# Patient Record
Sex: FEMALE | Race: WHITE | NOT HISPANIC OR LATINO | Employment: OTHER | URBAN - METROPOLITAN AREA
[De-identification: names, ages, dates, MRNs, and addresses within clinical notes are randomized per-mention and may not be internally consistent; named-entity substitution may affect disease eponyms.]

---

## 2017-10-19 ENCOUNTER — ALLSCRIPTS OFFICE VISIT (OUTPATIENT)
Dept: OTHER | Facility: OTHER | Age: 52
End: 2017-10-19

## 2017-10-19 DIAGNOSIS — M81.0 AGE-RELATED OSTEOPOROSIS WITHOUT CURRENT PATHOLOGICAL FRACTURE: ICD-10-CM

## 2017-10-19 DIAGNOSIS — R00.0 TACHYCARDIA: ICD-10-CM

## 2017-10-19 DIAGNOSIS — E53.8 DEFICIENCY OF OTHER SPECIFIED B GROUP VITAMINS (CODE): ICD-10-CM

## 2017-10-19 DIAGNOSIS — R06.02 SHORTNESS OF BREATH: ICD-10-CM

## 2017-10-20 NOTE — CONSULTS
Assessment  1  Positive HELENE (antinuclear antibody) (795 79) (R76 8)   2  Lupus anticoagulant positive (795 79) (R76 0)   3  B12 deficiency (266 2) (E53 8)   4  Osteoporosis (733 00) (M81 0)   5  Family history of cerebrovascular accident (CVA) (V17 1) (Z82 3) : Father   6  Family history of hypertension (V17 49) (Z82 49) : Father   7  Family history of diabetes mellitus (V18 0) (Z83 3) : Mother, Uncle   8  SOB (shortness of breath) (786 05) (R06 02)    Plan   B12 deficiency    · (1) CBC/PLT/DIFF; Status:Active; Requested BGQ:[id number];    Perform:[facility]; EOF:[id number]; Ordered; For:B12 deficiency; Ordered By:Trinidad March;   · (1) COMPREHENSIVE METABOLIC PANEL; Status:Active; Requested PGU:[id number];    Perform:[facility]; [id number]; Ordered; For:B12 deficiency; Ordered By:Trinidad March;   · (1) GASTRIN; Status:Active; Requested [id number];    Perform:[facility]; YDD:[id number]; Ordered; For:B12 deficiency; Ordered By:Trinidad March;   · (1) HOMOCYSTEINE; Status:Active; Requested MPB:[id number];    Perform:[facility]; ACV:[id number]; Ordered; For:B12 deficiency; Ordered By:Trinidad March;   · (1) INTRINSIC FACTOR BLOCKING ANTIBODY; Status:Active; Requested [id number]:[id number];    Perform:[facility]; KQF:[id number]; Ordered; For:B12 deficiency; Ordered By:Trinidad March;   · (1) METHYLMALONIC ACID,BLOOD; Status:Active; Requested XOF:[id number];    Perform:[facility]; JLY:[id number]; Ordered; For:B12 deficiency; Ordered By:Trinidad March;   · (1) VITAMIN B12; Status:Active; Requested QPE:[id number];    Perform:[facility]; KWE:[id number]; Ordered; For:B12 deficiency; Ordered By:Trinidad March;   · (Q) GASTRIC PARIETAL CELL ANTIBODY, GAMA; Status:Active; Requested  YMI:[id number];    Perform:[facility]; Due:2018; Ordered; For:B12 deficiency;  Ordered By:Trinidad March;   · Follow-up visit in 6 weeks Evaluation and Treatment Follow-up  Status: Complete  Done:  25ALL9733 09:15AM   Ordered; For: B12 deficiency; Ordered By: Burt Kennedy Performed:  Due: 29RGX0258; Last Updated By: Reyes Heir; 10/19/2017 10:59:06 AM  Lupus anticoagulant positive    · 3 - [facility]  (Rheumatology) Co-Management  *CHRONICALLY + HELENE, + LUPUS  ANTICOAGULANT; DX WITH LUPUS-LIKE DISEASE  NEVER TREATED  RECORDS  FROM CURRENT RHEUMATOLOGIST SCANNED IN [facility]  Status: Hold For -  Scheduling  Requested for: [id number]   Ordered; For: Lupus anticoagulant positive; Ordered By: Burt Kennedy Performed:  Due: 88IJQ2334  are Referring to a non- Preferred Provider : Established Patient  Care Summary provided  : Yes   · Rheumatology Referral Other Co-Management  *  CHRONICALLY + HELENE, + LUPUS  ANTICOAGULANT; DX WITH LUPUS-LIKE DISEASE  NEVER TREATED  RECORDS  FROM CURRENT RHEUMATOLOGIST SCANNED IN [facility]  Status: Active   Requested for: 20VYY7729   Ordered; For: Lupus anticoagulant positive; Ordered By: Burt Kennedy Performed:  Due: 72PVN9258  are Referring to a non- Preferred Provider : Services not provided in network  Care Summary provided  : Yes  SOB (shortness of breath)    · [facility] - [facility] (Cardiology) Co-Management  *Question of [address]  Chronic SOB  Fatigue  Estimated PA systolic pressure on stress echo at [facility] 8/13/2012 increased to  5mm mercury 'suggestive of significant exercise induced pulmonary hypertension  Status: Active  Requested for: [id number]   Ordered; For: SOB (shortness of breath); Ordered By: Burt Kennedy Performed:  Due: 16PWF1906  Care Summary provided  : Yes    * DXA BONE DENSITY SPINE HIP AND PELVIS; Status:Need Information - Financial Authorization; Requested UJK:[id number] 05:00PM;   Perform:[facility]; DHB:[id number]; Last Updated By:Bettie Power; 10/19/2017 2:27:27 PM;Ordered;    For:Osteoporosis; Ordered By:Jake March; Discussion/Summary    1   B12 deficiency without evidence of anemia  B12 184( 211-946) 10/6/2017  History of Nissen fundoplication 7120 for severe GERD  Lupus anticoagulant + on at least 3 separate occasions: (prior to 3/13/2015) 7/11/2016 and 10/6/2017  IgM anticardiolipin antibody: 18 10/6/2017Persistently positive HELENE  Lupus-like disease / lupus - has not been treated  Wants to see new rheumatologist  Referral made  Osteoporosis L-spine diagnosed 28 y/o  Dexa many years ago  Ordered  S/P hysterectomy without oophorectomy 39 y/o  Persistent SOB, chronic cough  Stress echo at [facility] suggestive of PAH  Saw [address] specialist who did not agree  Wishes to have another opinion  Referred  has B12 1000mcg/ML vials for single use from Dr Zenon Villagomez but did not receive needles/ syringes  She is comfortable with self IM administration and needles/ syringes rx written  It would be a hardship for patient financially and timewise to travel to Dr Seema Roper office for administration  1000mcg 2/week x 4-6 weeks recommended  Will evaluate for potential causes of B12 deficiency serologically  With history of Nissen fundoplication she may have poor absorption, with + autoimmune tests, I question if she also has autoantibodies to intrinsic factor or gastric parietal cells (pernicious anemia)  Repeat EGD to be considered in the future  is asked to f/u in 4-6 weeks with lab work  made for rheumatologic opinion regarding +/- Lupus, cardiology opinion for chronic SOB, +/- PAH  Continue ASA 81 mg po  History of Present Illness  Sophie Zaidi is a 45year old female seen 10/18/2017 for initial evaluation , self referred, secondary to low Vitamin B12 level  labs at [facility]: hemoglobin 13 2, mcv 90, RDW 14 3, WBC 6 2, 68% neutrophils, 21% lymphs, 8% monocytes 2% eosinophils, platelet count 021,432  Iron saturation 15%, ferritin 52 Vitamin B12 184( 211-946) Tsh 1 98, free T4 1 18, negative lyme antibody  normal, Anti-scleroderma antibodies normal, anti-DS DNA normal, Styles AB normal, RNP antibodies normal  Anti-centromere AB normal  Normal sed rate  Dense fine speckled pattern is noted which suggest presence of  antibody which has a low prevalence in systemic autoimmune rheumatic diseases  Normal immunofixation on SPEP  Normal serum immunoglobulins  CCP antibodies IgG and IgAidentified hyaline casts  panel was normal  CMP normalANA  POSITIVE Lupus anticoagulant  IgM anticardiolipin antibody 18 (Indeterminate)  Normal IgG anticardiolipin antibody , normal IgA anticardiolipin antibody  normal anticardiolipin antibodies  Lyme negative  ANCA profile, normal C3 and C4 complement, normal CRP  Normal thyroglobulin profile  Normal ESR  + LUPUS ANTICOAGULANT  positive  [facility] intake 3/13/2015 she noted that she had a history of + lupus anticoagulant  Raymundo Chatman states she 1st had HELENE evaluated and was positive in 2012  is extremely frustrated  She states she has been having symptoms of SOB, fatigue and has been diagnosed with a lupus-like disease but has not received any disease directed therapy  She took prednisone years ago without benefit and significant weight gain  Was previously seen at [address] [place] [address] by Yady Templeton in 2012 regarding chronic cough, SOB without history of asthma, allergies  Quit smoking at 34years old after 15-20 pack years  PND treated and cough improved but ANSARI persisted  Bronchoscopy was normal  PFTs normal  Evaluation unrevealing to pulmonary source for her dyspnea  No benefit with Advair or high dose steroids  Falls asleep easily  Normal echo  Noted to have edema and sleep apnea suspected  She was advised to increase Lasix and have sleep study  Gina, a cardiologist at the [facility] had her undergo echocardiogram, however, poor images were obtained  She been had a stress echocardiogram and a question of pulmonary artery hypertension and was raised   stress echocardiogram report from [address]: estimated P  a systolic pressure increased to 56 mm mercury suggestive of significant exercise induced pulmonary hypertensionstates she saw Dr Darnell Smalls, a specialist at [address] [place] [address] who didn't examine me and told me I look too good to have pulmonary artery hypertension  He did not perform additional testing but then referred her to Dr Katherine Birmingham, a cardiac electrophysiologist at [facility] due to resting heart rate 114-120s who informed her she had a + HELENE  saw Dr Kelvin Chaudhary a few times but felt pressured by him as he wanted to perform a lip biopsy to evaluate for Sjogren's syndrome  She has been seeing TERI Cutler  at the rheumatology center of [facility] but has not seeing him in approximately a year and a half as she states he was requesting repeated blood work that was not moving forward towards treating any symptoms she was having and feels that he can be adverserial    intolerant  Osteoporosis diagnosed at 26 y/o in Lumbar spine  Partial Hysterectomy 1996 for endometrial hyperplasia  Ovaries spared  at 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 arranged for her to see Jah Fisher MD in [place], [place] who ordered the above labs  Ginny Painter states that this was the first time her B12 level had been checked  She was prescribed B12 by Dr Otto Shields for 1000mcg IM twice weekly administration but does not syringes or needles  Ginny Painter is very comfortable with injecting herself IM  s/sx pancreatic insufficiency  She does not take PPI or H2- blocker  She does not drink alcohol regularly  has never had a blood clot  He has 2 grown children ages 28 and 29  Did have a first trimester miscarriage around the 10th week in between the birth of her 2 children  She takes Aspirin  last EGD was 3-4 years ago performed by Dr Rip Harris at [facility]  She is s/p Nissen Fundoplication for severe GERD   She had a colonoscopy previously  saw Dr Sade Edouard [facility]13/2015 regarding persistently + HELENE and IGM anticardiolipin antibody  There was also question whether or not she had cotton wool spots secondary to embolic phenomenon  Patient states ultimately this was ruled out  Aspirin was hematologic recommendation at that time  Review of Systems    Constitutional: feeling poorly,-- recent weight gain-- and-- feeling tired, but-- no fever,-- no chills-- and-- no recent weight loss  Eyes: No complaints of eye pain, no red eyes, no eyesight problems, no discharge, no dry eyes, no itching of eyes  ENT: nosebleeds, but-- no earache,-- no sore throat,-- no hearing loss,-- no nasal discharge-- and-- no hoarseness  Cardiovascular: fast heart rate  Respiratory: shortness of breath-- and-- shortness of breath during exertion  Gastrointestinal: No complaints of abdominal pain, no constipation, no nausea or vomiting, no diarrhea, no bloody stools  Genitourinary: s/p hysterectomy 26 y/o, but-- No complaints of dysuria, no incontinence, no pelvic pain, no dysmenorrhea, no vaginal discharge or bleeding  Musculoskeletal: joint stiffness  Integumentary: No complaints of skin rash or lesions, no itching, no skin wounds, no breast pain or lump  Neurological: numbness,-- tingling-- and-- Patient reports EMG BLE inconclusive  Psychiatric: Not suicidal, no sleep disturbance, no anxiety or depression, no change in personality, no emotional problems  Endocrine: No complaints of proptosis, no hot flashes, no muscle weakness, no deepening of the voice, no feelings of weakness  Active Problems  1  Breast lump (611 72) (N63 0)   2  Encounter for routine gynecological examination (V72 31) (Z01 419)   3  Encounter for screening mammogram for malignant neoplasm of breast (V76 12)   (Z12 31)   4  Hot flashes (627 2) (N95 1)   5  Lupus anticoagulant positive (795 79) (R76 0)   6   Night sweats (780  8) (R61)   7  Positive HELENE (antinuclear antibody) (795 79) (R76 8)    Past Medical History  1  History of Endometrial hyperplasia (621 30) (N85 00)   2  History of encephalitis (V12 42) (Z86 61)   3  History of malignant neoplasm of cervix uteri (V10 41) (Z85 41)   4  History of osteopenia (V13 59) (Z87 39)   5  History of osteoporosis (V13 59) (Z87 39)   6  History of pneumonia (V12 61) (Z87 01)   7  History of urinary incontinence (V13 09) (Z87 898)   8  History of Maxillary sinusitis (473 0) (J32 0)    The active problems and past medical history were reviewed and updated today  Surgical History  1  History of Ankle Surgery   2  History of Breast Surgery Reduction Procedure   3  History of Cholecystectomy   4  History of Esophagogastric Fundoplasty Laparoscopic   5  History of Hand Arthrodesis IP Joint   6  History of Tonsillectomy   7  History of Vaginal Hysterectomy    The surgical history was reviewed and updated today  Family History  Mother    1  Family history of diabetes mellitus (V18 0) (Z83 3)   2  Family history of malignant neoplasm of uterus (V16 49) (Z80 49)   3  Family history of pancreatic cancer (V16 0) (Z80 0)  Father    4  Family history of cerebrovascular accident (CVA) (V17 1) (Z82 3)   5  Family history of hypertension (V17 49) (Z82 49)  Uncle    6  Family history of diabetes mellitus (V18 0) (Z83 3)    The family history was reviewed and updated today  Social History   · Daily caffeinated coffee consumption   · Exercise habits   · Former smoker (V15 82) (Y01 210)   ·    · Occasional alcohol use   · Sexually active   · Two children   · Denied: History of Uses drugs daily  The social history was reviewed and updated today  The social history was reviewed and is unchanged  Current Meds   1  Aspirin 81 MG TABS; Therapy: (Recorded:13Mar2015) to Recorded    Allergies  1  Augmentin SUSR   2   Dilantin CAPS    Vitals  Vital Signs    Recorded: 69RIU0340 09:37AM Temperature 98 F   Heart Rate 103   Respiration 16   Systolic 697   Diastolic 90   Height 5 ft 2 5 in   Weight 225 lb    BMI Calculated 40 5   BSA Calculated 2 02   O2 Saturation 95   Pain Scale 0     Physical Exam    Constitutional   General appearance: No acute distress, well appearing and well nourished  Eyes   Conjunctiva and lids: No swelling, erythema or discharge  Pupils and irises: Equal, round and reactive to light  Ears, Nose, Mouth, and Throat   External inspection of ears and nose: Normal     Oropharynx: Normal with no erythema, edema, exudate or lesions  Pulmonary   Respiratory effort: No increased work of breathing or signs of respiratory distress  Auscultation of lungs: Clear to auscultation  Cardiovascular   Palpation of heart: Normal PMI, no thrills  Auscultation of heart: Normal rate and rhythm, normal S1 and S2, without murmurs  Examination of extremities for edema and/or varicosities: Normal     Abdomen   Abdomen: Non-tender, no masses  Liver and spleen: No hepatomegaly or splenomegaly  Lymphatic   Palpation of lymph nodes in neck: No lymphadenopathy  Musculoskeletal   Gait and station: Normal     Digits and nails: Normal without clubbing or cyanosis  Inspection/palpation of joints, bones, and muscles: Normal     Skin   Skin and subcutaneous tissue: Normal without rashes or lesions  Psychiatric   Orientation to person, place, and time: Normal     Mood and affect: Normal          Future Appointments    Date/Time Provider Specialty Site   11/27/2017 09:15 AM DANUTA Vides  [facility]     Signatures   Electronically signed by : Kim Lion [facility]; Oct 19 2017  3:47PM EST                       (Author)    Electronically signed by :  DANUTA Jackson ; Oct 19 2017  5:46PM EST                       (Author)

## 2017-10-31 ENCOUNTER — HOSPITAL ENCOUNTER (OUTPATIENT)
Dept: RADIOLOGY | Age: 52
Discharge: HOME | End: 2017-10-31
Payer: COMMERCIAL

## 2017-10-31 DIAGNOSIS — M81.0 AGE-RELATED OSTEOPOROSIS WITHOUT CURRENT PATHOLOGICAL FRACTURE: ICD-10-CM

## 2017-10-31 PROCEDURE — 77080 DXA BONE DENSITY AXIAL: CPT

## 2017-11-08 ENCOUNTER — ALLSCRIPTS OFFICE VISIT (OUTPATIENT)
Dept: OTHER | Facility: OTHER | Age: 52
End: 2017-11-08

## 2017-11-08 ENCOUNTER — TRANSCRIBE ORDERS (OUTPATIENT)
Dept: ADMINISTRATIVE | Facility: HOSPITAL | Age: 52
End: 2017-11-08

## 2017-11-08 ENCOUNTER — GENERIC CONVERSION - ENCOUNTER (OUTPATIENT)
Dept: OTHER | Facility: OTHER | Age: 52
End: 2017-11-08

## 2017-11-08 DIAGNOSIS — R06.2 WHEEZING: Primary | ICD-10-CM

## 2017-11-11 ENCOUNTER — HOSPITAL ENCOUNTER (OUTPATIENT)
Dept: RADIOLOGY | Facility: HOSPITAL | Age: 52
Discharge: HOME | End: 2017-11-11
Attending: INTERNAL MEDICINE
Payer: COMMERCIAL

## 2017-11-11 ENCOUNTER — TRANSCRIBE ORDERS (OUTPATIENT)
Dept: RADIOLOGY | Facility: HOSPITAL | Age: 52
End: 2017-11-11

## 2017-11-11 DIAGNOSIS — R06.02 SHORTNESS OF BREATH: ICD-10-CM

## 2017-11-11 DIAGNOSIS — R00.0 TACHYCARDIA: ICD-10-CM

## 2017-11-11 PROCEDURE — 78582 LUNG VENTILAT&PERFUS IMAGING: CPT

## 2017-11-11 PROCEDURE — A9540 TC99M MAA: HCPCS

## 2017-11-11 PROCEDURE — 71020 HB CHEST X-RAY 2VW FRONTAL&LATL: CPT

## 2017-11-11 PROCEDURE — A9558 XE133 XENON 10MCI: HCPCS

## 2017-11-18 ENCOUNTER — APPOINTMENT (OUTPATIENT)
Dept: LAB | Facility: CLINIC | Age: 52
End: 2017-11-18
Payer: COMMERCIAL

## 2017-11-18 ENCOUNTER — TRANSCRIBE ORDERS (OUTPATIENT)
Dept: LAB | Facility: CLINIC | Age: 52
End: 2017-11-18

## 2017-11-18 DIAGNOSIS — E53.8 DEFICIENCY OF OTHER SPECIFIED B GROUP VITAMINS (CODE): ICD-10-CM

## 2017-11-18 DIAGNOSIS — E53.8 ADENOSYLCOBALAMIN SYNTHESIS DEFECT: ICD-10-CM

## 2017-11-18 DIAGNOSIS — E53.8 ADENOSYLCOBALAMIN SYNTHESIS DEFECT: Primary | ICD-10-CM

## 2017-11-18 LAB
ALBUMIN SERPL BCP-MCNC: 3.4 G/DL (ref 3.5–5)
ALP SERPL-CCNC: 108 U/L (ref 46–116)
ALT SERPL W P-5'-P-CCNC: 23 U/L (ref 12–78)
ANION GAP SERPL CALCULATED.3IONS-SCNC: 7 MMOL/L (ref 4–13)
AST SERPL W P-5'-P-CCNC: 11 U/L (ref 5–45)
BASOPHILS # BLD AUTO: 0.03 THOUSANDS/ΜL (ref 0–0.1)
BASOPHILS NFR BLD AUTO: 1 % (ref 0–1)
BILIRUB SERPL-MCNC: 0.4 MG/DL (ref 0.2–1)
BUN SERPL-MCNC: 15 MG/DL (ref 5–25)
CALCIUM SERPL-MCNC: 9.1 MG/DL (ref 8.3–10.1)
CHLORIDE SERPL-SCNC: 103 MMOL/L (ref 100–108)
CO2 SERPL-SCNC: 29 MMOL/L (ref 21–32)
CREAT SERPL-MCNC: 0.91 MG/DL (ref 0.6–1.3)
EOSINOPHIL # BLD AUTO: 0.26 THOUSAND/ΜL (ref 0–0.61)
EOSINOPHIL NFR BLD AUTO: 6 % (ref 0–6)
ERYTHROCYTE [DISTWIDTH] IN BLOOD BY AUTOMATED COUNT: 12.8 % (ref 11.6–15.1)
GFR SERPL CREATININE-BSD FRML MDRD: 72 ML/MIN/1.73SQ M
GLUCOSE SERPL-MCNC: 97 MG/DL (ref 65–140)
HCT VFR BLD AUTO: 40.2 % (ref 34.8–46.1)
HCYS SERPL-SCNC: 9.7 UMOL/L (ref 3.7–11.2)
HGB BLD-MCNC: 13.1 G/DL (ref 11.5–15.4)
LYMPHOCYTES # BLD AUTO: 1.46 THOUSANDS/ΜL (ref 0.6–4.47)
LYMPHOCYTES NFR BLD AUTO: 31 % (ref 14–44)
MCH RBC QN AUTO: 29.6 PG (ref 26.8–34.3)
MCHC RBC AUTO-ENTMCNC: 32.6 G/DL (ref 31.4–37.4)
MCV RBC AUTO: 91 FL (ref 82–98)
MONOCYTES # BLD AUTO: 0.31 THOUSAND/ΜL (ref 0.17–1.22)
MONOCYTES NFR BLD AUTO: 7 % (ref 4–12)
NEUTROPHILS # BLD AUTO: 2.68 THOUSANDS/ΜL (ref 1.85–7.62)
NEUTS SEG NFR BLD AUTO: 55 % (ref 43–75)
PLATELET # BLD AUTO: 312 THOUSANDS/UL (ref 149–390)
PMV BLD AUTO: 10.5 FL (ref 8.9–12.7)
POTASSIUM SERPL-SCNC: 3.9 MMOL/L (ref 3.5–5.3)
PROT SERPL-MCNC: 6.8 G/DL (ref 6.4–8.2)
RBC # BLD AUTO: 4.43 MILLION/UL (ref 3.81–5.12)
SODIUM SERPL-SCNC: 139 MMOL/L (ref 136–145)
VIT B12 SERPL-MCNC: 760 PG/ML (ref 100–900)
WBC # BLD AUTO: 4.74 THOUSAND/UL (ref 4.31–10.16)

## 2017-11-18 PROCEDURE — 86340 INTRINSIC FACTOR ANTIBODY: CPT

## 2017-11-18 PROCEDURE — 82941 ASSAY OF GASTRIN: CPT

## 2017-11-18 PROCEDURE — 83516 IMMUNOASSAY NONANTIBODY: CPT

## 2017-11-18 PROCEDURE — 82607 VITAMIN B-12: CPT

## 2017-11-18 PROCEDURE — 85025 COMPLETE CBC W/AUTO DIFF WBC: CPT

## 2017-11-18 PROCEDURE — 80053 COMPREHEN METABOLIC PANEL: CPT

## 2017-11-18 PROCEDURE — 83090 ASSAY OF HOMOCYSTEINE: CPT

## 2017-11-18 PROCEDURE — 83918 ORGANIC ACIDS TOTAL QUANT: CPT

## 2017-11-18 PROCEDURE — 36415 COLL VENOUS BLD VENIPUNCTURE: CPT

## 2017-11-20 LAB — IF BLOCK AB SER QL RIA: 1 AU/ML (ref 0–1.1)

## 2017-11-21 LAB
GASTRIN SERPL-MCNC: 20 PG/ML (ref 0–115)
PCA AB SER-ACNC: 1.1 UNITS (ref 0–20)

## 2017-11-22 LAB — METHYLMALONATE SERPL-SCNC: 121 NMOL/L (ref 0–378)

## 2017-11-27 ENCOUNTER — HOSPITAL ENCOUNTER (OUTPATIENT)
Dept: NON INVASIVE DIAGNOSTICS | Facility: CLINIC | Age: 52
Discharge: HOME | End: 2017-11-27
Payer: COMMERCIAL

## 2017-11-27 ENCOUNTER — ALLSCRIPTS OFFICE VISIT (OUTPATIENT)
Dept: OTHER | Facility: OTHER | Age: 52
End: 2017-11-27

## 2017-11-27 DIAGNOSIS — E53.8 DEFICIENCY OF OTHER SPECIFIED B GROUP VITAMINS (CODE): ICD-10-CM

## 2017-11-27 DIAGNOSIS — R06.02 SHORTNESS OF BREATH: ICD-10-CM

## 2017-11-27 DIAGNOSIS — R00.0 TACHYCARDIA: ICD-10-CM

## 2017-11-27 PROCEDURE — 93306 TTE W/DOPPLER COMPLETE: CPT

## 2017-11-28 NOTE — PROGRESS NOTES
Assessment    1  B12 deficiency (266 2) (E53 8)   2  Positive HELENE (antinuclear antibody) (795 79) (R76 8)    Plan  B12 deficiency    · Cyanocobalamin 1000 MCG/ML Injection Solution; 1000 mcg IM 2x a month--3month supply   Rx By: Elizabeth Mack); Dispense: 0 Days ; #:6 ML; Refill: 5;For: B12 deficiency; VINCENT = N; Verified Transmission to [address] [place] [address] [address]; Last Updated By: System, [employer]; 11/27/2017 9:50:25 AM   · (1) CBC/PLT/DIFF; Status:Active; Requested for:27Nov2017;    Perform:[facility]; CSC:[id number]; Ordered; For:B12 deficiency; Ordered By:Margarette Brewster);   · (1) VITAMIN B12; Status:Active; Requested for:27Nov2017;    Perform:[facility]; EOD:[id number]; Ordered;deficiency; Ordered By:Margarette Brewster); · Follow-up visit in 1 year Evaluation and Treatment  Follow-up  Status: Hold For -Scheduling  Requested for: [id number]   Ordered;B12 deficiency; Ordered By: Elizabeth Mack) Performed:  Due: [id number]    Discussion/Summary  Discussion Summary:   1  vitamin B12 deficiency in a patient was diagnosed with autoimmune connective tissue disease most likely secondary to malabsorption as well because of history of gastric fundoplication secondary to GERDVitamin B12 1000 mcg every other week IM shotAspirin 81 mg p o  b i d  with a history of lupus anticoagulant antibodies she never had any history of thrombosis, DVT, PEFollow-up in 1 year with CBC, vitamin B12 level  History of Present Illness  HPI: Jayda Mauricio is a 45year old female seen 10/18/2017 for initial evaluation , self referred, secondary to low Vitamin B12 level  [facility]: hemoglobin 13 2, mcv 90, RDW 14 3, WBC 6 2, 68% neutrophils, 21% lymphs, 8% monocytes 2% eosinophils, platelet count 675,096  Iron saturation 15%, ferritin 52 Vitamin B12 184( 211-946) Tsh 1 98, free T4 1 18, negative lyme antibody  normal, Anti-scleroderma antibodies normal, anti-DS DNA normal, Styles AB normal, RNP antibodies normal  Anti-centromere AB normal  Normal sed rate  Dense fine speckled pattern is noted which suggest presence of  antibody which has a low prevalence in systemic autoimmune rheumatic diseases  Normal immunofixation on SPEP  Normal serum immunoglobulins  CCP antibodies IgG and IgAidentified hyaline casts  panel was normal  CMP normalANA  POSITIVE Lupus anticoagulant  IgM anticardiolipin antibody 18 (Indeterminate)  Normal IgG anticardiolipin antibody , normal IgA anticardiolipin antibody  normal anticardiolipin antibodies  Lyme negative  ANCA profile, normal C3 and C4 complement, normal CRP  Normal thyroglobulin profile  Normal ESR  + LUPUS ANTICOAGULANT  positive  [facility] she noted that she had a history of + lupus anticoagulant  Vera Gonzalezmiles states she 1st had HELENE evaluated and was positive in 2012  is extremely frustrated  She states she has been having symptoms of SOB, fatigue and has been diagnosed with a lupus-like disease but has not received any disease directed therapy  She took prednisone years ago without benefit and significant weight gain  Was previously seen at [facility] [address] [place] [address] by Cecilia Dumas in 2012 regarding chronic cough, SOB without history of asthma, allergies  Quit smoking at 34years old after 15-20 pack years  PND treated and cough improved but ANSARI persisted  Bronchoscopy was normal  PFTs normal  Evaluation unrevealing to pulmonary source for her dyspnea  No benefit with Advair or high dose steroids  Falls asleep easily  Normal echo  Noted to have edema and sleep apnea suspected  She was advised to increase Lasix and have sleep study  Gina, a cardiologist at the [facility] had her undergo echocardiogram, however, poor images were obtained  She been had a stress echocardiogram and a question of pulmonary artery hypertension and was raised  stress echocardiogram report from [address]: estimated P  a systolic pressure increased to 56 mm mercury suggestive of significant exercise induced pulmonary hypertensionstates she saw Dr Jesus Caldwell, a specialist at [address] [place] [address] who didn't examine me and told me I look too good to have pulmonary artery hypertension  He did not perform additional testing but then referred her to Dr Caroline Castellanos, a cardiac electrophysiologist at [facility] due to resting heart rate 114-120s who informed her she had a + HELENE  saw Dr Saumya Iniguez a few times but felt pressured by him as he wanted to perform a lip biopsy to evaluate for Sjogren's syndrome  She has been seeing TERI Ovalle  at the rheumatology center of [facility] but has not seeing him in approximately a year and a half as she states he was requesting repeated blood work that was not moving forward towards treating any symptoms she was having and feels that he can be adverserial   intolerant  Osteoporosis diagnosed at 26 y/o in Lumbar spine  Partial Hysterectomy 1996 for endometrial hyperplasia  Ovaries spared  at [facility] [place]  she returned from a vacation and had 3+ pitting edema  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, 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 arranged for her to see John Carrillo MD in [place], [place] who ordered the above labs  Madelaine Simmonds states that this was the first time her B12 level had been checked  She was prescribed B12 by Dr Starr Castillo for 1000mcg IM twice weekly administration but does not syringes or needles  Madelaine Simmonds is very comfortable with injecting herself IM s/sx pancreatic insufficiency  She does not take PPI or H2- blocker  She does not drink alcohol regularly  has never had a blood clot  He has 2 grown children ages 28 and 29  Did have a first trimester miscarriage around the 10th week in between the birth of her 2 children  She takes Aspirin  last EGD was 3-4 years ago performed by Dr Joseluis Slater at [facility]  She is s/p Nissen Fundoplication for severe GERD  She had a colonoscopy previously    saw Dr Donna Jean with [facility] 2/13/2015 regarding persistently + HELENE and IGM anticardiolipin antibody  There was also question whether or not she had cotton wool spots secondary to embolic phenomenon  Patient states ultimately this was ruled out  Aspirin was hematologic recommendation at that time  Interval History: Evaluated at [facility] and was diagnosed with auto connective tissue disease currently on Plaquenil 200 mg p  o  b i d  since November 2017B12 1000 mcg IM twice weekly for the past month because of fundoplication secondary to GERD      Review of Systems  Complete-Female:  Constitutional: feeling poorly,-- recent --Ulb weight gain-- and-- feeling tired, but-- no fever,-- no chills-- and-- no recent weight loss  Eyes: No complaints of eye pain, no red eyes, no eyesight problems, no discharge, no dry eyes, no itching of eyes  ENT: nosebleeds, but-- no earache,-- no sore throat,-- no hearing loss,-- no nasal discharge-- and-- no hoarseness  Cardiovascular: fast heart rate  Respiratory: shortness of breath-- and-- shortness of breath during exertion  Gastrointestinal: No complaints of abdominal pain, no constipation, no nausea or vomiting, no diarrhea, no bloody stools  Genitourinary: s/p hysterectomy 28 y/o, but-- No complaints of dysuria, no incontinence, no pelvic pain, no dysmenorrhea, no vaginal discharge or bleeding  Musculoskeletal: joint stiffness  Integumentary: No complaints of skin rash or lesions, no itching, no skin wounds, no breast pain or lump  Neurological: numbness,-- tingling-- and-- Patient reports EMG BLE inconclusive  Psychiatric: Not suicidal, no sleep disturbance, no anxiety or depression, no change in personality, no emotional problems  Endocrine: No complaints of proptosis, no hot flashes, no muscle weakness, no deepening of the voice, no feelings of weakness  Active Problems    1  B12 deficiency (266 2) (E50 4)   2   Breast lump (611 72) (N63 0)   3  Encounter for routine gynecological examination (V72 31) (Z01 419)   4  Encounter for screening mammogram for malignant neoplasm of breast (V76 12) (Z12 31)   5  Hot flashes (627 2) (N95 1)   6  Lupus anticoagulant positive (795 79) (R76 0)   7  Night sweats (780 8) (R61)   8  Osteoporosis (733 00) (M81 0)   9  Positive HELENE (antinuclear antibody) (795 79) (R76 8)   10  Sinus tachycardia (427 89) (R00 0)   11  SOB (shortness of breath) (786 05) (R06 02)    Past Medical History  1  History of Endometrial hyperplasia (621 30) (N85 00)   2  History of encephalitis (V12 42) (Z86 61)   3  History of malignant neoplasm of cervix uteri (V10 41) (Z85 41)   4  History of osteopenia (V13 59) (Z87 39)   5  History of osteoporosis (V13 59) (Z87 39)   6  History of pneumonia (V12 61) (Z87 01)   7  History of urinary incontinence (V13 09) (Z87 898)   8  History of Maxillary sinusitis (473 0) (J32 0)    Surgical History    1  History of Ankle Surgery   2  History of Breast Surgery Reduction Procedure   3  History of Cholecystectomy   4  History of Esophagogastric Fundoplasty Laparoscopic   5  History of Hand Arthrodesis IP Joint   6  History of Tonsillectomy   7  History of Vaginal Hysterectomy    Family History  Mother    1  Family history of diabetes mellitus (V18 0) (Z83 3)   2  Family history of malignant neoplasm of uterus (V16 49) (Z80 49)   3  Family history of pancreatic cancer (V16 0) (Z80 0)  Father    4  Family history of cerebrovascular accident (CVA) (V17 1) (Z82 3)   5  Family history of hypertension (V17 49) (Z82 49)  Uncle    6  Family history of diabetes mellitus (V18 0) (Z83 3)    Social History     · Daily caffeinated coffee consumption   · Exercise habits   · Former smoker (H54 30) (I18 567)   ·    · Occasional alcohol use   · Sexually active   · Two children   · Denied: History of Uses drugs daily    Current Meds   1  Aspirin 81 MG TABS;  Therapy: (Recorded:13Mar2015) to Recorded   2  B-12 Compliance Injection 1000 MCG/ML Injection Kit; 1000 mcg 2 times per week; Therapy: 84GEW1130 to (Last Rx:13Nov2017)  Requested for: 49LYV4307 Ordered   3  Furosemide 20 MG Oral Tablet; one po qd; Therapy: 72LLR2463 to Recorded   4  Plaquenil TABS; Therapy: (Recorded:27Nov2017) to Recorded    Allergies  1  Augmentin SUSR   2  Dilantin CAPS    Vitals  Vital Signs    Recorded: 90YED3361 09:20AM   Temperature 98 7 F   Heart Rate 102   Respiration 16   Systolic 169   Diastolic 80   Height 5 ft 2 5 in   Weight 227 lb    BMI Calculated 40 86   BSA Calculated 2 03   O2 Saturation 91       Physical Exam   Constitutional  General appearance: No acute distress, well appearing and well nourished  Eyes  Conjunctiva and lids: No swelling, erythema or discharge  Pupils and irises: Equal, round and reactive to light  Ears, Nose, Mouth, and Throat  External inspection of ears and nose: Normal    Oropharynx: Normal with no erythema, edema, exudate or lesions  Pulmonary  Respiratory effort: No increased work of breathing or signs of respiratory distress  Auscultation of lungs: Clear to auscultation  Cardiovascular  Palpation of heart: Normal PMI, no thrills  Auscultation of heart: Normal rate and rhythm, normal S1 and S2, without murmurs  Examination of extremities for edema and/or varicosities: Normal    Abdomen  Abdomen: Non-tender, no masses  Liver and spleen: No hepatomegaly or splenomegaly  Lymphatic  Palpation of lymph nodes in neck: No lymphadenopathy  Musculoskeletal  Gait and station: Normal    Digits and nails: Normal without clubbing or cyanosis  Inspection/palpation of joints, bones, and muscles: Normal    Skin  Skin and subcutaneous tissue: Normal without rashes or lesions     Psychiatric  Orientation to person, place, and time: Normal    Mood and affect: Normal          Results/Data  (1) CBC/PLT/DIFF 76SSH7559 08:44AM Aye Little Order Number: [id number] Test Name Result Flag Reference   WBC COUNT 4 74 Thousand/uL  4 31-10 16   RBC COUNT 4 43 Million/uL  3 81-5 12   HEMOGLOBIN 13 1 g/dL  11 5-15 4   HEMATOCRIT 40 2 %  34 8-46  1   MCV 91 fL  82-98   MCH 29 6 pg  26 8-34 3   MCHC 32 6 g/dL  31 4-37 4   RDW 12 8 %  11 6-15 1   MPV 10 5 fL  8 9-12 7   PLATELET COUNT 778 Thousands/uL  149-390   NEUTROPHILS RELATIVE PERCENT 55 %  43-75   LYMPHOCYTES RELATIVE PERCENT 31 %  14-44   MONOCYTES RELATIVE PERCENT 7 %  4-12   EOSINOPHILS RELATIVE PERCENT 6 %  0-6   BASOPHILS RELATIVE PERCENT 1 %  0-1   NEUTROPHILS ABSOLUTE COUNT 2 68 Thousands/? ??L  1 85-7 62   LYMPHOCYTES ABSOLUTE COUNT 1 46 Thousands/? ??L  0 60-4 47   MONOCYTES ABSOLUTE COUNT 0 31 Thousand/? ??L  0 17-1 22   EOSINOPHILS ABSOLUTE COUNT 0 26 Thousand/? ??L  0 00-0 61   BASOPHILS ABSOLUTE COUNT 0 03 Thousands/? ??L  0 00-0 10     (1) COMPREHENSIVE METABOLIC PANEL [id number] 54:31UK Rubén Be Order Number: [id number]     Test Name Result Flag Reference   GLUCOSE,RANDM 97 mg/dL       If the patient is fasting, the ADA then defines impaired fasting glucose as > 100 mg/dL and diabetes as > or equal to 123 mg/dL  Specimen collection should occur prior to Sulfasalazine administration due to the potential for falsely depressed results  Specimen collection should occur prior to Sulfapyridine administration due to the potential for falsely elevated results  SODIUM 139 mmol/L  136-145   POTASSIUM 3 9 mmol/L  3 5-5 3   CHLORIDE 103 mmol/L  100-108   CARBON DIOXIDE 29 mmol/L  21-32   ANION GAP (CALC) 7 mmol/L  4-13   BLOOD UREA NITROGEN 15 mg/dL  5-25   CREATININE 0 91 mg/dL  0 60-1 30   Standardized to IDMS reference method   CALCIUM 9 1 mg/dL  8 3-10 1   BILI, TOTAL 0 40 mg/dL  0 20-1 00   ALK PHOSPHATAS 108 U/L     ALT (SGPT) 23 U/L  12-78   Specimen collection should occur prior to Sulfasalazine administration due to the potential for falsely depressed results     AST(SGOT) 11 U/L 5-45   Specimen collection should occur prior to Sulfasalazine administration due to the potential for falsely depressed results  ALBUMIN 3 4 g/dL L 3 5-5 0   TOTAL PROTEIN 6 8 g/dL  6 4-8 2   eGFR 72 ml/min/1 73sq m       [facility] Disease Education Program recommendations are as follows: GFR calculation is accurate only with a steady state creatinine Chronic Kidney disease less than 60 ml/min/1 73 sq  meters Kidney failure less than 15 ml/min/1 73 sq  meters       (1) VITAMIN B12 [id number] 08:44AM Ashley Better Order Number: [id number]     Test Name Result Flag Reference   VITAMIN B12 760 pg/mL  100-900     (1) METHYLMALONIC ACID,BLOOD [id number] 08:44AM Ashley Better Order Number: [id number]     Test Name Result Flag Reference   METHYLMALONIC ACID 121 nmol/L  0 - 378     Performed at:  [address]  [id number] : Adriano Gonzalez MD, Phone:  [phone]     (1) HOMOCYSTEINE [id number] 08:44AM Ashley Better Order Number: [id number]     Test Name Result Flag Reference   HOMOCYSTEINE 9 7 umol/L  3 7-11 2     (1) INTRINSIC FACTOR BLOCKING ANTIBODY 90LTV2987 08:44AM Ashley Better Order Number: [id number]     Test Name Result Flag Reference   INTRINSIC FACTOR 1 0 AU/mL  0 0 - 1 1     Performed at:  [address]  [id number] : Adriano Gonzalez MD, Phone:  [phone]     (1) GASTRIN [id number] 08:44AM Ashley Better Order Number: [id number]     Test Name Result Flag Reference   GASTRIN 20 pg/mL  0 - 115     [employer] Immulite 2000 Immunochemiluminometric assay [facility])  Performed at:  [address]  [id number] : Adriano Gonzalez MD, Phone:  [phone]     (1) ANTI-PARIETAL ANTIBODY RUSSEL HAS GASTRIC PARIETAL CELL ANTIBODY [id number] 08:44AM [facility] Test Name Result Flag Reference   PARIETAL CELL ANTIBODY 1 1 Units  0 0 - 20 0     Negative    0 0 - 20 0                                 Equivocal  20 1 - 24 9                                 Positive         >24 9 Parietal Cell Antibodies are found in 90% of patients with pernicious anemia and 30% of first degree relatives with pernicious anemia  Performed at:  [address] [place] [address]  [id number] : Amilcar Cabrera MD, Phone:  [phone]     Future Appointments    Date/Time Provider Specialty Site   12/01/2017 04:00 PM DANUTA Klein , PhD Cardiology [facility]   Electronically signed by :  DANUTA Alberto ; Nov 27 2017  9:54AM EST                       (Author)

## 2017-12-01 ENCOUNTER — TRANSCRIBE ORDERS (OUTPATIENT)
Dept: ADMINISTRATIVE | Facility: HOSPITAL | Age: 52
End: 2017-12-01

## 2017-12-01 ENCOUNTER — ALLSCRIPTS OFFICE VISIT (OUTPATIENT)
Dept: OTHER | Facility: OTHER | Age: 52
End: 2017-12-01

## 2017-12-01 DIAGNOSIS — R06.02 SHORTNESS OF BREATH: Primary | ICD-10-CM

## 2017-12-01 DIAGNOSIS — R06.02 SHORTNESS OF BREATH: ICD-10-CM

## 2017-12-01 DIAGNOSIS — R00.0 SINUS TACHYCARDIA: Primary | ICD-10-CM

## 2017-12-04 ENCOUNTER — HOSPITAL ENCOUNTER (OUTPATIENT)
Dept: PULMONOLOGY | Facility: HOSPITAL | Age: 52
Discharge: HOME | End: 2017-12-04
Attending: INTERNAL MEDICINE
Payer: COMMERCIAL

## 2017-12-04 DIAGNOSIS — R06.02 SHORTNESS OF BREATH: ICD-10-CM

## 2017-12-04 PROCEDURE — 94726 PLETHYSMOGRAPHY LUNG VOLUMES: CPT

## 2017-12-04 PROCEDURE — 94729 DIFFUSING CAPACITY: CPT

## 2017-12-04 PROCEDURE — 94760 N-INVAS EAR/PLS OXIMETRY 1: CPT

## 2017-12-04 PROCEDURE — 94060 EVALUATION OF WHEEZING: CPT

## 2017-12-04 RX ORDER — ALBUTEROL SULFATE 2.5 MG/3ML
2.5 SOLUTION RESPIRATORY (INHALATION) ONCE
Status: COMPLETED | OUTPATIENT
Start: 2017-12-04 | End: 2017-12-04

## 2017-12-04 RX ADMIN — ALBUTEROL SULFATE 2.5 MG: 2.5 SOLUTION RESPIRATORY (INHALATION) at 17:45

## 2017-12-05 ENCOUNTER — GENERIC CONVERSION - ENCOUNTER (OUTPATIENT)
Dept: CARDIOLOGY CLINIC | Facility: CLINIC | Age: 52
End: 2017-12-05

## 2017-12-05 NOTE — PROGRESS NOTES
Plan  Sinus tachycardia, SOB (shortness of breath)    · ECHO STRESS TEST W CONTRAST IF INDICATED; Status:Need Information - FinancialAuthorization; Requested for:[id number];    Perform:[facility]; Order Comments:Please evaluate PA pressures and RV function  Please assess for RVOT signal  Assessing for exercise induced pulmonary HTN ; Due:[id number]; Last Updated By:[facility]; 12/1/2017 5:17:27 PM;Ordered; ASAP;For:Sinus tachycardia, SOB (shortness of breath); Ordered By:Joseluis Baird;  SOB (shortness of breath)    · Furosemide 20 MG Oral Tablet; one po qd   Rx By: Carine Daley; Dispense: 30 Days ; #:30 Tablet; Refill: 3;For: SOB (shortness of breath); VINCENT = N; Verified Transmission to [facility]; Last Updated By: System, [employer]; 12/1/2017 4:36:44 PM   · Complete PFT; Status:Active; Requested for:[id number];    Perform:[facility]; SHA:[id number]; Last Updated By:[facility]; 12/1/2017 5:17:27 PM;Ordered;(shortness of breath); Ordered By:Joseluis Baird;    Discussion/Summary  Cardiology Discussion Summary Free Text Note Form [place]:   44 y/o woman w/  ANSARI: Unclear etiology  Although there is likely a component of obesity/deconditioning +/- diastolic dysfunction (obesity, female, currently on diuretics w/ hx of volume overload)  This does not fully explain her degree of dysfunction  Recent diagnosis of MCTD/SLE w/ + lupus anticoagulant  Remote smoker  She is at risk to develop pulmonary vascular disease leading to RV dysfunction  Currently RV appears normal on TTE with RV-PA coupling  There is no e/o of RVOT notching suggestive of normal PVR at rest  However, she did have a stress echo in 2012 that was suggestive of exercise induced pulmonary HTN  At the time she did not have e/o of elevated PVR  Her bubble was negative which makes an intracardiac shunt less likely  However, her symptoms have progressed since that time   In addition to TTE findings, currently, her V/Q scan is negative making PE/CTEPH less likely  As her symptoms have progressed and with ambulation she shows evidence of desaturation that may be due to abnormal air exchange we will proceed with the following:with possible lung imaging based on resultsstress echo to evaluate pulmonary pressures, RV, and RVOT signal w/ exercisethreshold for RHC +/- exercise in this situationworkup/tx based on above  ST: V/Q negative  May be 2/2 to deconditioning +/- inappropriate ST +/- diastolic dysfunction/HF (euvolemic on exam)  No e/o infection  to monitor, can consider coby agents depending on workup  in 1 month  Chief Complaint  Chief Complaint Free Text Note Form: F/U      History of Present Illness  Cardiology HPI Free Text Note Form  Michaela Rod: Very pleasant 44 y/o woman w/ PMHx of newly diagnosed MCTD/SLE, +lupus anti-coagulant, B12 deficiency, and obesity referred for evaluation of ANSARI  states that she first started getting SOB -- 10 years ago  She has had several episodes of bacterial PNA and chronic cough (a few times/year)  She was referred to [facility] in 2012 for workup for PH  She had a a stress echo 8/2012 that showed that her PA pressures more than doubled from --24 mmHg to > 50 mmHg  Currently, she states she can walk up a couple flights of stairs but does get SOB  She also gets SOB with walking up inclines  She gets occasional LH  She has been found to be B12 deficient, but that is improving  seeing a Rheumatologist in [place] but recently saw a specialist at [facility], Dr Uma Rodriges (Rheumatologist - 11/16)  She was diagnosed with MCTD and SLE  HELENE + 1:320 w/ speckled pattern  She has been on Plaquenil x 2 weeks now  She is on ASA for + lupus anticoagulant and anticardiolipin Abs  She has joint pains and a subtle malar rash  She states so far there has been no change with plaquenil   She has f/u with him in 2/2018   her visit with Dr Joel Beal, she has had TTE which shows LVEF --65%, normal RV size and function without RVOT notching  Normal atria  CXR clear  She also has had a negative V/Q scan   walked the patient in the hallway and had her go up and down stairs  Her resting HR went from --100 bpm to 110s with Pulse Ox starting from 98% @ rest to --90% with exercise  Pulse Ox showed a good waveform throughout  denies CP, palpitations, presyncope, or syncope  She notes that she went on a two week cruise and afterwards developed LE edema in the past and has developed LE edema  Review of Systems  Cardiology Female ROS:    Cardiac: as noted in HPI  Skin: No complaints of nonhealing sores or skin rash  Genitourinary: No complaints of recurrent urinary tract infections, frequent urination at night, difficult urination, blood in urine, kidney stones, loss of bladder control, kidney problems, denies any birth control or hormone replacement, is not post menopausal, not currently pregnant  Psychological: No complaints of feeling depressed, anxiety, panic attacks, or difficulty concentrating  General: No complaints of trouble sleeping, lack of energy, fatigue, appetite changes, weight changes, fever, frequent infections, or night sweats  Respiratory: shortness of breath-- and-- cough/sputum, but-- as noted in HPI  HEENT: No complaints of serious problems, hearing problems, nose problems, throat problems, or snoring  Gastrointestinal: No complaints of liver problems, nausea, vomiting, heartburn, constipation, bloody stools, diarrhea, problems swallowing, adbominal pain, or rectal bleeding  Hematologic: No complaints of bleeding disorders, anemia, blood clots, or excessive brusing  Neurological: No complaints of numbness, tingling, dizziness, weakness, seizures, headaches, syncope or fainting, AM fatigue, daytime sleepiness, no witnessed apnea episodes  Musculoskeletal: as noted in HPI   ROS Reviewed:   ROS reviewed  Active Problems  Problems    1  B12 deficiency (266 2) (E53 8)   2   Breast lump (461 27) (N63 0)   3  Encounter for routine gynecological examination (V72 31) (Z01 419)   4  Encounter for screening mammogram for malignant neoplasm of breast (V76 12) (Z12 31)   5  Hot flashes (627 2) (N95 1)   6  Lupus anticoagulant positive (795 79) (R76 0)   7  Night sweats (780 8) (R61)   8  Osteoporosis (733 00) (M81 0)   9  Positive HELENE (antinuclear antibody) (795 79) (R76 8)   10  Sinus tachycardia (427 89) (R00 0)   11  SOB (shortness of breath) (786 05) (R06 02)    Past Medical History  Problems    1  History of Endometrial hyperplasia (621 30) (N85 00)   2  History of encephalitis (V12 42) (Z86 61)   3  History of malignant neoplasm of cervix uteri (V10 41) (Z85 41)   4  History of osteopenia (V13 59) (Z87 39)   5  History of osteoporosis (V13 59) (Z87 39)   6  History of pneumonia (V12 61) (Z87 01)   7  History of urinary incontinence (V13 09) (Z87 898)   8  History of Maxillary sinusitis (473 0) (J32 0)  Active Problems And Past Medical History Reviewed: The active problems and past medical history were reviewed and updated today  Surgical History  Problems    1  History of Ankle Surgery   2  History of Breast Surgery Reduction Procedure   3  History of Cholecystectomy   4  History of Esophagogastric Fundoplasty Laparoscopic   5  History of Hand Arthrodesis IP Joint   6  History of Tonsillectomy   7  History of Vaginal Hysterectomy  Surgical History Reviewed: The surgical history was reviewed and updated today  Family History  Mother    1  Family history of diabetes mellitus (V18 0) (Z83 3)   2  Family history of malignant neoplasm of uterus (V16 49) (Z80 49)   3  Family history of pancreatic cancer (V16 0) (Z80 0)  Father    4  Family history of cerebrovascular accident (CVA) (V17 1) (Z82 3)   5  Family history of hypertension (V17 49) (Z82 49)  Uncle    6  Family history of diabetes mellitus (V18 0) (Z83 3)  Family History Reviewed: The family history was reviewed and updated today  Social History  Problems    · Daily caffeinated coffee consumption   · Exercise habits   · Former smoker (N79 53) (Q86 819)   ·    · Occasional alcohol use   · Sexually active   · Two children   · Denied: History of Uses drugs daily  Social History Reviewed: The social history was reviewed and updated today  The social history was reviewed and is unchanged  Current Meds   1  Aspirin 81 MG TABS; take 2 tablet daily; Therapy: (Recorded:31Dhz5564) to Recorded   2  Furosemide 20 MG Oral Tablet; one po qd; Therapy: [id number] to Recorded   3  Plaquenil 200 MG Oral Tablet; take 2 tablet daily; Therapy: (Recorded:09Fcj7944) to Recorded  Medication List Reviewed: The medication list was reviewed and updated today  Allergies  Medication    1  Augmentin SUSR   2  Dilantin CAPS    Vitals  Vital Signs    Recorded: 25TGL1513 03:57PM   Heart Rate 220   Systolic 485, RUE, Sitting   Diastolic 72, RUE, Sitting   BP CUFF SIZE Large   Height 5 ft 2 5 in   Weight 224 lb 6 oz   BMI Calculated 40 38   BSA Calculated 2 02   O2 Saturation 98       Physical Exam   Constitutional  General appearance: No acute distress, well appearing and well nourished  Eyes  Conjunctiva and Sclera examination: Conjunctiva pink, sclera anicteric  Ears, Nose, Mouth, and Throat - Oropharynx: Clear, nares are clear, mucous membranes are moist   Neck  Neck and thyroid: Normal, supple, trachea midline, no thyromegaly  -- No HJR  Pulmonary  Respiratory effort: No increased work of breathing or signs of respiratory distress  Auscultation of lungs: Clear to auscultation, no rales, no rhonchi, no wheezing, good air movement  Cardiovascular  Auscultation of heart: Normal rate and rhythm, normal S1 and S2, no murmurs  -- No RV heave or PA tap  Carotid pulses: Normal, 2+ bilaterally  Peripheral vascular exam: Normal pulses throughout, no tenderness, erythema or swelling  Pedal pulses: Normal, 2+ bilaterally     Examination of extremities for edema and/or varicosities: Normal    Abdomen  Abdomen: Non-tender and no distention  Liver and spleen: No hepatomegaly or splenomegaly  Musculoskeletal Gait and station: Normal gait  -- Digits and nails: Normal without clubbing or cyanosis  -- Inspection/palpation of joints, bones, and muscles: Normal, ROM normal    Skin - Skin and subcutaneous tissue: Normal without rashes or lesions  Skin is warm and well perfused, normal turgor  Neurologic - Cranial nerves: II - XII intact  -- Speech: Normal    Psychiatric - Orientation to person, place, and time: Normal -- Mood and affect: Normal       Results/Data  Diagnostic Studies Reviewed Cardio: I personally reviewed the recording/images in the office today  My interpretation follows  Future Appointments    Date/Time Provider Specialty Site   11/30/2018 08:00 AM Cayla Weber [facility] Hematology Oncology [facility] [place]   01/09/2018 04:40 PM DANUTA Aranda , PhD [facility]   Electronically signed by : DANUTA Mina  Dec  1 2017  5:43PM EST                       (Author)

## 2017-12-14 ENCOUNTER — HOSPITAL ENCOUNTER (OUTPATIENT)
Dept: NON INVASIVE DIAGNOSTICS | Facility: HOSPITAL | Age: 52
Discharge: HOME | End: 2017-12-14
Attending: INTERNAL MEDICINE
Payer: COMMERCIAL

## 2017-12-14 ENCOUNTER — GENERIC CONVERSION - ENCOUNTER (OUTPATIENT)
Dept: CARDIOLOGY CLINIC | Facility: CLINIC | Age: 52
End: 2017-12-14

## 2017-12-14 DIAGNOSIS — R00.0 TACHYCARDIA: ICD-10-CM

## 2017-12-14 DIAGNOSIS — R06.02 SHORTNESS OF BREATH: ICD-10-CM

## 2017-12-14 PROCEDURE — 93350 STRESS TTE ONLY: CPT

## 2017-12-15 LAB
CHEST PAIN STATEMENT: NORMAL
MAX DIASTOLIC BP: 88 MMHG
MAX HEART RATE: 184 BPM
MAX PREDICTED HEART RATE: 166 BPM
MAX. SYSTOLIC BP: 178 MMHG
PROTOCOL NAME: NORMAL
REASON FOR TERMINATION: NORMAL
TARGET HR FORMULA: NORMAL
TEST INDICATION: NORMAL
TIME IN EXERCISE PHASE: NORMAL

## 2017-12-18 ENCOUNTER — TELEPHONE (OUTPATIENT)
Dept: INPATIENT UNIT | Facility: HOSPITAL | Age: 52
End: 2017-12-18

## 2017-12-19 ENCOUNTER — GENERIC CONVERSION - ENCOUNTER (OUTPATIENT)
Dept: OTHER | Facility: OTHER | Age: 52
End: 2017-12-19

## 2017-12-19 ENCOUNTER — HOSPITAL ENCOUNTER (OUTPATIENT)
Dept: NON INVASIVE DIAGNOSTICS | Facility: HOSPITAL | Age: 52
Discharge: HOME | End: 2017-12-19
Attending: INTERNAL MEDICINE | Admitting: INTERNAL MEDICINE
Payer: COMMERCIAL

## 2017-12-19 VITALS
RESPIRATION RATE: 18 BRPM | HEART RATE: 93 BPM | SYSTOLIC BLOOD PRESSURE: 129 MMHG | BODY MASS INDEX: 41.22 KG/M2 | DIASTOLIC BLOOD PRESSURE: 71 MMHG | OXYGEN SATURATION: 96 % | HEIGHT: 62 IN | TEMPERATURE: 98.6 F | WEIGHT: 224 LBS

## 2017-12-19 LAB
ANION GAP SERPL CALCULATED.3IONS-SCNC: 6 MMOL/L (ref 4–13)
ATRIAL RATE: 99 BPM
BUN SERPL-MCNC: 13 MG/DL (ref 5–25)
CALCIUM SERPL-MCNC: 9.4 MG/DL (ref 8.3–10.1)
CHLORIDE SERPL-SCNC: 107 MMOL/L (ref 100–108)
CO2 SERPL-SCNC: 27 MMOL/L (ref 21–32)
CREAT SERPL-MCNC: 0.9 MG/DL (ref 0.6–1.3)
ERYTHROCYTE [DISTWIDTH] IN BLOOD BY AUTOMATED COUNT: 12.7 % (ref 11.6–15.1)
GFR SERPL CREATININE-BSD FRML MDRD: 73 ML/MIN/1.73SQ M
GLUCOSE P FAST SERPL-MCNC: 93 MG/DL (ref 65–99)
GLUCOSE SERPL-MCNC: 93 MG/DL (ref 65–140)
HCT VFR BLD AUTO: 38.8 % (ref 34.8–46.1)
HGB BLD-MCNC: 13.1 G/DL (ref 11.5–15.4)
INR PPP: 0.99 (ref 0.86–1.16)
MCH RBC QN AUTO: 30.3 PG (ref 26.8–34.3)
MCHC RBC AUTO-ENTMCNC: 33.8 G/DL (ref 31.4–37.4)
MCV RBC AUTO: 90 FL (ref 82–98)
P AXIS: 52 DEGREES
PLATELET # BLD AUTO: 288 THOUSANDS/UL (ref 149–390)
PMV BLD AUTO: 11.1 FL (ref 8.9–12.7)
POTASSIUM SERPL-SCNC: 4.3 MMOL/L (ref 3.5–5.3)
PR INTERVAL: 130 MS
PROTHROMBIN TIME: 13.1 SECONDS (ref 12.1–14.4)
QRS AXIS: 73 DEGREES
QRSD INTERVAL: 82 MS
QT INTERVAL: 360 MS
QTC INTERVAL: 462 MS
RBC # BLD AUTO: 4.32 MILLION/UL (ref 3.81–5.12)
SODIUM SERPL-SCNC: 140 MMOL/L (ref 136–145)
T WAVE AXIS: 48 DEGREES
VENTRICULAR RATE: 99 BPM
WBC # BLD AUTO: 4.41 THOUSAND/UL (ref 4.31–10.16)

## 2017-12-19 PROCEDURE — 85027 COMPLETE CBC AUTOMATED: CPT | Performed by: NURSE PRACTITIONER

## 2017-12-19 PROCEDURE — 82810 BLOOD GASES O2 SAT ONLY: CPT | Performed by: INTERNAL MEDICINE

## 2017-12-19 PROCEDURE — 93451 RIGHT HEART CATH: CPT | Performed by: INTERNAL MEDICINE

## 2017-12-19 PROCEDURE — 85610 PROTHROMBIN TIME: CPT | Performed by: NURSE PRACTITIONER

## 2017-12-19 PROCEDURE — 80048 BASIC METABOLIC PNL TOTAL CA: CPT | Performed by: NURSE PRACTITIONER

## 2017-12-19 PROCEDURE — C1769 GUIDE WIRE: HCPCS | Performed by: INTERNAL MEDICINE

## 2017-12-19 PROCEDURE — 93464 EXERCISE W/HEMODYNAMIC MEAS: CPT | Performed by: INTERNAL MEDICINE

## 2017-12-19 PROCEDURE — 93005 ELECTROCARDIOGRAM TRACING: CPT

## 2017-12-19 PROCEDURE — C1894 INTRO/SHEATH, NON-LASER: HCPCS | Performed by: INTERNAL MEDICINE

## 2017-12-19 RX ORDER — HYDROXYCHLOROQUINE SULFATE 200 MG/1
400 TABLET, FILM COATED ORAL
COMMUNITY

## 2017-12-19 RX ORDER — ASPIRIN 81 MG/1
162 TABLET, CHEWABLE ORAL DAILY
COMMUNITY
End: 2018-07-12 | Stop reason: CLARIF

## 2017-12-19 RX ORDER — FUROSEMIDE 20 MG/1
20 TABLET ORAL DAILY
COMMUNITY
End: 2018-02-13 | Stop reason: ALTCHOICE

## 2017-12-19 RX ORDER — LIDOCAINE HYDROCHLORIDE 10 MG/ML
INJECTION, SOLUTION INFILTRATION; PERINEURAL CODE/TRAUMA/SEDATION MEDICATION
Status: COMPLETED | OUTPATIENT
Start: 2017-12-19 | End: 2017-12-19

## 2017-12-19 RX ORDER — MELATONIN
2000
COMMUNITY

## 2017-12-19 RX ADMIN — LIDOCAINE HYDROCHLORIDE 4 ML: 10 INJECTION, SOLUTION INFILTRATION; PERINEURAL at 09:06

## 2017-12-19 NOTE — PROCEDURES
Patient was prepped in sterile fashion  His right IJ area was draped  Right IJ access was obtained via seldinger technique using a micropuncture as an additional step for safety  An 8 French sheath was placed  The PA catheter was advanced via pressure wave form monitoring and as needed fluoroscopy to obtain RA, RV, PA and wedge pressures  No complications during the procedure  PA saturation was obtained for [facility] Cardiac Output calculation  Exercise RHC was done and PA pressures and simultaneous CVP tracings were done  Please see the full report for the results

## 2017-12-19 NOTE — DISCHARGE INSTRUCTIONS
We will start patient on Tadalafil on discharge  Right Heart Catheterization   WHAT YOU NEED TO KNOW:   Right heart catheterization is a procedure to check the pressure in your heart and lungs  It is also called a [place]-Jaime or pulmonary artery catheterization  You may need this procedure if you have chest pain, shortness of breath, or decreased oxygen in your body  You may also need this procedure if you need heart surgery or have a heart condition  DISCHARGE INSTRUCTIONS:   Follow up with your healthcare provider as directed:  Write down your questions so you remember to ask them during your visits  Limit activity as directed:   · Avoid unnecessary stair climbing for 48 hours, if a catheter was put in your groin  · Do not place pressure on your arm, hand, or wrist, if the catheter was placed in your wrist  Avoid pushing, pulling, or heavy lifting with that arm  · If you need to cough, support the area where the catheter was inserted with your hand  · Ask your healthcare provider how long you need to limit movement and avoid certain activities  · You may feel like resting more after your procedure  Slowly start to do more each day  Rest when you feel it is needed  Drink liquids as directed:  Liquids help flush the dye used for your procedure out of your body  Ask your healthcare provider how much liquid to drink each day, and which liquids to drink  Some foods, such as soup and fruit, also provide liquid  Wound care:  Ask your healthcare provider about how to care for your incision wound  Ask when you can get into a tub, shower, or pool  Contact your healthcare provider if:   · You have a fever  · The skin around your wound is red, swollen, or has pus coming from it  · You have trouble breathing, or your skin is itchy, swollen, or has a rash  · You have questions or concerns about your condition or care    Seek care immediately or call 911 if:   · The area where the catheter was placed is swollen and filled with blood or is bleeding  · The leg or arm used for the procedure becomes numb or turns white or blue  · You feel lightheaded, short of breath, and have chest pain  · You cough up blood  · You have weakness or numbness in your arm, leg, or face  · You are confused and have problems speaking or understanding speech  · You have a severe headache or feel dizzy  · You have vision changes or loss of vision  · Your arm or leg feels warm, tender, and painful  It may look swollen and red  · You have any of the following signs of a heart attack:      ¨ Squeezing, pressure, or pain in your chest that lasts longer than 5 minutes or returns    ¨ Discomfort or pain in your back, neck, jaw, stomach, or arm     ¨ Trouble breathing    ¨ Nausea or vomiting    ¨ Lightheadedness or a sudden cold sweat, especially with chest pain or trouble breathing  © 2017 [address] is for End User's use only and may not be sold, redistributed or otherwise used for commercial purposes  All illustrations and images included in CareNotes® are the copyrighted property of [employer]  or Howard Berrios  The above information is an  only  It is not intended as medical advice for individual conditions or treatments  Talk to your doctor, nurse or pharmacist before following any medical regimen to see if it is safe and effective for you

## 2017-12-27 ENCOUNTER — ALLSCRIPTS OFFICE VISIT (OUTPATIENT)
Dept: OTHER | Facility: OTHER | Age: 52
End: 2017-12-27

## 2017-12-30 NOTE — PROGRESS NOTES
Assessment   1  Positive HELENE (antinuclear antibody) (795 79) (R76 8)   2  B12 deficiency (266 2) (E53 8)   3  Lupus anticoagulant positive (795 79) (R76 0)    Plan   Lupus anticoagulant positive    · *VB - PHQ-9 Tool; Status:Complete;   Done: [id number] 04:00PM    Discussion/Summary   Discussion Summary:    Past and present medical history reviewed with patient during exam  Patient appears to be stable and well controlled at this time --continue working with Cardiology for newly diagnosed pulmonary hypertension  Patient to have records forwarded from her rheumatologists with [facility] for our review  RTO 5-6 months for complete physical exam  Patient welcome to return sooner for any questions or concerns  Counseling Documentation With Imm: The patient was counseled regarding diagnostic results,-- instructions for management,-- risk factor reductions,-- prognosis,-- patient and family education,-- impressions,-- risks and benefits of treatment options,-- importance of compliance with treatment  Medication SE Review and Pt Understands Tx: Possible side effects of new medications were reviewed with the patient/guardian today  The treatment plan was reviewed with the patient/guardian  The patient/guardian understands and agrees with the treatment plan      Chief Complaint   Chief Complaint Free Text Note Form: PT here multi issue lupus, vit B12        History of Present Illness   HPI: Patient presents to establish care complicated medical history including positive HELENE, positive lupus anticoagulant, mixed connective tissue disorder, and lupus recently, she was diagnosed with pulmonary hypertension follows face fully with [facility] cardiologists and rheumatologists with [address] also follows regularly with [facility] Hematology, specifically Dr Geena Jordan states that despite her medical history, she believes her overall health is good to fair no medication problems with furosemide, Plaquenil, vitamin-D, aspirin, and vitamin B12 injections currently lives at home with her , daughter, and grandson (who only today received his 's permit and is excited to learn how to drive) history includes cancer in her mother --endometrial, pancreatic (status post 12 years Whipple), heart disease in dad and brother, and type 2 diabetes with mom has no complaints today      Review of Systems   Complete-Female:      Constitutional: No fever, no chills, feels well, no tiredness, no recent weight gain or weight loss  Eyes: No complaints of eye pain, no red eyes, no eyesight problems, no discharge, no dry eyes, no itching of eyes  ENT: no complaints of earache, no loss of hearing, no nose bleeds, no nasal discharge, no sore throat, no hoarseness  Cardiovascular: No complaints of slow heart rate, no fast heart rate, no chest pain, no palpitations, no leg claudication, no lower extremity edema  Respiratory: No complaints of shortness of breath, no wheezing, no cough, no SOB on exertion, no orthopnea, no PND  Gastrointestinal: No complaints of abdominal pain, no constipation, no nausea or vomiting, no diarrhea, no bloody stools  Genitourinary: No complaints of dysuria, no incontinence, no pelvic pain, no dysmenorrhea, no vaginal discharge or bleeding  Musculoskeletal: No complaints of arthralgias, no myalgias, no joint swelling or stiffness, no limb pain or swelling  Integumentary: No complaints of skin rash or lesions, no itching, no skin wounds, no breast pain or lump  Neurological: No complaints of headache, no confusion, no convulsions, no numbness, no dizziness or fainting, no tingling, no limb weakness, no difficulty walking  Psychiatric: Not suicidal, no sleep disturbance, no anxiety or depression, no change in personality, no emotional problems        Endocrine: No complaints of proptosis, no hot flashes, no muscle weakness, no deepening of the voice, no feelings of weakness  Hematologic/Lymphatic: No complaints of swollen glands, no swollen glands in the neck, does not bleed easily, does not bruise easily  Active Problems   1  B12 deficiency (266 2) (E53 8)   2  Breast lump (611 72) (N63 0)   3  Encounter for routine gynecological examination (V72 31) (Z01 419)   4  Encounter for screening mammogram for malignant neoplasm of breast (V76 12)     (Z12 31)   5  Hot flashes (627 2) (N95 1)   6  Lupus anticoagulant positive (795 79) (R76 0)   7  Night sweats (780 8) (R61)   8  Osteoporosis (733 00) (M81 0)   9  Positive HELEEN (antinuclear antibody) (795 79) (R76 8)   10  Secondary pulmonary hypertension (416 8)   11  Sinus tachycardia (427 89) (R00 0)   12  SOB (shortness of breath) (786 05) (R06 02)    Past Medical History   1  History of Endometrial hyperplasia (621 30) (N85 00)   2  History of encephalitis (V12 42) (Z86 61)   3  History of malignant neoplasm of cervix uteri (V10 41) (Z85 41)   4  History of osteopenia (V13 59) (Z87 39)   5  History of osteoporosis (V13 59) (Z87 39)   6  History of pneumonia (V12 61) (Z87 01)   7  History of urinary incontinence (V13 09) (Z87 898)   8  History of Maxillary sinusitis (473 0) (J32 0)  Active Problems And Past Medical History Reviewed: The active problems and past medical history were reviewed and updated today  Surgical History   1  History of Ankle Surgery   2  History of Breast Surgery Reduction Procedure   3  History of Cholecystectomy   4  History of Esophagogastric Fundoplasty Laparoscopic   5  History of Hand Arthrodesis IP Joint   6  History of Tonsillectomy   7  History of Vaginal Hysterectomy  Surgical History Reviewed: The surgical history was reviewed and updated today  Family History   Mother    1  Family history of diabetes mellitus (V18 0) (Z83 3)   2  Family history of malignant neoplasm of uterus (V16 49) (Z80 49)   3   Family history of pancreatic cancer (V16 0) (Z80 0)  Father 4  Family history of cerebrovascular accident (CVA) (V17 1) (Z82 3)   5  Family history of hypertension (V17 49) (Z82 49)  Brother    10  Family history of hemochromatosis (V18 19) (Z83 49)  Uncle    7  Family history of diabetes mellitus (V18 0) (Z83 3)  Family History Reviewed: The family history was reviewed and updated today  Social History    · Daily caffeinated coffee consumption   · Exercise habits   · Former smoker (A71 06) (A84 827)   ·    · Occasional alcohol use   · Sexually active   · Social drinker (V49 89) (Z78 9)   · Two children   · Denied: History of Uses drugs daily  Social History Reviewed: The social history was reviewed and updated today  The social history was reviewed and is unchanged  Current Meds    1  Aspirin 81 MG TABS; take 2 tablet daily; Therapy: (Recorded:23Kkg9443) to Recorded   2  Cyanocobalamin 1000 MCG/ML Injection Solution; Inject every other week; Therapy: (Recorded:40Ijp8189) to Recorded   3  Furosemide 20 MG Oral Tablet; one po qd; Therapy: 14WIL9398 to (Evaluate:63Obt4311)  Requested for: 27VHW6095; Last     Rx:33Uei5584 Ordered   4  Plaquenil 200 MG Oral Tablet; Take 2 tablets QD; Therapy: (Recorded:22Tem6642) to Recorded   5  Vitamin D 2000 UNIT Oral Tablet; TAKE QD;     Therapy: (Recorded:94Irk5414) to Recorded    Allergies   1  Augmentin SUSR   2  Dilantin CAPS    Vitals   Vital Signs    Recorded: 83ZXJ7164 03:52PM   Temperature 98 4 F, Tympanic   Heart Rate 104   Respiration 16   Systolic 878, LUE, Sitting   Diastolic 90, LUE, Sitting   Height 5 ft 3 in   Weight 223 lb    BMI Calculated 39 5   BSA Calculated 2 03   O2 Saturation 97     Physical Exam        Constitutional      General appearance: No acute distress, well appearing and well nourished  obese  Eyes      Conjunctiva and lids: No swelling, erythema or discharge         Ears, Nose, Mouth, and Throat      External inspection of ears and nose: Normal        Oropharynx: Normal with no erythema, edema, exudate or lesions  Pulmonary      Respiratory effort: No increased work of breathing or signs of respiratory distress  Auscultation of lungs: Clear to auscultation  Cardiovascular      Auscultation of heart: Abnormal   The heart rate was tachycardic  The rhythm was regular  no murmurs were heard  Examination of extremities for edema and/or varicosities: Abnormal   bilateral ankle 1-2+ pitting edema  Abdomen      Abdomen: Non-tender, no masses  Lymphatic      Palpation of lymph nodes in neck: No lymphadenopathy  Musculoskeletal      Gait and station: Normal        Skin      Skin and subcutaneous tissue: Normal without rashes or lesions  Neurologic      Cranial nerves: Cranial nerves 2-12 intact         Psychiatric      Orientation to person, place, and time: Normal        Mood and affect: Normal           Results/Data   *VB - PHQ-9 Tool 41Rvw8405 04:00PM Traci Bazan      Test Name Result Flag Reference   PHQ-9 Adult Depression Score 0     PHQ-9 Adult Depression Screening Negative          Future Appointments      Date/Time Provider Specialty Site   11[facility]30/2018 08:00 AM [facility] [place]   01/09/2018 04:40 PM DANUTA Viramontes , PhD Cardiology [facility]   05/22/2018 06:45 PM Traci Bazan DO Family Medicine FAMILY PRACTICE [facility]     Signatures    Electronically signed by : Gisella Braxton DO; Dec 29 2017  1:55PM EST                       (Author)

## 2018-01-08 ENCOUNTER — TRANSCRIBE ORDERS (OUTPATIENT)
Dept: ADMINISTRATIVE | Facility: HOSPITAL | Age: 53
End: 2018-01-08

## 2018-01-08 DIAGNOSIS — Z12.31 ENCOUNTER FOR SCREENING MAMMOGRAM FOR MALIGNANT NEOPLASM OF BREAST: Primary | ICD-10-CM

## 2018-01-09 ENCOUNTER — ALLSCRIPTS OFFICE VISIT (OUTPATIENT)
Dept: OTHER | Facility: OTHER | Age: 53
End: 2018-01-09

## 2018-01-10 NOTE — PROGRESS NOTES
Discussion/Summary   Cardiology Discussion Summary Free Text Note Form [place]:    44 y/o woman w/   ANSARI: Unclear etiology  Although there is likely a component of obesity/deconditioning +/- diastolic dysfunction (obesity, female, currently on diuretics w/ hx of volume overload)  This does not fully explain her degree of dysfunction  Recent diagnosis of MCTD/SLE w/ + lupus anticoagulant  Remote smoker  She is at risk to develop pulmonary vascular disease leading to RV dysfunction  Currently RV appears normal on TTE with RV-PA coupling  There is no e/o of RVOT notching suggestive of normal PVR at rest  However, she did have a stress echo in 2012 that was suggestive of exercise induced pulmonary HTN  At the time she did not have e/o of elevated PVR  Her bubble was negative which makes an intracardiac shunt less likely  However, her symptoms have progressed since that time  In addition to TTE findings, currently, her V/Q scan is negative making PE/CTEPH less likely  As her symptoms have progressed and with ambulation she shows evidence of desaturation that may be due to abnormal air exchange we did the following: were normal stress echo to evaluate pulmonary pressures, RV, and RVOT signal w/ exercise showed exaggerated HR response, HTNsive response, decrease in O2 saturation from 99% to 91% and increase in PA pressures from 45 mmHg to 70 mmHg with exercise  Exercised for 5 min and 20 sec  on above did RHC w/ exercise: This was an exercise RHC with simultaneous monitoring of the distal and proximal SG catheter ports  She had an appropriate HR response from 100 to 130 bpm with MAP throughout the study staying at 112 mmHg  Hgb 13 1  The findings were consistent with exercise induced Pulmonary Arterial Hypertension (WHO Group 1, NYHA II)  Her TTE is not consistent with significant left sided heart disease (Group II)   Her PFTs did not show any significant lung disease (Group III), V/Q scan is not consistent with CTEPH (Group IV PAH) and stress echo showed elevated of PA pressures with exercise which is consistent with above findings  lasix to 20 mg PO BID or 40 mg PO daily until weight down --5 lbs; then reassess ANSARI at home; patient will call with symptom update next week weights qAM sildenafil 20 mg PO q8hrs for now; will d/w insurance company re: tadalafil   ST: V/Q negative  May be 2/2 to deconditioning +/- inappropriate ST +/- diastolic dysfunction/HF (euvolemic on exam)  No e/o infection  to monitor, can consider coby agents in the future   in 1 month  Chief Complaint   Chief Complaint Free Text Note Form: F/U      History of Present Illness   Cardiology HPI Free Text Note Form  Yesi Rodriguez: Very pleasant 46 y/o woman w/ PMHx of newly diagnosed MCTD/SLE, +lupus anti-coagulant, B12 deficiency, and obesity referred for evaluation of ANSARI  states that she first started getting SOB -- 10 years ago  She has had several episodes of bacterial PNA and chronic cough (a few times/year)  She was referred to [place] [address] in 2012 for workup for PH  She had a a stress echo 8/2012 that showed that her PA pressures more than doubled from --24 mmHg to > 50 mmHg  Currently, she states she can walk up a couple flights of stairs but does get SOB  She also gets SOB with walking up inclines  She gets occasional LH  She has been found to be B12 deficient, but that is improving  seeing a Rheumatologist in [place] but recently saw a specialist at [facility], Dr Suma Lira (Rheumatologist - 11/16)  She was diagnosed with MCTD and SLE  HELENE + 1:320 w/ speckled pattern  She has been on Plaquenil x 2 weeks now  She is on ASA for + lupus anticoagulant and anticardiolipin Abs  She has joint pains and a subtle malar rash  She states so far there has been no change with plaquenil  She has f/u with him in 2/2018    her visit with Dr Ashanti Cortes, she has had TTE which shows LVEF --65%, normal RV size and function without RVOT notching  Normal atria   CXR clear  She also has had a negative V/Q scan     walked the patient in the hallway and had her go up and down stairs  Her resting HR went from --100 bpm to 110s with Pulse Ox starting from 98% @ rest to --90% with exercise  Pulse Ox showed a good waveform throughout  denies CP, palpitations, presyncope, or syncope  She notes that she went on a two week cruise and afterwards developed LE edema in the past and has developed LE edema  1/9/2018, returns for f/u  Had echo stress test w/ poor exercise tolerance and decrease in O2 saturation w/ increase in PA pressures from 45 to 70 mmHg w/ exercise  Exercise RHC showed exercise induced PH w/ increase in PVR from <3 to --4 5 MOLINA  PCWP increased from 12 to 18 mmHg  CVP increased from 7 to 10 mmHg  She was denied for tadalafil, but approved for sildenafil  She started sildenafil on 1/5/18 and states she does not feel any different and continues to have trouble with stairs  In addition, today in clinic, she continues to be tachycardic  Has gained 4 lbs since her last visit  Review of Systems   Cardiology Female ROS:         Cardiac: as noted in HPI  Skin: No complaints of nonhealing sores or skin rash  Genitourinary: No complaints of recurrent urinary tract infections, frequent urination at night, difficult urination, blood in urine, kidney stones, loss of bladder control, kidney problems, denies any birth control or hormone replacement, is not post menopausal, not currently pregnant  Psychological: No complaints of feeling depressed, anxiety, panic attacks, or difficulty concentrating  General: No complaints of trouble sleeping, lack of energy, fatigue, appetite changes, weight changes, fever, frequent infections, or night sweats  Respiratory: shortness of breath-- and-- cough/sputum, but-- as noted in HPI  HEENT: No complaints of serious problems, hearing problems, nose problems, throat problems, or snoring        Gastrointestinal: No complaints of liver problems, nausea, vomiting, heartburn, constipation, bloody stools, diarrhea, problems swallowing, adbominal pain, or rectal bleeding  Hematologic: No complaints of bleeding disorders, anemia, blood clots, or excessive brusing  Neurological: No complaints of numbness, tingling, dizziness, weakness, seizures, headaches, syncope or fainting, AM fatigue, daytime sleepiness, no witnessed apnea episodes  Musculoskeletal: as noted in HPI             ROS Reviewed:    ROS reviewed  Active Problems   Problems    1  B12 deficiency (266 2) (E53 8)   2  Breast lump (611 72) (N63 0)   3  Encounter for routine gynecological examination (V72 31) (Z01 419)   4  Encounter for screening mammogram for malignant neoplasm of breast (V76 12)     (Z12 31)   5  Hot flashes (627 2) (N95 1)   6  Lupus anticoagulant positive (795 79) (R76 0)   7  Night sweats (780 8) (R61)   8  Osteoporosis (733 00) (M81 0)   9  Positive HELENE (antinuclear antibody) (795 79) (R76 8)   10  Secondary pulmonary hypertension (416 8)   11  Sinus tachycardia (427 89) (R00 0)   12  SOB (shortness of breath) (786 05) (R06 02)    Past Medical History   Problems    1  History of Endometrial hyperplasia (621 30) (N85 00)   2  History of encephalitis (V12 42) (Z86 61)   3  History of malignant neoplasm of cervix uteri (V10 41) (Z85 41)   4  History of osteopenia (V13 59) (Z87 39)   5  History of osteoporosis (V13 59) (Z87 39)   6  History of pneumonia (V12 61) (Z87 01)   7  History of urinary incontinence (V13 09) (Z87 898)   8  History of Maxillary sinusitis (473 0) (J32 0)  Active Problems And Past Medical History Reviewed: The active problems and past medical history were reviewed and updated today  Surgical History   Problems    1  History of Ankle Surgery   2  History of Breast Surgery Reduction Procedure   3  History of Cholecystectomy   4  History of Esophagogastric Fundoplasty Laparoscopic   5   History of Hand Arthrodesis IP Joint   6  History of Tonsillectomy   7  History of Vaginal Hysterectomy  Surgical History Reviewed: The surgical history was reviewed and updated today  Family History   Mother    1  Family history of diabetes mellitus (V18 0) (Z83 3)   2  Family history of malignant neoplasm of uterus (V16 49) (Z80 49)   3  Family history of pancreatic cancer (V16 0) (Z80 0)  Father    4  Family history of cerebrovascular accident (CVA) (V17 1) (Z82 3)   5  Family history of hypertension (V17 49) (Z82 49)  Brother    10  Family history of hemochromatosis (V18 19) (Z83 49)  Uncle    7  Family history of diabetes mellitus (V18 0) (Z83 3)  Family History    8  Family history of Heart problem  Family History Reviewed: The family history was reviewed and updated today  Social History   Problems    · Daily caffeinated coffee consumption   · Exercise habits   · Former smoker (V15 82) (D08 269)   ·    · No illicit drug use   · Occasional alcohol use   · Sexually active   · Social drinker (V49 89) (Z78 9)   · Two children   · Denied: History of Uses drugs daily  Social History Reviewed: The social history was reviewed and updated today  The social history was reviewed and is unchanged  Current Meds    1  Aspirin 81 MG TABS; take 2 tablet daily; Therapy: (Recorded:23Sdk5648) to Recorded   2  Cyanocobalamin 1000 MCG/ML Injection Solution; Inject every other week; Therapy: (Recorded:38Mji3118) to Recorded   3  Furosemide 20 MG Oral Tablet; one po qd; Therapy: 45NPH6095 to (Evaluate:31Mar2018)  Requested for: 89Apo5971; Last     Rx:38Iiz6177 Ordered   4  Plaquenil 200 MG Oral Tablet; Take 2 tablets QD; Therapy: (Recorded:44Oqz6623) to Recorded   5  Sildenafil Citrate 20 MG Oral Tablet; TAKE 1 TABLET 3 TIMES DAILY  Requested for:     08CVF3506; Last HC:65ZIQ0928 Ordered   6   Vitamin D 2000 UNIT Oral Tablet; TAKE QD;     Therapy: (Recorded:79Lnl3411) to Recorded  Medication List Reviewed: The medication list was reviewed and updated today  Allergies   Medication    1  Augmentin SUSR   2  Dilantin CAPS    Vitals   Vital Signs    Recorded: 02EIT1523 03:38PM   Heart Rate 905   Systolic 025, RUE, Sitting   Diastolic 50, RUE, Sitting   Height 5 ft 3 in   Weight 228 lb    BMI Calculated 40 39   BSA Calculated 2 04   O2 Saturation 98     Physical Exam        Constitutional      General appearance: No acute distress, well appearing and well nourished  Eyes      Conjunctiva and Sclera examination: Conjunctiva pink, sclera anicteric  Ears, Nose, Mouth, and Throat - Oropharynx: Clear, nares are clear, mucous membranes are moist       Neck      Neck and thyroid: Normal, supple, trachea midline, no thyromegaly  -- No HJR  Pulmonary      Respiratory effort: No increased work of breathing or signs of respiratory distress  Auscultation of lungs: Clear to auscultation, no rales, no rhonchi, no wheezing, good air movement  Cardiovascular      Auscultation of heart: Normal rate and rhythm, normal S1 and S2, no murmurs  -- No RV heave or PA tap  Carotid pulses: Normal, 2+ bilaterally  Peripheral vascular exam: Normal pulses throughout, no tenderness, erythema or swelling  Pedal pulses: Normal, 2+ bilaterally  Examination of extremities for edema and/or varicosities: Normal        Abdomen      Abdomen: Non-tender and no distention  Liver and spleen: No hepatomegaly or splenomegaly  Musculoskeletal Gait and station: Normal gait  -- Digits and nails: Normal without clubbing or cyanosis  -- Inspection/palpation of joints, bones, and muscles: Normal, ROM normal        Skin - Skin and subcutaneous tissue: Normal without rashes or lesions  Skin is warm and well perfused, normal turgor  Neurologic - Cranial nerves: II - XII intact  -- Speech: Normal        Psychiatric - Orientation to person, place, and time: Normal -- Mood and affect: Normal  Results/Data   Diagnostic Studies Reviewed Cardio: I personally reviewed the recording/images in the office today  My interpretation follows  Other: 12/19/17 RHC: This was an exercise RHC with simultaneous monitoring of the distal and proximal SG catheter ports  had an appropriate HR response from 100 to 130 bpm with MAP throughout the study staying at 112 mmHg  13 1 7 37/4/13 33/16/25 12 100 99% 72 3% 4 8/2 3 13 4 2 7 10 54/29/37 18 117 100% 66% 4/1 9 19 11 4 8 0 55 the end of the study the hemodynamics returned to baseline  above hemodynamic response is consistent with exercise induced Pulmonary Arterial Hypertension (WHO Group 1, NYHA II)  Future Appointments      Date/Time Provider Specialty Site   01/24/2018 08:40 AM Willem Velez, [address] Obstetrics/Gynecology [facility] OB   11/30/2018 08:00 AM Aicha Mendiola [address] Hematology Oncology CANCER [facility] ONCOLOGY [place]   02/13/2018 04:40 PM DANUTA Oneal , PhD Cardiology [facility]   05/22/2018 06:45 PM Isac Srivastava, [address]     Signatures    Electronically signed by : DANUTA Clements  Jan 9 2018  5:06PM EST                       (Author)

## 2018-01-13 VITALS
SYSTOLIC BLOOD PRESSURE: 120 MMHG | HEART RATE: 102 BPM | BODY MASS INDEX: 40.22 KG/M2 | HEIGHT: 63 IN | DIASTOLIC BLOOD PRESSURE: 80 MMHG | OXYGEN SATURATION: 91 % | TEMPERATURE: 98.7 F | WEIGHT: 227 LBS | RESPIRATION RATE: 16 BRPM

## 2018-01-14 VITALS
DIASTOLIC BLOOD PRESSURE: 90 MMHG | WEIGHT: 225 LBS | RESPIRATION RATE: 16 BRPM | TEMPERATURE: 98 F | HEART RATE: 103 BPM | OXYGEN SATURATION: 95 % | SYSTOLIC BLOOD PRESSURE: 136 MMHG | HEIGHT: 63 IN | BODY MASS INDEX: 39.87 KG/M2

## 2018-01-16 NOTE — CONSULTS
History of Present Illness  Rosemary Gonzalez is a 45year old female seen 10/18/2017 for initial evaluation , self referred, secondary to low Vitamin B12 level  10/6/2017 labs at [facility]: hemoglobin 13 2, mcv 90, RDW 14 3, WBC 6 2, 68% neutrophils, 21% lymphs, 8% monocytes 2% eosinophils, platelet count 415,301  Iron saturation 15%, ferritin 52 Vitamin B12 184( 211-946) Tsh 1 98, free T4 1 18, negative lyme antibody  RF normal, Anti-scleroderma antibodies normal, anti-DS DNA normal, Styles AB normal, RNP antibodies normal  Anti-centromere AB normal  Normal sed rate  HELENE: Dense fine speckled pattern is noted which suggest presence of  antibody which has a low prevalence in systemic autoimmune rheumatic diseases  Normal immunofixation on SPEP  Normal serum immunoglobulins  Normal CCP antibodies IgG and IgA  U/A identified hyaline casts  Celiac panel was normal  CMP normal  POSITIVE HELENE  POSITIVE Lupus anticoagulant  IgM anticardiolipin antibody 18 (Indeterminate)  Normal IgG anticardiolipin antibody , normal IgA anticardiolipin antibody  7/11/2016: normal anticardiolipin antibodies  Lyme negative  Normal ANCA profile, normal C3 and C4 complement, normal CRP  Normal thyroglobulin profile  Normal ESR  + LUPUS ANTICOAGULANT  HELENE positive  On [facility] intake 3/13/2015 she noted that she had a history of + lupus anticoagulant  Nova Bang states she 1st had HELENE evaluated and was positive in 2012  Patient is extremely frustrated  She states she has been having symptoms of SOB, fatigue and has been diagnosed with a lupus-like disease but has not received any disease directed therapy  She took prednisone years ago without benefit and significant weight gain  Was previously seen at [address] [place] [address] by Sergio Guerra in 2012 regarding chronic cough, SOB without history of asthma, allergies  Quit smoking at 34years old after 15-20 pack years   PND treated and cough improved but ANSARI persisted  Bronchoscopy was normal  PFTs normal  Evaluation unrevealing to pulmonary source for her dyspnea  No benefit with Advair or high dose steroids  Falls asleep easily  Normal echo  Noted to have edema and sleep apnea suspected  She was advised to increase Lasix and have sleep study  Bailey Griffin, a cardiologist at the [facility] had her undergo echocardiogram, however, poor images were obtained  She been had a stress echocardiogram and a question of pulmonary artery hypertension and was raised  8/13/2012: stress echocardiogram report from [address] [place] [address]: estimated P  a systolic pressure increased to 56 mm mercury suggestive of significant exercise induced pulmonary hypertension  She states she saw Dr Debbie Zhong, a specialist at [address] [place] [address] who didn't examine me and told me I look too good to have pulmonary artery hypertension " He did not perform additional testing but then referred her to Dr Brenden Schwarz, a cardiac electrophysiologist at [facility] due to resting heart rate 114-120s who informed her she had a + HELENE  She saw Dr Helen Castro a few times but felt pressured by him as he wanted to perform a lip biopsy to evaluate for Sjogren's syndrome  She has been seeing TERI Lees  at the rheumatology center of [facility] but has not seeing him in approximately a year and a half as she states he was requesting repeated blood work that was not moving forward towards treating any symptoms she was having and feels that he can be adverserial      Lactose intolerant  Osteoporosis diagnosed at 26 y/o in Lumbar spine  Partial Hysterectomy 1996 for endometrial hyperplasia  Ovaries spared  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 [place]  she returned from a vacation and had 3+ pitting edema  Dr Peyton Baird, 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 arranged for her to see Kate Mac MD in [facility], [address] [place] [address] who ordered the above labs   STAFFANSTORP states that this was the first time her B12 level had been checked  She was prescribed B12 by Dr Briana Obrien for 1000mcg IM twice weekly administration but does not syringes or needles  Alexander Gonzalez is very comfortable with injecting herself IM  No s/sx pancreatic insufficiency  She does not take PPI or H2- blocker  She does not drink alcohol regularly  Bundy has never had a blood clot  He has 2 grown children ages 28 and 29  Did have a first trimester miscarriage around the 10th week in between the birth of her 2 children  She takes Aspirin  Reports last EGD was 3-4 years ago performed by Dr Violeta Parker at [facility]  She is s/p Nissen Fundoplication for severe GERD  She had a colonoscopy previously  She saw Dr Jad Ha with [facility] 2/13/2015 regarding persistently + HELENE and IGM anticardiolipin antibody  There was also question whether or not she had cotton wool spots secondary to embolic phenomenon  Patient states ultimately this was ruled out  Aspirin was hematologic recommendation at that time  Review of Systems    Constitutional: feeling poorly, recent weight gain and feeling tired, but no fever, no chills and no recent weight loss  Eyes: No complaints of eye pain, no red eyes, no eyesight problems, no discharge, no dry eyes, no itching of eyes  ENT: nosebleeds, but no earache, no sore throat, no hearing loss, no nasal discharge and no hoarseness  Cardiovascular: fast heart rate  Respiratory: shortness of breath and shortness of breath during exertion  Gastrointestinal: No complaints of abdominal pain, no constipation, no nausea or vomiting, no diarrhea, no bloody stools  Genitourinary: s/p hysterectomy 26 y/o, but No complaints of dysuria, no incontinence, no pelvic pain, no dysmenorrhea, no vaginal discharge or bleeding  Musculoskeletal: joint stiffness  Integumentary: No complaints of skin rash or lesions, no itching, no skin wounds, no breast pain or lump     Neurological: numbness, tingling and Patient reports EMG BLE inconclusive  Psychiatric: Not suicidal, no sleep disturbance, no anxiety or depression, no change in personality, no emotional problems  Endocrine: No complaints of proptosis, no hot flashes, no muscle weakness, no deepening of the voice, no feelings of weakness  Active Problems    1  Breast lump (611 72) (N63 0)   2  Encounter for routine gynecological examination (V72 31) (Z01 419)   3  Encounter for screening mammogram for malignant neoplasm of breast (V76 12)   (Z12 31)   4  Hot flashes (627 2) (N95 1)   5  Lupus anticoagulant positive (795 79) (R76 0)   6  Night sweats (780 8) (R61)   7  Positive HELENE (antinuclear antibody) (795 79) (R76 8)    Past Medical History    · History of Endometrial hyperplasia (621 30) (N85 00)   · History of encephalitis (V12 42) (Z86 61)   · History of malignant neoplasm of cervix uteri (V10 41) (Z85 41)   · History of osteopenia (V13 59) (Z87 39)   · History of osteoporosis (V13 59) (Z87 39)   · History of pneumonia (V12 61) (Z87 01)   · History of urinary incontinence (V13 09) (A41 638)   · History of Maxillary sinusitis (473 0) (J32 0)    The active problems and past medical history were reviewed and updated today  Surgical History    · History of Ankle Surgery   · History of Breast Surgery Reduction Procedure   · History of Cholecystectomy   · History of Esophagogastric Fundoplasty Laparoscopic   · History of Hand Arthrodesis IP Joint   · History of Tonsillectomy   · History of Vaginal Hysterectomy    The surgical history was reviewed and updated today         Family History    · Family history of diabetes mellitus (V18 0) (Z83 3)   · Family history of malignant neoplasm of uterus (V16 49) (Z80 49)   · Family history of pancreatic cancer (V16 0) (Z80 0)    · Family history of cerebrovascular accident (CVA) (V17 1) (Z82 3)   · Family history of hypertension (V17 49) (Z82 49)    · Family history of diabetes mellitus (V18 0) (Z83 3)    The family history was reviewed and updated today  Social History    · Daily caffeinated coffee consumption   · Exercise habits   · Former smoker (V15 82) (B28 274)   ·    · Occasional alcohol use   · Sexually active   · Two children   · Denied: History of Uses drugs daily  The social history was reviewed and updated today  The social history was reviewed and is unchanged  Current Meds   1  Aspirin 81 MG TABS; Therapy: (Recorded:13Mar2015) to Recorded    Allergies    1  Augmentin SUSR   2  Dilantin CAPS    Vitals   Recorded: 03QBR3967 09:37AM   Temperature 98 F   Heart Rate 103   Respiration 16   Systolic 192   Diastolic 90   Height 5 ft 2 5 in   Weight 225 lb    BMI Calculated 40 5   BSA Calculated 2 02   O2 Saturation 95   Pain Scale 0     Physical Exam    Constitutional   General appearance: No acute distress, well appearing and well nourished  Eyes   Conjunctiva and lids: No swelling, erythema or discharge  Pupils and irises: Equal, round and reactive to light  Ears, Nose, Mouth, and Throat   External inspection of ears and nose: Normal     Oropharynx: Normal with no erythema, edema, exudate or lesions  Pulmonary   Respiratory effort: No increased work of breathing or signs of respiratory distress  Auscultation of lungs: Clear to auscultation  Cardiovascular   Palpation of heart: Normal PMI, no thrills  Auscultation of heart: Normal rate and rhythm, normal S1 and S2, without murmurs  Examination of extremities for edema and/or varicosities: Normal     Abdomen   Abdomen: Non-tender, no masses  Liver and spleen: No hepatomegaly or splenomegaly  Lymphatic   Palpation of lymph nodes in neck: No lymphadenopathy  Musculoskeletal   Gait and station: Normal     Digits and nails: Normal without clubbing or cyanosis  Inspection/palpation of joints, bones, and muscles: Normal     Skin   Skin and subcutaneous tissue: Normal without rashes or lesions  Psychiatric   Orientation to person, place, and time: Normal     Mood and affect: Normal          Assessment    1  Positive HELENE (antinuclear antibody) (795 79) (R76 8)   2  Lupus anticoagulant positive (795 79) (R76 0)   3  B12 deficiency (266 2) (E53 8)   4  Osteoporosis (733 00) (M81 0)   · premenopausal mid 30s diagnosed   5  Family history of cerebrovascular accident (CVA) (V17 1) (Z82 3) : Father   6  Family history of hypertension (V17 49) (Z82 49) : Father   7  Family history of diabetes mellitus (V18 0) (Z83 3) : Mother, Uncle   8  SOB (shortness of breath) (786 05) (R06 02)    Plan   B12 deficiency    · (1) CBC/PLT/DIFF; Status:Active; Requested JTX:[id number];    Perform:[facility]; RYF:[id number]; Ordered; For:B12 deficiency; Ordered By:Sandra March;   · (1) COMPREHENSIVE METABOLIC PANEL; Status:Active; Requested FTA:[id number];    Perform:[facility]; NUN:[id number]; Ordered; For:B12 deficiency; Ordered By:Sandra March;   · (1) GASTRIN; Status:Active; Requested KGK:[id number];    Perform:[facility]; OCX:[id number]; Ordered; For:B12 deficiency; Ordered By:Sandra March;   · (1) HOMOCYSTEINE; Status:Active; Requested PSR:[id number];    Perform:[facility]; KRC:[id number]; Ordered; For:B12 deficiency; Ordered By:Sandra March;   · (1) INTRINSIC FACTOR BLOCKING ANTIBODY; Status:Active; Requested HDA:[id number];    Perform:[facility]; ETT:[id number]; Ordered; For:B12 deficiency; Ordered By:Sandra March;   · (1) METHYLMALONIC ACID,BLOOD; Status:Active; Requested DQM:[id number];    Perform:[facility]; IVR:[id number]; Ordered; For:B12 deficiency; Ordered By:Sandra Mrach;   · (1) VITAMIN B12; Status:Active; Requested APY:[id number];    Perform:[facility]; GHW:[id number]; Ordered; For:B12 deficiency;  Ordered By:Sandra March;   · (Q) GASTRIC PARIETAL CELL ANTIBODY, GAMA; Status:Active; Requested  XPP:[id number];    Perform:[facility]; Due:47Mcp8139; Ordered; For:B12 deficiency; Ordered By:Yoseph March;   · Follow-up visit in 6 weeks Evaluation and Treatment  Follow-up  Status: Complete  Done:  03TBS8965 09:15AM   Ordered; For: B12 deficiency; Ordered By: Vanlue Model Performed:  Due: 88IZQ1580; Last Updated By: Butch Lewis; 10/19/2017 10:59:06 AM  Lupus anticoagulant positive    · 3 - Field Jeanne KIDD  (Rheumatology) Co-Management  *CHRONICALLY + HELENE, + LUPUS  ANTICOAGULANT; DX WITH "LUPUS-LIKE" DISEASE  NEVER TREATED  [facility]  [facility]  Status: Hold For -  Scheduling  Requested for: 06RQG4766   Ordered; For: Lupus anticoagulant positive; Ordered By: Vanlue Model Performed:  Due: 03BOL0952  are Referring to a non- Preferred Provider : Established Patient  Care Summary provided  : Yes   · Rheumatology Referral Other Co-Management  *  CHRONICALLY + HELENE, + LUPUS  ANTICOAGULANT; DX WITH "LUPUS-LIKE" DISEASE  NEVER TREATED  RECORDS  FROM CURRENT RHEUMATOLOGIST SCANNED IN [facility]  Status: Active   Requested for: 64IGB5081   Ordered; For: Lupus anticoagulant positive; Ordered By: Vanlue Model Performed:  Due: 86RVR8422  are Referring to a non- Preferred Provider : Services not provided in network  Care Summary provided  : Yes  SOB (shortness of breath)    · 1 - Mickie Parsons (Cardiology) Co-Management  *Question of [address]  Chronic SOB  Fatigue  Estimated PA systolic pressure on stress echo at [facility] 8/13/2012 increased to  5mm mercury 'suggestive of significant exercise induced pulmonary hypertension "   Status: Active  Requested for: 88MSS6302   Ordered; For: SOB (shortness of breath); Ordered By: Vanlue Model Performed:  Due: 80XTM0092  Care Summary provided  : Yes    * DXA BONE DENSITY SPINE HIP AND PELVIS; Status:Need Information - Financial Authorization;  Requested [facility]:47NAH0946 05:00PM;   Perform:[facility]; Due:86Tyh0852; Last Updated By:Bettie Power; 10/19/2017 2:27:27 PM;Ordered;    For:Osteoporosis; Ordered By:Valdo March; Discussion/Summary    1  B12 deficiency without evidence of anemia  B12 184( 691-274) 10/6/2017  History of Nissen fundoplication 8039 for severe GERD  2  Lupus anticoagulant + on at least 3 separate occasions: (prior to 3/13/2015) 7/11/2016 and 10/6/2017  3 Indeterminate IgM anticardiolipin antibody: 18 10/6/2017  4  Persistently positive HELENE  5  Lupus-like disease / lupus - has not been treated  Wants to see new rheumatologist  Referral made  6  Osteoporosis L-spine diagnosed 28 y/o  Dexa many years ago  Ordered  7  S/P hysterectomy without oophorectomy 39 y/o   8  Persistent SOB, chronic cough  Stress echo at [facility] suggestive of PAH  Saw [address] specialist who did not agree  Wishes to have another opinion  Referred  She has B12 1000mcg/ML vials for single use from Dr Kevan Bryson but did not receive needles/ syringes  She is comfortable with self IM administration and needles/ syringes rx written  It would be a hardship for patient financially and timewise to travel to Dr Morales Choe office for administration  1000mcg 2/week x 4-6 weeks recommended  Will evaluate for potential causes of B12 deficiency serologically  With history of Nissen fundoplication she may have poor absorption, with + autoimmune tests, I question if she also has autoantibodies to intrinsic factor or gastric parietal cells (pernicious anemia)  Repeat EGD to be considered in the future  She is asked to f/u in 4-6 weeks with lab work  Referral made for rheumatologic opinion regarding +/- Lupus, cardiology opinion for chronic SOB, +/- PAH  Continue ASA 81 mg po            Signatures   Electronically signed by : Sadny Dsouza, [facility]; Oct 19 2017  3:47PM EST                       (Author)

## 2018-01-22 VITALS
BODY MASS INDEX: 39.51 KG/M2 | HEART RATE: 98 BPM | HEIGHT: 63 IN | DIASTOLIC BLOOD PRESSURE: 80 MMHG | SYSTOLIC BLOOD PRESSURE: 140 MMHG | WEIGHT: 223 LBS

## 2018-01-23 VITALS
HEIGHT: 63 IN | OXYGEN SATURATION: 97 % | BODY MASS INDEX: 39.51 KG/M2 | WEIGHT: 223 LBS | HEART RATE: 104 BPM | DIASTOLIC BLOOD PRESSURE: 90 MMHG | RESPIRATION RATE: 16 BRPM | TEMPERATURE: 98.4 F | SYSTOLIC BLOOD PRESSURE: 138 MMHG

## 2018-01-23 VITALS
WEIGHT: 228 LBS | OXYGEN SATURATION: 98 % | DIASTOLIC BLOOD PRESSURE: 50 MMHG | HEART RATE: 116 BPM | SYSTOLIC BLOOD PRESSURE: 120 MMHG | HEIGHT: 63 IN | BODY MASS INDEX: 40.4 KG/M2

## 2018-01-23 VITALS
WEIGHT: 224.38 LBS | SYSTOLIC BLOOD PRESSURE: 140 MMHG | OXYGEN SATURATION: 98 % | BODY MASS INDEX: 39.76 KG/M2 | DIASTOLIC BLOOD PRESSURE: 72 MMHG | HEIGHT: 63 IN | HEART RATE: 100 BPM

## 2018-01-23 NOTE — PROCEDURES
Procedures by Francy Coffey MD at  12/19/2017 10:17 AM      Author:  Francy Coffey MD Service:  Cardiology Author Type:  Fellow    Filed:  12/19/2017 10:22 AM Date of Service:  12/19/2017 10:17 AM Status:  Attested    :  Francy Coffey MD (Fellow)  Cosigner:  Licha Manriquez MD at 12/19/2017  1:35 PM      Pre-procedure Diagnoses:       1  Pulmonary hypertension [I27 20]                Post-procedure Diagnoses:       1  Pulmonary hypertension [I27 20]                Procedures:       1  VT RIGHT HEART CATHETERIZATION [06331 (CPT®)]              Attestation signed by Licha Manriquez MD at 12/19/2017  1:35 PM      Teaching Physician Statement  I performed history and exam of patient  I discussed with the Cardiology Fellow  I agree with the [address] [place] [address] documented findings and plan of care  I personally interviewed and examined  patient, reviewed labs, telemetry, vitals, and relevant studies  Please see RHC report  Patient was prepped in sterile fashion  His right IJ area was draped  Right IJ access was obtained via seldinger technique using a micropuncture as an additional step for safety  An 8 French sheath was placed  The PA catheter was advanced via pressure wave form monitoring and as needed fluoroscopy to obtain RA, RV, PA and wedge pressures  No complications during the procedure  PA saturation was obtained for [facility] Cardiac Output calculation  Exercise RHC was done and PA pressures and simultaneous CVP tracings were done  Please see the full report for the results             Received Sukhdev Hou MD  Dec 19 2017  1:35PM [facility] Standard Time

## 2018-01-24 ENCOUNTER — OFFICE VISIT (OUTPATIENT)
Dept: OBGYN CLINIC | Facility: CLINIC | Age: 53
End: 2018-01-24
Payer: COMMERCIAL

## 2018-01-24 VITALS
SYSTOLIC BLOOD PRESSURE: 128 MMHG | BODY MASS INDEX: 40.75 KG/M2 | HEIGHT: 63 IN | DIASTOLIC BLOOD PRESSURE: 82 MMHG | WEIGHT: 230 LBS

## 2018-01-24 DIAGNOSIS — Z12.31 SCREENING MAMMOGRAM, ENCOUNTER FOR: Primary | ICD-10-CM

## 2018-01-24 PROBLEM — Z01.419 ENCOUNTER FOR GYNECOLOGICAL EXAMINATION (GENERAL) (ROUTINE) WITHOUT ABNORMAL FINDINGS: Status: ACTIVE | Noted: 2018-01-24

## 2018-01-24 PROCEDURE — S0612 ANNUAL GYNECOLOGICAL EXAMINA: HCPCS | Performed by: PHYSICIAN ASSISTANT

## 2018-01-24 RX ORDER — SILDENAFIL CITRATE 20 MG/1
20 TABLET ORAL 3 TIMES DAILY
COMMUNITY
End: 2018-12-06 | Stop reason: SDUPTHER

## 2018-01-24 NOTE — PROGRESS NOTES
CC:  Yearly exam    S:  52 y o  female here for yearly exam  She is postmenopausal and has had no vaginal bleeding  She is s/p vaginal hyst for bleeding and hyperplasia on biopsy; her specimen showed no signs of hyperplasia or carcinoma  She denies vaginal discharge, itching, odor or dryness  She is sexually active without pain, bleeding or dryness  We did discuss coconut oil as a good lubricant if needed  She has had a rough year with a new dx of systemic lupus and connective tissue disease and pulmonary hypertension  She works for an eye doctor  She does have some hot flashes that are bothersome to her  We discussed avoiding estrogen and soy products with her lupus anticoagulant  She will check with her rheumatologist about the advisability of trying black cohosh  Last Pap Hyst  Last HPV hyst  Last Mammo 3/20/2015, WNL  Last DEXA 10/31/2017, WNL  Last Colonoscopy years ago        O:  Patient appears well and is not in distress  Neck is supple without masses  Breasts are symmetrical without mass, tenderness, nipple discharge, skin changes or adenopathy  Abdomen is soft and nontender without masses  External genitals are normal without lesions or rashes  Vagina is normal without discharge or bleeding  Cervix and uterus are surgically absent  Adnexa are normal, nontender, without palpable mass  A:  Yearly exam      P:  She is scheduled for her mammogram on Monday  She refuses further colonoscopy due to severe pain with her last one  She will RTO one year for yearly exam or sooner as needed

## 2018-01-29 ENCOUNTER — HOSPITAL ENCOUNTER (OUTPATIENT)
Dept: RADIOLOGY | Age: 53
Discharge: HOME | End: 2018-01-29
Payer: COMMERCIAL

## 2018-01-29 DIAGNOSIS — Z12.31 SCREENING MAMMOGRAM, ENCOUNTER FOR: ICD-10-CM

## 2018-01-29 PROCEDURE — 77067 SCR MAMMO BI INCL CAD: CPT

## 2018-02-13 ENCOUNTER — OFFICE VISIT (OUTPATIENT)
Dept: CARDIOLOGY CLINIC | Facility: CLINIC | Age: 53
End: 2018-02-13
Payer: COMMERCIAL

## 2018-02-13 VITALS
OXYGEN SATURATION: 98 % | WEIGHT: 230.7 LBS | HEIGHT: 63 IN | DIASTOLIC BLOOD PRESSURE: 66 MMHG | SYSTOLIC BLOOD PRESSURE: 124 MMHG | BODY MASS INDEX: 40.88 KG/M2 | HEART RATE: 105 BPM

## 2018-02-13 DIAGNOSIS — I27.20 PULMONARY HYPERTENSION (HCC): Primary | ICD-10-CM

## 2018-02-13 PROCEDURE — 99214 OFFICE O/P EST MOD 30 MIN: CPT | Performed by: INTERNAL MEDICINE

## 2018-02-13 RX ORDER — TORSEMIDE 20 MG/1
20 TABLET ORAL DAILY
Qty: 145 TABLET | Refills: 3 | Status: SHIPPED | OUTPATIENT
Start: 2018-02-13 | End: 2018-04-30 | Stop reason: SDUPTHER

## 2018-02-13 NOTE — PATIENT INSTRUCTIONS
1) Stop furosemide  2) Start torsemide 40 mg daily (this is twice as strong as your furosemide)   Once weight has decreased ~9-10 lbs, go down to torsemide 20 mg daily  3) Check bloodwork in 1 week

## 2018-02-13 NOTE — PROGRESS NOTES
Heart Failure Outpatient Progress Note - Talon Asp 52 y o  female MRN: [id number]    @ Encounter: [id number]      Assessment/Plan:    # ANSARI: Likely combination of obesity/deconditioning, diastolic dysfunction (obesity, female, currently on diuretics w/ hx of volume overload), and exercise induced PAH  --Continue diet and exercise for weight loss    # Exercised induced PAH: Obesity/deconditioning and diastolic dysfunction (PCWP 12 mmHg) do not fully explain her degree of dysfunction  Recent diagnosis of MCTD/SLE w/ + lupus anticoagulant  Remote smoker  She is at risk to develop pulmonary vascular disease leading to RV dysfunction  At rest, RV appears normal on TTE with coupled RV-PA w/o e/o of RVOT notching suggestive of normal PVR at rest  However, she did have a stress echo in 2012 that was suggestive of exercise induced pulmonary HTN  At the time she did not have e/o of elevated PVR  Her bubble was negative which makes an intracardiac shunt less likely  Her symptoms have progressed and with ambulation she shows evidence of desaturation that may be due to abnormal air exchange we did the following:   Diag:  --PFTs were normal   --Repeat stress echo to evaluate pulmonary pressures, RV, and RVOT signal w/ exercise showed exaggerated HR response, HTNsive response, decrease in O2 saturation from 99% to 91% and increase in PA pressures from 45 mmHg to 70 mmHg with exercise  Exercised for 5 min and 20 sec  --RHC w/ exercise: This was an exercise RHC with simultaneous monitoring of the distal and proximal SG catheter ports  She had an appropriate HR response from 100 to 130 bpm with MAP throughout the study staying at 112 mmHg  Hgb 13 1  With exercise, there was an increase in PVR from <3 to ~4 5 MOLINA  PCWP increased from 12 to 18 mmHg  CVP increased from 7 to 10 mmHg  The findings were consistent with exercise induced Pulmonary Arterial Hypertension (WHO Group 1, NYHA II)   Her TTE and PCWP are not consistent with significant left sided heart disease (Group II)  Her PFTs did not show any significant lung disease (Group III), V/Q scan is not consistent with CTEPH (Group IV PAH) and stress echo showed elevation of PA pressures with exercise which is consistent with above findings  --Has not had significant response to sildenafil at this point  --Given above, will trial addition of ERA    Therapeutic:  --Stop lasix; start torsemide 20 mg PO daily  Increase to 40 mg PO daily until weight decrease @ least 9-10 lbs  Then back down to 20 mg PO daily  --Daily weights qAM  --Continue sildenafil 20 mg PO q8hrs for now  --Will obtain approval for macitentan 10 mg PO daily    # Undifferentiated CTD w/ + lupus anticoagulant (unclear if SLE is involved)  --Per outpatient Rheumatologist  # ST: V/Q negative  May be 2/2 to deconditioning +/- inappropriate ST +/- diastolic dysfunction/HF (euvolemic on exam)  No e/o infection  --Continue to monitor, can consider coby agents in the future    HPI: Very pleasant 44 y/o woman w/ PMHx of newly diagnosed MCTD/SLE, +lupus anti-coagulant, B12 deficiency, and obesity referred for evaluation of ANSARI  She states that she first started getting SOB -- 10 years ago  She has had several episodes of bacterial PNA and chronic cough (a few times/year)  She was referred to [facility] in 2012 for workup for PH  She had a a stress echo 8/2012 that showed that her PA pressures more than doubled from --24 mmHg to > 50 mmHg  Currently, she states she can walk up a couple flights of stairs but does get SOB  She also gets SOB with walking up inclines  She gets occasional LH  She has been found to be B12 deficient, but that is improving  She was seeing a Rheumatologist in [place], [address] [place] [address] but recently saw a specialist at [facility], Dr Waleska Sheth (Rheumatologist - 11/16)  She was diagnosed with MCTD and SLE  HELENE + 1:320 w/ speckled pattern  She has been on Plaquenil x 2 weeks now   She is on ASA for + lupus anticoagulant and anticardiolipin Abs  She has joint pains and a subtle malar rash  She states so far there has been no change with plaquenil  She has f/u with him in 2/2018  After her visit with Dr Jeana Duvall, she has had TTE which shows LVEF --65%, normal RV size and function without RVOT notching  Normal atria  CXR clear  She also has had a negative V/Q scan  She walked the patient in the hallway and had her go up and down stairs  Her resting HR went from --100 bpm to 110s with Pulse Ox starting from 98% @ rest to --90% with exercise  Pulse Ox showed a good waveform throughout  She denies CP, palpitations, presyncope, or syncope  She notes that she went on a two week cruise and afterwards developed LE edema in the past and has developed LE edema  Today, 1/9/2018, returns for f/u  Had echo stress test w/ poor exercise tolerance and decrease in O2 saturation w/ increase in PA pressures from 45 to 70 mmHg w/ exercise  Exercise RHC showed exercise induced PH w/ increase in PVR from <3 to ~4 5 MOLINA  PCWP increased from 12 to 18 mmHg  CVP increased from 7 to 10 mmHg  She was denied for tadalafil, but approved for sildenafil  She started sildenafil on 1/5/18 and states she does not feel any different and continues to have trouble with stairs  In addition, today in clinic, she continues to be tachycardic  Has gained 4 lbs since her last visit  Today, 2/13/2018, returns for f/u  She has gained ~ 4 lbs since her last visit, for a total of 9 lbs since attempted diuresis  She started sildenafil on 1/5/18 and states she feels worse going up stairs than previously  In addition, today in clinic, she continues to be tachycardic      Past Medical History:   Diagnosis Date    HELENE positive     Hypertension     Lupus anticoagulant positive     Night sweat     Osteoporosis     Secondary pulmonary arterial hypertension     Shortness of breath     Sinus tachycardia        Review of Systems - 12 point ROS was done and is negative, except as noted above  Allergies   Allergen Reactions    Dilantin [Phenytoin] Anaphylaxis    Augmentin [Amoxicillin-Pot Clavulanate] Rash       Current Outpatient Prescriptions:     aspirin 81 mg chewable tablet, Chew 162 mg daily, Disp: , Rfl:     cholecalciferol (VITAMIN D3) 1,000 units tablet, Take 2,000 Units by mouth daily, Disp: , Rfl:     Cyanocobalamin (VITAMIN B-12 IJ), Inject 1,000 mcg/mL as directed every 14 (fourteen) days, Disp: , Rfl:     furosemide (LASIX) 20 mg tablet, Take 20 mg by mouth daily, Disp: , Rfl:     hydroxychloroquine (PLAQUENIL) 200 mg tablet, Take 400 mg by mouth daily, Disp: , Rfl:     sildenafil (REVATIO) 20 mg tablet, Take 20 mg by mouth 3 (three) times a day, Disp: , Rfl:     Social History     Social History    Marital status: /Civil Union     Spouse name: N/A    Number of children: N/A    Years of education: N/A     Occupational History    Not on file       Social History Main Topics    Smoking status: Former Smoker     Quit date: 1/24/2006    Smokeless tobacco: Never Used    Alcohol use Yes      Comment: social    Drug use: No    Sexual activity: Yes     Other Topics Concern    Not on file     Social History Narrative    No narrative on file       Family History   Problem Relation Age of Onset    Uterine cancer Mother     Pancreatic cancer Mother     Heart disease Father     Thyroid disease Sister     Thyroid disease Maternal Grandmother        Physical Exam:    Vitals: Blood pressure 124/66, pulse 105, height 5' 3" (1 6 m), weight 105 kg (230 lb 11 2 oz), SpO2 98 %, not currently breastfeeding , Body mass index is 40 87 kg/m² ,   Wt Readings from Last 3 Encounters:   02/13/18 105 kg (230 lb 11 2 oz)   01/24/18 104 kg (230 lb)   01/09/18 103 kg (228 lb)     Physical Exam:  Vitals:    02/13/18 1647   BP: 124/66   BP Location: Left arm   Patient Position: Sitting   Cuff Size: Adult   Pulse: 105   SpO2: 98%   Weight: 105 kg (230 lb 11 2 oz)   Height: 5' 3" (1 6 m)       GEN: Mandy Gosselin appears well, alert and oriented x 3, pleasant and cooperative   HEENT: pupils equal, round, and reactive to light; extraocular muscles intact  NECK: supple, no carotid bruits   HEART: regular rhythm, normal S1 and S2, no murmurs, clicks, gallops or rubs, JVD is elevated  LUNGS: clear to auscultation bilaterally; no wheezes, rales, or rhonchi   ABDOMEN: normal bowel sounds, soft, no tenderness, no distention  EXTREMITIES: peripheral pulses normal; no clubbing, cyanosis; + 1+ B/L tense LE edema  NEURO: no focal findings   SKIN: normal without suspicious lesions on exposed skin    Labs & Results:  Lab Results   Component Value Date    WBC 4 41 12/19/2017    HGB 13 1 12/19/2017    HCT 38 8 12/19/2017    MCV 90 12/19/2017     12/19/2017       Chemistry        Component Value Date/Time     12/19/2017 0758    K 4 3 12/19/2017 0758     12/19/2017 0758    CO2 27 12/19/2017 0758    BUN 13 12/19/2017 0758    CREATININE 0 90 12/19/2017 0758        Component Value Date/Time    CALCIUM 9 4 12/19/2017 0758    ALKPHOS 108 11/18/2017 0844    AST 11 11/18/2017 0844    ALT 23 11/18/2017 0844    BILITOT 0 40 11/18/2017 0844        EKG personally reviewed by Rakesh Rose MD      Counseling / Coordination of Care  Total floor / unit time spent today 40 minutes  Greater than 50% of total time was spent with the patient and / or family counseling and / or coordination of care  A description of the counseling / coordination of care: 25 min  Thank you for the opportunity to participate in the care of this patient      Rakesh Rose MD, PhD   Jeny Hutson

## 2018-02-16 DIAGNOSIS — I27.21 PAH (PULMONARY ARTERIAL HYPERTENSION) WITH PORTAL HYPERTENSION (HCC): Primary | ICD-10-CM

## 2018-02-16 DIAGNOSIS — I27.20 PULMONARY HYPERTENSION (HCC): Primary | ICD-10-CM

## 2018-02-16 DIAGNOSIS — K76.6 PAH (PULMONARY ARTERIAL HYPERTENSION) WITH PORTAL HYPERTENSION (HCC): Primary | ICD-10-CM

## 2018-02-19 ENCOUNTER — TELEPHONE (OUTPATIENT)
Dept: CARDIOLOGY CLINIC | Facility: CLINIC | Age: 53
End: 2018-02-19

## 2018-02-19 ENCOUNTER — OFFICE VISIT (OUTPATIENT)
Dept: FAMILY MEDICINE CLINIC | Facility: OTHER | Age: 53
End: 2018-02-19
Payer: COMMERCIAL

## 2018-02-19 VITALS
HEART RATE: 100 BPM | DIASTOLIC BLOOD PRESSURE: 90 MMHG | HEIGHT: 63 IN | BODY MASS INDEX: 39.17 KG/M2 | SYSTOLIC BLOOD PRESSURE: 138 MMHG | WEIGHT: 221.06 LBS | TEMPERATURE: 101.8 F

## 2018-02-19 DIAGNOSIS — J06.9 VIRAL URI WITH COUGH: Primary | ICD-10-CM

## 2018-02-19 PROBLEM — R00.0 SINUS TACHYCARDIA: Status: ACTIVE | Noted: 2017-11-09

## 2018-02-19 PROBLEM — M70.50 PES ANSERINE BURSITIS: Status: ACTIVE | Noted: 2017-11-21

## 2018-02-19 PROBLEM — I27.20 PULMONARY HYPERTENSION (HCC): Status: ACTIVE | Noted: 2018-02-16

## 2018-02-19 PROBLEM — M35.9 DIFFUSE CONNECTIVE TISSUE DISEASE (HCC): Status: ACTIVE | Noted: 2017-11-21

## 2018-02-19 PROBLEM — M81.0 OSTEOPOROSIS: Status: ACTIVE | Noted: 2017-10-19

## 2018-02-19 PROBLEM — R06.02 SOB (SHORTNESS OF BREATH): Status: ACTIVE | Noted: 2017-10-19

## 2018-02-19 PROBLEM — IMO0002 LUPUS: Status: ACTIVE | Noted: 2017-11-17

## 2018-02-19 PROBLEM — M32.9 LUPUS (HCC): Status: ACTIVE | Noted: 2017-11-17

## 2018-02-19 PROBLEM — R76.0 LUPUS ANTICOAGULANT POSITIVE: Status: ACTIVE | Noted: 2017-10-18

## 2018-02-19 PROBLEM — IMO0002 SECONDARY PULMONARY HYPERTENSION: Status: ACTIVE | Noted: 2017-12-15

## 2018-02-19 PROBLEM — Z79.899 LONG-TERM USE OF HYDROXYCHLOROQUINE: Status: ACTIVE | Noted: 2018-02-16

## 2018-02-19 PROBLEM — R76.8 POSITIVE ANA (ANTINUCLEAR ANTIBODY): Status: ACTIVE | Noted: 2017-10-18

## 2018-02-19 PROBLEM — E53.8 B12 DEFICIENCY: Status: ACTIVE | Noted: 2017-10-19

## 2018-02-19 PROCEDURE — 99214 OFFICE O/P EST MOD 30 MIN: CPT | Performed by: FAMILY MEDICINE

## 2018-02-19 RX ORDER — OSELTAMIVIR PHOSPHATE 75 MG/1
75 CAPSULE ORAL 2 TIMES DAILY
Qty: 10 CAPSULE | Refills: 0 | Status: SHIPPED | OUTPATIENT
Start: 2018-02-19 | End: 2018-02-24

## 2018-02-19 NOTE — TELEPHONE ENCOUNTER
PCP states she has the flu  Gave script for Tamiflu  Fady Alexander would like to know if she can have more than 64 oz of fluids daily?

## 2018-02-19 NOTE — TELEPHONE ENCOUNTER
Continue current BID dosing of torsemide until weight <220 lbs  Then maintain at ~219 lbs with daily dosing of torsemide   Mgmt of F/cough/body aches per PMD

## 2018-02-19 NOTE — TELEPHONE ENCOUNTER
Delma Bowen called w/ her weight for today, 221 8 lbs  C/o fever, cough, body aches which she is seeing her PCP for today  Flint her SOB was better but today it is worsened  Currently taking Torsemide 20mg BID  Please advise     C/b # [phone]

## 2018-02-19 NOTE — PROGRESS NOTES
Assessment/Plan:    Stay hydrated and ensure resting  Take OTC fever reducing medication with precautions  Take the medication as directed for the full duration  Contact and droplet precautions advised  Work excuse provided today  FU prn  Problem List Items Addressed This Visit     None      Visit Diagnoses     Viral URI with cough    -  Primary    Relevant Medications    oseltamivir (TAMIFLU) 75 mg capsule            Subjective:      Patient ID: Prasanth Ralph is a 52 y o  female  Patient is here today for acute visit due to cough and URI and fever and body aches  She was around her coworker who was coughing last week  She missed work today and would like to have a work excuse as well  Did take some tylenol but still feels feverish  Has been tolerating diet ok but appetite is down and she feels terrible and run down  URI    This is a new problem  The current episode started yesterday  The problem has been gradually worsening  The maximum temperature recorded prior to her arrival was 102 - 102 9 F  The fever has been present for 1 to 2 days  Associated symptoms include congestion, coughing, headaches, neck pain, rhinorrhea and a sore throat  Pertinent negatives include no abdominal pain, chest pain, diarrhea, dysuria, joint swelling, nausea, rash, sinus pain, swollen glands, vomiting or wheezing  She has tried antihistamine, NSAIDs and sleep for the symptoms  The treatment provided no relief  The following portions of the patient's history were reviewed and updated as appropriate: allergies, current medications, past family history, past medical history, past social history, past surgical history and problem list     Review of Systems   HENT: Positive for congestion, rhinorrhea and sore throat  Negative for sinus pain  Respiratory: Positive for cough  Negative for wheezing  Cardiovascular: Negative for chest pain     Gastrointestinal: Negative for abdominal pain, diarrhea, nausea and vomiting  Genitourinary: Negative for dysuria  Musculoskeletal: Positive for arthralgias and neck pain  Skin: Negative for rash  Neurological: Positive for headaches  Objective:      /90 (BP Location: Right arm, Patient Position: Sitting, Cuff Size: Adult)   Pulse 100   Temp (!) 101 8 °F (38 8 °C) (Tympanic)   Ht 5' 3" (1 6 m)   Wt 100 kg (221 lb 1 oz)   BMI 39 16 kg/m²          Physical Exam   Constitutional: She is oriented to person, place, and time  She appears well-developed and well-nourished  No distress  HENT:   Head: Normocephalic and atraumatic  Right Ear: External ear normal    Left Ear: External ear normal    Mouth/Throat: Oropharynx is clear and moist  No oropharyngeal exudate  Eyes: Right eye exhibits no discharge  Left eye exhibits no discharge  No scleral icterus  Neck: Normal range of motion  Neck supple  Cardiovascular: Normal rate, regular rhythm and normal heart sounds  No murmur heard  Pulmonary/Chest: Effort normal and breath sounds normal  No respiratory distress  She has no wheezes  Abdominal: Soft  Bowel sounds are normal  She exhibits no distension  There is no tenderness  Lymphadenopathy:     She has no cervical adenopathy  Neurological: She is alert and oriented to person, place, and time  No cranial nerve deficit  Skin: Skin is warm and dry  No rash noted  She is not diaphoretic  Psychiatric: She has a normal mood and affect  Her behavior is normal    Nursing note and vitals reviewed

## 2018-02-19 NOTE — PATIENT INSTRUCTIONS
Viral Syndrome   AMBULATORY CARE:   Viral syndrome  is a term used for general symptoms of a viral infection that has no clear cause  Viruses are spread easily from person to person through the air and on shared items  Common symptoms include the following:   · Fever and chills    · A runny or stuffy nose     · Cough, sore throat, or hoarseness     · Headache, or pain and pressure around your eyes     · Muscle aches and joint pain     · Shortness of breath or wheezing     · Abdominal pain, cramps, and diarrhea     · Nausea, vomiting, or loss of appetite  Call 911 for the following:   · You have a seizure  · You cannot be woken  · You have chest pain or trouble breathing  Seek care immediately if:   · You have a stiff neck, a bad headache, and sensitivity to light  · You feel weak, dizzy, or confused  · You stop urinating or urinate a lot less than normal      · You cough up blood or thick, yellow or green, mucus  · You have severe abdominal pain or your abdomen is larger than usual   Contact your healthcare provider if:   · Your symptoms do not get better with treatment, or get worse, after 3 days  · You have a rash or ear pain  · You have burning when you urinate  · You have questions or concerns about your condition or care  Treatment for viral syndrome  may include medicines to manage your symptoms  You may  need any of the following:  · Acetaminophen  decreases pain and fever  It is available without a doctor's order  Ask how much medicine to take and how often to take it  Follow directions  Acetaminophen can cause liver damage if not taken correctly  · NSAIDs , such as ibuprofen, help decrease swelling, pain, and fever  NSAIDs can cause stomach bleeding or kidney problems in certain people  If you take blood thinner medicine, always ask your healthcare provider if NSAIDs are safe for you  Always read the medicine label and follow directions      · Cold medicine  helps decrease swelling, control a cough, and relieve chest or nasal congestion  · Saline nasal spray  helps decrease nasal congestion  · Take your medicine as directed  Contact your healthcare provider if you think your medicine is not helping or if you have side effects  Tell him of her if you are allergic to any medicine  Keep a list of the medicines, vitamins, and herbs you take  Include the amounts, and when and why you take them  Bring the list or the pill bottles to follow-up visits  Carry your medicine list with you in case of an emergency  Manage your symptoms:   · Drink liquids as directed  to prevent dehydration  Ask how much liquid to drink each day and which liquids are best for you  Ask if you should drink an oral rehydration solution (ORS)  An ORS has the right amounts of water, salts, and sugar you need to replace body fluids  This may help prevent dehydration caused by vomiting or diarrhea  Do not drink liquids with caffeine  Drinks with caffeine can make dehydration worse  · Get plenty of rest  to help your body heal  Take naps throughout the day  Ask your healthcare provider when you can return to work and your normal activities  · Use a cool mist humidifier  to help you breathe easier if you have nasal or chest congestion  Ask your healthcare provider how to use a cool mist humidifier  · Eat honey or use cough drops  to help decrease throat discomfort  Ask your healthcare provider how much honey you should eat each day  Cough drops are available without a doctor's order  Follow directions for taking cough drops  · Do not smoke and stay away from others who smoke  Nicotine and other chemicals in cigarettes and cigars can cause lung damage  Smoking can also delay healing  Ask your healthcare provider for information if you currently smoke and need help to quit  E-cigarettes or smokeless tobacco still contain nicotine   Talk to your healthcare provider before you use these products  · Wash your hands frequently  to prevent the spread of germs to others  Use soap and water  Use gel hand  when soap and water are not available  Wash your hands after you use the bathroom, cough, or sneeze  Wash your hands before you prepare or eat food  Follow up with your healthcare provider as directed:  Write down your questions so you remember to ask them during your visits  © 2017 [facility] Information is for End User's use only and may not be sold, redistributed or otherwise used for commercial purposes  All illustrations and images included in CareNotes® are the copyrighted property of A D A M [employer] Inc  or [employer] Ber[employer]  The above information is an  only  It is not intended as medical advice for individual conditions or treatments  Talk to your doctor, nurse or pharmacist before following any medical regimen to see if it is safe and effective for you

## 2018-02-19 NOTE — LETTER
February 19, 2018     Patient: Al Jay   YOB: 1965   Date of Visit: 2/19/2018       To Whom it May Concern:    Cristine Ferguson is under my professional care  She was seen in my office on 2/19/2018  Please excuse her from work today and 2/20/18  She may return to work on 2/21/18       If you have any questions or concerns, please don't hesitate to call           Sincerely,          Katarzyna Wolf MD        CC: No Recipients

## 2018-02-19 NOTE — TELEPHONE ENCOUNTER
Called patient and instructed her to continue fluid restriction but decrease torsemide to 20 mg PO daily during her acute phase of illness  She is to resume torsemide 20 mg PO BID if her weight starts to rise during her illness  Once her illness has resolved, she is to resume torsemide 20 mg PO BID to continue diuresis (likely another 2-3 lbs, still has LE edema per our discussion)  Also gave instructions to seek medical attention if her SOB worsens despite stable weights as this could signal progression of her viral illness

## 2018-02-21 ENCOUNTER — TELEPHONE (OUTPATIENT)
Dept: CARDIOLOGY CLINIC | Facility: CLINIC | Age: 53
End: 2018-02-21

## 2018-02-21 NOTE — TELEPHONE ENCOUNTER
Just a FYI : they have tried reaching the pt 3x  Opsumit- called the rx line today & confirm patients phone number-

## 2018-02-23 ENCOUNTER — TELEPHONE (OUTPATIENT)
Dept: CARDIOLOGY CLINIC | Facility: CLINIC | Age: 53
End: 2018-02-23

## 2018-02-23 ENCOUNTER — TELEPHONE (OUTPATIENT)
Dept: FAMILY MEDICINE CLINIC | Facility: OTHER | Age: 53
End: 2018-02-23

## 2018-02-23 NOTE — TELEPHONE ENCOUNTER
Called patient  Her edema as resolved  She still has URI symptoms  Asked her to decrease torsemide to QOD to attempt to maintain current weight  Lab work as planned

## 2018-02-24 ENCOUNTER — APPOINTMENT (OUTPATIENT)
Dept: LAB | Facility: CLINIC | Age: 53
End: 2018-02-24
Payer: COMMERCIAL

## 2018-02-24 LAB
ANION GAP SERPL CALCULATED.3IONS-SCNC: 7 MMOL/L (ref 4–13)
BUN SERPL-MCNC: 14 MG/DL (ref 5–25)
CALCIUM SERPL-MCNC: 9.6 MG/DL (ref 8.3–10.1)
CHLORIDE SERPL-SCNC: 96 MMOL/L (ref 100–108)
CO2 SERPL-SCNC: 32 MMOL/L (ref 21–32)
CREAT SERPL-MCNC: 1.47 MG/DL (ref 0.6–1.3)
GFR SERPL CREATININE-BSD FRML MDRD: 40 ML/MIN/1.73SQ M
GLUCOSE P FAST SERPL-MCNC: 115 MG/DL (ref 65–99)
POTASSIUM SERPL-SCNC: 2.8 MMOL/L (ref 3.5–5.3)
SODIUM SERPL-SCNC: 135 MMOL/L (ref 136–145)

## 2018-02-24 PROCEDURE — 36415 COLL VENOUS BLD VENIPUNCTURE: CPT | Performed by: INTERNAL MEDICINE

## 2018-02-24 PROCEDURE — 80048 BASIC METABOLIC PNL TOTAL CA: CPT | Performed by: INTERNAL MEDICINE

## 2018-03-12 ENCOUNTER — TELEPHONE (OUTPATIENT)
Dept: CARDIOLOGY CLINIC | Facility: CLINIC | Age: 53
End: 2018-03-12

## 2018-03-12 NOTE — TELEPHONE ENCOUNTER
Called the patient  Swelling has improved  Weight currently ~219 lbs per our discussion  SOB/ANSARI improved  Instructed her to continue sildenafil and start macitentan  Thanks

## 2018-03-12 NOTE — TELEPHONE ENCOUNTER
Pt asking to s/w you, stating she was advised to call with instruction prior to starting Opsumit regarding titration of the sildenafil

## 2018-03-19 ENCOUNTER — OFFICE VISIT (OUTPATIENT)
Dept: CARDIOLOGY CLINIC | Facility: CLINIC | Age: 53
End: 2018-03-19
Payer: COMMERCIAL

## 2018-03-19 VITALS
HEART RATE: 99 BPM | OXYGEN SATURATION: 98 % | SYSTOLIC BLOOD PRESSURE: 126 MMHG | WEIGHT: 222 LBS | DIASTOLIC BLOOD PRESSURE: 68 MMHG | BODY MASS INDEX: 39.33 KG/M2

## 2018-03-19 DIAGNOSIS — I27.20 PULMONARY HYPERTENSION (HCC): Primary | ICD-10-CM

## 2018-03-19 PROCEDURE — 99213 OFFICE O/P EST LOW 20 MIN: CPT | Performed by: INTERNAL MEDICINE

## 2018-03-19 RX ORDER — POTASSIUM CHLORIDE 20 MEQ/1
20 TABLET, EXTENDED RELEASE ORAL DAILY
Qty: 90 TABLET | Refills: 3 | Status: SHIPPED | OUTPATIENT
Start: 2018-03-19 | End: 2018-04-30 | Stop reason: SDUPTHER

## 2018-03-19 NOTE — PROGRESS NOTES
Heart Failure Outpatient Progress Note - Rob Meter 52 y o  female MRN: [id number]    @ Encounter: [id number]      Assessment/Plan:    # ANSARI: Likely combination of obesity/deconditioning, diastolic dysfunction (obesity, female, currently on diuretics w/ hx of volume overload), and exercise induced PAH  --Continue diet and exercise for weight loss    # Exercised induced PAH: Obesity/deconditioning and diastolic dysfunction (PCWP 12 mmHg) do not fully explain her degree of dysfunction  Recent diagnosis of MCTD/SLE w/ + lupus anticoagulant  Remote smoker  She is at risk to develop pulmonary vascular disease leading to RV dysfunction  At rest, RV appears normal on TTE with coupled RV-PA w/o e/o of RVOT notching suggestive of normal PVR at rest  However, she did have a stress echo in 2012 that was suggestive of exercise induced pulmonary HTN  At the time she did not have e/o of elevated PVR  Her bubble was negative which makes an intracardiac shunt less likely  Her symptoms have progressed and with ambulation she shows evidence of desaturation that may be due to abnormal air exchange we did the following:   Diag:  --PFTs were normal   --Repeat stress echo to evaluate pulmonary pressures, RV, and RVOT signal w/ exercise showed exaggerated HR response, HTNsive response, decrease in O2 saturation from 99% to 91% and increase in PA pressures from 45 mmHg to 70 mmHg with exercise  Exercised for 5 min and 20 sec  --RHC w/ exercise: This was an exercise RHC with simultaneous monitoring of the distal and proximal SG catheter ports  She had an appropriate HR response from 100 to 130 bpm with MAP throughout the study staying at 112 mmHg  Hgb 13 1  With exercise, there was an increase in PVR from <3 to ~4 5 MOLINA  PCWP increased from 12 to 18 mmHg  CVP increased from 7 to 10 mmHg  The findings were consistent with exercise induced Pulmonary Arterial Hypertension (WHO Group 1, NYHA II)   Her TTE and PCWP are not consistent with significant left sided heart disease (Group II)  Her PFTs did not show any significant lung disease (Group III), V/Q scan is not consistent with CTEPH (Group IV PAH) and stress echo showed elevation of PA pressures with exercise which is consistent with above findings  --As she did not have significant response to sildenafil, started macitentan  --Volume overloaded today    Therapeutic:  --Increase torsemide to 40 mg QOD and torsemide 20 mg PO daily  Start Kdur 20 mEq daily  --Check labs in 1 week  --Daily weights qAM  --Continue sildenafil 20 mg PO q8hrs for now  --Continue macitentan 10 mg PO daily    # Undifferentiated CTD w/ + lupus anticoagulant (unclear if SLE is involved)  --Per outpatient Rheumatologist  # ST: V/Q negative  May be 2/2 to deconditioning +/- inappropriate ST +/- diastolic dysfunction/HF (euvolemic on exam)  No e/o infection  --Continue to monitor, can consider coby agents in the future  # Morbid obesity: Continue weight loss    RTC in 6 weeks    HPI: Very pleasant 53 y/o woman w/ PMHx of newly diagnosed MCTD/SLE, +lupus anti-coagulant, B12 deficiency, and obesity referred for evaluation of ANSARI  She states that she first started getting SOB -- 10 years ago  She has had several episodes of bacterial PNA and chronic cough (a few times/year)  She was referred to [facility] in 2012 for workup for PH  She had a a stress echo 8/2012 that showed that her PA pressures more than doubled from --24 mmHg to > 50 mmHg  Currently, she states she can walk up a couple flights of stairs but does get SOB  She also gets SOB with walking up inclines  She gets occasional LH  She has been found to be B12 deficient, but that is improving  She was seeing a Rheumatologist in [place], [place] but recently saw a specialist at [facility], Dr Gary Hammer (Rheumatologist - 11/16)  She was diagnosed with MCTD and SLE  HELENE + 1:320 w/ speckled pattern  She has been on Plaquenil x 2 weeks now   She is on ASA for + lupus anticoagulant and anticardiolipin Abs  She has joint pains and a subtle malar rash  She states so far there has been no change with plaquenil  She has f/u with him in 2/2018  After her visit with Dr Ashanti Cortes, she has had TTE which shows LVEF --65%, normal RV size and function without RVOT notching  Normal atria  CXR clear  She also has had a negative V/Q scan  She walked the patient in the hallway and had her go up and down stairs  Her resting HR went from --100 bpm to 110s with Pulse Ox starting from 98% @ rest to --90% with exercise  Pulse Ox showed a good waveform throughout  She denies CP, palpitations, presyncope, or syncope  She notes that she went on a two week cruise and afterwards developed LE edema in the past and has developed LE edema  Today, 1/9/2018, returns for f/u  Had echo stress test w/ poor exercise tolerance and decrease in O2 saturation w/ increase in PA pressures from 45 to 70 mmHg w/ exercise  Exercise RHC showed exercise induced PH w/ increase in PVR from <3 to ~4 5 MOLINA  PCWP increased from 12 to 18 mmHg  CVP increased from 7 to 10 mmHg  She was denied for tadalafil, but approved for sildenafil  She started sildenafil on 1/5/18 and states she does not feel any different and continues to have trouble with stairs  In addition, today in clinic, she continues to be tachycardic  Has gained 4 lbs since her last visit  Today, 2/13/2018, returns for f/u  She has gained ~ 4 lbs since her last visit, for a total of 9 lbs since attempted diuresis  She started sildenafil on 1/5/18 and states she feels worse going up stairs than previously  In addition, today in clinic, she continues to be tachycardic  Today, 3/19/2018, she returns for f/u  She states going up stairs there is improvement now  She has been on macitentan for 1 week now  Weight has steadily been decreasing       Past Medical History:   Diagnosis Date    HELENE positive     Encephalitis     Lasted Assessed 10/18/2017    Endometrial hyperplasia     Last assessed 10/18/2017    Hypertension     Lupus anticoagulant positive     Malignant neoplasm of cervix uteri ([facility] [address] )     Maxillary sinusitis     Lasted Assessed 10/18/2017    Night sweat     Osteopenia     Hip    Osteoporosis     Spine    Pneumonia     Last Assessed 10/18/2017    Secondary pulmonary arterial hypertension     Shortness of breath     Sinus tachycardia     Urinary incontinence        Review of Systems - 12 point ROS was done and is negative, except as noted above  Allergies   Allergen Reactions    Dilantin [Phenytoin] Anaphylaxis    Augmentin [Amoxicillin-Pot Clavulanate] Rash       Current Outpatient Prescriptions:     aspirin 81 mg chewable tablet, Chew 162 mg daily, Disp: , Rfl:     cholecalciferol (VITAMIN D3) 1,000 units tablet, Take 2,000 Units by mouth daily, Disp: , Rfl:     Cyanocobalamin (VITAMIN B-12 IJ), Inject 1,000 mcg/mL as directed every 14 (fourteen) days, Disp: , Rfl:     hydroxychloroquine (PLAQUENIL) 200 mg tablet, Take 400 mg by mouth daily, Disp: , Rfl:     Macitentan (OPSUMIT) 10 MG tablet, Take 1 tablet (10 mg total) by mouth daily, Disp: 30 tablet, Rfl: 10    Macitentan (OPSUMIT) 10 MG tablet, Take 1 tablet (10 mg total) by mouth daily, Disp: 90 tablet, Rfl: 0    sildenafil (REVATIO) 20 mg tablet, Take 20 mg by mouth 3 (three) times a day, Disp: , Rfl:     torsemide (DEMADEX) 20 mg tablet, Take 1 tablet (20 mg total) by mouth daily (Patient taking differently: Take 40 mg by mouth daily  ), Disp: 145 tablet, Rfl: 3    Social History     Social History    Marital status: /Civil Union     Spouse name: N/A    Number of children: 2    Years of education: N/A     Occupational History    Not on file       Social History Main Topics    Smoking status: Former Smoker     Quit date: 1/24/2006    Smokeless tobacco: Never Used    Alcohol use Yes      Comment: social    Drug use: No    Sexual activity: Yes     Other Topics Concern    Not on file     Social History Narrative    Daily caffeinated coffee consumption    Exercise habits           Family History   Problem Relation Age of Onset    Uterine cancer Mother     Pancreatic cancer Mother     Diabetes Mother     Heart disease Father     Stroke Father      CVA    Hypertension Father     Thyroid disease Sister     Hemochromatosis Brother     Thyroid disease Maternal Grandmother     Diabetes Other     Other Family      Heart Problem       Physical Exam:    Vitals: not currently breastfeeding  , There is no height or weight on file to calculate BMI ,   Wt Readings from Last 3 Encounters:   02/19/18 100 kg (221 lb 1 oz)   02/13/18 105 kg (230 lb 11 2 oz)   01/24/18 104 kg (230 lb)     Physical Exam:  Vitals:    03/19/18 1633   BP: 126/68   BP Location: Right arm   Patient Position: Sitting   Cuff Size: Standard   Pulse: 99   SpO2: 98%   Weight: 101 kg (222 lb)       GEN: Charmayne Borders appears well, alert and oriented x 3, pleasant and cooperative   HEENT: pupils equal, round, and reactive to light; extraocular muscles intact  NECK: supple, no carotid bruits   HEART: regular rhythm, normal S1 and S2, no murmurs, clicks, gallops or rubs, JVD is elevated, +HJR  LUNGS: clear to auscultation bilaterally; no wheezes, rales, or rhonchi   ABDOMEN: normal bowel sounds, soft, no tenderness, no distention  EXTREMITIES: peripheral pulses normal; no clubbing, cyanosis; 1+ B/L LE edema  NEURO: no focal findings   SKIN: normal without suspicious lesions on exposed skin    Labs & Results:  Lab Results   Component Value Date    WBC 4 41 12/19/2017    HGB 13 1 12/19/2017    HCT 38 8 12/19/2017    MCV 90 12/19/2017     12/19/2017       Chemistry        Component Value Date/Time     (L) 02/24/2018 0744    K 2 8 (L) 02/24/2018 0744    CL 96 (L) 02/24/2018 0744    CO2 32 02/24/2018 0744    BUN 14 02/24/2018 0744    CREATININE 1 47 (H) 02/24/2018 0744        Component Value Date/Time    CALCIUM 9 6 02/24/2018 0744    ALKPHOS 108 11/18/2017 0844    AST 11 11/18/2017 0844    ALT 23 11/18/2017 0844    BILITOT 0 40 11/18/2017 0844        EKG personally reviewed by Yen Beck MD      Counseling / Coordination of Care  Total floor / unit time spent today 40 minutes  Greater than 50% of total time was spent with the patient and / or family counseling and / or coordination of care  A description of the counseling / coordination of care: 25 min  Thank you for the opportunity to participate in the care of this patient      Yen Beck MD, PhD   Yvrose Hernandez

## 2018-03-31 ENCOUNTER — APPOINTMENT (OUTPATIENT)
Dept: LAB | Facility: CLINIC | Age: 53
End: 2018-03-31
Payer: COMMERCIAL

## 2018-03-31 LAB
ANION GAP SERPL CALCULATED.3IONS-SCNC: 7 MMOL/L (ref 4–13)
BUN SERPL-MCNC: 18 MG/DL (ref 5–25)
CALCIUM SERPL-MCNC: 9.7 MG/DL (ref 8.3–10.1)
CHLORIDE SERPL-SCNC: 105 MMOL/L (ref 100–108)
CO2 SERPL-SCNC: 30 MMOL/L (ref 21–32)
CREAT SERPL-MCNC: 1.26 MG/DL (ref 0.6–1.3)
GFR SERPL CREATININE-BSD FRML MDRD: 48 ML/MIN/1.73SQ M
GLUCOSE P FAST SERPL-MCNC: 96 MG/DL (ref 65–99)
POTASSIUM SERPL-SCNC: 3.9 MMOL/L (ref 3.5–5.3)
SODIUM SERPL-SCNC: 142 MMOL/L (ref 136–145)

## 2018-03-31 PROCEDURE — 36415 COLL VENOUS BLD VENIPUNCTURE: CPT | Performed by: INTERNAL MEDICINE

## 2018-03-31 PROCEDURE — 80048 BASIC METABOLIC PNL TOTAL CA: CPT | Performed by: INTERNAL MEDICINE

## 2018-04-23 DIAGNOSIS — I27.20 PULMONARY HYPERTENSION (HCC): ICD-10-CM

## 2018-04-23 RX ORDER — TORSEMIDE 20 MG/1
TABLET ORAL
Qty: 140 TABLET | Refills: 3 | Status: CANCELLED | OUTPATIENT
Start: 2018-04-23

## 2018-04-30 ENCOUNTER — OFFICE VISIT (OUTPATIENT)
Dept: CARDIOLOGY CLINIC | Facility: CLINIC | Age: 53
End: 2018-04-30
Payer: COMMERCIAL

## 2018-04-30 VITALS
WEIGHT: 219 LBS | OXYGEN SATURATION: 99 % | HEART RATE: 94 BPM | BODY MASS INDEX: 38.79 KG/M2 | DIASTOLIC BLOOD PRESSURE: 70 MMHG | SYSTOLIC BLOOD PRESSURE: 112 MMHG

## 2018-04-30 DIAGNOSIS — I27.20 PULMONARY HYPERTENSION (HCC): ICD-10-CM

## 2018-04-30 PROCEDURE — 99243 OFF/OP CNSLTJ NEW/EST LOW 30: CPT | Performed by: INTERNAL MEDICINE

## 2018-04-30 RX ORDER — TORSEMIDE 20 MG/1
40 TABLET ORAL DAILY
Qty: 240 TABLET | Refills: 3 | Status: SHIPPED | OUTPATIENT
Start: 2018-04-30 | End: 2019-08-05 | Stop reason: SDUPTHER

## 2018-04-30 RX ORDER — POTASSIUM CHLORIDE 20 MEQ/1
40 TABLET, EXTENDED RELEASE ORAL DAILY
Qty: 240 TABLET | Refills: 3 | Status: SHIPPED | OUTPATIENT
Start: 2018-04-30 | End: 2019-01-28 | Stop reason: SDUPTHER

## 2018-04-30 NOTE — LETTER
April 30, 2018     Mahesh Larson [id number]    Patient: Aaron Robles   YOB: 1965   Date of Visit: 4/30/2018       Dear Dr Gama Spencer:    Thank you for referring Ubaldo Kang to me for evaluation  Below are my notes for this consultation  If you have questions, please do not hesitate to call me  I look forward to following your patient along with you  Sincerely,        Vincenzo Zarate MD        CC: No Recipients  Vincenzo Zarate MD  4/30/2018 10:28 AM  Sign at close encounter    Heart Failure Outpatient Progress Note - Aaron Robles 52 y o  female MRN: [id number]    @ Encounter: [id number]      Assessment/Plan:    # ANSARI: Likely combination of obesity/deconditioning, diastolic dysfunction (obesity, female, currently on diuretics w/ hx of volume overload), and exercise induced PAH  --Continue diet and exercise for weight loss    # Exercised induced PAH: Obesity/deconditioning and diastolic dysfunction (PCWP 12 mmHg) do not fully explain her degree of dysfunction  Recent diagnosis of MCTD/SLE w/ + lupus anticoagulant  Remote smoker  She is at risk to develop pulmonary vascular disease leading to RV dysfunction  At rest, RV appears normal on TTE with coupled RV-PA w/o e/o of RVOT notching suggestive of normal PVR at rest  However, she did have a stress echo in 2012 that was suggestive of exercise induced pulmonary HTN  At the time she did not have e/o of elevated PVR  Her bubble was negative which makes an intracardiac shunt less likely  Her symptoms have progressed and with ambulation she shows evidence of desaturation that may be due to abnormal air exchange we did the following:   Diag:  --PFTs were normal   --Repeat stress echo to evaluate pulmonary pressures, RV, and RVOT signal w/ exercise showed exaggerated HR response, HTNsive response, decrease in O2 saturation from 99% to 91% and increase in PA pressures from 45 mmHg to 70 mmHg with exercise   Exercised for 5 min and 20 sec  --RHC w/ exercise: This was an exercise RHC with simultaneous monitoring of the distal and proximal SG catheter ports  She had an appropriate HR response from 100 to 130 bpm with MAP throughout the study staying at 112 mmHg  Hgb 13 1  With exercise, there was an increase in PVR from <3 to ~4 5 MOLINA  PCWP increased from 12 to 18 mmHg  CVP increased from 7 to 10 mmHg  The findings were consistent with exercise induced Pulmonary Arterial Hypertension (WHO Group 1, NYHA II)  Her TTE and PCWP are not consistent with significant left sided heart disease (Group II)  Her PFTs did not show any significant lung disease (Group III), V/Q scan is not consistent with CTEPH (Group IV PAH) and stress echo showed elevation of PA pressures with exercise which is consistent with above findings  --As she did not have significant response to sildenafil, started macitentan  --Volume overloaded today    Therapeutic:  --Increase torsemide to 40 mg QOD and torsemide 20 mg PO daily  Start Kdur 20 mEq daily  --Check labs in 1 week  --Daily weights qAM  --Continue sildenafil 20 mg PO q8hrs for now  --Continue macitentan 10 mg PO daily    # Undifferentiated CTD w/ + lupus anticoagulant (unclear if SLE is involved)  --Per outpatient Rheumatologist  # ST: V/Q negative  May be 2/2 to deconditioning +/- inappropriate ST +/- diastolic dysfunction/HF (euvolemic on exam)  No e/o infection  --Continue to monitor, can consider coby agents in the future  # Morbid obesity: Continue weight loss    RTC in 6 weeks    HPI: Very pleasant 53 y/o woman w/ PMHx of newly diagnosed MCTD/SLE, +lupus anti-coagulant, B12 deficiency, and obesity referred for evaluation of ANSARI  She states that she first started getting SOB -- 10 years ago  She has had several episodes of bacterial PNA and chronic cough (a few times/year)  She was referred to [facility] in 2012 for workup for PH   She had a a stress echo 8/2012 that showed that her PA pressures more than doubled from --24 mmHg to > 50 mmHg  Currently, she states she can walk up a couple flights of stairs but does get SOB  She also gets SOB with walking up inclines  She gets occasional LH  She has been found to be B12 deficient, but that is improving  She was seeing a Rheumatologist in [place], [place] but recently saw a specialist at [facility], Dr Seble Nance (Rheumatologist - 11/16)  She was diagnosed with MCTD and SLE  HELENE + 1:320 w/ speckled pattern  She has been on Plaquenil x 2 weeks now  She is on ASA for + lupus anticoagulant and anticardiolipin Abs  She has joint pains and a subtle malar rash  She states so far there has been no change with plaquenil  She has f/u with him in 2/2018  After her visit with Dr Susie Robert, she has had TTE which shows LVEF --65%, normal RV size and function without RVOT notching  Normal atria  CXR clear  She also has had a negative V/Q scan  She walked the patient in the hallway and had her go up and down stairs  Her resting HR went from --100 bpm to 110s with Pulse Ox starting from 98% @ rest to --90% with exercise  Pulse Ox showed a good waveform throughout  She denies CP, palpitations, presyncope, or syncope  She notes that she went on a two week cruise and afterwards developed LE edema in the past and has developed LE edema  Today, 1/9/2018, returns for f/u  Had echo stress test w/ poor exercise tolerance and decrease in O2 saturation w/ increase in PA pressures from 45 to 70 mmHg w/ exercise  Exercise RHC showed exercise induced PH w/ increase in PVR from <3 to ~4 5 MOLINA  PCWP increased from 12 to 18 mmHg  CVP increased from 7 to 10 mmHg  She was denied for tadalafil, but approved for sildenafil  She started sildenafil on 1/5/18 and states she does not feel any different and continues to have trouble with stairs  In addition, today in clinic, she continues to be tachycardic  Has gained 4 lbs since her last visit  Today, 2/13/2018, returns for f/u   She has gained ~ 4 lbs since her last visit, for a total of 9 lbs since attempted diuresis  She started sildenafil on 1/5/18 and states she feels worse going up stairs than previously  In addition, today in clinic, she continues to be tachycardic  Today, 3/19/2018, she returns for f/u  She states going up stairs there is improvement now  She has been on macitentan for 1 week now  Weight has steadily been decreasing  Past Medical History:   Diagnosis Date    HELENE positive     Encephalitis     Lasted Assessed 10/18/2017    Endometrial hyperplasia     Last assessed 10/18/2017    Hypertension     Lupus anticoagulant positive     Malignant neoplasm of cervix uteri ([facility] [address]     Maxillary sinusitis     Lasted Assessed 10/18/2017    Night sweat     Osteopenia     Hip    Osteoporosis     Spine    Pneumonia     Last Assessed 10/18/2017    Secondary pulmonary arterial hypertension (HCC)     Shortness of breath     Sinus tachycardia     Urinary incontinence        Review of Systems - 12 point ROS was done and is negative, except as noted above  Allergies   Allergen Reactions    Dilantin [Phenytoin] Anaphylaxis    Augmentin [Amoxicillin-Pot Clavulanate] Rash           Current Outpatient Prescriptions:     aspirin 81 mg chewable tablet, Chew 162 mg daily, Disp: , Rfl:     cholecalciferol (VITAMIN D3) 1,000 units tablet, Take 2,000 Units by mouth daily, Disp: , Rfl:     Cyanocobalamin (VITAMIN B-12 IJ), Inject 1,000 mcg/mL as directed every 14 (fourteen) days, Disp: , Rfl:     hydroxychloroquine (PLAQUENIL) 200 mg tablet, Take 400 mg by mouth daily, Disp: , Rfl:     Macitentan (OPSUMIT) 10 MG tablet, Take 1 tablet (10 mg total) by mouth daily, Disp: 30 tablet, Rfl: 10    Macitentan (OPSUMIT) 10 MG tablet, Take 1 tablet (10 mg total) by mouth daily, Disp: 90 tablet, Rfl: 0    potassium chloride (K-DUR,KLOR-CON) 20 mEq tablet, Take 1 tablet (20 mEq total) by mouth daily, Disp: 90 tablet, Rfl: 3    sildenafil (REVATIO) 20 mg tablet, Take 20 mg by mouth 3 (three) times a day, Disp: , Rfl:     torsemide (DEMADEX) 20 mg tablet, Take 1 tablet (20 mg total) by mouth daily (Patient taking differently: Take 40 mg by mouth daily  ), Disp: 145 tablet, Rfl: 3    Social History     Social History    Marital status: /Civil Union     Spouse name: N/A    Number of children: 2    Years of education: N/A     Occupational History    Not on file  Social History Main Topics    Smoking status: Former Smoker     Quit date: 1/24/2006    Smokeless tobacco: Never Used    Alcohol use Yes      Comment: social    Drug use: No    Sexual activity: Yes     Other Topics Concern    Not on file     Social History Narrative    Daily caffeinated coffee consumption    Exercise habits           Family History   Problem Relation Age of Onset    Uterine cancer Mother     Pancreatic cancer Mother     Diabetes Mother     Heart disease Father     Stroke Father      CVA    Hypertension Father     Thyroid disease Sister     Hemochromatosis Brother     Thyroid disease Maternal Grandmother     Diabetes Other     Other Family      Heart Problem       Physical Exam:    Vitals: not currently breastfeeding  , There is no height or weight on file to calculate BMI ,   Wt Readings from Last 3 Encounters:   03/19/18 101 kg (222 lb)   02/19/18 100 kg (221 lb 1 oz)   02/13/18 105 kg (230 lb 11 2 oz)     There were no vitals filed for this visit      GEN: Walter Henry appears well, alert and oriented x 3, pleasant and cooperative   HEENT: pupils equal, round, and reactive to light; extraocular muscles intact  NECK: supple, no carotid bruits   HEART: regular rhythm, normal S1 and S2, no murmurs, clicks, gallops or rubs, JVP is    LUNGS: clear to auscultation bilaterally; no wheezes, rales, or rhonchi   ABDOMEN: normal bowel sounds, soft, no tenderness, no distention  EXTREMITIES: peripheral pulses normal; no clubbing, cyanosis, or edema  NEURO: no focal findings   SKIN: normal without suspicious lesions on exposed skin    Labs & Results:  Lab Results   Component Value Date    WBC 4 41 12/19/2017    HGB 13 1 12/19/2017    HCT 38 8 12/19/2017    MCV 90 12/19/2017     12/19/2017       Chemistry        Component Value Date/Time     03/31/2018 0748    K 3 9 03/31/2018 0748     03/31/2018 0748    CO2 30 03/31/2018 0748    BUN 18 03/31/2018 0748    CREATININE 1 26 03/31/2018 0748        Component Value Date/Time    CALCIUM 9 7 03/31/2018 0748    ALKPHOS 108 11/18/2017 0844    AST 11 11/18/2017 0844    ALT 23 11/18/2017 0844    BILITOT 0 40 11/18/2017 0844        EKG personally reviewed by rEyn Ny MD      Counseling / Coordination of Care  Total floor / unit time spent today 40 minutes  Greater than 50% of total time was spent with the patient and / or family counseling and / or coordination of care  A description of the counseling / coordination of care: 25 min  Thank you for the opportunity to participate in the care of this patient      Eryn Ny MD, PhD   Genny Bridges

## 2018-04-30 NOTE — PROGRESS NOTES
Heart Failure Outpatient Progress Note - Bib Estrella 52 y o  female MRN: [id number]    @ Encounter: [id number]  Assessment/Plan:    # ANSARI: Likely combination of obesity/deconditioning, diastolic dysfunction (obesity, female, currently on diuretics w/ hx of volume overload), and exercise induced PAH  --Continue diet and exercise for weight loss    # Exercised induced PAH: Obesity/deconditioning and diastolic dysfunction (PCWP 12 mmHg) do not fully explain her degree of dysfunction  Recent diagnosis of MCTD/SLE w/ + lupus anticoagulant  Remote smoker  She is at risk to develop pulmonary vascular disease leading to RV dysfunction  At rest, RV appears normal on TTE with coupled RV-PA w/o e/o of RVOT notching suggestive of normal PVR at rest  However, she did have a stress echo in 2012 that was suggestive of exercise induced pulmonary HTN  At the time she did not have e/o of elevated PVR  Her bubble was negative which makes an intracardiac shunt less likely  Her symptoms have progressed and with ambulation she shows evidence of desaturation that may be due to abnormal air exchange we did the following:   Diag:  --PFTs were normal   --Repeat stress echo to evaluate pulmonary pressures, RV, and RVOT signal w/ exercise showed exaggerated HR response, HTNsive response, decrease in O2 saturation from 99% to 91% and increase in PA pressures from 45 mmHg to 70 mmHg with exercise  Exercised for 5 min and 20 sec  --RHC w/ exercise: This was an exercise RHC with simultaneous monitoring of the distal and proximal SG catheter ports  She had an appropriate HR response from 100 to 130 bpm with MAP throughout the study staying at 112 mmHg  Hgb 13 1  With exercise, there was an increase in PVR from <3 to ~4 5 MOLINA  PCWP increased from 12 to 18 mmHg  CVP increased from 7 to 10 mmHg  The findings were consistent with exercise induced Pulmonary Arterial Hypertension (WHO Group 1, NYHA II)   Her TTE and PCWP are not consistent with significant left sided heart disease (Group II)  Her PFTs did not show any significant lung disease (Group III), V/Q scan is not consistent with CTEPH (Group IV PAH) and stress echo showed elevation of PA pressures with exercise which is consistent with above findings  --As she did not have significant response to sildenafil, started macitentan  --Volume overloaded today    Therapeutic:  --Increase torsemide to 40 mg daily  Increase Kdur to 40 mEq daily  --Check labs in 1 week  --Daily weights qAM  --Continue sildenafil 20 mg PO q8hrs for now  --Continue macitentan 10 mg PO daily    # Undifferentiated CTD w/ + lupus anticoagulant (unclear if SLE is involved)  --Per outpatient Rheumatologist  # ST: V/Q negative  May be 2/2 to deconditioning +/- inappropriate ST +/- diastolic dysfunction/HF (euvolemic on exam)  No e/o infection  --Continue to monitor, can consider coby agents in the future  # Morbid obesity: Continue weight loss    RTC in 6 weeks    HPI: Very pleasant 51 y/o woman w/ PMHx of newly diagnosed MCTD/SLE, +lupus anti-coagulant, B12 deficiency, and obesity referred for evaluation of ANSARI  She states that she first started getting SOB -- 10 years ago  She has had several episodes of bacterial PNA and chronic cough (a few times/year)  She was referred to [facility] in 2012 for workup for PH  She had a a stress echo 8/2012 that showed that her PA pressures more than doubled from --24 mmHg to > 50 mmHg  Currently, she states she can walk up a couple flights of stairs but does get SOB  She also gets SOB with walking up inclines  She gets occasional LH  She has been found to be B12 deficient, but that is improving  She was seeing a Rheumatologist in [place], [place] but recently saw a specialist at [facility], Dr Lorene Smith (Rheumatologist - 11/16)  She was diagnosed with MCTD and SLE  HELENE + 1:320 w/ speckled pattern  She has been on Plaquenil x 2 weeks now   She is on ASA for + lupus anticoagulant and anticardiolipin Abs  She has joint pains and a subtle malar rash  She states so far there has been no change with plaquenil  She has f/u with him in 2/2018  After her visit with Dr Jaden Anders, she has had TTE which shows LVEF --65%, normal RV size and function without RVOT notching  Normal atria  CXR clear  She also has had a negative V/Q scan  She walked the patient in the hallway and had her go up and down stairs  Her resting HR went from --100 bpm to 110s with Pulse Ox starting from 98% @ rest to --90% with exercise  Pulse Ox showed a good waveform throughout  She denies CP, palpitations, presyncope, or syncope  She notes that she went on a two week cruise and afterwards developed LE edema in the past and has developed LE edema  Today, 1/9/2018, returns for f/u  Had echo stress test w/ poor exercise tolerance and decrease in O2 saturation w/ increase in PA pressures from 45 to 70 mmHg w/ exercise  Exercise RHC showed exercise induced PH w/ increase in PVR from <3 to ~4 5 MOLINA  PCWP increased from 12 to 18 mmHg  CVP increased from 7 to 10 mmHg  She was denied for tadalafil, but approved for sildenafil  She started sildenafil on 1/5/18 and states she does not feel any different and continues to have trouble with stairs  In addition, today in clinic, she continues to be tachycardic  Has gained 4 lbs since her last visit  Today, 2/13/2018, returns for f/u  She has gained ~ 4 lbs since her last visit, for a total of 9 lbs since attempted diuresis  She started sildenafil on 1/5/18 and states she feels worse going up stairs than previously  In addition, today in clinic, she continues to be tachycardic  Today, 3/19/2018, she returns for f/u  She states going up stairs there is improvement now  She has been on macitentan for 1 week now  Weight has steadily been decreasing  Today, 4/30/2018, she returns for f/u  She states she feels about the same as her last visit  Weight has steadily been decreasing  Past Medical History:   Diagnosis Date    HELENE positive     Encephalitis     Lasted Assessed 10/18/2017    Endometrial hyperplasia     Last assessed 10/18/2017    Hypertension     Lupus anticoagulant positive     Malignant neoplasm of cervix uteri ([address] )     Maxillary sinusitis     Lasted Assessed 10/18/2017    Night sweat     Osteopenia     Hip    Osteoporosis     Spine    Pneumonia     Last Assessed 10/18/2017    Secondary pulmonary arterial hypertension (HCC)     Shortness of breath     Sinus tachycardia     Urinary incontinence        Review of Systems - 12 point ROS was done and is negative, except as noted above  Allergies   Allergen Reactions    Dilantin [Phenytoin] Anaphylaxis and Rash    Augmentin [Amoxicillin-Pot Clavulanate] Rash       Current Outpatient Prescriptions:     aspirin 81 mg chewable tablet, Chew 162 mg daily, Disp: , Rfl:     cholecalciferol (VITAMIN D3) 1,000 units tablet, Take 2,000 Units by mouth daily, Disp: , Rfl:     Cyanocobalamin (VITAMIN B-12 IJ), Inject 1,000 mcg/mL as directed every 14 (fourteen) days, Disp: , Rfl:     hydroxychloroquine (PLAQUENIL) 200 mg tablet, Take 400 mg by mouth daily, Disp: , Rfl:     Macitentan (OPSUMIT) 10 MG tablet, Take 1 tablet (10 mg total) by mouth daily, Disp: 30 tablet, Rfl: 10    potassium chloride (K-DUR,KLOR-CON) 20 mEq tablet, Take 2 tablets (40 mEq total) by mouth daily, Disp: 240 tablet, Rfl: 3    sildenafil (REVATIO) 20 mg tablet, Take 20 mg by mouth 3 (three) times a day, Disp: , Rfl:     torsemide (DEMADEX) 20 mg tablet, Take 2 tablets (40 mg total) by mouth daily, Disp: 240 tablet, Rfl: 3    Social History     Social History    Marital status: /Civil Union     Spouse name: N/A    Number of children: 2    Years of education: N/A     Occupational History    Not on file       Social History Main Topics    Smoking status: Former Smoker     Quit date: 1/24/2006    Smokeless tobacco: Never Used   Dipti Soto Alcohol use Yes      Comment: social    Drug use: No    Sexual activity: Yes     Other Topics Concern    Not on file     Social History Narrative    Daily caffeinated coffee consumption    Exercise habits           Family History   Problem Relation Age of Onset    Uterine cancer Mother     Pancreatic cancer Mother     Diabetes Mother     Heart disease Father     Stroke Father      CVA    Hypertension Father     Thyroid disease Sister     Hemochromatosis Brother     Thyroid disease Maternal Grandmother     Diabetes Other     Other Family      Heart Problem       Physical Exam:    Vitals: Blood pressure 112/70, pulse 94, weight 99 3 kg (219 lb), SpO2 99 %, not currently breastfeeding , Body mass index is 38 79 kg/m² ,   Wt Readings from Last 3 Encounters:   04/30/18 99 3 kg (219 lb)   03/19/18 101 kg (222 lb)   02/19/18 100 kg (221 lb 1 oz)     Vitals:    04/30/18 1639   BP: 112/70   BP Location: Right arm   Patient Position: Sitting   Cuff Size: Standard   Pulse: 94   SpO2: 99%   Weight: 99 3 kg (219 lb)       GEN: Johnnie Goodness appears well, alert and oriented x 3, pleasant and cooperative   HEENT: pupils equal, round, and reactive to light; extraocular muscles intact  NECK: supple, no carotid bruits   HEART: regular rhythm, normal S1 and S2, no murmurs, clicks, gallops or rubs, JVD is elevated  LUNGS: clear to auscultation bilaterally; no wheezes, rales, or rhonchi   ABDOMEN: normal bowel sounds, soft, no tenderness, no distention  EXTREMITIES: peripheral pulses normal; no clubbing, cyanosis; +B/L LE edema  NEURO: no focal findings   SKIN: normal without suspicious lesions on exposed skin    Labs & Results:  Lab Results   Component Value Date    WBC 4 41 12/19/2017    HGB 13 1 12/19/2017    HCT 38 8 12/19/2017    MCV 90 12/19/2017     12/19/2017       Chemistry        Component Value Date/Time     03/31/2018 0748    K 3 9 03/31/2018 0748     03/31/2018 0748    CO2 30 03/31/2018 0748 BUN 18 03/31/2018 0748    CREATININE 1 26 03/31/2018 0748        Component Value Date/Time    CALCIUM 9 7 03/31/2018 0748    ALKPHOS 108 11/18/2017 0844    AST 11 11/18/2017 0844    ALT 23 11/18/2017 0844    BILITOT 0 40 11/18/2017 0844        EKG personally reviewed by Skylar Dickens MD      Counseling / Coordination of Care  Total floor / unit time spent today 40 minutes  Greater than 50% of total time was spent with the patient and / or family counseling and / or coordination of care  A description of the counseling / coordination of care: 25 min  Thank you for the opportunity to participate in the care of this patient      Skylar Dickens MD, PhD   Lisa Collazo

## 2018-05-07 ENCOUNTER — TELEPHONE (OUTPATIENT)
Dept: CARDIOLOGY CLINIC | Facility: CLINIC | Age: 53
End: 2018-05-07

## 2018-05-07 NOTE — TELEPHONE ENCOUNTER
Enma Blevins called to let you kow she has not really lost any weight since increasing torsemide dose on 4/30  Currently taking 20 mg bid  Also on KDur 40 meq daily  Her wt has been steady at 214 6 to 214 8 for the last 3 days  On the 30th at home, she weighted 217, although on office scale was 219  Please advise    Pt cb [phone]

## 2018-05-07 NOTE — TELEPHONE ENCOUNTER
Please increase to torsemide 40 mg PO BID and Kdur to 40 mEq BID  Check chem 7 on Thursday  Also should continue to maintain 2L fluid restriction  Thanks

## 2018-05-09 NOTE — TELEPHONE ENCOUNTER
It is ok to draw nonfasting BMP  Ok to do Friday  Continue the higher dose of torsemide and Kdur as we have been doing

## 2018-05-09 NOTE — TELEPHONE ENCOUNTER
Chinedu Price called to let you know wt is down 2 lbs since Monday  Also having bmp drawn on Friday  She said she has to have it drawn after work that day,and she is unable to fast for 8 hours prior  Chinedu Price asking how important is fasting prior to bmp, and is it ok if she has it drawn without fasting?

## 2018-05-11 ENCOUNTER — LAB (OUTPATIENT)
Dept: LAB | Facility: HOSPITAL | Age: 53
End: 2018-05-11
Attending: INTERNAL MEDICINE
Payer: COMMERCIAL

## 2018-05-11 LAB
ANION GAP SERPL CALCULATED.3IONS-SCNC: 6 MMOL/L (ref 4–13)
BUN SERPL-MCNC: 18 MG/DL (ref 5–25)
CALCIUM SERPL-MCNC: 9.6 MG/DL (ref 8.3–10.1)
CHLORIDE SERPL-SCNC: 100 MMOL/L (ref 100–108)
CO2 SERPL-SCNC: 29 MMOL/L (ref 21–32)
CREAT SERPL-MCNC: 1.62 MG/DL (ref 0.6–1.3)
GFR SERPL CREATININE-BSD FRML MDRD: 35 ML/MIN/1.73SQ M
GLUCOSE P FAST SERPL-MCNC: 90 MG/DL (ref 65–99)
POTASSIUM SERPL-SCNC: 3.8 MMOL/L (ref 3.5–5.3)
SODIUM SERPL-SCNC: 135 MMOL/L (ref 136–145)

## 2018-05-11 PROCEDURE — 36415 COLL VENOUS BLD VENIPUNCTURE: CPT | Performed by: INTERNAL MEDICINE

## 2018-05-11 PROCEDURE — 80048 BASIC METABOLIC PNL TOTAL CA: CPT | Performed by: INTERNAL MEDICINE

## 2018-05-12 ENCOUNTER — HOSPITAL ENCOUNTER (EMERGENCY)
Facility: HOSPITAL | Age: 53
Discharge: HOME | End: 2018-05-12
Attending: EMERGENCY MEDICINE | Admitting: EMERGENCY MEDICINE
Payer: COMMERCIAL

## 2018-05-12 VITALS
DIASTOLIC BLOOD PRESSURE: 56 MMHG | RESPIRATION RATE: 18 BRPM | SYSTOLIC BLOOD PRESSURE: 108 MMHG | TEMPERATURE: 99.5 F | HEART RATE: 85 BPM | OXYGEN SATURATION: 92 %

## 2018-05-12 DIAGNOSIS — M25.561 RIGHT KNEE PAIN: ICD-10-CM

## 2018-05-12 DIAGNOSIS — R50.9 FEVER: Primary | ICD-10-CM

## 2018-05-12 LAB
AMORPH URATE CRY URNS QL MICRO: ABNORMAL /HPF
ANION GAP SERPL CALCULATED.3IONS-SCNC: 12 MMOL/L (ref 4–13)
BACTERIA UR QL AUTO: ABNORMAL /HPF
BASOPHILS # BLD AUTO: 0.01 THOUSANDS/ΜL (ref 0–0.1)
BASOPHILS NFR BLD AUTO: 0 % (ref 0–1)
BILIRUB UR QL STRIP: ABNORMAL
BUN SERPL-MCNC: 17 MG/DL (ref 5–25)
CALCIUM SERPL-MCNC: 9.1 MG/DL (ref 8.3–10.1)
CHLORIDE SERPL-SCNC: 101 MMOL/L (ref 100–108)
CLARITY UR: CLEAR
CO2 SERPL-SCNC: 22 MMOL/L (ref 21–32)
COLOR UR: YELLOW
COLOR, POC: YELLOW
CREAT SERPL-MCNC: 1.25 MG/DL (ref 0.6–1.3)
CRP SERPL QL: 68.3 MG/L
EOSINOPHIL # BLD AUTO: 0 THOUSAND/ΜL (ref 0–0.61)
EOSINOPHIL NFR BLD AUTO: 0 % (ref 0–6)
ERYTHROCYTE [DISTWIDTH] IN BLOOD BY AUTOMATED COUNT: 13.3 % (ref 11.6–15.1)
GFR SERPL CREATININE-BSD FRML MDRD: 49 ML/MIN/1.73SQ M
GLUCOSE SERPL-MCNC: 81 MG/DL (ref 65–140)
GLUCOSE UR STRIP-MCNC: NEGATIVE MG/DL
HCT VFR BLD AUTO: 37.7 % (ref 34.8–46.1)
HGB BLD-MCNC: 12.7 G/DL (ref 11.5–15.4)
HGB UR QL STRIP.AUTO: NEGATIVE
KETONES UR STRIP-MCNC: ABNORMAL MG/DL
LEUKOCYTE ESTERASE UR QL STRIP: ABNORMAL
LYMPHOCYTES # BLD AUTO: 0.51 THOUSANDS/ΜL (ref 0.6–4.47)
LYMPHOCYTES NFR BLD AUTO: 9 % (ref 14–44)
MCH RBC QN AUTO: 30.2 PG (ref 26.8–34.3)
MCHC RBC AUTO-ENTMCNC: 33.7 G/DL (ref 31.4–37.4)
MCV RBC AUTO: 90 FL (ref 82–98)
MONOCYTES # BLD AUTO: 0.32 THOUSAND/ΜL (ref 0.17–1.22)
MONOCYTES NFR BLD AUTO: 6 % (ref 4–12)
NEUTROPHILS # BLD AUTO: 4.56 THOUSANDS/ΜL (ref 1.85–7.62)
NEUTS SEG NFR BLD AUTO: 85 % (ref 43–75)
NITRITE UR QL STRIP: NEGATIVE
NON-SQ EPI CELLS URNS QL MICRO: ABNORMAL /HPF
NRBC BLD AUTO-RTO: 0 /100 WBCS
PH UR STRIP.AUTO: 5.5 [PH] (ref 4.5–8)
PLATELET # BLD AUTO: 215 THOUSANDS/UL (ref 149–390)
PMV BLD AUTO: 10.7 FL (ref 8.9–12.7)
POTASSIUM SERPL-SCNC: 3.2 MMOL/L (ref 3.5–5.3)
PROT UR STRIP-MCNC: ABNORMAL MG/DL
RBC # BLD AUTO: 4.2 MILLION/UL (ref 3.81–5.12)
RBC #/AREA URNS AUTO: ABNORMAL /HPF
SODIUM SERPL-SCNC: 135 MMOL/L (ref 136–145)
SP GR UR STRIP.AUTO: >=1.03 (ref 1–1.03)
UROBILINOGEN UR QL STRIP.AUTO: 0.2 E.U./DL
WBC # BLD AUTO: 5.4 THOUSAND/UL (ref 4.31–10.16)
WBC #/AREA URNS AUTO: ABNORMAL /HPF

## 2018-05-12 PROCEDURE — 93005 ELECTROCARDIOGRAM TRACING: CPT

## 2018-05-12 PROCEDURE — 85025 COMPLETE CBC W/AUTO DIFF WBC: CPT | Performed by: EMERGENCY MEDICINE

## 2018-05-12 PROCEDURE — 86140 C-REACTIVE PROTEIN: CPT | Performed by: EMERGENCY MEDICINE

## 2018-05-12 PROCEDURE — 80048 BASIC METABOLIC PNL TOTAL CA: CPT | Performed by: EMERGENCY MEDICINE

## 2018-05-12 PROCEDURE — 36415 COLL VENOUS BLD VENIPUNCTURE: CPT | Performed by: EMERGENCY MEDICINE

## 2018-05-12 PROCEDURE — 99284 EMERGENCY DEPT VISIT MOD MDM: CPT

## 2018-05-12 PROCEDURE — 81002 URINALYSIS NONAUTO W/O SCOPE: CPT | Performed by: EMERGENCY MEDICINE

## 2018-05-12 PROCEDURE — 87086 URINE CULTURE/COLONY COUNT: CPT

## 2018-05-12 PROCEDURE — 81001 URINALYSIS AUTO W/SCOPE: CPT

## 2018-05-12 RX ORDER — ACETAMINOPHEN 325 MG/1
650 TABLET ORAL ONCE
Status: COMPLETED | OUTPATIENT
Start: 2018-05-12 | End: 2018-05-12

## 2018-05-12 RX ADMIN — ACETAMINOPHEN 650 MG: 325 TABLET, FILM COATED ORAL at 13:10

## 2018-05-12 NOTE — ED PROVIDER NOTES
History  Chief Complaint   Patient presents with    Fever - 9 weeks to 74 years     Pt c/o fever since yesterday and right knee pain/swelling       Patient is a 57-year-old woman with a past medical history of undifferentiated mixed connective tissue disorder, lupus anticoagulant positive, exertional pulmonary hypertension who presents for evaluation of fever, right knee pain  Patient states that she noticed a fever yesterday  Her T-max was roughly 101  She states that this morning  She noticed her right knee her this morning  She has pain when bearing weight on the right knee  She is able to ambulate  She states that the pain is simply worse when she bears weight  She denies trauma to the knee  She denies any swelling or redness that she has seen  She denies prior injury to that knee  She does have a diagnosis 7 M differentiated connective tissue disorder  Walks she has multiple lupus criteria she has not been formally diagnosed with lupus  She has had extensive diagnosis by rheumatologists at [facility]  She currently takes Plaquenil for this  She denies recent steroids  She has had fevers or having flares in the past   She denies any sick contacts, cough, nausea, vomiting, diarrhea, abdominal pain, shortness of breath, chest pain  History provided by:  Patient      Prior to Admission Medications   Prescriptions Last Dose Informant Patient Reported? Taking?    Cyanocobalamin (VITAMIN B-12 IJ)  Self Yes No   Sig: Inject 1,000 mcg/mL as directed every 14 (fourteen) days   Macitentan (OPSUMIT) 10 MG tablet   No No   Sig: Take 1 tablet (10 mg total) by mouth daily   aspirin 81 mg chewable tablet  Self Yes No   Sig: Chew 162 mg daily   cholecalciferol (VITAMIN D3) 1,000 units tablet  Self Yes No   Sig: Take 2,000 Units by mouth daily   hydroxychloroquine (PLAQUENIL) 200 mg tablet  Self Yes No   Sig: Take 400 mg by mouth daily   potassium chloride (K-DUR,KLOR-CON) 20 mEq tablet   No No Sig: Take 2 tablets (40 mEq total) by mouth daily   sildenafil (REVATIO) 20 mg tablet  Self Yes No   Sig: Take 20 mg by mouth 3 (three) times a day   torsemide (DEMADEX) 20 mg tablet   No No   Sig: Take 2 tablets (40 mg total) by mouth daily      Facility-Administered Medications: None       Past Medical History:   Diagnosis Date    HELENE positive     Encephalitis     Lasted Assessed 10/18/2017    Endometrial hyperplasia     Last assessed 10/18/2017    Hypertension     Lupus anticoagulant disorder ([facility]     Lupus anticoagulant positive     Malignant neoplasm of cervix uteri ([facility] [address] [facility]     Maxillary sinusitis     Lasted Assessed 10/18/2017    Night sweat     Osteopenia     Hip    Osteoporosis     Spine    Pneumonia     Last Assessed 10/18/2017    Secondary pulmonary arterial hypertension (HCC)     Shortness of breath     Sinus tachycardia     Urinary incontinence        Past Surgical History:   Procedure Laterality Date    ANKLE FRACTURE SURGERY      ARTHRODESIS      Hand: IP Joint    COLPOSCOPY      GALLBLADDER SURGERY      HYSTERECTOMY      Vaginal    LAPAROSCOPIC ESOPHAGOGASTRIC FUNDOPLASTY  2008    REDUCTION MAMMAPLASTY      TONSILLECTOMY         Family History   Problem Relation Age of Onset    Uterine cancer Mother     Pancreatic cancer Mother     Diabetes Mother     Heart disease Father     Stroke Father      CVA    Hypertension Father     Thyroid disease Sister     Hemochromatosis Brother     Thyroid disease Maternal Grandmother     Diabetes Other     Other Family      Heart Problem     I have reviewed and agree with the history as documented  Social History   Substance Use Topics    Smoking status: Former Smoker     Quit date: 1/24/2006    Smokeless tobacco: Former User    Alcohol use Yes      Comment: social        Review of Systems   Constitutional: Positive for fatigue and fever  Negative for appetite change, chills and diaphoresis     HENT: Negative for congestion, rhinorrhea and sore throat  Respiratory: Negative for cough, shortness of breath, wheezing and stridor  Cardiovascular: Negative for chest pain, palpitations and leg swelling  Gastrointestinal: Negative for abdominal distention, abdominal pain, constipation, diarrhea, nausea and vomiting  Endocrine: Negative for polydipsia and polyuria  Genitourinary: Negative for dysuria and hematuria  Musculoskeletal: Positive for arthralgias  Negative for back pain, neck pain and neck stiffness  Skin: Negative for pallor, rash and wound  Neurological: Negative for dizziness, light-headedness and headaches  Psychiatric/Behavioral: Negative for behavioral problems and confusion  All other systems reviewed and are negative  Physical Exam  ED Triage Vitals [05/12/18 1028]   Temperature Pulse Respirations Blood Pressure SpO2   (!) 100 6 °F (38 1 °C) 100 16 126/75 93 %      Temp Source Heart Rate Source Patient Position - Orthostatic VS BP Location FiO2 (%)   Oral Monitor Sitting Right arm --      Pain Score       7           Orthostatic Vital Signs  Vitals:    05/12/18 1300 05/12/18 1315 05/12/18 1416 05/12/18 1545   BP: 117/88 117/65 110/62 108/56   Pulse: 98 94 97 85   Patient Position - Orthostatic VS: Sitting  Lying Lying       Physical Exam   Constitutional: She is oriented to person, place, and time  She appears well-developed and well-nourished  No distress  HENT:   Head: Normocephalic and atraumatic  Eyes: Conjunctivae are normal  No scleral icterus  Neck: Normal range of motion  Neck supple  Cardiovascular: Normal rate, regular rhythm, normal heart sounds and intact distal pulses  Exam reveals no gallop and no friction rub  No murmur heard  Pulmonary/Chest: Effort normal and breath sounds normal  No respiratory distress  She has no wheezes  She has no rales  She exhibits no tenderness  Abdominal: Soft  Bowel sounds are normal  She exhibits no distension  There is no tenderness  Musculoskeletal: Normal range of motion  She exhibits no edema, tenderness or deformity  Tenderness over inferior 0 medial aspect of right knee  No obvious effusion, erythema, edema, skin breakdown appreciated  There is no edema distal to the area of tenderness  There is no popliteal tenderness, edema  Full ROM of bilateral lower extremities to passive and active flexion & extension  Neurological: She is alert and oriented to person, place, and time  Skin: Skin is warm and dry  No rash noted  She is not diaphoretic  No erythema  No pallor  Psychiatric: She has a normal mood and affect  Her behavior is normal    Nursing note and vitals reviewed        ED Medications  Medications   acetaminophen (TYLENOL) tablet 650 mg (650 mg Oral Given 5/12/18 1310)       Diagnostic Studies  Results Reviewed     Procedure Component Value Units Date/Time    Urine culture [[id number]] Collected:  05/12/18 1520    Lab Status:  Final result Specimen:  Urine from Urine, Clean Catch Updated:  05/13/18 1856     Urine Culture 40,000-49,000 cfu/ml     Urine Microscopic [[id number]]  (Abnormal) Collected:  05/12/18 1520    Lab Status:  Final result Specimen:  Urine from Urine, Clean Catch Updated:  05/12/18 1632     RBC, UA 2-4 (A) /hpf      WBC, UA 30-50 (A) /hpf      Epithelial Cells Moderate (A) /hpf      Bacteria, UA Moderate (A) /hpf      AMORPH URATES Moderate /hpf     POCT urinalysis dipstick [[id number]]  (Normal) Resulted:  05/12/18 1516    Lab Status:  Final result Specimen:  Urine Updated:  05/12/18 1518     Color, UA Yellow    ED Urine Macroscopic [[id number]]  (Abnormal) Collected:  05/12/18 1520    Lab Status:  Final result Specimen:  Urine Updated:  05/12/18 1516     Color, UA Yellow     Clarity, UA Clear     pH, UA 5 5     Leukocytes, UA Trace (A)     Nitrite, UA Negative     Protein,  (2+) (A) mg/dl      Glucose, UA Negative mg/dl      Ketones, UA 15 (1+) (A) mg/dl      Urobilinogen, UA 0 2 E U /dl      Bilirubin, UA Interference- unable to analyze (A)     Blood, UA Negative     Specific Gravity, UA >=1 030    Narrative:       CLINITEK RESULT    Basic metabolic panel [[id number]]  (Abnormal) Collected:  05/12/18 1042    Lab Status:  Final result Specimen:  Blood from Hand, Left Updated:  05/12/18 1128     Sodium 135 (L) mmol/L      Potassium 3 2 (L) mmol/L      Chloride 101 mmol/L      CO2 22 mmol/L      Anion Gap 12 mmol/L      BUN 17 mg/dL      Creatinine 1 25 mg/dL      Glucose 81 mg/dL      Calcium 9 1 mg/dL      eGFR 49 ml/min/1 73sq m     Narrative:         National Kidney Disease Education Program recommendations are as follows:  GFR calculation is accurate only with a steady state creatinine  Chronic Kidney disease less than 60 ml/min/1 73 sq  meters  Kidney failure less than 15 ml/min/1 73 sq  meters      C-reactive protein [[id number]]  (Abnormal) Collected:  05/12/18 1042    Lab Status:  Final result Specimen:  Blood from Hand, Left Updated:  05/12/18 1128     CRP 68 3 (H) mg/L     CBC and differential [[id number]]  (Abnormal) Collected:  05/12/18 1042    Lab Status:  Final result Specimen:  Blood from Hand, Left Updated:  05/12/18 1104     WBC 5 40 Thousand/uL      RBC 4 20 Million/uL      Hemoglobin 12 7 g/dL      Hematocrit 37 7 %      MCV 90 fL      MCH 30 2 pg      MCHC 33 7 g/dL      RDW 13 3 %      MPV 10 7 fL      Platelets 964 Thousands/uL      nRBC 0 /100 WBCs      Neutrophils Relative 85 (H) %      Lymphocytes Relative 9 (L) %      Monocytes Relative 6 %      Eosinophils Relative 0 %      Basophils Relative 0 %      Neutrophils Absolute 4 56 Thousands/µL      Lymphocytes Absolute 0 51 (L) Thousands/µL      Monocytes Absolute 0 32 Thousand/µL      Eosinophils Absolute 0 00 Thousand/µL      Basophils Absolute 0 01 Thousands/µL                  No orders to display         Procedures  Procedures      Phone Consults  ED Phone Contact    ED Course                               MDM  Number of Diagnoses or Management Options  Fever: new and requires workup  Right knee pain: new and requires workup  Diagnosis management comments: [de-identified] broke woman with history of undifferentiated connective tissue disorder presents with fever and right knee pain for approximately 1 day  Patient denies any other systemic symptoms at this time  She denies trauma to the right knee  Etiology includes infectious process, inflammatory/lupus flare as well as inflammatory versus tendinitis in the right knee and surrounding musculature  Will obtain CBC, CMP, CRP to assess for active inflammatory response  Will contact [facility] for recommendations  Contacted Dr Masood Sebastian at [facility]  She states that symptoms likely caused by autoimmune disease flare  She recommends UA to evaluate for hematuria/proteinuria as these can be precursor to systemic flare  She does not recommend admission if urinalysis is normal     UA wnl  Discharge patient home with instructions to follow up with Rheumatology on Monday  Return precautions given including persistent fever, dyspnea, worsening right knee pain, right knee swelling/redness         Amount and/or Complexity of Data Reviewed  Clinical lab tests: ordered and reviewed  Tests in the medicine section of CPT®: ordered and reviewed  Decide to obtain previous medical records or to obtain history from someone other than the patient: yes  Obtain history from someone other than the patient: yes  Review and summarize past medical records: yes  Discuss the patient with other providers: yes  Independent visualization of images, tracings, or specimens: yes    Risk of Complications, Morbidity, and/or Mortality  Presenting problems: minimal  Diagnostic procedures: minimal  Management options: minimal    Patient Progress  Patient progress: stable    CritCare Time    Disposition  Final diagnoses:   Fever   Right knee pain     Time reflects when diagnosis was documented in both MDM as applicable and the Disposition within this note     Time User Action Codes Description Comment    5/12/2018  4:34 PM Cheryn Render Add [R50 9] Fever     5/12/2018  4:34 PM Cheryn Render Add [M25 561] Right knee pain       ED Disposition     ED Disposition Condition Comment    Discharge  Giles Schaumann discharge to home/self care  Condition at discharge: Stable        Follow-up Information     Follow up With Specialties Details Why Contact Info Additional [address],  Family Medicine  Follow-Up [address] [address]  [phone]       [address] [place] [address] Emergency Department Emergency Medicine Go to As needed, If symptoms worsen [address]  [phone]  ED, [address] [place] [address], [phone]    Rheumotology  Call in 2 days Follow-Up          Discharge Medication List as of 5/12/2018  4:36 PM      CONTINUE these medications which have NOT CHANGED    Details   aspirin 81 mg chewable tablet Chew 162 mg daily, Historical Med      cholecalciferol (VITAMIN D3) 1,000 units tablet Take 2,000 Units by mouth daily, Historical Med      Cyanocobalamin (VITAMIN B-12 IJ) Inject 1,000 mcg/mL as directed every 14 (fourteen) days, Historical Med      hydroxychloroquine (PLAQUENIL) 200 mg tablet Take 400 mg by mouth daily, Historical Med      Macitentan (OPSUMIT) 10 MG tablet Take 1 tablet (10 mg total) by mouth daily, Starting Tue 2/20/2018, Until Wed 2/20/2019, Phone In      potassium chloride (K-DUR,KLOR-CON) 20 mEq tablet Take 2 tablets (40 mEq total) by mouth daily, Starting Mon 4/30/2018, Normal      sildenafil (REVATIO) 20 mg tablet Take 20 mg by mouth 3 (three) times a day, Historical Med      torsemide (DEMADEX) 20 mg tablet Take 2 tablets (40 mg total) by mouth daily, Starting Mon 4/30/2018, Normal           No discharge procedures on file  ED Provider  Attending physically available and evaluated Giles Schaumann I managed the patient along with the ED Attending      Electronically Signed by         Saadia Ro MD  05/18/18 9402

## 2018-05-12 NOTE — ED ATTENDING ATTESTATION
Kenyatta Hodge MD, saw and evaluated the patient  I have discussed the patient with the resident/non-physician practitioner and agree with the resident's/non-physician practitioner's findings, Plan of Care, and MDM as documented in the resident's/non-physician practitioner's note, except where noted  All available labs and Radiology studies were reviewed  At this point I agree with the current assessment done in the Emergency Department  I have conducted an independent evaluation of this patient a history and physical is as follows:Has undifferentiated mixed connective tissue disease  Presents with fever and right knee pain  Also has pulmonary HTN    Pain medial proximal tibia  No swelling or redness   Lungs clear, cor RR    Critical Care Time  CritCare Time    Procedures

## 2018-05-12 NOTE — DISCHARGE INSTRUCTIONS
Arthralgia   WHAT YOU NEED TO KNOW:   Arthralgia is pain in one or more joints, with no inflammation  It may be short-term and get better within 6 to 8 weeks  Arthralgia can be an early sign of arthritis  Arthralgia may be caused by a medical condition, such as a hormone disorder or a tumor  It may also be caused by an infection or injury  DISCHARGE INSTRUCTIONS:   Medicines: The following medicines may  be ordered for you:  · Acetaminophen  decreases pain  Ask how much to take and how often to take it  Follow directions  Acetaminophen can cause liver damage if not taken correctly  · NSAIDs  Do not take Motrin  · Pain relief cream  decreases pain  Use this cream as directed  · Take your medicine as directed  Contact your healthcare provider if you think your medicine is not helping or if you have side effects  Tell him of her if you are allergic to any medicine  Keep a list of the medicines, vitamins, and herbs you take  Include the amounts, and when and why you take them  Bring the list or the pill bottles to follow-up visits  Carry your medicine list with you in case of an emergency  Follow up with your healthcare provider or specialist as directed:  Write down your questions so you remember to ask them during your visits  Self-care:   · Apply heat  to help decrease pain  Use a heating pad or heat wrap  Apply heat for 20 to 30 minutes every 2 hours for as many days as directed  · Rest  as much as possible  Avoid activities that cause joint pain  · Apply ice  to help decrease swelling and pain  Ice may also help prevent tissue damage  Use an ice pack, or put crushed ice in a plastic bag  Cover it with a towel and place it on your painful joint for 15 to 20 minutes every hour or as directed  · Support  the joint with a brace or elastic wrap as directed  · Elevate  your joint above the level of your heart as often as you can to help decrease swelling and pain   Prop your painful joint on pillows or blankets to keep it elevated comfortably  · Lose weight  if you are overweight  Extra weight can put pressure on your joints and cause more pain  Ask your healthcare provider how much you should weigh  Ask him to help you create a weight loss plan  · Exercise  regularly to help improve joint movement and to decrease pain  Ask about the best exercise plan for you  Low-impact exercises can help take the pressure off your joints  Examples are walking, swimming, and water aerobics  Physical therapy:  A physical therapist teaches you exercises to help improve movement and strength, and to decrease pain  Ask your healthcare provider if physical therapy is right for you  Contact your healthcare provider or specialist if:   · You have a fever  · You continue to have joint pain that cannot be relieved with heat, ice, or medicine  · You have pain and inflammation around your joint  · You have questions or concerns about your condition or care  Return to the emergency department if:   · You have sudden, severe pain when you move your joint  · You have a fever and shaking chills  · You cannot move your joint  · You lose feeling on the side of your body where you have the painful joint  © 2017 [facility] Information is for End User's use only and may not be sold, redistributed or otherwise used for commercial purposes  All illustrations and images included in CareNotes® are the copyrighted property of [employer]  or [employer] Ber[employer]  The above information is an  only  It is not intended as medical advice for individual conditions or treatments  Talk to your doctor, nurse or pharmacist before following any medical regimen to see if it is safe and effective for you

## 2018-05-13 LAB — BACTERIA UR CULT: NORMAL

## 2018-05-14 ENCOUNTER — TELEPHONE (OUTPATIENT)
Dept: FAMILY MEDICINE CLINIC | Facility: OTHER | Age: 53
End: 2018-05-14

## 2018-05-14 DIAGNOSIS — I27.20 PULMONARY HYPERTENSION (HCC): Primary | ICD-10-CM

## 2018-05-14 LAB
ATRIAL RATE: 105 BPM
P AXIS: 43 DEGREES
PR INTERVAL: 126 MS
QRS AXIS: 59 DEGREES
QRSD INTERVAL: 82 MS
QT INTERVAL: 306 MS
QTC INTERVAL: 404 MS
T WAVE AXIS: 19 DEGREES
VENTRICULAR RATE: 105 BPM

## 2018-05-14 PROCEDURE — 93010 ELECTROCARDIOGRAM REPORT: CPT | Performed by: INTERNAL MEDICINE

## 2018-05-14 NOTE — TELEPHONE ENCOUNTER
Brent Salmeron called today to report wt down to 204#  Last week was 212  She was in ER over weekend with a fever, thought to be possible lupus flare per pt, and has been advised to contact her rheumatologist at [facility]  She states she "feels awful", and hasn't taken any of her meds over the weekend  BMP on 5/12 with potassium of 3 2 FYI  Again, has not taken potassium or torsemide for a few days  She is asking what to do with meds  Please advise    Pt cb [phone]

## 2018-05-14 NOTE — TELEPHONE ENCOUNTER
She should resume her sildenafil (revatio), macitentan (opsumit), torsemide, and potassium (she should double this given her low K)  I will defer to her rheumatologist for treatment of possible lupus flare

## 2018-05-14 NOTE — TELEPHONE ENCOUNTER
I gave pt instructions from Dr Silviano Simpson  Will double kcl dose until bmp drawn  States she cannot have drawn until Thursday  Verbalized understanding

## 2018-05-16 ENCOUNTER — OFFICE VISIT (OUTPATIENT)
Dept: FAMILY MEDICINE CLINIC | Facility: OTHER | Age: 53
End: 2018-05-16
Payer: COMMERCIAL

## 2018-05-16 VITALS
TEMPERATURE: 98.9 F | HEART RATE: 98 BPM | HEIGHT: 62 IN | BODY MASS INDEX: 38.04 KG/M2 | SYSTOLIC BLOOD PRESSURE: 134 MMHG | WEIGHT: 206.7 LBS | DIASTOLIC BLOOD PRESSURE: 86 MMHG | OXYGEN SATURATION: 98 %

## 2018-05-16 DIAGNOSIS — Z00.00 WELL ADULT EXAM: Primary | ICD-10-CM

## 2018-05-16 DIAGNOSIS — Z13.220 SCREENING FOR LIPID DISORDERS: ICD-10-CM

## 2018-05-16 DIAGNOSIS — Z13.1 SCREENING FOR DIABETES MELLITUS: ICD-10-CM

## 2018-05-16 PROCEDURE — 99396 PREV VISIT EST AGE 40-64: CPT | Performed by: FAMILY MEDICINE

## 2018-05-16 NOTE — PROGRESS NOTES
Subjective:      Patient ID: Lawson Franklin is a 52 y o  female  HPI   Pt presents for annual physical/wellness exam  Overall health fair (current ?lupus flare)  Regular eye/dental exams  Diet: healthy, diverse -- protein from lean meat/fish   Exercise: none (low O2 with activity)  Colonoscopy: 4-5 yrs ago, GI in [place]  Last mammo/pap:  mammo done 1/2018; No pap s/p hyster  DXA: 1/2018  GYN: Fadia Calamity  Vaccines: Tdap 2016,  Prevnar-13/Pneumovax: n/a         The following portions of the patient's history were reviewed and updated as appropriate: allergies, current medications, past family history, past medical history, past social history, past surgical history and problem list       Current Outpatient Prescriptions:     aspirin 81 mg chewable tablet, Chew 162 mg daily, Disp: , Rfl:     cholecalciferol (VITAMIN D3) 1,000 units tablet, Take 2,000 Units by mouth daily, Disp: , Rfl:     Cyanocobalamin (VITAMIN B-12 IJ), Inject 1,000 mcg/mL as directed every 14 (fourteen) days, Disp: , Rfl:     hydroxychloroquine (PLAQUENIL) 200 mg tablet, Take 400 mg by mouth daily, Disp: , Rfl:     Macitentan (OPSUMIT) 10 MG tablet, Take 1 tablet (10 mg total) by mouth daily, Disp: 30 tablet, Rfl: 10    potassium chloride (K-DUR,KLOR-CON) 20 mEq tablet, Take 2 tablets (40 mEq total) by mouth daily, Disp: 240 tablet, Rfl: 3    sildenafil (REVATIO) 20 mg tablet, Take 20 mg by mouth 3 (three) times a day, Disp: , Rfl:     torsemide (DEMADEX) 20 mg tablet, Take 2 tablets (40 mg total) by mouth daily, Disp: 240 tablet, Rfl: 3      Review of Systems   Constitutional: Positive for fatigue  Negative for appetite change, fever and unexpected weight change  HENT: Negative for congestion, dental problem, ear pain, postnasal drip, sore throat and tinnitus  Eyes: Negative for pain, discharge and visual disturbance  Respiratory: Negative for cough, shortness of breath and wheezing      Cardiovascular: Negative for chest pain, palpitations and leg swelling  Gastrointestinal: Negative for abdominal pain, constipation, diarrhea, nausea and vomiting  Endocrine: Negative for cold intolerance and heat intolerance  Genitourinary: Negative for difficulty urinating, dysuria, flank pain and urgency  Musculoskeletal: Positive for arthralgias (right knee)  Negative for back pain, joint swelling and myalgias  Skin: Negative for rash and wound  Allergic/Immunologic: Negative for immunocompromised state  Neurological: Negative for dizziness, syncope, speech difficulty, weakness and numbness  Hematological: Negative for adenopathy  Does not bruise/bleed easily  Psychiatric/Behavioral: Negative for confusion, dysphoric mood and sleep disturbance  The patient is not nervous/anxious  Objective:      /86 (BP Location: Left arm, Patient Position: Sitting, Cuff Size: Adult)   Pulse 98   Temp 98 9 °F (37 2 °C) (Tympanic)   Ht 5' 2" (1 575 m)   Wt 93 8 kg (206 lb 11 2 oz)   SpO2 98%   BMI 37 81 kg/m²          Physical Exam   Constitutional: She is oriented to person, place, and time  She appears well-developed and well-nourished  No distress  HENT:   Head: Normocephalic and atraumatic  Right Ear: External ear normal    Left Ear: External ear normal    Nose: Nose normal    Mouth/Throat: Oropharynx is clear and moist  No oropharyngeal exudate  Eyes: Conjunctivae and EOM are normal  Pupils are equal, round, and reactive to light  No scleral icterus  Neck: Normal range of motion  Neck supple  No thyromegaly present  Cardiovascular: Normal rate, regular rhythm and normal heart sounds  No murmur heard  Pulmonary/Chest: Effort normal and breath sounds normal  No respiratory distress  She has no wheezes  She has no rales  Abdominal: Soft  Bowel sounds are normal  She exhibits no mass  There is no tenderness  Musculoskeletal: Normal range of motion  She exhibits no edema or tenderness     Lymphadenopathy:     She has no cervical adenopathy  Neurological: She is alert and oriented to person, place, and time  She has normal reflexes  No cranial nerve deficit  Skin: Skin is warm and dry  No rash noted  No erythema  Psychiatric: She has a normal mood and affect  Her behavior is normal          Assessment/Plan:     Diagnoses and all orders for this visit:    Well adult exam    Screening for lipid disorders  -     Lipid panel; Future    Screening for diabetes mellitus          Healthy appearing overweight female with Body mass index is 37 81 kg/m²  Reviewed the importance of healthy diet and regular exercise -- advised regular visits with eye doctor and dentist   Health maintenance items up-to-date with the exception of colonoscopy  Patient declines colonoscopy citing traumatic experience with last scope approximately 4-5 years ago  Routine blood work ordered to assess lipid status  Patient already has order for BMP tomorrow (will observe fasting glucose on this study)  States she has appointment to see specialist at [facility] tomorrow regarding possible lupus diagnosis  The patient indicates understanding of these issues and agrees with the plan        Sherley Cui DO

## 2018-05-17 ENCOUNTER — APPOINTMENT (OUTPATIENT)
Dept: LAB | Facility: CLINIC | Age: 53
End: 2018-05-17
Payer: COMMERCIAL

## 2018-05-17 ENCOUNTER — TRANSCRIBE ORDERS (OUTPATIENT)
Dept: LAB | Facility: CLINIC | Age: 53
End: 2018-05-17

## 2018-05-17 DIAGNOSIS — I27.20 PULMONARY HYPERTENSION (HCC): ICD-10-CM

## 2018-05-17 DIAGNOSIS — Z13.220 SCREENING FOR LIPID DISORDERS: ICD-10-CM

## 2018-05-17 LAB
ANION GAP SERPL CALCULATED.3IONS-SCNC: 9 MMOL/L (ref 4–13)
BUN SERPL-MCNC: 14 MG/DL (ref 5–25)
CALCIUM SERPL-MCNC: 9.3 MG/DL (ref 8.3–10.1)
CHLORIDE SERPL-SCNC: 101 MMOL/L (ref 100–108)
CHOLEST SERPL-MCNC: 157 MG/DL (ref 50–200)
CO2 SERPL-SCNC: 32 MMOL/L (ref 21–32)
CREAT SERPL-MCNC: 1.38 MG/DL (ref 0.6–1.3)
GFR SERPL CREATININE-BSD FRML MDRD: 43 ML/MIN/1.73SQ M
GLUCOSE P FAST SERPL-MCNC: 100 MG/DL (ref 65–99)
HDLC SERPL-MCNC: 45 MG/DL (ref 40–60)
LDLC SERPL CALC-MCNC: 86 MG/DL (ref 0–100)
NONHDLC SERPL-MCNC: 112 MG/DL
POTASSIUM SERPL-SCNC: 3.1 MMOL/L (ref 3.5–5.3)
SODIUM SERPL-SCNC: 142 MMOL/L (ref 136–145)
TRIGL SERPL-MCNC: 131 MG/DL

## 2018-05-17 PROCEDURE — 36415 COLL VENOUS BLD VENIPUNCTURE: CPT

## 2018-05-17 PROCEDURE — 80048 BASIC METABOLIC PNL TOTAL CA: CPT

## 2018-05-17 PROCEDURE — 80061 LIPID PANEL: CPT

## 2018-05-17 NOTE — TELEPHONE ENCOUNTER
JAIME   checked BMP that Lorri Mendes had drawn today, after she was instructed to increase kcl dose  It's actually lower at 3 1 today  On 5/12 it was 3 2

## 2018-05-18 DIAGNOSIS — I27.21 PULMONARY ARTERY HYPERTENSION (HCC): Primary | ICD-10-CM

## 2018-05-18 DIAGNOSIS — M10.9 GOUT, UNSPECIFIED CAUSE, UNSPECIFIED CHRONICITY, UNSPECIFIED SITE: Primary | ICD-10-CM

## 2018-05-18 RX ORDER — COLCHICINE 0.6 MG/1
0.6 TABLET ORAL 2 TIMES DAILY
Qty: 30 TABLET | Refills: 11 | Status: SHIPPED | OUTPATIENT
Start: 2018-05-18 | End: 2018-07-12 | Stop reason: ALTCHOICE

## 2018-05-18 NOTE — TELEPHONE ENCOUNTER
Spoke with Diaz Jackson, she will increase potassium as instructed  Order in for BMP next week  She had appt with her rheumatologist yesterday, who advised her that her uric acid level is extremely high, >10, which he believes may be from her torsemide  He thinks she had a gout flare in response to uric acid    Rheumatologist told her to discuss with you,  holding torsemide for a while to allow numbers to come back to normal   FYI

## 2018-05-18 NOTE — TELEPHONE ENCOUNTER
If we hold her torsemide, she will become overloaded again  Best option would be to start her on colchicine 0 6 mg PO BID for maintenance

## 2018-05-19 ENCOUNTER — TRANSCRIBE ORDERS (OUTPATIENT)
Dept: LAB | Facility: CLINIC | Age: 53
End: 2018-05-19

## 2018-05-19 ENCOUNTER — APPOINTMENT (OUTPATIENT)
Dept: LAB | Facility: CLINIC | Age: 53
End: 2018-05-19
Payer: COMMERCIAL

## 2018-05-19 DIAGNOSIS — M35.9 DIFFUSE DISEASE OF CONNECTIVE TISSUE (HCC): ICD-10-CM

## 2018-05-19 DIAGNOSIS — M35.1 MIXED CONNECTIVE TISSUE DISEASE (HCC): Primary | ICD-10-CM

## 2018-05-19 LAB
BACTERIA UR QL AUTO: ABNORMAL /HPF
BILIRUB UR QL STRIP: NEGATIVE
CLARITY UR: CLEAR
COLOR UR: YELLOW
CREAT UR-MCNC: 124 MG/DL
GLUCOSE UR STRIP-MCNC: NEGATIVE MG/DL
HGB UR QL STRIP.AUTO: NEGATIVE
KETONES UR STRIP-MCNC: NEGATIVE MG/DL
LEUKOCYTE ESTERASE UR QL STRIP: ABNORMAL
MICROALBUMIN UR-MCNC: 12.6 MG/L (ref 0–20)
MICROALBUMIN/CREAT 24H UR: 10 MG/G CREATININE (ref 0–30)
MUCOUS THREADS UR QL AUTO: ABNORMAL
NITRITE UR QL STRIP: NEGATIVE
NON-SQ EPI CELLS URNS QL MICRO: ABNORMAL /HPF
PH UR STRIP.AUTO: 5.5 [PH] (ref 4.5–8)
PROT UR STRIP-MCNC: NEGATIVE MG/DL
RBC #/AREA URNS AUTO: ABNORMAL /HPF
SP GR UR STRIP.AUTO: 1.02 (ref 1–1.03)
UROBILINOGEN UR QL STRIP.AUTO: 0.2 E.U./DL
WBC #/AREA URNS AUTO: ABNORMAL /HPF

## 2018-05-19 PROCEDURE — 82570 ASSAY OF URINE CREATININE: CPT | Performed by: DENTIST

## 2018-05-19 PROCEDURE — 81001 URINALYSIS AUTO W/SCOPE: CPT | Performed by: DENTIST

## 2018-05-19 PROCEDURE — 82043 UR ALBUMIN QUANTITATIVE: CPT | Performed by: DENTIST

## 2018-05-22 ENCOUNTER — APPOINTMENT (OUTPATIENT)
Dept: LAB | Facility: CLINIC | Age: 53
End: 2018-05-22
Payer: COMMERCIAL

## 2018-05-22 DIAGNOSIS — I27.21 PULMONARY ARTERY HYPERTENSION (HCC): ICD-10-CM

## 2018-05-22 LAB
ANION GAP SERPL CALCULATED.3IONS-SCNC: 10 MMOL/L (ref 4–13)
BUN SERPL-MCNC: 17 MG/DL (ref 5–25)
CALCIUM SERPL-MCNC: 9.8 MG/DL (ref 8.3–10.1)
CHLORIDE SERPL-SCNC: 103 MMOL/L (ref 100–108)
CO2 SERPL-SCNC: 28 MMOL/L (ref 21–32)
CREAT SERPL-MCNC: 1.47 MG/DL (ref 0.6–1.3)
GFR SERPL CREATININE-BSD FRML MDRD: 40 ML/MIN/1.73SQ M
GLUCOSE SERPL-MCNC: 93 MG/DL (ref 65–140)
POTASSIUM SERPL-SCNC: 4 MMOL/L (ref 3.5–5.3)
SODIUM SERPL-SCNC: 141 MMOL/L (ref 136–145)

## 2018-05-22 PROCEDURE — 36415 COLL VENOUS BLD VENIPUNCTURE: CPT

## 2018-05-22 PROCEDURE — 80048 BASIC METABOLIC PNL TOTAL CA: CPT

## 2018-05-23 ENCOUNTER — TELEPHONE (OUTPATIENT)
Dept: FAMILY MEDICINE CLINIC | Facility: OTHER | Age: 53
End: 2018-05-23

## 2018-05-23 NOTE — TELEPHONE ENCOUNTER
Please advise patient that she does not need to test her blood sugars at home  Elevated blood sugar reading noted last week was 100 (normal is 70-99)  Since that blood work, it appears the patient has had additional labs drawn--blood sugar was normal on those readings  Will continue to monitor at this time  Thanks!   Walter Zhou, DO

## 2018-05-23 NOTE — TELEPHONE ENCOUNTER
----- Message from Yanique Joseph sent at 5/23/2018  8:51 AM EDT -----  Regarding: Test Results Question  Contact: [phone]  DR Arnold,  You had mentioned that my blood sugar was high, do you think it would be reasonable for me to monitor my blood sugar daily and if so would you be willing to prescribe me a glucose meter or recommend one  to me

## 2018-05-23 NOTE — TELEPHONE ENCOUNTER
As stated in the previous message, I do not feel it is appropriate for patient to do home glucose monitoring at this time  She had only 1 elevated fasting glucose reading--her value was 100, normal was 70-99  Since that lab draw 1 week ago, patient has had normal glucose levels on subsequent studies  Thanks!   Dmitriy Thomas, DO

## 2018-05-23 NOTE — TELEPHONE ENCOUNTER
Lemuel Joseph called, to let you know she had repeat bmp drawn yesterday, after increasing Kcl to 40 meq tid  Please review and advise of any new orders

## 2018-06-04 ENCOUNTER — OFFICE VISIT (OUTPATIENT)
Dept: OBGYN CLINIC | Facility: CLINIC | Age: 53
End: 2018-06-04
Payer: COMMERCIAL

## 2018-06-04 ENCOUNTER — APPOINTMENT (OUTPATIENT)
Dept: RADIOLOGY | Facility: CLINIC | Age: 53
End: 2018-06-04
Payer: COMMERCIAL

## 2018-06-04 VITALS
DIASTOLIC BLOOD PRESSURE: 75 MMHG | WEIGHT: 206.79 LBS | SYSTOLIC BLOOD PRESSURE: 114 MMHG | HEART RATE: 105 BPM | BODY MASS INDEX: 38.05 KG/M2 | HEIGHT: 62 IN

## 2018-06-04 DIAGNOSIS — M70.51 PES ANSERINUS BURSITIS OF RIGHT KNEE: ICD-10-CM

## 2018-06-04 DIAGNOSIS — M17.11 ARTHRITIS OF RIGHT KNEE: ICD-10-CM

## 2018-06-04 DIAGNOSIS — M25.561 ACUTE PAIN OF RIGHT KNEE: Primary | ICD-10-CM

## 2018-06-04 DIAGNOSIS — M22.2X1 PATELLOFEMORAL DISORDER OF RIGHT KNEE: ICD-10-CM

## 2018-06-04 DIAGNOSIS — M25.561 ACUTE PAIN OF RIGHT KNEE: ICD-10-CM

## 2018-06-04 PROCEDURE — 99204 OFFICE O/P NEW MOD 45 MIN: CPT | Performed by: ORTHOPAEDIC SURGERY

## 2018-06-04 PROCEDURE — 73562 X-RAY EXAM OF KNEE 3: CPT

## 2018-06-04 PROCEDURE — 20610 DRAIN/INJ JOINT/BURSA W/O US: CPT | Performed by: ORTHOPAEDIC SURGERY

## 2018-06-04 RX ADMIN — BUPIVACAINE HYDROCHLORIDE 2 ML: 2.5 INJECTION, SOLUTION INFILTRATION; PERINEURAL at 16:59

## 2018-06-04 RX ADMIN — LIDOCAINE HYDROCHLORIDE 1 ML: 10 INJECTION, SOLUTION INFILTRATION; PERINEURAL at 16:59

## 2018-06-04 RX ADMIN — BETAMETHASONE SODIUM PHOSPHATE AND BETAMETHASONE ACETATE 6 MG: 3; 3 INJECTION, SUSPENSION INTRA-ARTICULAR; INTRALESIONAL; INTRAMUSCULAR; SOFT TISSUE at 16:59

## 2018-06-04 NOTE — PROGRESS NOTES
Assessment:  1  Acute pain of right knee  XR knee 3 vw right non injury   2  Patellofemoral disorder of right knee  Ambulatory referral to Physical Therapy   3  Pes anserinus bursitis of right knee  Ambulatory referral to Physical Therapy   4  Arthritis of right knee  Ambulatory referral to Physical Therapy     Patient Active Problem List   Diagnosis    Encounter for gynecological examination (general) (routine) without abnormal findings    B12 deficiency    Chronic cough    Chronic rhinitis    Diffuse connective tissue disease ([address] )    Dyspnea    Edema    Essential hypertension    Hot flashes due to menopause    Long-term use of hydroxychloroquine    Lupus    Lupus anticoagulant positive    Tachycardia    SOB (shortness of breath) on exertion    SOB (shortness of breath)    Sinus tachycardia    Secondary pulmonary hypertension    Pulmonary hypertension (HCC)    Post-nasal drip    Pes anserine bursitis    Positive HELENE (antinuclear antibody)    Other chronic pulmonary heart diseases    Osteoporosis    Night sweats    Patellofemoral disorder of right knee    Pes anserinus bursitis of right knee    Arthritis of right knee           Plan       cortisone injection given  Burns taping    brace   PT for home exercise program  Follow-up in 6 weeks see how she is doing            Subjective:     Patient ID:    Chief Complaint:Na Joseph 52 y o  female      HPI    Patient comes in today with regards to Right knee  The patient reports that the pain is in the  Superior to the patella and anterior inferior and medially along the knee joint and has been going on for  At least 1 month  The pain is rated at4 at its best and7 at its worst   The pain is described as  Aching and throbbing  It is worsened with  Walking especially with up and down steps, and is made better with  uncertain  The patient has taken  Aleve for treatment  But is unable to take this due to kidney function issues  Aleve helps somewhat but not completely alleviated  The following portions of the patient's history were reviewed and updated as appropriate: allergies, current medications, past family history, past social history, past surgical history and problem list         Social History     Social History    Marital status: /Civil Union     Spouse name: N/A    Number of children: 2    Years of education: N/A     Occupational History    Not on file       Social History Main Topics    Smoking status: Former Smoker     Quit date: 1/24/2006    Smokeless tobacco: Former User    Alcohol use Yes      Comment: social    Drug use: No    Sexual activity: Not on file     Other Topics Concern    Not on file     Social History Narrative    Daily caffeinated coffee consumption    Exercise habits         Past Medical History:   Diagnosis Date    HELENE positive     Encephalitis     Lasted Assessed 10/18/2017    Endometrial hyperplasia     Last assessed 10/18/2017    Hypertension     Lupus anticoagulant disorder ([address] )     Lupus anticoagulant positive     Malignant neoplasm of cervix uteri ([address]     Maxillary sinusitis     Lasted Assessed 10/18/2017    Night sweat     Osteopenia     Hip    Osteoporosis     Spine    Pneumonia     Last Assessed 10/18/2017    Secondary pulmonary arterial hypertension (HCC)     Shortness of breath     Sinus tachycardia     Urinary incontinence      Past Surgical History:   Procedure Laterality Date    ANKLE FRACTURE SURGERY      ARTHRODESIS      Hand: IP Joint    COLPOSCOPY      GALLBLADDER SURGERY      HYSTERECTOMY      Vaginal    LAPAROSCOPIC ESOPHAGOGASTRIC FUNDOPLASTY  2008    REDUCTION MAMMAPLASTY      TONSILLECTOMY       Allergies   Allergen Reactions    Dilantin [Phenytoin] Anaphylaxis and Rash    Augmentin [Amoxicillin-Pot Clavulanate] Rash     Current Outpatient Prescriptions on File Prior to Visit   Medication Sig Dispense Refill    aspirin 81 mg chewable tablet Chew 162 mg daily      cholecalciferol (VITAMIN D3) 1,000 units tablet Take 2,000 Units by mouth daily      colchicine (COLCRYS) 0 6 mg tablet Take 1 tablet (0 6 mg total) by mouth 2 (two) times a day 30 tablet 11    Cyanocobalamin (VITAMIN B-12 IJ) Inject 1,000 mcg/mL as directed every 14 (fourteen) days      hydroxychloroquine (PLAQUENIL) 200 mg tablet Take 400 mg by mouth daily      Macitentan (OPSUMIT) 10 MG tablet Take 1 tablet (10 mg total) by mouth daily 30 tablet 10    potassium chloride (K-DUR,KLOR-CON) 20 mEq tablet Take 2 tablets (40 mEq total) by mouth daily 240 tablet 3    sildenafil (REVATIO) 20 mg tablet Take 20 mg by mouth 3 (three) times a day      torsemide (DEMADEX) 20 mg tablet Take 2 tablets (40 mg total) by mouth daily 240 tablet 3     No current facility-administered medications on file prior to visit  Objective:    Review of Systems   Constitutional: Negative  HENT: Negative  Eyes: Negative  Respiratory: Positive for shortness of breath  Cardiovascular: Positive for leg swelling  Gastrointestinal: Negative  Endocrine: Positive for polyuria  Genitourinary: Negative  Musculoskeletal:        See HPI   Skin: Negative  Allergic/Immunologic: Negative  Neurological: Negative  Hematological: Negative  Psychiatric/Behavioral: Negative  Right Knee Exam     Tenderness   The patient is experiencing tenderness in the pes anserinus and patella  Range of Motion   The patient has normal right knee ROM  Extension: normal   Flexion: normal     Muscle Strength     The patient has normal right knee strength      Tests   Hussain:  Medial - negative Lateral - negative  Lachman:  Anterior - negative      Drawer:       Anterior - negative      Varus: negative  Valgus: positive    Other   Erythema: present  Swelling: mild  Other tests: no effusion present    Comments:   Pinpoint tenderness over the right pes bursa and medial patellofemoral ligament there is past pain on the lateral patellar facet and patellar grind test is positive  Left Knee Exam   Left knee exam is normal     Range of Motion   The patient has normal left knee ROM  Tests   Drawer:       Anterior - negative         Other   Erythema: present  Sensation: normal  Pulse: present  Swelling: none            Physical Exam   Constitutional: She is oriented to person, place, and time  She appears well-developed  HENT:   Head: Normocephalic  Eyes: Pupils are equal, round, and reactive to light  Neck: Normal range of motion  Cardiovascular: Normal rate  Pulmonary/Chest: Effort normal    Abdominal: She exhibits no distension  Musculoskeletal:        Right knee: She exhibits no effusion  Neurological: She is alert and oriented to person, place, and time  Skin: Skin is warm  Psychiatric: She has a normal mood and affect  Nursing note and vitals reviewed  Pes bursa injection  Date/Time: 6/4/2018 4:59 PM  Consent given by: patient  Site marked: site marked  Supporting Documentation  Indications: pain   Procedure Details  Location: knee - R knee (Pes bursa)  Needle size: 25 G  Approach: anteromedial  Medications administered: 2 mL bupivacaine 0 25 %; 1 mL lidocaine 1 %; 6 mg betamethasone acetate-betamethasone sodium phosphate 6 (3-3) mg/mL    Patient tolerance: patient tolerated the procedure well with no immediate complications               I have personally reviewed pertinent films in PACS and my interpretation is Mild arthritis medial joint line mild arthritis patellofemoral joint line  Portions of the record may have been created with voice recognition software   Occasional wrong word or "sound a like" substitutions may have occurred due to the inherent limitations of voice recognition software   Read the chart carefully and recognize, using context, where substitutions have occurred

## 2018-06-06 ENCOUNTER — TELEPHONE (OUTPATIENT)
Dept: OBGYN CLINIC | Facility: HOSPITAL | Age: 53
End: 2018-06-06

## 2018-06-06 RX ORDER — LIDOCAINE HYDROCHLORIDE 10 MG/ML
1 INJECTION, SOLUTION INFILTRATION; PERINEURAL
Status: DISCONTINUED | OUTPATIENT
Start: 2018-06-04 | End: 2018-12-05

## 2018-06-06 RX ORDER — BETAMETHASONE SODIUM PHOSPHATE AND BETAMETHASONE ACETATE 3; 3 MG/ML; MG/ML
6 INJECTION, SUSPENSION INTRA-ARTICULAR; INTRALESIONAL; INTRAMUSCULAR; SOFT TISSUE
Status: DISCONTINUED | OUTPATIENT
Start: 2018-06-04 | End: 2018-12-05

## 2018-06-06 RX ORDER — BUPIVACAINE HYDROCHLORIDE 2.5 MG/ML
2 INJECTION, SOLUTION INFILTRATION; PERINEURAL
Status: DISCONTINUED | OUTPATIENT
Start: 2018-06-04 | End: 2018-12-05

## 2018-06-06 NOTE — TELEPHONE ENCOUNTER
Dr Silverio Schultz patient calling  Ph- [phone]  Patient reports her rheumatology doctor will not change her gout medication unless there is a confirmed diagnosis by aspiration  She wants to know if you want to aspirate her knee given this information? Also, she wants to know the name and type of knee brace you recommended so she can see if she can get it cheaper online

## 2018-06-06 NOTE — TELEPHONE ENCOUNTER
isabelle olvera is in the note and I just gave her a cortisone injection so it is too soon to aspirate  Have her come in at her scheduled visit in six weeks and we can possibluy aspirate  BTW rheumatologist also give injections and aspirate knees

## 2018-06-11 ENCOUNTER — OFFICE VISIT (OUTPATIENT)
Dept: CARDIOLOGY CLINIC | Facility: CLINIC | Age: 53
End: 2018-06-11
Payer: COMMERCIAL

## 2018-06-11 VITALS
WEIGHT: 208 LBS | BODY MASS INDEX: 38.03 KG/M2 | DIASTOLIC BLOOD PRESSURE: 70 MMHG | OXYGEN SATURATION: 99 % | SYSTOLIC BLOOD PRESSURE: 114 MMHG | HEART RATE: 96 BPM

## 2018-06-11 DIAGNOSIS — I27.20 PULMONARY HYPERTENSION (HCC): Primary | ICD-10-CM

## 2018-06-11 PROCEDURE — 99205 OFFICE O/P NEW HI 60 MIN: CPT | Performed by: INTERNAL MEDICINE

## 2018-06-11 NOTE — LETTER
June 11, 2018     [address]    Patient: Talat Coe   YOB: 1965   Date of Visit: 6/11/2018       Dear Dr Amadou Hopper:    Thank you for referring Uvaldo Rachael to me for evaluation  Below are my notes for this consultation  If you have questions, please do not hesitate to call me  I look forward to following your patient along with you  Sincerely,        Eryn Ny MD        CC: No Recipients  Eryn Ny MD  6/11/2018  5:29 PM  Sign at close encounter  Heart Failure Outpatient Progress Note - Talat Coe 52 y o  female MRN: [id number]    @ Encounter: [id number]  Assessment/Plan:    # ANSARI: Likely combination of obesity/deconditioning, diastolic dysfunction (obesity, female, currently on diuretics w/ hx of volume overload), and exercise induced PAH  --Continue diet and exercise for weight loss    # Exercised induced PAH: Obesity/deconditioning and diastolic dysfunction (PCWP 12 mmHg) do not fully explain her degree of dysfunction  Recent diagnosis of MCTD/SLE w/ + lupus anticoagulant  Remote smoker  She is at risk to develop pulmonary vascular disease leading to RV dysfunction  At rest, RV appears normal on TTE with coupled RV-PA w/o e/o of RVOT notching suggestive of normal PVR at rest  However, she did have a stress echo in 2012 that was suggestive of exercise induced pulmonary HTN  At the time she did not have e/o of elevated PVR  Her bubble was negative which makes an intracardiac shunt less likely  Her symptoms have progressed and with ambulation she shows evidence of desaturation that may be due to abnormal air exchange we did the following:   Diag:  --PFTs were normal   --Repeat stress echo to evaluate pulmonary pressures, RV, and RVOT signal w/ exercise showed exaggerated HR response, HTNsive response, decrease in O2 saturation from 99% to 91% and increase in PA pressures from 45 mmHg to 70 mmHg with exercise   Exercised for 5 min and 20 sec  --RHC w/ exercise: This was an exercise RHC with simultaneous monitoring of the distal and proximal SG catheter ports  She had an appropriate HR response from 100 to 130 bpm with MAP throughout the study staying at 112 mmHg  Hgb 13 1  With exercise, there was an increase in PVR from <3 to ~4 5 MOLINA  PCWP increased from 12 to 18 mmHg  CVP increased from 7 to 10 mmHg  The findings were consistent with exercise induced Pulmonary Arterial Hypertension (WHO Group 1, NYHA II)  Her TTE and PCWP are not consistent with significant left sided heart disease (Group II)  Her PFTs did not show any significant lung disease (Group III), V/Q scan is not consistent with CTEPH (Group IV PAH) and stress echo showed elevation of PA pressures with exercise which is consistent with above findings  --Slow improvement on sildenafil and macitentan  --Volume overloaded today, but improving    Therapeutic:  --Continue torsemide 40 mg PO BID  Nausea w/ Kdur  Trial 20 mEq PO BID  Consider start omeprazole  --Check labs one weight ~ 200 lbs on home scale; CMP and Mag  --Daily weights qAM  --Continue sildenafil 20 mg PO q8hrs for now  --Continue macitentan 10 mg PO daily    # Undifferentiated CTD w/ + lupus anticoagulant (unclear if SLE is involved)  --Per outpatient Rheumatologist  # ST: V/Q negative  May be 2/2 to deconditioning +/- inappropriate ST +/- diastolic dysfunction/HF (euvolemic on exam)  No e/o infection  --Continue to monitor, can consider coby agents in the future  # Morbid obesity: Continue weight loss    RTC in 6 weeks    HPI: Very pleasant 53 y/o woman w/ PMHx of newly diagnosed MCTD/SLE, +lupus anti-coagulant, B12 deficiency, and obesity referred for evaluation of ANSARI  She states that she first started getting SOB -- 10 years ago  She has had several episodes of bacterial PNA and chronic cough (a few times/year)  She was referred to [facility] in 2012 for workup for PH   She had a a stress echo 8/2012 that showed that her PA pressures more than doubled from --24 mmHg to > 50 mmHg  Currently, she states she can walk up a couple flights of stairs but does get SOB  She also gets SOB with walking up inclines  She gets occasional LH  She has been found to be B12 deficient, but that is improving  She was seeing a Rheumatologist in [address] but recently saw a specialist at [facility], Dr Alyson Leonardo (Rheumatologist - 11/16)  She was diagnosed with MCTD and SLE  HELENE + 1:320 w/ speckled pattern  She has been on Plaquenil x 2 weeks now  She is on ASA for + lupus anticoagulant and anticardiolipin Abs  She has joint pains and a subtle malar rash  She states so far there has been no change with plaquenil  She has f/u with him in 2/2018  After her visit with Dr My Haynes, she has had TTE which shows LVEF --65%, normal RV size and function without RVOT notching  Normal atria  CXR clear  She also has had a negative V/Q scan  She walked the patient in the hallway and had her go up and down stairs  Her resting HR went from --100 bpm to 110s with Pulse Ox starting from 98% @ rest to --90% with exercise  Pulse Ox showed a good waveform throughout  She denies CP, palpitations, presyncope, or syncope  She notes that she went on a two week cruise and afterwards developed LE edema in the past and has developed LE edema  Today, 1/9/2018, returns for f/u  Had echo stress test w/ poor exercise tolerance and decrease in O2 saturation w/ increase in PA pressures from 45 to 70 mmHg w/ exercise  Exercise RHC showed exercise induced PH w/ increase in PVR from <3 to ~4 5 MOLINA  PCWP increased from 12 to 18 mmHg  CVP increased from 7 to 10 mmHg  She was denied for tadalafil, but approved for sildenafil  She started sildenafil on 1/5/18 and states she does not feel any different and continues to have trouble with stairs  In addition, today in clinic, she continues to be tachycardic  Has gained 4 lbs since her last visit      Today, 2/13/2018, returns for f/u  She has gained ~ 4 lbs since her last visit, for a total of 9 lbs since attempted diuresis  She started sildenafil on 1/5/18 and states she feels worse going up stairs than previously  In addition, today in clinic, she continues to be tachycardic  Today, 3/19/2018, she returns for f/u  She states going up stairs there is improvement now  She has been on macitentan for 1 week now  Weight has steadily been decreasing  Today, 4/30/2018, she returns for f/u  She states she feels about the same as her last visit  Weight has steadily been decreasing  Today, 6/11/2018, she returns for f/u  She feels slightly better than her last visit  Weight continues to decrease  Walking up one flight is not a problem, two flights feels SOB  Past Medical History:   Diagnosis Date    HELENE positive     Encephalitis     Lasted Assessed 10/18/2017    Endometrial hyperplasia     Last assessed 10/18/2017    Hypertension     [facility]upus anticoagulant disorder [facility] [address] [facility]     Lupus anticoagulant positive     [facility]     Maxillary sinusitis     Lasted Assessed 10/18/2017    Night sweat     Osteopenia     Hip    Osteoporosis     Spine    Pneumonia     Last Assessed 10/18/2017    Secondary pulmonary arterial hypertension (HCC)     Shortness of breath     Sinus tachycardia     Urinary incontinence        Review of Systems - 12 point ROS was done and is negative, except as noted above       Allergies   Allergen Reactions    Dilantin [Phenytoin] Anaphylaxis and Rash    Augmentin [Amoxicillin-Pot Clavulanate] Rash       Current Outpatient Prescriptions:     aspirin 81 mg chewable tablet, Chew 162 mg daily, Disp: , Rfl:     cholecalciferol (VITAMIN D3) 1,000 units tablet, Take 2,000 Units by mouth daily, Disp: , Rfl:     Cyanocobalamin (VITAMIN B-12 IJ), Inject 1,000 mcg/mL as directed every 14 (fourteen) days, Disp: , Rfl:     hydroxychloroquine (PLAQUENIL) 200 mg tablet, Take 400 mg by mouth daily, Disp: , Rfl:     Macitentan (OPSUMIT) 10 MG tablet, Take 1 tablet (10 mg total) by mouth daily, Disp: 30 tablet, Rfl: 10    potassium chloride (K-DUR,KLOR-CON) 20 mEq tablet, Take 2 tablets (40 mEq total) by mouth daily, Disp: 240 tablet, Rfl: 3    sildenafil (REVATIO) 20 mg tablet, Take 20 mg by mouth 3 (three) times a day, Disp: , Rfl:     torsemide (DEMADEX) 20 mg tablet, Take 2 tablets (40 mg total) by mouth daily (Patient taking differently: Take 80 mg by mouth daily  ), Disp: 240 tablet, Rfl: 3    colchicine (COLCRYS) 0 6 mg tablet, Take 1 tablet (0 6 mg total) by mouth 2 (two) times a day, Disp: 30 tablet, Rfl: 11    Current Facility-Administered Medications:     betamethasone acetate-betamethasone sodium phosphate (CELESTONE) injection 6 mg, 6 mg, Intra-articular, , Hannah Roots, DO, 6 mg at 06/04/18 1659    bupivacaine (MARCAINE) 0 25 % injection 2 mL, 2 mL, Injection, , Hannah Roots, DO, 2 mL at 06/04/18 1659    lidocaine (XYLOCAINE) 1 % injection 1 mL, 1 mL, Injection, , Hannah Roots, DO, 1 mL at 06/04/18 1659    Social History     Social History    Marital status: /Civil Union     Spouse name: N/A    Number of children: 2    Years of education: N/A     Occupational History    Not on file       Social History Main Topics    Smoking status: Former Smoker     Quit date: 1/24/2006    Smokeless tobacco: Former User    Alcohol use Yes      Comment: social    Drug use: No    Sexual activity: Not on file     Other Topics Concern    Not on file     Social History Narrative    Daily caffeinated coffee consumption    Exercise habits           Family History   Problem Relation Age of Onset    Uterine cancer Mother     Pancreatic cancer Mother     Diabetes Mother     Heart disease Father     Stroke Father      CVA    Hypertension Father     Thyroid disease Sister     Hemochromatosis Brother     Thyroid disease Maternal Grandmother     Diabetes Other  Other Family      Heart Problem       Physical Exam:    Vitals: Blood pressure 114/70, pulse 96, weight 94 3 kg (208 lb), SpO2 99 %, not currently breastfeeding , Body mass index is 38 03 kg/m² ,   Wt Readings from Last 3 Encounters:   06/11/18 94 3 kg (208 lb)   06/04/18 93 8 kg (206 lb 12 7 oz)   05/16/18 93 8 kg (206 lb 11 2 oz)     Vitals:    06/11/18 1633   BP: 114/70   BP Location: Right arm   Patient Position: Sitting   Cuff Size: Standard   Pulse: 96   SpO2: 99%   Weight: 94 3 kg (208 lb)       GEN: Sophie Ribera appears well, alert and oriented x 3, pleasant and cooperative   HEENT: pupils equal, round, and reactive to light; extraocular muscles intact  NECK: supple, no carotid bruits   HEART: regular rhythm, normal S1 and S2, no murmurs, clicks, gallops or rubs, JVD is flat; +HJR    LUNGS: clear to auscultation bilaterally; no wheezes, rales, or rhonchi   ABDOMEN: normal bowel sounds, soft, no tenderness, no distention  EXTREMITIES: peripheral pulses normal; no clubbing, cyanosis; 1+ B/L LE edema  NEURO: no focal findings   SKIN: normal without suspicious lesions on exposed skin    Labs & Results:  Lab Results   Component Value Date    WBC 5 40 05/12/2018    HGB 12 7 05/12/2018    HCT 37 7 05/12/2018    MCV 90 05/12/2018     05/12/2018       Chemistry        Component Value Date/Time     05/22/2018 1657    K 4 0 05/22/2018 1657     05/22/2018 1657    CO2 28 05/22/2018 1657    BUN 17 05/22/2018 1657    CREATININE 1 47 (H) 05/22/2018 1657        Component Value Date/Time    CALCIUM 9 8 05/22/2018 1657    ALKPHOS 108 11/18/2017 0844    AST 11 11/18/2017 0844    ALT 23 11/18/2017 0844    BILITOT 0 40 11/18/2017 0844        EKG personally reviewed by Piedad Gannon MD      Counseling / Coordination of Care  Total floor / unit time spent today 40 minutes  Greater than 50% of total time was spent with the patient and / or family counseling and / or coordination of care    A description of the counseling / coordination of care: 25 min  Thank you for the opportunity to participate in the care of this patient      Gill Hazel MD, PhD   [facility]

## 2018-06-11 NOTE — PROGRESS NOTES
Heart Failure Outpatient Progress Note - Corby Cho 52 y o  female MRN: [id number]    @ Encounter: [id number]  Assessment/Plan:    # ANSARI: Likely combination of obesity/deconditioning, diastolic dysfunction (obesity, female, currently on diuretics w/ hx of volume overload), and exercise induced PAH  --Continue diet and exercise for weight loss    # Exercised induced PAH: Obesity/deconditioning and diastolic dysfunction (PCWP 12 mmHg) do not fully explain her degree of dysfunction  Recent diagnosis of MCTD/SLE w/ + lupus anticoagulant  Remote smoker  She is at risk to develop pulmonary vascular disease leading to RV dysfunction  At rest, RV appears normal on TTE with coupled RV-PA w/o e/o of RVOT notching suggestive of normal PVR at rest  However, she did have a stress echo in 2012 that was suggestive of exercise induced pulmonary HTN  At the time she did not have e/o of elevated PVR  Her bubble was negative which makes an intracardiac shunt less likely  Her symptoms have progressed and with ambulation she shows evidence of desaturation that may be due to abnormal air exchange we did the following:   Diag:  --PFTs were normal   --Repeat stress echo to evaluate pulmonary pressures, RV, and RVOT signal w/ exercise showed exaggerated HR response, HTNsive response, decrease in O2 saturation from 99% to 91% and increase in PA pressures from 45 mmHg to 70 mmHg with exercise  Exercised for 5 min and 20 sec  --RHC w/ exercise: This was an exercise RHC with simultaneous monitoring of the distal and proximal SG catheter ports  She had an appropriate HR response from 100 to 130 bpm with MAP throughout the study staying at 112 mmHg  Hgb 13 1  With exercise, there was an increase in PVR from <3 to ~4 5 MOLINA  PCWP increased from 12 to 18 mmHg  CVP increased from 7 to 10 mmHg  The findings were consistent with exercise induced Pulmonary Arterial Hypertension (WHO Group 1, NYHA II)   Her TTE and PCWP are not consistent with significant left sided heart disease (Group II)  Her PFTs did not show any significant lung disease (Group III), V/Q scan is not consistent with CTEPH (Group IV PAH) and stress echo showed elevation of PA pressures with exercise which is consistent with above findings  --Slow improvement on sildenafil and macitentan  --Volume overloaded today, but improving    Therapeutic:  --Continue torsemide 40 mg PO BID  Nausea w/ Kdur  Trial 20 mEq PO BID  Consider start omeprazole  --Check labs one weight ~ 200 lbs on home scale; CMP and Mag  --Daily weights qAM  --Continue sildenafil 20 mg PO q8hrs for now  --Continue macitentan 10 mg PO daily    # Undifferentiated CTD w/ + lupus anticoagulant (unclear if SLE is involved)  --Per outpatient Rheumatologist  # ST: V/Q negative  May be 2/2 to deconditioning +/- inappropriate ST +/- diastolic dysfunction/HF (euvolemic on exam)  No e/o infection  --Continue to monitor, can consider coby agents in the future  # Morbid obesity: Continue weight loss    RTC in 6 weeks    HPI: Very pleasant 51 y/o woman w/ PMHx of newly diagnosed MCTD/SLE, +lupus anti-coagulant, B12 deficiency, and obesity referred for evaluation of ANSARI  She states that she first started getting SOB -- 10 years ago  She has had several episodes of bacterial PNA and chronic cough (a few times/year)  She was referred to [facility] in 2012 for workup for PH  She had a a stress echo 8/2012 that showed that her PA pressures more than doubled from --24 mmHg to > 50 mmHg  Currently, she states she can walk up a couple flights of stairs but does get SOB  She also gets SOB with walking up inclines  She gets occasional LH  She has been found to be B12 deficient, but that is improving  She was seeing a Rheumatologist [place] but recently saw a specialist at [facility], Dr Uma Rodriges (Rheumatologist - 11/16)  She was diagnosed with MCTD and SLE  HELENE + 1:320 w/ speckled pattern   She has been on Plaquenil x 2 weeks now  She is on ASA for + lupus anticoagulant and anticardiolipin Abs  She has joint pains and a subtle malar rash  She states so far there has been no change with plaquenil  She has f/u with him in 2/2018  After her visit with Dr Trini Jauregui, she has had TTE which shows LVEF --65%, normal RV size and function without RVOT notching  Normal atria  CXR clear  She also has had a negative V/Q scan  She walked the patient in the hallway and had her go up and down stairs  Her resting HR went from --100 bpm to 110s with Pulse Ox starting from 98% @ rest to --90% with exercise  Pulse Ox showed a good waveform throughout  She denies CP, palpitations, presyncope, or syncope  She notes that she went on a two week cruise and afterwards developed LE edema in the past and has developed LE edema  Today, 1/9/2018, returns for f/u  Had echo stress test w/ poor exercise tolerance and decrease in O2 saturation w/ increase in PA pressures from 45 to 70 mmHg w/ exercise  Exercise RHC showed exercise induced PH w/ increase in PVR from <3 to ~4 5 MOLINA  PCWP increased from 12 to 18 mmHg  CVP increased from 7 to 10 mmHg  She was denied for tadalafil, but approved for sildenafil  She started sildenafil on 1/5/18 and states she does not feel any different and continues to have trouble with stairs  In addition, today in clinic, she continues to be tachycardic  Has gained 4 lbs since her last visit  Today, 2/13/2018, returns for f/u  She has gained ~ 4 lbs since her last visit, for a total of 9 lbs since attempted diuresis  She started sildenafil on 1/5/18 and states she feels worse going up stairs than previously  In addition, today in clinic, she continues to be tachycardic  Today, 3/19/2018, she returns for f/u  She states going up stairs there is improvement now  She has been on macitentan for 1 week now  Weight has steadily been decreasing  Today, 4/30/2018, she returns for f/u   She states she feels about the same as her last visit  Weight has steadily been decreasing  Today, 6/11/2018, she returns for f/u  She feels slightly better than her last visit  Weight continues to decrease  Walking up one flight is not a problem, two flights feels SOB  Past Medical History:   Diagnosis Date    HELENE positive     Encephalitis     Lasted Assessed 10/18/2017    Endometrial hyperplasia     Last assessed 10/18/2017    Hypertension     Lupus [facility] [address] [facility]     Lupus anticoagulant positive     Malignant neoplasm of cervix uteri ([facility] [address] [facility]     Maxillary sinusitis     Lasted Assessed 10/18/2017    Night sweat     Osteopenia     Hip    Osteoporosis     Spine    Pneumonia     Last Assessed 10/18/2017    Secondary pulmonary arterial hypertension (HCC)     Shortness of breath     Sinus tachycardia     Urinary incontinence        Review of Systems - 12 point ROS was done and is negative, except as noted above       Allergies   Allergen Reactions    Dilantin [Phenytoin] Anaphylaxis and Rash    Augmentin [Amoxicillin-Pot Clavulanate] Rash       Current Outpatient Prescriptions:     aspirin 81 mg chewable tablet, Chew 162 mg daily, Disp: , Rfl:     cholecalciferol (VITAMIN D3) 1,000 units tablet, Take 2,000 Units by mouth daily, Disp: , Rfl:     Cyanocobalamin (VITAMIN B-12 IJ), Inject 1,000 mcg/mL as directed every 14 (fourteen) days, Disp: , Rfl:     hydroxychloroquine (PLAQUENIL) 200 mg tablet, Take 400 mg by mouth daily, Disp: , Rfl:     Macitentan (OPSUMIT) 10 MG tablet, Take 1 tablet (10 mg total) by mouth daily, Disp: 30 tablet, Rfl: 10    potassium chloride (K-DUR,KLOR-CON) 20 mEq tablet, Take 2 tablets (40 mEq total) by mouth daily, Disp: 240 tablet, Rfl: 3    sildenafil (REVATIO) 20 mg tablet, Take 20 mg by mouth 3 (three) times a day, Disp: , Rfl:     torsemide (DEMADEX) 20 mg tablet, Take 2 tablets (40 mg total) by mouth daily (Patient taking differently: Take 80 mg by mouth daily  ), Disp: 240 tablet, Rfl: 3    colchicine (COLCRYS) 0 6 mg tablet, Take 1 tablet (0 6 mg total) by mouth 2 (two) times a day, Disp: 30 tablet, Rfl: 11    Current Facility-Administered Medications:     betamethasone acetate-betamethasone sodium phosphate (CELESTONE) injection 6 mg, 6 mg, Intra-articular, , [place] Nyhan, DO, 6 mg at 06/04/18 1659    bupivacaine (MARCAINE) 0 25 % injection 2 mL, 2 mL, Injection, , Lazara Nyhan, DO, 2 mL at 06/04/18 1659    lidocaine (XYLOCAINE) 1 % injection 1 mL, 1 mL, Injection, , [place] Nyhan, DO, 1 mL at 06/04/18 1659    Social History     Social History    Marital status: /Civil Union     Spouse name: N/A    Number of children: 2    Years of education: N/A     Occupational History    Not on file       Social History Main Topics    Smoking status: Former Smoker     Quit date: 1/24/2006    Smokeless tobacco: Former User    Alcohol use Yes      Comment: social    Drug use: No    Sexual activity: Not on file     Other Topics Concern    Not on file     Social History Narrative    Daily caffeinated coffee consumption    Exercise habits           Family History   Problem Relation Age of Onset    Uterine cancer Mother     Pancreatic cancer Mother     Diabetes Mother     Heart disease Father     Stroke Father      CVA    Hypertension Father     Thyroid disease Sister     Hemochromatosis Brother     Thyroid disease Maternal Grandmother     Diabetes Other     Other Family      Heart Problem       Physical Exam:    Vitals: Blood pressure 114/70, pulse 96, weight 94 3 kg (208 lb), SpO2 99 %, not currently breastfeeding , Body mass index is 38 03 kg/m² ,   Wt Readings from Last 3 Encounters:   06/11/18 94 3 kg (208 lb)   06/04/18 93 8 kg (206 lb 12 7 oz)   05/16/18 93 8 kg (206 lb 11 2 oz)     Vitals:    06/11/18 1633   BP: 114/70   BP Location: Right arm   Patient Position: Sitting   Cuff Size: Standard   Pulse: 96   SpO2: 99%   Weight: 94 3 kg (208 lb)       GEN: Reggie Gowers appears well, alert and oriented x 3, pleasant and cooperative   HEENT: pupils equal, round, and reactive to light; extraocular muscles intact  NECK: supple, no carotid bruits   HEART: regular rhythm, normal S1 and S2, no murmurs, clicks, gallops or rubs, JVD is flat; +HJR    LUNGS: clear to auscultation bilaterally; no wheezes, rales, or rhonchi   ABDOMEN: normal bowel sounds, soft, no tenderness, no distention  EXTREMITIES: peripheral pulses normal; no clubbing, cyanosis; 1+ B/L LE edema  NEURO: no focal findings   SKIN: normal without suspicious lesions on exposed skin    Labs & Results:  Lab Results   Component Value Date    WBC 5 40 05/12/2018    HGB 12 7 05/12/2018    HCT 37 7 05/12/2018    MCV 90 05/12/2018     05/12/2018       Chemistry        Component Value Date/Time     05/22/2018 1657    K 4 0 05/22/2018 1657     05/22/2018 1657    CO2 28 05/22/2018 1657    BUN 17 05/22/2018 1657    CREATININE 1 47 (H) 05/22/2018 1657        Component Value Date/Time    CALCIUM 9 8 05/22/2018 1657    ALKPHOS 108 11/18/2017 0844    AST 11 11/18/2017 0844    ALT 23 11/18/2017 0844    BILITOT 0 40 11/18/2017 0844        EKG personally reviewed by Pratibha Rios MD      Counseling / Coordination of Care  Total floor / unit time spent today 40 minutes  Greater than 50% of total time was spent with the patient and / or family counseling and / or coordination of care  A description of the counseling / coordination of care: 25 min  Thank you for the opportunity to participate in the care of this patient      Pratibha Rios MD, PhD   [facility]

## 2018-07-05 ENCOUNTER — TRANSCRIBE ORDERS (OUTPATIENT)
Dept: ADMINISTRATIVE | Facility: HOSPITAL | Age: 53
End: 2018-07-05

## 2018-07-05 DIAGNOSIS — K62.89 CHRONIC IDIOPATHIC ANAL PAIN: ICD-10-CM

## 2018-07-05 DIAGNOSIS — M25.561 ACUTE PAIN OF RIGHT KNEE: Primary | ICD-10-CM

## 2018-07-05 DIAGNOSIS — G89.29 CHRONIC IDIOPATHIC ANAL PAIN: ICD-10-CM

## 2018-07-10 ENCOUNTER — HOSPITAL ENCOUNTER (OUTPATIENT)
Dept: RADIOLOGY | Age: 53
Discharge: HOME | End: 2018-07-10
Payer: COMMERCIAL

## 2018-07-10 DIAGNOSIS — M25.561 ACUTE PAIN OF RIGHT KNEE: ICD-10-CM

## 2018-07-10 DIAGNOSIS — G89.29 CHRONIC IDIOPATHIC ANAL PAIN: ICD-10-CM

## 2018-07-10 DIAGNOSIS — K62.89 CHRONIC IDIOPATHIC ANAL PAIN: ICD-10-CM

## 2018-07-10 PROCEDURE — 73721 MRI JNT OF LWR EXTRE W/O DYE: CPT

## 2018-07-12 ENCOUNTER — OFFICE VISIT (OUTPATIENT)
Dept: CARDIOLOGY CLINIC | Facility: CLINIC | Age: 53
End: 2018-07-12
Payer: COMMERCIAL

## 2018-07-12 VITALS
DIASTOLIC BLOOD PRESSURE: 68 MMHG | SYSTOLIC BLOOD PRESSURE: 122 MMHG | OXYGEN SATURATION: 94 % | HEART RATE: 100 BPM | HEIGHT: 62 IN | WEIGHT: 207.6 LBS | BODY MASS INDEX: 38.2 KG/M2

## 2018-07-12 DIAGNOSIS — I27.20 PULMONARY HYPERTENSION (HCC): Primary | ICD-10-CM

## 2018-07-12 PROCEDURE — 99215 OFFICE O/P EST HI 40 MIN: CPT | Performed by: INTERNAL MEDICINE

## 2018-07-12 NOTE — LETTER
July 12, 2018     Mahesh Larson [address]    Patient: Hugo Hurtado   YOB: 1965   Date of Visit: 7/12/2018       Dear Dr Alfred Rucker:    Thank you for referring Oli Ask to me for evaluation  Below are my notes for this consultation  If you have questions, please do not hesitate to call me  I look forward to following your patient along with you  Sincerely,        Piedad Rosa MD        CC: MD Piedad Gutierrez MD  7/12/2018  4:52 PM  Sign at close encounter  Heart Failure Outpatient Progress Note - Hugo Hurtado 52 y o  female MRN: [id number]    @ Encounter: [id number]  Assessment/Plan:    # ANSARI: Likely combination of obesity/deconditioning, diastolic dysfunction (obesity, female, currently on diuretics w/ hx of volume overload), and exercise induced PAH  --Continue diet and exercise for weight loss    # Exercised induced PAH: Obesity/deconditioning and diastolic dysfunction (PCWP 12 mmHg) do not fully explain her degree of dysfunction  Recent diagnosis of MCTD/SLE w/ + lupus anticoagulant  Remote smoker  She is at risk to develop pulmonary vascular disease leading to RV dysfunction  At rest, RV appears normal on TTE with coupled RV-PA w/o e/o of RVOT notching suggestive of normal PVR at rest  However, she did have a stress echo in 2012 that was suggestive of exercise induced pulmonary HTN  At the time she did not have e/o of elevated PVR  Her bubble was negative which makes an intracardiac shunt less likely  Her symptoms have progressed and with ambulation she shows evidence of desaturation that may be due to abnormal air exchange we did the following:   Diag:  --PFTs were normal   --Repeat stress echo to evaluate pulmonary pressures, RV, and RVOT signal w/ exercise showed exaggerated HR response, HTNsive response, decrease in O2 saturation from 99% to 91% and increase in PA pressures from 45 mmHg to 70 mmHg with exercise   Exercised for 5 min and 20 sec  --RHC w/ exercise: This was an exercise RHC with simultaneous monitoring of the distal and proximal SG catheter ports  She had an appropriate HR response from 100 to 130 bpm with MAP throughout the study staying at 112 mmHg  Hgb 13 1  With exercise, there was an increase in PVR from <3 to ~4 5 MOLINA  PCWP increased from 12 to 18 mmHg  CVP increased from 7 to 10 mmHg  The findings were consistent with exercise induced Pulmonary Arterial Hypertension (WHO Group 1, NYHA II)  Her TTE and PCWP are not consistent with significant left sided heart disease (Group II)  Her PFTs did not show any significant lung disease (Group III), V/Q scan is not consistent with CTEPH (Group IV PAH) and stress echo showed elevation of PA pressures with exercise which is consistent with above findings  --Slow improvement on sildenafil and macitentan  --Mild overload to euvolemic; improving     Therapeutic:  --Continue torsemide 40 mg PO BID  Continue Kdur 20 mEq PO BID    --Daily weights qAM  --Continue sildenafil 20 mg PO q8hrs for now  --Continue macitentan 10 mg PO daily    # Undifferentiated CTD w/ + lupus anticoagulant (unclear if SLE is involved)  --Per outpatient Rheumatologist  # ST: V/Q negative  May be 2/2 to deconditioning +/- inappropriate ST +/- diastolic dysfunction/HF (euvolemic on exam)  No e/o infection  --Continue to monitor, can consider coby agents in the future  # Morbid obesity: Continue weight loss    RTC in 6 weeks    HPI: Very pleasant 51 y/o woman w/ PMHx of newly diagnosed MCTD/SLE, +lupus anti-coagulant, B12 deficiency, and obesity referred for evaluation of ANSARI  She states that she first started getting SOB -- 10 years ago  She has had several episodes of bacterial PNA and chronic cough (a few times/year)  She was referred to [facility] in 2012 for workup for PH  She had a a stress echo 8/2012 that showed that her PA pressures more than doubled from --24 mmHg to > 50 mmHg   Currently, she states she can walk up a couple flights of stairs but does get SOB  She also gets SOB with walking up inclines  She gets occasional LH  She has been found to be B12 deficient, but that is improving  She was seeing a Rheumatologist in [place] but recently saw a specialist at [facility], Dr Alyson Leonardo (Rheumatologist - 11/16)  She was diagnosed with MCTD and SLE  HELENE + 1:320 w/ speckled pattern  She has been on Plaquenil x 2 weeks now  She is on ASA for + lupus anticoagulant and anticardiolipin Abs  She has joint pains and a subtle malar rash  She states so far there has been no change with plaquenil  She has f/u with him in 2/2018  After her visit with Dr My Haynes, she has had TTE which shows LVEF --65%, normal RV size and function without RVOT notching  Normal atria  CXR clear  She also has had a negative V/Q scan  She walked the patient in the hallway and had her go up and down stairs  Her resting HR went from --100 bpm to 110s with Pulse Ox starting from 98% @ rest to --90% with exercise  Pulse Ox showed a good waveform throughout  She denies CP, palpitations, presyncope, or syncope  She notes that she went on a two week cruise and afterwards developed LE edema in the past and has developed LE edema  Today, 1/9/2018, returns for f/u  Had echo stress test w/ poor exercise tolerance and decrease in O2 saturation w/ increase in PA pressures from 45 to 70 mmHg w/ exercise  Exercise RHC showed exercise induced PH w/ increase in PVR from <3 to ~4 5 MOLINA  PCWP increased from 12 to 18 mmHg  CVP increased from 7 to 10 mmHg  She was denied for tadalafil, but approved for sildenafil  She started sildenafil on 1/5/18 and states she does not feel any different and continues to have trouble with stairs  In addition, today in clinic, she continues to be tachycardic  Has gained 4 lbs since her last visit  Today, 2/13/2018, returns for f/u   She has gained ~ 4 lbs since her last visit, for a total of 9 lbs since attempted diuresis  She started sildenafil on 1/5/18 and states she feels worse going up stairs than previously  In addition, today in clinic, she continues to be tachycardic  Today, 3/19/2018, she returns for f/u  She states going up stairs there is improvement now  She has been on macitentan for 1 week now  Weight has steadily been decreasing  Today, 4/30/2018, she returns for f/u  She states she feels about the same as her last visit  Weight has steadily been decreasing  Today, 6/11/2018, she returns for f/u  She feels slightly better than her last visit  Weight continues to decrease  Walking up one flight is not a problem, two flights feels SOB  Today, 7/12/2018, she returns for f/u  Her weight is ~206 lbs on home scale  She has been out and walking around with her puppy about 15-20 minutes every night  She feels a little winded on the top of the hills, but not having to stop  Past Medical History:   Diagnosis Date    HELENE positive     Encephalitis     Lasted Assessed 10/18/2017    Endometrial hyperplasia     Last assessed 10/18/2017    Hypertension     [facility]upus [facility] disorder [facility] [address]     Lupus anticoagulant positive     Malignant neoplasm of cervix uteri ([facility] [address]     Maxillary sinusitis     Lasted Assessed 10/18/2017    Night sweat     Osteopenia     Hip    Osteoporosis     Spine    Pneumonia     Last Assessed 10/18/2017    Secondary pulmonary arterial hypertension (HCC)     Shortness of breath     Sinus tachycardia     Urinary incontinence        Review of Systems - 12 point ROS was done and is negative, except as noted above       Allergies   Allergen Reactions    Dilantin [Phenytoin] Anaphylaxis and Rash    Augmentin [Amoxicillin-Pot Clavulanate] Rash    Penicillins Rash       Current Outpatient Prescriptions:     aspirin (ECOTRIN LOW STRENGTH) 81 mg EC tablet, Take 162 mg by mouth daily, Disp: , Rfl:     cholecalciferol (VITAMIN D3) 1,000 units tablet, Take 2,000 Units by mouth daily, Disp: , Rfl:     Cyanocobalamin (VITAMIN B-12 IJ), Inject 1,000 mcg/mL as directed every 14 (fourteen) days, Disp: , Rfl:     hydroxychloroquine (PLAQUENIL) 200 mg tablet, Take 400 mg by mouth daily, Disp: , Rfl:     Macitentan (OPSUMIT) 10 MG tablet, Take 1 tablet (10 mg total) by mouth daily, Disp: 30 tablet, Rfl: 10    potassium chloride (K-DUR,KLOR-CON) 20 mEq tablet, Take 2 tablets (40 mEq total) by mouth daily, Disp: 240 tablet, Rfl: 3    sildenafil (REVATIO) 20 mg tablet, Take 20 mg by mouth 3 (three) times a day, Disp: , Rfl:     torsemide (DEMADEX) 20 mg tablet, Take 2 tablets (40 mg total) by mouth daily (Patient taking differently: Take 80 mg by mouth daily  ), Disp: 240 tablet, Rfl: 3    Current Facility-Administered Medications:     betamethasone acetate-betamethasone sodium phosphate (CELESTONE) injection 6 mg, 6 mg, Intra-articular, , Ray Pump, DO, 6 mg at 06/04/18 1659    bupivacaine (MARCAINE) 0 25 % injection 2 mL, 2 mL, Injection, , Ray Pump, DO, 2 mL at 06/04/18 1659    lidocaine (XYLOCAINE) 1 % injection 1 mL, 1 mL, Injection, , Ray Pump, DO, 1 mL at 06/04/18 1659    Social History     Social History    Marital status: /Civil Union     Spouse name: N/A    Number of children: 2    Years of education: N/A     Occupational History    Not on file       Social History Main Topics    Smoking status: Former Smoker     Quit date: 1/24/2006    Smokeless tobacco: Never Used    Alcohol use Yes      Comment: social    Drug use: No    Sexual activity: Not on file     Other Topics Concern    Not on file     Social History Narrative    Daily caffeinated coffee consumption    Exercise habits           Family History   Problem Relation Age of Onset    Uterine cancer Mother     Pancreatic cancer Mother     Diabetes Mother     Heart disease Father         open heart surgery at age 48     Stroke Father         CVA    Hypertension Father     Atrial fibrillation Father     Hyperlipidemia Father     Thyroid disease Sister     Hemochromatosis Brother     Thyroid disease Maternal Grandmother     Diabetes Other     Other Family         Heart Problem    Anuerysm Neg Hx     Clotting disorder Neg Hx     Heart failure Neg Hx        Physical Exam:    Vitals: Blood pressure 122/68, pulse 100, height 5' 2" (1 575 m), weight 94 2 kg (207 lb 9 6 oz), SpO2 94 %, not currently breastfeeding , Body mass index is 37 97 kg/m² ,   Wt Readings from Last 3 Encounters:   07/12/18 94 2 kg (207 lb 9 6 oz)   07/10/18 93 9 kg (207 lb)   06/11/18 94 3 kg (208 lb)     Vitals:    07/12/18 1634   BP: 122/68   BP Location: Left arm   Patient Position: Sitting   Cuff Size: Large   Pulse: 100   SpO2: 94%   Weight: 94 2 kg (207 lb 9 6 oz)   Height: 5' 2" (1 575 m)       GEN: Luis Angel Miller appears well, alert and oriented x 3, pleasant and cooperative   HEENT: pupils equal, round, and reactive to light; extraocular muscles intact  NECK: supple, no carotid bruits   HEART: regular rhythm, normal S1 and S2, no murmurs, clicks, gallops or rubs, JVD is flat    LUNGS: clear to auscultation bilaterally; no wheezes, rales, or rhonchi   ABDOMEN: normal bowel sounds, soft, no tenderness, no distention  EXTREMITIES: peripheral pulses normal; no clubbing, cyanosis, or edema  NEURO: no focal findings   SKIN: normal without suspicious lesions on exposed skin    Labs & Results:  Lab Results   Component Value Date    WBC 5 40 05/12/2018    HGB 12 7 05/12/2018    HCT 37 7 05/12/2018    MCV 90 05/12/2018     05/12/2018       Chemistry        Component Value Date/Time     05/22/2018 1657    K 4 0 05/22/2018 1657     05/22/2018 1657    CO2 28 05/22/2018 1657    BUN 17 05/22/2018 1657    CREATININE 1 47 (H) 05/22/2018 1657        Component Value Date/Time    CALCIUM 9 8 05/22/2018 1657    ALKPHOS 108 11/18/2017 0844    AST 11 11/18/2017 0844    ALT 23 11/18/2017 0844    BILITOT 0 40 11/18/2017 0844        EKG personally reviewed by John Sigala MD      Counseling / Coordination of Care  Total floor / unit time spent today 40 minutes  Greater than 50% of total time was spent with the patient and / or family counseling and / or coordination of care  A description of the counseling / coordination of care: 25 min  Thank you for the opportunity to participate in the care of this patient      John Sigala MD, PhD   [place]

## 2018-07-12 NOTE — PROGRESS NOTES
Heart Failure Outpatient Progress Note - Reggie Gowers 52 y o  female MRN: [id number]    @ Encounter: [id number]  Assessment/Plan:    # ANSARI: Likely combination of obesity/deconditioning, diastolic dysfunction (obesity, female, currently on diuretics w/ hx of volume overload), and exercise induced PAH  --Continue diet and exercise for weight loss    # Exercised induced PAH: Obesity/deconditioning and diastolic dysfunction (PCWP 12 mmHg) do not fully explain her degree of dysfunction  Recent diagnosis of MCTD/SLE w/ + lupus anticoagulant  Remote smoker  She is at risk to develop pulmonary vascular disease leading to RV dysfunction  At rest, RV appears normal on TTE with coupled RV-PA w/o e/o of RVOT notching suggestive of normal PVR at rest  However, she did have a stress echo in 2012 that was suggestive of exercise induced pulmonary HTN  At the time she did not have e/o of elevated PVR  Her bubble was negative which makes an intracardiac shunt less likely  Her symptoms have progressed and with ambulation she shows evidence of desaturation that may be due to abnormal air exchange we did the following:   Diag:  --PFTs were normal   --Repeat stress echo to evaluate pulmonary pressures, RV, and RVOT signal w/ exercise showed exaggerated HR response, HTNsive response, decrease in O2 saturation from 99% to 91% and increase in PA pressures from 45 mmHg to 70 mmHg with exercise  Exercised for 5 min and 20 sec  --RHC w/ exercise: This was an exercise RHC with simultaneous monitoring of the distal and proximal SG catheter ports  She had an appropriate HR response from 100 to 130 bpm with MAP throughout the study staying at 112 mmHg  Hgb 13 1  With exercise, there was an increase in PVR from <3 to ~4 5 MOLINA  PCWP increased from 12 to 18 mmHg  CVP increased from 7 to 10 mmHg  The findings were consistent with exercise induced Pulmonary Arterial Hypertension (WHO Group 1, NYHA II)   Her TTE and PCWP are not consistent with significant left sided heart disease (Group II)  Her PFTs did not show any significant lung disease (Group III), V/Q scan is not consistent with CTEPH (Group IV PAH) and stress echo showed elevation of PA pressures with exercise which is consistent with above findings  --Slow improvement on sildenafil and macitentan  --Mild overload to euvolemic; improving     Therapeutic:  --Continue torsemide 40 mg PO BID  Continue Kdur 20 mEq PO BID    --Daily weights qAM  --Continue sildenafil 20 mg PO q8hrs for now  --Continue macitentan 10 mg PO daily    # Undifferentiated CTD w/ + lupus anticoagulant (unclear if SLE is involved)  --Per outpatient Rheumatologist  # ST: V/Q negative  May be 2/2 to deconditioning +/- inappropriate ST +/- diastolic dysfunction/HF (euvolemic on exam)  No e/o infection  --Continue to monitor, can consider coby agents in the future  # Morbid obesity: Continue weight loss    RTC in 6 weeks    HPI: Very pleasant 53 y/o woman w/ PMHx of newly diagnosed MCTD/SLE, +lupus anti-coagulant, B12 deficiency, and obesity referred for evaluation of ANSARI  She states that she first started getting SOB -- 10 years ago  She has had several episodes of bacterial PNA and chronic cough (a few times/year)  She was referred to [facility] in 2012 for workup for PH  She had a a stress echo 8/2012 that showed that her PA pressures more than doubled from --24 mmHg to > 50 mmHg  Currently, she states she can walk up a couple flights of stairs but does get SOB  She also gets SOB with walking up inclines  She gets occasional LH  She has been found to be B12 deficient, but that is improving  She was seeing a Rheumatologist in [place] but recently saw a specialist at [facility], Dr Catracho Paez (Rheumatologist - 11/16)  She was diagnosed with MCTD and SLE  HELENE + 1:320 w/ speckled pattern  She has been on Plaquenil x 2 weeks now  She is on ASA for + lupus anticoagulant and anticardiolipin Abs   She has joint pains and a subtle malar rash  She states so far there has been no change with plaquenil  She has f/u with him in 2/2018  After her visit with Dr Gael Holter, she has had TTE which shows LVEF --65%, normal RV size and function without RVOT notching  Normal atria  CXR clear  She also has had a negative V/Q scan  She walked the patient in the hallway and had her go up and down stairs  Her resting HR went from --100 bpm to 110s with Pulse Ox starting from 98% @ rest to --90% with exercise  Pulse Ox showed a good waveform throughout  She denies CP, palpitations, presyncope, or syncope  She notes that she went on a two week cruise and afterwards developed LE edema in the past and has developed LE edema  Today, 1/9/2018, returns for f/u  Had echo stress test w/ poor exercise tolerance and decrease in O2 saturation w/ increase in PA pressures from 45 to 70 mmHg w/ exercise  Exercise RHC showed exercise induced PH w/ increase in PVR from <3 to ~4 5 MOLINA  PCWP increased from 12 to 18 mmHg  CVP increased from 7 to 10 mmHg  She was denied for tadalafil, but approved for sildenafil  She started sildenafil on 1/5/18 and states she does not feel any different and continues to have trouble with stairs  In addition, today in clinic, she continues to be tachycardic  Has gained 4 lbs since her last visit  Today, 2/13/2018, returns for f/u  She has gained ~ 4 lbs since her last visit, for a total of 9 lbs since attempted diuresis  She started sildenafil on 1/5/18 and states she feels worse going up stairs than previously  In addition, today in clinic, she continues to be tachycardic  Today, 3/19/2018, she returns for f/u  She states going up stairs there is improvement now  She has been on macitentan for 1 week now  Weight has steadily been decreasing  Today, 4/30/2018, she returns for f/u  She states she feels about the same as her last visit  Weight has steadily been decreasing  Today, 6/11/2018, she returns for f/u   She feels slightly better than her last visit  Weight continues to decrease  Walking up one flight is not a problem, two flights feels SOB  Today, 7/12/2018, she returns for f/u  Her weight is ~206 lbs on home scale  She has been out and walking around with her puppy about 15-20 minutes every night  She feels a little winded [place], but not having to stop  Past Medical History:   Diagnosis Date    HELENE positive     Encephalitis     Lasted Assessed 10/18/2017    Endometrial hyperplasia     Last assessed 10/18/2017    Hypertension     [facility]upus [facility] disorder [facility] [address]     Lupus anticoagulant positive     Malignant neoplasm of cervix uteri ([facility] [address]     Maxillary sinusitis     Lasted Assessed 10/18/2017    Night sweat     Osteopenia     Hip    Osteoporosis     Spine    Pneumonia     Last Assessed 10/18/2017    Secondary pulmonary arterial hypertension (HCC)     Shortness of breath     Sinus tachycardia     Urinary incontinence        Review of Systems - 12 point ROS was done and is negative, except as noted above       Allergies   Allergen Reactions    Dilantin [Phenytoin] Anaphylaxis and Rash    Augmentin [Amoxicillin-Pot Clavulanate] Rash    Penicillins Rash       Current Outpatient Prescriptions:     aspirin (ECOTRIN LOW STRENGTH) 81 mg EC tablet, Take 162 mg by mouth daily, Disp: , Rfl:     cholecalciferol (VITAMIN D3) 1,000 units tablet, Take 2,000 Units by mouth daily, Disp: , Rfl:     Cyanocobalamin (VITAMIN B-12 IJ), Inject 1,000 mcg/mL as directed every 14 (fourteen) days, Disp: , Rfl:     hydroxychloroquine (PLAQUENIL) 200 mg tablet, Take 400 mg by mouth daily, Disp: , Rfl:     Macitentan (OPSUMIT) 10 MG tablet, Take 1 tablet (10 mg total) by mouth daily, Disp: 30 tablet, Rfl: 10    potassium chloride (K-DUR,KLOR-CON) 20 mEq tablet, Take 2 tablets (40 mEq total) by mouth daily, Disp: 240 tablet, Rfl: 3    sildenafil (REVATIO) 20 mg tablet, Take 20 mg by mouth 3 (three) times a day, Disp: , Rfl:     torsemide (DEMADEX) 20 mg tablet, Take 2 tablets (40 mg total) by mouth daily (Patient taking differently: Take 80 mg by mouth daily  ), Disp: 240 tablet, Rfl: 3    Current Facility-Administered Medications:     betamethasone acetate-betamethasone sodium phosphate (CELESTONE) injection 6 mg, 6 mg, Intra-articular, , Shellia Fritter, DO, 6 mg at 06/04/18 1659    bupivacaine (MARCAINE) 0 25 % injection 2 mL, 2 mL, Injection, , Shellia Fritter, DO, 2 mL at 06/04/18 1659    lidocaine (XYLOCAINE) 1 % injection 1 mL, 1 mL, Injection, , Shellia Fritter, DO, 1 mL at 06/04/18 1659    Social History     Social History    Marital status: /Civil Union     Spouse name: N/A    Number of children: 2    Years of education: N/A     Occupational History    Not on file       Social History Main Topics    Smoking status: Former Smoker     Quit date: 1/24/2006    Smokeless tobacco: Never Used    Alcohol use Yes      Comment: social    Drug use: No    Sexual activity: Not on file     Other Topics Concern    Not on file     Social History Narrative    Daily caffeinated coffee consumption    Exercise habits           Family History   Problem Relation Age of Onset    Uterine cancer Mother     Pancreatic cancer Mother     Diabetes Mother     Heart disease Father         open heart surgery at age 48     Stroke Father         CVA    Hypertension Father     Atrial fibrillation Father     Hyperlipidemia Father     Thyroid disease Sister     Hemochromatosis Brother     Thyroid disease Maternal Grandmother     Diabetes Other     Other Family         Heart Problem    Anuerysm Neg Hx     Clotting disorder Neg Hx     Heart failure Neg Hx        Physical Exam:    Vitals: Blood pressure 122/68, pulse 100, height 5' 2" (1 575 m), weight 94 2 kg (207 lb 9 6 oz), SpO2 94 %, not currently breastfeeding , Body mass index is 37 97 kg/m² ,   Wt Readings from Last 3 Encounters:   07/12/18 94 2 kg (207 lb 9 6 oz)   07/10/18 93 9 kg (207 lb)   06/11/18 94 3 kg (208 lb)     Vitals:    07/12/18 1634   BP: 122/68   BP Location: Left arm   Patient Position: Sitting   Cuff Size: Large   Pulse: 100   SpO2: 94%   Weight: 94 2 kg (207 lb 9 6 oz)   Height: 5' 2" (1 575 m)       GEN: Lawson Franklin appears well, alert and oriented x 3, pleasant and cooperative   HEENT: pupils equal, round, and reactive to light; extraocular muscles intact  NECK: supple, no carotid bruits   HEART: regular rhythm, normal S1 and S2, no murmurs, clicks, gallops or rubs, JVD is flat    LUNGS: clear to auscultation bilaterally; no wheezes, rales, or rhonchi   ABDOMEN: normal bowel sounds, soft, no tenderness, no distention  EXTREMITIES: peripheral pulses normal; no clubbing, cyanosis, or edema  NEURO: no focal findings   SKIN: normal without suspicious lesions on exposed skin    Labs & Results:  Lab Results   Component Value Date    WBC 5 40 05/12/2018    HGB 12 7 05/12/2018    HCT 37 7 05/12/2018    MCV 90 05/12/2018     05/12/2018       Chemistry        Component Value Date/Time     05/22/2018 1657    K 4 0 05/22/2018 1657     05/22/2018 1657    CO2 28 05/22/2018 1657    BUN 17 05/22/2018 1657    CREATININE 1 47 (H) 05/22/2018 1657        Component Value Date/Time    CALCIUM 9 8 05/22/2018 1657    ALKPHOS 108 11/18/2017 0844    AST 11 11/18/2017 0844    ALT 23 11/18/2017 0844    BILITOT 0 40 11/18/2017 0844        EKG personally reviewed by Rustam May MD      Counseling / Coordination of Care  Total floor / unit time spent today 40 minutes  Greater than 50% of total time was spent with the patient and / or family counseling and / or coordination of care  A description of the counseling / coordination of care: 25 min  Thank you for the opportunity to participate in the care of this patient      Rustam May MD, PhD   Roxie Styles

## 2018-07-16 ENCOUNTER — OFFICE VISIT (OUTPATIENT)
Dept: OBGYN CLINIC | Facility: CLINIC | Age: 53
End: 2018-07-16
Payer: COMMERCIAL

## 2018-07-16 VITALS
DIASTOLIC BLOOD PRESSURE: 80 MMHG | HEIGHT: 62 IN | BODY MASS INDEX: 37.91 KG/M2 | HEART RATE: 97 BPM | WEIGHT: 206 LBS | SYSTOLIC BLOOD PRESSURE: 117 MMHG

## 2018-07-16 DIAGNOSIS — M17.11 ARTHRITIS OF RIGHT KNEE: ICD-10-CM

## 2018-07-16 DIAGNOSIS — S83.241A OTHER TEAR OF MEDIAL MENISCUS, CURRENT INJURY, RIGHT KNEE, INITIAL ENCOUNTER: ICD-10-CM

## 2018-07-16 DIAGNOSIS — M70.50 PES ANSERINE BURSITIS: Primary | ICD-10-CM

## 2018-07-16 PROCEDURE — 99213 OFFICE O/P EST LOW 20 MIN: CPT | Performed by: ORTHOPAEDIC SURGERY

## 2018-07-16 NOTE — PROGRESS NOTES
Assessment:  1  Pes anserine bursitis     2  Arthritis of right knee     3  Other tear of medial meniscus, current injury, right knee, initial encounter       Patient Active Problem List   Diagnosis    Encounter for gynecological examination (general) (routine) without abnormal findings    B12 deficiency    Chronic cough    Chronic rhinitis    Diffuse connective tissue disease ([address] )    Dyspnea    Edema    Essential hypertension    Hot flashes due to menopause    Long-term use of hydroxychloroquine    Lupus    Lupus anticoagulant positive    Tachycardia    SOB (shortness of breath) on exertion    SOB (shortness of breath)    Sinus tachycardia    Secondary pulmonary hypertension    Pulmonary hypertension (HCC)    Post-nasal drip    Pes anserine bursitis    Positive HELENE (antinuclear antibody)    Other chronic pulmonary heart diseases    Osteoporosis    Night sweats    Patellofemoral disorder of right knee    Pes anserinus bursitis of right knee    Arthritis of right knee           Plan      Injection given into the right knee  Attempted aspiration no fluid was able to be collected  From the suprapatellar pouch approach  She will follow back up with us in 2 weeks to see how the injection worked if it worked and the pain came back we can always do an arthroscopic partial medial meniscectomy  If she still has relief when she comes back I would hold off on arthroscopy  If she comes back in had no relief whatsoever I would hold off on meniscal surgery and try to treat the pes bursa with may be repeat injection or topical anti-inflammatories or even steroids  Subjective:     Patient ID:    Chief Complaint:Na Joseph 52 y o  female      HPI    Patient comes in today with regards to her right knee  She had been given injection of the pes bursa had a couple days of relief and then the pain came back    She saw her rheumatologist or who ordered an MRI back today to see how her response was for the injection but also to review the MRI with us  The following portions of the patient's history were reviewed and updated as appropriate: allergies, current medications, past family history, past social history, past surgical history and problem list     All organ systems normal    Social History     Social History    Marital status: /Civil Union     Spouse name: N/A    Number of children: 2    Years of education: N/A     Occupational History    Not on file       Social History Main Topics    Smoking status: Former Smoker     Quit date: 1/24/2006    Smokeless tobacco: Never Used    Alcohol use Yes      Comment: social    Drug use: No    Sexual activity: Not on file     Other Topics Concern    Not on file     Social History Narrative    Daily caffeinated coffee consumption    Exercise habits         Past Medical History:   Diagnosis Date    HELENE positive     Encephalitis     Lasted Assessed 10/18/2017    Endometrial hyperplasia     Last assessed 10/18/2017    Hypertension     Lupus anticoagulant disorder (HCC)     Lupus anticoagulant positive     Malignant neoplasm of cervix uteri ([address] )     Maxillary sinusitis     Lasted Assessed 10/18/2017    Night sweat     Osteopenia     Hip    Osteoporosis     Spine    Pneumonia     Last Assessed 10/18/2017    Secondary pulmonary arterial hypertension (HCC)     Shortness of breath     Sinus tachycardia     Urinary incontinence      Past Surgical History:   Procedure Laterality Date    ANKLE FRACTURE SURGERY      ARTHRODESIS      Hand: IP Joint    COLPOSCOPY      GALLBLADDER SURGERY      HYSTERECTOMY      Vaginal    LAPAROSCOPIC ESOPHAGOGASTRIC FUNDOPLASTY  2008    REDUCTION MAMMAPLASTY      TONSILLECTOMY       Allergies   Allergen Reactions    Dilantin [Phenytoin] Anaphylaxis and Rash    Augmentin [Amoxicillin-Pot Clavulanate] Rash    Penicillins Rash     Current Outpatient Prescriptions on File Prior to Visit Medication Sig Dispense Refill    aspirin (ECOTRIN LOW STRENGTH) 81 mg EC tablet Take 162 mg by mouth daily      cholecalciferol (VITAMIN D3) 1,000 units tablet Take 2,000 Units by mouth daily      Cyanocobalamin (VITAMIN B-12 IJ) Inject 1,000 mcg/mL as directed every 14 (fourteen) days      hydroxychloroquine (PLAQUENIL) 200 mg tablet Take 400 mg by mouth daily      Macitentan (OPSUMIT) 10 MG tablet Take 1 tablet (10 mg total) by mouth daily 30 tablet 10    potassium chloride (K-DUR,KLOR-CON) 20 mEq tablet Take 2 tablets (40 mEq total) by mouth daily 240 tablet 3    sildenafil (REVATIO) 20 mg tablet Take 20 mg by mouth 3 (three) times a day      torsemide (DEMADEX) 20 mg tablet Take 2 tablets (40 mg total) by mouth daily (Patient taking differently: Take 80 mg by mouth daily  ) 240 tablet 3     Current Facility-Administered Medications on File Prior to Visit   Medication Dose Route Frequency Provider Last Rate Last Dose    betamethasone acetate-betamethasone sodium phosphate (CELESTONE) injection 6 mg  6 mg Intra-articular  Shellia Fritter, DO   6 mg at 06/04/18 1659    bupivacaine (MARCAINE) 0 25 % injection 2 mL  2 mL Injection  Shellia Fritter, DO   2 mL at 06/04/18 1659    lidocaine (XYLOCAINE) 1 % injection 1 mL  1 mL Injection  Shellia Fritter, DO   1 mL at 06/04/18 1659              Objective:        Ortho Exam  Patient is still pinpoint tender over the pes bursa  She does have some tenderness along the joint line as well  Attempted aspiration of what I thought was effusion but unable to aspirate any fluid from the knee joint  No erythema minimal warmth with palpation  I have personally reviewed pertinent films in PACS and my interpretation is MRI shows arthritis medial femoral condyle with some bone edema as well posterior aspect of medial femoral condyle  There is also arthritis medial patella facet  There is a small meniscus tear as well       Portions of the record may have been created with voice recognition software   Occasional wrong word or "sound a like" substitutions may have occurred due to the inherent limitations of voice recognition software   Read the chart carefully and recognize, using context, where substitutions have occurred

## 2018-07-30 ENCOUNTER — OFFICE VISIT (OUTPATIENT)
Dept: OBGYN CLINIC | Facility: CLINIC | Age: 53
End: 2018-07-30
Payer: COMMERCIAL

## 2018-07-30 VITALS
HEIGHT: 62 IN | WEIGHT: 206 LBS | BODY MASS INDEX: 37.91 KG/M2 | SYSTOLIC BLOOD PRESSURE: 138 MMHG | DIASTOLIC BLOOD PRESSURE: 84 MMHG | HEART RATE: 102 BPM

## 2018-07-30 DIAGNOSIS — S83.241D OTHER TEAR OF MEDIAL MENISCUS, CURRENT INJURY, RIGHT KNEE, SUBSEQUENT ENCOUNTER: Primary | ICD-10-CM

## 2018-07-30 DIAGNOSIS — M70.51 PES ANSERINUS BURSITIS OF RIGHT KNEE: ICD-10-CM

## 2018-07-30 DIAGNOSIS — M22.2X1 PATELLOFEMORAL DISORDER OF RIGHT KNEE: ICD-10-CM

## 2018-07-30 DIAGNOSIS — M17.11 ARTHRITIS OF RIGHT KNEE: ICD-10-CM

## 2018-07-30 PROCEDURE — 99214 OFFICE O/P EST MOD 30 MIN: CPT | Performed by: ORTHOPAEDIC SURGERY

## 2018-07-30 NOTE — PROGRESS NOTES
Assessment/Plan:  1  Other tear of medial meniscus, current injury, right knee, subsequent encounter     2  Pes anserinus bursitis of right knee     3  Arthritis of right knee     4  Patellofemoral disorder of right knee       Patient Active Problem List   Diagnosis    Encounter for gynecological examination (general) (routine) without abnormal findings    B12 deficiency    Chronic cough    Chronic rhinitis    Diffuse connective tissue disease ([address] )    Dyspnea    Edema    Essential hypertension    Hot flashes due to menopause    Long-term use of hydroxychloroquine    Lupus    Lupus anticoagulant positive    Tachycardia    SOB (shortness of breath) on exertion    SOB (shortness of breath)    Sinus tachycardia    Secondary pulmonary hypertension    Pulmonary hypertension (HCC)    Post-nasal drip    Pes anserine bursitis    Positive HELENE (antinuclear antibody)    Other chronic pulmonary heart diseases    Osteoporosis    Night sweats    Patellofemoral disorder of right knee    Pes anserinus bursitis of right knee    Arthritis of right knee    Other tear of medial meniscus, current injury, right knee, initial encounter       Pt does have a medial meniscus tear,   And since she did receive few days of relief from the intra-articular injection of the right knee with corticosteroid, this indicates that the primary source of her pain is the medial meniscus  The patient was offered but declined a right pes anserine corticosteroid injection today  Discussed benefits and risks of  Knee arthroscopy, including risks and benefits of general versus local anesthesia  At this time, patient does have significant pain however would like to think about surgery and will contact the office for follow-up if she is interested in scheduling in consenting for right knee arthroscopy  The assessment and plan were formulated by Dr Akash Lowery and I assisted in carrying it out      Subjective:   Patient ID: Srini Norman Flora Redd is a 52 y o  female   HPI    52year old female presents for follow up of right medial meniscus tear, right knee OA,and right pes anserine bursitis  Pt did receive a right knee CSI at her last visit, she reports only 2-3 day relief of her knee pain  She reports the pain now is same as before, worse medial aspect of the knee near the medial joint line as well as over pes anserine bursa  She reports pain or pes anserine was not real change by the intra-articular knee injection  Her pain is still worse with weight-bearing activity, better with rest   She reports she cannot trial NSAIDs due to renal issues related to diuretics  She denies any re-injury of the right knee  The following portions of the patient's history were reviewed and updated as appropriate: allergies, current medications, past family history, past social history, past surgical history and problem list     Social History     Social History    Marital status: /Civil Union     Spouse name: N/A    Number of children: 2    Years of education: N/A     Occupational History    Not on file       Social History Main Topics    Smoking status: Former Smoker     Quit date: 1/24/2006    Smokeless tobacco: Never Used    Alcohol use Yes      Comment: social    Drug use: No    Sexual activity: Not on file     Other Topics Concern    Not on file     Social History Narrative    Daily caffeinated coffee consumption    Exercise habits         Past Medical History:   Diagnosis Date    HELENE positive     Encephalitis     Lasted Assessed 10/18/2017    Endometrial hyperplasia     Last assessed 10/18/2017    Hypertension     [facility]upus anticoagulant disorder [facility] [address]     Lupus anticoagulant positive     Malignant neoplasm of cervix uteri ([facility] [facility] )     Maxillary sinusitis     Lasted Assessed 10/18/2017    Night sweat     Osteopenia     Hip    Osteoporosis     Spine    Pneumonia     Last Assessed 10/18/2017    Secondary pulmonary arterial hypertension (HCC)     Shortness of breath     Sinus tachycardia     Urinary incontinence      Past Surgical History:   Procedure Laterality Date    ANKLE FRACTURE SURGERY      ARTHRODESIS      Hand: IP Joint    COLPOSCOPY      GALLBLADDER SURGERY      HYSTERECTOMY      Vaginal    LAPAROSCOPIC ESOPHAGOGASTRIC FUNDOPLASTY  2008    REDUCTION MAMMAPLASTY      TONSILLECTOMY       Allergies   Allergen Reactions    Dilantin [Phenytoin] Anaphylaxis and Rash    Augmentin [Amoxicillin-Pot Clavulanate] Rash    Penicillins Rash     Current Outpatient Prescriptions on File Prior to Visit   Medication Sig Dispense Refill    aspirin (ECOTRIN LOW STRENGTH) 81 mg EC tablet Take 162 mg by mouth daily      cholecalciferol (VITAMIN D3) 1,000 units tablet Take 2,000 Units by mouth daily      Cyanocobalamin (VITAMIN B-12 IJ) Inject 1,000 mcg/mL as directed every 14 (fourteen) days      hydroxychloroquine (PLAQUENIL) 200 mg tablet Take 400 mg by mouth daily      Macitentan (OPSUMIT) 10 MG tablet Take 1 tablet (10 mg total) by mouth daily 30 tablet 10    potassium chloride (K-DUR,KLOR-CON) 20 mEq tablet Take 2 tablets (40 mEq total) by mouth daily 240 tablet 3    sildenafil (REVATIO) 20 mg tablet Take 20 mg by mouth 3 (three) times a day      torsemide (DEMADEX) 20 mg tablet Take 2 tablets (40 mg total) by mouth daily (Patient taking differently: Take 80 mg by mouth daily  ) 240 tablet 3     Current Facility-Administered Medications on File Prior to Visit   Medication Dose Route Frequency Provider Last Rate Last Dose    betamethasone acetate-betamethasone sodium phosphate (CELESTONE) injection 6 mg  6 mg Intra-articular  Patterson Dikes, DO   6 mg at 06/04/18 1659    bupivacaine (MARCAINE) 0 25 % injection 2 mL  2 mL Injection  Diony Dikes, DO   2 mL at 06/04/18 1659    lidocaine (XYLOCAINE) 1 % injection 1 mL  1 mL Injection  Diony Dikes, DO   1 mL at 06/04/18 1659       Review of Systems - Negative except as noted in HPI      Objective:    Vitals:    07/30/18 1718   BP: 138/84   Pulse: 102       Physical Exam   Constitutional: She is oriented to person, place, and time  She appears well-developed and well-nourished  HENT:   Head: Normocephalic and atraumatic  Eyes: Conjunctivae are normal  No scleral icterus  Neck: Normal range of motion  Neck supple  Pulmonary/Chest: Effort normal  No stridor  No respiratory distress  Musculoskeletal:        Right knee: She exhibits no effusion  Neurological: She is alert and oriented to person, place, and time  No sensory deficit  Skin: Skin is warm and dry  No erythema  Psychiatric: She has a normal mood and affect  Her behavior is normal        Right Knee Exam     Tenderness   The patient is experiencing tenderness in the pes anserinus, medial joint line and medial retinaculum (No point tenderness over bone or soft tissues)  Range of Motion   The patient has normal right knee ROM  Extension: normal   Flexion: normal     Muscle Strength     The patient has normal right knee strength  Tests   Hussain:  Medial - positive Lateral - negative  Lachman:  Anterior - negative    Posterior - negative  Drawer:       Anterior - negative    Posterior - negative    Other   Erythema: absent  Scars: absent  Sensation: normal  Pulse: present  Swelling: none  Other tests: no effusion present            I have personally reviewed pertinent films in PACS and my interpretation is small partial undersurface tear of posterior horn of right medial meniscus  Procedures  No Procedures performed today    Portions of the record may have been created with voice recognition software   Occasional wrong word or "sound a like" substitutions may have occurred due to the inherent limitations of voice recognition software   Read the chart carefully and recognize, using context, where substitutions have occurred

## 2018-08-07 ENCOUNTER — TELEPHONE (OUTPATIENT)
Dept: OBGYN CLINIC | Facility: HOSPITAL | Age: 53
End: 2018-08-07

## 2018-08-07 NOTE — TELEPHONE ENCOUNTER
Dr Valerie Rodríguez    Patient called stating that she has an appointment with her cardiologist on [phone]  She is stating that she needs paperwork for that appointment  Patient is asking for a call to discuss this

## 2018-08-13 ENCOUNTER — TELEPHONE (OUTPATIENT)
Dept: CARDIOLOGY CLINIC | Facility: CLINIC | Age: 53
End: 2018-08-13

## 2018-08-15 ENCOUNTER — TELEPHONE (OUTPATIENT)
Dept: CARDIOLOGY CLINIC | Facility: CLINIC | Age: 53
End: 2018-08-15

## 2018-08-16 DIAGNOSIS — K76.6 PAH (PULMONARY ARTERIAL HYPERTENSION) WITH PORTAL HYPERTENSION (HCC): ICD-10-CM

## 2018-08-16 DIAGNOSIS — I27.21 PAH (PULMONARY ARTERIAL HYPERTENSION) WITH PORTAL HYPERTENSION (HCC): ICD-10-CM

## 2018-08-30 ENCOUNTER — OFFICE VISIT (OUTPATIENT)
Dept: CARDIOLOGY CLINIC | Facility: CLINIC | Age: 53
End: 2018-08-30
Payer: COMMERCIAL

## 2018-08-30 VITALS
SYSTOLIC BLOOD PRESSURE: 104 MMHG | OXYGEN SATURATION: 98 % | BODY MASS INDEX: 35.22 KG/M2 | DIASTOLIC BLOOD PRESSURE: 66 MMHG | WEIGHT: 206.3 LBS | HEART RATE: 94 BPM | HEIGHT: 64 IN

## 2018-08-30 DIAGNOSIS — Z01.818 PRE-OP EXAM: Primary | ICD-10-CM

## 2018-08-30 PROCEDURE — 93000 ELECTROCARDIOGRAM COMPLETE: CPT | Performed by: INTERNAL MEDICINE

## 2018-08-30 PROCEDURE — 99214 OFFICE O/P EST MOD 30 MIN: CPT | Performed by: INTERNAL MEDICINE

## 2018-08-30 NOTE — LETTER
August 30, 2018     Mahesh Larson [id number]    Patient: Ingrid Rincon   YOB: 1965   Date of Visit: 8/30/2018       Dear Dr Mattie Vazquez:    Thank you for referring Dali Quigley to me for evaluation  Below are my notes for this consultation  If you have questions, please do not hesitate to call me  I look forward to following your patient along with you  Sincerely,        José Luis Méndez MD        CC: DO José Luis Mar MD  8/30/2018  4:00 PM  Sign at close encounter  Heart Failure Outpatient Progress Note - Ingrid Rincon 52 y o  female MRN: [id number]    @ Encounter: [id number]  Assessment/Plan:    # Preop for arthoscopic knee surgery: Low to moderate risk for surgery  Currently optimized from a CV perspective  RV normal at rest and now on therapy with minimal symptoms  No further testing is indicated  Continue sildenafil and macitentan in the perioperative setting  Maintain euvolemia, HR, and BP in perioperative setting  # ANSARI: Likely combination of obesity/deconditioning, diastolic dysfunction (obesity, female, currently on diuretics w/ hx of volume overload), and exercise induced PAH  --Continue diet and exercise for weight loss    # Exercised induced PAH: Obesity/deconditioning and diastolic dysfunction (PCWP 12 mmHg) do not fully explain her degree of dysfunction  Recent diagnosis of MCTD/SLE w/ + lupus anticoagulant  Remote smoker  She is at risk to develop pulmonary vascular disease leading to RV dysfunction  At rest, RV appears normal on TTE with coupled RV-PA w/o e/o of RVOT notching suggestive of normal PVR at rest  However, she did have a stress echo in 2012 that was suggestive of exercise induced pulmonary HTN  At the time she did not have e/o of elevated PVR  Her bubble was negative which makes an intracardiac shunt less likely   Her symptoms have progressed and with ambulation she shows evidence of desaturation that may be due to abnormal air exchange we did the following:   Diag:  --PFTs were normal   --Repeat stress echo to evaluate pulmonary pressures, RV, and RVOT signal w/ exercise showed exaggerated HR response, HTNsive response, decrease in O2 saturation from 99% to 91% and increase in PA pressures from 45 mmHg to 70 mmHg with exercise  Exercised for 5 min and 20 sec  --RHC w/ exercise: This was an exercise RHC with simultaneous monitoring of the distal and proximal SG catheter ports  She had an appropriate HR response from 100 to 130 bpm with MAP throughout the study staying at 112 mmHg  Hgb 13 1  With exercise, there was an increase in PVR from <3 to ~4 5 MOLINA  PCWP increased from 12 to 18 mmHg  CVP increased from 7 to 10 mmHg  The findings were consistent with exercise induced Pulmonary Arterial Hypertension (WHO Group 1, NYHA II)  Her TTE and PCWP are not consistent with significant left sided heart disease (Group II)  Her PFTs did not show any significant lung disease (Group III), V/Q scan is not consistent with CTEPH (Group IV PAH) and stress echo showed elevation of PA pressures with exercise which is consistent with above findings  --Slow improvement on sildenafil and macitentan  --Mild overload to euvolemic; improving     Therapeutic:  --Continue torsemide 40 mg PO BID  Continue Kdur 20 mEq PO BID    --Daily weights qAM  --Continue sildenafil 20 mg PO q8hrs for now  --Continue macitentan 10 mg PO daily    # Undifferentiated CTD w/ + lupus anticoagulant (unclear if SLE is involved)  --Per outpatient Rheumatologist  # ST: V/Q negative  May be 2/2 to deconditioning +/- inappropriate ST +/- diastolic dysfunction/HF (euvolemic on exam)  No e/o infection      --Continue to monitor, can consider coby agents in the future  # Morbid obesity: Continue weight loss    RTC in 3 months    HPI: Very pleasant 53 y/o woman w/ PMHx of newly diagnosed MCTD/SLE, +lupus anti-coagulant, B12 deficiency, and obesity referred for evaluation of ANSARI  She states that she first started getting SOB -- 10 years ago  She has had several episodes of bacterial PNA and chronic cough (a few times/year)  She was referred to [facility] in 2012 for workup for PH  She had a a stress echo 8/2012 that showed that her PA pressures more than doubled from --24 mmHg to > 50 mmHg  Currently, she states she can walk up a couple flights of stairs but does get SOB  She also gets SOB with walking up inclines  She gets occasional LH  She has been found to be B12 deficient, but that is improving  She was seeing a Rheumatologist in [place], [place] but recently saw a specialist at [facility], Dr Alma Davis (Rheumatologist - 11/16)  She was diagnosed with MCTD and SLE  HELENE + 1:320 w/ speckled pattern  She has been on Plaquenil x 2 weeks now  She is on ASA for + lupus anticoagulant and anticardiolipin Abs  She has joint pains and a subtle malar rash  She states so far there has been no change with plaquenil  She has f/u with him in 2/2018  After her visit with Dr Tim Harp, she has had TTE which shows LVEF --65%, normal RV size and function without RVOT notching  Normal atria  CXR clear  She also has had a negative V/Q scan  She walked the patient in the hallway and had her go up and down stairs  Her resting HR went from --100 bpm to 110s with Pulse Ox starting from 98% @ rest to --90% with exercise  Pulse Ox showed a good waveform throughout  She denies CP, palpitations, presyncope, or syncope  She notes that she went on a two week cruise and afterwards developed LE edema in the past and has developed LE edema  Today, 1/9/2018, returns for f/u  Had echo stress test w/ poor exercise tolerance and decrease in O2 saturation w/ increase in PA pressures from 45 to 70 mmHg w/ exercise  Exercise RHC showed exercise induced PH w/ increase in PVR from <3 to ~4 5 MOLINA  PCWP increased from 12 to 18 mmHg  CVP increased from 7 to 10 mmHg   She was denied for tadalafil, but approved for sildenafil  She started sildenafil on 1/5/18 and states she does not feel any different and continues to have trouble with stairs  In addition, today in clinic, she continues to be tachycardic  Has gained 4 lbs since her last visit  Today, 2/13/2018, returns for f/u  She has gained ~ 4 lbs since her last visit, for a total of 9 lbs since attempted diuresis  She started sildenafil on 1/5/18 and states she feels worse going up stairs than previously  In addition, today in clinic, she continues to be tachycardic  Today, 3/19/2018, she returns for f/u  She states going up stairs there is improvement now  She has been on macitentan for 1 week now  Weight has steadily been decreasing  Today, 4/30/2018, she returns for f/u  She states she feels about the same as her last visit  Weight has steadily been decreasing  Today, 6/11/2018, she returns for f/u  She feels slightly better than her last visit  Weight continues to decrease  Walking up one flight is not a problem, two flights feels SOB  Today, 7/12/2018, she returns for f/u  Her weight is ~206 lbs on home scale  She has been out and walking around with her puppy about 15-20 minutes every night  She feels a little winded [place], but not having to stop  Today, 8/30/18, she returns for f/u  She feels the same as last visit, however, she is having knee pains  She has a meniscus tear in the R knee         Past Medical History:   Diagnosis Date    HELENE positive     Encephalitis     Lasted Assessed 10/18/2017    Endometrial hyperplasia     Last assessed 10/18/2017    Hypertension     Lupus anticoagulant disorder ([facility] [address] )     Lupus anticoagulant positive     Malignant neoplasm of cervix uteri ([facility] [address] [facility]     Maxillary sinusitis     Lasted Assessed 10/18/2017    Night sweat     Osteopenia     Hip    Osteoporosis     Spine    Pneumonia     Last Assessed 10/18/2017    Secondary pulmonary arterial hypertension (HCC)     Shortness of breath  Sinus tachycardia     Urinary incontinence      Review of Systems - 12 point ROS was done and is negative, except as noted above  Allergies   Allergen Reactions    Dilantin [Phenytoin] Anaphylaxis and Rash    Augmentin [Amoxicillin-Pot Clavulanate] Rash    Penicillins Rash       Current Outpatient Prescriptions:     aspirin (ECOTRIN LOW STRENGTH) 81 mg EC tablet, Take 162 mg by mouth daily, Disp: , Rfl:     cholecalciferol (VITAMIN D3) 1,000 units tablet, Take 2,000 Units by mouth daily, Disp: , Rfl:     Cyanocobalamin (VITAMIN B-12 IJ), Inject 1,000 mcg/mL as directed every 14 (fourteen) days, Disp: , Rfl:     hydroxychloroquine (PLAQUENIL) 200 mg tablet, Take 400 mg by mouth daily, Disp: , Rfl:     Macitentan (OPSUMIT) 10 MG tablet, Take 1 tablet (10 mg total) by mouth daily, Disp: 30 tablet, Rfl: 10    potassium chloride (K-DUR,KLOR-CON) 20 mEq tablet, Take 2 tablets (40 mEq total) by mouth daily, Disp: 240 tablet, Rfl: 3    sildenafil (REVATIO) 20 mg tablet, Take 20 mg by mouth 3 (three) times a day, Disp: , Rfl:     torsemide (DEMADEX) 20 mg tablet, Take 2 tablets (40 mg total) by mouth daily (Patient taking differently: Take 40 mg by mouth 2 (two) times a day  ), Disp: 240 tablet, Rfl: 3    Current Facility-Administered Medications:     betamethasone acetate-betamethasone sodium phosphate (CELESTONE) injection 6 mg, 6 mg, Intra-articular, , Katha Liner, DO, 6 mg at 06/04/18 1659    bupivacaine (MARCAINE) 0 25 % injection 2 mL, 2 mL, Injection, , Katha Liner, DO, 2 mL at 06/04/18 1659    lidocaine (XYLOCAINE) 1 % injection 1 mL, 1 mL, Injection, , Katha Liner, DO, 1 mL at 06/04/18 1659    Social History     Social History    Marital status: /Civil Union     Spouse name: N/A    Number of children: 2    Years of education: N/A     Occupational History    Not on file       Social History Main Topics    Smoking status: Former Smoker     Quit date: 1/24/2006    Smokeless tobacco: Never Used    Alcohol use Yes      Comment: social    Drug use: No    Sexual activity: Not on file     Other Topics Concern    Not on file     Social History Narrative    Daily caffeinated coffee consumption    Exercise habits           Family History   Problem Relation Age of Onset    Uterine cancer Mother     Pancreatic cancer Mother     Diabetes Mother     Heart disease Father         open heart surgery at age 48     Stroke Father         CVA    Hypertension Father     Atrial fibrillation Father     Hyperlipidemia Father     Thyroid disease Sister     Hemochromatosis Brother     Thyroid disease Maternal Grandmother     Diabetes Other     Other Family         Heart Problem    Anuerysm Neg Hx     Clotting disorder Neg Hx     Heart failure Neg Hx      Physical Exam:  Vitals:    08/30/18 1541   BP: 104/66   BP Location: Right arm   Patient Position: Sitting   Cuff Size: Large   Pulse: 94   SpO2: 98%   Weight: 93 6 kg (206 lb 4 8 oz)   Height: 5' 4" (1 626 m)       GEN: Kenji Swenson appears well, alert and oriented x 3, pleasant and cooperative   HEENT: pupils equal, round, and reactive to light; extraocular muscles intact  NECK: supple, no carotid bruits   HEART: regular rhythm, normal S1 and S2, no murmurs, clicks, gallops or rubs, JVD is flat    LUNGS: clear to auscultation bilaterally; no wheezes, rales, or rhonchi   ABDOMEN: normal bowel sounds, soft, no tenderness, no distention  EXTREMITIES: peripheral pulses normal; no clubbing, cyanosis, or edema  NEURO: no focal findings   SKIN: normal without suspicious lesions on exposed skin    Labs & Results:  Lab Results   Component Value Date    WBC 5 40 05/12/2018    HGB 12 7 05/12/2018    HCT 37 7 05/12/2018    MCV 90 05/12/2018     05/12/2018       Chemistry        Component Value Date/Time     05/22/2018 1657    K 4 0 05/22/2018 1657     05/22/2018 1657    CO2 28 05/22/2018 1657    BUN 17 05/22/2018 1657    CREATININE 1 47 (H) 05/22/2018 1657        Component Value Date/Time    CALCIUM 9 8 05/22/2018 1657    ALKPHOS 108 11/18/2017 0844    AST 11 11/18/2017 0844    ALT 23 11/18/2017 0844        EKG personally reviewed by Refugio Perez MD      Counseling / Coordination of Care  Total floor / unit time spent today 40 minutes  Greater than 50% of total time was spent with the patient and / or family counseling and / or coordination of care  A description of the counseling / coordination of care: 25 min  Thank you for the opportunity to participate in the care of this patient      Refugio Perez MD, PhD   Katharina Peacock

## 2018-08-30 NOTE — PROGRESS NOTES
Heart Failure Outpatient Progress Note - Kenji Swenson 52 y o  female MRN: [id number]    @ Encounter: [id number]  Assessment/Plan:    # Preop for arthoscopic knee surgery: Low to moderate risk for surgery  Currently optimized from a CV perspective  RV normal at rest and now on therapy with minimal symptoms  No further testing is indicated  Continue sildenafil and macitentan in the perioperative setting  Maintain euvolemia, HR, and BP in perioperative setting  # ANSARI: Likely combination of obesity/deconditioning, diastolic dysfunction (obesity, female, currently on diuretics w/ hx of volume overload), and exercise induced PAH  --Continue diet and exercise for weight loss    # Exercised induced PAH: Obesity/deconditioning and diastolic dysfunction (PCWP 12 mmHg) do not fully explain her degree of dysfunction  Recent diagnosis of MCTD/SLE w/ + lupus anticoagulant  Remote smoker  She is at risk to develop pulmonary vascular disease leading to RV dysfunction  At rest, RV appears normal on TTE with coupled RV-PA w/o e/o of RVOT notching suggestive of normal PVR at rest  However, she did have a stress echo in 2012 that was suggestive of exercise induced pulmonary HTN  At the time she did not have e/o of elevated PVR  Her bubble was negative which makes an intracardiac shunt less likely  Her symptoms have progressed and with ambulation she shows evidence of desaturation that may be due to abnormal air exchange we did the following:   Diag:  --PFTs were normal   --Repeat stress echo to evaluate pulmonary pressures, RV, and RVOT signal w/ exercise showed exaggerated HR response, HTNsive response, decrease in O2 saturation from 99% to 91% and increase in PA pressures from 45 mmHg to 70 mmHg with exercise  Exercised for 5 min and 20 sec  --RHC w/ exercise: This was an exercise RHC with simultaneous monitoring of the distal and proximal SG catheter ports   She had an appropriate HR response from 100 to 130 bpm with MAP throughout the study staying at 112 mmHg  Hgb 13 1  With exercise, there was an increase in PVR from <3 to ~4 5 MOLINA  PCWP increased from 12 to 18 mmHg  CVP increased from 7 to 10 mmHg  The findings were consistent with exercise induced Pulmonary Arterial Hypertension (WHO Group 1, NYHA II)  Her TTE and PCWP are not consistent with significant left sided heart disease (Group II)  Her PFTs did not show any significant lung disease (Group III), V/Q scan is not consistent with CTEPH (Group IV PAH) and stress echo showed elevation of PA pressures with exercise which is consistent with above findings  --Slow improvement on sildenafil and macitentan  --Mild overload to euvolemic; improving     Therapeutic:  --Continue torsemide 40 mg PO BID  Continue Kdur 20 mEq PO BID    --Daily weights qAM  --Continue sildenafil 20 mg PO q8hrs for now  --Continue macitentan 10 mg PO daily    # Undifferentiated CTD w/ + lupus anticoagulant (unclear if SLE is involved)  --Per outpatient Rheumatologist  # ST: V/Q negative  May be 2/2 to deconditioning +/- inappropriate ST +/- diastolic dysfunction/HF (euvolemic on exam)  No e/o infection  --Continue to monitor, can consider coby agents in the future  # Morbid obesity: Continue weight loss    RTC in 3 months    HPI: Very pleasant 53 y/o woman w/ PMHx of newly diagnosed MCTD/SLE, +lupus anti-coagulant, B12 deficiency, and obesity referred for evaluation of ANSARI  She states that she first started getting SOB -- 10 years ago  She has had several episodes of bacterial PNA and chronic cough (a few times/year)  She was referred to [facility] in 2012 for workup for PH  She had a a stress echo 8/2012 that showed that her PA pressures more than doubled from --24 mmHg to > 50 mmHg  Currently, she states she can walk up a couple flights of stairs but does get SOB  She also gets SOB with walking up inclines  She gets occasional LH  She has been found to be B12 deficient, but that is improving       She was seeing a Rheumatologist in [place] but recently saw a specialist at [facility], Dr Ellen Du (Rheumatologist - 11/16)  She was diagnosed with MCTD and SLE  HELENE + 1:320 w/ speckled pattern  She has been on Plaquenil x 2 weeks now  She is on ASA for + lupus anticoagulant and anticardiolipin Abs  She has joint pains and a subtle malar rash  She states so far there has been no change with plaquenil  She has f/u with him in 2/2018  After her visit with Dr Tanmay Turner, she has had TTE which shows LVEF --65%, normal RV size and function without RVOT notching  Normal atria  CXR clear  She also has had a negative V/Q scan  She walked the patient in the hallway and had her go up and down stairs  Her resting HR went from --100 bpm to 110s with Pulse Ox starting from 98% @ rest to --90% with exercise  Pulse Ox showed a good waveform throughout  She denies CP, palpitations, presyncope, or syncope  She notes that she went on a two week cruise and afterwards developed LE edema in the past and has developed LE edema  Today, 1/9/2018, returns for f/u  Had echo stress test w/ poor exercise tolerance and decrease in O2 saturation w/ increase in PA pressures from 45 to 70 mmHg w/ exercise  Exercise RHC showed exercise induced PH w/ increase in PVR from <3 to ~4 5 MOLINA  PCWP increased from 12 to 18 mmHg  CVP increased from 7 to 10 mmHg  She was denied for tadalafil, but approved for sildenafil  She started sildenafil on 1/5/18 and states she does not feel any different and continues to have trouble with stairs  In addition, today in clinic, she continues to be tachycardic  Has gained 4 lbs since her last visit  Today, 2/13/2018, returns for f/u  She has gained ~ 4 lbs since her last visit, for a total of 9 lbs since attempted diuresis  She started sildenafil on 1/5/18 and states she feels worse going up stairs than previously  In addition, today in clinic, she continues to be tachycardic      Today, 3/19/2018, she returns for f/u  She states going up stairs there is improvement now  She has been on macitentan for 1 week now  Weight has steadily been decreasing  Today, 4/30/2018, she returns for f/u  She states she feels about the same as her last visit  Weight has steadily been decreasing  Today, 6/11/2018, she returns for f/u  She feels slightly better than her last visit  Weight continues to decrease  Walking up one flight is not a problem, two flights feels SOB  Today, 7/12/2018, she returns for f/u  Her weight is ~206 lbs on home scale  She has been out and walking around with her puppy about 15-20 minutes every night  She feels a little winded on the top of the hills, but not having to stop  Today, 8/30/18, she returns for f/u  She feels the same as last visit, however, she is having knee pains  She has a meniscus tear in the R knee  Past Medical History:   Diagnosis Date    HELENE positive     Encephalitis     Lasted Assessed 10/18/2017    Endometrial hyperplasia     Last assessed 10/18/2017    Hypertension     [facility]     Lupus anticoagulant positive     Malignant neoplasm of cervix uteri ([facility]     Maxillary sinusitis     Lasted Assessed 10/18/2017    Night sweat     Osteopenia     Hip    Osteoporosis     Spine    Pneumonia     Last Assessed 10/18/2017    Secondary pulmonary arterial hypertension (HCC)     Shortness of breath     Sinus tachycardia     Urinary incontinence      Review of Systems - 12 point ROS was done and is negative, except as noted above       Allergies   Allergen Reactions    Dilantin [Phenytoin] Anaphylaxis and Rash    Augmentin [Amoxicillin-Pot Clavulanate] Rash    Penicillins Rash       Current Outpatient Prescriptions:     aspirin (ECOTRIN LOW STRENGTH) 81 mg EC tablet, Take 162 mg by mouth daily, Disp: , Rfl:     cholecalciferol (VITAMIN D3) 1,000 units tablet, Take 2,000 Units by mouth daily, Disp: , Rfl:     Cyanocobalamin (VITAMIN B-12 IJ), Inject 1,000 mcg/mL as directed every 14 (fourteen) days, Disp: , Rfl:     hydroxychloroquine (PLAQUENIL) 200 mg tablet, Take 400 mg by mouth daily, Disp: , Rfl:     Macitentan (OPSUMIT) 10 MG tablet, Take 1 tablet (10 mg total) by mouth daily, Disp: 30 tablet, Rfl: 10    potassium chloride (K-DUR,KLOR-CON) 20 mEq tablet, Take 2 tablets (40 mEq total) by mouth daily, Disp: 240 tablet, Rfl: 3    sildenafil (REVATIO) 20 mg tablet, Take 20 mg by mouth 3 (three) times a day, Disp: , Rfl:     torsemide (DEMADEX) 20 mg tablet, Take 2 tablets (40 mg total) by mouth daily (Patient taking differently: Take 40 mg by mouth 2 (two) times a day  ), Disp: 240 tablet, Rfl: 3    Current Facility-Administered Medications:     betamethasone acetate-betamethasone sodium phosphate (CELESTONE) injection 6 mg, 6 mg, Intra-articular, , Philemon Bump, DO, 6 mg at 06/04/18 1659    bupivacaine (MARCAINE) 0 25 % injection 2 mL, 2 mL, Injection, , Philemon Bump, DO, 2 mL at 06/04/18 1659    lidocaine (XYLOCAINE) 1 % injection 1 mL, 1 mL, Injection, , Philemon Bump, DO, 1 mL at 06/04/18 1659    Social History     Social History    Marital status: /Civil Union     Spouse name: N/A    Number of children: 2    Years of education: N/A     Occupational History    Not on file       Social History Main Topics    Smoking status: Former Smoker     Quit date: 1/24/2006    Smokeless tobacco: Never Used    Alcohol use Yes      Comment: social    Drug use: No    Sexual activity: Not on file     Other Topics Concern    Not on file     Social History Narrative    Daily caffeinated coffee consumption    Exercise habits           Family History   Problem Relation Age of Onset    Uterine cancer Mother     Pancreatic cancer Mother     Diabetes Mother     Heart disease Father         open heart surgery at age 48     Stroke Father         CVA    Hypertension Father     Atrial fibrillation Father     Hyperlipidemia Father  Thyroid disease Sister     Hemochromatosis Brother     Thyroid disease Maternal Grandmother     Diabetes Other     Other Family         Heart Problem    Anuerysm Neg Hx     Clotting disorder Neg Hx     Heart failure Neg Hx      Physical Exam:  Vitals:    08/30/18 1541   BP: 104/66   BP Location: Right arm   Patient Position: Sitting   Cuff Size: Large   Pulse: 94   SpO2: 98%   Weight: 93 6 kg (206 lb 4 8 oz)   Height: 5' 4" (1 626 m)       GEN: Olman Birch appears well, alert and oriented x 3, pleasant and cooperative   HEENT: pupils equal, round, and reactive to light; extraocular muscles intact  NECK: supple, no carotid bruits   HEART: regular rhythm, normal S1 and S2, no murmurs, clicks, gallops or rubs, JVD is flat    LUNGS: clear to auscultation bilaterally; no wheezes, rales, or rhonchi   ABDOMEN: normal bowel sounds, soft, no tenderness, no distention  EXTREMITIES: peripheral pulses normal; no clubbing, cyanosis, or edema  NEURO: no focal findings   SKIN: normal without suspicious lesions on exposed skin    Labs & Results:  Lab Results   Component Value Date    WBC 5 40 05/12/2018    HGB 12 7 05/12/2018    HCT 37 7 05/12/2018    MCV 90 05/12/2018     05/12/2018       Chemistry        Component Value Date/Time     05/22/2018 1657    K 4 0 05/22/2018 1657     05/22/2018 1657    CO2 28 05/22/2018 1657    BUN 17 05/22/2018 1657    CREATININE 1 47 (H) 05/22/2018 1657        Component Value Date/Time    CALCIUM 9 8 05/22/2018 1657    ALKPHOS 108 11/18/2017 0844    AST 11 11/18/2017 0844    ALT 23 11/18/2017 0844        EKG personally reviewed by Zonia Claire MD      Counseling / Coordination of Care  Total floor / unit time spent today 40 minutes  Greater than 50% of total time was spent with the patient and / or family counseling and / or coordination of care  A description of the counseling / coordination of care: 25 min        Thank you for the opportunity to participate in the care of this patient      Refugio Perez MD, PhD   Katharina Peacock

## 2018-09-10 ENCOUNTER — APPOINTMENT (OUTPATIENT)
Dept: LAB | Facility: CLINIC | Age: 53
End: 2018-09-10
Payer: COMMERCIAL

## 2018-09-10 ENCOUNTER — OFFICE VISIT (OUTPATIENT)
Dept: OBGYN CLINIC | Facility: CLINIC | Age: 53
End: 2018-09-10
Payer: COMMERCIAL

## 2018-09-10 ENCOUNTER — APPOINTMENT (OUTPATIENT)
Dept: RADIOLOGY | Facility: CLINIC | Age: 53
End: 2018-09-10
Payer: COMMERCIAL

## 2018-09-10 VITALS
SYSTOLIC BLOOD PRESSURE: 108 MMHG | WEIGHT: 204 LBS | DIASTOLIC BLOOD PRESSURE: 74 MMHG | BODY MASS INDEX: 34.83 KG/M2 | HEIGHT: 64 IN | HEART RATE: 98 BPM

## 2018-09-10 DIAGNOSIS — M17.11 ARTHRITIS OF RIGHT KNEE: ICD-10-CM

## 2018-09-10 DIAGNOSIS — S83.241D TEAR OF MEDIAL MENISCUS OF RIGHT KNEE, CURRENT, UNSPECIFIED TEAR TYPE, SUBSEQUENT ENCOUNTER: Primary | ICD-10-CM

## 2018-09-10 DIAGNOSIS — M70.51 PES ANSERINUS BURSITIS OF RIGHT KNEE: ICD-10-CM

## 2018-09-10 DIAGNOSIS — S83.241D TEAR OF MEDIAL MENISCUS OF RIGHT KNEE, CURRENT, UNSPECIFIED TEAR TYPE, SUBSEQUENT ENCOUNTER: ICD-10-CM

## 2018-09-10 PROBLEM — S83.241A TEAR OF MEDIAL MENISCUS OF RIGHT KNEE, CURRENT: Status: ACTIVE | Noted: 2018-09-10

## 2018-09-10 LAB
ANION GAP SERPL CALCULATED.3IONS-SCNC: 7 MMOL/L (ref 4–13)
BASOPHILS # BLD AUTO: 0.03 THOUSANDS/ΜL (ref 0–0.1)
BASOPHILS NFR BLD AUTO: 1 % (ref 0–1)
BUN SERPL-MCNC: 6 MG/DL (ref 5–25)
CALCIUM SERPL-MCNC: 9.2 MG/DL (ref 8.3–10.1)
CHLORIDE SERPL-SCNC: 106 MMOL/L (ref 100–108)
CO2 SERPL-SCNC: 30 MMOL/L (ref 21–32)
CREAT SERPL-MCNC: 1.05 MG/DL (ref 0.6–1.3)
EOSINOPHIL # BLD AUTO: 0.17 THOUSAND/ΜL (ref 0–0.61)
EOSINOPHIL NFR BLD AUTO: 4 % (ref 0–6)
ERYTHROCYTE [DISTWIDTH] IN BLOOD BY AUTOMATED COUNT: 12.8 % (ref 11.6–15.1)
GFR SERPL CREATININE-BSD FRML MDRD: 60 ML/MIN/1.73SQ M
GLUCOSE P FAST SERPL-MCNC: 91 MG/DL (ref 65–99)
HCT VFR BLD AUTO: 36.1 % (ref 34.8–46.1)
HGB BLD-MCNC: 11.7 G/DL (ref 11.5–15.4)
IMM GRANULOCYTES # BLD AUTO: 0.01 THOUSAND/UL (ref 0–0.2)
IMM GRANULOCYTES NFR BLD AUTO: 0 % (ref 0–2)
LYMPHOCYTES # BLD AUTO: 0.68 THOUSANDS/ΜL (ref 0.6–4.47)
LYMPHOCYTES NFR BLD AUTO: 18 % (ref 14–44)
MCH RBC QN AUTO: 30.2 PG (ref 26.8–34.3)
MCHC RBC AUTO-ENTMCNC: 32.4 G/DL (ref 31.4–37.4)
MCV RBC AUTO: 93 FL (ref 82–98)
MONOCYTES # BLD AUTO: 0.4 THOUSAND/ΜL (ref 0.17–1.22)
MONOCYTES NFR BLD AUTO: 10 % (ref 4–12)
NEUTROPHILS # BLD AUTO: 2.58 THOUSANDS/ΜL (ref 1.85–7.62)
NEUTS SEG NFR BLD AUTO: 67 % (ref 43–75)
NRBC BLD AUTO-RTO: 0 /100 WBCS
PLATELET # BLD AUTO: 261 THOUSANDS/UL (ref 149–390)
PMV BLD AUTO: 10.2 FL (ref 8.9–12.7)
POTASSIUM SERPL-SCNC: 3.8 MMOL/L (ref 3.5–5.3)
RBC # BLD AUTO: 3.87 MILLION/UL (ref 3.81–5.12)
SODIUM SERPL-SCNC: 143 MMOL/L (ref 136–145)
WBC # BLD AUTO: 3.87 THOUSAND/UL (ref 4.31–10.16)

## 2018-09-10 PROCEDURE — 36415 COLL VENOUS BLD VENIPUNCTURE: CPT

## 2018-09-10 PROCEDURE — 99214 OFFICE O/P EST MOD 30 MIN: CPT | Performed by: ORTHOPAEDIC SURGERY

## 2018-09-10 PROCEDURE — 80048 BASIC METABOLIC PNL TOTAL CA: CPT

## 2018-09-10 PROCEDURE — 85025 COMPLETE CBC W/AUTO DIFF WBC: CPT

## 2018-09-10 PROCEDURE — 71046 X-RAY EXAM CHEST 2 VIEWS: CPT

## 2018-09-10 NOTE — PROGRESS NOTES
Assessment/Plan:  1  Tear of medial meniscus of right knee, current, unspecified tear type, subsequent encounter  Case request operating room: ARTHROSCOPY RIGHT KNEE WITH PARTIAL MEDIAL MENISCECTOMY    Basic metabolic panel    CBC and differential    XR chest pa & lateral    Case request operating room: ARTHROSCOPY RIGHT KNEE WITH PARTIAL MEDIAL MENISCECTOMY   2  Pes anserinus bursitis of right knee     3  Arthritis of right knee       Patient Active Problem List   Diagnosis    Encounter for gynecological examination (general) (routine) without abnormal findings    B12 deficiency    Chronic cough    Chronic rhinitis    Diffuse connective tissue disease ([address] )    Dyspnea    Edema    Essential hypertension    Hot flashes due to menopause    Long-term use of hydroxychloroquine    Lupus    Lupus anticoagulant positive    Tachycardia    SOB (shortness of breath) on exertion    SOB (shortness of breath)    Sinus tachycardia    Secondary pulmonary hypertension    Pulmonary hypertension (HCC)    Post-nasal drip    Pes anserine bursitis    Positive HELENE (antinuclear antibody)    Other chronic pulmonary heart diseases    Osteoporosis    Night sweats    Patellofemoral disorder of right knee    Pes anserinus bursitis of right knee    Arthritis of right knee    Other tear of medial meniscus, current injury, right knee, initial encounter    Tear of medial meniscus of right knee, current       52year old female with right knee medial meniscus tear As well as pes anserine bursitis of the right knee  With increasing pain in the right knee, as well as having failed conservative treatment measures for medial meniscus tear of the right knee, she wishes to proceed with right knee arthroscopy  Doctor Wandy Potts explained the benefits and risks this procedure in detail with the patient, as well as possible need for additional procedures intraoperatively    Patient understands and consented to this procedure pending standard preop testing   Including chest x-ray for her pulmonary hypertension  She reports her rheumatologist wants her on injectable anticoagulant perioperatively as she is lupus anticoagulant positive, if insurance doesn't cover injectable form she may benefit form ASA 325mg BID  She reports her rheumatologist will be ordering and managing these anticoagulants  She will be contacted prior to surgery for management of her other medications by nursing, can do sips with medication  She was advised to hold plaquenil day of surgery  She reports she has tolerated cephalosporins in the past and as such will use ancef day of surgery  She declined any treatment for right pes anserine bursitis today, declined corticosteroid injection   In this area  The assessment and plan were formulated by Dr Ricki Puga and I assisted in carrying it out  Subjective:   Patient ID: Walter Henry is a 52 y o  female   HPI    Pt with history of right medial meniscus tear and right pes anserine bursitis presents to the office with increasing right knee pain  At her last visit she had already tried conservative treatments for the right medial meniscus including therapy and corticosteroid injections, was having significant pain at that time but uncertain of whether she wanted to proceed with arthroscopic surgery  Today she reports a fall on the knee last week, was not seen for this and has had increased pain but not instability since that fall  She reports the pain still as moderate, over the medial and anteromedial knee primarily  She denies any numbness tingling fevers or chills  Was offered a pes anserine bursa CSI last visit but declined      The following portions of the patient's history were reviewed and updated as appropriate: allergies, current medications, past family history, past social history, past surgical history and problem list     Social History     Social History    Marital status: /Civil Union Spouse name: N/A    Number of children: 2    Years of education: N/A     Occupational History    Not on file       Social History Main Topics    Smoking status: Former Smoker     Quit date: 1/24/2006    Smokeless tobacco: Never Used    Alcohol use Yes      Comment: social    Drug use: No    Sexual activity: Not on file     Other Topics Concern    Not on file     Social History Narrative    Daily caffeinated coffee consumption    Exercise habits         Past Medical History:   Diagnosis Date    HELENE positive     Encephalitis     Lasted Assessed 10/18/2017    Endometrial hyperplasia     Last assessed 10/18/2017    Hypertension     Lupus anticoagulant disorder (HCC)     Lupus anticoagulant positive     Malignant neoplasm of cervix uteri ([address]     Maxillary sinusitis     Lasted Assessed 10/18/2017    Night sweat     Osteopenia     Hip    Osteoporosis     Spine    Pneumonia     Last Assessed 10/18/2017    Secondary pulmonary arterial hypertension (HCC)     Shortness of breath     Sinus tachycardia     Urinary incontinence      Past Surgical History:   Procedure Laterality Date    ANKLE FRACTURE SURGERY      ARTHRODESIS      Hand: IP Joint    COLPOSCOPY      GALLBLADDER SURGERY      HYSTERECTOMY      Vaginal    LAPAROSCOPIC ESOPHAGOGASTRIC FUNDOPLASTY  2008    REDUCTION MAMMAPLASTY      TONSILLECTOMY       Allergies   Allergen Reactions    Dilantin [Phenytoin] Anaphylaxis and Rash    Augmentin [Amoxicillin-Pot Clavulanate] Rash    Penicillins Rash     Current Outpatient Prescriptions on File Prior to Visit   Medication Sig Dispense Refill    aspirin (ECOTRIN LOW STRENGTH) 81 mg EC tablet Take 162 mg by mouth daily      cholecalciferol (VITAMIN D3) 1,000 units tablet Take 2,000 Units by mouth daily      Cyanocobalamin (VITAMIN B-12 IJ) Inject 1,000 mcg/mL as directed every 14 (fourteen) days      hydroxychloroquine (PLAQUENIL) 200 mg tablet Take 400 mg by mouth daily      Macitentan (OPSUMIT) 10 MG tablet Take 1 tablet (10 mg total) by mouth daily 30 tablet 10    potassium chloride (K-DUR,KLOR-CON) 20 mEq tablet Take 2 tablets (40 mEq total) by mouth daily 240 tablet 3    sildenafil (REVATIO) 20 mg tablet Take 20 mg by mouth 3 (three) times a day      torsemide (DEMADEX) 20 mg tablet Take 2 tablets (40 mg total) by mouth daily (Patient taking differently: Take 40 mg by mouth 2 (two) times a day  ) 240 tablet 3     Current Facility-Administered Medications on File Prior to Visit   Medication Dose Route Frequency Provider Last Rate Last Dose    betamethasone acetate-betamethasone sodium phosphate (CELESTONE) injection 6 mg  6 mg Intra-articular  Philemon Bump, DO   6 mg at 06/04/18 1659    bupivacaine (MARCAINE) 0 25 % injection 2 mL  2 mL Injection  Philemon Bump, DO   2 mL at 06/04/18 1659    lidocaine (XYLOCAINE) 1 % injection 1 mL  1 mL Injection  Philemon Bump, DO   1 mL at 06/04/18 1659       Review of Systems - Negative except as noted in HPI      Objective:    Vitals:    09/10/18 1037   BP: 108/74   Pulse: 98       Physical Exam   Constitutional: She is oriented to person, place, and time  She appears well-developed and well-nourished  HENT:   Head: Normocephalic and atraumatic  Eyes: Conjunctivae are normal  No scleral icterus  Neck: Neck supple  Pulmonary/Chest: Effort normal  No stridor  No respiratory distress  Abdominal: She exhibits no distension  Musculoskeletal:        Right knee: She exhibits effusion (mild effusion)  Neurological: She is alert and oriented to person, place, and time  Skin: Skin is warm and dry  No erythema  Psychiatric: She has a normal mood and affect  Her behavior is normal        Right Knee Exam     Tenderness   The patient is experiencing tenderness in the medial joint line and pes anserinus  Range of Motion   Extension: normal   Flexion: abnormal Right knee flexion: slightly limited by pain       Tests   Hussain:  Medial - positive Lateral - negative  Lachman:  Anterior - negative      Drawer:       Anterior - negative    Posterior - negative  Varus: negative  Valgus: positive (pain no laxity)    Other   Erythema: absent  Scars: absent  Sensation: normal  Pulse: present  Swelling: mild  Other tests: effusion (mild effusion) present    Comments:  No ecchymosis or noticeable deformity present            I have personally reviewed pertinent films in PACS  Procedures  No Procedures performed today    Portions of the record may have been created with voice recognition software   Occasional wrong word or "sound a like" substitutions may have occurred due to the inherent limitations of voice recognition software   Read the chart carefully and recognize, using context, where substitutions have occurred

## 2018-09-11 NOTE — PRE-PROCEDURE INSTRUCTIONS
Pre-Surgery Instructions:   Medication Instructions    ASPIRIN PO Patient was instructed to contact Physician for medication instruction   cholecalciferol (VITAMIN D3) 1,000 units tablet Instructed patient per Anesthesia Guidelines   Cyanocobalamin (VITAMIN B-12 IJ) Instructed patient per Anesthesia Guidelines   hydroxychloroquine (PLAQUENIL) 200 mg tablet Instructed patient per Anesthesia Guidelines   Macitentan (OPSUMIT) 10 MG tablet Patient was instructed by Physician and understands   potassium chloride (K-DUR,KLOR-CON) 20 mEq tablet Instructed patient per Anesthesia Guidelines   sildenafil (REVATIO) 20 mg tablet Patient was instructed by Physician and understands   torsemide (DEMADEX) 20 mg tablet Patient was instructed by Physician and understands      Pre op and showering instructions reviewed-Patient getting hibiclens-Instructed to bring crutches DOS

## 2018-10-01 ENCOUNTER — ANESTHESIA EVENT (OUTPATIENT)
Dept: PERIOP | Facility: HOSPITAL | Age: 53
End: 2018-10-01
Payer: COMMERCIAL

## 2018-10-02 ENCOUNTER — ANESTHESIA (OUTPATIENT)
Dept: PERIOP | Facility: HOSPITAL | Age: 53
End: 2018-10-02
Payer: COMMERCIAL

## 2018-10-02 ENCOUNTER — HOSPITAL ENCOUNTER (OUTPATIENT)
Facility: HOSPITAL | Age: 53
Setting detail: OUTPATIENT SURGERY
Discharge: HOME | End: 2018-10-02
Attending: ORTHOPAEDIC SURGERY | Admitting: ORTHOPAEDIC SURGERY
Payer: COMMERCIAL

## 2018-10-02 VITALS
HEIGHT: 64 IN | OXYGEN SATURATION: 98 % | TEMPERATURE: 99.1 F | HEART RATE: 78 BPM | WEIGHT: 204 LBS | DIASTOLIC BLOOD PRESSURE: 64 MMHG | SYSTOLIC BLOOD PRESSURE: 107 MMHG | RESPIRATION RATE: 18 BRPM | BODY MASS INDEX: 34.83 KG/M2

## 2018-10-02 DIAGNOSIS — S83.241D TEAR OF MEDIAL MENISCUS OF RIGHT KNEE, CURRENT, UNSPECIFIED TEAR TYPE, SUBSEQUENT ENCOUNTER: Primary | ICD-10-CM

## 2018-10-02 PROCEDURE — 29881 ARTHRS KNE SRG MNISECTMY M/L: CPT | Performed by: ORTHOPAEDIC SURGERY

## 2018-10-02 RX ORDER — HYDROMORPHONE HCL/PF 1 MG/ML
0.5 SYRINGE (ML) INJECTION
Status: DISCONTINUED | OUTPATIENT
Start: 2018-10-02 | End: 2018-10-02 | Stop reason: HOSPADM

## 2018-10-02 RX ORDER — HYDROMORPHONE HCL/PF 1 MG/ML
0.2 SYRINGE (ML) INJECTION
Status: DISCONTINUED | OUTPATIENT
Start: 2018-10-02 | End: 2018-10-02 | Stop reason: HOSPADM

## 2018-10-02 RX ORDER — SODIUM CHLORIDE 9 MG/ML
INJECTION, SOLUTION INTRAVENOUS CONTINUOUS PRN
Status: DISCONTINUED | OUTPATIENT
Start: 2018-10-02 | End: 2018-10-02 | Stop reason: SURG

## 2018-10-02 RX ORDER — ONDANSETRON 2 MG/ML
4 INJECTION INTRAMUSCULAR; INTRAVENOUS ONCE AS NEEDED
Status: DISCONTINUED | OUTPATIENT
Start: 2018-10-02 | End: 2018-10-02 | Stop reason: HOSPADM

## 2018-10-02 RX ORDER — METOCLOPRAMIDE HYDROCHLORIDE 5 MG/ML
10 INJECTION INTRAMUSCULAR; INTRAVENOUS ONCE AS NEEDED
Status: DISCONTINUED | OUTPATIENT
Start: 2018-10-02 | End: 2018-10-02 | Stop reason: HOSPADM

## 2018-10-02 RX ORDER — FENTANYL CITRATE 50 UG/ML
INJECTION, SOLUTION INTRAMUSCULAR; INTRAVENOUS AS NEEDED
Status: DISCONTINUED | OUTPATIENT
Start: 2018-10-02 | End: 2018-10-02 | Stop reason: SURG

## 2018-10-02 RX ORDER — TRAMADOL HYDROCHLORIDE 50 MG/1
50 TABLET ORAL EVERY 6 HOURS PRN
Qty: 15 TABLET | Refills: 0 | Status: SHIPPED | OUTPATIENT
Start: 2018-10-02 | End: 2018-11-15 | Stop reason: SDUPTHER

## 2018-10-02 RX ORDER — PROPOFOL 10 MG/ML
INJECTION, EMULSION INTRAVENOUS AS NEEDED
Status: DISCONTINUED | OUTPATIENT
Start: 2018-10-02 | End: 2018-10-02 | Stop reason: SURG

## 2018-10-02 RX ORDER — ONDANSETRON 2 MG/ML
4 INJECTION INTRAMUSCULAR; INTRAVENOUS EVERY 6 HOURS PRN
Status: DISCONTINUED | OUTPATIENT
Start: 2018-10-02 | End: 2018-10-02 | Stop reason: HOSPADM

## 2018-10-02 RX ORDER — OXYCODONE HYDROCHLORIDE 5 MG/1
10 TABLET ORAL EVERY 4 HOURS PRN
Status: DISCONTINUED | OUTPATIENT
Start: 2018-10-02 | End: 2018-10-02 | Stop reason: HOSPADM

## 2018-10-02 RX ORDER — SODIUM CHLORIDE, SODIUM LACTATE, POTASSIUM CHLORIDE, CALCIUM CHLORIDE 600; 310; 30; 20 MG/100ML; MG/100ML; MG/100ML; MG/100ML
75 INJECTION, SOLUTION INTRAVENOUS CONTINUOUS
Status: CANCELLED | OUTPATIENT
Start: 2018-10-02

## 2018-10-02 RX ORDER — FENTANYL CITRATE/PF 50 MCG/ML
50 SYRINGE (ML) INJECTION
Status: DISCONTINUED | OUTPATIENT
Start: 2018-10-02 | End: 2018-10-02 | Stop reason: HOSPADM

## 2018-10-02 RX ORDER — DIPHENHYDRAMINE HYDROCHLORIDE 50 MG/ML
12.5 INJECTION INTRAMUSCULAR; INTRAVENOUS ONCE AS NEEDED
Status: COMPLETED | OUTPATIENT
Start: 2018-10-02 | End: 2018-10-02

## 2018-10-02 RX ORDER — TRAMADOL HYDROCHLORIDE 50 MG/1
50 TABLET ORAL EVERY 6 HOURS PRN
Qty: 15 TABLET | Refills: 0 | Status: SHIPPED | OUTPATIENT
Start: 2018-10-02 | End: 2018-10-12

## 2018-10-02 RX ORDER — METOCLOPRAMIDE HYDROCHLORIDE 5 MG/ML
INJECTION INTRAMUSCULAR; INTRAVENOUS AS NEEDED
Status: DISCONTINUED | OUTPATIENT
Start: 2018-10-02 | End: 2018-10-02 | Stop reason: SURG

## 2018-10-02 RX ORDER — SODIUM CHLORIDE, SODIUM LACTATE, POTASSIUM CHLORIDE, CALCIUM CHLORIDE 600; 310; 30; 20 MG/100ML; MG/100ML; MG/100ML; MG/100ML
125 INJECTION, SOLUTION INTRAVENOUS CONTINUOUS
Status: DISCONTINUED | OUTPATIENT
Start: 2018-10-02 | End: 2018-10-02 | Stop reason: HOSPADM

## 2018-10-02 RX ORDER — MIDAZOLAM HYDROCHLORIDE 1 MG/ML
INJECTION INTRAMUSCULAR; INTRAVENOUS AS NEEDED
Status: DISCONTINUED | OUTPATIENT
Start: 2018-10-02 | End: 2018-10-02 | Stop reason: SURG

## 2018-10-02 RX ORDER — ONDANSETRON 2 MG/ML
INJECTION INTRAMUSCULAR; INTRAVENOUS AS NEEDED
Status: DISCONTINUED | OUTPATIENT
Start: 2018-10-02 | End: 2018-10-02 | Stop reason: SURG

## 2018-10-02 RX ORDER — MORPHINE SULFATE 4 MG/ML
2 INJECTION, SOLUTION INTRAMUSCULAR; INTRAVENOUS EVERY 4 HOURS PRN
Status: DISCONTINUED | OUTPATIENT
Start: 2018-10-02 | End: 2018-10-02 | Stop reason: HOSPADM

## 2018-10-02 RX ORDER — OXYCODONE HYDROCHLORIDE 5 MG/1
5 TABLET ORAL EVERY 4 HOURS PRN
Status: DISCONTINUED | OUTPATIENT
Start: 2018-10-02 | End: 2018-10-02 | Stop reason: HOSPADM

## 2018-10-02 RX ORDER — SCOLOPAMINE TRANSDERMAL SYSTEM 1 MG/1
1 PATCH, EXTENDED RELEASE TRANSDERMAL
Status: DISCONTINUED | OUTPATIENT
Start: 2018-10-02 | End: 2018-10-02 | Stop reason: HOSPADM

## 2018-10-02 RX ORDER — BUPIVACAINE HYDROCHLORIDE AND EPINEPHRINE 5; 5 MG/ML; UG/ML
INJECTION, SOLUTION EPIDURAL; INTRACAUDAL; PERINEURAL AS NEEDED
Status: DISCONTINUED | OUTPATIENT
Start: 2018-10-02 | End: 2018-10-02 | Stop reason: HOSPADM

## 2018-10-02 RX ORDER — TRAMADOL HYDROCHLORIDE 50 MG/1
50 TABLET ORAL EVERY 6 HOURS PRN
Status: DISCONTINUED | OUTPATIENT
Start: 2018-10-02 | End: 2018-10-02 | Stop reason: HOSPADM

## 2018-10-02 RX ADMIN — METOCLOPRAMIDE HYDROCHLORIDE 10 MG: 5 INJECTION INTRAMUSCULAR; INTRAVENOUS at 13:20

## 2018-10-02 RX ADMIN — SODIUM CHLORIDE: 0.9 INJECTION, SOLUTION INTRAVENOUS at 11:40

## 2018-10-02 RX ADMIN — DIPHENHYDRAMINE HYDROCHLORIDE 12.5 MG: 50 INJECTION, SOLUTION INTRAMUSCULAR; INTRAVENOUS at 14:09

## 2018-10-02 RX ADMIN — PROPOFOL 50 MG: 10 INJECTION, EMULSION INTRAVENOUS at 13:16

## 2018-10-02 RX ADMIN — DEXAMETHASONE SODIUM PHOSPHATE 10 MG: 10 INJECTION INTRAMUSCULAR; INTRAVENOUS at 13:20

## 2018-10-02 RX ADMIN — PROPOFOL 30 MG: 10 INJECTION, EMULSION INTRAVENOUS at 13:17

## 2018-10-02 RX ADMIN — SODIUM CHLORIDE: 0.9 INJECTION, SOLUTION INTRAVENOUS at 13:43

## 2018-10-02 RX ADMIN — SCOPALAMINE 1 PATCH: 1 PATCH, EXTENDED RELEASE TRANSDERMAL at 13:03

## 2018-10-02 RX ADMIN — ONDANSETRON 4 MG: 2 INJECTION INTRAMUSCULAR; INTRAVENOUS at 13:46

## 2018-10-02 RX ADMIN — FENTANYL CITRATE 50 MCG: 50 INJECTION INTRAMUSCULAR; INTRAVENOUS at 14:27

## 2018-10-02 RX ADMIN — FENTANYL CITRATE 50 MCG: 50 INJECTION INTRAMUSCULAR; INTRAVENOUS at 14:04

## 2018-10-02 RX ADMIN — MIDAZOLAM HYDROCHLORIDE 2 MG: 1 INJECTION, SOLUTION INTRAMUSCULAR; INTRAVENOUS at 13:09

## 2018-10-02 RX ADMIN — CEFAZOLIN SODIUM 500 MG: 2 SOLUTION INTRAVENOUS at 13:11

## 2018-10-02 RX ADMIN — TRAMADOL HYDROCHLORIDE 50 MG: 50 TABLET, FILM COATED ORAL at 15:39

## 2018-10-02 RX ADMIN — CEFAZOLIN SODIUM 1500 MG: 2 SOLUTION INTRAVENOUS at 13:19

## 2018-10-02 RX ADMIN — FENTANYL CITRATE 25 MCG: 50 INJECTION INTRAMUSCULAR; INTRAVENOUS at 13:19

## 2018-10-02 RX ADMIN — PROPOFOL 200 MG: 10 INJECTION, EMULSION INTRAVENOUS at 13:15

## 2018-10-02 RX ADMIN — FENTANYL CITRATE 50 MCG: 50 INJECTION INTRAMUSCULAR; INTRAVENOUS at 13:15

## 2018-10-02 RX ADMIN — FENTANYL CITRATE 25 MCG: 50 INJECTION INTRAMUSCULAR; INTRAVENOUS at 13:53

## 2018-10-02 NOTE — DISCHARGE INSTRUCTIONS
Keep dressings clean and dry for 2- 3 days  May remove dressings in 2 days  May shower if incisions dry  Please clean incisions with alcohol after showers  Weight bearing as tolerated  Apply band-aide if needed  Utilize naproxen or Aleve as her primary source of pain relief use the pain medicines prescribed if needed and sparingly

## 2018-10-02 NOTE — H&P (VIEW-ONLY)
Assessment/Plan:  1  Tear of medial meniscus of right knee, current, unspecified tear type, subsequent encounter  Case request operating room: ARTHROSCOPY RIGHT KNEE WITH PARTIAL MEDIAL MENISCECTOMY    Basic metabolic panel    CBC and differential    XR chest pa & lateral    Case request operating room: ARTHROSCOPY RIGHT KNEE WITH PARTIAL MEDIAL MENISCECTOMY   2  Pes anserinus bursitis of right knee     3  Arthritis of right knee       Patient Active Problem List   Diagnosis    Encounter for gynecological examination (general) (routine) without abnormal findings    B12 deficiency    Chronic cough    Chronic rhinitis    Diffuse connective tissue disease ([address] )    Dyspnea    Edema    Essential hypertension    Hot flashes due to menopause    Long-term use of hydroxychloroquine    Lupus    Lupus anticoagulant positive    Tachycardia    SOB (shortness of breath) on exertion    SOB (shortness of breath)    Sinus tachycardia    Secondary pulmonary hypertension    Pulmonary hypertension (HCC)    Post-nasal drip    Pes anserine bursitis    Positive HELENE (antinuclear antibody)    Other chronic pulmonary heart diseases    Osteoporosis    Night sweats    Patellofemoral disorder of right knee    Pes anserinus bursitis of right knee    Arthritis of right knee    Other tear of medial meniscus, current injury, right knee, initial encounter    Tear of medial meniscus of right knee, current       52year old female with right knee medial meniscus tear As well as pes anserine bursitis of the right knee  With increasing pain in the right knee, as well as having failed conservative treatment measures for medial meniscus tear of the right knee, she wishes to proceed with right knee arthroscopy  Doctor Dora Weinstein explained the benefits and risks this procedure in detail with the patient, as well as possible need for additional procedures intraoperatively    Patient understands and consented to this procedure pending standard preop testing   Including chest x-ray for her pulmonary hypertension  She reports her rheumatologist wants her on injectable anticoagulant perioperatively as she is lupus anticoagulant positive, if insurance doesn't cover injectable form she may benefit form ASA 325mg BID  She reports her rheumatologist will be ordering and managing these anticoagulants  She will be contacted prior to surgery for management of her other medications by nursing, can do sips with medication  She was advised to hold plaquenil day of surgery  She reports she has tolerated cephalosporins in the past and as such will use ancef day of surgery  She declined any treatment for right pes anserine bursitis today, declined corticosteroid injection   In this area  The assessment and plan were formulated by Dr Mary Rm and I assisted in carrying it out  Subjective:   Patient ID: Chidi Chamorro is a 52 y o  female   HPI    Pt with history of right medial meniscus tear and right pes anserine bursitis presents to the office with increasing right knee pain  At her last visit she had already tried conservative treatments for the right medial meniscus including therapy and corticosteroid injections, was having significant pain at that time but uncertain of whether she wanted to proceed with arthroscopic surgery  Today she reports a fall on the knee last week, was not seen for this and has had increased pain but not instability since that fall  She reports the pain still as moderate, over the medial and anteromedial knee primarily  She denies any numbness tingling fevers or chills  Was offered a pes anserine bursa CSI last visit but declined      The following portions of the patient's history were reviewed and updated as appropriate: allergies, current medications, past family history, past social history, past surgical history and problem list     Social History     Social History    Marital status: /Civil Union Spouse name: N/A    Number of children: 2    Years of education: N/A     Occupational History    Not on file       Social History Main Topics    Smoking status: Former Smoker     Quit date: 1/24/2006    Smokeless tobacco: Never Used    Alcohol use Yes      Comment: social    Drug use: No    Sexual activity: Not on file     Other Topics Concern    Not on file     Social History Narrative    Daily caffeinated coffee consumption    Exercise habits         Past Medical History:   Diagnosis Date    HELENE positive     Encephalitis     Lasted Assessed 10/18/2017    Endometrial hyperplasia     Last assessed 10/18/2017    Hypertension     Lupus anticoagulant disorder (HCC)     Lupus anticoagulant positive     Malignant neoplasm of cervix uteri ([address] )     Maxillary sinusitis     Lasted Assessed 10/18/2017    Night sweat     Osteopenia     Hip    Osteoporosis     Spine    Pneumonia     Last Assessed 10/18/2017    Secondary pulmonary arterial hypertension (HCC)     Shortness of breath     Sinus tachycardia     Urinary incontinence      Past Surgical History:   Procedure Laterality Date    ANKLE FRACTURE SURGERY      ARTHRODESIS      Hand: IP Joint    COLPOSCOPY      GALLBLADDER SURGERY      HYSTERECTOMY      Vaginal    LAPAROSCOPIC ESOPHAGOGASTRIC FUNDOPLASTY  2008    REDUCTION MAMMAPLASTY      TONSILLECTOMY       Allergies   Allergen Reactions    Dilantin [Phenytoin] Anaphylaxis and Rash    Augmentin [Amoxicillin-Pot Clavulanate] Rash    Penicillins Rash     Current Outpatient Prescriptions on File Prior to Visit   Medication Sig Dispense Refill    aspirin (ECOTRIN LOW STRENGTH) 81 mg EC tablet Take 162 mg by mouth daily      cholecalciferol (VITAMIN D3) 1,000 units tablet Take 2,000 Units by mouth daily      Cyanocobalamin (VITAMIN B-12 IJ) Inject 1,000 mcg/mL as directed every 14 (fourteen) days      hydroxychloroquine (PLAQUENIL) 200 mg tablet Take 400 mg by mouth daily      Macitentan (OPSUMIT) 10 MG tablet Take 1 tablet (10 mg total) by mouth daily 30 tablet 10    potassium chloride (K-DUR,KLOR-CON) 20 mEq tablet Take 2 tablets (40 mEq total) by mouth daily 240 tablet 3    sildenafil (REVATIO) 20 mg tablet Take 20 mg by mouth 3 (three) times a day      torsemide (DEMADEX) 20 mg tablet Take 2 tablets (40 mg total) by mouth daily (Patient taking differently: Take 40 mg by mouth 2 (two) times a day  ) 240 tablet 3     Current Facility-Administered Medications on File Prior to Visit   Medication Dose Route Frequency Provider Last Rate Last Dose    betamethasone acetate-betamethasone sodium phosphate (CELESTONE) injection 6 mg  6 mg Intra-articular  Mihai Hammers, DO   6 mg at 06/04/18 1659    bupivacaine (MARCAINE) 0 25 % injection 2 mL  2 mL Injection  Mihai Hammers, DO   2 mL at 06/04/18 1659    lidocaine (XYLOCAINE) 1 % injection 1 mL  1 mL Injection  Mihai Hammers, DO   1 mL at 06/04/18 1659       Review of Systems - Negative except as noted in HPI      Objective:    Vitals:    09/10/18 1037   BP: 108/74   Pulse: 98       Physical Exam   Constitutional: She is oriented to person, place, and time  She appears well-developed and well-nourished  HENT:   Head: Normocephalic and atraumatic  Eyes: Conjunctivae are normal  No scleral icterus  Neck: Neck supple  Pulmonary/Chest: Effort normal  No stridor  No respiratory distress  Abdominal: She exhibits no distension  Musculoskeletal:        Right knee: She exhibits effusion (mild effusion)  Neurological: She is alert and oriented to person, place, and time  Skin: Skin is warm and dry  No erythema  Psychiatric: She has a normal mood and affect  Her behavior is normal        Right Knee Exam     Tenderness   The patient is experiencing tenderness in the medial joint line and pes anserinus  Range of Motion   Extension: normal   Flexion: abnormal Right knee flexion: slightly limited by pain       Tests   Hussain:  Medial - positive Lateral - negative  Lachman:  Anterior - negative      Drawer:       Anterior - negative    Posterior - negative  Varus: negative  Valgus: positive (pain no laxity)    Other   Erythema: absent  Scars: absent  Sensation: normal  Pulse: present  Swelling: mild  Other tests: effusion (mild effusion) present    Comments:  No ecchymosis or noticeable deformity present            I have personally reviewed pertinent films in PACS  Procedures  No Procedures performed today    Portions of the record may have been created with voice recognition software   Occasional wrong word or "sound a like" substitutions may have occurred due to the inherent limitations of voice recognition software   Read the chart carefully and recognize, using context, where substitutions have occurred

## 2018-10-02 NOTE — ANESTHESIA POSTPROCEDURE EVALUATION
Post-Op Assessment Note      CV Status:  Stable    Mental Status:  Alert and awake    Hydration Status:  Euvolemic    PONV Controlled:  Controlled    Airway Patency:  Patent    Post Op Vitals Reviewed: Yes          Staff: CRNA           BP   117/58   Temp   98 5   Pulse  97   Resp   14   SpO2   97

## 2018-10-02 NOTE — ANESTHESIA PREPROCEDURE EVALUATION
Review of Systems/Medical History  Patient summary reviewed  Chart reviewed  History of anesthetic complications PONV    Cardiovascular  Hypertension controlled,   Pulmonary hypertension Pulmonary  Negative pulmonary ROS        GI/Hepatic  Negative GI/hepatic ROS          Negative  ROS        Endo/Other    Comment: Lupus    GYN  Negative gynecology ROS          Hematology  Anemia ,  Hypercoagulable state,   Comment: Lupus anticoagulant, takes two 81mg ASA daily Musculoskeletal    Comment: Right knee meniscus tear Arthritis     Neurology  Negative neurology ROS      Psychology   Negative psychology ROS              Physical Exam    Airway    Mallampati score: II  TM Distance: >3 FB  Neck ROM: full     Dental   No notable dental hx     Cardiovascular  Rhythm: regular, Rate: normal, Cardiovascular exam normal    Pulmonary  Pulmonary exam normal Breath sounds clear to auscultation,     Other Findings        Anesthesia Plan  ASA Score- 3     Anesthesia Type- general with ASA Monitors  Additional Monitors:   Airway Plan: LMA  Plan Factors-    Induction- intravenous  Postoperative Plan- Plan for postoperative opioid use  Informed Consent- Anesthetic plan and risks discussed with patient  I personally reviewed this patient with the CRNA  Discussed and agreed on the Anesthesia Plan with the CRNA  Brooke Farrar Recent labs personally reviewed:  Lab Results   Component Value Date    WBC 3 87 (L) 09/10/2018    HGB 11 7 09/10/2018     09/10/2018     Lab Results   Component Value Date     09/10/2018    K 3 8 09/10/2018    BUN 6 09/10/2018    CREATININE 1 05 09/10/2018     No results found for: PTT   Lab Results   Component Value Date    INR 0 99 12/19/2017     I, Arlin Tomas MD, have personally seen and evaluated the patient prior to anesthetic care  I have reviewed the pre-anesthetic record, and other medical records if appropriate to the anesthetic care    If a CRNA is involved in the case, I have reviewed the CRNA assessment, if present, and agree  Risks/benefits and alternatives discussed with patient including possible PONV, sore throat, and possibility of rare anesthetic and surgical emergencies

## 2018-10-02 NOTE — OP NOTE
OPERATIVE REPORT  PATIENT NAME: Donzella Cogan    :  1965  MRN: [id number]  Pt Location: [facility]    SURGERY DATE: 10/2/2018    Surgeon(s) and Role:     DO Marc Orourke Primary    Preop Diagnosis:  Tear of medial meniscus of right knee, current, unspecified tear type, subsequent encounter [S35 241D]    Post-Op Diagnosis Codes:     * Tear of medial meniscus of right knee, current, unspecified tear type, subsequent encounter [I90 979D]    Procedure(s) (LRB):  KNEE ARTHROSCOPY WITH PARTIAL MEDIAL MENISCECTOMY (Right)    Specimen(s):  * No specimens in log *    Estimated Blood Loss:   Minimal    Drains:       Anesthesia Type:   General    Operative Indications:  Tear of medial meniscus of right knee, current, unspecified tear type, subsequent encounter [Y08 622D]      Operative Findings:   Complex medial Meniscus tear 1 changes arthritis grade 2 changes patellofemoral joint    Complications:   None    Procedure and Technique:  Patient was seen and examined in the preoperative area  The right lower extremity was marked, the consent an H&P had been reviewed  The patient was brought back to the operative suite  The patient was intubated sedated  The right lower extremity was prepped and draped in a sterile fashion  After proper timeout commenced and identified the right lower extremity as the operative site  Injection of quarter percent Marcaine with epinephrine was injected in both medial lateral portal site  Incision over the lateral portal was made with eleven blade  We performed a diagnostic arthroscopy  We used the arthroscope and spinal needle to located our medial portal, and made incision with 11 blade and dilated with hemostat  We completed our diagnostic arthroscopy  We found grade 1 changes medial joint open grade 2 changes patellofemoral joint with complex medial meniscus tear  We debrided our meniscus back to a stable by using electro cautery device, shaver, and biters    We copiously irrigated the wound  We closed the incision with 3-0 nylon  Xeroform was applied to the incisions  4 x 4's ABDs , web roll and an Ace wrap were applied to right lower extremity  The patient was taken back to PACU after anesthesia was reversed        I was present for the entire procedure and A qualified resident physician was not available    Patient Disposition:  PACU     SIGNATURE: Gadiel Wade DO  DATE: October 2, 2018  TIME: 1:03 PM

## 2018-10-17 ENCOUNTER — OFFICE VISIT (OUTPATIENT)
Dept: OBGYN CLINIC | Facility: CLINIC | Age: 53
End: 2018-10-17

## 2018-10-17 VITALS
HEART RATE: 109 BPM | BODY MASS INDEX: 34.83 KG/M2 | WEIGHT: 204 LBS | HEIGHT: 64 IN | DIASTOLIC BLOOD PRESSURE: 67 MMHG | SYSTOLIC BLOOD PRESSURE: 109 MMHG

## 2018-10-17 DIAGNOSIS — Z98.890 S/P ARTHROSCOPIC PARTIAL MEDIAL MENISCECTOMY: Primary | ICD-10-CM

## 2018-10-17 PROCEDURE — 99024 POSTOP FOLLOW-UP VISIT: CPT | Performed by: ORTHOPAEDIC SURGERY

## 2018-10-17 NOTE — PROGRESS NOTES
Assessment:  1  S/P arthroscopic partial medial meniscectomy       Patient Active Problem List   Diagnosis    Encounter for gynecological examination (general) (routine) without abnormal findings    B12 deficiency    Chronic cough    Chronic rhinitis    Diffuse connective tissue disease ([address] )    Dyspnea    Edema    Essential hypertension    Hot flashes due to menopause    Long-term use of hydroxychloroquine    Lupus    Lupus anticoagulant positive    Tachycardia    SOB (shortness of breath) on exertion    SOB (shortness of breath)    Sinus tachycardia    Secondary pulmonary hypertension    Pulmonary hypertension (HCC)    Post-nasal drip    Pes anserine bursitis    Positive HELENE (antinuclear antibody)    Other chronic pulmonary heart diseases    Osteoporosis    Night sweats    Patellofemoral disorder of right knee    Pes anserinus bursitis of right knee    Arthritis of right knee    Other tear of medial meniscus, current injury, right knee, initial encounter    Tear of medial meniscus of right knee, current           Plan      Discussed post operative symptoms with patient  She will do HEP instead of formal physical therapy  She is to do activities to tolerance  She is to remain out of work until next visit  I will see patient in the office x 2 weeks for post op check of right knee   Subjective:     Patient ID:    Chief Complaint:Na ORTIZ Jake 52 y o  female in office she is s/p Arthroscopy partial medial meniscectomy of the right knee  10/2/18  She states her pain level is a 4/10  She is very active and does over do it at times  She states knee does feel much improved after surgery than before surgery  She states she has pain when walking up and down steps                  The following portions of the patient's history were reviewed and updated as appropriate: allergies, current medications, past family history, past social history, past surgical history and problem list     All organ systems normal    Social History     Social History    Marital status: /Civil Union     Spouse name: N/A    Number of children: 2    Years of education: N/A     Occupational History    Not on file       Social History Main Topics    Smoking status: Former Smoker     Quit date: 1/24/2006    Smokeless tobacco: Never Used    Alcohol use Yes      Comment: socially    Drug use: No    Sexual activity: Not on file     Other Topics Concern    Not on file     Social History Narrative    Daily caffeinated coffee consumption    Exercise habits         Past Medical History:   Diagnosis Date    HELENE positive     Anemia     Sildenafil causes decreased hematocrit    Encephalitis     Lasted Assessed 10/18/2017    Lupus anticoagulant disorder [address] [address]     Maxillary sinusitis     Lasted Assessed 10/18/2017    Night sweat     Osteopenia     Hip    Osteoporosis     Spine    Pneumonia     Last Assessed 10/18/2017    PONV (postoperative nausea and vomiting)     Pulmonary hypertension (HCC)     Seizures (HCC)     Only during Encephalitis    Shortness of breath     Sinus tachycardia     Urinary incontinence      Past Surgical History:   Procedure Laterality Date    ARTHRODESIS      Hand: IP Joint    CHOLECYSTECTOMY      COLONOSCOPY      COLPOSCOPY      HYSTERECTOMY      Vaginal    LAPAROSCOPIC ESOPHAGOGASTRIC FUNDOPLASTY  2008    CA KNEE SCOPE,SINGLE MENISECTOMY Right 10/2/2018    Procedure: KNEE ARTHROSCOPY WITH PARTIAL MEDIAL MENISCECTOMY;  Surgeon: Janice Roman DO;  [facility];  Service: Orthopedics    REDUCTION MAMMAPLASTY      REPAIR ANKLE LIGAMENT Right     TONSILLECTOMY       Allergies   Allergen Reactions    Dilantin [Phenytoin] Anaphylaxis and Rash    Augmentin [Amoxicillin-Pot Clavulanate] Rash    Penicillins Rash     Current Outpatient Prescriptions on File Prior to Visit   Medication Sig Dispense Refill    ASPIRIN PO Take 162 mg by mouth daily in the early morning        cholecalciferol (VITAMIN D3) 1,000 units tablet Take 2,000 Units by mouth daily in the early morning        Cyanocobalamin (VITAMIN B-12 IJ) Inject 1,000 mcg/mL as directed every 14 (fourteen) days      hydroxychloroquine (PLAQUENIL) 200 mg tablet Take 400 mg by mouth daily with breakfast        Macitentan (OPSUMIT) 10 MG tablet Take 1 tablet (10 mg total) by mouth daily (Patient taking differently: Take 10 mg by mouth daily in the early morning  ) 30 tablet 10    potassium chloride (K-DUR,KLOR-CON) 20 mEq tablet Take 2 tablets (40 mEq total) by mouth daily (Patient taking differently: Take 20 mEq by mouth 2 (two) times a day  ) 240 tablet 3    sildenafil (REVATIO) 20 mg tablet Take 20 mg by mouth 3 (three) times a day      torsemide (DEMADEX) 20 mg tablet Take 2 tablets (40 mg total) by mouth daily (Patient taking differently: Take 20 mg by mouth daily  ) 240 tablet 3    traMADol (ULTRAM) 50 mg tablet Take 1 tablet (50 mg total) by mouth every 6 (six) hours as needed for moderate pain or severe pain 15 tablet 0     Current Facility-Administered Medications on File Prior to Visit   Medication Dose Route Frequency Provider Last Rate Last Dose    betamethasone acetate-betamethasone sodium phosphate (CELESTONE) injection 6 mg  6 mg Intra-articular  Shaila Valerie, DO   6 mg at 06/04/18 1659    bupivacaine (MARCAINE) 0 25 % injection 2 mL  2 mL Injection  Shaila Valerie, DO   2 mL at 06/04/18 1659    lidocaine (XYLOCAINE) 1 % injection 1 mL  1 mL Injection  Shaila Valerie, DO   1 mL at 06/04/18 1659              Objective:        Right Knee Exam     Tenderness   The patient is experiencing tenderness in the medial joint line      Range of Motion   Extension: 0   Flexion: normal     Tests   Hussain:  Medial - negative Lateral - negative    Other   Erythema: absent  Scars: present  Sensation: normal  Pulse: present  Swelling: none  Other tests: no effusion present    Comments:  Incision sites CDI , no erythema , no edema, no sign of infection  Scribe Attestation    I,:   Ela Augustine am acting as a scribe while in the presence of the attending physician :        I,:   Soha Morrissey, DO personally performed the services described in this documentation    as scribed in my presence :          Portions of the record may have been created with voice recognition software   Occasional wrong word or "sound a like" substitutions may have occurred due to the inherent limitations of voice recognition software   Read the chart carefully and recognize, using context, where substitutions have occurred

## 2018-10-17 NOTE — LETTER
October 17, 2018     Patient: Jessica Thomson   YOB: 1965   Date of Visit: 10/17/2018       To Whom it May Concern:    Molly Dominguez is under my professional care  She was seen in my office on 10/17/2018  She is to remain out of work until next post op visit x 2 weeks  If you have any questions or concerns, please don't hesitate to call           Sincerely,          Ashley Gtz DO        CC: Jessica Thomson

## 2018-10-24 RX ORDER — AZELASTINE 1 MG/ML
1 SPRAY, METERED NASAL
COMMUNITY
End: 2018-11-13

## 2018-10-24 RX ORDER — OMEPRAZOLE 20 MG/1
20 CAPSULE, DELAYED RELEASE ORAL
COMMUNITY
End: 2018-11-21

## 2018-10-24 RX ORDER — FUROSEMIDE 20 MG/1
20 TABLET ORAL
COMMUNITY
End: 2018-11-13

## 2018-10-31 ENCOUNTER — OFFICE VISIT (OUTPATIENT)
Dept: OBGYN CLINIC | Facility: CLINIC | Age: 53
End: 2018-10-31

## 2018-10-31 VITALS
WEIGHT: 204 LBS | HEIGHT: 64 IN | HEART RATE: 92 BPM | DIASTOLIC BLOOD PRESSURE: 83 MMHG | BODY MASS INDEX: 34.83 KG/M2 | SYSTOLIC BLOOD PRESSURE: 123 MMHG

## 2018-10-31 DIAGNOSIS — M17.11 ARTHRITIS OF RIGHT KNEE: ICD-10-CM

## 2018-10-31 DIAGNOSIS — S83.241D TEAR OF MEDIAL MENISCUS OF RIGHT KNEE, CURRENT, UNSPECIFIED TEAR TYPE, SUBSEQUENT ENCOUNTER: Primary | ICD-10-CM

## 2018-10-31 PROCEDURE — 99024 POSTOP FOLLOW-UP VISIT: CPT | Performed by: ORTHOPAEDIC SURGERY

## 2018-10-31 NOTE — LETTER
October 31, 2018     Patient: Christina Moreno   YOB: 1965   Date of Visit: 10/31/2018       To Whom it May Concern:    Penelope Medina is under my professional care  She was seen in my office on 10/31/2018  She may return to work on 11/12/2018 full duty no restrictions  If you have any questions or concerns, please don't hesitate to call           Sincerely,          Beryl Galindo,         CC: No Recipients

## 2018-10-31 NOTE — PROGRESS NOTES
Assessment:  1  Tear of medial meniscus of right knee, current, unspecified tear type, subsequent encounter  Injection procedure prior authorization   2  Arthritis of right knee  Injection procedure prior authorization     Patient Active Problem List   Diagnosis    Encounter for gynecological examination (general) (routine) without abnormal findings    B12 deficiency    Chronic cough    Chronic rhinitis    Diffuse connective tissue disease ([address] )    Dyspnea    Edema    Essential hypertension    Hot flashes due to menopause    Long-term use of hydroxychloroquine    Lupus    Lupus anticoagulant positive    Tachycardia    SOB (shortness of breath) on exertion    SOB (shortness of breath)    Sinus tachycardia    Secondary pulmonary hypertension    Pulmonary hypertension (HCC)    Post-nasal drip    Pes anserine bursitis    Positive HELENE (antinuclear antibody)    Other chronic pulmonary heart diseases    Osteoporosis    Night sweats    Patellofemoral disorder of right knee    Pes anserinus bursitis of right knee    Arthritis of right knee    Other tear of medial meniscus, current injury, right knee, initial encounter    Tear of medial meniscus of right knee, current           Plan       follow up with us once we have the lubricant injections approved  Patient has also been instructed on eccentric strengthening            Subjective:     Patient ID:    Chief Complaint:Na Joseph 52 y o  female      HPI     patient comes in today review guarding her right knee she had an arthroscopic surgery done over 4 weeks ago  She is healing nicely she has had improvements but today feels worse than it had been it feels about the same as before the surgery    Pain is on the inside of the knee on the medial joint line        Social History     Social History    Marital status: /Civil Union     Spouse name: N/A    Number of children: 2    Years of education: N/A     Occupational History    Not on file      Social History Main Topics    Smoking status: Former Smoker     Quit date: 1/24/2006    Smokeless tobacco: Never Used    Alcohol use Yes      Comment: socially    Drug use: No    Sexual activity: Not on file     Other Topics Concern    Not on file     Social History Narrative    Daily caffeinated coffee consumption    Exercise habits         Past Medical History:   Diagnosis Date    EHLENE positive     Anemia     Sildenafil causes decreased hematocrit    Encephalitis     Lasted Assessed 10/18/2017    Lupus anticoagulant disorder ([address] )     Maxillary sinusitis     Lasted Assessed 10/18/2017    Night sweat     Osteopenia     Hip    Osteoporosis     Spine    Pneumonia     Last Assessed 10/18/2017    PONV (postoperative nausea and vomiting)     Pulmonary hypertension (HCC)     Seizures (HCC)     Only during Encephalitis    Shortness of breath     Sinus tachycardia     Urinary incontinence      Past Surgical History:   Procedure Laterality Date    ARTHRODESIS      Hand: IP Joint    CHOLECYSTECTOMY      COLONOSCOPY      COLPOSCOPY      HYSTERECTOMY      Vaginal    LAPAROSCOPIC ESOPHAGOGASTRIC FUNDOPLASTY  2008    RI KNEE SCOPE,SINGLE MENISECTOMY Right 10/2/2018    Procedure: KNEE ARTHROSCOPY WITH PARTIAL MEDIAL MENISCECTOMY;  Surgeon: Melissa Bunn DO;  [facility];  Service: Orthopedics    REDUCTION MAMMAPLASTY      REPAIR ANKLE LIGAMENT Right     TONSILLECTOMY       Allergies   Allergen Reactions    Dilantin [Phenytoin] Anaphylaxis and Rash    Augmentin [Amoxicillin-Pot Clavulanate] Rash    Penicillins Rash     Current Outpatient Prescriptions on File Prior to Visit   Medication Sig Dispense Refill    ASPIRIN PO Take 162 mg by mouth daily in the early morning        azelastine (ASTELIN) 0 1 % nasal spray 1 spray      cholecalciferol (VITAMIN D3) 1,000 units tablet Take 2,000 Units by mouth daily in the early morning        Cyanocobalamin (VITAMIN B-12 IJ) Inject 1,000 mcg/mL as directed every 14 (fourteen) days      enoxaparin (LOVENOX) 40 mg/0 4 mL Inject 40 mg under the skin daily      Fluticasone Propionate, Inhal, 100 MCG/BLIST AEPB 2 puffs into each nostril      fluticasone-salmeterol (ADVAIR) 500-50 mcg/dose inhaler 1 puff      furosemide (LASIX) 20 mg tablet Take 20 mg by mouth      hydroxychloroquine (PLAQUENIL) 200 mg tablet Take 400 mg by mouth daily with breakfast        Macitentan (OPSUMIT) 10 MG tablet Take 1 tablet (10 mg total) by mouth daily (Patient taking differently: Take 10 mg by mouth daily in the early morning  ) 30 tablet 10    omeprazole (PriLOSEC) 20 mg delayed release capsule Take 20 mg by mouth      potassium chloride (K-DUR,KLOR-CON) 20 mEq tablet Take 2 tablets (40 mEq total) by mouth daily (Patient taking differently: Take 20 mEq by mouth 2 (two) times a day  ) 240 tablet 3    sildenafil (REVATIO) 20 mg tablet Take 20 mg by mouth 3 (three) times a day      tiotropium (SPIRIVA) 18 mcg inhalation capsule 18 mcg      torsemide (DEMADEX) 20 mg tablet Take 2 tablets (40 mg total) by mouth daily (Patient taking differently: Take 20 mg by mouth daily  ) 240 tablet 3    traMADol (ULTRAM) 50 mg tablet Take 1 tablet (50 mg total) by mouth every 6 (six) hours as needed for moderate pain or severe pain 15 tablet 0     Current Facility-Administered Medications on File Prior to Visit   Medication Dose Route Frequency Provider Last Rate Last Dose    betamethasone acetate-betamethasone sodium phosphate (CELESTONE) injection 6 mg  6 mg Intra-articular  Goddard Kramer, DO   6 mg at 06/04/18 1659    bupivacaine (MARCAINE) 0 25 % injection 2 mL  2 mL Injection  Goddard Kramer, DO   2 mL at 06/04/18 1659    lidocaine (XYLOCAINE) 1 % injection 1 mL  1 mL Injection  Goddard Kramer, DO   1 mL at 06/04/18 1659              Objective:    Review of Systems    Ortho Exam   no effusion or ecchymosis the incisions are healed very nicely she still has some scar tissue there  Her range of motion is within normal limits  She is painful on the medial joint line  Seems to be arthritic in nature    Physical Exam

## 2018-11-13 ENCOUNTER — TELEPHONE (OUTPATIENT)
Dept: OBGYN CLINIC | Facility: HOSPITAL | Age: 53
End: 2018-11-13

## 2018-11-13 ENCOUNTER — OFFICE VISIT (OUTPATIENT)
Dept: FAMILY MEDICINE CLINIC | Facility: OTHER | Age: 53
End: 2018-11-13
Payer: COMMERCIAL

## 2018-11-13 VITALS
HEIGHT: 63 IN | OXYGEN SATURATION: 98 % | HEART RATE: 106 BPM | WEIGHT: 202.56 LBS | TEMPERATURE: 99.8 F | BODY MASS INDEX: 35.89 KG/M2 | SYSTOLIC BLOOD PRESSURE: 120 MMHG | DIASTOLIC BLOOD PRESSURE: 70 MMHG

## 2018-11-13 DIAGNOSIS — M35.9 DIFFUSE CONNECTIVE TISSUE DISEASE (HCC): ICD-10-CM

## 2018-11-13 DIAGNOSIS — I10 ESSENTIAL HYPERTENSION: Primary | ICD-10-CM

## 2018-11-13 DIAGNOSIS — Z12.39 BREAST CANCER SCREENING: ICD-10-CM

## 2018-11-13 DIAGNOSIS — I27.20 PULMONARY HYPERTENSION (HCC): ICD-10-CM

## 2018-11-13 PROBLEM — E79.0 HYPERURICEMIA: Status: ACTIVE | Noted: 2018-05-22

## 2018-11-13 PROBLEM — G89.29 CHRONIC PAIN OF RIGHT KNEE: Status: ACTIVE | Noted: 2018-05-22

## 2018-11-13 PROBLEM — R79.89 ELEVATED SERUM CREATININE: Status: ACTIVE | Noted: 2018-05-22

## 2018-11-13 PROBLEM — M70.62 TROCHANTERIC BURSITIS OF BOTH HIPS: Status: ACTIVE | Noted: 2017-11-21

## 2018-11-13 PROBLEM — E55.9 VITAMIN D DEFICIENCY: Status: ACTIVE | Noted: 2017-11-21

## 2018-11-13 PROBLEM — M10.9 GOUT: Status: ACTIVE | Noted: 2018-06-26

## 2018-11-13 PROBLEM — M25.561 CHRONIC PAIN OF RIGHT KNEE: Status: ACTIVE | Noted: 2018-05-22

## 2018-11-13 PROBLEM — M70.61 TROCHANTERIC BURSITIS OF BOTH HIPS: Status: ACTIVE | Noted: 2017-11-21

## 2018-11-13 PROCEDURE — 3078F DIAST BP <80 MM HG: CPT | Performed by: FAMILY MEDICINE

## 2018-11-13 PROCEDURE — 1036F TOBACCO NON-USER: CPT | Performed by: FAMILY MEDICINE

## 2018-11-13 PROCEDURE — 3074F SYST BP LT 130 MM HG: CPT | Performed by: FAMILY MEDICINE

## 2018-11-13 PROCEDURE — 99213 OFFICE O/P EST LOW 20 MIN: CPT | Performed by: FAMILY MEDICINE

## 2018-11-13 NOTE — TELEPHONE ENCOUNTER
Patient sees Dr López Verdugo  She is calling because she was given an order for gel injections but they have not been approved yet  She is asking what she can do for the pain until she gets the injections? She is stating that she is not sleeping because of the pain as well

## 2018-11-13 NOTE — PROGRESS NOTES
Subjective:      Patient ID: Christina Moreno is a 52 y o  female  Hypertension   This is a chronic problem  The current episode started more than 1 year ago  The problem has been rapidly improving since onset  The problem is controlled  Associated symptoms include malaise/fatigue, peripheral edema (slight) and shortness of breath (occasional)  Pertinent negatives include no anxiety, blurred vision, chest pain, headaches, neck pain, orthopnea, palpitations, PND or sweats  There are no associated agents to hypertension  Risk factors for coronary artery disease include family history, obesity, sedentary lifestyle, stress and post-menopausal state  Past treatments include diuretics  The current treatment provides moderate improvement  There are no compliance problems  There is no history of kidney disease, CAD/MI, CVA, heart failure or retinopathy  There is no history of chronic renal disease, a hypertension causing med or a thyroid problem  Diffuse connective tissue disease  This is a chronic problem  Patient is currently under the care of [facility], Dr Chelly Barrett  As of 8/31/2018 office visit, UCTD is under control and there was no evidence of progression to SLE  She was advised to continue her current medication regimen of Plaqueni and Opsumit  Pulmonary hypertension  This is a chronic problem  Patient follows regularly with Cardiology, Dr Gonsales Offer  She is on aspirin for positive lupus anticoagulant and anticardiolipin antibodies  RHC showed exercise-induced pulmonary hypertension which is stable at this time  Patient has a history of intermittent dyspnea particularly with exertion, this is stable at the present time  Patient is maintained on sildenafil and torsemide          The following portions of the patient's history were reviewed and updated as appropriate: allergies, current medications, past family history, past medical history, past social history, past surgical history and problem list       Current Outpatient Prescriptions:     ASPIRIN PO, Take 162 mg by mouth daily in the early morning  , Disp: , Rfl:     cholecalciferol (VITAMIN D3) 1,000 units tablet, Take 2,000 Units by mouth daily in the early morning  , Disp: , Rfl:     Cyanocobalamin (VITAMIN B-12 IJ), Inject 1,000 mcg/mL as directed every 14 (fourteen) days, Disp: , Rfl:     hydroxychloroquine (PLAQUENIL) 200 mg tablet, Take 400 mg by mouth daily with breakfast  , Disp: , Rfl:     Macitentan (OPSUMIT) 10 MG tablet, Take 1 tablet (10 mg total) by mouth daily (Patient taking differently: Take 10 mg by mouth daily in the early morning  ), Disp: 30 tablet, Rfl: 10    omeprazole (PriLOSEC) 20 mg delayed release capsule, Take 20 mg by mouth, Disp: , Rfl:     potassium chloride (K-DUR,KLOR-CON) 20 mEq tablet, Take 2 tablets (40 mEq total) by mouth daily (Patient taking differently: Take 20 mEq by mouth 2 (two) times a day  ), Disp: 240 tablet, Rfl: 3    sildenafil (REVATIO) 20 mg tablet, Take 20 mg by mouth 3 (three) times a day, Disp: , Rfl:     torsemide (DEMADEX) 20 mg tablet, Take 2 tablets (40 mg total) by mouth daily (Patient taking differently: Take 20 mg by mouth daily  ), Disp: 240 tablet, Rfl: 3    Methylprednisolone 4 MG TBPK, Use as directed on package, Disp: 21 tablet, Rfl: 0    traMADol (ULTRAM) 50 mg tablet, Take 1 tablet (50 mg total) by mouth daily at bedtime as needed for moderate pain or severe pain, Disp: 30 tablet, Rfl: 0    Current Facility-Administered Medications:     betamethasone acetate-betamethasone sodium phosphate (CELESTONE) injection 6 mg, 6 mg, Intra-articular, , Padmini Citron, DO, 6 mg at 06/04/18 1659    bupivacaine (MARCAINE) 0 25 % injection 2 mL, 2 mL, Injection, , Padmini Citron, DO, 2 mL at 06/04/18 1659    lidocaine (XYLOCAINE) 1 % injection 1 mL, 1 mL, Injection, , Padmini Citron, DO, 1 mL at 06/04/18 1659      Review of Systems   Constitutional: Positive for malaise/fatigue  Negative for activity change, fatigue and fever  HENT: Negative for congestion, ear pain, sinus pain and sore throat  Eyes: Negative for blurred vision, pain and itching  Respiratory: Positive for shortness of breath (occasional)  Negative for cough  Cardiovascular: Negative for chest pain, palpitations, orthopnea and PND  Gastrointestinal: Negative for abdominal pain, constipation, diarrhea, nausea and vomiting  Endocrine: Negative for cold intolerance and heat intolerance  Genitourinary: Negative for dysuria  Musculoskeletal: Positive for arthralgias (Chronic knee pain, currently following with Ortho)  Negative for myalgias and neck pain  Skin: Negative for color change and rash  Neurological: Negative for dizziness, syncope and headaches  Hematological: Negative for adenopathy  Psychiatric/Behavioral: Negative for behavioral problems, dysphoric mood and sleep disturbance  The patient is not nervous/anxious  Objective:      /70 (BP Location: Right arm, Patient Position: Sitting, Cuff Size: Large)   Pulse (!) 106   Temp 99 8 °F (37 7 °C) (Tympanic)   Ht 5' 2 5" (1 588 m)   Wt 91 9 kg (202 lb 9 oz)   SpO2 98%   BMI 36 46 kg/m²          Physical Exam   Constitutional: She is oriented to person, place, and time  She appears well-developed and well-nourished  No distress  Obese, Body mass index is 36 46 kg/m²  HENT:   Head: Normocephalic and atraumatic  Right Ear: External ear normal    Left Ear: External ear normal    Nose: Nose normal    Mouth/Throat: Oropharynx is clear and moist    Eyes: Pupils are equal, round, and reactive to light  Conjunctivae and EOM are normal  No scleral icterus  Neck: Normal range of motion  Neck supple  No thyromegaly present  Cardiovascular: Normal rate, regular rhythm and normal heart sounds  No murmur heard  Pulmonary/Chest: Effort normal and breath sounds normal  No respiratory distress  She has no wheezes  Abdominal: Soft  Bowel sounds are normal  She exhibits no distension  There is no tenderness  Musculoskeletal: She exhibits edema (trace b/l LE edema)  She exhibits no tenderness  Lymphadenopathy:     She has no cervical adenopathy  Neurological: She is alert and oriented to person, place, and time  No cranial nerve deficit  Coordination normal    Skin: Skin is warm and dry  No rash noted  She is not diaphoretic  No erythema  Psychiatric: She has a normal mood and affect  Her behavior is normal            Assessment/Plan:  Diagnoses and all orders for this visit:    Essential hypertension    Diffuse connective tissue disease ([facility] [address] )    Pulmonary hypertension ([facility] [address] )    Breast cancer screening  -     Mammo screening bilateral w 3d & cad; Future            A 61-year-old female presents for routine follow-up of hypertension  Blood pressure remains stable at this time, no medication changes  She also has a longstanding history of mixed connective tissue disorder/SLE and pulmonary hypertension--both of these conditions are stable  Specialist notes reviewed with patient during visit  Health maintenance up-to-date with the exception of mammogram, order placed  Return in about 6 months (around 5/13/2019) for Annual physical        The patient indicates understanding of these issues and agrees with the plan          Bj Garcia DO

## 2018-11-13 NOTE — TELEPHONE ENCOUNTER
Patient does not have a follow up yet as she was waiting for confirmation on injection coverage  Patient will use an Ace wrap for support in the meantime while she waits for gel injections to be approved

## 2018-11-13 NOTE — TELEPHONE ENCOUNTER
Patient is calling to check on the status of her injections  Dr Chico Ventura ordered them on 10/31  She called [facility] and they stated that they never received an order

## 2018-11-13 NOTE — TELEPHONE ENCOUNTER
Thank you for your help  We will discuss on her upcoming visit  No idea about injections   Braces also have to get fitted

## 2018-11-13 NOTE — TELEPHONE ENCOUNTER
Dr Mary Rm patient - R kneeMedial Meniscus tear    I spoke with patient this morning  I advised ice 20 min on 20 min off, elevation, okay for ace wrap for support, okay for aspercreme with lidocaine gel topically  Patient states she is unable to use NSAIDS due to autoimmune disorder, patient states she is okay for tylenol 1000mg TID (no liver disorders in her hx)  She is wondering if a brace would help  She is coming in to see you with her grandson tomorrow  She can  brace then if you okay this

## 2018-11-15 DIAGNOSIS — S83.241D TEAR OF MEDIAL MENISCUS OF RIGHT KNEE, CURRENT, UNSPECIFIED TEAR TYPE, SUBSEQUENT ENCOUNTER: ICD-10-CM

## 2018-11-15 DIAGNOSIS — M25.561 RIGHT KNEE PAIN, UNSPECIFIED CHRONICITY: Primary | ICD-10-CM

## 2018-11-15 RX ORDER — METHYLPREDNISOLONE 4 MG/1
TABLET ORAL
Qty: 21 TABLET | Refills: 0 | Status: SHIPPED | OUTPATIENT
Start: 2018-11-15 | End: 2018-12-21 | Stop reason: ALTCHOICE

## 2018-11-15 RX ORDER — TRAMADOL HYDROCHLORIDE 50 MG/1
50 TABLET ORAL
Qty: 30 TABLET | Refills: 0 | Status: SHIPPED | OUTPATIENT
Start: 2018-11-15 | End: 2019-09-25 | Stop reason: CLARIF

## 2018-11-15 NOTE — TELEPHONE ENCOUNTER
Called and spoke with the patient  Reached out to staff responsible for [address] [place] [address] prior authorization regarding her Euflexxa injections  They will let us know and we will contact her as soon as they can when she can come in for a flexor for the right knee  Explained to her that we need to try conservative treatments  Before proceeding with total knee arthroplasty  Provided her prescription for tramadol for use at bedtime as needed as well as a Medrol Dosepak for acute pain

## 2018-11-15 NOTE — TELEPHONE ENCOUNTER
Patient called regarding the injection  She wants to know which shot it is that Dr Job Pinto wants to give her, all she knows is it's a "chicken shot"  She wants to know which medication it is  Please call to discuss

## 2018-11-15 NOTE — TELEPHONE ENCOUNTER
Patient called back to tell Dr Ivis Drake that she found out from her insurance company that if she does outpatient total knee it does not need precertification  She would like to do this before end of year if you have an opening  She realizes that the gel injections will be put on hold if total knee is in the near future

## 2018-11-17 ENCOUNTER — TELEPHONE (OUTPATIENT)
Dept: OBGYN CLINIC | Facility: HOSPITAL | Age: 53
End: 2018-11-17

## 2018-11-17 NOTE — TELEPHONE ENCOUNTER
Caller: Judi from [facility]  Patient: Artelia Samples  C/B #: [phone]  Dr Eric Lange called to advise that pre-authorization code  does not require a predetermination

## 2018-11-19 ENCOUNTER — OFFICE VISIT (OUTPATIENT)
Dept: CARDIOLOGY CLINIC | Facility: CLINIC | Age: 53
End: 2018-11-19
Payer: COMMERCIAL

## 2018-11-19 VITALS
DIASTOLIC BLOOD PRESSURE: 62 MMHG | OXYGEN SATURATION: 98 % | HEART RATE: 97 BPM | WEIGHT: 205 LBS | BODY MASS INDEX: 36.9 KG/M2 | SYSTOLIC BLOOD PRESSURE: 124 MMHG

## 2018-11-19 DIAGNOSIS — I27.20 PULMONARY HYPERTENSION (HCC): Primary | ICD-10-CM

## 2018-11-19 PROCEDURE — 99215 OFFICE O/P EST HI 40 MIN: CPT | Performed by: INTERNAL MEDICINE

## 2018-11-19 NOTE — PROGRESS NOTES
Heart Failure Outpatient Progress Note - Al Jay 52 y o  female MRN: [id number]    @ Encounter: [id number]  Assessment/Plan:    # ANSARI: Likely combination of obesity/deconditioning, diastolic dysfunction (obesity, female, currently on diuretics w/ hx of volume overload), and exercise induced PAH  --Continue diet and exercise for weight loss    # Exercised induced PAH: Obesity/deconditioning and diastolic dysfunction (PCWP 12 mmHg) do not fully explain her degree of dysfunction  Recent diagnosis of MCTD/SLE w/ + lupus anticoagulant  Remote smoker  She is at risk to develop pulmonary vascular disease leading to RV dysfunction  At rest, RV appears normal on TTE with coupled RV-PA w/o e/o of RVOT notching suggestive of normal PVR at rest  However, she did have a stress echo in 2012 that was suggestive of exercise induced pulmonary HTN  At the time she did not have e/o of elevated PVR  Her bubble was negative which makes an intracardiac shunt less likely  As her symptoms had progressed, and with ambulation she showed evidence of desaturation (possible abnormal air exchange) we did the following:   Diag:  --PFTs were normal   --Repeat stress echo to evaluate pulmonary pressures, RV, and RVOT signal w/ exercise showed exaggerated HR response, HTNsive response, decrease in O2 saturation from 99% to 91% and increase in PA pressures from 45 mmHg to 70 mmHg with exercise  Exercised for 5 min and 20 sec  --RHC w/ exercise: This was an exercise RHC with simultaneous monitoring of the distal and proximal SG catheter ports  She had an appropriate HR response from 100 to 130 bpm with MAP throughout the study staying at 112 mmHg  Hgb 13 1  With exercise, there was an increase in PVR from <3 to ~4 5 MOLINA  PCWP increased from 12 to 18 mmHg  CVP increased from 7 to 10 mmHg  The findings were consistent with exercise induced Pulmonary Arterial Hypertension (WHO Group 1, NYHA II)   Her TTE and PCWP are not consistent with significant left sided heart disease (Group II)  Her PFTs did not show any significant lung disease (Group III), V/Q scan is not consistent with CTEPH (Group IV PAH) and stress echo showed elevation of PA pressures with exercise which is consistent with above findings  --Slow improvement on sildenafil and macitentan  --Mild volume overload, slowly improving  --Extent of ANSARI symptoms unclear as likely masked currently by knee pain symptoms preceding ANSARI  Therapeutic:  --Continue torsemide 40 mg PO daily (patient dose reduced from BID)  Continue Kdur 20 mEq PO daily (patient dose reduced from BID)  Goal weight about 200 lbs for now  Increase back to BID   --Daily weights qAM  --Continue sildenafil 20 mg PO q8hrs  --Continue macitentan 10 mg PO daily    # Undifferentiated CTD w/ + lupus anticoagulant (unclear if SLE is involved)  --Per outpatient Rheumatologist  # ST: V/Q negative  May be 2/2 to deconditioning +/- inappropriate ST +/- diastolic dysfunction/HF (euvolemic on exam)  No e/o infection  --Continue to monitor, can consider coby agents in the future  # Morbid obesity: Continue weight loss    RTC in 3 months    HPI: Very pleasant 51 y/o woman w/ PMHx of newly diagnosed MCTD/SLE, +lupus anti-coagulant, B12 deficiency, and obesity referred for evaluation of ANSARI  She states that she first started getting SOB -- 10 years ago  She has had several episodes of bacterial PNA and chronic cough (a few times/year)  She was referred to [facility] in 2012 for workup for PH  She had a a stress echo 8/2012 that showed that her PA pressures more than doubled from --24 mmHg to > 50 mmHg  Currently, she states she can walk up a couple flights of stairs but does get SOB  She also gets SOB with walking up inclines  She gets occasional LH  She has been found to be B12 deficient, but that is improving       She was seeing a Rheumatologist in [place] but recently saw a specialist at [facility], Dr Triston Cross (Rheumatologist - 11/16)  She was diagnosed with MCTD and SLE  HELENE + 1:320 w/ speckled pattern  She has been on Plaquenil x 2 weeks now  She is on ASA for + lupus anticoagulant and anticardiolipin Abs  She has joint pains and a subtle malar rash  She states so far there has been no change with plaquenil  She has f/u with him in 2/2018  After her visit with Dr Lani Beltran, she has had TTE which shows LVEF --65%, normal RV size and function without RVOT notching  Normal atria  CXR clear  She also has had a negative V/Q scan  She walked the patient in the hallway and had her go up and down stairs  Her resting HR went from --100 bpm to 110s with Pulse Ox starting from 98% @ rest to --90% with exercise  Pulse Ox showed a good waveform throughout  She denies CP, palpitations, presyncope, or syncope  She notes that she went on a two week cruise and afterwards developed LE edema in the past and has developed LE edema  Today, 1/9/2018, returns for f/u  Had echo stress test w/ poor exercise tolerance and decrease in O2 saturation w/ increase in PA pressures from 45 to 70 mmHg w/ exercise  Exercise RHC showed exercise induced PH w/ increase in PVR from <3 to ~4 5 MOLINA  PCWP increased from 12 to 18 mmHg  CVP increased from 7 to 10 mmHg  She was denied for tadalafil, but approved for sildenafil  She started sildenafil on 1/5/18 and states she does not feel any different and continues to have trouble with stairs  In addition, today in clinic, she continues to be tachycardic  Has gained 4 lbs since her last visit  Today, 2/13/2018, returns for f/u  She has gained ~ 4 lbs since her last visit, for a total of 9 lbs since attempted diuresis  She started sildenafil on 1/5/18 and states she feels worse going up stairs than previously  In addition, today in clinic, she continues to be tachycardic  Today, 3/19/2018, she returns for f/u  She states going up stairs there is improvement now   She has been on macitentan for 1 week now  Weight has steadily been decreasing  Today, 4/30/2018, she returns for f/u  She states she feels about the same as her last visit  Weight has steadily been decreasing  Today, 6/11/2018, she returns for f/u  She feels slightly better than her last visit  Weight continues to decrease  Walking up one flight is not a problem, two flights feels SOB  Today, 7/12/2018, she returns for f/u  Her weight is ~206 lbs on home scale  She has been out and walking around with her puppy about 15-20 minutes every night  She feels a little winded on the top of the hills, but not having to stop  Today, 8/30/18, she returns for f/u  She feels the same as last visit, however, she is having knee pains  She has a meniscus tear in the R knee  Today, 11/19/18, she returns for f/u  Exertional symptoms are stable  Her knee pains are persistent despite meniscus surgery  She is considering further surgery or knee injections  Past Medical History:   Diagnosis Date    HELENE positive     Anemia     Sildenafil causes decreased hematocrit    Encephalitis     Lasted Assessed 10/18/2017    Lupus anticoagulant disorder (HCC)     Maxillary sinusitis     Lasted Assessed 10/18/2017    Night sweat     Osteopenia     Hip    Osteoporosis     Spine    Pneumonia     Last Assessed 10/18/2017    PONV (postoperative nausea and vomiting)     Pulmonary hypertension (HCC)     Seizures (HCC)     Only during Encephalitis    Shortness of breath     Sinus tachycardia     Urinary incontinence      Review of Systems - 12 point ROS was done and is negative, except as noted above       Allergies   Allergen Reactions    Dilantin [Phenytoin] Anaphylaxis and Rash    Augmentin [Amoxicillin-Pot Clavulanate] Rash and Dermatitis    Penicillins Rash       Current Outpatient Prescriptions:     ASPIRIN PO, Take 162 mg by mouth daily in the early morning  , Disp: , Rfl:     cholecalciferol (VITAMIN D3) 1,000 units tablet, Take 2,000 Units by mouth daily in the early morning  , Disp: , Rfl:     Cyanocobalamin (VITAMIN B-12 IJ), Inject 1,000 mcg/mL as directed every 14 (fourteen) days, Disp: , Rfl:     hydroxychloroquine (PLAQUENIL) 200 mg tablet, Take 400 mg by mouth daily with breakfast  , Disp: , Rfl:     Macitentan (OPSUMIT) 10 MG tablet, Take 1 tablet (10 mg total) by mouth daily (Patient taking differently: Take 10 mg by mouth daily in the early morning  ), Disp: 30 tablet, Rfl: 10    Methylprednisolone 4 MG TBPK, Use as directed on package, Disp: 21 tablet, Rfl: 0    omeprazole (PriLOSEC) 20 mg delayed release capsule, Take 20 mg by mouth, Disp: , Rfl:     potassium chloride (K-DUR,KLOR-CON) 20 mEq tablet, Take 2 tablets (40 mEq total) by mouth daily (Patient taking differently: Take 20 mEq by mouth 2 (two) times a day  ), Disp: 240 tablet, Rfl: 3    sildenafil (REVATIO) 20 mg tablet, Take 20 mg by mouth 3 (three) times a day, Disp: , Rfl:     torsemide (DEMADEX) 20 mg tablet, Take 2 tablets (40 mg total) by mouth daily (Patient taking differently: Take 20 mg by mouth daily  ), Disp: 240 tablet, Rfl: 3    traMADol (ULTRAM) 50 mg tablet, Take 1 tablet (50 mg total) by mouth daily at bedtime as needed for moderate pain or severe pain, Disp: 30 tablet, Rfl: 0    Current Facility-Administered Medications:     betamethasone acetate-betamethasone sodium phosphate (CELESTONE) injection 6 mg, 6 mg, Intra-articular, , Rhodia Adjutant, DO, 6 mg at 06/04/18 1659    bupivacaine (MARCAINE) 0 25 % injection 2 mL, 2 mL, Injection, , Rhodia Adjutant, DO, 2 mL at 06/04/18 1659    lidocaine (XYLOCAINE) 1 % injection 1 mL, 1 mL, Injection, , Rhodia Adjutant, DO, 1 mL at 06/04/18 1659    Social History     Social History    Marital status: /Civil Union     Spouse name: N/A    Number of children: 2    Years of education: N/A     Occupational History    Not on file       Social History Main Topics    Smoking status: Former Smoker     Quit date: 1/24/2006  Smokeless tobacco: Never Used    Alcohol use Yes      Comment: socially    Drug use: No    Sexual activity: Not on file     Other Topics Concern    Not on file     Social History Narrative    Daily caffeinated coffee consumption    Exercise habits           Family History   Problem Relation Age of Onset    Uterine cancer Mother     Pancreatic cancer Mother     Diabetes Mother     Heart disease Father         open heart surgery at age 48     Stroke Father         CVA    Hypertension Father     Atrial fibrillation Father     Hyperlipidemia Father     Thyroid disease Sister     Hemochromatosis Brother     Thyroid disease Maternal Grandmother     Diabetes Other     Other Family         Heart Problem    Anuerysm Neg Hx     Clotting disorder Neg Hx     Heart failure Neg Hx      There were no vitals filed for this visit      GEN: Glo Valles appears well, alert and oriented x 3, pleasant and cooperative   HEENT: pupils equal, round, and reactive to light; extraocular muscles intact  NECK: supple, no carotid bruits   HEART: regular rhythm, normal S1 and S2, no murmurs, clicks, gallops or rubs, JVP is    LUNGS: clear to auscultation bilaterally; no wheezes, rales, or rhonchi   ABDOMEN: normal bowel sounds, soft, no tenderness, no distention  EXTREMITIES: peripheral pulses normal; no clubbing, cyanosis, or edema  NEURO: no focal findings   SKIN: normal without suspicious lesions on exposed skin    Labs & Results:  Lab Results   Component Value Date    WBC 3 87 (L) 09/10/2018    HGB 11 7 09/10/2018    HCT 36 1 09/10/2018    MCV 93 09/10/2018     09/10/2018       Chemistry        Component Value Date/Time    K 3 8 09/10/2018 1128     09/10/2018 1128    CO2 30 09/10/2018 1128    BUN 6 09/10/2018 1128    CREATININE 1 05 09/10/2018 1128        Component Value Date/Time    CALCIUM 9 2 09/10/2018 1128    ALKPHOS 108 11/18/2017 0844    AST 11 11/18/2017 0844    ALT 23 11/18/2017 0844        EKG personally reviewed by Vonnie Rivera MD      Counseling / Coordination of Care  Total floor / unit time spent today 40 minutes  Greater than 50% of total time was spent with the patient and / or family counseling and / or coordination of care  A description of the counseling / coordination of care: 25 min  Thank you for the opportunity to participate in the care of this patient      Vonnie Rivera MD, PhD   Hassell Gilford

## 2018-11-20 ENCOUNTER — APPOINTMENT (OUTPATIENT)
Dept: LAB | Facility: CLINIC | Age: 53
End: 2018-11-20
Payer: COMMERCIAL

## 2018-11-20 ENCOUNTER — TRANSCRIBE ORDERS (OUTPATIENT)
Dept: LAB | Facility: CLINIC | Age: 53
End: 2018-11-20

## 2018-11-20 DIAGNOSIS — E53.8 DEFICIENCY OF OTHER SPECIFIED B GROUP VITAMINS (CODE): ICD-10-CM

## 2018-11-20 LAB
BASOPHILS # BLD AUTO: 0.02 THOUSANDS/ΜL (ref 0–0.1)
BASOPHILS NFR BLD AUTO: 0 % (ref 0–1)
EOSINOPHIL # BLD AUTO: 0.01 THOUSAND/ΜL (ref 0–0.61)
EOSINOPHIL NFR BLD AUTO: 0 % (ref 0–6)
ERYTHROCYTE [DISTWIDTH] IN BLOOD BY AUTOMATED COUNT: 12.5 % (ref 11.6–15.1)
HCT VFR BLD AUTO: 37 % (ref 34.8–46.1)
HGB BLD-MCNC: 12.3 G/DL (ref 11.5–15.4)
IMM GRANULOCYTES # BLD AUTO: 0.02 THOUSAND/UL (ref 0–0.2)
IMM GRANULOCYTES NFR BLD AUTO: 0 % (ref 0–2)
LYMPHOCYTES # BLD AUTO: 1.14 THOUSANDS/ΜL (ref 0.6–4.47)
LYMPHOCYTES NFR BLD AUTO: 15 % (ref 14–44)
MCH RBC QN AUTO: 30.2 PG (ref 26.8–34.3)
MCHC RBC AUTO-ENTMCNC: 33.2 G/DL (ref 31.4–37.4)
MCV RBC AUTO: 91 FL (ref 82–98)
MONOCYTES # BLD AUTO: 0.39 THOUSAND/ΜL (ref 0.17–1.22)
MONOCYTES NFR BLD AUTO: 5 % (ref 4–12)
NEUTROPHILS # BLD AUTO: 6.14 THOUSANDS/ΜL (ref 1.85–7.62)
NEUTS SEG NFR BLD AUTO: 80 % (ref 43–75)
NRBC BLD AUTO-RTO: 0 /100 WBCS
PLATELET # BLD AUTO: 308 THOUSANDS/UL (ref 149–390)
PMV BLD AUTO: 11.1 FL (ref 8.9–12.7)
RBC # BLD AUTO: 4.07 MILLION/UL (ref 3.81–5.12)
WBC # BLD AUTO: 7.72 THOUSAND/UL (ref 4.31–10.16)

## 2018-11-20 PROCEDURE — 85025 COMPLETE CBC W/AUTO DIFF WBC: CPT

## 2018-11-20 PROCEDURE — 36415 COLL VENOUS BLD VENIPUNCTURE: CPT

## 2018-11-20 PROCEDURE — 82607 VITAMIN B-12: CPT

## 2018-11-20 NOTE — TELEPHONE ENCOUNTER
Please see message  Not sure if you need to know this or not  This is the Dr Vicki Machado patient who wants to have sx before the of the year

## 2018-11-21 ENCOUNTER — TELEPHONE (OUTPATIENT)
Dept: HEMATOLOGY ONCOLOGY | Facility: CLINIC | Age: 53
End: 2018-11-21

## 2018-11-21 ENCOUNTER — OFFICE VISIT (OUTPATIENT)
Dept: HEMATOLOGY ONCOLOGY | Facility: CLINIC | Age: 53
End: 2018-11-21
Payer: COMMERCIAL

## 2018-11-21 VITALS
RESPIRATION RATE: 16 BRPM | WEIGHT: 205 LBS | HEIGHT: 63 IN | OXYGEN SATURATION: 97 % | HEART RATE: 87 BPM | DIASTOLIC BLOOD PRESSURE: 80 MMHG | BODY MASS INDEX: 36.32 KG/M2 | TEMPERATURE: 97.4 F | SYSTOLIC BLOOD PRESSURE: 130 MMHG

## 2018-11-21 DIAGNOSIS — E53.8 B12 DEFICIENCY: ICD-10-CM

## 2018-11-21 DIAGNOSIS — R76.0 LUPUS ANTICOAGULANT POSITIVE: Primary | ICD-10-CM

## 2018-11-21 LAB — VIT B12 SERPL-MCNC: 432 PG/ML (ref 100–900)

## 2018-11-21 PROCEDURE — 99214 OFFICE O/P EST MOD 30 MIN: CPT | Performed by: PHYSICIAN ASSISTANT

## 2018-11-21 RX ORDER — CYANOCOBALAMIN 1000 UG/ML
INJECTION INTRAMUSCULAR; SUBCUTANEOUS
Refills: 5 | COMMUNITY
Start: 2018-11-19 | End: 2018-11-21 | Stop reason: SDUPTHER

## 2018-11-21 RX ORDER — CYANOCOBALAMIN 1000 UG/ML
INJECTION INTRAMUSCULAR; SUBCUTANEOUS
Qty: 10 ML | Refills: 2 | Status: SHIPPED | OUTPATIENT
Start: 2018-11-21 | End: 2019-07-11 | Stop reason: SDUPTHER

## 2018-11-21 NOTE — PROGRESS NOTES
Hematology Outpatient Follow - Up Note  Lili Martinez 52 y o  female MRN: @ Encounter: [id number]        Date:  11/21/2018    Assessment/ Plan:    Vitamin B12 deficiency in a patient was diagnosed with autoimmune connective tissue disease most likely secondary to malabsorption as well because of history of gastric fundoplication secondary to GERD  Vitamin B12 1000 mcg every other week IM shot self administered at home  Vitamin B12 level 432 11/20/2018  Will increase to weekly for 2 months and then continue with every other week  She would like to f/u at 6 month interval to reassess  2   Aspirin 81 mg p o  b i d  with a history of lupus anticoagulant antibodies she never had any history of thrombosis, DVT, PE    3  Exercise induced PAH and follows with Dr Rodrigo Obando  4   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, HELENE, anticardiolipin AB  Met with Dr Rubio Lakhani at [facility]  F/U early December 2018  5   Right medial meniscal tear  Following with Dr Vicki Machado     HPI:  Naun Webbre is a 45year old female seen 10/18/2017 for initial evaluation , self referred, secondary to low Vitamin B12 level  10/6/2017 labs at [facility]: hemoglobin 13 2, mcv 90, RDW 14 3, WBC 6 2, 68% neutrophils, 21% lymphs, 8% monocytes 2% eosinophils, platelet count 360,934  Iron saturation 15%, ferritin 52 Vitamin B12 184( 211-946) Tsh 1 98, free T4 1 18, negative lyme antibody  RF normal, Anti-scleroderma antibodies normal, anti-DS DNA normal, Styles AB normal, RNP antibodies normal  Anti-centromere AB normal  Normal sed rate  HELNEE: Dense fine speckled pattern is noted which suggest presence of  antibody which has a low prevalence in systemic autoimmune rheumatic diseases  Normal immunofixation on SPEP  Normal serum immunoglobulins  Normal CCP antibodies IgG and IgA  U/A identified hyaline casts  Celiac panel was normal  CMP normal     POSITIVE HELENE  POSITIVE Lupus anticoagulant   IgM anticardiolipin antibody 18 (Indeterminate)  Normal IgG anticardiolipin antibody , normal IgA anticardiolipin antibody  7/11/2016: normal anticardiolipin antibodies  Lyme negative  Normal ANCA profile, normal C3 and C4 complement, normal CRP  Normal thyroglobulin profile  Normal ESR  + LUPUS ANTICOAGULANT  HELENE positive  On [facility] intake 3/13/2015 she noted that she had a history of + lupus anticoagulant  Katlin Duron states she 1st had HELENE evaluated and was positive in 2012  Patient is extremely frustrated  She states she has been having symptoms of SOB, fatigue and has been diagnosed with a lupus-like disease but has not received any disease directed therapy  She took prednisone years ago without benefit and significant weight gain  Was previously seen at [address] [place] [address] by Kennedi Tello in 2012 regarding chronic cough, SOB without history of asthma, allergies  Quit smoking at 34years old after 15-20 pack years  PND treated and cough improved but ANSARI persisted  Bronchoscopy was normal  PFTs normal  Evaluation unrevealing to pulmonary source for her dyspnea  No benefit with Advair or high dose steroids  Falls asleep easily  Normal echo  Noted to have edema and sleep apnea suspected  She was advised to increase Lasix and have sleep study  Tiffanie Luther, a cardiologist at the [facility] had her undergo echocardiogram, however, poor images were obtained  She been had a stress echocardiogram and a question of pulmonary artery hypertension and was raised     8/13/2012: stress echocardiogram report from [address] [place] [address]: estimated P  a systolic pressure increased to 56 mm mercury suggestive of significant exercise induced pulmonary hypertension  She states she saw Dr Tre Barros, a specialist at [address] [place] [address] who didn't examine me and told me I look too good to have pulmonary artery hypertension " He did not perform additional testing but then referred her to Dr Abdoul Mata, a cardiac electrophysiologist at [facility] due to resting heart rate 114-120s who informed her she had a + HELENE  She saw Dr Marta Ndiaye a few times but felt pressured by him as he wanted to perform a lip biopsy to evaluate for Sjogren's syndrome  She has been seeing TERI Love  at the rheumatology center of [facility] but has not seeing him in approximately a year and a half as she states he was requesting repeated blood work that was not moving forward towards treating any symptoms she was having and feels that he can be adverserial    Lactose intolerant  Osteoporosis diagnosed at 26 y/o in Lumbar spine  Partial Hysterectomy 1996 for endometrial hyperplasia  Ovaries spared  She works at [employer] [place]  She returned from a vacation and had 3+ pitting edema  Dr Tsering Carr, 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 arranged for her to see Serg Bailey MD in [place], [place] who ordered the above labs  Nelson Vides states that this was the first time her B12 level had been checked  She was prescribed B12 by Dr Rosita Wade for 1000mcg IM twice weekly administration but does not syringes or needles  Nelson Vides is very comfortable with injecting herself IM  No s/sx pancreatic insufficiency  She does not take PPI or H2- blocker  She does not drink alcohol regularly  Nelson Vides has never had a blood clot  He has 2 grown children ages 28 and 29  Did have a first trimester miscarriage around the 10th week in between the birth of her 2 children  She takes Aspirin  Reports last EGD was 3-4 years ago performed by Dr Isauro Russell at [facility]  She is s/p Nissen Fundoplication for severe GERD  She had a colonoscopy previously  She saw Dr Eric Pérez with [facility] 2/13/2015 regarding persistently + HELENE and IGM anticardiolipin antibody  There was also question whether or not she had cotton wool spots secondary to embolic phenomenon   Patient states ultimately this was ruled out  Aspirin was hematologic recommendation at that time  Interval History:        Previous Treatment:        Test Results:      Labs:   Lab Results   Component Value Date    HGB 12 3 11/20/2018    HCT 37 0 11/20/2018    MCV 91 11/20/2018     11/20/2018    WBC 7 72 11/20/2018    NRBC 0 11/20/2018     Lab Results   Component Value Date    K 3 8 09/10/2018     09/10/2018    CO2 30 09/10/2018    BUN 6 09/10/2018    CREATININE 1 05 09/10/2018    GLUF 91 09/10/2018    CALCIUM 9 2 09/10/2018    AST 11 11/18/2017    ALT 23 11/18/2017    ALKPHOS 108 11/18/2017    EGFR 60 09/10/2018         Lab Results   Component Value Date    [id number] 432 11/20/2018         Imaging: No results found  ROS:   As mentioned in HPI & Interval History otherwise 14 point ROS negative  Allergies: Allergies   Allergen Reactions    Dilantin [Phenytoin] Anaphylaxis and Rash    Augmentin [Amoxicillin-Pot Clavulanate] Rash and Dermatitis    Penicillins Rash     Current Medications: Reviewed  PMH/FH/SH:  Reviewed      Physical Exam:    There is no height or weight on file to calculate BSA  Ht Readings from Last 3 Encounters:   11/13/18 5' 2 5" (1 588 m)   10/31/18 5' 4" (1 626 m)   10/17/18 5' 4" (1 626 m)       Wt Readings from Last 3 Encounters:   11/19/18 93 kg (205 lb)   11/13/18 91 9 kg (202 lb 9 oz)   10/31/18 92 5 kg (204 lb)        Temp Readings from Last 3 Encounters:   11/13/18 99 8 °F (37 7 °C) (Tympanic)   10/02/18 99 1 °F (37 3 °C)   05/16/18 98 9 °F (37 2 °C) (Tympanic)        BP Readings from Last 3 Encounters:   11/19/18 124/62   11/13/18 120/70   10/31/18 123/83           Physical Exam   Constitutional: She is oriented to person, place, and time  She appears well-developed and well-nourished  No distress  HENT:   Head: Normocephalic and atraumatic  Nose: Nose normal    Mouth/Throat: Oropharynx is clear and moist    Eyes: Pupils are equal, round, and reactive to light   Conjunctivae and EOM are normal  No scleral icterus  Neck: Normal range of motion  Neck supple  No tracheal deviation present  No thyromegaly present  Cardiovascular: Normal rate, regular rhythm, normal heart sounds and intact distal pulses  Exam reveals no gallop and no friction rub  No murmur heard  Pulmonary/Chest: Effort normal and breath sounds normal  No stridor  No respiratory distress  She has no wheezes  She has no rales  She exhibits no tenderness  Abdominal: Soft  Bowel sounds are normal  She exhibits no distension and no mass  There is no tenderness  There is no rebound and no guarding  Musculoskeletal: She exhibits no edema, tenderness or deformity  Lymphadenopathy:     She has no cervical adenopathy  Neurological: She is alert and oriented to person, place, and time  No cranial nerve deficit  Coordination normal    Skin: Skin is warm and dry  No rash noted  She is not diaphoretic  No erythema  No pallor  Psychiatric: She has a normal mood and affect  Her behavior is normal  Judgment and thought content normal          Goals and Barriers:  Current Goal:  Remain free from falls  Barriers: None  Patient's Capacity to Self Care:  Patient is able to self care

## 2018-11-21 NOTE — TELEPHONE ENCOUNTER
Pt was called and given appropriate provider and scheduled with ARIANNA Sanchez on 11/23/18 @ 8 am  At pt's request I called her home # [phone] and left a vm with our address [address], [place], [address]

## 2018-11-21 NOTE — TELEPHONE ENCOUNTER
Please advise pt is scheduled to see Caroline KELLER on Friday 11/23 but she is a Dr Sachin Garcia pt  Please schedule pt with Anna Cabrera or Dr Sachin Garcia

## 2018-12-03 ENCOUNTER — OFFICE VISIT (OUTPATIENT)
Dept: OBGYN CLINIC | Facility: CLINIC | Age: 53
End: 2018-12-03
Payer: COMMERCIAL

## 2018-12-03 DIAGNOSIS — M17.11 ARTHRITIS OF RIGHT KNEE: Primary | ICD-10-CM

## 2018-12-03 PROCEDURE — 20610 DRAIN/INJ JOINT/BURSA W/O US: CPT | Performed by: ORTHOPAEDIC SURGERY

## 2018-12-03 RX ORDER — HYDROCODONE BITARTRATE AND ACETAMINOPHEN 5; 325 MG/1; MG/1
1 TABLET ORAL EVERY 6 HOURS PRN
Qty: 30 TABLET | Refills: 0 | Status: SHIPPED | OUTPATIENT
Start: 2018-12-03 | End: 2019-02-14 | Stop reason: HOSPADM

## 2018-12-03 RX ORDER — ONDANSETRON 4 MG/1
4 TABLET, ORALLY DISINTEGRATING ORAL EVERY 8 HOURS SCHEDULED
Status: DISCONTINUED | OUTPATIENT
Start: 2018-12-03 | End: 2018-12-05

## 2018-12-03 RX ADMIN — Medication 20 MG: at 17:47

## 2018-12-03 NOTE — PROGRESS NOTES
Assessment:  1  Arthritis of right knee  HYDROcodone-acetaminophen (NORCO) 5-325 mg per tablet    ondansetron (ZOFRAN-ODT) dispersible tablet 4 mg    Large joint arthrocentesis     Patient Active Problem List   Diagnosis    Encounter for gynecological examination (general) (routine) without abnormal findings    B12 deficiency    Chronic cough    Chronic rhinitis    Diffuse connective tissue disease ([address] )    Dyspnea    Edema    Essential hypertension    Hot flashes due to menopause    Long-term use of hydroxychloroquine    Systemic lupus erythematosus (HCC)    Lupus anticoagulant positive    Tachycardia    SOB (shortness of breath) on exertion    SOB (shortness of breath)    Sinus tachycardia    Secondary pulmonary hypertension    Pulmonary hypertension (HCC)    Post-nasal drip    Pes anserine bursitis    Positive HELENE (antinuclear antibody)    Other chronic pulmonary heart diseases    Osteoporosis    Night sweats    Patellofemoral disorder of right knee    Pes anserinus bursitis of right knee    Arthritis of right knee    Other tear of medial meniscus, current injury, right knee, initial encounter    Tear of medial meniscus of right knee, current    Chronic pain of right knee    Elevated serum creatinine    Gout    Hyperuricemia    Trochanteric bursitis of both hips    Undifferentiated connective tissue disease ([address] )    Vitamin D deficiency           Plan      Vicodin   Zofran to help with preventing any nausea  Follow up next week for 2nd injection of Euflexxa            Subjective:     Patient ID:    Chief Complaint:Napop Joseph 52 y o  female      HPI     patient has a history of medial meniscus tear pes bursitis as well as right knee arthritis  Here for her 1st injection of Euflexxa  Patient is in significant pain she is having difficulty sleeping she would like something to help her pain but she does get nauseous from narcotics    She cannot take anti-inflammatories because of her kidney issues as well  The following portions of the patient's history were reviewed and updated as appropriate: allergies, current medications, past family history, past social history, past surgical history and problem list     All organ systems normal    Social History     Social History    Marital status: /Civil Union     Spouse name: N/A    Number of children: 2    Years of education: N/A     Occupational History    Not on file       Social History Main Topics    Smoking status: Former Smoker     Quit date: 1/24/2006    Smokeless tobacco: Never Used    Alcohol use Yes      Comment: socially    Drug use: No    Sexual activity: Not on file     Other Topics Concern    Not on file     Social History Narrative    Daily caffeinated coffee consumption    Exercise habits         Past Medical History:   Diagnosis Date    HELENE positive     Anemia     Sildenafil causes decreased hematocrit    Encephalitis     Lasted Assessed 10/18/2017    Lupus anticoagulant disorder ([address] )     Maxillary sinusitis     Lasted Assessed 10/18/2017    Night sweat     Osteopenia     Hip    Osteoporosis     Spine    Pneumonia     Last Assessed 10/18/2017    PONV (postoperative nausea and vomiting)     Pulmonary hypertension (HCC)     Seizures (HCC)     Only during Encephalitis    Shortness of breath     Sinus tachycardia     Urinary incontinence      Past Surgical History:   Procedure Laterality Date    ARTHRODESIS      Hand: IP Joint    CHOLECYSTECTOMY      COLONOSCOPY      COLPOSCOPY      HYSTERECTOMY      Vaginal    LAPAROSCOPIC ESOPHAGOGASTRIC FUNDOPLASTY  2008    DC KNEE SCOPE,SINGLE MENISECTOMY Right 10/2/2018    Procedure: KNEE ARTHROSCOPY WITH PARTIAL MEDIAL MENISCECTOMY;  Surgeon: Daniel Hurd DO;  [facility];  Service: Orthopedics    REDUCTION MAMMAPLASTY      REPAIR ANKLE LIGAMENT Right     TONSILLECTOMY       Allergies   Allergen Reactions    Dilantin [Phenytoin] Anaphylaxis and Rash    Augmentin [Amoxicillin-Pot Clavulanate] Rash and Dermatitis    Penicillins Rash     Current Outpatient Prescriptions on File Prior to Visit   Medication Sig Dispense Refill    ASPIRIN PO Take 162 mg by mouth daily in the early morning        cholecalciferol (VITAMIN D3) 1,000 units tablet Take 2,000 Units by mouth daily in the early morning        cyanocobalamin 1,000 mcg/mL Inject 1mL IM weekly 10 mL 2    hydroxychloroquine (PLAQUENIL) 200 mg tablet Take 400 mg by mouth daily with breakfast        Macitentan (OPSUMIT) 10 MG tablet Take 1 tablet (10 mg total) by mouth daily (Patient taking differently: Take 10 mg by mouth daily in the early morning  ) 30 tablet 10    Methylprednisolone 4 MG TBPK Use as directed on package 21 tablet 0    potassium chloride (K-DUR,KLOR-CON) 20 mEq tablet Take 2 tablets (40 mEq total) by mouth daily (Patient taking differently: Take 20 mEq by mouth 2 (two) times a day  ) 240 tablet 3    sildenafil (REVATIO) 20 mg tablet Take 20 mg by mouth 3 (three) times a day      torsemide (DEMADEX) 20 mg tablet Take 2 tablets (40 mg total) by mouth daily (Patient taking differently: Take 20 mg by mouth daily  ) 240 tablet 3    traMADol (ULTRAM) 50 mg tablet Take 1 tablet (50 mg total) by mouth daily at bedtime as needed for moderate pain or severe pain 30 tablet 0     Current Facility-Administered Medications on File Prior to Visit   Medication Dose Route Frequency Provider Last Rate Last Dose    betamethasone acetate-betamethasone sodium phosphate (CELESTONE) injection 6 mg  6 mg Intra-articular  Archie Gaudy, DO   6 mg at 06/04/18 1659    bupivacaine (MARCAINE) 0 25 % injection 2 mL  2 mL Injection  Archie Gaudy, DO   2 mL at 06/04/18 1659    lidocaine (XYLOCAINE) 1 % injection 1 mL  1 mL Injection  Archie Gaudy, DO   1 mL at 06/04/18 1659              Objective:  Large joint arthrocentesis  Date/Time: 12/3/2018 5:47 PM  Consent given by: patient  Supporting Documentation  Indications: pain   Procedure Details  Location: knee - R knee  Needle size: 22 G  Ultrasound guidance: no  Approach: anterolateral  Medications administered: 20 mg Sodium Hyaluronate 20 MG/2ML    Patient tolerance: patient tolerated the procedure well with no immediate complications              Ortho Exam     no effusion no ecchymosis no erythema        Portions of the record may have been created with voice recognition software   Occasional wrong word or "sound a like" substitutions may have occurred due to the inherent limitations of voice recognition software   Read the chart carefully and recognize, using context, where substitutions have occurred

## 2018-12-04 ENCOUNTER — TELEPHONE (OUTPATIENT)
Dept: OBGYN CLINIC | Facility: HOSPITAL | Age: 53
End: 2018-12-04

## 2018-12-04 NOTE — TELEPHONE ENCOUNTER
Patient calling that her zofran did not make it to the pharmacy electronically  Zofran 4mg RX called to [facility]  Patient aware

## 2018-12-05 RX ORDER — ONDANSETRON 4 MG/1
4 TABLET, FILM COATED ORAL EVERY 8 HOURS PRN
Qty: 20 TABLET | Refills: 0 | Status: SHIPPED | OUTPATIENT
Start: 2018-12-05 | End: 2019-04-04 | Stop reason: ALTCHOICE

## 2018-12-05 RX ORDER — HYALURONATE SODIUM 10 MG/ML
20 SYRINGE (ML) INTRAARTICULAR
Status: COMPLETED | OUTPATIENT
Start: 2018-12-03 | End: 2018-12-03

## 2018-12-06 DIAGNOSIS — I27.20 PULMONARY HYPERTENSION (HCC): Primary | ICD-10-CM

## 2018-12-06 DIAGNOSIS — K76.6 PAH (PULMONARY ARTERIAL HYPERTENSION) WITH PORTAL HYPERTENSION (HCC): ICD-10-CM

## 2018-12-06 DIAGNOSIS — I27.21 PAH (PULMONARY ARTERIAL HYPERTENSION) WITH PORTAL HYPERTENSION (HCC): ICD-10-CM

## 2018-12-06 RX ORDER — SILDENAFIL CITRATE 20 MG/1
20 TABLET ORAL 3 TIMES DAILY
Qty: 90 TABLET | Refills: 11 | OUTPATIENT
Start: 2018-12-06 | End: 2019-11-21 | Stop reason: SDUPTHER

## 2018-12-08 ENCOUNTER — TRANSCRIBE ORDERS (OUTPATIENT)
Dept: LAB | Facility: CLINIC | Age: 53
End: 2018-12-08

## 2018-12-08 ENCOUNTER — APPOINTMENT (OUTPATIENT)
Dept: LAB | Facility: CLINIC | Age: 53
End: 2018-12-08
Payer: COMMERCIAL

## 2018-12-08 DIAGNOSIS — M35.9 DIFFUSE DISEASE OF CONNECTIVE TISSUE (HCC): Primary | ICD-10-CM

## 2018-12-08 LAB
BACTERIA UR QL AUTO: ABNORMAL /HPF
BILIRUB UR QL STRIP: NEGATIVE
CLARITY UR: CLEAR
COLOR UR: ABNORMAL
CREAT UR-MCNC: 33.8 MG/DL
GLUCOSE UR STRIP-MCNC: NEGATIVE MG/DL
HGB UR QL STRIP.AUTO: NEGATIVE
KETONES UR STRIP-MCNC: NEGATIVE MG/DL
LEUKOCYTE ESTERASE UR QL STRIP: ABNORMAL
MUCOUS THREADS UR QL AUTO: ABNORMAL
NITRITE UR QL STRIP: NEGATIVE
NON-SQ EPI CELLS URNS QL MICRO: ABNORMAL /HPF
PH UR STRIP.AUTO: 6 [PH] (ref 4.5–8)
PROT UR STRIP-MCNC: NEGATIVE MG/DL
PROT UR-MCNC: <6 MG/DL
PROT/CREAT UR: <0.18 MG/G{CREAT} (ref 0–0.1)
RBC #/AREA URNS AUTO: ABNORMAL /HPF
SP GR UR STRIP.AUTO: 1.02 (ref 1–1.03)
UROBILINOGEN UR QL STRIP.AUTO: 0.2 E.U./DL
WBC #/AREA URNS AUTO: ABNORMAL /HPF

## 2018-12-08 PROCEDURE — 82570 ASSAY OF URINE CREATININE: CPT | Performed by: INTERNAL MEDICINE

## 2018-12-08 PROCEDURE — 81001 URINALYSIS AUTO W/SCOPE: CPT | Performed by: INTERNAL MEDICINE

## 2018-12-08 PROCEDURE — 84156 ASSAY OF PROTEIN URINE: CPT | Performed by: INTERNAL MEDICINE

## 2018-12-10 ENCOUNTER — OFFICE VISIT (OUTPATIENT)
Dept: OBGYN CLINIC | Facility: CLINIC | Age: 53
End: 2018-12-10
Payer: COMMERCIAL

## 2018-12-10 DIAGNOSIS — M17.11 ARTHRITIS OF RIGHT KNEE: Primary | ICD-10-CM

## 2018-12-10 PROCEDURE — 20610 DRAIN/INJ JOINT/BURSA W/O US: CPT | Performed by: ORTHOPAEDIC SURGERY

## 2018-12-10 RX ORDER — HYALURONATE SODIUM 10 MG/ML
20 SYRINGE (ML) INTRAARTICULAR
Status: COMPLETED | OUTPATIENT
Start: 2018-12-10 | End: 2018-12-10

## 2018-12-10 RX ADMIN — Medication 20 MG: at 17:27

## 2018-12-10 NOTE — PROGRESS NOTES
1  Arthritis of right knee       Patient is here for her 2nd injection of Euflexxa into the right knee  Patient reports no improvement  All organ systems normal  Physical exam of the knee shows no effusion no ecchymosis  Large joint arthrocentesis  Date/Time: 12/10/2018 5:27 PM  Consent given by: patient  Supporting Documentation  Indications: pain   Procedure Details  Location: knee - R knee  Needle size: 22 G  Ultrasound guidance: no  Approach: anterolateral  Medications administered: 20 mg Sodium Hyaluronate 20 MG/2ML            Patient tolerated procedure follow up 1 week  We did have a lengthy discussion about her knee her arthritis and possibility of a knee replacement in the future  I did tell her that it may take 4 weeks to see pain reduction  I also told her that we need all of her medical doctors on the same page and with recommendations as far as postoperative care if we have her go as far as doing a total knee replacement  She has a history of connective tissue disorder as well as pulmonary hypertension and kidney issues we need recommendations from her pulmonologist her nephrologist her medical doctor as far as postoperative management to prevent organ damage    We also need recommendations for pain management due to the fact that she has many issues with other pain medications she seems to tolerate Vicodin and Dilaudid however

## 2018-12-17 ENCOUNTER — OFFICE VISIT (OUTPATIENT)
Dept: OBGYN CLINIC | Facility: CLINIC | Age: 53
End: 2018-12-17
Payer: COMMERCIAL

## 2018-12-17 DIAGNOSIS — M22.2X1 PATELLOFEMORAL DISORDER OF RIGHT KNEE: Primary | ICD-10-CM

## 2018-12-17 PROCEDURE — 20610 DRAIN/INJ JOINT/BURSA W/O US: CPT | Performed by: ORTHOPAEDIC SURGERY

## 2018-12-17 RX ORDER — HYALURONATE SODIUM 10 MG/ML
20 SYRINGE (ML) INTRAARTICULAR
Status: COMPLETED | OUTPATIENT
Start: 2018-12-17 | End: 2018-12-17

## 2018-12-17 RX ADMIN — Medication 20 MG: at 17:01

## 2018-12-17 NOTE — PROGRESS NOTES
1  Patellofemoral disorder of right knee       Patient is here for her 3rd injection of Euflexxa into the right knee  Patient reports no improvements  All organ systems normal  Physical exam of the knee shows no effusion no ecchymosis  Large joint arthrocentesis  Date/Time: 12/17/2018 5:01 PM  Consent given by: patient  Supporting Documentation  Indications: pain   Procedure Details  Location: knee - R knee  Needle size: 22 G  Ultrasound guidance: no  Approach: anterolateral  Medications administered: 20 mg Sodium Hyaluronate 20 MG/2ML            Patient tolerated procedure follow up p r n

## 2018-12-19 ENCOUNTER — CLINICAL SUPPORT (OUTPATIENT)
Dept: FAMILY MEDICINE CLINIC | Facility: OTHER | Age: 53
End: 2018-12-19
Payer: COMMERCIAL

## 2018-12-19 DIAGNOSIS — Z23 ENCOUNTER FOR IMMUNIZATION: ICD-10-CM

## 2018-12-19 DIAGNOSIS — Z23 ENCOUNTER FOR VACCINATION: Primary | ICD-10-CM

## 2018-12-19 PROCEDURE — 90472 IMMUNIZATION ADMIN EACH ADD: CPT

## 2018-12-19 PROCEDURE — 90732 PPSV23 VACC 2 YRS+ SUBQ/IM: CPT

## 2018-12-19 PROCEDURE — 90686 IIV4 VACC NO PRSV 0.5 ML IM: CPT

## 2018-12-19 PROCEDURE — 90471 IMMUNIZATION ADMIN: CPT

## 2018-12-21 ENCOUNTER — OFFICE VISIT (OUTPATIENT)
Dept: OBGYN CLINIC | Facility: CLINIC | Age: 53
End: 2018-12-21
Payer: COMMERCIAL

## 2018-12-21 VITALS
DIASTOLIC BLOOD PRESSURE: 72 MMHG | HEIGHT: 63 IN | BODY MASS INDEX: 37.14 KG/M2 | SYSTOLIC BLOOD PRESSURE: 116 MMHG | WEIGHT: 209.6 LBS

## 2018-12-21 DIAGNOSIS — B96.89 BV (BACTERIAL VAGINOSIS): Primary | ICD-10-CM

## 2018-12-21 DIAGNOSIS — N76.0 BV (BACTERIAL VAGINOSIS): Primary | ICD-10-CM

## 2018-12-21 PROCEDURE — 87660 TRICHOMONAS VAGIN DIR PROBE: CPT | Performed by: NURSE PRACTITIONER

## 2018-12-21 PROCEDURE — 87510 GARDNER VAG DNA DIR PROBE: CPT | Performed by: NURSE PRACTITIONER

## 2018-12-21 PROCEDURE — 99213 OFFICE O/P EST LOW 20 MIN: CPT | Performed by: NURSE PRACTITIONER

## 2018-12-21 PROCEDURE — 87480 CANDIDA DNA DIR PROBE: CPT | Performed by: NURSE PRACTITIONER

## 2018-12-21 NOTE — ASSESSMENT & PLAN NOTE
Exam c/w same  Affirm sent to r/o coinfection given plans for TKR  Recommended Clinda suppositories and conservative vulvar hygiene  F/u PRN if sx not improved

## 2018-12-21 NOTE — PROGRESS NOTES
Assessment/Plan:    BV (bacterial vaginosis)  Exam c/w same  Affirm sent to r/o coinfection given plans for TKR  Recommended Clinda suppositories and conservative vulvar hygiene  F/u PRN if sx not improved  Diagnoses and all orders for this visit:    BV (bacterial vaginosis)  -     clindamycin (CLEOCIN) 100 MG vaginal suppository; Insert 1 suppository (100 mg total) into the vagina daily at bedtime  -     VAGINOSIS DNA PROBE (AFFIRM)          Subjective:      Patient ID: Stephan Hdez is a 52 y o  female  This patient presents with c/o malodorous discharge and itching for about one month  She denies pelvic pain,  bleeding (s/p hyst), STI concerns   Scheduled for TKR in 7 weeks and must be infection free to proceed  Extensive PMHx reviewed  The following portions of the patient's history were reviewed and updated as appropriate: allergies, current medications, past family history, past medical history, past social history, past surgical history and problem list     Review of Systems   Constitutional: Negative  HENT: Negative  Eyes: Negative  Respiratory: Negative  Cardiovascular: Negative  Gastrointestinal: Negative  Endocrine: Negative  Genitourinary: Positive for vaginal discharge  Negative for dysuria and pelvic pain  Musculoskeletal: Negative  Skin: Negative  Allergic/Immunologic: Negative  Neurological: Negative  Hematological: Negative  Psychiatric/Behavioral: Negative  Objective:      /72 (BP Location: Left arm, Cuff Size: Large)   Ht 5' 3" (1 6 m)   Wt 95 1 kg (209 lb 9 6 oz)   BMI 37 13 kg/m²          Physical Exam   Constitutional: She is oriented to person, place, and time  She appears well-developed and well-nourished  HENT:   Head: Normocephalic and atraumatic  Eyes: Pupils are equal, round, and reactive to light  Neck: Normal range of motion     Pulmonary/Chest: Effort normal    Abdominal: Hernia confirmed negative in the right inguinal area and confirmed negative in the left inguinal area  Genitourinary: Rectum normal        There is no rash, tenderness, lesion or injury on the right labia  There is no rash, tenderness, lesion or injury on the left labia  No erythema, tenderness or bleeding in the vagina  Vaginal discharge found  Musculoskeletal: Normal range of motion  Lymphadenopathy:        Right: No inguinal adenopathy present  Left: No inguinal adenopathy present  Neurological: She is alert and oriented to person, place, and time  Skin: Skin is warm and dry  Psychiatric: She has a normal mood and affect   Her behavior is normal  Judgment and thought content normal

## 2018-12-23 LAB
CANDIDA RRNA VAG QL PROBE: NEGATIVE
G VAGINALIS RRNA GENITAL QL PROBE: POSITIVE
T VAGINALIS RRNA GENITAL QL PROBE: NEGATIVE

## 2018-12-26 ENCOUNTER — TELEPHONE (OUTPATIENT)
Dept: CARDIOLOGY CLINIC | Facility: CLINIC | Age: 53
End: 2018-12-26

## 2018-12-27 ENCOUNTER — TELEPHONE (OUTPATIENT)
Dept: OBGYN CLINIC | Facility: HOSPITAL | Age: 53
End: 2018-12-27

## 2018-12-27 NOTE — TELEPHONE ENCOUNTER
Patient is calling to check the status on her next plan of care from [facility]  She states that her Rheumatologist Dr Judge sent an email to [facility] and she wants to know if he received and discussed the email yet   Please advise, thx    Call back# [phone]    Dr Zendejas's patient

## 2018-12-28 NOTE — TELEPHONE ENCOUNTER
Spoke with patient we still need cardiac recommendations because patient is on fluid restrictions  Also we to figure out antibiotic preop and postop  Patient will stop aspirin 7 days before surgery will auto donate blood and we will automatically transfused that blood perioperatively  We will not stop Plaquenil as recommended by her rheumatologist but also supported by consortium of rheumatologist recommendations  She has also been counseled on increased risk of clot and infection due to lupus antibody as well as continuing Plaquenil preoperative  We will also place her on Lovenox perioperatively will try to reach out to Dr   to see if he recommends continuing the Lovenox any longer than 30 days usually recommended

## 2019-01-07 ENCOUNTER — TELEPHONE (OUTPATIENT)
Dept: CARDIOLOGY CLINIC | Facility: CLINIC | Age: 54
End: 2019-01-07

## 2019-01-21 ENCOUNTER — OFFICE VISIT (OUTPATIENT)
Dept: OBGYN CLINIC | Facility: CLINIC | Age: 54
End: 2019-01-21
Payer: COMMERCIAL

## 2019-01-21 VITALS — SYSTOLIC BLOOD PRESSURE: 127 MMHG | HEART RATE: 92 BPM | DIASTOLIC BLOOD PRESSURE: 82 MMHG

## 2019-01-21 DIAGNOSIS — M17.11 ARTHRITIS OF RIGHT KNEE: Primary | ICD-10-CM

## 2019-01-21 PROCEDURE — 99213 OFFICE O/P EST LOW 20 MIN: CPT | Performed by: ORTHOPAEDIC SURGERY

## 2019-01-21 RX ORDER — CHLORHEXIDINE GLUCONATE 0.12 MG/ML
15 RINSE ORAL ONCE
Status: CANCELLED | OUTPATIENT
Start: 2019-01-21 | End: 2019-01-21

## 2019-01-21 RX ORDER — FERROUS SULFATE TAB EC 324 MG (65 MG FE EQUIVALENT) 324 (65 FE) MG
324 TABLET DELAYED RESPONSE ORAL
Qty: 30 TABLET | Refills: 0 | Status: ON HOLD | OUTPATIENT
Start: 2019-01-21 | End: 2019-02-12

## 2019-01-21 RX ORDER — CHLORHEXIDINE GLUCONATE 4 G/100ML
SOLUTION TOPICAL DAILY PRN
Status: CANCELLED | OUTPATIENT
Start: 2019-01-21

## 2019-01-21 RX ORDER — CLINDAMYCIN PHOSPHATE 900 MG/50ML
900 INJECTION INTRAVENOUS ONCE
Status: CANCELLED | OUTPATIENT
Start: 2019-01-21 | End: 2019-01-21

## 2019-01-21 RX ORDER — LANOLIN ALCOHOL/MO/W.PET/CERES
400 CREAM (GRAM) TOPICAL DAILY
Qty: 30 TABLET | Refills: 0 | Status: SHIPPED | OUTPATIENT
Start: 2019-01-21 | End: 2019-04-04 | Stop reason: ALTCHOICE

## 2019-01-21 RX ORDER — ASCORBIC ACID 500 MG
500 TABLET ORAL DAILY
Qty: 30 TABLET | Refills: 0 | Status: SHIPPED | OUTPATIENT
Start: 2019-01-21 | End: 2019-04-04 | Stop reason: ALTCHOICE

## 2019-01-21 NOTE — PROGRESS NOTES
Assessment/Plan:  1  Arthritis of right knee  Case request operating room: ARTHROPLASTY KNEE TOTAL AND ASSOCIATED PROCEDURES    Ambulatory referral to Family Practice    Ambulatory referral to Cardiology    Comprehensive metabolic panel    Iron Panel    CBC and differential    C-reactive protein    UA w Reflex to Microscopic w Reflex to Culture    Case request operating room: ARTHROPLASTY KNEE TOTAL AND ASSOCIATED PROCEDURES    ascorbic acid (VITAMIN C) 500 mg tablet    ferrous sulfate 324 (65 Fe) mg    folic acid (FOLVITE) 362 mcg tablet    Walker    Crutches    Misc   Devices (COMMODE BEDSIDE) [address] [facility]    Shower chair     Patient Active Problem List   Diagnosis    Encounter for gynecological examination (general) (routine) without abnormal findings    B12 deficiency    Chronic cough    Chronic rhinitis    Diffuse connective tissue disease ([address]    Dyspnea    Edema    Essential hypertension    Hot flashes due to menopause    Long-term use of hydroxychloroquine    Systemic lupus erythematosus ([address]    Lupus anticoagulant positive    Tachycardia    SOB (shortness of breath) on exertion    SOB (shortness of breath)    Sinus tachycardia    Secondary pulmonary hypertension    Pulmonary hypertension (HCC)    Post-nasal drip    Pes anserine bursitis    Positive HELENE (antinuclear antibody)    Other chronic pulmonary heart diseases    Osteoporosis    Night sweats    Patellofemoral disorder of right knee    Pes anserinus bursitis of right knee    Arthritis of right knee    Other tear of medial meniscus, current injury, right knee, initial encounter    Tear of medial meniscus of right knee, current    Chronic pain of right knee    Elevated serum creatinine    Gout    Hyperuricemia    Trochanteric bursitis of both hips    Undifferentiated connective tissue disease ([address]    Vitamin D deficiency    BV (bacterial vaginosis)       Discussion/Summary:    62 y o  female  With right knee pain secondary to osteoarthritis  The patient has tried and failed multiple conservative treatments including cortisone injections, viscosupplementation,  bracing, and has also had arthroscopic meniscal surgery without relief of her symptoms  Discussion with the patient today including the increased risks of infection after surgery given that she has had Euflexxa injections 1 month ago and would be proceeding with total knee arthroplasty around February 12 as well as due to her continuing taking antirheumatic drugs there is the radical increased risk of infection from these 2 things she understands these and would still wished to proceed as scheduled with her knee replacement  Also discussed other risks including the risk that this surgery may not give her adequate relief or full relief of her symptoms as desired   And she still wishes to proceed  She has appointments with her cardiologist as well as family Medicine for clearance  We will reach out to her rheumatologist potentially about switching from aspirin to Lovenox preoperatively  Will consider if she can have test dose of Ancef given the fact that she has had a rash from penicillin in the past or if we will use alternative antibiotic such as clindamycin  Pending clearances and labs, she will undergo total knee replacement  Under general anesthesia with spinal   Prescriptions given for vitamin C folic acid and iron  Patient has crutches, walker, bedside commode, and shower chair    The assessment and plan were formulated by Dr Maris Wan and I assisted in carrying it out  Follow Up: After Surgery    To Do Next Visit:  X-rays of the  right  knee    Subjective:   Patient ID: Jessica Thomson is a 62 y o  female   HPI    Patient presents to the office for follow up of  Right knee pain and osteoarthritis  Since the last visit, the patient reports  No changes in her symptoms  Still has severe right anterior medial knee pain as well as pain behind the kneecap  This pain is causing significant difficulty with the quality of life  She denies any erythema warmth or swelling of the knee  Patient  denies any new injuries to the  Right knee since the last visit  The following portions of the patient's history were reviewed and updated as appropriate: allergies, current medications, past family history, past social history, past surgical history and problem list     Social History     Social History    Marital status: /Civil Union     Spouse name: N/A    Number of children: 2    Years of education: N/A     Occupational History    Not on file       Social History Main Topics    Smoking status: Former Smoker     Quit date: 1/24/2006    Smokeless tobacco: Never Used    Alcohol use Yes      Comment: socially    Drug use: No    Sexual activity: Yes     Partners: Male     Other Topics Concern    Not on file     Social History Narrative    Daily caffeinated coffee consumption    Exercise habits         Past Medical History:   Diagnosis Date    HELENE positive     Anemia     Sildenafil causes decreased hematocrit    Encephalitis     Lasted Assessed 10/18/2017    Lupus anticoagulant disorder [address] [address]     Maxillary sinusitis     Lasted Assessed 10/18/2017    Night sweat     Osteopenia     Hip    Osteoporosis     Spine    Pneumonia     Last Assessed 10/18/2017    PONV (postoperative nausea and vomiting)     Pulmonary hypertension (HCC)     Seizures (HCC)     Only during Encephalitis    Shortness of breath     Sinus tachycardia     Urinary incontinence      Past Surgical History:   Procedure Laterality Date    ARTHRODESIS      Hand: IP Joint    CHOLECYSTECTOMY      COLONOSCOPY      COLPOSCOPY      HYSTERECTOMY      Vaginal    LAPAROSCOPIC ESOPHAGOGASTRIC FUNDOPLASTY  2008    NV KNEE SCOPE,SINGLE MENISECTOMY Right 10/2/2018    Procedure: KNEE ARTHROSCOPY WITH PARTIAL MEDIAL MENISCECTOMY;  Surgeon: Lizzie Kohler DO;  [facility];  Service: Orthopedics    REDUCTION MAMMAPLASTY      REPAIR ANKLE LIGAMENT Right     TONSILLECTOMY       Allergies   Allergen Reactions    Dilantin [Phenytoin] Anaphylaxis and Rash    Augmentin [Amoxicillin-Pot Clavulanate] Rash and Dermatitis    Penicillins Rash     Current Outpatient Prescriptions on File Prior to Visit   Medication Sig Dispense Refill    ASPIRIN PO Take 162 mg by mouth daily in the early morning        cholecalciferol (VITAMIN D3) 1,000 units tablet Take 2,000 Units by mouth daily in the early morning        clindamycin (CLEOCIN) 100 MG vaginal suppository Insert 1 suppository (100 mg total) into the vagina daily at bedtime 3 suppository 0    cyanocobalamin 1,000 mcg/mL Inject 1mL IM weekly 10 mL 2    HYDROcodone-acetaminophen (NORCO) 5-325 mg per tablet Take 1 tablet by mouth every 6 (six) hours as needed for pain Max Daily Amount: 4 tablets 30 tablet 0    hydroxychloroquine (PLAQUENIL) 200 mg tablet Take 400 mg by mouth daily with breakfast        Macitentan (OPSUMIT) 10 MG tablet Take 1 tablet (10 mg total) by mouth daily 30 tablet 11    ondansetron (ZOFRAN) 4 mg tablet Take 1 tablet (4 mg total) by mouth every 8 (eight) hours as needed for nausea or vomiting 20 tablet 0    potassium chloride (K-DUR,KLOR-CON) 20 mEq tablet Take 2 tablets (40 mEq total) by mouth daily (Patient taking differently: Take 20 mEq by mouth 2 (two) times a day  ) 240 tablet 3    sildenafil (REVATIO) 20 mg tablet Take 1 tablet (20 mg total) by mouth 3 (three) times a day 90 tablet 11    torsemide (DEMADEX) 20 mg tablet Take 2 tablets (40 mg total) by mouth daily (Patient taking differently: Take 20 mg by mouth daily  ) 240 tablet 3    traMADol (ULTRAM) 50 mg tablet Take 1 tablet (50 mg total) by mouth daily at bedtime as needed for moderate pain or severe pain 30 tablet 0     No current facility-administered medications on file prior to visit          Review of Systems - Negative except as noted in HPI Objective:    Vitals:    01/21/19 1705   BP: 127/82   Pulse: 92       Physical Exam   Constitutional: She is oriented to person, place, and time  She appears well-developed and well-nourished  HENT:   Head: Normocephalic and atraumatic  Eyes: Conjunctivae are normal  No scleral icterus  Neck: Neck supple  Pulmonary/Chest: Effort normal  No stridor  No respiratory distress  Abdominal: Soft  She exhibits no distension  Musculoskeletal:        Right knee: She exhibits no effusion  Neurological: She is alert and oriented to person, place, and time  Skin: Skin is warm and dry  No erythema  Psychiatric: She has a normal mood and affect  Her behavior is normal        Right Knee Exam     Tenderness   The patient is experiencing tenderness in the medial joint line and medial retinaculum  Range of Motion   The patient has normal right knee ROM  Extension: normal   Flexion: normal     Muscle Strength     The patient has normal right knee strength  Tests   Varus: negative  Valgus: negative    Other   Erythema: absent  Scars: present ( anterolateral anteromedial arthroscopic portal scars present)  Sensation: normal  Pulse: present  Swelling: none  Other tests: no effusion present    Comments:  No ecchymosis or deformity              I have personally reviewed pertinent films in PACS  Procedures  No Procedures performed today    Portions of the record may have been created with voice recognition software   Occasional wrong word or "sound a like" substitutions may have occurred due to the inherent limitations of voice recognition software   Read the chart carefully and recognize, using context, where substitutions have occurred

## 2019-01-21 NOTE — H&P (VIEW-ONLY)
Assessment/Plan:  1  Arthritis of right knee  Case request operating room: ARTHROPLASTY KNEE TOTAL AND ASSOCIATED PROCEDURES    Ambulatory referral to Family Practice    Ambulatory referral to Cardiology    Comprehensive metabolic panel    Iron Panel    CBC and differential    C-reactive protein    UA w Reflex to Microscopic w Reflex to Culture    Case request operating room: ARTHROPLASTY KNEE TOTAL AND ASSOCIATED PROCEDURES    ascorbic acid (VITAMIN C) 500 mg tablet    ferrous sulfate 324 (65 Fe) mg    folic acid (FOLVITE) 632 mcg tablet    Walker    Crutches    Misc   Devices (COMMODE BEDSIDE) [facility]    Shower chair     Patient Active Problem List   Diagnosis    Encounter for gynecological examination (general) (routine) without abnormal findings    B12 deficiency    Chronic cough    Chronic rhinitis    Diffuse connective tissue disease ([address] )    Dyspnea    Edema    Essential hypertension    Hot flashes due to menopause    Long-term use of hydroxychloroquine    Systemic lupus erythematosus ([address] )    Lupus anticoagulant positive    Tachycardia    SOB (shortness of breath) on exertion    SOB (shortness of breath)    Sinus tachycardia    Secondary pulmonary hypertension    Pulmonary hypertension (HCC)    Post-nasal drip    Pes anserine bursitis    Positive HELENE (antinuclear antibody)    Other chronic pulmonary heart diseases    Osteoporosis    Night sweats    Patellofemoral disorder of right knee    Pes anserinus bursitis of right knee    Arthritis of right knee    Other tear of medial meniscus, current injury, right knee, initial encounter    Tear of medial meniscus of right knee, current    Chronic pain of right knee    Elevated serum creatinine    Gout    Hyperuricemia    Trochanteric bursitis of both hips    Undifferentiated connective tissue disease ([address] )    Vitamin D deficiency    BV (bacterial vaginosis)       Discussion/Summary:    62 y o  female  With right knee pain secondary to osteoarthritis  The patient has tried and failed multiple conservative treatments including cortisone injections, viscosupplementation,  bracing, and has also had arthroscopic meniscal surgery without relief of her symptoms  Discussion with the patient today including the increased risks of infection after surgery given that she has had Euflexxa injections 1 month ago and would be proceeding with total knee arthroplasty around February 12 as well as due to her continuing taking antirheumatic drugs there is the radical increased risk of infection from these 2 things she understands these and would still wished to proceed as scheduled with her knee replacement  Also discussed other risks including the risk that this surgery may not give her adequate relief or full relief of her symptoms as desired   And she still wishes to proceed  She has appointments with her cardiologist as well as family Medicine for clearance  We will reach out to her rheumatologist potentially about switching from aspirin to Lovenox preoperatively  Will consider if she can have test dose of Ancef given the fact that she has had a rash from penicillin in the past or if we will use alternative antibiotic such as clindamycin  Pending clearances and labs, she will undergo total knee replacement  Under general anesthesia with spinal   Prescriptions given for vitamin C folic acid and iron  Patient has crutches, walker, bedside commode, and shower chair    The assessment and plan were formulated by Dr Ivone Franco and I assisted in carrying it out  Follow Up: After Surgery    To Do Next Visit:  X-rays of the  right  knee    Subjective:   Patient ID: Carlitos Sims is a 62 y o  female   HPI    Patient presents to the office for follow up of  Right knee pain and osteoarthritis  Since the last visit, the patient reports  No changes in her symptoms  Still has severe right anterior medial knee pain as well as pain behind the kneecap  This pain is causing significant difficulty with the quality of life  She denies any erythema warmth or swelling of the knee  Patient  denies any new injuries to the  Right knee since the last visit  The following portions of the patient's history were reviewed and updated as appropriate: allergies, current medications, past family history, past social history, past surgical history and problem list     Social History     Social History    Marital status: /Civil Union     Spouse name: N/A    Number of children: 2    Years of education: N/A     Occupational History    Not on file       Social History Main Topics    Smoking status: Former Smoker     Quit date: 1/24/2006    Smokeless tobacco: Never Used    Alcohol use Yes      Comment: socially    Drug use: No    Sexual activity: Yes     Partners: Male     Other Topics Concern    Not on file     Social History Narrative    Daily caffeinated coffee consumption    Exercise habits         Past Medical History:   Diagnosis Date    HELENE positive     Anemia     Sildenafil causes decreased hematocrit    Encephalitis     Lasted Assessed 10/18/2017    Lupus anticoagulant disorder [address] [address]     Maxillary sinusitis     Lasted Assessed 10/18/2017    Night sweat     Osteopenia     Hip    Osteoporosis     Spine    Pneumonia     Last Assessed 10/18/2017    PONV (postoperative nausea and vomiting)     Pulmonary hypertension (HCC)     Seizures (HCC)     Only during Encephalitis    Shortness of breath     Sinus tachycardia     Urinary incontinence      Past Surgical History:   Procedure Laterality Date    ARTHRODESIS      Hand: IP Joint    CHOLECYSTECTOMY      COLONOSCOPY      COLPOSCOPY      HYSTERECTOMY      Vaginal    LAPAROSCOPIC ESOPHAGOGASTRIC FUNDOPLASTY  2008    SC KNEE SCOPE,SINGLE MENISECTOMY Right 10/2/2018    Procedure: KNEE ARTHROSCOPY WITH PARTIAL MEDIAL MENISCECTOMY;  Surgeon: Belkys De Leon DO;  Location: [facility];  Service: Orthopedics    REDUCTION MAMMAPLASTY      REPAIR ANKLE LIGAMENT Right     TONSILLECTOMY       Allergies   Allergen Reactions    Dilantin [Phenytoin] Anaphylaxis and Rash    Augmentin [Amoxicillin-Pot Clavulanate] Rash and Dermatitis    Penicillins Rash     Current Outpatient Prescriptions on File Prior to Visit   Medication Sig Dispense Refill    ASPIRIN PO Take 162 mg by mouth daily in the early morning        cholecalciferol (VITAMIN D3) 1,000 units tablet Take 2,000 Units by mouth daily in the early morning        clindamycin (CLEOCIN) 100 MG vaginal suppository Insert 1 suppository (100 mg total) into the vagina daily at bedtime 3 suppository 0    cyanocobalamin 1,000 mcg/mL Inject 1mL IM weekly 10 mL 2    HYDROcodone-acetaminophen (NORCO) 5-325 mg per tablet Take 1 tablet by mouth every 6 (six) hours as needed for pain Max Daily Amount: 4 tablets 30 tablet 0    hydroxychloroquine (PLAQUENIL) 200 mg tablet Take 400 mg by mouth daily with breakfast        Macitentan (OPSUMIT) 10 MG tablet Take 1 tablet (10 mg total) by mouth daily 30 tablet 11    ondansetron (ZOFRAN) 4 mg tablet Take 1 tablet (4 mg total) by mouth every 8 (eight) hours as needed for nausea or vomiting 20 tablet 0    potassium chloride (K-DUR,KLOR-CON) 20 mEq tablet Take 2 tablets (40 mEq total) by mouth daily (Patient taking differently: Take 20 mEq by mouth 2 (two) times a day  ) 240 tablet 3    sildenafil (REVATIO) 20 mg tablet Take 1 tablet (20 mg total) by mouth 3 (three) times a day 90 tablet 11    torsemide (DEMADEX) 20 mg tablet Take 2 tablets (40 mg total) by mouth daily (Patient taking differently: Take 20 mg by mouth daily  ) 240 tablet 3    traMADol (ULTRAM) 50 mg tablet Take 1 tablet (50 mg total) by mouth daily at bedtime as needed for moderate pain or severe pain 30 tablet 0     No current facility-administered medications on file prior to visit          Review of Systems - Negative except as noted in HPI Objective:    Vitals:    01/21/19 1705   BP: 127/82   Pulse: 92       Physical Exam   Constitutional: She is oriented to person, place, and time  She appears well-developed and well-nourished  HENT:   Head: Normocephalic and atraumatic  Eyes: Conjunctivae are normal  No scleral icterus  Neck: Neck supple  Pulmonary/Chest: Effort normal  No stridor  No respiratory distress  Abdominal: Soft  She exhibits no distension  Musculoskeletal:        Right knee: She exhibits no effusion  Neurological: She is alert and oriented to person, place, and time  Skin: Skin is warm and dry  No erythema  Psychiatric: She has a normal mood and affect  Her behavior is normal        Right Knee Exam     Tenderness   The patient is experiencing tenderness in the medial joint line and medial retinaculum  Range of Motion   The patient has normal right knee ROM  Extension: normal   Flexion: normal     Muscle Strength     The patient has normal right knee strength  Tests   Varus: negative  Valgus: negative    Other   Erythema: absent  Scars: present ( anterolateral anteromedial arthroscopic portal scars present)  Sensation: normal  Pulse: present  Swelling: none  Other tests: no effusion present    Comments:  No ecchymosis or deformity              I have personally reviewed pertinent films in PACS  Procedures  No Procedures performed today    Portions of the record may have been created with voice recognition software   Occasional wrong word or "sound a like" substitutions may have occurred due to the inherent limitations of voice recognition software   Read the chart carefully and recognize, using context, where substitutions have occurred

## 2019-01-22 DIAGNOSIS — Z01.818 PRE-OP TESTING: Primary | ICD-10-CM

## 2019-01-22 NOTE — PRE-PROCEDURE INSTRUCTIONS
Pre-Surgery Instructions:   Medication Instructions    ascorbic acid (VITAMIN C) 500 mg tablet Patient was instructed by Physician and understands   ASPIRIN PO Patient was instructed by Physician and understands   cholecalciferol (VITAMIN D3) 1,000 units tablet Patient was instructed by Physician and understands   cyanocobalamin 1,000 mcg/mL Patient was instructed by Physician and understands   ferrous sulfate 324 (65 Fe) mg Patient was instructed by Physician and understands   folic acid (FOLVITE) 093 mcg tablet Patient was instructed by Physician and understands   HYDROcodone-acetaminophen (NORCO) 5-325 mg per tablet Instructed patient per Anesthesia Guidelines   hydroxychloroquine (PLAQUENIL) 200 mg tablet Instructed patient per Anesthesia Guidelines   Macitentan (OPSUMIT) 10 MG tablet Instructed patient per Anesthesia Guidelines   ondansetron (ZOFRAN) 4 mg tablet Instructed patient per Anesthesia Guidelines   potassium chloride (K-DUR,KLOR-CON) 20 mEq tablet Instructed patient per Anesthesia Guidelines   sildenafil (REVATIO) 20 mg tablet Instructed patient per Anesthesia Guidelines   torsemide (DEMADEX) 20 mg tablet Instructed patient per Anesthesia Guidelines   traMADol (ULTRAM) 50 mg tablet Instructed patient per Anesthesia Guidelines  Pre op and bathing instructions reviewed   Pt has hibiclens

## 2019-01-24 DIAGNOSIS — R76.8 POSITIVE ANA (ANTINUCLEAR ANTIBODY): ICD-10-CM

## 2019-01-24 DIAGNOSIS — M32.9 SYSTEMIC LUPUS ERYTHEMATOSUS, UNSPECIFIED SLE TYPE, UNSPECIFIED ORGAN INVOLVEMENT STATUS (HCC): Primary | ICD-10-CM

## 2019-01-24 RX ORDER — HEPARIN SODIUM 5000 [USP'U]/ML
5000 INJECTION, SOLUTION INTRAVENOUS; SUBCUTANEOUS EVERY 12 HOURS SCHEDULED
Qty: 14 ML | Refills: 0 | Status: SHIPPED | OUTPATIENT
Start: 2019-02-04 | End: 2019-02-14 | Stop reason: HOSPADM

## 2019-01-24 NOTE — TELEPHONE ENCOUNTER
Called and spoke with pt, explained that we need her to start on 2/4 Heparin 5000 U BID and stop on Sunday night 2/10 as well as stopping aspirin on 2/4 so that she may be 24 hours anticoagulant free before surgery on 2/12   She understands and we sent this to her pharmacy we will pick it up

## 2019-01-25 ENCOUNTER — TRANSCRIBE ORDERS (OUTPATIENT)
Dept: LAB | Facility: CLINIC | Age: 54
End: 2019-01-25

## 2019-01-25 ENCOUNTER — APPOINTMENT (OUTPATIENT)
Dept: LAB | Facility: CLINIC | Age: 54
End: 2019-01-25
Payer: COMMERCIAL

## 2019-01-25 ENCOUNTER — APPOINTMENT (OUTPATIENT)
Dept: PREADMISSION TESTING | Facility: HOSPITAL | Age: 54
End: 2019-01-25
Payer: COMMERCIAL

## 2019-01-25 DIAGNOSIS — Z01.818 PRE-OP TESTING: ICD-10-CM

## 2019-01-25 DIAGNOSIS — M17.11 ARTHRITIS OF RIGHT KNEE: ICD-10-CM

## 2019-01-25 LAB
ABO GROUP BLD: NORMAL
ALBUMIN SERPL BCP-MCNC: 4.2 G/DL (ref 3.5–5)
ALP SERPL-CCNC: 105 U/L (ref 46–116)
ALT SERPL W P-5'-P-CCNC: 23 U/L (ref 12–78)
ANION GAP SERPL CALCULATED.3IONS-SCNC: 9 MMOL/L (ref 4–13)
AST SERPL W P-5'-P-CCNC: 10 U/L (ref 5–45)
BASOPHILS # BLD AUTO: 0.03 THOUSANDS/ΜL (ref 0–0.1)
BASOPHILS NFR BLD AUTO: 1 % (ref 0–1)
BILIRUB SERPL-MCNC: 0.5 MG/DL (ref 0.2–1)
BILIRUB UR QL STRIP: NEGATIVE
BLD GP AB SCN SERPL QL: NEGATIVE
BUN SERPL-MCNC: 15 MG/DL (ref 5–25)
CALCIUM SERPL-MCNC: 9.4 MG/DL (ref 8.3–10.1)
CHLORIDE SERPL-SCNC: 101 MMOL/L (ref 100–108)
CLARITY UR: CLEAR
CO2 SERPL-SCNC: 32 MMOL/L (ref 21–32)
COLOR UR: YELLOW
CREAT SERPL-MCNC: 1.18 MG/DL (ref 0.6–1.3)
CRP SERPL QL: <3 MG/L
EOSINOPHIL # BLD AUTO: 0.17 THOUSAND/ΜL (ref 0–0.61)
EOSINOPHIL NFR BLD AUTO: 4 % (ref 0–6)
ERYTHROCYTE [DISTWIDTH] IN BLOOD BY AUTOMATED COUNT: 12.5 % (ref 11.6–15.1)
EST. AVERAGE GLUCOSE BLD GHB EST-MCNC: 103 MG/DL
FERRITIN SERPL-MCNC: 41 NG/ML (ref 8–388)
GFR SERPL CREATININE-BSD FRML MDRD: 52 ML/MIN/1.73SQ M
GLUCOSE SERPL-MCNC: 97 MG/DL (ref 65–140)
GLUCOSE UR STRIP-MCNC: NEGATIVE MG/DL
HBA1C MFR BLD: 5.2 % (ref 4.2–6.3)
HCT VFR BLD AUTO: 38.6 % (ref 34.8–46.1)
HGB BLD-MCNC: 12.8 G/DL (ref 11.5–15.4)
HGB UR QL STRIP.AUTO: NEGATIVE
IMM GRANULOCYTES # BLD AUTO: 0.01 THOUSAND/UL (ref 0–0.2)
IMM GRANULOCYTES NFR BLD AUTO: 0 % (ref 0–2)
INR PPP: 1.02 (ref 0.86–1.17)
IRON SATN MFR SERPL: 21 %
IRON SERPL-MCNC: 78 UG/DL (ref 50–170)
KETONES UR STRIP-MCNC: NEGATIVE MG/DL
LEUKOCYTE ESTERASE UR QL STRIP: NEGATIVE
LYMPHOCYTES # BLD AUTO: 1.29 THOUSANDS/ΜL (ref 0.6–4.47)
LYMPHOCYTES NFR BLD AUTO: 29 % (ref 14–44)
MCH RBC QN AUTO: 29.9 PG (ref 26.8–34.3)
MCHC RBC AUTO-ENTMCNC: 33.2 G/DL (ref 31.4–37.4)
MCV RBC AUTO: 90 FL (ref 82–98)
MONOCYTES # BLD AUTO: 0.35 THOUSAND/ΜL (ref 0.17–1.22)
MONOCYTES NFR BLD AUTO: 8 % (ref 4–12)
NEUTROPHILS # BLD AUTO: 2.59 THOUSANDS/ΜL (ref 1.85–7.62)
NEUTS SEG NFR BLD AUTO: 58 % (ref 43–75)
NITRITE UR QL STRIP: NEGATIVE
NRBC BLD AUTO-RTO: 0 /100 WBCS
PH UR STRIP.AUTO: 6 [PH] (ref 4.5–8)
PLATELET # BLD AUTO: 305 THOUSANDS/UL (ref 149–390)
PMV BLD AUTO: 10.7 FL (ref 8.9–12.7)
POTASSIUM SERPL-SCNC: 3.2 MMOL/L (ref 3.5–5.3)
PROT SERPL-MCNC: 7.4 G/DL (ref 6.4–8.2)
PROT UR STRIP-MCNC: NEGATIVE MG/DL
PROTHROMBIN TIME: 13.1 SECONDS (ref 11.8–14.2)
RBC # BLD AUTO: 4.28 MILLION/UL (ref 3.81–5.12)
RH BLD: POSITIVE
SODIUM SERPL-SCNC: 142 MMOL/L (ref 136–145)
SP GR UR STRIP.AUTO: 1.02 (ref 1–1.03)
SPECIMEN EXPIRATION DATE: NORMAL
TIBC SERPL-MCNC: 371 UG/DL (ref 250–450)
UROBILINOGEN UR QL STRIP.AUTO: 0.2 E.U./DL
WBC # BLD AUTO: 4.44 THOUSAND/UL (ref 4.31–10.16)

## 2019-01-25 PROCEDURE — 86901 BLOOD TYPING SEROLOGIC RH(D): CPT

## 2019-01-25 PROCEDURE — 81003 URINALYSIS AUTO W/O SCOPE: CPT | Performed by: ORTHOPAEDIC SURGERY

## 2019-01-25 PROCEDURE — 82728 ASSAY OF FERRITIN: CPT

## 2019-01-25 PROCEDURE — 83540 ASSAY OF IRON: CPT

## 2019-01-25 PROCEDURE — 83036 HEMOGLOBIN GLYCOSYLATED A1C: CPT

## 2019-01-25 PROCEDURE — 80053 COMPREHEN METABOLIC PANEL: CPT

## 2019-01-25 PROCEDURE — 85610 PROTHROMBIN TIME: CPT

## 2019-01-25 PROCEDURE — 86850 RBC ANTIBODY SCREEN: CPT

## 2019-01-25 PROCEDURE — 86900 BLOOD TYPING SEROLOGIC ABO: CPT

## 2019-01-25 PROCEDURE — 85025 COMPLETE CBC W/AUTO DIFF WBC: CPT

## 2019-01-25 PROCEDURE — 86140 C-REACTIVE PROTEIN: CPT

## 2019-01-25 PROCEDURE — 83550 IRON BINDING TEST: CPT

## 2019-01-25 PROCEDURE — 36415 COLL VENOUS BLD VENIPUNCTURE: CPT

## 2019-01-28 ENCOUNTER — TELEPHONE (OUTPATIENT)
Dept: OBGYN CLINIC | Facility: HOSPITAL | Age: 54
End: 2019-01-28

## 2019-01-28 ENCOUNTER — OFFICE VISIT (OUTPATIENT)
Dept: CARDIOLOGY CLINIC | Facility: CLINIC | Age: 54
End: 2019-01-28
Payer: COMMERCIAL

## 2019-01-28 VITALS
OXYGEN SATURATION: 98 % | HEIGHT: 64 IN | HEART RATE: 97 BPM | WEIGHT: 202.6 LBS | SYSTOLIC BLOOD PRESSURE: 106 MMHG | DIASTOLIC BLOOD PRESSURE: 72 MMHG | BODY MASS INDEX: 34.59 KG/M2

## 2019-01-28 DIAGNOSIS — Z01.818 PRE-OP EXAM: Primary | ICD-10-CM

## 2019-01-28 DIAGNOSIS — I27.20 PULMONARY HYPERTENSION (HCC): ICD-10-CM

## 2019-01-28 PROCEDURE — 99215 OFFICE O/P EST HI 40 MIN: CPT | Performed by: INTERNAL MEDICINE

## 2019-01-28 RX ORDER — POTASSIUM CHLORIDE 20 MEQ/1
40 TABLET, EXTENDED RELEASE ORAL 2 TIMES DAILY
Qty: 960 TABLET | Refills: 3 | Status: SHIPPED | OUTPATIENT
Start: 2019-01-28 | End: 2020-09-21 | Stop reason: SDUPTHER

## 2019-01-28 NOTE — PROGRESS NOTES
Heart Failure Outpatient Progress Note - Alejandra Florence 62 y o  female MRN: [id number]    @ Encounter: [id number]  Assessment/Plan:    # Preoperative evaluation: Low to moderate risk for R total knee arthoplasty  She is currently optimized for surgery  TTE and RHC assessment shows normal RV size and function at rest    --Please maintain euvolemia, HR and BP in there perioperative setting   --Continue the sildenafil (revatio) and macitentan (opsumit) in the perioperative setting (please DO not hold it the AM of surgery)  --The patient's dry weight is about 203 lbs  Please diurese to this weight postop  If needed, please consult cardiology for assistance  # ANSARI: Likely combination of obesity/deconditioning, diastolic dysfunction (obesity, female, currently on diuretics w/ hx of volume overload), and exercise induced PAH  --Continue diet and exercise for weight loss    # Exercised induced PAH: Obesity/deconditioning and diastolic dysfunction (PCWP 12 mmHg) do not fully explain her degree of dysfunction  Recent diagnosis of MCTD/SLE w/ + lupus anticoagulant  Remote smoker  She is at risk to develop pulmonary vascular disease leading to RV dysfunction  At rest, RV appears normal on TTE with coupled RV-PA w/o e/o of RVOT notching suggestive of normal PVR at rest  However, she did have a stress echo in 2012 that was suggestive of exercise induced pulmonary HTN  At the time she did not have e/o of elevated PVR  Her bubble was negative which makes an intracardiac shunt less likely  As her symptoms had progressed, and with ambulation she showed evidence of desaturation (possible abnormal air exchange) we did the following  Dry weight 202-203 lbs:   Diag:  --PFTs were normal   --Repeat stress echo to evaluate pulmonary pressures, RV, and RVOT signal w/ exercise showed exaggerated HR response, HTNsive response, decrease in O2 saturation from 99% to 91% and increase in PA pressures from 45 mmHg to 70 mmHg with exercise  Exercised for 5 min and 20 sec  --RHC w/ exercise: This was an exercise RHC with simultaneous monitoring of the distal and proximal SG catheter ports  She had an appropriate HR response from 100 to 130 bpm with MAP throughout the study staying at 112 mmHg  Hgb 13 1  With exercise, there was an increase in PVR from <3 to ~4 5 MOLINA  PCWP increased from 12 to 18 mmHg  CVP increased from 7 to 10 mmHg  The findings were consistent with exercise induced Pulmonary Arterial Hypertension (WHO Group 1, NYHA II)  Her TTE and PCWP are not consistent with significant left sided heart disease (Group II)  Her PFTs did not show any significant lung disease (Group III), V/Q scan is not consistent with CTEPH (Group IV PAH) and stress echo showed elevation of PA pressures with exercise which is consistent with above findings  --Slow improvement on sildenafil and macitentan  --Mild volume overload, slowly improving  --Extent of ANSARI symptoms unclear as likely masked currently by knee pain symptoms preceding ANSARI  Therapeutic:  --Continue torsemide 40 mg PO BID (patient dose reduced from BID)  Continue Kdur 40 mEq PO qAM and 20 in the afternoon  Goal weight about 202-203 lbs for now  --Daily weights qAM  --Continue sildenafil 20 mg PO q8hrs  --Continue macitentan 10 mg PO daily    # Undifferentiated CTD w/ + lupus anticoagulant (unclear if SLE is involved)  --Per outpatient Rheumatologist  # ST: V/Q negative  May be 2/2 to deconditioning +/- inappropriate ST +/- diastolic dysfunction/HF (euvolemic on exam)  No e/o infection  --Continue to monitor, can consider coby agents in the future  # Morbid obesity: Continue weight loss    RTC in 3 months    HPI: Very pleasant 61 y/o woman w/ PMHx of newly diagnosed MCTD/SLE, +lupus anti-coagulant, B12 deficiency, and obesity referred for evaluation of ANSARI  She states that she first started getting SOB -- 10 years ago   She has had several episodes of bacterial PNA and chronic cough (a few times/year)  She was referred to [facility] in 2012 for workup for PH  She had a a stress echo 8/2012 that showed that her PA pressures more than doubled from --24 mmHg to > 50 mmHg  Currently, she states she can walk up a couple flights of stairs but does get SOB  She also gets SOB with walking up inclines  She gets occasional LH  She has been found to be B12 deficient, but that is improving  She was seeing a Rheumatologist in [place] but recently saw a specialist at [facility], Dr Yvette Gold (Rheumatologist - 11/16)  She was diagnosed with MCTD and SLE  HELENE + 1:320 w/ speckled pattern  She has been on Plaquenil x 2 weeks now  She is on ASA for + lupus anticoagulant and anticardiolipin Abs  She has joint pains and a subtle malar rash  She states so far there has been no change with plaquenil  She has f/u with him in 2/2018  After her visit with Dr Erickson Be, she has had TTE which shows LVEF --65%, normal RV size and function without RVOT notching  Normal atria  CXR clear  She also has had a negative V/Q scan  She walked the patient in the hallway and had her go up and down stairs  Her resting HR went from --100 bpm to 110s with Pulse Ox starting from 98% @ rest to --90% with exercise  Pulse Ox showed a good waveform throughout  She denies CP, palpitations, presyncope, or syncope  She notes that she went on a two week cruise and afterwards developed LE edema in the past and has developed LE edema  Today, 1/9/2018, returns for f/u  Had echo stress test w/ poor exercise tolerance and decrease in O2 saturation w/ increase in PA pressures from 45 to 70 mmHg w/ exercise  Exercise RHC showed exercise induced PH w/ increase in PVR from <3 to ~4 5 MOLINA  PCWP increased from 12 to 18 mmHg  CVP increased from 7 to 10 mmHg  She was denied for tadalafil, but approved for sildenafil  She started sildenafil on 1/5/18 and states she does not feel any different and continues to have trouble with stairs   In addition, today in clinic, she continues to be tachycardic  Has gained 4 lbs since her last visit  Today, 2/13/2018, returns for f/u  She has gained ~ 4 lbs since her last visit, for a total of 9 lbs since attempted diuresis  She started sildenafil on 1/5/18 and states she feels worse going up stairs than previously  In addition, today in clinic, she continues to be tachycardic  Today, 3/19/2018, she returns for f/u  She states going up stairs there is improvement now  She has been on macitentan for 1 week now  Weight has steadily been decreasing  Today, 4/30/2018, she returns for f/u  She states she feels about the same as her last visit  Weight has steadily been decreasing  Today, 6/11/2018, she returns for f/u  She feels slightly better than her last visit  Weight continues to decrease  Walking up one flight is not a problem, two flights feels SOB  Today, 7/12/2018, she returns for f/u  Her weight is ~206 lbs on home scale  She has been out and walking around with her puppy about 15-20 minutes every night  She feels a little winded on the top of the [place], but not having to stop  Today, 8/30/18, she returns for f/u  She feels the same as last visit, however, she is having knee pains  She has a meniscus tear in the R knee  Today, 11/19/18, she returns for f/u  Exertional symptoms are stable  Her knee pains are persistent despite meniscus surgery  She is considering further surgery or knee injections  Today, 1/28/19, she returns for f/u  Preop for knee surgery         Past Medical History:   Diagnosis Date    HELENE positive     Anemia     Sildenafil causes decreased hematocrit    Chronic kidney disease     borderline lab values    Encephalitis     Lasted Assessed 10/18/2017    Lupus anticoagulant disorder (HCC)     Maxillary sinusitis     Lasted Assessed 10/18/2017    Night sweat     Osteopenia     Hip    Osteoporosis     Spine    Pneumonia     Last Assessed 10/18/2017    PONV (postoperative nausea and vomiting)     Pulmonary hypertension (HCC)     Seizures (HCC)     Only during Encephalitis    Shortness of breath     with exertion    Sinus tachycardia     Urinary incontinence      Review of Systems - 12 point ROS was done and is negative, except as noted above       Allergies   Allergen Reactions    Dilantin [Phenytoin] Anaphylaxis and Rash    Augmentin [Amoxicillin-Pot Clavulanate] Rash and Dermatitis    Penicillins Rash       Current Outpatient Prescriptions:     ascorbic acid (VITAMIN C) 500 mg tablet, Take 1 tablet (500 mg total) by mouth daily, Disp: 30 tablet, Rfl: 0    ASPIRIN PO, Take 162 mg by mouth daily in the early morning  , Disp: , Rfl:     cholecalciferol (VITAMIN D3) 1,000 units tablet, Take 2,000 Units by mouth daily in the early morning  , Disp: , Rfl:     cyanocobalamin 1,000 mcg/mL, Inject 1mL IM weekly (Patient taking differently: Inject 1,000 mcg into a muscle Inject 1mL IM weekly ), Disp: 10 mL, Rfl: 2    ferrous sulfate 324 (65 Fe) mg, Take 1 tablet (324 mg total) by mouth 2 (two) times a day before meals, Disp: 30 tablet, Rfl: 0    folic acid (FOLVITE) 128 mcg tablet, Take 1 tablet (400 mcg total) by mouth daily, Disp: 30 tablet, Rfl: 0    HYDROcodone-acetaminophen (NORCO) 5-325 mg per tablet, Take 1 tablet by mouth every 6 (six) hours as needed for pain Max Daily Amount: 4 tablets, Disp: 30 tablet, Rfl: 0    hydroxychloroquine (PLAQUENIL) 200 mg tablet, Take 400 mg by mouth daily with breakfast  , Disp: , Rfl:     Macitentan (OPSUMIT) 10 MG tablet, Take 1 tablet (10 mg total) by mouth daily, Disp: 30 tablet, Rfl: 11    ondansetron (ZOFRAN) 4 mg tablet, Take 1 tablet (4 mg total) by mouth every 8 (eight) hours as needed for nausea or vomiting, Disp: 20 tablet, Rfl: 0    potassium chloride (K-DUR,KLOR-CON) 20 mEq tablet, Take 2 tablets (40 mEq total) by mouth daily (Patient taking differently: Take 20 mEq by mouth 2 (two) times a day  ), Disp: 240 tablet, Rfl: 3    sildenafil (REVATIO) 20 mg tablet, Take 1 tablet (20 mg total) by mouth 3 (three) times a day, Disp: 90 tablet, Rfl: 11    torsemide (DEMADEX) 20 mg tablet, Take 2 tablets (40 mg total) by mouth daily (Patient taking differently: Take 40 mg by mouth 2 (two) times a day  ), Disp: 240 tablet, Rfl: 3    traMADol (ULTRAM) 50 mg tablet, Take 1 tablet (50 mg total) by mouth daily at bedtime as needed for moderate pain or severe pain, Disp: 30 tablet, Rfl: 0    [START ON 2/4/2019] Heparin Sodium, Porcine, (HEPARIN, PORCINE,) 5,000 units/mL, Inject 1 mL (5,000 Units total) under the skin every 12 (twelve) hours for 7 days Last dose on Faustino night Feb 10th (Patient not taking: Reported on 1/28/2019 ), Disp: 14 mL, Rfl: 0    Misc  Devices (COMMODE BEDSIDE) MISC, by Does not apply route as needed (Bathroom), Disp: 1 each, Rfl: 0    Social History     Social History    Marital status: /Civil Union     Spouse name: N/A    Number of children: 2    Years of education: N/A     Occupational History    Not on file       Social History Main Topics    Smoking status: Former Smoker     Quit date: 1/24/2006    Smokeless tobacco: Never Used    Alcohol use Yes      Comment: socially    Drug use: No    Sexual activity: Yes     Partners: Male     Other Topics Concern    Not on file     Social History Narrative    Daily caffeinated coffee consumption    Exercise habits           Family History   Problem Relation Age of Onset    Uterine cancer Mother     Pancreatic cancer Mother     Diabetes Mother     Heart disease Father         open heart surgery at age 48     Stroke Father         CVA    Hypertension Father     Atrial fibrillation Father     Hyperlipidemia Father     No Known Problems Sister     No Known Problems Brother     Thyroid disease Maternal Grandmother     Asthma Daughter     Heart attack Maternal Grandfather     Heart attack Paternal Grandmother     Skin cancer Paternal Grandfather     No Known Problems Sister     Thyroid disease Sister     Depression Sister     Osteoarthritis Sister     Hemochromatosis Brother    Jessenia Almanza Migraines Daughter     Asthma Daughter     Anuerysm Neg Hx     Clotting disorder Neg Hx     Heart failure Neg Hx      Physical Exam:  Vitals:    01/28/19 1608   BP: 106/72   BP Location: Right arm   Patient Position: Sitting   Cuff Size: Large   Pulse: 97   SpO2: 98%   Weight: 91 9 kg (202 lb 9 6 oz)   Height: 5' 4" (1 626 m)       GEN: Chidi Chamorro appears well, alert and oriented x 3, pleasant and cooperative   HEENT: pupils equal, round, and reactive to light; extraocular muscles intact  NECK: supple, no carotid bruits   HEART: regular rhythm, normal S1 and S2, no murmurs, clicks, gallops or rubs, JVP is    LUNGS: clear to auscultation bilaterally; no wheezes, rales, or rhonchi   ABDOMEN: normal bowel sounds, soft, no tenderness, no distention  EXTREMITIES: peripheral pulses normal; no clubbing, cyanosis, or edema  NEURO: no focal findings   SKIN: normal without suspicious lesions on exposed skin    Labs & Results:  Lab Results   Component Value Date    WBC 4 44 01/25/2019    HGB 12 8 01/25/2019    HCT 38 6 01/25/2019    MCV 90 01/25/2019     01/25/2019       Chemistry        Component Value Date/Time    K 3 2 (L) 01/25/2019 1300     01/25/2019 1300    CO2 32 01/25/2019 1300    BUN 15 01/25/2019 1300    CREATININE 1 18 01/25/2019 1300        Component Value Date/Time    CALCIUM 9 4 01/25/2019 1300    ALKPHOS 105 01/25/2019 1300    AST 10 01/25/2019 1300    ALT 23 01/25/2019 1300        EKG personally reviewed by Pradip Schmitt MD      Counseling / Coordination of Care  Total floor / unit time spent today 40 minutes  Greater than 50% of total time was spent with the patient and / or family counseling and / or coordination of care  A description of the counseling / coordination of care: 25 min        Thank you for the opportunity to participate in the care of this patient      Rodrigo Obando MD, PhD   [facility]ae Mcdonald

## 2019-01-28 NOTE — LETTER
January 28, 2019     Nam Armijo DO  [id number]    Patient: Christina Moreno   YOB: 1965   Date of Visit: 1/28/2019       Dear Dr Elaina Holguin:    Thank you for referring Penelope Medina to me for evaluation  Below are my notes for this consultation  If you have questions, please do not hesitate to call me  I look forward to following your patient along with you  Sincerely,        Ilda Daniels MD        CC: PELON Ma DO Trudie Offer, MD  1/28/2019  4:31 PM  Sign at close encounter  Heart Failure Outpatient Progress Note - Christina Moreno 62 y o  female MRN: [id number]    @ Encounter: [id number]  Assessment/Plan:    # Preoperative evaluation: Low to moderate risk for R total knee arthoplasty  She is currently optimized for surgery  TTE and RHC assessment shows normal RV size and function at rest    --Please maintain euvolemia, HR and BP in there perioperative setting   --Continue the sildenafil (revatio) and macitentan (opsumit) in the perioperative setting (please DO not hold it the AM of surgery)  --The patient's dry weight is about 203 lbs  Please diurese to this weight postop  If needed, please consult cardiology for assistance  # ANSARI: Likely combination of obesity/deconditioning, diastolic dysfunction (obesity, female, currently on diuretics w/ hx of volume overload), and exercise induced PAH  --Continue diet and exercise for weight loss    # Exercised induced PAH: Obesity/deconditioning and diastolic dysfunction (PCWP 12 mmHg) do not fully explain her degree of dysfunction  Recent diagnosis of MCTD/SLE w/ + lupus anticoagulant  Remote smoker  She is at risk to develop pulmonary vascular disease leading to RV dysfunction  At rest, RV appears normal on TTE with coupled RV-PA w/o e/o of RVOT notching suggestive of normal PVR at rest  However, she did have a stress echo in 2012 that was suggestive of exercise induced pulmonary HTN   At the time she did not have e/o of elevated PVR  Her bubble was negative which makes an intracardiac shunt less likely  As her symptoms had progressed, and with ambulation she showed evidence of desaturation (possible abnormal air exchange) we did the following  Dry weight 202-203 lbs:   Diag:  --PFTs were normal   --Repeat stress echo to evaluate pulmonary pressures, RV, and RVOT signal w/ exercise showed exaggerated HR response, HTNsive response, decrease in O2 saturation from 99% to 91% and increase in PA pressures from 45 mmHg to 70 mmHg with exercise  Exercised for 5 min and 20 sec  --RHC w/ exercise: This was an exercise RHC with simultaneous monitoring of the distal and proximal SG catheter ports  She had an appropriate HR response from 100 to 130 bpm with MAP throughout the study staying at 112 mmHg  Hgb 13 1  With exercise, there was an increase in PVR from <3 to ~4 5 MOLINA  PCWP increased from 12 to 18 mmHg  CVP increased from 7 to 10 mmHg  The findings were consistent with exercise induced Pulmonary Arterial Hypertension (WHO Group 1, NYHA II)  Her TTE and PCWP are not consistent with significant left sided heart disease (Group II)  Her PFTs did not show any significant lung disease (Group III), V/Q scan is not consistent with CTEPH (Group IV PAH) and stress echo showed elevation of PA pressures with exercise which is consistent with above findings  --Slow improvement on sildenafil and macitentan  --Mild volume overload, slowly improving  --Extent of ANSARI symptoms unclear as likely masked currently by knee pain symptoms preceding ANSARI  Therapeutic:  --Continue torsemide 40 mg PO BID (patient dose reduced from BID)  Continue Kdur 40 mEq PO qAM and 20 in the afternoon  Goal weight about 202-203 lbs for now    --Daily weights qAM  --Continue sildenafil 20 mg PO q8hrs  --Continue macitentan 10 mg PO daily    # Undifferentiated CTD w/ + lupus anticoagulant (unclear if SLE is involved)  --Per outpatient Rheumatologist  # ST: V/Q negative  May be 2/2 to deconditioning +/- inappropriate ST +/- diastolic dysfunction/HF (euvolemic on exam)  No e/o infection  --Continue to monitor, can consider coby agents in the future  # Morbid obesity: Continue weight loss    RTC in 3 months    HPI: Very pleasant 63 y/o woman w/ PMHx of newly diagnosed MCTD/SLE, +lupus anti-coagulant, B12 deficiency, and obesity referred for evaluation of ANSARI  She states that she first started getting SOB -- 10 years ago  She has had several episodes of bacterial PNA and chronic cough (a few times/year)  She was referred to [facility] in 2012 for workup for PH  She had a a stress echo 8/2012 that showed that her PA pressures more than doubled from --24 mmHg to > 50 mmHg  Currently, she states she can walk up a couple flights of stairs but does get SOB  She also gets SOB with walking up inclines  She gets occasional LH  She has been found to be B12 deficient, but that is improving  She was seeing a Rheumatologist in [place], [place] but recently saw a specialist at [facility], Dr Yvette Gold (Rheumatologist - 11/16)  She was diagnosed with MCTD and SLE  HELENE + 1:320 w/ speckled pattern  She has been on Plaquenil x 2 weeks now  She is on ASA for + lupus anticoagulant and anticardiolipin Abs  She has joint pains and a subtle malar rash  She states so far there has been no change with plaquenil  She has f/u with him in 2/2018  After her visit with Dr Erickson Be, she has had TTE which shows LVEF --65%, normal RV size and function without RVOT notching  Normal atria  CXR clear  She also has had a negative V/Q scan  She walked the patient in the hallway and had her go up and down stairs  Her resting HR went from --100 bpm to 110s with Pulse Ox starting from 98% @ rest to --90% with exercise  Pulse Ox showed a good waveform throughout  She denies CP, palpitations, presyncope, or syncope   She notes that she went on a two week cruise and afterwards developed LE edema in the past and has developed LE edema  Today, 1/9/2018, returns for f/u  Had echo stress test w/ poor exercise tolerance and decrease in O2 saturation w/ increase in PA pressures from 45 to 70 mmHg w/ exercise  Exercise RHC showed exercise induced PH w/ increase in PVR from <3 to ~4 5 MOLINA  PCWP increased from 12 to 18 mmHg  CVP increased from 7 to 10 mmHg  She was denied for tadalafil, but approved for sildenafil  She started sildenafil on 1/5/18 and states she does not feel any different and continues to have trouble with stairs  In addition, today in clinic, she continues to be tachycardic  Has gained 4 lbs since her last visit  Today, 2/13/2018, returns for f/u  She has gained ~ 4 lbs since her last visit, for a total of 9 lbs since attempted diuresis  She started sildenafil on 1/5/18 and states she feels worse going up stairs than previously  In addition, today in clinic, she continues to be tachycardic  Today, 3/19/2018, she returns for f/u  She states going up stairs there is improvement now  She has been on macitentan for 1 week now  Weight has steadily been decreasing  Today, 4/30/2018, she returns for f/u  She states she feels about the same as her last visit  Weight has steadily been decreasing  Today, 6/11/2018, she returns for f/u  She feels slightly better than her last visit  Weight continues to decrease  Walking up one flight is not a problem, two flights feels SOB  Today, 7/12/2018, she returns for f/u  Her weight is ~206 lbs on home scale  She has been out and walking around with her puppy about 15-20 minutes every night  She feels a little winded on the top of the [place], but not having to stop  Today, 8/30/18, she returns for f/u  She feels the same as last visit, however, she is having knee pains  She has a meniscus tear in the R knee  Today, 11/19/18, she returns for f/u  Exertional symptoms are stable   Her knee pains are persistent despite meniscus surgery  She is considering further surgery or knee injections  Today, 1/28/19, she returns for f/u  Preop for knee surgery  Past Medical History:   Diagnosis Date    HELENE positive     Anemia     Sildenafil causes decreased hematocrit    Chronic kidney disease     borderline lab values    Encephalitis     Lasted Assessed 10/18/2017    Lupus anticoagulant disorder (HCC)     Maxillary sinusitis     Lasted Assessed 10/18/2017    Night sweat     Osteopenia     Hip    Osteoporosis     Spine    Pneumonia     Last Assessed 10/18/2017    PONV (postoperative nausea and vomiting)     Pulmonary hypertension (HCC)     Seizures (HCC)     Only during Encephalitis    Shortness of breath     with exertion    Sinus tachycardia     Urinary incontinence      Review of Systems - 12 point ROS was done and is negative, except as noted above       Allergies   Allergen Reactions    Dilantin [Phenytoin] Anaphylaxis and Rash    Augmentin [Amoxicillin-Pot Clavulanate] Rash and Dermatitis    Penicillins Rash       Current Outpatient Prescriptions:     ascorbic acid (VITAMIN C) 500 mg tablet, Take 1 tablet (500 mg total) by mouth daily, Disp: 30 tablet, Rfl: 0    ASPIRIN PO, Take 162 mg by mouth daily in the early morning  , Disp: , Rfl:     cholecalciferol (VITAMIN D3) 1,000 units tablet, Take 2,000 Units by mouth daily in the early morning  , Disp: , Rfl:     cyanocobalamin 1,000 mcg/mL, Inject 1mL IM weekly (Patient taking differently: Inject 1,000 mcg into a muscle Inject 1mL IM weekly ), Disp: 10 mL, Rfl: 2    ferrous sulfate 324 (65 Fe) mg, Take 1 tablet (324 mg total) by mouth 2 (two) times a day before meals, Disp: 30 tablet, Rfl: 0    folic acid (FOLVITE) 003 mcg tablet, Take 1 tablet (400 mcg total) by mouth daily, Disp: 30 tablet, Rfl: 0    HYDROcodone-acetaminophen (NORCO) 5-325 mg per tablet, Take 1 tablet by mouth every 6 (six) hours as needed for pain Max Daily Amount: 4 tablets, Disp: 30 tablet, Rfl: 0    hydroxychloroquine (PLAQUENIL) 200 mg tablet, Take 400 mg by mouth daily with breakfast  , Disp: , Rfl:     Macitentan (OPSUMIT) 10 MG tablet, Take 1 tablet (10 mg total) by mouth daily, Disp: 30 tablet, Rfl: 11    ondansetron (ZOFRAN) 4 mg tablet, Take 1 tablet (4 mg total) by mouth every 8 (eight) hours as needed for nausea or vomiting, Disp: 20 tablet, Rfl: 0    potassium chloride (K-DUR,KLOR-CON) 20 mEq tablet, Take 2 tablets (40 mEq total) by mouth daily (Patient taking differently: Take 20 mEq by mouth 2 (two) times a day  ), Disp: 240 tablet, Rfl: 3    sildenafil (REVATIO) 20 mg tablet, Take 1 tablet (20 mg total) by mouth 3 (three) times a day, Disp: 90 tablet, Rfl: 11    torsemide (DEMADEX) 20 mg tablet, Take 2 tablets (40 mg total) by mouth daily (Patient taking differently: Take 40 mg by mouth 2 (two) times a day  ), Disp: 240 tablet, Rfl: 3    traMADol (ULTRAM) 50 mg tablet, Take 1 tablet (50 mg total) by mouth daily at bedtime as needed for moderate pain or severe pain, Disp: 30 tablet, Rfl: 0    [START ON 2/4/2019] Heparin Sodium, Porcine, (HEPARIN, PORCINE,) 5,000 units/mL, Inject 1 mL (5,000 Units total) under the skin every 12 (twelve) hours for 7 days Last dose on Faustino night Feb 10th (Patient not taking: Reported on 1/28/2019 ), Disp: 14 mL, Rfl: 0    Misc  Devices (COMMODE BEDSIDE) MISC, by Does not apply route as needed (Bathroom), Disp: 1 each, Rfl: 0    Social History     Social History    Marital status: /Civil Union     Spouse name: N/A    Number of children: 2    Years of education: N/A     Occupational History    Not on file       Social History Main Topics    Smoking status: Former Smoker     Quit date: 1/24/2006    Smokeless tobacco: Never Used    Alcohol use Yes      Comment: socially    Drug use: No    Sexual activity: Yes     Partners: Male     Other Topics Concern    Not on file     Social History Narrative    Daily caffeinated coffee consumption Exercise habits           Family History   Problem Relation Age of Onset    Uterine cancer Mother     Pancreatic cancer Mother     Diabetes Mother     Heart disease Father         open heart surgery at age 48     Stroke Father         CVA    Hypertension Father     Atrial fibrillation Father     Hyperlipidemia Father     No Known Problems Sister     No Known Problems Brother     Thyroid disease Maternal Grandmother     Asthma Daughter     Heart attack Maternal Grandfather     Heart attack Paternal Grandmother     Skin cancer Paternal Grandfather     No Known Problems Sister     Thyroid disease Sister     Depression Sister     Osteoarthritis Sister     Hemochromatosis Brother     Migraines Daughter     Asthma Daughter     Anuerysm Neg Hx     Clotting disorder Neg Hx     Heart failure Neg Hx      Physical Exam:  Vitals:    01/28/19 1608   BP: 106/72   BP Location: Right arm   Patient Position: Sitting   Cuff Size: Large   Pulse: 97   SpO2: 98%   Weight: 91 9 kg (202 lb 9 6 oz)   Height: 5' 4" (1 626 m)       GEN: Bg Stern appears well, alert and oriented x 3, pleasant and cooperative   HEENT: pupils equal, round, and reactive to light; extraocular muscles intact  NECK: supple, no carotid bruits   HEART: regular rhythm, normal S1 and S2, no murmurs, clicks, gallops or rubs, JVP is    LUNGS: clear to auscultation bilaterally; no wheezes, rales, or rhonchi   ABDOMEN: normal bowel sounds, soft, no tenderness, no distention  EXTREMITIES: peripheral pulses normal; no clubbing, cyanosis, or edema  NEURO: no focal findings   SKIN: normal without suspicious lesions on exposed skin    Labs & Results:  Lab Results   Component Value Date    WBC 4 44 01/25/2019    HGB 12 8 01/25/2019    HCT 38 6 01/25/2019    MCV 90 01/25/2019     01/25/2019       Chemistry        Component Value Date/Time    K 3 2 (L) 01/25/2019 1300     01/25/2019 1300    CO2 32 01/25/2019 1300    BUN 15 01/25/2019 1300    CREATININE 1 18 01/25/2019 1300        Component Value Date/Time    CALCIUM 9 4 01/25/2019 1300    ALKPHOS 105 01/25/2019 1300    AST 10 01/25/2019 1300    ALT 23 01/25/2019 1300        EKG personally reviewed by Rakesh Rose MD      Counseling / Coordination of Care  Total floor / unit time spent today 40 minutes  Greater than 50% of total time was spent with the patient and / or family counseling and / or coordination of care  A description of the counseling / coordination of care: 25 min  Thank you for the opportunity to participate in the care of this patient      Rakesh Rose MD, PhD   Jeny Hutson

## 2019-01-28 NOTE — TELEPHONE ENCOUNTER
Preoperative Elective Admission Assessment       Living Situation: Pt reports living at home with her , Gloria Nava, grown daughter and grandson  Home Layout: Pt reports twin home with 2 floors, step in tub with shower seat, and pt ordered grab bar                      Steps: 4 to enter the home, 14 to the 2nd floor  First Floor Setup: N/A     Post-op Caregiver: Primarily  Gloria Nava, however, pt reports grandson and daughter will both be around to assist      Post-op Transport:Primarily  Gloria Nava, however, pt reports grandson and daughter will both be around to assist      Outpatient Physical Therapy Site: [facility], sent to PT  to get pt scheduled    DME: Pt reports having crutches, RW and BSC, needs a cane    Patient's Current Level of Function: Pt reports independent with ADLS and Ambulation    Medication Management: Pt reports self managing medications using a pillbox                     Preferred Pharmacy:[facility]                    Blood Management Vitamins: Pt reports taking folic acid, vitamin C and Iron                      Post-op anticoagulant: Pt reports bridging with heparin pre op, pt has lupus anticoag disorder, will go to [facility] post op per pt, she discussed with surgeon    DC Plan: Home with outpatient PT                    Barriers to DC identified preoperatively:   *concerns for anesthesia    BMI: 37 13 at GYN on 12/21    [employer]: enrolled on 1/28                    RAPT: 11                    ACE/ARB Form: N/A                    HOOS/KOOS: 54 84 on 1/28    Patient Education:   Pt educated on post op pain, early mobilization (POD0), indication/use of incentive spirometer (10x/hr while awake), and indication for/use of foot/leg pumps  pt encouraged to call me with questions, concerns or issues

## 2019-01-31 ENCOUNTER — ANESTHESIA EVENT (OUTPATIENT)
Dept: PERIOP | Facility: HOSPITAL | Age: 54
DRG: 470 | End: 2019-01-31
Payer: COMMERCIAL

## 2019-02-04 ENCOUNTER — OFFICE VISIT (OUTPATIENT)
Dept: FAMILY MEDICINE CLINIC | Facility: OTHER | Age: 54
End: 2019-02-04
Payer: COMMERCIAL

## 2019-02-04 ENCOUNTER — TELEPHONE (OUTPATIENT)
Dept: OBGYN CLINIC | Facility: HOSPITAL | Age: 54
End: 2019-02-04

## 2019-02-04 VITALS
SYSTOLIC BLOOD PRESSURE: 122 MMHG | OXYGEN SATURATION: 97 % | DIASTOLIC BLOOD PRESSURE: 78 MMHG | TEMPERATURE: 99.6 F | HEART RATE: 105 BPM | HEIGHT: 64 IN | WEIGHT: 206.56 LBS | BODY MASS INDEX: 35.26 KG/M2

## 2019-02-04 DIAGNOSIS — I27.20 PULMONARY HYPERTENSION (HCC): ICD-10-CM

## 2019-02-04 DIAGNOSIS — Z01.818 PREOP GENERAL PHYSICAL EXAM: Primary | ICD-10-CM

## 2019-02-04 DIAGNOSIS — I10 ESSENTIAL HYPERTENSION: ICD-10-CM

## 2019-02-04 DIAGNOSIS — M35.9 DIFFUSE CONNECTIVE TISSUE DISEASE (HCC): ICD-10-CM

## 2019-02-04 DIAGNOSIS — M17.11 ARTHRITIS OF RIGHT KNEE: ICD-10-CM

## 2019-02-04 DIAGNOSIS — R76.0 LUPUS ANTICOAGULANT POSITIVE: ICD-10-CM

## 2019-02-04 PROCEDURE — 99244 OFF/OP CNSLTJ NEW/EST MOD 40: CPT | Performed by: FAMILY MEDICINE

## 2019-02-04 NOTE — TELEPHONE ENCOUNTER
Pt contacted me this morning requesting a call  Pt asking about anesthesia type for day of surgery  I contacted surgeons office, was told that ultimate decision will be made the day of but that pt's request of being "concious" would not occur  I called pt and relayed that spinal with a femoral block will not be completed  I did offer pt to delay surgery to do research regarding anesthesia and surgeons that would consider her request, pt denied wanting to do so and would like to keep surgery date for 2 12  Pt reports she is just "concerned with getting sick and general " I advised pt to speak with anesthesia the day of surgery and that they can medicate pt appropriately for her concerns of nausea  pt encouraged to call me with questions, concerns or issues

## 2019-02-04 NOTE — PROGRESS NOTES
Subjective:     Genesis Batista is a 62 y o  female who presents to the office today for a preoperative consultation at the request of surgeon Dr Radha De La Cruz who plans on performing RIGHT TKR on February 12  This consultation is requested for the specific conditions prompting preoperative evaluation (i e  because of potential affect on operative risk): pulmonary HTN, essential HTN, diffuse connective tissue disease  Planned anesthesia: spinal and general  The patient has the following known anesthesia issues: NONE  Patients bleeding risk: no recent abnormal bleeding, no remote history of abnormal bleeding and no use of Ca-channel blockers  Patient does not have objections to receiving blood products if needed      The following portions of the patient's history were reviewed and updated as appropriate: allergies, current medications, past family history, past medical history, past social history, past surgical history and problem list       Current Outpatient Prescriptions:     ascorbic acid (VITAMIN C) 500 mg tablet, Take 1 tablet (500 mg total) by mouth daily, Disp: 30 tablet, Rfl: 0    cholecalciferol (VITAMIN D3) 1,000 units tablet, Take 2,000 Units by mouth daily in the early morning  , Disp: , Rfl:     cyanocobalamin 1,000 mcg/mL, Inject 1mL IM weekly (Patient taking differently: Inject 1,000 mcg into a muscle Inject 1mL IM weekly ), Disp: 10 mL, Rfl: 2    ferrous sulfate 324 (65 Fe) mg, Take 1 tablet (324 mg total) by mouth 2 (two) times a day before meals, Disp: 30 tablet, Rfl: 0    folic acid (FOLVITE) 011 mcg tablet, Take 1 tablet (400 mcg total) by mouth daily, Disp: 30 tablet, Rfl: 0    Heparin Sodium, Porcine, (HEPARIN, PORCINE,) 5,000 units/mL, Inject 1 mL (5,000 Units total) under the skin every 12 (twelve) hours for 7 days Last dose on Sunday night Feb 10th, Disp: 14 mL, Rfl: 0    HYDROcodone-acetaminophen (NORCO) 5-325 mg per tablet, Take 1 tablet by mouth every 6 (six) hours as needed for pain Max Daily Amount: 4 tablets, Disp: 30 tablet, Rfl: 0    hydroxychloroquine (PLAQUENIL) 200 mg tablet, Take 400 mg by mouth daily with breakfast  , Disp: , Rfl:     Macitentan (OPSUMIT) 10 MG tablet, Take 1 tablet (10 mg total) by mouth daily, Disp: 30 tablet, Rfl: 11    Misc  Devices (COMMODE BEDSIDE) MISC, by Does not apply route as needed (Bathroom), Disp: 1 each, Rfl: 0    ondansetron (ZOFRAN) 4 mg tablet, Take 1 tablet (4 mg total) by mouth every 8 (eight) hours as needed for nausea or vomiting, Disp: 20 tablet, Rfl: 0    potassium chloride (K-DUR,KLOR-CON) 20 mEq tablet, Take 2 tablets (40 mEq total) by mouth 2 (two) times a day, Disp: 960 tablet, Rfl: 3    sildenafil (REVATIO) 20 mg tablet, Take 1 tablet (20 mg total) by mouth 3 (three) times a day, Disp: 90 tablet, Rfl: 11    torsemide (DEMADEX) 20 mg tablet, Take 2 tablets (40 mg total) by mouth daily (Patient taking differently: Take 40 mg by mouth 2 (two) times a day  ), Disp: 240 tablet, Rfl: 3    traMADol (ULTRAM) 50 mg tablet, Take 1 tablet (50 mg total) by mouth daily at bedtime as needed for moderate pain or severe pain, Disp: 30 tablet, Rfl: 0        Review of Systems   Constitutional: Negative for appetite change, fatigue, fever and unexpected weight change  HENT: Negative for congestion, dental problem, ear pain, postnasal drip, sore throat and tinnitus  Eyes: Negative for pain, discharge and visual disturbance  Respiratory: Negative for cough, shortness of breath and wheezing  Cardiovascular: Negative for chest pain, palpitations and leg swelling  Gastrointestinal: Negative for abdominal pain, constipation, diarrhea, nausea and vomiting  Endocrine: Negative for cold intolerance and heat intolerance  Genitourinary: Negative for difficulty urinating, dysuria, flank pain and urgency  Musculoskeletal: Negative for arthralgias, back pain, joint swelling and myalgias  Skin: Negative for rash and wound     Allergic/Immunologic: Negative for immunocompromised state  Neurological: Negative for dizziness, syncope, speech difficulty, weakness and numbness  Hematological: Negative for adenopathy  Does not bruise/bleed easily  Psychiatric/Behavioral: Negative for confusion, dysphoric mood and sleep disturbance  The patient is not nervous/anxious  Objective:  /78 (BP Location: Left arm, Patient Position: Sitting, Cuff Size: Large)   Pulse 105   Temp 99 6 °F (37 6 °C) (Tympanic)   Ht 5' 4" (1 626 m)   Wt 93 7 kg (206 lb 9 oz)   SpO2 97%   BMI 35 46 kg/m²     Physical Exam   Constitutional: She is oriented to person, place, and time  She appears well-developed and well-nourished  No distress  HENT:   Head: Normocephalic and atraumatic  Right Ear: Hearing, tympanic membrane, external ear and ear canal normal    Left Ear: Hearing, tympanic membrane, external ear and ear canal normal    Nose: Nose normal    Mouth/Throat: Uvula is midline, oropharynx is clear and moist and mucous membranes are normal    Eyes: Pupils are equal, round, and reactive to light  Conjunctivae and EOM are normal  No scleral icterus  Neck: Normal range of motion  Neck supple  No thyromegaly present  Cardiovascular: Normal rate, regular rhythm, normal heart sounds and intact distal pulses  No murmur heard  Pulmonary/Chest: Effort normal and breath sounds normal  No respiratory distress  She has no wheezes  Abdominal: Soft  Bowel sounds are normal  She exhibits no distension  There is no tenderness  Musculoskeletal: She exhibits no edema or tenderness  Lymphadenopathy:     She has no cervical adenopathy  Neurological: She is alert and oriented to person, place, and time  No cranial nerve deficit  Coordination normal    Skin: Skin is warm and dry  No rash noted  No erythema  No pallor  Psychiatric: She has a normal mood and affect  Her behavior is normal    Vitals reviewed        Cardiographics  ECG: normal sinus rhythm, no blocks or conduction defects, no ischemic changes  Echocardiogram: not done    Imaging  Chest x-ray: not done     Lab Review   Appointment on 01/25/2019   Component Date Value    Sodium 01/25/2019 142     Potassium 01/25/2019 3 2*    Chloride 01/25/2019 101     CO2 01/25/2019 32     ANION GAP 01/25/2019 9     BUN 01/25/2019 15     Creatinine 01/25/2019 1 18     Glucose 01/25/2019 97     Calcium 01/25/2019 9 4     AST 01/25/2019 10     ALT 01/25/2019 23     Alkaline Phosphatase 01/25/2019 105     Total Protein 01/25/2019 7 4     Albumin 01/25/2019 4 2     Total Bilirubin 01/25/2019 0 50     eGFR 01/25/2019 52     WBC 01/25/2019 4 44     RBC 01/25/2019 4 28     Hemoglobin 01/25/2019 12 8     Hematocrit 01/25/2019 38 6     MCV 01/25/2019 90     MCH 01/25/2019 29 9     MCHC 01/25/2019 33 2     RDW 01/25/2019 12 5     MPV 01/25/2019 10 7     Platelets 54/76/5891 305     nRBC 01/25/2019 0     Neutrophils Relative 01/25/2019 58     Immat GRANS % 01/25/2019 0     Lymphocytes Relative 01/25/2019 29     Monocytes Relative 01/25/2019 8     Eosinophils Relative 01/25/2019 4     Basophils Relative 01/25/2019 1     Neutrophils Absolute 01/25/2019 2 59     Immature Grans Absolute 01/25/2019 0 01     Lymphocytes Absolute 01/25/2019 1 29     Monocytes Absolute 01/25/2019 0 35     Eosinophils Absolute 01/25/2019 0 17     Basophils Absolute 01/25/2019 0 03     CRP 01/25/2019 <3 0     ABO Grouping 01/25/2019 A     Rh Factor 01/25/2019 Positive     Antibody Screen 01/25/2019 Negative     Specimen Expiration Date 01/25/2019 [id number]     Hemoglobin A1C 01/25/2019 5 2     EAG 01/25/2019 103     Protime 01/25/2019 13 1     INR 01/25/2019 1 02     Iron Saturation 01/25/2019 21     TIBC 01/25/2019 371     Iron 01/25/2019 78     Ferritin 01/25/2019 41    Office Visit on 01/21/2019   Component Date Value    Color, UA 01/25/2019 Yellow     Clarity, UA 01/25/2019 Clear     Specific Lutz, UA 01/25/2019 1 020     pH, UA 01/25/2019 6 0     Leukocytes, UA 01/25/2019 Negative     Nitrite, UA 01/25/2019 Negative     Protein, UA 01/25/2019 Negative     Glucose, UA 01/25/2019 Negative     Ketones, UA 01/25/2019 Negative     Urobilinogen, UA 01/25/2019 0 2     Bilirubin, UA 01/25/2019 Negative     Blood, UA 01/25/2019 Negative    Office Visit on 12/21/2018   Component Date Value    Candida Species 12/21/2018 Negative     Gardnerella vaginalis 12/21/2018 Positive*    Trichomonas vaginalis 12/21/2018 Negative    Transcribe Orders on 12/08/2018   Component Date Value    Color, UA 12/08/2018 Light Yellow     Clarity, UA 12/08/2018 Clear     Specific Gravity, UA 12/08/2018 1 020     pH, UA 12/08/2018 6 0     Leukocytes, UA 12/08/2018 Trace*    Nitrite, UA 12/08/2018 Negative     Protein, UA 12/08/2018 Negative     Glucose, UA 12/08/2018 Negative     Ketones, UA 12/08/2018 Negative     Urobilinogen, UA 12/08/2018 0 2     Bilirubin, UA 12/08/2018 Negative     Blood, UA 12/08/2018 Negative     Creatinine, Ur 12/08/2018 33 8     Protein Urine Random 12/08/2018 <6     Prot/Creat Ratio, Ur 12/08/2018 <0 18*    RBC, UA 12/08/2018 None Seen     WBC, UA 12/08/2018 0-1*    Epithelial Cells 12/08/2018 Occasional     Bacteria, UA 12/08/2018 None Seen     MUCUS THREADS 12/08/2018 Occasional*          Assessment:     62 y o  female with planned surgery as above  Known risk factors for perioperative complications: pulmonary hypertension    Difficulty with intubation is not anticipated  Cardiac Risk Estimation: MODERATE cardiopulmonary risk;  medically maximized at this time      Plan:     1  Preoperative workup as follows ECG, hemoglobin, hematocrit, electrolytes, creatinine, glucose, coagulation studies  2  Change in medication regimen before surgery: see Cardiology clearance note for details  Return if symptoms worsen or fail to improve      The patient indicates understanding of these issues and agrees with the plan            Brenda Gaines, DO

## 2019-02-06 ENCOUNTER — EVALUATION (OUTPATIENT)
Dept: PHYSICAL THERAPY | Facility: CLINIC | Age: 54
End: 2019-02-06
Payer: COMMERCIAL

## 2019-02-06 DIAGNOSIS — M25.561 RECURRENT PAIN OF RIGHT KNEE: Primary | ICD-10-CM

## 2019-02-06 PROCEDURE — 97161 PT EVAL LOW COMPLEX 20 MIN: CPT | Performed by: PHYSICAL THERAPIST

## 2019-02-06 NOTE — PROGRESS NOTES
PT Evaluation     Today's date: 2019  Patient name: Carlitos Sims  : 1965  MRN: [id number]  Referring provider: Chucho Sousa DO  Dx:   Encounter Diagnosis     ICD-10-CM    1  Recurrent pain of right knee M25 [id number]                   Assessment  Assessment details: Carlitos Sims is a pleasant 62 y o  female who presents pre-operatively for R TKR  The patient's greatest concerns are the fear of not knowing what will happen and this increases her anxiety  No further referral appears necessary at this time based upon examination results  Primary movement impairment diagnosis of knee extension hypomobility resulting in pathoanatomical symptoms of knee osteoarthritis  She was given education on what to expect postoperatively and patient showed good understanding of plan  Impairments include:  1) Knee extension hypomobility  2)knee flexion hypomobility  3) Hip ext strength deficit    Etiologic factors include none recalled by the patient  Impairments: abnormal coordination, abnormal muscle firing, abnormal muscle tone, abnormal or restricted ROM, abnormal movement, activity intolerance, difficulty understanding, impaired physical strength, lacks appropriate home exercise program, pain with function, poor posture  and poor body mechanics    Symptom irritability: moderateUnderstanding of Dx/Px/POC: good   Prognosis: good  Prognosis details: Positive prognostic indicators include positive attitude toward recovery  Negative prognostic indicators include predisposition for blood clots, Osteopenia, Pulmonary HTN, connective tissue disorder,     Goals  STGs  1  Decrease pain by 20% in 2-4 weeks  2  Improve knee ROM by 10 degrees in 2-4 weeks  3  Improve hip strength by 1/3 grade in 2-4 weeks  LTG  1  Decrease pain by 80% in 10 weeks  2  Perform stairs s pain and drive independently 10 weeks  3  Walk well enough to vacation to [facility] in     4  SLB >20 sec in 3 weeks    5  Walk grocery store distance in 2-3 weeks  Plan  Plan details: Prognosis above is given PT services 2x/week tapering to 1x/week over the next 2 months and home program adherence  Patient would benefit from: skilled physical therapy  Planned modality interventions: thermotherapy: hydrocollator packs  Planned therapy interventions: activity modification, joint mobilization, manual therapy, motor coordination training, neuromuscular re-education, patient education, self care, therapeutic activities, therapeutic exercise, graded activity, home exercise program and behavior modification  Frequency: 2x week  Duration in weeks: 8  Treatment plan discussed with: patient        Subjective Evaluation    History of Present Illness  Mechanism of injury: Patient presents with R knee pain from insidious onset last 2018  She will be having R TKR on 19  She does get occasional LBP due to gait deviations       Neuro signs: Her knee gives out few times/ week  Red flags: none  Occupation: Medical billing- sitting mostly  Past Medical History: Menisectomy in 10/2018  Pain  At best pain ratin  At worst pain ratin  Location: medial knee and under kneecap  Quality: dull ache and throbbing  Aggravating factors: stair climbing    Social Support  Steps to enter house: yes (single rail)  5  Stairs in house: yes (single rail)   18  Lives with: spouse and adult children (5 cats and 1 dog)      Diagnostic Tests  MRI studies: abnormal  Patient Goals  Patient goals for therapy: decreased pain, increased motion and increased strength          Objective     Active Range of Motion   Left Knee   Flexion: 130 degrees   Extension: -4 degrees     Right Knee   Flexion: 118 degrees   Extension: 7 degrees     Additional Active Range of Motion Details    Observation: BLEs swelling  130/82 mm Hg  TUG: 10 11 sec  Balance: R SLB  5 sec mod sway and painful  Gait: Decr WB on RLE  Functional squat: 95 deg painful  - SLR: Delma Olvieros: - Faddir: -  Stairs: decreased force in push off, pain coming down c LLE   Hip abd: R 4/5 L 4+5   Hip ext: 4-/5 B    Passive Range of Motion   Left Knee   Flexion: 140 degrees   Extension: -7 degrees     Right Knee   Flexion: 130 degrees   Extension: 2 degrees                        Subjective: See IE      Objective: See treatment diary below      Assessment: Tolerated session well and demonstrated good understanding of POC        Plan: Patient's main goal is to walk normally    Precautions: Pulmonary HTN, connective tissue disorder, SOB   Dx: R TKR 2/12/19    Daily Treatment Diary     Manuals 2/6         knee PROM                                                    Exercise Diary          bike          LAQ          SLR          TKE          Total gym          SLS          3-way          Steps          Minisquats                                                                                                                                  Modalities

## 2019-02-11 NOTE — ANESTHESIA PREPROCEDURE EVALUATION
Review of Systems/Medical History      History of anesthetic complications PONV    Cardiovascular  Hypertension ,   Comment: Nl  RV and RHCath, Pulmonary hypertension (Exercise Induced) Pulmonary  Smoker ex-smoker  ,   Comment: Chronic Cough     GI/Hepatic            Endo/Other     GYN       Hematology  Anemia ,    Comment: SLE Musculoskeletal    Arthritis     Neurology  Seizures (Only during encephalitis) ,     Psychology           Physical Exam    Airway    Mallampati score: I  TM Distance: >3 FB  Neck ROM: full     Dental   No notable dental hx     Cardiovascular      Pulmonary      Other Findings        Anesthesia Plan  ASA Score- 3     Anesthesia Type- regional and general with ASA Monitors  Additional Monitors:   Airway Plan: ETT  Comment: AFter discussion with pt  And Dr Bishop Starks, will proceed with GA   Plan Factors-Patient not instructed to abstain from smoking on day of procedure  Patient did not smoke on day of surgery  Induction-     Postoperative Plan-     Informed Consent- Anesthetic plan and risks discussed with patient  I personally reviewed this patient with the CRNA  Discussed and agreed on the Anesthesia Plan with the CRNA           Lab Results   Component Value Date    GLUC 97 01/25/2019    GLUF 91 09/10/2018    ALT 23 01/25/2019    AST 10 01/25/2019    BUN 15 01/25/2019    CALCIUM 9 4 01/25/2019     01/25/2019    CO2 32 01/25/2019    CREATININE 1 18 01/25/2019    HDL 45 05/17/2018    INR 1 02 01/25/2019    HCT 38 6 01/25/2019    HGB 12 8 01/25/2019    HGBA1C 5 2 01/25/2019     01/25/2019    K 3 2 (L) 01/25/2019    TRIG 131 05/17/2018    WBC 4 44 01/25/2019

## 2019-02-12 ENCOUNTER — ANESTHESIA (OUTPATIENT)
Dept: PERIOP | Facility: HOSPITAL | Age: 54
DRG: 470 | End: 2019-02-12
Payer: COMMERCIAL

## 2019-02-12 ENCOUNTER — HOSPITAL ENCOUNTER (INPATIENT)
Facility: HOSPITAL | Age: 54
LOS: 2 days | Discharge: HOME | DRG: 470 | End: 2019-02-14
Attending: ORTHOPAEDIC SURGERY | Admitting: ORTHOPAEDIC SURGERY
Payer: COMMERCIAL

## 2019-02-12 DIAGNOSIS — R00.0 TACHYCARDIA: ICD-10-CM

## 2019-02-12 DIAGNOSIS — R00.0 SINUS TACHYCARDIA: ICD-10-CM

## 2019-02-12 DIAGNOSIS — M35.9 DIFFUSE CONNECTIVE TISSUE DISEASE (HCC): ICD-10-CM

## 2019-02-12 DIAGNOSIS — E79.0 HYPERURICEMIA: ICD-10-CM

## 2019-02-12 DIAGNOSIS — M17.11 ARTHRITIS OF RIGHT KNEE: Primary | ICD-10-CM

## 2019-02-12 DIAGNOSIS — N18.30 CKD (CHRONIC KIDNEY DISEASE) STAGE 3, GFR 30-59 ML/MIN (HCC): ICD-10-CM

## 2019-02-12 DIAGNOSIS — M10.9 GOUT, UNSPECIFIED CAUSE, UNSPECIFIED CHRONICITY, UNSPECIFIED SITE: ICD-10-CM

## 2019-02-12 DIAGNOSIS — R76.0 LUPUS ANTICOAGULANT POSITIVE: ICD-10-CM

## 2019-02-12 DIAGNOSIS — L93.0 LUPUS ERYTHEMATOSUS, UNSPECIFIED FORM: ICD-10-CM

## 2019-02-12 DIAGNOSIS — I10 ESSENTIAL HYPERTENSION: ICD-10-CM

## 2019-02-12 DIAGNOSIS — I27.20 PULMONARY HYPERTENSION (HCC): ICD-10-CM

## 2019-02-12 DIAGNOSIS — M35.9 UNDIFFERENTIATED CONNECTIVE TISSUE DISEASE (HCC): ICD-10-CM

## 2019-02-12 DIAGNOSIS — R06.02 SOB (SHORTNESS OF BREATH) ON EXERTION: ICD-10-CM

## 2019-02-12 DIAGNOSIS — R76.8 POSITIVE ANA (ANTINUCLEAR ANTIBODY): ICD-10-CM

## 2019-02-12 DIAGNOSIS — M32.9 SYSTEMIC LUPUS ERYTHEMATOSUS, UNSPECIFIED SLE TYPE, UNSPECIFIED ORGAN INVOLVEMENT STATUS (HCC): ICD-10-CM

## 2019-02-12 DIAGNOSIS — Z79.899 LONG-TERM USE OF HYDROXYCHLOROQUINE: ICD-10-CM

## 2019-02-12 LAB
ABO GROUP BLD: NORMAL
ALBUMIN SERPL BCP-MCNC: 3.5 G/DL (ref 3.5–5)
ALP SERPL-CCNC: 95 U/L (ref 46–116)
ALT SERPL W P-5'-P-CCNC: 27 U/L (ref 12–78)
ANION GAP SERPL CALCULATED.3IONS-SCNC: 11 MMOL/L (ref 4–13)
AST SERPL W P-5'-P-CCNC: 18 U/L (ref 5–45)
BILIRUB SERPL-MCNC: 0.3 MG/DL (ref 0.2–1)
BLD GP AB SCN SERPL QL: NEGATIVE
BUN SERPL-MCNC: 17 MG/DL (ref 5–25)
CALCIUM SERPL-MCNC: 8.9 MG/DL (ref 8.3–10.1)
CHLORIDE SERPL-SCNC: 104 MMOL/L (ref 100–108)
CO2 SERPL-SCNC: 26 MMOL/L (ref 21–32)
CREAT SERPL-MCNC: 1.26 MG/DL (ref 0.6–1.3)
GFR SERPL CREATININE-BSD FRML MDRD: 48 ML/MIN/1.73SQ M
GLUCOSE SERPL-MCNC: 103 MG/DL (ref 65–140)
GLUCOSE SERPL-MCNC: 158 MG/DL (ref 65–140)
PLATELET # BLD AUTO: 288 THOUSANDS/UL (ref 149–390)
PMV BLD AUTO: 9.8 FL (ref 8.9–12.7)
POTASSIUM SERPL-SCNC: 3.8 MMOL/L (ref 3.5–5.3)
PROT SERPL-MCNC: 6.7 G/DL (ref 6.4–8.2)
RH BLD: POSITIVE
SODIUM SERPL-SCNC: 141 MMOL/L (ref 136–145)
SPECIMEN EXPIRATION DATE: NORMAL

## 2019-02-12 PROCEDURE — 97530 THERAPEUTIC ACTIVITIES: CPT

## 2019-02-12 PROCEDURE — 86901 BLOOD TYPING SEROLOGIC RH(D): CPT | Performed by: ORTHOPAEDIC SURGERY

## 2019-02-12 PROCEDURE — 86923 COMPATIBILITY TEST ELECTRIC: CPT

## 2019-02-12 PROCEDURE — 86900 BLOOD TYPING SEROLOGIC ABO: CPT | Performed by: ORTHOPAEDIC SURGERY

## 2019-02-12 PROCEDURE — G8979 MOBILITY GOAL STATUS: HCPCS

## 2019-02-12 PROCEDURE — 86850 RBC ANTIBODY SCREEN: CPT | Performed by: ORTHOPAEDIC SURGERY

## 2019-02-12 PROCEDURE — C1713 ANCHOR/SCREW BN/BN,TIS/BN: HCPCS | Performed by: ORTHOPAEDIC SURGERY

## 2019-02-12 PROCEDURE — 3E0T3BZ INTRODUCTION OF ANESTHETIC AGENT INTO PERIPHERAL NERVES AND PLEXI, PERCUTANEOUS APPROACH: ICD-10-PCS | Performed by: ANESTHESIOLOGY

## 2019-02-12 PROCEDURE — 27447 TOTAL KNEE ARTHROPLASTY: CPT | Performed by: ORTHOPAEDIC SURGERY

## 2019-02-12 PROCEDURE — 82948 REAGENT STRIP/BLOOD GLUCOSE: CPT

## 2019-02-12 PROCEDURE — 99254 IP/OBS CNSLTJ NEW/EST MOD 60: CPT | Performed by: PHYSICIAN ASSISTANT

## 2019-02-12 PROCEDURE — C9290 INJ, BUPIVACAINE LIPOSOME: HCPCS | Performed by: ORTHOPAEDIC SURGERY

## 2019-02-12 PROCEDURE — 99254 IP/OBS CNSLTJ NEW/EST MOD 60: CPT | Performed by: INTERNAL MEDICINE

## 2019-02-12 PROCEDURE — 0SRC0J9 REPLACEMENT OF RIGHT KNEE JOINT WITH SYNTHETIC SUBSTITUTE, CEMENTED, OPEN APPROACH: ICD-10-PCS | Performed by: ORTHOPAEDIC SURGERY

## 2019-02-12 PROCEDURE — C1776 JOINT DEVICE (IMPLANTABLE): HCPCS | Performed by: ORTHOPAEDIC SURGERY

## 2019-02-12 PROCEDURE — 85049 AUTOMATED PLATELET COUNT: CPT | Performed by: ORTHOPAEDIC SURGERY

## 2019-02-12 PROCEDURE — 97163 PT EVAL HIGH COMPLEX 45 MIN: CPT

## 2019-02-12 PROCEDURE — 97110 THERAPEUTIC EXERCISES: CPT

## 2019-02-12 PROCEDURE — 80053 COMPREHEN METABOLIC PANEL: CPT | Performed by: NURSE PRACTITIONER

## 2019-02-12 PROCEDURE — G8978 MOBILITY CURRENT STATUS: HCPCS

## 2019-02-12 DEVICE — ATTUNE KNEE SYSTEM TIBIAL BASE ROTATING PLATFORM SIZE 4 CEMENTED
Type: IMPLANTABLE DEVICE | Site: KNEE | Status: FUNCTIONAL
Brand: ATTUNE

## 2019-02-12 DEVICE — ATTUNE PATELLA MEDIALIZED DOME 35MM CEMENTED AOX
Type: IMPLANTABLE DEVICE | Site: KNEE | Status: FUNCTIONAL
Brand: ATTUNE

## 2019-02-12 DEVICE — ATTUNE KNEE SYSTEM FEMORAL POSTERIOR STABILIZED NARROW SIZE 5N RIGHT CEMENTED
Type: IMPLANTABLE DEVICE | Site: KNEE | Status: FUNCTIONAL
Brand: ATTUNE

## 2019-02-12 DEVICE — SMARTSET HIGH PERFORMANCE MV MEDIUM VISCOSITY BONE CEMENT 40G
Type: IMPLANTABLE DEVICE | Site: KNEE | Status: FUNCTIONAL
Brand: SMARTSET

## 2019-02-12 DEVICE — ATTUNE KNEE SYSTEM TIBIAL INSERT ROTATING PLATFORM POSTERIOR STABILIZED 5 15MM AOX
Type: IMPLANTABLE DEVICE | Site: KNEE | Status: FUNCTIONAL
Brand: ATTUNE

## 2019-02-12 RX ORDER — ROPIVACAINE HYDROCHLORIDE 5 MG/ML
INJECTION, SOLUTION EPIDURAL; INFILTRATION; PERINEURAL
Status: COMPLETED | OUTPATIENT
Start: 2019-02-12 | End: 2019-02-12

## 2019-02-12 RX ORDER — ONDANSETRON 2 MG/ML
INJECTION INTRAMUSCULAR; INTRAVENOUS AS NEEDED
Status: DISCONTINUED | OUTPATIENT
Start: 2019-02-12 | End: 2019-02-12 | Stop reason: SURG

## 2019-02-12 RX ORDER — HEPARIN SODIUM 5000 [USP'U]/ML
5000 INJECTION, SOLUTION INTRAVENOUS; SUBCUTANEOUS EVERY 12 HOURS SCHEDULED
Status: DISCONTINUED | OUTPATIENT
Start: 2019-02-12 | End: 2019-02-14 | Stop reason: HOSPADM

## 2019-02-12 RX ORDER — HYDROMORPHONE HCL/PF 1 MG/ML
0.5 SYRINGE (ML) INJECTION
Status: DISCONTINUED | OUTPATIENT
Start: 2019-02-12 | End: 2019-02-14 | Stop reason: HOSPADM

## 2019-02-12 RX ORDER — CHLORHEXIDINE GLUCONATE 4 G/100ML
SOLUTION TOPICAL DAILY PRN
Status: DISCONTINUED | OUTPATIENT
Start: 2019-02-12 | End: 2019-02-12 | Stop reason: HOSPADM

## 2019-02-12 RX ORDER — SODIUM CHLORIDE 9 MG/ML
125 INJECTION, SOLUTION INTRAVENOUS CONTINUOUS
Status: DISCONTINUED | OUTPATIENT
Start: 2019-02-12 | End: 2019-02-12

## 2019-02-12 RX ORDER — FENTANYL CITRATE/PF 50 MCG/ML
25 SYRINGE (ML) INJECTION
Status: COMPLETED | OUTPATIENT
Start: 2019-02-12 | End: 2019-02-12

## 2019-02-12 RX ORDER — MELATONIN
2000
Status: DISCONTINUED | OUTPATIENT
Start: 2019-02-12 | End: 2019-02-14 | Stop reason: HOSPADM

## 2019-02-12 RX ORDER — SODIUM CHLORIDE 9 MG/ML
40 INJECTION, SOLUTION INTRAVENOUS CONTINUOUS
Status: DISCONTINUED | OUTPATIENT
Start: 2019-02-12 | End: 2019-02-14

## 2019-02-12 RX ORDER — SODIUM CHLORIDE, SODIUM LACTATE, POTASSIUM CHLORIDE, CALCIUM CHLORIDE 600; 310; 30; 20 MG/100ML; MG/100ML; MG/100ML; MG/100ML
125 INJECTION, SOLUTION INTRAVENOUS CONTINUOUS
Status: DISCONTINUED | OUTPATIENT
Start: 2019-02-12 | End: 2019-02-12

## 2019-02-12 RX ORDER — CHLORHEXIDINE GLUCONATE 0.12 MG/ML
15 RINSE ORAL ONCE
Status: COMPLETED | OUTPATIENT
Start: 2019-02-12 | End: 2019-02-12

## 2019-02-12 RX ORDER — SCOLOPAMINE TRANSDERMAL SYSTEM 1 MG/1
1 PATCH, EXTENDED RELEASE TRANSDERMAL ONCE
Status: DISCONTINUED | OUTPATIENT
Start: 2019-02-12 | End: 2019-02-12

## 2019-02-12 RX ORDER — MIDAZOLAM HYDROCHLORIDE 1 MG/ML
INJECTION INTRAMUSCULAR; INTRAVENOUS AS NEEDED
Status: DISCONTINUED | OUTPATIENT
Start: 2019-02-12 | End: 2019-02-12

## 2019-02-12 RX ORDER — MAGNESIUM HYDROXIDE 1200 MG/15ML
LIQUID ORAL AS NEEDED
Status: DISCONTINUED | OUTPATIENT
Start: 2019-02-12 | End: 2019-02-12 | Stop reason: HOSPADM

## 2019-02-12 RX ORDER — ROCURONIUM BROMIDE 10 MG/ML
INJECTION, SOLUTION INTRAVENOUS AS NEEDED
Status: DISCONTINUED | OUTPATIENT
Start: 2019-02-12 | End: 2019-02-12 | Stop reason: SURG

## 2019-02-12 RX ORDER — HYDROMORPHONE HCL/PF 1 MG/ML
SYRINGE (ML) INJECTION AS NEEDED
Status: DISCONTINUED | OUTPATIENT
Start: 2019-02-12 | End: 2019-02-12 | Stop reason: SURG

## 2019-02-12 RX ORDER — KETAMINE HCL IN NACL, ISO-OSM 100MG/10ML
SYRINGE (ML) INJECTION AS NEEDED
Status: DISCONTINUED | OUTPATIENT
Start: 2019-02-12 | End: 2019-02-12 | Stop reason: SURG

## 2019-02-12 RX ORDER — POTASSIUM CHLORIDE 20 MEQ/1
40 TABLET, EXTENDED RELEASE ORAL 2 TIMES DAILY
Status: DISCONTINUED | OUTPATIENT
Start: 2019-02-12 | End: 2019-02-14 | Stop reason: HOSPADM

## 2019-02-12 RX ORDER — LANOLIN ALCOHOL/MO/W.PET/CERES
400 CREAM (GRAM) TOPICAL DAILY
Status: DISCONTINUED | OUTPATIENT
Start: 2019-02-12 | End: 2019-02-14 | Stop reason: HOSPADM

## 2019-02-12 RX ORDER — PROPOFOL 10 MG/ML
INJECTION, EMULSION INTRAVENOUS AS NEEDED
Status: DISCONTINUED | OUTPATIENT
Start: 2019-02-12 | End: 2019-02-12 | Stop reason: SURG

## 2019-02-12 RX ORDER — ONDANSETRON 2 MG/ML
4 INJECTION INTRAMUSCULAR; INTRAVENOUS EVERY 6 HOURS PRN
Status: DISCONTINUED | OUTPATIENT
Start: 2019-02-12 | End: 2019-02-14 | Stop reason: HOSPADM

## 2019-02-12 RX ORDER — FENTANYL CITRATE 50 UG/ML
INJECTION, SOLUTION INTRAMUSCULAR; INTRAVENOUS
Status: COMPLETED | OUTPATIENT
Start: 2019-02-12 | End: 2019-02-12

## 2019-02-12 RX ORDER — ONDANSETRON 2 MG/ML
4 INJECTION INTRAMUSCULAR; INTRAVENOUS ONCE AS NEEDED
Status: DISCONTINUED | OUTPATIENT
Start: 2019-02-12 | End: 2019-02-12 | Stop reason: HOSPADM

## 2019-02-12 RX ORDER — ACETAMINOPHEN 325 MG/1
650 TABLET ORAL EVERY 4 HOURS PRN
Status: DISCONTINUED | OUTPATIENT
Start: 2019-02-12 | End: 2019-02-14 | Stop reason: HOSPADM

## 2019-02-12 RX ORDER — PROPOFOL 10 MG/ML
INJECTION, EMULSION INTRAVENOUS CONTINUOUS PRN
Status: DISCONTINUED | OUTPATIENT
Start: 2019-02-12 | End: 2019-02-12 | Stop reason: SURG

## 2019-02-12 RX ORDER — FENTANYL CITRATE 50 UG/ML
INJECTION, SOLUTION INTRAMUSCULAR; INTRAVENOUS AS NEEDED
Status: DISCONTINUED | OUTPATIENT
Start: 2019-02-12 | End: 2019-02-12

## 2019-02-12 RX ORDER — DOCUSATE SODIUM 100 MG/1
100 CAPSULE, LIQUID FILLED ORAL 2 TIMES DAILY
Status: DISCONTINUED | OUTPATIENT
Start: 2019-02-12 | End: 2019-02-14 | Stop reason: HOSPADM

## 2019-02-12 RX ORDER — DEXAMETHASONE SODIUM PHOSPHATE 4 MG/ML
INJECTION, SOLUTION INTRA-ARTICULAR; INTRALESIONAL; INTRAMUSCULAR; INTRAVENOUS; SOFT TISSUE AS NEEDED
Status: DISCONTINUED | OUTPATIENT
Start: 2019-02-12 | End: 2019-02-12 | Stop reason: SURG

## 2019-02-12 RX ORDER — TORSEMIDE 20 MG/1
40 TABLET ORAL
Status: DISCONTINUED | OUTPATIENT
Start: 2019-02-13 | End: 2019-02-14 | Stop reason: HOSPADM

## 2019-02-12 RX ORDER — OXYCODONE HYDROCHLORIDE 10 MG/1
10 TABLET ORAL EVERY 4 HOURS PRN
Status: DISCONTINUED | OUTPATIENT
Start: 2019-02-12 | End: 2019-02-14 | Stop reason: HOSPADM

## 2019-02-12 RX ORDER — GLYCOPYRROLATE 0.2 MG/ML
INJECTION INTRAMUSCULAR; INTRAVENOUS AS NEEDED
Status: DISCONTINUED | OUTPATIENT
Start: 2019-02-12 | End: 2019-02-12 | Stop reason: SURG

## 2019-02-12 RX ORDER — MULTIVIT WITH MINERALS/LUTEIN
500 TABLET ORAL DAILY
Status: DISCONTINUED | OUTPATIENT
Start: 2019-02-12 | End: 2019-02-14 | Stop reason: HOSPADM

## 2019-02-12 RX ORDER — SCOLOPAMINE TRANSDERMAL SYSTEM 1 MG/1
PATCH, EXTENDED RELEASE TRANSDERMAL
Status: DISPENSED
Start: 2019-02-12 | End: 2019-02-12

## 2019-02-12 RX ORDER — ALBUTEROL SULFATE 2.5 MG/3ML
2.5 SOLUTION RESPIRATORY (INHALATION) ONCE AS NEEDED
Status: DISCONTINUED | OUTPATIENT
Start: 2019-02-12 | End: 2019-02-12 | Stop reason: HOSPADM

## 2019-02-12 RX ORDER — OXYCODONE HYDROCHLORIDE 5 MG/1
5 TABLET ORAL EVERY 4 HOURS PRN
Status: DISCONTINUED | OUTPATIENT
Start: 2019-02-12 | End: 2019-02-14 | Stop reason: HOSPADM

## 2019-02-12 RX ORDER — CLINDAMYCIN PHOSPHATE 900 MG/50ML
INJECTION INTRAVENOUS AS NEEDED
Status: DISCONTINUED | OUTPATIENT
Start: 2019-02-12 | End: 2019-02-12 | Stop reason: SURG

## 2019-02-12 RX ORDER — TORSEMIDE 20 MG/1
40 TABLET ORAL DAILY
Status: DISCONTINUED | OUTPATIENT
Start: 2019-02-12 | End: 2019-02-12

## 2019-02-12 RX ORDER — HYDROXYCHLOROQUINE SULFATE 200 MG/1
400 TABLET, FILM COATED ORAL
Status: DISCONTINUED | OUTPATIENT
Start: 2019-02-12 | End: 2019-02-14 | Stop reason: HOSPADM

## 2019-02-12 RX ORDER — TRAMADOL HYDROCHLORIDE 50 MG/1
50 TABLET ORAL
Status: DISCONTINUED | OUTPATIENT
Start: 2019-02-12 | End: 2019-02-14 | Stop reason: HOSPADM

## 2019-02-12 RX ORDER — METOCLOPRAMIDE HYDROCHLORIDE 5 MG/ML
INJECTION INTRAMUSCULAR; INTRAVENOUS AS NEEDED
Status: DISCONTINUED | OUTPATIENT
Start: 2019-02-12 | End: 2019-02-12 | Stop reason: SURG

## 2019-02-12 RX ORDER — CLINDAMYCIN PHOSPHATE 900 MG/50ML
900 INJECTION INTRAVENOUS ONCE
Status: COMPLETED | OUTPATIENT
Start: 2019-02-12 | End: 2019-02-12

## 2019-02-12 RX ORDER — CLINDAMYCIN PHOSPHATE 600 MG/50ML
600 INJECTION INTRAVENOUS EVERY 8 HOURS
Status: COMPLETED | OUTPATIENT
Start: 2019-02-12 | End: 2019-02-12

## 2019-02-12 RX ORDER — SILDENAFIL CITRATE 20 MG/1
20 TABLET ORAL 3 TIMES DAILY
Status: DISCONTINUED | OUTPATIENT
Start: 2019-02-12 | End: 2019-02-14 | Stop reason: HOSPADM

## 2019-02-12 RX ORDER — HYDROMORPHONE HCL/PF 1 MG/ML
0.5 SYRINGE (ML) INJECTION
Status: DISCONTINUED | OUTPATIENT
Start: 2019-02-12 | End: 2019-02-12 | Stop reason: HOSPADM

## 2019-02-12 RX ORDER — MIDAZOLAM HYDROCHLORIDE 1 MG/ML
INJECTION INTRAMUSCULAR; INTRAVENOUS
Status: COMPLETED | OUTPATIENT
Start: 2019-02-12 | End: 2019-02-12

## 2019-02-12 RX ORDER — MAGNESIUM HYDROXIDE/ALUMINUM HYDROXICE/SIMETHICONE 120; 1200; 1200 MG/30ML; MG/30ML; MG/30ML
30 SUSPENSION ORAL EVERY 6 HOURS PRN
Status: DISCONTINUED | OUTPATIENT
Start: 2019-02-12 | End: 2019-02-14 | Stop reason: HOSPADM

## 2019-02-12 RX ORDER — LIDOCAINE HYDROCHLORIDE 10 MG/ML
INJECTION, SOLUTION INFILTRATION; PERINEURAL AS NEEDED
Status: DISCONTINUED | OUTPATIENT
Start: 2019-02-12 | End: 2019-02-12 | Stop reason: SURG

## 2019-02-12 RX ADMIN — SILDENAFIL 20 MG: 20 TABLET ORAL at 12:57

## 2019-02-12 RX ADMIN — CLINDAMYCIN PHOSPHATE 900 MG: 900 INJECTION, SOLUTION INTRAVENOUS at 07:13

## 2019-02-12 RX ADMIN — FENTANYL CITRATE 50 MCG: 50 INJECTION INTRAMUSCULAR; INTRAVENOUS at 07:51

## 2019-02-12 RX ADMIN — ONDANSETRON 4 MG: 2 INJECTION INTRAMUSCULAR; INTRAVENOUS at 14:06

## 2019-02-12 RX ADMIN — CLINDAMYCIN PHOSPHATE 600 MG: 12 INJECTION, SOLUTION INTRAMUSCULAR; INTRAVENOUS at 14:14

## 2019-02-12 RX ADMIN — POTASSIUM CHLORIDE 40 MEQ: 1500 TABLET, EXTENDED RELEASE ORAL at 18:26

## 2019-02-12 RX ADMIN — SILDENAFIL 20 MG: 20 TABLET ORAL at 18:26

## 2019-02-12 RX ADMIN — ROPIVACAINE HYDROCHLORIDE 20 ML: 5 INJECTION, SOLUTION EPIDURAL; INFILTRATION; PERINEURAL at 07:21

## 2019-02-12 RX ADMIN — OXYCODONE HYDROCHLORIDE 5 MG: 5 TABLET ORAL at 19:30

## 2019-02-12 RX ADMIN — SODIUM CHLORIDE: 0.9 INJECTION, SOLUTION INTRAVENOUS at 09:45

## 2019-02-12 RX ADMIN — ASCORBIC ACID TAB 250 MG 500 MG: 250 TAB at 12:57

## 2019-02-12 RX ADMIN — FENTANYL CITRATE 50 MCG: 50 INJECTION INTRAMUSCULAR; INTRAVENOUS at 07:21

## 2019-02-12 RX ADMIN — LIDOCAINE HYDROCHLORIDE ANHYDROUS 25 MG: 10 INJECTION, SOLUTION INFILTRATION at 07:51

## 2019-02-12 RX ADMIN — PROPOFOL 120 MCG/KG/MIN: 10 INJECTION, EMULSION INTRAVENOUS at 07:53

## 2019-02-12 RX ADMIN — MIDAZOLAM HYDROCHLORIDE 2 MG: 1 INJECTION, SOLUTION INTRAMUSCULAR; INTRAVENOUS at 07:21

## 2019-02-12 RX ADMIN — SCOPALAMINE 1 PATCH: 1 PATCH, EXTENDED RELEASE TRANSDERMAL at 07:15

## 2019-02-12 RX ADMIN — GLYCOPYRROLATE 0.4 MG: 0.2 INJECTION, SOLUTION INTRAMUSCULAR; INTRAVENOUS at 10:11

## 2019-02-12 RX ADMIN — HYDROMORPHONE HYDROCHLORIDE 0.5 MG: 1 INJECTION, SOLUTION INTRAMUSCULAR; INTRAVENOUS; SUBCUTANEOUS at 09:56

## 2019-02-12 RX ADMIN — CHLORHEXIDINE GLUCONATE 0.12% ORAL RINSE 15 ML: 1.2 LIQUID ORAL at 07:15

## 2019-02-12 RX ADMIN — CLINDAMYCIN PHOSPHATE 900 MG: 18 INJECTION, SOLUTION INTRAMUSCULAR; INTRAVENOUS at 07:45

## 2019-02-12 RX ADMIN — FENTANYL CITRATE 25 MCG: 50 INJECTION, SOLUTION INTRAMUSCULAR; INTRAVENOUS at 10:55

## 2019-02-12 RX ADMIN — DOCUSATE SODIUM 100 MG: 100 CAPSULE, LIQUID FILLED ORAL at 12:56

## 2019-02-12 RX ADMIN — HYDROMORPHONE HYDROCHLORIDE 0.5 MG: 1 INJECTION, SOLUTION INTRAMUSCULAR; INTRAVENOUS; SUBCUTANEOUS at 08:31

## 2019-02-12 RX ADMIN — HEPARIN SODIUM 5000 UNITS: 5000 INJECTION, SOLUTION INTRAVENOUS; SUBCUTANEOUS at 21:47

## 2019-02-12 RX ADMIN — OXYCODONE HYDROCHLORIDE 5 MG: 5 TABLET ORAL at 14:20

## 2019-02-12 RX ADMIN — Medication 40 MG: at 08:14

## 2019-02-12 RX ADMIN — Medication 10 MG: at 09:02

## 2019-02-12 RX ADMIN — NEOSTIGMINE METHYLSULFATE 3 MG: 1 INJECTION INTRAMUSCULAR; INTRAVENOUS; SUBCUTANEOUS at 10:11

## 2019-02-12 RX ADMIN — OXYCODONE HYDROCHLORIDE 5 MG: 5 TABLET ORAL at 23:59

## 2019-02-12 RX ADMIN — DEXAMETHASONE SODIUM PHOSPHATE 8 MG: 4 INJECTION, SOLUTION INTRAMUSCULAR; INTRAVENOUS at 08:04

## 2019-02-12 RX ADMIN — SODIUM CHLORIDE: 0.9 INJECTION, SOLUTION INTRAVENOUS at 07:00

## 2019-02-12 RX ADMIN — FOLIC ACID TAB 400 MCG 400 MCG: 400 TAB at 12:57

## 2019-02-12 RX ADMIN — PROPOFOL 200 MG: 10 INJECTION, EMULSION INTRAVENOUS at 07:51

## 2019-02-12 RX ADMIN — DOCUSATE SODIUM 100 MG: 100 CAPSULE, LIQUID FILLED ORAL at 18:26

## 2019-02-12 RX ADMIN — ONDANSETRON 4 MG: 2 INJECTION INTRAMUSCULAR; INTRAVENOUS at 09:12

## 2019-02-12 RX ADMIN — FENTANYL CITRATE 25 MCG: 50 INJECTION, SOLUTION INTRAMUSCULAR; INTRAVENOUS at 10:40

## 2019-02-12 RX ADMIN — HYDROMORPHONE HYDROCHLORIDE 0.5 MG: 1 INJECTION, SOLUTION INTRAMUSCULAR; INTRAVENOUS; SUBCUTANEOUS at 11:15

## 2019-02-12 RX ADMIN — METOCLOPRAMIDE HYDROCHLORIDE 10 MG: 5 INJECTION INTRAMUSCULAR; INTRAVENOUS at 08:09

## 2019-02-12 RX ADMIN — FENTANYL CITRATE 25 MCG: 50 INJECTION, SOLUTION INTRAMUSCULAR; INTRAVENOUS at 10:50

## 2019-02-12 RX ADMIN — FENTANYL CITRATE 25 MCG: 50 INJECTION, SOLUTION INTRAMUSCULAR; INTRAVENOUS at 10:45

## 2019-02-12 RX ADMIN — CLINDAMYCIN PHOSPHATE 600 MG: 12 INJECTION, SOLUTION INTRAMUSCULAR; INTRAVENOUS at 21:47

## 2019-02-12 RX ADMIN — HYDROMORPHONE HYDROCHLORIDE 0.5 MG: 1 INJECTION, SOLUTION INTRAMUSCULAR; INTRAVENOUS; SUBCUTANEOUS at 10:59

## 2019-02-12 RX ADMIN — ROCURONIUM BROMIDE 50 MG: 10 INJECTION INTRAVENOUS at 07:51

## 2019-02-12 NOTE — PHYSICAL THERAPY NOTE
PHYSICAL THERAPY Evaluation NOTE    Patient Name: Carlitos Sims  OKZKW'K Date: 2/12/2019     AGE:   62 y o  Mrn:   [id number]  ADMIT DX:  Arthritis of right knee [M17 11]    Past Medical History:   Diagnosis Date    HELENE positive     Anemia     Sildenafil causes decreased hematocrit    Chronic kidney disease     borderline lab values    Encephalitis     Lasted Assessed 10/18/2017    Lupus anticoagulant disorder (HCC)     Maxillary sinusitis     Lasted Assessed 10/18/2017    Night sweat     Osteopenia     Hip    Osteoporosis     Spine    Pneumonia     Last Assessed 10/18/2017    PONV (postoperative nausea and vomiting)     Pulmonary hypertension (HCC)     Seizures (HCC)     Only during Encephalitis    Shortness of breath     with exertion    Sinus tachycardia     Urinary incontinence      Length Of Stay: 0  PHYSICAL THERAPY EVALUATION :    02/12/19 1553   Note Type   Note type Eval/Treat   Pain Assessment   Pain Assessment 0-10   Pain Score 3   Pain Type Surgical pain   Pain Location Knee   Pain Orientation Right   Home Living   Type of [address] [place] [address] Two level;Stairs to enter with rails;Bed/bath upstairs; Work area in [address]   [address] transfer bench;[employer]   [address] [facility]   Additional Comments Pt  lives with spouse, and family in a [facility] with 4 JINA and full flight to main bedroom/bathroom  Pt reports having DME from previous needs, however does not currently use any of the items listed above   Prior Function   Level of [place] Independent with ADLs and functional mobility   Lives With Spouse; Family   ADL Assistance Independent   IADLs Independent   Falls in the last 6 months 0   Vocational Full time employment   Comments PTA, Pt  reports INDEP with ADLs, IADLs, and functional mobility without an AD, + , and works full time    Restrictions/Precautions   Weight Bearing Precautions Per Order Yes   RLE Weight Bearing Per Order WBAT  (confirmed by Dr Daisy Rodrigues verbally)   Braces or Orthoses   (Water circulation system)   Other Precautions WBS; Multiple lines;Telemetry;O2;Fall Risk;Pain   General   Additional Pertinent History Pt  is a 61 yo F who presents with R knee pain 2/2 to OA  Pt  has trialed and failed multiple conservative measures w/o relief  Pt  is s/p R TKA POD #0  Per Ortho WBS is WBAT  Comorbidties include htn, systemic lupus, pulmonary htn, CKD 3, Tachycardia, SOB, Osteoporosis, edema, Vit  B 12 Defficiency, Diffuse connective tissue disease   Family/Caregiver Present Yes   Cognition   Overall Cognitive Status WFL   Arousal/Participation Cooperative   Orientation Level Oriented X4   Memory Within functional limits   Following Commands Follows all commands and directions without difficulty   Comments Pt  was identified with full name and birthdate   RUE Assessment   RUE Assessment WFL   LUE Assessment   LUE Assessment WFL   RLE Assessment   RLE Assessment   (limited MMT and ROM due to POD 0)   LLE Assessment   LLE Assessment WFL   Coordination   Movements are Fluid and Coordinated 0   Coordination and Movement Description bradykinetic with all functional mobility 2/2 to pain   Sensation   (reports TTP over R lateral quadriceps musculature)   Light Touch   RLE Light Touch Grossly intact   LLE Light Touch Grossly intact   Bed Mobility   Supine to Sit 4  Minimal assistance   Additional items Assist x 1; Increased time required;HOB elevated; Bedrails;Verbal cues;LE management   Sit to Supine 4  Minimal assistance   Additional items Assist x 1; Increased time required;Verbal cues;LE management;HOB elevated; Bedrails   Transfers   Sit to Stand 5  Supervision   Additional items Assist x 1; Increased time required;Verbal cues  (for hand placement and sequencing)   Stand to Sit 5  Supervision Additional items Assist x 1; Impulsive;Verbal cues  (to reach back for controlled descent)   Ambulation/Elevation   Gait pattern Improper Weight shift; Forward Flexion;Decreased foot clearance; Antalgic; Inconsistent carlos; Foward flexed; Short stride; Step to;Excessively slow   Gait Assistance 4  Minimal assist  (CGA, variable to supervision)   Additional items Assist x 1;Verbal cues  (for gait mechanics and AD management)   Assistive Device Rolling walker   Distance 200'x1   Balance   Static Sitting Fair   Dynamic Sitting Fair   Static Standing Fair -   Dynamic Standing Poor +   Ambulatory Poor +   Endurance Deficit   Endurance Deficit Yes   Endurance Deficit Description postural and gait degradation with fatigue   Activity Tolerance   Activity Tolerance Patient tolerated treatment well   Medical Staff Made Aware Spoke to ARIANNA Wesley, Dr Nito Lindsay, and ARIANNA Whitaker   [facility]urse [facility]   Assessment   Prognosis Excellent   Problem List Decreased strength;Decreased range of motion;Decreased endurance; Impaired balance;Decreased mobility; Decreased coordination;Decreased safety awareness;Pain;Orthopedic restrictions;Decreased skin integrity   Assessment Pt  is a 61 yo F who presents with R knee pain 2/2 to OA  Pt  has trialed and failed multiple conservative measures w/o relief  Pt  is s/p R TKA POD #0  Per Ortho WBS is WBAT  order placed for PT eval and tx, w/ activity order of WBAT R LE  pt presents w/ comorbidities of  htn, systemic lupus, pulmonary htn, CKD 3, Tachycardia, SOB, Osteoporosis, edema, Vit   B 12 Defficiency, Diffuse connective tissue disease and personal factors of living in 2 story house, mobilizing w/ assistive device, stair(s) to enter home, inability to navigate level surfaces w/o external assistance, unable to perform dynamic tasks in community, inability to perform current job functions, unable to perform caregiver support/tasks, inability to perform IADLs, inability to perform ADLs and inability to live alone  pt presents w/ pain, weakness, decreased ROM, decreased endurance, impaired cognition, impaired balance, gait deviations, impaired coordination, decreased safety awareness, orthopedic restrictions, fall risk, LE edema and impaired skin integrity  these impairments are evident in findings from physical examination (weakness, decreased ROM, impaired coordination, impaired skin integrity and edema of extremities), mobility assessment (need for Min assist w/ all phases of mobility when usually mobilizing independently, tolerance to only 200 feet of ambulation and need for cueing for mobility technique), and Barthel Index: 55/100  pt needed input for task focus and mobility technique  pt is at risk for falls due to physical and safety awareness deficits  pt's clinical presentation is unstable/unpredictable (evident in need for assist w/ all phases of mobility when usually mobilizing independently, tolerance to only 200 feet of ambulation, pain impacting overall mobility status, need for supplemental oxygen in order to maintain oxygen saturation, need for input for mobility technique, need for input for task focus and mobility technique and need for input for mobility technique/safety)  pt needs inpatient PT tx to improve mobility deficits  discharge recommendation is for outpatient PT to reduce fall risk and maximize level of functional independence  Barriers to Discharge Inaccessible home environment  (Steps to Enter)   Goals   Patient Goals to be able to go on my cruise next year pain free   STG Expiration Date 02/22/19   Short Term Goal #1 pt will:  Increase bilateral LE strength 1/2 grade to facilitate independent mobility, Perform all bed mobility tasks modified independent to decrease fall risk factors, Perform all transfers modified independent to improve independence, Ambulate 350 ft  with least restrictive assistive device w/ supervision w/o LOB, Increase all balance 1/2 grade to decrease risk for falls and Improve Barthel Index score to 80 or greater to facilitate independence PT to see for stair training as appropriate  Plan   Treatment/Interventions Functional transfer training;LE strengthening/ROM; Therapeutic exercise; Endurance training;Cognitive reorientation;Patient/family training;Equipment eval/education; Bed mobility;Gait training;Spoke to nursing;Spoke to MD;Spoke to advanced practitioner;Family  (PT to see for Stair training as appropriate)   PT Frequency Twice a day   Recommendation   Recommendation Outpatient PT   Equipment Recommended Walker   PT - OK to Discharge Yes   Additional Comments when medically appropriate   Barthel Index   Feeding 10   Bathing 0   Grooming Score 0   Dressing Score 5   Bladder Score 0   Bowels Score 10   Toilet Use Score 5   Transfers (Bed/Chair) Score 10   Mobility (Level Surface) Score 10   Stairs Score 5   Barthel Index Score 55     Skilled PT recommended while in hospital and upon DC to progress pt toward treatment goals  [id number] Treatment session    S: Pt  Berenice Chiang to continued PT session for development of LE TherEx program and time was provided for all questions from Pt  And spouse to be answered  O: Pt  Performed quad sets and hamstring sets x10 RLE, glute sets bilateral x30 and Ankle pumps BLE x30 Pt  Was instructed to perform these exercises 2 x more this evening for a total of 3x per day  Pt  And family were educated in the importance of these exercises for increased ROM, Stability, and reducing risk of VTE due to decreased mobility s/p surgery  Time was provided for Pt  And family to ask all questions needed and answers were provided within the scope of PT practice  A: Pt  Continues to demonstrate activity limitations as outlined above and will benefit from continued skilled therapy to address deficits in functional mobility, aid patient in return of PLOF, and reduce risk for falls      P: Continue with PT POC as outlined above  Trial for stair negotiation required prior to return home, recommend trial and education over next 1-2 sessions  From a nursing standpoint, Ax1 for short distance ambulation from bed to chair or bed to bathroom  I do not recommend further ambulation at this time        Savannah Lopes, PT 2/12/2019

## 2019-02-12 NOTE — PLAN OF CARE
Problem: PAIN - ADULT  Goal: Verbalizes/displays adequate comfort level or baseline comfort level  Description  Interventions:  - Encourage patient to monitor pain and request assistance  - Assess pain using appropriate pain scale  - Administer analgesics based on type and severity of pain and evaluate response  - Implement non-pharmacological measures as appropriate and evaluate response  - Consider cultural and social influences on pain and pain management  - Notify physician/advanced practitioner if interventions unsuccessful or patient reports new pain  Outcome: Progressing     Problem: SKIN/TISSUE INTEGRITY - ADULT  Goal: Incision(s), wounds(s) or drain site(s) healing without S/S of infection  Description  INTERVENTIONS  - Assess and document risk factors for skin impairment   - Assess and document dressing, incision, wound bed, drain sites and surrounding tissue  - Initiate Nutrition services consult and/or wound management as needed  Outcome: Progressing     Problem: MUSCULOSKELETAL - ADULT  Goal: Maintain or return mobility to safest level of function  Description  INTERVENTIONS:  - Assess patient's ability to carry out ADLs; assess patient's baseline for ADL function and identify physical deficits which impact ability to perform ADLs (bathing, care of mouth/teeth, toileting, grooming, dressing, etc )  - Assess/evaluate cause of self-care deficits   - Assess range of motion  - Assess patient's mobility; develop plan if impaired  - Assess patient's need for assistive devices and provide as appropriate  - Encourage maximum independence but intervene and supervise when necessary  - Involve family in performance of ADLs  - Assess for home care needs following discharge   - Request OT consult to assist with ADL evaluation and planning for discharge  - Provide patient education as appropriate  Outcome: Progressing

## 2019-02-12 NOTE — PLAN OF CARE
Problem: PHYSICAL THERAPY ADULT  Goal: Performs mobility at highest level of function for planned discharge setting  See evaluation for individualized goals  Description  Treatment/Interventions: Functional transfer training, LE strengthening/ROM, Therapeutic exercise, Endurance training, Cognitive reorientation, Patient/family training, Equipment eval/education, Bed mobility, Gait training, Spoke to nursing, Spoke to MD, Spoke to advanced practitioner, Family(PT to see for Stair training as appropriate)  Equipment Recommended: Diane Kill       See flowsheet documentation for full assessment, interventions and recommendations  Note:   Prognosis: Excellent  Problem List: Decreased strength, Decreased range of motion, Decreased endurance, Impaired balance, Decreased mobility, Decreased coordination, Decreased safety awareness, Pain, Orthopedic restrictions, Decreased skin integrity  Assessment: Pt  is a 63 yo F who presents with R knee pain 2/2 to OA  Pt  has trialed and failed multiple conservative measures w/o relief  Pt  is s/p R TKA POD #0  Per Ortho WBS is WBAT  order placed for PT eval and tx, w/ activity order of WBAT R LE  pt presents w/ comorbidities of  htn, systemic lupus, pulmonary htn, CKD 3, Tachycardia, SOB, Osteoporosis, edema, Vit  B 12 Defficiency, Diffuse connective tissue disease and personal factors of living in 2 story house, mobilizing w/ assistive device, stair(s) to enter home, inability to navigate level surfaces w/o external assistance, unable to perform dynamic tasks in community, inability to perform current job functions, unable to perform caregiver support/tasks, inability to perform IADLs, inability to perform ADLs and inability to live alone   pt presents w/ pain, weakness, decreased ROM, decreased endurance, impaired cognition, impaired balance, gait deviations, impaired coordination, decreased safety awareness, orthopedic restrictions, fall risk, LE edema and impaired skin integrity  these impairments are evident in findings from physical examination (weakness, decreased ROM, impaired coordination, impaired skin integrity and edema of extremities), mobility assessment (need for Min assist w/ all phases of mobility when usually mobilizing independently, tolerance to only 200 feet of ambulation and need for cueing for mobility technique), and Barthel Index: 55/100  pt needed input for task focus and mobility technique  pt is at risk for falls due to physical and safety awareness deficits  pt's clinical presentation is unstable/unpredictable (evident in need for assist w/ all phases of mobility when usually mobilizing independently, tolerance to only 200 feet of ambulation, pain impacting overall mobility status, need for supplemental oxygen in order to maintain oxygen saturation, need for input for mobility technique, need for input for task focus and mobility technique and need for input for mobility technique/safety)  pt needs inpatient PT tx to improve mobility deficits  discharge recommendation is for outpatient PT to reduce fall risk and maximize level of functional independence  Barriers to Discharge: Inaccessible home environment(Steps to Enter)     Recommendation: Outpatient PT     PT - OK to Discharge: Yes    See flowsheet documentation for full assessment

## 2019-02-12 NOTE — OP NOTE
OPERATIVE REPORT  PATIENT NAME: Rajinder Treviño    :  1965  MRN: [id number]  Pt Location: AN OR ROOM 01    SURGERY DATE: 2019    Surgeon(s) and Role:     * Melissa Bunn DO - Primary  [facility] utilized during the case for assistance with positioning draping retraction closure and dressing application  He retracted the soft tissue as I did closure we also retracted the soft tissue as I made my bone cut and protected the soft tissue as I made my bone cuts  Preop Diagnosis:  Arthritis of right knee [M17 11]    Post-Op Diagnosis Codes:     * Arthritis of right knee [M17 11]    Procedure(s) (LRB):  KNEE TOTAL ARTHROPLASTY AND ASSOCIATED PROCEDURES (Right)    Specimen(s):  * No specimens in log *    Estimated Blood Loss:   Minimal    Drains:  * No LDAs found *    Anesthesia Type:   Spinal    Operative Indications:  Arthritis of right knee [M17 11]      Operative Findings:  Severe osteoarthritis involving the medial femoral condyle patellofemoral joint and medial tibial plateau    Complications:   None    Procedure and Technique:  Patient was seen and examined in the preoperative area  The right lower extremity was marked, the consent an H&P had been reviewed  The patient was brought back to the operative suite  The patient was intubated sedated  Tourniquet was applied to right thigh  The right lower extremity was prepped and draped in a sterile fashion  After proper timeout commenced and identified the right lower extremity as the operative site  Esmarch was utilized to exsanguinate the extremity, the tourniquet was turned up to 325 mm of mercury for 90 minutes  We started with injection of a mixture into the subdermal tissue  An incision was made midline over the patella  We dissected down to the fascia  We identified the patella made a parapatellar approach using 10 blade  We made sure to have 5 mm of remnant tissue on the patella   We then moved on the prepatellar fat pad resected most of the prepatellar fat pad  We moved medially freeing the capsule from the proximal tibia making sure not to extend distally more than 9 mm  We then moved onto meniscus and ACL PCL resected as much as possible  We then hyperflexed the knee and used our opening canal Reamer 1 cm anterior to the PCL insertion opened the canal   Using our interna medullary jig we placed our distal femoral cutting jig  We performed our distal femoral cut  We then moved onto our tibia using our external jig we excised 2 mm off the deficient deficient side or 8-10 off the good side or less deficient side  We then used the extension block found good balance in medial out stress  We moved back to the femur performed our anterior posterior and chamfer cuts after sizing the femur  We found the size to be 5  We then moved back to the tibia  We finished the tibia and sized this to be 5 we used the Sizer guide and placed 2 screws to hold in position then placed the tower using the opening Reamer and then the broach  We then moved to our patella we excised 8 mm of bone  We sized the patella be 21 mm thick before excising  We then used our lollipop sizing guide and measured the patella to be a size 35 we then used the lollipop guide to drill 3 holes into the patella  Implanted our size 35 patella trial   Implanted our size 5 femur trial and our size 5 tibia trial   We took the knee through range of motion found good balance in flexion and extension  We explanted all implants irrigate the wound copiously injected our mixture in the posterior capsule  We mixed the cement we placed a piece of bone into the femoral canal to block excessive bleeding  We placed cement into the tibia impacted our tibial tray excised any excess cement  Placed cement on the posterior flange of the implant of the femur  We placed cement on the femoral condyles implanted the femoral implant  We impacted an removed excess cement   We then placed cement on the patella and the back of the patellar implant  Placed the implant on the patella held that in place with clamp removed excess cement  We waited for the cement to dry  Once the cement had hardened we then irrigated the wound again ran the knee through range of motion and found good patellar tracking  We closed the fascia with 0 Vicryl as well as Jam's fascia  We then closed the subcutaneous fascia with 2 O Vicryl staples on skin  Acticoat was placed on the skin 4x4s ABDs Webril Hydrocool and Ace wrap were applied to the operative leg  A stocking net Jovanny stocking was applied to the right lower extremity  Patient left the OR with the gomez intact  Anesthesia was reversed and patient was taken back to PACU in stable condition  Patient will require more than 2 midnight stays due to the fact that she has multiple cor morbidities has a history of chronic kidney disease CHF lupus anticoagulant and lupus    Internal Medicine will also be consult   I was present for the entire procedure and A qualified resident physician was not available    Patient Disposition:  PACU     SIGNATURE: Diane Whitney,   DATE: February 12, 2019  TIME: 7:30 AM

## 2019-02-12 NOTE — ASSESSMENT & PLAN NOTE
· Baseline creatinine noted to be 1 2-1 5  Suspect due to NSAID nephropathy vs hypertensive nephrosclerosis   Nephrology following  · Continue home medications   · Monitor creatinine   · Decrease fluids to 75 cc/hr as per their note's recommendations

## 2019-02-12 NOTE — ANESTHESIA POSTPROCEDURE EVALUATION
Post-Op Assessment Note    CV Status:  Stable    Pain management: adequate  Multimodal analgesia used between 6 hours prior to anesthesia start to PACU discharge    Mental Status:  Alert   PONV Controlled:  None   Airway Patency:  Patent   Post Op Vitals Reviewed: Yes      Staff: CRNA           BP      Temp      Pulse     Resp      SpO2

## 2019-02-12 NOTE — ANESTHESIA PROCEDURE NOTES
Peripheral Block    Patient location during procedure: holding area  Start time: 2/12/2019 7:31 AM  Reason for block: at surgeon's request and post-op pain management  Staffing  Anesthesiologist: Esdras Sarah MD  Performed: anesthesiologist   Preanesthetic Checklist  Completed: patient identified, site marked, surgical consent, pre-op evaluation, timeout performed, IV checked, risks and benefits discussed and monitors and equipment checked  Peripheral Block  Patient position: supine  Prep: ChloraPrep  Patient monitoring: heart rate, continuous pulse ox and frequent blood pressure checks  Block type: adductor canal block  Laterality: right  Injection technique: single-shot  Procedures: ultrasound guided  Ultrasound permanent image savedropivacaine (NAROPIN) 0 5 % perineural infiltration, 20 mL  midazolam (VERSED) 2 mg/2 mL IV, 2 mg  fentaNYL 50 mcg/mL IV, 50 mcg  Needle  Needle type: Stimuplex   Needle gauge: 20G    Needle length: 10 cm  Needle localization: ultrasound guidance  Needle insertion depth: 4 cm  Assessment  Injection assessment: incremental injection, local visualized surrounding nerve on ultrasound, negative aspiration for heme and no paresthesia on injection  Heart rate change: no  Slow fractionated injection: yes  Post-procedure:  site cleaned  patient tolerated the procedure well with no immediate complications

## 2019-02-12 NOTE — CONSULTS
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  Hypertensive nephrosclerosis in the differential   · Plan:    · Check labs now and in the [facility] · Continue IV fluids, reduced dose as described below  · Avoid hypotension, maintain systolic blood pressure  Permissive elevation of blood pressure recommended for renal perfusion  · Avoid nephrotoxic agents  · No NSAID  · Monitor I&O  2  Right knee osteoarthritis:    · Status post right knee arthroplasty on 02/12/2019  · Management per orthopedic surgeons  3  Hypertension, volume status:    · Blood pressure low  · Try to maintain normal or slightly elevated pressures for renal perfusion  · Patient was below her dry weight of 203 lb preoperatively  This morning she weighed 198 lb  · She took her dose of torsemide this morning preoperatively    4  Mixed connective tissue disorder:  Follows with Rheumatology  5  Exercise induced pulmonary hypertension:  On sildenafil and Macitentan  · Recommend tapering IV fluids to 75 mL an hour      HISTORY OF PRESENT ILLNESS:  Requesting Physician: Kirstie Cesar DO  Reason for Consult:  Chronic kidney disease    Chidi Chamorro is a 62 y o  female the history of CKD, hypertension, exercise induced PAH, positive lupus anticoagulant and cardiolipin antibodies/mixed connective tissue disorder, who was admitted to [facility] after presenting for elective knee arthroplasty  A renal consultation is requested today for assistance in the management of CKD  According to review of medical record it appears that creatinine has been elevated for approximately 1 year    Baseline creatinine appears to be between 1  05-1 5 although she did have 1 reading as high as 1 62 last May  Prior to 2018 creatinine 0 9  Patient was chronically using NSAIDs for pain relief  She stopped using Aleve less than the year ago  She takes torsemide daily for control of lower extremity edema  She has not on home oxygen  She was seen postoperatively  Adequate pain control although she is having intermittent nausea  She is on nasal cannula oxygen  Breathing is comfortable at this time  She has a Sauceda catheter in place draining clear yellow urine      PAST MEDICAL HISTORY:  Past Medical History:   Diagnosis Date    HELENE positive     Anemia     Sildenafil causes decreased hematocrit    Chronic kidney disease     borderline lab values    Encephalitis     Lasted Assessed 10/18/2017    Lupus anticoagulant disorder ([facility] [address] )     Maxillary sinusitis     Lasted Assessed 10/18/2017    Night sweat     Osteopenia     Hip    Osteoporosis     Spine    Pneumonia     Last Assessed 10/18/2017    PONV (postoperative nausea and vomiting)     Pulmonary hypertension (HCC)     Seizures (HCC)     Only during Encephalitis    Shortness of breath     with exertion    Sinus tachycardia     Urinary incontinence        PAST SURGICAL HISTORY:  Past Surgical History:   Procedure Laterality Date    ARTHRODESIS      Hand: IP Joint    CHOLECYSTECTOMY      COLONOSCOPY      COLPOSCOPY      HYSTERECTOMY      Vaginal    LAPAROSCOPIC ESOPHAGOGASTRIC FUNDOPLASTY  2008    KS KNEE SCOPE,SINGLE MENISECTOMY Right 10/2/2018    Procedure: KNEE ARTHROSCOPY WITH PARTIAL MEDIAL MENISCECTOMY;  Surgeon: Beryl Galindo DO;  Location: [facility];  Service: Orthopedics    REDUCTION MAMMAPLASTY      REPAIR ANKLE LIGAMENT Right     TONSILLECTOMY         ALLERGIES:  Allergies   Allergen Reactions    Dilantin [Phenytoin] Anaphylaxis and Rash    Augmentin [Amoxicillin-Pot Clavulanate] Rash and Dermatitis    Penicillins Rash       SOCIAL HISTORY:  Social History Substance and Sexual Activity   Alcohol Use Yes    Comment: socially     Social History     Substance and Sexual Activity   Drug Use No     Social History     Tobacco Use   Smoking Status Former Smoker    Last attempt to quit: 2006    Years since quittin 0   Smokeless Tobacco Never Used       FAMILY HISTORY:  Family History   Problem Relation Age of Onset    Uterine cancer Mother     Pancreatic cancer Mother     Diabetes Mother     Heart disease Father         open heart surgery at age 48     Stroke Father         CVA    Hypertension Father     Atrial fibrillation Father     Hyperlipidemia Father     No Known Problems Sister     No Known Problems Brother     Thyroid disease Maternal Grandmother     Asthma Daughter     Heart attack Maternal Grandfather     Heart attack Paternal Grandmother     Skin cancer Paternal Grandfather     No Known Problems Sister     Thyroid disease Sister     Depression Sister     Osteoarthritis Sister     Hemochromatosis Brother     Migraines Daughter     Asthma Daughter     Anuerysm Neg Hx     Clotting disorder Neg Hx     Heart failure Neg Hx        MEDICATIONS:    Current Facility-Administered Medications:     ascorbic acid (VITAMIN C) tablet 500 mg, 500 mg, Oral, Daily, Sandi Greenfield, DO    bupivacaine liposomal (EXPAREL) 1 3 % 20 mL, bupivacaine (MARCAINE) 50 mL in sodium chloride 0 9 % 50 mL OR irrigation, , Irrigation, Once, Sandi Greenfield DO    cholecalciferol (VITAMIN D3) tablet 2,000 Units, 2,000 Units, Oral, Early Morning, Sandi Greenfield, DO    folic acid (FOLVITE) tablet 400 mcg, 400 mcg, Oral, Daily, Sandi Greenfield, DO    heparin (porcine) subcutaneous injection 5,000 Units, 5,000 Units, Subcutaneous, Q12H [address], Sandi Greenfield, DO    hydroxychloroquine (PLAQUENIL) tablet 400 mg, 400 mg, Oral, Daily With Breakfast, Sandi Greenfield, DO    Macitentan (OPSUMIT) tablet 10 mg, 10 mg, Oral, Daily, Sandi Greenfield, DO    potassium chloride (K-DUR,KLOR-CON) CR tablet 40 mEq, 40 mEq, Oral, BID, Gadiel Wade DO    scopolamine (TRANSDERM-SCOP) 1 5 mg/3 days TD 72 hr patch **ADS Override Pull**, , , ,     scopolamine (TRANSDERM-SCOP) 1 5 mg/3 days TD 72 hr patch 1 patch, 1 patch, Transdermal, Once, Keshia Quintanilla MD, 1 patch at 02/12/19 0715    sildenafil (REVATIO) tablet 20 mg, 20 mg, Oral, TID, Gadiel Wade DO    sodium chloride 0 9 % infusion, 125 mL/hr, Intravenous, Continuous, Keshia Quintanilla MD    torsemide [facility]) tablet 40 mg, 40 mg, Oral, Daily, Gadiel Wade DO    traMADol Lady Karl) tablet 50 mg, 50 mg, Oral, HS PRN, Gadiel Wade DO    REVIEW OF SYSTEMS:  Please see HPI for pertinent positives and negatives   All the systems were reviewed and were negative except as documented on the HPI  PHYSICAL EXAM:  Current Weight: Weight - Scale: 89 8 kg (198 lb)  First Weight: Weight - Scale: 92 5 kg (204 lb)  Vitals:    02/12/19 1045 02/12/19 1100 02/12/19 1115 02/12/19 1155   BP: 125/58 114/64 112/63 98/64   Pulse: 88 76 76 75   Resp: 20 18 15 16   Temp:   (!) 44 1 °F (6 7 °C) (!) 97 4 °F (36 3 °C)   TempSrc:    Oral   SpO2: 98% 98% 100% (!) 85%   Weight:       Height:           Intake/Output Summary (Last 24 hours) at 2/12/2019 1209  Last data filed at 2/12/2019 1120  Gross per 24 hour   Intake 1600 ml   Output 800 ml   Net 800 ml     Physical Exam   Constitutional: She is oriented to person, place, and time  She appears well-developed and well-nourished  No distress  HENT:   Head: Normocephalic and atraumatic  Mouth/Throat: Oropharynx is clear and moist    Eyes: Conjunctivae are normal  Right eye exhibits no discharge  Left eye exhibits no discharge  No scleral icterus  Neck: Neck supple  No JVD present  Cardiovascular: Normal rate and regular rhythm  Exam reveals no gallop and no friction rub  No murmur heard  Pulmonary/Chest: Effort normal  No stridor  No respiratory distress  She has no wheezes  She has no rales     Coarse breath sounds right greater than left   Abdominal: Soft  Bowel sounds are normal  She exhibits no distension and no mass  There is no tenderness  There is no guarding  Musculoskeletal: She exhibits no edema ( no significant edema noted at this time)  Neurological: She is alert and oriented to person, place, and time  Skin: Skin is warm and dry  No rash noted  She is not diaphoretic  No erythema  No pallor  Psychiatric: She has a normal mood and affect  Her behavior is normal  Judgment and thought content normal        Invasive Devices:   Urethral Catheter Latex 16 Fr   (Active)   Site Assessment Clean;Skin intact 2/12/2019 10:34 AM   Collection Container Standard drainage bag 2/12/2019 10:34 AM     Lab Results:         Invalid input(s): ALBUMIN  Other Studies:

## 2019-02-12 NOTE — ASSESSMENT & PLAN NOTE
· Patient follows Dr Green Blizzard for routine follow up and monitoring   As per his notes she had been stable/improving on current therapy   · Continue Revatio, Opsumit, and Demedex at home doses  · Continue O2 for supportive care for now  · Wean O2 as tolerated   · Encourage Incentive Spirometry

## 2019-02-13 LAB
ANION GAP SERPL CALCULATED.3IONS-SCNC: 9 MMOL/L (ref 4–13)
BASOPHILS # BLD AUTO: 0.01 THOUSANDS/ΜL (ref 0–0.1)
BASOPHILS NFR BLD AUTO: 0 % (ref 0–1)
BUN SERPL-MCNC: 13 MG/DL (ref 5–25)
CALCIUM SERPL-MCNC: 9 MG/DL (ref 8.3–10.1)
CHLORIDE SERPL-SCNC: 103 MMOL/L (ref 100–108)
CO2 SERPL-SCNC: 25 MMOL/L (ref 21–32)
CREAT SERPL-MCNC: 1.09 MG/DL (ref 0.6–1.3)
EOSINOPHIL # BLD AUTO: 0 THOUSAND/ΜL (ref 0–0.61)
EOSINOPHIL NFR BLD AUTO: 0 % (ref 0–6)
ERYTHROCYTE [DISTWIDTH] IN BLOOD BY AUTOMATED COUNT: 13 % (ref 11.6–15.1)
GFR SERPL CREATININE-BSD FRML MDRD: 57 ML/MIN/1.73SQ M
GLUCOSE SERPL-MCNC: 128 MG/DL (ref 65–140)
HCT VFR BLD AUTO: 30.4 % (ref 34.8–46.1)
HGB BLD-MCNC: 9.9 G/DL (ref 11.5–15.4)
IMM GRANULOCYTES # BLD AUTO: 0.02 THOUSAND/UL (ref 0–0.2)
IMM GRANULOCYTES NFR BLD AUTO: 0 % (ref 0–2)
LYMPHOCYTES # BLD AUTO: 0.71 THOUSANDS/ΜL (ref 0.6–4.47)
LYMPHOCYTES NFR BLD AUTO: 11 % (ref 14–44)
MCH RBC QN AUTO: 30.3 PG (ref 26.8–34.3)
MCHC RBC AUTO-ENTMCNC: 32.6 G/DL (ref 31.4–37.4)
MCV RBC AUTO: 93 FL (ref 82–98)
MONOCYTES # BLD AUTO: 0.75 THOUSAND/ΜL (ref 0.17–1.22)
MONOCYTES NFR BLD AUTO: 12 % (ref 4–12)
NEUTROPHILS # BLD AUTO: 5.01 THOUSANDS/ΜL (ref 1.85–7.62)
NEUTS SEG NFR BLD AUTO: 77 % (ref 43–75)
NRBC BLD AUTO-RTO: 0 /100 WBCS
PLATELET # BLD AUTO: 274 THOUSANDS/UL (ref 149–390)
PMV BLD AUTO: 9.8 FL (ref 8.9–12.7)
POTASSIUM SERPL-SCNC: 4.1 MMOL/L (ref 3.5–5.3)
RBC # BLD AUTO: 3.27 MILLION/UL (ref 3.81–5.12)
SODIUM SERPL-SCNC: 137 MMOL/L (ref 136–145)
WBC # BLD AUTO: 6.5 THOUSAND/UL (ref 4.31–10.16)

## 2019-02-13 PROCEDURE — 99232 SBSQ HOSP IP/OBS MODERATE 35: CPT | Performed by: PHYSICIAN ASSISTANT

## 2019-02-13 PROCEDURE — 85025 COMPLETE CBC W/AUTO DIFF WBC: CPT | Performed by: NURSE PRACTITIONER

## 2019-02-13 PROCEDURE — 97110 THERAPEUTIC EXERCISES: CPT

## 2019-02-13 PROCEDURE — 99024 POSTOP FOLLOW-UP VISIT: CPT | Performed by: ORTHOPAEDIC SURGERY

## 2019-02-13 PROCEDURE — G8987 SELF CARE CURRENT STATUS: HCPCS

## 2019-02-13 PROCEDURE — 97164 PT RE-EVAL EST PLAN CARE: CPT

## 2019-02-13 PROCEDURE — 30233N1 TRANSFUSION OF NONAUTOLOGOUS RED BLOOD CELLS INTO PERIPHERAL VEIN, PERCUTANEOUS APPROACH: ICD-10-PCS | Performed by: ORTHOPAEDIC SURGERY

## 2019-02-13 PROCEDURE — 97116 GAIT TRAINING THERAPY: CPT

## 2019-02-13 PROCEDURE — G8988 SELF CARE GOAL STATUS: HCPCS

## 2019-02-13 PROCEDURE — G8978 MOBILITY CURRENT STATUS: HCPCS

## 2019-02-13 PROCEDURE — P9016 RBC LEUKOCYTES REDUCED: HCPCS

## 2019-02-13 PROCEDURE — 97530 THERAPEUTIC ACTIVITIES: CPT

## 2019-02-13 PROCEDURE — 80048 BASIC METABOLIC PNL TOTAL CA: CPT | Performed by: ORTHOPAEDIC SURGERY

## 2019-02-13 PROCEDURE — 99232 SBSQ HOSP IP/OBS MODERATE 35: CPT | Performed by: INTERNAL MEDICINE

## 2019-02-13 PROCEDURE — G8979 MOBILITY GOAL STATUS: HCPCS

## 2019-02-13 PROCEDURE — 97166 OT EVAL MOD COMPLEX 45 MIN: CPT

## 2019-02-13 RX ORDER — KETOROLAC TROMETHAMINE 30 MG/ML
15 INJECTION, SOLUTION INTRAMUSCULAR; INTRAVENOUS ONCE
Status: COMPLETED | OUTPATIENT
Start: 2019-02-13 | End: 2019-02-13

## 2019-02-13 RX ORDER — KETOROLAC TROMETHAMINE 30 MG/ML
30 INJECTION, SOLUTION INTRAMUSCULAR; INTRAVENOUS ONCE
Status: DISCONTINUED | OUTPATIENT
Start: 2019-02-13 | End: 2019-02-13

## 2019-02-13 RX ORDER — OXYCODONE HYDROCHLORIDE 5 MG/1
5 TABLET ORAL EVERY 4 HOURS PRN
Qty: 20 TABLET | Refills: 0 | Status: SHIPPED | OUTPATIENT
Start: 2019-02-13 | End: 2019-02-22 | Stop reason: SDUPTHER

## 2019-02-13 RX ADMIN — HYDROMORPHONE HYDROCHLORIDE 0.5 MG: 1 INJECTION, SOLUTION INTRAMUSCULAR; INTRAVENOUS; SUBCUTANEOUS at 09:11

## 2019-02-13 RX ADMIN — VITAMIN D, TAB 1000IU (100/BT) 2000 UNITS: 25 TAB at 05:53

## 2019-02-13 RX ADMIN — SILDENAFIL 20 MG: 20 TABLET ORAL at 05:53

## 2019-02-13 RX ADMIN — HEPARIN SODIUM 5000 UNITS: 5000 INJECTION, SOLUTION INTRAVENOUS; SUBCUTANEOUS at 07:56

## 2019-02-13 RX ADMIN — TORSEMIDE 40 MG: 20 TABLET ORAL at 15:11

## 2019-02-13 RX ADMIN — OXYCODONE HYDROCHLORIDE 10 MG: 10 TABLET ORAL at 03:18

## 2019-02-13 RX ADMIN — DOCUSATE SODIUM 100 MG: 100 CAPSULE, LIQUID FILLED ORAL at 07:56

## 2019-02-13 RX ADMIN — DOCUSATE SODIUM 100 MG: 100 CAPSULE, LIQUID FILLED ORAL at 18:13

## 2019-02-13 RX ADMIN — ACETAMINOPHEN 650 MG: 325 TABLET, FILM COATED ORAL at 23:07

## 2019-02-13 RX ADMIN — OXYCODONE HYDROCHLORIDE 10 MG: 10 TABLET ORAL at 12:07

## 2019-02-13 RX ADMIN — KETOROLAC TROMETHAMINE 15 MG: 30 INJECTION, SOLUTION INTRAMUSCULAR at 14:18

## 2019-02-13 RX ADMIN — HYDROXYCHLOROQUINE SULFATE 400 MG: 200 TABLET, FILM COATED ORAL at 07:55

## 2019-02-13 RX ADMIN — POTASSIUM CHLORIDE 40 MEQ: 1500 TABLET, EXTENDED RELEASE ORAL at 18:13

## 2019-02-13 RX ADMIN — SILDENAFIL 20 MG: 20 TABLET ORAL at 18:13

## 2019-02-13 RX ADMIN — ASCORBIC ACID TAB 250 MG 500 MG: 250 TAB at 07:59

## 2019-02-13 RX ADMIN — OXYCODONE HYDROCHLORIDE 10 MG: 10 TABLET ORAL at 07:55

## 2019-02-13 RX ADMIN — HEPARIN SODIUM 5000 UNITS: 5000 INJECTION, SOLUTION INTRAVENOUS; SUBCUTANEOUS at 21:38

## 2019-02-13 RX ADMIN — SILDENAFIL 20 MG: 20 TABLET ORAL at 12:07

## 2019-02-13 RX ADMIN — OXYCODONE HYDROCHLORIDE 10 MG: 10 TABLET ORAL at 21:49

## 2019-02-13 RX ADMIN — HYDROMORPHONE HYDROCHLORIDE 0.5 MG: 1 INJECTION, SOLUTION INTRAMUSCULAR; INTRAVENOUS; SUBCUTANEOUS at 04:37

## 2019-02-13 RX ADMIN — ONDANSETRON 4 MG: 2 INJECTION INTRAMUSCULAR; INTRAVENOUS at 09:11

## 2019-02-13 RX ADMIN — FOLIC ACID TAB 400 MCG 400 MCG: 400 TAB at 07:58

## 2019-02-13 RX ADMIN — SODIUM CHLORIDE 40 ML/HR: 0.9 INJECTION, SOLUTION INTRAVENOUS at 21:38

## 2019-02-13 RX ADMIN — POTASSIUM CHLORIDE 40 MEQ: 1500 TABLET, EXTENDED RELEASE ORAL at 07:57

## 2019-02-13 RX ADMIN — HYDROMORPHONE HYDROCHLORIDE 0.5 MG: 1 INJECTION, SOLUTION INTRAMUSCULAR; INTRAVENOUS; SUBCUTANEOUS at 15:14

## 2019-02-13 NOTE — PLAN OF CARE
Problem: OCCUPATIONAL THERAPY ADULT  Goal: Performs self-care activities at highest level of function for planned discharge setting  See evaluation for individualized goals  Description  Treatment Interventions: ADL retraining, Functional transfer training, Endurance training, Patient/family training, Equipment evaluation/education, Activityengagement, Compensatory technique education  Equipment Recommended: Other (comment)(pt reports having DME and AE from her )       See flowsheet documentation for full assessment, interventions and recommendations  Note:   Limitation: Decreased ADL status, Decreased endurance, Decreased self-care trans, Decreased high-level ADLs     Assessment: Pt is a 62yo female admitted to [address] [place] [address] on 2/12/2019  Pt presents w/ arthritis of right knee and significant PMH impacting her occupational performance including HTN, systemic lupus  Pt presents s/p R TKA 2/12/2019, and is WBAT R LE  Pt reports living w/  and family in [facility] w/ JINA  Pt reports I w/ ADL / IADL PTA including driving and working full time  Upon eval, pt able to participate in conversation appropriate and provide social history  Pt required mod A to complete LBD to thread R LE and don sock / shoe  Pt completed grooming w/ S standing at sink after set- up w/ increased time using RW  Pt engaged in functional mobility and sit to stand w/ min A and increased time  Pt presents w/ increased pain, decreased R LE ROM / strength, decreased standing tolerance, decreased standing balance impacting her I w/ dressing, bathing, functional mobility, functional transfers, community mobility, food prep / clean up, and clothing mgmt  Pt would benefit from OT while in acute care to address deficits  From an OT perspective, pt can return home to [facility] w/ family support and PT services  Will continue to follow pt while in acute care       OT Discharge Recommendation: Home with family support  OT - OK to Discharge: (when medically stable)

## 2019-02-13 NOTE — DISCHARGE INSTRUCTIONS
Discharge Instructions:    Weight Bearing Status:                                           Weight bearing as tolerated right lower extremity    DVT prophylaxis:  Complete course of Lovenox as directed  One injection into abdomen daily for 30 days post-operatively  Wear thigh high compression ALAP stockings on both legs for 6 months  Start one tablet of 325mg of Aspirin twice daily up to 6 months postoperatively  Start the Aspirin after you have finished the Lovenox  Pain:  You have been prescribed a narcotic  Take this sparingly and only as needed for pain  May take one tab every 4-6hrs as needed for pain  Dressing Instructions:   Do not remove dressing overlying the incision  May shower 5 days after surgery, but please clean around the dressing afterwards  NO SOAKING the incision  PT/OT:  Continue PT/OT on outpatient basis as directed    Appt Instructions: If you do not have your appointment, please call the clinic at [phone] to follow up with Dr Sheeba Ram in 2 weeks from surgery date  If you develop significant pain in your calf, calf swelling/discoloration, these may be signs of a blood clot  Call the office or go to the emergency department

## 2019-02-13 NOTE — UTILIZATION REVIEW
Please use the attached Clinical for auth #[id number]  Send all determinations to our UR Dept Fax of [phone] or my phone of [phone] please

## 2019-02-13 NOTE — PLAN OF CARE
Problem: PHYSICAL THERAPY ADULT  Goal: Performs mobility at highest level of function for planned discharge setting  See evaluation for individualized goals  Description  Treatment/Interventions: Functional transfer training, LE strengthening/ROM, Therapeutic exercise, Endurance training, Cognitive reorientation, Patient/family training, Equipment eval/education, Bed mobility, Gait training, Spoke to nursing, Spoke to MD, Spoke to advanced practitioner, Family(PT to see for Stair training as appropriate)  Equipment Recommended: Kimberly Gomez       See flowsheet documentation for full assessment, interventions and recommendations     Outcome: Progressing

## 2019-02-13 NOTE — TELEPHONE ENCOUNTER
Called and spoke with Dr Genet Stone  He recommended that we do Lovenox for postoperative  Just 30 days  He said it was okay if we use aspirin afterwards    He suggested use of 81 mg of aspirin along with Lovenox but left this up to our discretion,   As long as she is on at least 81 mg of aspirin daily after Lovenox is completed he feels that will be sufficient for treatment of her lupus anticoagulant

## 2019-02-13 NOTE — ASSESSMENT & PLAN NOTE
· Baseline creatinine noted to be 1 2-1 5  Suspect due to NSAID nephropathy vs hypertensive nephrosclerosis   Nephrology following  · Continue home medications   · Monitor creatinine   · Decrease fluids to 40  cc/hr as per their note's recommendations

## 2019-02-13 NOTE — PROGRESS NOTES
Progress Note - Orthopedics   Genesis Batista 62 y o  female MRN: [id number]  Unit/Bed#: -01 Encounter: [id number]    Assessment:  Right total knee arthroplasty  Pain was 7/10 today she did well before 11 o'clock last night but pain increased  Has had only 1 dose of Dilaudid  Would like to give her 1 dose of anti-inflammatories Toradol IM  We will reach out to nephrology her kidneys look like they are functioning normally right now hopefully we can give her a dose of Toradol this will decrease her dependence on narcotic usage  Much appreciated input by Nephrology an internal medicine  Patient is noted to have acute blood loss anemia has auto donated her blood will transfuse at 1 unit    Plan: We will reach out to nephrology for possible 1 time IM dose of Toradol    Weight bearing:  Weight bearing as tolerated    VTE Pharmacologic Prophylaxis: Heparin  VTE Mechanical Prophylaxis: sequential compression device    Subjective:  Pain 7 out 10 was doing well up until 11 o'clock last night and pain started  Patient had oxycodone 1 hour ago    Vitals: Blood pressure 106/55, pulse 78, temperature 98 7 °F (37 1 °C), temperature source Oral, resp  rate 18, height 5' 4" (1 626 m), weight 89 8 kg (198 lb), SpO2 99 %, not currently breastfeeding  ,Body mass index is 33 99 kg/m²  Intake/Output Summary (Last 24 hours) at 2/13/2019 0912  Last data filed at 2/13/2019 0601  Gross per 24 hour   Intake 1600 ml   Output 1450 ml   Net 150 ml       Invasive Devices     Peripheral Intravenous Line            Peripheral IV 02/12/19 Left Arm 1 day                Physical Exam:  Dressings with scant or minimal blood otherwise dry removed Ace wrap today  Neurovascularly intact      Lab, Imaging and other studies:   I have personally reviewed pertinent lab results    CBC:   Lab Results   Component Value Date    WBC 6 50 02/13/2019    HGB 9 9 (L) 02/13/2019    HCT 30 4 (L) 02/13/2019    MCV 93 02/13/2019     02/13/2019    MCH 30 3 02/13/2019    MCHC 32 6 02/13/2019    RDW 13 0 02/13/2019    MPV 9 8 02/13/2019    NRBC 0 02/13/2019     CMP:   Lab Results   Component Value Date    SODIUM 137 02/13/2019     02/13/2019    CO2 25 02/13/2019    BUN 13 02/13/2019    CREATININE 1 09 02/13/2019    CALCIUM 9 0 02/13/2019    AST 18 02/12/2019    ALT 27 02/12/2019    ALKPHOS 95 02/12/2019    EGFR 57 02/13/2019

## 2019-02-13 NOTE — PHYSICAL THERAPY NOTE
PHYSICAL THERAPY NOTE    Patient Name: Glo Valles  QJVBB'E Date: 19 0825   Pain Assessment   Pain Assessment 0-10   Pain Score 8   Pain Type Surgical pain   Pain Location Knee   Pain Orientation Right   Restrictions/Precautions   Weight Bearing Precautions Per Order Yes   RLE Weight Bearing Per Order WBAT   Other Precautions WBS; Multiple lines;Telemetry; Fall Risk;Pain   General   Family/Caregiver Present Yes   Subjective   Subjective Patient supine in bed and is agreeable to therapy session  Patient identifers obtained from name &   Bed Mobility   Supine to Sit 4  Minimal assistance   Additional items Assist x 1;HOB elevated; Bedrails; Increased time required;Verbal cues;LE management   Sit to Supine Unable to assess   Additional Comments Patient seated OON in recliner post session with call bell and belongings in reach  Transfers   Sit to Stand 5  Supervision   Additional items Assist x 1; Increased time required;Verbal cues   Stand to Sit 5  Supervision   Additional items Assist x 1; Armrests; Increased time required;Verbal cues   Ambulation/Elevation   Gait pattern Improper Weight shift; Forward Flexion;Decreased foot clearance; Antalgic; Inconsistent carlos; Foward flexed; Short stride; Step to;Excessively slow   Gait Assistance 4  Minimal assist  (contact guard)   Additional items Assist x 1;Verbal cues   Assistive Device Rolling walker   Distance 150' x1   Balance   Static Sitting Fair   Dynamic Sitting Fair   Static Standing Fair -   Dynamic Standing Poor +   Ambulatory Poor +   Endurance Deficit   Endurance Deficit Yes   Endurance Deficit Description limited ambulation distance   Activity Tolerance   Activity Tolerance Patient tolerated treatment well;Patient limited by pain   Nurse Made Aware Spoke to Tin Martinez RN    Exercises   [employer] Supine;10 reps;AROM; Right   Heelslides Supine;10 reps;AAROM; Right Ankle Pumps Supine;15 reps;AROM; Bilateral   Assessment   Prognosis Excellent   Problem List Decreased strength;Decreased range of motion;Decreased endurance; Impaired balance;Decreased mobility; Decreased coordination;Decreased safety awareness;Pain;Orthopedic restrictions;Decreased skin integrity   Assessment Patient with complaints of increased R knee pain however is agreeable and motivated to participate  Patient able to transfer supine to sit with min ax1, R LE management and increased time  Patient remains consistent with supervision for sit<>stand transfers with verbal instruction for body positioning and hand placement for controlled descend  Patient able to ambulate 150' x1 with roller walker and contact guard assist  Provided verbal instructionfor walker advancement and stepping sequence with good follow through and understanding  Patient participated in supine LE ROM and strengthening program with AROM/AAROM  Continue to focus on OOB mobility with progression of ambulation and PT to see for stair training as appropriate  Barriers to Discharge Inaccessible home environment  (steps to enter)   Goals   Patient Goals none stated   STG Expiration Date 02/22/19   Treatment Day 2   Plan   Treatment/Interventions Functional transfer training;LE strengthening/ROM; Therapeutic exercise; Endurance training;Cognitive reorientation;Patient/family training;Equipment eval/education; Bed mobility;Gait training;Spoke to nursing  (PT to see for Stair training as appropriate)   PT Frequency Twice a day   Recommendation   Recommendation Outpatient PT   Equipment Recommended Roseann Peñaloza PTA

## 2019-02-13 NOTE — PROGRESS NOTES
[address] [facility] NOTE   Karan Ramachandran 62 y o  female MRN: [id number]  Unit/Bed#: -01 Encounter: [id number]  Reason for Consult: CKD    ASSESSMENT and PLAN:  1  CKD III:  · Baseline serum creatinine appears to be around 1 2-1 5  · Renal function is currently stable perioperatively  · Urinalysis in January 2019 was noted to be bland  Suspected etiology is long-term NSAID use versus hypertensive nephrosclerosis  · Will decrease IVF to 40 cc/hour and may stop once oral intake has improved  2  Hypotension: Resolved  Decrease IVF  3  R knee osteoarthritis s/p arthroplasty 2/12/19  4  MCTD:  Follows with Rheumatology at [facility]  5  Pulm HTN    DISPOSITION:  · Decrease IVF to 40 cc/hour  · Stop IVF later today or tomorrow once oral intake has normalized  · Continue to hold diuretics and likely restart as an outpatient  SUBJECTIVE / INTERVAL HISTORY:  No new acute events  Complaining of pain from postoperative site  Appetite is still on the low side  OBJECTIVE:  Current Weight: Weight - Scale: 89 8 kg (198 lb)  Vitals:    02/12/19 2027 02/12/19 2251 02/13/19 0256 02/13/19 0700   BP: 119/59 100/53 103/55 106/55   BP Location: Left arm Right arm Left arm Right arm   Pulse: 80 83 87 78   Resp:  18 18 18   Temp:  98 8 °F (37 1 °C) 99 4 °F (37 4 °C) 98 7 °F (37 1 °C)   TempSrc:  Oral Oral Oral   SpO2:  98% 100% 99%   Weight:       Height:           Intake/Output Summary (Last 24 hours) at 2/13/2019 1003  Last data filed at 2/13/2019 0601  Gross per 24 hour   Intake 600 ml   Output 750 ml   Net -150 ml     General: conscious, coherent, cooperative, no distress  Chest/Lungs: clear breath sounds  CVS: distinct heart sounds, normal rate  Abdomen: soft  Extremities: (+) R leg edema       Medications:    Current Facility-Administered Medications:     acetaminophen (TYLENOL) tablet 650 mg, 650 mg, Oral, Q4H PRN, Rachie Gateresa, DO    aluminum-magnesium hydroxide-simethicone (MYLANTA) 200-200-20 mg/5 mL oral suspension 30 mL, 30 mL, Oral, Q6H PRN, Lisa Polite, DO    ascorbic acid (VITAMIN C) tablet 500 mg, 500 mg, Oral, Daily, Lisa Polite, DO, 500 mg at 02/13/19 0759    bupivacaine liposomal (EXPAREL) 1 3 % 20 mL, bupivacaine (MARCAINE) 50 mL in sodium chloride 0 9 % 50 mL OR irrigation, , Irrigation, Once, Lisa Polite, DO    cholecalciferol (VITAMIN D3) tablet 2,000 Units, 2,000 Units, Oral, Early Morning, Lisa Kellyte, DO, 2,000 Units at 02/13/19 0553    docusate sodium (COLACE) capsule 100 mg, 100 mg, Oral, BID, Lisa Kellyte, DO, 100 mg at [birth year] 1986    folic acid (FOLVITE) tablet 400 mcg, 400 mcg, Oral, Daily, Lisa Polite, DO, 400 mcg at 02/13/19 0758    heparin (porcine) subcutaneous injection 5,000 Units, 5,000 Units, Subcutaneous, Q12H [facility], Lisa Kellyte, DO, 5,000 Units at 02/13/19 0756    HYDROmorphone (DILAUDID) injection 0 5 mg, 0 5 mg, Intravenous, Q3H PRN, Lisa Kellyte, DO, 0 5 mg at 02/13/19 0911    hydroxychloroquine (PLAQUENIL) tablet 400 mg, 400 mg, Oral, Daily With Breakfast, Lisa Kellyte, DO, 400 mg at 02/13/19 0755    lactated ringers bolus 1,000 mL, 1,000 mL, Intravenous, Once, Lisa Kellyte DO, Stopped at 02/12/19 1903    Macitentan (OPSUMIT) tablet 10 mg, 10 mg, Oral, Daily, Lisa Kellyte DO, 10 mg at 02/13/19 0758    ondansetron (ZOFRAN) injection 4 mg, 4 mg, Intravenous, Q6H PRN, Colleen Wade MD, 4 mg at 02/13/19 0911    oxyCODONE (ROXICODONE) immediate release tablet 10 mg, 10 mg, Oral, Q4H PRN, Lisa Kellyte, DO, 10 mg at 02/13/19 0755    oxyCODONE (ROXICODONE) IR tablet 5 mg, 5 mg, Oral, Q4H PRN, Lisa Polite, DO, 5 mg at 02/12/19 6943    potassium chloride (K-DUR,KLOR-CON) CR tablet 40 mEq, 40 mEq, Oral, BID, Lisa Polite, DO, 40 mEq at 02/13/19 0757    sildenafil (REVATIO) tablet 20 mg, 20 mg, Oral, TID, Lisa Polite, DO, 20 mg at 02/13/19 0553    sodium chloride 0 9 % infusion, 75 mL/hr, Intravenous, Continuous, Jerome Torrez PA-C, Last Rate: 75 mL/hr at 02/12/19 1410, 75 mL/hr at 02/12/19 1410    torsemide (DEMADEX) tablet 40 mg, 40 mg, Oral, BID (diuretic), Jerome Vieira PA-C    traMADol (ULTRAM) tablet 50 mg, 50 mg, Oral, HS PRN, Fabiana Victoria,     Laboratory Results:  Results from last 7 days   Lab Units 02/13/19  0543 02/12/19  1428   WBC Thousand/uL 6 50  --    HEMOGLOBIN g/dL 9 9*  --    HEMATOCRIT % 30 4*  --    PLATELETS Thousands/uL 274 288   POTASSIUM mmol/L 4 1 3 8   CHLORIDE mmol/L 103 104   CO2 mmol/L 25 26   BUN mg/dL 13 17   CREATININE mg/dL 1 09 1 26   CALCIUM mg/dL 9 0 8 9     Work up:

## 2019-02-13 NOTE — OCCUPATIONAL THERAPY NOTE
[address]  Evaluation     Patient Name: Donzella Cogan  [id number] Date: 2/13/2019  Problem List  Patient Active Problem List   Diagnosis    Encounter for gynecological examination (general) (routine) without abnormal findings    B12 deficiency    Chronic cough    Chronic rhinitis    Diffuse connective tissue disease ([facility] [address] [facility]    Dyspnea    Edema    Essential hypertension    Hot flashes due to menopause    Long-term use of hydroxychloroquine    Systemic lupus erythematosus ([facility] [address] [facility]    Lupus anticoagulant positive    Tachycardia    SOB (shortness of breath) on exertion    SOB (shortness of breath)    Sinus tachycardia    Secondary pulmonary hypertension    Pulmonary hypertension (HCC)    Post-nasal drip    Pes anserine bursitis    Positive HELENE (antinuclear antibody)    Other chronic pulmonary heart diseases    Osteoporosis    Night sweats    Patellofemoral disorder of right knee    Pes anserinus bursitis of right knee    Arthritis of right knee    Other tear of medial meniscus, current injury, right knee, initial encounter    Tear of medial meniscus of right knee, current    Chronic pain of right knee    Elevated serum creatinine    Gout    Hyperuricemia    Trochanteric bursitis of both hips    Undifferentiated connective tissue disease ([facility] [facility]    Vitamin D deficiency    BV (bacterial vaginosis)    Chronic kidney disease, stage 3 ([facility] [address] )     Past Medical History  Past Medical History:   Diagnosis Date    HELENE positive     Anemia     Sildenafil causes decreased hematocrit    Chronic kidney disease     borderline lab values    Encephalitis     Lasted Assessed 10/18/2017    Lupus anticoagulant disorder ([facility] [address] )     Maxillary sinusitis     Lasted Assessed 10/18/2017    Night sweat     Osteopenia     Hip    Osteoporosis     Spine    Pneumonia     Last Assessed 10/18/2017    PONV (postoperative nausea and vomiting)     Pulmonary hypertension (HCC)     Seizures ([facility] [address] )     Only during Encephalitis    Shortness of breath     with exertion    Sinus tachycardia     Urinary incontinence      Past Surgical History  Past Surgical History:   Procedure Laterality Date    ARTHRODESIS      Hand: IP Joint    CHOLECYSTECTOMY      COLONOSCOPY      COLPOSCOPY      HYSTERECTOMY      Vaginal    LAPAROSCOPIC ESOPHAGOGASTRIC FUNDOPLASTY  2008    SD KNEE SCOPE,SINGLE MENISECTOMY Right 10/2/2018    Procedure: KNEE ARTHROSCOPY WITH PARTIAL MEDIAL MENISCECTOMY;  Surgeon: Padmini Morrissey DO;  [facility];  Service: Orthopedics    SD TOTAL KNEE ARTHROPLASTY Right 2/12/2019    Procedure: KNEE TOTAL ARTHROPLASTY AND ASSOCIATED PROCEDURES;  Surgeon: Padmini Morrissey DO;  [facility];  Service: Orthopedics    REDUCTION MAMMAPLASTY      REPAIR ANKLE LIGAMENT Right     TONSILLECTOMY           02/13/19 0946   Note Type   Note type Eval/Treat   Restrictions/Precautions   Weight Bearing Precautions Per Order Yes   RLE Weight Bearing Per Order WBAT   Other Precautions WBS;Pain; Fall Risk;Multiple lines   Pain Assessment   Pain Assessment 0-10   Pain Score 6   Pain Type Acute pain;Surgical pain   Pain Location Knee   Pain Orientation Right   Effect of Pain on Daily Activities limits standing tolerance and I w/ ADL performance   Patient's Stated Pain Goal No pain   Home Living   Type of [facility] Two level;Bed/bath upstairs;Stairs to enter with rails   Bathroom Shower/Tub Tub/shower unit   Bathroom Toilet Standard   Bathroom Equipment Tub transfer bench;Commode   Bathroom Accessibility Other (Comment)  (up full flight of stairs)   Home Equipment Walker   Additional Comments Pt reports living w/ , daughter, and grandson in [facility] w/ + JINA  Pt reports bed / bath up full flight of stairs on 2nd floor   Pt added plans to keep commode on first floor in small dressing tent for privacy as no bathroom on first floor   Prior Function   Level of [place] Independent with ADLs and functional mobility   Lives With Spouse;Daughter; Other (Comment)  (grandson)   ADL Assistance Independent   IADLs Independent  (+ drive, works full time)   Falls in the last 6 months 0   Vocational Full time employment   Comments Pt reports I w/ ADL / IADL PTA including driving and working full time   Lifestyle   Autonomy Pt reports I w/ ADL/ IADL PTA including working full time and driving  Pt reports having AE and DME for her  who had his hip replaced, but not using it PTA   Reciprocal Relationships Pt reports supportive  and family, and added that her  took off work next week  Pt's daughter present during eval   Service to Others 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 [address] reports enjoying watching her grandson play football  Pt added that she is looking for viera to big vacation (transatlantic cruise) later this year w/ her    ADL   Eating Assistance 6  Modified independent  (decreased appetite; did not eat much breakfast)   Eating Deficit Setup   Grooming Assistance 5  Supervision/Setup  (to complete oral hygiene standing at sink)   Grooming Deficit Setup; Increased time to complete;Standing with assistive device   UB Bathing Assistance Unable to assess   LB Bathing Assistance 2  Maximal Assistance   UB Dressing Assistance 4  Minimal Assistance   UB Dressing Deficit Setup;Pull around back; Increased time to complete   LB Dressing Assistance 3  Moderate Assistance   LB Dressing Deficit Don/doff R sock; Setup; Increased time to complete   Bed Mobility   Supine to Sit Unable to assess   Sit to Supine Unable to assess   Additional Comments Pt seated OOB in chair upon arrival and post eval w/ call bell in reach on cushion  Needs met  Provided pt w/ today's newspaper   Transfers   Sit to Stand 4  Minimal assistance   Additional items Assist x 1; Increased time required;Armrests; Verbal cues   Stand to Sit 5  Supervision Additional items Assist x 1; Armrests; Increased time required;Verbal cues  (+ time, cues for hand placement)   Functional Mobility   Functional Mobility 4  Minimal assistance   Additional Comments min A w/ increased time using RW   Additional items Rolling walker   Balance   Static Sitting Fair   Dynamic Sitting Poor +   Static Standing Fair -   Ambulatory Poor +   Activity Tolerance   Activity Tolerance Patient limited by pain; Patient tolerated treatment well   Nurse Made Aware spoke to [address] Assessment   RUE Assessment [facility]   RU Strength   RUE Overall Strength Within Functional Limits - able to perform ADL tasks with strength   LUE Assessment   LUE Assessment WFL   LUE Strength   LUE Overall Strength Within Functional Limits - able to perform ADL tasks with strength   Hand Function   Gross Motor Coordination Functional   Fine Motor Coordination Functional   Sensation   Light Touch No apparent deficits   Sharp/Dull Not tested   Vision-Basic Assessment   Current Vision Wears glasses all the time   Cognition   Overall Cognitive Status [facility]   Arousal/Participation Alert; Cooperative   Attention Within functional limits   Orientation Level Oriented X4   Memory Within functional limits   Following Commands Follows all commands and directions without difficulty   Comments Identified pt by full name and birthdate  Pt able to participate in covnersation appropriate this AM   Assessment   Limitation Decreased ADL status; Decreased endurance;Decreased self-care trans;Decreased high-level ADLs   Assessment Pt is a 60yo female [facility] on 2/12/2019  Pt presents w/ arthritis of right knee and significant PMH impacting her occupational performance including HTN, systemic lupus  Pt presents s/p R TKA 2/12/2019, and is WBAT R LE  Pt reports living w/  and family in [facility] w/ J[place]  Pt reports I w/ ADL / IADL PTA including driving and working full time   Upon eval, pt able to participate in conversation appropriate and provide social history  Pt required mod A to complete LBD to thread R LE and don sock / shoe  Pt completed grooming w/ S standing at sink after set- up w/ increased time using RW  Pt engaged in functional mobility and sit to stand w/ min A and increased time  Pt presents w/ increased pain, decreased R LE ROM / strength, decreased standing tolerance, decreased standing balance impacting her I w/ dressing, bathing, functional mobility, functional transfers, community mobility, food prep / clean up, and clothing mgmt  Pt would benefit from OT while in acute care to address deficits  From an OT perspective, pt can return home to [facility] w/ family support and PT services  Will continue to follow pt while in acute care  Goals   Patient Goals Pt stated that she would like to go home and be able to take care of herself   Plan   Treatment Interventions ADL retraining;Functional transfer training; Endurance training;Patient/family training;Equipment evaluation/education; Activityengagement; Compensatory technique education   Goal Expiration Date 02/20/19   OT Frequency 3-5x/wk   Recommendation   OT Discharge Recommendation Home with family support   Equipment Recommended Other (comment)  (pt reports having DME and AE from her )   OT - OK to Discharge   (when medically stable)   Barthel Index   Feeding 10   Bathing 0   Grooming Score 5   Dressing Score 5   Bladder Score 10   Bowels Score 10   Toilet Use Score 5   Transfers (Bed/Chair) Score 10   Mobility (Level Surface) Score 0   Stairs Score 0   Barthel Index Score 55   Modified [place] Scale   Modified [place] Scale 4   Pt goals to be met by 2/20/2019:  1  Pt will complete functional transfers to bed, chair, and toilet vs commode using AD w/ mod I to max I w/ ADL performance  2  Pt will complete UBD w/ mod I after set- up while seated unsupported at EOB w/ good balance  3   Pt will complete LBD w/ min A x1 after set- up thread R LE, don sock / shoe using AE as needed to max I w/ ADL performance  4  Pt will demonstrate increased functional standing tolerance for at least 15 minutes using AD to max I functional mobility and improve activity engagement to return to PLOF, and be able to care for herself  5   Pt will complete functional tub / shower transfers using DME w/ S and AD to max I and safety w/ bathing    Judie Martinez, OTR/L

## 2019-02-13 NOTE — UTILIZATION REVIEW
Initial Clinical Review    Age/Sex: 62 y o  female     Surgery Date: 2/12/2019     Procedure: KNEE TOTAL ARTHROPLASTY AND ASSOCIATED PROCEDURES (Right)    Anesthesia: spinal     Admission Orders: Date/Time/Statement: 2/12/19 @    Orders Placed This Encounter   Procedures    Inpatient Admission     Standing Status:   Standing     Number of Occurrences:   1     Order Specific Question:   Admitting Physician     Answer:   Roland Cedeño     Order Specific Question:   Level of Care     Answer:   Med Surg [16]     Order Specific Question:   Estimated length of stay     Answer:   More than 2 Midnights     Order Specific Question:   Certification     Answer:   I certify that inpatient services are medically necessary for this patient for a duration of greater than two midnights  See H&P and MD Progress Notes for additional information about the patient's course of treatment  Comments:   Patient has multiple medical issues including hypercoagulable state lupus CHF     Vital Signs: /55 (BP Location: Right arm)   Pulse 78   Temp 98 7 °F (37 1 °C) (Oral)   Resp 18   Ht 5' 4" (1 626 m)   Wt 89 8 kg (198 lb)   SpO2 99%   BMI 33 99 kg/m²   Diet:        Diet Orders   (From admission, onward)            Start     Ordered    02/12/19 1637  Room Service  Once     Question:  Type of Service  Answer:  Room Service-Appropriate    02/12/19 1636    02/12/19 1223  Diet Regular; Regular House  Diet effective now     Question Answer Comment   Diet Type Regular    Regular Regular House    RD to adjust diet per protocol? Yes        02/12/19 1222        Mobility: PT/OT    DVT Prophylaxis:   Scds  Heparin 5000 sq q 12h  Pain Control: Dilaudid 0 5 IV - used x 2  Oxycodone 5 mg - used x 3     Pain Medications             HYDROcodone-acetaminophen (NORCO) 5-325 mg per tablet Take 1 tablet by mouth every 6 (six) hours as needed for pain Max Daily Amount: 4 tablets    traMADol (ULTRAM) 50 mg tablet Take 1 tablet (50 mg total) by mouth daily at bedtime as needed for moderate pain or severe pain    oxyCODONE (ROXICODONE) 5 mg immediate release tablet Take 1 tablet (5 mg total) by mouth every 4 (four) hours as needed for moderate pain for up to 10 daysMax Daily Amount: 30 mg          OTHER ORDERS:  Po medications:  Macitentan  Torsemide  Hydroxychloroquine  Colace  Sildenafil  Ascorbic acid  Folic acid  KCL  Cholecalciferol  IVF @ 40 cc/h  Urinary catheter - dc post day one  2/13 transfuse PRBC  Consult medicine, nephrology  zofran 4 IV - used x 2  Labs 2/13-  hgb 9 9, hct 30 4       Network Utilization Review Department  Phone: [phone]; Fax [phone]  [email or web address]  ATTENTION: Please call with any questions or concerns to [phone]  and carefully listen to the prompts so that you are directed to the right person  Send all requests for admission clinical reviews, approved or denied determinations and any other requests to fax [phone]   All voicemails are confidential

## 2019-02-13 NOTE — PHYSICAL THERAPY NOTE
PHYSICAL THERAPY REEVAL NOTE    Patient Name: Saba Gutierrez  GVOCF'B Date: 2/13/2019 02/13/19 1534   Pain Assessment   Pain Assessment 0-10   Pain Score 7   Pain Location Knee  (thigh)   Pain Orientation Right   Restrictions/Precautions   RLE Weight Bearing Per Order WBAT   Other Precautions Pain; Fall Risk;WBS;Multiple lines   General   Chart Reviewed Yes   Additional Pertinent History Reeval completed due to pt exceeding previously established goals and need for stair goal  Dx: right knee arthritis s/p TKA, pulmonary HTN, SLE, and HTN  Comorbidities: HTN, SLE, pulmonary HTN, CKD, tachycardia,osteoporosis, edema, Vit  B12 defficiency, and diffuse connective tissue disease  Personal factors: living in 2 story house, mobilizing w/ assistive device, stair(s) to enter home, inability to navigate level surfaces w/o external assistance, unable to perform dynamic tasks in community, inability to perform current job functions, unable to perform caregiver support/tasks, inability to perform IADLs, inability to perform ADLs and inability to live alone  Right knee supine active ROM -10* to 46*  Clinical presentation: unstable/unpredictable (evident in need for for standby to min assist needed to maintain mobilty safety, need for input for mobility technique, and pain affecting mobility status)  Family/Caregiver Present No   Cognition   Overall Cognitive Status WFL   Arousal/Participation Alert; Cooperative   Attention Within functional limits   Orientation Level Oriented X4;Other (Comment)  (pt was identified w/ full name, birth date)   Following Commands Follows one step commands without difficulty   Subjective   Subjective pt seen supine in bed  agreed to PT session  c/o right knee and thigh pain  pt wants to go home and to go on a cruise next year     Bed Mobility   Supine to Sit 4  Minimal assistance   Additional items Assist x 1;Increased time required;LE management   Sit to Supine 4  Minimal assistance   Additional items Assist x 1; Increased time required;LE management   Transfers   Sit to Stand 5  Supervision   Additional items Increased time required   Stand to Sit 5  Supervision   Additional items Increased time required   Ambulation/Elevation   Gait pattern Decreased R stance; Excessively slow; Short stride   Gait Assistance 5  Supervision   Additional items Verbal cues  (for walker placement)   Assistive Device Rolling walker   Distance 100 feet x2  (additional not possible due to pain and fatigue)   Stair Management Assistance 5  Supervision   Additional items Verbal cues; Tactile cues; Increased time required  (for cane placement, sequencing, foot clearance)   Stair Management Technique One rail R;With cane; Step to pattern   Number of Stairs 5  (additional not possible due to pain and fatigue)   Balance   Static Sitting Good   Dynamic Sitting Fair +   Static Standing Fair   Dynamic Standing Fair -   Ambulatory Fair -  (w/ roller walker)   Activity Tolerance   Activity Tolerance Patient limited by fatigue;Patient limited by pain   Nurse Made Aware spoke to [address]   Equipment Use   Comments ankle pumps 30  quad sets 20  heel slides 10  exercise handout was provided  Assessment   Prognosis Excellent   Problem List Decreased strength;Decreased range of motion;Decreased endurance; Impaired balance;Decreased mobility; Decreased coordination;Decreased safety awareness;Pain;Orthopedic restrictions;Decreased skin integrity   Assessment Pt shows improvement in mobility status since initial eval w/ increased ambulation tolerance, decreased level of assist to safely mobilize, and introduction of stair use  Pt continues to need standby to min assist to maintain mobility safety  Mobility impairments are related to pain, weakness, impaired balance, decreased endurance, limited ROM, and gait deviations   Mobility impairment is evident in Barthel Index score of 75/100  Pt continues to be at risk for falling due to physical and safety awareness impairments  Pt needs continued inpatient PT to reduce fall risk factors and increase level of function  Discharge recommendation is for outpatient PT to facilitate return home and ability to participate in leisure activities  Goals   Patient Goals go home, go on a cruise next year   STG Expiration Date 02/23/19   Short Term Goal #1 pt will: Increase right knee active ROM to 0*-80* to improve level of function, Increase right knee strength to 3/5 or greater to facilitate independent mobility, Perform all bed mobility tasks independently to decrease fall risk factors, Perform all transfers independently to improve independence, Ambulate 300 ft  with roller walker independently w/o LOB to facilitate return to leisure activities like cruising, Navigate 15 stairs independently with unilateral handrail to facilitate return to previous living environment, Increase ambulatory balance 1 grade to decrease risk for falls, Complete exercise program independently to improve activity tolerance, Tolerate 3 hr OOB to faciliate upright tolerance and Improve Barthel Index score to 95 or greater to facilitate independence   Treatment Day 1   Plan   Treatment/Interventions Functional transfer training;LE strengthening/ROM; Elevations; Therapeutic exercise; Endurance training;Patient/family training;Equipment eval/education; Bed mobility;Gait training   Progress Progressing toward goals   PT Frequency Twice a day   Recommendation   Recommendation Outpatient PT   Equipment Recommended Walker     Skilled inpatient PT recommended while in hospital to progress pt toward treatment goals      Violetta Anderson, PT

## 2019-02-13 NOTE — ASSESSMENT & PLAN NOTE
· Patient follows Dr Behzad Alexandre for routine follow up and monitoring   As per his notes she had been stable/improving on current therapy   · Continue Revatio, Opsumit, and Demedex at home doses  · Patient is off O2 at this time  · Continue incentive spirometry

## 2019-02-14 VITALS
TEMPERATURE: 98.7 F | HEIGHT: 64 IN | WEIGHT: 198 LBS | RESPIRATION RATE: 16 BRPM | BODY MASS INDEX: 33.8 KG/M2 | DIASTOLIC BLOOD PRESSURE: 60 MMHG | HEART RATE: 104 BPM | SYSTOLIC BLOOD PRESSURE: 119 MMHG | OXYGEN SATURATION: 100 %

## 2019-02-14 LAB
ABO GROUP BLD BPU: NORMAL
ANION GAP SERPL CALCULATED.3IONS-SCNC: 9 MMOL/L (ref 4–13)
BPU ID: NORMAL
BUN SERPL-MCNC: 12 MG/DL (ref 5–25)
CALCIUM SERPL-MCNC: 8.4 MG/DL (ref 8.3–10.1)
CHLORIDE SERPL-SCNC: 103 MMOL/L (ref 100–108)
CO2 SERPL-SCNC: 24 MMOL/L (ref 21–32)
CREAT SERPL-MCNC: 1.04 MG/DL (ref 0.6–1.3)
GFR SERPL CREATININE-BSD FRML MDRD: 60 ML/MIN/1.73SQ M
GLUCOSE SERPL-MCNC: 93 MG/DL (ref 65–140)
POTASSIUM SERPL-SCNC: 4 MMOL/L (ref 3.5–5.3)
SODIUM SERPL-SCNC: 136 MMOL/L (ref 136–145)
UNIT DISPENSE STATUS: NORMAL
UNIT PRODUCT CODE: NORMAL
UNIT RH: NORMAL

## 2019-02-14 PROCEDURE — 80048 BASIC METABOLIC PNL TOTAL CA: CPT | Performed by: ORTHOPAEDIC SURGERY

## 2019-02-14 PROCEDURE — 97535 SELF CARE MNGMENT TRAINING: CPT

## 2019-02-14 PROCEDURE — 99024 POSTOP FOLLOW-UP VISIT: CPT | Performed by: PHYSICIAN ASSISTANT

## 2019-02-14 PROCEDURE — 97116 GAIT TRAINING THERAPY: CPT

## 2019-02-14 RX ADMIN — OXYCODONE HYDROCHLORIDE 10 MG: 10 TABLET ORAL at 12:04

## 2019-02-14 RX ADMIN — DOCUSATE SODIUM 100 MG: 100 CAPSULE, LIQUID FILLED ORAL at 17:29

## 2019-02-14 RX ADMIN — HYDROMORPHONE HYDROCHLORIDE 0.5 MG: 1 INJECTION, SOLUTION INTRAMUSCULAR; INTRAVENOUS; SUBCUTANEOUS at 02:17

## 2019-02-14 RX ADMIN — VITAMIN D, TAB 1000IU (100/BT) 2000 UNITS: 25 TAB at 05:45

## 2019-02-14 RX ADMIN — OXYCODONE HYDROCHLORIDE 10 MG: 10 TABLET ORAL at 08:24

## 2019-02-14 RX ADMIN — DOCUSATE SODIUM 100 MG: 100 CAPSULE, LIQUID FILLED ORAL at 08:25

## 2019-02-14 RX ADMIN — OXYCODONE HYDROCHLORIDE 10 MG: 10 TABLET ORAL at 17:30

## 2019-02-14 RX ADMIN — OXYCODONE HYDROCHLORIDE 5 MG: 5 TABLET ORAL at 00:32

## 2019-02-14 RX ADMIN — POTASSIUM CHLORIDE 40 MEQ: 1500 TABLET, EXTENDED RELEASE ORAL at 08:25

## 2019-02-14 RX ADMIN — FOLIC ACID TAB 400 MCG 400 MCG: 400 TAB at 08:26

## 2019-02-14 RX ADMIN — HEPARIN SODIUM 5000 UNITS: 5000 INJECTION, SOLUTION INTRAVENOUS; SUBCUTANEOUS at 08:31

## 2019-02-14 RX ADMIN — SILDENAFIL 20 MG: 20 TABLET ORAL at 12:12

## 2019-02-14 RX ADMIN — ASCORBIC ACID TAB 250 MG 500 MG: 250 TAB at 08:26

## 2019-02-14 RX ADMIN — TORSEMIDE 40 MG: 20 TABLET ORAL at 08:24

## 2019-02-14 RX ADMIN — SILDENAFIL 20 MG: 20 TABLET ORAL at 05:45

## 2019-02-14 RX ADMIN — HYDROXYCHLOROQUINE SULFATE 400 MG: 200 TABLET, FILM COATED ORAL at 08:25

## 2019-02-14 RX ADMIN — TORSEMIDE 40 MG: 20 TABLET ORAL at 17:29

## 2019-02-14 RX ADMIN — POTASSIUM CHLORIDE 40 MEQ: 1500 TABLET, EXTENDED RELEASE ORAL at 17:29

## 2019-02-14 NOTE — PROGRESS NOTES
Orthopedics   [facility] 62 y o  female MRN: [id number]  Unit/Bed#: -01      Subjective:  Patient seen and evaluated  Notes overall improvement with regards to her right knee pain  States she slept very well last night  No numbness or tingling    No fevers or chills    Labs:  0   Lab Value Date/Time    HCT 30 4 (L) 02/13/2019 0543    HCT 38 6 01/25/2019 1300    HCT 37 0 11/20/2018 1706    HGB 9 9 (L) 02/13/2019 0543    HGB 12 8 01/25/2019 1300    HGB 12 3 11/20/2018 1706    INR 1 02 01/25/2019 1300    WBC 6 50 02/13/2019 0543    WBC 4 44 01/25/2019 1300    WBC 7 72 11/20/2018 1706    CRP <3 0 01/25/2019 1300       Meds:    Current Facility-Administered Medications:     acetaminophen (TYLENOL) tablet 650 mg, 650 mg, Oral, Q4H PRN, Elidia Myers DO, 650 mg at 02/13/19 2307    aluminum-magnesium hydroxide-simethicone (MYLANTA) 200-200-20 mg/5 mL oral suspension 30 mL, 30 mL, Oral, Q6H PRN, Elidia Myers DO    ascorbic acid (VITAMIN C) tablet 500 mg, 500 mg, Oral, Daily, Elidia Myers DO, 500 mg at 02/13/19 0759    bupivacaine liposomal (EXPAREL) 1 3 % 20 mL, bupivacaine (MARCAINE) 50 mL in sodium chloride 0 9 % 50 mL OR irrigation, , Irrigation, Once, Elidia Myers DO    cholecalciferol (VITAMIN D3) tablet 2,000 Units, 2,000 Units, Oral, Early Morning, Elidia Myers DO, 2,000 Units at 02/14/19 0545    docusate sodium (COLACE) capsule 100 mg, 100 mg, Oral, BID, Elidia Myers DO, 100 mg at 90/75/73 5867    folic acid (FOLVITE) tablet 400 mcg, 400 mcg, Oral, Daily, Elidia Myers DO, 400 mcg at 02/13/19 0758    heparin (porcine) subcutaneous injection 5,000 Units, 5,000 Units, Subcutaneous, Q12H [address], Elidia Myers DO, 5,000 Units at 02/13/19 2138    HYDROmorphone (DILAUDID) injection 0 5 mg, 0 5 mg, Intravenous, Q3H PRN, Elidia Myers DO, 0 5 mg at 02/14/19 0217    hydroxychloroquine (PLAQUENIL) tablet 400 mg, 400 mg, Oral, Daily With Breakfast, Elidia Myers DO, 400 mg at 02/13/19 3424    lactated ringers bolus 1,000 mL, 1,000 mL, Intravenous, Once, Ashley Ulisses, DO, Stopped at 02/12/19 1903    Macitentan (OPSUMIT) tablet 10 mg, 10 mg, Oral, Daily, Ashley Ulisses, DO, 10 mg at 02/13/19 0758    ondansetron (ZOFRAN) injection 4 mg, 4 mg, Intravenous, Q6H PRN, Marin Sharp MD, 4 mg at 02/13/19 0911    oxyCODONE (ROXICODONE) immediate release tablet 10 mg, 10 mg, Oral, Q4H PRN, Ashley Ulisses, DO, 10 mg at 02/13/19 2149    oxyCODONE (ROXICODONE) IR tablet 5 mg, 5 mg, Oral, Q4H PRN, Ashley Ulisses, DO, 5 mg at 02/14/19 0032    potassium chloride (K-DUR,KLOR-CON) CR tablet 40 mEq, 40 mEq, Oral, BID, Ashley Ulisses, DO, 40 mEq at 02/13/19 1813    sildenafil (REVATIO) tablet 20 mg, 20 mg, Oral, TID, Ashley Ulisses, DO, 20 mg at 02/14/19 0545    sodium chloride 0 9 % infusion, 40 mL/hr, Intravenous, Continuous, Mikey Conklin MD, Last Rate: 40 mL/hr at 02/13/19 2138, 40 mL/hr at 02/13/19 2138    torsemide (DEMADEX) tablet 40 mg, 40 mg, Oral, BID (diuretic), Jerome Torrez PA-C, 40 mg at 02/13/19 1511    traMADol (ULTRAM) tablet 50 mg, 50 mg, Oral, HS PRN, Ashley Ulisses, DO      Physical exam:  Vitals:    02/14/19 0104   BP:    Pulse:    Resp:    Temp: 99 7 °F (37 6 °C)   SpO2:       Right knee:  · Incision C/D/I  No erythema or drainage  Dressing changed  · Motor and sensation intact right lower extremity  · Palpable DP pulse     _*_*_*_*_*_*_*_*_*_*_*_*_*_*_*_*_*_*_*_*_*_*_*_*_*_*_*_*_*_*_*_*_*_*_*_*_*_*_*_*_*    Assessment: 62 y  o female POD 2 s/p R TKA    Plan:  · Pain Control  · WBAT RLE  · PT/OT  · DVT prophylaxis  · Slight increase in temperature likely secondary to atelectasis  Encourage IS use  · Continue to monitor clinically for signs of ABLA  · D/C planning   Potential D/C home today if cleared by PT    Neftali Calderón PA-C

## 2019-02-14 NOTE — SOCIAL WORK
CM spoke with [address] Gl[address] Paula  Patient copay cost is $15 00 and $1 50  [facility] will contact patient and deliver to the bedside if patient is agreeable  Nursing updated with plan of care  Patient's  will transport at time of discharge  Patient's  will be here around 1600 due to work schedule

## 2019-02-14 NOTE — PROGRESS NOTES
Progress Note - Nephrology   Lamine Arana 62 y o  female MRN: [id number]  Unit/Bed#: -01 Encounter: [id number]    Assessment:  1  Stage 3 chronic kidney disease:  Baseline creatinine had been 1 2-1 5, her creatinine is 1 04 better than her prior baseline  In 2017 is notice her creatinine is 0 9  Urinalysis in January 2019 was noted to be bland  Diagnostic considerations for chronic kidney disease include long-term NSAID use, hypertensive nephrosclerosis or hemodynamics given pulmonary hypertension with history of mixed connective tissue disease  Patient was given 1 dose of diuretic last night discontinue IV fluids    2  Pulmonary hypertension with history of mixed connective tissue disease:  Patient follows with Rheumatology terms Prasad  Continue with torsemide at home dose follow hemodynamics and blood pressure  3  Hypotension:  Resolved  Plan:  Continue with oral diuretics and  discontinue IV fluids      Subjective:   Patient seen in follow-up for acute renal failure on chronic kidney disease  She feels okay mild soreness of her knee  She states that she has for the torsemide to be given last night to prevent fluid buildup given her history of hypertension  Her blood pressure over the past 24 hours has been anywhere from 960-939 systolic  Objective:     Vitals: Blood pressure 109/50, pulse (!) 115, temperature 99 6 °F (37 6 °C), temperature source Oral, resp  rate 18, height 5' 4" (1 626 m), weight 89 8 kg (198 lb), SpO2 94 %, not currently breastfeeding  ,Body mass index is 33 99 kg/m²      Weight (last 2 days)     Date/Time   Weight    02/12/19 0654   89 8 (198)                Intake/Output Summary (Last 24 hours) at 2/14/2019 0821  Last data filed at 2/14/2019 0044  Gross per 24 hour   Intake 3478 33 ml   Output 1150 ml   Net 2328 33 ml            Physical Exam: General: patient is in NAD  Skin:  No new rash  Eyes:  No scleral icterus  Neck:  Supple no adenopathy  Chest:  Coarse breath sounds  CVS:  S1-S2  Abdomen:  Positive bowel sounds  Extremities: There is leg edema right greater than left  Neuro:  Nonfocal  Psych:  Patient answers questions appropriate;y              Medications Prior to Admission   Medication    ascorbic acid (VITAMIN C) 500 mg tablet    cholecalciferol (VITAMIN D3) 1,000 units tablet    cyanocobalamin 8,093 mcg/mL    folic acid (FOLVITE) 407 mcg tablet    HYDROcodone-acetaminophen (NORCO) 5-325 mg per tablet    hydroxychloroquine (PLAQUENIL) 200 mg tablet    Macitentan (OPSUMIT) 10 MG tablet    potassium chloride (K-DUR,KLOR-CON) 20 mEq tablet    sildenafil (REVATIO) 20 mg tablet    torsemide (DEMADEX) 20 mg tablet    traMADol (ULTRAM) 50 mg tablet    Heparin Sodium, Porcine, (HEPARIN, PORCINE,) 5,000 units/mL    Misc   Devices (COMMODE BEDSIDE) MISC    ondansetron (ZOFRAN) 4 mg tablet       Current Facility-Administered Medications   Medication Dose Route Frequency    acetaminophen (TYLENOL) tablet 650 mg  650 mg Oral Q4H PRN    aluminum-magnesium hydroxide-simethicone (MYLANTA) 200-200-20 mg/5 mL oral suspension 30 mL  30 mL Oral Q6H PRN    ascorbic acid (VITAMIN C) tablet 500 mg  500 mg Oral Daily    bupivacaine liposomal (EXPAREL) 1 3 % 20 mL, bupivacaine (MARCAINE) 50 mL in sodium chloride 0 9 % 50 mL OR irrigation   Irrigation Once    cholecalciferol (VITAMIN D3) tablet 2,000 Units  2,000 Units Oral Early Morning    docusate sodium (COLACE) capsule 100 mg  100 mg Oral BID    folic acid (FOLVITE) tablet 400 mcg  400 mcg Oral Daily    heparin (porcine) subcutaneous injection 5,000 Units  5,000 Units Subcutaneous Q12H [address]    HYDROmorphone (DILAUDID) injection 0 5 mg  0 5 mg Intravenous Q3H PRN    hydroxychloroquine (PLAQUENIL) tablet 400 mg  400 mg Oral Daily With Breakfast    lactated ringers bolus 1,000 mL  1,000 mL Intravenous Once    Macitentan (OPSUMIT) tablet 10 mg  10 mg Oral Daily    ondansetron (ZOFRAN) injection 4 mg  4 mg Intravenous Q6H PRN    oxyCODONE (ROXICODONE) immediate release tablet 10 mg  10 mg Oral Q4H PRN    oxyCODONE (ROXICODONE) IR tablet 5 mg  5 mg Oral Q4H PRN    potassium chloride (K-DUR,KLOR-CON) CR tablet 40 mEq  40 mEq Oral BID    sildenafil (REVATIO) tablet 20 mg  20 mg Oral TID    sodium chloride 0 9 % infusion  40 mL/hr Intravenous Continuous    torsemide (DEMADEX) tablet 40 mg  40 mg Oral BID (diuretic)    traMADol (ULTRAM) tablet 50 mg  50 mg Oral HS PRN        Lab, Imaging and other studies: I have personally reviewed pertinent labs    CBC:   Lab Results   Component Value Date    WBC 6 50 02/13/2019    RBC 3 27 (L) 02/13/2019     CMP:   Lab Results   Component Value Date     02/14/2019    CO2 24 02/14/2019    BUN 12 02/14/2019    CREATININE 1 04 02/14/2019    CALCIUM 8 4 02/14/2019    AST 18 02/12/2019    ALT 27 02/12/2019    ALKPHOS 95 02/12/2019    EGFR 60 02/14/2019     Phosphorus: No results found for: PHOS  Magnesium: No results found for: MG  Urinalysis:   Lab Results   Component Value Date    COLORU Yellow 01/25/2019    CLARITYU Clear 01/25/2019    SPECGRAV 1 020 01/25/2019    PHUR 6 0 01/25/2019    LEUKOCYTESUR Negative 01/25/2019    NITRITE Negative 01/25/2019    GLUCOSEU Negative 01/25/2019    KETONESU Negative 01/25/2019    BILIRUBINUR Negative 01/25/2019    BLOODU Negative 01/25/2019     BMP:   Lab Results   Component Value Date    SODIUM 136 02/14/2019    CO2 24 02/14/2019    BUN 12 02/14/2019    CREATININE 1 04 02/14/2019    CALCIUM 8 4 02/14/2019

## 2019-02-14 NOTE — PLAN OF CARE
Problem: PAIN - ADULT  Goal: Verbalizes/displays adequate comfort level or baseline comfort level  Description  Interventions:  - Encourage patient to monitor pain and request assistance  - Assess pain using appropriate pain scale  - Administer analgesics based on type and severity of pain and evaluate response  - Implement non-pharmacological measures as appropriate and evaluate response  - Consider cultural and social influences on pain and pain management  - Notify physician/advanced practitioner if interventions unsuccessful or patient reports new pain  Outcome: Progressing     Problem: SKIN/TISSUE INTEGRITY - ADULT  Goal: Incision(s), wounds(s) or drain site(s) healing without S/S of infection  Description  INTERVENTIONS  - Assess and document risk factors for skin impairment   - Assess and document dressing, incision, wound bed, drain sites and surrounding tissue  - Initiate Nutrition services consult and/or wound management as needed  Outcome: Progressing     Problem: MUSCULOSKELETAL - ADULT  Goal: Maintain or return mobility to safest level of function  Description  INTERVENTIONS:  - Assess patient's ability to carry out ADLs; assess patient's baseline for ADL function and identify physical deficits which impact ability to perform ADLs (bathing, care of mouth/teeth, toileting, grooming, dressing, etc )  - Assess/evaluate cause of self-care deficits   - Assess range of motion  - Assess patient's mobility; develop plan if impaired  - Assess patient's need for assistive devices and provide as appropriate  - Encourage maximum independence but intervene and supervise when necessary  - Involve family in performance of ADLs  - Assess for home care needs following discharge   - Request OT consult to assist with ADL evaluation and planning for discharge  - Provide patient education as appropriate  Outcome: Progressing     Problem: Potential for Falls  Goal: Patient will remain free of falls  Description  INTERVENTIONS:  - Assess patient frequently for physical needs  -  Identify cognitive and physical deficits and behaviors that affect risk of falls    -  [place] fall precautions as indicated by assessment   - Educate patient/family on patient safety including physical limitations  - Instruct patient to call for assistance with activity based on assessment  - Modify environment to reduce risk of injury  - Consider OT/PT consult to assist with strengthening/mobility  Outcome: Progressing

## 2019-02-14 NOTE — OCCUPATIONAL THERAPY NOTE
OccupationalTherapy Progress Note     Pt seen for split session this AM [id number]; [id number]  Patient Name: Robert Degroot Date: 2/14/2019  Problem List  Patient Active Problem List   Diagnosis    Encounter for gynecological examination (general) (routine) without abnormal findings    B12 deficiency    Chronic cough    Chronic rhinitis    Diffuse connective tissue disease ([place] [address] )    Dyspnea    Edema    Essential hypertension    Hot flashes due to menopause    Long-term use of hydroxychloroquine    Systemic lupus erythematosus ([facility] )    Lupus anticoagulant positive    Tachycardia    SOB (shortness of breath) on exertion    SOB (shortness of breath)    Sinus tachycardia    Secondary pulmonary hypertension    Pulmonary hypertension (HCC)    Post-nasal drip    Pes anserine bursitis    Positive HELENE (antinuclear antibody)    Other chronic pulmonary heart diseases    Osteoporosis    Night sweats    Patellofemoral disorder of right knee    Pes anserinus bursitis of right knee    Arthritis of right knee    Other tear of medial meniscus, current injury, right knee, initial encounter    Tear of medial meniscus of right knee, current    Chronic pain of right knee    Elevated serum creatinine    Gout    Hyperuricemia    Trochanteric bursitis of both hips    Undifferentiated connective tissue disease ([place] [address] )    Vitamin D deficiency    BV (bacterial vaginosis)    Chronic kidney disease, stage 3 ([place] [address] )         02/14/19 0950   Restrictions/Precautions   Weight Bearing Precautions Per Order Yes   RLE Weight Bearing Per Order WBAT   Other Precautions Fall Risk;Pain;WBS   General   Response to Previous Treatment Patient with no complaints from previous session   Pain Assessment   Pain Assessment 0-10   Pain Score 6   Pain Type Acute pain;Surgical pain   Pain Location Knee;Leg   Pain Orientation Right   Effect of Pain on Daily Activities limits standing tolerance and I w/ ADL performance Patient's Stated Pain Goal No pain   Hospital Pain Intervention(s) Cold applied;Repositioned; Ambulation/increased activity; Emotional support   Response to Interventions tolerated, resting comforably in bed   ADL   Where Assessed Standing at sink  (vs sitting at sink)   Grooming Assistance 6  Modified Independent   Grooming Deficit Setup; Increased time to complete;Standing with assistive device   Grooming Comments standing at sink to complete oral hygiene after set- up   [address]; Increased time to complete   UB Bathing Comments seated in arm chair to complete bathing; assist to wash upper / mid back   LB Bathing Assistance 5  Supervision/Setup   LB Bathing Deficit Setup; Increased time to complete   LB Bathing Comments sitting at sink w/ mod I after set- up to wash thighs, L knee  S standing w/ RW to wash buttocks   UB Dressing Assistance 6  Modified independent   UB Dressing Deficit Setup   UB Dressing Comments to don bra, tank top, and sweatshirt   LB Dressing Assistance 4  Minimal Assistance   LB Dressing Deficit Thread RLE into pants; Thread RLE into underwear;Pull up over hips   Toileting Assistance  5  Supervision/Setup   Toileting Deficit Setup; Increased time to complete   Bed Mobility   Supine to Sit Unable to assess   Sit to Supine 4  Minimal assistance   Additional items LE management;Assist x 1   Additional Comments Pt returned to bed at end of session w/ needs met, call bell in reach   Transfers   Sit to Stand 5  Supervision   Additional items Increased time required;Armrests   Stand to Sit 5  Supervision   Additional items Armrests; Increased time required   Toilet transfer 5  Supervision   Additional items Assist x 1;Standard toilet; Increased time required; Other  (R grab bars)   Additional items Assist x 1;Verbal cues  (tub transfer)   Mechanical lift 4  Minimal assistance  (min A to manage R LE to tub bench)   Functional Mobility   Functional Mobility 5  Supervision   Additional Comments household distances   Additional items Rolling walker   Toilet Transfers   Toilet Transfer From Rolling walker   Toilet Transfer Type To and from   Toilet Transfer to [employer] Transfers Comments increased time   Tub Transfers   Tub Transfer From Rolling walker   Tub Transfer Type To and From   Tub Transfer to Transfer tub bench   Tub Transfer Technique Ambulating   Tub Transfers Minimal assistance;Verbal cues   Tub Transfers Comments educated pt on tub transfer to tub bench   Cognition   Overall Cognitive Status [facility]   Arousal/Participation Alert; Cooperative   Attention Within functional limits   Orientation Level Oriented X4   Memory Within functional limits   Following Commands Follows multistep commands without difficulty   Comments Identified pt by full name and wristband  Pt able to participate in conversation appropriately   Activity Tolerance   Activity Tolerance Patient tolerated treatment well;Patient limited by pain   Medical Staff Made Aware spoke to Shine GARCIA and Pranav TOMPKINS   Assessment   Assessment Pt seen for OT tx session day 1 this AM focusing on challenging activity tolerance and pt education ot tub transfer to tub bench  Portion of session co - tx, dovetail w/ PTA Stepmayriosuze due to care coordination and pt education  Pt demonstrated increased activity tolerance this AM and required less physical assistance  Pt engaged in bathing, dressing, toileting  Educated pt on functional tub transfer to tub bench w/ min A to manage R LE  Continue to recommend that pt can return home to [facility] w/ OPPT when medically stable for discharge from acute care  Will continue to follow   Plan   Treatment Interventions ADL retraining;Functional transfer training;Patient/family training;Equipment evaluation/education; Activityengagement   Goal Expiration Date 02/20/19   Treatment Day 1   OT Frequency 3-5x/wk   Recommendation   OT Discharge Recommendation Home with family support   Equipment Recommended Other (comment)  (tub bench, commode, AE)   OT - OK to Discharge   (when medically stable)   Barthel Index   Feeding 10   Bathing 0   Grooming Score 5   Dressing Score 5   Bladder Score 10   Bowels Score 10   Toilet Use Score 10   Transfers (Bed/Chair) Score 10   Mobility (Level Surface) Score 10   Stairs Score 5   Barthel Index Score 75   Modified [place] Scale   Modified [place] Scale 3   Judie Martinez, OTR/L

## 2019-02-14 NOTE — PLAN OF CARE
Problem: PAIN - ADULT  Goal: Verbalizes/displays adequate comfort level or baseline comfort level  Description  Interventions:  - Encourage patient to monitor pain and request assistance  - Assess pain using appropriate pain scale  - Administer analgesics based on type and severity of pain and evaluate response  - Implement non-pharmacological measures as appropriate and evaluate response  - Consider cultural and social influences on pain and pain management  - Notify physician/advanced practitioner if interventions unsuccessful or patient reports new pain  Outcome: Progressing     Problem: SKIN/TISSUE INTEGRITY - ADULT  Goal: Incision(s), wounds(s) or drain site(s) healing without S/S of infection  Description  INTERVENTIONS  - Assess and document risk factors for skin impairment   - Assess and document dressing, incision, wound bed, drain sites and surrounding tissue  - Initiate Nutrition services consult and/or wound management as needed  Outcome: Progressing     Problem: MUSCULOSKELETAL - ADULT  Goal: Maintain or return mobility to safest level of function  Description  INTERVENTIONS:  - Assess patient's ability to carry out ADLs; assess patient's baseline for ADL function and identify physical deficits which impact ability to perform ADLs (bathing, care of mouth/teeth, toileting, grooming, dressing, etc )  - Assess/evaluate cause of self-care deficits   - Assess range of motion  - Assess patient's mobility; develop plan if impaired  - Assess patient's need for assistive devices and provide as appropriate  - Encourage maximum independence but intervene and supervise when necessary  - Involve family in performance of ADLs  - Assess for home care needs following discharge   - Request OT consult to assist with ADL evaluation and planning for discharge  - Provide patient education as appropriate  Outcome: Progressing     Problem: Potential for Falls  Goal: Patient will remain free of falls  Description  INTERVENTIONS:  - Assess patient frequently for physical needs  -  Identify cognitive and physical deficits and behaviors that affect risk of falls    -  [place] fall precautions as indicated by assessment   - Educate patient/family on patient safety including physical limitations  - Instruct patient to call for assistance with activity based on assessment  - Modify environment to reduce risk of injury  - Consider OT/PT consult to assist with strengthening/mobility  Outcome: Progressing

## 2019-02-14 NOTE — PHYSICAL THERAPY NOTE
PHYSICAL THERAPY NOTE    Patient Name: Pushpa Stone  ZVREI'G Date: 19 0320   Pain Assessment   Pain Assessment 0-10   Pain Score 6   Pain Type Acute pain   Pain Location Knee   Pain Orientation Right   Restrictions/Precautions   Weight Bearing Precautions Per Order Yes   RLE Weight Bearing Per Order WBAT   Other Precautions Pain; Fall Risk;WBS   General   Family/Caregiver Present No   Subjective   Subjective Patient seated in bathroom with LOUANN Dimas present and is agreeable to therapy session  Patient identifers obtained from name &   Bed Mobility   Supine to Sit Unable to assess   Sit to Supine Unable to assess   Transfers   Sit to Stand 5  Supervision   Additional items Increased time required   Stand to Sit 5  Supervision   Additional items Increased time required;Verbal cues   Ambulation/Elevation   Gait pattern Decreased R stance; Excessively slow; Short stride   Gait Assistance 5  Supervision   Additional items Verbal cues  (for walker advancement)   Assistive Device Rolling walker   Distance 230' x1   Stair Management Assistance 5  Supervision   Additional items Assist x 1;Verbal cues; Increased time required   Stair Management Technique One rail R;Step to pattern; Foreward; With cane   Number of Stairs 5   Balance   Static Sitting Good   Dynamic Sitting Fair +   Static Standing Fair   Dynamic Standing Alexis Boggs 3554 -  (roller walker)   Activity Tolerance   Activity Tolerance Patient limited by fatigue;Patient limited by pain   Medical Staff Made Aware Spoke to Gerda Falk OT   Nurse Made Aware Spoke to Ozzy Yanez RN    Assessment   Prognosis Excellent   Problem List Decreased strength;Decreased range of motion;Decreased endurance; Impaired balance;Decreased mobility; Decreased coordination;Decreased safety awareness;Pain;Orthopedic restrictions;Decreased skin integrity   Assessment Patient seen follwing Santiago Red for care corrdination  Patient remains consistent with supervision for sit<>stand transfers with increased time and good technique  Patient demonstrated ability to ambulate increased gait distance with roller walker and supervision  Patient requires verbal instruction to increase walker advancement with fair carry over  Patient ascend and descend 5 steps with unilateral handrail and SPC  Good recall of technique and cane use, additional stair training not approriate secondary to pain and fatigue  Goals   Patient Goals to go home   STG Expiration Date 02/23/19   Treatment Day 2   Plan   Treatment/Interventions Functional transfer training;LE strengthening/ROM; Elevations; Therapeutic exercise; Endurance training;Patient/family training;Equipment eval/education; Bed mobility;Gait training   Progress Progressing toward goals   PT Frequency Twice a day   Recommendation   Recommendation Outpatient PT   Equipment Recommended Sheela Jackson, PTA

## 2019-02-14 NOTE — PLAN OF CARE
Problem: OCCUPATIONAL THERAPY ADULT  Goal: Performs self-care activities at highest level of function for planned discharge setting  See evaluation for individualized goals  Description  Treatment Interventions: ADL retraining, Functional transfer training, Endurance training, Patient/family training, Equipment evaluation/education, Activityengagement, Compensatory technique education  Equipment Recommended: Other (comment)(pt reports having DME and AE from her )       See flowsheet documentation for full assessment, interventions and recommendations  Outcome: Progressing  Note:   Limitation: Decreased ADL status, Decreased endurance, Decreased self-care trans, Decreased high-level ADLs     Assessment: Pt seen for OT tx session day 1 this AM focusing on challenging activity tolerance and pt education ot tub transfer to tub bench  Portion of session co - tx, dovetail w/ PTA, Stephanioe due to care coordination and pt education  Pt demonstrated increased activity tolerance this AM and required less physical assistance  Pt engaged in bathing, dressing, toileting  Educated pt on functional tub transfer to tub bench w/ min A to manage R LE  Continue to recommend that pt can return home to [facility] w/ OPPT when medically stable for discharge from acute care   Will continue to follow     OT Discharge Recommendation: Home with family support  OT - OK to Discharge: (when medically stable)

## 2019-02-14 NOTE — SOCIAL WORK
CM spoke with patient at the bedside  Patient has a RW and BSC at home  Patient is agreeable to CM sending prescriptions to [address] [place], [place] [address]josy Doshi Paula to check copay costs  Patient's  will transport at time of discharge  CM provided a copy of the outpatient PT list to patient at the bedside  Patient stated she has an outpatient appointment scheduled for PT  All questions answered at the bedside  CM will f/u with patient regarding copay costs of Lovenox and pain script  Nursing updated with plan of care

## 2019-02-14 NOTE — PLAN OF CARE
Problem: DISCHARGE PLANNING - CARE MANAGEMENT  Goal: Discharge to post-acute care or home with appropriate resources  Description  INTERVENTIONS:  - Conduct assessment to determine patient/family and health care team treatment goals, and need for post-acute services based on payer coverage, community resources, and patient preferences, and barriers to discharge  - Address psychosocial, clinical, and financial barriers to discharge as identified in assessment in conjunction with the patient/family and health care team  - Arrange appropriate level of post-acute services according to patient?s   needs and preference and payer coverage in collaboration with the physician and health care team  - Communicate with and update the patient/family, physician, and health care team regarding progress on the discharge plan  - Arrange appropriate transportation to post-acute venues  Outcome: Progressing     CM spoke with patient at the bedside  Patient has a RW and BSC at home  Patient is agreeable to CM sending prescriptions to [address] [place] [address] to check copay costs  Patient's  will transport at time of discharge  CM provided a copy of the outpatient PT list to patient at the bedside  Patient stated she has an outpatient appointment scheduled for PT  All questions answered at the bedside  CM will f/u with patient regarding copay costs of Lovenox and pain script  Nursing updated with plan of care

## 2019-02-15 ENCOUNTER — TRANSITIONAL CARE MANAGEMENT (OUTPATIENT)
Dept: FAMILY MEDICINE CLINIC | Facility: OTHER | Age: 54
End: 2019-02-15

## 2019-02-15 ENCOUNTER — TELEPHONE (OUTPATIENT)
Dept: OBGYN CLINIC | Facility: HOSPITAL | Age: 54
End: 2019-02-15

## 2019-02-15 ENCOUNTER — OFFICE VISIT (OUTPATIENT)
Dept: PHYSICAL THERAPY | Facility: CLINIC | Age: 54
End: 2019-02-15
Payer: COMMERCIAL

## 2019-02-15 DIAGNOSIS — D64.9 ANEMIA, UNSPECIFIED TYPE: Primary | ICD-10-CM

## 2019-02-15 DIAGNOSIS — M25.561 RECURRENT PAIN OF RIGHT KNEE: Primary | ICD-10-CM

## 2019-02-15 PROCEDURE — 97164 PT RE-EVAL EST PLAN CARE: CPT | Performed by: PHYSICAL THERAPIST

## 2019-02-15 PROCEDURE — 97110 THERAPEUTIC EXERCISES: CPT | Performed by: PHYSICAL THERAPIST

## 2019-02-15 RX ORDER — FERROUS SULFATE TAB EC 324 MG (65 MG FE EQUIVALENT) 324 (65 FE) MG
324 TABLET DELAYED RESPONSE ORAL
Qty: 30 TABLET | Refills: 0 | Status: SHIPPED | OUTPATIENT
Start: 2019-02-15 | End: 2019-04-04 | Stop reason: ALTCHOICE

## 2019-02-15 NOTE — DISCHARGE SUMMARY
ORTHOPEDICS DISCHARGE SUMMARY  Jessy Nunn 62 y o  female MRN: [id number]  Unit/Bed#:     Attending Physician: Dr Nash Brock    Admitting diagnosis: Arthritis of right knee [M17 11]    Discharge diagnosis: Arthritis of right knee [M17 11]    Date of admission: 2/12/2019    Date of discharge: 2/14/2019    Procedure:  Right total knee arthroplasty    Hospital Course:  42-year-old female who tried and failed all conservative measures with regards to her right knee DJD  She elected to proceed with total knee arthroplasty  She underwent a right total knee arthroplasty by Dr Nash Brock on 2/12/19  After the procedure was completed she was admitted to the orthopedic service  She was transferred to the floor in stable condition  On the floor she received adequate pain control, PT/OT, and DVT prophylaxis  The remainder of her hospital course was unremarkable  She remained hemodynamically stable  She was deemed fit for discharge by physical therapy and discharged to home in stable condition on 2/14/19  Discharge Instructions: The patient was discharged weight bearing as tolerated to the right lower extremity  Lovenox will be continued for 28 days  Continue PT/OT  Take pain medications as instructed  Discharge Medications: For the complete list of discharge medications, please refer to the patient's medication reconciliation

## 2019-02-15 NOTE — TELEPHONE ENCOUNTER
Pt contacted today to complete postoperative follow up call assessment  Pt reports doing "okay" at this time reports being tired, but denies weakness  Pt  reports 5 out of 10 pain scale at this time, taking oxycodone for pain, encouraged rotation of tylenol into pain regimen  Pt educated on max dose of tylenol is 3000mg a day  Pt verbalized understanding  Pt reprots extremity is "swollen" reports elevating, and was encouraged to ice  Pt reports dressing is dry, denies drainage  Pt reports taking daily lovenox injections, denies any bleeding  Pt denies any "issues or concerns" with the lovenox injections  Pt reports no bowel movement yet, reports passing gas at this time, pt was recommended to start her colace today as prescribed 2 times daily  Pt denies nausea, vomiting, or abdominal pain at this time  Pt denies Chest pain, dizziness, fever, and/or calf pain  Pt reports "sob on exhertion" when getting up to ambulate that started yesterday  Upon further assessment pt reports she is "pale" but then states "I am always pale "  Pt denies any addtl symptoms, again denied chest pain, calf pain or palpitations  Pt reports concern over her Mychart reports "low hemoglobin" result before DC  Surgeon notified about the pt's concerns and symptoms, and Mychart concerns,at this time, Dr  encouraged pt to increase fluids, continue iron as prescribed and refill called into pharmacy, and pt has f u apt on Monday with surgeon  Pt made aware of these recommendations from the surgeon, and aware to contact myself or the call center with any changes  pt encouraged to call me with questions, concerns or issues

## 2019-02-15 NOTE — PROGRESS NOTES
PT Evaluation     Today's date: 2/15/2019  Patient name: Charmayne Borders  : 1965  MRN: [id number]  Referring provider: Jeannette Ortiz DO  Dx:   Encounter Diagnosis     ICD-10-CM    1  Recurrent pain of right knee M25 561                   Assessment  Assessment details: Charmayne Borders is a pleasant 62 y o  female who presents post-operatively for R TKR  She has no signs of infection nor signs of DVT  No further referral appears necessary at this time based upon examination results  Primary movement impairment diagnosis of knee flexion hypomobility  She would benefit from use of NMES unit and potentially home unit to improve return of quadriceps contraction Positive prognostic indicators include positive attitude toward recovery  Negative prognostic indicators include: poor/ trace quad contraction,  predisposition for blood clots, Osteopenia, Pulmonary HTN, connective tissue disorder,     Impairments include:  1) Poor quad set  2)knee flexion hypomobility  3) Hip ext strength deficit  4) Decreased endurance  5) Decreased WB tolerance    Etiologic factors include none recalled by the patient  Impairments: abnormal coordination, abnormal muscle firing, abnormal muscle tone, abnormal or restricted ROM, abnormal movement, activity intolerance, difficulty understanding, impaired physical strength, lacks appropriate home exercise program, pain with function, poor posture  and poor body mechanics    Symptom irritability: moderateUnderstanding of Dx/Px/POC: good   Prognosis: good    Goals  STGs  1  Decrease pain by 20% in 2-4 weeks  2  Improve knee ROM by 10 degrees in 2-4 weeks  3  Improve hip strength by 1/3 grade in 2-4 weeks  4  Getting in and out of tub independently in 3 weeks  5  Getting in and out of car in 2-3 weeks  LTG  1  Decrease pain by 80% in 10 weeks  2  Perform stairs s pain and drive independently 10 weeks  3  Walk well enough to vacation to [facility] in     4   SLB >20 sec in 3 weeks   5  Walk grocery store distance in 2-3 weeks  Plan  Plan details: Prognosis above is given PT services 2x/week tapering to 1x/week over the next 2 months and home program adherence  Patient would benefit from: skilled physical therapy  Planned modality interventions: thermotherapy: hydrocollator packs  Planned therapy interventions: activity modification, joint mobilization, manual therapy, motor coordination training, neuromuscular re-education, patient education, self care, therapeutic activities, therapeutic exercise, graded activity, home exercise program and behavior modification  Frequency: 2x week  Duration in weeks: 8  Treatment plan discussed with: patient        Subjective Evaluation    History of Present Illness  Date of surgery: 2019  Mechanism of injury: Patient presents with R knee pain from insidious onset last 2018  She had R TKR on 19  She was d/c from the hospital and has been doing quite well  She sees MD on Monday  She has not been sleeping too well  She states she was walking up stairs with some supervision  She feels like she has gotten very good care while here      Neuro signs: Her knee gives out few times/ week- has not felt this  Red flags: none  Occupation: Medical billing- sitting mostly  Past Medical History: Menisectomy in 10/2018  Pain  Current pain ratin  At best pain ratin  At worst pain ratin  Location: medial knee and under kneecap  Quality: dull ache and throbbing  Aggravating factors: stair climbing    Social Support  Steps to enter house: yes (single rail)  5  Stairs in house: yes (single rail)   18  Lives with: spouse and adult children (5 cats and 1 dog)      Diagnostic Tests  MRI studies: abnormal  Patient Goals  Patient goals for therapy: decreased pain, increased motion and increased strength          Objective     Active Range of Motion   Left Knee   Flexion: 130 degrees   Extension: -4 degrees     Right Knee   Flexion: 60 degrees Extension: 12 degrees     Additional Active Range of Motion Details    Observation: BLEs swelling  Homans: -  Integumentary: no weeping or drainage, no odor, wound edges closed  Quad set: poor and trace contraction  TUG: RW 30 36 sec  Balance: R SLB unable to do  Gait: Decr WB on RLE knee flexion and trunk flexion in gait  Functional squat: 95 deg painful (NT)  - SLR: Nikhil Johann: - Faddir: -  Stairs: Step to pattern, 6inch step using rail and cane     (NT)  Hip abd: R 4/5 L 4+5   Hip ext: 4-/5 B    Passive Range of Motion   Left Knee   Flexion: 140 degrees   Extension: -7 degrees     Right Knee   Flexion: 73 degrees   Extension: 12 degrees                        Subjective: See IE      Objective: See treatment diary below      Assessment: Tolerated session with fatigue and poor quad contraction  Plan: Patient's main goal is to walk normally   STG to get into car s pain and in and out of bathtub    Precautions: Pulmonary HTN, connective tissue disorder, SOB   Dx: R TKR 2/12/19    Daily Treatment Diary     Manuals 2/6 2/15        knee PROM   3'                                                 Exercise Diary          bike          LAQ          SLR c strap          TKE GTB          LP #75          NBOS          3-way          Steps          Minisquats          Quad set  20x        Heel slides  3'        S/l hip abd          SAQ  1x10        Gastroc ST strap  5x :20                                                                              Modalities          NMES to quad and CP  nv

## 2019-02-18 ENCOUNTER — APPOINTMENT (OUTPATIENT)
Dept: RADIOLOGY | Facility: CLINIC | Age: 54
End: 2019-02-18
Payer: COMMERCIAL

## 2019-02-18 ENCOUNTER — OFFICE VISIT (OUTPATIENT)
Dept: OBGYN CLINIC | Facility: CLINIC | Age: 54
End: 2019-02-18

## 2019-02-18 DIAGNOSIS — M25.561 RIGHT KNEE PAIN, UNSPECIFIED CHRONICITY: Primary | ICD-10-CM

## 2019-02-18 DIAGNOSIS — Z96.651 STATUS POST TOTAL RIGHT KNEE REPLACEMENT: ICD-10-CM

## 2019-02-18 DIAGNOSIS — M25.561 RIGHT KNEE PAIN, UNSPECIFIED CHRONICITY: ICD-10-CM

## 2019-02-18 PROCEDURE — 99024 POSTOP FOLLOW-UP VISIT: CPT | Performed by: ORTHOPAEDIC SURGERY

## 2019-02-18 PROCEDURE — 73562 X-RAY EXAM OF KNEE 3: CPT

## 2019-02-18 NOTE — PROGRESS NOTES
Chart reviewed (labs and d/c summary) -- no elevated creatinine level noted  Without more information, I'm unable to make a recommendation at this time  Keep follow up appts with Ortho as surgical course appears to have been rather unremarkable  Thanks!   Clarissa Never, DO

## 2019-02-18 NOTE — PROGRESS NOTES
Assessment:   S/p right total knee arthroplasty on 2/12/19    Plan:   WBAT RLE  PT/OT  Pain control as needed  Next week will likely plan to start peeling off some of the Prineo dressings    Follow Up:  1  week(s)    To Do Next Visit:  No XR needed next week      CHIEF COMPLAINT:  Chief Complaint   Patient presents with    Right Knee - Post-op         SUBJECTIVE:  Rob Palmer is a 62y o  year old female who presents for follow up after right TKA  Today patient has improved pain as compared to first few post op days  She reports that she is getting around ok with use of walker and is doing well with PT  She reports having warmth of the knee and low grade fever Saturday night but called and spoke With Dr Mumtaz Enriquez who was not concerned for infection  She has noted no fevers or chills         PHYSICAL EXAMINATION:  General: well developed and well nourished, alert, oriented times 3 and appears comfortable  Psychiatric: Normal    MUSCULOSKELETAL EXAMINATION:  Incision: Clean dry intact, prineo dressing intact, mild rubor and warmth anterolateral knee c/w likely hematoma, there is swelling as expected as well  Range of Motion: Actively from 0-90 degrees  Neurovascular status: Neuro intact, good cap refill  Activity Restrictions: No restrictions         STUDIES REVIEWED:  Images were reviewd in PACS: XR right knee shows intact stable implants s/p TKA, implants are in good alignment no evidence of faiulre      PROCEDURES PERFORMED:  Procedures  No Procedures performed today

## 2019-02-19 ENCOUNTER — OFFICE VISIT (OUTPATIENT)
Dept: PHYSICAL THERAPY | Facility: CLINIC | Age: 54
End: 2019-02-19
Payer: COMMERCIAL

## 2019-02-19 DIAGNOSIS — M25.561 RECURRENT PAIN OF RIGHT KNEE: Primary | ICD-10-CM

## 2019-02-19 PROCEDURE — 97110 THERAPEUTIC EXERCISES: CPT | Performed by: PHYSICAL THERAPIST

## 2019-02-19 PROCEDURE — 97112 NEUROMUSCULAR REEDUCATION: CPT | Performed by: PHYSICAL THERAPIST

## 2019-02-19 PROCEDURE — 97140 MANUAL THERAPY 1/> REGIONS: CPT | Performed by: PHYSICAL THERAPIST

## 2019-02-19 NOTE — UTILIZATION REVIEW
Please use the attached Clinical for auth #[id number]  Send all determinations to our UR Dept Fax of [phone] or my phone of [phone] please  Notification of Inpatient Admission/Inpatient Authorization Request  This is a Notification of Inpatient Admission/Request for Inpatient Authorization for our facility [address] [facility] [address]  Be advised that this patient was admitted to our facility under Inpatient Status  Please contact the Utilization Review Department where the patient is receiving care services for additional admission information  Place of Service Code: 24   Place of Service Name: Inpatient Hospital  Presentation Date & Time: 2/12/2019  6:18 AM  Inpatient Admission Date & Time: 2/12/19 0749  Discharge Date & Time: 2/14/2019  6:00 PM   Discharge Disposition (if discharged): Home/Self Care  Attending Physician: Mercedes Rucker [[id number]]  Admission Orders (From admission, onward)    Ordered        02/12/19 0749  Inpatient Admission  Once     Order ID Start Status   [id number] 02/12/19 0744 Completed                Facility: [facility] [address] Utilization Review Department  Phone: [phone]; Fax [phone]  [email or web address]  ATTENTION: Please call with any questions or concerns to [phone]  and carefully listen to the prompts so that you are directed to the right person  Send all requests for admission clinical reviews, approved or denied determinations and any other requests to fax [phone]   All voicemails are confidential     Initial Clinical Review    Age/Sex: 62 y o  female     Surgery Date: 2/12/2019     Procedure: KNEE TOTAL ARTHROPLASTY AND ASSOCIATED PROCEDURES (Right)    Anesthesia: spinal     Admission Orders: Date/Time/Statement: 2/12/19 @ 4732   Orders Placed This Encounter   Procedures    Inpatient Admission     Standing Status:   Standing     Number of Occurrences:   1     Order Specific Question: Admitting Physician     Answer:   Bobo Franco     Order Specific Question:   Level of Care     Answer:   Med Surg [16]     Order Specific Question:   Estimated length of stay     Answer:   More than 2 Midnights     Order Specific Question:   Certification     Answer:   I certify that inpatient services are medically necessary for this patient for a duration of greater than two midnights  See H&P and MD Progress Notes for additional information about the patient's course of treatment  Comments:   Patient has multiple medical issues including hypercoagulable state lupus CHF     Vital Signs: /60 (BP Location: Right arm)   Pulse 104   Temp 98 7 °F (37 1 °C)   Resp 16   Ht 5' 4" (1 626 m)   Wt 89 8 kg (198 lb)   SpO2 100%   BMI 33 99 kg/m²   Diet:     Mobility: PT/OT    DVT Prophylaxis:   Scds  Heparin 5000 sq q 12h  Pain Control: Dilaudid 0 5 IV - used x 2  Oxycodone 5 mg - used x 3  Pain Medications             traMADol (ULTRAM) 50 mg tablet Take 1 tablet (50 mg total) by mouth daily at bedtime as needed for moderate pain or severe pain    oxyCODONE (ROXICODONE) 5 mg immediate release tablet Take 1 tablet (5 mg total) by mouth every 4 (four) hours as needed for moderate pain for up to 10 daysMax Daily Amount: 30 mg          OTHER ORDERS:  Po medications:  Macitentan  Torsemide  Hydroxychloroquine  Colace  Sildenafil  Ascorbic acid  Folic acid  KCL  Cholecalciferol  IVF @ 40 cc/h  Urinary catheter - dc post day one  2/13 transfuse PRBC  Consult medicine, nephrology  zofran 4 IV - used x 2  Labs 2/13-  hgb 9 9, hct 30 4       Network Utilization Review Department  Phone: [phone]; Fax [phone]  [email or web address]  ATTENTION: Please call with any questions or concerns to [phone]  and carefully listen to the prompts so that you are directed to the right person     Send all requests for admission clinical reviews, approved or denied determinations and any other requests to fax [phone]  All voicemails are confidential     Notification of Discharge  This is a Notification of Discharge from our facility [address]  Please be advised that this patient has been discharge from our facility  Below you will find the admission and discharge date and time including the patients disposition  PRESENTATION DATE: 2/12/2019  6:18 AM  IP ADMISSION DATE: 2/12/19 0749  DISCHARGE DATE: 2/14/2019  6:00 PM  DISPOSITION: [facility] Utilization Review Department  Phone: [phone]; Fax [phone]  [email or web address]  ATTENTION: Please call with any questions or concerns to [phone]  and carefully listen to the prompts so that you are directed to the right person  Send all requests for admission clinical reviews, approved or denied determinations and any other requests to fax [phone]   All voicemails are confidential

## 2019-02-19 NOTE — PROGRESS NOTES
Daily Note     Today's date: 2019  Patient name: Glo Valles  : 1965  MRN: [id number]  Referring provider: Benito Rhodes DO  Dx: No diagnosis found  Subjective: She states she saw MD and he was pleased c progress  She states she had about 90 deg knee flexion  Objective: See treatment diary below      Assessment: Tolerated session with fatigue and poor quad contraction  Tolerated exercises well and had 1/2 full ROM for SAQ  Showed good motivation today  Plan: Patient's main goal is to walk normally  STG to get into car s pain and in and out of bathtub  Steps to get in and then she has steps to bedroom      Precautions: Pulmonary HTN, connective tissue disorder, SOB   Dx: R TKR 19    Daily Treatment Diary     Manuals 2/6 2/15 2/19       knee PROM   3' 10'                                                Exercise Diary          bike   6'       LAQ          SLR c strap   10x       TKE GTB          LP #75   2x10       NBOS          3-way          Steps   1 flight       Minisquats   2x8       Quad set  20x 20x       Heel slides  3' 5'       S/l hip abd          SAQ  1x10 2x10       Gastroc ST strap  5x :20                                                                              Modalities          NMES to quad and CP  nv nv

## 2019-02-20 ENCOUNTER — TELEPHONE (OUTPATIENT)
Dept: FAMILY MEDICINE CLINIC | Facility: OTHER | Age: 54
End: 2019-02-20

## 2019-02-20 NOTE — TELEPHONE ENCOUNTER
Patient was informed of the previous message but she would like a call back regarding some questions she has regarding the creatine she is confused on what everyone is saying because she is getting different information from different physicians     I offered her to come in but due to her surgery its very hard for her to get out   Her number is [phone]  Thank you

## 2019-02-22 ENCOUNTER — OFFICE VISIT (OUTPATIENT)
Dept: PHYSICAL THERAPY | Facility: CLINIC | Age: 54
End: 2019-02-22
Payer: COMMERCIAL

## 2019-02-22 DIAGNOSIS — M17.11 ARTHRITIS OF RIGHT KNEE: ICD-10-CM

## 2019-02-22 DIAGNOSIS — M25.561 RECURRENT PAIN OF RIGHT KNEE: Primary | ICD-10-CM

## 2019-02-22 PROCEDURE — 97112 NEUROMUSCULAR REEDUCATION: CPT | Performed by: PHYSICAL THERAPIST

## 2019-02-22 PROCEDURE — 97110 THERAPEUTIC EXERCISES: CPT | Performed by: PHYSICAL THERAPIST

## 2019-02-22 PROCEDURE — 97140 MANUAL THERAPY 1/> REGIONS: CPT | Performed by: PHYSICAL THERAPIST

## 2019-02-22 RX ORDER — OXYCODONE HYDROCHLORIDE 5 MG/1
5 TABLET ORAL EVERY 4 HOURS PRN
Qty: 20 TABLET | Refills: 0 | Status: SHIPPED | OUTPATIENT
Start: 2019-02-22 | End: 2019-03-04

## 2019-02-22 NOTE — PROGRESS NOTES
Daily Note     Today's date: 2019  Patient name: Chad Hernandez  : 1965  MRN: [id number]  Referring provider: Char Paige DO  Dx:   Encounter Diagnosis     ICD-10-CM    1  Recurrent pain of right knee M25 561        Start Time: 1045  Stop Time: 1135  Total time in clinic (min): 50 minutes    Subjective: Patient notes she is stiff and sore overall today  Objective: See treatment diary below      Assessment: Tolerated treatment well  Patient exhibited good technique with therapeutic exercises and would benefit from continued PT  Plan: Continue per plan of care       Precautions: Pulmonary HTN, connective tissue disorder, SOB   Dx: R TKR 19     Daily Treatment Diary      Manuals 2/6 2/15 2/19  2/22         knee PROM    3' 10'  10'                                                                                 Exercise Diary                 bike     6'  6'         LAQ                 SLR c strap     10x  10x         TKE GTB                 LP #75     2x10 3x10         NBOS                 3-way                 Steps     1 flight  np         Minisquats     2x8  2x10         Quad set   20x 20x  20x         Heel slides   3' 5'  5'         S/l hip abd                 SAQ   1x10 2x10  2x10         Gastroc ST strap   5x :20              gastroc stretch on wedge        20" x5                                                                                                                     Modalities                 NMES to quad and CP   nv nv

## 2019-02-25 ENCOUNTER — OFFICE VISIT (OUTPATIENT)
Dept: OBGYN CLINIC | Facility: CLINIC | Age: 54
End: 2019-02-25

## 2019-02-25 DIAGNOSIS — M17.11 ARTHRITIS OF RIGHT KNEE: Primary | ICD-10-CM

## 2019-02-25 PROCEDURE — 99024 POSTOP FOLLOW-UP VISIT: CPT | Performed by: ORTHOPAEDIC SURGERY

## 2019-02-25 NOTE — PROGRESS NOTES
Assessment:  1  Arthritis of right knee       Patient Active Problem List   Diagnosis    Encounter for gynecological examination (general) (routine) without abnormal findings    B12 deficiency    Chronic cough    Chronic rhinitis    Diffuse connective tissue disease [facility]    Dyspnea    Edema    Essential hypertension    Hot flashes due to menopause    Long-term use of hydroxychloroquine    Systemic lupus erythematosus (HCC)    Lupus anticoagulant positive    Tachycardia    SOB (shortness of breath) on exertion    SOB (shortness of breath)    Sinus tachycardia    Secondary pulmonary hypertension    Pulmonary hypertension (HCC)    Post-nasal drip    Pes anserine bursitis    Positive HELENE (antinuclear antibody)    Other chronic pulmonary heart diseases    Osteoporosis    Night sweats    Patellofemoral disorder of right knee    Pes anserinus bursitis of right knee    Arthritis of right knee    Other tear of medial meniscus, current injury, right knee, initial encounter    Tear of medial meniscus of right knee, current    Chronic pain of right knee    Elevated serum creatinine    Gout    Hyperuricemia    Trochanteric bursitis of both hips    Undifferentiated connective tissue disease ([facility] [address] [facility]    Vitamin D deficiency    BV (bacterial vaginosis)    [facility] disease[facility]    Right knee pain    Status post total right knee replacement           Plan      Follow-up in 2 weeks repeat exam            Subjective:     Patient ID:    Chief Complaint:Na Joseph 62 y o  female      HPI    Patient comes in today now 2 weeks out after having total knee arthroplasty doing rather well  Had some physical therapy on Friday that aggravated her pain but is doing better today          Social History     Socioeconomic History    Marital status: /Civil Union     Spouse name: Not on file    Number of children: 2    Years of education: Not on file    Highest education level: Not on file   Occupational History    Not on file   Social Needs    Financial resource strain: Not on file    Food insecurity:     Worry: Not on file     Inability: Not on file    Transportation needs:     Medical: Not on file     Non-medical: Not on file   Tobacco Use    Smoking status: Former Smoker     Last attempt to quit: 2006     Years since quittin 0    Smokeless tobacco: Never Used   Substance and Sexual Activity    Alcohol use: Yes     Comment: socially    Drug use: No    Sexual activity: Yes     Partners: Male   Lifestyle    Physical activity:     Days per week: Not on file     Minutes per session: Not on file    Stress: Not on file   Relationships    Social connections:     Talks on phone: Not on file     Gets together: Not on file     Attends Orthodoxy service: Not on file     Active member of club or organization: Not on file     Attends meetings of clubs or organizations: Not on file     Relationship status: Not on file    Intimate partner violence:     Fear of current or ex partner: Not on file     Emotionally abused: Not on file     Physically abused: Not on file     Forced sexual activity: Not on file   Other Topics Concern    Not on file   Social History Narrative    Daily caffeinated coffee consumption    Exercise habits     Past Medical History:   Diagnosis Date    HELENE positive     Anemia     Sildenafil causes decreased hematocrit    Chronic kidney disease     borderline lab values    Encephalitis     Lasted Assessed 10/18/2017    Lupus anticoagulant disorder ([facility] [facility]     Maxillary sinusitis     Lasted Assessed 10/18/2017    Night sweat     Osteopenia     Hip    Osteoporosis     Spine    Pneumonia     Last Assessed 10/18/2017    PONV (postoperative nausea and vomiting)     Pulmonary hypertension ([facility]     Seizures ([facility] [facility]     Only during Encephalitis    Shortness of breath     with exertion    Sinus tachycardia     Urinary incontinence      Past Surgical History: Procedure Laterality Date    ARTHRODESIS      Hand: IP Joint    CHOLECYSTECTOMY      COLONOSCOPY      COLPOSCOPY      HYSTERECTOMY      Vaginal    LAPAROSCOPIC ESOPHAGOGASTRIC FUNDOPLASTY  2008    ME KNEE SCOPE,SINGLE MENISECTOMY Right 10/2/2018    Procedure: KNEE ARTHROSCOPY WITH PARTIAL MEDIAL MENISCECTOMY;  Surgeon: Dana Gupta DO;  [facility];  Service: Orthopedics    ME TOTAL KNEE ARTHROPLASTY Right 2/12/2019    Procedure: KNEE TOTAL ARTHROPLASTY AND ASSOCIATED PROCEDURES;  Surgeon: Dana Gupta DO;  [facility];  Service: Orthopedics    REDUCTION MAMMAPLASTY      REPAIR ANKLE LIGAMENT Right     TONSILLECTOMY       Allergies   Allergen Reactions    Dilantin [Phenytoin] Anaphylaxis and Rash    Augmentin [Amoxicillin-Pot Clavulanate] Rash and Dermatitis    Penicillins Rash     Current Outpatient Medications on File Prior to Visit   Medication Sig Dispense Refill    ascorbic acid (VITAMIN C) 500 mg tablet Take 1 tablet (500 mg total) by mouth daily 30 tablet 0    cholecalciferol (VITAMIN D3) 1,000 units tablet Take 2,000 Units by mouth daily in the early morning        cyanocobalamin 1,000 mcg/mL Inject 1mL IM weekly (Patient taking differently: Inject 1,000 mcg into a muscle Inject 1mL IM weekly ) 10 mL 2    enoxaparin (LOVENOX) 40 mg/0 4 mL Inject 0 4 mL (40 mg total) under the skin daily for 30 days 30 Syringe 0    ferrous sulfate 324 (65 Fe) mg Take 1 tablet (324 mg total) by mouth daily before breakfast 30 tablet 0    folic acid (FOLVITE) 916 mcg tablet Take 1 tablet (400 mcg total) by mouth daily 30 tablet 0    hydroxychloroquine (PLAQUENIL) 200 mg tablet Take 400 mg by mouth daily with breakfast        Macitentan (OPSUMIT) 10 MG tablet Take 1 tablet (10 mg total) by mouth daily 30 tablet 11    Misc   Devices (COMMODE BEDSIDE) MISC by Does not apply route as needed (Bathroom) 1 each 0    ondansetron (ZOFRAN) 4 mg tablet Take 1 tablet (4 mg total) by mouth every 8 (eight) hours as needed for nausea or vomiting 20 tablet 0    oxyCODONE (ROXICODONE) 5 mg immediate release tablet Take 1 tablet (5 mg total) by mouth every 4 (four) hours as needed for moderate pain for up to 10 daysMax Daily Amount: 30 mg 20 tablet 0    potassium chloride (K-DUR,KLOR-CON) 20 mEq tablet Take 2 tablets (40 mEq total) by mouth 2 (two) times a day 960 tablet 3    sildenafil (REVATIO) 20 mg tablet Take 1 tablet (20 mg total) by mouth 3 (three) times a day 90 tablet 11    torsemide (DEMADEX) 20 mg tablet Take 2 tablets (40 mg total) by mouth daily (Patient taking differently: Take 40 mg by mouth 2 (two) times a day  ) 240 tablet 3    traMADol (ULTRAM) 50 mg tablet Take 1 tablet (50 mg total) by mouth daily at bedtime as needed for moderate pain or severe pain 30 tablet 0     No current facility-administered medications on file prior to visit  Objective:    Review of Systems    Ortho Exam    Physical Exam    Range of motion almost 0-95 degrees incision healing nicely removed the proneo    No effusion ecchymosis resolving swelling also improving

## 2019-02-26 ENCOUNTER — OFFICE VISIT (OUTPATIENT)
Dept: PHYSICAL THERAPY | Facility: CLINIC | Age: 54
End: 2019-02-26
Payer: COMMERCIAL

## 2019-02-26 DIAGNOSIS — M25.561 RECURRENT PAIN OF RIGHT KNEE: Primary | ICD-10-CM

## 2019-02-26 PROCEDURE — 97140 MANUAL THERAPY 1/> REGIONS: CPT

## 2019-02-26 PROCEDURE — 97530 THERAPEUTIC ACTIVITIES: CPT

## 2019-02-26 PROCEDURE — 97110 THERAPEUTIC EXERCISES: CPT

## 2019-02-26 PROCEDURE — 97112 NEUROMUSCULAR REEDUCATION: CPT

## 2019-02-26 PROCEDURE — 97116 GAIT TRAINING THERAPY: CPT

## 2019-02-26 NOTE — PROGRESS NOTES
Daily Note     Today's date: 2019  Patient name: Angel Hood  : 1965  MRN: [id number]  Referring provider: Jenny Lewis DO  Dx:   Encounter Diagnosis     ICD-10-CM    1  Recurrent pain of right knee M25 561      1:1 with PTA LK 5:00-5:30  1:1 with PTA CR 5:30-6:05  NMES/CP 6:05-6:15  Subjective: Follow up with MD went well stating that he would like her to begin gait training with SPC  Objective: See treatment diary below      Assessment: Tolerated treatment well  Patient exhibited good technique with therapeutic exercises and would benefit from continued PT  Unable to make full revolutions on the bike  Educated patient in proper use of SPC as she was using it on the wrong side  Corrected squatting technique  Introduced NMES for improved quad contraction  PROM: -6- 102*      Plan: Continue per plan of care       Precautions: Pulmonary HTN, connective tissue disorder, SOB   Dx: R TKR 19     Daily Treatment Diary      Manuals 2/6 2/15 2/19  2/22  2/26       knee PROM    3' 10'  10'  10 mins                                           Exercise Diary                 bike     6'  6'  6 mins       LAQ                 SLR c strap     10x  10x  10       TKE GTB                 LP #75     2x10 3x10  3x10       NBOS                 3-way                 Steps     1 flight  np         Minisquats     2x8  2x10  2x10       Quad set   20x 20x  20x  NMES  10 mins       Heel slides   3' 5'  5'  5 mins       S/l hip abd                 SAQ   1x10 2x10  2x10  2x10       Gastroc ST strap   5x :20              gastroc stretch on wedge        20" x5  20"x5                                            gait training          SPC  3 laps                                                             Modalities                 NMES to quad and CP   nv nv    10 mins

## 2019-02-28 ENCOUNTER — OFFICE VISIT (OUTPATIENT)
Dept: PHYSICAL THERAPY | Facility: CLINIC | Age: 54
End: 2019-02-28
Payer: COMMERCIAL

## 2019-02-28 DIAGNOSIS — M25.561 RECURRENT PAIN OF RIGHT KNEE: Primary | ICD-10-CM

## 2019-02-28 PROCEDURE — 97140 MANUAL THERAPY 1/> REGIONS: CPT

## 2019-02-28 PROCEDURE — 97110 THERAPEUTIC EXERCISES: CPT

## 2019-02-28 PROCEDURE — 97116 GAIT TRAINING THERAPY: CPT

## 2019-02-28 PROCEDURE — 97112 NEUROMUSCULAR REEDUCATION: CPT

## 2019-02-28 NOTE — PROGRESS NOTES
Daily Note     Today's date: 2019  Patient name: Edison Carey  : 1965  MRN: [id number]  Referring provider: Irving Wright DO  Dx:   Encounter Diagnosis     ICD-10-CM    1  Recurrent pain of right knee M25 561                   Subjective: R knee feeling sore but no more than usual  Using SPC only at home, feels like she is getting around okay with it  Still using RW outside of home  Objective: See treatment diary below    Precautions: Pulmonary HTN, connective tissue disorder, SOB   Dx: R TKR 19     Daily Treatment Diary      Manuals 2/6 2/15 2/19  2/22  2/26  2/28     knee PROM    3' 10'  10'  10 mins  10 mins                                         Exercise Diary               bike     6'  6'  6 mins  6 mins     LAQ                 SLR c strap     10x  10x  10  15 no strap     TKE GTB                 LP #75     2x10 3x10  3x10  3x10     NBOS                 3-way                 Steps     1 flight  np         Minisquats     2x8  2x10  2x10  2x10     Quad set   20x 20x  20x  NMES  10 mins  see below     Heel slides   3' 5'  5'  5 mins  5 mins     S/l hip abd                 SAQ   1x10 2x10  2x10  2x10  2x10     Gastroc ST strap   5x :20              gastroc stretch on wedge        20" x5  20"x5  20''x5                                          gait training          SPC  3 laps  SPC 4 laps                                                           Modalities               NMES to quad and CP   nv nv    10 mins  10 mins                                                       Assessment: Tolerated treatment well  Patient would benefit from continued PT For improved strength, flexibility and overall function  ncision appears to be healing well  Improved soft tissue mobility of R knee following manual therapy; R knee AROM extension 6 degrees after manual therapy  I      Plan: Continue per plan of care

## 2019-03-04 ENCOUNTER — OFFICE VISIT (OUTPATIENT)
Dept: PHYSICAL THERAPY | Facility: CLINIC | Age: 54
End: 2019-03-04
Payer: COMMERCIAL

## 2019-03-04 DIAGNOSIS — M25.561 RECURRENT PAIN OF RIGHT KNEE: Primary | ICD-10-CM

## 2019-03-04 PROCEDURE — 97140 MANUAL THERAPY 1/> REGIONS: CPT | Performed by: PHYSICAL THERAPIST

## 2019-03-04 PROCEDURE — 97112 NEUROMUSCULAR REEDUCATION: CPT | Performed by: PHYSICAL THERAPIST

## 2019-03-04 PROCEDURE — 97110 THERAPEUTIC EXERCISES: CPT | Performed by: PHYSICAL THERAPIST

## 2019-03-04 PROCEDURE — G0283 ELEC STIM OTHER THAN WOUND: HCPCS | Performed by: PHYSICAL THERAPIST

## 2019-03-04 PROCEDURE — 97014 ELECTRIC STIMULATION THERAPY: CPT | Performed by: PHYSICAL THERAPIST

## 2019-03-04 NOTE — PROGRESS NOTES
Daily Note     Today's date: 3/4/2019  Patient name: Jessica Thomson  : 1965  MRN: [id number]  Referring provider: Saurav Ojeda DO  Dx:   Encounter Diagnosis     ICD-10-CM    1  Recurrent pain of right knee M25 561                   Subjective: She states she is continuing to feel better and has been using cane more often with the walker on the sidewalks  Objective: See treatment diary below    Assessment: She had pain with SLR in R glut at rep 18  Instructed to perform 3x7 reps at home  Improved to performing LAQ today as well  Quad set is still quite poor  Plan: Continue with POC   Add in TKE nv     Precautions: Pulmonary HTN, connective tissue disorder, SOB   Dx: R TKR 19     Daily Treatment Diary      Manuals 2/6 2/15 2/19  2/22  2/26  2/28  3/4   knee PROM    3' 10'  10'  10 mins  10 mins  10'                                       Exercise Diary               bike     6'  6'  6 mins  6 mins  6'   LAQ              2x10   SLR      10x  10x  10  15 no strap  2x10   TKE GTB             nv   LP #95     2x10 3x10  3x10  3x10  3x10                                     Steps     1 flight  np         Minisquats     2x8  2x10  2x10  2x10  2x10   Quad set   20x 20x  20x  NMES  10 mins  see below     Heel slides   3' 5'  5'  5 mins  5 mins  30x   S/l hip abd              2x10   SAQ   1x10 2x10  2x10  2x10  2x10                gastroc stretch on wedge        20" x5  20"x5  20''x5  :20x5                                        gait training          SPC  3 laps  SPC 4 laps                                                           Modalities               NMES to quad and CP   nv nv    10 mins  10 mins  10'

## 2019-03-06 ENCOUNTER — OFFICE VISIT (OUTPATIENT)
Dept: PHYSICAL THERAPY | Facility: CLINIC | Age: 54
End: 2019-03-06
Payer: COMMERCIAL

## 2019-03-06 DIAGNOSIS — M25.561 RECURRENT PAIN OF RIGHT KNEE: Primary | ICD-10-CM

## 2019-03-06 PROCEDURE — 97140 MANUAL THERAPY 1/> REGIONS: CPT | Performed by: PHYSICAL THERAPIST

## 2019-03-06 PROCEDURE — 97014 ELECTRIC STIMULATION THERAPY: CPT | Performed by: PHYSICAL THERAPIST

## 2019-03-06 PROCEDURE — 97110 THERAPEUTIC EXERCISES: CPT | Performed by: PHYSICAL THERAPIST

## 2019-03-06 PROCEDURE — 97112 NEUROMUSCULAR REEDUCATION: CPT | Performed by: PHYSICAL THERAPIST

## 2019-03-06 PROCEDURE — G0283 ELEC STIM OTHER THAN WOUND: HCPCS | Performed by: PHYSICAL THERAPIST

## 2019-03-06 NOTE — PROGRESS NOTES
Daily Note     Today's date: 3/6/2019  Patient name: Genesis Batista  : 1965  MRN: [id number]  Referring provider: Gentry Courtney DO  Dx:   Encounter Diagnosis     ICD-10-CM    1  Recurrent pain of right knee M25 561                 Subjective: She states she is continuing to feel better and has been using cane more often with the walker on the sidewalks  Objective: See treatment diary below    Assessment: She had pain with SLR in R glut at rep 18  Instructed to perform 3x7 reps at home  Improved to performing LAQ today as well  Quad set is still quite poor  Plan: Continue with POC       Precautions: Pulmonary HTN, connective tissue disorder, SOB   Dx: R TKR 19      Daily Treatment Diary      Manuals 3/6 2/15 2/19  2/22  2/26  2/28  3/4   knee PROM   10' 3' 10'  10'  10 mins  10 mins  10'                                       Exercise Diary               bike  6'   6'  6'  6 mins  6 mins  6'   LAQ  2x10            2x10   SLR   3x7   10x  10x  10  15 no strap  2x10   TKE GTB  2x10              LP #95  3x10   2x10 3x10  3x10  3x10  3x10                                     Steps  2x10   1 flight  np      h   Sit to stand  2x10   2x8  2x10  2x10  2x10  2x10   Quad set   20x 20x  20x  NMES  10 mins  see below     Heel slides  30x 3' 5'  5'  5 mins  5 mins  30x   S/l hip abd  2x10            2x10   SAQ   1x10 2x10  2x10  2x10  2x10                gastroc stretch on wedge  :20 x5      20" x5  20"x5  20''x5  :20x5                                        gait training          SPC  3 laps  SPC 4 laps                                                           Modalities               NMES to quad and CP  10' nv nv    10 mins  10 mins  10'

## 2019-03-11 ENCOUNTER — OFFICE VISIT (OUTPATIENT)
Dept: PHYSICAL THERAPY | Facility: CLINIC | Age: 54
End: 2019-03-11
Payer: COMMERCIAL

## 2019-03-11 DIAGNOSIS — M25.561 RECURRENT PAIN OF RIGHT KNEE: Primary | ICD-10-CM

## 2019-03-11 PROCEDURE — 97140 MANUAL THERAPY 1/> REGIONS: CPT | Performed by: PHYSICAL THERAPIST

## 2019-03-11 PROCEDURE — 97116 GAIT TRAINING THERAPY: CPT | Performed by: PHYSICAL THERAPIST

## 2019-03-11 PROCEDURE — 97112 NEUROMUSCULAR REEDUCATION: CPT | Performed by: PHYSICAL THERAPIST

## 2019-03-11 PROCEDURE — G0283 ELEC STIM OTHER THAN WOUND: HCPCS | Performed by: PHYSICAL THERAPIST

## 2019-03-11 PROCEDURE — 97014 ELECTRIC STIMULATION THERAPY: CPT | Performed by: PHYSICAL THERAPIST

## 2019-03-11 NOTE — PROGRESS NOTES
Daily Note     Today's date: 3/11/2019  Patient name: Prudencio Rivers  : 1965  MRN: [id number]  Referring provider: Rhea Guallpa DO  Dx:   Encounter Diagnosis     ICD-10-CM    1  Recurrent pain of right knee M25 561                   Subjective: Pt was quite sore after LV with increased pain/soreness lasting through the weekend      Objective: See treatment diary below  Decreased intensity of TE program today given response to last tx  AROM [id number]      Assessment: Tolerated treatment well  Patient would benefit from continued PT  Difficulty with stair ambulation- improved ascending after practice   Plan: Continue per plan of care    Consider step ups/downs at 4/6 in height    Precautions: Pulmonary HTN, connective tissue disorder, SOB   Dx: R TKR 19      Daily Treatment Diary      Manuals 3/6 3/11 2/19  2/22  2/26  2/28  3   knee PROM   10' 10' 10'  10'  10 mins  10 mins  10'                                       Exercise Diary               bike  6'  6' 6'  6'  6 mins  6 mins  6'   LAQ  2x10  20x          2x10   SLR   3x7  10x 10x  10x  10  15 no strap  2x10   TKE GTB  2x10              LP #95  3x10  20x 2x10 3x10  3x10  3x10  3x10                                     Steps  2x10   1 flight  np      h   Sit to stand  2x10   2x8  2x10  2x10  2x10  2x10   Quad set   20x 20x  20x  NMES  10 mins  see below     Heel slides  30x 20 5'  5'  5 mins  5 mins  30x   S/l hip abd  2x10  20          2x10   SAQ    2x10  2x10  2x10  2x10                gastroc stretch on wedge  :20 x5  :20/5    20" x5  20"x5  20''x5  :20x5                                        gait training          SPC  3 laps  SPC 4 laps                                                           Modalities               NMES to quad and CP  10' 10 nv    10 mins  10 mins  10'                        Direct Supervision 545-end of tx

## 2019-03-13 ENCOUNTER — OFFICE VISIT (OUTPATIENT)
Dept: OBGYN CLINIC | Facility: CLINIC | Age: 54
End: 2019-03-13

## 2019-03-13 ENCOUNTER — EVALUATION (OUTPATIENT)
Dept: PHYSICAL THERAPY | Facility: CLINIC | Age: 54
End: 2019-03-13
Payer: COMMERCIAL

## 2019-03-13 DIAGNOSIS — Z96.651 STATUS POST TOTAL RIGHT KNEE REPLACEMENT: Primary | ICD-10-CM

## 2019-03-13 DIAGNOSIS — M25.561 RECURRENT PAIN OF RIGHT KNEE: Primary | ICD-10-CM

## 2019-03-13 PROCEDURE — 97112 NEUROMUSCULAR REEDUCATION: CPT | Performed by: PHYSICAL THERAPIST

## 2019-03-13 PROCEDURE — 97110 THERAPEUTIC EXERCISES: CPT | Performed by: PHYSICAL THERAPIST

## 2019-03-13 PROCEDURE — 99024 POSTOP FOLLOW-UP VISIT: CPT | Performed by: ORTHOPAEDIC SURGERY

## 2019-03-13 PROCEDURE — 97140 MANUAL THERAPY 1/> REGIONS: CPT | Performed by: PHYSICAL THERAPIST

## 2019-03-13 RX ORDER — CLINDAMYCIN HYDROCHLORIDE 300 MG/1
CAPSULE ORAL
Qty: 2 CAPSULE | Refills: 3 | Status: SHIPPED | OUTPATIENT
Start: 2019-03-13 | End: 2019-04-04 | Stop reason: ALTCHOICE

## 2019-03-13 NOTE — PROGRESS NOTES
PT Re-Evaluation     Today's date: 3/13/2019  Patient name: Mandy Gosselin  : 1965  MRN: [id number]  Referring provider: John Hardy DO  Dx:   Encounter Diagnosis     ICD-10-CM    1  Recurrent pain of right knee M25 561                   Assessment  Assessment details: Mandy Gosselin has demonstrated overall improvement great in ROM, strength, function, and a reduction in pain  Currently, she has made steady progress towards her goals, but continues to remain limited with knee extension mobility  At this time, skilled physical therapy is warranted to address the remaining impairments and aide in return to walking s AD and performing home activities s pain  It is recommended that they continue to be seen for skilled physical therapy for an additional 6 weeks  Thank you for this pleasant referral!       Impairments: abnormal coordination, abnormal muscle firing, abnormal muscle tone, abnormal or restricted ROM, abnormal movement, activity intolerance, difficulty understanding, impaired physical strength, lacks appropriate home exercise program, pain with function, poor posture  and poor body mechanics    Symptom irritability: moderateUnderstanding of Dx/Px/POC: good   Prognosis: good    Goals  STGs  1  Decrease pain by 20% in 2-4 weeks  - not met  2  Improve knee ROM by 10 degrees in 2-4 weeks  - met   3  Improve hip strength by 1/3 grade in 2-4 weeks  -met  4  Getting in and out of tub independently in 3 weeks  - met  5  Getting in and out of car in 2-3 weeks  met  LTG  1  Decrease pain by 80% in 10 weeks  2  Perform stairs s pain and drive independently 10 weeks  - not met  3  Walk well enough to vacation to [facility] in     4  SLB >20 sec in 8 weeks  - not met  5  Walk grocery store distance in 6 weeks  - not met     Plan  Patient would benefit from: skilled physical therapy  Planned modality interventions: thermotherapy: hydrocollator packs  Planned therapy interventions: activity modification, joint mobilization, manual therapy, motor coordination training, neuromuscular re-education, patient education, self care, therapeutic activities, therapeutic exercise, graded activity, home exercise program and behavior modification  Frequency: 2x week  Duration in weeks: 6  Treatment plan discussed with: patient        Subjective Evaluation    History of Present Illness  Date of surgery: 2019  Mechanism of injury: She states she is not sleeping well still due to being busy during the day  She has no trouble with stairs at this time but is doing step to pattern     Neuro signs: Her knee gives out few times/ week- has not felt this  Red flags: none  Occupation: Medical billing- sitting mostly  Past Medical History: Menisectomy in 10/2018  Pain  At best pain ratin  At worst pain ratin  Location: medial knee and under kneecap  Quality: dull ache and throbbing  Aggravating factors: stair climbing    Social Support  Steps to enter house: yes (single rail)  5  Stairs in house: yes (single rail)   18  Lives with: spouse and adult children (5 cats and 1 dog)      Diagnostic Tests  MRI studies: abnormal  Patient Goals  Patient goals for therapy: decreased pain, increased motion and increased strength          Objective     Active Range of Motion   Left Knee   Flexion: 130 degrees   Extension: -4 degrees     Right Knee   Flexion: 101 degrees   Extension: 11 degrees     Additional Active Range of Motion Details    Observation: BLEs swelling    Integumentary: WNL incision edges doing well   TUG: cane 11 18 sec  Balance: R SLB  10 sec and inbalance  Gait: Decr WB on RLE knee flexion and trunk flexion in gait  Functional squat: 95 deg   - SLR: Katie Antonio: - Faddir: -  Stairs: Step to pattern, 6inch step using rail and cane   SLR lag: 15 deg  Hip abd: R 4/5 L 4+5   Hip ext: 4/5 B    Passive Range of Motion   Left Knee   Flexion: 140 degrees   Extension: -7 degrees     Right Knee   Flexion: 111 degrees   Extension: 8 degrees Subjective: See RE      Objective: See treatment diary below  AROM:       Assessment: Tolerated treatment well  Patient would benefit from continued PT  She demonstrated improved control with stair negotiation  Plan: Continue per plan of care        Precautions: Pulmonary HTN, connective tissue disorder, SOB   Dx: R TKR 2/12/19      Daily Treatment Diary      Manuals 3/6 3/11 3/13  2/22  2/26  2/28  3/4   knee PROM   10' 10' 10'  10'  10 mins  10 mins  10'                                       Exercise Diary          2/26 2/28     bike  6'  6' 6'  6'  6 mins  6 mins  6'   LAQ  2x10  20x  np        2x10   SLR   3x7  10x 2x10  10x  10  15 no strap  2x10   TKE GTB  2x10              LP #95  3x10  20x 3x10 3x10  3x10  3x10  3x10                                     Steps  2x10   1 flight  np      h   Sit to stand  2x10   2x10  2x10  2x10  2x10  2x10   Quad set   20x np  20x  NMES  10 mins  see below     Heel slides  30x 20 HEP  5'  5 mins  5 mins  30x   S/l hip abd  2x10  20  20x        2x10   SAQ      2x10  2x10  2x10                gastroc stretch on wedge  :20 x5  :20/5  :20 x5  20" x5  20"x5  20''x5  :20x5                                        gait training          SPC  3 laps  SPC 4 laps                                                           Modalities          2/26 2/28     NMES to quad and CP  10' 10 declined    10 mins  10 mins  10'

## 2019-03-13 NOTE — PROGRESS NOTES
Assessment:    status post right total knee arthroplasty on 02/12/2019    Plan:    weightbearing as tolerated right lower extremity  Patient will be taking aspirin for 5 months from today 81 mg b i d    use of bilateral thigh-high Jovanny stockings for another 5 months for total 6 months postoperatively   advised the patient in sleep hygiene, including use of chamomile Tea, melatonin  Rather than a medication specifically for sleep  Pt will be given temporary handicap placard  Continue with PT, advised that ROM and stretching will help with soreness around incision as well    Prescription for dental prophylactic antibiotics will be sent    Follow Up:  8 week(s)    To Do Next Visit:  X-rays of the  right  knee      CHIEF COMPLAINT:  Chief Complaint   Patient presents with    Right Knee - Post-op         SUBJECTIVE:  Chad Hernandez is a 62y o  year old female who presents for follow up after  Left total knee arthroplasty  Today patient has  Continued improvement in her pain,  Says she has occasional increase in swelling after physical therapy of the right knee, still notes warmth that is slightly improving no fevers or chills  She denies any numbness or tingling  Does note stiffness when attempting full extension  George Orozco        PHYSICAL EXAMINATION:  General: well developed and well nourished, alert, oriented times 3 and appears comfortable  Psychiatric: Normal    MUSCULOSKELETAL EXAMINATION:  Incision: Incision healing nicely, there is mild swelling erythema over anterior knee as expected some mild warmth as expected as well  Range of Motion: knee extension: PROM and AROM to about 5-10 degrees, AROM with flexion to near 120  Neurovascular status: Neuro intact, good cap refill  Activity Restrictions: No restrictions        STUDIES REVIEWED:  No Studies to review      PROCEDURES PERFORMED:  Procedures  No Procedures performed today

## 2019-03-18 ENCOUNTER — OFFICE VISIT (OUTPATIENT)
Dept: PHYSICAL THERAPY | Facility: CLINIC | Age: 54
End: 2019-03-18
Payer: COMMERCIAL

## 2019-03-18 DIAGNOSIS — M25.561 RECURRENT PAIN OF RIGHT KNEE: Primary | ICD-10-CM

## 2019-03-18 PROCEDURE — 97140 MANUAL THERAPY 1/> REGIONS: CPT

## 2019-03-18 PROCEDURE — 97112 NEUROMUSCULAR REEDUCATION: CPT

## 2019-03-18 PROCEDURE — 97110 THERAPEUTIC EXERCISES: CPT

## 2019-03-18 NOTE — PROGRESS NOTES
Daily Note     Today's date: 3/18/2019  Patient name: Rosy Seymour  : 1965  MRN: [id number]  Referring provider: Timothy Page DO  Dx:   Encounter Diagnosis     ICD-10-CM    1  Recurrent pain of right knee M25 561                   Subjective: Pt reports feeling pretty good prior to start of session  Notes she still has been getting better at ascending stairs with reciprocal gait, but is still nervous about descending stairs  Objective: See treatment diary below    Precautions: Pulmonary HTN, connective tissue disorder, SOB   Dx: R TKR 19      Daily Treatment Diary      Manuals 3/6 3/11 3/13 3/18  2/26  2/28  3/4   knee PROM   10' 10' 10' 10'  10 mins  10 mins  10'                                       Exercise Diary               bike  6'  6' 6'  6'  6 mins  6 mins  6'   LAQ  2x10  20x  np 3x10      2x10   SLR   3x7  10x 2x10 2x10  10  15 no strap  2x10   TKE GTB  2x10     NV        LP #95  3x10  20x 3x10 100#  2x10  3x10  3x10  3x10                                   Steps  2x10   1 flight np      h   Sit to stand  2x10   2x10 np  2x10  2x10  2x10   Quad set   20x np np  NMES  10 mins  see below     Heel slides  30x 20 HEP 3x10  5 mins  5 mins  30x   S/l hip abd  2x10  20  20x 2x10      2x10   SAQ       2x10  2x10                gastroc stretch on wedge  :20 x5  :20/5  :20 x5 :20x5  20"x5  20''x5  :20x5    Forward Step Down        4"  2x10                            gait training          SPC  3 laps  SPC 4 laps                                                           Modalities        3/18  2/26  2/28     NMES to quad and CP  10' 10 declined  10 min  10 mins  10 mins  10'                     1:1 with PTA CR [id number]    Assessment: Tolerated treatment well  Was able to perform all exercise with no c/o pain  Exhibits continued limitations in knee ext  May benefit from addition of IASTM to hamstring tendon next session to aide improved ROM  Consider adding TKE with TB next session  Patient demonstrated fatigue post treatment and would benefit from continued PT  Plan: Continue per plan of care  Progress treatment as tolerated

## 2019-03-18 NOTE — PROGRESS NOTES
Daily Note     Today's date: 3/18/2019  Patient name: Brigid Iglesias  : 1965  MRN: [id number]  Referring provider: Apoorva Barnett DO  Dx:   Encounter Diagnosis     ICD-10-CM    1  Recurrent pain of right knee M25 561                   Subjective: ***      Objective: See treatment diary below      Assessment: Tolerated treatment {Tolerated treatment :6468304126}   Patient {assessment:2897099675}      Plan: {PLAN:8498227260}    Precautions: Pulmonary HTN, connective tissue disorder, SOB   Dx: R TKR 19      Daily Treatment Diary      Manuals 3/6 3/11 3/13  3/18  2/26  2/28  3/4   knee PROM   10' 10' 10'  10'  10 mins  10 mins  10'                                       Exercise Diary               bike  6'  6' 6'  L1  7 mins  6 mins  6 mins  6'   LAQ  2x10  20x  np  3x10      2x10   SLR   3x7  10x 2x10  2x10  10  15 no strap  2x10   TKE GTB  2x10     NV        LP #95  3x10  20x 3x10 100#  2x10  3x10  3x10  3x10                                     Steps  2x10   1 flight  np      h   Sit to stand  2x10   2x10 NP  2x10  2x10  2x10   Quad set   20x np NP  NMES  10 mins  see below     Heel slides  30x 20 HEP    5 mins  5 mins  30x   S/l hip abd  2x10  20  20x  2x10      2x10              gastroc stretch on wedge  :20 x5  :20/  :20 x5  20" x5  20"x5  20''x5  :20x5    forward step down        4"x20                            gait training          SPC  3 laps  SPC 4 laps                                                          Modalities               NMES to quad and CP  10' 10 declined    10 mins  10 mins  10'

## 2019-03-20 ENCOUNTER — OFFICE VISIT (OUTPATIENT)
Dept: PHYSICAL THERAPY | Facility: CLINIC | Age: 54
End: 2019-03-20
Payer: COMMERCIAL

## 2019-03-20 DIAGNOSIS — M25.561 RECURRENT PAIN OF RIGHT KNEE: Primary | ICD-10-CM

## 2019-03-20 PROCEDURE — 97110 THERAPEUTIC EXERCISES: CPT

## 2019-03-20 PROCEDURE — 97140 MANUAL THERAPY 1/> REGIONS: CPT

## 2019-03-20 PROCEDURE — 97112 NEUROMUSCULAR REEDUCATION: CPT

## 2019-03-20 NOTE — PROGRESS NOTES
Daily Note     Today's date: 3/20/2019  Patient name: Genesis Batista  : 1965  MRN: [id number]  Referring provider: Gentry Courtney DO  Dx:   Encounter Diagnosis     ICD-10-CM    1  Recurrent pain of right knee M25 561        Start Time: 1800  Stop Time: 1858  Total time in clinic (min): 58 minutes    Subjective: Pt reports not having much pain today prior to start of session  Primary complaint is stiffness and continued limited ROM in R knee  Objective: See treatment diary below  AROM in ext of R knee 10*  PROM in ext of R knee 3*  Precautions: Pulmonary HTN, connective tissue disorder, SOB   Dx: R TKR 19      Daily Treatment Diary      Manuals 3/6 3/11 3/13 3/18  3/20  2/28  3/4   knee PROM   10' 10' 10' 10'  10 mins  10 mins  10'    IASTM   Patella/hamstring tendon           8'                         Exercise Diary                bike  6'  6' 6'  6' 6'  6 mins  6'   LAQ  2x10  20x  np 3x10 np    2x10   SLR   3x7  10x 2x10 2x10 3x10  15 no strap  2x10   TKE GTB  2x10     NV GTB  5"x20      LP #95  3x10  20x 3x10 100#  2x10 100#  2x10  3x10  3x10                                 Steps  2x10   1 flight np     h   Sit to stand  2x10   2x10 np   2x10  2x10   Quad set   20x np np   see below     Heel slides  30x 20 HEP 3x10 3x10  5 mins  30x   S/l hip abd  2x10  20  20x 2x10 3x10    2x10   SAQ        2x10                gastroc stretch on wedge  :20 x5  :20/5  :20 x5 :20x5 20"x5  20''x5  :20x5    Forward Step Down        4"  2x10  4"  2x10                         gait training           SPC 4 laps                                                        Modalities        3/18 3/20  2/28     NMES to quad and CP  10' 10 declined  10 min Declined  10 mins  10'                         Assessment: Tolerated treatment well  Was able to tolerate all exercise with no significant increase of pain  Increased tone in R hamstring noted during IASTM  Continues to show limitations in available ROM in knee ext  Patient would benefit from continued PT  Plan: Continue per plan of care

## 2019-03-25 ENCOUNTER — OFFICE VISIT (OUTPATIENT)
Dept: PHYSICAL THERAPY | Facility: CLINIC | Age: 54
End: 2019-03-25
Payer: COMMERCIAL

## 2019-03-25 DIAGNOSIS — M25.561 RECURRENT PAIN OF RIGHT KNEE: Primary | ICD-10-CM

## 2019-03-25 PROCEDURE — 97140 MANUAL THERAPY 1/> REGIONS: CPT | Performed by: PHYSICAL THERAPIST

## 2019-03-25 PROCEDURE — 97112 NEUROMUSCULAR REEDUCATION: CPT | Performed by: PHYSICAL THERAPIST

## 2019-03-25 PROCEDURE — 97110 THERAPEUTIC EXERCISES: CPT | Performed by: PHYSICAL THERAPIST

## 2019-03-25 NOTE — PROGRESS NOTES
Daily Note     Today's date: 3/25/2019  Patient name: Alejandra Florence  : 1965  MRN: [id number]  Referring provider: Savilla Schaumann, DO  Dx:   Encounter Diagnosis     ICD-10-CM    1  Recurrent pain of right knee M25 561                   Subjective: Pt reports she feels she turned the corner with her ROM  Objective: See treatment diary below  AROM in ext of R knee 10* (NT)  PROM in ext of R knee 6  Precautions: Pulmonary HTN, connective tissue disorder, SOB   Dx: R TKR 19      Daily Treatment Diary      Manuals 3/6 3/11 3/13 3/18  3/20  3/25  3/4   knee PROM   10' 10' 10' 10'  10 mins  10 mins  10'    IASTM   Patella/hamstring tendon           8'  np                       Exercise Diary                bike  6'  6' 6'  6' 6'  6 mins  6'   LAQ  2x10  20x  np 3x10 np    2x10   SLR   3x7  10x 2x10 2x10 3x10  15 no strap  2x10   TKE GTB  2x10     NV GTB  5"x20  GTB 20x    LP #95  3x10  20x 3x10 100#  2x10 100#  2x10 #60 x20  3x10                                 Steps  2x10   1 flight np     h   Sit to stand  2x10   2x10 np   2x10  2x10   Quad set   20x np np   see below     Heel slides  30x 20 HEP 3x10 3x10  5 mins  30x   S/l hip abd  2x10  20  20x 2x10 3x10  2x10  2x10   SAQ        2x10                gastroc stretch on wedge  :20 x5  :20/5  :20 x5 :20x5 20"x5  20''x5  :20x5    Forward Step Down        4"  2x10  4"  2x10  6" 2x10                                                                                    Modalities        3/18 3/20      NMES to quad and CP  10' 10 declined  10 min Declined declined  10'                         Assessment: Tolerated treatment well  She was able to tolerate all TE well today  Will continue to work on steps progression  She had some difficulty with stepping up onto 8 inch step  Plan: Continue per plan of care  Will have her trial parking lot driving [place] if pain and strength is doing well

## 2019-03-27 ENCOUNTER — OFFICE VISIT (OUTPATIENT)
Dept: PHYSICAL THERAPY | Facility: CLINIC | Age: 54
End: 2019-03-27
Payer: COMMERCIAL

## 2019-03-27 DIAGNOSIS — M25.561 RECURRENT PAIN OF RIGHT KNEE: Primary | ICD-10-CM

## 2019-03-27 PROCEDURE — 97110 THERAPEUTIC EXERCISES: CPT

## 2019-03-27 PROCEDURE — 97140 MANUAL THERAPY 1/> REGIONS: CPT

## 2019-03-27 PROCEDURE — 97112 NEUROMUSCULAR REEDUCATION: CPT

## 2019-03-27 NOTE — PROGRESS NOTES
Daily Note     Today's date: 3/27/2019  Patient name: Rajinder Treviño  : 1965  MRN: [id number]  Referring provider: Octavia Barnes DO  Dx:   Encounter Diagnosis     ICD-10-CM    1  Recurrent pain of right knee M25 561                   Subjective: Pt presents to session with no AD, notes that descending stairs is difficult  Objective: See treatment diary below  AROM knee flexion 120 degrees  Precautions: Pulmonary HTN, connective tissue disorder, SOB   Dx: R TKR 19      Daily Treatment Diary      Manuals 3/6 3/11 3/13 3/18  3/20  3/25 3/27   knee PROM   10' 10' 10' 10'  10 mins  10 mins 10 mins    IASTM   Patella/hamstring tendon           8'  np                     Exercise Diary               bike  6'  6' 6'  6' 6'  6 mins 6 min   LAQ  2x10  20x  np 3x10 np      SLR   3x7  10x 2x10 2x10 3x10  15 no strap 2x10   TKE GTB  2x10     NV GTB  5"x20  GTB 20x GTB  20x 5"   LP #95  3x10  20x 3x10 100#  2x10 100#  2x10 #60 x20 60#  20x SL                                Steps  2x10   1 flight np       Sit to stand  2x10   2x10 np   2x10 np   Quad set   20x np np   see below np   Heel slides  30x 20 HEP 3x10 3x10  5 mins np    S/l hip abd  2x10  20  20x 2x10 3x10  2x10    SAQ        2x10 2x10               gastroc stretch on wedge  :20 x5  :20/5  :20 x5 :20x5 20"x5  20''x5     Forward Step Down        4"  2x10  4"  2x10  6" 2x10 8 5"  2x10     mini squats             biodex   20x    LSD       4"  2x10     TG lvl 19             20x                                    Modalities        3/18 3/20     NMES to quad and CP  10' 10 declined  10 min Declined declined 10'                         Assessment: Tolerated treatment well  Some difficulty with steps but able to perform with 8 5" this session  Progressed with knee ext b exercises  Improved AROM knee ext post manual stretching  Plan: Continue per plan of care  Will have her trial parking lot driving [place] if pain and strength is doing well

## 2019-04-02 ENCOUNTER — OFFICE VISIT (OUTPATIENT)
Dept: PHYSICAL THERAPY | Facility: CLINIC | Age: 54
End: 2019-04-02
Payer: COMMERCIAL

## 2019-04-02 DIAGNOSIS — M25.561 RECURRENT PAIN OF RIGHT KNEE: Primary | ICD-10-CM

## 2019-04-02 PROCEDURE — 97140 MANUAL THERAPY 1/> REGIONS: CPT

## 2019-04-02 PROCEDURE — 97110 THERAPEUTIC EXERCISES: CPT

## 2019-04-02 PROCEDURE — 97112 NEUROMUSCULAR REEDUCATION: CPT

## 2019-04-03 NOTE — TELEPHONE ENCOUNTER
patient not due for visco injections until 6/17/19  Tried calling patient how ever received a busy dial tone

## 2019-04-04 ENCOUNTER — ANNUAL EXAM (OUTPATIENT)
Dept: OBGYN CLINIC | Facility: CLINIC | Age: 54
End: 2019-04-04
Payer: COMMERCIAL

## 2019-04-04 VITALS
DIASTOLIC BLOOD PRESSURE: 80 MMHG | WEIGHT: 202 LBS | SYSTOLIC BLOOD PRESSURE: 138 MMHG | HEIGHT: 63 IN | BODY MASS INDEX: 35.79 KG/M2

## 2019-04-04 DIAGNOSIS — Z12.31 ENCOUNTER FOR SCREENING MAMMOGRAM FOR MALIGNANT NEOPLASM OF BREAST: ICD-10-CM

## 2019-04-04 DIAGNOSIS — Z01.419 ENCNTR FOR GYN EXAM (GENERAL) (ROUTINE) W/O ABN FINDINGS: ICD-10-CM

## 2019-04-04 PROCEDURE — S0612 ANNUAL GYNECOLOGICAL EXAMINA: HCPCS | Performed by: PHYSICIAN ASSISTANT

## 2019-04-04 RX ORDER — TRIAMCINOLONE ACETONIDE 0.1 %
PASTE (GRAM) DENTAL
COMMUNITY
Start: 2017-11-17

## 2019-04-04 RX ORDER — ASPIRIN 81 MG/1
162 TABLET ORAL DAILY
COMMUNITY

## 2019-04-05 ENCOUNTER — OFFICE VISIT (OUTPATIENT)
Dept: PHYSICAL THERAPY | Facility: CLINIC | Age: 54
End: 2019-04-05
Payer: COMMERCIAL

## 2019-04-05 DIAGNOSIS — M25.561 RECURRENT PAIN OF RIGHT KNEE: Primary | ICD-10-CM

## 2019-04-05 PROCEDURE — 97140 MANUAL THERAPY 1/> REGIONS: CPT | Performed by: PHYSICAL THERAPIST

## 2019-04-05 PROCEDURE — 97110 THERAPEUTIC EXERCISES: CPT | Performed by: PHYSICAL THERAPIST

## 2019-04-05 PROCEDURE — 97112 NEUROMUSCULAR REEDUCATION: CPT | Performed by: PHYSICAL THERAPIST

## 2019-04-08 ENCOUNTER — OFFICE VISIT (OUTPATIENT)
Dept: PHYSICAL THERAPY | Facility: CLINIC | Age: 54
End: 2019-04-08
Payer: COMMERCIAL

## 2019-04-08 DIAGNOSIS — M25.561 RECURRENT PAIN OF RIGHT KNEE: Primary | ICD-10-CM

## 2019-04-08 PROCEDURE — 97140 MANUAL THERAPY 1/> REGIONS: CPT | Performed by: PHYSICAL THERAPIST

## 2019-04-08 PROCEDURE — 97112 NEUROMUSCULAR REEDUCATION: CPT | Performed by: PHYSICAL THERAPIST

## 2019-04-08 PROCEDURE — 97110 THERAPEUTIC EXERCISES: CPT | Performed by: PHYSICAL THERAPIST

## 2019-04-10 ENCOUNTER — OFFICE VISIT (OUTPATIENT)
Dept: PHYSICAL THERAPY | Facility: CLINIC | Age: 54
End: 2019-04-10
Payer: COMMERCIAL

## 2019-04-10 DIAGNOSIS — M25.561 RECURRENT PAIN OF RIGHT KNEE: Primary | ICD-10-CM

## 2019-04-10 PROCEDURE — 97110 THERAPEUTIC EXERCISES: CPT

## 2019-04-10 PROCEDURE — 97112 NEUROMUSCULAR REEDUCATION: CPT

## 2019-04-10 PROCEDURE — 97140 MANUAL THERAPY 1/> REGIONS: CPT

## 2019-04-15 ENCOUNTER — OFFICE VISIT (OUTPATIENT)
Dept: CARDIOLOGY CLINIC | Facility: CLINIC | Age: 54
End: 2019-04-15
Payer: COMMERCIAL

## 2019-04-15 VITALS
BODY MASS INDEX: 35.97 KG/M2 | SYSTOLIC BLOOD PRESSURE: 116 MMHG | OXYGEN SATURATION: 97 % | DIASTOLIC BLOOD PRESSURE: 80 MMHG | HEIGHT: 63 IN | HEART RATE: 89 BPM | WEIGHT: 203 LBS

## 2019-04-15 DIAGNOSIS — I27.20 PULMONARY HYPERTENSION (HCC): Primary | ICD-10-CM

## 2019-04-15 PROCEDURE — 99214 OFFICE O/P EST MOD 30 MIN: CPT | Performed by: INTERNAL MEDICINE

## 2019-04-16 ENCOUNTER — OFFICE VISIT (OUTPATIENT)
Dept: PHYSICAL THERAPY | Facility: CLINIC | Age: 54
End: 2019-04-16
Payer: COMMERCIAL

## 2019-04-16 DIAGNOSIS — M25.561 RECURRENT PAIN OF RIGHT KNEE: Primary | ICD-10-CM

## 2019-04-16 PROCEDURE — 97140 MANUAL THERAPY 1/> REGIONS: CPT

## 2019-04-16 PROCEDURE — 97112 NEUROMUSCULAR REEDUCATION: CPT

## 2019-04-16 PROCEDURE — 97110 THERAPEUTIC EXERCISES: CPT

## 2019-04-17 ENCOUNTER — APPOINTMENT (OUTPATIENT)
Dept: RADIOLOGY | Facility: CLINIC | Age: 54
End: 2019-04-17
Payer: COMMERCIAL

## 2019-04-17 ENCOUNTER — OFFICE VISIT (OUTPATIENT)
Dept: OBGYN CLINIC | Facility: CLINIC | Age: 54
End: 2019-04-17

## 2019-04-17 DIAGNOSIS — Z96.651 STATUS POST TOTAL RIGHT KNEE REPLACEMENT: ICD-10-CM

## 2019-04-17 DIAGNOSIS — Z96.651 STATUS POST TOTAL RIGHT KNEE REPLACEMENT: Primary | ICD-10-CM

## 2019-04-17 PROCEDURE — 99024 POSTOP FOLLOW-UP VISIT: CPT | Performed by: ORTHOPAEDIC SURGERY

## 2019-04-17 PROCEDURE — 73562 X-RAY EXAM OF KNEE 3: CPT

## 2019-04-18 ENCOUNTER — OFFICE VISIT (OUTPATIENT)
Dept: PHYSICAL THERAPY | Facility: CLINIC | Age: 54
End: 2019-04-18
Payer: COMMERCIAL

## 2019-04-18 DIAGNOSIS — M25.561 RECURRENT PAIN OF RIGHT KNEE: Primary | ICD-10-CM

## 2019-04-18 PROCEDURE — 97110 THERAPEUTIC EXERCISES: CPT

## 2019-04-18 PROCEDURE — 97140 MANUAL THERAPY 1/> REGIONS: CPT

## 2019-04-22 ENCOUNTER — EVALUATION (OUTPATIENT)
Dept: PHYSICAL THERAPY | Facility: CLINIC | Age: 54
End: 2019-04-22
Payer: COMMERCIAL

## 2019-04-22 DIAGNOSIS — M25.561 RECURRENT PAIN OF RIGHT KNEE: Primary | ICD-10-CM

## 2019-04-22 PROCEDURE — 97112 NEUROMUSCULAR REEDUCATION: CPT | Performed by: PHYSICAL THERAPIST

## 2019-04-22 PROCEDURE — 97110 THERAPEUTIC EXERCISES: CPT | Performed by: PHYSICAL THERAPIST

## 2019-04-22 PROCEDURE — 97140 MANUAL THERAPY 1/> REGIONS: CPT | Performed by: PHYSICAL THERAPIST

## 2019-04-23 ENCOUNTER — HOSPITAL ENCOUNTER (OUTPATIENT)
Dept: NON INVASIVE DIAGNOSTICS | Facility: HOSPITAL | Age: 54
Discharge: HOME | End: 2019-04-23
Payer: COMMERCIAL

## 2019-04-23 DIAGNOSIS — I27.20 PULMONARY HYPERTENSION (HCC): ICD-10-CM

## 2019-04-23 PROCEDURE — 93306 TTE W/DOPPLER COMPLETE: CPT

## 2019-04-24 ENCOUNTER — OFFICE VISIT (OUTPATIENT)
Dept: PHYSICAL THERAPY | Facility: CLINIC | Age: 54
End: 2019-04-24
Payer: COMMERCIAL

## 2019-04-24 DIAGNOSIS — M25.561 RECURRENT PAIN OF RIGHT KNEE: Primary | ICD-10-CM

## 2019-04-24 PROCEDURE — 97110 THERAPEUTIC EXERCISES: CPT

## 2019-04-24 PROCEDURE — 97112 NEUROMUSCULAR REEDUCATION: CPT

## 2019-04-24 PROCEDURE — 97140 MANUAL THERAPY 1/> REGIONS: CPT

## 2019-04-24 PROCEDURE — 93306 TTE W/DOPPLER COMPLETE: CPT | Performed by: INTERNAL MEDICINE

## 2019-04-29 ENCOUNTER — APPOINTMENT (OUTPATIENT)
Dept: PHYSICAL THERAPY | Facility: CLINIC | Age: 54
End: 2019-04-29
Payer: COMMERCIAL

## 2019-04-29 ENCOUNTER — TELEPHONE (OUTPATIENT)
Dept: CARDIOLOGY CLINIC | Facility: CLINIC | Age: 54
End: 2019-04-29

## 2019-04-30 ENCOUNTER — OFFICE VISIT (OUTPATIENT)
Dept: PHYSICAL THERAPY | Facility: CLINIC | Age: 54
End: 2019-04-30
Payer: COMMERCIAL

## 2019-04-30 DIAGNOSIS — M25.561 RECURRENT PAIN OF RIGHT KNEE: Primary | ICD-10-CM

## 2019-04-30 PROCEDURE — 97110 THERAPEUTIC EXERCISES: CPT

## 2019-04-30 PROCEDURE — 97140 MANUAL THERAPY 1/> REGIONS: CPT

## 2019-04-30 PROCEDURE — 97112 NEUROMUSCULAR REEDUCATION: CPT

## 2019-05-01 ENCOUNTER — APPOINTMENT (OUTPATIENT)
Dept: PHYSICAL THERAPY | Facility: CLINIC | Age: 54
End: 2019-05-01
Payer: COMMERCIAL

## 2019-05-02 ENCOUNTER — OFFICE VISIT (OUTPATIENT)
Dept: PHYSICAL THERAPY | Facility: CLINIC | Age: 54
End: 2019-05-02
Payer: COMMERCIAL

## 2019-05-02 DIAGNOSIS — M25.561 RECURRENT PAIN OF RIGHT KNEE: Primary | ICD-10-CM

## 2019-05-02 PROCEDURE — 97140 MANUAL THERAPY 1/> REGIONS: CPT

## 2019-05-02 PROCEDURE — 97110 THERAPEUTIC EXERCISES: CPT

## 2019-05-06 ENCOUNTER — OFFICE VISIT (OUTPATIENT)
Dept: PHYSICAL THERAPY | Facility: CLINIC | Age: 54
End: 2019-05-06
Payer: COMMERCIAL

## 2019-05-06 DIAGNOSIS — M25.561 RECURRENT PAIN OF RIGHT KNEE: Primary | ICD-10-CM

## 2019-05-06 PROCEDURE — 97110 THERAPEUTIC EXERCISES: CPT | Performed by: PHYSICAL THERAPIST

## 2019-05-06 PROCEDURE — 97140 MANUAL THERAPY 1/> REGIONS: CPT | Performed by: PHYSICAL THERAPIST

## 2019-05-06 PROCEDURE — 97112 NEUROMUSCULAR REEDUCATION: CPT | Performed by: PHYSICAL THERAPIST

## 2019-05-08 ENCOUNTER — OFFICE VISIT (OUTPATIENT)
Dept: PHYSICAL THERAPY | Facility: CLINIC | Age: 54
End: 2019-05-08
Payer: COMMERCIAL

## 2019-05-08 DIAGNOSIS — M25.561 RECURRENT PAIN OF RIGHT KNEE: Primary | ICD-10-CM

## 2019-05-08 PROCEDURE — 97110 THERAPEUTIC EXERCISES: CPT

## 2019-05-08 PROCEDURE — 97112 NEUROMUSCULAR REEDUCATION: CPT

## 2019-05-08 PROCEDURE — 97140 MANUAL THERAPY 1/> REGIONS: CPT

## 2019-05-16 ENCOUNTER — APPOINTMENT (OUTPATIENT)
Dept: LAB | Facility: CLINIC | Age: 54
End: 2019-05-16
Payer: COMMERCIAL

## 2019-05-16 DIAGNOSIS — E53.8 B12 DEFICIENCY: ICD-10-CM

## 2019-05-16 LAB
FOLATE SERPL-MCNC: 9 NG/ML (ref 3.1–17.5)
VIT B12 SERPL-MCNC: 897 PG/ML (ref 100–900)

## 2019-05-16 PROCEDURE — 83918 ORGANIC ACIDS TOTAL QUANT: CPT

## 2019-05-16 PROCEDURE — 36415 COLL VENOUS BLD VENIPUNCTURE: CPT

## 2019-05-16 PROCEDURE — 82607 VITAMIN B-12: CPT

## 2019-05-16 PROCEDURE — 82746 ASSAY OF FOLIC ACID SERUM: CPT

## 2019-05-17 RX ORDER — OMEPRAZOLE 20 MG/1
20 CAPSULE, DELAYED RELEASE ORAL
COMMUNITY
End: 2019-06-25

## 2019-05-17 RX ORDER — AZELASTINE 1 MG/ML
1 SPRAY, METERED NASAL
COMMUNITY
End: 2019-06-25

## 2019-05-17 RX ORDER — TIOTROPIUM BROMIDE 18 UG/1
18 CAPSULE ORAL; RESPIRATORY (INHALATION)
COMMUNITY
End: 2019-06-25

## 2019-05-17 RX ORDER — FUROSEMIDE 20 MG/1
TABLET ORAL
COMMUNITY
End: 2019-06-25

## 2019-05-20 ENCOUNTER — OFFICE VISIT (OUTPATIENT)
Dept: OBGYN CLINIC | Facility: MEDICAL CENTER | Age: 54
End: 2019-05-20
Payer: COMMERCIAL

## 2019-05-20 ENCOUNTER — APPOINTMENT (OUTPATIENT)
Dept: RADIOLOGY | Facility: MEDICAL CENTER | Age: 54
End: 2019-05-20
Payer: COMMERCIAL

## 2019-05-20 ENCOUNTER — OFFICE VISIT (OUTPATIENT)
Dept: HEMATOLOGY ONCOLOGY | Facility: CLINIC | Age: 54
End: 2019-05-20
Payer: COMMERCIAL

## 2019-05-20 VITALS
TEMPERATURE: 97.7 F | HEIGHT: 63 IN | WEIGHT: 203 LBS | RESPIRATION RATE: 16 BRPM | SYSTOLIC BLOOD PRESSURE: 140 MMHG | DIASTOLIC BLOOD PRESSURE: 70 MMHG | BODY MASS INDEX: 35.97 KG/M2 | OXYGEN SATURATION: 98 % | HEART RATE: 94 BPM

## 2019-05-20 VITALS
DIASTOLIC BLOOD PRESSURE: 82 MMHG | HEIGHT: 63 IN | HEART RATE: 97 BPM | BODY MASS INDEX: 36.57 KG/M2 | SYSTOLIC BLOOD PRESSURE: 119 MMHG | WEIGHT: 206.4 LBS

## 2019-05-20 DIAGNOSIS — Z96.651 STATUS POST TOTAL RIGHT KNEE REPLACEMENT: Primary | ICD-10-CM

## 2019-05-20 DIAGNOSIS — Z96.651 STATUS POST TOTAL RIGHT KNEE REPLACEMENT: ICD-10-CM

## 2019-05-20 DIAGNOSIS — E53.8 B12 DEFICIENCY: Primary | ICD-10-CM

## 2019-05-20 PROCEDURE — 99213 OFFICE O/P EST LOW 20 MIN: CPT | Performed by: ORTHOPAEDIC SURGERY

## 2019-05-20 PROCEDURE — 73562 X-RAY EXAM OF KNEE 3: CPT

## 2019-05-20 PROCEDURE — 99213 OFFICE O/P EST LOW 20 MIN: CPT | Performed by: PHYSICIAN ASSISTANT

## 2019-05-21 LAB
METHYLMALONATE SERPL-SCNC: 118 NMOL/L (ref 0–378)
SL AMB DISCLAIMER: NORMAL

## 2019-06-25 ENCOUNTER — OFFICE VISIT (OUTPATIENT)
Dept: FAMILY MEDICINE CLINIC | Facility: OTHER | Age: 54
End: 2019-06-25
Payer: COMMERCIAL

## 2019-06-25 VITALS
WEIGHT: 207 LBS | TEMPERATURE: 98.5 F | HEART RATE: 101 BPM | OXYGEN SATURATION: 99 % | DIASTOLIC BLOOD PRESSURE: 76 MMHG | SYSTOLIC BLOOD PRESSURE: 110 MMHG | BODY MASS INDEX: 36.67 KG/M2

## 2019-06-25 DIAGNOSIS — Z11.59 NEED FOR HEPATITIS C SCREENING TEST: ICD-10-CM

## 2019-06-25 DIAGNOSIS — R79.89 ELEVATED SERUM CREATININE: Primary | ICD-10-CM

## 2019-06-25 DIAGNOSIS — Z12.11 COLON CANCER SCREENING: ICD-10-CM

## 2019-06-25 PROBLEM — M10.9 GOUT: Status: RESOLVED | Noted: 2018-06-26 | Resolved: 2019-06-25

## 2019-06-25 PROBLEM — R06.02 SOB (SHORTNESS OF BREATH): Status: RESOLVED | Noted: 2017-10-19 | Resolved: 2019-06-25

## 2019-06-25 PROCEDURE — 99213 OFFICE O/P EST LOW 20 MIN: CPT | Performed by: FAMILY MEDICINE

## 2019-06-25 PROCEDURE — 1036F TOBACCO NON-USER: CPT | Performed by: FAMILY MEDICINE

## 2019-07-10 ENCOUNTER — OFFICE VISIT (OUTPATIENT)
Dept: CARDIOLOGY CLINIC | Facility: CLINIC | Age: 54
End: 2019-07-10
Payer: COMMERCIAL

## 2019-07-10 VITALS
BODY MASS INDEX: 36 KG/M2 | OXYGEN SATURATION: 97 % | SYSTOLIC BLOOD PRESSURE: 132 MMHG | HEART RATE: 115 BPM | DIASTOLIC BLOOD PRESSURE: 78 MMHG | HEIGHT: 64 IN | WEIGHT: 210.9 LBS

## 2019-07-10 DIAGNOSIS — I50.32 CHRONIC HEART FAILURE WITH PRESERVED EJECTION FRACTION (HCC): Primary | ICD-10-CM

## 2019-07-10 PROCEDURE — 99214 OFFICE O/P EST MOD 30 MIN: CPT | Performed by: INTERNAL MEDICINE

## 2019-07-10 RX ORDER — SPIRONOLACTONE 25 MG/1
25 TABLET ORAL DAILY
Qty: 90 TABLET | Refills: 3 | Status: SHIPPED | OUTPATIENT
Start: 2019-07-10 | End: 2020-09-21 | Stop reason: SDUPTHER

## 2019-07-10 NOTE — PROGRESS NOTES
Advanced Heart Failure/Pulmonary Hypertension Outpatient Progress Note - Reggie Gowers 62 y o  female MRN: [id number]    @ Encounter: [id number]  Assessment/Plan:    # ANSARI: Likely combination of obesity/deconditioning, diastolic dysfunction (obesity, female, currently on diuretics w/ hx of volume overload), and exercise induced PAH  --Continue diet and exercise for weight loss    # Exercised induced PAH: Obesity/deconditioning and diastolic dysfunction (PCWP 12 mmHg) do not fully explain her degree of dysfunction  Recent diagnosis of MCTD/SLE w/ + lupus anticoagulant  Remote smoker  She is at risk to develop pulmonary vascular disease leading to RV dysfunction  At rest, RV appears normal on TTE with coupled RV-PA w/o e/o of RVOT notching suggestive of normal PVR at rest  However, she did have a stress echo in 2012 that was suggestive of exercise induced pulmonary HTN  At the time she did not have e/o of elevated PVR  Her bubble was negative which makes an intracardiac shunt less likely  As her symptoms had progressed, and with ambulation she showed evidence of desaturation (possible abnormal air exchange) we did the following  Dry weight 202-203 lbs:   Diag:  --PFTs normal   --Repeat stress echo to evaluate pulmonary pressures, RV, and RVOT signal w/ exercise showed exaggerated HR response, HTNsive response, decrease in O2 saturation from 99% to 91% and increase in PA pressures from 45 mmHg to 70 mmHg with exercise  Exercised for 5 min and 20 sec  --RHC w/ exercise: This was an exercise RHC with simultaneous monitoring of the distal and proximal SG catheter ports  She had an appropriate HR response from 100 to 130 bpm with MAP throughout the study staying at 112 mmHg  Hgb 13 1  With exercise, there was an increase in PVR from <3 to ~4 5 MOLINA  PCWP increased from 12 to 18 mmHg  CVP increased from 7 to 10 mmHg   The findings were consistent with exercise induced pulmonary hypertension with an abnormal pulmonary vascular response  Her TTE and PCWP suggest a component of L heart disease  Her PFTs did not show any significant lung disease (Group III), V/Q scan is not consistent with CTEPH (Group IV PAH) and stress echo showed elevation of PA pressures with exercise which is consistent with above findings  --TTE 4/23/19 shows normal RV size and function  --Slow improvement on sildenafil and macitentan  --Mild volume overload currently  --Will consider RHC w/ exercise if symptoms do not return to baseline after weight back to ~202 lbs     Therapeutic:  --Increase to torsemide 60 mg PO BID (patient dose reduced from BID)  --Start spironolactone 25 mg PO daily; decrease Kdur to 20 mEq PO qAM  --Goal weight about 202-203 lbs for now  Currently 210 lbs  --Daily weights qAM  --Continue sildenafil 20 mg PO q8hrs  --Continue macitentan 10 mg PO daily    # SLE: Per outpatient Rheumatologist  Continue plaquenil  # ST: V/Q negative  May be 2/2 to deconditioning +/- inappropriate ST +/- diastolic dysfunction/HF  No e/o infection  --Continue to monitor, can consider coby agents in the future  # Morbid obesity: Continue weight loss    RTC in 3 months    HPI: Very pleasant 62 y o  woman w/ PMHx of newly diagnosed MCTD/SLE, +lupus anti-coagulant, B12 deficiency, and obesity referred for evaluation of ANSARI  She states that she first started getting SOB -- 10 years ago  She has had several episodes of bacterial PNA and chronic cough (a few times/year)  She was referred to [facility] in 2012 for workup for PH  She had a a stress echo 8/2012 that showed that her PA pressures more than doubled from --24 mmHg to > 50 mmHg  Currently, she states she can walk up a couple flights of stairs but does get SOB  She also gets SOB with walking up inclines  She gets occasional LH  She has been found to be B12 deficient, but that is improving       She was seeing a Rheumatologist in [place], [place] but recently saw a specialist at [facility], Dr Janice Nance (Rheumatologist - 11/16)  She was diagnosed with MCTD and SLE  HELEEN + 1:320 w/ speckled pattern  She has been on Plaquenil x 2 weeks now  She is on ASA for + lupus anticoagulant and anticardiolipin Abs  She has joint pains and a subtle malar rash  She states so far there has been no change with plaquenil  She has f/u with him in 2/2018  After her visit with Dr Silviano Roque, she has had TTE which shows LVEF --65%, normal RV size and function without RVOT notching  Normal atria  CXR clear  She also has had a negative V/Q scan  She walked the patient in the hallway and had her go up and down stairs  Her resting HR went from --100 bpm to 110s with Pulse Ox starting from 98% @ rest to --90% with exercise  Pulse Ox showed a good waveform throughout  She denies CP, palpitations, presyncope, or syncope  She notes that she went on a two week cruise and afterwards developed LE edema in the past and has developed LE edema  Today, 1/9/2018, returns for f/u  Had echo stress test w/ poor exercise tolerance and decrease in O2 saturation w/ increase in PA pressures from 45 to 70 mmHg w/ exercise  Exercise RHC showed exercise induced PH w/ increase in PVR from <3 to ~4 5 MOLINA  PCWP increased from 12 to 18 mmHg  CVP increased from 7 to 10 mmHg  She was denied for tadalafil, but approved for sildenafil  She started sildenafil on 1/5/18 and states she does not feel any different and continues to have trouble with stairs  In addition, today in clinic, she continues to be tachycardic  Has gained 4 lbs since her last visit  Today, 2/13/2018, returns for f/u  She has gained ~ 4 lbs since her last visit, for a total of 9 lbs since attempted diuresis  She started sildenafil on 1/5/18 and states she feels worse going up stairs than previously  In addition, today in clinic, she continues to be tachycardic  Today, 3/19/2018, she returns for f/u  She states going up stairs there is improvement now   She has been on macitentan for 1 week now  Weight has steadily been decreasing  Today, 4/30/2018, she returns for f/u  She states she feels about the same as her last visit  Weight has steadily been decreasing  Today, 6/11/2018, she returns for f/u  She feels slightly better than her last visit  Weight continues to decrease  Walking up one flight is not a problem, two flights feels SOB  Today, 7/12/2018, she returns for f/u  Her weight is ~206 lbs on home scale  She has been out and walking around with her puppy about 15-20 minutes every night  She feels a little winded on the top of the hills, but not having to stop  Today, 8/30/18, she returns for f/u  She feels the same as last visit, however, she is having knee pains  She has a meniscus tear in the R knee  Today, 11/19/18, she returns for f/u  Exertional symptoms are stable  Her knee pains are persistent despite meniscus surgery  She is considering further surgery or knee injections  Today, 1/28/19, she returns for f/u  Preop for knee surgery  Today, 4/15/19, she returns for f/u  Continues to recover from surgery  Today, 7/10/19, she returns for f/u  She is walking around again without difficulty  Having trouble going up hills  Stairs are a little more challenging as well  Weight is up about 8-10 lbs       Past Medical History:   Diagnosis Date    HELENE positive     Anemia     Sildenafil causes decreased hematocrit    Chronic kidney disease     borderline lab values    Chronic rhinitis 6/4/2012    Encephalitis     Lasted Assessed 10/18/2017    Gout 6/26/2018    Lupus anticoagulant disorder (HCC)     Maxillary sinusitis     Lasted Assessed 10/18/2017    Night sweat     Osteopenia     Hip    Osteoporosis     Spine    Pneumonia     Last Assessed 10/18/2017    PONV (postoperative nausea and vomiting)     Pulmonary hypertension (HCC)     Seizures (HCC)     Only during Encephalitis    Shortness of breath     with exertion    Sinus tachycardia     Urinary incontinence      Review of Systems - 12 point ROS was done and is negative, except as noted above       Allergies   Allergen Reactions    Dilantin [Phenytoin] Anaphylaxis and Rash    Other     Augmentin [Amoxicillin-Pot Clavulanate] Rash and Dermatitis    Penicillins Rash       Current Outpatient Medications:     aspirin (ECOTRIN LOW STRENGTH) 81 mg EC tablet, Take 162 mg by mouth daily, Disp: , Rfl:     cholecalciferol (VITAMIN D3) 1,000 units tablet, Take 2,000 Units by mouth daily in the early morning  , Disp: , Rfl:     cyanocobalamin 1,000 mcg/mL, Inject 1mL IM weekly (Patient taking differently: Inject 1,000 mcg into a muscle Inject 1mL IM weekly ), Disp: 10 mL, Rfl: 2    hydroxychloroquine (PLAQUENIL) 200 mg tablet, Take 400 mg by mouth daily with breakfast  , Disp: , Rfl:     Macitentan (OPSUMIT) 10 MG tablet, Take 1 tablet (10 mg total) by mouth daily, Disp: 30 tablet, Rfl: 11    potassium chloride (K-DUR,KLOR-CON) 20 mEq tablet, Take 2 tablets (40 mEq total) by mouth 2 (two) times a day, Disp: 960 tablet, Rfl: 3    sildenafil (REVATIO) 20 mg tablet, Take 1 tablet (20 mg total) by mouth 3 (three) times a day, Disp: 90 tablet, Rfl: 11    torsemide (DEMADEX) 20 mg tablet, Take 2 tablets (40 mg total) by mouth daily (Patient taking differently: Take 40 mg by mouth 2 (two) times a day  ), Disp: 240 tablet, Rfl: 3    traMADol (ULTRAM) 50 mg tablet, Take 1 tablet (50 mg total) by mouth daily at bedtime as needed for moderate pain or severe pain, Disp: 30 tablet, Rfl: 0    triamcinolone (KENALOG) 0 1 % oral topical paste, prn, Disp: , Rfl:     Social History     Socioeconomic History    Marital status: /Civil Union     Spouse name: Not on file    Number of children: 2    Years of education: Not on file    Highest education level: Not on file   Occupational History    Not on file   Social Needs    Financial resource strain: Not on file    Food insecurity:     Worry: Not on file Inability: Not on file    Transportation needs:     Medical: Not on file     Non-medical: Not on file   Tobacco Use    Smoking status: Former Smoker     Last attempt to quit: 2006     Years since quittin 4    Smokeless tobacco: Never Used   Substance and Sexual Activity    Alcohol use: Yes     Comment: socially    Drug use: No    Sexual activity: Yes     Partners: Male   Lifestyle    Physical activity:     Days per week: Not on file     Minutes per session: Not on file    Stress: Not on file   Relationships    Social connections:     Talks on phone: Not on file     Gets together: Not on file     Attends [employer] service: Not on file     Active member of club or organization: Not on file     Attends meetings of clubs or organizations: Not on file     Relationship status: Not on file    Intimate partner violence:     Fear of current or ex partner: Not on file     Emotionally abused: Not on file     Physically abused: Not on file     Forced sexual activity: Not on file   Other Topics Concern    Not on file   Social History Narrative    Daily caffeinated coffee consumption    Exercise habits       Family History   Problem Relation Age of Onset    Uterine cancer Mother     Pancreatic cancer Mother     Diabetes Mother     Heart disease Father         open heart surgery at age 48     Stroke Father         CVA    Hypertension Father     Atrial fibrillation Father     Hyperlipidemia Father     No Known Problems Sister     No Known Problems Brother     Thyroid disease Maternal Grandmother     Asthma Daughter     Heart attack Maternal Grandfather     Heart attack Paternal Grandmother     Skin cancer Paternal Grandfather     No Known Problems Sister     Thyroid disease Sister     Depression Sister     Osteoarthritis Sister     Hemochromatosis Brother    Vena Kinza Migraines Daughter     Asthma Daughter     Anuerysm Neg Hx     Clotting disorder Neg Hx     Heart failure Neg Hx      Physical Exam:  Vitals:    07/10/19 1634   BP: 132/78   BP Location: Right arm   Patient Position: Sitting   Cuff Size: Large   Pulse: (!) 115   SpO2: 97%   Weight: 95 7 kg (210 lb 14 4 oz)   Height: 5' 4" (1 626 m)     GEN: Ingrid Rincon appears well, alert and oriented x 3, pleasant and cooperative   HEENT: pupils equal, round, and reactive to light; extraocular muscles intact  NECK: supple, no carotid bruits   HEART: regular rhythm, normal S1 and S2, no murmurs, clicks, gallops or rubs, JVP is    LUNGS: clear to auscultation bilaterally; no wheezes, rales, or rhonchi   ABDOMEN: normal bowel sounds, soft, no tenderness, no distention  EXTREMITIES: peripheral pulses normal; no clubbing, cyanosis, or edema  NEURO: no focal findings   SKIN: normal without suspicious lesions on exposed skin    Labs & Results:  Lab Results   Component Value Date    WBC 6 50 02/13/2019    HGB 9 9 (L) 02/13/2019    HCT 30 4 (L) 02/13/2019    MCV 93 02/13/2019     02/13/2019       Chemistry        Component Value Date/Time    K 4 0 02/14/2019 0535     02/14/2019 0535    CO2 24 02/14/2019 0535    BUN 12 02/14/2019 0535    CREATININE 1 04 02/14/2019 0535        Component Value Date/Time    CALCIUM 8 4 02/14/2019 0535    ALKPHOS 95 02/12/2019 1428    AST 18 02/12/2019 1428    ALT 27 02/12/2019 1428        EKG personally reviewed by José Luis Méndez MD      Counseling / Coordination of Care  Total floor / unit time spent today 40 minutes  Greater than 50% of total time was spent with the patient and / or family counseling and / or coordination of care  A description of the counseling / coordination of care: 25 min  Thank you for the opportunity to participate in the care of this patient      José Luis Méndez MD, PhD   [facility]

## 2019-07-11 DIAGNOSIS — E53.8 B12 DEFICIENCY: ICD-10-CM

## 2019-07-11 RX ORDER — CYANOCOBALAMIN 1000 UG/ML
INJECTION INTRAMUSCULAR; SUBCUTANEOUS
Qty: 10 ML | Refills: 2 | Status: SHIPPED | OUTPATIENT
Start: 2019-07-11 | End: 2019-12-02 | Stop reason: SDUPTHER

## 2019-07-11 NOTE — TELEPHONE ENCOUNTER
Call transferred from [facility] [address], [facility]  Patient called requesting refill of Cyancobalamin injection  States will need refill for next weeks dose through [facility]   Patient phone number: [phone]

## 2019-07-18 ENCOUNTER — APPOINTMENT (OUTPATIENT)
Dept: LAB | Facility: CLINIC | Age: 54
End: 2019-07-18
Payer: COMMERCIAL

## 2019-07-18 ENCOUNTER — TRANSCRIBE ORDERS (OUTPATIENT)
Dept: LAB | Facility: CLINIC | Age: 54
End: 2019-07-18

## 2019-07-18 DIAGNOSIS — Z11.59 NEED FOR HEPATITIS C SCREENING TEST: ICD-10-CM

## 2019-07-18 LAB
ANION GAP SERPL CALCULATED.3IONS-SCNC: 8 MMOL/L (ref 4–13)
BUN SERPL-MCNC: 25 MG/DL (ref 5–25)
CALCIUM SERPL-MCNC: 9.5 MG/DL (ref 8.3–10.1)
CHLORIDE SERPL-SCNC: 100 MMOL/L (ref 100–108)
CO2 SERPL-SCNC: 31 MMOL/L (ref 21–32)
CREAT SERPL-MCNC: 1.19 MG/DL (ref 0.6–1.3)
GFR SERPL CREATININE-BSD FRML MDRD: 51 ML/MIN/1.73SQ M
GLUCOSE SERPL-MCNC: 82 MG/DL (ref 65–140)
POTASSIUM SERPL-SCNC: 3.9 MMOL/L (ref 3.5–5.3)
SODIUM SERPL-SCNC: 139 MMOL/L (ref 136–145)

## 2019-07-18 PROCEDURE — 86803 HEPATITIS C AB TEST: CPT

## 2019-07-18 PROCEDURE — 80048 BASIC METABOLIC PNL TOTAL CA: CPT | Performed by: INTERNAL MEDICINE

## 2019-07-18 PROCEDURE — 36415 COLL VENOUS BLD VENIPUNCTURE: CPT | Performed by: INTERNAL MEDICINE

## 2019-07-19 LAB — HCV AB SER QL: ABNORMAL

## 2019-07-22 ENCOUNTER — TELEPHONE (OUTPATIENT)
Dept: FAMILY MEDICINE CLINIC | Facility: OTHER | Age: 54
End: 2019-07-22

## 2019-07-22 ENCOUNTER — TELEPHONE (OUTPATIENT)
Dept: CARDIOLOGY CLINIC | Facility: CLINIC | Age: 54
End: 2019-07-22

## 2019-07-22 DIAGNOSIS — Z11.59 NEED FOR HEPATITIS C SCREENING TEST: Primary | ICD-10-CM

## 2019-07-22 NOTE — TELEPHONE ENCOUNTER
Patient informed       ----- Message from Cahy Escamilla DO sent at 7/22/2019 10:32 AM EDT -----  Please inform pt that Hep C test came back "low reactive" -- we are ordering a secondary, confirmatory test to further evaluate this result  Thanks!   Chay Escamilla DO

## 2019-07-22 NOTE — TELEPHONE ENCOUNTER
Spoke with Agustín Abreu her that her labs were normal  She said that's great news  No further action was taken     ===View-only below this line===    ----- Message -----  From: Bety Andrews MD  Sent: 7/19/2019   5:03 PM EDT  To: Ana Lilia Cheney MA    Results are normal  Please notify the patient  Thank you!

## 2019-07-23 ENCOUNTER — APPOINTMENT (OUTPATIENT)
Dept: LAB | Facility: CLINIC | Age: 54
End: 2019-07-23
Payer: COMMERCIAL

## 2019-07-23 DIAGNOSIS — Z11.59 NEED FOR HEPATITIS C SCREENING TEST: ICD-10-CM

## 2019-07-23 PROCEDURE — 36415 COLL VENOUS BLD VENIPUNCTURE: CPT

## 2019-07-23 PROCEDURE — 87522 HEPATITIS C REVRS TRNSCRPJ: CPT

## 2019-07-25 LAB
HCV RNA SERPL NAA+PROBE-ACNC: NORMAL IU/ML
TEST INFORMATION: NORMAL

## 2019-08-05 ENCOUNTER — TELEPHONE (OUTPATIENT)
Dept: OBGYN CLINIC | Facility: HOSPITAL | Age: 54
End: 2019-08-05

## 2019-08-05 ENCOUNTER — TELEPHONE (OUTPATIENT)
Dept: CARDIOLOGY CLINIC | Facility: CLINIC | Age: 54
End: 2019-08-05

## 2019-08-05 DIAGNOSIS — I27.20 PULMONARY HYPERTENSION (HCC): ICD-10-CM

## 2019-08-05 RX ORDER — TORSEMIDE 20 MG/1
40 TABLET ORAL 2 TIMES DAILY
Qty: 360 TABLET | Refills: 3 | Status: SHIPPED | OUTPATIENT
Start: 2019-08-05 | End: 2020-09-21 | Stop reason: SDUPTHER

## 2019-08-05 NOTE — TELEPHONE ENCOUNTER
Lorri Mendes was seen on 7/10  She called to give you an update  She said you wanted to know if her breathing improves   She said it has not improved and may even be slightly worse  She is taking all meds as prescribed  Her weight is hovering around 207#  In the office, it was 210  She said her edema has slightly improved  Please advise

## 2019-08-05 NOTE — TELEPHONE ENCOUNTER
Dr Yoav Esquivel    Patient had a R total knee arthroplasty on 2/12/2019  She broke a tooth and has a dental appt Tuesday morning 8/6/19 @ 10 am  Her Dental office is asking since it has been less then 6 months if it will be ok to pull the tooth if needed   Patient stated she already has the antibiotic    Please contact patient @ [phone]

## 2019-08-05 NOTE — TELEPHONE ENCOUNTER
Keep going with the higher dose of torsemide  Once weight back down to ~202 lbs, call with symptom update  If no improvement, will consider repeat of RHC with exercise  Thanks!

## 2019-08-06 NOTE — TELEPHONE ENCOUNTER
Called spoke with the patient  She is 1 week shy of 6 months out from surgery    At this point as long as she takes her dental prophylactic antibiotics as instructed is okay for her to proceed with her dental procedure

## 2019-08-26 ENCOUNTER — OFFICE VISIT (OUTPATIENT)
Dept: OBGYN CLINIC | Facility: CLINIC | Age: 54
End: 2019-08-26
Payer: COMMERCIAL

## 2019-08-26 ENCOUNTER — APPOINTMENT (OUTPATIENT)
Dept: RADIOLOGY | Facility: AMBULARY SURGERY CENTER | Age: 54
End: 2019-08-26
Payer: COMMERCIAL

## 2019-08-26 VITALS
HEIGHT: 64 IN | WEIGHT: 210 LBS | HEART RATE: 84 BPM | DIASTOLIC BLOOD PRESSURE: 81 MMHG | SYSTOLIC BLOOD PRESSURE: 124 MMHG | BODY MASS INDEX: 35.85 KG/M2

## 2019-08-26 DIAGNOSIS — Z96.651 STATUS POST TOTAL RIGHT KNEE REPLACEMENT: Primary | ICD-10-CM

## 2019-08-26 DIAGNOSIS — Z96.651 STATUS POST TOTAL RIGHT KNEE REPLACEMENT: ICD-10-CM

## 2019-08-26 PROCEDURE — 99213 OFFICE O/P EST LOW 20 MIN: CPT | Performed by: ORTHOPAEDIC SURGERY

## 2019-08-26 PROCEDURE — 73562 X-RAY EXAM OF KNEE 3: CPT

## 2019-08-26 NOTE — PROGRESS NOTES
Assessment:    6 months status post right total knee arthroplasty doing well    Plan:    weightbearing as tolerated right lower extremity  Activities as tolerated  No more Jovanny stockings  Patient does have prescriptions are ready for dental prophylactic antibiotics  Continue working  Without restrictions    Follow Up:  6  month(s)    To Do Next Visit:  X-rays of the  right  knee      CHIEF COMPLAINT:  Chief Complaint   Patient presents with    Right Knee - Post-op         SUBJECTIVE:  Gavin Parra is a 62y o  year old female who presents for follow up after  Right total knee arthroplasty 6 months ago  Today patient has  No complaints  She no longer has any pain of the right knee  No falls or injuries since her last visit  To the office  No fevers or chills  She has return of sensation over the anterior knee near the incision line          PHYSICAL EXAMINATION:  General: well developed and well nourished, alert, oriented times 3 and appears comfortable  Psychiatric: Normal    MUSCULOSKELETAL EXAMINATION:  Incision:  incision is well healed, there is no effusion no erythema no ecchymosis  Range of Motion:  full extension, active passive flexion to at least 120°  Neurovascular status: Neuro intact, good cap refill  Activity Restrictions: No restrictions        STUDIES REVIEWED:  Images were reviewd in PACS:   x-ray of right knee today demonstrates stable implants and hardware status post total knee arthroplasty, possible stress shielding along the femoral implant on AP view no clear evidence of lucency or loosening      PROCEDURES PERFORMED:  Procedures  No Procedures performed today

## 2019-08-30 ENCOUNTER — TELEPHONE (OUTPATIENT)
Dept: CARDIOLOGY CLINIC | Facility: CLINIC | Age: 54
End: 2019-08-30

## 2019-08-30 NOTE — TELEPHONE ENCOUNTER
P/C crying  She told me she got fired from her job because she uses the bathroom to much  She would like for you to call her   # [id number]  Her  said for her to go out on disability, and she wants to know if you would support her on that? Pt aware you are out of the office until Wednesday        Please advise

## 2019-09-04 ENCOUNTER — TELEPHONE (OUTPATIENT)
Dept: CARDIOLOGY CLINIC | Facility: CLINIC | Age: 54
End: 2019-09-04

## 2019-09-04 NOTE — TELEPHONE ENCOUNTER
opsumit 10 mg daily prior auth submitted to [employer] [place] through cover my meds

## 2019-09-10 ENCOUNTER — DOCUMENTATION (OUTPATIENT)
Dept: CARDIOLOGY CLINIC | Facility: CLINIC | Age: 54
End: 2019-09-10

## 2019-09-10 NOTE — TELEPHONE ENCOUNTER
Opsumit 10 mg daily   Approved through [employer] and [place], [place] therapeutic    9/6/19 -9/6/2020  Case [id number]

## 2019-09-10 NOTE — PROGRESS NOTES
Received and completed a short term disability request for Damaris Weber  It was submitted through the [employer]  Dr Radha Munguia approved a short term disability leave until 11/28/2019

## 2019-09-25 ENCOUNTER — OFFICE VISIT (OUTPATIENT)
Dept: CARDIOLOGY CLINIC | Facility: CLINIC | Age: 54
End: 2019-09-25
Payer: COMMERCIAL

## 2019-09-25 VITALS
BODY MASS INDEX: 35.41 KG/M2 | SYSTOLIC BLOOD PRESSURE: 106 MMHG | HEIGHT: 64 IN | OXYGEN SATURATION: 97 % | WEIGHT: 207.4 LBS | HEART RATE: 108 BPM | DIASTOLIC BLOOD PRESSURE: 70 MMHG

## 2019-09-25 DIAGNOSIS — I27.20 PULMONARY HYPERTENSION (HCC): Primary | ICD-10-CM

## 2019-09-25 PROCEDURE — 99214 OFFICE O/P EST MOD 30 MIN: CPT | Performed by: INTERNAL MEDICINE

## 2019-09-25 RX ORDER — CLINDAMYCIN HYDROCHLORIDE 300 MG/1
CAPSULE ORAL
Refills: 3 | COMMUNITY
Start: 2019-08-26 | End: 2019-10-01 | Stop reason: SDUPTHER

## 2019-09-25 NOTE — PROGRESS NOTES
Advanced Heart Failure/Pulmonary Hypertension Outpatient Progress Note - Reggie Gowers 62 y o  female MRN: [id number]    @ Encounter: [id number]    Plan:  --Daily weights qAM  --Trial stop macitentan 10 mg PO daily as may be causing fluid retention and maybe able to dose reduce diuretic  --Decrease torsemide to 40 mg PO BID  --Continue spironolactone 25 mg PO daily; decrease Kdur to 20 mEq PO qAM  --Continue sildenafil 20 mg PO q8hrs    Assessment:  # ANSARI: Likely combination of obesity/deconditioning, diastolic dysfunction (obesity, female, currently on diuretics w/ hx of volume overload), and exercise induced PH  --Continue diet and exercise for weight loss    # Exercised induced PAH: Obesity/deconditioning and diastolic dysfunction (PCWP 12 mmHg) do not fully explain her degree of dysfunction  Recent diagnosis of MCTD/SLE w/ + lupus anticoagulant  Remote smoker  She is at risk to develop pulmonary vascular disease leading to RV dysfunction  At rest, RV appears normal on TTE with coupled RV-PA w/o e/o of RVOT notching suggestive of normal PVR at rest  However, she did have a stress echo in 2012 that was suggestive of exercise induced pulmonary HTN  At the time she did not have e/o of elevated PVR  Her bubble was negative which makes an intracardiac shunt less likely  As her symptoms had progressed, and with ambulation she showed evidence of desaturation (possible abnormal air exchange) we did the following  Dry weight 202-203 lbs:   Diag:  --PFTs normal   --Repeat stress echo to evaluate pulmonary pressures, RV, and RVOT signal w/ exercise showed exaggerated HR response, HTNsive response, decrease in O2 saturation from 99% to 91% and increase in PA pressures from 45 mmHg to 70 mmHg with exercise  Exercised for 5 min and 20 sec  --RHC w/ exercise: This was an exercise RHC with simultaneous monitoring of the distal and proximal SG catheter ports   She had an appropriate HR response from 100 to 130 bpm with MAP throughout the study staying at 112 mmHg  Hgb 13 1  With exercise, there was an increase in PVR from <3 to ~4 5 MOLINA  PCWP increased from 12 to 18 mmHg  CVP increased from 7 to 10 mmHg  The findings were consistent with exercise induced pulmonary hypertension with an abnormal pulmonary vascular response  Her TTE and PCWP suggest a component of L heart disease  Her PFTs did not show any significant lung disease (Group III), V/Q scan is not consistent with CTEPH (Group IV PAH) and stress echo showed elevation of PA pressures with exercise which is consistent with above findings  --TTE 4/23/19 shows normal RV size and function  --Slow improvement on sildenafil and macitentan  --Mild volume overload currently  --Will consider RHC w/ exercise if symptoms do not return to baseline after weight back to ~202 lbs     Therapeutic:  --Decrease torsemide to 40 mg PO BID  --Continue spironolactone 25 mg PO daily; decrease Kdur to 20 mEq PO qAM  --Goal weight about 202-203 lbs for now  Currently 210 lbs  --Daily weights qAM  --Continue sildenafil 20 mg PO q8hrs  --Trial stop macitentan 10 mg PO daily    # SLE: Per outpatient Rheumatologist  Continue plaquenil  # ST: V/Q negative  May be 2/2 to deconditioning +/- inappropriate ST +/- diastolic dysfunction/HF  No e/o infection  --Continue to monitor, can consider coby agents in the future  # Morbid obesity: Continue weight loss    RTC in 3 months    HPI: Very pleasant 62 y o  woman w/ PMHx of newly diagnosed MCTD/SLE, +lupus anti-coagulant, B12 deficiency, and obesity referred for evaluation of ANSARI  She states that she first started getting SOB -- 10 years ago  She has had several episodes of bacterial PNA and chronic cough (a few times/year)  She was referred to [facility] in 2012 for workup for PH  She had a a stress echo 8/2012 that showed that her PA pressures more than doubled from --24 mmHg to > 50 mmHg   Currently, she states she can walk up a couple flights of stairs but does get SOB  She also gets SOB with walking up inclines  She gets occasional LH  She has been found to be B12 deficient, but that is improving  She was seeing a Rheumatologist in [place], [place] but recently saw a specialist at [facility], Dr Seble Nance (Rheumatologist - 11/16)  She was diagnosed with MCTD and SLE  HELENE + 1:320 w/ speckled pattern  She has been on Plaquenil x 2 weeks now  She is on ASA for + lupus anticoagulant and anticardiolipin Abs  She has joint pains and a subtle malar rash  She states so far there has been no change with plaquenil  She has f/u with him in 2/2018  After her visit with Dr Susie Robert, she has had TTE which shows LVEF --65%, normal RV size and function without RVOT notching  Normal atria  CXR clear  She also has had a negative V/Q scan  She walked the patient in the hallway and had her go up and down stairs  Her resting HR went from --100 bpm to 110s with Pulse Ox starting from 98% @ rest to --90% with exercise  Pulse Ox showed a good waveform throughout  She denies CP, palpitations, presyncope, or syncope  She notes that she went on a two week cruise and afterwards developed LE edema in the past and has developed LE edema  Today, 1/9/2018, returns for f/u  Had echo stress test w/ poor exercise tolerance and decrease in O2 saturation w/ increase in PA pressures from 45 to 70 mmHg w/ exercise  Exercise RHC showed exercise induced PH w/ increase in PVR from <3 to ~4 5 MOLINA  PCWP increased from 12 to 18 mmHg  CVP increased from 7 to 10 mmHg  She was denied for tadalafil, but approved for sildenafil  She started sildenafil on 1/5/18 and states she does not feel any different and continues to have trouble with stairs  In addition, today in clinic, she continues to be tachycardic  Has gained 4 lbs since her last visit  Today, 2/13/2018, returns for f/u  She has gained ~ 4 lbs since her last visit, for a total of 9 lbs since attempted diuresis   She started sildenafil on 1/5/18 and states she feels worse going up stairs than previously  In addition, today in clinic, she continues to be tachycardic  Today, 3/19/2018, she returns for f/u  She states going up stairs there is improvement now  She has been on macitentan for 1 week now  Weight has steadily been decreasing  Today, 4/30/2018, she returns for f/u  She states she feels about the same as her last visit  Weight has steadily been decreasing  Today, 6/11/2018, she returns for f/u  She feels slightly better than her last visit  Weight continues to decrease  Walking up one flight is not a problem, two flights feels SOB  Today, 7/12/2018, she returns for f/u  Her weight is ~206 lbs on home scale  She has been out and walking around with her puppy about 15-20 minutes every night  She feels a little winded on the top of the hills, but not having to stop  Today, 8/30/18, she returns for f/u  She feels the same as last visit, however, she is having knee pains  She has a meniscus tear in the R knee  Today, 11/19/18, she returns for f/u  Exertional symptoms are stable  Her knee pains are persistent despite meniscus surgery  She is considering further surgery or knee injections  Today, 1/28/19, she returns for f/u  Preop for knee surgery  Today, 4/15/19, she returns for f/u  Continues to recover from surgery  Today, 7/10/19, she returns for f/u  She is walking around again without difficulty  Having trouble going up hills  Stairs are a little more challenging as well  Weight is up about 8-10 lbs  Today, 9/25/19, she returns for f/u  She was released from her job due to "spending too much time in the bathroom " She states this is likely due to her diuretic  Continues to have trouble with hills, stairs continues to be SOB  Stable symptoms from last visit       Past Medical History:   Diagnosis Date    HELENE positive     Anemia     Sildenafil causes decreased hematocrit    Chronic kidney disease borderline lab values    Chronic rhinitis 6/4/2012    Encephalitis     Lasted Assessed 10/18/2017    Gout 6/26/2018    Lupus anticoagulant disorder (HCC)     Maxillary sinusitis     Lasted Assessed 10/18/2017    Night sweat     Osteopenia     Hip    Osteoporosis     Spine    Pneumonia     Last Assessed 10/18/2017    PONV (postoperative nausea and vomiting)     Pulmonary hypertension (HCC)     Seizures (HCC)     Only during Encephalitis    Shortness of breath     with exertion    Sinus tachycardia     Urinary incontinence      Review of Systems - 12 point ROS was done and is negative, except as noted above       Allergies   Allergen Reactions    Dilantin [Phenytoin] Anaphylaxis and Rash    Other     Augmentin [Amoxicillin-Pot Clavulanate] Rash and Dermatitis    Penicillins Rash       Current Outpatient Medications:     aspirin (ECOTRIN LOW STRENGTH) 81 mg EC tablet, Take 162 mg by mouth daily, Disp: , Rfl:     cholecalciferol (VITAMIN D3) 1,000 units tablet, Take 2,000 Units by mouth daily in the early morning  , Disp: , Rfl:     cyanocobalamin 1,000 mcg/mL, Inject 1mL IM weekly, Disp: 10 mL, Rfl: 2    hydroxychloroquine (PLAQUENIL) 200 mg tablet, Take 400 mg by mouth daily with breakfast  , Disp: , Rfl:     Macitentan (OPSUMIT) 10 MG tablet, Take 1 tablet (10 mg total) by mouth daily, Disp: 30 tablet, Rfl: 11    potassium chloride (K-DUR,KLOR-CON) 20 mEq tablet, Take 2 tablets (40 mEq total) by mouth 2 (two) times a day (Patient taking differently: Take 20 mEq by mouth daily ), Disp: 960 tablet, Rfl: 3    sildenafil (REVATIO) 20 mg tablet, Take 1 tablet (20 mg total) by mouth 3 (three) times a day, Disp: 90 tablet, Rfl: 11    spironolactone (ALDACTONE) 25 mg tablet, Take 1 tablet (25 mg total) by mouth daily, Disp: 90 tablet, Rfl: 3    torsemide (DEMADEX) 20 mg tablet, Take 2 tablets (40 mg total) by mouth 2 (two) times a day (Patient taking differently: Take 40 mg by mouth 2 (two) times a day ), Disp: 360 tablet, Rfl: 3    triamcinolone (KENALOG) 0 1 % oral topical paste, prn, Disp: , Rfl:     clindamycin (CLEOCIN) 300 MG capsule, TK 2 CS PO 1 HOUR BEFORE DENTAL PROCEDURE, Disp: , Rfl: 3    Social History     Socioeconomic History    Marital status: /Civil Union     Spouse name: Not on file    Number of children: 2    Years of education: Not on file    Highest education level: Not on file   Occupational History    Not on file   Social Needs    Financial resource strain: Not on file    Food insecurity:     Worry: Not on file     Inability: Not on file    Transportation needs:     Medical: Not on file     Non-medical: Not on file   Tobacco Use    Smoking status: Former Smoker     Last attempt to quit: 2006     Years since quittin 6    Smokeless tobacco: Never Used   Substance and Sexual Activity    Alcohol use: Yes     Comment: socially    Drug use: No    Sexual activity: Yes     Partners: Male   Lifestyle    Physical activity:     Days per week: Not on file     Minutes per session: Not on file    Stress: Not on file   Relationships    Social connections:     Talks on phone: Not on file     Gets together: Not on file     Attends Sabianist service: Not on file     Active member of club or organization: Not on file     Attends meetings of clubs or organizations: Not on file     Relationship status: Not on file    Intimate partner violence:     Fear of current or ex partner: Not on file     Emotionally abused: Not on file     Physically abused: Not on file     Forced sexual activity: Not on file   Other Topics Concern    Not on file   Social History Narrative    Daily caffeinated coffee consumption    Exercise habits       Family History   Problem Relation Age of Onset    Uterine cancer Mother     Pancreatic cancer Mother     Diabetes Mother     Heart disease Father         open heart surgery at age 48     Stroke Father         CVA    Hypertension Father    [employer] Atrial fibrillation Father     Hyperlipidemia Father     No Known Problems Sister     No Known Problems Brother     Thyroid disease Maternal Grandmother     Asthma Daughter     Heart attack Maternal Grandfather     Heart attack Paternal Grandmother     Skin cancer Paternal Grandfather     No Known Problems Sister     Thyroid disease Sister     Depression Sister     Osteoarthritis Sister     Hemochromatosis Brother    Harden Migraines Daughter     Asthma Daughter     Anuerysm Neg Hx     Clotting disorder Neg Hx     Heart failure Neg Hx      Vitals:    09/25/19 1713   BP: 106/70   BP Location: Right arm   Patient Position: Sitting   Cuff Size: Large   Pulse: (!) 108   SpO2: 97%   Weight: 94 1 kg (207 lb 6 4 oz)   Height: 5' 4" (1 626 m)       GEN: Ingrid Rincon appears well, alert and oriented x 3, pleasant and cooperative   HEENT: pupils equal, round, and reactive to light; extraocular muscles intact  NECK: supple, no carotid bruits   HEART: regular rhythm, normal S1 and S2, no murmurs, clicks, gallops or rubs, JVP is    LUNGS: clear to auscultation bilaterally; no wheezes, rales, or rhonchi   ABDOMEN: normal bowel sounds, soft, no tenderness, no distention  EXTREMITIES: peripheral pulses normal; no clubbing, cyanosis, or edema  NEURO: no focal findings   SKIN: normal without suspicious lesions on exposed skin    Labs & Results:  Lab Results   Component Value Date    WBC 6 50 02/13/2019    HGB 9 9 (L) 02/13/2019    HCT 30 4 (L) 02/13/2019    MCV 93 02/13/2019     02/13/2019       Chemistry        Component Value Date/Time    K 3 9 07/18/2019 1638     07/18/2019 1638    CO2 31 07/18/2019 1638    BUN 25 07/18/2019 1638    CREATININE 1 19 07/18/2019 1638        Component Value Date/Time    CALCIUM 9 5 07/18/2019 1638    ALKPHOS 95 02/12/2019 1428    AST 18 02/12/2019 1428    ALT 27 02/12/2019 1428        EKG personally reviewed by José Luis Méndez MD      Counseling / Coordination of Care  Total floor / unit time spent today 40 minutes  Greater than 50% of total time was spent with the patient and / or family counseling and / or coordination of care  A description of the counseling / coordination of care: 25 min  Thank you for the opportunity to participate in the care of this patient      Delos Buerger MD, PhD   Cherylene Hakim

## 2019-09-25 NOTE — PATIENT INSTRUCTIONS
Stop opsumit for now as it may be contributing to your fluid retention  Hopefully will be able to reduce your diuretic over time    Please call in 2 weeks with weight and symptom update

## 2019-10-01 ENCOUNTER — TELEPHONE (OUTPATIENT)
Dept: OBGYN CLINIC | Facility: CLINIC | Age: 54
End: 2019-10-01

## 2019-10-01 DIAGNOSIS — Z96.651 STATUS POST TOTAL RIGHT KNEE REPLACEMENT: Primary | ICD-10-CM

## 2019-10-01 RX ORDER — CLINDAMYCIN HYDROCHLORIDE 300 MG/1
CAPSULE ORAL
Qty: 2 CAPSULE | Refills: 3 | Status: SHIPPED | OUTPATIENT
Start: 2019-10-01 | End: 2019-10-02

## 2019-10-01 NOTE — TELEPHONE ENCOUNTER
Dr Louise Theodore has a dental appt and would like a prescription sent into [facility]    Thank you

## 2019-10-14 ENCOUNTER — CLINICAL SUPPORT (OUTPATIENT)
Dept: FAMILY MEDICINE CLINIC | Facility: OTHER | Age: 54
End: 2019-10-14
Payer: COMMERCIAL

## 2019-10-14 DIAGNOSIS — Z23 ENCOUNTER FOR IMMUNIZATION: ICD-10-CM

## 2019-10-14 PROCEDURE — 90682 RIV4 VACC RECOMBINANT DNA IM: CPT

## 2019-10-14 PROCEDURE — 90471 IMMUNIZATION ADMIN: CPT

## 2019-10-21 ENCOUNTER — TELEPHONE (OUTPATIENT)
Dept: HEMATOLOGY ONCOLOGY | Facility: CLINIC | Age: 54
End: 2019-10-21

## 2019-10-21 NOTE — TELEPHONE ENCOUNTER
Claudine Robert from the [facility] [address] called to follow up on the request for medical records mailed to the Identified 9/16  She stated that she will to the office today at fax number [phone]

## 2019-10-30 DIAGNOSIS — N76.0 BACTERIAL VAGINOSIS: Primary | ICD-10-CM

## 2019-10-30 DIAGNOSIS — B96.89 BACTERIAL VAGINOSIS: Primary | ICD-10-CM

## 2019-11-01 ENCOUNTER — HOSPITAL ENCOUNTER (OUTPATIENT)
Dept: MAMMOGRAPHY | Facility: HOSPITAL | Age: 54
Discharge: HOME | End: 2019-11-01
Payer: COMMERCIAL

## 2019-11-01 VITALS — HEIGHT: 64 IN | BODY MASS INDEX: 35.34 KG/M2 | WEIGHT: 207 LBS

## 2019-11-01 DIAGNOSIS — Z12.31 ENCOUNTER FOR SCREENING MAMMOGRAM FOR MALIGNANT NEOPLASM OF BREAST: ICD-10-CM

## 2019-11-01 PROCEDURE — 77067 SCR MAMMO BI INCL CAD: CPT

## 2019-11-11 ENCOUNTER — OFFICE VISIT (OUTPATIENT)
Dept: OBGYN CLINIC | Facility: CLINIC | Age: 54
End: 2019-11-11
Payer: COMMERCIAL

## 2019-11-11 ENCOUNTER — APPOINTMENT (OUTPATIENT)
Dept: RADIOLOGY | Facility: AMBULARY SURGERY CENTER | Age: 54
End: 2019-11-11
Payer: COMMERCIAL

## 2019-11-11 DIAGNOSIS — S83.242A TEAR OF MEDIAL MENISCUS OF LEFT KNEE, CURRENT, UNSPECIFIED TEAR TYPE, INITIAL ENCOUNTER: ICD-10-CM

## 2019-11-11 DIAGNOSIS — M25.562 LEFT KNEE PAIN, UNSPECIFIED CHRONICITY: ICD-10-CM

## 2019-11-11 DIAGNOSIS — M25.562 LEFT KNEE PAIN, UNSPECIFIED CHRONICITY: Primary | ICD-10-CM

## 2019-11-11 PROCEDURE — 20610 DRAIN/INJ JOINT/BURSA W/O US: CPT | Performed by: ORTHOPAEDIC SURGERY

## 2019-11-11 PROCEDURE — 99213 OFFICE O/P EST LOW 20 MIN: CPT | Performed by: ORTHOPAEDIC SURGERY

## 2019-11-11 PROCEDURE — 73562 X-RAY EXAM OF KNEE 3: CPT

## 2019-11-11 RX ORDER — LIDOCAINE HYDROCHLORIDE 10 MG/ML
1 INJECTION, SOLUTION INFILTRATION; PERINEURAL
Status: COMPLETED | OUTPATIENT
Start: 2019-11-11 | End: 2019-11-11

## 2019-11-11 RX ORDER — BETAMETHASONE SODIUM PHOSPHATE AND BETAMETHASONE ACETATE 3; 3 MG/ML; MG/ML
6 INJECTION, SUSPENSION INTRA-ARTICULAR; INTRALESIONAL; INTRAMUSCULAR; SOFT TISSUE
Status: COMPLETED | OUTPATIENT
Start: 2019-11-11 | End: 2019-11-11

## 2019-11-11 RX ADMIN — BETAMETHASONE SODIUM PHOSPHATE AND BETAMETHASONE ACETATE 6 MG: 3; 3 INJECTION, SUSPENSION INTRA-ARTICULAR; INTRALESIONAL; INTRAMUSCULAR; SOFT TISSUE at 12:25

## 2019-11-11 RX ADMIN — LIDOCAINE HYDROCHLORIDE 1 ML: 10 INJECTION, SOLUTION INFILTRATION; PERINEURAL at 12:25

## 2019-11-11 NOTE — PROGRESS NOTES
Assessment:  1  Left knee pain, unspecified chronicity  XR knee 3 vw left non injury    Large joint arthrocentesis: L knee   2  Tear of medial meniscus of left knee, current, unspecified tear type, initial encounter  Large joint arthrocentesis: L knee     Patient Active Problem List   Diagnosis    Encounter for gynecological examination (general) (routine) without abnormal findings    B12 deficiency    Chronic cough    Diffuse connective tissue disease ([facility]    Dyspnea    Edema    Essential hypertension    Hot flashes due to menopause    Long-term use of hydroxychloroquine    Systemic lupus erythematosus ([facility]    Lupus anticoagulant positive    Tachycardia    SOB (shortness of breath) on exertion    Sinus tachycardia    Secondary pulmonary hypertension    Pulmonary hypertension (HCC)    Post-nasal drip    Pes anserine bursitis    Positive HELENE (antinuclear antibody)    Other chronic pulmonary heart diseases    Osteoporosis    Night sweats    Patellofemoral disorder of right knee    Pes anserinus bursitis of right knee    Arthritis of right knee    Other tear of medial meniscus, current injury, right knee, initial encounter    Tear of medial meniscus of right knee, current    Chronic pain of right knee    Elevated serum creatinine    Hyperuricemia    Trochanteric bursitis of both hips    Undifferentiated connective tissue disease ([facility] [address] [facility]    Vitamin D deficiency    BV (bacterial vaginosis)    Chronic kidney disease, stage 3 ([facility] [address] [facility]    Right knee pain    Status post total right knee replacement    Left knee pain    Tear of medial meniscus of left knee, current           Plan       patient has pain medially and 3/4 test are positive for meniscus tear  We are giving her the cortisone injection today will in order the min MRI  Most likely will show positive meniscus tear  She will come back as soon as we have the MRI to discuss what the other options are    We have briefly discussed and she has a history of having meniscal surgeries on the opposite knee            Subjective:     Patient ID:    Chief Irma Le 62 y o  female      HPI    Patient comes in today with regards to Knee pain  The patient reports that the pain is in the  Anterior medial aspect of left knee and has been going on for 2 weeks   [place] no specific injury or fall  The pain is rated at3 at its best and8 at its worst   The pain is described as   Cruz Xavier  It is worsened with  Standing and walking, and is made better with  Rest and Tylenol partially makes it better  The patient has taken  Tylenol for treatment patient is unable to take anti-inflammatories due to medical reasons  Open occasionally patient feels unstable        The following portions of the patient's history were reviewed and updated as appropriate: allergies, current medications, past family history, past social history, past surgical history and problem list         Social History     Socioeconomic History    Marital status: /Civil Union     Spouse name: Not on file    Number of children: 2    Years of education: Not on file    Highest education level: Not on file   Occupational History    Not on file   Social Needs    Financial resource strain: Not on file    Food insecurity:     Worry: Not on file     Inability: Not on file    Transportation needs:     Medical: Not on file     Non-medical: Not on file   Tobacco Use    Smoking status: Former Smoker     Last attempt to quit: 2006     Years since quittin 8    Smokeless tobacco: Never Used   Substance and Sexual Activity    Alcohol use: Yes     Comment: socially    Drug use: No    Sexual activity: Yes     Partners: Male   Lifestyle    Physical activity:     Days per week: Not on file     Minutes per session: Not on file    Stress: Not on file   Relationships    Social connections:     Talks on phone: Not on file     Gets together: Not on file     Attends Confucianism service: Not on file     Active member of club or organization: Not on file     Attends meetings of clubs or organizations: Not on file     Relationship status: Not on file    Intimate partner violence:     Fear of current or ex partner: Not on file     Emotionally abused: Not on file     Physically abused: Not on file     Forced sexual activity: Not on file   Other Topics Concern    Not on file   Social History Narrative    Daily caffeinated coffee consumption    Exercise habits     Past Medical History:   Diagnosis Date    HELENE positive     Anemia     Sildenafil causes decreased hematocrit    Chronic kidney disease     borderline lab values    Chronic rhinitis 6/4/2012    Encephalitis     Lasted Assessed 10/18/2017    Gout 6/26/2018    Lupus anticoagulant disorder ([address] )     Maxillary sinusitis     Lasted Assessed 10/18/2017    Night sweat     Osteopenia     Hip    Osteoporosis     Spine    Pneumonia     Last Assessed 10/18/2017    PONV (postoperative nausea and vomiting)     Pulmonary hypertension ([address]     Seizures ([address] )     Only during Encephalitis    Shortness of breath     with exertion    Sinus tachycardia     Urinary incontinence      Past Surgical History:   Procedure Laterality Date    ARTHRODESIS      Hand: IP Joint    CHOLECYSTECTOMY      COLONOSCOPY      COLPOSCOPY      HYSTERECTOMY      Vaginal    LAPAROSCOPIC ESOPHAGOGASTRIC FUNDOPLASTY  2008    MD KNEE SCOPE,SINGLE MENISECTOMY Right 10/2/2018    Procedure: KNEE ARTHROSCOPY WITH PARTIAL MEDIAL MENISCECTOMY;  Surgeon: John Jacobo DO;  [facility];  Service: Orthopedics    MD TOTAL KNEE ARTHROPLASTY Right 2/12/2019    Procedure: KNEE TOTAL ARTHROPLASTY AND ASSOCIATED PROCEDURES;  Surgeon: John Jacobo DO;  [facility];  Service: Orthopedics    REDUCTION MAMMAPLASTY      REPAIR ANKLE LIGAMENT Right     TONSILLECTOMY       Allergies   Allergen Reactions    Dilantin [Phenytoin] Anaphylaxis and Rash    Other     Augmentin [Amoxicillin-Pot Clavulanate] Rash and Dermatitis    Penicillins Rash     Current Outpatient Medications on File Prior to Visit   Medication Sig Dispense Refill    aspirin (ECOTRIN LOW STRENGTH) 81 mg EC tablet Take 162 mg by mouth daily      cholecalciferol (VITAMIN D3) 1,000 units tablet Take 2,000 Units by mouth daily in the early morning        clindamycin (CLEOCIN) 100 MG vaginal suppository Insert 1 suppository (100 mg total) into the vagina daily at bedtime 3 suppository 0    cyanocobalamin 1,000 mcg/mL Inject 1mL IM weekly 10 mL 2    hydroxychloroquine (PLAQUENIL) 200 mg tablet Take 400 mg by mouth daily with breakfast        Macitentan (OPSUMIT) 10 MG tablet Take 1 tablet (10 mg total) by mouth daily 30 tablet 11    potassium chloride (K-DUR,KLOR-CON) 20 mEq tablet Take 2 tablets (40 mEq total) by mouth 2 (two) times a day (Patient taking differently: Take 20 mEq by mouth daily ) 960 tablet 3    sildenafil (REVATIO) 20 mg tablet Take 1 tablet (20 mg total) by mouth 3 (three) times a day 90 tablet 11    spironolactone (ALDACTONE) 25 mg tablet Take 1 tablet (25 mg total) by mouth daily 90 tablet 3    torsemide (DEMADEX) 20 mg tablet Take 2 tablets (40 mg total) by mouth 2 (two) times a day (Patient taking differently: Take 40 mg by mouth 2 (two) times a day ) 360 tablet 3    triamcinolone (KENALOG) 0 1 % oral topical paste prn       No current facility-administered medications on file prior to visit  Objective:    Review of Systems   Constitutional: Negative  HENT: Negative  Eyes: Negative  Respiratory: Positive for shortness of breath  Cardiovascular: Positive for leg swelling  Gastrointestinal: Negative  Endocrine: Negative  Genitourinary: Negative  Musculoskeletal:        See HPI   Skin: Negative  Allergic/Immunologic: Negative  Neurological: Negative  Hematological: Negative  Psychiatric/Behavioral: Negative          Right Knee Exam     Range of Motion   The patient has normal right knee ROM  Left Knee Exam     Tenderness   The patient is experiencing tenderness in the medial joint line  Range of Motion   The patient has normal left knee ROM  Tests   Hussain:  Medial - positive   Varus: negative Valgus: positive    Other   Erythema: absent  Sensation: normal  Pulse: present  Swelling: mild  Effusion: no effusion present    Comments:   Pinpoint tenderness over the posterior medial joint line Hussain's test is exquisitely positive as is the sleeves test bounce test was negative            Physical Exam   Constitutional: She is oriented to person, place, and time  She appears well-developed  HENT:   Head: Normocephalic  Eyes: Pupils are equal, round, and reactive to light  Neck: Normal range of motion  Cardiovascular: Normal rate  Pulmonary/Chest: Effort normal    Abdominal: She exhibits no distension  Musculoskeletal:        Left knee: She exhibits no effusion  Neurological: She is alert and oriented to person, place, and time  Skin: Skin is warm  Psychiatric: She has a normal mood and affect  Nursing note and vitals reviewed  Large joint arthrocentesis: L knee  Date/Time: 11/11/2019 12:25 PM  Consent given by: patient  Timeout: Immediately prior to procedure a time out was called to verify the correct patient, procedure, equipment, support staff and site/side marked as required   Supporting Documentation  Indications: pain   Procedure Details  Location: knee - L knee  Needle size: 22 G  Ultrasound guidance: no  Approach: anterolateral  Medications administered: 1 mL lidocaine 1 %; 6 mg betamethasone acetate-betamethasone sodium phosphate 6 (3-3) mg/mL               I have personally reviewed pertinent films in PACS and my interpretation is Mild arthritis medial joint space and patellofemoral joint space        Portions of the record may have been created with voice recognition software   Occasional wrong word or "sound a like" substitutions may have occurred due to the inherent limitations of voice recognition software   Read the chart carefully and recognize, using context, where substitutions have occurred

## 2019-11-12 ENCOUNTER — HOSPITAL ENCOUNTER (OUTPATIENT)
Dept: MRI IMAGING | Facility: HOSPITAL | Age: 54
Discharge: HOME | End: 2019-11-12
Attending: ORTHOPAEDIC SURGERY
Payer: COMMERCIAL

## 2019-11-12 ENCOUNTER — TELEPHONE (OUTPATIENT)
Dept: OBGYN CLINIC | Facility: HOSPITAL | Age: 54
End: 2019-11-12

## 2019-11-12 DIAGNOSIS — M25.562 LEFT KNEE PAIN, UNSPECIFIED CHRONICITY: ICD-10-CM

## 2019-11-12 DIAGNOSIS — M25.562 LEFT KNEE PAIN, UNSPECIFIED CHRONICITY: Primary | ICD-10-CM

## 2019-11-12 DIAGNOSIS — S83.242A TEAR OF MEDIAL MENISCUS OF LEFT KNEE, CURRENT, UNSPECIFIED TEAR TYPE, INITIAL ENCOUNTER: ICD-10-CM

## 2019-11-12 PROCEDURE — 73721 MRI JNT OF LWR EXTRE W/O DYE: CPT

## 2019-11-12 RX ORDER — METHYLPREDNISOLONE 4 MG/1
TABLET ORAL
Qty: 1 EACH | Refills: 0 | Status: SHIPPED | OUTPATIENT
Start: 2019-11-12 | End: 2019-12-02

## 2019-11-12 NOTE — TELEPHONE ENCOUNTER
Methylprednisolone was sent to her pharmacy on file  Thank you for providing education on post-injection protocol, agree with everything you told her  If she has increasing pain redness warmth fevers chills etc she should call the office back as soon as possible, would expect this post injection pain to get better over the next few days gradually

## 2019-11-12 NOTE — TELEPHONE ENCOUNTER
Spoke to patient  She stated she is in severe pain after the injection yesterday  Stated she is taking tylenol at this time with no improvement  She stated she is icing hourly with no help to pain and has been elevating  Advised that these injections can cause increased pain for the first few days following the injection and she should take it easy for a few days and ice and rest as much as she can  Patient stated she cannot take NSAIDs due to Lupus  Requesting Prednisone  Please advise

## 2019-11-12 NOTE — TELEPHONE ENCOUNTER
She had an injection yesterday by Dr Janina Siddiqi on the left knee, and she is in so much pain  Should she get a prescription for pain?       [phone]

## 2019-11-15 ENCOUNTER — OFFICE VISIT (OUTPATIENT)
Dept: OBGYN CLINIC | Facility: CLINIC | Age: 54
End: 2019-11-15
Payer: COMMERCIAL

## 2019-11-15 VITALS
DIASTOLIC BLOOD PRESSURE: 84 MMHG | HEIGHT: 64 IN | HEART RATE: 101 BPM | WEIGHT: 207 LBS | BODY MASS INDEX: 35.34 KG/M2 | SYSTOLIC BLOOD PRESSURE: 160 MMHG

## 2019-11-15 DIAGNOSIS — S83.242A TEAR OF MEDIAL MENISCUS OF LEFT KNEE, CURRENT, UNSPECIFIED TEAR TYPE, INITIAL ENCOUNTER: ICD-10-CM

## 2019-11-15 DIAGNOSIS — M25.562 LEFT KNEE PAIN, UNSPECIFIED CHRONICITY: Primary | ICD-10-CM

## 2019-11-15 PROCEDURE — 99212 OFFICE O/P EST SF 10 MIN: CPT | Performed by: ORTHOPAEDIC SURGERY

## 2019-11-21 ENCOUNTER — APPOINTMENT (OUTPATIENT)
Dept: LAB | Facility: CLINIC | Age: 54
End: 2019-11-21
Payer: COMMERCIAL

## 2019-11-21 DIAGNOSIS — I27.20 PULMONARY HYPERTENSION (HCC): ICD-10-CM

## 2019-11-21 DIAGNOSIS — M25.562 LEFT KNEE PAIN, UNSPECIFIED CHRONICITY: ICD-10-CM

## 2019-11-21 DIAGNOSIS — E53.8 B12 DEFICIENCY: ICD-10-CM

## 2019-11-21 DIAGNOSIS — S83.242A TEAR OF MEDIAL MENISCUS OF LEFT KNEE, CURRENT, UNSPECIFIED TEAR TYPE, INITIAL ENCOUNTER: ICD-10-CM

## 2019-11-21 LAB
ANION GAP SERPL CALCULATED.3IONS-SCNC: 7 MMOL/L (ref 4–13)
BASOPHILS # BLD AUTO: 0.03 THOUSANDS/ΜL (ref 0–0.1)
BASOPHILS NFR BLD AUTO: 1 % (ref 0–1)
BUN SERPL-MCNC: 17 MG/DL (ref 5–25)
CALCIUM SERPL-MCNC: 9.3 MG/DL (ref 8.3–10.1)
CHLORIDE SERPL-SCNC: 105 MMOL/L (ref 100–108)
CO2 SERPL-SCNC: 29 MMOL/L (ref 21–32)
CREAT SERPL-MCNC: 0.82 MG/DL (ref 0.6–1.3)
EOSINOPHIL # BLD AUTO: 0.23 THOUSAND/ΜL (ref 0–0.61)
EOSINOPHIL NFR BLD AUTO: 4 % (ref 0–6)
ERYTHROCYTE [DISTWIDTH] IN BLOOD BY AUTOMATED COUNT: 12.5 % (ref 11.6–15.1)
GFR SERPL CREATININE-BSD FRML MDRD: 80 ML/MIN/1.73SQ M
GLUCOSE SERPL-MCNC: 86 MG/DL (ref 65–140)
HCT VFR BLD AUTO: 42.2 % (ref 34.8–46.1)
HGB BLD-MCNC: 13.9 G/DL (ref 11.5–15.4)
IMM GRANULOCYTES # BLD AUTO: 0.02 THOUSAND/UL (ref 0–0.2)
IMM GRANULOCYTES NFR BLD AUTO: 0 % (ref 0–2)
LYMPHOCYTES # BLD AUTO: 1.63 THOUSANDS/ΜL (ref 0.6–4.47)
LYMPHOCYTES NFR BLD AUTO: 31 % (ref 14–44)
MCH RBC QN AUTO: 30.4 PG (ref 26.8–34.3)
MCHC RBC AUTO-ENTMCNC: 32.9 G/DL (ref 31.4–37.4)
MCV RBC AUTO: 92 FL (ref 82–98)
MONOCYTES # BLD AUTO: 0.48 THOUSAND/ΜL (ref 0.17–1.22)
MONOCYTES NFR BLD AUTO: 9 % (ref 4–12)
NEUTROPHILS # BLD AUTO: 2.8 THOUSANDS/ΜL (ref 1.85–7.62)
NEUTS SEG NFR BLD AUTO: 55 % (ref 43–75)
NRBC BLD AUTO-RTO: 0 /100 WBCS
PLATELET # BLD AUTO: 315 THOUSANDS/UL (ref 149–390)
PMV BLD AUTO: 9.9 FL (ref 8.9–12.7)
POTASSIUM SERPL-SCNC: 4.6 MMOL/L (ref 3.5–5.3)
RBC # BLD AUTO: 4.57 MILLION/UL (ref 3.81–5.12)
SODIUM SERPL-SCNC: 141 MMOL/L (ref 136–145)
VIT B12 SERPL-MCNC: 1881 PG/ML (ref 100–900)
WBC # BLD AUTO: 5.19 THOUSAND/UL (ref 4.31–10.16)

## 2019-11-21 PROCEDURE — 80048 BASIC METABOLIC PNL TOTAL CA: CPT

## 2019-11-21 PROCEDURE — 82607 VITAMIN B-12: CPT

## 2019-11-21 PROCEDURE — 36415 COLL VENOUS BLD VENIPUNCTURE: CPT

## 2019-11-21 PROCEDURE — 85025 COMPLETE CBC W/AUTO DIFF WBC: CPT

## 2019-11-21 RX ORDER — SILDENAFIL CITRATE 20 MG/1
TABLET ORAL
Qty: 90 TABLET | Refills: 0 | Status: SHIPPED | OUTPATIENT
Start: 2019-11-21 | End: 2019-11-28 | Stop reason: SDUPTHER

## 2019-11-28 DIAGNOSIS — I27.20 PULMONARY HYPERTENSION (HCC): ICD-10-CM

## 2019-11-28 RX ORDER — SILDENAFIL CITRATE 20 MG/1
TABLET ORAL
Qty: 90 TABLET | Refills: 0 | Status: SHIPPED | OUTPATIENT
Start: 2019-11-28 | End: 2020-01-16

## 2019-12-02 ENCOUNTER — OFFICE VISIT (OUTPATIENT)
Dept: HEMATOLOGY ONCOLOGY | Facility: CLINIC | Age: 54
End: 2019-12-02
Payer: COMMERCIAL

## 2019-12-02 ENCOUNTER — TELEPHONE (OUTPATIENT)
Dept: HEMATOLOGY ONCOLOGY | Facility: CLINIC | Age: 54
End: 2019-12-02

## 2019-12-02 VITALS
DIASTOLIC BLOOD PRESSURE: 74 MMHG | OXYGEN SATURATION: 97 % | HEART RATE: 92 BPM | TEMPERATURE: 97.7 F | RESPIRATION RATE: 16 BRPM | SYSTOLIC BLOOD PRESSURE: 120 MMHG

## 2019-12-02 DIAGNOSIS — E53.8 B12 DEFICIENCY: Primary | ICD-10-CM

## 2019-12-02 PROCEDURE — 99213 OFFICE O/P EST LOW 20 MIN: CPT | Performed by: PHYSICIAN ASSISTANT

## 2019-12-02 RX ORDER — CYANOCOBALAMIN 1000 UG/ML
INJECTION INTRAMUSCULAR; SUBCUTANEOUS
Qty: 10 ML | Refills: 6 | Status: SHIPPED | OUTPATIENT
Start: 2019-12-02 | End: 2021-11-22

## 2019-12-02 RX ORDER — CYANOCOBALAMIN 1000 UG/ML
INJECTION INTRAMUSCULAR; SUBCUTANEOUS
Qty: 10 ML | Refills: 6 | Status: SHIPPED | OUTPATIENT
Start: 2019-12-02 | End: 2019-12-02 | Stop reason: SDUPTHER

## 2019-12-02 NOTE — TELEPHONE ENCOUNTER
Call transferred from [place], [facility] from patient stating she was just seen by Andrew Corcoran, [facility]  and script for syringes was not at [facility] for the Vitamin B 12 injection  Tuyet Massey [place] for [facility] to advise of same, a separate order will be put in for syringes for this patient  Patient made aware of same

## 2019-12-02 NOTE — TELEPHONE ENCOUNTER
Andrade Thomas from [place] called stating he would like to verify a few things with the Vitamin B12 injection he is requesting a call back at [phone]

## 2019-12-02 NOTE — PROGRESS NOTES
Hematology/Oncology Outpatient Follow- up Note  Fay Boggs 62 y o  female MRN: @ Encounter: [id number]        Date:  12/2/2019      Assessment / Plan:    1  Vitamin B12 deficiency in a patient was diagnosed with autoimmune connective tissue disease most likely secondary to malabsorption as well because of history of gastric fundoplication secondary to GERD   Vitamin B12 1000 mcg every other week IM shot self administered at home   Vitamin B12 level 432 11/20/2018   She has been self administering weekly since 11/2018  Vitamin B12 level increased to 897 5/16/2019; 1881 11/21/2019 (This was drawn hours after her B12 injection)    She would like to f/u at 6 month interval to reassess       2   Aspirin 81 mg p o  b i d  with a history of lupus anticoagulant antibodies she never had any history of thrombosis, DVT, PE     3   Exercise induced PAH and follows with Dr Mónica Bal      4   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, HELENE, anticardiolipin AB   Met with Dr Omar Evans at [facility]   Dx Lupus  On plaquenil        5   Right medial meniscal tear, OA  S/P RTKR 2/2109 per Dr Yashira Toro is a 45year old female seen 10/18/2017 for initial evaluation , self referred, secondary to low Vitamin B12 level    10/6/2017 labs at [facility]: hemoglobin 13 2, mcv 90, RDW 14 3, WBC 6 2, 68% neutrophils, 21% lymphs, 8% monocytes 2% eosinophils, platelet count 921,022  Iron saturation 15%, ferritin 52 Vitamin B12 184( 211-946) Tsh 1 98, free T4 1 18, negative lyme antibody  RF normal, Anti-scleroderma antibodies normal, anti-DS DNA normal, Styles AB normal, RNP antibodies normal  Anti-centromere AB normal  Normal sed rate   HELENE: Dense fine speckled pattern is noted which suggest presence of  antibody which has a low prevalence in systemic autoimmune rheumatic diseases  Normal immunofixation on SPEP  Normal serum immunoglobulins     Normal CCP antibodies IgG and IgA  U/A identified hyaline casts  Celiac panel was normal  CMP normal     POSITIVE HELENE  POSITIVE Lupus anticoagulant  IgM anticardiolipin antibody 18 (Indeterminate)  Normal IgG anticardiolipin antibody , normal IgA anticardiolipin antibody      7/11/2016: normal anticardiolipin antibodies  Lyme negative  Normal ANCA profile, normal C3 and C4 complement, normal CRP  Normal thyroglobulin profile  Normal ESR  + LUPUS ANTICOAGULANT  HELENE positive  On [facility] intake 3/13/2015 she noted that she had a history of + lupus anticoagulant  Nisa Sandifer states she 1st had HELENE evaluated and was positive in 2012      Patient is extremely frustrated  She states she has been having symptoms of SOB, fatigue and has been diagnosed with a lupus-like disease but has not received any disease directed therapy  She took prednisone years ago without benefit and significant weight gain  Was previously seen at [address] [place] [address] by Lowell Uribe in 2012 regarding chronic cough, SOB without history of asthma, allergies  Quit smoking at 34years old after 15-20 pack years  PND treated and cough improved but ANSARI persisted  Bronchoscopy was normal  PFTs normal  Evaluation unrevealing to pulmonary source for her dyspnea  No benefit with Advair or high dose steroids  Falls asleep easily  Normal echo  Noted to have edema and sleep apnea suspected  She was advised to increase Lasix and have sleep study  Stephenie Sheridan, a cardiologist at the [facility] had her undergo echocardiogram, however, poor images were obtained  She been had a stress echocardiogram and a question of pulmonary artery hypertension and was raised     8/13/2012: stress echocardiogram report from [address] [place] [address]: estimated P  a systolic pressure increased to 56 mm mercury suggestive of significant exercise induced pulmonary hypertension  She states she saw Dr Dale Rodriguez, a specialist at [address] [place] [address] who didn't examine me and told me I look too good to have pulmonary artery hypertension " He did not perform additional testing but then referred her to Dr Linda Pugh, a cardiac electrophysiologist at [facility] due to resting heart rate 114-120s who informed her she had a + HELENE      She saw Dr Julio C Nj a few times but felt pressured by him as he wanted to perform a lip biopsy to evaluate for Sjogren's syndrome  She has been seeing TERI Victor  at the rheumatology center of [facility] but has not seeing him in approximately a year and a half as she states he was requesting repeated blood work that was not moving forward towards treating any symptoms she was having and feels that he can be adverserial    Lactose intolerant  Osteoporosis diagnosed at 28 y/o in Lumbar spine  Partial Hysterectomy 1996 for endometrial hyperplasia  Ovaries spared       She works at [employer] [place]  She returned from a vacation and had 3+ pitting edema  Dr Melody Saleh, 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 arranged for her to see Rigo Ocampo MD in [place], [place] who ordered the above labs  Denae Bhat is very comfortable with injecting herself IM  No s/sx pancreatic insufficiency  She does not take PPI or H2- blocker  She does not drink alcohol regularly       Lili has never had a blood clot  He has 2 grown children ages 28 and 29  Did have a first trimester miscarriage around the 10th week in between the birth of her 2 children  She takes Aspirin       Reports last EGD was 3-4 years ago performed by Dr Tanya Bedoya at [facility]  She is s/p Nissen Fundoplication for severe GERD  She had a colonoscopy previously      She saw Dr Jony Mckeon with [facility] 2/13/2015 regarding persistently + HELENE and IGM anticardiolipin antibody  There was also question whether or not she had cotton wool spots secondary to embolic phenomenon  Patient states ultimately this was ruled out   Aspirin was hematologic recommendation at that time           Interval History:    Left knee medial meniscus tear  Left knee arthroscopy with partial medial lateral meniscectomies planned for 12/17/2019      10/2019:  Normal cardiolipin AB, normal C3, C4, dsDNA ab not present,     Test Results:        Labs:   Lab Results   Component Value Date    HGB 13 9 11/21/2019    HCT 42 2 11/21/2019    MCV 92 11/21/2019     11/21/2019    WBC 5 19 11/21/2019    NRBC 0 11/21/2019     Lab Results   Component Value Date    K 4 6 11/21/2019     11/21/2019    CO2 29 11/21/2019    BUN 17 11/21/2019    CREATININE 0 82 11/21/2019    GLUF 91 09/10/2018    CALCIUM 9 3 11/21/2019    AST 18 02/12/2019    ALT 27 02/12/2019    ALKPHOS 95 02/12/2019    EGFR 80 11/21/2019       Imaging: Xr Knee 3 Vw Left Non Injury    Result Date: 11/13/2019  Narrative: LEFT KNEE INDICATION:   M25 562: Pain in left knee  COMPARISON:  None VIEWS:  XR KNEE 3 VW LEFT NON INJURY Images: 3 FINDINGS: There is no acute fracture or dislocation  Mild suprapatellar fullness suggesting small joint effusion  No significant degenerative changes  No lytic or blastic lesions are seen  Soft tissues are unremarkable  Impression: No acute osseous abnormality  Workstation performed: [id number]     Mri Knee Left  Wo Contrast    Result Date: 11/13/2019  Narrative: MRI LEFT KNEE INDICATION:   M25 562: Pain in left knee S83 242A: Other tear of medial meniscus, current injury, left knee, initial encounter  COMPARISON: Knee x-ray from prior day TECHNIQUE:    MR sequences were obtained of the left knee including:  Localizer, axial T2 fat sat, coronal T1/T2 fat sat, sagittal PD/T2 fat sat  Imaging performed on 1 5T MRI Gadolinium was not used  FINDINGS: SUBCUTANEOUS TISSUES: Normal JOINT EFFUSION: There is a small joint effusion  BAKER'S CYST: None  MENISCI: Radial tear noted in the medial meniscus body seen best on series 5/19, also appreciated on series 3/20 CRUCIATE LIGAMENTS: Intact   EXTENSOR APPARATUS: Intact  COLLATERAL LIGAMENTS: Intact  ARTICULAR SURFACES: Patellofemoral: Full-thickness loss of cartilage along the medial facet with subchondral edema  Focal subchondral edema noted along the medial trochlea Medial compartment: Broad area of partial thickness loss of cartilage throughout the weightbearing portion of the femoral condyle Lateral compartment: Intact BONES: Normal  MUSCULATURE:  Intact  Impression: 1  Radial tear at the medial meniscus body 2  Severe patellofemoral and moderate medial compartment chondrosis Workstation performed: [id number]           ROS:  As mentioned in HPI & Interval History otherwise 14 point ROS negative  Allergies: Allergies   Allergen Reactions    Dilantin [Phenytoin] Anaphylaxis and Rash    Other     Augmentin [Amoxicillin-Pot Clavulanate] Rash and Dermatitis    Penicillins Rash     Current Medications: Reviewed  PMH/FH/SH:  Reviewed      Physical Exam:    There is no height or weight on file to calculate BSA  Ht Readings from Last 3 Encounters:   11/15/19 5' 4" (1 626 m)   11/01/19 5' 4" (1 626 m)   09/25/19 5' 4" (1 626 m)        Wt Readings from Last 3 Encounters:   11/15/19 93 9 kg (207 lb)   11/01/19 93 9 kg (207 lb)   09/25/19 94 1 kg (207 lb 6 4 oz)        Temp Readings from Last 3 Encounters:   12/02/19 97 7 °F (36 5 °C) (Oral)   06/25/19 98 5 °F (36 9 °C) (Tympanic)   05/20/19 97 7 °F (36 5 °C)        BP Readings from Last 3 Encounters:   12/02/19 120/74   11/15/19 160/84   09/25/19 106/70           Physical Exam   Constitutional: She is oriented to person, place, and time  She appears well-developed and well-nourished  No distress  HENT:   Head: Normocephalic and atraumatic  Eyes: Conjunctivae are normal    Neck: Normal range of motion  Neck supple  No tracheal deviation present  Cardiovascular: Normal rate and regular rhythm  Exam reveals no gallop and no friction rub  No murmur heard    Pulmonary/Chest: Effort normal and breath sounds normal  No respiratory distress  She has no wheezes  She has no rales  She exhibits no tenderness  Abdominal: Soft  She exhibits no distension  There is no tenderness  Lymphadenopathy:     She has no cervical adenopathy  Neurological: She is alert and oriented to person, place, and time  Skin: Skin is warm and dry  She is not diaphoretic  No erythema  No pallor  Psychiatric: She has a normal mood and affect  Her behavior is normal  Judgment and thought content normal    Vitals reviewed            Emergency Contacts:    Khalida Ortega [phone],

## 2019-12-02 NOTE — TELEPHONE ENCOUNTER
Patient called in for an earlier appointment I found a availability for 11am today to see Anca KELLER

## 2019-12-04 NOTE — PRE-PROCEDURE INSTRUCTIONS
Pre-Surgery Instructions:   Medication Instructions    aspirin (ECOTRIN LOW STRENGTH) 81 mg EC tablet Patient was instructed by Physician and understands   cholecalciferol (VITAMIN D3) 1,000 units tablet Instructed patient per Anesthesia Guidelines   cyanocobalamin 1,000 mcg/mL Instructed patient per Anesthesia Guidelines   hydroxychloroquine (PLAQUENIL) 200 mg tablet Patient was instructed by Physician and understands   NEEDLE, DISP, 24 G 24G X 1" MISC Instructed patient per Anesthesia Guidelines   potassium chloride (K-DUR,KLOR-CON) 20 mEq tablet Instructed patient per Anesthesia Guidelines   sildenafil (REVATIO) 20 mg tablet Patient was instructed by Physician and understands   spironolactone (ALDACTONE) 25 mg tablet Instructed patient per Anesthesia Guidelines   Syringe 1 ML MISC Instructed patient per Anesthesia Guidelines   torsemide (DEMADEX) 20 mg tablet Instructed patient per Anesthesia Guidelines   triamcinolone (KENALOG) 0 1 % oral topical paste Instructed patient per Anesthesia Guidelines      Pre op,medications and showering instructions reviewed-Patient has hibiclens

## 2019-12-05 RX ORDER — NEEDLES, FILTER 19GX1 1/2"
NEEDLE, DISPOSABLE MISCELLANEOUS
Refills: 2 | COMMUNITY
Start: 2019-12-02 | End: 2021-11-22

## 2019-12-09 ENCOUNTER — OFFICE VISIT (OUTPATIENT)
Dept: FAMILY MEDICINE CLINIC | Facility: OTHER | Age: 54
End: 2019-12-09
Payer: COMMERCIAL

## 2019-12-09 ENCOUNTER — APPOINTMENT (OUTPATIENT)
Dept: LAB | Facility: CLINIC | Age: 54
End: 2019-12-09
Payer: COMMERCIAL

## 2019-12-09 ENCOUNTER — TRANSCRIBE ORDERS (OUTPATIENT)
Dept: LAB | Facility: CLINIC | Age: 54
End: 2019-12-09

## 2019-12-09 VITALS
DIASTOLIC BLOOD PRESSURE: 90 MMHG | BODY MASS INDEX: 38.16 KG/M2 | TEMPERATURE: 98.9 F | HEART RATE: 117 BPM | SYSTOLIC BLOOD PRESSURE: 146 MMHG | OXYGEN SATURATION: 98 % | WEIGHT: 223.5 LBS | HEIGHT: 64 IN

## 2019-12-09 DIAGNOSIS — Z01.818 PREOP EXAMINATION: ICD-10-CM

## 2019-12-09 DIAGNOSIS — S83.242A TEAR OF MEDIAL MENISCUS OF LEFT KNEE, CURRENT, UNSPECIFIED TEAR TYPE, INITIAL ENCOUNTER: ICD-10-CM

## 2019-12-09 DIAGNOSIS — Z01.818 PREOP EXAMINATION: Primary | ICD-10-CM

## 2019-12-09 DIAGNOSIS — M25.562 LEFT KNEE PAIN, UNSPECIFIED CHRONICITY: ICD-10-CM

## 2019-12-09 LAB
BACTERIA UR QL AUTO: ABNORMAL /HPF
BILIRUB UR QL STRIP: NEGATIVE
CLARITY UR: CLEAR
COLOR UR: YELLOW
GLUCOSE UR STRIP-MCNC: NEGATIVE MG/DL
HGB UR QL STRIP.AUTO: NEGATIVE
INR PPP: 1.03 (ref 0.84–1.19)
KETONES UR STRIP-MCNC: NEGATIVE MG/DL
LEUKOCYTE ESTERASE UR QL STRIP: ABNORMAL
MUCOUS THREADS UR QL AUTO: ABNORMAL
NITRITE UR QL STRIP: NEGATIVE
NON-SQ EPI CELLS URNS QL MICRO: ABNORMAL /HPF
PH UR STRIP.AUTO: 6 [PH]
PROT UR STRIP-MCNC: NEGATIVE MG/DL
PROTHROMBIN TIME: 12.9 SECONDS (ref 11.6–14.5)
RBC #/AREA URNS AUTO: ABNORMAL /HPF
SP GR UR STRIP.AUTO: 1.01 (ref 1–1.03)
UROBILINOGEN UR QL STRIP.AUTO: 0.2 E.U./DL
WBC #/AREA URNS AUTO: ABNORMAL /HPF

## 2019-12-09 PROCEDURE — 81001 URINALYSIS AUTO W/SCOPE: CPT | Performed by: FAMILY MEDICINE

## 2019-12-09 PROCEDURE — 85610 PROTHROMBIN TIME: CPT

## 2019-12-09 PROCEDURE — 93000 ELECTROCARDIOGRAM COMPLETE: CPT | Performed by: FAMILY MEDICINE

## 2019-12-09 PROCEDURE — 36415 COLL VENOUS BLD VENIPUNCTURE: CPT

## 2019-12-09 PROCEDURE — 87086 URINE CULTURE/COLONY COUNT: CPT | Performed by: FAMILY MEDICINE

## 2019-12-09 PROCEDURE — 99244 OFF/OP CNSLTJ NEW/EST MOD 40: CPT | Performed by: FAMILY MEDICINE

## 2019-12-09 NOTE — PROGRESS NOTES
Subjective:      Fay Boggs is a 62 y o  female who presents to the office today for a preoperative consultation at the request of surgeon DR HAYNES [facility] who plans on performing LEFT KNEE ARTHROSCOPIC MENISCECTOMY LATERAL/MEDIAL on December 17  This consultation is requested for the specific conditions prompting preoperative evaluation (i e  because of potential affect on operative risk): HTN, PULMONARY HTN, LUPUS, CKD STABE 3, AND H/O SINUS TACHYCARDIA  Planned anesthesia is general  The patient has the following known anesthesia issues: NONE  Patient has a bleeding risk of: no recent abnormal bleeding and no remote history of abnormal bleeding  Patient does not have objections to receiving blood products if needed  The following portions of the patient's history were reviewed and updated as appropriate: allergies, current medications, past family history, past medical history, past social history, past surgical history and problem list       Current Outpatient Medications:     aspirin (ECOTRIN LOW STRENGTH) 81 mg EC tablet, Take 162 mg by mouth daily, Disp: , Rfl:     BD INTEGRA SYRINGE 25G X 1" 3 ML MISC, FOR WEEKLY ADMINISTRATION OF B-12, Disp: , Rfl: 2    cholecalciferol (VITAMIN D3) 1,000 units tablet, Take 2,000 Units by mouth daily in the early morning  , Disp: , Rfl:     cyanocobalamin 1,000 mcg/mL, Inject 1mL IM weekly   Dispense with needles and syringes, Disp: 10 mL, Rfl: 6    hydroxychloroquine (PLAQUENIL) 200 mg tablet, Take 400 mg by mouth daily with breakfast  , Disp: , Rfl:     NEEDLE, DISP, 24 G 24G X 1" MISC, For weekly administration of B12, Disp: 20 each, Rfl: 2    potassium chloride (K-DUR,KLOR-CON) 20 mEq tablet, Take 2 tablets (40 mEq total) by mouth 2 (two) times a day (Patient taking differently: Take 20 mEq by mouth daily ), Disp: 960 tablet, Rfl: 3    sildenafil (REVATIO) 20 mg tablet, TAKE ONE TABLET BY MOUTH THREE TIMES DAILY (Patient taking differently: Take 20 mg by mouth ), Disp: 90 tablet, Rfl: 0    spironolactone (ALDACTONE) 25 mg tablet, Take 1 tablet (25 mg total) by mouth daily, Disp: 90 tablet, Rfl: 3    Syringe 1 ML MISC, For weekly administration of B12, Disp: 20 each, Rfl: 2    torsemide (DEMADEX) 20 mg tablet, Take 2 tablets (40 mg total) by mouth 2 (two) times a day (Patient taking differently: Take 40 mg by mouth 2 (two) times a day ), Disp: 360 tablet, Rfl: 3    triamcinolone (KENALOG) 0 1 % oral topical paste, prn, Disp: , Rfl:       Review of Systems   Constitutional: Negative for appetite change, fatigue, fever and unexpected weight change  HENT: Negative for congestion, dental problem, ear pain, postnasal drip, sore throat and tinnitus  Eyes: Negative for pain, discharge and visual disturbance  Respiratory: Negative for cough, shortness of breath and wheezing  Cardiovascular: Negative for chest pain, palpitations and leg swelling  Gastrointestinal: Negative for abdominal pain, constipation, diarrhea, nausea and vomiting  Endocrine: Negative for cold intolerance and heat intolerance  Genitourinary: Negative for difficulty urinating, dysuria, flank pain and urgency  Musculoskeletal: Positive for arthralgias (CHRONIC LEFT KNEE PAIN)  Negative for back pain, joint swelling and myalgias  Skin: Negative for rash and wound  Allergic/Immunologic: Negative for immunocompromised state  Neurological: Negative for dizziness, syncope, speech difficulty, weakness and numbness  Hematological: Negative for adenopathy  Does not bruise/bleed easily  Psychiatric/Behavioral: Negative for confusion, dysphoric mood and sleep disturbance  The patient is not nervous/anxious              Objective:     /90 (BP Location: Left arm, Patient Position: Sitting, Cuff Size: Large)   Pulse (!) 117   Temp 98 9 °F (37 2 °C) (Tympanic)   Ht 5' 4" (1 626 m)   Wt 101 kg (223 lb 8 oz)   SpO2 98%   BMI 38 36 kg/m²     Physical Exam   Constitutional: She is oriented to person, place, and time  She appears well-developed and well-nourished  No distress  Body mass index is 38 36 kg/m²  HENT:   Head: Normocephalic and atraumatic  Right Ear: Hearing, tympanic membrane, external ear and ear canal normal    Left Ear: Hearing, tympanic membrane, external ear and ear canal normal    Nose: Nose normal    Mouth/Throat: Uvula is midline, oropharynx is clear and moist and mucous membranes are normal    Eyes: Pupils are equal, round, and reactive to light  Conjunctivae and EOM are normal  No scleral icterus  Neck: Normal range of motion  Neck supple  No thyromegaly present  Cardiovascular: Normal rate, regular rhythm, normal heart sounds and intact distal pulses  No murmur heard  Pulmonary/Chest: Effort normal and breath sounds normal  No respiratory distress  She has no wheezes  Abdominal: Soft  Bowel sounds are normal  She exhibits no distension  There is no tenderness  Musculoskeletal: She exhibits no edema or tenderness  Lymphadenopathy:     She has no cervical adenopathy  Neurological: She is alert and oriented to person, place, and time  No cranial nerve deficit  Coordination normal    Skin: Skin is warm and dry  No rash noted  No erythema  No pallor  Psychiatric: She has a normal mood and affect  Her behavior is normal    Nursing note and vitals reviewed          Cardiographics  ECG: normal sinus rhythm, no blocks or conduction defects, no ischemic changes  Echocardiogram: not done    Imaging  Chest x-ray: NOT REQUESTED BY SURGICAL TEAM     Lab Review   Appointment on 11/21/2019   Component Date Value    WBC 11/21/2019 5 19     RBC 11/21/2019 4 57     Hemoglobin 11/21/2019 13 9     Hematocrit 11/21/2019 42 2     MCV 11/21/2019 92     [address] 11/21/2019 30 4     MCHC 11/21/2019 32 9     RDW 11/21/2019 12 5     MPV 11/21/2019 9 9     Platelets 07/97/3308 315     nRBC 11/21/2019 0     Neutrophils Relative 11/21/2019 55     Immat GRANS % 11/21/2019 0     Lymphocytes Relative 11/21/2019 31     Monocytes Relative 11/21/2019 9     Eosinophils Relative 11/21/2019 4     Basophils Relative 11/21/2019 1     Neutrophils Absolute 11/21/2019 2 80     Immature Grans Absolute 11/21/2019 0 02     Lymphocytes Absolute 11/21/2019 1 63     Monocytes Absolute 11/21/2019 0 48     Eosinophils Absolute 11/21/2019 0 23     Basophils Absolute 11/21/2019 0 03     Vitamin B-12 11/21/2019 1,881*    Sodium 11/21/2019 141     Potassium 11/21/2019 4 6     Chloride 11/21/2019 105     CO2 11/21/2019 29     ANION GAP 11/21/2019 7     BUN 11/21/2019 17     Creatinine 11/21/2019 0 82     Glucose 11/21/2019 86     Calcium 11/21/2019 9 3     eGFR 11/21/2019 80       Lab Results   Component Value Date    INR 1 03 12/09/2019    INR 1 02 01/25/2019    INR 0 99 12/19/2017    PROTIME 12 9 12/09/2019    PROTIME 13 1 01/25/2019    PROTIME 13 1 12/19/2017           Assessment:      62 y o  female with planned surgery as above  Known risk factors for perioperative complications: None    Difficulty with intubation is not anticipated  Cardiac Risk Estimation: per the Revised Cardiac Risk Index (Circ  [id number], 1999), the patient's risk factors for cardiac complications include Pulmonary Hypertension, putting her in: RCI RISK CLASS I (0 risk factors, risk of major cardiac compl  appr  3 9%)       Plan:     1  Preoperative workup as follows ECG, hemoglobin, hematocrit, electrolytes, creatinine, glucose, coagulation studies, urinalysis (urinary tract instrumentation planned)  2  Change in medication regimen before surgery: Defer to Cardiology for advice regarding Pulmonary HTN medications  4  Deep vein thrombosis prophylaxis postoperatively:regimen to be chosen by surgical team  5  Urinalysis likely contaminated specimen -- if culture comes back positive will empirically tx with 3 day course of oral cipro (no need to delay procedure)          Return in about 4 weeks (around 1/6/2020) for Annual physical      The patient indicates understanding of these issues and agrees with the plan          Ute Hurd, DO

## 2019-12-10 LAB — BACTERIA UR CULT: NORMAL

## 2019-12-11 ENCOUNTER — TELEPHONE (OUTPATIENT)
Dept: OBGYN CLINIC | Facility: CLINIC | Age: 54
End: 2019-12-11

## 2019-12-12 DIAGNOSIS — Z91.89 RISK FACTORS FOR OBSTRUCTIVE SLEEP APNEA: Primary | ICD-10-CM

## 2019-12-16 ENCOUNTER — ANESTHESIA EVENT (OUTPATIENT)
Dept: PERIOP | Facility: HOSPITAL | Age: 54
End: 2019-12-16
Payer: COMMERCIAL

## 2019-12-17 ENCOUNTER — ANESTHESIA (OUTPATIENT)
Dept: PERIOP | Facility: HOSPITAL | Age: 54
End: 2019-12-17
Payer: COMMERCIAL

## 2019-12-17 ENCOUNTER — HOSPITAL ENCOUNTER (OUTPATIENT)
Facility: HOSPITAL | Age: 54
Setting detail: OUTPATIENT SURGERY
Discharge: HOME | End: 2019-12-17
Attending: ORTHOPAEDIC SURGERY | Admitting: ORTHOPAEDIC SURGERY
Payer: COMMERCIAL

## 2019-12-17 VITALS
SYSTOLIC BLOOD PRESSURE: 137 MMHG | DIASTOLIC BLOOD PRESSURE: 73 MMHG | WEIGHT: 221 LBS | HEIGHT: 64 IN | TEMPERATURE: 97.6 F | OXYGEN SATURATION: 95 % | HEART RATE: 89 BPM | RESPIRATION RATE: 16 BRPM | BODY MASS INDEX: 37.73 KG/M2

## 2019-12-17 DIAGNOSIS — R11.0 NAUSEA AFTER ANESTHESIA, INITIAL ENCOUNTER: Primary | ICD-10-CM

## 2019-12-17 DIAGNOSIS — S83.242D TEAR OF MEDIAL MENISCUS OF LEFT KNEE, CURRENT, UNSPECIFIED TEAR TYPE, SUBSEQUENT ENCOUNTER: Primary | ICD-10-CM

## 2019-12-17 DIAGNOSIS — T88.59XA NAUSEA AFTER ANESTHESIA, INITIAL ENCOUNTER: Primary | ICD-10-CM

## 2019-12-17 PROCEDURE — 29880 ARTHRS KNE SRG MNISECTMY M&L: CPT | Performed by: ORTHOPAEDIC SURGERY

## 2019-12-17 PROCEDURE — 99024 POSTOP FOLLOW-UP VISIT: CPT | Performed by: PHYSICIAN ASSISTANT

## 2019-12-17 PROCEDURE — 29880 ARTHRS KNE SRG MNISECTMY M&L: CPT | Performed by: PHYSICIAN ASSISTANT

## 2019-12-17 RX ORDER — ONDANSETRON 2 MG/ML
INJECTION INTRAMUSCULAR; INTRAVENOUS AS NEEDED
Status: DISCONTINUED | OUTPATIENT
Start: 2019-12-17 | End: 2019-12-17 | Stop reason: SURG

## 2019-12-17 RX ORDER — LIDOCAINE HYDROCHLORIDE AND EPINEPHRINE 10; 10 MG/ML; UG/ML
INJECTION, SOLUTION INFILTRATION; PERINEURAL AS NEEDED
Status: DISCONTINUED | OUTPATIENT
Start: 2019-12-17 | End: 2019-12-17 | Stop reason: HOSPADM

## 2019-12-17 RX ORDER — OXYCODONE HYDROCHLORIDE AND ACETAMINOPHEN 5; 325 MG/1; MG/1
1 TABLET ORAL EVERY 6 HOURS PRN
Qty: 7 TABLET | Refills: 0 | Status: SHIPPED | OUTPATIENT
Start: 2019-12-17 | End: 2019-12-24

## 2019-12-17 RX ORDER — FENTANYL CITRATE/PF 50 MCG/ML
50 SYRINGE (ML) INJECTION
Status: DISCONTINUED | OUTPATIENT
Start: 2019-12-17 | End: 2019-12-17 | Stop reason: HOSPADM

## 2019-12-17 RX ORDER — PROPOFOL 10 MG/ML
INJECTION, EMULSION INTRAVENOUS AS NEEDED
Status: DISCONTINUED | OUTPATIENT
Start: 2019-12-17 | End: 2019-12-17 | Stop reason: SURG

## 2019-12-17 RX ORDER — LIDOCAINE HYDROCHLORIDE 10 MG/ML
INJECTION, SOLUTION EPIDURAL; INFILTRATION; INTRACAUDAL; PERINEURAL AS NEEDED
Status: DISCONTINUED | OUTPATIENT
Start: 2019-12-17 | End: 2019-12-17 | Stop reason: SURG

## 2019-12-17 RX ORDER — SODIUM CHLORIDE, SODIUM LACTATE, POTASSIUM CHLORIDE, CALCIUM CHLORIDE 600; 310; 30; 20 MG/100ML; MG/100ML; MG/100ML; MG/100ML
INJECTION, SOLUTION INTRAVENOUS CONTINUOUS PRN
Status: DISCONTINUED | OUTPATIENT
Start: 2019-12-17 | End: 2019-12-17 | Stop reason: SURG

## 2019-12-17 RX ORDER — MIDAZOLAM HYDROCHLORIDE 2 MG/2ML
INJECTION, SOLUTION INTRAMUSCULAR; INTRAVENOUS AS NEEDED
Status: DISCONTINUED | OUTPATIENT
Start: 2019-12-17 | End: 2019-12-17 | Stop reason: SURG

## 2019-12-17 RX ORDER — HYDROMORPHONE HCL/PF 1 MG/ML
0.5 SYRINGE (ML) INJECTION
Status: DISCONTINUED | OUTPATIENT
Start: 2019-12-17 | End: 2019-12-17 | Stop reason: HOSPADM

## 2019-12-17 RX ORDER — HYDROMORPHONE HCL/PF 1 MG/ML
0.2 SYRINGE (ML) INJECTION
Status: DISCONTINUED | OUTPATIENT
Start: 2019-12-17 | End: 2019-12-17 | Stop reason: HOSPADM

## 2019-12-17 RX ORDER — MORPHINE SULFATE 10 MG/ML
2 INJECTION, SOLUTION INTRAMUSCULAR; INTRAVENOUS EVERY 2 HOUR PRN
Status: DISCONTINUED | OUTPATIENT
Start: 2019-12-17 | End: 2019-12-17 | Stop reason: HOSPADM

## 2019-12-17 RX ORDER — SODIUM CHLORIDE, SODIUM LACTATE, POTASSIUM CHLORIDE, CALCIUM CHLORIDE 600; 310; 30; 20 MG/100ML; MG/100ML; MG/100ML; MG/100ML
125 INJECTION, SOLUTION INTRAVENOUS CONTINUOUS
Status: DISCONTINUED | OUTPATIENT
Start: 2019-12-17 | End: 2019-12-17 | Stop reason: HOSPADM

## 2019-12-17 RX ORDER — ONDANSETRON 2 MG/ML
4 INJECTION INTRAMUSCULAR; INTRAVENOUS ONCE AS NEEDED
Status: DISCONTINUED | OUTPATIENT
Start: 2019-12-17 | End: 2019-12-17 | Stop reason: HOSPADM

## 2019-12-17 RX ORDER — SCOLOPAMINE TRANSDERMAL SYSTEM 1 MG/1
1 PATCH, EXTENDED RELEASE TRANSDERMAL
Status: DISCONTINUED | OUTPATIENT
Start: 2019-12-17 | End: 2019-12-17 | Stop reason: HOSPADM

## 2019-12-17 RX ORDER — ONDANSETRON 2 MG/ML
4 INJECTION INTRAMUSCULAR; INTRAVENOUS EVERY 6 HOURS PRN
Status: DISCONTINUED | OUTPATIENT
Start: 2019-12-17 | End: 2019-12-17 | Stop reason: HOSPADM

## 2019-12-17 RX ORDER — PROPOFOL 10 MG/ML
INJECTION, EMULSION INTRAVENOUS CONTINUOUS PRN
Status: DISCONTINUED | OUTPATIENT
Start: 2019-12-17 | End: 2019-12-17 | Stop reason: SURG

## 2019-12-17 RX ORDER — METOCLOPRAMIDE HYDROCHLORIDE 5 MG/ML
10 INJECTION INTRAMUSCULAR; INTRAVENOUS ONCE AS NEEDED
Status: DISCONTINUED | OUTPATIENT
Start: 2019-12-17 | End: 2019-12-17 | Stop reason: HOSPADM

## 2019-12-17 RX ORDER — CLINDAMYCIN PHOSPHATE 900 MG/50ML
900 INJECTION INTRAVENOUS ONCE
Status: COMPLETED | OUTPATIENT
Start: 2019-12-17 | End: 2019-12-17

## 2019-12-17 RX ORDER — DIPHENHYDRAMINE HYDROCHLORIDE 50 MG/ML
12.5 INJECTION INTRAMUSCULAR; INTRAVENOUS ONCE AS NEEDED
Status: DISCONTINUED | OUTPATIENT
Start: 2019-12-17 | End: 2019-12-17 | Stop reason: HOSPADM

## 2019-12-17 RX ORDER — FENTANYL CITRATE 50 UG/ML
INJECTION, SOLUTION INTRAMUSCULAR; INTRAVENOUS AS NEEDED
Status: DISCONTINUED | OUTPATIENT
Start: 2019-12-17 | End: 2019-12-17 | Stop reason: SURG

## 2019-12-17 RX ORDER — ONDANSETRON 4 MG/1
4 TABLET, FILM COATED ORAL EVERY 8 HOURS PRN
Qty: 10 TABLET | Refills: 0 | Status: CANCELLED | OUTPATIENT
Start: 2019-12-17

## 2019-12-17 RX ORDER — DEXAMETHASONE SODIUM PHOSPHATE 10 MG/ML
INJECTION, SOLUTION INTRAMUSCULAR; INTRAVENOUS AS NEEDED
Status: DISCONTINUED | OUTPATIENT
Start: 2019-12-17 | End: 2019-12-17 | Stop reason: SURG

## 2019-12-17 RX ORDER — LIDOCAINE HYDROCHLORIDE 10 MG/ML
0.5 INJECTION, SOLUTION EPIDURAL; INFILTRATION; INTRACAUDAL; PERINEURAL ONCE AS NEEDED
Status: DISCONTINUED | OUTPATIENT
Start: 2019-12-17 | End: 2019-12-17 | Stop reason: HOSPADM

## 2019-12-17 RX ORDER — OXYCODONE HYDROCHLORIDE 5 MG/1
5 TABLET ORAL EVERY 4 HOURS PRN
Status: DISCONTINUED | OUTPATIENT
Start: 2019-12-17 | End: 2019-12-17 | Stop reason: HOSPADM

## 2019-12-17 RX ORDER — METOCLOPRAMIDE HYDROCHLORIDE 5 MG/ML
INJECTION INTRAMUSCULAR; INTRAVENOUS AS NEEDED
Status: DISCONTINUED | OUTPATIENT
Start: 2019-12-17 | End: 2019-12-17 | Stop reason: SURG

## 2019-12-17 RX ORDER — OXYCODONE HYDROCHLORIDE 5 MG/1
10 TABLET ORAL EVERY 4 HOURS PRN
Status: DISCONTINUED | OUTPATIENT
Start: 2019-12-17 | End: 2019-12-17 | Stop reason: HOSPADM

## 2019-12-17 RX ORDER — ONDANSETRON 4 MG/1
4 TABLET, FILM COATED ORAL EVERY 8 HOURS PRN
Qty: 20 TABLET | Refills: 0 | Status: SHIPPED | OUTPATIENT
Start: 2019-12-17 | End: 2021-11-22

## 2019-12-17 RX ADMIN — FENTANYL CITRATE 50 MCG: 50 INJECTION, SOLUTION INTRAMUSCULAR; INTRAVENOUS at 15:07

## 2019-12-17 RX ADMIN — DEXAMETHASONE SODIUM PHOSPHATE 8 MG: 10 INJECTION, SOLUTION INTRAMUSCULAR; INTRAVENOUS at 14:20

## 2019-12-17 RX ADMIN — LIDOCAINE HYDROCHLORIDE 50 MG: 10 INJECTION, SOLUTION EPIDURAL; INFILTRATION; INTRACAUDAL; PERINEURAL at 14:16

## 2019-12-17 RX ADMIN — SODIUM CHLORIDE, SODIUM LACTATE, POTASSIUM CHLORIDE, AND CALCIUM CHLORIDE: .6; .31; .03; .02 INJECTION, SOLUTION INTRAVENOUS at 14:05

## 2019-12-17 RX ADMIN — FENTANYL CITRATE 50 MCG: 50 INJECTION INTRAMUSCULAR; INTRAVENOUS at 14:16

## 2019-12-17 RX ADMIN — PROPOFOL 200 MCG/KG/MIN: 10 INJECTION, EMULSION INTRAVENOUS at 14:18

## 2019-12-17 RX ADMIN — PROPOFOL 200 MG: 10 INJECTION, EMULSION INTRAVENOUS at 14:16

## 2019-12-17 RX ADMIN — METOCLOPRAMIDE HYDROCHLORIDE 10 MG: 5 INJECTION INTRAMUSCULAR; INTRAVENOUS at 14:24

## 2019-12-17 RX ADMIN — MIDAZOLAM HYDROCHLORIDE 2 MG: 1 INJECTION, SOLUTION INTRAMUSCULAR; INTRAVENOUS at 14:10

## 2019-12-17 RX ADMIN — SCOPOLAMINE 1 PATCH: 1 PATCH TRANSDERMAL at 13:59

## 2019-12-17 RX ADMIN — ONDANSETRON 4 MG: 2 INJECTION INTRAMUSCULAR; INTRAVENOUS at 14:39

## 2019-12-17 RX ADMIN — CLINDAMYCIN PHOSPHATE 900 MG: 18 INJECTION, SOLUTION INTRAMUSCULAR; INTRAVENOUS at 14:05

## 2019-12-17 RX ADMIN — FENTANYL CITRATE 50 MCG: 50 INJECTION, SOLUTION INTRAMUSCULAR; INTRAVENOUS at 15:23

## 2019-12-17 NOTE — ANESTHESIA POSTPROCEDURE EVALUATION
Post-Op Assessment Note    CV Status:  Stable    Pain management: adequate     Mental Status:  Alert and awake   Hydration Status:  Euvolemic   PONV Controlled:  Controlled   Airway Patency:  Patent   Post Op Vitals Reviewed: Yes      Staff: CRNA           BP   141/72   Temp   98   Pulse 101   Resp 18   SpO2   97

## 2019-12-17 NOTE — H&P
H&P Exam - Orthopedics   Josiah Lewis 62 y o  female MRN: [id number]  Unit/Bed#: APU 6    Assessment/Plan   Assessment:  Left knee medial meniscus tear   Plan: To OR today for left knee arthroscopy partial medial meniscectomy vs possible but much less likely meniscal repair    History of Present Illness   HPI:  Josiah Lewis is a 62 y o  female who presents with left knee pain  Was seen in the office and received intra-articular cortisone injection  This failed to give her lasting relief  MRIs obtained  This identified a medial meniscus tear  After all conservative measures were tried and failed, she wished to proceed with surgical intervention  Left knee arthroscopic partial medial meniscectomy was recommended  Patient was interested in meniscal repair was explained to her that due to location of the tear this is unlikely but would be evaluated intraoperatively if tear was repairable and repair than would be performed  Since being last seen in the office she has no change in his symptoms  Still has medial-sided knee pain worse with activity and bending the knee  Moderate to severe and intermittent      Historical Information  Review Of Systems:   · Skin: Normal  · Neuro: See HPI  · Musculoskeletal: See HPI  · 14 point review of systems negative except as stated above     Past Medical History:   Past Medical History:   Diagnosis Date    HELENE positive     Anemia     Sildenafil causes decreased hematocrit    Chronic kidney disease     borderline lab values    Chronic rhinitis 6/4/2012    Encephalitis     Lasted Assessed 10/18/2017    Gout 6/26/2018    Lupus anticoagulant disorder ([facility] [address] )     Maxillary sinusitis     Lasted Assessed 10/18/2017    Night sweat     Osteopenia     Hip    Osteoporosis     Spine    Pneumonia     Last Assessed 10/18/2017    PONV (postoperative nausea and vomiting)     Pulmonary hypertension (HCC)     Seizures (HCC)     Only during Encephalitis    Shortness of breath with exertion    Sinus tachycardia     Urinary incontinence        Past Surgical History:   Past Surgical History:   Procedure Laterality Date    ARTHRODESIS      Hand: IP Joint    CHOLECYSTECTOMY      COLONOSCOPY      COLPOSCOPY      HYSTERECTOMY      Vaginal    JOINT REPLACEMENT Right     Knee replacement    LAPAROSCOPIC ESOPHAGOGASTRIC FUNDOPLASTY  2008    OH KNEE SCOPE,SINGLE MENISECTOMY Right 10/2/2018    Procedure: KNEE ARTHROSCOPY WITH PARTIAL MEDIAL MENISCECTOMY;  Surgeon: Harper Ingram DO;  [facility];  Service: Orthopedics    OH TOTAL KNEE ARTHROPLASTY Right 2/12/2019    Procedure: KNEE TOTAL ARTHROPLASTY AND ASSOCIATED PROCEDURES;  Surgeon: Harper Ingram DO;  [facility];  Service: Orthopedics    REDUCTION MAMMAPLASTY      REPAIR ANKLE LIGAMENT Right     TONSILLECTOMY         Family History:  Family history reviewed and non-contributory  Family History   Problem Relation Age of Onset    Uterine cancer Mother     Pancreatic cancer Mother 79    Diabetes Mother     Endometrial cancer Mother 77    Heart disease Father         open heart surgery at age 48     Stroke Father         CVA    Hypertension Father     Atrial fibrillation Father     Hyperlipidemia Father     No Known Problems Sister     No Known Problems Brother     Thyroid disease Maternal Grandmother     Asthma Daughter     Heart attack Maternal Grandfather     Heart attack Paternal Grandmother     Skin cancer Paternal Grandfather     No Known Problems Sister     Thyroid disease Sister     Depression Sister     Osteoarthritis Sister     Hemochromatosis Brother     Migraines Daughter     Asthma Daughter     Anuerysm Neg Hx     Clotting disorder Neg Hx     Heart failure Neg Hx        Social History:  Social History     Socioeconomic History    Marital status: /Civil Union     Spouse name: None    Number of children: 2    Years of education: None    Highest education level: None Occupational History    None   Social Needs    Financial resource strain: None    Food insecurity:     Worry: None     Inability: None    Transportation needs:     Medical: None     Non-medical: None   Tobacco Use    Smoking status: Former Smoker     Last attempt to quit: 2006     Years since quittin 9    Smokeless tobacco: Never Used   Substance and Sexual Activity    Alcohol use: Yes     Frequency: Monthly or less     Drinks per session: 1 or 2     Binge frequency: Never     Comment: socially    Drug use: No    Sexual activity: Yes     Partners: Male   Lifestyle    Physical activity:     Days per week: None     Minutes per session: None    Stress: None   Relationships    Social connections:     Talks on phone: None     Gets together: None     Attends Mu-ism service: None     Active member of club or organization: None     Attends meetings of clubs or organizations: None     Relationship status: None    Intimate partner violence:     Fear of current or ex partner: None     Emotionally abused: None     Physically abused: None     Forced sexual activity: None   Other Topics Concern    None   Social History Narrative    Daily caffeinated coffee consumption    Exercise habits       Allergies:    Allergies   Allergen Reactions    Dilantin [Phenytoin] Anaphylaxis and Rash    Augmentin [Amoxicillin-Pot Clavulanate] Rash and Dermatitis    Penicillins Rash           Labs:  0   Lab Value Date/Time    HCT 42 2 2019 0748    HCT 30 4 (L) 2019 0543    HCT 38 6 2019 1300    HGB 13 9 2019 0748    HGB 9 9 (L) 2019 0543    HGB 12 8 2019 1300    INR 1 03 2019 1437    WBC 5 19 2019 0748    WBC 6 50 2019 0543    WBC 4 44 2019 1300    CRP <3 0 2019 1300       Meds:    Current Facility-Administered Medications:     clindamycin (CLEOCIN) IVPB (premix) 900 mg, 900 mg, Intravenous, Once, Alpha Pain, DO    lidocaine (PF) (XYLOCAINE-MPF) 1 % injection 0 5 mL, 0 5 mL, Infiltration, Once PRN, Miller Smallwood MD    morphine (PF) 10 mg/mL injection 2 mg, 2 mg, Intravenous, Q2H PRN, Myranda Flanagan PA-C    ondansetron [facility]) injection 4 mg, 4 mg, Intravenous, Q6H PRN, Myranda Flanagan PA-C    oxyCODONE (ROXICODONE) IR tablet 10 mg, 10 mg, Oral, Q4H PRN, ARIANNA Bella-ANGEL    oxyCODONE (ROXICODONE) IR tablet 5 mg, 5 mg, Oral, Q4H PRN, Myranda Flanagan PA-C    scopolamine (TRANSDERM-SCOP) 1 5 mg/3 days TD 72 hr patch 1 patch, 1 patch, Transdermal, Q72H, Miller Smallwood MD, 1 patch at 12/17/19 1359    Blood Culture:   No results found for: BLOODCX    Wound Culture:   No results found for: WOUNDCULT    Ins and Outs:  No intake/output data recorded  Physical Exam  /73   Pulse (!) 109   Temp 97 6 °F (36 4 °C) (Temporal)   Resp 20   Ht 5' 4" (1 626 m)   Wt 100 kg (221 lb)   SpO2 97%   BMI 37 93 kg/m²   /73   Pulse (!) 109   Temp 97 6 °F (36 4 °C) (Temporal)   Resp 20   Ht 5' 4" (1 626 m)   Wt 100 kg (221 lb)   SpO2 97%   BMI 37 93 kg/m²   Gen: Alert and oriented to person, place, time  HEENT: EOMI, eyes clear, moist mucus membranes, hearing intact  Respiratory: Bilateral chest rise   No audible wheezing found  Cardiovascular: Regular Rate and Rhythm  Abdomen: soft nontender/nondistended  Ortho Exam:  Tenderness to palpation medial joint line posteriorly, no erythema ecchymosis or effusion, range of motion within normal limits  Neuro Exam:  Sensation intact light touch L2 through S1 left lower extremity, motor intact knee flexion extension, EHL/FHL, DP pulse intact  Lab Results: Reviewed  Imaging: Reviewed

## 2019-12-17 NOTE — DISCHARGE INSTRUCTIONS
Discharge Instructions    Weight Bearing Status:                                           Weight bearing as tolerated    Pain:  You have been prescribed a narcotic  Take this sparingly and only as needed for pain  May take one tab every 4-6hrs AS NEEDED for pain  Dressing Instructions:   Keep dressing clean, dry and intact for three days  After three days, you may remove dressings and shower  Please clean incisions with rubbing alcohol after showering until postoperative appointment  May apply band-aids as needed  Appt Instructions: If you do not have your appointment, please call the clinic at [phone] to f/u with Dr Radha Kenney in 2 weeks from surgery    Miscellaneous:  Please perform exercises Dr Radha Kenney instructed postoperatively  If you develop significant calf pain, calf swelling, calf discoloration - these may be signs of a blood clot  Call the office or go the emergency department

## 2019-12-17 NOTE — OP NOTE
OPERATIVE REPORT  PATIENT NAME: Syd Kay    :  1965  MRN: [id number]  Pt Location: [facility] OR ROOM 01    SURGERY DATE: 2019    Surgeon(s) and Role:     * Virginia Gonzalez DO - Primary  [facility] utilized during the case for positioning prepping draping wound closure and dressing application assistance  Preop Diagnosis:  Tear of medial meniscus of left knee, current, unspecified tear type, initial encounter [S83 242A]  Left knee pain, unspecified chronicity [M25 562]    Post-Op Diagnosis Codes:     * Tear of medial meniscus of left knee, current, unspecified tear type, initial encounter [S83 242A]     * Left knee pain, unspecified chronicity [M25 562]  Left knee arthritis    Procedure(s) (LRB):  KNEE ARTHROSCOPIC MENISCECTOMY LATERAL /MEDIAL (Left)    Specimen(s):  * No specimens in log *    Estimated Blood Loss:   Minimal    Drains:  * No LDAs found *    Anesthesia Type:   General    Operative Indications:  Tear of medial meniscus of left knee, current, unspecified tear type, initial encounter [S83 242A]  Left knee pain, unspecified chronicity [M25 562]      Operative Findings:  Transverse tear through the body of the meniscus with complex degeneration as well in this area as well as the posterior aspect of meniscus  There is also grade 2 and grade 3 changes medial femoral condyle  Grade 3 changes patellofemoral joint grade 1 changes lateral tibial femoral condyle and mild fraying of the meniscus    Complications:   None    Procedure and Technique:  Patient was seen and examined in the preoperative area  The left lower extremity was marked, the consent an H&P had been reviewed  The patient was brought back to the operative suite  The patient was intubated sedated  The left lower extremity was prepped and draped in a sterile fashion  After proper timeout commenced and identified the left lower extremity as the operative site     Injection of quarter percent Marcaine with epinephrine was injected in both medial lateral portal site  Incision over the lateral portal was made with eleven blade  We performed a diagnostic arthroscopy  We used the arthroscope and spinal needle to located our medial portal, and made incision with 11 blade and dilated with hemostat  We completed our diagnostic arthroscopy  We found Transverse tear through the body of the meniscus with complex degeneration as well in this area as well as the posterior aspect of meniscus  There is also grade 2 and grade 3 changes medial femoral condyle  Grade 3 changes patellofemoral joint grade 1 changes lateral tibial femoral condyle and mild fraying of the meniscus  We debrided our meniscus back to a stable by using electro cautery device, shaver, and biters  We also performed a chondroplasty on the patella and medial femoral condyle we debrided the lateral meniscus but it was just mild fraying  We copiously irrigated the wound  We closed the incision with 3-0 nylon  Xeroform was applied to the incisions  4 x 4's ABDs , web roll and an Ace wrap were applied to left lower extremity  The patient was taken back to PACU after anesthesia was reversed        I was present for the entire procedure and A qualified resident physician was not available    Patient Disposition:  PACU     SIGNATURE: Obinna Deal DO  DATE: December 17, 2019  TIME: 2:13 PM

## 2019-12-17 NOTE — ANESTHESIA PREPROCEDURE EVALUATION
Review of Systems/Medical History  Patient summary reviewed  Chart reviewed  History of anesthetic complications PONV    Cardiovascular  Hypertension ,   Pulmonary hypertension Pulmonary  Smoker ex-smoker  ,        GI/Hepatic  Negative GI/hepatic ROS          Kidney disease CKD, Chronic kidney disease stage 3,        Endo/Other    Comment: Lupus Obesity    GYN  Negative gynecology ROS          Hematology  Negative hematology ROS      Musculoskeletal  Gout,   Comment: Left knee meniscus tear Arthritis     Neurology  Seizures ,     Psychology   Negative psychology ROS              Physical Exam    Airway    Mallampati score: II  TM Distance: >3 FB  Neck ROM: full     Dental   No notable dental hx     Cardiovascular  Rhythm: regular, Rate: normal, Cardiovascular exam normal    Pulmonary  Pulmonary exam normal Breath sounds clear to auscultation,     Other Findings        Anesthesia Plan  ASA Score- 3     Anesthesia Type- general with ASA Monitors  Additional Monitors:   Airway Plan: LMA  Plan Factors-    Induction- intravenous  Postoperative Plan- Plan for postoperative opioid use  Informed Consent- Anesthetic plan and risks discussed with patient  I personally reviewed this patient with the CRNA  Discussed and agreed on the Anesthesia Plan with the CRNA  Carlitos Burgess Recent labs personally reviewed:  Lab Results   Component Value Date    WBC 5 19 11/21/2019    HGB 13 9 11/21/2019     11/21/2019     Lab Results   Component Value Date    K 4 6 11/21/2019    BUN 17 11/21/2019    CREATININE 0 82 11/21/2019     No results found for: PTT   Lab Results   Component Value Date    INR 1 03 12/09/2019       Blood type A    Lab Results   Component Value Date    HGBA1C 5 2 01/25/2019       I, Verner Leech, MD, have personally seen and evaluated the patient prior to anesthetic care  I have reviewed the pre-anesthetic record, and other medical records if appropriate to the anesthetic care    If a CRNA is involved in the case, I have reviewed the CRNA assessment, if present, and agree  Risks/benefits and alternatives discussed with patient including possible PONV, sore throat, and possibility of rare anesthetic and surgical emergencies

## 2019-12-19 ENCOUNTER — OFFICE VISIT (OUTPATIENT)
Dept: CARDIOLOGY CLINIC | Facility: CLINIC | Age: 54
End: 2019-12-19
Payer: COMMERCIAL

## 2019-12-19 VITALS
HEIGHT: 64 IN | OXYGEN SATURATION: 97 % | SYSTOLIC BLOOD PRESSURE: 112 MMHG | WEIGHT: 229 LBS | BODY MASS INDEX: 39.09 KG/M2 | HEART RATE: 86 BPM | DIASTOLIC BLOOD PRESSURE: 68 MMHG

## 2019-12-19 DIAGNOSIS — I27.20 PULMONARY HYPERTENSION (HCC): Primary | ICD-10-CM

## 2019-12-19 PROCEDURE — 99214 OFFICE O/P EST MOD 30 MIN: CPT | Performed by: INTERNAL MEDICINE

## 2019-12-19 NOTE — PROGRESS NOTES
Advanced Heart Failure/Pulmonary Hypertension Outpatient Progress Note - Richard Thomason 62 y o  female MRN: [id number]    @ Encounter: [id number]    Plan:  --Resume torsemide 40 mg PO qAM and 20 mg qPM  --Continue spironolactone 25 mg PO daily; decrease Kdur to 20 mEq PO qAM  --Goal weight about 202-203 lbs for now  Currently 229, but only partially volume  --Daily weights qAM  --Continue sildenafil 20 mg PO q8hrs  --Stop macitentan 10 mg PO daily    Assessment:  # ANSARI: Likely combination of obesity/deconditioning, diastolic dysfunction (obesity, female, currently on diuretics w/ hx of volume overload), and exercise induced PH  --Continue diet and exercise for weight loss    # Exercised induced PAH: Obesity/deconditioning and diastolic dysfunction (PCWP 12 mmHg) do not fully explain her degree of dysfunction  Recent diagnosis of MCTD/SLE w/ + lupus anticoagulant  Remote smoker  She is at risk to develop pulmonary vascular disease leading to RV dysfunction  At rest, RV appears normal on TTE with coupled RV-PA w/o e/o of RVOT notching suggestive of normal PVR at rest  However, she did have a stress echo in 2012 that was suggestive of exercise induced pulmonary HTN  At the time she did not have e/o of elevated PVR  Her bubble was negative which makes an intracardiac shunt less likely  As her symptoms had progressed, and with ambulation she showed evidence of desaturation (possible abnormal air exchange) we did the following  Volume overloaded currently  Dry weight 202-203 lbs:   Diag:  --PFTs normal   --Repeat stress echo to evaluate pulmonary pressures, RV, and RVOT signal w/ exercise showed exaggerated HR response, HTNsive response, decrease in O2 saturation from 99% to 91% and increase in PA pressures from 45 mmHg to 70 mmHg with exercise  Exercised for 5 min and 20 sec  --RHC w/ exercise: This was an exercise RHC with simultaneous monitoring of the distal and proximal SG catheter ports   She had an appropriate HR response from 100 to 130 bpm with MAP throughout the study staying at 112 mmHg  Hgb 13 1  With exercise, there was an increase in PVR from <3 to ~4 5 MOLINA  PCWP increased from 12 to 18 mmHg  CVP increased from 7 to 10 mmHg  The findings were consistent with exercise induced pulmonary hypertension with an abnormal pulmonary vascular response  Her TTE and PCWP suggest a component of L heart disease  Her PFTs did not show any significant lung disease (Group III), V/Q scan is not consistent with CTEPH (Group IV PAH) and stress echo showed elevation of PA pressures with exercise which is consistent with above findings  --TTE 4/23/19 shows normal RV size and function  --Slow improvement on sildenafil and macitentan  --Mild volume overload currently  --Will consider RHC w/ exercise if symptoms do not return to baseline after weight back to ~202 lbs     Therapeutic:  --Resume torsemide 40 mg PO qAM and 20 mg qPM  --Continue spironolactone 25 mg PO daily; decrease Kdur to 20 mEq PO qAM  --Goal weight about 202-203 lbs for now  Currently 229, but only partially volume  --Daily weights qAM  --Continue sildenafil 20 mg PO q8hrs  --Stop macitentan 10 mg PO daily    # SLE: Per outpatient Rheumatologist  Continue plaquenil  # ST: V/Q negative  May be 2/2 to deconditioning +/- inappropriate ST +/- diastolic dysfunction/HF  No e/o infection  --Continue to monitor, can consider coby agents in the future  # Morbid obesity: Continue weight loss    RTC in 3 months    HPI: Very pleasant 62 y o  woman w/ PMHx of newly diagnosed MCTD/SLE, +lupus anti-coagulant, B12 deficiency, and obesity referred for evaluation of ANSARI  She states that she first started getting SOB -- 10 years ago  She has had several episodes of bacterial PNA and chronic cough (a few times/year)  She was referred to [facility] in 2012 for workup for PH  She had a a stress echo 8/2012 that showed that her PA pressures more than doubled from --24 mmHg to > 50 mmHg  Currently, she states she can walk up a couple flights of stairs but does get SOB  She also gets SOB with walking up inclines  She gets occasional LH  She has been found to be B12 deficient, but that is improving  She was seeing a Rheumatologist in [place], [place] but recently saw a specialist at [facility], Dr Ronaldo Tena (Rheumatologist - 11/16)  She was diagnosed with MCTD and SLE  HELENE + 1:320 w/ speckled pattern  She has been on Plaquenil x 2 weeks now  She is on ASA for + lupus anticoagulant and anticardiolipin Abs  She has joint pains and a subtle malar rash  She states so far there has been no change with plaquenil  She has f/u with him in 2/2018  After her visit with Dr Katie Manley, she has had TTE which shows LVEF --65%, normal RV size and function without RVOT notching  Normal atria  CXR clear  She also has had a negative V/Q scan  She walked the patient in the hallway and had her go up and down stairs  Her resting HR went from --100 bpm to 110s with Pulse Ox starting from 98% @ rest to --90% with exercise  Pulse Ox showed a good waveform throughout  She denies CP, palpitations, presyncope, or syncope  She notes that she went on a two week cruise and afterwards developed LE edema in the past and has developed LE edema  Today, 1/9/2018, returns for f/u  Had echo stress test w/ poor exercise tolerance and decrease in O2 saturation w/ increase in PA pressures from 45 to 70 mmHg w/ exercise  Exercise RHC showed exercise induced PH w/ increase in PVR from <3 to ~4 5 MOLINA  PCWP increased from 12 to 18 mmHg  CVP increased from 7 to 10 mmHg  She was denied for tadalafil, but approved for sildenafil  She started sildenafil on 1/5/18 and states she does not feel any different and continues to have trouble with stairs  In addition, today in clinic, she continues to be tachycardic  Has gained 4 lbs since her last visit  Today, 2/13/2018, returns for f/u   She has gained ~ 4 lbs since her last visit, for a total of 9 lbs since attempted diuresis  She started sildenafil on 1/5/18 and states she feels worse going up stairs than previously  In addition, today in clinic, she continues to be tachycardic  Today, 3/19/2018, she returns for f/u  She states going up stairs there is improvement now  She has been on macitentan for 1 week now  Weight has steadily been decreasing  Today, 4/30/2018, she returns for f/u  She states she feels about the same as her last visit  Weight has steadily been decreasing  Today, 6/11/2018, she returns for f/u  She feels slightly better than her last visit  Weight continues to decrease  Walking up one flight is not a problem, two flights feels SOB  Today, 7/12/2018, she returns for f/u  Her weight is ~206 lbs on home scale  She has been out and walking around with her puppy about 15-20 minutes every night  She feels a little winded on the top of the hills, but not having to stop  Today, 8/30/18, she returns for f/u  She feels the same as last visit, however, she is having knee pains  She has a meniscus tear in the R knee  Today, 11/19/18, she returns for f/u  Exertional symptoms are stable  Her knee pains are persistent despite meniscus surgery  She is considering further surgery or knee injections  Today, 1/28/19, she returns for f/u  Preop for knee surgery  Today, 4/15/19, she returns for f/u  Continues to recover from surgery  Today, 7/10/19, she returns for f/u  She is walking around again without difficulty  Having trouble going up hills  Stairs are a little more challenging as well  Weight is up about 8-10 lbs  Today, 9/25/19, she returns for f/u  She was released from her job due to "spending too much time in the bathroom " She states this is likely due to her diuretic  Continues to have trouble with hills, stairs continues to be SOB  Stable symptoms from last visit  12/19/19: Tore meniscus in L knee s/p repair   Has been off macitentan for about 3 months and feels same  SOB is stable  Weight is     Past Medical History:   Diagnosis Date    HELENE positive     Anemia     Sildenafil causes decreased hematocrit    Chronic kidney disease     borderline lab values    Chronic rhinitis 6/4/2012    Encephalitis     Lasted Assessed 10/18/2017    Gout 6/26/2018    Lupus anticoagulant disorder [facility]     Maxillary sinusitis     Lasted Assessed 10/18/2017    Night sweat     Osteopenia     Hip    Osteoporosis     Spine    Pneumonia     Last Assessed 10/18/2017    PONV (postoperative nausea and vomiting)     Pulmonary hypertension (HCC)     Seizures (HCC)     Only during Encephalitis    Shortness of breath     with exertion    Sinus tachycardia     Urinary incontinence      Review of Systems - 12 point ROS was done and is negative, except as noted above  Allergies   Allergen Reactions    Dilantin [Phenytoin] Anaphylaxis and Rash    Augmentin [Amoxicillin-Pot Clavulanate] Rash and Dermatitis    Penicillins Rash       Current Outpatient Medications:     aspirin (ECOTRIN LOW STRENGTH) 81 mg EC tablet, Take 162 mg by mouth daily, Disp: , Rfl:     BD INTEGRA SYRINGE 25G X 1" 3 ML MISC, FOR WEEKLY ADMINISTRATION OF B-12, Disp: , Rfl: 2    cholecalciferol (VITAMIN D3) 1,000 units tablet, Take 2,000 Units by mouth daily in the early morning  , Disp: , Rfl:     cyanocobalamin 1,000 mcg/mL, Inject 1mL IM weekly   Dispense with needles and syringes, Disp: 10 mL, Rfl: 6    hydroxychloroquine (PLAQUENIL) 200 mg tablet, Take 400 mg by mouth daily with breakfast  , Disp: , Rfl:     NEEDLE, DISP, 24 G 24G X 1" MISC, For weekly administration of B12, Disp: 20 each, Rfl: 2    ondansetron (ZOFRAN) 4 mg tablet, Take 1 tablet (4 mg total) by mouth every 8 (eight) hours as needed for nausea or vomiting, Disp: 20 tablet, Rfl: 0    oxyCODONE-acetaminophen (PERCOCET) 5-325 mg per tablet, Take 1 tablet by mouth every 6 (six) hours as needed for moderate pain or severe pain for up to 7 daysMax Daily Amount: 4 tablets, Disp: 7 tablet, Rfl: 0    potassium chloride (K-DUR,KLOR-CON) 20 mEq tablet, Take 2 tablets (40 mEq total) by mouth 2 (two) times a day (Patient taking differently: Take 20 mEq by mouth 2 (two) times a day ), Disp: 960 tablet, Rfl: 3    sildenafil (REVATIO) 20 mg tablet, TAKE ONE TABLET BY MOUTH THREE TIMES DAILY (Patient taking differently: Take 20 mg by mouth ), Disp: 90 tablet, Rfl: 0    spironolactone (ALDACTONE) 25 mg tablet, Take 1 tablet (25 mg total) by mouth daily, Disp: 90 tablet, Rfl: 3    Syringe 1 ML MISC, For weekly administration of B12, Disp: 20 each, Rfl: 2    torsemide (DEMADEX) 20 mg tablet, Take 2 tablets (40 mg total) by mouth 2 (two) times a day (Patient taking differently: Take 40 mg by mouth 2 (two) times a day ), Disp: 360 tablet, Rfl: 3    triamcinolone (KENALOG) 0 1 % oral topical paste, prn, Disp: , Rfl:     Social History     Socioeconomic History    Marital status: /Civil Union     Spouse name: Not on file    Number of children: 2    Years of education: Not on file    Highest education level: Not on file   Occupational History    Not on file   Social Needs    Financial resource strain: Not on file    Food insecurity:     Worry: Not on file     Inability: Not on file    Transportation needs:     Medical: Not on file     Non-medical: Not on file   Tobacco Use    Smoking status: Former Smoker     Last attempt to quit: 2006     Years since quittin 9    Smokeless tobacco: Never Used   Substance and Sexual Activity    Alcohol use: Yes     Frequency: Monthly or less     Drinks per session: 1 or 2     Binge frequency: Never     Comment: socially    Drug use: No    Sexual activity: Yes     Partners: Male   Lifestyle    Physical activity:     Days per week: Not on file     Minutes per session: Not on file    Stress: Not on file   Relationships    Social connections:     Talks on phone: Not on file     Gets together: Not on file     Attends Rastafarian service: Not on file     Active member of club or organization: Not on file     Attends meetings of clubs or organizations: Not on file     Relationship status: Not on file    Intimate partner violence:     Fear of current or ex partner: Not on file     Emotionally abused: Not on file     Physically abused: Not on file     Forced sexual activity: Not on file   Other Topics Concern    Not on file   Social History Narrative    Daily caffeinated coffee consumption    Exercise habits       Family History   Problem Relation Age of Onset    Uterine cancer Mother     Pancreatic cancer Mother 79    Diabetes Mother     Endometrial cancer Mother 77    Heart disease Father         open heart surgery at age 48     Stroke Father         CVA    Hypertension Father     Atrial fibrillation Father     Hyperlipidemia Father     No Known Problems Sister     No Known Problems Brother     Thyroid disease Maternal Grandmother     Asthma Daughter     Heart attack Maternal Grandfather     Heart attack Paternal Grandmother     Skin cancer Paternal Grandfather     No Known Problems Sister     Thyroid disease Sister     Depression Sister     Osteoarthritis Sister     Hemochromatosis Brother    Aniceto Etna Migraines Daughter     Asthma Daughter     Anuerysm Neg Hx     Clotting disorder Neg Hx     Heart failure Neg Hx      Physical Exam:  Vitals:    12/19/19 1555   BP: 112/68   BP Location: Right arm   Patient Position: Sitting   Cuff Size: Large   Pulse: 86   SpO2: 97%   Weight: 104 kg (229 lb)   Height: 5' 4" (1 626 m)       GEN: Saadia Devine appears well, alert and oriented x 3, pleasant and cooperative   HEENT: pupils equal, round, and reactive to light; extraocular muscles intact  NECK: supple, no carotid bruits   HEART: regular rhythm, normal S1 and S2, no murmurs, clicks, gallops or rubs, JVP is    LUNGS: clear to auscultation bilaterally; no wheezes, rales, or rhonchi ABDOMEN: normal bowel sounds, soft, no tenderness, no distention  EXTREMITIES: peripheral pulses normal; no clubbing, cyanosis, or edema  NEURO: no focal findings   SKIN: normal without suspicious lesions on exposed skin    Labs & Results:  Lab Results   Component Value Date    WBC 5 19 11/21/2019    HGB 13 9 11/21/2019    HCT 42 2 11/21/2019    MCV 92 11/21/2019     11/21/2019       Chemistry        Component Value Date/Time    K 4 6 11/21/2019 0748     11/21/2019 0748    CO2 29 11/21/2019 0748    BUN 17 11/21/2019 0748    CREATININE 0 82 11/21/2019 0748        Component Value Date/Time    CALCIUM 9 3 11/21/2019 0748    ALKPHOS 95 02/12/2019 1428    AST 18 02/12/2019 1428    ALT 27 02/12/2019 1428        EKG personally reviewed by Tianna Higuera MD      Counseling / Coordination of Care  Total floor / unit time spent today 40 minutes  Greater than 50% of total time was spent with the patient and / or family counseling and / or coordination of care  A description of the counseling / coordination of care: 25 min  Thank you for the opportunity to participate in the care of this patient      Tianna Higuera MD, PhD   Vishal Mccallum

## 2019-12-30 ENCOUNTER — OFFICE VISIT (OUTPATIENT)
Dept: OBGYN CLINIC | Facility: CLINIC | Age: 54
End: 2019-12-30

## 2019-12-30 DIAGNOSIS — S83.241A OTHER TEAR OF MEDIAL MENISCUS, CURRENT INJURY, RIGHT KNEE, INITIAL ENCOUNTER: Primary | ICD-10-CM

## 2019-12-30 PROCEDURE — 99024 POSTOP FOLLOW-UP VISIT: CPT | Performed by: ORTHOPAEDIC SURGERY

## 2019-12-30 NOTE — PROGRESS NOTES
Assessment:  1  Other tear of medial meniscus, current injury, right knee, initial encounter       Patient Active Problem List   Diagnosis    Encounter for gynecological examination (general) (routine) without abnormal findings    B12 deficiency    Chronic cough    [facility]    Dyspnea    Edema    Essential hypertension    Hot flashes due to menopause    Long-term use of hydroxychloroquine    Systemic lupus erythematosus [facility]    Lupus anticoagulant positive    Tachycardia    SOB (shortness of breath) on exertion    Sinus tachycardia    Secondary pulmonary hypertension    Pulmonary hypertension (HCC)    Post-nasal drip    Pes anserine bursitis    Positive HELENE (antinuclear antibody)    Other chronic pulmonary heart diseases    Osteoporosis    Night sweats    Patellofemoral disorder of right knee    Pes anserinus bursitis of right knee    Arthritis of right knee    Other tear of medial meniscus, current injury, right knee, initial encounter    Tear of medial meniscus of right knee, current    Chronic pain of right knee    Elevated serum creatinine    Hyperuricemia    Trochanteric bursitis of both hips    Undifferentiated connective tissue disease ([facility] )    Vitamin D deficiency    BV (bacterial vaginosis)    Chronic kidney disease, stage 3 ([facility] )    Right knee pain    Status post total right knee replacement    Left knee pain    Tear of medial meniscus of left knee, current           Plan      Sutures were removed at today's visit, steri strips applied  She is to perform HEP for quad strengthening exercises and straight leg raises demonstrated to her in the office  Scar massage advised  She will be seen back in the office x 2 weeks for post op visit, left knee medial and lateral meniscectomy  Subjective:     Patient ID:    Chief Complaint:Na Joseph 62 y o  female      HPI    Leobardo Eth presents in the office for post op visit    She is s/p Left knee arthroscopy medial / lateral meniscectomy 19  She states she had burning of the left knee lateral probe incision suture  She states mild discomfort with walking up and down stairs and discomfort when on her feet for long periods of time          Social History     Socioeconomic History    Marital status: /Civil Union     Spouse name: Not on file    Number of children: 2    Years of education: Not on file    Highest education level: Not on file   Occupational History    Not on file   Social Needs    Financial resource strain: Not on file    Food insecurity:     Worry: Not on file     Inability: Not on file    Transportation needs:     Medical: Not on file     Non-medical: Not on file   Tobacco Use    Smoking status: Former Smoker     Last attempt to quit: 2006     Years since quittin 9    Smokeless tobacco: Never Used   Substance and Sexual Activity    Alcohol use: Yes     Frequency: Monthly or less     Drinks per session: 1 or 2     Binge frequency: Never     Comment: socially    Drug use: No    Sexual activity: Yes     Partners: Male   Lifestyle    Physical activity:     Days per week: Not on file     Minutes per session: Not on file    Stress: Not on file   Relationships    Social connections:     Talks on phone: Not on file     Gets together: Not on file     Attends Mor[employer] service: Not on file     Active member of club or organization: Not on file     Attends meetings of clubs or organizations: Not on file     Relationship status: Not on file    Intimate partner violence:     Fear of current or ex partner: Not on file     Emotionally abused: Not on file     Physically abused: Not on file     Forced sexual activity: Not on file   Other Topics Concern    Not on file   Social History Narrative    Daily caffeinated coffee consumption    Exercise habits     Past Medical History:   Diagnosis Date    HELENE positive     Anemia     Sildenafil causes decreased hematocrit    Chronic kidney disease     borderline lab values    Chronic rhinitis 6/4/2012    Encephalitis     Lasted Assessed 10/18/2017    Gout 6/26/2018    Lupus anticoagulant disorder (HCC)     Maxillary sinusitis     Lasted Assessed 10/18/2017    Night sweat     Osteopenia     Hip    Osteoporosis     Spine    Pneumonia     Last Assessed 10/18/2017    PONV (postoperative nausea and vomiting)     Pulmonary hypertension (HCC)     Seizures (HCC)     Only during Encephalitis    Shortness of breath     with exertion    Sinus tachycardia     Urinary incontinence      Past Surgical History:   Procedure Laterality Date    ARTHRODESIS      Hand: IP Joint    CHOLECYSTECTOMY      COLONOSCOPY      COLPOSCOPY      HYSTERECTOMY      Vaginal    JOINT REPLACEMENT Right     Knee replacement    LAPAROSCOPIC ESOPHAGOGASTRIC FUNDOPLASTY  2008    OK KNEE SCOPE,SINGLE MENISECTOMY Right 10/2/2018    Procedure: KNEE ARTHROSCOPY WITH PARTIAL MEDIAL MENISCECTOMY;  Surgeon: Macho Booker DO;  [facility];  Service: Orthopedics    OK KNEE SCOPE,SINGLE MENISECTOMY Left 12/17/2019    Procedure: KNEE ARTHROSCOPIC MENISCECTOMY /MEDIAL;  Chondyl medial and posterior;  Surgeon: Macho Booker DO;  [facility];  Service: Orthopedics    OK TOTAL KNEE ARTHROPLASTY Right 2/12/2019    Procedure: KNEE TOTAL ARTHROPLASTY AND ASSOCIATED PROCEDURES;  Surgeon: Macho Booker DO;  [facility];  Service: Orthopedics    REDUCTION MAMMAPLASTY      REPAIR ANKLE LIGAMENT Right     TONSILLECTOMY       Allergies   Allergen Reactions    Dilantin [Phenytoin] Anaphylaxis and Rash    Augmentin [Amoxicillin-Pot Clavulanate] Rash and Dermatitis    Penicillins Rash     Current Outpatient Medications on File Prior to Visit   Medication Sig Dispense Refill    aspirin (ECOTRIN LOW STRENGTH) 81 mg EC tablet Take 162 mg by mouth daily      BD INTEGRA SYRINGE 25G X 1" 3 ML MISC FOR WEEKLY ADMINISTRATION OF B-12  2    cholecalciferol (VITAMIN D3) 1,000 units tablet Take 2,000 Units by mouth daily in the early morning        cyanocobalamin 1,000 mcg/mL Inject 1mL IM weekly  Dispense with needles and syringes 10 mL 6    hydroxychloroquine (PLAQUENIL) 200 mg tablet Take 400 mg by mouth daily with breakfast        NEEDLE, DISP, 24 G 24G X 1" MISC For weekly administration of B12 20 each 2    ondansetron (ZOFRAN) 4 mg tablet Take 1 tablet (4 mg total) by mouth every 8 (eight) hours as needed for nausea or vomiting 20 tablet 0    potassium chloride (K-DUR,KLOR-CON) 20 mEq tablet Take 2 tablets (40 mEq total) by mouth 2 (two) times a day (Patient taking differently: Take 20 mEq by mouth 2 (two) times a day ) 960 tablet 3    sildenafil (REVATIO) 20 mg tablet TAKE ONE TABLET BY MOUTH THREE TIMES DAILY (Patient taking differently: Take 20 mg by mouth ) 90 tablet 0    spironolactone (ALDACTONE) 25 mg tablet Take 1 tablet (25 mg total) by mouth daily 90 tablet 3    Syringe 1 ML MISC For weekly administration of B12 20 each 2    torsemide (DEMADEX) 20 mg tablet Take 2 tablets (40 mg total) by mouth 2 (two) times a day (Patient taking differently: Take 40 mg by mouth 2 (two) times a day ) 360 tablet 3    triamcinolone (KENALOG) 0 1 % oral topical paste prn       No current facility-administered medications on file prior to visit  Objective:    Review of Systems    Left Knee Exam     Muscle Strength   The patient has normal left knee strength  Tenderness   The patient is experiencing no tenderness  Range of Motion   Extension: normal   Flexion: normal     Other   Erythema: absent  Scars: present  Sensation: normal  Pulse: present  Swelling: none  Effusion: no effusion present    Comments:  Probe sites present:  CDI sutures remain intact  No drainage, no erythema, no ecchymosis, no sign of infection  Physical Exam   Musculoskeletal:        Left knee: She exhibits no effusion         no new imaging perfomed

## 2020-01-13 ENCOUNTER — OFFICE VISIT (OUTPATIENT)
Dept: OBGYN CLINIC | Facility: CLINIC | Age: 55
End: 2020-01-13

## 2020-01-13 DIAGNOSIS — S83.242D TEAR OF MEDIAL MENISCUS OF LEFT KNEE, CURRENT, UNSPECIFIED TEAR TYPE, SUBSEQUENT ENCOUNTER: Primary | ICD-10-CM

## 2020-01-13 PROCEDURE — 99024 POSTOP FOLLOW-UP VISIT: CPT | Performed by: ORTHOPAEDIC SURGERY

## 2020-01-13 NOTE — PROGRESS NOTES
Assessment:   {Common Diagnosis:15722}    Plan:   {kmtreatments:30886}    Follow Up:  {follow up time choices:73558}    To Do Next Visit:  {To do next visit:93029::" "}      CHIEF COMPLAINT:  Chief Complaint   Patient presents with    Left Knee - Post-op         SUBJECTIVE:  Aubree Hall is a 62y o  year old female who presents for follow up after {Common Surgeries:25266}  Today patient has {Complaint:24145}         PHYSICAL EXAMINATION:  General: {1234:05314::"well developed and well nourished","alert","oriented times 3","appears comfortable"}  Psychiatric: {Psych:12580::"Normal"}    MUSCULOSKELETAL EXAMINATION:  Incision: {post op incision:21938}  Range of Motion: {post op rom:27778::"As expected"}  Neurovascular status: {post op neuro MHCU:92592::"ELYHZ intact, good cap refill"}  Activity Restrictions: {post op activity:14329::"No restrictions"}  {anything done today?:82505}      STUDIES REVIEWED:  {kmimagin::"No Studies to review"}      PROCEDURES PERFORMED:  Procedures  {Was Procdoc done:85105::"No Procedures performed today"}

## 2020-01-13 NOTE — PROGRESS NOTES
Assessment:  1  Tear of medial meniscus of left knee, current, unspecified tear type, subsequent encounter       Patient Active Problem List   Diagnosis    Encounter for gynecological examination (general) (routine) without abnormal findings    B12 deficiency    Chronic cough    [facility]    Dyspnea    Edema    Essential hypertension    Hot flashes due to menopause    Long-term use of hydroxychloroquine    Systemic lupus erythematosus ([facility]    Lupus anticoagulant positive    Tachycardia    SOB (shortness of breath) on exertion    Sinus tachycardia    Secondary pulmonary hypertension    Pulmonary hypertension (HCC)    Post-nasal drip    Pes anserine bursitis    Positive HELENE (antinuclear antibody)    Other chronic pulmonary heart diseases    Osteoporosis    Night sweats    Patellofemoral disorder of right knee    Pes anserinus bursitis of right knee    Arthritis of right knee    Other tear of medial meniscus, current injury, right knee, initial encounter    Tear of medial meniscus of right knee, current    Chronic pain of right knee    Elevated serum creatinine    Hyperuricemia    Trochanteric bursitis of both hips    Undifferentiated connective tissue disease ([facility]    Vitamin D deficiency    BV (bacterial vaginosis)    Chronic kidney disease, stage 3 ([facility]    Right knee pain    Status post total right knee replacement    Left knee pain    Tear of medial meniscus of left knee, current           Plan      Activity as tolerated follow up on as-needed basis  We did talk about giving her cortisone injection before she goes on her cruise in May  She may also want us to reorder lubricant injections as well  Subjective:     Patient ID:    Chief Complaint:Na Joseph 62 y o  female      HPI    Patient comes in today with regards to her left knee  Patient had meniscal surgery 4 weeks ago  She is doing better    She still has some pain especially when she wears heels  I explained to her that this is normal   She is only 4 weeks out from her surgery        The following portions of the patient's history were reviewed and updated as appropriate: allergies, current medications, past family history, past social history, past surgical history and problem list     All organ systems normal    Social History     Socioeconomic History    Marital status: /Civil Union     Spouse name: Not on file    Number of children: 2    Years of education: Not on file    Highest education level: Not on file   Occupational History    Not on file   Social Needs    Financial resource strain: Not on file    Food insecurity:     Worry: Not on file     Inability: Not on file    Transportation needs:     Medical: Not on file     Non-medical: Not on file   Tobacco Use    Smoking status: Former Smoker     Last attempt to quit: 2006     Years since quittin 9    Smokeless tobacco: Never Used   Substance and Sexual Activity    Alcohol use: Yes     Frequency: Monthly or less     Drinks per session: 1 or 2     Binge frequency: Never     Comment: socially    Drug use: No    Sexual activity: Yes     Partners: Male   Lifestyle    Physical activity:     Days per week: Not on file     Minutes per session: Not on file    Stress: Not on file   Relationships    Social connections:     Talks on phone: Not on file     Gets together: Not on file     Attends Gnosticist service: Not on file     Active member of club or organization: Not on file     Attends meetings of clubs or organizations: Not on file     Relationship status: Not on file    Intimate partner violence:     Fear of current or ex partner: Not on file     Emotionally abused: Not on file     Physically abused: Not on file     Forced sexual activity: Not on file   Other Topics Concern    Not on file   Social History Narrative    Daily caffeinated coffee consumption    Exercise habits     Past Medical History:   Diagnosis Date    HELENE positive     Anemia     Sildenafil causes decreased hematocrit    Chronic kidney disease     borderline lab values    Chronic rhinitis 6/4/2012    Encephalitis     Lasted Assessed 10/18/2017    Gout 6/26/2018    Lupus anticoagulant disorder ([facility]     Maxillary sinusitis     Lasted Assessed 10/18/2017    Night sweat     Osteopenia     Hip    Osteoporosis     Spine    Pneumonia     Last Assessed 10/18/2017    PONV (postoperative nausea and vomiting)     Pulmonary hypertension (HCC)     Seizures (HCC)     Only during Encephalitis    Shortness of breath     with exertion    Sinus tachycardia     Urinary incontinence      Past Surgical History:   Procedure Laterality Date    ARTHRODESIS      Hand: IP Joint    CHOLECYSTECTOMY      COLONOSCOPY      COLPOSCOPY      HYSTERECTOMY      Vaginal    JOINT REPLACEMENT Right     Knee replacement    LAPAROSCOPIC ESOPHAGOGASTRIC FUNDOPLASTY  2008    AR KNEE SCOPE,SINGLE MENISECTOMY Right 10/2/2018    Procedure: KNEE ARTHROSCOPY WITH PARTIAL MEDIAL MENISCECTOMY;  Surgeon: Maryam Guerrero DO;  [facility];  Service: Orthopedics    AR KNEE SCOPE,SINGLE MENISECTOMY Left 12/17/2019    Procedure: KNEE ARTHROSCOPIC MENISCECTOMY /MEDIAL;  Chondyl medial and posterior;  Surgeon: Maryam Guerrero DO;  [facility];  Service: Orthopedics    AR TOTAL KNEE ARTHROPLASTY Right 2/12/2019    Procedure: KNEE TOTAL ARTHROPLASTY AND ASSOCIATED PROCEDURES;  Surgeon: Maryam Guerrero DO;  [facility];  Service: Orthopedics    REDUCTION MAMMAPLASTY      REPAIR ANKLE LIGAMENT Right     TONSILLECTOMY       Allergies   Allergen Reactions    Dilantin [Phenytoin] Anaphylaxis and Rash    Augmentin [Amoxicillin-Pot Clavulanate] Rash and Dermatitis    Penicillins Rash     Current Outpatient Medications on File Prior to Visit   Medication Sig Dispense Refill    aspirin (ECOTRIN LOW STRENGTH) 81 mg EC tablet Take 162 mg by mouth daily  BD INTEGRA SYRINGE 25G X 1" 3 ML MISC FOR WEEKLY ADMINISTRATION OF B-12  2    cholecalciferol (VITAMIN D3) 1,000 units tablet Take 2,000 Units by mouth daily in the early morning        cyanocobalamin 1,000 mcg/mL Inject 1mL IM weekly  Dispense with needles and syringes 10 mL 6    hydroxychloroquine (PLAQUENIL) 200 mg tablet Take 400 mg by mouth daily with breakfast        NEEDLE, DISP, 24 G 24G X 1" MISC For weekly administration of B12 20 each 2    ondansetron (ZOFRAN) 4 mg tablet Take 1 tablet (4 mg total) by mouth every 8 (eight) hours as needed for nausea or vomiting 20 tablet 0    potassium chloride (K-DUR,KLOR-CON) 20 mEq tablet Take 2 tablets (40 mEq total) by mouth 2 (two) times a day (Patient taking differently: Take 20 mEq by mouth 2 (two) times a day ) 960 tablet 3    sildenafil (REVATIO) 20 mg tablet TAKE ONE TABLET BY MOUTH THREE TIMES DAILY (Patient taking differently: Take 20 mg by mouth ) 90 tablet 0    spironolactone (ALDACTONE) 25 mg tablet Take 1 tablet (25 mg total) by mouth daily 90 tablet 3    Syringe 1 ML MISC For weekly administration of B12 20 each 2    torsemide (DEMADEX) 20 mg tablet Take 2 tablets (40 mg total) by mouth 2 (two) times a day (Patient taking differently: Take 40 mg by mouth 2 (two) times a day ) 360 tablet 3    triamcinolone (KENALOG) 0 1 % oral topical paste prn       No current facility-administered medications on file prior to visit  Objective:        Ortho Exam    Mild tenderness to palpation on the medial joint line over the medial portal scar otherwise range of motion normal   No effusion or ecchymosis  Mild portal scar swelling        Portions of the record may have been created with voice recognition software   Occasional wrong word or "sound a like" substitutions may have occurred due to the inherent limitations of voice recognition software   Read the chart carefully and recognize, using context, where substitutions have occurred

## 2020-01-16 DIAGNOSIS — I27.20 PULMONARY HYPERTENSION (HCC): ICD-10-CM

## 2020-01-16 RX ORDER — SILDENAFIL CITRATE 20 MG/1
TABLET ORAL
Qty: 90 TABLET | Refills: 1 | Status: SHIPPED | OUTPATIENT
Start: 2020-01-16 | End: 2020-01-22

## 2020-01-22 DIAGNOSIS — I27.20 PULMONARY HYPERTENSION (HCC): ICD-10-CM

## 2020-01-22 RX ORDER — SILDENAFIL CITRATE 20 MG/1
20 TABLET ORAL 3 TIMES DAILY
Qty: 90 TABLET | Refills: 1 | Status: SHIPPED | OUTPATIENT
Start: 2020-01-22 | End: 2020-02-28 | Stop reason: SDUPTHER

## 2020-02-03 ENCOUNTER — TELEPHONE (OUTPATIENT)
Dept: CARDIOLOGY CLINIC | Facility: CLINIC | Age: 55
End: 2020-02-03

## 2020-02-05 NOTE — TELEPHONE ENCOUNTER
Sildenafil 20 mg TID approved through [facility] [employer]   2/3/20-2/3/2021   Case ID [id number]

## 2020-02-12 ENCOUNTER — APPOINTMENT (OUTPATIENT)
Dept: RADIOLOGY | Facility: AMBULARY SURGERY CENTER | Age: 55
End: 2020-02-12
Payer: COMMERCIAL

## 2020-02-12 ENCOUNTER — OFFICE VISIT (OUTPATIENT)
Dept: OBGYN CLINIC | Facility: CLINIC | Age: 55
End: 2020-02-12
Payer: COMMERCIAL

## 2020-02-12 VITALS — DIASTOLIC BLOOD PRESSURE: 77 MMHG | HEART RATE: 108 BPM | SYSTOLIC BLOOD PRESSURE: 138 MMHG

## 2020-02-12 DIAGNOSIS — M17.12 ARTHRITIS OF LEFT KNEE: ICD-10-CM

## 2020-02-12 DIAGNOSIS — Z96.651 STATUS POST TOTAL RIGHT KNEE REPLACEMENT: Primary | ICD-10-CM

## 2020-02-12 DIAGNOSIS — Z96.651 STATUS POST TOTAL RIGHT KNEE REPLACEMENT: ICD-10-CM

## 2020-02-12 PROCEDURE — 99024 POSTOP FOLLOW-UP VISIT: CPT | Performed by: ORTHOPAEDIC SURGERY

## 2020-02-12 PROCEDURE — 1036F TOBACCO NON-USER: CPT | Performed by: ORTHOPAEDIC SURGERY

## 2020-02-12 PROCEDURE — 99213 OFFICE O/P EST LOW 20 MIN: CPT | Performed by: ORTHOPAEDIC SURGERY

## 2020-02-12 PROCEDURE — 3075F SYST BP GE 130 - 139MM HG: CPT | Performed by: ORTHOPAEDIC SURGERY

## 2020-02-12 PROCEDURE — 3078F DIAST BP <80 MM HG: CPT | Performed by: ORTHOPAEDIC SURGERY

## 2020-02-12 PROCEDURE — 73562 X-RAY EXAM OF KNEE 3: CPT

## 2020-02-12 PROCEDURE — 20610 DRAIN/INJ JOINT/BURSA W/O US: CPT | Performed by: ORTHOPAEDIC SURGERY

## 2020-02-12 RX ORDER — LIDOCAINE HYDROCHLORIDE 10 MG/ML
1 INJECTION, SOLUTION INFILTRATION; PERINEURAL
Status: COMPLETED | OUTPATIENT
Start: 2020-02-12 | End: 2020-02-12

## 2020-02-12 RX ORDER — BETAMETHASONE SODIUM PHOSPHATE AND BETAMETHASONE ACETATE 3; 3 MG/ML; MG/ML
6 INJECTION, SUSPENSION INTRA-ARTICULAR; INTRALESIONAL; INTRAMUSCULAR; SOFT TISSUE
Status: COMPLETED | OUTPATIENT
Start: 2020-02-12 | End: 2020-02-12

## 2020-02-12 RX ADMIN — LIDOCAINE HYDROCHLORIDE 1 ML: 10 INJECTION, SOLUTION INFILTRATION; PERINEURAL at 09:40

## 2020-02-12 RX ADMIN — BETAMETHASONE SODIUM PHOSPHATE AND BETAMETHASONE ACETATE 6 MG: 3; 3 INJECTION, SUSPENSION INTRA-ARTICULAR; INTRALESIONAL; INTRAMUSCULAR; SOFT TISSUE at 09:40

## 2020-02-12 NOTE — PROGRESS NOTES
Assessment:  1  Status post total right knee replacement  XR knee 3 vw right non injury     Patient Active Problem List   Diagnosis    Encounter for gynecological examination (general) (routine) without abnormal findings    B12 deficiency    Chronic cough    Diffuse connective tissue disease ([facility]    Dyspnea    Edema    Essential hypertension    Hot flashes due to menopause    Long-term use of hydroxychloroquine    Systemic lupus erythematosus ([facility]    Lupus anticoagulant positive    Tachycardia    SOB (shortness of breath) on exertion    Sinus tachycardia    Secondary pulmonary hypertension    Pulmonary hypertension (HCC)    Post-nasal drip    Pes anserine bursitis    Positive HELENE (antinuclear antibody)    Other chronic pulmonary heart diseases    Osteoporosis    Night sweats    Patellofemoral disorder of right knee    Pes anserinus bursitis of right knee    Arthritis of right knee    Other tear of medial meniscus, current injury, right knee, initial encounter    Tear of medial meniscus of right knee, current    Chronic pain of right knee    Elevated serum creatinine    Hyperuricemia    Trochanteric bursitis of both hips    Undifferentiated connective tissue disease ([facility]    Vitamin D deficiency    BV (bacterial vaginosis)    [facility] disease, stage 3 [facility]    Right knee pain    Status post total right knee replacement    Left knee pain    Tear of medial meniscus of left knee, current           Plan      Patient is doing very well with her right knee she has excellent range of motion she has done excellent with her progress  She does have some little tightness of her lateral gastroc region and her patellar tendon  Will center with their physical therapy to work on Tara on that  As far as her left knee is concerned she had a significant complex meniscus tear on the medial meniscus in the body of the meniscus and that is where her pain is currently    I think she has some inflammation in that area as well as the arthritis that we saw on the MRI and some of the arthritis that was on the arthroscopy is contributing to her pain we are going to give her cortisone injection to calm down the inflammation today we will order lubricant and we will utilize that as soon as she needs it done  We must get the approval from the insurance 1st             Subjective:     Patient ID:    Chief Meagan Petty 60 y o  female      HPI      Patient comes in today with regards to her right knee and is 1 year out from her right knee but also comes in to talk about her left knee who had a arthroscopy little over 6-8 weeks ago  I am the left knee is bothering her on the medial side where the meniscectomy was the right knee is doing otherwise well        The following portions of the patient's history were reviewed and updated as appropriate: allergies, current medications, past family history, past social history, past surgical history and problem list     All organ systems normal    Social History     Socioeconomic History    Marital status: /Civil Union     Spouse name: Not on file    Number of children: 2    Years of education: Not on file    Highest education level: Not on file   Occupational History    Not on file   Social Needs    Financial resource strain: Not on file    Food insecurity:     Worry: Not on file     Inability: Not on file    Transportation needs:     Medical: Not on file     Non-medical: Not on file   Tobacco Use    Smoking status: Former Smoker     Last attempt to quit: 2006     Years since quittin 0    Smokeless tobacco: Never Used   Substance and Sexual Activity    Alcohol use: Yes     Frequency: Monthly or less     Drinks per session: 1 or 2     Binge frequency: Never     Comment: socially    Drug use: No    Sexual activity: Yes     Partners: Male   Lifestyle    Physical activity:     Days per week: Not on file     Minutes per session: Not on file    Stress: Not on file   Relationships    Social connections:     Talks on phone: Not on file     Gets together: Not on file     Attends Jewish service: Not on file     Active member of club or organization: Not on file     Attends meetings of clubs or organizations: Not on file     Relationship status: Not on file    Intimate partner violence:     Fear of current or ex partner: Not on file     Emotionally abused: Not on file     Physically abused: Not on file     Forced sexual activity: Not on file   Other Topics Concern    Not on file   Social History Narrative    Daily caffeinated coffee consumption    Exercise habits     Past Medical History:   Diagnosis Date    HELENE positive     Anemia     Sildenafil causes decreased hematocrit    Chronic kidney disease     borderline lab values    Chronic rhinitis 6/4/2012    Encephalitis     Lasted Assessed 10/18/2017    Gout 6/26/2018    Lupus anticoagulant disorder ([address]     Maxillary sinusitis     Lasted Assessed 10/18/2017    Night sweat     Osteopenia     Hip    Osteoporosis     Spine    Pneumonia     Last Assessed 10/18/2017    PONV (postoperative nausea and vomiting)     Pulmonary hypertension ([address]     Seizures ([address]     Only during Encephalitis    Shortness of breath     with exertion    Sinus tachycardia     Urinary incontinence      Past Surgical History:   Procedure Laterality Date    ARTHRODESIS      Hand: IP Joint    CHOLECYSTECTOMY      COLONOSCOPY      COLPOSCOPY      HYSTERECTOMY      Vaginal    JOINT REPLACEMENT Right     Knee replacement    LAPAROSCOPIC ESOPHAGOGASTRIC FUNDOPLASTY  2008    DE KNEE SCOPE,SINGLE MENISECTOMY Right 10/2/2018    Procedure: KNEE ARTHROSCOPY WITH PARTIAL MEDIAL MENISCECTOMY;  Surgeon: Real Barakat DO;  [facility];  Service: Orthopedics    DE KNEE SCOPE,SINGLE MENISECTOMY Left 12/17/2019    Procedure: KNEE ARTHROSCOPIC MENISCECTOMY /MEDIAL;  Chondyl medial and posterior; Surgeon: José Cabrales DO;  [facility];  Service: Orthopedics    WA TOTAL KNEE ARTHROPLASTY Right 2/12/2019    Procedure: KNEE TOTAL ARTHROPLASTY AND ASSOCIATED PROCEDURES;  Surgeon: José Cabrales DO;  [facility];  Service: Orthopedics    REDUCTION MAMMAPLASTY      REPAIR ANKLE LIGAMENT Right     TONSILLECTOMY       Allergies   Allergen Reactions    Dilantin [Phenytoin] Anaphylaxis and Rash    Augmentin [Amoxicillin-Pot Clavulanate] Rash and Dermatitis    Penicillins Rash     Current Outpatient Medications on File Prior to Visit   Medication Sig Dispense Refill    aspirin (ECOTRIN LOW STRENGTH) 81 mg EC tablet Take 162 mg by mouth daily      BD INTEGRA SYRINGE 25G X 1" 3 ML MISC FOR WEEKLY ADMINISTRATION OF B-12  2    cholecalciferol (VITAMIN D3) 1,000 units tablet Take 2,000 Units by mouth daily in the early morning        cyanocobalamin 1,000 mcg/mL Inject 1mL IM weekly   Dispense with needles and syringes 10 mL 6    hydroxychloroquine (PLAQUENIL) 200 mg tablet Take 400 mg by mouth daily with breakfast        NEEDLE, DISP, 24 G 24G X 1" MISC For weekly administration of B12 20 each 2    ondansetron (ZOFRAN) 4 mg tablet Take 1 tablet (4 mg total) by mouth every 8 (eight) hours as needed for nausea or vomiting 20 tablet 0    potassium chloride (K-DUR,KLOR-CON) 20 mEq tablet Take 2 tablets (40 mEq total) by mouth 2 (two) times a day (Patient taking differently: Take 20 mEq by mouth 2 (two) times a day ) 960 tablet 3    sildenafil (REVATIO) 20 mg tablet Take 1 tablet (20 mg total) by mouth 3 (three) times a day 90 tablet 1    spironolactone (ALDACTONE) 25 mg tablet Take 1 tablet (25 mg total) by mouth daily 90 tablet 3    Syringe 1 ML MISC For weekly administration of B12 20 each 2    torsemide (DEMADEX) 20 mg tablet Take 2 tablets (40 mg total) by mouth 2 (two) times a day (Patient taking differently: Take 40 mg by mouth 2 (two) times a day ) 360 tablet 3    triamcinolone (KENALOG) 0 1 % oral topical paste prn       No current facility-administered medications on file prior to visit  Objective:  Large joint arthrocentesis: L knee  Date/Time: 2/12/2020 9:40 AM  Consent given by: patient  Timeout: Immediately prior to procedure a time out was called to verify the correct patient, procedure, equipment, support staff and site/side marked as required   Supporting Documentation  Indications: pain   Procedure Details  Location: knee - L knee  Needle size: 22 G  Ultrasound guidance: no  Approach: anterolateral  Medications administered: 1 mL lidocaine 1 %; 6 mg betamethasone acetate-betamethasone sodium phosphate 6 (3-3) mg/mL                Ortho Exam    Examination pinpoint tenderness on the medial joint of the left knee  There is great stability of the right knee and full extension and greater than 120° of flexion  I have personally reviewed pertinent films in PACS  Portions of the record may have been created with voice recognition software   Occasional wrong word or "sound a like" substitutions may have occurred due to the inherent limitations of voice recognition software   Read the chart carefully and recognize, using context, where substitutions have occurred

## 2020-02-28 DIAGNOSIS — I27.20 PULMONARY HYPERTENSION (HCC): ICD-10-CM

## 2020-02-28 RX ORDER — SILDENAFIL CITRATE 20 MG/1
20 TABLET ORAL 3 TIMES DAILY
Qty: 270 TABLET | Refills: 3 | Status: SHIPPED | OUTPATIENT
Start: 2020-02-28 | End: 2021-02-11 | Stop reason: SDUPTHER

## 2020-03-06 ENCOUNTER — TELEPHONE (OUTPATIENT)
Dept: OBGYN CLINIC | Facility: HOSPITAL | Age: 55
End: 2020-03-06

## 2020-03-06 NOTE — TELEPHONE ENCOUNTER
Caller: [facility]  Provider: Dr Lucia Jennings  Call back: [phone]  Reason for call: Johana Santiago is calling requesting to speak with Benigno Christiansen

## 2020-03-11 NOTE — TELEPHONE ENCOUNTER
Patient called to check and see if there was any financial assistance for visco injections, I informed her that she would have to request that with [facility] - she will contact them to see what she can with help regarding cost of injections  If they need anything from us they will reach out to us

## 2020-03-13 NOTE — PROGRESS NOTES
Assessment:  1  Left knee pain, unspecified chronicity     2  Tear of medial meniscus of left knee, current, unspecified tear type, initial encounter       Patient Active Problem List   Diagnosis    Encounter for gynecological examination (general) (routine) without abnormal findings    B12 deficiency    Chronic cough    [facility] disease [facility]    Dyspnea    Edema    Essential hypertension    Hot flashes due to menopause    Long-term use of hydroxychloroquine    Systemic lupus erythematosus ([facility]    Lupus anticoagulant positive    Tachycardia    SOB (shortness of breath) on exertion    Sinus tachycardia    Secondary pulmonary hypertension    Pulmonary hypertension (HCC)    Post-nasal drip    Pes anserine bursitis    Positive HELENE (antinuclear antibody)    Other chronic pulmonary heart diseases    Osteoporosis    Night sweats    Patellofemoral disorder of right knee    Pes anserinus bursitis of right knee    Arthritis of right knee    Other tear of medial meniscus, current injury, right knee, initial encounter    Tear of medial meniscus of right knee, current    Chronic pain of right knee    Elevated serum creatinine    Hyperuricemia    Trochanteric bursitis of both hips    Undifferentiated connective tissue disease ([facility]    Vitamin D deficiency    BV (bacterial vaginosis)    Chronic kidney disease, stage 3 ([facility] )    Right knee pain    Status post total right knee replacement    Left knee pain    Tear of medial meniscus of left knee, current           Plan      · Left medial meniscus tear   · Left knee MRI reviewed  · In the presence of lack of relief with non operative modalities and in conjunction with her MRI findings and symptoms, quality of life decision to pursue a left knee arthroscopy with partial medial lateral meniscectomies    Patient wishes to pursue surgical intervention as she finds that her symptoms are impacting her daily activities as well as impeding on her quality of life  Risks and benefits of a knee arthroscopy were discussed  Consents obtained  Reasonable expectations of surgical outcomes advised given the amount of arthritis the patient does have              Subjective:     Patient ID:    Chief Complaint:Na Joseph 62 y o  female      HPI    Patient presents for follow up of left knee  She is s/p left knee steroid injection 19 with benefit  Today she complains of anteromedial left knee pain  The pain can be sharp  Standing aggravates  She can occasionally feel unstable   Rest and tylenol lessen pain  She does use tylenol for pain  She avoids NSAIDs due to medical reasons  She does use Aspirin 81mg BID            The following portions of the patient's history were reviewed and updated as appropriate: allergies, current medications, past family history, past social history, past surgical history and problem list         Social History     Socioeconomic History    Marital status: /Civil Union     Spouse name: Not on file    Number of children: 2    Years of education: Not on file    Highest education level: Not on file   Occupational History    Not on file   Social Needs    Financial resource strain: Not on file    Food insecurity:     Worry: Not on file     Inability: Not on file    Transportation needs:     Medical: Not on file     Non-medical: Not on file   Tobacco Use    Smoking status: Former Smoker     Last attempt to quit: 2006     Years since quittin 8    Smokeless tobacco: Never Used   Substance and Sexual Activity    Alcohol use: Yes     Comment: socially    Drug use: No    Sexual activity: Yes     Partners: Male   Lifestyle    Physical activity:     Days per week: Not on file     Minutes per session: Not on file    Stress: Not on file   Relationships    Social connections:     Talks on phone: Not on file     Gets together: Not on file     Attends Shinto service: Not on file Active member of club or organization: Not on file     Attends meetings of clubs or organizations: Not on file     Relationship status: Not on file    Intimate partner violence:     Fear of current or ex partner: Not on file     Emotionally abused: Not on file     Physically abused: Not on file     Forced sexual activity: Not on file   Other Topics Concern    Not on file   Social History Narrative    Daily caffeinated coffee consumption    Exercise habits     Past Medical History:   Diagnosis Date    HELENE positive     Anemia     Sildenafil causes decreased hematocrit    Chronic kidney disease     borderline lab values    Chronic rhinitis 6/4/2012    Encephalitis     Lasted Assessed 10/18/2017    Gout 6/26/2018    Lupus anticoagulant disorder ([address] )     Maxillary sinusitis     Lasted Assessed 10/18/2017    Night sweat     Osteopenia     Hip    Osteoporosis     Spine    Pneumonia     Last Assessed 10/18/2017    PONV (postoperative nausea and vomiting)     Pulmonary hypertension ([address]     Seizures ([address] )     Only during Encephalitis    Shortness of breath     with exertion    Sinus tachycardia     Urinary incontinence      Past Surgical History:   Procedure Laterality Date    ARTHRODESIS      Hand: IP Joint    CHOLECYSTECTOMY      COLONOSCOPY      COLPOSCOPY      HYSTERECTOMY      Vaginal    LAPAROSCOPIC ESOPHAGOGASTRIC FUNDOPLASTY  2008    MO KNEE SCOPE,SINGLE MENISECTOMY Right 10/2/2018    Procedure: KNEE ARTHROSCOPY WITH PARTIAL MEDIAL MENISCECTOMY;  Surgeon: Gaby Valente DO;  [facility];  Service: Orthopedics    MO TOTAL KNEE ARTHROPLASTY Right 2/12/2019    Procedure: KNEE TOTAL ARTHROPLASTY AND ASSOCIATED PROCEDURES;  Surgeon: Gaby Valente DO;  [facility];  Service: Orthopedics    REDUCTION MAMMAPLASTY      REPAIR ANKLE LIGAMENT Right     TONSILLECTOMY       Allergies   Allergen Reactions    Dilantin [Phenytoin] Anaphylaxis and Rash    Other     Augmentin [Amoxicillin-Pot Clavulanate] Rash and Dermatitis    Penicillins Rash     Current Outpatient Medications on File Prior to Visit   Medication Sig Dispense Refill    aspirin (ECOTRIN LOW STRENGTH) 81 mg EC tablet Take 162 mg by mouth daily      cholecalciferol (VITAMIN D3) 1,000 units tablet Take 2,000 Units by mouth daily in the early morning        clindamycin (CLEOCIN) 100 MG vaginal suppository Insert 1 suppository (100 mg total) into the vagina daily at bedtime 3 suppository 0    cyanocobalamin 1,000 mcg/mL Inject 1mL IM weekly 10 mL 2    hydroxychloroquine (PLAQUENIL) 200 mg tablet Take 400 mg by mouth daily with breakfast        Macitentan (OPSUMIT) 10 MG tablet Take 1 tablet (10 mg total) by mouth daily 30 tablet 11    methylPREDNISolone 4 MG tablet therapy pack Use as directed on package 1 each 0    potassium chloride (K-DUR,KLOR-CON) 20 mEq tablet Take 2 tablets (40 mEq total) by mouth 2 (two) times a day (Patient taking differently: Take 20 mEq by mouth daily ) 960 tablet 3    sildenafil (REVATIO) 20 mg tablet Take 1 tablet (20 mg total) by mouth 3 (three) times a day 90 tablet 11    spironolactone (ALDACTONE) 25 mg tablet Take 1 tablet (25 mg total) by mouth daily 90 tablet 3    torsemide (DEMADEX) 20 mg tablet Take 2 tablets (40 mg total) by mouth 2 (two) times a day (Patient taking differently: Take 40 mg by mouth 2 (two) times a day ) 360 tablet 3    triamcinolone (KENALOG) 0 1 % oral topical paste prn       No current facility-administered medications on file prior to visit  Objective:    Review of Systems    Right Knee Exam     Comments:  See previous exam for further details but exam is unchanged from previous exam            Physical Exam   Constitutional: She is oriented to person, place, and time  She appears well-developed and well-nourished  HENT:   Head: Normocephalic  Eyes: Conjunctivae are normal    Neck: Normal range of motion  Cardiovascular: Normal rate  Pulmonary/Chest: Effort normal    Neurological: She is alert and oriented to person, place, and time  Skin: Skin is warm and dry  Procedures    None performed today    I have personally reviewed pertinent films in PACS  Left knee MRI 11/12/19:  Mild/moderate arthritis  Medial meniscus tear  Scribe Attestation    I,:   Larissa George am acting as a scribe while in the presence of the attending physician :        I,:   Casey Almeida, DO personally performed the services described in this documentation    as scribed in my presence :              Portions of the record may have been created with voice recognition software   Occasional wrong word or "sound a like" substitutions may have occurred due to the inherent limitations of voice recognition software   Read the chart carefully and recognize, using context, where substitutions have occurred  no

## 2020-06-25 ENCOUNTER — TELEPHONE (OUTPATIENT)
Dept: HEMATOLOGY ONCOLOGY | Facility: CLINIC | Age: 55
End: 2020-06-25

## 2020-07-13 ENCOUNTER — TELEPHONE (OUTPATIENT)
Dept: CARDIOLOGY CLINIC | Facility: CLINIC | Age: 55
End: 2020-07-13

## 2020-07-13 NOTE — TELEPHONE ENCOUNTER
I did offer virtual she refused and [address] [place] [address] offered her to see  Dr Venessa Henson

## 2020-07-13 NOTE — TELEPHONE ENCOUNTER
Contacted patient she stated she is concerned with coming into the office due to Covid she will call in a couple weeks to schedule I did let her know he is leaving the practice she said she is not comfortable       Thanks

## 2020-09-17 ENCOUNTER — OFFICE VISIT (OUTPATIENT)
Dept: OBGYN CLINIC | Facility: CLINIC | Age: 55
End: 2020-09-17
Payer: COMMERCIAL

## 2020-09-17 DIAGNOSIS — M17.12 ARTHRITIS OF LEFT KNEE: Primary | ICD-10-CM

## 2020-09-17 PROCEDURE — 20610 DRAIN/INJ JOINT/BURSA W/O US: CPT | Performed by: ORTHOPAEDIC SURGERY

## 2020-09-17 PROCEDURE — 99213 OFFICE O/P EST LOW 20 MIN: CPT | Performed by: ORTHOPAEDIC SURGERY

## 2020-09-17 RX ORDER — BUPIVACAINE HYDROCHLORIDE 5 MG/ML
2 INJECTION, SOLUTION EPIDURAL; INTRACAUDAL
Status: COMPLETED | OUTPATIENT
Start: 2020-09-17 | End: 2020-09-17

## 2020-09-17 RX ORDER — LIDOCAINE HYDROCHLORIDE 10 MG/ML
1 INJECTION, SOLUTION INFILTRATION; PERINEURAL
Status: COMPLETED | OUTPATIENT
Start: 2020-09-17 | End: 2020-09-17

## 2020-09-17 RX ORDER — BETAMETHASONE SODIUM PHOSPHATE AND BETAMETHASONE ACETATE 3; 3 MG/ML; MG/ML
12 INJECTION, SUSPENSION INTRA-ARTICULAR; INTRALESIONAL; INTRAMUSCULAR; SOFT TISSUE
Status: COMPLETED | OUTPATIENT
Start: 2020-09-17 | End: 2020-09-17

## 2020-09-17 RX ADMIN — LIDOCAINE HYDROCHLORIDE 1 ML: 10 INJECTION, SOLUTION INFILTRATION; PERINEURAL at 08:04

## 2020-09-17 RX ADMIN — BUPIVACAINE HYDROCHLORIDE 2 ML: 5 INJECTION, SOLUTION EPIDURAL; INTRACAUDAL at 08:04

## 2020-09-17 RX ADMIN — BETAMETHASONE SODIUM PHOSPHATE AND BETAMETHASONE ACETATE 12 MG: 3; 3 INJECTION, SUSPENSION INTRA-ARTICULAR; INTRALESIONAL; INTRAMUSCULAR; SOFT TISSUE at 08:04

## 2020-09-17 NOTE — PROGRESS NOTES
Assessment/Plan:  1  Arthritis of left knee  Large joint arthrocentesis     Patient Active Problem List   Diagnosis    Encounter for gynecological examination (general) (routine) without abnormal findings    B12 deficiency    Chronic cough    Diffuse connective [facility] disease ([facility]    Dyspnea    Edema    Essential hypertension    Hot flashes due to menopause    Long-term use of hydroxychloroquine    Systemic lupus [facility]matosus ([email or web address]    Lupus anticoagulant positive    Tachycardia    SOB (shortness of breath) on exertion    Sinus tachycardia    Secondary pulmonary hypertension    Pulmonary hypertension (HCC)    Post-nasal drip    Pes anserine bursitis    Positive HELENE (antinuclear antibody)    Other chronic pulmonary heart diseases    Osteoporosis    Night sweats    Patellofemoral disorder of right knee    Pes anserinus bursitis of right knee    Arthritis of right knee    Other tear of medial meniscus, current injury, right knee, initial encounter    Tear of medial meniscus of right knee, current    Chronic pain of right knee    Elevated serum creatinine    Hyperuricemia    Trochanteric bursitis of both hips    Undifferentiated connective tissue disease ([email or web address]    Vitamin D deficiency    BV (bacterial vaginosis)    Chronic kidney disease, stage 3 ([email or web address]    Right knee pain    Status post total right knee replacement    Left knee pain    Tear of medial meniscus of left knee, current    Arthritis of left knee       Discussion/Summary:    60 y o  female  Left knee pain secondary to osteoarthritis  Cortisone injection given into the left knee today  She tolerated this well  She will call and let us know if she would like to try an oral steroid pack  At this time she does not wish to undergo total knee arthroplasty due to concerns   Related to COVID  Can also consider Visco injections for her       The assessment and plan were formulated by Dr Lenora Houston and I assisted in carrying it out     Subjective:   Patient ID: Carlitos Sims is a 60 y o  female   HPI    Patient presents to the office for follow up of   left knee pain  Since the last visit, the patient reports  Increased pain left knee  He has anterior medial joint line  The pain is mild-to-moderate  It is waking her up at night sometimes  Sometimes sharp sometimes stiff knee aching  The pain does not radiate  It is intermittent  It is relieved sometimes at rest   She had 10 days relief from prior cortisone injection  She is unable to take NSAIDs    Patient   Denies any new injuries to the  Left knee since the last visit    Denies any fevers or chills    The following portions of the patient's history were reviewed and updated as appropriate: allergies, current medications, past family history, past social history, past surgical history and problem list     Social History     Socioeconomic History    Marital status: /Civil Union     Spouse name: Not on file    Number of children: 2    Years of education: Not on file    Highest education level: Not on file   Occupational History    Not on file   Social Needs    Financial resource strain: Not on file    Food insecurity     Worry: Not on file     Inability: Not on file   [employer] needs     Medical: Not on file     Non-medical: Not on file   Tobacco Use    Smoking status: Former Smoker     Last attempt to quit: 2006     Years since quittin 6    Smokeless tobacco: Never Used   Substance and Sexual Activity    Alcohol use: Yes     Frequency: Monthly or less     Drinks per session: 1 or 2     Binge frequency: Never     Comment: socially    Drug use: No    Sexual activity: Yes     Partners: Male   Lifestyle    Physical activity     Days per week: Not on file     Minutes per session: Not on file    Stress: Not on file   Relationships    Social connections     Talks on phone: Not on file     Gets together: Not on file     Attends [employer] service: Not on file     Active member of club or organization: Not on file     Attends meetings of clubs or organizations: Not on file     Relationship status: Not on file    Intimate partner violence     Fear of current or ex partner: Not on file     Emotionally abused: Not on file     Physically abused: Not on file     Forced sexual activity: Not on file   Other Topics Concern    Not on file   Social History Narrative    Daily caffeinated coffee consumption    Exercise habits     Past Medical History:   Diagnosis Date    HELENE positive     Anemia     Sildenafil causes decreased hematocrit    Chronic kidney disease     borderline lab values    Chronic rhinitis 6/4/2012    Encephalitis     Lasted Assessed 10/18/2017    Gout 6/26/2018    Lupus anticoagulant disorder ([address]     Maxillary sinusitis     Lasted Assessed 10/18/2017    Night sweat     Osteopenia     Hip    Osteoporosis     Spine    Pneumonia     Last Assessed 10/18/2017    PONV (postoperative nausea and vomiting)     Pulmonary hypertension ([address]     Seizures ([address] )     Only during Encephalitis    Shortness of breath     with exertion    Sinus tachycardia     Urinary incontinence      Past Surgical History:   Procedure Laterality Date    ARTHRODESIS      Hand: IP Joint    CHOLECYSTECTOMY      COLONOSCOPY      COLPOSCOPY      HYSTERECTOMY      Vaginal    JOINT REPLACEMENT Right     Knee replacement    LAPAROSCOPIC ESOPHAGOGASTRIC FUNDOPLASTY  2008    IN KNEE SCOPE,SINGLE MENISECTOMY Right 10/2/2018    Procedure: KNEE ARTHROSCOPY WITH PARTIAL MEDIAL MENISCECTOMY;  Surgeon: Sb Zavala DO;  [facility];  Service: Orthopedics    IN KNEE SCOPE,SINGLE MENISECTOMY Left 12/17/2019    Procedure: KNEE ARTHROSCOPIC MENISCECTOMY /MEDIAL;  Chondyl medial and posterior;  Surgeon: Sb Zavala DO;  [facility];  Service: Orthopedics    IN TOTAL KNEE ARTHROPLASTY Right 2/12/2019    Procedure: KNEE TOTAL ARTHROPLASTY AND ASSOCIATED PROCEDURES;  Surgeon: Mihai Vale DO;  [facility];  Service: Orthopedics    REDUCTION MAMMAPLASTY      REPAIR ANKLE LIGAMENT Right     TONSILLECTOMY       Allergies   Allergen Reactions    Dilantin [Phenytoin] Anaphylaxis and Rash    Augmentin [Amoxicillin-Pot Clavulanate] Rash and Dermatitis    Penicillins Rash     Current Outpatient Medications on File Prior to Visit   Medication Sig Dispense Refill    aspirin (ECOTRIN LOW STRENGTH) 81 mg EC tablet Take 162 mg by mouth daily      BD INTEGRA SYRINGE 25G X 1" 3 ML MISC FOR WEEKLY ADMINISTRATION OF B-12  2    cholecalciferol (VITAMIN D3) 1,000 units tablet Take 2,000 Units by mouth daily in the early morning        cyanocobalamin 1,000 mcg/mL Inject 1mL IM weekly  Dispense with needles and syringes 10 mL 6    hydroxychloroquine (PLAQUENIL) 200 mg tablet Take 400 mg by mouth daily with breakfast        NEEDLE, DISP, 24 G 24G X 1" MISC For weekly administration of B12 20 each 2    ondansetron (ZOFRAN) 4 mg tablet Take 1 tablet (4 mg total) by mouth every 8 (eight) hours as needed for nausea or vomiting 20 tablet 0    potassium chloride (K-DUR,KLOR-CON) 20 mEq tablet Take 2 tablets (40 mEq total) by mouth 2 (two) times a day (Patient taking differently: Take 20 mEq by mouth 2 (two) times a day ) 960 tablet 3    sildenafil (REVATIO) 20 mg tablet Take 1 tablet (20 mg total) by mouth 3 (three) times a day 270 tablet 3    spironolactone (ALDACTONE) 25 mg tablet Take 1 tablet (25 mg total) by mouth daily 90 tablet 3    Syringe 1 ML MISC For weekly administration of B12 20 each 2    torsemide (DEMADEX) 20 mg tablet Take 2 tablets (40 mg total) by mouth 2 (two) times a day (Patient taking differently: Take 40 mg by mouth 2 (two) times a day ) 360 tablet 3    triamcinolone (KENALOG) 0 1 % oral topical paste prn       No current facility-administered medications on file prior to visit  Review of Systems      Objective:     There were no vitals filed for this visit  Physical Exam  Constitutional:       Appearance: She is well-developed  HENT:      Head: Normocephalic and atraumatic  Eyes:      General: No scleral icterus  Conjunctiva/sclera: Conjunctivae normal    Neck:      Musculoskeletal: Neck supple  Cardiovascular:      Comments: No discernible arrhthymias  Pulmonary:      Effort: Pulmonary effort is normal  No respiratory distress  Breath sounds: No stridor  Abdominal:      General: There is no distension  Palpations: Abdomen is soft  Musculoskeletal:      Left knee: She exhibits no effusion  Skin:     General: Skin is warm and dry  Findings: No erythema  Neurological:      Mental Status: She is alert and oriented to person, place, and time  Psychiatric:         Behavior: Behavior normal          Left Knee Exam     Muscle Strength   The patient has normal left knee strength  Tenderness   The patient is experiencing tenderness in the medial joint line  Range of Motion   Flexion: abnormal     Tests   Varus: negative Valgus: negative  Patellar apprehension: negative    Other   Erythema: absent  Scars: absent  Sensation: normal  Left knee pulse absent: skin warm and well perfused  Swelling: none  Effusion: no effusion present    Comments:  No ecchymosis or deformity  Negative patellar grind test    Crepitus in patellofemoral joint with range of motion            I have personally reviewed pertinent films in PACS      Large joint arthrocentesis: L knee  Date/Time: 9/17/2020 8:04 AM  Consent given by: patient  Site marked: site marked  Timeout: Immediately prior to procedure a time out was called to verify the correct patient, procedure, equipment, support staff and site/side marked as required   Supporting Documentation  Indications: pain   Procedure Details  Location: knee - L knee  Preparation: Patient was prepped and draped in the usual sterile fashion  Needle size: 22 G  Approach:  superior lateral   Medications administered: 1 mL lidocaine 1 %; 12 mg betamethasone acetate-betamethasone sodium phosphate 6 (3-3) mg/mL; 2 mL bupivacaine (PF) 0 5 %    Patient tolerance: patient tolerated the procedure well with no immediate complications  Dressing:  Sterile dressing applied            Portions of the record may have been created with voice recognition software  Occasional wrong word or "sound a like" substitutions may have occurred due to the inherent limitations of voice recognition software  Read the chart carefully and recognize, using context, where substitutions have occurred

## 2020-09-20 NOTE — PROGRESS NOTES
Cardiology Outpatient Progress Note - Pushpa Stone 60 y o  female MRN: [id number]    @ Encounter: [id number]      Patient Active Problem List    Diagnosis Date Noted    Arthritis of left knee 02/12/2020    Left knee pain 11/11/2019    Tear of medial meniscus of left knee, current 11/11/2019    Right knee pain 02/18/2019    Status post total right knee replacement 02/18/2019    Chronic kidney disease, stage 3 ([facility] [address] ) 02/12/2019    BV (bacterial vaginosis) 12/21/2018    Tear of medial meniscus of right knee, current 09/10/2018    Other tear of medial meniscus, current injury, right knee, initial encounter 07/16/2018    Patellofemoral disorder of right knee 06/04/2018    Pes anserinus bursitis of right knee 06/04/2018    Arthritis of right knee 06/04/2018    Chronic pain of right knee 05/22/2018    Elevated serum creatinine 05/22/2018    Hyperuricemia 05/22/2018    Long-term use of hydroxychloroquine 02/16/2018    Pulmonary hypertension ([facility] ) 02/16/2018    Undifferentiated connective tissue disease ([facility] ) 02/16/2018    Encounter for gynecological examination (general) (routine) without abnormal findings 01/24/2018    Secondary pulmonary hypertension 12/15/2017    Diffuse connective tissue disease ([facility] ) 11/21/2017    Pes anserine bursitis 11/21/2017    Trochanteric bursitis of both hips 11/21/2017    Vitamin D deficiency 11/21/2017    [facility] lupus [facility]matosus [facility] ) 11/17/2017    Sinus tachycardia 11/09/2017    B12 deficiency 10/19/2017    Osteoporosis 10/19/2017    Lupus anticoagulant positive 10/18/2017    Positive HELENE (antinuclear antibody) 10/18/2017    Hot flashes due to menopause 09/01/2015    Night sweats 09/01/2015    Tachycardia 11/15/2012    SOB (shortness of breath) on exertion 11/12/2012    Other chronic pulmonary heart diseases 11/05/2012    Essential hypertension 06/13/2012    Edema 06/04/2012    Post-nasal drip 06/04/2012    Chronic cough 02/09/2012    Dyspnea 02/09/2012       Assessment:  # Exercise induced PAH; HFpEF with PH  Out of proportion to obesity/ deconditioning/ diastolic dysfunction (PCWP 12 mmHg)  Pt with MCTD/ SLE w/ lupus anticoagulant  Remote smoker  At rest, RV appears normal on TTE with coupled RV-PA w/o e/o of RVOT notching suggestive of normal PVR at rest  However, she did have a stress echo in 2012 that was suggestive of exercise induced pulmonary HTN  At the time she did not have e/o of elevated PVR  Her bubble was negative which makes an intracardiac shunt less likely  PAH Rx: sildanefil 20 mg Q8 (previously on macitentan)  Diuretic: torsemide 40  Mg AM, 20 mg PM, spironolactone 25 mg daily  NT proBNP:   Weight: 236  Lbs up from 202 lbs remotely and 220 lbs last time    Echo 4/23/19:  Normal RV size and function    RHC with stress 12/19/17:  She had an appropriate HR response from 100 to 130 bpm with MAP throughout the study staying at 112 mmHg  Hgb 13 1     Rest:  RA 7  RV 37/4/13  PA 33/16/25  PCWP 12    SaO2 99%  MvO2 72 3%  CO/CI 4 8/2  3  TPG 13  DPG 4  PVR 2 7     Exercise:  CVP 10  PA 54/29/37  PCWP 18    SaO2 100%  MvO2 66%  CO/CI 4/1 9  TPG 19  DPG 11  PVR 4 8  CVP:PCWP 0 55    Stress echo 12/14/17:to evaluate pulmonary pressures, RV, and RVOT signal w/ exercise  5 min, 20 sec  HTNsive response, decrease in O2 saturation from 99% to 91% and increase in PA pressures from 45 mmHg to 70 mmHg with exercise  PFTs 12/1/17: normal, DLCO 84%    # SLE: Per outpatient Rheumatologist  Continue plaquenil  # ST: V/Q negative  May be 2/2 to deconditioning +/- inappropriate ST +/- diastolic dysfunction/HF  No e/o infection     --Continue to monitor, can consider coby agents in the future  # Morbid obesity: Continue weight loss    TODAY'S PLAN:  Stress echo to assess RV, pa pressures    HPI:          61 yo followed for out of proportion PAH, exercise induced PAH, with MCTD/ SLE, + lupus anticoagulant, obesity  She has seen Dr Dmitri Sahni  first started getting SOB -- 10 years ago  She has had several episodes of bacterial PNA and chronic cough (a few times/year)  She was referred to [facility] in 2012 for workup for PH  She had a a stress echo 8/2012 that showed that her PA pressures more than doubled from --24 mmHg to > 50 mmHg  Currently, she states she can walk up a couple flights of stairs but does get SOB  She also gets SOB with walking up inclines  She gets occasional LH  She was seeing a Rheumatologist in [place] but recently saw a specialist at [facility], Dr Linda Samano (Rheumatologist - 11/16)  She was diagnosed with MCTD and SLE  HELENE + 1:320 w/ speckled pattern  She has been on Plaquenil x 2 weeks now  She is on ASA for + lupus anticoagulant and anticardiolipin Abs  She has joint pains and a subtle malar rash  She states so far there has been no change with plaquenil  After her visit with Dr Andrade Katz, she has had TTE which shows LVEF --65%, normal RV size and function without RVOT notching  Normal atria  CXR clear  She also has had a negative V/Q scan  She walked the patient in the hallway and had her go up and down stairs  Her resting HR went from --100 bpm to 110s with Pulse Ox starting from 98% @ rest to --90% with exercise  Stress Echo in 2017 showed hypertensive response and PA pressures to 70 mmHg w/ drop in pulse ox to 91%  Exercise RHC showed increase in PVR to 4 5 MOLINA from < 3 MOLINA  She was denied Tadalafil but approved for sildanefil  She did not feel any different and continued trouble with stairs       Interval History:   Has not been on diuretics for a couple of months  Was anxious to leave house with COVID    Past Medical History:   Diagnosis Date    HELENE positive     Anemia     Sildenafil causes decreased hematocrit    Chronic kidney disease     borderline lab values    Chronic rhinitis 6/4/2012    Encephalitis     Lasted Assessed 10/18/2017    Gout 6/26/2018    Lupus anticoagulant disorder ([facility] )     Maxillary sinusitis     Lasted Assessed 10/18/2017    Night sweat     Osteopenia     Hip    Osteoporosis     Spine    Pneumonia     Last Assessed 10/18/2017    PONV (postoperative nausea and vomiting)     Pulmonary hypertension (HCC)     Seizures (HCC)     Only during Encephalitis    Shortness of breath     with exertion    Sinus tachycardia     Urinary incontinence        Review of Systems    Allergies   Allergen Reactions    Dilantin [Phenytoin] Anaphylaxis and Rash    Augmentin [Amoxicillin-Pot Clavulanate] Rash and Dermatitis    Penicillins Rash       Current Outpatient Medications:     aspirin (ECOTRIN LOW STRENGTH) 81 mg EC tablet, Take 162 mg by mouth daily, Disp: , Rfl:     BD INTEGRA SYRINGE 25G X 1" 3 ML MISC, FOR WEEKLY ADMINISTRATION OF B-12, Disp: , Rfl: 2    cholecalciferol (VITAMIN D3) 1,000 units tablet, Take 2,000 Units by mouth daily in the early morning  , Disp: , Rfl:     cyanocobalamin 1,000 mcg/mL, Inject 1mL IM weekly   Dispense with needles and syringes, Disp: 10 mL, Rfl: 6    hydroxychloroquine (PLAQUENIL) 200 mg tablet, Take 400 mg by mouth daily with breakfast  , Disp: , Rfl:     NEEDLE, DISP, 24 G 24G X 1" MISC, For weekly administration of B12, Disp: 20 each, Rfl: 2    ondansetron (ZOFRAN) 4 mg tablet, Take 1 tablet (4 mg total) by mouth every 8 (eight) hours as needed for nausea or vomiting, Disp: 20 tablet, Rfl: 0    potassium chloride (K-DUR,KLOR-CON) 20 mEq tablet, Take 2 tablets (40 mEq total) by mouth 2 (two) times a day (Patient taking differently: Take 20 mEq by mouth 2 (two) times a day ), Disp: 960 tablet, Rfl: 3    sildenafil (REVATIO) 20 mg tablet, Take 1 tablet (20 mg total) by mouth 3 (three) times a day, Disp: 270 tablet, Rfl: 3    spironolactone (ALDACTONE) 25 mg tablet, Take 1 tablet (25 mg total) by mouth daily, Disp: 90 tablet, Rfl: 3    Syringe 1 ML MISC, For weekly administration of B12, Disp: 20 each, Rfl: 2    torsemide (DEMADEX) 20 mg tablet, Take 2 tablets (40 mg total) by mouth 2 (two) times a day (Patient taking differently: Take 40 mg by mouth 2 (two) times a day ), Disp: 360 tablet, Rfl: 3    triamcinolone (KENALOG) 0 1 % oral topical paste, prn, Disp: , Rfl:     Social History     Socioeconomic History    Marital status: /Civil Union     Spouse name: Not on file    Number of children: 2    Years of education: Not on file    Highest education level: Not on file   Occupational History    Not on file   Social Needs    Financial resource strain: Not on file    Food insecurity     Worry: Not on file     Inability: Not on file   [employer] needs     Medical: Not on file     Non-medical: Not on file   Tobacco Use    Smoking status: Former Smoker     Last attempt to quit: 2006     Years since quittin 6    Smokeless tobacco: Never Used   Substance and Sexual Activity    Alcohol use: Yes     Frequency: Monthly or less     Drinks per session: 1 or 2     Binge frequency: Never     Comment: socially    Drug use: No    Sexual activity: Yes     Partners: Male   Lifestyle    Physical activity     Days per week: Not on file     Minutes per session: Not on file    Stress: Not on file   Relationships    Social connections     Talks on phone: Not on file     Gets together: Not on file     Attends [employer] service: Not on file     Active member of club or organization: Not on file     Attends meetings of clubs or organizations: Not on file     Relationship status: Not on file    Intimate partner violence     Fear of current or ex partner: Not on file     Emotionally abused: Not on file     Physically abused: Not on file     Forced sexual activity: Not on file   Other Topics Concern    Not on file   Social History Narrative    Daily caffeinated coffee consumption    Exercise habits       Family History   Problem Relation Age of Onset    Uterine cancer Mother     Pancreatic cancer Mother 79    Diabetes Mother     Endometrial cancer Mother 77    Heart disease Father         open heart surgery at age 48     Stroke Father         CVA    Hypertension Father     Atrial fibrillation Father     Hyperlipidemia Father     No Known Problems Sister     No Known Problems Brother     Thyroid disease Maternal Grandmother     Asthma Daughter     Heart attack Maternal Grandfather     Heart attack Paternal Grandmother     Skin cancer Paternal Grandfather     No Known Problems Sister     Thyroid disease Sister     Depression Sister     Osteoarthritis Sister     Hemochromatosis Brother     Migraines Daughter     Asthma Daughter     Anuerysm Neg Hx     Clotting disorder Neg Hx     Heart failure Neg Hx        Physical Exam:    Vitals: not currently breastfeeding  , There is no height or weight on file to calculate BMI ,   Wt Readings from Last 3 Encounters:   12/19/19 104 kg (229 lb)   12/17/19 100 kg (221 lb)   12/09/19 101 kg (223 lb 8 oz)         Physical Exam:    Physical Exam    Labs & Results:    Lab Results   Component Value Date    SODIUM 141 11/21/2019    K 4 6 11/21/2019     11/21/2019    CO2 29 11/21/2019    BUN 17 11/21/2019    CREATININE 0 82 11/21/2019    GLUC 86 11/21/2019    CALCIUM 9 3 11/21/2019     Lab Results   Component Value Date    WBC 5 19 11/21/2019    HGB 13 9 11/21/2019    HCT 42 2 11/21/2019    MCV 92 11/21/2019     11/21/2019     No results found for: BNP   Lab Results   Component Value Date    CHOLESTEROL 157 05/17/2018     Lab Results   Component Value Date    HDL 45 05/17/2018     Lab Results   Component Value Date    TRIG 131 05/17/2018     Lab Results   Component Value Date    [place] 112 05/17/2018         EKG personally reviewed by Marcia Koch DO  Counseling / Coordination of Care  Total floor / unit time spent today 25 minutes  Greater than 50% of total time was spent with the patient and / or family counseling and / or coordination of care    A description of the counseling / coordination of care: 15  Thank you for the opportunity to participate in the care of this patient    YUMI ZAVALETA [address] Richard

## 2020-09-21 ENCOUNTER — OFFICE VISIT (OUTPATIENT)
Dept: CARDIOLOGY CLINIC | Facility: CLINIC | Age: 55
End: 2020-09-21
Payer: COMMERCIAL

## 2020-09-21 ENCOUNTER — TELEPHONE (OUTPATIENT)
Dept: OBGYN CLINIC | Facility: HOSPITAL | Age: 55
End: 2020-09-21

## 2020-09-21 VITALS
DIASTOLIC BLOOD PRESSURE: 74 MMHG | HEIGHT: 64 IN | TEMPERATURE: 97.5 F | WEIGHT: 236.3 LBS | BODY MASS INDEX: 40.34 KG/M2 | OXYGEN SATURATION: 99 % | SYSTOLIC BLOOD PRESSURE: 122 MMHG | HEART RATE: 96 BPM

## 2020-09-21 DIAGNOSIS — I27.21 PAH (PULMONARY ARTERY HYPERTENSION) (HCC): Primary | ICD-10-CM

## 2020-09-21 DIAGNOSIS — I10 HTN (HYPERTENSION), BENIGN: ICD-10-CM

## 2020-09-21 DIAGNOSIS — I27.20 PULMONARY HYPERTENSION (HCC): ICD-10-CM

## 2020-09-21 DIAGNOSIS — I50.32 CHRONIC HEART FAILURE WITH PRESERVED EJECTION FRACTION (HCC): ICD-10-CM

## 2020-09-21 DIAGNOSIS — I50.32 CHRONIC DIASTOLIC CHF (CONGESTIVE HEART FAILURE), NYHA CLASS 2 (HCC): ICD-10-CM

## 2020-09-21 PROCEDURE — 99214 OFFICE O/P EST MOD 30 MIN: CPT | Performed by: INTERNAL MEDICINE

## 2020-09-21 RX ORDER — SPIRONOLACTONE 25 MG/1
25 TABLET ORAL DAILY
Qty: 90 TABLET | Refills: 3 | Status: SHIPPED | OUTPATIENT
Start: 2020-09-21 | End: 2021-11-10 | Stop reason: SDUPTHER

## 2020-09-21 RX ORDER — POTASSIUM CHLORIDE 20 MEQ/1
40 TABLET, EXTENDED RELEASE ORAL 2 TIMES DAILY
Qty: 960 TABLET | Refills: 3 | Status: SHIPPED | OUTPATIENT
Start: 2020-09-21 | End: 2021-11-10 | Stop reason: ALTCHOICE

## 2020-09-21 RX ORDER — TORSEMIDE 20 MG/1
40 TABLET ORAL DAILY
Qty: 180 TABLET | Refills: 3 | Status: SHIPPED | OUTPATIENT
Start: 2020-09-21 | End: 2021-11-10 | Stop reason: SDUPTHER

## 2020-09-21 NOTE — TELEPHONE ENCOUNTER
Dr Canelo Menezes    Patient was told by Angelia Lema that the office has a free VISCO sample since her insurance won't cover it  Can this be scheduled? What are the instructions?       # [phone]

## 2020-09-24 ENCOUNTER — OFFICE VISIT (OUTPATIENT)
Dept: FAMILY MEDICINE CLINIC | Facility: OTHER | Age: 55
End: 2020-09-24
Payer: COMMERCIAL

## 2020-09-24 VITALS
TEMPERATURE: 98 F | WEIGHT: 234 LBS | HEART RATE: 106 BPM | BODY MASS INDEX: 39.95 KG/M2 | HEIGHT: 64 IN | OXYGEN SATURATION: 97 % | DIASTOLIC BLOOD PRESSURE: 80 MMHG | SYSTOLIC BLOOD PRESSURE: 122 MMHG

## 2020-09-24 DIAGNOSIS — Z23 ENCOUNTER FOR IMMUNIZATION: ICD-10-CM

## 2020-09-24 DIAGNOSIS — I10 ESSENTIAL HYPERTENSION: ICD-10-CM

## 2020-09-24 DIAGNOSIS — Z00.00 ANNUAL PHYSICAL EXAM: ICD-10-CM

## 2020-09-24 DIAGNOSIS — Z12.39 BREAST CANCER SCREENING: Primary | ICD-10-CM

## 2020-09-24 DIAGNOSIS — F41.9 ANXIETY: ICD-10-CM

## 2020-09-24 DIAGNOSIS — R63.1 POLYDIPSIA: ICD-10-CM

## 2020-09-24 DIAGNOSIS — R35.89 POLYURIA: ICD-10-CM

## 2020-09-24 PROCEDURE — 90471 IMMUNIZATION ADMIN: CPT

## 2020-09-24 PROCEDURE — 99396 PREV VISIT EST AGE 40-64: CPT | Performed by: FAMILY MEDICINE

## 2020-09-24 PROCEDURE — 3725F SCREEN DEPRESSION PERFORMED: CPT | Performed by: FAMILY MEDICINE

## 2020-09-24 PROCEDURE — 90682 RIV4 VACC RECOMBINANT DNA IM: CPT

## 2020-09-24 RX ORDER — LORAZEPAM 0.5 MG/1
0.5 TABLET ORAL 2 TIMES DAILY PRN
Qty: 30 TABLET | Refills: 0 | Status: SHIPPED | OUTPATIENT
Start: 2020-09-24 | End: 2021-11-22

## 2020-09-24 NOTE — PROGRESS NOTES
ADULT ANNUAL [place] [address] [facility]    NAME: Jessy Nunn  AGE: 60 y o  SEX: female  : 1965     DATE: 2020     Assessment and Plan:     Problem List Items Addressed This Visit        Cardiovascular and Mediastinum    Essential hypertension    Relevant Orders    TSH, 3rd generation with Free T4 reflex    Lipid panel      Other Visit Diagnoses     Breast cancer screening    -  Primary    Relevant Orders    Mammo screening bilateral w 3d & cad    Encounter for immunization        Relevant Orders    influenza vaccine, quadrivalent, recombinant, PF, 0 5 mL, for patients 18 yr+ (FLUBLOK) (Completed)    Polyuria        Relevant Orders    HEMOGLOBIN A1C W/ EAG ESTIMATION    Polydipsia        Relevant Orders    HEMOGLOBIN A1C W/ EAG ESTIMATION    Anxiety        Relevant Medications    LORazepam (ATIVAN) 0 5 mg tablet    Other Relevant Orders    TSH, 3rd generation with Free T4 reflex    HEMOGLOBIN A1C W/ EAG ESTIMATION    Annual physical exam            PDMP consulted and appropriate -- no red flags  Immunizations and preventive care screenings were discussed with patient today  Appropriate education was printed on patient's after visit summary  Counseling:  Alcohol/drug use: discussed moderation in alcohol intake, the recommendations for healthy alcohol use, and avoidance of illicit drug use  Dental Health: discussed importance of regular tooth brushing, flossing, and dental visits  Injury prevention: discussed safety/seat belts, safety helmets, smoke detectors, carbon dioxide detectors, and smoking near bedding or upholstery  Sexual health: discussed sexually transmitted diseases, partner selection, use of condoms, avoidance of unintended pregnancy, and contraceptive alternatives  · Exercise: the importance of regular exercise/physical activity was discussed  Recommend exercise 3-5 times per week for at least 30 minutes       BMI Counseling: Body mass index is 40 17 kg/m²  The BMI is above normal  Nutrition recommendations include decreasing portion sizes, encouraging healthy choices of fruits and vegetables, decreasing fast food intake, consuming healthier snacks, limiting drinks that contain sugar, moderation in carbohydrate intake, increasing intake of lean protein, reducing intake of saturated and trans fat and reducing intake of cholesterol  Exercise recommendations include exercising 3-5 times per week and strength training exercises  Return in about 2 weeks (around 10/8/2020) for Recheck (virtual) review of labs/anxiety  Chief Complaint:     Chief Complaint   Patient presents with    Annual Exam      History of Present Illness:     Adult Annual Physical   Patient here for a comprehensive physical exam  The patient reports no problems  Diet and Physical Activity  · Diet/Nutrition: well balanced diet, limited junk food and consuming 3-5 servings of fruits/vegetables daily  · Exercise: no formal exercise  Depression Screening  PHQ-9 Depression Screening    PHQ-9:    Frequency of the following problems over the past two weeks:       Little interest or pleasure in doing things:  0 - not at all  Feeling down, depressed, or hopeless:  0 - not at all  PHQ-2 Score:  0       General Health  · Sleep: sleeps poorly and gets 4-6 hours of sleep on average  · Hearing: normal - bilateral   · Vision: goes for regular eye exams  · Dental: regular dental visits  /GYN Health  · Patient is: postmenopausal  · Last menstrual period: n/a  · Contraceptive method: n/a  Review of Systems:     Review of Systems   Constitutional: Negative for appetite change, fatigue, fever and unexpected weight change  HENT: Negative for congestion, dental problem, ear pain, postnasal drip, sore throat and tinnitus  Eyes: Negative for pain, discharge and visual disturbance     Respiratory: Negative for cough, shortness of breath and wheezing  Cardiovascular: Positive for palpitations  Negative for chest pain and leg swelling  Gastrointestinal: Negative for abdominal pain, constipation, diarrhea, nausea and vomiting  Endocrine: Positive for polydipsia and polyuria  Negative for cold intolerance and heat intolerance  Genitourinary: Negative for difficulty urinating, dysuria, flank pain and urgency  Musculoskeletal: Negative for arthralgias, back pain, joint swelling and myalgias  Skin: Negative for rash and wound  Allergic/Immunologic: Negative for immunocompromised state  Neurological: Negative for dizziness, syncope, speech difficulty, weakness and numbness  Hematological: Negative for adenopathy  Does not bruise/bleed easily  Psychiatric/Behavioral: Negative for confusion, dysphoric mood and sleep disturbance  The patient is nervous/anxious         Past Medical History:     Past Medical History:   Diagnosis Date    HELENE positive     Anemia     Sildenafil causes decreased hematocrit    Chronic kidney disease     borderline lab values    Chronic rhinitis 6/4/2012    Encephalitis     Lasted Assessed 10/18/2017    Gout 6/26/2018    Lupus anticoagulant disorder (HCC)     Maxillary sinusitis     Lasted Assessed 10/18/2017    Night sweat     Osteopenia     Hip    Osteoporosis     Spine    Pneumonia     Last Assessed 10/18/2017    PONV (postoperative nausea and vomiting)     Pulmonary hypertension (HCC)     Seizures (HCC)     Only during Encephalitis    Shortness of breath     with exertion    Sinus tachycardia     Urinary incontinence       Past Surgical History:     Past Surgical History:   Procedure Laterality Date    ARTHRODESIS      Hand: IP Joint    CHOLECYSTECTOMY      COLONOSCOPY      COLPOSCOPY      HYSTERECTOMY      Vaginal    JOINT REPLACEMENT Right     Knee replacement    LAPAROSCOPIC ESOPHAGOGASTRIC FUNDOPLASTY  2008    GA KNEE SCOPE,SINGLE MENISECTOMY Right 10/2/2018    Procedure: KNEE ARTHROSCOPY WITH PARTIAL MEDIAL MENISCECTOMY;  Surgeon: Jazz Olmos DO;  [facility];  Service: Orthopedics    TN KNEE SCOPE,SINGLE MENISECTOMY Left 2019    Procedure: KNEE ARTHROSCOPIC MENISCECTOMY /MEDIAL;  Chondyl medial and posterior;  Surgeon: Jazz Olmos DO;  [facility];  Service: Orthopedics    TN TOTAL KNEE ARTHROPLASTY Right 2019    Procedure: KNEE TOTAL ARTHROPLASTY AND ASSOCIATED PROCEDURES;  Surgeon: Jazz Olmos DO;  [facility];  Service: Orthopedics    REDUCTION MAMMAPLASTY      REPAIR ANKLE LIGAMENT Right     TONSILLECTOMY        Social History:        Social History     Socioeconomic History    Marital status: /Civil Union     Spouse name: None    Number of children: 2    Years of education: None    Highest education level: None   Occupational History    None   Social Needs    Financial resource strain: None    Food insecurity     Worry: None     Inability: None    Transportation needs     Medical: None     Non-medical: None   Tobacco Use    Smoking status: Former Smoker     Last attempt to quit: 2006     Years since quittin 6    Smokeless tobacco: Never Used   Substance and Sexual Activity    Alcohol use: Yes     Frequency: Monthly or less     Drinks per session: 1 or 2     Binge frequency: Never     Comment: socially    Drug use: No    Sexual activity: Yes     Partners: Male   Lifestyle    Physical activity     Days per week: None     Minutes per session: None    Stress: None   Relationships    Social connections     Talks on phone: None     Gets together: None     Attends [employer] service: None     Active member of club or organization: None     Attends meetings of clubs or organizations: None     Relationship status: None    Intimate partner violence     Fear of current or ex partner: None     Emotionally abused: None     Physically abused: None     Forced sexual activity: None   Other Topics Concern    None Social History Narrative    Daily caffeinated coffee consumption    Exercise habits      Family History:     Family History   Problem Relation Age of Onset    Uterine cancer Mother     Pancreatic cancer Mother 79    Diabetes Mother     Endometrial cancer Mother 77    Heart disease Father         open heart surgery at age 48     Stroke Father         CVA    Hypertension Father     Atrial fibrillation Father     Hyperlipidemia Father     No Known Problems Sister     No Known Problems Brother     Thyroid disease Maternal Grandmother     Asthma Daughter     Heart attack Maternal Grandfather     Heart attack Paternal Grandmother     Skin cancer Paternal Grandfather     No Known Problems Sister     Thyroid disease Sister     Depression Sister     Osteoarthritis Sister     Hemochromatosis Brother     Migraines Daughter     Asthma Daughter     Anuerysm Neg Hx     Clotting disorder Neg Hx     Heart failure Neg Hx       Current Medications:     Current Outpatient Medications   Medication Sig Dispense Refill    aspirin (ECOTRIN LOW STRENGTH) 81 mg EC tablet Take 162 mg by mouth daily      BD INTEGRA SYRINGE 25G X 1" 3 ML MISC FOR WEEKLY ADMINISTRATION OF B-12  2    cholecalciferol (VITAMIN D3) 1,000 units tablet Take 2,000 Units by mouth daily in the early morning        cyanocobalamin 1,000 mcg/mL Inject 1mL IM weekly   Dispense with needles and syringes 10 mL 6    hydroxychloroquine (PLAQUENIL) 200 mg tablet Take 400 mg by mouth daily with breakfast        NEEDLE, DISP, 24 G 24G X 1" MISC For weekly administration of B12 20 each 2    ondansetron (ZOFRAN) 4 mg tablet Take 1 tablet (4 mg total) by mouth every 8 (eight) hours as needed for nausea or vomiting 20 tablet 0    potassium chloride (K-DUR,KLOR-CON) 20 mEq tablet Take 2 tablets (40 mEq total) by mouth 2 (two) times a day 960 tablet 3    sildenafil (REVATIO) 20 mg tablet Take 1 tablet (20 mg total) by mouth 3 (three) times a day 270 tablet 3    spironolactone (ALDACTONE) 25 mg tablet Take 1 tablet (25 mg total) by mouth daily 90 tablet 3    Syringe 1 ML MISC For weekly administration of B12 20 each 2    torsemide (DEMADEX) 20 mg tablet Take 2 tablets (40 mg total) by mouth daily 180 tablet 3    triamcinolone (KENALOG) 0 1 % oral topical paste prn      LORazepam (ATIVAN) 0 5 mg tablet Take 1 tablet (0 5 mg total) by mouth 2 (two) times a day as needed for anxiety 30 tablet 0     No current facility-administered medications for this visit  Allergies: Allergies   Allergen Reactions    Dilantin [Phenytoin] Anaphylaxis and Rash    Augmentin [Amoxicillin-Pot Clavulanate] Rash and Dermatitis    Penicillins Rash      Physical Exam:     /80 (BP Location: Right arm, Patient Position: Sitting, Cuff Size: Large)   Pulse (!) 106   Temp 98 °F (36 7 °C) (Temporal)   Ht 5' 4" (1 626 m)   Wt 106 kg (234 lb)   SpO2 97%   BMI 40 17 kg/m²     Physical Exam  Vitals signs and nursing note reviewed  Constitutional:       General: She is not in acute distress  Appearance: She is well-developed  She is obese  She is not ill-appearing  HENT:      Head: Normocephalic and atraumatic  Right Ear: Hearing, tympanic membrane, ear canal and external ear normal       Left Ear: Hearing, tympanic membrane, ear canal and external ear normal       Nose: Nose normal       Mouth/Throat:      Pharynx: Uvula midline  Eyes:      General: No scleral icterus  Conjunctiva/sclera: Conjunctivae normal       Pupils: Pupils are equal, round, and reactive to light  Neck:      Musculoskeletal: Normal range of motion and neck supple  Thyroid: No thyromegaly  Cardiovascular:      Rate and Rhythm: Normal rate and regular rhythm  Heart sounds: Normal heart sounds  No murmur  Pulmonary:      Effort: Pulmonary effort is normal  No respiratory distress  Breath sounds: Normal breath sounds  No wheezing     Abdominal:      General: Bowel sounds are normal  There is no distension  Palpations: Abdomen is soft  Tenderness: There is no abdominal tenderness  Musculoskeletal: Normal range of motion  General: No tenderness  Right lower leg: No edema  Left lower leg: No edema  Lymphadenopathy:      Cervical: No cervical adenopathy  Skin:     General: Skin is warm and dry  Coloration: Skin is not jaundiced  Findings: No erythema or rash  Neurological:      General: No focal deficit present  Mental Status: She is alert and oriented to person, place, and time  Cranial Nerves: No cranial nerve deficit  Gait: Gait normal    Psychiatric:         Mood and Affect: Mood is anxious  Speech: Speech normal          Behavior: Behavior normal          Thought Content: Thought content does not include homicidal or suicidal ideation            Sultana Coffey, DO  [facility] [address] [address],[address]

## 2020-09-24 NOTE — PATIENT INSTRUCTIONS

## 2020-09-28 ENCOUNTER — OFFICE VISIT (OUTPATIENT)
Dept: OBGYN CLINIC | Facility: CLINIC | Age: 55
End: 2020-09-28
Payer: COMMERCIAL

## 2020-09-28 DIAGNOSIS — M17.12 ARTHRITIS OF LEFT KNEE: Primary | ICD-10-CM

## 2020-09-28 PROCEDURE — 20610 DRAIN/INJ JOINT/BURSA W/O US: CPT | Performed by: ORTHOPAEDIC SURGERY

## 2020-09-28 RX ORDER — HYALURONATE SODIUM 10 MG/ML
20 SYRINGE (ML) INTRAARTICULAR
Status: COMPLETED | OUTPATIENT
Start: 2020-09-28 | End: 2020-09-28

## 2020-09-28 RX ADMIN — Medication 20 MG: at 15:29

## 2020-09-28 NOTE — PROGRESS NOTES
1  Arthritis of left knee       Patient is here for her 1st injection of Euflexxa into the left knee  Patient reports pain medially  Physical exam of the knee shows no effusion no ecchymosis    All other organ systems normal    Large joint arthrocentesis: L knee  Date/Time: 9/28/2020 3:29 PM  Consent given by: patient  Timeout: Immediately prior to procedure a time out was called to verify the correct patient, procedure, equipment, support staff and site/side marked as required   Supporting Documentation  Indications: pain   Procedure Details  Location: knee - L knee  Needle size: 22 G  Ultrasound guidance: no  Approach: anterolateral  Medications administered: 20 mg Sodium Hyaluronate 20 MG/2ML    Patient tolerance: patient tolerated the procedure well with no immediate complications              Patient tolerated procedure follow up   One week

## 2020-10-05 ENCOUNTER — OFFICE VISIT (OUTPATIENT)
Dept: OBGYN CLINIC | Facility: CLINIC | Age: 55
End: 2020-10-05
Payer: COMMERCIAL

## 2020-10-05 DIAGNOSIS — M17.12 ARTHRITIS OF LEFT KNEE: Primary | ICD-10-CM

## 2020-10-05 PROCEDURE — 20610 DRAIN/INJ JOINT/BURSA W/O US: CPT | Performed by: ORTHOPAEDIC SURGERY

## 2020-10-05 RX ORDER — HYALURONATE SODIUM 10 MG/ML
20 SYRINGE (ML) INTRAARTICULAR
Status: COMPLETED | OUTPATIENT
Start: 2020-10-05 | End: 2020-10-05

## 2020-10-05 RX ADMIN — Medication 20 MG: at 15:33

## 2020-10-08 PROBLEM — M32.19 OTHER ORGAN OR SYSTEM INVOLVEMENT IN SYSTEMIC LUPUS ERYTHEMATOSUS (HCC): Status: ACTIVE | Noted: 2017-11-17

## 2020-10-08 PROBLEM — D68.62 LUPUS ANTICOAGULANT DISORDER (HCC): Status: ACTIVE | Noted: 2017-10-18

## 2020-10-08 PROBLEM — E66.01 MORBID OBESITY (HCC): Status: ACTIVE | Noted: 2020-10-08

## 2020-10-09 ENCOUNTER — LAB (OUTPATIENT)
Dept: LAB | Facility: CLINIC | Age: 55
End: 2020-10-09
Payer: COMMERCIAL

## 2020-10-09 DIAGNOSIS — F41.9 ANXIETY: ICD-10-CM

## 2020-10-09 DIAGNOSIS — R35.89 POLYURIA: ICD-10-CM

## 2020-10-09 DIAGNOSIS — R63.1 POLYDIPSIA: ICD-10-CM

## 2020-10-09 DIAGNOSIS — I10 ESSENTIAL HYPERTENSION: ICD-10-CM

## 2020-10-09 LAB
CHOLEST SERPL-MCNC: 176 MG/DL (ref 50–200)
EST. AVERAGE GLUCOSE BLD GHB EST-MCNC: 103 MG/DL
HBA1C MFR BLD: 5.2 %
HDLC SERPL-MCNC: 60 MG/DL
LDLC SERPL CALC-MCNC: 95 MG/DL (ref 0–100)
NONHDLC SERPL-MCNC: 116 MG/DL
TRIGL SERPL-MCNC: 107 MG/DL
TSH SERPL DL<=0.05 MIU/L-ACNC: 2.16 UIU/ML (ref 0.36–3.74)

## 2020-10-09 PROCEDURE — 36415 COLL VENOUS BLD VENIPUNCTURE: CPT

## 2020-10-09 PROCEDURE — 84443 ASSAY THYROID STIM HORMONE: CPT

## 2020-10-09 PROCEDURE — 80061 LIPID PANEL: CPT

## 2020-10-09 PROCEDURE — 83036 HEMOGLOBIN GLYCOSYLATED A1C: CPT

## 2020-10-12 ENCOUNTER — OFFICE VISIT (OUTPATIENT)
Dept: FAMILY MEDICINE CLINIC | Facility: OTHER | Age: 55
End: 2020-10-12
Payer: COMMERCIAL

## 2020-10-12 ENCOUNTER — OFFICE VISIT (OUTPATIENT)
Dept: OBGYN CLINIC | Facility: CLINIC | Age: 55
End: 2020-10-12
Payer: COMMERCIAL

## 2020-10-12 VITALS
BODY MASS INDEX: 40.26 KG/M2 | TEMPERATURE: 98.2 F | OXYGEN SATURATION: 97 % | WEIGHT: 235.8 LBS | HEIGHT: 64 IN | DIASTOLIC BLOOD PRESSURE: 78 MMHG | SYSTOLIC BLOOD PRESSURE: 118 MMHG | HEART RATE: 94 BPM

## 2020-10-12 DIAGNOSIS — R21 RASH AND NONSPECIFIC SKIN ERUPTION: Primary | ICD-10-CM

## 2020-10-12 DIAGNOSIS — R63.1 POLYDIPSIA: ICD-10-CM

## 2020-10-12 DIAGNOSIS — M17.12 ARTHRITIS OF LEFT KNEE: Primary | ICD-10-CM

## 2020-10-12 DIAGNOSIS — R35.89 POLYURIA: ICD-10-CM

## 2020-10-12 PROCEDURE — 1036F TOBACCO NON-USER: CPT | Performed by: FAMILY MEDICINE

## 2020-10-12 PROCEDURE — 99213 OFFICE O/P EST LOW 20 MIN: CPT | Performed by: FAMILY MEDICINE

## 2020-10-12 PROCEDURE — 3078F DIAST BP <80 MM HG: CPT | Performed by: FAMILY MEDICINE

## 2020-10-12 PROCEDURE — 20610 DRAIN/INJ JOINT/BURSA W/O US: CPT | Performed by: ORTHOPAEDIC SURGERY

## 2020-10-12 RX ORDER — HYALURONATE SODIUM 10 MG/ML
20 SYRINGE (ML) INTRAARTICULAR
Status: COMPLETED | OUTPATIENT
Start: 2020-10-12 | End: 2020-10-12

## 2020-10-12 RX ORDER — NYSTATIN 100000 U/G
CREAM TOPICAL 2 TIMES DAILY
Qty: 30 G | Refills: 0 | Status: SHIPPED | OUTPATIENT
Start: 2020-10-12

## 2020-10-12 RX ADMIN — Medication 20 MG: at 15:16

## 2020-10-21 ENCOUNTER — DOCUMENTATION (OUTPATIENT)
Dept: CARDIOLOGY CLINIC | Facility: CLINIC | Age: 55
End: 2020-10-21

## 2020-10-29 ENCOUNTER — OFFICE VISIT (OUTPATIENT)
Dept: OBGYN CLINIC | Facility: CLINIC | Age: 55
End: 2020-10-29
Payer: COMMERCIAL

## 2020-10-29 ENCOUNTER — TELEPHONE (OUTPATIENT)
Dept: FAMILY MEDICINE CLINIC | Facility: OTHER | Age: 55
End: 2020-10-29

## 2020-10-29 VITALS — BODY MASS INDEX: 40.12 KG/M2 | WEIGHT: 235 LBS | HEIGHT: 64 IN

## 2020-10-29 DIAGNOSIS — I10 ESSENTIAL HYPERTENSION: Primary | ICD-10-CM

## 2020-10-29 DIAGNOSIS — M35.9 UNDIFFERENTIATED CONNECTIVE TISSUE DISEASE (HCC): ICD-10-CM

## 2020-10-29 DIAGNOSIS — M17.12 ARTHRITIS OF LEFT KNEE: Primary | ICD-10-CM

## 2020-10-29 DIAGNOSIS — M70.62 TROCHANTERIC BURSITIS OF BOTH HIPS: ICD-10-CM

## 2020-10-29 DIAGNOSIS — M70.50 PES ANSERINE BURSITIS: ICD-10-CM

## 2020-10-29 DIAGNOSIS — R06.02 SOB (SHORTNESS OF BREATH) ON EXERTION: ICD-10-CM

## 2020-10-29 DIAGNOSIS — E66.01 MORBID OBESITY (HCC): ICD-10-CM

## 2020-10-29 DIAGNOSIS — M70.61 TROCHANTERIC BURSITIS OF BOTH HIPS: ICD-10-CM

## 2020-10-29 DIAGNOSIS — D68.62 LUPUS ANTICOAGULANT DISORDER (HCC): ICD-10-CM

## 2020-10-29 DIAGNOSIS — M35.9 DIFFUSE CONNECTIVE TISSUE DISEASE (HCC): ICD-10-CM

## 2020-10-29 DIAGNOSIS — M22.2X1 PATELLOFEMORAL DISORDER OF RIGHT KNEE: ICD-10-CM

## 2020-10-29 DIAGNOSIS — I27.20 PULMONARY HYPERTENSION (HCC): ICD-10-CM

## 2020-10-29 DIAGNOSIS — Z96.651 STATUS POST TOTAL RIGHT KNEE REPLACEMENT: ICD-10-CM

## 2020-10-29 DIAGNOSIS — M81.0 OSTEOPOROSIS WITHOUT CURRENT PATHOLOGICAL FRACTURE, UNSPECIFIED OSTEOPOROSIS TYPE: ICD-10-CM

## 2020-10-29 DIAGNOSIS — M70.51 PES ANSERINUS BURSITIS OF RIGHT KNEE: ICD-10-CM

## 2020-10-29 PROCEDURE — 20610 DRAIN/INJ JOINT/BURSA W/O US: CPT | Performed by: ORTHOPAEDIC SURGERY

## 2020-10-29 PROCEDURE — 3008F BODY MASS INDEX DOCD: CPT | Performed by: ORTHOPAEDIC SURGERY

## 2020-10-29 PROCEDURE — 99213 OFFICE O/P EST LOW 20 MIN: CPT | Performed by: ORTHOPAEDIC SURGERY

## 2020-10-29 RX ORDER — BETAMETHASONE SODIUM PHOSPHATE AND BETAMETHASONE ACETATE 3; 3 MG/ML; MG/ML
12 INJECTION, SUSPENSION INTRA-ARTICULAR; INTRALESIONAL; INTRAMUSCULAR; SOFT TISSUE
Status: COMPLETED | OUTPATIENT
Start: 2020-10-29 | End: 2020-10-29

## 2020-10-29 RX ORDER — BUPIVACAINE HYDROCHLORIDE 2.5 MG/ML
1 INJECTION, SOLUTION INFILTRATION; PERINEURAL
Status: COMPLETED | OUTPATIENT
Start: 2020-10-29 | End: 2020-10-29

## 2020-10-29 RX ORDER — LIDOCAINE HYDROCHLORIDE 10 MG/ML
1 INJECTION, SOLUTION INFILTRATION; PERINEURAL
Status: COMPLETED | OUTPATIENT
Start: 2020-10-29 | End: 2020-10-29

## 2020-10-29 RX ADMIN — BUPIVACAINE HYDROCHLORIDE 1 ML: 2.5 INJECTION, SOLUTION INFILTRATION; PERINEURAL at 10:19

## 2020-10-29 RX ADMIN — BETAMETHASONE SODIUM PHOSPHATE AND BETAMETHASONE ACETATE 12 MG: 3; 3 INJECTION, SUSPENSION INTRA-ARTICULAR; INTRALESIONAL; INTRAMUSCULAR; SOFT TISSUE at 10:19

## 2020-10-29 RX ADMIN — LIDOCAINE HYDROCHLORIDE 1 ML: 10 INJECTION, SOLUTION INFILTRATION; PERINEURAL at 10:19

## 2020-10-30 ENCOUNTER — TELEPHONE (OUTPATIENT)
Dept: FAMILY MEDICINE CLINIC | Facility: OTHER | Age: 55
End: 2020-10-30

## 2020-10-30 ENCOUNTER — DOCUMENTATION (OUTPATIENT)
Dept: FAMILY MEDICINE CLINIC | Facility: OTHER | Age: 55
End: 2020-10-30

## 2020-10-30 DIAGNOSIS — G47.00 INSOMNIA, UNSPECIFIED TYPE: Primary | ICD-10-CM

## 2020-10-30 RX ORDER — TRAZODONE HYDROCHLORIDE 50 MG/1
50 TABLET ORAL
Qty: 30 TABLET | Refills: 1 | Status: SHIPPED | OUTPATIENT
Start: 2020-10-30 | End: 2021-01-05 | Stop reason: SDUPTHER

## 2020-11-04 ENCOUNTER — HOSPITAL ENCOUNTER (OUTPATIENT)
Dept: NON INVASIVE DIAGNOSTICS | Facility: CLINIC | Age: 55
Discharge: HOME | End: 2020-11-04
Payer: COMMERCIAL

## 2020-11-04 DIAGNOSIS — I50.32 CHRONIC HEART FAILURE WITH PRESERVED EJECTION FRACTION (HCC): ICD-10-CM

## 2020-11-04 DIAGNOSIS — I27.21 PAH (PULMONARY ARTERY HYPERTENSION) (HCC): ICD-10-CM

## 2020-11-04 PROCEDURE — 93350 STRESS TTE ONLY: CPT

## 2020-11-04 PROCEDURE — 93306 TTE W/DOPPLER COMPLETE: CPT

## 2020-11-04 PROCEDURE — 93306 TTE W/DOPPLER COMPLETE: CPT | Performed by: INTERNAL MEDICINE

## 2020-11-04 PROCEDURE — 93351 STRESS TTE COMPLETE: CPT | Performed by: INTERNAL MEDICINE

## 2020-11-05 LAB
CHEST PAIN STATEMENT: NORMAL
MAX DIASTOLIC BP: 90 MMHG
MAX HEART RATE: 155 BPM
MAX PREDICTED HEART RATE: 163 BPM
MAX. SYSTOLIC BP: 160 MMHG
PROTOCOL NAME: NORMAL
REASON FOR TERMINATION: NORMAL
TARGET HR FORMULA: NORMAL
TEST INDICATION: NORMAL
TIME IN EXERCISE PHASE: NORMAL

## 2020-11-11 ENCOUNTER — TELEPHONE (OUTPATIENT)
Dept: CARDIOLOGY CLINIC | Facility: CLINIC | Age: 55
End: 2020-11-11

## 2020-11-20 ENCOUNTER — TELEPHONE (OUTPATIENT)
Dept: OBGYN CLINIC | Facility: OTHER | Age: 55
End: 2020-11-20

## 2020-12-01 ENCOUNTER — TRANSCRIBE ORDERS (OUTPATIENT)
Dept: ADMINISTRATIVE | Facility: HOSPITAL | Age: 55
End: 2020-12-01

## 2020-12-01 DIAGNOSIS — I49.3 PVC (PREMATURE VENTRICULAR CONTRACTION): Primary | ICD-10-CM

## 2020-12-04 ENCOUNTER — HOSPITAL ENCOUNTER (OUTPATIENT)
Dept: NON INVASIVE DIAGNOSTICS | Facility: CLINIC | Age: 55
Discharge: HOME | End: 2020-12-04
Payer: COMMERCIAL

## 2020-12-04 DIAGNOSIS — I49.3 PVC (PREMATURE VENTRICULAR CONTRACTION): ICD-10-CM

## 2020-12-04 PROCEDURE — 93226 XTRNL ECG REC<48 HR SCAN A/R: CPT

## 2020-12-04 PROCEDURE — 93225 XTRNL ECG REC<48 HRS REC: CPT

## 2020-12-08 PROCEDURE — 93227 XTRNL ECG REC<48 HR R&I: CPT | Performed by: INTERNAL MEDICINE

## 2021-01-05 DIAGNOSIS — G47.00 INSOMNIA, UNSPECIFIED TYPE: ICD-10-CM

## 2021-01-05 RX ORDER — TRAZODONE HYDROCHLORIDE 50 MG/1
50 TABLET ORAL
Qty: 30 TABLET | Refills: 1 | Status: SHIPPED | OUTPATIENT
Start: 2021-01-05 | End: 2021-11-22

## 2021-02-11 DIAGNOSIS — I27.20 PULMONARY HYPERTENSION (HCC): ICD-10-CM

## 2021-02-11 RX ORDER — SILDENAFIL CITRATE 20 MG/1
20 TABLET ORAL 3 TIMES DAILY
Qty: 270 TABLET | Refills: 3 | Status: SHIPPED | OUTPATIENT
Start: 2021-02-11 | End: 2022-02-07

## 2021-02-17 NOTE — TELEPHONE ENCOUNTER
Surgery      Subjective: Minimal complaints of discomfort; hungry      Objective: _    Vitals:   Temp    98.3  (MAR 19 19:15) 98.3  (MAR 19 19:15) 98.3  (MAR 19 19:15)  Heart Rate   100  (MAR 19 19:15) 93  (MAR 19 07:57) 100  (MAR 19 19:15)  Resp Rate       16  (MAR 19 19:15) 16  (MAR 19 19:15) 18  (MAR 19 07:57)  SBP    93  (MAR 19 19:15) 93  (MAR 19 19:15) 97  (MAR 19 07:57)  DBP    61  (MAR 19 19:15) 61  (MAR 19 07:57) 61  (MAR 19 07:57)    GI: Abdomen is soft and non-tender      Labs:   WBC                  7.2 (MAR 19) 6.2 (MAR 18) 5.0 (MAR 17) 4.2 (MAR 16)   Hgb                  L 8.9 (MAR 19) L 8.7 (MAR 18) L 8.1 (MAR 17) L 8.7 (MAR 16)   Hct                  L 28 (MAR 19) L 28 (MAR 18) L 25 (MAR 17) L 27 (MAR 16)   Plt                  224 (MAR 19) 179 (MAR 18) 159 (MAR 17) 150 (MAR 16)   Na                   L 134 (MAR 19) L 133 (MAR 18) L 132 (MAR 17) 136 (MAR 16)   K                    4.3 (MAR 19) 4.3 (MAR 18) 4.2 (MAR 17) 4.2 (MAR 16)   CO2                  25 (MAR 19) 26 (MAR 18) 27 (MAR 17) 27 (MAR 16)   Cl                   103 (MAR 19) 102 (MAR 18) 102 (MAR 17) 100 (MAR 16)   Cr                   0.75 (MAR 19) 0.72 (MAR 18) 0.67 (MAR 17) 0.71 (MAR 16)   BUN                  H 26 (MAR 19) H 25 (MAR 18) H 26 (MAR 17) H 29 (MAR 16)   Glucose              98 (MAR 19) 86 (MAR 18) H 101 (MAR 17) H 106 (MAR 16)   Mg                   1.9 (MAR 19) 1.8 (MAR 18) 1.9 (MAR 17) 1.9 (MAR 16)   Phos                 4.4 (MAR 19) 4.2 (MAR 18) 4.1 (MAR 17) 4.0 (MAR 16)   Ca                   L 8.3 (MAR 19) L 8.1 (MAR 18) L 7.6 (MAR 17) L 7.7 (MAR 16)   PT                   H 12.6 (MAR 10)   INR                  H 1.3 (MAR 10)   PTT                  H 62 (MAR 19) H 64 (MAR 18) H 59 (MAR 17) H 46 (MAR 16)     Assessment: Excellent improvement    Plan: Hydrate tonight and CT A/P with IV contrast to assess progress; likely will start clear liquids tomorrow pending CT results         I spoke with Gold Pinon and advised  She still wants to think about the colchicine but asked that it be called in to pharmacy

## 2021-02-24 ENCOUNTER — DOCUMENTATION (OUTPATIENT)
Dept: CARDIOLOGY CLINIC | Facility: CLINIC | Age: 56
End: 2021-02-24

## 2021-03-10 DIAGNOSIS — Z23 ENCOUNTER FOR IMMUNIZATION: ICD-10-CM

## 2021-03-13 DIAGNOSIS — G47.00 INSOMNIA, UNSPECIFIED TYPE: ICD-10-CM

## 2021-03-15 RX ORDER — TRAZODONE HYDROCHLORIDE 50 MG/1
TABLET ORAL
Qty: 30 TABLET | Refills: 1 | OUTPATIENT
Start: 2021-03-15

## 2021-03-17 DIAGNOSIS — G47.00 INSOMNIA, UNSPECIFIED TYPE: ICD-10-CM

## 2021-03-17 RX ORDER — TRAZODONE HYDROCHLORIDE 50 MG/1
TABLET ORAL
Qty: 30 TABLET | Refills: 1 | OUTPATIENT
Start: 2021-03-17

## 2021-07-15 DIAGNOSIS — Z12.11 COLON CANCER SCREENING: Primary | ICD-10-CM

## 2021-07-22 ENCOUNTER — VBI (OUTPATIENT)
Dept: ADMINISTRATIVE | Facility: OTHER | Age: 56
End: 2021-07-22

## 2021-10-19 ENCOUNTER — HOSPITAL ENCOUNTER (OUTPATIENT)
Dept: RADIOLOGY | Facility: HOSPITAL | Age: 56
Discharge: HOME | End: 2021-10-19
Payer: COMMERCIAL

## 2021-10-19 DIAGNOSIS — R76.0 RAISED ANTIBODY TITER: ICD-10-CM

## 2021-10-19 DIAGNOSIS — Z79.899 OTHER LONG TERM (CURRENT) DRUG THERAPY: ICD-10-CM

## 2021-10-19 DIAGNOSIS — M32.19 OTHER ORGAN OR SYSTEM INVOLVEMENT IN SYSTEMIC LUPUS ERYTHEMATOSUS (HCC): ICD-10-CM

## 2021-10-19 DIAGNOSIS — I27.20 PULMONARY HYPERTENSION, UNSPECIFIED (HCC): ICD-10-CM

## 2021-10-19 PROCEDURE — 77080 DXA BONE DENSITY AXIAL: CPT

## 2021-10-28 ENCOUNTER — VBI (OUTPATIENT)
Dept: ADMINISTRATIVE | Facility: OTHER | Age: 56
End: 2021-10-28

## 2021-11-09 ENCOUNTER — TELEPHONE (OUTPATIENT)
Dept: HEMATOLOGY ONCOLOGY | Facility: CLINIC | Age: 56
End: 2021-11-09

## 2021-11-10 ENCOUNTER — OFFICE VISIT (OUTPATIENT)
Dept: CARDIOLOGY CLINIC | Facility: CLINIC | Age: 56
End: 2021-11-10
Payer: COMMERCIAL

## 2021-11-10 VITALS
BODY MASS INDEX: 40.72 KG/M2 | DIASTOLIC BLOOD PRESSURE: 84 MMHG | OXYGEN SATURATION: 97 % | SYSTOLIC BLOOD PRESSURE: 132 MMHG | HEART RATE: 97 BPM | WEIGHT: 237.2 LBS

## 2021-11-10 DIAGNOSIS — I50.32 CHRONIC HEART FAILURE WITH PRESERVED EJECTION FRACTION (HCC): ICD-10-CM

## 2021-11-10 DIAGNOSIS — I27.20 PULMONARY HYPERTENSION (HCC): ICD-10-CM

## 2021-11-10 PROCEDURE — 99214 OFFICE O/P EST MOD 30 MIN: CPT | Performed by: INTERNAL MEDICINE

## 2021-11-10 RX ORDER — SPIRONOLACTONE 25 MG/1
25 TABLET ORAL DAILY
Qty: 90 TABLET | Refills: 3 | Status: SHIPPED | OUTPATIENT
Start: 2021-11-10 | End: 2021-11-22

## 2021-11-10 RX ORDER — TORSEMIDE 20 MG/1
20 TABLET ORAL DAILY
Qty: 90 TABLET | Refills: 3 | Status: SHIPPED | OUTPATIENT
Start: 2021-11-10 | End: 2022-06-17 | Stop reason: SDUPTHER

## 2021-11-22 ENCOUNTER — OFFICE VISIT (OUTPATIENT)
Dept: HEMATOLOGY ONCOLOGY | Facility: CLINIC | Age: 56
End: 2021-11-22
Payer: COMMERCIAL

## 2021-11-22 ENCOUNTER — APPOINTMENT (OUTPATIENT)
Dept: LAB | Facility: CLINIC | Age: 56
End: 2021-11-22
Payer: COMMERCIAL

## 2021-11-22 ENCOUNTER — TELEPHONE (OUTPATIENT)
Dept: HEMATOLOGY ONCOLOGY | Facility: CLINIC | Age: 56
End: 2021-11-22

## 2021-11-22 VITALS
BODY MASS INDEX: 40.12 KG/M2 | HEIGHT: 64 IN | WEIGHT: 235 LBS | TEMPERATURE: 97.8 F | HEART RATE: 124 BPM | DIASTOLIC BLOOD PRESSURE: 90 MMHG | OXYGEN SATURATION: 98 % | SYSTOLIC BLOOD PRESSURE: 158 MMHG | RESPIRATION RATE: 20 BRPM

## 2021-11-22 DIAGNOSIS — D70.9 NEUTROPENIA, UNSPECIFIED TYPE (HCC): ICD-10-CM

## 2021-11-22 DIAGNOSIS — D50.9 IRON DEFICIENCY ANEMIA, UNSPECIFIED IRON DEFICIENCY ANEMIA TYPE: ICD-10-CM

## 2021-11-22 DIAGNOSIS — E53.8 B12 DEFICIENCY: ICD-10-CM

## 2021-11-22 DIAGNOSIS — I50.32 CHRONIC HEART FAILURE WITH PRESERVED EJECTION FRACTION (HCC): ICD-10-CM

## 2021-11-22 DIAGNOSIS — D68.62 LUPUS ANTICOAGULANT DISORDER (HCC): ICD-10-CM

## 2021-11-22 DIAGNOSIS — R76.8 POSITIVE ANA (ANTINUCLEAR ANTIBODY): Primary | ICD-10-CM

## 2021-11-22 PROBLEM — D72.819 LEUKOPENIA: Status: ACTIVE | Noted: 2021-11-22

## 2021-11-22 LAB
FERRITIN SERPL-MCNC: 92 NG/ML (ref 8–388)
IRON SATN MFR SERPL: 14 % (ref 15–50)
IRON SERPL-MCNC: 46 UG/DL (ref 50–170)
TIBC SERPL-MCNC: 336 UG/DL (ref 250–450)
VIT B12 SERPL-MCNC: 227 PG/ML (ref 100–900)

## 2021-11-22 PROCEDURE — 36415 COLL VENOUS BLD VENIPUNCTURE: CPT

## 2021-11-22 PROCEDURE — 82607 VITAMIN B-12: CPT

## 2021-11-22 PROCEDURE — 99214 OFFICE O/P EST MOD 30 MIN: CPT | Performed by: PHYSICIAN ASSISTANT

## 2021-11-22 PROCEDURE — 1036F TOBACCO NON-USER: CPT | Performed by: PHYSICIAN ASSISTANT

## 2021-11-22 PROCEDURE — 3008F BODY MASS INDEX DOCD: CPT | Performed by: PHYSICIAN ASSISTANT

## 2021-11-22 PROCEDURE — 83550 IRON BINDING TEST: CPT

## 2021-11-22 PROCEDURE — 83540 ASSAY OF IRON: CPT

## 2021-11-22 PROCEDURE — 82728 ASSAY OF FERRITIN: CPT

## 2021-11-22 RX ORDER — METHOTREXATE 2.5 MG/1
TABLET ORAL
COMMUNITY
End: 2021-11-22

## 2021-11-22 RX ORDER — SPIRONOLACTONE 25 MG/1
25 TABLET ORAL DAILY
Qty: 90 TABLET | Refills: 3
Start: 2021-11-22 | End: 2022-06-17 | Stop reason: SDUPTHER

## 2021-11-22 RX ORDER — FOLIC ACID 1 MG/1
TABLET ORAL DAILY
COMMUNITY

## 2021-11-22 RX ORDER — CYANOCOBALAMIN 1000 UG/ML
1000 INJECTION INTRAMUSCULAR; SUBCUTANEOUS
Qty: 1 ML | Refills: 11 | Status: SHIPPED | OUTPATIENT
Start: 2021-11-22

## 2021-11-22 RX ORDER — FERROUS SULFATE 325(65) MG
325 TABLET ORAL 2 TIMES DAILY WITH MEALS
Qty: 30 TABLET | Refills: 3 | Status: SHIPPED | OUTPATIENT
Start: 2021-11-22

## 2022-02-07 DIAGNOSIS — I27.20 PULMONARY HYPERTENSION (HCC): ICD-10-CM

## 2022-02-07 RX ORDER — SILDENAFIL CITRATE 20 MG/1
20 TABLET ORAL 3 TIMES DAILY
Qty: 270 TABLET | Refills: 3 | Status: SHIPPED | OUTPATIENT
Start: 2022-02-07 | End: 2022-03-10 | Stop reason: SDUPTHER

## 2022-02-21 ENCOUNTER — HOSPITAL ENCOUNTER (OUTPATIENT)
Dept: MAMMOGRAPHY | Facility: HOSPITAL | Age: 57
Discharge: HOME | End: 2022-02-21
Attending: FAMILY MEDICINE
Payer: MEDICARE

## 2022-02-21 VITALS — HEIGHT: 64 IN | BODY MASS INDEX: 40.27 KG/M2 | WEIGHT: 235.89 LBS

## 2022-02-21 DIAGNOSIS — Z12.39 BREAST CANCER SCREENING: ICD-10-CM

## 2022-02-21 PROCEDURE — 77063 BREAST TOMOSYNTHESIS BI: CPT

## 2022-02-21 PROCEDURE — 77067 SCR MAMMO BI INCL CAD: CPT

## 2022-03-04 ENCOUNTER — OFFICE VISIT (OUTPATIENT)
Dept: OBGYN CLINIC | Facility: CLINIC | Age: 57
End: 2022-03-04
Payer: MEDICARE

## 2022-03-04 VITALS
DIASTOLIC BLOOD PRESSURE: 82 MMHG | SYSTOLIC BLOOD PRESSURE: 136 MMHG | BODY MASS INDEX: 44.05 KG/M2 | WEIGHT: 239.4 LBS | HEIGHT: 62 IN

## 2022-03-04 DIAGNOSIS — Z01.419 ENCNTR FOR GYN EXAM (GENERAL) (ROUTINE) W/O ABN FINDINGS: Primary | ICD-10-CM

## 2022-03-04 DIAGNOSIS — E66.01 MORBID OBESITY (HCC): ICD-10-CM

## 2022-03-04 DIAGNOSIS — Z12.31 ENCOUNTER FOR SCREENING MAMMOGRAM FOR MALIGNANT NEOPLASM OF BREAST: ICD-10-CM

## 2022-03-04 DIAGNOSIS — Z12.11 COLON CANCER SCREENING: ICD-10-CM

## 2022-03-04 DIAGNOSIS — F32.9 REACTIVE DEPRESSION: ICD-10-CM

## 2022-03-04 PROBLEM — B96.89 BV (BACTERIAL VAGINOSIS): Status: RESOLVED | Noted: 2018-12-21 | Resolved: 2022-03-04

## 2022-03-04 PROBLEM — N76.0 BV (BACTERIAL VAGINOSIS): Status: RESOLVED | Noted: 2018-12-21 | Resolved: 2022-03-04

## 2022-03-04 PROCEDURE — G0101 CA SCREEN;PELVIC/BREAST EXAM: HCPCS | Performed by: PHYSICIAN ASSISTANT

## 2022-03-04 NOTE — LETTER
March 4, 2022     Castro Stephenson  [address] [facility] [address]    Patient: Genesis Batista   YOB: 1965   Date of Visit: 3/4/2022       Dear Dr Nhung Aguilera:    I saw Fariha Redman for her annual visit today  We discussed her osteoporosis  She is uninterested in oral bisphosphonate therapy  I recommended Prolia as a reasonable option, however, we do not administer this in our office due to insurance issues  We typically refer to rheumatology for Prolia  I asked her to reach out to your office to see if this can be set up through you, or if she needs to see an endocrinologist for this  Thanks so much!       Sincerely,        Dawna Bansal PA-C
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
## 2022-03-07 ENCOUNTER — TELEPHONE (OUTPATIENT)
Dept: OBGYN CLINIC | Facility: HOSPITAL | Age: 57
End: 2022-03-07

## 2022-03-07 NOTE — TELEPHONE ENCOUNTER
Spoke to patient and advised of above  Phone number for Vikram Mcintosh provided as well        ----- Message from Nilda Padilla PA-C sent at 3/4/2022  2:57 PM EST -----  Regarding: FW: Abnormal Bone density  Can you please contact the patient and let her know we recommend seeing one of our endocrinologists for her osteoporosis  ----- Message -----  From: Pancho Cid MA  Sent: 3/4/2022   2:18 PM EST  To: Nilda Padilla PA-C  Subject: FW: Abnormal Bone density                          ----- Message -----  From: Kenneth Hernandez  Sent: 3/4/2022   2:09 PM EST  To: [facility]  Subject: Abnormal Bone density                            Dr Nuha Abbasi,  I have an abnormal Bone density with osteoporosis, My Rheumatologist in [place] wants this addressed  I spoke to my GYN today and her recommendation is to start injections of Prolia  She can not administer them and would refer me back to Rheumatology, but my Rheumatologist in [place] does not treat osteoporosis  Do you or anyone in your office treat osteoporosis? or does [facility] have a osteoporosis clinic?

## 2022-03-10 DIAGNOSIS — I27.20 PULMONARY HYPERTENSION (HCC): ICD-10-CM

## 2022-03-10 RX ORDER — SILDENAFIL CITRATE 20 MG/1
20 TABLET ORAL 3 TIMES DAILY
Qty: 270 TABLET | Refills: 3 | Status: SHIPPED | OUTPATIENT
Start: 2022-03-10

## 2022-03-22 ENCOUNTER — TELEPHONE (OUTPATIENT)
Dept: PSYCHIATRY | Facility: CLINIC | Age: 57
End: 2022-03-22

## 2022-04-13 ENCOUNTER — VBI (OUTPATIENT)
Dept: ADMINISTRATIVE | Facility: OTHER | Age: 57
End: 2022-04-13

## 2022-04-22 ENCOUNTER — OFFICE VISIT (OUTPATIENT)
Dept: FAMILY MEDICINE CLINIC | Facility: OTHER | Age: 57
End: 2022-04-22
Payer: MEDICARE

## 2022-04-22 VITALS
DIASTOLIC BLOOD PRESSURE: 80 MMHG | TEMPERATURE: 98 F | OXYGEN SATURATION: 98 % | WEIGHT: 234.4 LBS | HEIGHT: 62 IN | SYSTOLIC BLOOD PRESSURE: 108 MMHG | HEART RATE: 103 BPM | RESPIRATION RATE: 18 BRPM | BODY MASS INDEX: 43.13 KG/M2

## 2022-04-22 DIAGNOSIS — R05.8 DRY COUGH: ICD-10-CM

## 2022-04-22 DIAGNOSIS — R09.81 NASAL CONGESTION: Primary | ICD-10-CM

## 2022-04-22 PROCEDURE — 99213 OFFICE O/P EST LOW 20 MIN: CPT | Performed by: FAMILY MEDICINE

## 2022-04-22 RX ORDER — TRIAMCINOLONE ACETONIDE 55 UG/1
2 SPRAY, METERED NASAL DAILY
Qty: 16.9 ML | Refills: 0 | Status: SHIPPED | OUTPATIENT
Start: 2022-04-22

## 2022-04-22 RX ORDER — LORATADINE 10 MG/1
10 TABLET ORAL DAILY
Qty: 30 TABLET | Refills: 0 | Status: SHIPPED | OUTPATIENT
Start: 2022-04-22 | End: 2022-05-22

## 2022-04-22 NOTE — PROGRESS NOTES
Family Medicine Office Visit  Corina Pedro 59 y o  female   MRN: [id number] : 1965  ENCOUNTER: 2022 1:25 PM    Assessment & Plan     This is a 55-year-old female with scratchy throat x4d, nasal congestion/postnasal drip, nonproductive cough, and wheezing (Last night only)  Afebrile, lungs clear  home called rapid antigen test negative    Symptoms likely secondary to seasonal allergies  1  Nasal congestion  Triamcinolone Acetonide (Nasacort Allergy 24HR) 55 MCG/ACT nasal spray    loratadine (CLARITIN) 10 mg tablet   2  Dry cough  Triamcinolone Acetonide (Nasacort Allergy 24HR) 55 MCG/ACT nasal spray    loratadine (CLARITIN) 10 mg tablet   ·   ·  Patient states she has tried Claritin in the past and has not had allergy with use  Trial of p o  loratadine 10 mg p r n  Hayley Patel · Discontinue use of Afrin as it can cause rebound nasal congestion  discontinue use of Flonase as has not improved symptoms much  Trial of Nasacort  · Call office if symptoms worsen/failed to improve  Call office if sxs worsen/fail to improve  ED precautions emphasized  Patient verbalizes understanding and agrees with the plan  Return if symptoms worsen or fail to improve, for Next scheduled follow up  Subjective   CC: Cough (1x day) and Wheezing      HPI  Corina Pedro is a 59y o -year-old female who  has a past medical history of HELENE positive, Anemia, Anxiety (3/2020), Chronic kidney disease, Chronic rhinitis (2012), Clotting disorder ([address] ) (), Coronary artery disease (), Encephalitis, Gout (2018), Hypertension, Lupus anticoagulant disorder ([address] ), Maxillary sinusitis, Night sweat, Osteopenia, Osteoporosis, Pneumonia, PONV (postoperative nausea and vomiting), Pulmonary hypertension ([address] ), Seizures ([address] ), Shortness of breath, Sinus tachycardia, and Urinary incontinence        Today she presents for Cough (1x day) and Wheezing x1 day   Itchy/scratchy throat started Tuesday    Then she noted nasal congestion and postnasal drip    Denies having any fevers at any point without the use of any fever reducing medications  Has been using Afrin and Flonase x2 days   Dry cough   Wheezing only last night when lying down but has not noticed it sent getting out of bed  This does not bother her and she denies chest tightness/chest congestion   Right ear clogged since yesterday   Has similar symptoms about 2 times per year and she feels that she may have seasonal allergies  Review of Systems   Constitutional: Negative for chills, diaphoresis, fatigue and fever  HENT: Positive for congestion and postnasal drip  Negative for rhinorrhea, sinus pressure, sinus pain, sneezing, sore throat and trouble swallowing  Respiratory: Positive for cough (Dry)  Negative for chest tightness, shortness of breath, wheezing and stridor  Cardiovascular: Negative for chest pain  Skin: Negative for color change (no blue discoloration of lips/tonuge/fingers/toes)  Allergies   Allergen Reactions    Dilantin [Phenytoin] Anaphylaxis and Rash    Augmentin [Amoxicillin-Pot Clavulanate] Rash and Dermatitis    Penicillins Rash       Past Medical History, Past Surgical History, Family History, and Social History were reviewed and updated today as appropriate  Objective   /80   Pulse 103   Temp 98 °F (36 7 °C)   Resp 18   Ht 5' 2 21" (1 58 m)   Wt 106 kg (234 lb 6 4 oz)   SpO2 98%   BMI 42 58 kg/m²     Physical Exam  Constitutional:       General: She is not in acute distress  Appearance: She is not diaphoretic  HENT:      Right Ear: Tympanic membrane, ear canal and external ear normal  No tenderness  No middle ear effusion  No mastoid tenderness  Tympanic membrane is not erythematous, retracted or bulging  Left Ear: Tympanic membrane, ear canal and external ear normal  No tenderness  No middle ear effusion  No mastoid tenderness  Tympanic membrane is not erythematous, retracted or bulging  Nose: Congestion present  Comments: Normal transillumination test of maxillary sinuses     Mouth/Throat:      Mouth: Mucous membranes are moist       Pharynx: Uvula midline  No pharyngeal swelling, oropharyngeal exudate or uvula swelling  Tonsils: No tonsillar exudate  Comments: Mild pharyngeal erythema without exudates or palatal petechiae  Eyes:      Extraocular Movements: Extraocular movements intact  Pupils: Pupils are equal, round, and reactive to light  Cardiovascular:      Rate and Rhythm: Regular rhythm  Tachycardia present  Pulses: Normal pulses  Heart sounds: Normal heart sounds  Comments: Patient states has tachycardia at baseline due to pulmonary hypertension    Denies palpitations, lightheadedness  Pulmonary:      Effort: Pulmonary effort is normal       Breath sounds: Normal breath sounds  No stridor  No wheezing, rhonchi or rales  Musculoskeletal:      Cervical back: Neck supple  Lymphadenopathy:      Cervical: No cervical adenopathy  Skin:     General: Skin is warm and dry  Neurological:      Mental Status: She is alert  Labs: I have reviewed all lab results           Fausto Alcazar MD   [address]    Parts of this note were dictated using M*Modal dictation software

## 2022-05-06 ENCOUNTER — HOSPITAL ENCOUNTER (OUTPATIENT)
Dept: RADIOLOGY | Facility: HOSPITAL | Age: 57
Discharge: HOME | End: 2022-05-06
Payer: MEDICARE

## 2022-05-06 ENCOUNTER — HOSPITAL ENCOUNTER (OUTPATIENT)
Dept: NON INVASIVE DIAGNOSTICS | Facility: HOSPITAL | Age: 57
Discharge: HOME | End: 2022-05-06
Payer: MEDICARE

## 2022-05-06 ENCOUNTER — OFFICE VISIT (OUTPATIENT)
Dept: OBGYN CLINIC | Facility: HOSPITAL | Age: 57
End: 2022-05-06
Payer: MEDICARE

## 2022-05-06 DIAGNOSIS — M79.604 PAIN IN RIGHT LEG: ICD-10-CM

## 2022-05-06 DIAGNOSIS — M25.561 ACUTE PAIN OF RIGHT KNEE: ICD-10-CM

## 2022-05-06 DIAGNOSIS — M25.561 ACUTE PAIN OF RIGHT KNEE: Primary | ICD-10-CM

## 2022-05-06 PROCEDURE — 20610 DRAIN/INJ JOINT/BURSA W/O US: CPT | Performed by: PHYSICIAN ASSISTANT

## 2022-05-06 PROCEDURE — 99204 OFFICE O/P NEW MOD 45 MIN: CPT | Performed by: PHYSICIAN ASSISTANT

## 2022-05-06 PROCEDURE — 93971 EXTREMITY STUDY: CPT

## 2022-05-06 PROCEDURE — 73564 X-RAY EXAM KNEE 4 OR MORE: CPT

## 2022-05-06 NOTE — PROGRESS NOTES
Assessment:    Right knee pain, erythema and swelling concerning for prosthetic joint infection  Right knee joint aspirated over 10 cc of yellowish cloudy fluid  History of right total knee arthroplasty done in 2019 by Dr Ary Fothergill:    Fluid sent for [facility] analysis with crystals  Check CBC, CMP, ESR, CRP  If she develops fever or chills it is recommended she present to an ER  She will follow-up in the office on Monday at [facility] for further evaluation  This plan was formulated by Dr Edwina Garrido     In addition, will send the patient for a stat venous duplex of the right lower extremity given her additional pitting edema and warmth of the lower leg with recent plane ride and history of lupus  I will call the patient with the results of the study and further treatment plan at that time  Problem List Items Addressed This Visit        Other    Right knee pain - Primary    Relevant Orders    XR knee 4+ vw right injury    VAS lower limb venous duplex study, unilateral/limited    CBC and differential    Sedimentation rate, automated    Comprehensive metabolic panel    C-reactive protein      Other Visit Diagnoses     Pain in right leg         Relevant Orders    VAS lower limb venous duplex study, unilateral/limited                   Subjective:     Patient ID:  Roxanna Purcell is a 59 y o  female    HPI    29-year-old female presenting for evaluation of her right knee  She states last week she was on a cruise and got lots of sun however this past Saturday coming home from the trip, she noticed right lower extremity swelling that started in her right lower leg and work her way up to the knee  This was associated with right knee pain  She states she thinks there is fluid in her knee  She is concerned because she has a history of right total knee replacement done by Dr Edwina Garrido in 2019, and is worried about infection  Over the last few days, she thinks the swelling has increased    It is in the foot and shin, and localized to the knee joint as well  Additionally she notes the distal aspect of her incision did open up and was bleeding to the point where she had a clean it several times  She did not notice any pus drainage  She has not noticed any fever chills and has not been sick over the course of the last week  She does have a history of lupus  The following portions of the patient's history were reviewed and updated as appropriate: allergies, current medications, past family history, past medical history, past social history, past surgical history and problem list     Review of Systems     Objective:    Imaging:  Right knee x-rays demonstrate no obvious loosening of the prosthetic joint   No acute findings  Physical Exam     Orthopedic Examination:  Patient ambulates without assistance  She is well-appearing  She does not appear acutely ill  Right knee    Inspection:  + erythema and effusion  The distal most aspect of her previous incision has some dried blood that is scabbing  There is no evidence of moshe wound dehiscence or active pus drainage  Palpation:  Warm to palpation  Range-of-motion:  0 to 100, there is pain with knee flexion past 90 degrees    Strength:  5/5 hip flexion knee extension ankle plantar dorsiflexion    Sensation:  Intact    Special Tests: There is +2-3 pitting edema of the lower leg in addition to her knee, with warmth  There is no calf tenderness currently  Large joint arthrocentesis: R knee  Universal Protocol:  Consent: Verbal consent obtained    Risks and benefits: risks, benefits and alternatives were discussed  Consent given by: patient  Patient identity confirmed: verbally with patient    Supporting Documentation  Indications: joint swelling   Procedure Details  Location: knee - R knee  Preparation: Patient was prepped and draped in the usual sterile fashion  Needle size: 18 G  Approach: superior    Aspirate amount: 12 mL  Aspirate: cloudy and yellow  Analysis: fluid sample sent for laboratory analysis    Patient tolerance: patient tolerated the procedure well with no immediate complications  Dressing:  Sterile dressing applied

## 2022-05-07 ENCOUNTER — APPOINTMENT (OUTPATIENT)
Dept: LAB | Facility: CLINIC | Age: 57
End: 2022-05-07
Payer: MEDICARE

## 2022-05-07 DIAGNOSIS — M25.561 ACUTE PAIN OF RIGHT KNEE: ICD-10-CM

## 2022-05-07 LAB
ALBUMIN SERPL BCP-MCNC: 3.6 G/DL (ref 3.5–5)
ALP SERPL-CCNC: 68 U/L (ref 34–104)
ALT SERPL W P-5'-P-CCNC: 13 U/L (ref 7–52)
ANION GAP SERPL CALCULATED.3IONS-SCNC: 6 MMOL/L (ref 4–13)
AST SERPL W P-5'-P-CCNC: 11 U/L (ref 13–39)
BASOPHILS # BLD AUTO: 0.02 THOUSANDS/ΜL (ref 0–0.1)
BASOPHILS NFR BLD AUTO: 0 % (ref 0–1)
BILIRUB SERPL-MCNC: 0.47 MG/DL (ref 0.2–1)
BUN SERPL-MCNC: 12 MG/DL (ref 5–25)
CALCIUM SERPL-MCNC: 9 MG/DL (ref 8.4–10.2)
CHLORIDE SERPL-SCNC: 107 MMOL/L (ref 96–108)
CO2 SERPL-SCNC: 27 MMOL/L (ref 21–32)
CREAT SERPL-MCNC: 0.7 MG/DL (ref 0.6–1.3)
CRP SERPL QL: <1 MG/L
EOSINOPHIL # BLD AUTO: 0.14 THOUSAND/ΜL (ref 0–0.61)
EOSINOPHIL NFR BLD AUTO: 3 % (ref 0–6)
ERYTHROCYTE [DISTWIDTH] IN BLOOD BY AUTOMATED COUNT: 13.2 % (ref 11.6–15.1)
ERYTHROCYTE [SEDIMENTATION RATE] IN BLOOD: 14 MM/HOUR (ref 0–29)
GFR SERPL CREATININE-BSD FRML MDRD: 95 ML/MIN/1.73SQ M
GLUCOSE SERPL-MCNC: 89 MG/DL (ref 65–140)
HCT VFR BLD AUTO: 39.7 % (ref 34.8–46.1)
HGB BLD-MCNC: 12.9 G/DL (ref 11.5–15.4)
IMM GRANULOCYTES # BLD AUTO: 0.01 THOUSAND/UL (ref 0–0.2)
IMM GRANULOCYTES NFR BLD AUTO: 0 % (ref 0–2)
LYMPHOCYTES # BLD AUTO: 1.27 THOUSANDS/ΜL (ref 0.6–4.47)
LYMPHOCYTES NFR BLD AUTO: 28 % (ref 14–44)
MCH RBC QN AUTO: 32.8 PG (ref 26.8–34.3)
MCHC RBC AUTO-ENTMCNC: 32.5 G/DL (ref 31.4–37.4)
MCV RBC AUTO: 101 FL (ref 82–98)
MONOCYTES # BLD AUTO: 0.38 THOUSAND/ΜL (ref 0.17–1.22)
MONOCYTES NFR BLD AUTO: 8 % (ref 4–12)
NEUTROPHILS # BLD AUTO: 2.71 THOUSANDS/ΜL (ref 1.85–7.62)
NEUTS SEG NFR BLD AUTO: 61 % (ref 43–75)
NRBC BLD AUTO-RTO: 0 /100 WBCS
PLATELET # BLD AUTO: 307 THOUSANDS/UL (ref 149–390)
PMV BLD AUTO: 11.5 FL (ref 8.9–12.7)
POTASSIUM SERPL-SCNC: 4.2 MMOL/L (ref 3.5–5.3)
PROT SERPL-MCNC: 6 G/DL (ref 6.4–8.4)
RBC # BLD AUTO: 3.93 MILLION/UL (ref 3.81–5.12)
SODIUM SERPL-SCNC: 140 MMOL/L (ref 135–147)
WBC # BLD AUTO: 4.53 THOUSAND/UL (ref 4.31–10.16)

## 2022-05-07 PROCEDURE — 93971 EXTREMITY STUDY: CPT | Performed by: SURGERY

## 2022-05-07 PROCEDURE — 85025 COMPLETE CBC W/AUTO DIFF WBC: CPT

## 2022-05-07 PROCEDURE — 85652 RBC SED RATE AUTOMATED: CPT

## 2022-05-07 PROCEDURE — 80053 COMPREHEN METABOLIC PANEL: CPT

## 2022-05-07 PROCEDURE — 36415 COLL VENOUS BLD VENIPUNCTURE: CPT

## 2022-05-07 PROCEDURE — 86140 C-REACTIVE PROTEIN: CPT

## 2022-05-09 ENCOUNTER — TELEPHONE (OUTPATIENT)
Dept: OBGYN CLINIC | Facility: OTHER | Age: 57
End: 2022-05-09

## 2022-05-09 ENCOUNTER — TELEPHONE (OUTPATIENT)
Dept: OBGYN CLINIC | Facility: CLINIC | Age: 57
End: 2022-05-09

## 2022-05-09 ENCOUNTER — TRANSCRIBE ORDERS (OUTPATIENT)
Dept: OBGYN CLINIC | Facility: CLINIC | Age: 57
End: 2022-05-09

## 2022-05-09 ENCOUNTER — OFFICE VISIT (OUTPATIENT)
Dept: OBGYN CLINIC | Facility: CLINIC | Age: 57
End: 2022-05-09
Payer: MEDICARE

## 2022-05-09 VITALS — WEIGHT: 234 LBS | BODY MASS INDEX: 43.06 KG/M2 | HEIGHT: 62 IN

## 2022-05-09 DIAGNOSIS — I87.8 VENOUS STASIS OF LOWER EXTREMITY: Primary | ICD-10-CM

## 2022-05-09 DIAGNOSIS — M25.561 ACUTE PAIN OF RIGHT KNEE: ICD-10-CM

## 2022-05-09 DIAGNOSIS — Z96.651 STATUS POST TOTAL RIGHT KNEE REPLACEMENT: ICD-10-CM

## 2022-05-09 DIAGNOSIS — M32.19 OTHER SYSTEMIC LUPUS ERYTHEMATOSUS WITH OTHER ORGAN INVOLVEMENT (HCC): ICD-10-CM

## 2022-05-09 DIAGNOSIS — E66.01 MORBID OBESITY (HCC): ICD-10-CM

## 2022-05-09 PROCEDURE — 99214 OFFICE O/P EST MOD 30 MIN: CPT | Performed by: ORTHOPAEDIC SURGERY

## 2022-05-09 RX ORDER — METHYLPREDNISOLONE 4 MG/1
TABLET ORAL
COMMUNITY
Start: 2022-04-19

## 2022-05-09 NOTE — TELEPHONE ENCOUNTER
I called and spoke to her rheumatologist Dr Alejandre Danger  He is also concern for vascular etiology of her symptoms  He does not feel that anything about her reported symptoms are consistent with autoimmune or inflammatory cause  Her lupus anticoagulant does make her high risk for DVT  He did suggest we order ultrasound to evaluate for more proximal DVT in the iliac veins  We will order vas ivc/iliac veins duplex STAT  Will call the patient as well to inform her and help get it scheduled

## 2022-05-09 NOTE — PROGRESS NOTES
Assessment:  1  Venous stasis of lower extremity  MRI tibia fibula right wo contrast    Ambulatory Referral to Vascular Surgery   2  Status post total right knee replacement  MRI tibia fibula right wo contrast    Ambulatory Referral to Vascular Surgery   3  Acute pain of right knee     4  Other systemic lupus erythematosus with other organ involvement ([facility] )     5   Morbid obesity ([facility] )       Patient Active Problem List   Diagnosis    B12 deficiency    Chronic cough    Diffuse connective tissue disease ([facility] )    Dyspnea    Edema    Essential hypertension    Hot flashes due to menopause    Long-term use of hydroxychloroquine    Other organ or system involvement in systemic lupus erythematosus [facility] )    Lupus anticoagulant disorder (HCC)    Tachycardia    SOB (shortness of breath) on exertion    Sinus tachycardia    Secondary pulmonary hypertension    Pulmonary hypertension (HCC)    Post-nasal drip    Pes anserine bursitis    Positive HELENE (antinuclear antibody)    Other chronic pulmonary heart diseases    Osteoporosis    Night sweats    Patellofemoral disorder of right knee    Pes anserinus bursitis of right knee    Arthritis of right knee    Other tear of medial meniscus, current injury, right knee, initial encounter    Tear of medial meniscus of right knee, current    Chronic pain of right knee    Elevated serum creatinine    Hyperuricemia    Trochanteric bursitis of both hips    Undifferentiated connective tissue disease ([facility] )    Vitamin D deficiency    Chronic kidney disease, stage 3 ([facility] )    Right knee pain    Status post total right knee replacement    Left knee pain    Tear of medial meniscus of left knee, current    Arthritis of left knee    Morbid obesity ([facility] )    Leukopenia    Venous stasis of lower extremity    Acute pain of right knee       Plan:    59 y o  female with acute right knee pain and right lower leg pain, swelling, discoloration secondary to suspected venous congestion     Will obtain stat MRI of the right tib-fib to further evaluate the musculature and compartments, her compartments are soft compressible on exam however specifically the anterior lateral compartments appear to be swollen on exam as well as appearance of vascular congestion possibly due to more superficial rather than deep vein  Recent venous duplex was negative for thrombosis from the common femoral vein distally  She does have risk factors for thrombus formation including history of lupus anticoagulant , recent prolonged travel   Will reach out to her rheumatologist for further recommendations and coordination of care; patient might require a period of anticoagulation   Will also referred the patient to vascular surgery for evaluation and reach out to them for any possible recommendations prior to formal consultation  She will see vascular after having this MRI performed   We will continue to wait results of the Synovasure testing from 05/06/2022 when she was seen and aspirated by Fallon Hidalgo At this time have a lower suspicion that she does have a prosthetic joint infection given her physical examination and also the history she presents with  Also her systemic inflammatory markers are normal with no peripheral leukocytosis   Follow-up will be based upon results synovial fluid testing and MRI   Will consider referral for lymphedema massage and therapy in the future depending on results of workup  Patient may consider use of Jovanny stockings as well to help with swelling in the meantime    The patient was seen and examined by Dr Monroe Candelario and myself  The assessment and plan were formulated by Dr Monroe Candelario and I assisted in carrying it out  Subjective:   Patient ID: Siena Espinoza is a 59 y o  female   HPI    Patient comes in today with regards to right knee pain  Patient is referred to us by Keshawn Fletcher DO for further evaluation   The patient reports that the pain is in the anteromedial and anterolateral and has been going on for approximately 10 days  She began noticing at the inferior aspect of her incision some superficial area of bleeding  She also noticed some discoloration of her leg  She denies any fall or injury to the knee our incision prior to the onset of the symptoms  She was on vacation in the [place] area  She denies any bug bites or cuts to the skin around the knee   The pain does radiate the leg  The pain is rated at mild-to-moderate  The pain is described as swelling  It is worsened with pressure on the area, and is made better with nothing  Denies any fevers chills night sweats recent weight loss  The patient has taken nothing for treatment  Prior TKA on 19      The following portions of the patient's history were reviewed and updated as appropriate: allergies, current medications, past family history, past social history, past surgical history and problem list     Social History     Socioeconomic History    Marital status: /Civil Union     Spouse name: Not on file    Number of children: 2    Years of education: Not on file    Highest education level: Not on file   Occupational History    Not on file   Tobacco Use    Smoking status: Former Smoker     Packs/day: 0 00     Years: 0 00     Pack years: 0 00     Quit date: 2006     Years since quittin 2    Smokeless tobacco: Never Used   Vaping Use    Vaping Use: Never used   Substance and Sexual Activity    Alcohol use:  Yes     Alcohol/week: 0 0 standard drinks     Comment: socially    Drug use: No    Sexual activity: Yes     Partners: Male   Other Topics Concern    Not on file   Social History Narrative    Daily caffeinated coffee consumption    Exercise habits     Social Determinants of Health     Financial Resource Strain: Not on file   Food Insecurity: Not on file   Transportation Needs: Not on file   Physical Activity: Not on file   Stress: Not on file   Social Connections: Not on file   Intimate Partner Violence: Not on file   Housing Stability: Not on file     Past Medical History:   Diagnosis Date    HELENE positive     Anemia     Sildenafil causes decreased hematocrit    Anxiety 3/2020    Chronic kidney disease     borderline lab values    Chronic rhinitis 6/4/2012    Clotting disorder ([facility] ) 2012    Coronary artery disease 2018    Encephalitis     Lasted Assessed 10/18/2017    Gout 6/26/2018    Hypertension     Lupus anticoagulant disorder ([facility] )     Maxillary sinusitis     Lasted Assessed 10/18/2017    Night sweat     Osteopenia     Hip    Osteoporosis     Spine    Pneumonia     Last Assessed 10/18/2017    PONV (postoperative nausea and vomiting)     Pulmonary hypertension (HCC)     Seizures (HCC)     Only during Encephalitis    Shortness of breath     with exertion    Sinus tachycardia     Urinary incontinence      Past Surgical History:   Procedure Laterality Date    ARTHRODESIS      Hand: IP Joint    CHOLECYSTECTOMY      COLONOSCOPY      COLPOSCOPY      HYSTERECTOMY      Vaginal    JOINT REPLACEMENT Right     Knee replacement    KNEE SURGERY  2/2019    LAPAROSCOPIC ESOPHAGOGASTRIC FUNDOPLASTY  2008    MO KNEE SCOPE,SINGLE MENISECTOMY Right 10/2/2018    Procedure: KNEE ARTHROSCOPY WITH PARTIAL MEDIAL MENISCECTOMY;  Surgeon: Umair Jose DO;  [facility];  Service: Orthopedics    MO KNEE SCOPE,SINGLE MENISECTOMY Left 12/17/2019    Procedure: KNEE ARTHROSCOPIC MENISCECTOMY /MEDIAL;  Chondyl medial and posterior;  Surgeon: Umair Jose DO;  [facility];  Service: Orthopedics    MO TOTAL KNEE ARTHROPLASTY Right 2/12/2019    Procedure: KNEE TOTAL ARTHROPLASTY AND ASSOCIATED PROCEDURES;  Surgeon: Umair Jose DO;  [facility];  Service: Orthopedics    REDUCTION MAMMAPLASTY      REPAIR ANKLE LIGAMENT Right     TONSILLECTOMY       Allergies   Allergen Reactions    Dilantin [Phenytoin] Anaphylaxis and Rash    Augmentin [Amoxicillin-Pot Clavulanate] Rash and Dermatitis    Penicillins Rash     Current Outpatient Medications on File Prior to Visit   Medication Sig Dispense Refill    aspirin (ECOTRIN LOW STRENGTH) 81 mg EC tablet Take 162 mg by mouth daily      cholecalciferol (VITAMIN D3) 1,000 units tablet Take 2,000 Units by mouth daily in the early morning        cyanocobalamin 1,000 mcg/mL Inject 1 mL (1,000 mcg total) into a muscle every 30 (thirty) days 1 mL 11    ferrous sulfate 325 (65 Fe) mg tablet Take 1 tablet (325 mg total) by mouth 2 (two) times a day with meals 30 tablet 3    folic acid (FOLVITE) 1 mg tablet Take by mouth daily      hydroxychloroquine (PLAQUENIL) 200 mg tablet Take 400 mg by mouth daily with breakfast        loratadine (CLARITIN) 10 mg tablet Take 1 tablet (10 mg total) by mouth daily 30 tablet 0    methylPREDNISolone 4 MG tablet therapy pack Follow package directions      nystatin (MYCOSTATIN) cream Apply topically 2 (two) times a day 30 g 0    sildenafil (REVATIO) 20 mg tablet Take 1 tablet (20 mg total) by mouth 3 (three) times a day 270 tablet 3    spironolactone (ALDACTONE) 25 mg tablet Take 1 tablet (25 mg total) by mouth daily 90 tablet 3    Syringe/Needle, Disp, (SecureSafe Syringe/Needle) 25G X 1-1/2" 1 ML MISC Use 1 Syringe every 30 (thirty) days 12 each 1    torsemide (DEMADEX) 20 mg tablet Take 1 tablet (20 mg total) by mouth daily 90 tablet 3    triamcinolone (KENALOG) 0 1 % oral topical paste prn      Triamcinolone Acetonide (Nasacort Allergy 24HR) 55 MCG/ACT nasal spray 2 sprays by Each Nare route daily 16 9 mL 0    methotrexate 2 5 mg tablet Take 15 mg by mouth once a week   (Patient not taking: Reported on 5/9/2022 )       No current facility-administered medications on file prior to visit  Review of Systems   Constitutional: Negative for chills, fever and unexpected weight change  HENT: Negative for hearing loss, nosebleeds and sore throat      Eyes: Negative for pain, redness and visual disturbance  Respiratory: Negative for cough, shortness of breath and wheezing  Cardiovascular: Positive for leg swelling  Negative for chest pain and palpitations  Gastrointestinal: Negative for abdominal pain, nausea and vomiting  Endocrine: Negative for polydipsia and polyuria  Genitourinary: Negative for dysuria and hematuria  Musculoskeletal:         As noted in HPI   Skin: Negative for rash and wound  Neurological: Negative for dizziness, numbness and headaches  Psychiatric/Behavioral: Negative for decreased concentration, dysphoric mood and suicidal ideas  The patient is not nervous/anxious  Objective: There were no vitals filed for this visit  Physical Exam  Constitutional:       Appearance: She is well-developed  HENT:      Head: Normocephalic and atraumatic  Eyes:      General: No scleral icterus  Conjunctiva/sclera: Conjunctivae normal    Cardiovascular:      Comments: No discernible arrhthymias  Pulmonary:      Effort: Pulmonary effort is normal  No respiratory distress  Breath sounds: No stridor  Abdominal:      General: There is no distension  Palpations: Abdomen is soft  Musculoskeletal:      Cervical back: Neck supple  Right knee: Effusion (small) present  Skin:     General: Skin is warm and dry  Findings: No erythema  Neurological:      Mental Status: She is alert and oriented to person, place, and time  Psychiatric:         Behavior: Behavior normal          Right Knee Exam     Tenderness   The patient is experiencing tenderness in the medial joint line and lateral joint line      Range of Motion   Extension: 0   Flexion: 120     Tests   Varus: negative Valgus: negative  Drawer:  Anterior - negative    Posterior - negative    Other   Erythema: present  Pulse: present (DP pulse intact)  Effusion: effusion (small) present    Comments:  Anterior and lateral lower lerg are swollen with prominent superficial vascular marking and violaceous discoloration of leg more in anterior lateral than posterior and medial, shininess to the skin as well  This discoloration is also present around the entire foot    Superficial crusting/abrasion with dry blood over the inferior apex of the incision, rest of incision is intact at midpoint of incision there is some hyperemia                I have personally reviewed pertinent films in PACS and my interpretation is XR R knee from 5/6/22 shows no acute fracture, intact prostheses s/p TKA  Procedures  No Procedures performed today    Scribe Attestation    I,:  Ben Saunders PA-C am acting as a scribe while in the presence of the attending physician :       I,:  Sandhya Petty, DO personally performed the services described in this documentation    as scribed in my presence :             Portions of the record may have been created with voice recognition software  Occasional wrong word or "sound a like" substitutions may have occurred due to the inherent limitations of voice recognition software  Read the chart carefully and recognize, using context, where substitutions have occurred

## 2022-05-10 ENCOUNTER — HOSPITAL ENCOUNTER (OUTPATIENT)
Dept: VASCULAR ULTRASOUND | Facility: HOSPITAL | Age: 57
Discharge: HOME | End: 2022-05-10
Payer: MEDICARE

## 2022-05-10 DIAGNOSIS — I87.8 VENOUS STASIS OF LOWER EXTREMITY: ICD-10-CM

## 2022-05-10 PROCEDURE — 93978 VASCULAR STUDY: CPT | Performed by: SURGERY

## 2022-05-10 PROCEDURE — 93978 VASCULAR STUDY: CPT

## 2022-05-12 ENCOUNTER — TELEPHONE (OUTPATIENT)
Dept: CARDIOLOGY CLINIC | Facility: CLINIC | Age: 57
End: 2022-05-12

## 2022-05-12 ENCOUNTER — HOSPITAL ENCOUNTER (OUTPATIENT)
Dept: RADIOLOGY | Facility: HOSPITAL | Age: 57
Discharge: HOME | End: 2022-05-12
Payer: MEDICARE

## 2022-05-12 ENCOUNTER — TELEPHONE (OUTPATIENT)
Dept: OBGYN CLINIC | Facility: CLINIC | Age: 57
End: 2022-05-12

## 2022-05-12 DIAGNOSIS — I87.8 VENOUS STASIS OF LOWER EXTREMITY: ICD-10-CM

## 2022-05-12 DIAGNOSIS — Z96.651 STATUS POST TOTAL RIGHT KNEE REPLACEMENT: ICD-10-CM

## 2022-05-12 PROCEDURE — 73718 MRI LOWER EXTREMITY W/O DYE: CPT

## 2022-05-12 PROCEDURE — G1004 CDSM NDSC: HCPCS

## 2022-05-12 NOTE — TELEPHONE ENCOUNTER
Heather Cardona is due for a 6 m follow up this month  She is not scheduled  Heather Cardona asked for your thoughts regarding swelling in her right leg  She returned from a cruise a week ago and developed swelling in her right leg only  She does not weight herself, no abdominal bloating  Said she is always sob due to her pulmonary htn, but no changes  She has had vascular studies and no DVT, had fluid removed from her right knee by Dr Eulogio Johnson, and today had an MRI of her right leg  Results pending  Heather Cardona asked if you have any recommendations since so far, no reason for the swelling, pain in right leg  She has been referred for a vascular consult, but Heather Cardona thinks something else is going on with her leg  She asked to come in today, but no openings with you or Meredith Albion  Please advise

## 2022-05-12 NOTE — TELEPHONE ENCOUNTER
I spoke with Ghislaine Cherry and advised her of Dr Wilberto Henson response  She verbalized understanding

## 2022-05-12 NOTE — TELEPHONE ENCOUNTER
I called spoke to the patient regarding results of the MRI showing mild subcutaneous edema  Discuss that this time we do not have an explanation for discoloration swelling of her right lower leg  All of our synovial fluid testing has been negative thus far  We will contact her if the final results are positive  Encouraged her to keep following up with her rheumatologist as he did prophylactically place her on Lovenox recommended she contact him and make him aware that the venous duplex of the IVC and iliac veins were negative for any DVT

## 2022-05-24 ENCOUNTER — VBI (OUTPATIENT)
Dept: ADMINISTRATIVE | Facility: OTHER | Age: 57
End: 2022-05-24

## 2022-06-15 NOTE — PROGRESS NOTES
Cardiology Outpatient Progress Note - Saadia Devine 59 y o  female MRN: [id number]    @ Encounter: [id number]      Patient Active Problem List    Diagnosis Date Noted    Venous stasis of lower extremity 05/09/2022    Acute pain of right knee 05/09/2022    Leukopenia 11/22/2021    Morbid obesity ([facility] ) 10/08/2020    Arthritis of left knee 02/12/2020    Left knee pain 11/11/2019    Tear of medial meniscus of left knee, current 11/11/2019    Right knee pain 02/18/2019    Status post total right knee replacement 02/18/2019    Chronic kidney disease, stage 3 ([facility] ) 02/12/2019    Tear of medial meniscus of right knee, current 09/10/2018    Other tear of medial meniscus, current injury, right knee, initial encounter 07/16/2018    Patellofemoral disorder of right knee 06/04/2018    Pes anserinus bursitis of right knee 06/04/2018    Arthritis of right knee 06/04/2018    Chronic pain of right knee 05/22/2018    Elevated serum creatinine 05/22/2018    Hyperuricemia 05/22/2018    Long-term use of hydroxychloroquine 02/16/2018    Pulmonary hypertension ([facility] ) 02/16/2018    Undifferentiated connective tissue disease ([facility] ) 02/16/2018    Secondary pulmonary hypertension 12/15/2017    Diffuse connective tissue disease ([facility] ) 11/21/2017    Pes anserine bursitis 11/21/2017    Trochanteric bursitis of both hips 11/21/2017    Vitamin D deficiency 11/21/2017    Other organ or system involvement in systemic lupus erythematosus ([facility] ) 11/17/2017    Sinus tachycardia 11/09/2017    B12 deficiency 10/19/2017    Osteoporosis 10/19/2017    Lupus anticoagulant disorder ([facility] ) 10/18/2017    Positive HELENE (antinuclear antibody) 10/18/2017    Hot flashes due to menopause 09/01/2015    Night sweats 09/01/2015    Tachycardia 11/15/2012    SOB (shortness of breath) on exertion 11/12/2012    Other chronic pulmonary heart diseases 11/05/2012    Essential hypertension 06/13/2012    Edema 06/04/2012    Post-nasal drip 06/04/2012    Chronic cough 02/09/2012    Dyspnea 02/09/2012       Assessment:  # Exercise induced PAH; HFpEF with PH  Out of proportion to obesity/ deconditioning/ diastolic dysfunction (PCWP 12 mmHg)  Pt with MCTD/ SLE w/ lupus anticoagulant  Remote smoker  At rest, RV appears normal on TTE with coupled RV-PA w/o e/o of RVOT notching suggestive of normal PVR at rest  However, she did have a stress echo in 2012 that was suggestive of exercise induced pulmonary HTN  At the time she did not have e/o of elevated PVR  Her bubble was negative which makes an intracardiac shunt less likely  PAH Rx: sildanefil 20 mg Q8 (previously on macitentan)  Diuretic: torsemide 20 mg daily, spironolactone 25 mg daily  NT proBNP:   Weight: 237 lbs    Studies- personally reviewed by me  Stress Echo 11/4/20: 3 min, 4 6 METs, max heart rate 155 bpm, resting /96    Echo 4/23/19:  Normal RV size and function    RHC with stress 12/19/17:  She had an appropriate HR response from 100 to 130 bpm with MAP throughout the study staying at 112 mmHg  Hgb 13 1     Rest:  RA 7  RV 37/4/13  PA 33/16/25  PCWP 12    SaO2 99%  MvO2 72 3%  CO/CI 4 8/2  3  TPG 13  DPG 4  PVR 2 7     Exercise:  CVP 10  PA 54/29/37  PCWP 18    SaO2 100%  MvO2 66%  CO/CI 4/1 9  TPG 19  DPG 11  PVR 4 8  CVP:PCWP 0 55    Stress echo 12/14/17:to evaluate pulmonary pressures, RV, and RVOT signal w/ exercise  5 min, 20 sec  HTNsive response, decrease in O2 saturation from 99% to 91% and increase in PA pressures from 45 mmHg to 70 mmHg with exercise  PFTs 12/1/17: normal, DLCO 84%    # SLE: Per outpatient Rheumatologist  Continue plaquenil  # ST: V/Q negative  May be 2/2 to deconditioning +/- inappropriate ST +/- diastolic dysfunction/HF   No e/o infection     Holter 12/4/20: , 87 bpm  # Morbid obesity: Continue weight loss    TODAY'S PLAN:  Continue sildanefil  Torsemide 20 mg and spironolactone 25 mg daily  Possible CT venogram  Will refer to vascular   Likely has venous insufficiency  Echo to follow on RV, pa pressures    HPI:          61 yo followed for out of proportion PAH, exercise induced PAH, with MCTD/ SLE, + lupus anticoagulant, obesity  She has seen Dr Ellen Garcia  first started getting SOB -- 10 years ago  She has had several episodes of bacterial PNA and chronic cough (a few times/year)  She was referred to [facility] in 2012 for workup for PH  She had a a stress echo 8/2012 that showed that her PA pressures more than doubled from --24 mmHg to > 50 mmHg  Currently, she states she can walk up a couple flights of stairs but does get SOB  She also gets SOB with walking up inclines  She gets occasional LH  She was seeing a Rheumatologist in [place] but recently saw a specialist at [facility], Dr Mignon Boyd (Rheumatologist - 11/16)  She was diagnosed with MCTD and SLE  HELENE + 1:320 w/ speckled pattern  She has been on Plaquenil x 2 weeks now  She is on ASA for + lupus anticoagulant and anticardiolipin Abs  She has joint pains and a subtle malar rash  She states so far there has been no change with plaquenil  After her visit with Dr Molinda Saint, she has had TTE which shows LVEF --65%, normal RV size and function without RVOT notching  Normal atria  CXR clear  She also has had a negative V/Q scan  She walked the patient in the hallway and had her go up and down stairs  Her resting HR went from --100 bpm to 110s with Pulse Ox starting from 98% @ rest to --90% with exercise  Stress Echo in 2017 showed hypertensive response and PA pressures to 70 mmHg w/ drop in pulse ox to 91%  Exercise RHC showed increase in PVR to 4 5 MOLINA from < 3 MOLINA  She was denied Tadalafil but approved for sildanefil  She did not feel any different and continued trouble with stairs  Interval History:   Called 5/12 that returned from cruise a week ago and developed swelling in right leg  Ultrasound negative for DVT   Had fluid removed from right knee by Dr Kelly Galeas  CMR- mild edema throughout right and left leg  I saw pictures and she had horrible edema on the right only  [place] rheumatologist wants her to see a cardiologist at [facility]    Past Medical History:   Diagnosis Date    HELENE positive     Anemia     Sildenafil causes decreased hematocrit    Anxiety 3/2020    Chronic kidney disease     borderline lab values    Chronic rhinitis 6/4/2012    Clot[facility] disorder ([facility] ) 2012    Coronary artery disease 2018    Encephalitis     Lasted Assessed 10/18/2017    Gout 6/26/2018    Hypertension     Lupus anticoagulant disorder ([facility] )     Maxillary sinusitis     Lasted Assessed 10/18/2017    Night sweat     Osteopenia     Hip    Osteoporosis     Spine    Pneumonia     Last Assessed 10/18/2017    PONV (postoperative nausea and vomiting)     Pulmonary hypertension (HCC)     Seizures (HCC)     Only during Encephalitis    Shortness of breath     with exertion    Sinus tachycardia     Urinary incontinence        Review of Systems   Constitutional: Negative for activity change, appetite change, fatigue and unexpected weight change  HENT: Negative for congestion and nosebleeds  Eyes: Negative  Respiratory: Negative for cough, chest tightness and shortness of breath  Cardiovascular: Negative for chest pain, palpitations and leg swelling  Gastrointestinal: Negative for abdominal distention  Endocrine: Negative  Genitourinary: Negative  Musculoskeletal: Negative  Skin: Negative  Neurological: Negative for dizziness, syncope and weakness  Hematological: Negative  Psychiatric/Behavioral: Negative  Allergies   Allergen Reactions    Dilantin [Phenytoin] Anaphylaxis and Rash    Augmentin [Amoxicillin-Pot Clavulanate] Rash and Dermatitis    Penicillins Rash           Current Outpatient Medications:     aspirin (ECOTRIN LOW STRENGTH) 81 mg EC tablet, Take 162 mg by mouth daily, Disp: , Rfl:     cholecalciferol (VITAMIN D3) 1,000 units tablet, Take 2,000 Units by mouth daily in the early morning  , Disp: , Rfl:     cyanocobalamin 1,000 mcg/mL, Inject 1 mL (1,000 mcg total) into a muscle every 30 (thirty) days, Disp: 1 mL, Rfl: 11    ferrous sulfate 325 (65 Fe) mg tablet, Take 1 tablet (325 mg total) by mouth 2 (two) times a day with meals, Disp: 30 tablet, Rfl: 3    folic acid (FOLVITE) 1 mg tablet, Take by mouth daily, Disp: , Rfl:     hydroxychloroquine (PLAQUENIL) 200 mg tablet, Take 400 mg by mouth daily with breakfast  , Disp: , Rfl:     loratadine (CLARITIN) 10 mg tablet, Take 1 tablet (10 mg total) by mouth daily, Disp: 30 tablet, Rfl: 0    methotrexate 2 5 mg tablet, Take 15 mg by mouth once a week   (Patient not taking: Reported on 5/9/2022 ), Disp: , Rfl:     methylPREDNISolone 4 MG tablet therapy pack, Follow package directions, Disp: , Rfl:     nystatin (MYCOSTATIN) cream, Apply topically 2 (two) times a day, Disp: 30 g, Rfl: 0    sildenafil (REVATIO) 20 mg tablet, Take 1 tablet (20 mg total) by mouth 3 (three) times a day, Disp: 270 tablet, Rfl: 3    spironolactone (ALDACTONE) 25 mg tablet, Take 1 tablet (25 mg total) by mouth daily, Disp: 90 tablet, Rfl: 3    Syringe/Needle, Disp, (SecureSafe Syringe/Needle) 25G X 1-1/2" 1 ML MISC, Use 1 Syringe every 30 (thirty) days, Disp: 12 each, Rfl: 1    torsemide (DEMADEX) 20 mg tablet, Take 1 tablet (20 mg total) by mouth daily, Disp: 90 tablet, Rfl: 3    triamcinolone (KENALOG) 0 1 % oral topical paste, prn, Disp: , Rfl:     Triamcinolone Acetonide (Nasacort Allergy 24HR) 55 MCG/ACT nasal spray, 2 sprays by Each Nare route daily, Disp: 16 9 mL, Rfl: 0    Social History     Socioeconomic History    Marital status: /Civil Union     Spouse name: Not on file    Number of children: 2    Years of education: Not on file    Highest education level: Not on file   Occupational History    Not on file   Tobacco Use    Smoking status: Former Smoker     Packs/day: 0 00     Years: 0 00 Pack years: 0 00     Quit date: 2006     Years since quittin 4    Smokeless tobacco: Never Used   Vaping Use    Vaping Use: Never used   Substance and Sexual Activity    Alcohol use: Yes     Alcohol/week: 0 0 standard drinks     Comment: socially    Drug use: No    Sexual activity: Yes     Partners: Male   Other Topics Concern    Not on file   Social History Narrative    Daily caffeinated coffee consumption    Exercise habits     Social Determinants of Health     Financial Resource Strain: Not on file   Food Insecurity: Not on file   Transportation Needs: Not on file   Physical Activity: Not on file   Stress: Not on file   Social Connections: Not on file   Intimate Partner Violence: Not on file   Housing Stability: Not on file       Family History   Problem Relation Age of Onset    Uterine cancer Mother     Pancreatic cancer Mother 79    Diabetes Mother     Endometrial cancer Mother 77    Arthritis Mother     Cancer Mother         Pancreas, Uterine    Vision loss Mother     Heart disease Father         open heart surgery at age 48     Stroke Father         CVA    Hypertension Father     Atrial fibrillation Father     Hyperlipidemia Father     Arthritis Father     Rheum arthritis Father     No Known Problems Sister     No Known Problems Brother     Thyroid disease Maternal Grandmother     Asthma Daughter     Heart attack Maternal Grandfather     Heart attack Paternal Grandmother     Skin cancer Paternal Grandfather     No Known Problems Sister     Thyroid disease Sister     Depression Sister     Osteoarthritis Sister     Hemochromatosis Brother    [facility] Migraines Daughter     Asthma Daughter     No Known Problems Maternal Aunt     Anuerysm Neg Hx     Clotting disorder Neg Hx     Heart failure Neg Hx        Physical Exam:    Vitals: not currently breastfeeding  , There is no height or weight on file to calculate BMI ,   Wt Readings from Last 3 Encounters:   22 106 kg (234 lb)   04/22/22 106 kg (234 lb 6 4 oz)   03/04/22 109 kg (239 lb 6 4 oz)         Physical Exam:    Physical Exam    Labs & Results:    Lab Results   Component Value Date    SODIUM 140 05/07/2022    K 4 2 05/07/2022     05/07/2022    CO2 27 05/07/2022    BUN 12 05/07/2022    CREATININE 0 70 05/07/2022    GLUC 89 05/07/2022    CALCIUM 9 0 05/07/2022     Lab Results   Component Value Date    WBC 4 53 05/07/2022    HGB 12 9 05/07/2022    HCT 39 7 05/07/2022     (H) 05/07/2022     05/07/2022     No results found for: BNP   Lab Results   Component Value Date    CHOLESTEROL 176 10/09/2020    CHOLESTEROL 157 05/17/2018     Lab Results   Component Value Date    HDL 60 10/09/2020    HDL 45 05/17/2018     Lab Results   Component Value Date    TRIG 107 10/09/2020    TRIG 131 05/17/2018     Lab Results   Component Value Date    NONHDLC 116 10/09/2020    [place] 112 05/17/2018         EKG personally reviewed by Kenny Mason DO  Counseling / Coordination of Care  Time spent today 25 minutes  Greater than 50% of total time was spent with the patient and / or family counseling and / or coordination of care  We discussed diagnoses, most recent studies, tests and any changes in treatment plan    Thank you for the opportunity to participate in the care of this patient      [facility] PULMONARY HYPERTENSION  MEDICAL DIRECTOR OF [place]

## 2022-06-17 ENCOUNTER — OFFICE VISIT (OUTPATIENT)
Dept: CARDIOLOGY CLINIC | Facility: CLINIC | Age: 57
End: 2022-06-17
Payer: MEDICARE

## 2022-06-17 VITALS
HEIGHT: 62 IN | HEART RATE: 93 BPM | OXYGEN SATURATION: 98 % | WEIGHT: 235 LBS | DIASTOLIC BLOOD PRESSURE: 74 MMHG | SYSTOLIC BLOOD PRESSURE: 112 MMHG | BODY MASS INDEX: 43.24 KG/M2

## 2022-06-17 DIAGNOSIS — I27.20 PULMONARY HYPERTENSION (HCC): Primary | ICD-10-CM

## 2022-06-17 DIAGNOSIS — R60.0 EDEMA OF RIGHT LOWER EXTREMITY: ICD-10-CM

## 2022-06-17 DIAGNOSIS — I50.32 CHRONIC HEART FAILURE WITH PRESERVED EJECTION FRACTION (HCC): ICD-10-CM

## 2022-06-17 DIAGNOSIS — E66.01 MORBID OBESITY (HCC): ICD-10-CM

## 2022-06-17 DIAGNOSIS — I10 ESSENTIAL HYPERTENSION: ICD-10-CM

## 2022-06-17 PROCEDURE — 99214 OFFICE O/P EST MOD 30 MIN: CPT | Performed by: INTERNAL MEDICINE

## 2022-06-17 RX ORDER — SPIRONOLACTONE 25 MG/1
25 TABLET ORAL DAILY
Qty: 90 TABLET | Refills: 3
Start: 2022-06-17 | End: 2022-07-14 | Stop reason: SDUPTHER

## 2022-06-17 RX ORDER — TORSEMIDE 20 MG/1
20 TABLET ORAL DAILY
Qty: 90 TABLET | Refills: 3 | Status: SHIPPED | OUTPATIENT
Start: 2022-06-17

## 2022-06-27 ENCOUNTER — OFFICE VISIT (OUTPATIENT)
Dept: URGENT CARE | Facility: CLINIC | Age: 57
End: 2022-06-27
Payer: MEDICARE

## 2022-06-27 VITALS
HEIGHT: 62 IN | BODY MASS INDEX: 43.24 KG/M2 | TEMPERATURE: 97.4 F | SYSTOLIC BLOOD PRESSURE: 174 MMHG | HEART RATE: 97 BPM | DIASTOLIC BLOOD PRESSURE: 77 MMHG | WEIGHT: 235 LBS | OXYGEN SATURATION: 97 % | RESPIRATION RATE: 16 BRPM

## 2022-06-27 DIAGNOSIS — R07.81 RIB PAIN ON LEFT SIDE: Primary | ICD-10-CM

## 2022-06-27 PROCEDURE — 99213 OFFICE O/P EST LOW 20 MIN: CPT | Performed by: NURSE PRACTITIONER

## 2022-06-27 PROCEDURE — G0463 HOSPITAL OUTPT CLINIC VISIT: HCPCS | Performed by: NURSE PRACTITIONER

## 2022-06-27 RX ORDER — BELIMUMAB 200 MG/ML
SOLUTION SUBCUTANEOUS
COMMUNITY
Start: 2022-06-20

## 2022-06-27 NOTE — PROGRESS NOTES
[facility]        NAME: Robbie Smith is a 59 y o  female  : 1965    MRN: [id number]  DATE: 2022  TIME: 12:04 PM    Assessment and Plan   Rib pain on left side [R07 81]  1  Rib pain on left side  XR ribs 2 vw left         Patient Instructions     --Initial read of x-ray negative for fracture  Will call with final results when obtained (anticipate 1-12 hours)  Suspected cartilaginous injury  --Ice/heat   --Tylenol as needed for pain  Alternative is OTC Voltaren gel or OTC lidoderm patch  --Avoid potentially exacerbating and injurious activities  --Seek re-evaluation (PCP or ortho) if no improvement over the next 1-2 weeks and/or ongoing symptoms over the next 3-4 weeks  Chief Complaint     Chief Complaint   Patient presents with    Rib Injury     Pt was bending over Friday night to grab something when she felt a pop in her left side         History of Present Illness       Here with complaints of left lower anterior rib pain s/p injury 3 days ago  Was bending to the left to grab vacuum  when she heard "snap" and felt abrupt sharp pain in her left lower anterior ribs  Pain has continued since then, worse if anything  Radiates partially around side to back and also to upper abdomen  Increased with most trunk movements, lying down, and with deep breathing  Rates 4/10 at present  No relief from Tylenol  No ice or heat  No dyspnea noted  No bruising, swelling  PMH notable for osteoporosis, lupus-related connective tissue disorder  She denies past rib injuries or pathologic fractures, however  Review of Systems   Review of Systems   Constitutional: Negative for fever  Respiratory: Negative for shortness of breath      Musculoskeletal:        Per HPI         Current Medications       Current Outpatient Medications:     aspirin (ECOTRIN LOW STRENGTH) 81 mg EC tablet, Take 162 mg by mouth daily, Disp: , Rfl:     Benlysta 200 MG/ML SOAJ, , Disp: , Rfl:   cholecalciferol (VITAMIN D3) 1,000 units tablet, Take 2,000 Units by mouth daily in the early morning  , Disp: , Rfl:     cyanocobalamin 1,000 mcg/mL, Inject 1 mL (1,000 mcg total) into a muscle every 30 (thirty) days, Disp: 1 mL, Rfl: 11    ferrous sulfate 325 (65 Fe) mg tablet, Take 1 tablet (325 mg total) by mouth 2 (two) times a day with meals, Disp: 30 tablet, Rfl: 3    folic acid (FOLVITE) 1 mg tablet, Take by mouth daily, Disp: , Rfl:     hydroxychloroquine (PLAQUENIL) 200 mg tablet, Take 400 mg by mouth daily with breakfast  , Disp: , Rfl:     methotrexate 2 5 mg tablet, Take 15 mg by mouth once a week, Disp: , Rfl:     methylPREDNISolone 4 MG tablet therapy pack, Follow package directions, Disp: , Rfl:     nystatin (MYCOSTATIN) cream, Apply topically 2 (two) times a day, Disp: 30 g, Rfl: 0    sildenafil (REVATIO) 20 mg tablet, Take 1 tablet (20 mg total) by mouth 3 (three) times a day, Disp: 270 tablet, Rfl: 3    spironolactone (ALDACTONE) 25 mg tablet, Take 1 tablet (25 mg total) by mouth daily, Disp: 90 tablet, Rfl: 3    Syringe/Needle, Disp, (SecureSafe Syringe/Needle) 25G X 1-1/2" 1 ML MISC, Use 1 Syringe every 30 (thirty) days, Disp: 12 each, Rfl: 1    torsemide (DEMADEX) 20 mg tablet, Take 1 tablet (20 mg total) by mouth daily, Disp: 90 tablet, Rfl: 3    triamcinolone (KENALOG) 0 1 % oral topical paste, prn, Disp: , Rfl:     loratadine (CLARITIN) 10 mg tablet, Take 1 tablet (10 mg total) by mouth daily, Disp: 30 tablet, Rfl: 0    Triamcinolone Acetonide (Nasacort Allergy 24HR) 55 MCG/ACT nasal spray, 2 sprays by Each Nare route daily, Disp: 16 9 mL, Rfl: 0    Current Allergies     Allergies as of 06/27/2022 - Reviewed 06/27/2022   Allergen Reaction Noted    Dilantin [phenytoin] Anaphylaxis and Rash 03/10/2012    Penicillins Rash, Anaphylaxis, Dermatitis, and Hives 02/09/2012    Augmentin [amoxicillin-pot clavulanate] Rash and Dermatitis 03/10/2012            The following portions of the patient's history were reviewed and updated as appropriate: allergies, current medications, past family history, past medical history, past social history, past surgical history and problem list      Past Medical History:   Diagnosis Date    HELENE positive     Anemia     Sildenafil causes decreased hematocrit    Anxiety 3/2020    Chronic kidney disease     borderline lab values    Chronic rhinitis 6/4/2012    [facility] ) 2012    Coronary artery disease 2018    Encephalitis     Lasted Assessed 10/18/2017    Gout 6/26/2018    Hypertension     [facility]     Maxillary sinusitis     Lasted Assessed 10/18/2017    Night sweat     Osteopenia     Hip    Osteoporosis     Spine    Pneumonia     Last Assessed 10/18/2017    PONV (postoperative nausea and vomiting)     Pulmonary hypertension (HCC)     Seizures (HCC)     Only during Encephalitis    Shortness of breath     with exertion    Sinus tachycardia     Urinary incontinence        Past Surgical History:   Procedure Laterality Date    ARTHRODESIS      Hand: IP Joint    CHOLECYSTECTOMY      COLONOSCOPY      COLPOSCOPY      HYSTERECTOMY      Vaginal    JOINT REPLACEMENT Right     Knee replacement    KNEE SURGERY  2/2019    LAPAROSCOPIC ESOPHAGOGASTRIC FUNDOPLASTY  2008    CA KNEE SCOPE,SINGLE MENISECTOMY Right 10/2/2018    Procedure: KNEE ARTHROSCOPY WITH PARTIAL MEDIAL MENISCECTOMY;  Surgeon: Nicky Monroe DO;  [facility];  Service: Orthopedics    CA KNEE SCOPE,SINGLE MENISECTOMY Left 12/17/2019    Procedure: KNEE ARTHROSCOPIC MENISCECTOMY /MEDIAL;  Chondyl medial and posterior;  Surgeon: Nicky Monroe DO;  [facility];  Service: Orthopedics    CA TOTAL KNEE ARTHROPLASTY Right 2/12/2019    Procedure: KNEE TOTAL ARTHROPLASTY AND ASSOCIATED PROCEDURES;  Surgeon: Nicky Monroe DO;  [facility];  Service: Orthopedics    [address] [place] [address] Right     TONSILLECTOMY         Family History   Problem Relation Age of Onset    Uterine cancer Mother     Pancreatic cancer Mother 79    Diabetes Mother     Endometrial cancer Mother 77    Arthritis Mother    Becca Garcia Cancer Mother         Pancreas, Uterine    Vision loss Mother     Heart disease Father         open heart surgery at age 48     Stroke Father         CVA    Hypertension Father     Atrial fibrillation Father     Hyperlipidemia Father     Arthritis Father     Rheum arthritis Father     No Known Problems Sister     No Known Problems Brother     Thyroid disease Maternal Grandmother     Asthma Daughter     Heart attack Maternal Grandfather     Heart attack Paternal Grandmother     Skin cancer Paternal Grandfather     No Known Problems Sister     Thyroid disease Sister     Depression Sister     Osteoarthritis Sister     Hemochromatosis Brother    Becca Garcia Migraines Daughter     Asthma Daughter     No Known Problems Maternal Aunt     Anuerysm Neg Hx     Clotting disorder Neg Hx     Heart failure Neg Hx          Medications have been verified  Objective   BP (!) 174/77   Pulse 97   Temp (!) 97 4 °F (36 3 °C)   Resp 16   Ht 5' 2" (1 575 m)   Wt 107 kg (235 lb)   SpO2 97%   BMI 42 98 kg/m²   No LMP recorded  Patient is postmenopausal        Physical Exam     Physical Exam  Pulmonary:      Effort: Pulmonary effort is normal  No respiratory distress  Breath sounds: No stridor  No wheezing, rhonchi or rales  Chest:      Chest wall: Tenderness present  Abdominal:      Palpations: Abdomen is soft  Comments: Mild left upper quadrant abdominal tenderness  Musculoskeletal:         General: Tenderness present  No swelling or deformity  Comments: Area of primary symptomatology, mid lower margin of left anterior ribs, with 2-3 inch area of localized tenderness  No swelling, bruising, or other visualized abnormality     No deformity, crepitus, or other palpable abnormality  Remainder of chest well nontender with normal appearance  Spine AROM decreased 25-50% in rotation with pain  Skin:     Findings: No bruising or erythema  Neurological:      Mental Status: She is alert

## 2022-06-27 NOTE — PATIENT INSTRUCTIONS
--Initial read of x-ray negative for fracture  Will call with final results when obtained (anticipate 1-12 hours)  Suspected cartilaginous injury  --Ice/heat   --Motrin/Aleve as needed for pain  Alternative is OTC Voltaren gel or OTC lidoderm patch  --Avoid potentially exacerbating and injurious activities  --Seek re-evaluation (PCP or ortho) if no improvement over the next 1-2 weeks and/or ongoing symptoms over the next 3-4 weeks

## 2022-07-07 ENCOUNTER — OFFICE VISIT (OUTPATIENT)
Dept: OBGYN CLINIC | Facility: CLINIC | Age: 57
End: 2022-07-07
Payer: MEDICARE

## 2022-07-07 ENCOUNTER — APPOINTMENT (OUTPATIENT)
Dept: RADIOLOGY | Facility: AMBULARY SURGERY CENTER | Age: 57
End: 2022-07-07
Attending: ORTHOPAEDIC SURGERY
Payer: MEDICARE

## 2022-07-07 VITALS
HEART RATE: 108 BPM | HEIGHT: 64 IN | DIASTOLIC BLOOD PRESSURE: 85 MMHG | WEIGHT: 235 LBS | BODY MASS INDEX: 40.12 KG/M2 | SYSTOLIC BLOOD PRESSURE: 129 MMHG

## 2022-07-07 DIAGNOSIS — M25.562 PAIN OF LEFT KNEE AND LOWER LEG: ICD-10-CM

## 2022-07-07 DIAGNOSIS — M17.12 ARTHRITIS OF LEFT KNEE: Primary | ICD-10-CM

## 2022-07-07 DIAGNOSIS — M17.12 ARTHRITIS OF LEFT KNEE: ICD-10-CM

## 2022-07-07 DIAGNOSIS — M25.562 PAIN AND SWELLING OF KNEE, LEFT: ICD-10-CM

## 2022-07-07 DIAGNOSIS — M79.662 PAIN OF LEFT KNEE AND LOWER LEG: ICD-10-CM

## 2022-07-07 DIAGNOSIS — M25.462 PAIN AND SWELLING OF KNEE, LEFT: ICD-10-CM

## 2022-07-07 PROCEDURE — 73562 X-RAY EXAM OF KNEE 3: CPT

## 2022-07-07 PROCEDURE — 99214 OFFICE O/P EST MOD 30 MIN: CPT | Performed by: ORTHOPAEDIC SURGERY

## 2022-07-07 RX ORDER — MULTIVIT-MIN/IRON FUM/FOLIC AC 7.5 MG-4
1 TABLET ORAL DAILY
Qty: 30 TABLET | Refills: 1 | Status: SHIPPED | OUTPATIENT
Start: 2022-07-07

## 2022-07-07 RX ORDER — FERROUS SULFATE TAB EC 324 MG (65 MG FE EQUIVALENT) 324 (65 FE) MG
324 TABLET DELAYED RESPONSE ORAL
Qty: 60 TABLET | Refills: 1 | Status: SHIPPED | OUTPATIENT
Start: 2022-07-07

## 2022-07-07 RX ORDER — CHLORHEXIDINE GLUCONATE 0.12 MG/ML
15 RINSE ORAL ONCE
Status: CANCELLED | OUTPATIENT
Start: 2022-07-07 | End: 2022-07-07

## 2022-07-07 RX ORDER — ASCORBIC ACID 500 MG
500 TABLET ORAL 2 TIMES DAILY
Qty: 60 TABLET | Refills: 1 | Status: SHIPPED | OUTPATIENT
Start: 2022-07-07

## 2022-07-07 RX ORDER — FOLIC ACID 1 MG/1
1 TABLET ORAL DAILY
Qty: 30 TABLET | Refills: 1 | Status: SHIPPED | OUTPATIENT
Start: 2022-07-07 | End: 2022-09-27

## 2022-07-07 RX ORDER — CHLORHEXIDINE GLUCONATE 4 G/100ML
SOLUTION TOPICAL DAILY PRN
Status: CANCELLED | OUTPATIENT
Start: 2022-07-07

## 2022-07-07 RX ORDER — ENOXAPARIN SODIUM 100 MG/ML
40 INJECTION SUBCUTANEOUS DAILY
Qty: 11.2 ML | Refills: 0 | Status: SHIPPED | OUTPATIENT
Start: 2022-07-07 | End: 2022-10-13

## 2022-07-07 NOTE — PROGRESS NOTES
Assessment:  1  Arthritis of left knee  XR knee 3 vw left non injury    Case request operating room: ARTHROPLASTY KNEE TOTAL    Comprehensive metabolic panel    Hemoglobin A1C W/EAG Estimation    CBC and differential    C-reactive protein    Anemia Panel w/Reflex    Protime-INR    APTT    Type and screen    Ambulatory referral to Family Practice    Ambulatory referral to Physical Therapy    EKG 12 lead    ascorbic acid (VITAMIN C) 500 MG tablet    ferrous sulfate 324 (65 Fe) mg    folic acid (FOLVITE) 1 mg tablet    Multiple Vitamins-Minerals (multivitamin with minerals) tablet    Case request operating room: ARTHROPLASTY KNEE TOTAL    enoxaparin (LOVENOX) 40 mg/0 4 mL   2  Pain and swelling of knee, left   CBC and differential    Protime-INR    APTT   3   Pain of left knee and lower leg   Protime-INR    APTT     Patient Active Problem List   Diagnosis    B12 deficiency    Chronic cough    Diffuse connective tissue disease ([facility] [address] )    Dyspnea    Edema    Essential hypertension    Hot flashes due to menopause    Long-term use of hydroxychloroquine    Other organ or system involvement in systemic lupus erythematosus ([address]    Lupus anticoagulant disorder (HCC)    Tachycardia    SOB (shortness of breath) on exertion    Sinus tachycardia    Secondary pulmonary hypertension    Pulmonary hypertension (HCC)    Post-nasal drip    Pes anserine bursitis    Positive HELENE (antinuclear antibody)    Other chronic pulmonary heart diseases    Osteoporosis    Night sweats    Patellofemoral disorder of right knee    Pes anserinus bursitis of right knee    Arthritis of right knee    Other tear of medial meniscus, current injury, right knee, initial encounter    Tear of medial meniscus of right knee, current    Chronic pain of right knee    Elevated serum creatinine    Hyperuricemia    Trochanteric bursitis of both hips    Undifferentiated connective tissue disease ([facility] [address] )    Vitamin D deficiency    Chronic kidney disease, stage 3 (HCC)    Right knee pain    Status post total right knee replacement    Left knee pain    Tear of medial meniscus of left knee, current    Arthritis of left knee    Morbid obesity (HCC)    Leukopenia    Venous stasis of lower extremity    Acute pain of right knee           Plan      59 y o  female with known osteoarthritis of the left knee  She is here today to discuss total knee replacement  A quality life decision has been made, she has tried multiple other treatments in the past including but not limited to injections, therapy and over the counter NSAID's  Subjective:     Patient ID:    Chief Complaint:Na Joseph 59 y o  female      HPI      Patient is here today for evaluation of left knee  Patient states the pain is in the medial joint space  It wakes her up at night  It is painful to walk and go up and down stairs  She also notes a general stiffness in the morning or after sitting for a while  She rates her pain 4-10 at its best 710 at its worst   She describes as an aching and throbbing  She has tried Tylenol and injections which she states did not help  The following portions of the patient's history were reviewed and updated as appropriate: allergies, current medications, past family history, past social history, past surgical history and problem list         Social History     Socioeconomic History    Marital status: /Civil Union     Spouse name: Not on file    Number of children: 2    Years of education: Not on file    Highest education level: Not on file   Occupational History    Not on file   Tobacco Use    Smoking status: Former Smoker     Packs/day: 0 00     Years: 0 00     Pack years: 0 00     Quit date: 2006     Years since quittin 4    Smokeless tobacco: Never Used   Vaping Use    Vaping Use: Never used   Substance and Sexual Activity    Alcohol use:  Yes     Alcohol/week: 0 0 standard drinks     Comment: socially  Drug use: No    Sexual activity: Yes     Partners: Male   Other Topics Concern    Not on file   Social History Narrative    Daily caffeinated coffee consumption    Exercise habits     Social Determinants of Health     Financial Resource Strain: Not on file   Food Insecurity: Not on file   Transportation Needs: Not on file   Physical Activity: Not on file   Stress: Not on file   Social Connections: Not on file   Intimate Partner Violence: Not on file   Housing Stability: Not on file     Past Medical History:   Diagnosis Date    HELENE positive     Anemia     Sildenafil causes decreased hematocrit    Anxiety 3/2020    Chronic kidney disease     borderline lab values    Chronic rhinitis 6/4/2012    Clotting disorder ([facility] [address] ) 2012    Coronary artery disease 2018    Encephalitis     Lasted Assessed 10/18/2017    Gout 6/26/2018    Hypertension     Lupus anticoagulant disorder ([facility] [address] )     Maxillary sinusitis     Lasted Assessed 10/18/2017    Night sweat     Osteopenia     Hip    Osteoporosis     Spine    Pneumonia     Last Assessed 10/18/2017    PONV (postoperative nausea and vomiting)     Pulmonary hypertension (HCC)     Seizures (HCC)     Only during Encephalitis    Shortness of breath     with exertion    Sinus tachycardia     Urinary incontinence      Past Surgical History:   Procedure Laterality Date    ARTHRODESIS      Hand: IP Joint    CHOLECYSTECTOMY      COLONOSCOPY      COLPOSCOPY      HYSTERECTOMY      Vaginal    JOINT REPLACEMENT Right     Knee replacement    KNEE SURGERY  2/2019    LAPAROSCOPIC ESOPHAGOGASTRIC FUNDOPLASTY  2008    FL KNEE SCOPE,SINGLE MENISECTOMY Right 10/2/2018    Procedure: KNEE ARTHROSCOPY WITH PARTIAL MEDIAL MENISCECTOMY;  Surgeon: Dana Gupta DO;  [facility];  Service: Orthopedics    FL KNEE SCOPE,SINGLE MENISECTOMY Left 12/17/2019    Procedure: KNEE ARTHROSCOPIC MENISCECTOMY /MEDIAL;  Chondyl medial and posterior;  Surgeon: Dana Gupta DO; [facility];  Service: Orthopedics    VT TOTAL KNEE ARTHROPLASTY Right 2/12/2019    Procedure: KNEE TOTAL ARTHROPLASTY AND ASSOCIATED PROCEDURES;  Surgeon: Chelsea Mcintosh DO;  [facility];  Service: Orthopedics    REDUCTION MAMMAPLASTY      REPAIR ANKLE LIGAMENT Right     TONSILLECTOMY       Allergies   Allergen Reactions    Dilantin [Phenytoin] Anaphylaxis and Rash    Penicillins Rash, Anaphylaxis, Dermatitis and Hives    Augmentin [Amoxicillin-Pot Clavulanate] Rash and Dermatitis     Current Outpatient Medications on File Prior to Visit   Medication Sig Dispense Refill    aspirin (ECOTRIN LOW STRENGTH) 81 mg EC tablet Take 162 mg by mouth daily      Benlysta 200 MG/ML SOAJ       cholecalciferol (VITAMIN D3) 1,000 units tablet Take 2,000 Units by mouth daily in the early morning        cyanocobalamin 1,000 mcg/mL Inject 1 mL (1,000 mcg total) into a muscle every 30 (thirty) days 1 mL 11    hydroxychloroquine (PLAQUENIL) 200 mg tablet Take 400 mg by mouth daily with breakfast        sildenafil (REVATIO) 20 mg tablet Take 1 tablet (20 mg total) by mouth 3 (three) times a day 270 tablet 3    spironolactone (ALDACTONE) 25 mg tablet Take 1 tablet (25 mg total) by mouth daily 90 tablet 3    Syringe/Needle, Disp, (SecureSafe Syringe/Needle) 25G X 1-1/2" 1 ML MISC Use 1 Syringe every 30 (thirty) days 12 each 1    torsemide (DEMADEX) 20 mg tablet Take 1 tablet (20 mg total) by mouth daily 90 tablet 3    triamcinolone (KENALOG) 0 1 % oral topical paste prn      ferrous sulfate 325 (65 Fe) mg tablet Take 1 tablet (325 mg total) by mouth 2 (two) times a day with meals 30 tablet 3    folic acid (FOLVITE) 1 mg tablet Take by mouth daily      loratadine (CLARITIN) 10 mg tablet Take 1 tablet (10 mg total) by mouth daily 30 tablet 0    methotrexate 2 5 mg tablet Take 15 mg by mouth once a week      methylPREDNISolone 4 MG tablet therapy pack Follow package directions      nystatin (MYCOSTATIN) cream Apply topically 2 (two) times a day 30 g 0    Triamcinolone Acetonide (Nasacort Allergy 24HR) 55 MCG/ACT nasal spray 2 sprays by Each Nare route daily 16 9 mL 0     No current facility-administered medications on file prior to visit  Objective:    Review of Systems   Constitutional: Negative for chills and fever  HENT: Negative for ear pain and sore throat  Eyes: Negative for pain and visual disturbance  Respiratory: Negative for cough and shortness of breath  Cardiovascular: Negative for chest pain and palpitations  Gastrointestinal: Negative for abdominal pain and vomiting  Genitourinary: Negative for dysuria and hematuria  Musculoskeletal: Negative for arthralgias and back pain  Skin: Negative for color change and rash  Neurological: Negative for seizures and syncope  All other systems reviewed and are negative  Ortho Exam    Physical Exam  HENT:      Head: Normocephalic  Eyes:      Pupils: Pupils are equal, round, and reactive to light  Pulmonary:      Effort: Pulmonary effort is normal    Musculoskeletal:         General: Normal range of motion  Comments: See HEP   Skin:     General: Skin is warm and dry  Neurological:      General: No focal deficit present  Mental Status: She is alert  Psychiatric:         Behavior: Behavior normal          Procedures  No Procedures performed today    I have personally reviewed pertinent films in PACS  Degenerative changes with medial joint space narrowing       Scribe Attestation    I,:  Genyn Aleman am acting as a scribe while in the presence of the attending physician :       I,:  Malathi Bernabe, DO personally performed the services described in this documentation    as scribed in my presence :               Portions of the record may have been created with voice recognition software   Occasional wrong word or "sound a like" substitutions may have occurred due to the inherent limitations of voice recognition software   Read the chart carefully and recognize, using context, where substitutions have occurred

## 2022-07-08 ENCOUNTER — TELEPHONE (OUTPATIENT)
Dept: NEPHROLOGY | Facility: CLINIC | Age: 57
End: 2022-07-08

## 2022-07-08 PROBLEM — N18.30 CHRONIC KIDNEY DISEASE, STAGE 3 (HCC): Chronic | Status: RESOLVED | Noted: 2019-02-12 | Resolved: 2022-07-08

## 2022-07-08 NOTE — TELEPHONE ENCOUNTER
I reviewed her chart and I agree that her renal function now is better compared to 2018 and 2019 readings  I removed the CKD diagnosis from her chart

## 2022-07-08 NOTE — TELEPHONE ENCOUNTER
Patient called explaining that Dr Yimi Wan had seen her during 2019 (2/13/19) and diagnosed her with stage 3 CKD  She explains that Dr Canelo Menezes believes this diagnosis to be wrong  Patient states that she needs that diagnosis removed in order for her to go under general anesthesia for her knee replacement currently scheduled for 11/29  Patient mentioned that her kidney function has been stable for the past two years  Please advise  Patient requests a call back at [phone] when diagnosis is removed

## 2022-07-14 ENCOUNTER — HOSPITAL ENCOUNTER (OUTPATIENT)
Dept: NON INVASIVE DIAGNOSTICS | Facility: CLINIC | Age: 57
Discharge: HOME | End: 2022-07-14
Payer: MEDICARE

## 2022-07-14 VITALS
SYSTOLIC BLOOD PRESSURE: 129 MMHG | DIASTOLIC BLOOD PRESSURE: 85 MMHG | WEIGHT: 235 LBS | HEART RATE: 90 BPM | BODY MASS INDEX: 40.12 KG/M2 | HEIGHT: 64 IN

## 2022-07-14 DIAGNOSIS — I27.20 PULMONARY HYPERTENSION (HCC): ICD-10-CM

## 2022-07-14 DIAGNOSIS — I50.32 CHRONIC HEART FAILURE WITH PRESERVED EJECTION FRACTION (HCC): ICD-10-CM

## 2022-07-14 LAB
AORTIC ROOT: 3.4 CM
APICAL FOUR CHAMBER EJECTION FRACTION: 58 %
ASCENDING AORTA: 2.9 CM
E WAVE DECELERATION TIME: 187 MS
FRACTIONAL SHORTENING: 44 % (ref 28–44)
INTERVENTRICULAR SEPTUM IN DIASTOLE (PARASTERNAL SHORT AXIS VIEW): 1.2 CM
INTERVENTRICULAR SEPTUM: 1.2 CM (ref 0.6–1.1)
LAAS-AP4: 11.8 CM2
LEFT ATRIUM SIZE: 4.1 CM
LEFT INTERNAL DIMENSION IN SYSTOLE: 2.2 CM (ref 2.1–4)
LEFT VENTRICULAR INTERNAL DIMENSION IN DIASTOLE: 3.9 CM (ref 3.5–6)
LEFT VENTRICULAR POSTERIOR WALL IN END DIASTOLE: 1.1 CM
LEFT VENTRICULAR STROKE VOLUME: 50 ML
LVSV (TEICH): 50 ML
MV E'TISSUE VEL-SEP: 12 CM/S
MV PEAK A VEL: 0.85 M/S
MV PEAK E VEL: 82 CM/S
MV STENOSIS PRESSURE HALF TIME: 54 MS
MV VALVE AREA P 1/2 METHOD: 4.07 CM2
RA PRESSURE ESTIMATED: 3 MMHG
RIGHT ATRIUM AREA SYSTOLE A4C: 9 CM2
RIGHT VENTRICLE ID DIMENSION: 3 CM
RV PSP: 29 MMHG
SL CV LV EF: 70
SL CV PED ECHO LEFT VENTRICLE DIASTOLIC VOLUME (MOD BIPLANE) 2D: 67 ML
SL CV PED ECHO LEFT VENTRICLE SYSTOLIC VOLUME (MOD BIPLANE) 2D: 17 ML
TR MAX PG: 26 MMHG
TR PEAK VELOCITY: 2.6 M/S
TRICUSPID VALVE PEAK REGURGITATION VELOCITY: 2.57 M/S

## 2022-07-14 PROCEDURE — 93306 TTE W/DOPPLER COMPLETE: CPT

## 2022-07-14 PROCEDURE — 93306 TTE W/DOPPLER COMPLETE: CPT | Performed by: INTERNAL MEDICINE

## 2022-07-14 RX ORDER — SPIRONOLACTONE 25 MG/1
25 TABLET ORAL DAILY
Qty: 90 TABLET | Refills: 3 | Status: SHIPPED | OUTPATIENT
Start: 2022-07-14

## 2022-08-01 ENCOUNTER — TELEPHONE (OUTPATIENT)
Dept: OBGYN CLINIC | Facility: CLINIC | Age: 57
End: 2022-08-01

## 2022-08-01 NOTE — TELEPHONE ENCOUNTER
Pt is calling for her daughter she is asking if you can recommend a surgeon for her daughter she has an ovarian cyst

## 2022-08-02 NOTE — TELEPHONE ENCOUNTER
Do I know her daughter? It's hard to make a recommendation when I have no information on her daughter - age, type of cyst, etc    She should make an appt for a consult with any of our physicians to determine next best step - she may not even need surgery, so we need to learn a lot more about this

## 2022-08-17 ENCOUNTER — TELEPHONE (OUTPATIENT)
Dept: PSYCHIATRY | Facility: CLINIC | Age: 57
End: 2022-08-17

## 2022-08-17 NOTE — TELEPHONE ENCOUNTER
contacted pt off med mgmt waitlist to verify needs of services in attempts to update waitlist  unable to lvm for patient to contact intake dept " call cannot be completed at this time"

## 2022-09-09 ENCOUNTER — VBI (OUTPATIENT)
Dept: ADMINISTRATIVE | Facility: OTHER | Age: 57
End: 2022-09-09

## 2022-09-09 NOTE — PRE-PROCEDURE INSTRUCTIONS
Pre-Surgery Instructions:   Medication Instructions    ascorbic acid (VITAMIN C) 500 MG tablet Hold day of surgery   aspirin (ECOTRIN LOW STRENGTH) 81 mg EC tablet Pt instructed not stop per Rheumatologist, per pt surgeon office aware    Benlysta 200 MG/ML SOAJ Pt to check at rheumatologist appt 9/19    cholecalciferol (VITAMIN D3) 1,000 units tablet Stop taking 7 days prior to surgery   cyanocobalamin 1,000 mcg/mL Monthly    enoxaparin (LOVENOX) 40 mg/0 4 mL For use after surgery    ferrous sulfate 324 (65 Fe) mg Hold day of surgery   folic acid (FOLVITE) 1 mg tablet Hold day of surgery   hydroxychloroquine (PLAQUENIL) 200 mg tablet Take day of surgery   Multiple Vitamins-Minerals (multivitamin with minerals) tablet Hold day of surgery   sildenafil (REVATIO) 20 mg tablet Take day of surgery   spironolactone (ALDACTONE) 25 mg tablet Take night before surgery    torsemide (DEMADEX) 20 mg tablet Hold day of surgery   triamcinolone (KENALOG) 0 1 % oral topical paste Uses PRN- OK to take day of surgery     Spoke with pt via phone  Ortho class completed, Incentive spirometer reviewed  Instructed to complete 10 breaths 4x a day  Advised hospital location will call with arrival time night before surgery  NPO after midnight the night before surgery, including chewing gum and hard candy  A small sip of water is allowed to take approved medications the morning of surgery  Non-vaccinated patients and visitors need to remain masked at all times while in the hospital     Instructed to leave contacts/jewelery/valuables at home  Ok to wear dentures, glasses and hearing aides into the hospital - they will be removed for surgery  No smoking or alcohol consumption 24 hours prior to surgery  Avoid all Aspirin products and OTC Vit/Supp 1 week prior to surgery and avoid NSAIDs 3 days prior to surgery per anesthesia instructions  Tylenol ok to take prn      Showering instructions reviewed for night before and morning of surgery using surgical soap or antibacterial soap  Patient verbalized understanding of all of the above, including medication instructions

## 2022-09-10 DIAGNOSIS — E53.8 B12 DEFICIENCY: ICD-10-CM

## 2022-09-12 ENCOUNTER — EVALUATION (OUTPATIENT)
Dept: PHYSICAL THERAPY | Facility: CLINIC | Age: 57
End: 2022-09-12
Payer: MEDICARE

## 2022-09-12 ENCOUNTER — TELEPHONE (OUTPATIENT)
Dept: OBGYN CLINIC | Facility: HOSPITAL | Age: 57
End: 2022-09-12

## 2022-09-12 DIAGNOSIS — M25.562 CHRONIC PAIN OF LEFT KNEE: ICD-10-CM

## 2022-09-12 DIAGNOSIS — G89.29 CHRONIC PAIN OF LEFT KNEE: ICD-10-CM

## 2022-09-12 DIAGNOSIS — Z01.818 PRE-OP EXAM: Primary | ICD-10-CM

## 2022-09-12 PROCEDURE — 97161 PT EVAL LOW COMPLEX 20 MIN: CPT | Performed by: PHYSICAL THERAPIST

## 2022-09-12 RX ORDER — CYANOCOBALAMIN 1000 UG/ML
INJECTION, SOLUTION INTRAMUSCULAR; SUBCUTANEOUS
Qty: 3 ML | Refills: 2 | Status: SHIPPED | OUTPATIENT
Start: 2022-09-12 | End: 2022-09-12 | Stop reason: SDUPTHER

## 2022-09-12 NOTE — TELEPHONE ENCOUNTER
Preoperative Elective Admission Assessment- spoke to patient    [facility]     Living Situation:    Who does pt live with: spouse  And adult grand son  What kind of home: multi-level  How do they enter the home: front  How many levels in home:2   # of steps to enter home: 4 to enter with bilateral railing  # of steps to second floor: 14 to 2nd floor   Are there handrails: Yes  Are there landings: Yes  Sleeping arrangement: first/entry floor  Where is Bathroom: 2nd floor--- walk in shower, with grab bars and shower seats  First Floor Setup:   Is there a bathroom: NO- has BSC   Where would pt sleep: recliner     DME: frame walker, cane and 3-in-1 bedside commode     Patient's Current Level of Function: Ambulates: Independently and ADLs: Independent    Post-op Caregiver: spouse  Caregiver Name and phone number for Inpatient discharge needs: Corinne Dawson, spouse--- [phone], daughter also available Radha Evans)   Currently receive any HHC/aides/community supports: No     Post-op Transport: spouse  To/from hospital: spouse  To/from PT 2-3x/week: spouse   Uses community transport now: No     Outpatient Physical Therapy Site:  Site: [facility]   pre and post-op appts scheduled? Yes     Medication Management: self and out of bottle  Preferred Pharmacy for Post-op Medications: [facility]   Blood Management Vitamin Regimen: Pt confirms taking as prescribed  Post-op anticoagulant: prescribed preoperatively - patient has at home ready for after surgery use only     DC Plan: Pt plans to be discharged home  Pt educated that our goal, if at all possible, is to appropriately discharge patient based off their post-op function while striving to maintain maximal independence  If possible, the goal is to discharge patient to home and for them to attend outpatient physical therapy  Barriers to DC identified preoperatively: none identified    BMI: 40 34 -- discussed BMI of 40 and losing 2 lbs   Weight check at Dr Phillip Pitmtan on 9/26     Caresense: Enrolled in surveys     Patient Education:  Pt educated on post-op pain, early mobilization (POD0), Average inpt LOS, OP PT goal   Patient educated that our goal is to appropriately discharge patient based off their post-op function while striving to maintain maximal independence  The goal is to discharge patient to home and for them to attend outpatient physical therapy

## 2022-09-12 NOTE — PROGRESS NOTES
PT Evaluation    Today's date: 2022  Patient name: Dora Rodriguez  : 1965  MRN: [id number]  Referring provider: Sherry Quan DO  Dx: No diagnosis found  Subjective Evaluation     History of Present Illness    Patient presents pre-operatively for a L TKR on 10/6/22  She has had L knee pain from 2-3 years due to meniscus tear and repair  She had a menisectomy on 2019  This was after getting her R knee replaced  Her knee does buckle  Neuro signs: none  Red flags: none  Occupation: not working- house tasks      Pain  At best pain ratin  At worst pain rating: 10  Location: anterior medial knee  Quality: achy, sharp,   Relieving factors: rest, elevation,   Aggravating factors: at night, stairs- up worse, prolonged walking    Social Support  Lives at home c  and grandson  There are 5 JINA B railing,       Patient Goals  Patient goals for therapy: To get up and walking same day, improve ROM    STGs  1  Decrease pain by 20% in 2-4 weeks  2  Improve knee ROM from 0-90 degrees in 2-4 weeks  3  Improve knee strength by 1/3 grade in 2-4 weeks to perform sit to stand independently s UE  4  Perform stairs in step to pattern c rail in 4 weeks  LTGs  1  Decrease pain by 60% in 6-8 weeks  2  Improve walking tolerance to >30 minutes in 6-8 weeks to perform community ambulation  3  Perform job/dressing/showering activities without pain in 6-8 weeks      4  Return to driving activities    Objective Measurements:  Observation:L genu valgum    TUG: NT by mistake  Gait: pain in WB LLE and L lateral lean  Functional squat: sit to stand less WB LLE  Slump:-  SLR: Gilma Riis:  [place]:   Stairs: up:step through c pain going up c use of rail   Down: pain coming down and decreased control on LLE B rail use       Knee A/PROM L R Strength L R   Flex  128 / 124 / Flex 5 5   Ext  4/-4 5 / Ext 4 5           Hip A/PROM WFL       Flex  /  / Flex 5 5   ER at 90   ER     IR at 90   IR     Abd   Abd 4+    Ext Ext 4+            Lumbar AROM   Ankle     Flex   DF     Ext   PF           Assessment:    Angel Hood is a pleasant 59 y o  female who presents for pre-op L TKR limiting her ability to sleep s pain and walk for prolonged distances  The patient's greatest concern is improving pain  She was given education to trial elevating leg to help her pain at night  No further referral appears necessary at this time based upon examination results  Etiologic factors include pain after menisectomy  Negative prognostic indicators:none  Positive prognostic indicators: good ROM  Please contact me if you have any further questions or recommendations  Thank you very much for the kind referral         Plan  Patient would benefit from:Skilled physical therapy  Planned therapy interventions: manual therapy, neuromuscular re-education, stretching, strengthening, therapeutic activities, therapeutic exercise, patient education, home exercise program, and activity modification      Frequency: 2x week  Duration in weeks: 8  Treatment plan discussed with: patient         Goal: Patient's goal is to sleep s pain and improve her ROM    Precautions: R tkR, Pulmonary HTN, connective tissue disorder, SOB   Dx: L pre-op TKR     Daily Treatment Diary  Manuals 9/11/2022        Knee PROM                                     Ther Ex Hep discussed        Bike         Gastroc slant bd         TKE         Heel slides         bridges                                             Neuro Re-ed         Quad set 10x        saq         laq 10x                          Ther Activity         stairs         minisquats                  Gait Training         Treadmill WBAT                  Modalities         CP in elevation

## 2022-09-15 ENCOUNTER — APPOINTMENT (OUTPATIENT)
Dept: LAB | Facility: CLINIC | Age: 57
End: 2022-09-15
Payer: MEDICARE

## 2022-09-15 ENCOUNTER — OFFICE VISIT (OUTPATIENT)
Dept: LAB | Facility: CLINIC | Age: 57
End: 2022-09-15
Payer: MEDICARE

## 2022-09-15 DIAGNOSIS — I50.32 CHRONIC HEART FAILURE WITH PRESERVED EJECTION FRACTION (HCC): ICD-10-CM

## 2022-09-15 DIAGNOSIS — M17.12 ARTHRITIS OF LEFT KNEE: ICD-10-CM

## 2022-09-15 DIAGNOSIS — M25.462 PAIN AND SWELLING OF KNEE, LEFT: ICD-10-CM

## 2022-09-15 DIAGNOSIS — I27.20 PULMONARY HYPERTENSION (HCC): ICD-10-CM

## 2022-09-15 DIAGNOSIS — M79.662 PAIN OF LEFT KNEE AND LOWER LEG: ICD-10-CM

## 2022-09-15 DIAGNOSIS — M25.562 PAIN AND SWELLING OF KNEE, LEFT: ICD-10-CM

## 2022-09-15 DIAGNOSIS — M25.562 PAIN OF LEFT KNEE AND LOWER LEG: ICD-10-CM

## 2022-09-15 LAB
ABO GROUP BLD: NORMAL
ALBUMIN SERPL BCP-MCNC: 4.5 G/DL (ref 3.5–5)
ALP SERPL-CCNC: 97 U/L (ref 34–104)
ALT SERPL W P-5'-P-CCNC: 14 U/L (ref 7–52)
ANION GAP SERPL CALCULATED.3IONS-SCNC: 7 MMOL/L (ref 4–13)
APTT PPP: 30 SECONDS (ref 23–37)
AST SERPL W P-5'-P-CCNC: 13 U/L (ref 13–39)
BASOPHILS # BLD AUTO: 0.04 THOUSANDS/ΜL (ref 0–0.1)
BASOPHILS NFR BLD AUTO: 1 % (ref 0–1)
BILIRUB SERPL-MCNC: 0.62 MG/DL (ref 0.2–1)
BLD GP AB SCN SERPL QL: NEGATIVE
BUN SERPL-MCNC: 27 MG/DL (ref 5–25)
CALCIUM SERPL-MCNC: 9.9 MG/DL (ref 8.4–10.2)
CHLORIDE SERPL-SCNC: 102 MMOL/L (ref 96–108)
CO2 SERPL-SCNC: 30 MMOL/L (ref 21–32)
CREAT SERPL-MCNC: 1.1 MG/DL (ref 0.6–1.3)
CRP SERPL QL: 2.2 MG/L
EOSINOPHIL # BLD AUTO: 0.24 THOUSAND/ΜL (ref 0–0.61)
EOSINOPHIL NFR BLD AUTO: 5 % (ref 0–6)
ERYTHROCYTE [DISTWIDTH] IN BLOOD BY AUTOMATED COUNT: 12.1 % (ref 11.6–15.1)
EST. AVERAGE GLUCOSE BLD GHB EST-MCNC: 103 MG/DL
GFR SERPL CREATININE-BSD FRML MDRD: 55 ML/MIN/1.73SQ M
GLUCOSE P FAST SERPL-MCNC: 107 MG/DL (ref 65–99)
HBA1C MFR BLD: 5.2 %
HCT VFR BLD AUTO: 45.5 % (ref 34.8–46.1)
HGB BLD-MCNC: 15.1 G/DL (ref 11.5–15.4)
IMM GRANULOCYTES # BLD AUTO: 0 THOUSAND/UL (ref 0–0.2)
IMM GRANULOCYTES NFR BLD AUTO: 0 % (ref 0–2)
INR PPP: 1.03 (ref 0.84–1.19)
LYMPHOCYTES # BLD AUTO: 1.5 THOUSANDS/ΜL (ref 0.6–4.47)
LYMPHOCYTES NFR BLD AUTO: 33 % (ref 14–44)
MCH RBC QN AUTO: 29.8 PG (ref 26.8–34.3)
MCHC RBC AUTO-ENTMCNC: 33.2 G/DL (ref 31.4–37.4)
MCV RBC AUTO: 90 FL (ref 82–98)
MONOCYTES # BLD AUTO: 0.57 THOUSAND/ΜL (ref 0.17–1.22)
MONOCYTES NFR BLD AUTO: 13 % (ref 4–12)
NEUTROPHILS # BLD AUTO: 2.2 THOUSANDS/ΜL (ref 1.85–7.62)
NEUTS SEG NFR BLD AUTO: 48 % (ref 43–75)
NRBC BLD AUTO-RTO: 0 /100 WBCS
NT-PROBNP SERPL-MCNC: 29 PG/ML
PLATELET # BLD AUTO: 315 THOUSANDS/UL (ref 149–390)
PMV BLD AUTO: 10.6 FL (ref 8.9–12.7)
POTASSIUM SERPL-SCNC: 3.7 MMOL/L (ref 3.5–5.3)
PROT SERPL-MCNC: 7.3 G/DL (ref 6.4–8.4)
PROTHROMBIN TIME: 13.8 SECONDS (ref 11.6–14.5)
RBC # BLD AUTO: 5.07 MILLION/UL (ref 3.81–5.12)
RH BLD: POSITIVE
SODIUM SERPL-SCNC: 139 MMOL/L (ref 135–147)
SPECIMEN EXPIRATION DATE: NORMAL
WBC # BLD AUTO: 4.55 THOUSAND/UL (ref 4.31–10.16)

## 2022-09-15 PROCEDURE — 83036 HEMOGLOBIN GLYCOSYLATED A1C: CPT

## 2022-09-15 PROCEDURE — 93005 ELECTROCARDIOGRAM TRACING: CPT

## 2022-09-15 PROCEDURE — 85025 COMPLETE CBC W/AUTO DIFF WBC: CPT

## 2022-09-15 PROCEDURE — 86900 BLOOD TYPING SEROLOGIC ABO: CPT

## 2022-09-15 PROCEDURE — 85610 PROTHROMBIN TIME: CPT

## 2022-09-15 PROCEDURE — 83880 ASSAY OF NATRIURETIC PEPTIDE: CPT

## 2022-09-15 PROCEDURE — 36415 COLL VENOUS BLD VENIPUNCTURE: CPT

## 2022-09-15 PROCEDURE — 86140 C-REACTIVE PROTEIN: CPT

## 2022-09-15 PROCEDURE — 86901 BLOOD TYPING SEROLOGIC RH(D): CPT

## 2022-09-15 PROCEDURE — 86850 RBC ANTIBODY SCREEN: CPT

## 2022-09-15 PROCEDURE — 85730 THROMBOPLASTIN TIME PARTIAL: CPT

## 2022-09-15 PROCEDURE — 80053 COMPREHEN METABOLIC PANEL: CPT

## 2022-09-16 PROBLEM — I27.20 PULMONARY HYPERTENSION (HCC): Status: RESOLVED | Noted: 2018-02-16 | Resolved: 2022-09-16

## 2022-09-16 LAB
ATRIAL RATE: 97 BPM
P AXIS: 43 DEGREES
PR INTERVAL: 128 MS
QRS AXIS: 65 DEGREES
QRSD INTERVAL: 84 MS
QT INTERVAL: 358 MS
QTC INTERVAL: 454 MS
T WAVE AXIS: 48 DEGREES
VENTRICULAR RATE: 97 BPM

## 2022-09-16 PROCEDURE — 93010 ELECTROCARDIOGRAM REPORT: CPT | Performed by: INTERNAL MEDICINE

## 2022-09-16 NOTE — PROGRESS NOTES
Internal Medicine Pre-Operative Evaluation:     Reason for Visit: Pre-operative Evaluation for Risk Stratification and Optimization    Patient ID: Karan Ramachandran is a 59 y o  female  Surgery: Arthroplasty of left knee  Referring Provider: Dr Izzy Snow      Primary osteoarthritis left knee   Failed conservative measures   Electing to undergo total joint arthroplasty    Pre-operative Optimization for planned surgery   Patient is surgically optimized for planned procedure   Patient meets preoperative quality goals as noted below   Recommendations as listed in PLAN section below  Anabel Garcia 3090 surgical nurse  navigator with any questions regarding preoperative plan or schedule   Follow up visit with [facility] medical team 1 week after discharge from surgery as scheduled    CKD   Level 3   Baseline 0 9-1 1   Avoid nephrotoxic medications   Avoid hypotension in the post operative setting   Previous gfr was 95 now down to 54   Had episode previously of this as well   Recommend repeat bmp well hydrated    Pulmonary HTN  · F/b Cardiology  · Cont sildenafil daily  · Hold torsemide and aldactone the day prior to surgery and the morning of surgery    Lupus  · Takes plaquenil  · F/b physician at [facility]  · They want her to stay on both her plaquenil and her ASA through surgery w/o interruption    · Surgery aware of ASA plan and ok with same    Obesity  · Recommend continued attempts at weight loss        Patient Active Problem List   Diagnosis    B12 deficiency    Chronic cough    Diffuse connective tissue disease ([facility]    Dyspnea    Edema    Essential hypertension    Hot flashes due to menopause    Long-term use of hydroxychloroquine    Other organ or system involvement in systemic lupus erythematosus (HCC)    Lupus anticoagulant disorder (HCC)    SOB (shortness of breath) on exertion    Sinus tachycardia    Secondary pulmonary hypertension    Post-nasal drip    Pes anserine bursitis    Positive HELENE (antinuclear antibody)    Other chronic pulmonary heart diseases    Osteoporosis    Night sweats    Patellofemoral disorder of right knee    Pes anserinus bursitis of right knee    Arthritis of right knee    Other tear of medial meniscus, current injury, right knee, initial encounter    Tear of medial meniscus of right knee, current    Chronic pain of right knee    Elevated serum creatinine    Hyperuricemia    Trochanteric bursitis of both hips    Undifferentiated connective tissue disease ([facility] )    Vitamin D deficiency    Right knee pain    Status post total right knee replacement    Left knee pain    Tear of medial meniscus of left knee, current    Primary osteoarthritis of left knee    Morbid obesity ([facility]    Leukopenia    Venous stasis of lower extremity    Acute pain of right knee        Plan:     1  Further preoperative workup as follows:   - none no further testing required may proceed with surgery    2  Medication Management/Recommendations:   - continue all current medicines through morning of surgery with sip of water except the following:  - hold diuretic / water pill  24 hours prior to surgery  - hold aspirin 7 days prior to surgery  - avoid use of NSAID such as motrin, advil, aleve for 7 days prior to surgery  - hold rheumatologic medicines as instructed by your rheumatologist  - hold all OTC herbal or nutritional supplements 7 days before surgery    3  Patient requires further consultation with:   No Consults Required    4  Discharge Planning / Barriers to Discharge  none identified - patients has post discharge therapy plan in place, transportation arranged for discharge day, adequate family support at home to assist with discharge to home      Recommendations to Proceed withSurgery    No contraindications to planned surgery      Subjective:           History of Present Illness:     Donzella Cogan is a 59 y o  female who presents to the office today for a preoperative consultation at the request of surgeon  The patient understands this is an elective procedure and not emergent  They are electing to undergo planned procedure with an understanding that all surgery has inherent risk  They have worked with their surgeon and failed conservative treatment measures  Today they present for preoperative risk assessment and recommendations for optimization in preparation for surgery  Pt seen in surgical optimization center for upcoming proposed surgery  They have failed previous conservative measures and have elected surgical intervention  Pt meets presurgical lab and BMI optimization goals, except Current GFR is <60  Her previous gfr was 95, I would recommend repeat bmp well hydrated prior to surgery  She has had an episode like this in the past and was seen by nephrology however it had improved  We will hold all nephrotoxic medications and monitor bmp closely in the post operative setting avoiding hypotension if at all possible  Upon interview questioning patient is able to perform greater than 4 METs workload in daily life without any reported cardio-pulmonary symptoms  Pt has lupus followed by physician group at [facility]  They have outlined the perioperative medication plan as it relates to her lupus meds        ROS: No TIA's or unusual headaches, no dysphagia  No prolonged cough  No dyspnea or chest pain on exertion  No abdominal pain, change in bowel habits, black or bloody stools  No urinary tract or BPH symptoms  Positive reported pain in arthritic joint  Positive difficulty with gait  No skin rashes or issues              Objective:      /80   Pulse (!) 111   Ht 5' 4 5" (1 638 m)   Wt 105 kg (231 lb 7 7 oz)   SpO2 98%   BMI 39 12 kg/m²       General Appearance: no distress, conversive  HEENT: PERRLA, conjuctiva normal; oropharynx clear; mucous membranes moist;   Neck:  Supple, no lymphadenopathy or thyromegaly  Lungs: CTA, normal respiratory effort, no retractions, expiratory effort normal  CV: regular rate and rhythm , PMI normal   ABD: soft non tender, no masses , no hepatic or splenomegaly  EXT: DP pulses intact, no lymphadenopathy, no edema  Skin: normal turgor, normal texture, no rash  Psych: affect normal, mood normal  Neuro: AAOx3        The following portions of the patient's history were reviewed and updated as appropriate: allergies, current medications, past family history, past medical history, past social history, past surgical history and problem list      Past History:       Past Medical History:   Diagnosis Date    HELENE positive     Anemia     Sildenafil causes decreased hematocrit    Anxiety 03/2020    CHF (congestive heart failure) [facility]     right heart failure related to pulm HTN lupus    Chronic kidney disease     borderline lab values    Chronic rhinitis 06/04/2012    [facility] 2012    Coronary artery disease 2018    Encephalitis     Lasted Assessed 10/18/2017    Gout 06/26/2018    Hypertension     Lupus anticoagulant disorder [facility]     Maxillary sinusitis     Lasted Assessed 10/18/2017    Night sweat     Osteopenia     Hip    Osteoporosis     Spine    Pneumonia     Last Assessed 10/18/2017    PONV (postoperative nausea and vomiting)     Pulmonary hypertension (HCC)     Seizures (HCC)     Only during Encephalitis    Shortness of breath     with exertion    Sinus tachycardia     Urinary incontinence     Past Surgical History:   Procedure Laterality Date    ARTHRODESIS      Hand: IP Joint    CHOLECYSTECTOMY      COLONOSCOPY      COLPOSCOPY      HYSTERECTOMY      Vaginal    JOINT REPLACEMENT Right     Knee replacement    KNEE SURGERY  2/2019    LAPAROSCOPIC ESOPHAGOGASTRIC FUNDOPLASTY  2008    [place] KNEE SCOPE,SINGLE MENISECTOMY Right 10/2/2018    Procedure: KNEE ARTHROSCOPY WITH PARTIAL MEDIAL MENISCECTOMY;  Surgeon: Kirstie Cesar DO;  [facility];  Service: Orthopedics    [facility] [address] MENISECTOMY Left 2019    Procedure: KNEE ARTHROSCOPIC MENISCECTOMY /MEDIAL; Chondyl medial and posterior;  Surgeon: Gadiel Wade DO;  [facility];  Service: Orthopedics    RI TOTAL KNEE ARTHROPLASTY Right 2019    Procedure: KNEE TOTAL ARTHROPLASTY AND ASSOCIATED PROCEDURES;  Surgeon: Gadiel Wade DO;  [facility];  Service: Orthopedics    REDUCTION MAMMAPLASTY      REPAIR ANKLE LIGAMENT Right     TONSILLECTOMY            Social History     Tobacco Use    Smoking status: Former Smoker     Packs/day: 0 00     Years: 0 00     Pack years: 0 00     Quit date: 2006     Years since quittin 6    Smokeless tobacco: Never Used   Vaping Use    Vaping Use: Never used   Substance Use Topics    Alcohol use: Yes     Alcohol/week: 0 0 standard drinks     Comment: socially    Drug use: No     Family History   Problem Relation Age of Onset    Uterine cancer Mother     Pancreatic cancer Mother 79    Diabetes Mother     Endometrial cancer Mother 77    Arthritis Mother    [employer] Cancer Mother         Pancreas, Uterine    Vision loss Mother     Heart disease Father         open heart surgery at age 48     Stroke Father         CVA    Hypertension Father     Atrial fibrillation Father     Hyperlipidemia Father     Arthritis Father     Rheum arthritis Father     No Known Problems Sister     No Known Problems Brother     Thyroid disease Maternal Grandmother     Asthma Daughter     Heart attack Maternal Grandfather     Heart attack Paternal [de-identified]     Skin cancer Paternal Grandfather     No Known Problems Sister     Thyroid disease Sister     Depression Sister     Osteoarthritis Sister     Hemochromatosis Brother    [employer] Migraines Daughter     Asthma Daughter     No Known Problems Maternal Aunt     Anuerysm Neg Hx     Clotting disorder Neg Hx     Heart failure Neg Hx           Allergies:      Allergies   Allergen Reactions    Dilantin [Phenytoin] Anaphylaxis and Rash  Penicillins Rash, Anaphylaxis, Dermatitis and Hives    Augmentin [Amoxicillin-Pot Clavulanate] Rash and Dermatitis        Current Medications:     Current Outpatient Medications   Medication Instructions    ascorbic acid (VITAMIN C) 500 mg, Oral, 2 times daily    aspirin (ECOTRIN LOW STRENGTH) 162 mg, Oral, Daily    Benlysta 200 MG/ML SOAJ Weekly on Fridays    cholecalciferol (VITAMIN D3) 2,000 Units, Oral, Daily (early morning)    cyanocobalamin 1,000 mcg, Intramuscular, Every 30 days    enoxaparin (LOVENOX) 40 mg, Subcutaneous, Daily, Starting after left total knee surgery    ferrous sulfate 324 mg, Oral, 2 times daily before meals    folic acid (FOLVITE) 1 mg, Oral, Daily    hydroxychloroquine (PLAQUENIL) 400 mg, Oral, Daily with breakfast, Dosage adjustments based on testing results  Takes 200 mg on odd days and takes 400 mg on even days    Multiple Vitamins-Minerals (multivitamin with minerals) tablet 1 tablet, Oral, Daily    sildenafil (REVATIO) 20 mg, Oral, 3 times daily    spironolactone (ALDACTONE) 25 mg, Oral, Daily    Syringe/Needle, Disp, (SecureSafe Syringe/Needle) 25G X 1-1/2" 1 ML MISC 1 Syringe, Does not apply, Every 30 days    torsemide (DEMADEX) 20 mg, Oral, Daily    triamcinolone (KENALOG) 0 1 % oral topical paste prn             PRE-OP WORKSHEET DATA    Assessment of Pre-Operative Risks     MLJ Quality Hard Stops:  BMI (<40) : Estimated body mass index is 39 12 kg/m² as calculated from the following:    Height as of this encounter: 5' 4 5" (1 638 m)  Weight as of this encounter: 105 kg (231 lb 7 7 oz)  Hgb ( >11): Lab Results   Component Value Date    HGB 15 1 09/15/2022     HbA1c (<7 0) :   Lab Results   Component Value Date    HGBA1C 5 2 09/15/2022     GFR (>60) (Less then 45 = Nephrology consult):  CrCl cannot be calculated (Patient's most recent lab result is older than the maximum 7 days allowed  )        Active Decompensated Chronic Conditions which would delay surgery  No acutely decompensated medical issues such as recent CVA, MI, new onset arrhythmia, severe aortic stenosis, CHF, uncontrolled COPD       Exercise Capacity: (if less the 4 mets consider functional assessment via cardiac stress testing or consultation)    · Able to walk 2 blocks without symptoms?: Yes  · Able to walk 1 flights without symptoms?: Yes    Assessment of intra and post operative respiratory, hemodynamic and thrombotic risks     Prior Anesthesia Reactions: No     Personal history of venous thromboembolic disease? No    History of steroid use > 5 mg for >2 weeks within last year? No    Cardiac Risk Estimation: per the Revised Cardiac Risk Index (Circ  100:1043, 1999),     The patient's risk factors for cardiac complications include :  none    Charmayne Borders has a in hospital cardiac risk of RCI RISK CLASS I (0 risk factors, risk of major cardiac compl  appr  0 5%) based on RCRI calculator          Pre-Op Data Reviewed:       Laboratory Results: I have personally reviewed the pertinent laboratory results/reports     EKG:I have personally reviewed pertinent reports    I personally reviewed and interpreted available tracings in the electronic medical record    OLD RECORDS: reviewed old records in the chart review section if EHR on day of visit      Previous cardiopulmonary studies within the past year:  · Echocardiogram: yes, ef 70%  · Cardiac Catheterization: no  · Stress Test: no      Time of visit including pre-visit chart review, visit and post-visit coordination of plan and care , review of pre-surgical lab work, preparation and time spent documenting note in electronic medical record, time spent face-to-face in physical examination answering patient questions: 60 minutes             [address] [place] [address]

## 2022-09-16 NOTE — PATIENT INSTRUCTIONS
Contact surgical nurse  navigator with any questions regarding preoperative plan or schedule  Follow up visit with [facility] medical team 1 week after discharge from surgery    Stop all over the counter supplements, herbal, naturopathic  medications for 1 week prior to surgery UNLESS prescribed by your surgeon  Hold NSAIDS (i e  advil, alleve, motrin, ibuprofen, celebrex) minimum 7 days prior to surgery  Recommend using Tylenol ( acetaminophen ) 500mg every eight hours during the first week post discharge in conjunction with any additional pain medicine prescribed by your surgeon  Use bowel medicines prescribed by your surgeon ( colace) daily post op during the first 1-2 weeks to avoid post operative constipation issues  Call [phone] with any post discharge concerns or medical issues  Hold torsemide/aldactone the day prior to surgery and the morning of surgery  Take only sildenafil ( Revatio) morning of surgery

## 2022-09-16 NOTE — H&P (VIEW-ONLY)
Internal Medicine Pre-Operative Evaluation:     Reason for Visit: Pre-operative Evaluation for Risk Stratification and Optimization    Patient ID: Radha Lancaster is a 59 y o  female  Surgery: Arthroplasty of left knee  Referring Provider: Dr Al Johansen      Primary osteoarthritis left knee   Failed conservative measures   Electing to undergo total joint arthroplasty    Pre-operative Optimization for planned surgery   Patient is surgically optimized for planned procedure   Patient meets preoperative quality goals as noted below   Recommendations as listed in PLAN section below  Anabel Garcia 3201 surgical nurse  navigator with any questions regarding preoperative plan or schedule   Follow up visit with [facility] medical team 1 week after discharge from surgery as scheduled    CKD   Level 3   Baseline 0 9-1 1   Avoid nephrotoxic medications   Avoid hypotension in the post operative setting   Previous gfr was 95 now down to 54   Had episode previously of this as well   Recommend repeat bmp well hydrated    Pulmonary HTN  · F/b Cardiology  · Cont sildenafil daily  · Hold torsemide and aldactone the day prior to surgery and the morning of surgery    Lupus  · Takes plaquenil  · F/b physician at [facility]  · They want her to stay on both her plaquenil and her ASA through surgery w/o interruption    · Surgery aware of ASA plan and ok with same    Obesity  · Recommend continued attempts at weight loss        Patient Active Problem List   Diagnosis    B12 deficiency    Chronic cough    Diffuse connective tissue disease ([facility] [address]    Dyspnea    Edema    Essential hypertension    Hot flashes due to menopause    Long-term use of hydroxychloroquine    Other organ or system involvement in systemic lupus erythematosus (HCC)    Lupus anticoagulant disorder (HCC)    SOB (shortness of breath) on exertion    Sinus tachycardia    Secondary pulmonary hypertension    Post-nasal drip    Pes anserine bursitis    Positive HELENE (antinuclear antibody)    Other chronic pulmonary heart diseases    Osteoporosis    Night sweats    Patellofemoral disorder of right knee    Pes anserinus bursitis of right knee    Arthritis of right knee    Other tear of medial meniscus, current injury, right knee, initial encounter    Tear of medial meniscus of right knee, current    Chronic pain of right knee    Elevated serum creatinine    Hyperuricemia    Trochanteric bursitis of both hips    [facility] connective tissue disease [facility]    Vitamin D deficiency    Right knee pain    Status post total right knee replacement    Left knee pain    Tear of medial meniscus of left knee, current    Primary osteoarthritis of left knee    Morbid obesity ([facility] [address] )    Leukopenia    Venous stasis of lower extremity    Acute pain of right knee        Plan:     1  Further preoperative workup as follows:   - none no further testing required may proceed with surgery    2  Medication Management/Recommendations:   - continue all current medicines through morning of surgery with sip of water except the following:  - hold diuretic / water pill  24 hours prior to surgery  - hold aspirin 7 days prior to surgery  - avoid use of NSAID such as motrin, advil, aleve for 7 days prior to surgery  - hold rheumatologic medicines as instructed by your rheumatologist  - hold all OTC herbal or nutritional supplements 7 days before surgery    3  Patient requires further consultation with:   No Consults Required    4  Discharge Planning / Barriers to Discharge  none identified - patients has post discharge therapy plan in place, transportation arranged for discharge day, adequate family support at home to assist with discharge to home      Recommendations to Proceed withSurgery    No contraindications to planned surgery      Subjective:           History of Present Illness:     Prasanth Ralph is a 59 y o  female who presents to the office today for a preoperative consultation at the request of surgeon  The patient understands this is an elective procedure and not emergent  They are electing to undergo planned procedure with an understanding that all surgery has inherent risk  They have worked with their surgeon and failed conservative treatment measures  Today they present for preoperative risk assessment and recommendations for optimization in preparation for surgery  Pt seen in surgical optimization center for upcoming proposed surgery  They have failed previous conservative measures and have elected surgical intervention  Pt meets presurgical lab and BMI optimization goals, except Current GFR is <60  Her previous gfr was 95, I would recommend repeat bmp well hydrated prior to surgery  She has had an episode like this in the past and was seen by nephrology however it had improved  We will hold all nephrotoxic medications and monitor bmp closely in the post operative setting avoiding hypotension if at all possible  Upon interview questioning patient is able to perform greater than 4 METs workload in daily life without any reported cardio-pulmonary symptoms  Pt has lupus followed by physician group at [facility]  They have outlined the perioperative medication plan as it relates to her lupus meds        ROS: No TIA's or unusual headaches, no dysphagia  No prolonged cough  No dyspnea or chest pain on exertion  No abdominal pain, change in bowel habits, black or bloody stools  No urinary tract or BPH symptoms  Positive reported pain in arthritic joint  Positive difficulty with gait  No skin rashes or issues              Objective:      /80   Pulse (!) 111   Ht 5' 4 5" (1 638 m)   Wt 105 kg (231 lb 7 7 oz)   SpO2 98%   BMI 39 12 kg/m²       General Appearance: no distress, conversive  HEENT: PERRLA, conjuctiva normal; oropharynx clear; mucous membranes moist;   Neck:  Supple, no lymphadenopathy or thyromegaly  Lungs: CTA, normal respiratory effort, no retractions, expiratory effort normal  CV: regular rate and rhythm , PMI normal   ABD: soft non tender, no masses , no hepatic or splenomegaly  EXT: DP pulses intact, no lymphadenopathy, no edema  Skin: normal turgor, normal texture, no rash  Psych: affect normal, mood normal  Neuro: AAOx3        The following portions of the patient's history were reviewed and updated as appropriate: allergies, current medications, past family history, past medical history, past social history, past surgical history and problem list      Past History:       Past Medical History:   Diagnosis Date    HELENE positive     Anemia     Sildenafil causes decreased hematocrit    Anxiety 03/2020    CHF (congestive heart failure) ([facility] [address] )     right heart failure related to pulm HTN lupus    Chronic kidney disease     borderline lab values    Chronic rhinitis 06/04/2012    [facility]    Coronary artery disease 2018    Encephalitis     Lasted Assessed 10/18/2017    Gout 06/26/2018    Hypertension     [facility]     Maxillary sinusitis     Lasted Assessed 10/18/2017    Night sweat     Osteopenia     Hip    Osteoporosis     Spine    Pneumonia     Last Assessed 10/18/2017    PONV (postoperative nausea and vomiting)     Pulmonary hypertension (HCC)     Seizures (HCC)     Only during Encephalitis    Shortness of breath     with exertion    Sinus tachycardia     Urinary incontinence     Past Surgical History:   Procedure Laterality Date    ARTHRODESIS      Hand: IP Joint    CHOLECYSTECTOMY      COLONOSCOPY      COLPOSCOPY      HYSTERECTOMY      Vaginal    JOINT REPLACEMENT Right     Knee replacement    KNEE SURGERY  2/2019    LAPAROSCOPIC ESOPHAGOGASTRIC FUNDOPLASTY  2008    [place] KNEE SCOPE,SINGLE MENISECTOMY Right 10/2/2018    Procedure: KNEE ARTHROSCOPY WITH PARTIAL MEDIAL MENISCECTOMY;  Surgeon: Belkys De Leon DO;  [facility];  Service: Orthopedics    [facility] [address] MENISECTOMY Left 2019    Procedure: KNEE ARTHROSCOPIC MENISCECTOMY /MEDIAL; Chondyl medial and posterior;  Surgeon: Padmini Morrissey DO;  [facility];  Service: Orthopedics    TX TOTAL KNEE ARTHROPLASTY Right 2019    Procedure: KNEE TOTAL ARTHROPLASTY AND ASSOCIATED PROCEDURES;  Surgeon: Padmini Morrissey DO;  [facility];  Service: Orthopedics    REDUCTION MAMMAPLASTY      REPAIR ANKLE LIGAMENT Right     TONSILLECTOMY            Social History     Tobacco Use    Smoking status: Former Smoker     Packs/day: 0 00     Years: 0 00     Pack years: 0 00     Quit date: 2006     Years since quittin 6    Smokeless tobacco: Never Used   Vaping Use    Vaping Use: Never used   Substance Use Topics    Alcohol use: Yes     Alcohol/week: 0 0 standard drinks     Comment: socially    Drug use: No     Family History   Problem Relation Age of Onset    Uterine cancer Mother     Pancreatic cancer Mother 79    Diabetes Mother     Endometrial cancer Mother 77    Arthritis Mother    Tomotilia Coop Cancer Mother         Pancreas, Uterine    Vision loss Mother     Heart disease Father         open heart surgery at age 48     Stroke Father         CVA    Hypertension Father     Atrial fibrillation Father     Hyperlipidemia Father     Arthritis Father     Rheum arthritis Father     No Known Problems Sister     No Known Problems Brother     Thyroid disease Maternal Grandmother     Asthma Daughter     Heart attack Maternal Grandfather     Heart attack Paternal [de-identified]     Skin cancer Paternal Grandfather     No Known Problems Sister     Thyroid disease Sister     Depression Sister     Osteoarthritis Sister     Hemochromatosis Brother    Tomotilia Coop Migraines Daughter     Asthma Daughter     No Known Problems Maternal Aunt     Anuerysm Neg Hx     Clotting disorder Neg Hx     Heart failure Neg Hx           Allergies:      Allergies   Allergen Reactions    Dilantin [Phenytoin] Anaphylaxis and Rash  Penicillins Rash, Anaphylaxis, Dermatitis and Hives    Augmentin [Amoxicillin-Pot Clavulanate] Rash and Dermatitis        Current Medications:     Current Outpatient Medications   Medication Instructions    ascorbic acid (VITAMIN C) 500 mg, Oral, 2 times daily    aspirin (ECOTRIN LOW STRENGTH) 162 mg, Oral, Daily    Benlysta 200 MG/ML SOAJ Weekly on Fridays    cholecalciferol (VITAMIN D3) 2,000 Units, Oral, Daily (early morning)    cyanocobalamin 1,000 mcg, Intramuscular, Every 30 days    enoxaparin (LOVENOX) 40 mg, Subcutaneous, Daily, Starting after left total knee surgery    ferrous sulfate 324 mg, Oral, 2 times daily before meals    folic acid (FOLVITE) 1 mg, Oral, Daily    hydroxychloroquine (PLAQUENIL) 400 mg, Oral, Daily with breakfast, Dosage adjustments based on testing results  Takes 200 mg on odd days and takes 400 mg on even days    Multiple Vitamins-Minerals (multivitamin with minerals) tablet 1 tablet, Oral, Daily    sildenafil (REVATIO) 20 mg, Oral, 3 times daily    spironolactone (ALDACTONE) 25 mg, Oral, Daily    Syringe/Needle, Disp, (SecureSafe Syringe/Needle) 25G X 1-1/2" 1 ML MISC 1 Syringe, Does not apply, Every 30 days    torsemide (DEMADEX) 20 mg, Oral, Daily    triamcinolone (KENALOG) 0 1 % oral topical paste prn             PRE-OP WORKSHEET DATA    Assessment of Pre-Operative Risks     MLJ Quality Hard Stops:  BMI (<40) : Estimated body mass index is 39 12 kg/m² as calculated from the following:    Height as of this encounter: 5' 4 5" (1 638 m)  Weight as of this encounter: 105 kg (231 lb 7 7 oz)  Hgb ( >11): Lab Results   Component Value Date    HGB 15 1 09/15/2022     HbA1c (<7 0) :   Lab Results   Component Value Date    HGBA1C 5 2 09/15/2022     GFR (>60) (Less then 45 = Nephrology consult):  CrCl cannot be calculated (Patient's most recent lab result is older than the maximum 7 days allowed  )        Active Decompensated Chronic Conditions which would delay surgery  No acutely decompensated medical issues such as recent CVA, MI, new onset arrhythmia, severe aortic stenosis, CHF, uncontrolled COPD       Exercise Capacity: (if less the 4 mets consider functional assessment via cardiac stress testing or consultation)    · Able to walk 2 blocks without symptoms?: Yes  · Able to walk 1 flights without symptoms?: Yes    Assessment of intra and post operative respiratory, hemodynamic and thrombotic risks     Prior Anesthesia Reactions: No     Personal history of venous thromboembolic disease? No    History of steroid use > 5 mg for >2 weeks within last year? No    Cardiac Risk Estimation: per the Revised Cardiac Risk Index (Circ  [id number], 1999),     The patient's risk factors for cardiac complications include :  none    Al Jay has a in hospital cardiac risk of RCI RISK CLASS I (0 risk factors, risk of major cardiac compl  appr  0 5%) based on RCRI calculator          Pre-Op Data Reviewed:       Laboratory Results: I have personally reviewed the pertinent laboratory results/reports     EKG:I have personally reviewed pertinent reports    I personally reviewed and interpreted available tracings in the electronic medical record    OLD RECORDS: reviewed old records in the chart review section if EHR on day of visit      Previous cardiopulmonary studies within the past year:  · Echocardiogram: yes, ef 70%  · Cardiac Catheterization: no  · Stress Test: no      Time of visit including pre-visit chart review, visit and post-visit coordination of plan and care , review of pre-surgical lab work, preparation and time spent documenting note in electronic medical record, time spent face-to-face in physical examination answering patient questions: 60 minutes             [address] [place] [address]

## 2022-09-26 ENCOUNTER — TELEPHONE (OUTPATIENT)
Dept: CARDIOLOGY CLINIC | Facility: CLINIC | Age: 57
End: 2022-09-26

## 2022-09-26 ENCOUNTER — APPOINTMENT (OUTPATIENT)
Dept: LAB | Facility: CLINIC | Age: 57
End: 2022-09-26
Payer: COMMERCIAL

## 2022-09-26 ENCOUNTER — OFFICE VISIT (OUTPATIENT)
Dept: INTERNAL MEDICINE CLINIC | Facility: CLINIC | Age: 57
End: 2022-09-26
Payer: MEDICARE

## 2022-09-26 VITALS
HEIGHT: 65 IN | OXYGEN SATURATION: 98 % | BODY MASS INDEX: 38.57 KG/M2 | DIASTOLIC BLOOD PRESSURE: 80 MMHG | SYSTOLIC BLOOD PRESSURE: 121 MMHG | WEIGHT: 231.48 LBS | HEART RATE: 111 BPM

## 2022-09-26 DIAGNOSIS — E55.9 VITAMIN D INSUFFICIENCY: ICD-10-CM

## 2022-09-26 DIAGNOSIS — Z23 NEED FOR IMMUNIZATION AGAINST INFLUENZA: ICD-10-CM

## 2022-09-26 DIAGNOSIS — E66.01 MORBID OBESITY (HCC): ICD-10-CM

## 2022-09-26 DIAGNOSIS — Z01.818 PRE-OPERATIVE CLEARANCE: ICD-10-CM

## 2022-09-26 DIAGNOSIS — Z51.81 THERAPEUTIC DRUG MONITORING: ICD-10-CM

## 2022-09-26 DIAGNOSIS — I27.20 PULMONARY HYPERTENSION (HCC): ICD-10-CM

## 2022-09-26 DIAGNOSIS — M17.12 PRIMARY OSTEOARTHRITIS OF LEFT KNEE: ICD-10-CM

## 2022-09-26 DIAGNOSIS — Z79.899 LONG-TERM USE OF HYDROXYCHLOROQUINE: ICD-10-CM

## 2022-09-26 DIAGNOSIS — I10 ESSENTIAL HYPERTENSION: ICD-10-CM

## 2022-09-26 DIAGNOSIS — R05.3 CHRONIC COUGH: Primary | ICD-10-CM

## 2022-09-26 DIAGNOSIS — R76.0 ANTIPHOSPHOLIPID ANTIBODY POSITIVE: ICD-10-CM

## 2022-09-26 DIAGNOSIS — M79.7 FIBROMYALGIA: ICD-10-CM

## 2022-09-26 DIAGNOSIS — Z91.89 AT RISK FOR CARDIOVASCULAR EVENT: ICD-10-CM

## 2022-09-26 DIAGNOSIS — M32.19 OTHER ORGAN OR SYSTEM INVOLVEMENT IN SYSTEMIC LUPUS ERYTHEMATOSUS (HCC): ICD-10-CM

## 2022-09-26 DIAGNOSIS — M79.89 LEG SWELLING: ICD-10-CM

## 2022-09-26 DIAGNOSIS — R60.9 EDEMA, UNSPECIFIED TYPE: ICD-10-CM

## 2022-09-26 DIAGNOSIS — D84.9 IMMUNOSUPPRESSION (HCC): ICD-10-CM

## 2022-09-26 DIAGNOSIS — D84.9 IMMUNOCOMPROMISED (HCC): ICD-10-CM

## 2022-09-26 DIAGNOSIS — M17.12 ARTHRITIS OF LEFT KNEE: ICD-10-CM

## 2022-09-26 DIAGNOSIS — M32.19 OTHER SYSTEMIC LUPUS ERYTHEMATOSUS WITH OTHER ORGAN INVOLVEMENT (HCC): ICD-10-CM

## 2022-09-26 LAB
25(OH)D3 SERPL-MCNC: 51.3 NG/ML (ref 30–100)
ALBUMIN SERPL BCP-MCNC: 3.6 G/DL (ref 3.5–5)
ALP SERPL-CCNC: 108 U/L (ref 46–116)
ALT SERPL W P-5'-P-CCNC: 25 U/L (ref 12–78)
ANION GAP SERPL CALCULATED.3IONS-SCNC: 4 MMOL/L (ref 4–13)
AST SERPL W P-5'-P-CCNC: 17 U/L (ref 5–45)
BILIRUB SERPL-MCNC: 0.66 MG/DL (ref 0.2–1)
BUN SERPL-MCNC: 22 MG/DL (ref 5–25)
C3 SERPL-MCNC: 142 MG/DL (ref 90–180)
C4 SERPL-MCNC: 30 MG/DL (ref 10–40)
CALCIUM SERPL-MCNC: 9.2 MG/DL (ref 8.3–10.1)
CHLORIDE SERPL-SCNC: 106 MMOL/L (ref 96–108)
CO2 SERPL-SCNC: 29 MMOL/L (ref 21–32)
CREAT SERPL-MCNC: 1.06 MG/DL (ref 0.6–1.3)
ERYTHROCYTE [SEDIMENTATION RATE] IN BLOOD: 17 MM/HOUR (ref 0–29)
GFR SERPL CREATININE-BSD FRML MDRD: 57 ML/MIN/1.73SQ M
GLUCOSE SERPL-MCNC: 111 MG/DL (ref 65–140)
POTASSIUM SERPL-SCNC: 3.3 MMOL/L (ref 3.5–5.3)
PROT SERPL-MCNC: 7.2 G/DL (ref 6.4–8.4)
SODIUM SERPL-SCNC: 139 MMOL/L (ref 135–147)

## 2022-09-26 PROCEDURE — 80220 DRUG ASY HYDROXYCHLOROQUINE: CPT

## 2022-09-26 PROCEDURE — 82306 VITAMIN D 25 HYDROXY: CPT

## 2022-09-26 PROCEDURE — 85613 RUSSELL VIPER VENOM DILUTED: CPT

## 2022-09-26 PROCEDURE — 86225 DNA ANTIBODY NATIVE: CPT

## 2022-09-26 PROCEDURE — 85652 RBC SED RATE AUTOMATED: CPT

## 2022-09-26 PROCEDURE — 80053 COMPREHEN METABOLIC PANEL: CPT

## 2022-09-26 PROCEDURE — 99205 OFFICE O/P NEW HI 60 MIN: CPT | Performed by: INTERNAL MEDICINE

## 2022-09-26 PROCEDURE — 36415 COLL VENOUS BLD VENIPUNCTURE: CPT

## 2022-09-26 PROCEDURE — 86160 COMPLEMENT ANTIGEN: CPT

## 2022-09-26 PROCEDURE — 86147 CARDIOLIPIN ANTIBODY EA IG: CPT

## 2022-09-26 NOTE — TELEPHONE ENCOUNTER
BW had BW done today for another practice and stated the potassium is 3 3  She is currently taking spironolactone 25 mg and torsemide 20 mg daily  She does not typically include potassium into her diet, but will start eating bananas and stated she started protein drinks with additional potassium  Pt stated she choked on potassium tablets before and does not wish to start taking them again

## 2022-09-27 DIAGNOSIS — M17.12 ARTHRITIS OF LEFT KNEE: ICD-10-CM

## 2022-09-27 LAB
CARDIOLIPIN IGA SER IA-ACNC: 1.8
CARDIOLIPIN IGG SER IA-ACNC: 1.3
CARDIOLIPIN IGM SER IA-ACNC: 1.8
DSDNA AB SER-ACNC: 1 IU/ML (ref 0–9)

## 2022-09-27 RX ORDER — FOLIC ACID 1 MG/1
TABLET ORAL
Qty: 90 TABLET | Refills: 0 | Status: SHIPPED | OUTPATIENT
Start: 2022-09-27

## 2022-09-28 LAB
DRVVT IMM 1:2 NP PPP: 47.3 SEC (ref 0–40.4)
DRVVT SCREEN TO CONFIRM RATIO: 1.4 RATIO (ref 0.8–1.2)
LA PPP-IMP: ABNORMAL
SCREEN APTT: 46.6 SEC (ref 0–51.9)
SCREEN DRVVT: 52.8 SEC (ref 0–47)

## 2022-09-30 ENCOUNTER — VBI (OUTPATIENT)
Dept: ADMINISTRATIVE | Facility: OTHER | Age: 57
End: 2022-09-30

## 2022-09-30 PROCEDURE — 99447 NTRPROF PH1/NTRNET/EHR 11-20: CPT | Performed by: ANESTHESIOLOGY

## 2022-10-01 NOTE — TELEMEDICINE
Contacted Blanca Baez to discuss Perioperative Autologous Blood options for upcoming TKA  I had previously discussed PAD (preoperative autologous donation) and JUSTIN (acute normovolemic hemodilution) with her and reviewed data with her surgeon Dr Selvin Lang   We unanimously agree with the below recommendation and logic  We reviewed her last experience in which she has preoperative autologous blood donation in 2019  Baseline Hgb 12 8, ori Hgb 9 9 for which she received her PAD unit  This likely under represents her degree of anemia given donation occurred after CBC and the transfusion occurred above typical threshold most likely to prevent unit waste  For this event, she has a baseline Hgb 15 1 and ori Hgb is typically -3 0 g/dL based on unpublished internal data  As such, I do not think for a procedure with a <1% transfusion rate PAD or JUSTIN is indicated even if she will be bridging with enoxaparin and continuing aspirin  I think the risk of JUSTIN blood clotting or becoming unable to be returned exceeds any benefit particularly in that achieving appropriate therapeutic dose of JUSTIN will be challenging in light of her degree of Pulmonary Hypertension and HFpEF  She is aware to discuss mode of anesthesia with her anesthesiologist on day-of-surgery (Spinal compared to [place] is associated with lower EBL and risk of transfusion), antifibrinolytic use is planned, and if a transfusion is indicated, she will received "community" blood  Time spent discussing this with Ms Joseph 15 minutes      Junior Zarco Problem: Pain:  Goal: Pain level will decrease  Description  Pain level will decrease  4/17/2019 1005 by Arturo Dorsey RN  Outcome: Ongoing  4/17/2019 0256 by Brennen Chambers RN  Outcome: Ongoing  Goal: Control of acute pain  Description  Control of acute pain  Outcome: Ongoing  Goal: Control of chronic pain  Description  Control of chronic pain  Outcome: Ongoing  Note:   Pt has chronic back pain in addition to new abdominal pain from hernia repair two days ago. Continue IV morphine as needed. Problem: Risk for Impaired Skin Integrity  Goal: Tissue integrity - skin and mucous membranes  Description  Structural intactness and normal physiological function of skin and  mucous membranes. Outcome: Ongoing  Note:   Lap incisions dry and intact. Problem: Nausea/Vomiting:  Goal: Absence of nausea/vomiting  Description  Absence of nausea/vomiting  4/17/2019 1005 by Arturo Dorsey RN  Outcome: Ongoing  Note:   Continue IV Phenergan and Zofran as needed.   4/16/2019 2007 by Brennen Chambers RN  Outcome: Ongoing     Problem: Cardiovascular  Goal: Hemodynamic stability  4/17/2019 1005 by Arturo Dorsey RN  Outcome: Ongoing  Note:   VSS.  4/17/2019 0506 by Brennen Chambers RN  Outcome: Ongoing  4/16/2019 2007 by Brennen Chambers RN  Outcome: Ongoing

## 2022-10-05 ENCOUNTER — ANESTHESIA EVENT (OUTPATIENT)
Dept: PERIOP | Facility: HOSPITAL | Age: 57
End: 2022-10-05
Payer: MEDICARE

## 2022-10-06 ENCOUNTER — HOSPITAL ENCOUNTER (OUTPATIENT)
Facility: HOSPITAL | Age: 57
Setting detail: OUTPATIENT SURGERY
Discharge: HOME | End: 2022-10-07
Attending: ORTHOPAEDIC SURGERY | Admitting: ORTHOPAEDIC SURGERY
Payer: MEDICARE

## 2022-10-06 ENCOUNTER — ANESTHESIA (OUTPATIENT)
Dept: PERIOP | Facility: HOSPITAL | Age: 57
End: 2022-10-06
Payer: MEDICARE

## 2022-10-06 DIAGNOSIS — Z96.652 STATUS POST TOTAL KNEE REPLACEMENT USING CEMENT, LEFT: Primary | ICD-10-CM

## 2022-10-06 DIAGNOSIS — I27.20 PULMONARY HYPERTENSION (HCC): ICD-10-CM

## 2022-10-06 DIAGNOSIS — M35.9 DIFFUSE CONNECTIVE TISSUE DISEASE (HCC): ICD-10-CM

## 2022-10-06 DIAGNOSIS — R06.02 SOB (SHORTNESS OF BREATH) ON EXERTION: ICD-10-CM

## 2022-10-06 DIAGNOSIS — Z98.890 PONV (POSTOPERATIVE NAUSEA AND VOMITING): ICD-10-CM

## 2022-10-06 DIAGNOSIS — M17.12 PRIMARY OSTEOARTHRITIS OF LEFT KNEE: ICD-10-CM

## 2022-10-06 DIAGNOSIS — R11.2 PONV (POSTOPERATIVE NAUSEA AND VOMITING): ICD-10-CM

## 2022-10-06 DIAGNOSIS — N18.31 STAGE 3A CHRONIC KIDNEY DISEASE (HCC): ICD-10-CM

## 2022-10-06 PROBLEM — E66.9 OBESITY: Status: ACTIVE | Noted: 2020-10-08

## 2022-10-06 PROCEDURE — C1713 ANCHOR/SCREW BN/BN,TIS/BN: HCPCS | Performed by: ORTHOPAEDIC SURGERY

## 2022-10-06 PROCEDURE — 27447 TOTAL KNEE ARTHROPLASTY: CPT | Performed by: PHYSICIAN ASSISTANT

## 2022-10-06 PROCEDURE — 99218 PR INITIAL OBSERVATION CARE/DAY 30 MINUTES: CPT | Performed by: INTERNAL MEDICINE

## 2022-10-06 PROCEDURE — C1776 JOINT DEVICE (IMPLANTABLE): HCPCS | Performed by: ORTHOPAEDIC SURGERY

## 2022-10-06 PROCEDURE — 27447 TOTAL KNEE ARTHROPLASTY: CPT | Performed by: ORTHOPAEDIC SURGERY

## 2022-10-06 PROCEDURE — 97163 PT EVAL HIGH COMPLEX 45 MIN: CPT

## 2022-10-06 PROCEDURE — 97535 SELF CARE MNGMENT TRAINING: CPT

## 2022-10-06 PROCEDURE — C9290 INJ, BUPIVACAINE LIPOSOME: HCPCS | Performed by: STUDENT IN AN ORGANIZED HEALTH CARE EDUCATION/TRAINING PROGRAM

## 2022-10-06 DEVICE — SMARTSET HIGH PERFORMANCE MV MEDIUM VISCOSITY BONE CEMENT 40G
Type: IMPLANTABLE DEVICE | Site: KNEE | Status: FUNCTIONAL
Brand: SMARTSET

## 2022-10-06 DEVICE — ATTUNE KNEE SYSTEM FEMORAL POSTERIOR STABILIZED NARROW SIZE 5N LEFT CEMENTED
Type: IMPLANTABLE DEVICE | Site: KNEE | Status: FUNCTIONAL
Brand: ATTUNE

## 2022-10-06 DEVICE — ATTUNE KNEE SYSTEM TIBIAL INSERT ROTATING PLATFORM POSTERIOR STABILIZED 5 7MM AOX
Type: IMPLANTABLE DEVICE | Site: KNEE | Status: FUNCTIONAL
Brand: ATTUNE

## 2022-10-06 DEVICE — ATTUNE PATELLA MEDIALIZED DOME 35MM CEMENTED AOX
Type: IMPLANTABLE DEVICE | Site: KNEE | Status: FUNCTIONAL
Brand: ATTUNE

## 2022-10-06 DEVICE — ATTUNE KNEE SYSTEM TIBIAL BASE ROTATING PLATFORM SIZE 4 CEMENTED
Type: IMPLANTABLE DEVICE | Site: KNEE | Status: FUNCTIONAL
Brand: ATTUNE

## 2022-10-06 RX ORDER — SPIRONOLACTONE 25 MG/1
25 TABLET ORAL
Status: DISCONTINUED | OUTPATIENT
Start: 2022-10-07 | End: 2022-10-07 | Stop reason: HOSPADM

## 2022-10-06 RX ORDER — ENOXAPARIN SODIUM 100 MG/ML
40 INJECTION SUBCUTANEOUS ONCE
Status: COMPLETED | OUTPATIENT
Start: 2022-10-06 | End: 2022-10-06

## 2022-10-06 RX ORDER — ONDANSETRON 2 MG/ML
4 INJECTION INTRAMUSCULAR; INTRAVENOUS ONCE AS NEEDED
Status: DISCONTINUED | OUTPATIENT
Start: 2022-10-06 | End: 2022-10-06 | Stop reason: HOSPADM

## 2022-10-06 RX ORDER — PROPOFOL 10 MG/ML
INJECTION, EMULSION INTRAVENOUS CONTINUOUS PRN
Status: DISCONTINUED | OUTPATIENT
Start: 2022-10-06 | End: 2022-10-06

## 2022-10-06 RX ORDER — SODIUM CHLORIDE, SODIUM LACTATE, POTASSIUM CHLORIDE, CALCIUM CHLORIDE 600; 310; 30; 20 MG/100ML; MG/100ML; MG/100ML; MG/100ML
1.5 INJECTION, SOLUTION INTRAVENOUS CONTINUOUS
Status: DISCONTINUED | OUTPATIENT
Start: 2022-10-06 | End: 2022-10-07 | Stop reason: HOSPADM

## 2022-10-06 RX ORDER — PROPOFOL 10 MG/ML
INJECTION, EMULSION INTRAVENOUS AS NEEDED
Status: DISCONTINUED | OUTPATIENT
Start: 2022-10-06 | End: 2022-10-06

## 2022-10-06 RX ORDER — GLYCOPYRROLATE 0.2 MG/ML
INJECTION INTRAMUSCULAR; INTRAVENOUS AS NEEDED
Status: DISCONTINUED | OUTPATIENT
Start: 2022-10-06 | End: 2022-10-06

## 2022-10-06 RX ORDER — ASCORBIC ACID 500 MG
500 TABLET ORAL 2 TIMES DAILY
Status: DISCONTINUED | OUTPATIENT
Start: 2022-10-06 | End: 2022-10-07 | Stop reason: HOSPADM

## 2022-10-06 RX ORDER — ONDANSETRON 2 MG/ML
4 INJECTION INTRAMUSCULAR; INTRAVENOUS ONCE
Status: COMPLETED | OUTPATIENT
Start: 2022-10-06 | End: 2022-10-06

## 2022-10-06 RX ORDER — CYANOCOBALAMIN 1000 UG/ML
1000 INJECTION, SOLUTION INTRAMUSCULAR; SUBCUTANEOUS
Status: DISCONTINUED | OUTPATIENT
Start: 2022-10-12 | End: 2022-10-07 | Stop reason: HOSPADM

## 2022-10-06 RX ORDER — DEXAMETHASONE SODIUM PHOSPHATE 10 MG/ML
INJECTION, SOLUTION INTRAMUSCULAR; INTRAVENOUS AS NEEDED
Status: DISCONTINUED | OUTPATIENT
Start: 2022-10-06 | End: 2022-10-06

## 2022-10-06 RX ORDER — MAGNESIUM HYDROXIDE 1200 MG/15ML
LIQUID ORAL AS NEEDED
Status: DISCONTINUED | OUTPATIENT
Start: 2022-10-06 | End: 2022-10-06 | Stop reason: HOSPADM

## 2022-10-06 RX ORDER — CHLORHEXIDINE GLUCONATE 4 G/100ML
SOLUTION TOPICAL DAILY PRN
Status: DISCONTINUED | OUTPATIENT
Start: 2022-10-06 | End: 2022-10-06

## 2022-10-06 RX ORDER — PROMETHAZINE HYDROCHLORIDE 25 MG/ML
12.5 INJECTION, SOLUTION INTRAMUSCULAR; INTRAVENOUS
Status: DISCONTINUED | OUTPATIENT
Start: 2022-10-06 | End: 2022-10-06 | Stop reason: HOSPADM

## 2022-10-06 RX ORDER — ONDANSETRON 2 MG/ML
INJECTION INTRAMUSCULAR; INTRAVENOUS AS NEEDED
Status: DISCONTINUED | OUTPATIENT
Start: 2022-10-06 | End: 2022-10-06

## 2022-10-06 RX ORDER — FERROUS SULFATE 325(65) MG
325 TABLET ORAL 2 TIMES DAILY WITH MEALS
Refills: 1 | Status: DISCONTINUED | OUTPATIENT
Start: 2022-10-06 | End: 2022-10-07 | Stop reason: HOSPADM

## 2022-10-06 RX ORDER — SENNOSIDES 8.6 MG
1 TABLET ORAL DAILY
Status: DISCONTINUED | OUTPATIENT
Start: 2022-10-06 | End: 2022-10-07 | Stop reason: HOSPADM

## 2022-10-06 RX ORDER — LANOLIN ALCOHOL/MO/W.PET/CERES
800 CREAM (GRAM) TOPICAL DAILY
Status: DISCONTINUED | OUTPATIENT
Start: 2022-10-06 | End: 2022-10-07 | Stop reason: HOSPADM

## 2022-10-06 RX ORDER — HYDROXYCHLOROQUINE SULFATE 200 MG/1
400 TABLET, FILM COATED ORAL
Status: DISCONTINUED | OUTPATIENT
Start: 2022-10-07 | End: 2022-10-07 | Stop reason: HOSPADM

## 2022-10-06 RX ORDER — HYDROMORPHONE HCL/PF 1 MG/ML
0.5 SYRINGE (ML) INJECTION
Status: DISCONTINUED | OUTPATIENT
Start: 2022-10-06 | End: 2022-10-06 | Stop reason: HOSPADM

## 2022-10-06 RX ORDER — SILDENAFIL CITRATE 20 MG/1
20 TABLET ORAL 3 TIMES DAILY
Status: DISCONTINUED | OUTPATIENT
Start: 2022-10-06 | End: 2022-10-07 | Stop reason: HOSPADM

## 2022-10-06 RX ORDER — MELATONIN
2000
Status: DISCONTINUED | OUTPATIENT
Start: 2022-10-07 | End: 2022-10-07 | Stop reason: HOSPADM

## 2022-10-06 RX ORDER — ENOXAPARIN SODIUM 100 MG/ML
40 INJECTION SUBCUTANEOUS DAILY
Status: DISCONTINUED | OUTPATIENT
Start: 2022-10-07 | End: 2022-10-07 | Stop reason: HOSPADM

## 2022-10-06 RX ORDER — CLINDAMYCIN PHOSPHATE 600 MG/50ML
600 INJECTION INTRAVENOUS EVERY 8 HOURS
Status: COMPLETED | OUTPATIENT
Start: 2022-10-06 | End: 2022-10-07

## 2022-10-06 RX ORDER — LIDOCAINE HYDROCHLORIDE 10 MG/ML
INJECTION, SOLUTION EPIDURAL; INFILTRATION; INTRACAUDAL; PERINEURAL AS NEEDED
Status: DISCONTINUED | OUTPATIENT
Start: 2022-10-06 | End: 2022-10-06

## 2022-10-06 RX ORDER — SODIUM CHLORIDE, SODIUM LACTATE, POTASSIUM CHLORIDE, CALCIUM CHLORIDE 600; 310; 30; 20 MG/100ML; MG/100ML; MG/100ML; MG/100ML
INJECTION, SOLUTION INTRAVENOUS CONTINUOUS PRN
Status: DISCONTINUED | OUTPATIENT
Start: 2022-10-06 | End: 2022-10-06

## 2022-10-06 RX ORDER — MIDAZOLAM HYDROCHLORIDE 2 MG/2ML
INJECTION, SOLUTION INTRAMUSCULAR; INTRAVENOUS AS NEEDED
Status: DISCONTINUED | OUTPATIENT
Start: 2022-10-06 | End: 2022-10-06

## 2022-10-06 RX ORDER — METHOCARBAMOL 750 MG/1
750 TABLET, FILM COATED ORAL EVERY 6 HOURS SCHEDULED
Status: DISCONTINUED | OUTPATIENT
Start: 2022-10-06 | End: 2022-10-07 | Stop reason: HOSPADM

## 2022-10-06 RX ORDER — ROCURONIUM BROMIDE 10 MG/ML
INJECTION, SOLUTION INTRAVENOUS AS NEEDED
Status: DISCONTINUED | OUTPATIENT
Start: 2022-10-06 | End: 2022-10-06

## 2022-10-06 RX ORDER — METHOCARBAMOL 750 MG/1
750 TABLET, FILM COATED ORAL EVERY 6 HOURS PRN
Qty: 60 TABLET | Refills: 0 | Status: SHIPPED | OUTPATIENT
Start: 2022-10-06 | End: 2022-10-07

## 2022-10-06 RX ORDER — CALCIUM CARBONATE 200(500)MG
1000 TABLET,CHEWABLE ORAL DAILY PRN
Status: DISCONTINUED | OUTPATIENT
Start: 2022-10-06 | End: 2022-10-07 | Stop reason: HOSPADM

## 2022-10-06 RX ORDER — ACETAMINOPHEN 325 MG/1
975 TABLET ORAL EVERY 8 HOURS
Status: DISCONTINUED | OUTPATIENT
Start: 2022-10-06 | End: 2022-10-07 | Stop reason: HOSPADM

## 2022-10-06 RX ORDER — FENTANYL CITRATE 50 UG/ML
INJECTION, SOLUTION INTRAMUSCULAR; INTRAVENOUS AS NEEDED
Status: DISCONTINUED | OUTPATIENT
Start: 2022-10-06 | End: 2022-10-06

## 2022-10-06 RX ORDER — TORSEMIDE 20 MG/1
20 TABLET ORAL DAILY
Status: DISCONTINUED | OUTPATIENT
Start: 2022-10-07 | End: 2022-10-07 | Stop reason: HOSPADM

## 2022-10-06 RX ORDER — OXYCODONE HYDROCHLORIDE 5 MG/1
5 TABLET ORAL
Status: DISCONTINUED | OUTPATIENT
Start: 2022-10-06 | End: 2022-10-07 | Stop reason: HOSPADM

## 2022-10-06 RX ORDER — CHLORHEXIDINE GLUCONATE 0.12 MG/ML
15 RINSE ORAL ONCE
Status: COMPLETED | OUTPATIENT
Start: 2022-10-06 | End: 2022-10-06

## 2022-10-06 RX ORDER — OXYCODONE HYDROCHLORIDE 5 MG/1
5 TABLET ORAL EVERY 4 HOURS PRN
Qty: 20 TABLET | Refills: 0 | Status: SHIPPED | OUTPATIENT
Start: 2022-10-06 | End: 2022-10-07

## 2022-10-06 RX ORDER — BUPIVACAINE HYDROCHLORIDE 2.5 MG/ML
INJECTION, SOLUTION EPIDURAL; INFILTRATION; INTRACAUDAL AS NEEDED
Status: DISCONTINUED | OUTPATIENT
Start: 2022-10-06 | End: 2022-10-06

## 2022-10-06 RX ORDER — HYDROMORPHONE HCL/PF 1 MG/ML
SYRINGE (ML) INJECTION AS NEEDED
Status: DISCONTINUED | OUTPATIENT
Start: 2022-10-06 | End: 2022-10-06

## 2022-10-06 RX ORDER — OXYCODONE HYDROCHLORIDE 10 MG/1
10 TABLET ORAL EVERY 4 HOURS PRN
Status: DISCONTINUED | OUTPATIENT
Start: 2022-10-06 | End: 2022-10-07 | Stop reason: HOSPADM

## 2022-10-06 RX ORDER — ONDANSETRON 2 MG/ML
4 INJECTION INTRAMUSCULAR; INTRAVENOUS EVERY 6 HOURS PRN
Status: DISCONTINUED | OUTPATIENT
Start: 2022-10-06 | End: 2022-10-07 | Stop reason: HOSPADM

## 2022-10-06 RX ORDER — ASPIRIN 81 MG/1
162 TABLET ORAL DAILY
Status: DISCONTINUED | OUTPATIENT
Start: 2022-10-07 | End: 2022-10-07 | Stop reason: HOSPADM

## 2022-10-06 RX ORDER — ENOXAPARIN SODIUM 100 MG/ML
40 INJECTION SUBCUTANEOUS DAILY
COMMUNITY
Start: 2022-09-29 | End: 2022-10-07

## 2022-10-06 RX ORDER — NEOSTIGMINE METHYLSULFATE 1 MG/ML
INJECTION INTRAVENOUS AS NEEDED
Status: DISCONTINUED | OUTPATIENT
Start: 2022-10-06 | End: 2022-10-06

## 2022-10-06 RX ORDER — SCOLOPAMINE TRANSDERMAL SYSTEM 1 MG/1
1 PATCH, EXTENDED RELEASE TRANSDERMAL
Status: DISCONTINUED | OUTPATIENT
Start: 2022-10-06 | End: 2022-10-07 | Stop reason: HOSPADM

## 2022-10-06 RX ORDER — FENTANYL CITRATE/PF 50 MCG/ML
50 SYRINGE (ML) INJECTION
Status: DISCONTINUED | OUTPATIENT
Start: 2022-10-06 | End: 2022-10-06 | Stop reason: HOSPADM

## 2022-10-06 RX ORDER — CLINDAMYCIN PHOSPHATE 900 MG/50ML
900 INJECTION INTRAVENOUS ONCE
Status: COMPLETED | OUTPATIENT
Start: 2022-10-06 | End: 2022-10-06

## 2022-10-06 RX ORDER — DOCUSATE SODIUM 100 MG/1
100 CAPSULE, LIQUID FILLED ORAL 2 TIMES DAILY
Status: DISCONTINUED | OUTPATIENT
Start: 2022-10-06 | End: 2022-10-07 | Stop reason: HOSPADM

## 2022-10-06 RX ADMIN — ONDANSETRON 4 MG: 2 INJECTION INTRAMUSCULAR; INTRAVENOUS at 13:06

## 2022-10-06 RX ADMIN — SENNOSIDES 8.6 MG: 8.6 TABLET, FILM COATED ORAL at 18:14

## 2022-10-06 RX ADMIN — BUPIVACAINE HYDROCHLORIDE 5 ML: 2.5 INJECTION, SOLUTION EPIDURAL; INFILTRATION; INTRACAUDAL at 10:15

## 2022-10-06 RX ADMIN — FENTANYL CITRATE 100 MCG: 50 INJECTION, SOLUTION INTRAMUSCULAR; INTRAVENOUS at 11:10

## 2022-10-06 RX ADMIN — ROCURONIUM BROMIDE 50 MG: 10 SOLUTION INTRAVENOUS at 11:11

## 2022-10-06 RX ADMIN — METHOCARBAMOL 750 MG: 750 TABLET ORAL at 18:13

## 2022-10-06 RX ADMIN — SILDENAFIL CITRATE 20 MG: 20 TABLET ORAL at 22:34

## 2022-10-06 RX ADMIN — FENTANYL CITRATE 50 MCG: 50 INJECTION INTRAMUSCULAR; INTRAVENOUS at 13:55

## 2022-10-06 RX ADMIN — CHLORHEXIDINE GLUCONATE 15 ML: 1.2 RINSE ORAL at 10:24

## 2022-10-06 RX ADMIN — GLYCOPYRROLATE 0.4 MG: 0.2 INJECTION, SOLUTION INTRAMUSCULAR; INTRAVENOUS at 13:08

## 2022-10-06 RX ADMIN — ACETAMINOPHEN 975 MG: 325 TABLET ORAL at 15:24

## 2022-10-06 RX ADMIN — LIDOCAINE HYDROCHLORIDE 20 MG: 10 INJECTION, SOLUTION EPIDURAL; INFILTRATION; INTRACAUDAL; PERINEURAL at 11:10

## 2022-10-06 RX ADMIN — NEOSTIGMINE METHYLSULFATE 3 MG: 1 INJECTION INTRAVENOUS at 13:08

## 2022-10-06 RX ADMIN — DOCUSATE SODIUM 100 MG: 100 CAPSULE, LIQUID FILLED ORAL at 18:13

## 2022-10-06 RX ADMIN — MIDAZOLAM HYDROCHLORIDE 2 MG: 1 INJECTION, SOLUTION INTRAMUSCULAR; INTRAVENOUS at 10:10

## 2022-10-06 RX ADMIN — ENOXAPARIN SODIUM 40 MG: 40 INJECTION SUBCUTANEOUS at 22:35

## 2022-10-06 RX ADMIN — CLINDAMYCIN PHOSPHATE 600 MG: 600 INJECTION, SOLUTION INTRAVENOUS at 19:18

## 2022-10-06 RX ADMIN — MULTIPLE VITAMINS W/ MINERALS TAB 1 TABLET: TAB ORAL at 18:13

## 2022-10-06 RX ADMIN — FERROUS SULFATE TAB 325 MG (65 MG ELEMENTAL FE) 325 MG: 325 (65 FE) TAB at 18:13

## 2022-10-06 RX ADMIN — BUPIVACAINE 10 ML: 13.3 INJECTION, SUSPENSION, LIPOSOMAL INFILTRATION at 10:20

## 2022-10-06 RX ADMIN — SODIUM CHLORIDE, SODIUM LACTATE, POTASSIUM CHLORIDE, AND CALCIUM CHLORIDE: .6; .31; .03; .02 INJECTION, SOLUTION INTRAVENOUS at 11:03

## 2022-10-06 RX ADMIN — ACETAMINOPHEN 975 MG: 325 TABLET ORAL at 22:35

## 2022-10-06 RX ADMIN — HYDROMORPHONE HYDROCHLORIDE 0.5 MG: 1 INJECTION, SOLUTION INTRAMUSCULAR; INTRAVENOUS; SUBCUTANEOUS at 12:10

## 2022-10-06 RX ADMIN — SCOPALAMINE 1 PATCH: 1 PATCH, EXTENDED RELEASE TRANSDERMAL at 11:00

## 2022-10-06 RX ADMIN — OXYCODONE HYDROCHLORIDE AND ACETAMINOPHEN 500 MG: 500 TABLET ORAL at 18:13

## 2022-10-06 RX ADMIN — BUPIVACAINE HYDROCHLORIDE 5 ML: 2.5 INJECTION, SOLUTION EPIDURAL; INFILTRATION; INTRACAUDAL at 10:20

## 2022-10-06 RX ADMIN — PROPOFOL 100 MCG/KG/MIN: 10 INJECTION, EMULSION INTRAVENOUS at 11:18

## 2022-10-06 RX ADMIN — TRANEXAMIC ACID 1000 MG: 1 INJECTION, SOLUTION INTRAVENOUS at 11:30

## 2022-10-06 RX ADMIN — ONDANSETRON 4 MG: 2 INJECTION INTRAMUSCULAR; INTRAVENOUS at 16:38

## 2022-10-06 RX ADMIN — PROPOFOL 200 MG: 10 INJECTION, EMULSION INTRAVENOUS at 11:10

## 2022-10-06 RX ADMIN — SILDENAFIL CITRATE 20 MG: 20 TABLET ORAL at 19:19

## 2022-10-06 RX ADMIN — DEXAMETHASONE SODIUM PHOSPHATE 10 MG: 10 INJECTION, SOLUTION INTRAMUSCULAR; INTRAVENOUS at 11:10

## 2022-10-06 RX ADMIN — OXYCODONE HYDROCHLORIDE 10 MG: 5 TABLET ORAL at 15:25

## 2022-10-06 RX ADMIN — SODIUM CHLORIDE, SODIUM LACTATE, POTASSIUM CHLORIDE, AND CALCIUM CHLORIDE 1.5 ML/KG/HR: .6; .31; .03; .02 INJECTION, SOLUTION INTRAVENOUS at 15:28

## 2022-10-06 RX ADMIN — FOLIC ACID TAB 400 MCG 800 MCG: 400 TAB at 19:25

## 2022-10-06 RX ADMIN — BUPIVACAINE 10 ML: 13.3 INJECTION, SUSPENSION, LIPOSOMAL INFILTRATION at 10:15

## 2022-10-06 RX ADMIN — CLINDAMYCIN PHOSPHATE 900 MG: 900 INJECTION, SOLUTION INTRAVENOUS at 11:05

## 2022-10-06 NOTE — ANESTHESIA PROCEDURE NOTES
Peripheral Block    Patient location during procedure: holding area  Start time: 10/6/2022 10:20 AM  Reason for block: procedure for pain, at surgeon's request and post-op pain management  Staffing  Anesthesiologist: Cande Allred MD  Preanesthetic Checklist  Completed: patient identified, IV checked, site marked, risks and benefits discussed, surgical consent, monitors and equipment checked, pre-op evaluation and timeout performed  Peripheral Block  Patient position: supine  Prep: ChloraPrep  Patient monitoring: continuous pulse ox and frequent blood pressure checks  Block type: ipack block  Laterality: left  Injection technique: single-shot  Procedures: ultrasound guided, Ultrasound guidance required for the procedure to increase accuracy and safety of medication placement and decrease risk of complications    Ultrasound permanent image saved  Needle  Needle type: Stimuplex   Needle gauge: 22 G  Needle length: 10 cm  Needle localization: anatomical landmarks and ultrasound guidance  Test dose: negative  Assessment  Injection assessment: incremental injection, local visualized surrounding nerve on ultrasound, negative aspiration for CSF, negative aspiration for heme and transient paresthesias  Paresthesia pain: immediately resolved  Heart rate change: no  Slow fractionated injection: yes  Post-procedure:  site cleaned  patient tolerated the procedure well with no immediate complications  Additional Notes  Single needle pass, needle visualized throughout, minimal resistance on injection, appropriate perineural spread

## 2022-10-06 NOTE — ANESTHESIA PROCEDURE NOTES
Peripheral Block    Patient location during procedure: holding area  Start time: 10/6/2022 10:15 AM  Reason for block: procedure for pain, at surgeon's request and post-op pain management  Staffing  Performed: Anesthesiologist   Anesthesiologist: Beth Woo MD  Preanesthetic Checklist  Completed: patient identified, IV checked, site marked, risks and benefits discussed, surgical consent, monitors and equipment checked, pre-op evaluation and timeout performed  Peripheral Block  Patient position: supine  Prep: ChloraPrep  Patient monitoring: continuous pulse ox and frequent blood pressure checks  Block type: adductor canal block  Laterality: left  Injection technique: single-shot  Procedures: ultrasound guided, Ultrasound guidance required for the procedure to increase accuracy and safety of medication placement and decrease risk of complications    Ultrasound permanent image saved  Needle  Needle type: Stimuplex   Needle gauge: 22 G  Needle length: 10 cm  Needle localization: anatomical landmarks and ultrasound guidance  Test dose: negative  Assessment  Injection assessment: incremental injection, local visualized surrounding nerve on ultrasound, negative aspiration for CSF, negative aspiration for heme and transient paresthesias  Paresthesia pain: immediately resolved  Heart rate change: no  Slow fractionated injection: yes  Post-procedure:  site cleaned  patient tolerated the procedure well with no immediate complications  Additional Notes  Single needle pass, needle visualized throughout, minimal resistance on injection, appropriate perineural spread

## 2022-10-06 NOTE — DISCHARGE INSTRUCTIONS
Discharge Instructions:    Weight Bearing Status:                                           Weight bearing as tolerated lower extremity    DVT prophylaxis:  Complete course of Lovenox as directed  One injection into abdomen daily for 30 days postoperatively  Wear thigh high compression ALPA stockings on both legs for 6 months  Start one tablet of 81mg of Aspirin twice daily up to 6 months postoperatively  Start the Aspirin after you have finished the 30 days of Lovenox  Pain:  You have been prescribed a narcotic  Take this sparingly and only as needed for pain  May take one tab every 4-6hrs as needed for pain  Dressing Instructions:   Daily dressing changes until dry  May shower 5 days after surgery as long as there is no drainage from the incision, but please clean incision with alcohol after showers until postoperative appointment  NO SOAKING the incision  If there is drainage from the incision do not shower until drainage stops then may shower  PT/OT:  Continue PT/OT on outpatient basis as directed    Appt Instructions: If you do not have your appointment, please call the clinic at [phone] to follow up with Dr Sheeba Ram in 7-10 days from surgery date  If you develop significant pain in your calf, calf swelling/discoloration, these may be signs of a blood clot  Call the office or go to the emergency department

## 2022-10-06 NOTE — PHYSICAL THERAPY NOTE
PHYSICAL THERAPY EVALUATION NOTE          Patient Name: Edison MCMANUS Date: 10/6/2022      AGE:   59 y o   Mrn:   [id number]  ADMIT DX:  Arthritis of left knee [M17 12]    Past Medical History:  Past Medical History:   Diagnosis Date    HELENE positive     Anemia     Sildenafil causes decreased hematocrit    Anxiety 03/2020    CHF (congestive heart failure) ([facility] )     right heart failure related to pulm HTN lupus    Chronic kidney disease     borderline lab values    Chronic rhinitis 06/04/2012    Clotting disorder ([facility] ) 2012    Coronary artery disease 2018    Encephalitis     Lasted Assessed 10/18/2017    Gout 06/26/2018    Hypertension     Lupus anticoagulant disorder ([facility] )     Maxillary sinusitis     Lasted Assessed 10/18/2017    Night sweat     Osteopenia     Hip    Osteoporosis     Spine    Pneumonia     Last Assessed 10/18/2017    PONV (postoperative nausea and vomiting)     Pulmonary hypertension (HCC)     Seizures (HCC)     Only during Encephalitis    Shortness of breath     with exertion    Sinus tachycardia     Urinary incontinence        Past Surgical History:  Past Surgical History:   Procedure Laterality Date    ARTHRODESIS      Hand: IP Joint    CHOLECYSTECTOMY      COLONOSCOPY      COLPOSCOPY      HYSTERECTOMY      Vaginal    JOINT REPLACEMENT Right     Knee replacement    KNEE SURGERY  2/2019    LAPAROSCOPIC ESOPHAGOGASTRIC FUNDOPLASTY  2008    OK KNEE SCOPE,SINGLE MENISECTOMY Right 10/2/2018    Procedure: KNEE ARTHROSCOPY WITH PARTIAL MEDIAL MENISCECTOMY;  Surgeon: Dana Gupta DO;  [facility];  Service: Orthopedics    OK KNEE SCOPE,SINGLE MENISECTOMY Left 12/17/2019    Procedure: KNEE ARTHROSCOPIC MENISCECTOMY /MEDIAL;  Chondyl medial and posterior;  Surgeon: Dana Gupta DO;  [facility];  Service: Orthopedics    OK TOTAL KNEE ARTHROPLASTY Right 2/12/2019    Procedure: KNEE TOTAL ARTHROPLASTY AND ASSOCIATED PROCEDURES; Surgeon: Daniel Hurd DO;  [facility];  Service: Orthopedics    REDUCTION MAMMAPLASTY      REPAIR ANKLE LIGAMENT Right     TONSILLECTOMY       Length Of Stay: 0        PHYSICAL THERAPY EVALUATION:    Patient's identity confirmed via 2 patient identifiers (full name and ) at start of session       10/06/22 1604   PT Last Visit   PT Visit Date 10/06/22   Note Type   Note type Evaluation   Pain Assessment   Pain Assessment Tool 0-10   Pain Score 3   Pain Location/Orientation Orientation: Left; Location: Knee   Pain Onset/Description Onset: Ongoing; Descriptor: Discomfort   Effect of Pain on Daily Activities limits pt's ease of mobility   Patient's Stated Pain Goal No pain   Hospital Pain Intervention(s) Cold applied; Ambulation/increased activity; Emotional support   Restrictions/Precautions   Weight Bearing Precautions Per Order Yes   LLE Weight Bearing Per Order WBAT   Other Precautions WBS; Multiple lines; Fall Risk;Pain   Home Living   Type of [address] [place] [address] Two level;Stairs to enter with rails;Stairs to enter without rails; Laundry in basement  (4 JINA)   Bathroom Shower/Tub Walk-in shower   Bathroom Toilet Raised   Bathroom Equipment Grab bars in shower; Shower chair;Grab bars around toilet; Toilet raiser   [address] Walker;Cane;Wheelchair-manual  (rollator walker and RW)   Additional Comments Pt lives w/ her  and grandson in a 2 level house   Prior Function   Level of [place] Independent with ADLs and functional mobility   Lives With Spouse; Family  (adult grandson)   Receives Help From Family   ADL Assistance Independent   IADLs Needs assistance  ( does laundry, (+) driving)   Falls in the last 6 months 0   Vocational On disability   Comments At baseline PTA, pt reports ambulating independently w/o AD, performing all ADLs independently, shares IADLs w/ family   General   Additional Pertinent History POD 0: L TKA; WBAT LLE   Family/Caregiver Present Yes  (pt's  Kandi Echols)   Cognition   Overall Cognitive Status WFL   Arousal/Participation Cooperative   Orientation Level Oriented X4   Memory Within functional limits   Following Commands Follows one step commands without difficulty   Comments Pt ID via name and ; pt agreeable and motivated to PT eval and OOB mobility   Subjective   Subjective "I am very motivated, I want to walk down the hallways"   Strength RLE   R Knee Flexion 3+/5   R Knee Extension 3+/5   R Ankle Dorsiflexion 3+/5   R Ankle Plantar Flexion 3+/5   LLE Assessment   LLE Assessment X  (limited due to pain and ace bandages)   Strength LLE   L Knee Flexion 3-/5   L Knee Extension 3-/5   L Ankle Dorsiflexion 3+/5   L Ankle Plantar Flexion 3+/5   Light Touch   RLE Light Touch Grossly intact   LLE Light Touch Grossly intact   Bed Mobility   Supine to Sit 5  Supervision   Additional items Assist x 1;HOB elevated; Bedrails; Increased time required;Verbal cues   Sit to Supine 5  Supervision   Additional items Assist x 1;Bedrails; Increased time required;Verbal cues   Additional Comments Pt able to maintain sitting balance at EOB (stretcher) w/ supervision   Transfers   Sit to Stand 5  Supervision   Additional items Assist x 1;Verbal cues   Stand to Sit 5  Supervision   Additional items Assist x 1; Increased time required;Verbal cues   Ambulation/Elevation   Gait pattern Improper Weight shift; Wide NORIS; Decreased foot clearance;Decreased L stance; Short stride   Gait Assistance 4  Minimal assist   Additional items Assist x 1;Verbal cues  (CGA/steadying assist)   Assistive Device Rolling walker   Distance 25'  (w/ multiple turns)   Stair Management Assistance Not tested   Ambulation/Elevation Additional Comments Pt limited in ambulatory distance due to IV line (unable to remove from bed) and nausea   Balance   Static Sitting Fair +   Dynamic Sitting Fair   Static Standing Fair -  (w/ RW)   Dynamic Standing Poor +   Ambulatory Poor +  (w/ RW) Activity Tolerance   Activity Tolerance Patient limited by pain   Freddy Bansal Dr Maris Wan arrived at end of session   Nurse Made Aware RN Adeola Jerome confirmed pt appropriate for PT eval, updated post   Assessment   Prognosis Good   Problem List Decreased strength;Decreased endurance; Impaired balance;Decreased mobility; Decreased skin integrity;Orthopedic restrictions;Pain   Assessment Jessica Thomson is a 59 y o  Female who [facility] for an elective joint replacement  Pt currently POD 0: L TKA  Orders for PT eval and treat received, w/ activity orders of up w/ assist and WBAT LLE  Comorbidities affecting pt at time of evaluation include: previous TKA, osteoporosis, HTN, lupus  Personal factors affecting DC include: lives in 2 story house, stairs to enter home and inability to perform IADLs  At baseline, pt mobilizes independently w/ no AD, and w/ 0 fall(s) in the previous 6 months  Upon evaluation, pt presents w/ the following deficits: weakness, impaired skin integrity, impaired balance, decreased endurance, pain limiting functional mobility and gait deviations  Pt currently requires supervision for bed mobility, supervision for transfers, and min Ax1/CGA w/ RW for ambulation  Pt's clinical presentation is unstable/unpredictable due to pain impacting overall mobility status, need for input for mobility technique, ongoing medical monitoring/management, and recent drastic decline in mobility compared to baseline  Pt is at an increased risk of falls due to impaired balance  From a PT/mobility standpoint, given the above findings, DC recommendation is: Home w/ OPPT pending progress w/ functional mobility  During current admission, pt will benefit from continued skilled inpatient PT in the acute care setting in order to address the above deficits and to maximize function and mobility prior to DC from acute care     Goals   Patient Goals to walk down the hallways, to go to OPPT   STG Expiration Date 10/16/22   Short Term Goal #1 Pt will: perform bed mobility w/ mod I to decrease pt's burden of care and increase pt's independence w/ repositioning in bed; perform transfers w/ mod I to increase pt's OOB mobility; ambulate at least 350' w/ LRAD and mod I to increase pt's ambulatory endurance/tolerance; negotiate at least 14 steps w/ UE support and mod I to facilitate pt returning to previous living environment; increase all balance ratings by at least 1 grade to decrease pt's risk of falls   PT Treatment Day 0   Plan   Treatment/Interventions Functional transfer training;LE strengthening/ROM; Elevations; Therapeutic exercise; Endurance training;Patient/family training;Equipment eval/education; Bed mobility;Gait training   PT Frequency Twice a day   Recommendation   PT Discharge Recommendation Home with outpatient rehabilitation   Equipment Recommended [facility] Recommended Wheeled walker   Change/add to [place]? No   Additional Comments DC: OPPT pending progress w/ functional mobility   AM-PAC Basic Mobility Inpatient   Turning in Bed Without Bedrails 3   Lying on Back to Sitting on Edge of Flat Bed 3   Moving Bed to Chair 3   Standing Up From Chair 3   Walk in Room 3   Climb 3-5 Stairs 2   Basic Mobility Inpatient Raw Score 17   Basic Mobility Standardized Score 39 67   Highest Level Of Mobility   -HLM Goal 5: Stand one or more mins   -HLM Achieved 7: Walk 25 feet or more   Additional Treatment Session   Start Time 1630   End Time 1640   Treatment Assessment At end of PT eval, pt returned supine on stretcher w/ HOB elevated  Pt and  educated on role of PT in acute care during current admission  Additional education provided to not place pillows under R knee to allow R knee to rest in full extension  Pt and her  confirm understanding  Pt will continue to benefit from skilled PT intervention to promote pt's independence w/ functional mobility and progress towards set goals  Continue to recommend DC home with outpatient rehabilitation pending progress w/ functional mobility when medically cleared  End of Consult   Patient Position at End of Consult Supine; All needs within reach  [facility], RN present in room)       The patient's AM-PAC Basic Mobility Inpatient Short Form Raw Score is 17  A Raw score of greater than 16 suggests the patient may benefit from discharge to home  Please also refer to the recommendation of the Physical Therapist for safe discharge planning      Pt will benefit from skilled inpatient PT during this admission in order to facilitate progress towards goals and to maximize functional independence [facility]: OPPT pending progress w/ functional mobility        Louie Garcia, PT, DPT  10/06/22

## 2022-10-06 NOTE — ASSESSMENT & PLAN NOTE
· Patient has a hx of chronic dyspnea primarily with exertion due to history of pulmonary hypertension  · Oxygen saturations have been 95%    Plan:  · Continue to monitor vital signs  · Continue sildenafil 20 mg daily

## 2022-10-06 NOTE — OP NOTE
OPERATIVE REPORT  PATIENT NAME: Radha Lancaster    :  1965  MRN: [id number]  Pt Location: [facility] RO[facility]    SURGERY DATE: 10/6/2022    Surgeon(s) and Role:     * Suyapa Valencia DO - Primary  Sharonda Juju [facility] utilized during the case for assistance with positioning draping retraction closure and dressing application    Preop Diagnosis:  Arthritis of left knee [M17 12]    Post-Op Diagnosis Codes:     * Arthritis of left knee [M17 12]    Procedure(s) (LRB):  ARTHROPLASTY KNEE TOTAL (Left)    Specimen(s):  * No specimens in log *    Estimated Blood Loss:   Minimal    Drains:  * No LDAs found *    Anesthesia Type:   Spinal    Operative Indications:  Arthritis of left knee [M17 12]      Operative Findings:  Significant arthritis affecting the medial joint space    Complications:   None    Procedure and Technique:  Patient was seen and examined in the preoperative area the left lower extremity was marked, the consent an H&P had been reviewed  The patient was brought back to the operative suite  The patient was intubated sedated  Tourniquet was applied to left thigh  The left lower extremity was prepped and draped in a sterile fashion  After proper timeout commenced and identified the left lower extremity as the operative site  Esmarch was utilized to exsanguinate the extremity, the tourniquet was turned up to 275 mmHg  We started with injection of a mixture into the subdermal tissue  An incision was made midline over the patella  We dissected down to the fascia  We identified the patella made a parapatellar approach using 10 blade  We made sure to have 5 mm of remnant tissue on the patella  We then moved on the prepatellar fat pad resected most of the prepatellar fat pad  We moved medially freeing the capsule from the proximal tibia making sure not to extend distally more than 9 mm  We then moved onto meniscus and ACL PCL resected as much as possible   We then hyperflexed the knee and used our opening canal Reamer 1 cm anterior to the PCL insertion opened the canal   Using our interna medullary jig we placed our distal femoral cutting jig  We performed our distal femoral cut  We then moved onto our tibia using our external jig we excised 2 mm off the deficient deficient side or 8-10 off the good side or less deficient side  We then used the extension block found good balance in medial out stress  We moved back to the femur performed our anterior posterior and chamfer cuts after sizing the femur  We found the size to be 6  We then moved back to the tibia  We finished the tibia and sized this to be for we used the Sizer guide and placed 2 screws to hold in position then placed the tower using the opening Reamer and then the broach  We then moved to our patella we excised less than 9 mm of bone  We sized the patella be 23 mm thick before excising  We then used our lollipop sizing guide and measured the patella to be a size 35 we then used the lollipop guide to drill 3 holes into the patella  Implanted our size 35 patella trial   Implanted our size 6 femur trial and our size 4 tibia trial with a 6 mm poly  We took the knee through range of motion found good balance in flexion and extension  We explanted all implants irrigate the wound copiously injected our mixture in the posterior capsule  We mixed the cement we placed a piece of bone into the femoral canal to block excessive bleeding  We placed cement into the tibia impacted our tibial tray excised any excess cement  Placed cement on the posterior flange of the implant of the femur  We placed cement on the femoral condyles implanted the femoral implant  We impacted an removed excess cement  We then placed cement on the patella and the back of the patellar implant  Placed the implant on the patella held that in place with clamp removed excess cement  We waited for the cement to dry   Once the cement had hardened we then irrigated the wound again ran the knee through range of motion and found good patellar tracking  We changed the poly to a 7 mm poly and this was good balance both in flexion extension and mid range  We closed the deep fascia with  0 Vicryl at the proximal middle and distal end of the capsular incision as well as using strata fix to close the rest of the capsule  We closed Jam's fascia with interrupted 0 Vicryl  We then closed the subcutaneous fascia with 2 O strata fix and Prineo on skin  4x4s ABDs Webril Hydrocool and Ace wrap were applied to the operative leg  A stocking net Jovanny stocking was applied to the left lower extremity  Patient left the OR with the gomez intact  Anesthesia was reversed and patient was taken back to PACU in stable condition     I was present for the entire procedure and A qualified resident physician was not available    Patient Disposition:  PACU         SIGNATURE: Danielle Toribio DO  DATE: October 6, 2022  TIME: 10:23 AM

## 2022-10-06 NOTE — CONSULTS
[address] 1965, 59 y o  female MRN: [id number]  Unit/Bed#: S -01 Encounter: [id number]  Primary Care Provider: Rachel Suarez DO   Date and time admitted to hospital: 10/6/2022  9:04 AM    Consults    Primary osteoarthritis of left knee  Assessment & Plan  · Patient is post-op total left knee arthroplasty  · Patient is recovering well presently with minimal pain s/p nerve block     Plan:  · Primary management deferred to orthopedic surgery    Stage 3a chronic kidney disease (CKD) [facility])  Assessment & Plan  Lab Results   Component Value Date    EGFR 57 09/26/2022    EGFR 55 09/15/2022    EGFR 95 05/07/2022    CREATININE 1 06 09/26/2022    CREATININE 1 10 09/15/2022    CREATININE 0 70 05/07/2022     Plan:  · Check BMP in the morning  · Continue torsemide 20 mg daily and spironolactone 25 mg daily  · Avoid nephrotoxic agents  · Avoid hypotension    Obesity  Assessment & Plan  · POA BMI of 39 65    Plan:  · Patient advised and counseled on weight loss  · Outpatient follow up with PCP    Pulmonary hypertension ([place] [address] )  Assessment & Plan  · Patient has a hx of chronic dyspnea primarily with exertion due to history of pulmonary hypertension  · Oxygen saturations have been 95%    Plan:  · Continue to monitor vital signs  · Continue sildenafil 20 mg daily    Other organ or system involvement in systemic lupus erythematosus ([facility] )  Assessment & Plan  · History of SLE on hydroxychloroquine and aspirin  · Also with history of mixed connective tissue disease    Plan:  · Continue hydroxychloroquine 400 mg daily and aspirin 162 mg daily      VTE Prophylaxis: VTE Score: 7 High Risk (Score >/= 5) - Pharmacological DVT Prophylaxis Ordered: enoxaparin (Lovenox)  Sequential Compression Devices Ordered  Recommendations for Discharge:  · Outpatient follow up with PCP for pulmonary hypertension     Collaboration of Care:  Were Recommendations Directly Discussed with Primary Treatment Team? Yes    History of Present Illness:  Mandy Gosselin is a 59 y o  female who is originally admitted to the orthopedic surgery service due to total left knee arthroplasty  We are consulted for blood pressure management  On my evaluation, patient states she does not have a history of hypertension  She does have a history of pulmonary hypertension contributing to shortness of breath, but not requiring supplemental oxygen  Her blood pressures have been controlled post-op  She takes torsemide and spironolactone for her HFpEF  She denies chest pain, change in her baseline shortness of breath, abdominal pain, vomiting, diarrhea, and constipation  Reports sore throat secondary to intubation and post-operative nausea  Review of Systems:  Review of Systems   Constitutional: Negative for chills and fever  HENT: Negative for rhinorrhea, trouble swallowing and voice change  Eyes: Negative for pain and visual disturbance  Respiratory: Negative for cough and shortness of breath  Cardiovascular: Negative for chest pain and palpitations  Gastrointestinal: Negative for abdominal pain, constipation, diarrhea, nausea and vomiting  Endocrine: Negative for cold intolerance  Genitourinary: Negative for dysuria, frequency and hematuria  Musculoskeletal: Negative for back pain and myalgias  Skin: Negative for color change and rash  Neurological: Negative for dizziness, light-headedness, numbness and headaches  Psychiatric/Behavioral: Negative for confusion  The patient is not nervous/anxious  All other systems reviewed and are negative        Past Medical and Surgical History:   Past Medical History:   Diagnosis Date    HELENE positive     Anemia     Sildenafil causes decreased hematocrit    Anxiety 03/2020    CHF (congestive heart failure) (HCC)     right heart failure related to pulm HTN lupus    Chronic kidney disease     borderline lab values    Chronic rhinitis 06/04/2012    Clotting disorder ([facility] ) 2012    Coronary artery disease 2018    Encephalitis     Lasted Assessed 10/18/2017    Gout 06/26/2018    Hypertension     Lupus anticoagulant disorder ([facility] )     Maxillary sinusitis     Lasted Assessed 10/18/2017    Night sweat     Osteopenia     Hip    Osteoporosis     Spine    Pneumonia     Last Assessed 10/18/2017    PONV (postoperative nausea and vomiting)     Pulmonary hypertension (HCC)     Seizures (HCC)     Only during Encephalitis    Shortness of breath     with exertion    Sinus tachycardia     Urinary incontinence        Past Surgical History:   Procedure Laterality Date    ARTHRODESIS      Hand: IP Joint    CHOLECYSTECTOMY      COLONOSCOPY      COLPOSCOPY      HYSTERECTOMY      Vaginal    JOINT REPLACEMENT Right     Knee replacement    KNEE SURGERY  2/2019    LAPAROSCOPIC ESOPHAGOGASTRIC FUNDOPLASTY  2008    IA KNEE SCOPE,SINGLE MENISECTOMY Right 10/2/2018    Procedure: KNEE ARTHROSCOPY WITH PARTIAL MEDIAL MENISCECTOMY;  Surgeon: Angy Nance DO;  [facility];  Service: Orthopedics    IA KNEE SCOPE,SINGLE MENISECTOMY Left 12/17/2019    Procedure: KNEE ARTHROSCOPIC MENISCECTOMY /MEDIAL; Chondyl medial and posterior;  Surgeon: Angy Nance DO;  [facility];  Service: Orthopedics    IA TOTAL KNEE ARTHROPLASTY Right 2/12/2019    Procedure: KNEE TOTAL ARTHROPLASTY AND ASSOCIATED PROCEDURES;  Surgeon: Angy Nance DO;  [facility];  Service: Orthopedics    REDUCTION MAMMAPLASTY      REPAIR ANKLE LIGAMENT Right     TONSILLECTOMY         Meds/Allergies:  all medications and allergies reviewed    Allergies:    Allergies   Allergen Reactions    Dilantin [Phenytoin] Anaphylaxis and Rash    Penicillins Rash, Anaphylaxis, Dermatitis and Hives    Augmentin [Amoxicillin-Pot Clavulanate] Rash and Dermatitis       Social History:  Marital Status: /Civil Union  Substance Use History:   Social History     Substance and Sexual Activity Alcohol Use Yes    Alcohol/week: 0 0 standard drinks    Comment: socially     Social History     Tobacco Use   Smoking Status Former Smoker    Packs/day: 0 00    Years: 0 00    Pack years: 0 00    Quit date: 2006    Years since quittin 7   Smokeless Tobacco Never Used     Social History     Substance and Sexual Activity   Drug Use No       Family History:  Family History   Problem Relation Age of Onset    Uterine cancer Mother     Pancreatic cancer Mother 79    Diabetes Mother     Endometrial cancer Mother 77    Arthritis Mother     Cancer Mother         Pancreas, Uterine    Vision loss Mother     Heart disease Father         open heart surgery at age 48     Stroke Father         CVA    Hypertension Father     Atrial fibrillation Father     Hyperlipidemia Father     Arthritis Father     Rheum arthritis Father     No Known Problems Sister     No Known Problems Brother     Thyroid disease Maternal Grandmother     Asthma Daughter     Heart attack Maternal Grandfather     Heart attack Paternal Grandmother     Skin cancer Paternal Grandfather     No Known Problems Sister     Thyroid disease Sister     Depression Sister     Osteoarthritis Sister     Hemochromatosis Brother     Migraines Daughter     Asthma Daughter     No Known Problems Maternal Aunt     Anuerysm Neg Hx     Clotting disorder Neg Hx     Heart failure Neg Hx        Physical Exam:   Vitals:   Blood Pressure: 107/75 (10/06/22 1732)  Pulse: 104 (10/06/22 1650)  Temperature: 97 9 °F (36 6 °C) (10/06/22 1732)  Temp Source: Temporal (10/06/22 1452)  Respirations: 16 (10/06/22 1732)  Height: 5' 4" (162 6 cm) (10/06/22 0927)  Weight - Scale: 105 kg (231 lb) (10/06/22 0927)  SpO2: 95 % (10/06/22 1650)    Physical Exam  Constitutional:       General: She is not in acute distress  Appearance: Normal appearance  She is obese  She is not ill-appearing or diaphoretic  HENT:      Head: Normocephalic and atraumatic  Right Ear: External ear normal       Left Ear: External ear normal       Nose: Nose normal       Mouth/Throat:      Mouth: Mucous membranes are moist    Eyes:      General: Lids are normal       Conjunctiva/sclera: Conjunctivae normal    Cardiovascular:      Rate and Rhythm: Normal rate and regular rhythm  Pulses: Normal pulses  Heart sounds: Normal heart sounds  No murmur heard  Pulmonary:      Effort: Pulmonary effort is normal  No respiratory distress  Breath sounds: Normal breath sounds  No decreased breath sounds, wheezing, rhonchi or rales  Abdominal:      General: Bowel sounds are normal  There is no distension  Palpations: Abdomen is soft  Tenderness: There is no abdominal tenderness  There is no guarding or rebound  Musculoskeletal:      Cervical back: Neck supple  Comments: Left lower leg appropriately wrapped   Skin:     General: Skin is warm and dry  Neurological:      Mental Status: She is alert and oriented to person, place, and time  Sensory: Sensation is intact  Psychiatric:         Mood and Affect: Mood normal          Speech: Speech normal          Behavior: Behavior normal  Behavior is cooperative  Additional Data:   Lab Results:                    Lab Results   Component Value Date/Time    BA1C 5 2 09/15/2022 07:37 AM    HGBA1C 5 2 10/09/2020 12:02 PM    HGBA1C 5 2 01/25/2019 01:00 PM               Imaging: Personally reviewed the following imaging: xray(s)  No orders to display       EKG, Pathology, and Other Studies Reviewed on Admission:   · EKG: No EKG obtained  ** Please Note: This note may have been constructed using a voice recognition system  **

## 2022-10-06 NOTE — ASSESSMENT & PLAN NOTE
· Patient is post-op total left knee arthroplasty  · Patient is recovering well presently with minimal pain s/p nerve block     Plan:  · Primary management deferred to orthopedic surgery

## 2022-10-06 NOTE — INTERVAL H&P NOTE
H&P reviewed  After examining the patient I find no changes in the patients condition since the H&P had been written      Vitals:    10/06/22 0927   BP: 156/78   Pulse: (!) 113   Resp: 18   Temp: 97 6 °F (36 4 °C)   SpO2: 96%

## 2022-10-06 NOTE — ASSESSMENT & PLAN NOTE
Lab Results   Component Value Date    EGFR 57 09/26/2022    EGFR 55 09/15/2022    EGFR 95 05/07/2022    CREATININE 1 06 09/26/2022    CREATININE 1 10 09/15/2022    CREATININE 0 70 05/07/2022     Plan:  · Check BMP in the morning  · Continue torsemide 20 mg daily and spironolactone 25 mg daily  · Avoid nephrotoxic agents  · Avoid hypotension

## 2022-10-06 NOTE — ASSESSMENT & PLAN NOTE
· Patient has a hx of chronic dyspnea primarily with exertion due to history of pulmonary hypertension  · Oxygen saturations have been 95%    Plan:  · Continue to monitor vital signs

## 2022-10-06 NOTE — ASSESSMENT & PLAN NOTE
· History of SLE on hydroxychloroquine and aspirin  · Also with history of mixed connective tissue disease    Plan:  · Continue hydroxychloroquine 400 mg daily and aspirin 162 mg daily

## 2022-10-06 NOTE — PLAN OF CARE
Problem: PHYSICAL THERAPY ADULT  Goal: Performs mobility at highest level of function for planned discharge setting  See evaluation for individualized goals  Description: Treatment/Interventions: Functional transfer training, LE strengthening/ROM, Elevations, Therapeutic exercise, Endurance training, Patient/family training, Equipment eval/education, Bed mobility, Gait training  Equipment Recommended: John Mcneil       See flowsheet documentation for full assessment, interventions and recommendations  Note: Prognosis: Good  Problem List: Decreased strength, Decreased endurance, Impaired balance, Decreased mobility, Decreased skin integrity, Orthopedic restrictions, Pain  Assessment: Karan Ramachandran is a 59 y o  Female who [facility] on 10/6/2022 for an elective joint replacement  Pt currently POD 0: L TKA  Orders for PT eval and treat received, w/ activity orders of up w/ assist and WBAT LLE  Comorbidities affecting pt at time of evaluation include: previous TKA, osteoporosis, HTN, lupus  Personal factors affecting DC include: lives in 2 story house, stairs to enter home and inability to perform IADLs  At baseline, pt mobilizes independently w/ no AD, and w/ 0 fall(s) in the previous 6 months  Upon evaluation, pt presents w/ the following deficits: weakness, impaired skin integrity, impaired balance, decreased endurance, pain limiting functional mobility and gait deviations  Pt currently requires supervision for bed mobility, supervision for transfers, and min Ax1/CGA w/ RW for ambulation  Pt's clinical presentation is unstable/unpredictable due to pain impacting overall mobility status, need for input for mobility technique, ongoing medical monitoring/management, and recent drastic decline in mobility compared to baseline  Pt is at an increased risk of falls due to impaired balance   From a PT/mobility standpoint, given the above findings, DC recommendation is: Home w/ OPPT pending progress w/ functional mobility  During current admission, pt will benefit from continued skilled inpatient PT in the acute care setting in order to address the above deficits and to maximize function and mobility prior to DC from acute care  PT Discharge Recommendation: Home with outpatient rehabilitation    See flowsheet documentation for full assessment

## 2022-10-06 NOTE — ADDENDUM NOTE
Addendum  created 10/06/22 1643 by Humble Rachel MD    Order list changed, Pharmacy for encounter modified

## 2022-10-06 NOTE — ANESTHESIA PREPROCEDURE EVALUATION
EF70%, nml RV, PASP 29, no sign valve abn, DLCO 84%  Aspirin for lupus antibody, no other blood thinners, plt 315    Procedure:  ARTHROPLASTY KNEE TOTAL (Left Knee)    Relevant Problems   ANESTHESIA   (+) PONV (postoperative nausea and vomiting)      CARDIO   (+) Essential hypertension   (+) SOB (shortness of breath) on exertion      HEMATOLOGY   (+) Lupus anticoagulant disorder (HCC)      MUSCULOSKELETAL   (+) Arthritis of right knee   (+) Pes anserine bursitis   (+) Pes anserinus bursitis of right knee   (+) Primary osteoarthritis of left knee      PULMONARY   (+) Dyspnea   (+) SOB (shortness of breath) on exertion        Physical Exam    Airway    Mallampati score: II  TM Distance: >3 FB  Neck ROM: full     Dental       Cardiovascular  Rate: normal,     Pulmonary  Pulmonary exam normal     Other Findings  Per pt denies anything remaining that is loose or removeable      Anesthesia Plan  ASA Score- 3     Anesthesia Type- general with ASA Monitors  Additional Monitors:   Airway Plan: ETT  Plan Factors-    Chart reviewed  Existing labs reviewed  Patient summary reviewed  Patient is not a current smoker  Induction- intravenous  Postoperative Plan- Plan for postoperative opioid use  Informed Consent- Anesthetic plan and risks discussed with patient  I personally reviewed this patient with the CRNA  Discussed and agreed on the Anesthesia Plan with the CRNA  Aleks Machado

## 2022-10-06 NOTE — ANESTHESIA POSTPROCEDURE EVALUATION
Post-Op Assessment Note    CV Status:  Stable  Pain Score: 0    Pain management: adequate     Mental Status:  Alert and sleepy   Hydration Status:  Euvolemic   PONV Controlled:  Controlled   Airway Patency:  Patent   Two or more mitigation strategies used for obstructive sleep apnea   Post Op Vitals Reviewed: Yes      Staff: CRNA         No complications documented      BP   143/67   Temp   98 1   Pulse 96   Resp   18   SpO2   98

## 2022-10-06 NOTE — ASSESSMENT & PLAN NOTE
· POA BMI of 39 65    Plan:  · Patient advised and counseled on weight loss  · Outpatient follow up with PCP

## 2022-10-07 ENCOUNTER — EPISODE CHANGES (OUTPATIENT)
Dept: CASE MANAGEMENT | Facility: OTHER | Age: 57
End: 2022-10-07

## 2022-10-07 VITALS
HEIGHT: 64 IN | SYSTOLIC BLOOD PRESSURE: 116 MMHG | HEART RATE: 96 BPM | OXYGEN SATURATION: 95 % | TEMPERATURE: 98.9 F | WEIGHT: 231 LBS | BODY MASS INDEX: 39.44 KG/M2 | DIASTOLIC BLOOD PRESSURE: 76 MMHG | RESPIRATION RATE: 16 BRPM

## 2022-10-07 LAB
ANION GAP SERPL CALCULATED.3IONS-SCNC: 4 MMOL/L (ref 4–13)
BUN SERPL-MCNC: 12 MG/DL (ref 5–25)
CALCIUM SERPL-MCNC: 9 MG/DL (ref 8.4–10.2)
CHLORIDE SERPL-SCNC: 107 MMOL/L (ref 96–108)
CO2 SERPL-SCNC: 28 MMOL/L (ref 21–32)
CREAT SERPL-MCNC: 0.9 MG/DL (ref 0.6–1.3)
ERYTHROCYTE [DISTWIDTH] IN BLOOD BY AUTOMATED COUNT: 12.6 % (ref 11.6–15.1)
GFR SERPL CREATININE-BSD FRML MDRD: 70 ML/MIN/1.73SQ M
GLUCOSE SERPL-MCNC: 134 MG/DL (ref 65–140)
HCT VFR BLD AUTO: 32.1 % (ref 34.8–46.1)
HGB BLD-MCNC: 10.6 G/DL (ref 11.5–15.4)
MCH RBC QN AUTO: 30.4 PG (ref 26.8–34.3)
MCHC RBC AUTO-ENTMCNC: 33 G/DL (ref 31.4–37.4)
MCV RBC AUTO: 92 FL (ref 82–98)
PLATELET # BLD AUTO: 238 THOUSANDS/UL (ref 149–390)
PMV BLD AUTO: 11.6 FL (ref 8.9–12.7)
POTASSIUM SERPL-SCNC: 4.7 MMOL/L (ref 3.5–5.3)
RBC # BLD AUTO: 3.49 MILLION/UL (ref 3.81–5.12)
SODIUM SERPL-SCNC: 139 MMOL/L (ref 135–147)
WBC # BLD AUTO: 9.04 THOUSAND/UL (ref 4.31–10.16)

## 2022-10-07 PROCEDURE — NC001 PR NO CHARGE: Performed by: NURSE PRACTITIONER

## 2022-10-07 PROCEDURE — 97116 GAIT TRAINING THERAPY: CPT

## 2022-10-07 PROCEDURE — 99204 OFFICE O/P NEW MOD 45 MIN: CPT | Performed by: STUDENT IN AN ORGANIZED HEALTH CARE EDUCATION/TRAINING PROGRAM

## 2022-10-07 PROCEDURE — 85027 COMPLETE CBC AUTOMATED: CPT | Performed by: PHYSICIAN ASSISTANT

## 2022-10-07 PROCEDURE — 99213 OFFICE O/P EST LOW 20 MIN: CPT | Performed by: NURSE PRACTITIONER

## 2022-10-07 PROCEDURE — 80048 BASIC METABOLIC PNL TOTAL CA: CPT | Performed by: PHYSICIAN ASSISTANT

## 2022-10-07 PROCEDURE — 97166 OT EVAL MOD COMPLEX 45 MIN: CPT

## 2022-10-07 PROCEDURE — 99024 POSTOP FOLLOW-UP VISIT: CPT | Performed by: PHYSICIAN ASSISTANT

## 2022-10-07 PROCEDURE — 99232 SBSQ HOSP IP/OBS MODERATE 35: CPT | Performed by: PHYSICIAN ASSISTANT

## 2022-10-07 RX ORDER — OXYCODONE HYDROCHLORIDE 5 MG/1
5 TABLET ORAL EVERY 4 HOURS PRN
Qty: 20 TABLET | Refills: 0 | Status: SHIPPED | OUTPATIENT
Start: 2022-10-07 | End: 2022-10-10

## 2022-10-07 RX ORDER — ONDANSETRON 4 MG/1
4 TABLET, ORALLY DISINTEGRATING ORAL EVERY 6 HOURS PRN
Qty: 20 TABLET | Refills: 0 | Status: SHIPPED | OUTPATIENT
Start: 2022-10-07

## 2022-10-07 RX ORDER — METHOCARBAMOL 750 MG/1
750 TABLET, FILM COATED ORAL EVERY 6 HOURS PRN
Qty: 20 TABLET | Refills: 0 | Status: SHIPPED | OUTPATIENT
Start: 2022-10-07 | End: 2022-10-13 | Stop reason: SDUPTHER

## 2022-10-07 RX ORDER — ONDANSETRON 4 MG/1
4 TABLET, FILM COATED ORAL EVERY 8 HOURS PRN
Qty: 10 TABLET | Refills: 0 | Status: SHIPPED | OUTPATIENT
Start: 2022-10-07 | End: 2022-10-07

## 2022-10-07 RX ADMIN — TORSEMIDE 20 MG: 20 TABLET ORAL at 08:43

## 2022-10-07 RX ADMIN — METHOCARBAMOL 750 MG: 750 TABLET ORAL at 13:17

## 2022-10-07 RX ADMIN — OXYCODONE HYDROCHLORIDE 5 MG: 5 TABLET ORAL at 14:32

## 2022-10-07 RX ADMIN — OXYCODONE HYDROCHLORIDE AND ACETAMINOPHEN 500 MG: 500 TABLET ORAL at 17:09

## 2022-10-07 RX ADMIN — SENNOSIDES 8.6 MG: 8.6 TABLET, FILM COATED ORAL at 08:44

## 2022-10-07 RX ADMIN — HYDROXYCHLOROQUINE SULFATE 400 MG: 200 TABLET, FILM COATED ORAL at 08:43

## 2022-10-07 RX ADMIN — Medication 2000 UNITS: at 05:51

## 2022-10-07 RX ADMIN — ACETAMINOPHEN 975 MG: 325 TABLET ORAL at 05:51

## 2022-10-07 RX ADMIN — CLINDAMYCIN PHOSPHATE 600 MG: 600 INJECTION, SOLUTION INTRAVENOUS at 03:56

## 2022-10-07 RX ADMIN — OXYCODONE HYDROCHLORIDE AND ACETAMINOPHEN 500 MG: 500 TABLET ORAL at 08:43

## 2022-10-07 RX ADMIN — FERROUS SULFATE TAB 325 MG (65 MG ELEMENTAL FE) 325 MG: 325 (65 FE) TAB at 08:44

## 2022-10-07 RX ADMIN — METHOCARBAMOL 750 MG: 750 TABLET ORAL at 05:51

## 2022-10-07 RX ADMIN — OXYCODONE HYDROCHLORIDE 5 MG: 5 TABLET ORAL at 03:55

## 2022-10-07 RX ADMIN — DOCUSATE SODIUM 100 MG: 100 CAPSULE, LIQUID FILLED ORAL at 08:44

## 2022-10-07 RX ADMIN — FOLIC ACID TAB 400 MCG 800 MCG: 400 TAB at 08:43

## 2022-10-07 RX ADMIN — FERROUS SULFATE TAB 325 MG (65 MG ELEMENTAL FE) 325 MG: 325 (65 FE) TAB at 17:09

## 2022-10-07 RX ADMIN — SILDENAFIL CITRATE 20 MG: 20 TABLET ORAL at 17:09

## 2022-10-07 RX ADMIN — ACETAMINOPHEN 975 MG: 325 TABLET ORAL at 13:17

## 2022-10-07 RX ADMIN — OXYCODONE HYDROCHLORIDE 5 MG: 5 TABLET ORAL at 08:43

## 2022-10-07 RX ADMIN — DOCUSATE SODIUM 100 MG: 100 CAPSULE, LIQUID FILLED ORAL at 17:09

## 2022-10-07 RX ADMIN — METHOCARBAMOL 750 MG: 750 TABLET ORAL at 17:09

## 2022-10-07 RX ADMIN — SODIUM CHLORIDE, SODIUM LACTATE, POTASSIUM CHLORIDE, AND CALCIUM CHLORIDE 1.5 ML/KG/HR: .6; .31; .03; .02 INJECTION, SOLUTION INTRAVENOUS at 00:57

## 2022-10-07 RX ADMIN — SILDENAFIL CITRATE 20 MG: 20 TABLET ORAL at 08:45

## 2022-10-07 RX ADMIN — METHOCARBAMOL 750 MG: 750 TABLET ORAL at 00:56

## 2022-10-07 RX ADMIN — MULTIPLE VITAMINS W/ MINERALS TAB 1 TABLET: TAB ORAL at 08:44

## 2022-10-07 RX ADMIN — ONDANSETRON 4 MG: 2 INJECTION INTRAMUSCULAR; INTRAVENOUS at 12:42

## 2022-10-07 RX ADMIN — ASPIRIN 162 MG: 81 TABLET, COATED ORAL at 08:43

## 2022-10-07 NOTE — ASSESSMENT & PLAN NOTE
· History of SLE on hydroxychloroquine and aspirin  · Also with history of mixed connective tissue disease  · Continue hydroxychloroquine 400 mg daily and aspirin 162 mg daily

## 2022-10-07 NOTE — ASSESSMENT & PLAN NOTE
· Patient is post-op total left knee arthroplasty  · Patient is recovering well presently with minimal pain s/p nerve block   · Primary management deferred to orthopedic surgery  · Patient is medically stable from SLIM perspective to continue her home medications   Please call with questions

## 2022-10-07 NOTE — OCCUPATIONAL THERAPY NOTE
Occupational Therapy Evaluation      Charmayne Borders    10/7/2022    Principal Problem:    Primary osteoarthritis of left knee  Active Problems:    Other organ or system involvement in systemic lupus erythematosus ([facility] )    Pulmonary hypertension (HCC)    Stage 3a chronic kidney disease (CKD) ([facility] )    Obesity      Past Medical History:   Diagnosis Date    HELENE positive     Anemia     Sildenafil causes decreased hematocrit    Anxiety 03/2020    CHF (congestive heart failure) ([facility] )     right heart failure related to pulm HTN lupus    Chronic kidney disease     borderline lab values    Chronic rhinitis 06/04/2012    Clotting disorder ([facility] ) 2012    Coronary artery disease 2018    Encephalitis     Lasted Assessed 10/18/2017    Gout 06/26/2018    Hypertension     Lupus anticoagulant disorder ([facility] )     Maxillary sinusitis     Lasted Assessed 10/18/2017    Night sweat     Osteopenia     Hip    Osteoporosis     Spine    Pneumonia     Last Assessed 10/18/2017    PONV (postoperative nausea and vomiting)     Pulmonary hypertension (HCC)     Seizures (HCC)     Only during Encephalitis    Shortness of breath     with exertion    Sinus tachycardia     Urinary incontinence        Past Surgical History:   Procedure Laterality Date    ARTHRODESIS      Hand: IP Joint    CHOLECYSTECTOMY      COLONOSCOPY      COLPOSCOPY      HYSTERECTOMY      Vaginal    JOINT REPLACEMENT Right     Knee replacement    KNEE SURGERY  2/2019    LAPAROSCOPIC ESOPHAGOGASTRIC FUNDOPLASTY  2008    [place] KNEE SCOPE,SINGLE MENISECTOMY Right 10/2/2018    Procedure: KNEE ARTHROSCOPY WITH PARTIAL MEDIAL MENISCECTOMY;  Surgeon: Maryan Zaragoza DO;  [facility];  Service: Orthopedics    [place] KNEE SCOPE,SINGLE MENISECTOMY Left 12/17/2019    Procedure: KNEE ARTHROSCOPIC MENISCECTOMY /MEDIAL;  Chondyl medial and posterior;  Surgeon: Maryan Zaragoza DO;  [facility];  Service: Orthopedics    [place] TOTAL KNEE ARTHROPLASTY Right 2/12/2019    Procedure: KNEE TOTAL ARTHROPLASTY AND ASSOCIATED PROCEDURES;  Surgeon: Archie Crandall DO;  [facility];  Service: Orthopedics    REDUCTION MAMMAPLASTY      REPAIR ANKLE LIGAMENT Right     TONSILLECTOMY          10/07/22 1015   OT Last Visit   OT Visit Date 10/07/22   Note Type   Note type Evaluation   Restrictions/Precautions   Weight Bearing Precautions Per Order Yes   LLE Weight Bearing Per Order WBAT   Other Precautions WBS; Multiple lines; Fall Risk;Pain   Pain Assessment   Pain Assessment Tool 0-10   Pain Score 3   Pain Location/Orientation Orientation: Left; Location: Knee   Effect of Pain on Daily Activities limits ease of participation in ADLs   Patient's Stated Pain Goal No pain   Home Living   Type of [address] [place] [address] Two level;Stairs to enter with rails  (4 JINA)   Bathroom Shower/Tub Walk-in shower   Bathroom Toilet Raised   Bathroom Equipment Grab bars in shower; Shower chair;Grab bars around toilet; Toilet raiser   [address] Walker;Cane;Wheelchair-manual  (rollator and RW)   Additional Comments Lives with  and adult grandson (20yo)   Prior Function   Level of Avery Independent with ADLs and functional mobility   Lives With Spouse; Family   Receives Help From Family   ADL Assistance Independent   IADLs Needs assistance  (lacey does laundry because it is in basement)   Falls in the last 6 months 0   Vocational On disability   Lifestyle   Autonomy (+) , (I) ADLs,   Reciprocal Relationships supportive family   Intrinsic Gratification vacationing (cruising)   Psychosocial   Psychosocial (WDL) WDL   Subjective   Subjective "I am going on a cruise in 2 weeks"   ADL   Eating Assistance 7  Independent   Grooming Assistance 5  Supervision/Setup   Grooming Deficit Standing with assistive device; Teeth care   UB Bathing Assistance 5  Supervision/Setup   LB Bathing Assistance 4  Minimal Assistance   [facility] 4  Minimal Assistance   LB Dressing Deficit Thread LLE into pants; Thread LLE into underwear;Don/doff L shoe; Increased time to complete  (pt reports having assistance available at home for LB ADL  Pt also reports having AE for LB self cares at home and is familiar with use from previous TKR)   Toileting Assistance  5  Supervision/Setup   Bed Mobility   Supine to Sit 5  Supervision   Additional items Assist x 1;HOB elevated; Bedrails; Increased time required   Sit to Supine Unable to assess  (pt remained OOB at the end of session)   Transfers   Sit to Stand 5  Supervision   Additional items Assist x 1;Verbal cues; Increased time required   Stand to Sit 5  Supervision   Additional items Increased time required;Verbal cues   Additional Comments good safety awareness evident by pt locking brakes of rollator prior to sitting  Functional Mobility   Functional Mobility 5  Supervision   Additional Comments pt used personal rollator for functional mobility t/o room   Balance   Static Sitting Good   Dynamic Sitting Fair +   Static Standing Fair   Dynamic Standing Fair   Activity Tolerance   Activity Tolerance Patient tolerated treatment well;Patient limited by pain   Medical Staff Made Aware Spoke with PT, Barron Hernandez  Nurse Made Aware RN cleared pt for OT evaluation  RUE Assessment   RUE Assessment WFL   LUE Assessment   LUE Assessment WFL   Hand Function   Gross Motor Coordination Functional   Fine Motor Coordination Functional   Sensation   Light Touch No apparent deficits   Vision - Complex Assessment   Acuity Able to read clock/calendar on wall without difficulty   Cognition   Overall Cognitive Status WFL   Arousal/Participation Alert   Attention Within functional limits   Orientation Level Oriented X4   Memory Within functional limits   Following Commands Follows all commands and directions without difficulty   Comments Pt verified ID by stating name and    Assessment   Limitation Decreased ADL status; Decreased endurance;Decreased self-care trans;Decreased high-level ADLs  (dec'd LE strength, dec'd functional balance)   Prognosis Good   Goals   Patient Goals "o go on my cruise  I really want to surprise my family!"   LTG Time Frame 7-10   Long Term Goal #1 see goals listed below   Plan   Treatment Interventions ADL retraining;Functional transfer training;Patient/family training; Endurance training;Equipment evaluation/education; Compensatory technique education; Activityengagement;Continued evaluation  (functional balance training)   Goal Expiration Date 10/17/22   OT Frequency 3-5x/wk   Recommendation   OT Discharge Recommendation No rehabilitation needs  (anticipate no OT needs at d/c)   AM-PAC Daily Activity Inpatient   Lower Body Dressing 3   Bathing 3   Toileting 3   Upper Body Dressing 4   Grooming 4   Eating 4   Daily Activity Raw Score 21   Daily Activity Standardized Score (Calc for Raw Score >=11) 44 27   AM-PAC Applied Cognition Inpatient   Following a Speech/Presentation 4   Understanding Ordinary Conversation 4   Taking Medications 4   Remembering Where Things Are Placed or Put Away 4   Remembering List of 4-5 Errands 4   Taking Care of Complicated Tasks 4   Applied Cognition Raw Score 24   Applied Cognition Standardized Score 62 21     Assessment:    Pt is a 59 y o  female seen for OT evaluation s/p admit [facility] 10/6/2022 w/ Primary osteoarthritis of left knee  Comorbidities affecting pt's functional performance at time of assessment are listed above  Personal factors affecting pt at time of IE include:steps to enter environment, difficulty performing ADLS, and difficulty performing IADLS   Prior to admission, pt was (I) with all self cares, majority of IADLS (with the exception of laundry), and able to participate in leisure activities  Upon evaluation: Pt requires min/mod (A) for LB ADLs, (S) for bed mobility, (S) functional mobility, (S) ADL transfers   Pt presents below baseline 2* the following deficits impacting occupational performance: weakness, decreased strength, decreased balance, decreased tolerance, increased pain, and orthopedic restrictions  Pt to benefit from continued skilled OT tx while in the hospital to address deficits as defined above and maximize level of functional independence w ADL's and functional mobility  Occupational Performance areas to address include: bathing/shower, toilet hygiene, dressing, functional mobility, community mobility, clothing management, and leisure participation   Pt's raw score on the AM-PAC Daily activity inpatient short form is 21, standardized score is 44 27, greater than 39 4  Patients at this level are likely to benefit from DC to home  This writer agrees with the latter recommendation  Based on OT evaluation, the assessment(s) completed, performance deficits listed, and current level of function, pt identified as a moderate complexity evaluation        GOALS:    Pt will achieve the following within specified time frame:  *Perform ADL transfers with mod (I) for inc'd independence with ADLs/purposeful tasks    *Bed mobility- mod (I)/(I) for inc'd independence to manage own comfort and initiate EOB & OOB purposeful tasks    *Retrieve items necessary for completion of ADL tasks at a mod (I) level (ie: retrieval of clothing items from closet, oral care supplies, G&H supplies) to facilitiate return to PLOF     *Perform LB ADL (S) using AE prn for inc'd independence with self cares    *Increase static stand balance to Good and dyn stand balance to Fair+ for inc'd safety with standing purposeful tasks    *Increase stand tolerance x7-10 m for inc'd tolerance with standing purposeful tasks    *Perform clothing management/hygiene for toileting (I)/mod (I) for inc'd independence with self cares    *Perform sinkside G&H mod (I)/(I)ly, with good safety and Good balance for inc'd independence with self cares    Guillermina Husbands

## 2022-10-07 NOTE — CONSULTS
Consultation - Nephrology   Stephan Hdez 59 y o  female MRN: [id number]  Unit/Bed#: S -01 Encounter: [id number]    ASSESSMENT AND PLAN:  59 y o  woman with PMH of obesity , CHF, SLE, OA, pulmonary hypertension, CKD G 3A, admitted for left knee replacement  Nephrology consulted for management of CKD    PLAN    #CKD G3a   Baseline creatinine:  0 9-1 2 mg/dL   Current creatinine: At baseline    Etiology:  Multifactorial likely secondary to nephroangiosclerosis in the settings of CHF and NSAIDs use   UA:  No hematuria, no proteinuria   Imaging:  No indication of kidney ultrasound at this time   Plan:   No indication of dialysis at this time   Maintain mean arterial pressure above 65 mmHg to avoid hypotension/hypoperfusion   Avoid nephrotoxic agents such as NSAIDs and contrast if at all possible     Avoid opioids    Adjust medications to GFR   Renal diet     #Volume/hypertension:   Volume:  Euvolemic   Blood pressure:  Normotensive /63mmhg, goal< 140/90   Low sodium diet    Status post IV fluids   Aldactone 25 mg   Torsemide 20 mg daily    #SLE  · On Plaquenil  · No hematuria, no proteinuria    #Acid base disorder   serum HCO3 28 millimole per L   Vascular contraction likely secondary to diuretics    #CKD-MBD   Calcium 9 mg/dL   At goal      #Anemia:  · Current hemoglobin:  10 6 mg/dL after surgical intervention  · Treatment:  · Transfuse for hemoglobin less than 7 0 per primary service      #OA  · Status post left knee replacement      HISTORY OF PRESENT ILLNESS:  Requesting Physician: Sb Zavala DO  Reason for Consult:  CKD    Stephan Hdez is a 59 y o  female with PMH of obesity , SLE, OA, pulmonary hypertension, CKD G 3A,, who was admitted to [place] [address] for left knee replacement   A renal consultation is requested today for assistance in the management of CKD      PAST MEDICAL HISTORY:  Past Medical History:   Diagnosis Date    EHLENE positive     Anemia     Sildenafil causes decreased hematocrit    Anxiety 03/2020    CHF (congestive heart failure) (HCC)     right heart failure related to pulm HTN lupus    Chronic kidney disease     borderline lab values    Chronic rhinitis 06/04/2012    Clotting disorder ([facility] [address] ) 2012    Coronary artery disease 2018    Encephalitis     Lasted Assessed 10/18/2017    Gout 06/26/2018    Hypertension     Lupus anticoagulant disorder ([facility] [address] )     Maxillary sinusitis     Lasted Assessed 10/18/2017    Night sweat     Osteopenia     Hip    Osteoporosis     Spine    Pneumonia     Last Assessed 10/18/2017    PONV (postoperative nausea and vomiting)     Pulmonary hypertension (HCC)     Seizures (HCC)     Only during Encephalitis    Shortness of breath     with exertion    Sinus tachycardia     Urinary incontinence        PAST SURGICAL HISTORY:  Past Surgical History:   Procedure Laterality Date    ARTHRODESIS      Hand: IP Joint    CHOLECYSTECTOMY      COLONOSCOPY      COLPOSCOPY      HYSTERECTOMY      Vaginal    JOINT REPLACEMENT Right     Knee replacement    KNEE SURGERY  2/2019    LAPAROSCOPIC ESOPHAGOGASTRIC FUNDOPLASTY  2008    [place] KNEE SCOPE,SINGLE MENISECTOMY Right 10/2/2018    Procedure: KNEE ARTHROSCOPY WITH PARTIAL MEDIAL MENISCECTOMY;  Surgeon: Belkys De Leon DO;  [facility];  Service: Orthopedics    [place] KNEE SCOPE,SINGLE MENISECTOMY Left 12/17/2019    Procedure: KNEE ARTHROSCOPIC MENISCECTOMY /MEDIAL;  Chondyl medial and posterior;  Surgeon: Belkys De Leon DO;  [facility];  Service: Orthopedics    [place] TOTAL KNEE ARTHROPLASTY Right 2/12/2019    Procedure: KNEE TOTAL ARTHROPLASTY AND ASSOCIATED PROCEDURES;  Surgeon: Belkys De Leon DO;  [facility];  Service: Orthopedics    REDUCTION MAMMAPLASTY      REPAIR ANKLE LIGAMENT Right     TONSILLECTOMY         ALLERGIES:  Allergies   Allergen Reactions    Dilantin [Phenytoin] Anaphylaxis and Rash    Penicillins Rash, Anaphylaxis, Dermatitis and Hives    Augmentin [Amoxicillin-Pot Clavulanate] Rash and Dermatitis       SOCIAL HISTORY:  Social History     Substance and Sexual Activity   Alcohol Use Yes    Alcohol/week: 0 0 standard drinks    Comment: socially     Social History     Substance and Sexual Activity   Drug Use No     Social History     Tobacco Use   Smoking Status Former Smoker    Packs/day: 0 00    Years: 0 00    Pack years: 0 00    Quit date: 2006    Years since quittin 7   Smokeless Tobacco Never Used       FAMILY HISTORY:  Family History   Problem Relation Age of Onset    Uterine cancer Mother     Pancreatic cancer Mother 79    Diabetes Mother     Endometrial cancer Mother 77    Arthritis Mother     Cancer Mother         Pancreas, Uterine    Vision loss Mother     Heart disease Father         open heart surgery at age 48     Stroke Father         CVA    Hypertension Father     Atrial fibrillation Father     Hyperlipidemia Father     Arthritis Father     Rheum arthritis Father     No Known Problems Sister     No Known Problems Brother     Thyroid disease Maternal Grandmother     Asthma Daughter     Heart attack Maternal Grandfather     Heart attack Paternal Grandmother     Skin cancer Paternal Grandfather     No Known Problems Sister     Thyroid disease Sister     Depression Sister     Osteoarthritis Sister     Hemochromatosis Brother     Migraines Daughter     Asthma Daughter     No Known Problems Maternal Aunt     Anuerysm Neg Hx     Clotting disorder Neg Hx     Heart failure Neg Hx        MEDICATIONS:    Current Facility-Administered Medications:     acetaminophen (TYLENOL) tablet 975 mg, 975 mg, Oral, Q8H, ARIANNA Perez-C, 975 mg at 10/07/22 0551    ascorbic acid (VITAMIN C) tablet 500 mg, 500 mg, Oral, BID, ARIANNA Perez-C, 500 mg at 10/07/22 0843    aspirin (ECOTRIN LOW STRENGTH) EC tablet 162 mg, 162 mg, Oral, Daily, ARIANNA Perez-C, 162 mg at 10/07/22 0843    calcium carbonate (TUMS) chewable tablet 1,000 mg, 1,000 mg, Oral, Daily PRN, Yuliet Villanueva, PA-C    cholecalciferol (VITAMIN D3) tablet 2,000 Units, 2,000 Units, Oral, Early Morning, Yuliet Villanueva, PA-C, 2,000 Units at 10/07/22 0551    [START ON 10/12/2022] cyanocobalamin injection 1,000 mcg, 1,000 mcg, Intramuscular, Q30 Days, Yuliet Villanueva, PA-C    docusate sodium (COLACE) capsule 100 mg, 100 mg, Oral, BID, Yuliet Villanueva, PA-C, 100 mg at 10/07/22 0844    enoxaparin (LOVENOX) subcutaneous injection 40 mg, 40 mg, Subcutaneous, Daily, Yuliet Villanueva, PA-C    ferrous sulfate tablet 325 mg, 325 mg, Oral, BID With Meals, Yuliet Villanueva, PA-C, 325 mg at 21/09/59 2874    folic acid (FOLVITE) tablet 800 mcg, 800 mcg, Oral, Daily, Yuliet Villanueva, PA-C, 800 mcg at 10/07/22 3747    hydroxychloroquine (PLAQUENIL) tablet 400 mg, 400 mg, Oral, Daily With Breakfast, Yuliet Villanueva, PA-C, 400 mg at 10/07/22 6700    lactated ringers bolus 1,000 mL, 1,000 mL, Intravenous, Once PRN **AND** lactated ringers bolus 1,000 mL, 1,000 mL, Intravenous, Once PRN, Yuliet Villanueva, PA-C    lactated ringers infusion, 1 5 mL/kg/hr, Intravenous, Continuous, Yuliet Villanueva, PA-C, Stopped at 10/07/22 0551    magnesium hydroxide (MILK OF MAGNESIA) oral suspension 30 mL, 30 mL, Oral, Daily PRN, Yuliet Villanueva, PA-C    methocarbamol (ROBAXIN) tablet 750 mg, 750 mg, Oral, Q6H [address], Yuliet Villanueva, PA-C, 750 mg at 10/07/22 0551    multivitamin-minerals (CENTRUM) tablet 1 tablet, 1 tablet, Oral, Daily, Yuliet Villanueva, PA-C, 1 tablet at 10/07/22 0844    ondansetron (ZOFRAN) injection 4 mg, 4 mg, Intravenous, Q6H PRN, Yuliet Villanueva, PA-C    oxyCODONE (ROXICODONE) immediate release tablet 10 mg, 10 mg, Oral, Q4H PRN, Yuliet Villanueva, PA-C, 10 mg at 10/06/22 1525    oxyCODONE (ROXICODONE) IR tablet 5 mg, 5 mg, Oral, Q3H PRN, Yuliet Villanueva, PA-C, 5 mg at 10/07/22 0843    scopolamine (TRANSDERM-SCOP) 1 mg/3 days TD 72 hr patch 1 patch, 1 patch, Transdermal, Q72H, Evorn Churn, CRNA, 1 patch at 10/06/22 1100    senna (SENOKOT) tablet 8 6 mg, 1 tablet, Oral, Daily, Norma Johann, PA-C, 8 6 mg at 10/07/22 0844    sildenafil (REVATIO) tablet 20 mg, 20 mg, Oral, TID, Norma Johann, PA-C, 20 mg at 10/07/22 0845    sodium chloride 0 9 % bolus 1,000 mL, 1,000 mL, Intravenous, Once PRN **AND** sodium chloride 0 9 % bolus 1,000 mL, 1,000 mL, Intravenous, Once PRN, Norma Johann, PA-C    spironolactone (ALDACTONE) tablet 25 mg, 25 mg, Oral, HS, Onrma Johann, PA-C    torsemide [facility]) tablet 20 mg, 20 mg, Oral, Daily, Norma Johann, PA-C, 20 mg at 10/07/22 3061    REVIEW OF SYSTEMS:  Complete 10 point review of systems were obtained and discussed in length with the patient  Complete review of systems were negative / unremarkable except mentioned in the HPI section       Review of Systems - Psychological ROS: negative  Ophthalmic ROS: negative  ENT ROS: negative  Hematological and Lymphatic ROS: negative  Endocrine ROS: negative  Respiratory ROS: no cough, shortness of breath, or wheezing  Cardiovascular ROS: no chest pain or dyspnea on exertion  Gastrointestinal ROS: no abdominal pain, change in bowel habits, or black or bloody stools  Genito-Urinary ROS: no dysuria, trouble voiding, or hematuria  Musculoskeletal ROS: negative  Neurological ROS: no TIA or stroke symptoms  Dermatological ROS: negative     PHYSICAL EXAM:  Current Weight: Weight - Scale: 105 kg (231 lb)  First Weight: Weight - Scale: 105 kg (231 lb)  Vitals:    10/07/22 0831   BP: 130/63   Pulse: 96   Resp: 16   Temp: 98 6 °F (37 °C)   SpO2: 95%       Intake/Output Summary (Last 24 hours) at 10/7/2022 0937  Last data filed at 10/7/2022 0549  Gross per 24 hour   Intake 2010 ml   Output 1450 ml   Net 560 ml     Wt Readings from Last 3 Encounters:   10/06/22 105 kg (231 lb)   09/26/22 105 kg (231 lb 7 7 oz)   07/14/22 107 kg (235 lb) Temp Readings from Last 3 Encounters:   10/07/22 98 6 °F (37 °C) (Oral)   06/27/22 (!) 97 4 °F (36 3 °C)   04/22/22 98 °F (36 7 °C)     BP Readings from Last 3 Encounters:   10/07/22 130/63   09/26/22 121/80   07/14/22 129/85     Pulse Readings from Last 3 Encounters:   10/07/22 96   09/26/22 (!) 111   07/14/22 90        General:  Obese, no acute distress at this time  Skin:  No acute rash  Eyes:  No scleral icterus and noninjected  ENT:  mucous membranes moist  Neck:  no carotid bruits  Chest:  Clear to auscultation percussion, good respiratory effort, no use of accessory respiratory muscles  CVS:  Regular rate and rhythm without rub ,  Abdomen:  soft and nontender   Extremities:   no significant lower extremity edema, left lower extremity wrapped  Neuro:  No gross focality  Psych:  Alert , cooperative       Invasive Devices:      Lab Results:   Results from last 7 days   Lab Units 10/07/22  0543   WBC Thousand/uL 9 04   HEMOGLOBIN g/dL 10 6*   HEMATOCRIT % 32 1*   PLATELETS Thousands/uL 238   POTASSIUM mmol/L 4 7   CHLORIDE mmol/L 107   CO2 mmol/L 28   BUN mg/dL 12   CREATININE mg/dL 0 90   CALCIUM mg/dL 9 0       Other Studies:   No orders to display       Portions of the record may have been created with voice recognition software  Occasional wrong word or "sound a like" substitutions may have occurred due to the inherent limitations of voice recognition software  Read the chart carefully and recognize, using context, where substitutions have occurred

## 2022-10-07 NOTE — ASSESSMENT & PLAN NOTE
· Patient has a hx of chronic dyspnea primarily with exertion due to history of pulmonary hypertension  · Lungs clear   · Continue sildenafil 20 mg daily  · Continue Torsemide 20 mg QD

## 2022-10-07 NOTE — PROGRESS NOTES
[facility]  Progress Note - Rajinder Treviño 1965, 59 y o  female MRN: [id number]  Unit/Bed#: S -06 Encounter: [id number]  Primary Care Provider: Caryn Castillo DO   Date and time admitted to hospital: 10/6/2022  9:04 AM    * Primary osteoarthritis of left knee  Assessment & Plan  · Patient is post-op total left knee arthroplasty  · Patient is recovering well presently with minimal pain s/p nerve block   · Primary management deferred to orthopedic surgery  · Patient is medically stable from SLIM perspective to continue her home medications  Please call with questions     Pulmonary hypertension ([place] [id number] )  Assessment & Plan  · Patient has a hx of chronic dyspnea primarily with exertion due to history of pulmonary hypertension  · Lungs clear   · Continue sildenafil 20 mg daily  · Continue Torsemide 20 mg QD     Stage 3a chronic kidney disease (CKD) ([facility] )  Assessment & Plan  Lab Results   Component Value Date    EGFR 70 10/07/2022    EGFR 57 09/26/2022    EGFR 55 09/15/2022    CREATININE 0 90 10/07/2022    CREATININE 1 06 09/26/2022    CREATININE 1 10 09/15/2022     · Renal function stable  · Continue torsemide 20 mg daily and spironolactone 25 mg daily      Other organ or system involvement in systemic lupus erythematosus ([place] [address] )  Assessment & Plan  · History of SLE on hydroxychloroquine and aspirin  · Also with history of mixed connective tissue disease  · Continue hydroxychloroquine 400 mg daily and aspirin 162 mg daily    Obesity  Assessment & Plan  · POA BMI of 39 65  · Patient advised and counseled on weight loss  · Outpatient follow up with PCP        VTE Pharmacologic Prophylaxis: VTE Score: 7 High Risk (Score >/= 5) - Pharmacological DVT Prophylaxis Ordered: enoxaparin (Lovenox)  Sequential Compression Devices Ordered  Patient Centered Rounds: I performed bedside rounds with nursing staff today    Discussions with Specialists or Other Care Team Provider: Discussed with RN, CM    Education and Discussions with Family / Patient: Patient declined call to   Time Spent for Care: 30 minutes  More than 50% of total time spent on counseling and coordination of care as described above  Current Length of Stay: 0 day(s)  Current Patient Status: Outpatient Surgery   Certification Statement: Per Ortho   Discharge Plan: AleksandraBriana Omalley is following this patient on consult  They are medically stable for discharge when deemed appropriate by primary service  Code Status: Prior    Subjective:   Patient reports she is feeling well today  Denies SOB or chest pain  States that her knee pain is well controlled  She wants to get out of bed today  Objective:     Vitals:   Temp (24hrs), Av 1 °F (36 7 °C), Min:97 °F (36 1 °C), Max:98 9 °F (37 2 °C)    Temp:  [97 °F (36 1 °C)-98 9 °F (37 2 °C)] 98 9 °F (37 2 °C)  HR:  [] 65  Resp:  [12-20] 20  BP: (103-156)/(56-78) 103/62  SpO2:  [92 %-99 %] 93 %  Body mass index is 39 65 kg/m²  Input and Output Summary (last 24 hours): Intake/Output Summary (Last 24 hours) at 10/7/2022 0809  Last data filed at 10/7/2022 0549  Gross per 24 hour   Intake  ml   Output 1450 ml   Net 560 ml       Physical Exam:   Physical Exam  Constitutional:       General: She is not in acute distress  Appearance: Normal appearance  She is obese  She is not ill-appearing or diaphoretic  HENT:      Head: Normocephalic and atraumatic  Mouth/Throat:      Mouth: Mucous membranes are moist    Eyes:      General: No scleral icterus  Pupils: Pupils are equal, round, and reactive to light  Cardiovascular:      Rate and Rhythm: Normal rate and regular rhythm  Pulses: Normal pulses  Heart sounds: Normal heart sounds, S1 normal and S2 normal  No murmur heard  No systolic murmur is present  No diastolic murmur is present  No gallop  No S3 or S4 sounds     Pulmonary:      Effort: Pulmonary effort is normal  No accessory muscle usage or respiratory distress  Breath sounds: Normal breath sounds  No stridor  No decreased breath sounds, wheezing, rhonchi or rales  Chest:      Chest wall: No tenderness  Abdominal:      General: Bowel sounds are normal  There is no distension  Palpations: Abdomen is soft  Tenderness: There is no abdominal tenderness  There is no guarding  Musculoskeletal:      Right lower leg: No edema  Left lower leg: No edema  Skin:     General: Skin is warm and dry  Coloration: Skin is not jaundiced  Neurological:      General: No focal deficit present  Mental Status: She is alert  Mental status is at baseline  Motor: No tremor or seizure activity  Psychiatric:         Behavior: Behavior is cooperative  Additional Data:     Labs:  Results from last 7 days   Lab Units 10/07/22  0543   WBC Thousand/uL 9 04   HEMOGLOBIN g/dL 10 6*   HEMATOCRIT % 32 1*   PLATELETS Thousands/uL 238     Results from last 7 days   Lab Units 10/07/22  0543   SODIUM mmol/L 139   POTASSIUM mmol/L 4 7   CHLORIDE mmol/L 107   CO2 mmol/L 28   BUN mg/dL 12   CREATININE mg/dL 0 90   ANION GAP mmol/L 4   CALCIUM mg/dL 9 0   GLUCOSE RANDOM mg/dL 134                       Lines/Drains:  Invasive Devices  Report    Peripheral Intravenous Line  Duration           Peripheral IV 10/06/22 Right;Ventral (anterior) Wrist <1 day                      Imaging: No pertinent imaging reviewed      Recent Cultures (last 7 days):         Last 24 Hours Medication List:   Current Facility-Administered Medications   Medication Dose Route Frequency Provider Last Rate    acetaminophen  975 mg Oral Q8H Jacinto Almeida PA-C      ascorbic acid  500 mg Oral BID Jacinto Almeida PA-C      aspirin  162 mg Oral Daily Jacinto Almeida PA-C      calcium carbonate  1,000 mg Oral Daily PRN Jacinto Almeida PA-C      cholecalciferol  2,000 Units Oral Early Morning Jacinto Almeida PA-C      [START ON 10/12/2022] cyanocobalamin 1,000 mcg Intramuscular Q30 Days Lawrence Porter PA-C      docusate sodium  100 mg Oral BID Lawrence Porter PA-C      enoxaparin  40 mg Subcutaneous Daily Lawrence Porter PA-C      ferrous sulfate  325 mg Oral BID With Meals Lawrence Porter PA-C      folic acid  933 mcg Oral Daily Lawrence Porter PA-C      hydroxychloroquine  400 mg Oral Daily With Breakfast Lawrence Porter PA-C      lactated ringers  1,000 mL Intravenous Once PRN Lawrence Porter PA-C      And    lactated ringers  1,000 mL Intravenous Once PRN Lawrence Porter PA-C      lactated ringers  1 5 mL/kg/hr Intravenous Continuous Lawrence Porter PA-C Stopped (10/07/22 0551)    magnesium hydroxide  30 mL Oral Daily PRN Lawrence Porter PA-C      methocarbamol  750 mg Oral Q6H [address] Lawrence Porter PA-C      multivitamin-minerals  1 tablet Oral Daily Lawrence Porter PA-C      ondansetron  4 mg Intravenous Q6H PRN Lawrence Porter PA-C      oxyCODONE  10 mg Oral Q4H PRN Lawrence Porter PA-C      oxyCODONE  5 mg Oral Q3H PRN Lawrence Porter PA-C      scopolamine  1 patch Transdermal Q72H LUIS ENRIQUE Harley  1 tablet Oral Daily Lawrence Porter PA-C      sildenafil  20 mg Oral TID Lawrence Porter PA-C      sodium chloride  1,000 mL Intravenous Once PRN Lawrence Porter PA-C      And    sodium chloride  1,000 mL Intravenous Once PRN Lawrence Porter PA-C      spironolactone  25 mg Oral HS Lawrence Porter PA-C      torsemide  20 mg Oral Daily Lawrence Porter PA-C          Today, Patient Was Seen By: Milla Rosales    **Please Note: This note may have been constructed using a voice recognition system  **

## 2022-10-07 NOTE — PLAN OF CARE
Problem: PHYSICAL THERAPY ADULT  Goal: Performs mobility at highest level of function for planned discharge setting  See evaluation for individualized goals  Description: Treatment/Interventions: Functional transfer training, LE strengthening/ROM, Elevations, Therapeutic exercise, Endurance training, Patient/family training, Equipment eval/education, Bed mobility, Gait training  Equipment Recommended: Chase Cummins       See flowsheet documentation for full assessment, interventions and recommendations  Outcome: Progressing  Note: Prognosis: Good  Problem List: Decreased strength, Decreased endurance, Impaired balance, Decreased mobility, Decreased skin integrity, Orthopedic restrictions, Pain  Assessment: Pt seen for PT treatment session today w/ pt agreeable and motivated to participate  PT interventions provided include: transfer, gait, balance, endurance, and stair negotiation training in order to progress pt towards PLOF and to maximize pt independence w/ functional mobility  Education provided on walker management and stair negotiate technique  In comparison to previous session, pt overall improved tolerance to functional mobility  Pt demonstrated ability to ambulated a total of 200' w/ her walker and no evidence of LOB; additionally pt demonstrated ability to negotiate 4 steps w/ supervision  Pt continues to be functioning below baseline level, and remains limited in functional mobility due to pain limiting functional mobility  Pt will continue to benefit from skilled PT intervention to promote pt's independence w/ functional mobility and progress towards set goals  Continue to recommend DC home with outpatient rehabilitation w/ family support/supervision when medically cleared  PT Discharge Recommendation: Home with outpatient rehabilitation (w/ family support)    See flowsheet documentation for full assessment

## 2022-10-07 NOTE — PHYSICAL THERAPY NOTE
PHYSICAL THERAPY TREATMENT NOTE          Patient Name: Brigid Iglesias  QSENAKY'L Date: 10/7/2022         10/07/22 1031   PT Last Visit   PT Visit Date 10/07/22   Note Type   Note Type Treatment   Pain Assessment   Pain Assessment Tool 0-10   Pain Score 8   Pain Location/Orientation Orientation: Left; Location: Knee  (posterior)   Pain Onset/Description Onset: Ongoing   Effect of Pain on Daily Activities limits pt's ease of mobility   Hospital Pain Intervention(s) Cold applied; Ambulation/increased activity;Repositioned   General   Chart Reviewed Yes   Additional Pertinent History POD 1: L TKA; WBAT LLE  (AM PT session)   Family/Caregiver Present No   Cognition   Overall Cognitive Status WFL   Arousal/Participation Alert   Attention Within functional limits   Orientation Level Oriented X4   Memory Within functional limits   Following Commands Follows all commands and directions without difficulty   Comments Pt motivated to participate in PT tx and ambulation   Subjective   Subjective "I'm definitely more sore today"   Bed Mobility   Supine to Sit Unable to assess   Sit to Supine Unable to assess   Additional Comments pt in bathroom w/ OT upon arrival, OOB in recliner chair at end of session   Transfers   Sit to Stand 5  Supervision   Additional items Assist x 1; Armrests; Increased time required;Verbal cues   Stand to Sit 5  Supervision   Additional items Assist x 1; Armrests; Increased time required;Verbal cues   Additional Comments VC to lock rollator walker   Ambulation/Elevation   Gait pattern Improper Weight shift; Wide NORIS; Decreased foot clearance;Decreased L stance   Gait Assistance 5  Supervision   Additional items Assist x 1;Verbal cues   Assistive Device   (pt's walker)   Distance 80'+120'  (seated reast break)   Stair Management Assistance 5  Supervision  (close supervision)   Additional items Assist x 1;Verbal cues   Stair Management Technique Two rails; Step to pattern   Number of Stairs 4   Ambulation/Elevation Additional Comments Pt able to ambulate into hallway, negotiate multiple turns and obstacles w/o evidence of LOB   Balance   Static Sitting Good   Dynamic Sitting Fair +   Static Standing Fair   Dynamic Standing Alexis Boggs 0274 -  (w/ walker)   Endurance Deficit   Endurance Deficit Yes   Endurance Deficit Description decreased overall tolerance to functional mobility due to pain   Activity Tolerance   Activity Tolerance Patient limited by pain   Medical Staff Made Aware Spoke to OT Pallavi Cameron   Nurse Made Aware CARLOZ Chakraborty   Knee AROM Long Arc Quad Sitting;10 reps;AROM; Right   Ankle Pumps Sitting;10 reps;AROM; Bilateral   Balance training  pt able to achieve ~-5* R knee ex and ~90* R knee flexion   Assessment   Prognosis Good   Problem List Decreased strength;Decreased endurance; Impaired balance;Decreased mobility; Decreased skin integrity;Orthopedic restrictions;Pain   Assessment Pt seen for PT treatment session today w/ pt agreeable and motivated to participate  PT interventions provided include: transfer, gait, balance, endurance, and stair negotiation training in order to progress pt towards PLOF and to maximize pt independence w/ functional mobility  Education provided on walker management and stair negotiate technique  In comparison to previous session, pt overall improved tolerance to functional mobility  Pt demonstrated ability to ambulated a total of 200' w/ her walker and no evidence of LOB; additionally pt demonstrated ability to negotiate 4 steps w/ supervision  Pt continues to be functioning below baseline level, and remains limited in functional mobility due to pain limiting functional mobility  Pt will continue to benefit from skilled PT intervention to promote pt's independence w/ functional mobility and progress towards set goals   Continue to recommend DC home with outpatient rehabilitation w/ family support/supervision when medically cleared  Goals   Patient Goals to go home today   STG Expiration Date 10/16/22   Short Term Goal #1 Pt will: perform bed mobility w/ mod I to decrease pt's burden of care and increase pt's independence w/ repositioning in bed; perform transfers w/ mod I to increase pt's OOB mobility; ambulate at least 350' w/ LRAD and mod I to increase pt's ambulatory endurance/tolerance; negotiate at least 14 steps w/ UE support and mod I to facilitate pt returning to previous living environment; increase all balance ratings by at least 1 grade to decrease pt's risk of falls   PT Treatment Day 1   Plan   Treatment/Interventions Functional transfer training;LE strengthening/ROM; Elevations; Therapeutic exercise; Endurance training;Patient/family training;Equipment eval/education; Bed mobility;Gait training   Progress Improving as expected   PT Frequency Twice a day   Recommendation   PT Discharge Recommendation Home with outpatient rehabilitation  (w/ family support)   Equipment Recommended [facility] Recommended Wheeled walker   Change/add to [employer]? No   AM-PAC Basic Mobility Inpatient   Turning in Bed Without Bedrails 3   Lying on Back to Sitting on Edge of Flat Bed 3   Moving Bed to Chair 3   Standing Up From Chair 3   Walk in Room 3   Climb 3-5 Stairs 3   Basic Mobility Inpatient Raw Score 18   Basic Mobility Standardized Score 41 05   Highest Level Of Mobility   JH-HLM Goal 6: Walk 10 steps or more   JH-HLM Achieved 7: Walk 25 feet or more   Education   Education Provided Mobility training;Assistive device   Patient Demonstrates acceptance/verbal understanding   End of Consult   Patient Position at End of Consult Bedside chair;Bed/Chair alarm activated  (LE elevated)         The patient's AM-PAC Basic Mobility Inpatient Short Form Raw Score is 18  A Raw score of greater than 16 suggests the patient may benefit from discharge to home   Please also refer to the recommendation of the Physical Therapist for safe discharge planning  Pt will continue to benefit from skilled inpatient PT during this admission in order to facilitate progress towards goals and to maximize pt's independence w/ functional mobility      DC rec: OPPT w/ family support/supervision      Tere Kearns, PT, DPT  10/07/22 denies

## 2022-10-07 NOTE — PLAN OF CARE
Problem: OCCUPATIONAL THERAPY ADULT  Goal: Performs self-care activities at highest level of function for planned discharge setting  See evaluation for individualized goals  Description: Treatment Interventions: ADL retraining, Functional transfer training, Patient/family training, Endurance training, Equipment evaluation/education, Compensatory technique education, Activityengagement, Continued evaluation (functional balance training)          See flowsheet documentation for full assessment, interventions and recommendations  Outcome: Progressing  Note: Limitation: Decreased ADL status, Decreased endurance, Decreased self-care trans, Decreased high-level ADLs (dec'd LE strength, dec'd functional balance)  Prognosis: Good  Assessment: Pt is a 59 y o  female seen for OT evaluation s/p [facility] 10/6/2022 w/ Primary osteoarthritis of left knee  Comorbidities affecting pt's functional performance at time of assessment are listed above  Personal factors affecting pt at time of IE include:steps to enter environment, difficulty performing ADLS, and difficulty performing IADLS   Prior to admission, pt was (I) with all self cares, majority of IADLS (with the exception of laundry), and able to participate in leisure activities  Upon evaluation: Pt requires min/mod (A) for LB ADLs, (S) for bed mobility, (S) functional mobility, (S) ADL transfers  Pt presents below baseline 2* the following deficits impacting occupational performance: weakness, decreased strength, decreased balance, decreased tolerance, increased pain, and orthopedic restrictions  Pt to benefit from continued skilled OT tx while in the hospital to address deficits as defined above and maximize level of functional independence w ADL's and functional mobility  Occupational Performance areas to address include: bathing/shower, toilet hygiene, dressing, functional mobility, community mobility, clothing management, and leisure participation    Pt's raw score on the AM-PAC Daily activity inpatient short form is 21, standardized score is 44 27, greater than 39 4  Patients at this level are likely to benefit from DC to home  This writer agrees with the latter recommendation  Based on OT evaluation, the assessment(s) completed, performance deficits listed, and current level of function, pt identified as a moderate complexity evaluation       OT Discharge Recommendation: No rehabilitation needs (anticipate no OT needs at d/c)

## 2022-10-07 NOTE — PLAN OF CARE
Problem: Potential for Falls  Goal: Patient will remain free of falls  Description: INTERVENTIONS:  - Educate patient/family on patient safety including physical limitations  - Instruct patient to call for assistance with activity   - Consult OT/PT to assist with strengthening/mobility   - Keep Call bell within reach  - Keep bed low and locked with side rails adjusted as appropriate  - Keep care items and personal belongings within reach  - Initiate and maintain comfort rounds  - Make Fall Risk Sign visible to staff  - Offer Toileting every 2 Hours, in advance of need  - Apply yellow socks and bracelet for high fall risk patients  - Consider moving patient to room near nurses station  Outcome: Progressing     Problem: MOBILITY - ADULT  Goal: Maintain or return to baseline ADL function  Description: INTERVENTIONS:  -  Assess patient's ability to carry out ADLs; assess patient's baseline for ADL function and identify physical deficits which impact ability to perform ADLs (bathing, care of mouth/teeth, toileting, grooming, dressing, etc )  - Assess/evaluate cause of self-care deficits   - Assess range of motion  - Assess patient's mobility; develop plan if impaired  - Assess patient's need for assistive devices and provide as appropriate  - Encourage maximum independence but intervene and supervise when necessary  - Involve family in performance of ADLs  - Assess for home care needs following discharge   - Consider OT consult to assist with ADL evaluation and planning for discharge  - Provide patient education as appropriate  Outcome: Progressing  Goal: Maintains/Returns to pre admission functional level  Description: INTERVENTIONS:  - Perform BMAT or MOVE assessment daily    - Set and communicate daily mobility goal to care team and patient/family/caregiver  - Collaborate with rehabilitation services on mobility goals if consulted  - Perform Range of Motion 5 times a day  - Reposition patient every 2 hours    - Dangle patient 5 times a day  - Stand patient 5 times a day  - Ambulate patient 5 times a day  - Out of bed to chair 3 times a day   - Out of bed for meals 3 times a day  - Out of bed for toileting  - Record patient progress and toleration of activity level   Outcome: Progressing

## 2022-10-07 NOTE — ASSESSMENT & PLAN NOTE
Lab Results   Component Value Date    EGFR 70 10/07/2022    EGFR 57 09/26/2022    EGFR 55 09/15/2022    CREATININE 0 90 10/07/2022    CREATININE 1 06 09/26/2022    CREATININE 1 10 09/15/2022     · Renal function stable  · Continue torsemide 20 mg daily and spironolactone 25 mg daily

## 2022-10-07 NOTE — PLAN OF CARE
Problem: PHYSICAL THERAPY ADULT  Goal: Performs mobility at highest level of function for planned discharge setting  See evaluation for individualized goals  Description: Treatment/Interventions: Functional transfer training, LE strengthening/ROM, Elevations, Therapeutic exercise, Endurance training, Patient/family training, Equipment eval/education, Bed mobility, Gait training  Equipment Recommended: Osman Emery       See flowsheet documentation for full assessment, interventions and recommendations  10/7/2022 1638 by Kandra Dandy, PT  Outcome: Progressing  Note: Prognosis: Good  Problem List: Decreased strength, Decreased endurance, Impaired balance, Decreased mobility, Decreased skin integrity, Orthopedic restrictions, Pain  Assessment: Pt seen for PM PT treatment session today w/ pt agreeable to participate  PT interventions provided include: bed mobility, transfer, gait, stair, and endurance traingin in order to progress pt towards PLOF and to maximize pt independence w/ functional mobility  Education provided on stair negotiation technique  In comparison to previous session, pt w/ improved ambulatory endurance as evident by pt demonstrating ability to ambulate 200' w/o rest break  Pt demonstrated ability to negotiate 4 steps w/ UE support and supervision w/o evidence of LOB  Pt continues to be functioning below baseline level, and remains limited in functional mobility due to pain w/ mobility  Pt will continue to benefit from skilled PT intervention to promote pt's independence w/ functional mobility and progress towards set goals  Continue to recommend DC home with outpatient rehabilitation w/ family support when medically cleared  PT Discharge Recommendation: Home with outpatient rehabilitation (w/ family support)    See flowsheet documentation for full assessment

## 2022-10-07 NOTE — PHYSICAL THERAPY NOTE
PHYSICAL THERAPY TREATMENT NOTE          Patient Name: Rosy COLEMAN Date: 10/7/2022           10/07/22 1510   PT Last Visit   PT Visit Date 10/07/22   Note Type   Note Type Treatment   Pain Assessment   Pain Assessment Tool 0-10   Pain Score 4   Pain Location/Orientation Orientation: Left; Location: Knee   Pain Onset/Description Onset: Ongoing   Patient's Stated Pain Goal No pain   Hospital Pain Intervention(s) Cold applied;Repositioned; Ambulation/increased activity  (RN pre-medicated pt)   Restrictions/Precautions   Weight Bearing Precautions Per Order Yes   LLE Weight Bearing Per Order WBAT   Other Precautions Chair Alarm; Bed Alarm;WBS;Fall Risk;Pain   General   Chart Reviewed Yes   Additional Pertinent History POD 1: L TKA; WBAT LLE  (PM PT session)   Response to Previous Treatment Patient with no complaints from previous session  Family/Caregiver Present No   Cognition   Overall Cognitive Status WFL   Arousal/Participation Alert   Attention Within functional limits   Orientation Level Oriented X4   Memory Within functional limits   Following Commands Follows all commands and directions without difficulty   Comments Pt motivated to particiapte in PT session   Bed Mobility   Supine to Sit 6  Modified independent   Additional items HOB elevated; Bedrails; Increased time required   Sit to Supine 6  Modified independent   Additional items HOB elevated; Increased time required   Transfers   Sit to Stand 5  Supervision   Additional items Assist x 1;Verbal cues   Stand to Sit 5  Supervision   Additional items Assist x 1;Verbal cues   Ambulation/Elevation   Gait pattern Improper Weight shift; Wide NORIS; Decreased foot clearance;Decreased L stance   Gait Assistance 5  Supervision   Additional items Assist x 1;Verbal cues   Assistive Device   (pt's walker)   Distance 200'   Stair Management Assistance 5  Supervision   Additional items Assist x 1;Verbal cues Stair Management Technique Two rails; Step to pattern   Number of Stairs 4   Balance   Static Sitting Good   Dynamic Sitting Fair +   Static Standing Fair   Dynamic Standing [address] -  (w/ walker)   Activity Tolerance   Activity Tolerance Patient limited by pain   Medical Staff Made Aware Ortho ARIANNA Cameron   Nurse Made Aware CARLOZ Mccallum   Assessment   Prognosis Good   Problem List Decreased strength;Decreased endurance; Impaired balance;Decreased mobility; Decreased skin integrity;Orthopedic restrictions;Pain   Assessment Pt seen for PM PT treatment session today w/ pt agreeable to participate  PT interventions provided include: bed mobility, transfer, gait, stair, and endurance traingin in order to progress pt towards PLOF and to maximize pt independence w/ functional mobility  Education provided on stair negotiation technique  In comparison to previous session, pt w/ improved ambulatory endurance as evident by pt demonstrating ability to ambulate 200' w/o rest break  Pt demonstrated ability to negotiate 4 steps w/ UE support and supervision w/o evidence of LOB  Pt continues to be functioning below baseline level, and remains limited in functional mobility due to pain w/ mobility  Pt will continue to benefit from skilled PT intervention to promote pt's independence w/ functional mobility and progress towards set goals  Continue to recommend DC home with outpatient rehabilitation w/ family support when medically cleared     Goals   Patient Goals to go home today   STG Expiration Date 10/16/22   Short Term Goal #1 Pt will: perform bed mobility w/ mod I to decrease pt's burden of care and increase pt's independence w/ repositioning in bed; perform transfers w/ mod I to increase pt's OOB mobility; ambulate at least 350' w/ LRAD and mod I to increase pt's ambulatory endurance/tolerance; negotiate at least 14 steps w/ UE support and mod I to facilitate pt returning to previous living environment; increase all balance ratings by at least 1 grade to decrease pt's risk of falls   PT Treatment Day 2   Plan   Treatment/Interventions Functional transfer training;LE strengthening/ROM; Elevations; Therapeutic exercise; Endurance training;Patient/family training;Equipment eval/education; Bed mobility;Gait training   Progress Improving as expected   PT Frequency Twice a day   Recommendation   PT Discharge Recommendation Home with outpatient rehabilitation  (w/ family support)   AM-PAC Basic Mobility Inpatient   Turning in Bed Without Bedrails 4   Lying on Back to Sitting on Edge of Flat Bed 4   Moving Bed to Chair 3   Standing Up From Chair 3   Walk in Room 3   Climb 3-5 Stairs 3   Basic Mobility Inpatient Raw Score 20   Basic Mobility Standardized Score 43 99   Highest Level Of Mobility   -HLM Goal 6: Walk 10 steps or more   JH-HLM Achieved 7: Walk 25 feet or more   Education   Education Provided Mobility training;Assistive device   Patient Demonstrates acceptance/verbal understanding   End of Consult   Patient Position at End of Consult Supine;Bed/Chair alarm activated; All needs within reach         The patient's AM-PAC Basic Mobility Inpatient Short Form Raw Score is 20  A Raw score of greater than 16 suggests the patient may benefit from discharge to home  Please also refer to the recommendation of the Physical Therapist for safe discharge planning  Pt will continue to benefit from skilled inpatient PT during this admission in order to facilitate progress towards goals and to maximize pt's independence w/ functional mobility      DC rec: OPPT w/ family support/supervision      Lisandra Sun PT, DPT  10/07/22

## 2022-10-07 NOTE — PROGRESS NOTES
Peripheral Block Progress Note - Acute Pain Service    Rosy Seymour 59 y o  female MRN: [id number]  Unit/Bed#: S -01 Encounter: [id number]      Assessment:   Principal Problem:    Primary osteoarthritis of left knee  Active Problems:    Other organ or system involvement in systemic lupus erythematosus ([facility] [address] )    Pulmonary hypertension (HCC)    Stage 3a chronic kidney disease (CKD) ([facility] )    Obesity    Rosy Seymour is a 59 y o  female who is postop day 1 from a left total knee arthroplasty  Plan:   Left single shot  AC/IPACK peripheral block with Exparel is functioning appropriately with expected sensory deficits  Multimodal analgesia:  · Tylenol 975 mg every 8 hours scheduled  · Robaxin 750 mg every 6 hours scheduled  · Oxycodone 5 mg every 4 hours as needed for moderate pain  · Oxycodone 10 mg every 4 hours as needed for severe pain    Bowel Regimen:  · Colace 100 mg twice a day  · Senokot daily  · Milk of magnesia daily as needed for constipation    Pain controlled on the above analgesic regimen with peripheral block  Okay for discharge from a pain management standpoint  Would recommend to continue Tylenol, Robaxin and oxycodone at time of discharge  Treatment recommendations and plan of care discussed with bedside nursing staff and Primary Care Service  APS will sign off at this time  Thank you for the consult  All opioids and other analgesics to be written at discretion of primary team  Please contact Acute Pain Service - [facility] via [employer] from [phone] with additional questions or concerns  See Beatris or Prema for additional contacts and after hours information  Pain History  Current pain location(s):  Left knee  Pain Scale:   0-5  Quality:  Aching, tightness  24 hour history:  Patient sitting in chair, no acute distress  Reporting mild left knee pain, that is more bothersome in the posterior aspect of her knee  Denied left lower extremity weakness or paresthesias    Sensory deficits present to left anterior knee  Tolerating current analgesic regimen  P r n  Oxycodone provides adequate pain control  No headaches or dizziness  Reports intermittent nausea, p r n  Zofran is effective      Opioid requirement previous 24 hours:  Oxycodone 20 mg    Meds/Allergies   all current active meds have been reviewed, current meds:   Current Facility-Administered Medications   Medication Dose Route Frequency    acetaminophen (TYLENOL) tablet 975 mg  975 mg Oral Q8H    ascorbic acid (VITAMIN C) tablet 500 mg  500 mg Oral BID    aspirin (ECOTRIN LOW STRENGTH) EC tablet 162 mg  162 mg Oral Daily    calcium carbonate (TUMS) chewable tablet 1,000 mg  1,000 mg Oral Daily PRN    cholecalciferol (VITAMIN D3) tablet 2,000 Units  2,000 Units Oral Early Morning    [START ON 10/12/2022] cyanocobalamin injection 1,000 mcg  1,000 mcg Intramuscular Q30 Days    docusate sodium (COLACE) capsule 100 mg  100 mg Oral BID    enoxaparin (LOVENOX) subcutaneous injection 40 mg  40 mg Subcutaneous Daily    ferrous sulfate tablet 325 mg  325 mg Oral BID With Meals    folic acid (FOLVITE) tablet 800 mcg  800 mcg Oral Daily    hydroxychloroquine (PLAQUENIL) tablet 400 mg  400 mg Oral Daily With Breakfast    lactated ringers bolus 1,000 mL  1,000 mL Intravenous Once PRN    And    lactated ringers bolus 1,000 mL  1,000 mL Intravenous Once PRN    lactated ringers infusion  1 5 mL/kg/hr Intravenous Continuous    magnesium hydroxide (MILK OF MAGNESIA) oral suspension 30 mL  30 mL Oral Daily PRN    methocarbamol (ROBAXIN) tablet 750 mg  750 mg Oral Q6H [address]    multivitamin-minerals (CENTRUM) tablet 1 tablet  1 tablet Oral Daily    ondansetron (ZOFRAN) injection 4 mg  4 mg Intravenous Q6H PRN    oxyCODONE (ROXICODONE) immediate release tablet 10 mg  10 mg Oral Q4H PRN    oxyCODONE (ROXICODONE) IR tablet 5 mg  5 mg Oral Q3H PRN    scopolamine (TRANSDERM-SCOP) 1 mg/3 days TD 72 hr patch 1 patch  1 patch Transdermal Q72H    senna (SENOKOT) tablet 8 6 mg  1 tablet Oral Daily    sildenafil (REVATIO) tablet 20 mg  20 mg Oral TID    sodium chloride 0 9 % bolus 1,000 mL  1,000 mL Intravenous Once PRN    And    sodium chloride 0 9 % bolus 1,000 mL  1,000 mL Intravenous Once PRN    spironolactone (ALDACTONE) tablet 25 mg  25 mg Oral HS    torsemide (DEMADEX) tablet 20 mg  20 mg Oral Daily    and PTA meds:   Prior to Admission Medications   Prescriptions Last Dose Informant Patient Reported? Taking?    Benlysta 200 MG/ML SOAJ Past Week at Unknown time  Yes Yes   Sig: Weekly on Fridays   Multiple Vitamins-Minerals (multivitamin with minerals) tablet 10/5/2022 at Unknown time  No Yes   Sig: Take 1 tablet by mouth daily   Syringe/Needle, Disp, (SecureSafe Syringe/Needle) 25G X 1-1/2" 1 ML MISC  Self No No   Sig: Use 1 Syringe every 30 (thirty) days   ascorbic acid (VITAMIN C) 500 MG tablet 10/5/2022 at Unknown time  No Yes   Sig: Take 1 tablet (500 mg total) by mouth 2 (two) times a day   aspirin (ECOTRIN LOW STRENGTH) 81 mg EC tablet 10/5/2022 at Unknown time Self Yes Yes   Sig: Take 162 mg by mouth daily   cholecalciferol (VITAMIN D3) 1,000 units tablet 10/5/2022 at Unknown time Self Yes Yes   Sig: Take 2,000 Units by mouth daily in the early morning     cyanocobalamin 1,000 mcg/mL Past Month at Unknown time  No Yes   Sig: Inject 1 mL (1,000 mcg total) into a muscle every 30 (thirty) days   enoxaparin (LOVENOX) 40 mg/0 4 mL   No Yes   Sig: Inject 0 4 mL (40 mg total) under the skin daily for 28 days Starting after left total knee surgery   enoxaparin (Lovenox) 40 mg/0 4 mL   Yes Yes   Sig: Inject 40 mg under the skin in the morning   ferrous sulfate 324 (65 Fe) mg 10/5/2022 at Unknown time  No Yes   Sig: Take 1 tablet (324 mg total) by mouth 2 (two) times a day before meals   folic acid (FOLVITE) 1 mg tablet 10/5/2022 at Unknown time  No Yes   Sig: TAKE 1 TABLET(1 MG) BY MOUTH DAILY   hydroxychloroquine (PLAQUENIL) 200 mg tablet 10/6/2022 at Unknown time Self Yes Yes   Sig: Take 400 mg by mouth daily with breakfast Dosage adjustments based on testing results  Takes 200 mg on odd days and takes 400 mg on even days   sildenafil (REVATIO) 20 mg tablet 10/6/2022 at Unknown time Self No Yes   Sig: Take 1 tablet (20 mg total) by mouth 3 (three) times a day   spironolactone (ALDACTONE) 25 mg tablet Past Week at Unknown time  No Yes   Sig: Take 1 tablet (25 mg total) by mouth daily   Patient taking differently: Take 25 mg by mouth daily at bedtime   torsemide (DEMADEX) 20 mg tablet 10/5/2022 at Unknown time  No Yes   Sig: Take 1 tablet (20 mg total) by mouth daily   triamcinolone (KENALOG) 0 1 % oral topical paste More than a month at Unknown time Self Yes No   Sig: prn      Facility-Administered Medications: None       Allergies   Allergen Reactions    Dilantin [Phenytoin] Anaphylaxis and Rash    Penicillins Rash, Anaphylaxis, Dermatitis and Hives    Augmentin [Amoxicillin-Pot Clavulanate] Rash and Dermatitis       Objective     Temp:  [97 °F (36 1 °C)-98 9 °F (37 2 °C)] 98 6 °F (37 °C)  HR:  [] 96  Resp:  [12-20] 16  BP: (103-147)/(56-75) 130/63    Physical Exam  Vitals reviewed  Constitutional:       General: She is awake  She is not in acute distress  Appearance: Normal appearance  She is not ill-appearing, toxic-appearing or diaphoretic  HENT:      Head: Normocephalic and atraumatic  Nose: Nose normal  No congestion or rhinorrhea  Mouth/Throat:      Mouth: Mucous membranes are moist    Eyes:      Extraocular Movements: Extraocular movements intact  Pulmonary:      Effort: Pulmonary effort is normal  No tachypnea, bradypnea or respiratory distress  Skin:     General: Skin is warm and dry  Coloration: Skin is not pale  Findings: No rash  Comments: Left knee dressing intact   Neurological:      Mental Status: She is alert and oriented to person, place, and time  Mental status is at baseline        Sensory: Sensory deficit (left anterior knee) present  Motor: No weakness or tremor  Psychiatric:         Attention and Perception: Attention normal          Mood and Affect: Mood normal  Mood is not anxious  Speech: Speech normal          Behavior: Behavior normal  Behavior is cooperative  Cognition and Memory: Cognition and memory normal          Lab Results:   Results from last 7 days   Lab Units 10/07/22  0543   WBC Thousand/uL 9 04   HEMOGLOBIN g/dL 10 6*   HEMATOCRIT % 32 1*   PLATELETS Thousands/uL 238      Results from last 7 days   Lab Units 10/07/22  0543   POTASSIUM mmol/L 4 7   CHLORIDE mmol/L 107   CO2 mmol/L 28   BUN mg/dL 12   CREATININE mg/dL 0 90   CALCIUM mg/dL 9 0       Imaging Studies: I have personally reviewed pertinent reports  Please note that the APS provides consultative services regarding pain management only  With the exception of ketamine and epidural infusions and except when indicated, final decisions regarding starting or changing doses of analgesic medications are at the discretion of the consulting service  Off hours consultation and/or medication management is generally not available      [facility]  Acute Pain Service

## 2022-10-07 NOTE — PROGRESS NOTES
Progress Note - Orthopedics   Christina Moreno 59 y o  female MRN: [id number]  Unit/Bed#: S -01      Subjective:    59 y  o female seen and evaluated at bedside  Pain controlled  She was walking laps last night with her   Sohail removed this AM but has not yet urinated  Eating well  No other complaints  Has not yet worked with PT this morning       Labs:  0   Lab Value Date/Time    HCT 32 1 (L) 10/07/2022 0543    HCT 45 5 09/15/2022 0737    HCT 39 7 05/07/2022 0707    HGB 10 6 (L) 10/07/2022 0543    HGB 15 1 09/15/2022 0737    HGB 12 9 05/07/2022 0707    INR 1 03 09/15/2022 0737    WBC 9 04 10/07/2022 0543    WBC 4 55 09/15/2022 0737    WBC 4 53 05/07/2022 0707    ESR 17 09/26/2022 0831    CRP 2 2 09/15/2022 0737       Meds:    Current Facility-Administered Medications:     acetaminophen (TYLENOL) tablet 975 mg, 975 mg, Oral, Q8H, ARIANNA Perez-C, 975 mg at 10/07/22 0551    ascorbic acid (VITAMIN C) tablet 500 mg, 500 mg, Oral, BID, ARIANNA Perez-C, 500 mg at 10/07/22 8481    aspirin (ECOTRIN LOW STRENGTH) EC tablet 162 mg, 162 mg, Oral, Daily, Norma Wills PA-C, 162 mg at 10/07/22 9290    calcium carbonate (TUMS) chewable tablet 1,000 mg, 1,000 mg, Oral, Daily PRN, ARIANNA Perez-C    cholecalciferol (VITAMIN D3) tablet 2,000 Units, 2,000 Units, Oral, Early Morning, ARIANNA Perez-C, 2,000 Units at 10/07/22 0551    [START ON 10/12/2022] cyanocobalamin injection 1,000 mcg, 1,000 mcg, Intramuscular, Q30 Days, ENMA PerezC    docusate sodium (COLACE) capsule 100 mg, 100 mg, Oral, BID, ARIANNA Perez-C, 100 mg at 10/07/22 0844    enoxaparin (LOVENOX) subcutaneous injection 40 mg, 40 mg, Subcutaneous, Daily, Norma Wills PA-C    ferrous sulfate tablet 325 mg, 325 mg, Oral, BID With Meals, Norma Wills PA-C, 325 mg at 26/31/91 4711    folic acid (FOLVITE) tablet 800 mcg, 800 mcg, Oral, Daily, Norma Wills PA-C, 800 mcg at 10/07/22 8350    hydroxychloroquine (PLAQUENIL) tablet 400 mg, 400 mg, Oral, Daily With Breakfast, Katie Sandoval, PA-C, 400 mg at 10/07/22 6659    lactated ringers bolus 1,000 mL, 1,000 mL, Intravenous, Once PRN **AND** lactated ringers bolus 1,000 mL, 1,000 mL, Intravenous, Once PRN, Katie Sandoval PA-C    lactated ringers infusion, 1 5 mL/kg/hr, Intravenous, Continuous, ARIANNA William-C, Stopped at 10/07/22 0551    magnesium hydroxide (MILK OF MAGNESIA) oral suspension 30 mL, 30 mL, Oral, Daily PRN, Katie Sandoval PA-C    methocarbamol (ROBAXIN) tablet 750 mg, 750 mg, Oral, Q6H [address], ARIANNA William-C, 750 mg at 10/07/22 0551    multivitamin-minerals (CENTRUM) tablet 1 tablet, 1 tablet, Oral, Daily, ARIANNA William-C, 1 tablet at 10/07/22 0844    ondansetron (ZOFRAN) injection 4 mg, 4 mg, Intravenous, Q6H PRN, Katie Sandoval PA-C    oxyCODONE (ROXICODONE) immediate release tablet 10 mg, 10 mg, Oral, Q4H PRN, Katie Sandoval PA-C, 10 mg at 10/06/22 1525    oxyCODONE (ROXICODONE) IR tablet 5 mg, 5 mg, Oral, Q3H PRN, Katie Sandoval PA-C, 5 mg at 10/07/22 0843    scopolamine (TRANSDERM-SCOP) 1 mg/3 days TD 72 hr patch 1 patch, 1 patch, Transdermal, Q72H, Spike English, LUIS ENRIQUE, 1 patch at 10/06/22 1100    senna (SENOKOT) tablet 8 6 mg, 1 tablet, Oral, Daily, Katie Sandoval PA-C, 8 6 mg at 10/07/22 0844    sildenafil (REVATIO) tablet 20 mg, 20 mg, Oral, TID, Katie Sandoval PA-C, 20 mg at 10/07/22 0845    sodium chloride 0 9 % bolus 1,000 mL, 1,000 mL, Intravenous, Once PRN **AND** sodium chloride 0 9 % bolus 1,000 mL, 1,000 mL, Intravenous, Once PRN, Katie Sandoval PA-C    spironolactone (ALDACTONE) tablet 25 mg, 25 mg, Oral, HS, Katie aSndoval PA-C    torsemide [facility]) tablet 20 mg, 20 mg, Oral, Daily, Katie Sandoval PA-C, 20 mg at 10/07/22 0843    Blood Culture:   No results found for: BLOODCX    Wound Culture:   No results found for: WOUNDCULT    Ins and Outs:  I/O last 24 hours: In: 2010 [P O :360; I V :1500; IV Piggyback:150]  Out: 5499 [Urine:1450]          Physical:  Vitals:    10/07/22 0831   BP: 130/63   Pulse: 96   Resp: 16   Temp: 98 6 °F (37 °C)   SpO2: 95%     Musculoskeletal: left Lower Extremity  · Surgical dressings C/D/I  Thigh and calf soft and compressible  · Sensation intact to saphenous, sural, tibial, superficial peroneal nerve, and deep peroneal  · Motor intact to +FHL/EHL, +ankle dorsi/plantar flexion  · 2+ DP pulse, symmetric bilaterally  · Digits warm and well perfused  · Capillary refill < 2 seconds    Assessment:    59 y  o female POD 1 left total knee arthroplasty  Patient doing well  Plan:  · WBAT to the left lower extremity  · Thigh high nick stockings bilaterally  · Hgb 10 6 today  · PT/OT  · Pain control  · DVT ppx: lovenox x 30 days  · Appreciate medicine and nephrology assistance post operatively  Cleared by medicine for discharge  Discussed with SLIM this am  Discussed with nephrology, cleared for discharge  · Dispo: Ortho will follow  Dispo pending urination, clearance with PT  Discussed with patient at bedside this AM    · Will need follow up with Dr Jay Jay Burroughs upon discharge       Nisha Gutierrez PA-C

## 2022-10-07 NOTE — QUICK NOTE
Brief orthopedics note    Patient seen and re-evaluated at bedside  Pain controlled  Voiding without issue  Tolerating PO  Cleared by renal and medicine  Cleared by PT/OT  Will discharge home       Nisha Miguel

## 2022-10-08 ENCOUNTER — TELEPHONE (OUTPATIENT)
Dept: OBGYN CLINIC | Facility: CLINIC | Age: 57
End: 2022-10-08

## 2022-10-10 ENCOUNTER — OFFICE VISIT (OUTPATIENT)
Dept: PHYSICAL THERAPY | Facility: CLINIC | Age: 57
End: 2022-10-10
Payer: MEDICARE

## 2022-10-10 DIAGNOSIS — G89.29 CHRONIC PAIN OF LEFT KNEE: Primary | ICD-10-CM

## 2022-10-10 DIAGNOSIS — M25.562 CHRONIC PAIN OF LEFT KNEE: Primary | ICD-10-CM

## 2022-10-10 DIAGNOSIS — Z51.89 AFTERCARE: ICD-10-CM

## 2022-10-10 LAB — MISCELLANEOUS LAB TEST RESULT: NORMAL

## 2022-10-10 PROCEDURE — 97164 PT RE-EVAL EST PLAN CARE: CPT | Performed by: PHYSICAL THERAPIST

## 2022-10-10 PROCEDURE — 97140 MANUAL THERAPY 1/> REGIONS: CPT | Performed by: PHYSICAL THERAPIST

## 2022-10-10 PROCEDURE — 97110 THERAPEUTIC EXERCISES: CPT | Performed by: PHYSICAL THERAPIST

## 2022-10-10 NOTE — PROGRESS NOTES
PT Evaluation     Today's date: 10/10/2022  Patient name: Saba Gutierrez  : 1965  MRN: [id number]  Referring provider: Pepe Gomez DO  Dx:   Encounter Diagnosis     ICD-10-CM    1  Chronic pain of left knee  M25 562     G89 29    2  Aftercare  Z51 89                   Assessment  Assessment details: Pt presents with signs and symptoms synonymous of L TKA and in overall very good form  Pt presents with pain while well controlled, decreased strength, decreased range, flexibility, as well as tolerance to activity and gait dysfunction as well as is a fall risk per objective and subjective findings  Pt appears to have a strong home set up, family support and a review of clots, infections, and how to ascend/ descend elevations  This is all likely due to her past experience and strong education from the past  Pt would benefit skilled PT intervention in order to address these impairments in order to be able to perform all desired activities with minimal to nil symptom exacerbation  Thank you very much for this kind, motivated and familiar referral      Impairments: abnormal gait, abnormal or restricted ROM, impaired balance, impaired physical strength, lacks appropriate home exercise program, pain with function, poor posture  and poor body mechanics  Understanding of Dx/Px/POC: good   Prognosis: good    Goals  STG 4 Weeks:  Decrease pain at worst to 5  Improve range to achieve TKE, and achieve 105 flexion  Improve strength to improve L hip to SLR AROM 3+/5, Knee ext 5/5  Independent with HEP  LTG 8 Weeks:  Decrease pain at worst to 2  Improve range to achieve 120 flexion  Improve strength to L hip 4/5 or greater  Able to perform all desired activities with minimal to nil symptom exacerbation      Plan  Plan details: Nikki Luna is      Patient would benefit from: skilled physical therapy  Planned modality interventions: cryotherapy, TENS and thermotherapy: hydrocollator packs  Planned therapy interventions: joint mobilization, manual therapy, abdominal trunk stabilization, activity modification, neuromuscular re-education, patient education, postural training, strengthening, stretching, therapeutic activities, therapeutic exercise, therapeutic training, transfer training, home exercise program, graded motor, graded exercise, graded activity, gait training, functional ROM exercises, flexibility, balance, balance/weight bearing training, behavior modification and body mechanics training  Frequency: 2x week  Duration in weeks: 10  Treatment plan discussed with: patient and family        Subjective Evaluation    History of Present Illness  Date of onset: 10/10/2022  Mechanism of injury: Pt is a 59 yofemale who presents with her  David Wilson just had L TKA performed by Dr Dejon Kearney on 10/6/22 stating that as a whole she is feeling well  Had history of injection and other conservative care prior to this, had menisectomy that failed and thus chose to have TKA performed  She indicates mild elevation in temperature has notified medical staff and continue to monitor  Pt reports that her pain is well controlled with medication, she has been icing, performing her HEP routinely  This is her second TKA as she had R one performed 3-4 years ago  Pt reports currently using rollator  Pt reports pain levels have been fluctuating between 3 and can get up to 6/10  Pt reports that sleeping is not going well at this time  Pt reports that has been successful with performance of BM  Pt that she has 4 JINA with B/L railing but only able to use 1, there is 1 JINA to Living room which is the First floor setup with commode at this time  Pt reports that overall feeling really well at this time, states that her goals are to continue reducing pain, improving mobility, and strength, range, as well as getting back to [facility] sparingly to be able to mobility within community and home     Quality of life: good    Pain  Current pain rating: 3  At best pain rating: 3  At worst pain ratin  Quality: dull ache, tight and throbbing  Relieving factors: ice, medications, rest and change in position  Aggravating factors: standing, walking, stair climbing and lifting  Progression: improved    Social Support  Steps to enter house: yes  Stairs in house: yes   Lives in: multiple-level home  Lives with: spouse      Diagnostic Tests  X-ray: abnormal  Treatments  Previous treatment: injection treatment and medication  Patient Goals  Patient goals for therapy: decreased pain, improved balance, increased motion, increased strength and return to sport/leisure activities          Objective     Active Range of Motion   Left Knee   Flexion: 88 (92) degrees with pain  Extension: -3 (-1) degrees with pain    Right Knee   Flexion: 125 degrees   Extension: 0 degrees     Additional Active Range of Motion Details  Forward head, rounded shoulders, Lordosis  Genu valgum R > L, slow, decreased step heigh antalgia with L    B/L Sensation intact to L3,4,5,S1,S2  Hip strength  Quad Set Good R, Fair (-) L    L Flex 2 Ext 4 Abd 3 Add 3 SLR AAROM  2 x 5  R Flex 5 Ext 5 Abd 5 Add 5 SLR [id number]  LE Screen  Knee strength  L 3 flex and ext  R 5/5   Ankle    L strong and painless B  R strong and painless B   Joint Mobility  Palpation  Sts  Incision Clean dry mild scabbing, no sign of infection  TUG with Rollator: 34 87"   Elevations reviewed and discussed                Precautions:  HTN, Falls Osteoporosis, CHF,       Daily Treatment Diary  Manuals 10/10        Knee L PROM / SLR AAROM 10 min + education        RE performed done                          Ther Ex         Bike rock and roll         Gastroc slant bd         Quad set/GS 3" x 10        Heel slides AROM or Towel reviewed 3" x 10        NBOS EC         NBOS EO Foam                           Ankle pump/circulation education done        Neuro Re-ed         [employer] 10x5"        saq         laq 10x2                          Ther Activity stairs reviewed and educated        minisquats         Sit<>Stand                                    Modalities         CP in elevation to L knee

## 2022-10-10 NOTE — TELEPHONE ENCOUNTER
Pt contacted today to follow up on weekend fever, and complete a postoperative follow up call assessment  She reports temp of 101 this morning, and as high as 102 8 this weekend  She did call oncall, and reports it is "not breaking" with tylenol  We discussed s/s of infection (spreading redness, spreading warmth and drainage) all of which patient denies  She reports she "feels fine" and icing areas of pain and swelling  She reports no drainage since time of DC on bandage  She has outpatient PT today, and is aware they will evaluate incisional area as well  We reviewed her AVS medication list  She is taking Robaxin 750mg every 6 hours, and tylenol 1000mg TID for pain  She plans to take Oxycodone 5mg before PT, not taking it every 4 hours  She is also not taking zofran, and has not needed it  She is taking lovenox daily, and colace 100mg BID (denies BM but is passing gas)  She is holding her ASA  She denies chest pain, SOB, dizziness, fever or calf pain  She denies questions, and was advised to continue to update our team with concerns or temperature updates  pt encouraged to call me with questions, concerns or issues

## 2022-10-13 ENCOUNTER — OFFICE VISIT (OUTPATIENT)
Dept: OBGYN CLINIC | Facility: CLINIC | Age: 57
End: 2022-10-13

## 2022-10-13 ENCOUNTER — TELEPHONE (OUTPATIENT)
Dept: OBGYN CLINIC | Facility: HOSPITAL | Age: 57
End: 2022-10-13

## 2022-10-13 ENCOUNTER — OFFICE VISIT (OUTPATIENT)
Dept: PHYSICAL THERAPY | Facility: CLINIC | Age: 57
End: 2022-10-13
Payer: MEDICARE

## 2022-10-13 ENCOUNTER — PATIENT OUTREACH (OUTPATIENT)
Dept: FAMILY MEDICINE CLINIC | Facility: OTHER | Age: 57
End: 2022-10-13

## 2022-10-13 VITALS
SYSTOLIC BLOOD PRESSURE: 130 MMHG | DIASTOLIC BLOOD PRESSURE: 78 MMHG | HEIGHT: 64 IN | BODY MASS INDEX: 41.86 KG/M2 | WEIGHT: 245.2 LBS | HEART RATE: 108 BPM

## 2022-10-13 DIAGNOSIS — M25.562 CHRONIC PAIN OF LEFT KNEE: Primary | ICD-10-CM

## 2022-10-13 DIAGNOSIS — Z51.89 AFTERCARE: ICD-10-CM

## 2022-10-13 DIAGNOSIS — Z96.652 STATUS POST TOTAL KNEE REPLACEMENT USING CEMENT, LEFT: ICD-10-CM

## 2022-10-13 DIAGNOSIS — Z96.652 STATUS POST TOTAL KNEE REPLACEMENT USING CEMENT, LEFT: Primary | ICD-10-CM

## 2022-10-13 DIAGNOSIS — G89.29 CHRONIC PAIN OF LEFT KNEE: Primary | ICD-10-CM

## 2022-10-13 PROCEDURE — 97110 THERAPEUTIC EXERCISES: CPT | Performed by: PHYSICAL THERAPIST

## 2022-10-13 PROCEDURE — 97140 MANUAL THERAPY 1/> REGIONS: CPT | Performed by: PHYSICAL THERAPIST

## 2022-10-13 PROCEDURE — 99024 POSTOP FOLLOW-UP VISIT: CPT | Performed by: ORTHOPAEDIC SURGERY

## 2022-10-13 RX ORDER — METHOCARBAMOL 750 MG/1
750 TABLET, FILM COATED ORAL EVERY 6 HOURS PRN
Qty: 20 TABLET | Refills: 0 | Status: SHIPPED | OUTPATIENT
Start: 2022-10-13

## 2022-10-13 RX ORDER — OXYCODONE HYDROCHLORIDE 5 MG/1
5 TABLET ORAL EVERY 4 HOURS PRN
Qty: 20 TABLET | Refills: 0 | Status: SHIPPED | OUTPATIENT
Start: 2022-10-13 | End: 2022-10-18

## 2022-10-13 RX ORDER — OXYCODONE HYDROCHLORIDE 5 MG/1
5 TABLET ORAL EVERY 4 HOURS PRN
Qty: 30 TABLET | Refills: 0 | Status: SHIPPED | OUTPATIENT
Start: 2022-10-13 | End: 2022-10-13 | Stop reason: SDUPTHER

## 2022-10-13 NOTE — PROGRESS NOTES
Assessment:   Status Post  Arthroplasty Knee Total - Left on 10/6/2022 doing well    Plan:   Weight bearing as tolerated left lower extremity  PT - instructed her PT to avoid deep flexion stretching  and focus on extension  Analgesics PRN   Lovenox for 30 days post op  exofin reapplied to patients incision  Thigh high nick stockings - she will cut the bands      Follow Up:  1  week(s)     To Do Next Visit:    and X-rays of the  left  knee two view      CHIEF COMPLAINT:  Chief Complaint   Patient presents with   • Left Knee - Post-op, Wound Check         SUBJECTIVE:  Talon Akins is a 59y o  year old female who presents for follow up after Arthroplasty Knee Total - Left  Today patient has pain around the thigh where the stocking is  She is also complaining of redness and pain in the posterior knee in the skin fold from the stocking she had on the hospital      PHYSICAL EXAMINATION:    MUSCULOSKELETAL EXAMINATION:  Left knee    General: Alert and oriented x 3, WNWD, NAD  Incision: Clean, dry, intact and No galindo-incisional erythema, slight active bleeding and drainge Skin glue appears to have exfoliated off    Posterior knee in skin fold there is some mild erythema no ulceration no evidence of infection  Normal postoperative swelling as expected  Joint effusion:  None appreciable  Range of Motion: -5 degrees extension, 90 degrees flexion  Neurovascular status: Sensation intact  to light touch L3-S1   Motor intact L3-S1 including 5/5 ankle DF/PF  DP pulse intact        STUDIES REVIEWED:  None      PROCEDURES PERFORMED:  Procedures  No Procedures performed today     Scribe Attestation    I,:  Tejinder Dunlap PA-C am acting as a scribe while in the presence of the attending physician :       I,:  Shaila Padilla DO personally performed the services described in this documentation    as scribed in my presence :

## 2022-10-13 NOTE — PROGRESS NOTES
Bundle Episode Left TKR  10/6/22-23    Chart was reviewed  This OPCM RN called patient and introduced self  Confirmed patient by name and  for HIPPA compliance  Patient states she does not have time to talk at [facility] time a requests a call back later today or tomorrow  OPCM RN to follow  Kim Tellez

## 2022-10-13 NOTE — TELEPHONE ENCOUNTER
Patient made aware and verbalized understanding  She will pay out of pocket if she has to until things get straightened out

## 2022-10-13 NOTE — TELEPHONE ENCOUNTER
Pt contacted Call Center requested refill of their medication  Medication Name:Oxyocodone       Dosage of Med: 5mg       Frequency of Med: 6 times a day       Remaining Medication: 0      [facility] and [facility]         [place]  Preferred Callback Phone Number: [phone]    Roger Monsalve called to advise prior Auth required for 30 tablets , this can be submitted via covermymeds       Thank you  PLEASE ADVISE PATIENTS:    REFILL REQUESTS WILL BE PROCESSED WITHIN 24-48 HOURS

## 2022-10-13 NOTE — PROGRESS NOTES
Daily Note     Today's date: 10/13/2022  Patient name: Alejandra Florence  : 1965  MRN: [id number]  Referring provider: Savilla Schaumann, DO  Dx:   Encounter Diagnosis     ICD-10-CM    1  Chronic pain of left knee  M25 562     G89 29    2  Aftercare  Z51 89                   Subjective: Pt presents today stating that she is having a lot of soreness on L quad, physician visit went well, encouraged to focus on TKE, flexion is going well allow less significance flexion due to soreness and receiving glue at incision and allow healing  Pt will follow up with physician next week 10/20/22  Objective: See treatment diary below    PROM 95 flex  -2 Ext      Assessment: Achieved 95 degree flex without challenge or struggle  Good tolerance t/o entire session  Continue to progress CKC and OKC based intervention as able  Precautions:  HTN, Falls Osteoporosis, CHF,     Per Physician visit 10/13/22: Focus on extension avoid deep flexion until given clearance    Daily Treatment Diary  Manuals 10/10 10/13       Knee L PROM / SLR AAROM 10 min + education 10 min TKE focus       RE performed done                          Ther Ex         Bike rock and roll  6 min       Gastroc slant bd  20" x 4       Quad set/GS 3" x 10 3" x 10       Heel slides AROM or Towel reviewed 3" x 10 AROM today  NBOS EC  nv       NBOS EO Foam  nv                         Ankle pump/circulation education done review       Neuro Re-ed         Quad set 10x5" 10 x 5"       saq         laq 10x2 10 x 2                         Ther Activity         stairs reviewed and educated        minisquats  nv?        Sit<>Stand                                    Modalities         CP in elevation to L knee  10 min

## 2022-10-13 NOTE — TELEPHONE ENCOUNTER
Pt contacted Call Center requested refill of their medication  Medication Name: Oxycodone    Dosage of Med: 5mg      Frequency of Med: 4 hrs      Remaining Medication:5      Pharmacy and Location: [facility] in [place]      Thank you

## 2022-10-14 ENCOUNTER — PATIENT OUTREACH (OUTPATIENT)
Dept: FAMILY MEDICINE CLINIC | Facility: OTHER | Age: 57
End: 2022-10-14

## 2022-10-14 NOTE — PROGRESS NOTES
Bundle Episode  10/6/22-1/5/23    This OPCM RN called patient and introduced self and explain the role of OPCM RN and the Bundle program  She states she has no need for any help or follow up at this time  My contact information was provided  She is aware OPCM RN will continue chart reviews throughout the 90 day bundle period

## 2022-10-17 ENCOUNTER — OFFICE VISIT (OUTPATIENT)
Dept: PHYSICAL THERAPY | Facility: CLINIC | Age: 57
End: 2022-10-17
Payer: MEDICARE

## 2022-10-17 ENCOUNTER — TELEPHONE (OUTPATIENT)
Dept: OBGYN CLINIC | Facility: HOSPITAL | Age: 57
End: 2022-10-17

## 2022-10-17 DIAGNOSIS — G89.29 CHRONIC PAIN OF LEFT KNEE: Primary | ICD-10-CM

## 2022-10-17 DIAGNOSIS — Z51.89 AFTERCARE: ICD-10-CM

## 2022-10-17 DIAGNOSIS — M25.562 CHRONIC PAIN OF LEFT KNEE: Primary | ICD-10-CM

## 2022-10-17 PROCEDURE — 97110 THERAPEUTIC EXERCISES: CPT

## 2022-10-17 PROCEDURE — 97140 MANUAL THERAPY 1/> REGIONS: CPT

## 2022-10-17 NOTE — TELEPHONE ENCOUNTER
Caller: Patric Soares    Doctor: Lucas Burnett    Reason for call: ALPA stockings are to tight, can she go to a knee high ALPA stocking?     Call back#: [phone]

## 2022-10-17 NOTE — PROGRESS NOTES
Daily Note     Today's date: 10/17/2022  Patient name: Saba Gutierrez  : 1965  MRN: [id number]  Referring provider: Pepe Gomez DO  Dx:   Encounter Diagnosis     ICD-10-CM    1  Chronic pain of left knee  M25 562     G89 29    2  Aftercare  Z51 89                   Subjective: Pt reports that she is feeling better than she did last week  Objective: See treatment diary below      Assessment: Pt tolerated session well  Improved WB on L LE  Good tolerance to PROM extension stretching  Precautions:  HTN, Falls Osteoporosis, CHF,     Per Physician visit 10/13/22: Focus on extension avoid deep flexion until given clearance    Daily Treatment Diary  Manuals 10/10 10/13 10/17      Knee L PROM / SLR AAROM 10 min + education 10 min TKE focus 10 min TKE focus      RE performed done                          Ther Ex         Bike rock and roll  6 min 6 min       Gastroc slant bd  20" x 4 20"x4      Quad set/GS 3" x 10 3" x 10 3"x10      Heel slides AROM or Towel reviewed 3" x 10 AROM today  AROM today 3"x10      NBOS EC  nv 1 min      NBOS EO Foam  nv 1 min                        Ankle pump/circulation education done review review      Neuro Re-ed         Quad set 10x5" 10 x 5" As above      saq         laq 10x2 10 x 2 2x10                         Ther Activity         stairs reviewed and educated        minisquats   nv?       Sit<>Stand                                    Modalities         CP in elevation to L knee  10 min 10 min

## 2022-10-20 ENCOUNTER — OFFICE VISIT (OUTPATIENT)
Dept: PHYSICAL THERAPY | Facility: CLINIC | Age: 57
End: 2022-10-20
Payer: MEDICARE

## 2022-10-20 ENCOUNTER — APPOINTMENT (OUTPATIENT)
Dept: RADIOLOGY | Facility: AMBULARY SURGERY CENTER | Age: 57
End: 2022-10-20
Payer: MEDICARE

## 2022-10-20 ENCOUNTER — OFFICE VISIT (OUTPATIENT)
Dept: OBGYN CLINIC | Facility: CLINIC | Age: 57
End: 2022-10-20

## 2022-10-20 VITALS — BODY MASS INDEX: 41.83 KG/M2 | HEIGHT: 64 IN | WEIGHT: 245 LBS

## 2022-10-20 DIAGNOSIS — M25.562 CHRONIC PAIN OF LEFT KNEE: Primary | ICD-10-CM

## 2022-10-20 DIAGNOSIS — Z96.652 STATUS POST TOTAL KNEE REPLACEMENT USING CEMENT, LEFT: ICD-10-CM

## 2022-10-20 DIAGNOSIS — Z96.652 STATUS POST TOTAL KNEE REPLACEMENT USING CEMENT, LEFT: Primary | ICD-10-CM

## 2022-10-20 DIAGNOSIS — G89.29 CHRONIC PAIN OF LEFT KNEE: Primary | ICD-10-CM

## 2022-10-20 PROCEDURE — 97140 MANUAL THERAPY 1/> REGIONS: CPT

## 2022-10-20 PROCEDURE — 97110 THERAPEUTIC EXERCISES: CPT

## 2022-10-20 PROCEDURE — 73560 X-RAY EXAM OF KNEE 1 OR 2: CPT

## 2022-10-20 PROCEDURE — 99024 POSTOP FOLLOW-UP VISIT: CPT | Performed by: PHYSICIAN ASSISTANT

## 2022-10-20 NOTE — PROGRESS NOTES
Daily Note     Today's date: 10/20/2022  Patient name: Christina Moreno  : 1965  MRN: [id number]  Referring provider: Bob Salas DO  Dx:   Encounter Diagnosis     ICD-10-CM    1  Chronic pain of left knee  M25 562     G89 29        Start Time: 1700  Stop Time: 1800  Total time in clinic (min): 60 minutes    Subjective: Pt reports that she notices improvement in her ROM and her WB tolerance  She will be going on a cruise and will return in 1 week  Objective: See treatment diary below  Education on stretching shown to her , Darrick Dunn  Assessment: Pt tolerated session well  Progressing very well at this time  Precautions:  HTN, Falls Osteoporosis, CHF,     Per Physician visit 10/13/22: Focus on extension avoid deep flexion until given clearance (MD APT 10/20 allowed to flex)     Daily Treatment Diary  Manuals 10/10 10/13 10/17 10/20     Knee L PROM / SLR AAROM 10 min + education 10 min TKE focus 10 min TKE focus 10 min      RE performed done                          Ther Ex         Bike rock and roll  6 min 6 min  6 min      Gastroc slant bd  20" x 4 20"x4 20"x4     Quad set/GS 3" x 10 3" x 10 3"x10 3"x10     Heel slides AROM or Towel reviewed 3" x 10 AROM today  AROM today 3"x10 AROM 3" x10     NBOS EC  nv 1 min 1 min     NBOS EO Foam  nv 1 min 1 min                       Ankle pump/circulation education done review review      Neuro Re-ed         Quad set 10x5" 10 x 5" As above      saq         laq 10x2 10 x 2 2x10  2x10                       Ther Activity         stairs reviewed and educated        minisquats   nv?  2x10     Sit<>Stand                                    Modalities         CP in elevation to L knee  10 min 10 min 10 min

## 2022-10-20 NOTE — PROGRESS NOTES
Assessment:   Status Post  Arthroplasty Knee Total - Left on 10/6/2022 doing well    Plan:   Weight bearing as tolerated left lower extremity  PT  Analgesics PRN   Lovenox for 30 days post op  Knee high nick stockings   ASA 81 mg BID until 6 months from surgery after lovenox complete    Follow Up:  4  week(s)    To Do Next Visit:  X-rays of the  left  knee three view      CHIEF COMPLAINT:  Chief Complaint   Patient presents with   • Left Knee - Post-op         SUBJECTIVE:  Donzella Cogan is a 59y o  year old female who presents for follow up after Arthroplasty Knee Total - Left  Today patient has pain is much improved  Pt denies any fevers or chills  Denies any numbness or tingling in the left lower extremity except for the anterolateral knee she has some numbness there since the surgery  PHYSICAL EXAMINATION:    MUSCULOSKELETAL EXAMINATION:  Left knee    General: Alert and oriented x 3, WNWD, NAD  Incision:  Healing well the incision is well approximated there is skin glue still present there is no active drainage no galindo-incisional erythema  Normal postoperative swelling as expected  Joint effusion:  None  Range of Motion: -5 degrees extension, 100 degrees flexion  Neurovascular status: Sensation intact  to light touch L3-S1   Motor intact L3-S1 including ankle DF/PF  Skin warm well perfused        STUDIES REVIEWED:  I have personally reviewed pertinent films in PACS and my interpretation is Two view x-ray of the left knee today demonstrates stable appearance alignment of left total knee arthroplasty  No evidence of hardware complication on AP or lateral views        PROCEDURES PERFORMED:  Procedures  No Procedures performed today

## 2022-10-31 ENCOUNTER — APPOINTMENT (OUTPATIENT)
Dept: PHYSICAL THERAPY | Facility: CLINIC | Age: 57
End: 2022-10-31

## 2022-11-03 ENCOUNTER — APPOINTMENT (OUTPATIENT)
Dept: PHYSICAL THERAPY | Facility: CLINIC | Age: 57
End: 2022-11-03

## 2022-11-05 ENCOUNTER — HOSPITAL ENCOUNTER (INPATIENT)
Facility: HOSPITAL | Age: 57
LOS: 2 days | Discharge: HOME | End: 2022-11-07
Attending: EMERGENCY MEDICINE | Admitting: HOSPITALIST

## 2022-11-05 ENCOUNTER — APPOINTMENT (EMERGENCY)
Dept: RADIOLOGY | Facility: HOSPITAL | Age: 57
End: 2022-11-05

## 2022-11-05 ENCOUNTER — APPOINTMENT (EMERGENCY)
Dept: CT IMAGING | Facility: HOSPITAL | Age: 57
End: 2022-11-05

## 2022-11-05 DIAGNOSIS — I26.99 PULMONARY EMBOLISM ASSOCIATED WITH COVID-19 (HCC): ICD-10-CM

## 2022-11-05 DIAGNOSIS — U07.1 PULMONARY EMBOLISM ASSOCIATED WITH COVID-19 (HCC): ICD-10-CM

## 2022-11-05 DIAGNOSIS — U07.1 COVID: ICD-10-CM

## 2022-11-05 DIAGNOSIS — I26.99 ACUTE PULMONARY EMBOLISM (HCC): Primary | ICD-10-CM

## 2022-11-05 DIAGNOSIS — I26.99 PULMONARY INFARCT (HCC): ICD-10-CM

## 2022-11-05 PROBLEM — R65.10 SIRS (SYSTEMIC INFLAMMATORY RESPONSE SYNDROME) (HCC): Status: ACTIVE | Noted: 2022-11-05

## 2022-11-05 PROBLEM — M32.9 SLE (SYSTEMIC LUPUS ERYTHEMATOSUS) (HCC): Chronic | Status: ACTIVE | Noted: 2022-11-05

## 2022-11-05 LAB
2HR DELTA HS TROPONIN: >0 NG/L
ABO GROUP BLD: NORMAL
ALBUMIN SERPL BCP-MCNC: 4.2 G/DL (ref 3.5–5)
ALP SERPL-CCNC: 98 U/L (ref 34–104)
ALT SERPL W P-5'-P-CCNC: 9 U/L (ref 7–52)
ANION GAP SERPL CALCULATED.3IONS-SCNC: 10 MMOL/L (ref 4–13)
APTT PPP: 32 SECONDS (ref 23–37)
APTT PPP: >210 SECONDS (ref 23–37)
AST SERPL W P-5'-P-CCNC: 10 U/L (ref 13–39)
BASOPHILS # BLD AUTO: 0.02 THOUSANDS/ÂΜL (ref 0–0.1)
BASOPHILS NFR BLD AUTO: 0 % (ref 0–1)
BILIRUB SERPL-MCNC: 0.99 MG/DL (ref 0.2–1)
BLD GP AB SCN SERPL QL: NEGATIVE
BNP SERPL-MCNC: 16 PG/ML (ref 0–100)
BUN SERPL-MCNC: 11 MG/DL (ref 5–25)
CALCIUM SERPL-MCNC: 9.9 MG/DL (ref 8.4–10.2)
CARDIAC TROPONIN I PNL SERPL HS: 2 NG/L
CARDIAC TROPONIN I PNL SERPL HS: <2 NG/L
CHLORIDE SERPL-SCNC: 101 MMOL/L (ref 96–108)
CK SERPL-CCNC: 25 U/L (ref 26–192)
CO2 SERPL-SCNC: 26 MMOL/L (ref 21–32)
CREAT SERPL-MCNC: 0.82 MG/DL (ref 0.6–1.3)
CRP SERPL QL: 120.2 MG/L
D DIMER PPP FEU-MCNC: 5.13 UG/ML FEU
EOSINOPHIL # BLD AUTO: 0.07 THOUSAND/ÂΜL (ref 0–0.61)
EOSINOPHIL NFR BLD AUTO: 1 % (ref 0–6)
ERYTHROCYTE [DISTWIDTH] IN BLOOD BY AUTOMATED COUNT: 13.7 % (ref 11.6–15.1)
FLUAV RNA RESP QL NAA+PROBE: NEGATIVE
FLUBV RNA RESP QL NAA+PROBE: NEGATIVE
GFR SERPL CREATININE-BSD FRML MDRD: 78 ML/MIN/1.73SQ M
GLUCOSE SERPL-MCNC: 112 MG/DL (ref 65–140)
HCT VFR BLD AUTO: 38.2 % (ref 34.8–46.1)
HGB BLD-MCNC: 12.5 G/DL (ref 11.5–15.4)
IMM GRANULOCYTES # BLD AUTO: 0.02 THOUSAND/UL (ref 0–0.2)
IMM GRANULOCYTES NFR BLD AUTO: 0 % (ref 0–2)
INR PPP: 0.99 (ref 0.84–1.19)
LACTATE SERPL-SCNC: 1.3 MMOL/L (ref 0.5–2)
LYMPHOCYTES # BLD AUTO: 0.73 THOUSANDS/ÂΜL (ref 0.6–4.47)
LYMPHOCYTES NFR BLD AUTO: 9 % (ref 14–44)
MAGNESIUM SERPL-MCNC: 2 MG/DL (ref 1.9–2.7)
MCH RBC QN AUTO: 30 PG (ref 26.8–34.3)
MCHC RBC AUTO-ENTMCNC: 32.7 G/DL (ref 31.4–37.4)
MCV RBC AUTO: 92 FL (ref 82–98)
MONOCYTES # BLD AUTO: 0.62 THOUSAND/ÂΜL (ref 0.17–1.22)
MONOCYTES NFR BLD AUTO: 8 % (ref 4–12)
NEUTROPHILS # BLD AUTO: 6.51 THOUSANDS/ÂΜL (ref 1.85–7.62)
NEUTS SEG NFR BLD AUTO: 82 % (ref 43–75)
NRBC BLD AUTO-RTO: 0 /100 WBCS
PLATELET # BLD AUTO: 271 THOUSANDS/UL (ref 149–390)
PMV BLD AUTO: 10.9 FL (ref 8.9–12.7)
POTASSIUM SERPL-SCNC: 3.7 MMOL/L (ref 3.5–5.3)
PROT SERPL-MCNC: 7.3 G/DL (ref 6.4–8.4)
PROTHROMBIN TIME: 13.3 SECONDS (ref 11.6–14.5)
RBC # BLD AUTO: 4.16 MILLION/UL (ref 3.81–5.12)
RH BLD: POSITIVE
RSV RNA RESP QL NAA+PROBE: NEGATIVE
SARS-COV-2 RNA RESP QL NAA+PROBE: POSITIVE
SODIUM SERPL-SCNC: 137 MMOL/L (ref 135–147)
SPECIMEN EXPIRATION DATE: NORMAL
WBC # BLD AUTO: 7.97 THOUSAND/UL (ref 4.31–10.16)

## 2022-11-05 PROCEDURE — XW033E5 INTRODUCTION OF REMDESIVIR ANTI-INFECTIVE INTO PERIPHERAL VEIN, PERCUTANEOUS APPROACH, NEW TECHNOLOGY GROUP 5: ICD-10-PCS

## 2022-11-05 RX ORDER — HEPARIN SODIUM 1000 [USP'U]/ML
8800 INJECTION, SOLUTION INTRAVENOUS; SUBCUTANEOUS ONCE
Status: COMPLETED | OUTPATIENT
Start: 2022-11-05 | End: 2022-11-05

## 2022-11-05 RX ORDER — MELATONIN
2000
Status: DISCONTINUED | OUTPATIENT
Start: 2022-11-06 | End: 2022-11-07 | Stop reason: HOSPADM

## 2022-11-05 RX ORDER — DEXAMETHASONE SODIUM PHOSPHATE 4 MG/ML
6 INJECTION, SOLUTION INTRA-ARTICULAR; INTRALESIONAL; INTRAMUSCULAR; INTRAVENOUS; SOFT TISSUE EVERY 24 HOURS
Status: DISCONTINUED | OUTPATIENT
Start: 2022-11-05 | End: 2022-11-05

## 2022-11-05 RX ORDER — SILDENAFIL CITRATE 20 MG/1
20 TABLET ORAL 3 TIMES DAILY
Status: DISCONTINUED | OUTPATIENT
Start: 2022-11-05 | End: 2022-11-07 | Stop reason: HOSPADM

## 2022-11-05 RX ORDER — TORSEMIDE 20 MG/1
20 TABLET ORAL DAILY
Status: DISCONTINUED | OUTPATIENT
Start: 2022-11-06 | End: 2022-11-07 | Stop reason: HOSPADM

## 2022-11-05 RX ORDER — HEPARIN SODIUM 10000 [USP'U]/100ML
3-30 INJECTION, SOLUTION INTRAVENOUS
Status: DISCONTINUED | OUTPATIENT
Start: 2022-11-05 | End: 2022-11-06

## 2022-11-05 RX ORDER — OXYCODONE HYDROCHLORIDE 5 MG/1
2.5 TABLET ORAL EVERY 4 HOURS PRN
Status: DISCONTINUED | OUTPATIENT
Start: 2022-11-05 | End: 2022-11-05

## 2022-11-05 RX ORDER — HEPARIN SODIUM 10000 [USP'U]/100ML
3-30 INJECTION, SOLUTION INTRAVENOUS
Status: DISCONTINUED | OUTPATIENT
Start: 2022-11-05 | End: 2022-11-05

## 2022-11-05 RX ORDER — ENOXAPARIN SODIUM 150 MG/ML
1 INJECTION SUBCUTANEOUS EVERY 12 HOURS SCHEDULED
Status: DISCONTINUED | OUTPATIENT
Start: 2022-11-05 | End: 2022-11-05

## 2022-11-05 RX ORDER — HEPARIN SODIUM 1000 [USP'U]/ML
8800 INJECTION, SOLUTION INTRAVENOUS; SUBCUTANEOUS
Status: DISCONTINUED | OUTPATIENT
Start: 2022-11-05 | End: 2022-11-05

## 2022-11-05 RX ORDER — TORSEMIDE 20 MG/1
20 TABLET ORAL DAILY
Status: CANCELLED | OUTPATIENT
Start: 2022-11-06

## 2022-11-05 RX ORDER — OXYCODONE HYDROCHLORIDE AND ACETAMINOPHEN 5; 325 MG/1; MG/1
1 TABLET ORAL EVERY 4 HOURS PRN
Status: DISCONTINUED | OUTPATIENT
Start: 2022-11-05 | End: 2022-11-06

## 2022-11-05 RX ORDER — HEPARIN SODIUM 1000 [USP'U]/ML
8400 INJECTION, SOLUTION INTRAVENOUS; SUBCUTANEOUS
Status: DISCONTINUED | OUTPATIENT
Start: 2022-11-05 | End: 2022-11-06

## 2022-11-05 RX ORDER — HYDROXYCHLOROQUINE SULFATE 200 MG/1
400 TABLET, FILM COATED ORAL
Status: DISCONTINUED | OUTPATIENT
Start: 2022-11-06 | End: 2022-11-07 | Stop reason: HOSPADM

## 2022-11-05 RX ORDER — TRIAMCINOLONE ACETONIDE 0.1 %
PASTE (GRAM) DENTAL 2 TIMES DAILY PRN
Status: DISCONTINUED | OUTPATIENT
Start: 2022-11-05 | End: 2022-11-05 | Stop reason: ALTCHOICE

## 2022-11-05 RX ORDER — FUROSEMIDE 10 MG/ML
40 INJECTION INTRAMUSCULAR; INTRAVENOUS ONCE
Status: COMPLETED | OUTPATIENT
Start: 2022-11-05 | End: 2022-11-05

## 2022-11-05 RX ORDER — ACETAMINOPHEN 325 MG/1
650 TABLET ORAL EVERY 6 HOURS PRN
Status: DISCONTINUED | OUTPATIENT
Start: 2022-11-05 | End: 2022-11-06

## 2022-11-05 RX ORDER — OXYCODONE HYDROCHLORIDE AND ACETAMINOPHEN 5; 325 MG/1; MG/1
1 TABLET ORAL ONCE
Status: COMPLETED | OUTPATIENT
Start: 2022-11-05 | End: 2022-11-05

## 2022-11-05 RX ORDER — HEPARIN SODIUM 1000 [USP'U]/ML
4200 INJECTION, SOLUTION INTRAVENOUS; SUBCUTANEOUS
Status: DISCONTINUED | OUTPATIENT
Start: 2022-11-05 | End: 2022-11-06

## 2022-11-05 RX ORDER — ASPIRIN 81 MG/1
162 TABLET ORAL DAILY
Status: DISCONTINUED | OUTPATIENT
Start: 2022-11-06 | End: 2022-11-07 | Stop reason: HOSPADM

## 2022-11-05 RX ORDER — ONDANSETRON 2 MG/ML
4 INJECTION INTRAMUSCULAR; INTRAVENOUS EVERY 6 HOURS PRN
Status: DISCONTINUED | OUTPATIENT
Start: 2022-11-05 | End: 2022-11-07 | Stop reason: HOSPADM

## 2022-11-05 RX ORDER — OXYCODONE HYDROCHLORIDE 5 MG/1
5 TABLET ORAL EVERY 4 HOURS PRN
Status: DISCONTINUED | OUTPATIENT
Start: 2022-11-05 | End: 2022-11-05

## 2022-11-05 RX ORDER — HEPARIN SODIUM 1000 [USP'U]/ML
4400 INJECTION, SOLUTION INTRAVENOUS; SUBCUTANEOUS
Status: DISCONTINUED | OUTPATIENT
Start: 2022-11-05 | End: 2022-11-05

## 2022-11-05 RX ADMIN — FUROSEMIDE 40 MG: 10 INJECTION, SOLUTION INTRAMUSCULAR; INTRAVENOUS at 20:23

## 2022-11-05 RX ADMIN — SILDENAFIL 20 MG: 20 TABLET ORAL at 20:22

## 2022-11-05 RX ADMIN — REMDESIVIR 200 MG: 100 INJECTION, POWDER, LYOPHILIZED, FOR SOLUTION INTRAVENOUS at 20:22

## 2022-11-05 RX ADMIN — OXYCODONE HYDROCHLORIDE AND ACETAMINOPHEN 1 TABLET: 5; 325 TABLET ORAL at 15:25

## 2022-11-05 RX ADMIN — IOHEXOL 100 ML: 350 INJECTION, SOLUTION INTRAVENOUS at 16:16

## 2022-11-05 RX ADMIN — ONDANSETRON 4 MG: 2 INJECTION INTRAMUSCULAR; INTRAVENOUS at 22:41

## 2022-11-05 RX ADMIN — HEPARIN SODIUM 8800 UNITS: 1000 INJECTION INTRAVENOUS; SUBCUTANEOUS at 17:48

## 2022-11-05 RX ADMIN — HEPARIN SODIUM 18 UNITS/KG/HR: 10000 INJECTION, SOLUTION INTRAVENOUS at 17:55

## 2022-11-05 RX ADMIN — OXYCODONE HYDROCHLORIDE AND ACETAMINOPHEN 1 TABLET: 5; 325 TABLET ORAL at 20:01

## 2022-11-05 NOTE — ED PROVIDER NOTES
History  Chief Complaint   Patient presents with   • Shortness of Breath     Pt reports COVID+ x11 days, tonight reports back pain on R side that radiates to shoulder, worsening SOB (hx pulmonary htn), denies CP; knee replacement 4 weeks ago     Patient is a 38-year-old female who came in to  the ED complaining of right-sided back pain radiating to the right shoulder as well shortness of breath for last 2 days  Patient reported that she was exposed to [place] from her grandson 17 days ago and tested negative then  She reports going [place] on the [place] and stated feeling sick on the 26 th  with nonproductive cough, and runny nose  Patient reported testing positive for COVID on the [place] and feeling sick the whole time she was [place] the [place]  Patient reported that last night she started having right-sided back pain radiating to the right shoulder as well as shortness of breath and chest pain with deep inspiration  She also reported history of fever last night but denies nausea and vomiting  Of note patient reported that she has a history of lupus and has a positive lupus anticoagulant  Patient is seen at bedside coughing but appears to be in no acute distress  Prior to Admission Medications   Prescriptions Last Dose Informant Patient Reported? Taking?    Benlysta 200 MG/ML SOAJ   Yes No   Sig: Weekly on Fridays   Syringe/Needle, Disp, (SecureSafe Syringe/Needle) 25G X 1-1/2" 1 ML MISC  Self No No   Sig: Use 1 Syringe every 30 (thirty) days   aspirin (ECOTRIN LOW STRENGTH) 81 mg EC tablet  Self Yes No   Sig: Take 162 mg by mouth daily   Patient not taking: Reported on 10/13/2022   cholecalciferol (VITAMIN D3) 1,000 units tablet  Self Yes No   Sig: Take 2,000 Units by mouth daily in the early morning     cyanocobalamin 1,000 mcg/mL   No No   Sig: Inject 1 mL (1,000 mcg total) into a muscle every 30 (thirty) days   enoxaparin (LOVENOX) 40 mg/0 4 mL   No No   Sig: Inject 0 4 mL (40 mg total) under the skin daily for 28 days Starting after left total knee surgery   hydroxychloroquine (PLAQUENIL) 200 mg tablet  Self Yes No   Sig: Take 400 mg by mouth daily with breakfast Dosage adjustments based on testing results   Takes 200 mg on odd days and takes 400 mg on even days   sildenafil (REVATIO) 20 mg tablet  Self No No   Sig: Take 1 tablet (20 mg total) by mouth 3 (three) times a day   spironolactone (ALDACTONE) 25 mg tablet   No No   Sig: Take 1 tablet (25 mg total) by mouth daily   Patient taking differently: Take 25 mg by mouth daily at bedtime   torsemide (DEMADEX) 20 mg tablet   No No   Sig: Take 1 tablet (20 mg total) by mouth daily   triamcinolone (KENALOG) 0 1 % oral topical paste  Self Yes No   Sig: prn      Facility-Administered Medications: None       Past Medical History:   Diagnosis Date   • HELENE positive    • Anemia     Sildenafil causes decreased hematocrit   • Anxiety 03/2020   • CHF (congestive heart failure) ([facility] )     right heart failure related to pulm HTN lupus   • Chronic kidney disease     borderline lab values   • Chronic rhinitis 06/04/2012   • Clotting disorder ([facility] [address] ) 2012   • Coronary artery disease 2018   • Encephalitis     Lasted Assessed 10/18/2017   • Gout 06/26/2018   • Hypertension    • Lupus anticoagulant disorder ([facility] [address] )    • Maxillary sinusitis     Lasted Assessed 10/18/2017   • Night sweat    • Osteopenia     Hip   • Osteoporosis     Spine   • Pneumonia     Last Assessed 10/18/2017   • PONV (postoperative nausea and vomiting)    • Pulmonary hypertension (HCC)    • Seizures (HCC)     Only during Encephalitis   • Shortness of breath     with exertion   • Sinus tachycardia    • Urinary incontinence        Past Surgical History:   Procedure Laterality Date   • ARTHRODESIS      Hand: IP Joint   • CHOLECYSTECTOMY     • COLONOSCOPY     • COLPOSCOPY     • HYSTERECTOMY      Vaginal   • JOINT REPLACEMENT Right     Knee replacement   • KNEE SURGERY  2/2019   • LAPAROSCOPIC ESOPHAGOGASTRIC FUNDOPLASTY  2008   • WA KNEE SCOPE,SINGLE MENISECTOMY Right 10/2/2018    Procedure: KNEE ARTHROSCOPY WITH PARTIAL MEDIAL MENISCECTOMY;  Surgeon: Sandhya Petty DO;  [facility];  Service: Orthopedics   • WA KNEE SCOPE,SINGLE MENISECTOMY Left 12/17/2019    Procedure: KNEE ARTHROSCOPIC MENISCECTOMY /MEDIAL; Chondyl medial and posterior;  Surgeon: Sandhya Petty DO;  [facility];  Service: Orthopedics   • WA TOTAL KNEE ARTHROPLASTY Right 2/12/2019    Procedure: KNEE TOTAL ARTHROPLASTY AND ASSOCIATED PROCEDURES;  Surgeon: Sandhya Petty DO;  [facility];  Service: Orthopedics   • WA TOTAL KNEE ARTHROPLASTY Left 10/6/2022    Procedure: ARTHROPLASTY KNEE TOTAL;  Surgeon: Sandhya Petty DO;  [facility];  Service: Orthopedics   • REDUCTION MAMMAPLASTY     • REPAIR ANKLE LIGAMENT Right    • TONSILLECTOMY         Family History   Problem Relation Age of Onset   • Uterine cancer Mother    • Pancreatic cancer Mother 79   • Diabetes Mother    • Endometrial cancer Mother 77   • Arthritis Mother    • Cancer Mother         Pancreas, Uterine   • Vision loss Mother    • Heart disease Father         open heart surgery at age 48    • Stroke Father         CVA   • Hypertension Father    • Atrial fibrillation Father    • Hyperlipidemia Father    • Arthritis Father    • Rheum arthritis Father    • No Known Problems Sister    • No Known Problems Brother    • Thyroid disease Maternal Grandmother    • Asthma Daughter    • Heart attack Maternal Grandfather    • Heart attack Paternal Grandmother    • Skin cancer Paternal Grandfather    • No Known Problems Sister    • Thyroid disease Sister    • Depression Sister    • Osteoarthritis Sister    • Hemochromatosis Brother    • Migraines Daughter    • Asthma Daughter    • No Known Problems Maternal Aunt    • Anuerysm Neg Hx    • Clotting disorder Neg Hx    • Heart failure Neg Hx      I have reviewed and agree with the history as documented      E-Cigarette/Vaping   • E-Cigarette Use Never User      E-Cigarette/Vaping Substances   • Nicotine No    • THC No    • CBD No    • Flavoring No    • Other No    • Unknown No      Social History     Tobacco Use   • Smoking status: Former Smoker     Packs/day: 0 00     Years: 0 00     Pack years: 0 00     Quit date: 2006     Years since quittin 7   • Smokeless tobacco: Never Used   Vaping Use   • Vaping Use: Never used   Substance Use Topics   • Alcohol use: Yes     Alcohol/week: 0 0 standard drinks     Comment: socially   • Drug use: No        Review of Systems   Constitutional: Positive for fever  Negative for chills  HENT: Negative for ear pain and sore throat  Eyes: Negative for pain and visual disturbance  Respiratory: Positive for cough and shortness of breath  Cardiovascular: Negative for chest pain and palpitations  Gastrointestinal: Negative for abdominal pain, nausea and vomiting  Genitourinary: Negative for dysuria and hematuria  Musculoskeletal: Negative for arthralgias and back pain  Skin: Negative for color change and rash  Neurological: Negative for seizures and syncope  All other systems reviewed and are negative  Physical Exam  ED Triage Vitals   Temperature Pulse Respirations Blood Pressure SpO2   22 1423 22 1423 22 1423 22 1423 22 1423   99 6 °F (37 6 °C) (!) 127 (!) 28 134/71 98 %      Temp Source Heart Rate Source Patient Position - Orthostatic VS BP Location FiO2 (%)   22 1423 22 1423 22 1529 22 1529 --   Oral Monitor Sitting Left arm       Pain Score       22 1525       7             Orthostatic Vital Signs  Vitals:    22 1630 22 1800 22 1830 22 1901   BP: 114/56 119/71 125/71 147/81   Pulse: 102 (!) 114 (!) 110 (!) 108   Patient Position - Orthostatic VS:           Physical Exam  Vitals and nursing note reviewed  Constitutional:       General: She is not in acute distress       Appearance: She is well-developed  HENT:      Head: Normocephalic and atraumatic  Eyes:      Conjunctiva/sclera: Conjunctivae normal    Cardiovascular:      Rate and Rhythm: Regular rhythm  Tachycardia present  Heart sounds: No murmur heard  Pulmonary:      Effort: Pulmonary effort is normal  Tachypnea present  No respiratory distress  Breath sounds: Normal breath sounds  Chest:      Chest wall: Tenderness present  Comments: Patient reported chest pain on palpation  Abdominal:      Palpations: Abdomen is soft  Tenderness: There is no abdominal tenderness  Musculoskeletal:      Cervical back: Neck supple  Right lower leg: No tenderness  No edema  Left lower leg: No tenderness  No edema  Skin:     General: Skin is warm and dry  Neurological:      Mental Status: She is alert           ED Medications  Medications   aspirin (ECOTRIN LOW STRENGTH) EC tablet 162 mg (has no administration in time range)   cholecalciferol (VITAMIN D3) tablet 2,000 Units (has no administration in time range)   hydroxychloroquine (PLAQUENIL) tablet 400 mg (has no administration in time range)   sildenafil (REVATIO) tablet 20 mg (has no administration in time range)   remdesivir (Veklury) 200 mg in sodium chloride 0 9 % 290 mL IVPB (has no administration in time range)     Followed by   remdesivir Ramu Awilda) 100 mg in sodium chloride 0 9 % 270 mL IVPB (has no administration in time range)   furosemide (LASIX) injection 40 mg (has no administration in time range)   acetaminophen (TYLENOL) tablet 650 mg (has no administration in time range)   ondansetron (ZOFRAN) injection 4 mg (has no administration in time range)   oxyCODONE-acetaminophen (PERCOCET) 5-325 mg per tablet 1 tablet (1 tablet Oral Given 11/5/22 2001)   heparin (VTE/PE) high (has no administration in time range)   oxyCODONE-acetaminophen (PERCOCET) 5-325 mg per tablet 1 tablet (1 tablet Oral Given 11/5/22 1525)   iohexol (OMNIPAQUE) 350 MG/ML injection (SINGLE-DOSE) 100 mL (100 mL Intravenous Given 11/5/22 1616)   heparin (porcine) injection 8,800 Units (8,800 Units Intravenous Given 11/5/22 1748)       Diagnostic Studies  Results Reviewed     Procedure Component Value Units Date/Time    B-Type Natriuretic Peptide(BNP) [facility], [facility], [facility] Only [[id number]]  (Normal) Collected: 11/05/22 1526    Lab Status: Final result Specimen: Blood Updated: 11/05/22 1956     BNP 16 pg/mL     D-dimer, quantitative [[id number]]  (Abnormal) Collected: 11/05/22 1722    Lab Status: Final result Specimen: Blood from Arm, Right Updated: 11/05/22 1936     D-Dimer, Quant 5 13 ug/ml FEU     Narrative: In the evaluation for possible pulmonary embolism, in the appropriate (Well's Score of 4 or less) patient, the age adjusted d-dimer cutoff for this patient can be calculated as:    Age x 0 01 (in ug/mL) for Age-adjusted D-dimer exclusion threshold for a patient over 50 years      Magnesium [[id number]]  (Normal) Collected: 11/05/22 1526    Lab Status: Final result Specimen: Blood from Arm, Right Updated: 11/05/22 1921     Magnesium 2 0 mg/dL     C-reactive protein [[id number]]  (Abnormal) Collected: 11/05/22 1526    Lab Status: Final result Specimen: Blood from Arm, Right Updated: 11/05/22 1921      2 mg/L     CK (with reflex to MB) [[id number]]  (Abnormal) Collected: 11/05/22 1526    Lab Status: Final result Specimen: Blood from Arm, Right Updated: 11/05/22 1921     Total CK 25 U/L     HS Troponin I 2hr [[id number]]  (Normal) Collected: 11/05/22 1757    Lab Status: Final result Specimen: Blood from Arm, Right Updated: 11/05/22 1836     hs TnI 2hr 2 ng/L      Delta 2hr hsTnI >0 ng/L     APTT [[id number]]  (Normal) Collected: 11/05/22 1722    Lab Status: Final result Specimen: Blood from Arm, Right Updated: 11/05/22 1745     PTT 32 seconds     Protime-INR [[id number]]  (Normal) Collected: 11/05/22 1722    Lab Status: Final result Specimen: Blood from Arm, Right Updated: 11/05/22 0003 Protime 13 3 seconds      INR 0 99    HS Troponin 0hr (reflex protocol) [[id number]]  (Normal) Collected: 11/05/22 1617    Lab Status: Final result Specimen: Blood from Arm, Right Updated: 11/05/22 1704     hs TnI 0hr <2 ng/L     FLU/RSV/COVID - if FLU/RSV clinically relevant [[id number]]  (Abnormal) Collected: 11/05/22 1526    Lab Status: Final result Specimen: Nares from Nose Updated: 11/05/22 1628     SARS-CoV-2 Positive     INFLUENZA A PCR Negative     INFLUENZA B PCR Negative     RSV PCR Negative    Narrative:      FOR PEDIATRIC PATIENTS - copy/paste COVID Guidelines URL to browser: [email or web address]  [employer]    SARS-CoV-2 assay is a Nucleic Acid Amplification assay intended for the  qualitative detection of nucleic acid from SARS-CoV-2 in nasopharyngeal  swabs  Results are for the presumptive identification of SARS-CoV-2 RNA  Positive results are indicative of infection with SARS-CoV-2, the virus  causing COVID-19, but do not rule out bacterial infection or co-infection  with other viruses  Laboratories within the [place] and its  territories are required to report all positive results to the appropriate  public health authorities  Negative results do not preclude SARS-CoV-2  infection and should not be used as the sole basis for treatment or other  patient management decisions  Negative results must be combined with  clinical observations, patient history, and epidemiological information  This test has not been FDA cleared or approved  This test has been authorized by FDA under an Emergency Use Authorization  (EUA)  This test is only authorized for the duration of time the  declaration that circumstances exist justifying the authorization of the  emergency use of an in vitro diagnostic tests for detection of SARS-CoV-2  virus and/or diagnosis of COVID-19 infection under section 564(b)(1) of  the Act, 21 U  S C  618DJG-3(N)(6), unless the authorization is terminated  or revoked sooner  The test has been validated but independent review by FDA  and CLIA is pending  Test performed using [employer] GeneXpert: This RT-PCR assay targets N2,  a region unique to SARS-CoV-2  A conserved region in the E-gene was chosen  for pan-Sarbecovirus detection which includes SARS-CoV-2  According to CMS-2020-01-R, this platform meets the definition of high-throughput technology      Comprehensive metabolic panel [[id number]]  (Abnormal) Collected: 11/05/22 1526    Lab Status: Final result Specimen: Blood from Arm, Right Updated: 11/05/22 1554     Sodium 137 mmol/L      Potassium 3 7 mmol/L      Chloride 101 mmol/L      CO2 26 mmol/L      ANION GAP 10 mmol/L      BUN 11 mg/dL      Creatinine 0 82 mg/dL      Glucose 112 mg/dL      Calcium 9 9 mg/dL      AST 10 U/L      ALT 9 U/L      Alkaline Phosphatase 98 U/L      Total Protein 7 3 g/dL      Albumin 4 2 g/dL      Total Bilirubin 0 99 mg/dL      eGFR 78 ml/min/1 73sq m     Narrative:      [place] guidelines for Chronic Kidney Disease (CKD):   •  Stage 1 with normal or high GFR (GFR > 90 mL/min/1 73 square meters)  •  Stage 2 Mild CKD (GFR = 60-89 mL/min/1 73 square meters)  •  Stage 3A Moderate CKD (GFR = 45-59 mL/min/1 73 square meters)  •  Stage 3B Moderate CKD (GFR = 30-44 mL/min/1 73 square meters)  •  Stage 4 Severe CKD (GFR = 15-29 mL/min/1 73 square meters)  •  Stage 5 End Stage CKD (GFR <15 mL/min/1 73 square meters)  Note: GFR calculation is accurate only with a steady state creatinine    CBC and differential [[id number]]  (Abnormal) Collected: 11/05/22 1526    Lab Status: Final result Specimen: Blood from Arm, Right Updated: 11/05/22 1535     WBC 7 97 Thousand/uL      RBC 4 16 Million/uL      Hemoglobin 12 5 g/dL      Hematocrit 38 2 %      MCV 92 fL      MCH 30 0 pg      MCHC 32 7 g/dL      RDW 13 7 %      MPV 10 9 fL      Platelets 979 Thousands/uL      nRBC 0 /100 WBCs      Neutrophils Relative 82 %      Immat GRANS % 0 %      Lymphocytes Relative 9 %      Monocytes Relative 8 %      Eosinophils Relative 1 %      Basophils Relative 0 %      Neutrophils Absolute 6 51 Thousands/µL      Immature Grans Absolute 0 02 Thousand/uL      Lymphocytes Absolute 0 73 Thousands/µL      Monocytes Absolute 0 62 Thousand/µL      Eosinophils Absolute 0 07 Thousand/µL      Basophils Absolute 0 02 Thousands/µL                  CTA ED chest PE study   Final Result by Jim Gupta MD (11/05 2998)      Moderate quantity of right lower lobe segmental and small quantity of right upper lobe and left lower lobe acute PE  The calculated ratio of right ventricular to left ventricular diameter (RV/LV ratio) is greater than 0 9, which is abnormal and indicates right heart strain  An abnormal RV/LV ratio has been shown to be associated with an increased risk of 30 day    mortality in the setting of acute pulmonary embolism  Urgent consultation with the medical critical care team is recommended  I personally discussed this study with JOSLYN ALBERTS on 11/5/2022 at 5:07 PM             Workstation performed: [id number]         XR chest portable   ED Interpretation by Yenny Madera MD (11/05 9726)   Patient registration sign of consolidation or pulmonary effusion  No sign of fracture or costovertebral angle blunting  Procedures  ECG 12 Lead Documentation Only    Date/Time: 11/5/2022 3:34 PM  Performed by: Yenny Madera MD  Authorized by: Yenny Madera MD     Indications / Diagnosis:  Shortness of breath  ECG reviewed by me, the ED Provider: yes    Patient location:  ED  Previous ECG:     Previous ECG:  Compared to current    Comparison ECG info:   May 12, 2018    Similarity:  Changes noted    Comparison to cardiac monitor: No    Interpretation:     Interpretation: normal    Rate:     ECG rate:  119    ECG rate assessment: tachycardic    Rhythm:     Rhythm: sinus rhythm    Ectopy:     Ectopy: none    QRS:     QRS axis:  Normal  Conduction:     Conduction: normal    ST segments:     ST segments:  Normal  T waves:     T waves: normal    Comments:      Ventricular rate 119 beats per minute MI interval 120ms QRS duration 78ms QT 308ms QTC 433ms  No sign of acute ST segment elevation or depression, sinus tachycardia  ED Course  ED Course as of 11/05/22 2018   Sat Nov 05, 2022   1517 Patient presented with signs and symptoms of URI and chest pain with inspiration  We ordered EKG chest x-ray and a CT PE studies  Results awaits   1714 Patient CT scans show moderate right lower lobe and left lower lobe  PE with evidence of right heart strain  Patient's case has been discussed with inpatient medicine team and admitted for further management  Patient was given heparin in the ED in the meantime  PERC Rule for PE    Flowsheet Row Most Recent Value   PERC Rule for PE    Age >=50 1 Filed at: 11/05/2022 1454   HR >=100 1 Filed at: 11/05/2022 1454   O2 Sat on room air < 95% 0 Filed at: 11/05/2022 1454   History of PE or DVT 0 Filed at: 11/05/2022 1454   Recent trauma or surgery 1 Filed at: 11/05/2022 1454   Hemoptysis 0 Filed at: 11/05/2022 1454   Exogenous estrogen 0 Filed at: 11/05/2022 1454   Unilateral leg swelling 0 Filed at: 11/05/2022 1454   PERC Rule for PE Results 3 Filed at: 11/05/2022 1454              SBIRT 20yo+    Flowsheet Row Most Recent Value   SBIRT (25 yo +)    In order to provide better care to our patients, we are screening all of our patients for alcohol and drug use  Would it be okay to ask you these screening questions?  Unable to answer at this time Filed at: 11/05/2022 1423          Wells' Criteria for PE    Flowsheet Row Most Recent Value   Wells' Criteria for PE    Clinical signs and symptoms of DVT 0 Filed at: 11/05/2022 1455   PE is primary diagnosis or equally likely 3 Filed at: 11/05/2022 1455   HR >100 1 5 Filed at: 11/05/2022 1455   Immobilization at least 3 days or Surgery in the previous 4 weeks 1 5 Filed at: 11/05/2022 1455   Previous, objectively diagnosed PE or DVT 0 Filed at: 11/05/2022 1455   Hemoptysis 0 Filed at: 11/05/2022 1455   Malignancy with treatment within 6 months or palliative 0 Filed at: 11/05/2022 1455   Wells' Criteria Total 6 Filed at: 11/05/2022 1455            [facility]  Number of Diagnoses or Management Options  Acute pulmonary embolism ([facility] )  COVID  Pulmonary infarct ([place] [address] )  Diagnosis management comments: Patient is a 49-year-old female with past medical history of lupus anticoagulant who came to the ED complaining of right-sided back pain radiating to the right shoulder as well as worsening shortness of breath the last 2 days  Patient reported being exposed to [place] on a elizabeth and tested positive at home  In the ED patient labs are remarkable for positive PE study with a right heart strain  Chest x-ray show a right lower lobe consolidation  Patient was given heparin for PE and was admitted to the inpatient medicine under the care of Dr Belgica Spears for further management      Disposition  Final diagnoses:   Acute pulmonary embolism [facility])   Pulmonary infarct ([facility] )   COVID     Time reflects when diagnosis was documented in both MDM as applicable and the Disposition within this note     Time User Action Codes Description Comment    11/5/2022  5:46 PM Sol Small Add [I26 99] Acute pulmonary embolism ([facility] )     11/5/2022  5:46 PM Joleen Small Add [I26 99] Pulmonary infarct ([facility] )     11/5/2022  5:47 PM Carleen Small [address] [U07 [id number]] [place]       ED Disposition     ED Disposition   Admit    Condition   Stable    Date/Time   Sat Nov 5, 2022  5:48 PM    Comment   Case was discussed with Dr Gregorio Gupta and the patient's admission status was agreed to be Admission Status: inpatient status to the service of Dr Belgica Spears              Follow-up Information    None Current Discharge Medication List      CONTINUE these medications which have NOT CHANGED    Details   aspirin (ECOTRIN LOW STRENGTH) 81 mg EC tablet Take 162 mg by mouth daily      Benlysta 200 MG/ML SOAJ Weekly on Fridays      cholecalciferol (VITAMIN D3) 1,000 units tablet Take 2,000 Units by mouth daily in the early morning        cyanocobalamin 1,000 mcg/mL Inject 1 mL (1,000 mcg total) into a muscle every 30 (thirty) days  Qty: 12 mL, Refills: 1    Comments: **Patient requests 90 days supply**  Associated Diagnoses: B12 deficiency      enoxaparin (LOVENOX) 40 mg/0 4 mL Inject 0 4 mL (40 mg total) under the skin daily for 28 days Starting after left total knee surgery  Qty: 11 2 mL, Refills: 0    Associated Diagnoses: Arthritis of left knee      hydroxychloroquine (PLAQUENIL) 200 mg tablet Take 400 mg by mouth daily with breakfast Dosage adjustments based on testing results  Takes 200 mg on odd days and takes 400 mg on even days      sildenafil (REVATIO) 20 mg tablet Take 1 tablet (20 mg total) by mouth 3 (three) times a day  Qty: 270 tablet, Refills: 3    Associated Diagnoses: Pulmonary hypertension (HCC)      spironolactone (ALDACTONE) 25 mg tablet Take 1 tablet (25 mg total) by mouth daily  Qty: 90 tablet, Refills: 3    Associated Diagnoses: Chronic heart failure with preserved ejection fraction (HCC)      Syringe/Needle, Disp, (SecureSafe Syringe/Needle) 25G X 1-1/2" 1 ML MISC Use 1 Syringe every 30 (thirty) days  Qty: 12 each, Refills: 1    Associated Diagnoses: B12 deficiency      torsemide (DEMADEX) 20 mg tablet Take 1 tablet (20 mg total) by mouth daily  Qty: 90 tablet, Refills: 3    Associated Diagnoses: Pulmonary hypertension (HCC)      triamcinolone (KENALOG) 0 1 % oral topical paste prn           No discharge procedures on file      PDMP Review       Value Time User    PDMP Reviewed  Yes 10/13/2022 12:12 PM Danae Wilcox PA-C           ED Provider  Attending physically available and isma Smith I managed the patient along with the ED Attending      Electronically Signed by         Rocío Rocha MD  11/05/22 2018

## 2022-11-05 NOTE — H&P
[facility]  H&P- Margi Gauravsuze 1965, 59 y o  female MRN: [id number]  Unit/Bed#: S -01 Encounter: [id number]  Primary Care Provider: Alexus Sawyer DO   Date and time admitted to hospital: 11/5/2022  2:16 PM    SIRS (systemic inflammatory response syndrome) (HCC)  Assessment & Plan  Criteria met with tachycardia and tachypnoea  Patient was afebrile with normal oxygen and normal WBC  Likely secondary to PE, though possibly with COVID contributing   - see management for PE and COVID    SLE (systemic lupus erythematosus) ([facility] )  Assessment & Plan  Patient follows with rheumatology  - continue home hydroxychloroquine  - next dose of Benlysta is 11/11  - continue home ASA for positive lupus anticoagulant    Stage 3a chronic kidney disease (CKD) [facility])  Assessment & Plan  Lab Results   Component Value Date    EGFR 78 11/05/2022    EGFR 70 10/07/2022    EGFR 57 09/26/2022    CREATININE 0 82 11/05/2022    CREATININE 0 90 10/07/2022    CREATININE 1 06 09/26/2022     Sequela of SLE  Creatinine and GFR are normal here  - furosemide once today  - home torsemide tomorrow  - patient reports not currently taking spironolactone    Pulmonary hypertension ([facility] )  Assessment & Plan  Present at baseline  There is concern for right heart strain due to acute PE   - continue home sildenafil  - follow-up echocardiography  - given one dose IV furosemide here for effusions seen on imaging  - continue home torsemide  - patient reports home spironolactone is currently held    * Pulmonary embolism associated with COVID-19 [facility])  Assessment & Plan  Patient presented with SOB and cough  Patient recently was on cruise ship one week ago, also exposed to grandson who had positive test  Tested positive at home with home fever to 102 F  In ED patient was afebrile however found to be tachycardic and tachypnic with positive COVID test there sepsis criteria met  Imaging revealed acute PE   Patient does not meet indications for ABX or steroid therapy at this time  Lab Results   Component Value Date    SARSCOV2 Positive (A) 11/05/2022       Symptoms of COVID-19 SYMPTOMS: Fever (99 0* or greater), cough, shortness of breath, Runny nose, Sore throat, Nasal congestion, Tachycardia (more than 100 beats per min) and General malaise (headache, feeling rundown, achy)  Vaccine status: up to date    Workup:    CTA ED chest PE study    Result Date: 11/5/2022  Impression: Moderate quantity of right lower lobe segmental and small quantity of right upper lobe and left lower lobe acute PE  The calculated ratio of right ventricular to left ventricular diameter (RV/LV ratio) is greater than 0 9, which is abnormal and indicates right heart strain  An abnormal RV/LV ratio has been shown to be associated with an increased risk of 30 day mortality in the setting of acute pulmonary embolism  Urgent consultation with the medical critical care team is recommended  I personally discussed this study with JOSLYN ALBERTS on 11/5/2022 at 5:07 PM  Workstation performed: [id number]       No Chest XR results available for this patient  No results for input(s): FERRITIN, CRP, DDIMER, CKTOTAL, NTBNP in the last 72 hours  Invalid input(s):  Tong Hudson  Lab Results   Component Value Date    HSTNI0 <2 11/05/2022    HSTNI2 2 11/05/2022    HSTNID2 >0 11/05/2022       · Saturating 100% on 2 liters/min via nasal cannula    MILD Stage    Plan:  · Continue IV heparin for at least 48 hours  · Transition to Eliquis/Xarelto for 3-6 months with f/u with rheumatology and haematology for further anticoagulation management  · OOB TID, PT and OT eval and treat, Incentive spirometry  · Continue remdesivir  · Pain management for pleuritic chest pain  · Follow-up echocardiography  · Monitor VS for oxygen requirement  · Trend labs as needed;  Inflammatory markers, daily labs: CMP and CBC  · heparin level, lactate, APTT, type and screen            VTE Pharmacologic Prophylaxis: VTE Score: 21 High Risk (Score >/= 5) - Pharmacological DVT Prophylaxis Ordered: heparin drip  Sequential Compression Devices Ordered  Code Status: Level 1 - Full Code     Anticipated Length of Stay: Patient will be admitted on an inpatient basis with an anticipated length of stay of greater than 2 midnights secondary to PE in setting of COVID  Chief Complaint: SOB    History of Present Illness:  Aubree Hall is a 59 y o  female with a PMH of SLE, CHF, CKD, and pulmonary HTN who presents with SOB, fever, cough, and positive home COVID test  Patient had recent exposures including cruise ship one week ago and grandson who lives with her  She is also experiencing significant back pain radiating to shoulder  In ED patient was tachypnic and tachycardic with confirmed COVID test  Afebrile and normal oxygen saturation in ED  CTA PE study revealed bilateral PE  Patient was placed on heparin and oxygen and admitted to inpatient  Patient is not in distress  Review of Systems:  Review of Systems   Constitutional: Positive for chills and fever  HENT: Positive for postnasal drip and rhinorrhea  Negative for ear pain and sore throat  Eyes: Negative for pain and visual disturbance  Respiratory: Positive for cough and shortness of breath  Cardiovascular: Positive for leg swelling  Negative for chest pain and palpitations  Gastrointestinal: Positive for nausea  Negative for abdominal pain and vomiting  Genitourinary: Negative for dysuria and hematuria  Musculoskeletal: Positive for back pain  Negative for arthralgias  Skin: Negative for color change and rash  Neurological: Positive for headaches  Negative for dizziness, seizures, syncope and light-headedness  All other systems reviewed and are negative        Past Medical and Surgical History:   Past Medical History:   Diagnosis Date   • HELENE positive    • Anemia     Sildenafil causes decreased hematocrit   • Anxiety 03/2020   • CHF (congestive heart failure) ([facility] )     right heart failure related to pulm HTN lupus   • Chronic kidney disease     borderline lab values   • Chronic rhinitis 06/04/2012   • Clotting disorder ([facility] ) 2012   • Coronary artery disease 2018   • Encephalitis     Lasted Assessed 10/18/2017   • Gout 06/26/2018   • Hypertension    • Lupus anticoagulant disorder ([facility] )    • Maxillary sinusitis     Lasted Assessed 10/18/2017   • Night sweat    • Osteopenia     Hip   • Osteoporosis     Spine   • Pneumonia     Last Assessed 10/18/2017   • PONV (postoperative nausea and vomiting)    • Pulmonary hypertension (HCC)    • Seizures (HCC)     Only during Encephalitis   • Shortness of breath     with exertion   • Sinus tachycardia    • Urinary incontinence        Past Surgical History:   Procedure Laterality Date   • ARTHRODESIS      Hand: IP Joint   • CHOLECYSTECTOMY     • COLONOSCOPY     • COLPOSCOPY     • HYSTERECTOMY      Vaginal   • JOINT REPLACEMENT Right     Knee replacement   • KNEE SURGERY  2/2019   • LAPAROSCOPIC ESOPHAGOGASTRIC FUNDOPLASTY  2008   • OK KNEE SCOPE,SINGLE MENISECTOMY Right 10/2/2018    Procedure: KNEE ARTHROSCOPY WITH PARTIAL MEDIAL MENISCECTOMY;  Surgeon: Helen Rainey DO;  [facility];  Service: Orthopedics   • OK KNEE SCOPE,SINGLE MENISECTOMY Left 12/17/2019    Procedure: KNEE ARTHROSCOPIC MENISCECTOMY /MEDIAL;  Chondyl medial and posterior;  Surgeon: Helen Rainey DO;  [facility];  Service: Orthopedics   • OK TOTAL KNEE ARTHROPLASTY Right 2/12/2019    Procedure: KNEE TOTAL ARTHROPLASTY AND ASSOCIATED PROCEDURES;  Surgeon: Helen Rainey DO;  [facility];  Service: Orthopedics   • OK TOTAL KNEE ARTHROPLASTY Left 10/6/2022    Procedure: ARTHROPLASTY KNEE TOTAL;  Surgeon: Helen Rainey DO;  [facility];  Service: Orthopedics   • REDUCTION MAMMAPLASTY     • REPAIR ANKLE LIGAMENT Right    • TONSILLECTOMY         Meds/Allergies:  Prior to Admission medications    Medication Sig Start Date End Date Taking? Authorizing Provider   ascorbic acid (VITAMIN C) 500 MG tablet Take 1 tablet (500 mg total) by mouth 2 (two) times a day 7/7/22   Alicia Cesar PA-C   aspirin (ECOTRIN LOW STRENGTH) 81 mg EC tablet Take 162 mg by mouth daily  Patient not taking: Reported on 10/13/2022    Historical Provider, MD   Benlysta 200 MG/ML SOAJ Weekly on Fridays 6/20/22   Historical Provider, MD   cholecalciferol (VITAMIN D3) 1,000 units tablet Take 2,000 Units by mouth daily in the early morning      Historical Provider, MD   cyanocobalamin 1,000 mcg/mL Inject 1 mL (1,000 mcg total) into a muscle every 30 (thirty) days 9/12/22   Katherine March PA-C   enoxaparin (LOVENOX) 40 mg/0 4 mL Inject 0 4 mL (40 mg total) under the skin daily for 28 days Starting after left total knee surgery 7/7/22 10/13/22  Alicia Cesar PA-C   ferrous sulfate 324 (65 Fe) mg Take 1 tablet (324 mg total) by mouth 2 (two) times a day before meals 7/7/22   Alicia Cesar PA-C   folic acid (FOLVITE) 1 mg tablet TAKE 1 TABLET(1 MG) BY MOUTH DAILY 9/27/22   Alicia Cesar PA-C   hydroxychloroquine (PLAQUENIL) 200 mg tablet Take 400 mg by mouth daily with breakfast Dosage adjustments based on testing results   Takes 200 mg on odd days and takes 400 mg on even days    Historical Provider, MD   methocarbamol (Robaxin-750) 750 mg tablet Take 1 tablet (750 mg total) by mouth every 6 (six) hours as needed for muscle spasms 10/13/22   Alicia Cesar PA-C   Multiple Vitamins-Minerals (multivitamin with minerals) tablet Take 1 tablet by mouth daily 7/7/22   Alicia Cesar PA-C   ondansetron (Zofran ODT) 4 mg disintegrating tablet Take 1 tablet (4 mg total) by mouth every 6 (six) hours as needed for nausea or vomiting 10/7/22   Giselle Caban PA-C   sildenafil (REVATIO) 20 mg tablet Take 1 tablet (20 mg total) by mouth 3 (three) times a day 3/10/22   Hernando Delgado DO   spironolactone (ALDACTONE) 25 mg tablet Take 1 tablet (25 mg total) by mouth daily  Patient taking differently: Take 25 mg by mouth daily at bedtime 22   Hernando Delgado DO   Syringe/Needle, Disp, [facility] Syringe/Needle) 25G X 1-1/2" 1 ML MISC Use 1 Syringe every 30 (thirty) days 21   Valeri Hillman PA-C   torsemide [facility]) 20 mg tablet Take 1 tablet (20 mg total) by mouth daily 22   Hernando Delgado DO   triamcinolone (KENALOG) 0 1 % oral topical paste prn 17   Historical Provider, MD   ondansetron (ZOFRAN) 4 mg tablet Take 1 tablet (4 mg total) by mouth every 8 (eight) hours as needed for nausea or vomiting 10/7/22 10/7/22  Rand Perez PA-C     I have reviewed home medications with patient personally  Allergies:    Allergies   Allergen Reactions   • Dilantin [Phenytoin] Anaphylaxis and Rash   • Penicillins Rash, Anaphylaxis, Dermatitis and Hives   • Augmentin [Amoxicillin-Pot Clavulanate] Rash and Dermatitis       Social History:  Marital Status: /Civil Union   Occupation: retired  Patient Pre-hospital Living Situation: Home  Patient Pre-hospital Level of Mobility: walks with walker  Patient Pre-hospital Diet Restrictions: no restrictions  Substance Use History:   Social History     Substance and Sexual Activity   Alcohol Use Yes   • Alcohol/week: 0 0 standard drinks    Comment: socially     Social History     Tobacco Use   Smoking Status Former Smoker   • Packs/day: 0 00   • Years: 0 00   • Pack years: 0 00   • Quit date: 2006   • Years since quittin 7   Smokeless Tobacco Never Used     Social History     Substance and Sexual Activity   Drug Use No       Family History:  Family History   Problem Relation Age of Onset   • Uterine cancer Mother    • Pancreatic cancer Mother 79   • Diabetes Mother    • Endometrial cancer Mother 77   • Arthritis Mother    • Cancer Mother         Pancreas, Uterine   • Vision loss Mother    • Heart disease Father         open heart surgery at age 48    • Stroke Father CVA   • Hypertension Father    • Atrial fibrillation Father    • Hyperlipidemia Father    • Arthritis Father    • Rheum arthritis Father    • No Known Problems Sister    • No Known Problems Brother    • Thyroid disease Maternal Grandmother    • Asthma Daughter    • Heart attack Maternal Grandfather    • Heart attack Paternal Grandmother    • Skin cancer Paternal Grandfather    • No Known Problems Sister    • Thyroid disease Sister    • Depression Sister    • Osteoarthritis Sister    • Hemochromatosis Brother    • Migraines Daughter    • Asthma Daughter    • No Known Problems Maternal Aunt    • Anuerysm Neg Hx    • Clotting disorder Neg Hx    • Heart failure Neg Hx        Physical Exam:     Vitals:   Blood Pressure: 147/81 (11/05/22 1901)  Pulse: (!) 108 (11/05/22 1901)  Temperature: 98 9 °F (37 2 °C) (11/05/22 1901)  Temp Source: Oral (11/05/22 1901)  Respirations: 18 (11/05/22 1901)  Height: 5' 4" (162 6 cm) (11/05/22 1901)  Weight - Scale: 105 kg (230 lb 13 2 oz) (11/05/22 1901)  SpO2: 100 % (11/05/22 1901)    Physical Exam  Vitals and nursing note reviewed  Constitutional:       General: She is not in acute distress  Appearance: She is well-developed  HENT:      Head: Normocephalic and atraumatic  Eyes:      Conjunctiva/sclera: Conjunctivae normal       Pupils: Pupils are equal, round, and reactive to light  Cardiovascular:      Rate and Rhythm: Regular rhythm  Tachycardia present  Heart sounds: No murmur heard  No friction rub  No gallop  Pulmonary:      Effort: Pulmonary effort is normal  No respiratory distress  Breath sounds: Normal breath sounds  No wheezing, rhonchi or rales  Abdominal:      Palpations: Abdomen is soft  Tenderness: There is no abdominal tenderness  Musculoskeletal:      Cervical back: Neck supple  Skin:     General: Skin is warm and dry  Neurological:      Mental Status: She is alert            Additional Data:     Lab Results:  Results from last 7 days   Lab Units 11/05/22  1526   WBC Thousand/uL 7 97   HEMOGLOBIN g/dL 12 5   HEMATOCRIT % 38 2   PLATELETS Thousands/uL 271   NEUTROS PCT % 82*   LYMPHS PCT % 9*   MONOS PCT % 8   EOS PCT % 1     Results from last 7 days   Lab Units 11/05/22  1526   SODIUM mmol/L 137   POTASSIUM mmol/L 3 7   CHLORIDE mmol/L 101   CO2 mmol/L 26   BUN mg/dL 11   CREATININE mg/dL 0 82   ANION GAP mmol/L 10   CALCIUM mg/dL 9 9   ALBUMIN g/dL 4 2   TOTAL BILIRUBIN mg/dL 0 99   ALK PHOS U/L 98   ALT U/L 9   AST U/L 10*   GLUCOSE RANDOM mg/dL 112     Results from last 7 days   Lab Units 11/05/22  1722   INR  0 99                   Imaging: Reviewed radiology reports from this admission including: chest xray and chest CT scan  CTA ED chest PE study   Final Result by Nevaeh Putnam MD (11/05 9698)      Moderate quantity of right lower lobe segmental and small quantity of right upper lobe and left lower lobe acute PE  The calculated ratio of right ventricular to left ventricular diameter (RV/LV ratio) is greater than 0 9, which is abnormal and indicates right heart strain  An abnormal RV/LV ratio has been shown to be associated with an increased risk of 30 day    mortality in the setting of acute pulmonary embolism  Urgent consultation with the medical critical care team is recommended  I personally discussed this study with JOSLYN ALBERTS on 11/5/2022 at 5:07 PM             Workstation performed: [id number]         XR chest portable   ED Interpretation by Lianna Roe MD (11/05 2589)   Patient registration sign of consolidation or pulmonary effusion  No sign of fracture or costovertebral angle blunting  EKG and Other Studies Reviewed on Admission:   · EKG: Sinus Tachycardia    ** Please Note: This note has been constructed using a voice recognition system   **    Richelle Neil MD Psychiatry

## 2022-11-05 NOTE — ED ATTENDING ATTESTATION
11/5/2022  IEthel DO, saw and evaluated the patient  I have discussed the patient with the resident/non-physician practitioner and agree with the resident's/non-physician practitioner's findings, Plan of Care, and MDM as documented in the resident's/non-physician practitioner's note, except where noted  All available labs and Radiology studies were reviewed  I was present for key portions of any procedure(s) performed by the resident/non-physician practitioner and I was immediately available to provide assistance  At this point I agree with the current assessment done in the Emergency Department  I have conducted an independent evaluation of this patient a history and physical is as follows:    ED Course   59year old female presents to the ED for evaluation of shortness of breath and back pain radiating to the right shoulder  Patient has pain with deep inspiration  Patient tested positive for COVID on 10/25 while on a cruise  She states that she felt that she was getting slightly better however over the past 2 days she felt worse  Today she woke up with severe pain in the middle of the night in her right back radiating to the right shoulder  She also felt short of breath  She has had a junky cough that is nonproductive  No hemoptysis  She has had fever at home  No nausea, vomiting, diarrhea  PmhX: Lupus anticoagulatant, hypertension    Physical exam:  Patient is awake and alert, appears to be in mild-to-moderate distress  She is tachypneic  Pupils are equal reactive  Mucous membranes are dry  Neck is supple  No JVD  Heart is tachycardic, regular  Lungs have diminished breath sounds at the right base  Few scattered rhonchi noted  No wheezes  Abdomen is soft nontender nondistended with normoactive bowel sounds  Trace bilateral lower extremity edema noted  2+ dorsal pedal posterior tibial pulses noted  Patient has 5/5 strength in all 4 extremities      Plan:  Patient is at high risk for pulmonary embolism  Will check CTA of the chest , check labs, EKG, provide pain medication and oxygen as needed    Update:  Patient's CT demonstrates pulmonary embolism with moderate clot burden  Patient remains hemodynamically stable on 2 L of nasal cannula oxygen  High dose heparin infusion started  Patient admitted to the step-down floor            Critical Care Time  CriticalCare Time  Performed by: Paula Ceballos DO  Authorized by: Paula Ceballos DO     Critical care provider statement:     Critical care time (minutes):  40    Critical care time was exclusive of:  Separately billable procedures and treating other patients and teaching time    Critical care was necessary to treat or prevent imminent or life-threatening deterioration of the following conditions:  Respiratory failure    Critical care was time spent personally by me on the following activities:  Blood draw for specimens, obtaining history from patient or surrogate, development of treatment plan with patient or surrogate, discussions with consultants, evaluation of patient's response to treatment, examination of patient, review of old charts, re-evaluation of patient's condition, ordering and review of radiographic studies, ordering and review of laboratory studies and ordering and performing treatments and interventions    I assumed direction of critical care for this patient from another provider in my specialty: no

## 2022-11-05 NOTE — ASSESSMENT & PLAN NOTE
Patient presented with SOB and cough  Patient recently was on cruise ship one week ago, also exposed to grandson who had positive test  Tested positive at home with home fever to 102 F  In ED patient was afebrile however found to be tachycardic and tachypnic with positive COVID test there sepsis criteria met  Imaging revealed acute PE  Patient does not meet indications for ABX or steroid therapy at this time  Lab Results   Component Value Date    SARSCOV2 Positive (A) 11/05/2022       Symptoms of COVID-19 SYMPTOMS: Fever (99 0* or greater), cough, shortness of breath, Runny nose, Sore throat, Nasal congestion, Tachycardia (more than 100 beats per min) and General malaise (headache, feeling rundown, achy)  Vaccine status: up to date    Workup:    CTA ED chest PE study    Result Date: 11/5/2022  Impression: Moderate quantity of right lower lobe segmental and small quantity of right upper lobe and left lower lobe acute PE  The calculated ratio of right ventricular to left ventricular diameter (RV/LV ratio) is greater than 0 9, which is abnormal and indicates right heart strain  An abnormal RV/LV ratio has been shown to be associated with an increased risk of 30 day mortality in the setting of acute pulmonary embolism  Urgent consultation with the medical critical care team is recommended  I personally discussed this study with JOSLYN ALBERTS on 11/5/2022 at 5:07 PM  Workstation performed: [id number]       No Chest XR results available for this patient  No results for input(s): FERRITIN, CRP, DDIMER, CKTOTAL, NTBNP in the last 72 hours      Invalid input(s):  TROPONINI  Lab Results   Component Value Date    HSTNI0 <2 11/05/2022    HSTNI2 2 11/05/2022    HSTNID2 >0 11/05/2022       · Saturating 100% on 2 liters/min via nasal cannula    MILD Stage    Plan:  · Continue IV heparin for at least 48 hours  · Transition to Eliquis/Xarelto for 3-6 months with f/u with rheumatology and haematology for further anticoagulation management  · OOB TID, PT and OT eval and treat, Incentive spirometry  · Continue remdesivir  · Pain management for pleuritic chest pain  · Follow-up echocardiography  · Monitor VS for oxygen requirement  · Trend labs as needed;  Inflammatory markers, daily labs: CMP and CBC  · heparin level, lactate, APTT, type and screen

## 2022-11-06 ENCOUNTER — APPOINTMENT (INPATIENT)
Dept: NON INVASIVE DIAGNOSTICS | Facility: HOSPITAL | Age: 57
End: 2022-11-06

## 2022-11-06 LAB
ALBUMIN SERPL BCP-MCNC: 3.7 G/DL (ref 3.5–5)
ALP SERPL-CCNC: 89 U/L (ref 34–104)
ALT SERPL W P-5'-P-CCNC: 24 U/L (ref 7–52)
ANION GAP SERPL CALCULATED.3IONS-SCNC: 6 MMOL/L (ref 4–13)
AORTIC VALVE MEAN VELOCITY: 9.4 M/S
APTT PPP: >210 SECONDS (ref 23–37)
APTT PPP: >210 SECONDS (ref 23–37)
AST SERPL W P-5'-P-CCNC: 30 U/L (ref 13–39)
ATRIAL RATE: 119 BPM
AV AREA BY CONTINUOUS VTI: 3 CM2
AV AREA PEAK VELOCITY: 2.7 CM2
AV LVOT MEAN GRADIENT: 4 MMHG
AV LVOT PEAK GRADIENT: 6 MMHG
AV MEAN GRADIENT: 4 MMHG
AV PEAK GRADIENT: 8 MMHG
AV VALVE AREA: 3.02 CM2
AV VELOCITY RATIO: 0.88
BASOPHILS # BLD AUTO: 0.03 THOUSANDS/ÂΜL (ref 0–0.1)
BASOPHILS NFR BLD AUTO: 0 % (ref 0–1)
BILIRUB SERPL-MCNC: 0.8 MG/DL (ref 0.2–1)
BUN SERPL-MCNC: 13 MG/DL (ref 5–25)
CALCIUM SERPL-MCNC: 9 MG/DL (ref 8.4–10.2)
CHLORIDE SERPL-SCNC: 98 MMOL/L (ref 96–108)
CO2 SERPL-SCNC: 28 MMOL/L (ref 21–32)
CREAT SERPL-MCNC: 0.79 MG/DL (ref 0.6–1.3)
DOP CALC AO PEAK VEL: 1.44 M/S
DOP CALC AO VTI: 25.62 CM
DOP CALC LVOT AREA: 3.14 CM2
DOP CALC LVOT DIAMETER: 2 CM
DOP CALC LVOT PEAK VEL VTI: 24.62 CM
DOP CALC LVOT PEAK VEL: 1.26 M/S
DOP CALC LVOT STROKE INDEX: 36.1 ML/M2
DOP CALC LVOT STROKE VOLUME: 77.31 CM3
E WAVE DECELERATION TIME: 171 MS
EOSINOPHIL # BLD AUTO: 0.03 THOUSAND/ÂΜL (ref 0–0.61)
EOSINOPHIL NFR BLD AUTO: 0 % (ref 0–6)
ERYTHROCYTE [DISTWIDTH] IN BLOOD BY AUTOMATED COUNT: 13.9 % (ref 11.6–15.1)
FRACTIONAL SHORTENING: 35 % (ref 28–44)
GFR SERPL CREATININE-BSD FRML MDRD: 82 ML/MIN/1.73SQ M
GLUCOSE SERPL-MCNC: 112 MG/DL (ref 65–140)
HCT VFR BLD AUTO: 34.2 % (ref 34.8–46.1)
HEPARIN CF II AG PPP IA-MCNC: 1.82 IU/ML
HGB BLD-MCNC: 10.9 G/DL (ref 11.5–15.4)
IMM GRANULOCYTES # BLD AUTO: 0.03 THOUSAND/UL (ref 0–0.2)
IMM GRANULOCYTES NFR BLD AUTO: 0 % (ref 0–2)
INTERVENTRICULAR SEPTUM IN DIASTOLE (PARASTERNAL SHORT AXIS VIEW): 1.2 CM
INTERVENTRICULAR SEPTUM: 1.2 CM (ref 0.6–1.1)
LEFT ATRIUM SIZE: 4.5 CM
LEFT INTERNAL DIMENSION IN SYSTOLE: 2.4 CM (ref 2.1–4)
LEFT VENTRICULAR INTERNAL DIMENSION IN DIASTOLE: 3.7 CM (ref 3.5–6)
LEFT VENTRICULAR POSTERIOR WALL IN END DIASTOLE: 1.2 CM
LEFT VENTRICULAR STROKE VOLUME: 37 ML
LVSV (TEICH): 37 ML
LYMPHOCYTES # BLD AUTO: 0.59 THOUSANDS/ÂΜL (ref 0.6–4.47)
LYMPHOCYTES NFR BLD AUTO: 8 % (ref 14–44)
MCH RBC QN AUTO: 29.6 PG (ref 26.8–34.3)
MCHC RBC AUTO-ENTMCNC: 31.9 G/DL (ref 31.4–37.4)
MCV RBC AUTO: 93 FL (ref 82–98)
MONOCYTES # BLD AUTO: 0.68 THOUSAND/ÂΜL (ref 0.17–1.22)
MONOCYTES NFR BLD AUTO: 9 % (ref 4–12)
MV E'TISSUE VEL-LAT: 17 CM/S
MV E'TISSUE VEL-SEP: 10 CM/S
MV PEAK A VEL: 0.92 M/S
MV PEAK E VEL: 98 CM/S
MV STENOSIS PRESSURE HALF TIME: 50 MS
MV VALVE AREA P 1/2 METHOD: 4.4 CM2
NEUTROPHILS # BLD AUTO: 6.37 THOUSANDS/ÂΜL (ref 1.85–7.62)
NEUTS SEG NFR BLD AUTO: 83 % (ref 43–75)
NRBC BLD AUTO-RTO: 0 /100 WBCS
P AXIS: 57 DEGREES
PLATELET # BLD AUTO: 210 THOUSANDS/UL (ref 149–390)
PMV BLD AUTO: 10.6 FL (ref 8.9–12.7)
POTASSIUM SERPL-SCNC: 4.1 MMOL/L (ref 3.5–5.3)
PR INTERVAL: 120 MS
PROT SERPL-MCNC: 6.4 G/DL (ref 6.4–8.4)
QRS AXIS: 70 DEGREES
QRSD INTERVAL: 78 MS
QT INTERVAL: 308 MS
QTC INTERVAL: 433 MS
RA PRESSURE ESTIMATED: 5 MMHG
RBC # BLD AUTO: 3.68 MILLION/UL (ref 3.81–5.12)
RV PSP: 42 MMHG
SL CV LV EF: 65
SL CV PED ECHO LEFT VENTRICLE DIASTOLIC VOLUME (MOD BIPLANE) 2D: 58 ML
SL CV PED ECHO LEFT VENTRICLE SYSTOLIC VOLUME (MOD BIPLANE) 2D: 21 ML
SODIUM SERPL-SCNC: 132 MMOL/L (ref 135–147)
T WAVE AXIS: 31 DEGREES
TR MAX PG: 37 MMHG
TR PEAK VELOCITY: 3 M/S
TRICUSPID VALVE PEAK REGURGITATION VELOCITY: 3.02 M/S
VENTRICULAR RATE: 119 BPM
WBC # BLD AUTO: 7.73 THOUSAND/UL (ref 4.31–10.16)

## 2022-11-06 RX ORDER — ACETAMINOPHEN 325 MG/1
975 TABLET ORAL EVERY 6 HOURS SCHEDULED
Status: DISCONTINUED | OUTPATIENT
Start: 2022-11-06 | End: 2022-11-07 | Stop reason: HOSPADM

## 2022-11-06 RX ORDER — OXYCODONE HYDROCHLORIDE 5 MG/1
5 TABLET ORAL EVERY 4 HOURS PRN
Status: DISCONTINUED | OUTPATIENT
Start: 2022-11-06 | End: 2022-11-07 | Stop reason: HOSPADM

## 2022-11-06 RX ORDER — HYDROMORPHONE HCL IN WATER/PF 6 MG/30 ML
0.2 PATIENT CONTROLLED ANALGESIA SYRINGE INTRAVENOUS ONCE
Status: COMPLETED | OUTPATIENT
Start: 2022-11-06 | End: 2022-11-06

## 2022-11-06 RX ORDER — SODIUM CHLORIDE 1000 MG
1 TABLET, SOLUBLE MISCELLANEOUS ONCE
Status: COMPLETED | OUTPATIENT
Start: 2022-11-06 | End: 2022-11-06

## 2022-11-06 RX ORDER — ENOXAPARIN SODIUM 100 MG/ML
1 INJECTION SUBCUTANEOUS EVERY 12 HOURS SCHEDULED
Status: DISCONTINUED | OUTPATIENT
Start: 2022-11-06 | End: 2022-11-07 | Stop reason: HOSPADM

## 2022-11-06 RX ORDER — OXYCODONE HYDROCHLORIDE 10 MG/1
10 TABLET ORAL EVERY 6 HOURS PRN
Status: DISCONTINUED | OUTPATIENT
Start: 2022-11-06 | End: 2022-11-07 | Stop reason: HOSPADM

## 2022-11-06 RX ORDER — ACETAMINOPHEN 325 MG/1
975 TABLET ORAL EVERY 6 HOURS PRN
Status: DISCONTINUED | OUTPATIENT
Start: 2022-11-06 | End: 2022-11-06

## 2022-11-06 RX ORDER — LIDOCAINE 50 MG/G
1 PATCH TOPICAL DAILY
Status: DISCONTINUED | OUTPATIENT
Start: 2022-11-06 | End: 2022-11-07 | Stop reason: HOSPADM

## 2022-11-06 RX ORDER — HYDROMORPHONE HCL/PF 1 MG/ML
0.5 SYRINGE (ML) INJECTION EVERY 4 HOURS PRN
Status: DISCONTINUED | OUTPATIENT
Start: 2022-11-06 | End: 2022-11-07 | Stop reason: HOSPADM

## 2022-11-06 RX ADMIN — SODIUM CHLORIDE 1 G: 1 TABLET ORAL at 13:24

## 2022-11-06 RX ADMIN — OXYCODONE HYDROCHLORIDE AND ACETAMINOPHEN 1 TABLET: 5; 325 TABLET ORAL at 08:25

## 2022-11-06 RX ADMIN — OXYCODONE HYDROCHLORIDE AND ACETAMINOPHEN 1 TABLET: 5; 325 TABLET ORAL at 00:02

## 2022-11-06 RX ADMIN — ACETAMINOPHEN 975 MG: 325 TABLET ORAL at 11:33

## 2022-11-06 RX ADMIN — OXYCODONE HYDROCHLORIDE AND ACETAMINOPHEN 1 TABLET: 5; 325 TABLET ORAL at 04:03

## 2022-11-06 RX ADMIN — ENOXAPARIN SODIUM 100 MG: 100 INJECTION SUBCUTANEOUS at 13:24

## 2022-11-06 RX ADMIN — HYDROXYCHLOROQUINE SULFATE 400 MG: 200 TABLET, FILM COATED ORAL at 08:30

## 2022-11-06 RX ADMIN — LIDOCAINE 5% 1 PATCH: 700 PATCH TOPICAL at 10:23

## 2022-11-06 RX ADMIN — SILDENAFIL 20 MG: 20 TABLET ORAL at 08:29

## 2022-11-06 RX ADMIN — CHOLECALCIFEROL TAB 25 MCG (1000 UNIT) 2000 UNITS: 25 TAB at 08:30

## 2022-11-06 RX ADMIN — SILDENAFIL 20 MG: 20 TABLET ORAL at 17:58

## 2022-11-06 RX ADMIN — HYDROMORPHONE HYDROCHLORIDE 0.2 MG: 0.2 INJECTION, SOLUTION INTRAMUSCULAR; INTRAVENOUS; SUBCUTANEOUS at 05:31

## 2022-11-06 RX ADMIN — TORSEMIDE 20 MG: 20 TABLET ORAL at 08:29

## 2022-11-06 RX ADMIN — SILDENAFIL 20 MG: 20 TABLET ORAL at 22:01

## 2022-11-06 RX ADMIN — ENOXAPARIN SODIUM 100 MG: 100 INJECTION SUBCUTANEOUS at 22:01

## 2022-11-06 RX ADMIN — HEPARIN SODIUM 12 UNITS/KG/HR: 10000 INJECTION, SOLUTION INTRAVENOUS at 11:31

## 2022-11-06 RX ADMIN — HEPARIN SODIUM 15 UNITS/KG/HR: 10000 INJECTION, SOLUTION INTRAVENOUS at 08:02

## 2022-11-06 RX ADMIN — ASPIRIN 162 MG: 81 TABLET, COATED ORAL at 08:30

## 2022-11-06 RX ADMIN — ACETAMINOPHEN 975 MG: 325 TABLET ORAL at 17:58

## 2022-11-06 RX ADMIN — ONDANSETRON 4 MG: 2 INJECTION INTRAMUSCULAR; INTRAVENOUS at 05:07

## 2022-11-06 RX ADMIN — REMDESIVIR 100 MG: 100 INJECTION, POWDER, LYOPHILIZED, FOR SOLUTION INTRAVENOUS at 22:01

## 2022-11-06 NOTE — PROGRESS NOTES
[facility]  Progress Note - Ashley Alcaraz 1965, 59 y o  female MRN: [id number]  Unit/Bed#: S -01 Encounter: [id number]  Primary Care Provider: Gold Thomson,    Date and time admitted to hospital: 11/5/2022  2:16 PM    * Pulmonary embolism associated with COVID-19 [facility])  Assessment & Plan  Patient presented with SOB and cough  Patient recently was on cruise ship one week ago, also exposed to grandson who had positive test  Tested positive at home with home fever to 102 F  In ED patient was afebrile however found to be tachycardic and tachypnic with positive COVID test there sepsis criteria met  Imaging revealed acute PE  Patient does not meet indications for ABX or steroid therapy at this time  Lab Results   Component Value Date    SARSCOV2 Positive (A) 11/05/2022       Symptoms of COVID-19 SYMPTOMS: Fever (99 0* or greater), cough, shortness of breath, Runny nose, Sore throat, Nasal congestion, Tachycardia (more than 100 beats per min) and General malaise (headache, feeling rundown, achy)  Vaccine status: up to date    Workup:    CTA ED chest PE study    Result Date: 11/5/2022  Impression: Moderate quantity of right lower lobe segmental and small quantity of right upper lobe and left lower lobe acute PE  The calculated ratio of right ventricular to left ventricular diameter (RV/LV ratio) is greater than 0 9, which is abnormal and indicates right heart strain  An abnormal RV/LV ratio has been shown to be associated with an increased risk of 30 day mortality in the setting of acute pulmonary embolism  Urgent consultation with the medical critical care team is recommended  I personally discussed this study with JOSLYN ALBERTS on 11/5/2022 at 5:07 PM  Workstation performed: [id number]       No Chest XR results available for this patient         Recent Labs     11/05/22  1526 11/05/22  1722    2*  --    DDIMER  --  5 13*   CKTOTAL 25*  --      Lab Results   Component Value Date    HSTNI0 <2 11/05/2022    HSTNI2 2 11/05/2022    HSTNID2 >0 11/05/2022       · Saturating 100% on RA     MILD Stage    Plan:  · D/C IV heparin, Start Lovenox 1 mg/kg BID  · Transition to Eliquis/Xarelto for 3-6 months with f/u with rheumatology and haematology for further anticoagulation management  · OOB TID, PT and OT eval and treat, Incentive spirometry  · Continue remdesivir Day #2 (11/6)  · Pain management for pleuritic chest pain/lower back pain  · Placed on scheduled tylenol  · Oxycodone 5 mg q4 PRN mod pain  · Oxycodone 10 mg q6 PRN severe pain  · Dilaudid 0 5 mg q4 PRN breakthrough pain   · Lidocaine patch to back  · Follow-up echocardiography  · Monitor VS for oxygen requirement  · Trend labs as needed; Inflammatory markers, daily labs: CMP and CBC  · heparin level, lactate, APTT, type and screen    SIRS (systemic inflammatory response syndrome) (HCC)  Assessment & Plan  Criteria met with tachycardia and tachypnoea  Patient was afebrile with normal oxygen and normal WBC  Likely secondary to PE, though possibly with COVID contributing   - see management for PE and COVID    SLE (systemic lupus erythematosus) ([facility] [address] )  Assessment & Plan  Patient follows with rheumatology  - continue home hydroxychloroquine  - next dose of Benlysta is 11/11  - continue home ASA for positive lupus anticoagulant    Stage 3a chronic kidney disease (CKD) [facility])  Assessment & Plan  Lab Results   Component Value Date    EGFR 82 11/06/2022    EGFR 78 11/05/2022    EGFR 70 10/07/2022    CREATININE 0 79 11/06/2022    CREATININE 0 82 11/05/2022    CREATININE 0 90 10/07/2022     Sequela of SLE  Creatinine and GFR are normal here  - furosemide 1x 11/5    Plan  - Continue home torsemide 20 mg PO qd   - patient reports not currently taking spironolactone    Pulmonary hypertension ([facility] [address] )  Assessment & Plan  Present at baseline   There is concern for right heart strain due to acute PE   - Given one dose IV furosemide here for effusions seen on imaging ()    Plan  - continue home sildenafil  - follow-up echocardiography  - continue home torsemide  - patient reports home spironolactone is currently held        VTE Pharmacologic Prophylaxis:   VTE Score: 21 High Risk (Score >/= 5) - Pharmacological DVT Prophylaxis Ordered: Heparin Drip  Sequential Compression Devices Ordered  Mechanical VTE Prophylaxis in Place: Yes    Patient Centered Rounds: I have performed bedside rounds with nursing staff today  Discussions with Specialists or Other Care Team Provider: Attending    Education and Discussions with Family / Patient: Updated  (daughter) via phone  Current Length of Stay: 1 day(s)    Current Patient Status: Inpatient     Discharge Plan / Estimated Discharge Date: Anticipate discharge in 48 hrs to home  Code Status: Level 1 - Full Code      Subjective:   Patient reports feeling better than yesterday but still has ongoing excruciating low back pain  Objective:     Vitals:   Temp (24hrs), Av °F (37 2 °C), Min:98 6 °F (37 °C), Max:99 6 °F (37 6 °C)    Temp:  [98 6 °F (37 °C)-99 6 °F (37 6 °C)] 98 9 °F (37 2 °C)  HR:  [] 112  Resp:  [18-28] 18  BP: (114-151)/(56-87) 151/87  SpO2:  [91 %-100 %] 91 %  Body mass index is 39 48 kg/m²  Input and Output Summary (last 24 hours): Intake/Output Summary (Last 24 hours) at 2022 1135  Last data filed at 2022 2124  Gross per 24 hour   Intake --   Output 550 ml   Net -550 ml       Physical Exam:     Physical Exam  Constitutional:       General: She is not in acute distress  Appearance: Normal appearance  She is not ill-appearing  HENT:      Head: Normocephalic and atraumatic  Right Ear: External ear normal       Left Ear: External ear normal       Nose: Nose normal    Eyes:      Extraocular Movements: Extraocular movements intact  Conjunctiva/sclera: Conjunctivae normal    Cardiovascular:      Rate and Rhythm: Normal rate and regular rhythm  Pulses: Normal pulses  Heart sounds: Normal heart sounds  Pulmonary:      Effort: Pulmonary effort is normal       Breath sounds: Normal breath sounds  Comments: Decreased breath sounds   Abdominal:      General: Abdomen is flat  Palpations: Abdomen is soft  Musculoskeletal:         General: No swelling  Right lower leg: No edema  Left lower leg: No edema  Skin:     General: Skin is warm and dry  Neurological:      Mental Status: She is alert and oriented to person, place, and time            Additional Data:     Labs:  Results from last 7 days   Lab Units 11/06/22  0814   WBC Thousand/uL 7 73   HEMOGLOBIN g/dL 10 9*   HEMATOCRIT % 34 2*   PLATELETS Thousands/uL 210   NEUTROS PCT % 83*   LYMPHS PCT % 8*   MONOS PCT % 9   EOS PCT % 0     Results from last 7 days   Lab Units 11/06/22  0814   SODIUM mmol/L 132*   POTASSIUM mmol/L 4 1   CHLORIDE mmol/L 98   CO2 mmol/L 28   BUN mg/dL 13   CREATININE mg/dL 0 79   ANION GAP mmol/L 6   CALCIUM mg/dL 9 0   ALBUMIN g/dL 3 7   TOTAL BILIRUBIN mg/dL 0 80   ALK PHOS U/L 89   ALT U/L 24   AST U/L 30   GLUCOSE RANDOM mg/dL 112     Results from last 7 days   Lab Units 11/05/22  1722   INR  0 99             Results from last 7 days   Lab Units 11/05/22  2017   LACTIC ACID mmol/L 1 3       Imaging: Reviewed radiology reports from this admission including: chest xray and chest CT scan    Recent Cultures (last 7 days):           Lines/Drains:  Invasive Devices  Report    Peripheral Intravenous Line  Duration           Peripheral IV 11/05/22 Left;Ventral (anterior) Forearm 1 day    Peripheral IV 11/05/22 Right Antecubital <1 day                Telemetry:        Last 24 Hours Medication List:   Current Facility-Administered Medications   Medication Dose Route Frequency Provider Last Rate   • acetaminophen  975 mg Oral Q6H [address] [place] Yahaira Alford DO     • aspirin  162 mg Oral Daily Osito Grayson MD     • cholecalciferol  2,000 Units Oral Early Morning Osito Grayson MD     • enoxaparin  1 mg/kg Subcutaneous Q12H [address] [place], [place]     • HYDROmorphone  0 5 mg Intravenous Q4H PRN [facility]     • hydroxychloroquine  400 mg Oral Daily With Breakfast Jesus Mac MD     • lidocaine  1 patch Topical Daily [facility]     • ondansetron  4 mg Intravenous Q6H PRN Osito Grayson MD     • oxyCODONE  10 mg Oral Q6H PRN [place] [facility]     • oxyCODONE  5 mg Oral Q4H PRN Anjel Rosenberg DO     • remdesivir  100 mg Intravenous Q24H Osito Grayson MD     • sildenafil  20 mg Oral TID Osito Grayson MD     • sodium chloride  1 g Oral Once Anjel Rosenberg DO     • torsemide  20 mg Oral Daily Osito Grayson MD          Today, Patient Was Seen By: Anjel Rosenberg    ** Please Note: This note has been constructed using a voice recognition system   **

## 2022-11-06 NOTE — ASSESSMENT & PLAN NOTE
Patient presented with SOB and cough  Patient recently was on cruise ship one week ago, also exposed to grandson who had positive test  Tested positive at home with home fever to 102 F  In ED patient was afebrile however found to be tachycardic and tachypnic with positive COVID test  Imaging revealed acute PE  Patient does not meet indications for ABX or steroid therapy at this time  Lab Results   Component Value Date    SARSCOV2 Positive (A) 11/05/2022       Symptoms of COVID-19 SYMPTOMS: Fever (99 0* or greater), cough, shortness of breath, Runny nose, Sore throat, Nasal congestion, Tachycardia (more than 100 beats per min) and General malaise (headache, feeling rundown, achy)  Vaccine status: up to date    Workup:    CTA ED chest PE study    Result Date: 11/5/2022  Impression: Moderate quantity of right lower lobe segmental and small quantity of right upper lobe and left lower lobe acute PE  The calculated ratio of right ventricular to left ventricular diameter (RV/LV ratio) is greater than 0 9, which is abnormal and indicates right heart strain  An abnormal RV/LV ratio has been shown to be associated with an increased risk of 30 day mortality in the setting of acute pulmonary embolism  Urgent consultation with the medical critical care team is recommended  I personally discussed this study with JOSLYN ALBERTS on 11/5/2022 at 5:07 PM  Workstation performed: [id number]       No Chest XR results available for this patient         Recent Labs     11/05/22  1526 11/05/22  1722    2*  --    DDIMER  --  5 13*   CKTOTAL 25*  --      Lab Results   Component Value Date    HSTNI0 <2 11/05/2022    HSTNI2 2 11/05/2022    HSTNID2 >0 11/05/2022       · Saturating 100% on RA     MILD Stage    Plan:  · D/C IV heparin, Start Lovenox 1 mg/kg BID  · Transition to Eliquis/Xarelto for 3-6 months with f/u with rheumatology and haematology for further anticoagulation management  · OOB TID, PT and OT eval and treat, Incentive spirometry  · Continue remdesivir Day #2 (11/6)  · Pain management for pleuritic chest pain/lower back pain  · Placed on scheduled tylenol  · Oxycodone 5 mg q4 PRN mod pain  · Oxycodone 10 mg q6 PRN severe pain  · Dilaudid 0 5 mg q4 PRN breakthrough pain   · Lidocaine patch to back  · Follow-up echocardiography  · Monitor VS for oxygen requirement  · Trend labs as needed;  Inflammatory markers, daily labs: CMP and CBC  · heparin level, lactate, APTT, type and screen

## 2022-11-06 NOTE — ASSESSMENT & PLAN NOTE
Present at baseline   There is concern for right heart strain due to acute PE   - continue home sildenafil  - follow-up echocardiography  - given one dose IV furosemide here for effusions seen on imaging  - continue home torsemide  - patient reports home spironolactone is currently held

## 2022-11-06 NOTE — ASSESSMENT & PLAN NOTE
Lab Results   Component Value Date    EGFR 84 11/07/2022    EGFR 82 11/06/2022    EGFR 78 11/05/2022    CREATININE 0 77 11/07/2022    CREATININE 0 79 11/06/2022    CREATININE 0 82 11/05/2022     Sequela of SLE  Creatinine and GFR are normal here    - furosemide 1x 11/5    Plan  - Continue home torsemide 20 mg PO qd   - patient reports not currently taking spironolactone

## 2022-11-06 NOTE — ASSESSMENT & PLAN NOTE
Patient follows with rheumatology    - continue home hydroxychloroquine  - next dose of Benlysta is 11/11  - continue home ASA for positive lupus anticoagulant

## 2022-11-06 NOTE — ASSESSMENT & PLAN NOTE
Lab Results   Component Value Date    EGFR 82 11/06/2022    EGFR 78 11/05/2022    EGFR 70 10/07/2022    CREATININE 0 79 11/06/2022    CREATININE 0 82 11/05/2022    CREATININE 0 90 10/07/2022     Sequela of SLE  Creatinine and GFR are normal here    - furosemide 1x 11/5    Plan  - Continue home torsemide 20 mg PO qd   - patient reports not currently taking spironolactone

## 2022-11-06 NOTE — ASSESSMENT & PLAN NOTE
Patient presented with SOB and cough  Patient recently was on cruise ship one [place] ago, also exposed to grandson who had positive test  Tested positive at home with home fever to 102 F  In ED patient was afebrile however found to be tachycardic and tachypnic with positive COVID test there sepsis criteria met  Imaging revealed acute PE  Patient does not meet indications for ABX or steroid therapy at this time  Lab Results   Component Value Date    SARSCOV2 Positive (A) 11/05/2022       Symptoms of COVID-19 SYMPTOMS: Fever (99 0* or greater), cough, shortness of breath, Runny nose, Sore throat, Nasal congestion, Tachycardia (more than 100 beats per min) and General malaise (headache, feeling rundown, achy)  Vaccine status: up to date    Workup:    CTA ED chest PE study    Result Date: 11/5/2022  Impression: Moderate quantity of right lower lobe segmental and small quantity of right upper lobe and left lower lobe acute PE  The calculated ratio of right ventricular to left ventricular diameter (RV/LV ratio) is greater than 0 9, which is abnormal and indicates right heart strain  An abnormal RV/LV ratio has been shown to be associated with an increased risk of 30 day mortality in the setting of acute pulmonary embolism  Urgent consultation with the medical critical care team is recommended  I personally discussed this study with JOSLYN ALBERTS on 11/5/2022 at 5:07 PM  Workstation performed: [id number]       No Chest XR results available for this patient         Recent Labs     11/05/22  1526 11/05/22  1722    2*  --    DDIMER  --  5 13*   CKTOTAL 25*  --      Lab Results   Component Value Date    HSTNI0 <2 11/05/2022    HSTNI2 2 11/05/2022    HSTNID2 >0 11/05/2022       · Saturating 100% on 2 liters/min via nasal cannula    MILD Stage    Plan:  · Continue IV heparin for at least 48 hours  · Transition to Eliquis/Xarelto for 3-6 months with f/u with rheumatology and haematology for further anticoagulation management  · OOB TID, PT and OT eval and treat, Incentive spirometry  · Continue remdesivir Day #2 (11/6)  · Pain management for pleuritic chest pain/lower back pain  · Placed on scheduled tylenol  · Oxycodone 5 mg q4 PRN mod pain  · Oxycodone 10 mg q6 PRN severe pain  · Dilaudid 0 5 mg q4 PRN breakthrough pain   · Lidocaine patch to back  · Follow-up echocardiography  · Monitor VS for oxygen requirement  · Trend labs as needed;  Inflammatory markers, daily labs: CMP and CBC  · heparin level, lactate, APTT, type and screen

## 2022-11-06 NOTE — ASSESSMENT & PLAN NOTE
Lab Results   Component Value Date    EGFR 78 11/05/2022    EGFR 70 10/07/2022    EGFR 57 09/26/2022    CREATININE 0 82 11/05/2022    CREATININE 0 90 10/07/2022    CREATININE 1 06 09/26/2022     Sequela of SLE  Creatinine and GFR are normal here    - furosemide once today  - home torsemide tomorrow  - patient reports not currently taking spironolactone

## 2022-11-06 NOTE — PHYSICAL THERAPY NOTE
PHYSICAL THERAPY EVALUATION NOTE    Patient Name: Yaima Avelar  OBHHU'G Date: 2022    AGE:   59 y o   Mrn:   [id number]  ADMIT DX:  Pulmonary infarct ([place] [address] ) [I26 99]  SOB (shortness of breath) [R06 02]  Acute pulmonary embolism ([facility] ) [I26 99]  COVID [U07 1]    Past Medical History:   Diagnosis Date    HELENE positive     Anemia     Sildenafil causes decreased hematocrit    Anxiety 2020    CHF (congestive heart failure) ([place] [address] )     right heart failure related to pulm HTN lupus    Chronic kidney disease     borderline lab values    Chronic rhinitis 2012    Clotting disorder ([place] [address] )     Coronary artery disease     Encephalitis     Lasted Assessed 10/18/2017    Gout 2018    Hypertension     Lupus anticoagulant disorder ([place] [address] )     Maxillary sinusitis     Lasted Assessed 10/18/2017    Night sweat     Osteopenia     Hip    Osteoporosis     Spine    Pneumonia     Last Assessed 10/18/2017    PONV (postoperative nausea and vomiting)     Pulmonary hypertension (HCC)     Seizures (HCC)     Only during Encephalitis    Shortness of breath     with exertion    Sinus tachycardia     Urinary incontinence      Length Of Stay: 1  PHYSICAL THERAPY EVALUATION :   Patient's identity confirmed via 2 patient identifiers (full name and ) at start of session       22 1608   PT Last Visit   PT Visit Date 22   Note Type   Note type Evaluation   Pain Assessment   Pain Assessment Tool FLACC   Pain Location/Orientation Orientation: Right;Location: Shoulder   Pain Onset/Description Onset: Ongoing  (worsens w/ deep breaths, using RUE)   Patient's Stated Pain Goal No pain   Hospital Pain Intervention(s) Repositioned; Ambulation/increased activity; Emotional support   Restrictions/Precautions   Other Precautions Fall Risk;Pain; Airborne/isolation;Droplet precautions;Contact/isolation  (Justyn, on RA;  Covid+)   Home Living   Type of Home House   Home Layout Two level;Stairs to enter with rails  (4 JINA)   Bathroom Shower/Tub Walk-in shower   Bathroom Toilet Raised   Home Equipment Walker;Quad cane  (both rollator and RW)   Additional Comments Pt reports residing in a 2 SH w/ her  and grandson  Prior Function   Level of [place] Independent with ADLs; Independent with functional mobility   Lives With Spouse; Family  (grandson)   Falls in the last 6 months 0   Vocational On disability  (Medical coding)   Comments Pt reports she took her RW with her to use on her cruise 2 weeks ago; she has been ambulating without anything but was using the RW recently for energy conservation due to SOB   General   Additional Pertinent History Pt had L TKA done here at [facility] on 10/6/2022 by Dr Radha Kenney  Pt [address] home w/ OPPT and has been going to OP until the last 2 weeks due to vacation/pt being sick   Family/Caregiver Present Yes  (, Hardeep)   Cognition   Overall Cognitive Status WFL   Arousal/Participation Alert   Orientation Level Oriented X4   Memory Within functional limits   Following Commands Follows all commands and directions without difficulty   Comments Pt very pleasant and agreeable to participate in PT evaluation   Subjective   Subjective "I just don't want to lose any of my progress because of this"   RLE Assessment   RLE Assessment WFL   Strength RLE   RLE Overall Strength 4+/5   LLE Assessment   LLE Assessment WFL   Strength LLE   LLE Overall Strength 4/5  (able to achieve TKE)   Vision-Basic Assessment   Current Vision Wears glasses all the time   Bed Mobility   Supine to Sit 6  Modified independent   Sit to Supine 6  Modified independent   Transfers   Sit to Stand 6  Modified independent   Stand to Sit 6  Modified independent   Ambulation/Elevation   Gait pattern Decreased foot clearance;Decreased L stance; Short stride  (very mild decreased L stance)   Gait Assistance 6  Modified independent   Assistive Device None  (pt declines need for RW or quad cane for ambulating in room)   Distance 100 ft  (w/ multiple turns around room)   Balance   Static Sitting Normal   Dynamic Sitting Good   Static Standing Good   Dynamic Standing Good   Ambulatory Good   Activity Tolerance   Activity Tolerance Patient limited by fatigue  (due to SOB/Covid)   Nurse Made Aware Spoke to RN [facility]   Assessment   Problem List Decreased range of motion;Decreased endurance;Decreased mobility;Pain   Assessment Deja Miles is a 59 y o  Female who presents to [address] [place] [address] on 11/5/2022 from home w/ c/o worsening SOB and R shoulder pain and diagnosis of COVID-19 w/ acute PEs  Orders for PT eval and treat received  Pt presents w/ comorbidities of recent L TKA (10/6/2022), hx of R TKA (2019), SLE, pulmonary HTN, osteoporosis  Pt has been doing well since her L TKA, even ambulating without AD  No falls, returning to PLOF  Upon evaluation, pt presents w/ the following deficits: weakness, decreased endurance and pain limiting functional mobility  Pt requires mod I for bed mobility, mod I for transfers, and mod I for gait  Pt's clinical presentation is unstable/unpredictable due to need for assist w/ all phases of mobility when usually mobilizing independently, pain impacting overall mobility status and need for input for mobility technique  At this time, pt is able to navigate around hospital environment without assistance, and report no concern w/ navigating home environment upon D/C  No further acute PT needs at this time, will D/C from PT caseload  Spoke w/ pt regarding this, she agrees there is nothing else for acute PT to work on during this admission  Encouraged using RW for community distances to assist w/ endurance/energy conservation for the COVID symptoms, encouraged to continue performing stretching/HEP to maintain ROM progress  Discharge recommendation is for Home w/ OPPT in order to reduce fall risk and maximize level of functional independence     Goals   Patient Goals "I want my left leg to be as good as my right" (pt had R TKA 2019 w/ great results)   Recommendation   PT Discharge Recommendation Home with outpatient rehabilitation   AM-PAC Basic Mobility Inpatient   Turning in Bed Without Bedrails 4   Lying on Back to Sitting on Edge of Flat Bed 4   Moving Bed to Chair 4   Standing Up From Chair 4   Walk in Room 4   Climb 3-5 Stairs 3   Basic Mobility Inpatient Raw Score 23   Basic Mobility Standardized Score 50 88   Highest Level Of Mobility   JH-HLM Goal 7: Walk 25 feet or more   JH-HLM Achieved 7: Walk 25 feet or more         The patient's AM-PAC Basic Mobility Inpatient Short Form Raw Score is 23, Standardized Score is 50  88  A standardized score greater than 38 32 (raw score of 16) suggests the patient may benefit from discharge to home which may not coincide with above PT recommendations  However please refer to therapist recommendation for discharge planning given other factors that may influence destination  Given the above findings from this evaluation, at this time this patient does not require skilled inpatient PT for the remainder of this admission  Will D/C patient from PT caseload, please reconsult if any changes or needs arise        Florestine Goodell, PT, DPT

## 2022-11-06 NOTE — ASSESSMENT & PLAN NOTE
Criteria met with tachycardia and tachypnoea  Patient was afebrile with normal oxygen and normal WBC   Likely secondary to PE, though possibly with COVID contributing   - see management for PE and COVID

## 2022-11-06 NOTE — ASSESSMENT & PLAN NOTE
Present at baseline   There is concern for right heart strain due to acute PE   - Given one dose IV furosemide here for effusions seen on imaging (11/5)    Plan  - continue home sildenafil  - follow-up echocardiography  - continue home torsemide  - patient reports home spironolactone is currently held

## 2022-11-07 ENCOUNTER — APPOINTMENT (OUTPATIENT)
Dept: PHYSICAL THERAPY | Facility: CLINIC | Age: 57
End: 2022-11-07

## 2022-11-07 ENCOUNTER — TELEPHONE (OUTPATIENT)
Dept: OBGYN CLINIC | Facility: HOSPITAL | Age: 57
End: 2022-11-07

## 2022-11-07 VITALS
OXYGEN SATURATION: 95 % | HEIGHT: 64 IN | RESPIRATION RATE: 18 BRPM | WEIGHT: 236.55 LBS | HEART RATE: 98 BPM | BODY MASS INDEX: 40.39 KG/M2 | TEMPERATURE: 99 F | DIASTOLIC BLOOD PRESSURE: 67 MMHG | SYSTOLIC BLOOD PRESSURE: 102 MMHG

## 2022-11-07 LAB
ANION GAP SERPL CALCULATED.3IONS-SCNC: 9 MMOL/L (ref 4–13)
APTT PPP: 50 SECONDS (ref 23–37)
BASOPHILS # BLD AUTO: 0.02 THOUSANDS/ÂΜL (ref 0–0.1)
BASOPHILS NFR BLD AUTO: 0 % (ref 0–1)
BUN SERPL-MCNC: 12 MG/DL (ref 5–25)
CALCIUM SERPL-MCNC: 8.9 MG/DL (ref 8.4–10.2)
CHLORIDE SERPL-SCNC: 99 MMOL/L (ref 96–108)
CO2 SERPL-SCNC: 26 MMOL/L (ref 21–32)
CREAT SERPL-MCNC: 0.77 MG/DL (ref 0.6–1.3)
EOSINOPHIL # BLD AUTO: 0.11 THOUSAND/ÂΜL (ref 0–0.61)
EOSINOPHIL NFR BLD AUTO: 1 % (ref 0–6)
ERYTHROCYTE [DISTWIDTH] IN BLOOD BY AUTOMATED COUNT: 13.4 % (ref 11.6–15.1)
GFR SERPL CREATININE-BSD FRML MDRD: 84 ML/MIN/1.73SQ M
GLUCOSE SERPL-MCNC: 110 MG/DL (ref 65–140)
HCT VFR BLD AUTO: 31.6 % (ref 34.8–46.1)
HGB BLD-MCNC: 10.6 G/DL (ref 11.5–15.4)
IMM GRANULOCYTES # BLD AUTO: 0.04 THOUSAND/UL (ref 0–0.2)
IMM GRANULOCYTES NFR BLD AUTO: 1 % (ref 0–2)
LYMPHOCYTES # BLD AUTO: 0.55 THOUSANDS/ÂΜL (ref 0.6–4.47)
LYMPHOCYTES NFR BLD AUTO: 6 % (ref 14–44)
MCH RBC QN AUTO: 30.2 PG (ref 26.8–34.3)
MCHC RBC AUTO-ENTMCNC: 33.5 G/DL (ref 31.4–37.4)
MCV RBC AUTO: 90 FL (ref 82–98)
MONOCYTES # BLD AUTO: 0.71 THOUSAND/ÂΜL (ref 0.17–1.22)
MONOCYTES NFR BLD AUTO: 8 % (ref 4–12)
NEUTROPHILS # BLD AUTO: 7.25 THOUSANDS/ÂΜL (ref 1.85–7.62)
NEUTS SEG NFR BLD AUTO: 84 % (ref 43–75)
NRBC BLD AUTO-RTO: 0 /100 WBCS
PLATELET # BLD AUTO: 213 THOUSANDS/UL (ref 149–390)
PMV BLD AUTO: 11.1 FL (ref 8.9–12.7)
POTASSIUM SERPL-SCNC: 3.7 MMOL/L (ref 3.5–5.3)
RBC # BLD AUTO: 3.51 MILLION/UL (ref 3.81–5.12)
SODIUM SERPL-SCNC: 134 MMOL/L (ref 135–147)
WBC # BLD AUTO: 8.68 THOUSAND/UL (ref 4.31–10.16)

## 2022-11-07 RX ORDER — LIDOCAINE 50 MG/G
1 PATCH TOPICAL DAILY
Qty: 7 PATCH | Refills: 0 | Status: SHIPPED | OUTPATIENT
Start: 2022-11-08 | End: 2022-11-15

## 2022-11-07 RX ORDER — OXYCODONE HYDROCHLORIDE 5 MG/1
5 TABLET ORAL EVERY 4 HOURS PRN
Qty: 25 TABLET | Refills: 0 | Status: SHIPPED | OUTPATIENT
Start: 2022-11-07

## 2022-11-07 RX ADMIN — ENOXAPARIN SODIUM 100 MG: 100 INJECTION SUBCUTANEOUS at 11:03

## 2022-11-07 RX ADMIN — CHOLECALCIFEROL TAB 25 MCG (1000 UNIT) 2000 UNITS: 25 TAB at 05:47

## 2022-11-07 RX ADMIN — ACETAMINOPHEN 975 MG: 325 TABLET ORAL at 05:46

## 2022-11-07 RX ADMIN — ASPIRIN 162 MG: 81 TABLET, COATED ORAL at 11:02

## 2022-11-07 RX ADMIN — SILDENAFIL 20 MG: 20 TABLET ORAL at 11:03

## 2022-11-07 RX ADMIN — TORSEMIDE 20 MG: 20 TABLET ORAL at 11:03

## 2022-11-07 RX ADMIN — LIDOCAINE 5% 1 PATCH: 700 PATCH TOPICAL at 11:03

## 2022-11-07 RX ADMIN — HYDROXYCHLOROQUINE SULFATE 400 MG: 200 TABLET, FILM COATED ORAL at 11:03

## 2022-11-07 RX ADMIN — ACETAMINOPHEN 975 MG: 325 TABLET ORAL at 11:14

## 2022-11-07 NOTE — DISCHARGE INSTR - AVS FIRST PAGE
Dear Yudelka Solo,     It was our pleasure to care for you here at [facility]  It is our hope that we were always able to exceed the expected standards for your care during your stay  You were hospitalized due to pulmonary embolism in setting of COVID-19 infection  You were cared for on the [facility] 2nd floor by Cory Gallegos under the service of Tin Retana MD with the [facility] who covers for your primary care physician (PCP), Shelia Ny DO, while you were hospitalized  If you have any questions or concerns related to this hospitalization, you may contact us at [phone]  For follow up as well as any medication refills, we recommend that you follow up with your primary care physician  A registered nurse will reach out to you by phone within a few days after your discharge to answer any additional questions that you may have after going home  However, at this time we provide for you here, the most important instructions / recommendations at discharge:     Notable Medication Adjustments -   Please start taking 2 tabs of 5 mg Eliquis twice in the morning and twice in the afternoon for the first 7 days then 1 tab of 5 mg Eliquis twice daily there after   Testing Required after Discharge -   None  Important follow up information -   Please follow up with PCP in 1 week  Other Instructions -   Please follow up Physical Therapy  Please review this entire after visit summary as additional general instructions including medication list, appointments, activity, diet, any pertinent wound care, and other additional recommendations from your care team that may be provided for you        Sincerely,     Cory Gallegos DO

## 2022-11-07 NOTE — TELEPHONE ENCOUNTER
Patient may return to physical therapy at any time from an orthopedic standpoint  Should check with her family doctor if they feel she should return or weight a few days based on the recent PE    Would not recommend the patient remain out of physical therapy for a prolonged period of time however

## 2022-11-07 NOTE — DISCHARGE INSTRUCTIONS
Pulmonary Embolism   WHAT YOU NEED TO KNOW:   A pulmonary embolism (PE) is the sudden blockage of a blood vessel in the lungs by an embolus  A PE can become life-threatening  Go to follow-up appointments and take blood thinners as directed  These are especially important if you were discharged home from the emergency department  DISCHARGE INSTRUCTIONS:   Call your local emergency number (911 in the [address] [facility]) if:   You feel lightheaded, short of breath, and have chest pain  You cough up blood  Return to the emergency department if:   Your arm or leg feels warm, tender, and painful  It may look swollen and red  Call your doctor or hematologist if:   You have questions or concerns about your condition or care  Medicines:   Blood thinners  help prevent blood clots  Clots can cause strokes, heart attacks, and death  The following are general safety guidelines to follow while you are taking a blood thinner:    Watch for bleeding and bruising while you take blood thinners  Watch for bleeding from your gums or nose  Watch for blood in your urine and bowel movements  Use a soft washcloth on your skin, and a soft toothbrush to brush your teeth  This can keep your skin and gums from bleeding  If you shave, use an electric shaver  Do not play contact sports  Tell your dentist and other healthcare providers that you take a blood thinner  Wear a bracelet or necklace that says you take this medicine  Do not start or stop any other medicines unless your healthcare provider tells you to  Many medicines cannot be used with blood thinners  Take your blood thinner exactly as prescribed by your healthcare provider  Do not skip does or take less than prescribed  Tell your provider right away if you forget to take your blood thinner, or if you take too much  Warfarin  is a blood thinner that you may need to take   The following are things you should be aware of if you take warfarin:     Foods and medicines can affect the amount of warfarin in your blood  Do not make major changes to your diet while you take warfarin  Warfarin works best when you eat about the same amount of vitamin K every day  Vitamin K is found in green leafy vegetables and certain other foods  Ask for more information about what to eat when you are taking warfarin  You will need to see your healthcare provider for follow-up visits when you are on warfarin  You will need regular blood tests  These tests are used to decide how much medicine you need  Take your medicine as directed  Contact your healthcare provider if you think your medicine is not helping or if you have side effects  Tell him or her if you are allergic to any medicine  Keep a list of the medicines, vitamins, and herbs you take  Include the amounts, and when and why you take them  Bring the list or the pill bottles to follow-up visits  Carry your medicine list with you in case of an emergency  Prevent another PE:   Change your body position or move around often  Move and stretch in your seat several times each hour if you travel by car or work at a desk  In an airplane, get up and walk every hour  Exercise regularly to help increase your blood flow  Walking is a good low-impact exercise  Talk to your healthcare provider about the best exercise plan for you  Maintain a healthy weight  Ask your healthcare provider what a healthy weight is for you  Ask him or her to help you create a weight loss plan, if needed  Do not smoke  Nicotine and other chemicals in cigarettes and cigars can damage blood vessels and increase your risk for another PE  Ask your healthcare provider for information if you currently smoke and need help to quit  E-cigarettes or smokeless tobacco still contain nicotine  Talk to your healthcare provider before you use these products  Ask about birth control if you are a woman who takes the pill    A birth control pill increases the risk for blood clots in certain women  The risk is higher if you are also older than 35, smoke cigarettes, or have a blood clotting disorder  Talk to your healthcare provider about other ways to prevent pregnancy, such as a cervical cap or intrauterine device (IUD)  Follow up with your doctor or hematologist as directed:  Make an appointment as soon as possible  You may also need to come in regularly for scans to check for blood clots  Your blood may checked to see how long it takes to clot  Your doctor or specialist will tell you if you need to have this test and how often to have it  Write down your questions so you remember to ask them during your visits  © Copyright [employer] 2022 Information is for End User's use only and may not be sold, redistributed or otherwise used for commercial purposes  All illustrations and images included in CareNotes® are the copyrighted property of [employer]  or Janene Griffin   The above information is an  only  It is not intended as medical advice for individual conditions or treatments  Talk to your doctor, nurse or pharmacist before following any medical regimen to see if it is safe and effective for you

## 2022-11-07 NOTE — DISCHARGE SUMMARY
[facility]  Discharge- Andra Candelaria 1965, 59 y o  female MRN: [id number]  Unit/Bed#: S -01 Encounter: [id number]  Primary Care Provider: Fidencio Short DO   Date and time admitted to hospital: 11/5/2022  2:16 PM    * Pulmonary embolism associated with COVID-19 [facility])  Assessment & Plan  Patient presented with SOB and cough  Patient recently was on cruise ship one week ago, also exposed to grandson who had positive test  Tested positive at home with home fever to 102 F  In ED patient was afebrile however found to be tachycardic and tachypnic with positive COVID test  Imaging revealed acute PE  Patient does not meet indications for ABX or steroid therapy at this time  Lab Results   Component Value Date    SARSCOV2 Positive (A) 11/05/2022       Symptoms of COVID-19 SYMPTOMS: Fever (99 0* or greater), cough, shortness of breath, Runny nose, Sore throat, Nasal congestion, Tachycardia (more than 100 beats per min) and General malaise (headache, feeling rundown, achy)  Vaccine status: up to date    Workup:    CTA ED chest PE study    Result Date: 11/5/2022  Impression: Moderate quantity of right lower lobe segmental and small quantity of right upper lobe and left lower lobe acute PE  The calculated ratio of right ventricular to left ventricular diameter (RV/LV ratio) is greater than 0 9, which is abnormal and indicates right heart strain  An abnormal RV/LV ratio has been shown to be associated with an increased risk of 30 day mortality in the setting of acute pulmonary embolism  Urgent consultation with the medical critical care team is recommended  I personally discussed this study with JOSLYN ALBERTS on 11/5/2022 at 5:07 PM  Workstation performed: [id number]       No Chest XR results available for this patient         Recent Labs     11/05/22  1526 11/05/22  1722    2*  --    DDIMER  --  5 13*   CKTOTAL 25*  --      Lab Results   Component Value Date    HSTNI0 <2 11/05/2022 HSTNI2 2 11/05/2022    HSTNID2 >0 11/05/2022       · Saturating 100% on RA     MILD Stage    Plan:  · D/C IV heparin, Start Lovenox 1 mg/kg BID  · Transition to Eliquis/Xarelto for 3-6 months with f/u with rheumatology and haematology for further anticoagulation management  · OOB TID, PT and OT eval and treat, Incentive spirometry  · Continue remdesivir Day #2 (11/6)  · Pain management for pleuritic chest pain/lower back pain  · Placed on scheduled tylenol  · Oxycodone 5 mg q4 PRN mod pain  · Oxycodone 10 mg q6 PRN severe pain  · Dilaudid 0 5 mg q4 PRN breakthrough pain   · Lidocaine patch to back  · Follow-up echocardiography  · Monitor VS for oxygen requirement  · Trend labs as needed; Inflammatory markers, daily labs: CMP and CBC  · heparin level, lactate, APTT, type and screen    SIRS (systemic inflammatory response syndrome) (HCC)  Assessment & Plan  Criteria met with tachycardia and tachypnoea  Patient was afebrile with normal oxygen and normal WBC  Likely secondary to PE, though possibly with COVID contributing   - see management for PE and COVID    SLE (systemic lupus erythematosus) ([facility] [address] )  Assessment & Plan  Patient follows with rheumatology  - continue home hydroxychloroquine  - next dose of Benlysta is 11/11  - continue home ASA for positive lupus anticoagulant    Stage 3a chronic kidney disease (CKD) [facility])  Assessment & Plan  Lab Results   Component Value Date    EGFR 84 11/07/2022    EGFR 82 11/06/2022    EGFR 78 11/05/2022    CREATININE 0 77 11/07/2022    CREATININE 0 79 11/06/2022    CREATININE 0 82 11/05/2022     Sequela of SLE  Creatinine and GFR are normal here  - furosemide 1x 11/5    Plan  - Continue home torsemide 20 mg PO qd   - patient reports not currently taking spironolactone    Pulmonary hypertension ([facility] [address] )  Assessment & Plan  Present at baseline   There is concern for right heart strain due to acute PE   - Given one dose IV furosemide here for effusions seen on imaging (11/5)    Plan  - continue home sildenafil  - follow-up echocardiography  - continue home torsemide  - patient reports home spironolactone is currently held    Discharging Resident Physician: Yuni Henderson  Attending: No att  providers found  PCP: Leanne Almaguer DO  Admission Date: 11/5/2022  Discharge Date: 11/07/22    Disposition:     Home    Reason for Admission: SOB, cough, fever in setting of COVID-19 positivity     Consultations During Hospital Stay:  · PT    Procedures Performed:     · None    Significant Findings / Test Results:     · Moderate quantity of right lower lobe segmental and small quantity of right upper lobe and left lower lobe acute PE with right heart strain  Incidental Findings:   · None    Test Results Pending at Discharge (will require follow up): · None     Outpatient Tests Requested:  · None    Complications:  None    Hospital Course:     Bridgett Luna is a 59 y o  female patient who originally presented to the hospital on 11/5/2022 due to acute onset of back pain, difficulty breathing, SOB and cough  On arrival to the ED patient was noted to be tachypneic and tachycardic but otherwise stable and afebrile  Patient testing positive for COVID  A CTA PE study would reveal small to moderate quantity of emboli in the left and right lungs with right heart strain  Patient was subsequently started on a heparin drip and later transitioned to lovenox after failure to maintain therapeutic range  Throughout admission patient was complaining of ongoing right sided lower back pain/pleuritic chest pain that was controlled for with lidocaine patch and oxycodone  Patient would remain stable without any supplemental oxygen needs during the course of her admission  Case management was consulted for an Eliquis price check and was determined with discussion with patient to be affordable  Patient was subsequently discharged on Eliquis for the next 3 months       Condition at Discharge: fair     Discharge Day Visit / Exam:     Subjective:  Patient reporting some ongoing right side back pain, 3/10 in severity (much less than yesterday "excruciating" pain)  Patient is without any SOB, tachycardia, cough, fever  Vitals: Blood Pressure: 102/67 (11/07/22 1534)  Pulse: 98 (11/07/22 1534)  Temperature: 99 °F (37 2 °C) (11/07/22 1534)  Temp Source: Oral (11/05/22 1901)  Respirations: 18 (11/07/22 1534)  Height: 5' 4" (162 6 cm) (11/06/22 0829)  Weight - Scale: 107 kg (236 lb 8 9 oz) (11/07/22 0600)  SpO2: 95 % (11/07/22 1534)  Exam:   Physical Exam  Constitutional:       General: She is not in acute distress  Appearance: She is not ill-appearing  HENT:      Head: Normocephalic and atraumatic  Right Ear: External ear normal       Left Ear: External ear normal       Nose: Nose normal    Eyes:      Extraocular Movements: Extraocular movements intact  Conjunctiva/sclera: Conjunctivae normal    Cardiovascular:      Rate and Rhythm: Normal rate and regular rhythm  Pulmonary:      Effort: No respiratory distress  Breath sounds: No wheezing, rhonchi or rales  Comments: Decreased breath sounds R > L  Abdominal:      General: Abdomen is flat  Palpations: Abdomen is soft  Musculoskeletal:      Right lower leg: No edema  Left lower leg: No edema  Skin:     General: Skin is warm and dry  Neurological:      Mental Status: She is alert and oriented to person, place, and time  Discussion with Family: with patient only; denied family update     Discharge instructions/Information to patient and family:   See after visit summary for information provided to patient and family  Provisions for Follow-Up Care:  See after visit summary for information related to follow-up care and any pertinent home health orders  Planned Readmission: No     Discharge Medications:  See after visit summary for reconciled discharge medications provided to patient and family        ** Please Note: This note has been constructed using a voice recognition system **

## 2022-11-07 NOTE — CASE MANAGEMENT
Case Management Progress Note    Patient name Saadia Devine  Location S /S -01 MRN [id number]  : 1965 Date 2022       LOS (days): 2  Geometric Mean LOS (GMLOS) (days): 5 00  Days to GMLOS:3 1        OBJECTIVE:        Current admission status: Inpatient  Preferred Pharmacy:   [facility] [address] [place], [place] [address]suze Salamanca [address] [address]  Phone: [phone] Fax: [phone]    Primary Care Provider: Sreedhar Ching DO    Primary Insurance: MEDICARE  Secondary Insurance: BLUE CROSS    PROGRESS NOTE:  Per the Pt's local pharmacy of [facility], the Pt would have the financial responsibility of $45 for Eliquis  CM made Dr Jin Doan aware

## 2022-11-07 NOTE — TELEPHONE ENCOUNTER
Caller: PATIENT    Doctor: Anna Gottlieb    Reason for call: patient called she is being discharged from the hospital today for COVID and pulmonary embolism and she needs clearance to return to PT    Call back#: [phone]

## 2022-11-08 ENCOUNTER — TRANSITIONAL CARE MANAGEMENT (OUTPATIENT)
Dept: FAMILY MEDICINE CLINIC | Facility: OTHER | Age: 57
End: 2022-11-08

## 2022-11-08 ENCOUNTER — TELEPHONE (OUTPATIENT)
Dept: FAMILY MEDICINE CLINIC | Facility: OTHER | Age: 57
End: 2022-11-08

## 2022-11-08 ENCOUNTER — PATIENT OUTREACH (OUTPATIENT)
Dept: FAMILY MEDICINE CLINIC | Facility: OTHER | Age: 57
End: 2022-11-08

## 2022-11-08 ENCOUNTER — TELEPHONE (OUTPATIENT)
Dept: CARDIOLOGY CLINIC | Facility: CLINIC | Age: 57
End: 2022-11-08

## 2022-11-08 DIAGNOSIS — U07.1 PULMONARY EMBOLISM ASSOCIATED WITH COVID-19 (HCC): Primary | ICD-10-CM

## 2022-11-08 DIAGNOSIS — R11.0 NAUSEA: Primary | ICD-10-CM

## 2022-11-08 DIAGNOSIS — M17.12 ARTHRITIS OF LEFT KNEE: ICD-10-CM

## 2022-11-08 DIAGNOSIS — I26.99 PULMONARY EMBOLISM ASSOCIATED WITH COVID-19 (HCC): Primary | ICD-10-CM

## 2022-11-08 RX ORDER — ENOXAPARIN SODIUM 100 MG/ML
107 INJECTION SUBCUTANEOUS 2 TIMES DAILY
Qty: 24 ML | Refills: 0 | Status: SHIPPED | OUTPATIENT
Start: 2022-11-08 | End: 2022-11-21

## 2022-11-08 RX ORDER — ONDANSETRON 4 MG/1
4 TABLET, FILM COATED ORAL EVERY 8 HOURS PRN
Qty: 20 TABLET | Refills: 0 | Status: SHIPPED | OUTPATIENT
Start: 2022-11-08 | End: 2023-07-17

## 2022-11-08 RX ORDER — WARFARIN SODIUM 5 MG/1
5 TABLET ORAL
Qty: 90 TABLET | Refills: 0 | Status: SHIPPED | OUTPATIENT
Start: 2022-11-08

## 2022-11-08 NOTE — PROGRESS NOTES
I called patient and made her aware that authorization was obtained for the Lidoderm patches and they will be ready to be picked up at 2:30 PM and there will be a $15 00 co-pay  She states understanding  She will call with any additional needs

## 2022-11-08 NOTE — TELEPHONE ENCOUNTER
Patient is scheduled for TCM on 11/15/2022 with Dr Savanah Zacarias please call her and go over her questions and episodes     She is aware someone will be calling her     Thank you

## 2022-11-08 NOTE — PROGRESS NOTES
Bundle Episode LTKR  11/6/22-1/5/22    ADT Alert received via IB  Patient was discharged yesterday  Patient was admitted due to SOB, COVID + and PE  This OPCM RN called patient for follow up  Patient states she feels terrible  She says she feels worse today then yesterday  She sounded terrible over the phone coughing a lot  She states she is not sleeping because of her cough, her joints hurt and she is achier than prior  She also has back pain  She states she still feels SOB but states it is not worse than prior when asked  Patient states she has a lot of back pain from the PE and was ordered Lidocaine Patches  She states that the pharmacy told her that her insurance will not cover them  I confirmed her pharmacy and told her I would look into it and try to assist  She was very appreciative  I advised her to call her doctors office if her cough is too bad and ask for a stronger cough medicine to use at night so she can get some rest  I also advised her that if she were to have increased SOB to go to the ER  She states understanding  She also expressed frustration in that she forgot her pillow at the hospital and the unit cannot locate it  Much emotional support was given  I told patient I would touch base with her by the end of the day regarding the Lidocaine Patches  She was also provided with my contact information and encouraged to call with any questions or concerns

## 2022-11-08 NOTE — PROGRESS NOTES
This OPCM RN attempted to submit for insurance auth for Lidocaine Patch via [employer] but the we sabrinate is having issues  Will try again later

## 2022-11-08 NOTE — PROGRESS NOTES
This OPCM RN called [facility] in [address] [place] [address] and spoke with Jhon Conroy in [employer]  She confirmed that patient needs insurance prior authorization for her Lidocaine Patch  She provided me with the contact number for authorization at [facility] [phone]  I called and spoke with Benjamin Nails who transferred me to the prior [address] [place] [address] department  I then spoke with Twilla Homans who states that they cannot provide authorization it would need to be obtained through the patients insurance plan  I was then connected to April at [address] [place] [address]  She states that this [facility] RN must submit request through their web site; [employer]  com

## 2022-11-08 NOTE — TELEPHONE ENCOUNTER
Pt states can not take Eliquis per Dr Courtney Che at [facility] because of having Lupus    Pt asking to start warfarin therapy     Pt last INR was 0 99  On 11/05/2022   Pt last dose of eliquis was 7:30am     Please advise     Thank you       Jessi Carbajal

## 2022-11-08 NOTE — TELEPHONE ENCOUNTER
Please d/c Eliquis immediately -- this change has already been made to her med rec  I have sent Lovenox and Coumadin to her pharmacy ([facility])  She will need to take the lovenox (1mg/kg) twice daily until directed to stop -- this will begin tomorrow 11/9  Also on 11/9, pt will begin coumadin 5 mg daily  We need to use both until the coumadin creates a therapeutic INR (goal 2-3) -- this prevents further clot formation  I have asked the lab to run an INR off of yesterday's specimen (recent baseline)  She has a standing order for INR lab draws in the system and is recommended to go first thing Friday AM for this test   Friday PM we will review her results and make any necessary adjustments  Will likely also need to check labs again on Monday 11/14 (so she knows for planning purposes)  Please call us with any questions or concerns  Keep 11/15 TCM appt  Thanks!   Sreedhar Ching, DO

## 2022-11-08 NOTE — PROGRESS NOTES
Received e-mail from: Cover my meds and Lidoderm patches were approved by [employer] under Key: [id number]  I called [facility] pharmacy and spoke with Viktoria Lanza  He states he received the authorization and he is getting it ready now  He states they will be ready for  at 2:30 PM  Co-pay will be $15 00

## 2022-11-08 NOTE — PROGRESS NOTES
Insurance Pre Authorization initiated at [employer]  com under Key: [id number]  OPCM RN will follow for determination and fax

## 2022-11-08 NOTE — TELEPHONE ENCOUNTER
P/C was in the hospital over the weekend, would like to discuss with you  Please call at [phone]    Tried to get more information but would like to speak to you only

## 2022-11-09 ENCOUNTER — OFFICE VISIT (OUTPATIENT)
Dept: PHYSICAL THERAPY | Facility: CLINIC | Age: 57
End: 2022-11-09

## 2022-11-09 DIAGNOSIS — Z51.89 AFTERCARE: ICD-10-CM

## 2022-11-09 DIAGNOSIS — G89.29 CHRONIC PAIN OF LEFT KNEE: Primary | ICD-10-CM

## 2022-11-09 DIAGNOSIS — M25.562 CHRONIC PAIN OF LEFT KNEE: Primary | ICD-10-CM

## 2022-11-09 NOTE — PROGRESS NOTES
PT Evaluation     Today's date: 2022  Patient name: Richard Thomason  : 1965  MRN: [id number]  Referring provider: Michael Garza DO  Dx:   Encounter Diagnosis     ICD-10-CM    1  Chronic pain of left knee  M25 562     G89 29    2  Aftercare  Z51 89                   Assessment  Assessment details: Pt is progressing very well at this time, especially considering everything they have experienced over the last 2 weeks  They have demonstrated improvement in pain levels, strength, range, flexibility, tolerance to activity as well as gait quality and speed  They are still a mild fall risk per objective data  They have achieved all STGs sought out for them as well as by them  They will benefit continued Skilled PT intervention in order to achieve all LTGs sought out for them as well as by them in order to perform all desired activities with minimal to nil symptom exacerbation  Thank you very much for this kind, familiar and motivated referral         Impairments: abnormal gait, abnormal or restricted ROM, impaired balance, impaired physical strength, lacks appropriate home exercise program, pain with function, poor posture  and poor body mechanics  Understanding of Dx/Px/POC: good   Prognosis: good    Goals  STG 4 Weeks:  Decrease pain at worst to 5 -met  Improve range to achieve TKE, and achieve 105 flexion -met  Improve strength to improve L hip to SLR AROM 3+/5, Knee ext 5/5  -met  Independent with HEP -met  LTG 8 Weeks:  Decrease pain at worst to 2  Improve range to achieve 120 flexion  Improve strength to L hip 4/5 or greater  Able to perform all desired activities with minimal to nil symptom exacerbation      Plan  Plan details: Sadie Cantu is      Patient would benefit from: skilled physical therapy  Planned modality interventions: cryotherapy, TENS and thermotherapy: hydrocollator packs  Planned therapy interventions: joint mobilization, manual therapy, abdominal trunk stabilization, activity modification, neuromuscular re-education, patient education, postural training, strengthening, stretching, therapeutic activities, therapeutic exercise, therapeutic training, transfer training, home exercise program, graded motor, graded exercise, graded activity, gait training, functional ROM exercises, flexibility, balance, balance/weight bearing training, behavior modification and body mechanics training  Frequency: 2x week  Duration in weeks: 10  Treatment plan discussed with: patient and family        Subjective Evaluation    History of Present Illness  Date of onset: 10/10/2022  Mechanism of injury: Pt presents today for the first time since 10/20/22 since going on a cruise, [place] and developed PE's  She was admitted to Hospital and was [address]  She was put on prophylaxis and cleared to Return to PT via ortho and family medicine  She presents today stating that as a whole she is feeling really well  She has been using SPC for mobility, performing HEP as much as she can and desires goals to be reduction of pain, for improved sleeping ability, better endurance, range, strength and generalized activity  She returns to Dr Juan M Howell next week on 22  She is very content with her current presentation     Quality of life: good    Pain  Current pain ratin  At best pain ratin  At worst pain ratin  Quality: dull ache, tight and throbbing  Relieving factors: ice, medications, rest and change in position  Aggravating factors: standing, walking, stair climbing and lifting  Progression: improved    Social Support  Steps to enter house: yes  Stairs in house: yes   Lives in: multiple-level home  Lives with: spouse      Diagnostic Tests  X-ray: abnormal  Treatments  Previous treatment: injection treatment and medication  Patient Goals  Patient goals for therapy: decreased pain, improved balance, increased motion, increased strength and return to sport/leisure activities          Objective Active Range of Motion   Left Knee   Flexion: 118 (126) degrees with pain  Extension: 0 (0) degrees with pain    Right Knee   Flexion: 125 degrees   Extension: 0 degrees     Additional Active Range of Motion Details  Forward head, rounded shoulders, Lordosis  Genu valgum R > L, slow, decreased step heigh antalgia with L    B/L Sensation intact to L3,4,5,S1,S2  Hip strength  Quad Set Good R, Fair (-) L    L Flex 4- Ext 4 Abd 4- Add 5 SLR AAROM  10 before TKE lag of < 5 occurs  R Flex 5 Ext 5 Abd 5 Add 5 SLR AROM 20x  LE Screen  Knee strength  L 3 flex and ext // 5/5  R 5/5   Ankle    L strong and painless B  R strong and painless B   Joint Mobility  Palpation  Sts  Incision Clean dry and intact all except, small distal scab, small suture top 1/3rd of incision  No signs of infection  TUG with Rollator: 34 87"  // 10 41" without AD, mild antalgia with L IC, decreased stance phase  Elevations reviewed and discussed  Precautions:  HTN, Falls Osteoporosis, CHF,     Per Physician visit 10/13/22: Focus on extension avoid deep flexion until given clearance (MD APT 10/20 allowed to flex)   Slow progression and return to full activity secondary to his PE  Daily Treatment Diary  Manuals 10/10 10/13 10/17 10/20 11/9    Knee L PROM / SLR AAROM 10 min + education 10 min TKE focus 10 min TKE focus 10 min  10 min    RE performed done                          Ther Ex         Bike rock and roll  6 min 6 min  6 min  6 min    Gastroc slant bd  20" x 4 20"x4 20"x4 20" x 4    Quad set/GS 3" x 10 3" x 10 3"x10 3"x10 3" x 10    Heel slides AROM or Towel reviewed 3" x 10 AROM today  AROM today 3"x10 AROM 3" x10 AROM 3" x 10    NBOS EC  nv 1 min 1 min nv? NBOS EO Foam  nv 1 min 1 min nv? Hip Abd + Ext     nv? HR/TR     nv?     Ankle pump/circulation education done review review      Neuro Re-ed         Quad set 10x5" 10 x 5" As above      saq         laq 10x2 10 x 2 2x10  2x10 nv                      Ther Activity         stairs reviewed and educated    nv?    minisquats   nv?  2x10 nv    Sit<>Stand     nv                               Modalities         CP in elevation to L knee  10 min 10 min 10 min  dec

## 2022-11-10 ENCOUNTER — TELEPHONE (OUTPATIENT)
Dept: FAMILY MEDICINE CLINIC | Facility: OTHER | Age: 57
End: 2022-11-10

## 2022-11-10 NOTE — TELEPHONE ENCOUNTER
Warfarin should not cause joint pain How Severe Are They?: moderate Is This A New Presentation, Or A Follow-Up?: Skin Lesion Additional History: Patient reports that she has developed an indent and rough spot on her lip secondary to filler injections that were given by Ke Chou PA-C in May of 2016. She notes that it bothers her and wants to discuss treatment options.

## 2022-11-11 ENCOUNTER — APPOINTMENT (OUTPATIENT)
Dept: LAB | Facility: CLINIC | Age: 57
End: 2022-11-11

## 2022-11-11 ENCOUNTER — TELEPHONE (OUTPATIENT)
Dept: FAMILY MEDICINE CLINIC | Facility: OTHER | Age: 57
End: 2022-11-11

## 2022-11-11 DIAGNOSIS — E53.8 B12 DEFICIENCY: ICD-10-CM

## 2022-11-11 DIAGNOSIS — D50.9 IRON DEFICIENCY ANEMIA, UNSPECIFIED IRON DEFICIENCY ANEMIA TYPE: ICD-10-CM

## 2022-11-11 LAB
BASOPHILS # BLD AUTO: 0.03 THOUSANDS/ÂΜL (ref 0–0.1)
BASOPHILS NFR BLD AUTO: 1 % (ref 0–1)
EOSINOPHIL # BLD AUTO: 0.27 THOUSAND/ÂΜL (ref 0–0.61)
EOSINOPHIL NFR BLD AUTO: 6 % (ref 0–6)
ERYTHROCYTE [DISTWIDTH] IN BLOOD BY AUTOMATED COUNT: 13.1 % (ref 11.6–15.1)
FERRITIN SERPL-MCNC: 394 NG/ML (ref 8–388)
HCT VFR BLD AUTO: 33.8 % (ref 34.8–46.1)
HGB BLD-MCNC: 11.1 G/DL (ref 11.5–15.4)
IMM GRANULOCYTES # BLD AUTO: 0.03 THOUSAND/UL (ref 0–0.2)
IMM GRANULOCYTES NFR BLD AUTO: 1 % (ref 0–2)
INR PPP: 1.93 (ref 0.84–1.19)
IRON SATN MFR SERPL: 13 % (ref 15–50)
IRON SERPL-MCNC: 27 UG/DL (ref 50–170)
LYMPHOCYTES # BLD AUTO: 0.96 THOUSANDS/ÂΜL (ref 0.6–4.47)
LYMPHOCYTES NFR BLD AUTO: 23 % (ref 14–44)
MCH RBC QN AUTO: 29.4 PG (ref 26.8–34.3)
MCHC RBC AUTO-ENTMCNC: 32.8 G/DL (ref 31.4–37.4)
MCV RBC AUTO: 90 FL (ref 82–98)
MONOCYTES # BLD AUTO: 0.45 THOUSAND/ÂΜL (ref 0.17–1.22)
MONOCYTES NFR BLD AUTO: 11 % (ref 4–12)
NEUTROPHILS # BLD AUTO: 2.48 THOUSANDS/ÂΜL (ref 1.85–7.62)
NEUTS SEG NFR BLD AUTO: 58 % (ref 43–75)
NRBC BLD AUTO-RTO: 0 /100 WBCS
PLATELET # BLD AUTO: 380 THOUSANDS/UL (ref 149–390)
PMV BLD AUTO: 10.7 FL (ref 8.9–12.7)
PROTHROMBIN TIME: 22.3 SECONDS (ref 11.6–14.5)
RBC # BLD AUTO: 3.77 MILLION/UL (ref 3.81–5.12)
TIBC SERPL-MCNC: 207 UG/DL (ref 250–450)
VIT B12 SERPL-MCNC: 1114 PG/ML (ref 100–900)
WBC # BLD AUTO: 4.22 THOUSAND/UL (ref 4.31–10.16)

## 2022-11-11 NOTE — TELEPHONE ENCOUNTER
After speaking with the patient she asked if there is anything she can put on her belly? She said it's very dry, cracked and sore from the injections  Please advise    Thank you!

## 2022-11-11 NOTE — RESULT ENCOUNTER NOTE
INR 1 93  INR goal 2-3  Reason for anticoagulation: PE 2/2 COVID dx 11/5/22  Current dose: Lovenox 1mg/kg BID + coumadin 5 mg daily  New dose: same  Recheck: Monday 11/14    Thanks!   Mohan Lebron DO

## 2022-11-11 NOTE — TELEPHONE ENCOUNTER
The patient left a message stating she completed her lab work this morning and she would like someone to contact her with the results    Thank you!

## 2022-11-12 RX ORDER — SYRINGE WITH NEEDLE, 1 ML 25GX5/8"
SYRINGE, EMPTY DISPOSABLE MISCELLANEOUS
COMMUNITY
Start: 2022-11-02

## 2022-11-14 ENCOUNTER — APPOINTMENT (OUTPATIENT)
Dept: LAB | Facility: CLINIC | Age: 57
End: 2022-11-14

## 2022-11-14 ENCOUNTER — ANTICOAG VISIT (OUTPATIENT)
Dept: FAMILY MEDICINE CLINIC | Facility: OTHER | Age: 57
End: 2022-11-14

## 2022-11-14 ENCOUNTER — OFFICE VISIT (OUTPATIENT)
Dept: PHYSICAL THERAPY | Facility: CLINIC | Age: 57
End: 2022-11-14

## 2022-11-14 DIAGNOSIS — G89.29 CHRONIC PAIN OF LEFT KNEE: Primary | ICD-10-CM

## 2022-11-14 DIAGNOSIS — M25.562 CHRONIC PAIN OF LEFT KNEE: Primary | ICD-10-CM

## 2022-11-14 DIAGNOSIS — Z51.89 AFTERCARE: ICD-10-CM

## 2022-11-14 NOTE — PROGRESS NOTES
Daily Note     Today's date: 2022  Patient name: Carlitos Sims  : 1965  MRN: [id number]  Referring provider: Chucho Sousa DO  Dx:   Encounter Diagnosis     ICD-10-CM    1  Chronic pain of left knee  M25 562     G89 29    2  Aftercare  Z51 89        Start Time:   Stop Time:   Total time in clinic (min): 40 minutes    Subjective: Pt reports that she is very tired but does not want to loose her ROM  Objective: See treatment diary below      Assessment: Tolerated treatment well  Maintaining 0-125 ROM  No progressions with strengthening due to fatigue  Patient demonstrated fatigue post treatment      Plan: Continue per plan of care  Precautions:  HTN, Falls Osteoporosis, CHF,     Per Physician visit 10/13/22: Focus on extension avoid deep flexion until given clearance (MD APT 10/20 allowed to flex)   Slow progression and return to full activity secondary to his PE  Daily Treatment Diary  Manuals 10/10 10/13 10/17 10/20 11/9 11/14   Knee L PROM / SLR AAROM 10 min + education 10 min TKE focus 10 min TKE focus 10 min  10 min 10 min   RE performed done                          Ther Ex         Bike rock and roll  6 min 6 min  6 min  6 min 6 min    Gastroc slant bd  20" x 4 20"x4 20"x4 20" x 4 20"x4   Quad set/GS 3" x 10 3" x 10 3"x10 3"x10 3" x 10    Heel slides AROM or Towel reviewed 3" x 10 AROM today  AROM today 3"x10 AROM 3" x10 AROM 3" x 10    NBOS EC  nv 1 min 1 min nv? NBOS EO Foam  nv 1 min 1 min nv? Hip Abd + Ext     nv? HR/TR     nv? Ankle pump/circulation education done review review      Neuro Re-ed         Quad set 10x5" 10 x 5" As above      saq         laq 10x2 10 x 2 2x10  2x10 nv                      Ther Activity         stairs reviewed and educated    nv?    minisquats   nv?  2x10 nv 2x10    Sit<>Stand     nv                               Modalities         CP in elevation to L knee  10 min 10 min 10 min  dec

## 2022-11-15 ENCOUNTER — OFFICE VISIT (OUTPATIENT)
Dept: FAMILY MEDICINE CLINIC | Facility: OTHER | Age: 57
End: 2022-11-15

## 2022-11-15 VITALS
HEART RATE: 104 BPM | HEIGHT: 64 IN | RESPIRATION RATE: 16 BRPM | TEMPERATURE: 97.6 F | BODY MASS INDEX: 37.97 KG/M2 | WEIGHT: 222.4 LBS | SYSTOLIC BLOOD PRESSURE: 104 MMHG | DIASTOLIC BLOOD PRESSURE: 78 MMHG | OXYGEN SATURATION: 98 %

## 2022-11-15 DIAGNOSIS — R11.0 NAUSEA: ICD-10-CM

## 2022-11-15 DIAGNOSIS — I27.20 PULMONARY HYPERTENSION (HCC): ICD-10-CM

## 2022-11-15 DIAGNOSIS — I26.99 PULMONARY EMBOLISM ASSOCIATED WITH COVID-19 (HCC): Primary | ICD-10-CM

## 2022-11-15 DIAGNOSIS — U07.1 PULMONARY EMBOLISM ASSOCIATED WITH COVID-19 (HCC): Primary | ICD-10-CM

## 2022-11-15 PROBLEM — M17.12 ARTHRITIS OF LEFT KNEE: Status: ACTIVE | Noted: 2018-06-04

## 2022-11-15 NOTE — ASSESSMENT & PLAN NOTE
Recommend patient continue torsemide and spironolactone as prescribed as well as following up with Cardiology as scheduled

## 2022-11-15 NOTE — ASSESSMENT & PLAN NOTE
INR at goal yesterday  Repeat level ordered for tomorrow  If this remains therapeutic, patient can stop Lovenox and continue warfarin alone      - For now continue lovenox 1 mg/kg b i d  and warfarin 5mg daily  - f/u with hematology, Cardiology, and Rheumatology as scheduled

## 2022-11-15 NOTE — PATIENT INSTRUCTIONS
Pulmonary Embolism   AMBULATORY CARE:   A pulmonary embolism (PE)  is the sudden blockage of a blood vessel in the lungs by an embolus  An embolus is a small piece of blood clot, fat, air, or tumor cells  The embolus cuts off the blood supply to your lungs  A PE can become life-threatening  Common signs and symptoms of a PE:   Sudden shortness of breath or fast breathing    Sudden chest pain that is worse when you take a deep breath    A fast or irregular heartbeat    Fever    Coughing up blood, or coughing that does not go away    Sweating at rest    Bluish nails    Cold, pale, clammy skin    Fainting    Call your local emergency number (911 in the [facility]) if:   You feel lightheaded, short of breath, and have chest pain  You cough up blood  Seek care immediately if:   Your arm or leg feels warm, tender, and painful  It may look swollen and red  Call your doctor or hematologist if:   You have questions or concerns about your condition or care  Treatment  depends on what the embolus is made of and where it is in your lung  Treatment may include any of the following:  Clot busters  are emergency medicines that work to dissolve blood clots  Blood thinners  help prevent blood clots  Clots can cause strokes, heart attacks, and death  The following are general safety guidelines to follow while you are taking a blood thinner:    Watch for bleeding and bruising while you take blood thinners  Watch for bleeding from your gums or nose  Watch for blood in your urine and bowel movements  Use a soft washcloth on your skin, and a soft toothbrush to brush your teeth  This can keep your skin and gums from bleeding  If you shave, use an electric shaver  Do not play contact sports  Tell your dentist and other healthcare providers that you take a blood thinner  Wear a bracelet or necklace that says you take this medicine       Do not start or stop any other medicines unless your healthcare provider tells you to  Many medicines cannot be used with blood thinners  Take your blood thinner exactly as prescribed by your healthcare provider  Do not skip does or take less than prescribed  Tell your provider right away if you forget to take your blood thinner, or if you take too much  Warfarin  is a blood thinner that you may need to take  The following are things you should be aware of if you take warfarin:     Foods and medicines can affect the amount of warfarin in your blood  Do not make major changes to your diet while you take warfarin  Warfarin works best when you eat about the same amount of vitamin K every day  Vitamin K is found in green leafy vegetables and certain other foods  Ask for more information about what to eat when you are taking warfarin  You will need to see your healthcare provider for follow-up visits when you are on warfarin  You will need regular blood tests  These tests are used to decide how much medicine you need  A vena cava filter  may be placed inside your vena cava to prevent another PE  The vena cava is a large vein that brings blood from your lower body up to your heart  The filter may help trap an embolus and prevent it from going into your lungs  Surgery  called a thrombectomy may be done to remove the PE  A procedure called thrombolysis may instead be done to inject a clot buster that helps dissolve or break the clot apart  Prevent another PE:   Change your body position or move around often  Move and stretch in your seat several times each hour if you travel by car or work at a desk  In an airplane, get up and walk every hour  Exercise regularly to help increase your blood flow  Walking is a good low-impact exercise  Talk to your healthcare provider about the best exercise plan for you  Maintain a healthy weight  Ask your healthcare provider what a healthy weight is for you  Ask him or her to help you create a weight loss plan, if needed  Do not smoke  Nicotine and other chemicals in cigarettes and cigars can damage blood vessels and increase your risk for another PE  Ask your healthcare provider for information if you currently smoke and need help to quit  E-cigarettes or smokeless tobacco still contain nicotine  Talk to your healthcare provider before you use these products  Ask about birth control if you are a woman who takes the pill  A birth control pill increases the risk for blood clots in certain women  The risk is higher if you are also older than 35, smoke cigarettes, or have a blood clotting disorder  Talk to your healthcare provider about other ways to prevent pregnancy, such as a cervical cap or intrauterine device (IUD)  Follow up with your doctor or hematologist as directed:  Make an appointment as soon as possible  You may also need to come in regularly for scans to check for blood clots  Your blood may checked to see how long it takes to clot  Your doctor or specialist will tell you if you need to have this test and how often to have it  Write down your questions so you remember to ask them during your visits  © Copyright [employer] 2022 Information is for End User's use only and may not be sold, redistributed or otherwise used for commercial purposes  All illustrations and images included in CareNotes® are the copyrighted property of [employer]  or Janene Griffin   The above information is an  only  It is not intended as medical advice for individual conditions or treatments  Talk to your doctor, nurse or pharmacist before following any medical regimen to see if it is safe and effective for you

## 2022-11-15 NOTE — PROGRESS NOTES
Assessment & Plan     1  Pulmonary embolism associated with COVID-19 [facility])  Assessment & Plan:  INR at goal yesterday  Repeat level ordered for tomorrow  If this remains therapeutic, patient can stop Lovenox and continue warfarin alone  - For now continue lovenox 1 mg/kg b i d  and warfarin 5mg daily  - f/u with hematology, Cardiology, and Rheumatology as scheduled      2  Pulmonary hypertension ([address] )  Assessment & Plan:  Recommend patient continue torsemide and spironolactone as prescribed as well as following up with Cardiology as scheduled  3  Nausea    -continue Zofran as needed  Readdress nausea/appetite/weight loss after Lovenox is discontinued and other medications are stable  Subjective     Transitional Care Management Review:   Bg Stern is a 59 y o  female here for TCM follow up  During the TCM phone call patient stated:  TCM Call     Date and time call was made  11/8/2022 12:30 PM    Hospital care reviewed  Records reviewed    Patient was hospitialized at  [address] [place] [address]    Date of Admission  11/05/22    Date of discharge  11/07/22    Diagnosis  Pulmonary embolism associated with COVID-19    Disposition  Home    Were the patients medications reviewed and updated  Yes    Current Symptoms  Cough; Shortness of breath; Fatigue    Cough Severity  Moderate    Shortness of breath severity  Moderate    Fatigue severity  Severe      TCM Call     Post hospital issues  None; Reduced activity; Poor ADL (Activities of Daily Living) performance    Should patient be enrolled in anticoag monitoring? Yes    Scheduled for follow up?   Yes    Patient refusal reason  following Dr Jay Jay Burroughs (orthopedist)    Patients specialists  Other (comment)    Other specialists names  Dr Jay aJy Burroughs (orthopedist)    Did you obtain your prescribed medications  Yes    Do you need help managing your prescriptions or medications  No    Is transportation to your appointment needed  No    I have advised the patient to call PCP with any new or worsening symptoms  [email or web address]    Living Arrangements  Family members    Support System  Family    The type of support provided  Physical; Emotional    Do you have social support  Yes, as much as I need    Are you recieving any outpatient services  No    What type of services  physical therapy (outpatient)    Are you recieving home care services  No    Are you using any community resources  No    Current waiver services  No    Have you fallen in the last 12 months  No    Interperter language line needed  No    Counseling  Patient    Counseling topics  Home health agency benefits; Risk factor reduction; Importance of RX compliance; Activities of daily living        Patient presents for follow-up after hospitalization from 11/5-11/7 due to submassive PE in the setting of COVID-19 infection  She was started on Eliquis but subsequently changed to warfarin with Lovenox bridge because she is lupus anticoagulant positive  She reports feeling drained and exhausted due to poor sleep  She endorses left knee pain since total knee replacement on 10/06 as well as some right-sided back pain which has improved since discharge  She has noticed bleeding from lovenox injection sites on her abdomen  She says her cough has improved  She endorses some residual SOB, but this is improved  She also notes sharp pain in her left lower leg  Patient stopped taking spironolactone and torsemide b/c she reports its contributing to nausea  She denies fevers/chills, vomiting, abd pain, chest pain, headaches, or dizziness  The patient has close follow-up scheduled with Hematology as well as Cardiology  She sees her rheumatologist next in January  Review of Systems   Constitutional: Positive for appetite change (Secondary to nausea), fatigue and unexpected weight change (Weight loss since hospitalization)  Negative for chills and fever  HENT: Negative for ear pain and hearing loss      Eyes: Negative for photophobia and pain  Respiratory: Positive for shortness of breath  Negative for cough and chest tightness  Cardiovascular: Positive for leg swelling  Negative for chest pain and palpitations  Gastrointestinal: Positive for nausea  Negative for blood in stool, constipation, diarrhea and vomiting  Genitourinary: Negative for difficulty urinating and dysuria  Musculoskeletal: Positive for arthralgias (Left knee)  Neurological: Negative for dizziness, syncope, light-headedness, numbness and headaches  Hematological: Bruises/bleeds easily (At Lovenox injection sites)  Psychiatric/Behavioral: Positive for sleep disturbance  Objective     /78   Pulse 104   Temp 97 6 °F (36 4 °C)   Resp 16   Ht 5' 4" (1 626 m)   Wt 101 kg (222 lb 6 4 oz)   SpO2 98%   BMI 38 17 kg/m²      Physical Exam  Constitutional:       Appearance: Normal appearance  She is obese  She is not diaphoretic  HENT:      Head: Normocephalic and atraumatic  Right Ear: External ear normal       Left Ear: External ear normal       Nose: Nose normal       Mouth/Throat:      Mouth: Mucous membranes are moist       Pharynx: Oropharynx is clear  Eyes:      Extraocular Movements: Extraocular movements intact  Conjunctiva/sclera: Conjunctivae normal       Pupils: Pupils are equal, round, and reactive to light  Cardiovascular:      Rate and Rhythm: Normal rate and regular rhythm  Pulses: Normal pulses  Heart sounds: Normal heart sounds  No murmur heard  No friction rub  No gallop  Pulmonary:      Effort: Pulmonary effort is normal  No respiratory distress  Breath sounds: Normal breath sounds  No stridor  No wheezing, rhonchi or rales  Abdominal:      General: Bowel sounds are normal  There is no distension  Palpations: Abdomen is soft  There is no mass  Tenderness: There is no abdominal tenderness  There is no right CVA tenderness, left CVA tenderness or guarding     Musculoskeletal: General: Tenderness (Left lower leg and right back) present  Normal range of motion  Cervical back: Normal range of motion and neck supple  Right lower leg: Edema (Nonpitting) present  Left lower leg: Edema (Nonpitting) present  Lymphadenopathy:      Cervical: No cervical adenopathy  Skin:     General: Skin is warm and dry  Findings: Bruising (Abdomen) present  No rash  Neurological:      General: No focal deficit present  Mental Status: She is alert and oriented to person, place, and time     Psychiatric:         Mood and Affect: Mood normal          Behavior: Behavior normal        Ailyn Ramirez MD

## 2022-11-16 ENCOUNTER — APPOINTMENT (OUTPATIENT)
Dept: LAB | Facility: CLINIC | Age: 57
End: 2022-11-16

## 2022-11-16 ENCOUNTER — APPOINTMENT (OUTPATIENT)
Dept: PHYSICAL THERAPY | Facility: CLINIC | Age: 57
End: 2022-11-16

## 2022-11-17 ENCOUNTER — APPOINTMENT (OUTPATIENT)
Dept: RADIOLOGY | Facility: AMBULARY SURGERY CENTER | Age: 57
End: 2022-11-17
Attending: ORTHOPAEDIC SURGERY

## 2022-11-17 ENCOUNTER — PATIENT MESSAGE (OUTPATIENT)
Dept: FAMILY MEDICINE CLINIC | Facility: OTHER | Age: 57
End: 2022-11-17

## 2022-11-17 ENCOUNTER — OFFICE VISIT (OUTPATIENT)
Dept: OBGYN CLINIC | Facility: CLINIC | Age: 57
End: 2022-11-17

## 2022-11-17 ENCOUNTER — OFFICE VISIT (OUTPATIENT)
Dept: PHYSICAL THERAPY | Facility: CLINIC | Age: 57
End: 2022-11-17

## 2022-11-17 VITALS — HEIGHT: 64 IN | BODY MASS INDEX: 37.9 KG/M2 | WEIGHT: 222 LBS

## 2022-11-17 DIAGNOSIS — Z51.89 AFTERCARE: Primary | ICD-10-CM

## 2022-11-17 DIAGNOSIS — Z96.652 STATUS POST TOTAL KNEE REPLACEMENT USING CEMENT, LEFT: Primary | ICD-10-CM

## 2022-11-17 DIAGNOSIS — M79.2 NEUROPATHIC PAIN, LEG, LEFT: ICD-10-CM

## 2022-11-17 DIAGNOSIS — Z96.652 STATUS POST TOTAL KNEE REPLACEMENT USING CEMENT, LEFT: ICD-10-CM

## 2022-11-17 RX ORDER — GABAPENTIN 100 MG/1
100 CAPSULE ORAL
Qty: 90 CAPSULE | Refills: 0 | Status: SHIPPED | OUTPATIENT
Start: 2022-11-17

## 2022-11-17 NOTE — PROGRESS NOTES
Daily Note     Today's date: 2022  Patient name: Stephan Hdez  : 1965  MRN: [id number]  Referring provider: Andressa Ro DO  Dx:   Encounter Diagnosis     ICD-10-CM    1  Aftercare  Z51 89                      Subjective: Pt presents today stating she is feeling well  Mild soreness when SL in bed on the inside of the knee, enhanced when sleeping  Otherwise progressing and feeling well  Objective: See treatment diary below      Assessment: Elevations able to alternate with descent however it is evident that power is not quite present due concentric challenges  Will continue to progress and encourage strength to assist with this  Discussed assistance with sleeping positions to decrease irritation on medial aspect of knee  Plan: Continue per plan of care  Precautions:  HTN, Falls Osteoporosis, CHF,     Per Physician visit 10/13/22: Focus on extension avoid deep flexion until given clearance (MD APT 10/20 allowed to flex)   Slow progression and return to full activity secondary to his PE  Daily Treatment Diary  Manuals 11/17 10/13 10/17 10/20 11/9 11/14   Knee L PROM / SLR AAROM 10 min  10 min TKE focus 10 min TKE focus 10 min  10 min 10 min   RE performed                           Ther Ex         Bike rock and roll 6 min 6 min 6 min  6 min  6 min 6 min    Gastroc slant bd 20" x 4 20" x 4 20"x4 20"x4 20" x 4 20"x4   Quad set/GS  3" x 10 3"x10 3"x10 3" x 10    Heel slides  AROM today  AROM today 3"x10 AROM 3" x10 AROM 3" x 10    NBOS EC 1 min nv 1 min 1 min nv? NBOS EO Foam 1 min nv 1 min 1 min nv? Hip Abd + Ext 2 x 10    nv? HR/TR 2 x 10    nv?     Ankle pump/circulation education  review review      Neuro Re-ed           10 x 5" As above               LAQ 2 x 10 2# 10 x 2 2x10  2x10 nv    SLR Flex 2 x 10                 Ther Activity         stairs FF    nv?    minisquats 2 x 10  nv? 2x10 nv 2x10    Sit<>Stand 2 x 10    nv                               Modalities CP in elevation to L knee  10 min 10 min 10 min  dec

## 2022-11-17 NOTE — PROGRESS NOTES
Assessment:   Status Post  Arthroplasty Knee Total - Left on 10/6/2022     Plan:   Weight bearing as tolerated LLE  PT - continue PT, instructed to begin patellar tendon grasten technique  Analgesics PRN - added gabapentin for neuropathic pain 100mg qhs can titrate this up as needed by 100mg at a time  Thigh high nick stockings again recommended  Recommend she talk to PCP regarding possible trazodone prescription for help with sleep  Also complaining of nausea today recommend she talk to GI or PCP if this persists  Patient is currently on Coumadin for pulmonary embolism    Follow Up:  6  week(s)    To Do Next Visit:    and X-rays of the  left  knee      CHIEF COMPLAINT:  Chief Complaint   Patient presents with   • Left Knee - Post-op         SUBJECTIVE:  Rajinder Treviño is a 59y o  year old female who presents for follow up after Arthroplasty Knee Total - Left  Today patient has moderate pain in the left knee  Pain wakes her up at night and keeps her from sleeping  She reports she was taking Robaxin but this caused frequent urination  She was recently hospitalized with pulmonary embolism and also diagnosed with COVID 19  Currently she is ambulating without any assistive devices  Not on any supplemental oxygen  She is currently on Coumadin for the pulmonary embolism        PHYSICAL EXAMINATION:    MUSCULOSKELETAL EXAMINATION:  Left knee    General: Alert and oriented x 3, WNWD, NAD  Incision: Well healed there is a small strand of spitting Vicryl suture from the midpoint of the incision this was removed sterilely there is no galindo-incisional erythema or drainage  Normal postoperative swelling as expected, swelling and slight discoloration lower leg  Joint effusion:  None  Range of Motion: 0 degrees extension, 120 degrees flexion  Neurovascular status: Sensation intact  to light touch L3-S1   Motor intact L3-S1 including 5/5 ankle DF/PF  DP pulse intact        STUDIES REVIEWED:  I have personally reviewed pertinent films in PACS and my interpretation is XR of L knee from today shows intact knee implants no evidence of complication or fracture        PROCEDURES PERFORMED:  Procedures  No Procedures performed today     Scribe Attestation    I,:  Nagi Monk PA-C am acting as a scribe while in the presence of the attending physician :       I,:  Bravo Wallace DO personally performed the services described in this documentation    as scribed in my presence :

## 2022-11-18 ENCOUNTER — TELEMEDICINE (OUTPATIENT)
Dept: FAMILY MEDICINE CLINIC | Facility: OTHER | Age: 57
End: 2022-11-18

## 2022-11-18 DIAGNOSIS — G47.9 SLEEPING DIFFICULTY: Primary | ICD-10-CM

## 2022-11-18 RX ORDER — TRAZODONE HYDROCHLORIDE 50 MG/1
50 TABLET ORAL
Qty: 30 TABLET | Refills: 1 | Status: SHIPPED | OUTPATIENT
Start: 2022-11-18 | End: 2022-12-18

## 2022-11-18 NOTE — PROGRESS NOTES
Name: Silvio Becker      : 1965      MRN: [id number]  Encounter Provider: Michael Rodarte DO  Encounter Date: 2022   Encounter department: [address] [place] [address] Dr Goodwin  Sleeping difficulty  Comments:  Buddy Marlene reports sleeping difficulties which have not improved with changes in sleep hygiene  Will trial trazodone which pt previously tolerated well in past and had stopped taking due to no longer needing for insomnia  She was counseled to start with 1/2 tablet daily at bedtime for one week and increase to one tablet daily at bedtime thereafter as tolerated  Reassess at next follow up in 4-6 wks  Orders:  -     traZODone (DESYREL) 50 mg tablet; Take 1 tablet (50 mg total) by mouth daily at bedtime    Return for Next scheduled follow up  Bia Edmonds is a 58 yo female who presents due to difficulty sleeping  She states that she only sleeps 3-4 hours and then awakens  When she gets up, she lays there and tosses and turns  This has been going on for a few months  She does not awaken due to feelings of urination and simply will lye  In bed for hours sometimes as she has trouble falling back asleep  She states that she had taken trazodone in the past for the same with good results  She tolerated the medication " very well with no problems"  She stopped taking due to no longer requiring for sleep  She has tried some sleep hygiene such as turning off electronics prior to bedtime and sleeping in a cold dark room  She reports she cannot take melatonin due to having  Lupus per her rheumatologist  Her symptoms have made her feel more tired during the day  She has not fallen asleep at important times or when she is bored during the day  Review of Systems   Constitutional: Positive for chills (occasionally)  Negative for fever  HENT: Negative for sore throat  Respiratory: Positive for cough (mild dry cough)  Cardiovascular: Negative for chest pain  Gastrointestinal: Positive for nausea (daily since covid)  Negative for vomiting  Genitourinary: Negative for dysuria  Neurological: Negative for light-headedness and headaches  Psychiatric/Behavioral: Positive for sleep disturbance  Negative for confusion, self-injury and suicidal ideas  Current Outpatient Medications on File Prior to Visit   Medication Sig   • aspirin (ECOTRIN LOW STRENGTH) 81 mg EC tablet Take 162 mg by mouth daily   • B-D 3CC LUER-LILLIE SYR 25GX1/2" 25G X 1-1/2" 3 ML MISC USE 1 SYRINGE EVERY 30 DAYS   • Benlysta 200 MG/ML SOAJ Weekly on Fridays   • cholecalciferol (VITAMIN D3) 1,000 units tablet Take 2,000 Units by mouth daily in the early morning     • cyanocobalamin 1,000 mcg/mL Inject 1 mL (1,000 mcg total) into a muscle every 30 (thirty) days   • gabapentin (Neurontin) 100 mg capsule Take 1 capsule (100 mg total) by mouth daily at bedtime   • hydroxychloroquine (PLAQUENIL) 200 mg tablet Take 400 mg by mouth daily with breakfast Dosage adjustments based on testing results   Takes 200 mg on odd days and takes 400 mg on even days   • ondansetron (ZOFRAN) 4 mg tablet Take 1 tablet (4 mg total) by mouth every 8 (eight) hours as needed for nausea or vomiting for up to 15 days   • sildenafil (REVATIO) 20 mg tablet Take 1 tablet (20 mg total) by mouth 3 (three) times a day   • spironolactone (ALDACTONE) 25 mg tablet Take 1 tablet (25 mg total) by mouth daily (Patient taking differently: Take 25 mg by mouth daily at bedtime)   • Syringe/Needle, Disp, (SecureSafe Syringe/Needle) 25G X 1-1/2" 1 ML MISC Use 1 Syringe every 30 (thirty) days   • triamcinolone (KENALOG) 0 1 % oral topical paste prn   • warfarin (Coumadin) 5 mg tablet Take 1 tablet (5 mg total) by mouth daily   • [DISCONTINUED] ascorbic acid (VITAMIN C) 500 MG tablet Take 1 tablet (500 mg total) by mouth 2 (two) times a day   • [DISCONTINUED] ferrous sulfate 324 (65 Fe) mg Take 1 tablet (324 mg total) by mouth 2 (two) times a day before meals   • [DISCONTINUED] folic acid (FOLVITE) 1 mg tablet TAKE 1 TABLET(1 MG) BY MOUTH DAILY   • [DISCONTINUED] methocarbamol (Robaxin-750) 750 mg tablet Take 1 tablet (750 mg total) by mouth every 6 (six) hours as needed for muscle spasms   • [DISCONTINUED] Multiple Vitamins-Minerals (multivitamin with minerals) tablet Take 1 tablet by mouth daily   • [DISCONTINUED] ondansetron (Zofran ODT) 4 mg disintegrating tablet Take 1 tablet (4 mg total) by mouth every 6 (six) hours as needed for nausea or vomiting       Objective     There were no vitals taken for this visit  Physical Exam  Constitutional:       General: She is not in acute distress  Appearance: She is not ill-appearing  HENT:      Head: Normocephalic  Right Ear: External ear normal       Left Ear: External ear normal       Nose: No congestion  Mouth/Throat:      Mouth: Mucous membranes are moist    Eyes:      General: No scleral icterus  Extraocular Movements: Extraocular movements intact  Pupils: Pupils are equal, round, and reactive to light  Pulmonary:      Effort: Pulmonary effort is normal  No respiratory distress  Musculoskeletal:      Cervical back: Normal range of motion  No erythema  Skin:     Coloration: Skin is not jaundiced  Findings: No erythema or rash  Neurological:      Mental Status: She is alert and oriented to person, place, and time  Psychiatric:         Attention and Perception: Attention and perception normal          Mood and Affect: Mood normal          Behavior: Behavior normal  Behavior is cooperative         Gene Mead DO

## 2022-11-21 ENCOUNTER — OFFICE VISIT (OUTPATIENT)
Dept: HEMATOLOGY ONCOLOGY | Facility: CLINIC | Age: 57
End: 2022-11-21

## 2022-11-21 ENCOUNTER — OFFICE VISIT (OUTPATIENT)
Dept: PHYSICAL THERAPY | Facility: CLINIC | Age: 57
End: 2022-11-21

## 2022-11-21 ENCOUNTER — HOSPITAL ENCOUNTER (OUTPATIENT)
Dept: VASCULAR ULTRASOUND | Facility: HOSPITAL | Age: 57
Discharge: HOME | End: 2022-11-21

## 2022-11-21 ENCOUNTER — APPOINTMENT (OUTPATIENT)
Dept: LAB | Facility: CLINIC | Age: 57
End: 2022-11-21

## 2022-11-21 ENCOUNTER — TELEPHONE (OUTPATIENT)
Dept: HEMATOLOGY ONCOLOGY | Facility: CLINIC | Age: 57
End: 2022-11-21

## 2022-11-21 VITALS
HEART RATE: 106 BPM | RESPIRATION RATE: 17 BRPM | WEIGHT: 224 LBS | TEMPERATURE: 97 F | DIASTOLIC BLOOD PRESSURE: 90 MMHG | BODY MASS INDEX: 38.24 KG/M2 | SYSTOLIC BLOOD PRESSURE: 122 MMHG | HEIGHT: 64 IN | OXYGEN SATURATION: 97 %

## 2022-11-21 DIAGNOSIS — I26.99 PULMONARY EMBOLISM ASSOCIATED WITH COVID-19 (HCC): ICD-10-CM

## 2022-11-21 DIAGNOSIS — D50.9 IRON DEFICIENCY ANEMIA, UNSPECIFIED IRON DEFICIENCY ANEMIA TYPE: ICD-10-CM

## 2022-11-21 DIAGNOSIS — U07.1 PULMONARY EMBOLISM ASSOCIATED WITH COVID-19 (HCC): Primary | ICD-10-CM

## 2022-11-21 DIAGNOSIS — U07.1 PULMONARY EMBOLISM ASSOCIATED WITH COVID-19 (HCC): ICD-10-CM

## 2022-11-21 DIAGNOSIS — Z51.89 AFTERCARE: Primary | ICD-10-CM

## 2022-11-21 DIAGNOSIS — I26.99 PULMONARY EMBOLISM ASSOCIATED WITH COVID-19 (HCC): Primary | ICD-10-CM

## 2022-11-21 DIAGNOSIS — D68.62 LUPUS ANTICOAGULANT DISORDER (HCC): ICD-10-CM

## 2022-11-21 DIAGNOSIS — M25.562 CHRONIC PAIN OF LEFT KNEE: ICD-10-CM

## 2022-11-21 DIAGNOSIS — D50.0 IRON DEFICIENCY ANEMIA DUE TO CHRONIC BLOOD LOSS: ICD-10-CM

## 2022-11-21 DIAGNOSIS — E53.8 B12 DEFICIENCY: ICD-10-CM

## 2022-11-21 DIAGNOSIS — G89.29 CHRONIC PAIN OF LEFT KNEE: ICD-10-CM

## 2022-11-21 RX ORDER — SODIUM CHLORIDE 9 MG/ML
20 INJECTION, SOLUTION INTRAVENOUS ONCE
Status: CANCELLED | OUTPATIENT
Start: 2022-11-30

## 2022-11-21 NOTE — TELEPHONE ENCOUNTER
Patient was already scheduled for the VAS study and her f/u w/ Henrine Speedy  Patient needs to be scheduled for treatment

## 2022-11-21 NOTE — PROGRESS NOTES
Daily Note     Today's date: 2022  Patient name: Saadia Devine  : 1965  MRN: [id number]  Referring provider: Jaqueline Roy DO  Dx:   Encounter Diagnosis     ICD-10-CM    1  Aftercare  Z51 89       2  Chronic pain of left knee  M25 562     G89 29                      Subjective: Pt reports that she saw vascular today for swelling in her L LE, no clots found  MD would like her to wear a ALPA stocking and advised that exercise would be good for her circulation  Objective: See treatment diary below      Assessment: Pt tolerated session well  Maintaining excellent ROM  Most difficulty ascending stairs  Plan: Continue per plan of care  Precautions:  HTN, Falls Osteoporosis, CHF,     Per Physician visit 10/13/22: Focus on extension avoid deep flexion until given clearance (MD APT 10/20 allowed to flex)   Slow progression and return to full activity secondary to his PE  Daily Treatment Diary  Manuals 11/17 11/21  10/20 11/9 11/14   Knee L PROM / SLR AAROM 10 min   10 min  10 min  10 min 10 min   RE performed                           Ther Ex         Bike rock and roll 6 min 6 min   6 min  6 min 6 min    Gastroc slant bd 20" x 4 20"x4  20"x4 20" x 4 20"x4   Quad set/GS    3"x10 3" x 10    Heel slides    AROM 3" x10 AROM 3" x 10    NBOS EC 1 min 1 min  1 min nv? NBOS EO Foam 1 min 1 min  1 min nv? Hip Abd + Ext 2 x 10 2x10   nv? HR/TR 2 x 10 2x10   nv?     Ankle pump/circulation education         Neuro Re-ed                           LAQ 2 x 10 2# 2x10   2x10 nv    SLR Flex 2 x 10 2x10                Ther Activity         stairs FF FF   nv?    minisquats 2 x 10 2x10  2x10 nv 2x10    Sit<>Stand 2 x 10    nv                               Modalities         CP in elevation to L knee  10 min  10 min  dec

## 2022-11-21 NOTE — PROGRESS NOTES
Cardiology Outpatient Progress Note - Syd Patient 59 y o  female MRN: [id number]    @ Encounter: [id number]      Patient Active Problem List    Diagnosis Date Noted   • Pulmonary embolism associated with COVID-19 ([facility] ) 11/05/2022   • SLE (systemic lupus erythematosus) ([facility] ) 11/05/2022   • SIRS (systemic inflammatory response syndrome) ([facility] ) 11/05/2022   • Status post total knee replacement using cement, left 10/20/2022   • PONV (postoperative nausea and vomiting) 10/06/2022   • Venous stasis of lower extremity 05/09/2022   • Acute pain of right knee 05/09/2022   • Leukopenia 11/22/2021   • Obesity 10/08/2020   • Primary osteoarthritis of left knee 02/12/2020   • Left knee pain 11/11/2019   • Tear of medial meniscus of left knee, current 11/11/2019   • Right knee pain 02/18/2019   • Status post total right knee replacement 02/18/2019   • Stage 3a chronic kidney disease (CKD) ([place] [address] ) 02/12/2019   • Tear of medial meniscus of right knee, current 09/10/2018   • Other tear of medial meniscus, current injury, right knee, initial encounter 07/16/2018   • Patellofemoral disorder of right knee 06/04/2018   • Pes anserinus bursitis of right knee 06/04/2018   • Arthritis of right knee 06/04/2018   • Arthritis of left knee 06/04/2018   • Chronic pain of right knee 05/22/2018   • Elevated serum creatinine 05/22/2018   • Hyperuricemia 05/22/2018   • Long-term use of hydroxychloroquine 02/16/2018   • Pulmonary hypertension ([facility] ) 02/16/2018   • Undifferentiated connective tissue disease ([place] [address] ) 02/16/2018   • Secondary pulmonary hypertension 12/15/2017   • Diffuse connective tissue disease ([facility] ) 11/21/2017   • Pes anserine bursitis 11/21/2017   • Trochanteric bursitis of both hips 11/21/2017   • Vitamin D deficiency 11/21/2017   • Other organ or system involvement in systemic lupus erythematosus ([facility] ) 11/17/2017   • Sinus tachycardia 11/09/2017   • B12 deficiency 10/19/2017   • Osteoporosis 10/19/2017   • Lupus anticoagulant disorder ([facility] ) 10/18/2017   • Positive HELENE (antinuclear antibody) 10/18/2017   • Hot flashes due to menopause 09/01/2015   • Night sweats 09/01/2015   • SOB (shortness of breath) on exertion 11/12/2012   • Other chronic pulmonary heart diseases 11/05/2012   • Essential hypertension 06/13/2012   • Edema 06/04/2012   • Post-nasal drip 06/04/2012   • Chronic cough 02/09/2012   • Dyspnea 02/09/2012       Assessment:  # Acute Pulmonary Embolism, associated with COVID 19 infection, also had TKR on 10/6/22  AC: warfarin    Positive lupus anticoagulant + 9/26/22    Echo 11/6/22:  LVEF: 65%  RV: upper normal size, normal function  PASP: 42 mmHg    CTA 11/5/22: moderate quantity of RLL segmental and small quantity RUL and LLL acute PE    # Exercise induced PAH; HFpEF with PH  Out of proportion to obesity/ deconditioning/ diastolic dysfunction (PCWP 12 mmHg)  Pt with MCTD/ SLE w/ lupus anticoagulant  Remote smoker  At rest, RV appears normal on TTE with coupled RV-PA w/o e/o of RVOT notching suggestive of normal PVR at rest  However, she did have a stress echo in 2012 that was suggestive of exercise induced pulmonary HTN  At the time she did not have e/o of elevated PVR  Her bubble was negative which makes an intracardiac shunt less likely  PAH Rx: sildanefil 20 mg Q8 (previously on macitentan)  Diuretic: torsemide 20 mg daily, spironolactone 25 mg daily  NT proBNP:   Weight: 237 lbs--> 229 lbs    Studies- personally reviewed by me  Echo 11/6/22: (in setting of acute PE)  LVEF: 65%  RV: upper normal size, normal function  PASP: 42 mmHg    Stress Echo 11/4/20: 3 min, 4 6 METs, max heart rate 155 bpm, resting /96    Echo 4/23/19:  Normal RV size and function    RHC with stress 12/19/17:  She had an appropriate HR response from 100 to 130 bpm with MAP throughout the study staying at 112 mmHg  Hgb 13 1     Rest:  RA 7  RV 37/4/13  PA 33/16/25  PCWP 12    SaO2 99%  MvO2 72 3%  CO/CI 4 8/2  3  TPG 13  DPG 4  PVR 2 7     Exercise:  CVP 10  PA 54/29/37  PCWP 18    SaO2 100%  MvO2 66%  CO/CI 4/1 9  TPG 19  DPG 11  PVR 4 8  CVP:PCWP 0 55    Stress echo 12/14/17:to evaluate pulmonary pressures, RV, and RVOT signal w/ exercise  5 min, 20 sec  HTNsive response, decrease in O2 saturation from 99% to 91% and increase in PA pressures from 45 mmHg to 70 mmHg with exercise  PFTs 12/1/17: normal, DLCO 84%    # SLE: Per outpatient Rheumatologist  Continue plaquenil  # ST: V/Q negative  May be 2/2 to deconditioning +/- inappropriate ST +/- diastolic dysfunction/HF  No e/o infection     Holter 12/4/20: , 87 bpm  # Morbid obesity: Continue weight loss  # TKR on 10/6/22    TODAY'S PLAN:  Warfarin probably lifelong  Can do D dimer at 3 months, if echo suggestive then can check VQ but no indication  Continue sildanefil  Torsemide 20 mg and spironolactone 25 mg daily- not taking torsemide  Likely has venous insufficiency      HPI:          60 yo followed for out of proportion PAH, exercise induced PAH, with MCTD/ SLE, + lupus anticoagulant, obesity  She has seen Dr Elisha Jorge  first started getting SOB -- 10 years ago  She has had several episodes of bacterial PNA and chronic cough (a few times/year)  She was referred to [facility] in 2012 for workup for PH  She had a a stress echo 8/2012 that showed that her PA pressures more than doubled from --24 mmHg to > 50 mmHg  Currently, she states she can walk up a couple flights of stairs but does get SOB  She also gets SOB with walking up inclines  She gets occasional LH  She was seeing [place]umat[place], [address] [place] [address] but recently saw a specialist at [facility], Dr Sola De Jesus (Rheumatologist - 11/16)  She was diagnosed with MCTD and SLE  HELENE + 1:320 w/ speckled pattern  She has been on Plaquenil x 2 weeks now  She is on ASA for + lupus anticoagulant and anticardiolipin Abs  She has joint pains and a subtle malar rash  She states so far there has been no change with plaquenil  After her visit with Dr Rosa Odom, she has had TTE which shows LVEF --65%, normal RV size and function without RVOT notching  Normal atria  CXR clear  She also has had a negative V/Q scan  She walked the patient in the hallway and had her go up and down stairs  Her resting HR went from --100 bpm to 110s with Pulse Ox starting from 98% @ rest to --90% with exercise  Stress Echo in 2017 showed hypertensive response and PA pressures to 70 mmHg w/ drop in pulse ox to 91%  Exercise RHC showed increase in PVR to 4 5 MOLINA from < 3 MOLINA  She was denied Tadalafil but approved for sildanefil  She did not feel any different and continued trouble with stairs  Called 5/12 that returned from cruise a week ago and developed swelling in right leg  Ultrasound negative for DVT   Had fluid removed from right knee by Dr Kobe Woodard    Interval History:   [facility] wants her to see a cardiologist at [facility]  Admitted 11/5 to 11/7 with pulmonary embolism associated with COVID 19 infection; Pt though already had TKR on 10/6/22  On warfarin  Lupus anticoagulant    Not taking her torsemide- says it does not work    Lost 25 lbs since [employer]  Past Medical History:   Diagnosis Date   • HELENE positive    • Anemia     Sildenafil causes decreased hematocrit   • Anxiety 03/2020   • CHF (congestive heart failure) ([facility] )     right heart failure related to pulm HTN lupus   • Chronic kidney disease     borderline lab values   • Chronic rhinitis 06/04/2012   • Clotting disorder ([facility] ) 2012   • Coronary artery disease 2018   • Encephalitis     Lasted Assessed 10/18/2017   • Gout 06/26/2018   • Hypertension    • Lupus anticoagulant disorder ([facility] )    • Maxillary sinusitis     Lasted Assessed 10/18/2017   • Night sweat    • Osteopenia     Hip   • Osteoporosis     Spine   • Pneumonia     Last Assessed 10/18/2017   • PONV (postoperative nausea and vomiting)    • Pulmonary hypertension (HCC)    • Seizures (HCC)     Only during Encephalitis   • Shortness of breath     with exertion   • Sinus tachycardia    • Urinary incontinence        Review of Systems   Constitutional: Negative for activity change, appetite change, fatigue and unexpected weight change  HENT: Negative for congestion and nosebleeds  Eyes: Negative  Respiratory: Negative for cough, chest tightness and shortness of breath  Cardiovascular: Negative for chest pain, palpitations and leg swelling  Gastrointestinal: Negative for abdominal distention  Endocrine: Negative  Genitourinary: Negative  Musculoskeletal: Negative  Skin: Negative  Neurological: Negative for dizziness, syncope and weakness  Hematological: Negative  Psychiatric/Behavioral: Negative  Allergies   Allergen Reactions   • Dilantin [Phenytoin] Anaphylaxis and Rash   • Penicillins Rash, Anaphylaxis, Dermatitis and Hives   • Augmentin [Amoxicillin-Pot Clavulanate] Rash and Dermatitis       Current Outpatient Medications:   •  aspirin (ECOTRIN LOW STRENGTH) 81 mg EC tablet, Take 162 mg by mouth daily, Disp: , Rfl:   •  B-D 3CC LUER-LILLIE SYR 25GX1/2" 25G X 1-1/2" 3 ML MISC, USE 1 SYRINGE EVERY 30 DAYS, Disp: , Rfl:   •  Benlysta 200 MG/ML SOAJ, Weekly on Fridays, Disp: , Rfl:   •  cholecalciferol (VITAMIN D3) 1,000 units tablet, Take 2,000 Units by mouth daily in the early morning  , Disp: , Rfl:   •  cyanocobalamin 1,000 mcg/mL, Inject 1 mL (1,000 mcg total) into a muscle every 30 (thirty) days, Disp: 12 mL, Rfl: 1  •  enoxaparin (LOVENOX) 40 mg/0 4 mL, Inject 1 1 mL (110 mg total) under the skin 2 (two) times a day as directed by physician, Disp: 24 mL, Rfl: 0  •  gabapentin (Neurontin) 100 mg capsule, Take 1 capsule (100 mg total) by mouth daily at bedtime, Disp: 90 capsule, Rfl: 0  •  hydroxychloroquine (PLAQUENIL) 200 mg tablet, Take 400 mg by mouth daily with breakfast Dosage adjustments based on testing results   Takes 200 mg on odd days and takes 400 mg on even days, Disp: , Rfl:   •  lidocaine (LIDODERM) 5 %, Apply 1 patch topically daily for 7 days Remove & Discard patch within 12 hours or as directed by MD Do not start before 2022 , Disp: 7 patch, Rfl: 0  •  ondansetron (ZOFRAN) 4 mg tablet, Take 1 tablet (4 mg total) by mouth every 8 (eight) hours as needed for nausea or vomiting for up to 15 days, Disp: 20 tablet, Rfl: 0  •  oxyCODONE (ROXICODONE) 5 immediate release tablet, Take 1 tablet (5 mg total) by mouth every 4 (four) hours as needed for moderate pain (- take 2 tabs (10 mg) every 4 hrs PRN for severe pain) Max Daily Amount: 30 mg, Disp: 25 tablet, Rfl: 0  •  sildenafil (REVATIO) 20 mg tablet, Take 1 tablet (20 mg total) by mouth 3 (three) times a day, Disp: 270 tablet, Rfl: 3  •  spironolactone (ALDACTONE) 25 mg tablet, Take 1 tablet (25 mg total) by mouth daily (Patient taking differently: Take 25 mg by mouth daily at bedtime), Disp: 90 tablet, Rfl: 3  •  Syringe/Needle, Disp, (SecureSafe Syringe/Needle) 25G X 1-1/2" 1 ML MISC, Use 1 Syringe every 30 (thirty) days, Disp: 12 each, Rfl: 1  •  torsemide (DEMADEX) 20 mg tablet, Take 1 tablet (20 mg total) by mouth daily (Patient not taking: Reported on 11/15/2022), Disp: 90 tablet, Rfl: 3  •  traZODone (DESYREL) 50 mg tablet, Take 1 tablet (50 mg total) by mouth daily at bedtime, Disp: 30 tablet, Rfl: 1  •  triamcinolone (KENALOG) 0 1 % oral topical paste, prn, Disp: , Rfl:   •  warfarin (Coumadin) 5 mg tablet, Take 1 tablet (5 mg total) by mouth daily, Disp: 90 tablet, Rfl: 0    Social History     Socioeconomic History   • Marital status: /Civil Union     Spouse name: Not on file   • Number of children: 2   • Years of education: Not on file   • Highest education level: Not on file   Occupational History   • Not on file   Tobacco Use   • Smoking status: Former     Packs/day: 0 00     Years: 0 00     Pack years: 0 00     Types: Cigarettes     Quit date: 2006     Years since quittin 8   • Smokeless tobacco: Never Vaping Use   • Vaping Use: Never used   Substance and Sexual Activity   • Alcohol use: Yes     Alcohol/week: 0 0 standard drinks     Comment: socially   • Drug use: No   • Sexual activity: Yes     Partners: Male   Other Topics Concern   • Not on file   Social History Narrative    Daily caffeinated coffee consumption    Exercise habits     Social Determinants of Health     Financial Resource Strain: Not on file   Food Insecurity: Not on file   Transportation Needs: Not on file   Physical Activity: Not on file   Stress: Not on file   Social Connections: Not on file   Intimate Partner Violence: Not on file   Housing Stability: Not on file       Family History   Problem Relation Age of Onset   • Uterine cancer Mother    • Pancreatic cancer Mother 79   • Diabetes Mother    • Endometrial cancer Mother 77   • Arthritis Mother    • Cancer Mother         Pancreas, Uterine   • Vision loss Mother    • Heart disease Father         open heart surgery at age 48    • Stroke Father         CVA   • Hypertension Father    • Atrial fibrillation Father    • Hyperlipidemia Father    • Arthritis Father    • Rheum arthritis Father    • No Known Problems Sister    • No Known Problems Brother    • Thyroid disease Maternal Grandmother    • Asthma Daughter    • Heart attack Maternal Grandfather    • Heart attack Paternal Grandmother    • Skin cancer Paternal Grandfather    • No Known Problems Sister    • Thyroid disease Sister    • Depression Sister    • Osteoarthritis Sister    • Hemochromatosis Brother    • Migraines Daughter    • Asthma Daughter    • No Known Problems Maternal Aunt    • Anuerysm Neg Hx    • Clotting disorder Neg Hx    • Heart failure Neg Hx        Physical Exam:    Vitals: not currently breastfeeding  , There is no height or weight on file to calculate BMI ,   Wt Readings from Last 3 Encounters:   11/17/22 101 kg (222 lb)   11/15/22 101 kg (222 lb 6 4 oz)   11/07/22 107 kg (236 lb 8 9 oz)         Physical Exam:    Physical Exam    Labs & Results:    Lab Results   Component Value Date    SODIUM 134 (L) 11/07/2022    K 3 7 11/07/2022    CL 99 11/07/2022    CO2 26 11/07/2022    BUN 12 11/07/2022    CREATININE 0 77 11/07/2022    GLUC 110 11/07/2022    CALCIUM 8 9 11/07/2022     Lab Results   Component Value Date    WBC 4 22 (L) 11/11/2022    HGB 11 1 (L) 11/11/2022    HCT 33 8 (L) 11/11/2022    MCV 90 11/11/2022     11/11/2022     Lab Results   Component Value Date    BNP 16 11/05/2022      Lab Results   Component Value Date    CHOLESTEROL 176 10/09/2020    CHOLESTEROL 157 05/17/2018     Lab Results   Component Value Date    HDL 60 10/09/2020    HDL 45 05/17/2018     Lab Results   Component Value Date    TRIG 107 10/09/2020    TRIG 131 05/17/2018     Lab Results   Component Value Date    NONHDLC 116 10/09/2020    [place] 112 05/17/2018         EKG personally reviewed by Carlitos Moseley  Counseling / Coordination of Care  Time spent today 25 minutes  Greater than 50% of total time was spent with the patient and / or family counseling and / or coordination of care  We discussed diagnoses, most recent studies, tests and any changes in treatment plan    Thank you for the opportunity to participate in the care of this patient      [address] [facility] PULMONARY HYPERTENSION  MEDICAL DIRECTOR OF [place]

## 2022-11-21 NOTE — TELEPHONE ENCOUNTER
While we try to accommodate patient requests, our priority is to schedule treatment according to Doctor's orders and site availability  Does the Provider use the intake sheet or checkout note? What would be a preferred day of the week that would work best for your infusion appointment? ANY  Do you prefer mornings or afternoons for your appointments? ANY (PLEASE SCHEDULE AROUND ANY OTHER APPTS ON HER APPT DESK)  Are there any days or dates that do not work for your schedule, including any upcoming vacations? NO  We are going to try our best to schedule you at the infusion center closest to your home  In the event that we are unable to what would be your next preferred infusion site or sites? 1  [facility]  2  [place]  3  Do you have transportation to take you to all of your appointments?  YES  Would you like the infusion center to draw labs from your port? (disregard if patient doesn't have a port or need labs for infusion appointment) NO

## 2022-11-21 NOTE — PROGRESS NOTES
Hematology/Oncology Outpatient Follow- up Note  Yaima Po 59 y o  female MRN: @ Encounter: [id number]        Date:  11/21/2022      Assessment / Plan:    1  Vitamin B12 deficiency in a patient was diagnosed with autoimmune connective tissue disease most likely secondary to malabsorption as well because of history of gastric fundoplication secondary to GERD  B12 1000mcg IM monthly  B12 level 1100 11/11/22  Continue  2   Post op anemia  Hgb 11/11/22 was 11 1g/dL  She was taking oral iron twice daily- she has not for some time due to nausea, however  Iron saturation 13%; ferritin 394 11/11/22((diagnosed with PE 11/5/22)  We discussed IV iron replacement  Potential side effects  of IV iron could include but may not be limited to:  change in taste, diarrhea, muscle cramps, nausea or vomiting, pain in the arms or legs, pain or burning sensation in the injection site, allergic reaction  The patient verbalized understanding and wishes to proceed  Feraheme weekly x2 requested    Colonoscopy 12/2017  F/U with Dr Cuca Velazquez  2  Prior to 10/22, she never had thrombosis, DVT, PE  Diagnosed with Bilateral PE 11/5/22 while on  ASA 162mg po daily  She had d/c Lovenox 40mg sq daily s/p Left Knee replacement on 10/6/2022 1 week prior  LE doppler was not done  - this is requested  + for Covid 10/26/22  On coumadin managed by PCP  Persistently Positive LA   + 9/26/22    Reviewed with patient that given her persistently positive lupus anticoagulant, her pulmonary embolism that occurred while on aspirin 162 milligrams p o  daily even though in the background of COVID positivity, her risk of recurrent thromboembolic event is high and I am in favor of chronic lifelong Coumadin for antiphospholipid antibody syndrome  Coumadin dose as of 11/21/22 -  m, w, f 2 5mg   T, th, S/S 5 mg  INR today was 3 91;  PCP adjusting  3   Exercise induced PAH and follows with cardiology       4   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, HELENE, anticardiolipin AB  Dx Lupus  Follows at [facility]  HPI:  Rob Young is a 59year old female seen 10/18/2017 for initial evaluation , self referred, secondary to low Vitamin B12 level  10/6/2017 [facility]: hemoglobin 13 2, mcv 90, RDW 14 3, WBC 6 2, 68% neutrophils, 21% lymphs, 8% monocytes 2% eosinophils, platelet count 235,058  Iron saturation 15%, ferritin 52 Vitamin B12 184( 211-946) Tsh 1 98, free T4 1 18, negative lyme antibody  RF normal, Anti-scleroderma antibodies normal, anti-DS DNA normal, Styles AB normal, RNP antibodies normal  Anti-centromere AB normal  Normal sed rate  HELENE: Dense fine speckled pattern is noted which suggest presence of  antibody which has a low prevalence in systemic autoimmune rheumatic diseases  Normal immunofixation on SPEP  Normal serum immunoglobulins  Normal CCP antibodies IgG and IgA  U/A identified hyaline casts  Celiac panel was normal  CMP normal     POSITIVE HELENE  POSITIVE Lupus anticoagulant  IgM anticardiolipin antibody 18 (Indeterminate)  Normal IgG anticardiolipin antibody , normal IgA anticardiolipin antibody  7/11/2016: normal anticardiolipin antibodies  Lyme negative  Normal ANCA profile, normal C3 and C4 complement, normal CRP  Normal thyroglobulin profile  Normal ESR  + LUPUS ANTICOAGULANT  HELENE positive  On [facility] intake 3/13/2015 she noted that she had a history of + lupus anticoagulant  Janice Fears states she 1st had HELENE evaluated and was positive in 2012  Patient is extremely frustrated  She states she has been having symptoms of SOB, fatigue and has been diagnosed with a lupus-like disease but has not received any disease directed therapy  She took prednisone years ago without benefit and significant weight gain     Was previously seen at [address] [place] [address] by [place] President in 2012 regarding chronic cough, SOB without history of asthma, allergies  Quit smoking at 34years old after 15-20 pack years  PND treated and cough improved but ANSARI persisted  Bronchoscopy was normal  PFTs normal  Evaluation unrevealing to pulmonary source for her dyspnea  No benefit with Advair or high dose steroids  Falls asleep easily  Normal echo  Noted to have edema and sleep apnea suspected  She was advised to increase Lasix and have sleep study  Sophia Orozco, a cardiologist at the [facility] had her undergo echocardiogram, however, poor images were obtained  She been had a stress echocardiogram and a question of pulmonary artery hypertension and was raised  8/13/2012: stress echocardiogram report from [address] [place] [address]: estimated P  a systolic pressure increased to 56 mm mercury suggestive of significant exercise induced pulmonary hypertension  She states she saw Dr Chen, a specialist at [address] [place] [address] who didn't examine me and told me I look too good to have pulmonary artery hypertension " He did not perform additional testing but then referred her to Dr Rosita Gtz, a cardiac electrophysiologist at [facility] due to resting heart rate 114-120s who informed her she had a + HELENE  She saw Dr Yolanda Nunes a few times but felt pressured by him as he wanted to perform a lip biopsy to evaluate for Sjogren's syndrome  She has been seeing TERI Newsome  at the rheumatology center of [facility] but has not seeing him in approximately a year and a half as she states he was requesting repeated blood work that was not moving forward towards treating any symptoms she was having and feels that he can be adverserial      Lactose intolerant  Osteoporosis diagnosed at 26 y/o in Lumbar spine  Partial Hysterectomy 1996 for endometrial hyperplasia  Ovaries spared  She works at [employer]  She returned from a vacation and had 3+ pitting edema   Dr Areli Velez, 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 arranged for her to see Bobbi Vale MD in [place], [place] who ordered the above labs  Anita Post is very comfortable with injecting herself IM  No s/sx pancreatic insufficiency  She does not take PPI or H2- blocker  She does not drink alcohol regularly  Did have a first trimester miscarriage around the 10th week in between the birth of her 2 children  She takes Aspirin  Reports last EGD was 3-4 years ago performed by Dr Miryam Anthony at [facility]  She is s/p Nissen Fundoplication for severe GERD  She had a colonoscopy previously  She saw Dr Adal Ireland with [facility] 2/13/2015 regarding persistently + HELENE and IGM anticardiolipin antibody  There was also question whether or not she had cotton wool spots secondary to embolic phenomenon  Patient states ultimately this was ruled out  Aspirin was hematologic recommendation at that time  10/2019:  Normal cardiolipin AB, normal C3, C4, dsDNA ab not present     10/2021: Hemoglobin 14 1, MCV 91, white blood cell count 3 9, 66% neutrophils, 22% lymphocytes, 8% monocytes, platelet count 887  Normal cardiolipin antibodies, normal beta 2 glycoprotein antibody    Interval History  Status Post Arthroplasty Knee Total - Left on 10/6/2022  She was on ASA 162mg po daily and Lovenox 40mg sq daily x 30 days post-op  Presented to the hospital on 11/5/2022 due to acute onset of back pain, difficulty breathing, SOB and cough  Reported that she went on a cruise 10/24 and stated feeling sick on the 26th and tested + for Covid 10/26/22     11/5/22 CTA - Moderate quantity of right lower lobe segmental and small quantity of right upper lobe and left lower lobe acute PE     LE doppler was not done  She was discharged on Eliquis  Transitoned to  Coumadin due to APS           Test Results:        Labs:   Lab Results   Component Value Date    HGB 11 1 (L) 11/11/2022    HCT 33 8 (L) 11/11/2022    MCV 90 11/11/2022     11/11/2022    WBC 4 22 (L) 11/11/2022    NRBC 0 11/11/2022     Lab Results   Component Value Date    K 3 7 11/07/2022    CL 99 11/07/2022    CO2 26 11/07/2022    BUN 12 11/07/2022    CREATININE 0 77 11/07/2022    GLUF 107 (H) 09/15/2022    CALCIUM 8 9 11/07/2022    AST 30 11/06/2022    ALT 24 11/06/2022    ALKPHOS 89 11/06/2022    EGFR 84 11/07/2022       Imaging: XR chest portable    Result Date: 11/6/2022  Narrative: CHEST INDICATION:  Chest pain  COMPARISON:  Left rib series 6/27/2022, CT chest 9/10/2018 EXAM PERFORMED/VIEWS:  XR CHEST PORTABLE FINDINGS: Cardiomediastinal silhouette appears unremarkable  Linear bibasilar subsegmental atelectasis or scarring  The lungs are otherwise clear  No pneumothorax or pleural effusion  Osseous structures appear within normal limits for patient age  Impression: No acute cardiopulmonary disease  Workstation performed: [id number]     CTA ED chest PE study    Result Date: 11/5/2022  Narrative: CTA - CHEST WITH IV CONTRAST - PULMONARY ANGIOGRAM INDICATION:   Shortness of breath  Patient has confirmed COVID-19  COMPARISON: AP chest 11/5/2022 TECHNIQUE: CTA examination of the chest was performed using angiographic technique according to a protocol specifically tailored to evaluate for pulmonary embolism  Axial, sagittal, and coronal 2D reformatted images were created from the source data and  submitted for interpretation  In addition, coronal 3D MIP postprocessing was performed on the acquisition scanner  Radiation dose length product (DLP) for this visit:  265 mGy-cm   This examination, like all CT scans performed in the [facility], was performed utilizing techniques to minimize radiation dose exposure, including the use of iterative reconstruction and automated exposure control   IV Contrast:  100 mL of iohexol (OMNIPAQUE)  FINDINGS: PULMONARY ARTERIAL TREE:  Moderate quantity of right lower lobe segmental and small quantity of right upper lobe and left lower lobe acute PE  LUNGS:  Right lower lobe groundglass opacities in keeping with infarction  No endotracheal or endobronchial lesion  Mild bibasilar hypoventilatory changes  PLEURA:  Trace right pleural effusion  HEART/GREAT VESSELS:  Unremarkable for patient's age  No thoracic aortic aneurysm  MEDIASTINUM AND CITLALLI:  Unremarkable  CHEST WALL AND LOWER NECK:   Unremarkable  VISUALIZED STRUCTURES IN THE UPPER ABDOMEN:  Unremarkable  OSSEOUS STRUCTURES:  No acute fracture or destructive osseous lesion  Impression: Moderate quantity of right lower lobe segmental and small quantity of right upper lobe and left lower lobe acute PE  The calculated ratio of right ventricular to left ventricular diameter (RV/LV ratio) is greater than 0 9, which is abnormal and indicates right heart strain  An abnormal RV/LV ratio has been shown to be associated with an increased risk of 30 day mortality in the setting of acute pulmonary embolism  Urgent consultation with the medical critical care team is recommended  I personally discussed this study with JOSLYN ALBERTS on 11/5/2022 at 5:07 PM  Workstation performed: [id number]     Echo follow up/limited w/ contrast if indicated    Result Date: 11/6/2022  Narrative: •  Left Ventricle: Left ventricular cavity size is normal  Wall thickness is mildly increased  The left ventricular ejection fraction is 65%  Systolic function is normal  Wall motion is normal  •  Right Ventricle: Right ventricular cavity size is normal  Systolic function is normal  •  Tricuspid Valve: There is mild regurgitation  The right ventricular systolic pressure is mildly elevated  The estimated right ventricular systolic pressure is 16 87 mmHg  ROS:  As mentioned in HPI & Interval History otherwise 14 point ROS negative  Allergies:    Allergies   Allergen Reactions   • Dilantin [Phenytoin] Anaphylaxis and Rash   • Penicillins Rash, Anaphylaxis, Dermatitis and Hives   • Augmentin [Amoxicillin-Pot Clavulanate] Rash and Dermatitis     Current Medications: Reviewed  PMH/FH/SH:  Reviewed      Physical Exam:    There is no height or weight on file to calculate BSA  Ht Readings from Last 3 Encounters:   11/17/22 5' 4" (1 626 m)   11/15/22 5' 4" (1 626 m)   11/06/22 5' 4" (1 626 m)        Wt Readings from Last 3 Encounters:   11/17/22 101 kg (222 lb)   11/15/22 101 kg (222 lb 6 4 oz)   11/07/22 107 kg (236 lb 8 9 oz)        Temp Readings from Last 3 Encounters:   11/15/22 97 6 °F (36 4 °C)   11/07/22 99 °F (37 2 °C)   10/07/22 98 9 °F (37 2 °C)        BP Readings from Last 3 Encounters:   11/15/22 104/78   11/07/22 102/67   10/13/22 130/78           Physical Exam  Vitals reviewed  Constitutional:       General: She is not in acute distress  Appearance: She is well-developed and well-nourished  She is not diaphoretic  HENT:      Head: Normocephalic and atraumatic  Eyes:      Conjunctiva/sclera: Conjunctivae normal    Neck:      Trachea: No tracheal deviation  Cardiovascular:      Rate and Rhythm: Normal rate and regular rhythm  Heart sounds: No murmur heard  No friction rub  No gallop  Pulmonary:      Effort: Pulmonary effort is normal  No respiratory distress  Breath sounds: Normal breath sounds  No wheezing or rales  Chest:      Chest wall: No tenderness  Abdominal:      General: There is no distension  Palpations: Abdomen is soft  Tenderness: There is no abdominal tenderness  Musculoskeletal:      Cervical back: Normal range of motion and neck supple  Comments: Left leg swelling   Lymphadenopathy:      Cervical: No cervical adenopathy  Skin:     General: Skin is warm and dry  Coloration: Skin is not pale  Findings: No erythema  Neurological:      Mental Status: She is alert and oriented to person, place, and time     Psychiatric:         Mood and Affect: Mood and affect normal       Comments: tearful             Emergency Contacts:    Extended Emergency Contact Information  Primary Emergency Contact: JakeHardeep  Address: [address] [facility] [address] [facility], [address] [facility] [address] Phone: [phone]  Mobile Phone: [phone]  Relation: Spouse  Secondary Emergency Contact: Donna Amador  Address: [address] [facility] [address] [facility] [address] [facility], [address] [facility] [address] [place] Phone: [phone]  Relation: Daughter

## 2022-11-22 ENCOUNTER — TELEPHONE (OUTPATIENT)
Dept: HEMATOLOGY ONCOLOGY | Facility: CLINIC | Age: 57
End: 2022-11-22

## 2022-11-22 NOTE — TELEPHONE ENCOUNTER
Spoke with pt in regards to VAS, informed pt Michele Schaffer reviewed the study and there was no evidence of clots in her legs  Pt verbalized understanding

## 2022-11-22 NOTE — RESULT ENCOUNTER NOTE
Spoke with pt in regards to VAS, informed pt Allen Lion reviewed the study and there was no evidence of clots in her legs  Pt verbalized understanding

## 2022-11-23 ENCOUNTER — OFFICE VISIT (OUTPATIENT)
Dept: PHYSICAL THERAPY | Facility: CLINIC | Age: 57
End: 2022-11-23

## 2022-11-23 DIAGNOSIS — M25.562 CHRONIC PAIN OF LEFT KNEE: ICD-10-CM

## 2022-11-23 DIAGNOSIS — Z51.89 AFTERCARE: Primary | ICD-10-CM

## 2022-11-23 DIAGNOSIS — G89.29 CHRONIC PAIN OF LEFT KNEE: ICD-10-CM

## 2022-11-23 NOTE — PROGRESS NOTES
Daily Note     Today's date: 2022  Patient name: Marv Mandujano  : 1965  MRN: [id number]  Referring provider: Claudio Valdez DO  Dx:   Encounter Diagnosis     ICD-10-CM    1  Aftercare  Z51 89       2  Chronic pain of left knee  M25 562     G89 29                      Subjective: Pt presents today stating that today was the first day she woke up without pain  Very content with progression  Objective: See treatment diary below      Assessment: Pt had no challenge with session aside from mild fatigue  Maintaining excellent ROM  Most difficulty with elevations  Plan: Continue per plan of care  Precautions:  HTN, Falls Osteoporosis, CHF,     Per Physician visit 10/13/22: Focus on extension avoid deep flexion until given clearance (MD APT 10/20 allowed to flex)   Slow progression and return to full activity secondary to his PE  Daily Treatment Diary  Manuals 11/17 11/21 11/23 10/20 11/9 11/14   Knee L PROM / SLR AAROM 10 min   10 min 10 min 10 min  10 min 10 min   RE performed                           Ther Ex         Bike rock and roll ROM 6 min 6 min  6 min 6 min  6 min 6 min    Gastroc slant bd 20" x 4 20"x4 20" x 4 20"x4 20" x 4 20"x4   Quad set/GS    3"x10 3" x 10    Heel slides    AROM 3" x10 AROM 3" x 10    NBOS EC 1 min 1 min 1 min 1 min nv? NBOS EO Foam 1 min 1 min 1 min 1 min nv? Hip Abd + Ext 2 x 10 2x10 2 x 10  nv? HR/TR 2 x 10 2x10 3 x 10  nv?     Ankle pump/circulation education         Neuro Re-ed                           LAQ 2 x 10 2# 2x10 3# 2 x 10 2x10 nv    SLR Flex 2 x 10 2x10 2 x 10               Ther Activity         stairs FF FF FF  nv?    minisquats 2 x 10 2x10 2 x 10 2x10 nv 2x10    Sit<>Stand 2 x 10 2 x 10 2 x 10  nv                               Modalities         CP in elevation to L knee  10 min 10 min 10 min  dec

## 2022-11-28 ENCOUNTER — APPOINTMENT (OUTPATIENT)
Dept: LAB | Facility: CLINIC | Age: 57
End: 2022-11-28

## 2022-11-28 ENCOUNTER — OFFICE VISIT (OUTPATIENT)
Dept: CARDIOLOGY CLINIC | Facility: CLINIC | Age: 57
End: 2022-11-28

## 2022-11-28 ENCOUNTER — EVALUATION (OUTPATIENT)
Dept: PHYSICAL THERAPY | Facility: CLINIC | Age: 57
End: 2022-11-28

## 2022-11-28 VITALS
SYSTOLIC BLOOD PRESSURE: 110 MMHG | HEIGHT: 64 IN | BODY MASS INDEX: 38.07 KG/M2 | DIASTOLIC BLOOD PRESSURE: 70 MMHG | HEART RATE: 104 BPM | WEIGHT: 223 LBS

## 2022-11-28 DIAGNOSIS — I10 ESSENTIAL HYPERTENSION: ICD-10-CM

## 2022-11-28 DIAGNOSIS — I26.99 OTHER ACUTE PULMONARY EMBOLISM WITHOUT ACUTE COR PULMONALE (HCC): ICD-10-CM

## 2022-11-28 DIAGNOSIS — M25.562 CHRONIC PAIN OF LEFT KNEE: ICD-10-CM

## 2022-11-28 DIAGNOSIS — I27.20 PULMONARY HYPERTENSION (HCC): Primary | ICD-10-CM

## 2022-11-28 DIAGNOSIS — G89.29 CHRONIC PAIN OF LEFT KNEE: ICD-10-CM

## 2022-11-28 DIAGNOSIS — Z51.89 AFTERCARE: Primary | ICD-10-CM

## 2022-11-28 NOTE — PROGRESS NOTES
Daily Note     Today's date: 2022  Patient name: Gabriele Douglas  : 1965  MRN: [id number]  Referring provider: Jose Luis Bartlett DO  Dx:   Encounter Diagnosis     ICD-10-CM    1  Aftercare  Z51 89       2  Chronic pain of left knee  M25 562     G89 29                      Subjective: Pt reports that she is feeling really well  Only pain at the top of her incision <3/10  She states that otherwise endurance is limited to her being ill and having the PE recently  Not using SPC hardly at all, only longer distances  Pt reports her goals are to continue with endurance, strength, and quality of how she is moving  Follows up with physician on 22  Objective: See treatment diary below    TUG with out AD: 0-125  Hip Strength: 4-/5 flex, 4/5 Abd, 5/5 Add, Ext 5/5  Knee Strength: 5/5 flex and ext  Elevations: Alternate up and down, B use of UE with railing, decreased power with ascent, fair eccentrics with descent  Incision Scar motility good, clean dry healing well  Assessment: Pt at this time has excellent range, decreased risk for falls, and is progressing well with strength and very low pain levels  At this time, pt will benefit continued intervention in order to be able to ascend elevations with least UE use and improved power as well as work on endurance which is primarily limited to her recent incident with PE  Plan: Continue per plan of care  Precautions:  HTN, Falls Osteoporosis, CHF,     Per Physician visit 10/13/22: Focus on extension avoid deep flexion until given clearance (MD PARIKH 10/20 allowed to flex)   Slow progression and return to full activity secondary to his PE       Daily Treatment Diary  Manuals    Knee L PROM / SLR AAROM 10 min   10 min 10 min 5 min, education of TFM to incision  10 min 10 min   RE performed                           Ther Ex         Bike rock and roll ROM 6 min 6 min  6 min 6 min 6 min 6 min    Gastroc slant bd 20" x 4 20"x4 20" x 4 20" x 4  20" x 4 20"x4   Quad set/GS     3" x 10    Heel slides     AROM 3" x 10    NBOS EC 1 min 1 min 1 min 1 min nv? NBOS EO Foam 1 min 1 min 1 min 1 min nv? Hip Abd + Ext 2 x 10 2x10 2 x 10 2 x 10 RTB nv? HR/TR 2 x 10 2x10 3 x 10 3 x 10 nv?     Ankle pump/circulation education         Neuro Re-ed                           LAQ 2 x 10 2# 2x10 3# 2 x 10 2 x 10 4# nv    SLR Flex 2 x 10 2x10 2 x 10 2 x 10              Ther Activity         stairs FF FF FF FF nv?    minisquats 2 x 10 2x10 2 x 10 2 x 10 nv 2x10    Sit<>Stand 2 x 10 2 x 10 2 x 10 2 x 10 nv                               Modalities         CP in elevation to L knee  10 min 10 min dec dec

## 2022-11-30 ENCOUNTER — HOSPITAL ENCOUNTER (OUTPATIENT)
Dept: INFUSION CENTER | Facility: CLINIC | Age: 57
Discharge: HOME | End: 2022-11-30

## 2022-11-30 ENCOUNTER — PATIENT OUTREACH (OUTPATIENT)
Dept: FAMILY MEDICINE CLINIC | Facility: OTHER | Age: 57
End: 2022-11-30

## 2022-11-30 VITALS
DIASTOLIC BLOOD PRESSURE: 91 MMHG | SYSTOLIC BLOOD PRESSURE: 139 MMHG | HEART RATE: 104 BPM | TEMPERATURE: 97.4 F | OXYGEN SATURATION: 98 % | RESPIRATION RATE: 18 BRPM

## 2022-11-30 DIAGNOSIS — D50.0 IRON DEFICIENCY ANEMIA DUE TO CHRONIC BLOOD LOSS: Primary | ICD-10-CM

## 2022-11-30 RX ORDER — SODIUM CHLORIDE 9 MG/ML
20 INJECTION, SOLUTION INTRAVENOUS ONCE
Status: CANCELLED | OUTPATIENT
Start: 2022-12-08

## 2022-11-30 RX ORDER — SODIUM CHLORIDE 9 MG/ML
20 INJECTION, SOLUTION INTRAVENOUS ONCE
Status: COMPLETED | OUTPATIENT
Start: 2022-11-30 | End: 2022-11-30

## 2022-11-30 RX ADMIN — SODIUM CHLORIDE 20 ML/HR: 0.9 INJECTION, SOLUTION INTRAVENOUS at 13:10

## 2022-11-30 RX ADMIN — FERUMOXYTOL 510 MG: 510 INJECTION INTRAVENOUS at 13:10

## 2022-11-30 NOTE — PROGRESS NOTES
Bundle Episode L TKR  10/6/22-1/5/23    Chart was reviewed  Patient was given my contact information and will call with any needs  OPCM RN to follow throughout bundle episode

## 2022-11-30 NOTE — PROGRESS NOTES
patient to infusion for Feraheme  She offers no complaints  Pt tolerated treatment today without adverse reaction    Next appointment confirmed,  She declined AVS

## 2022-12-01 ENCOUNTER — OFFICE VISIT (OUTPATIENT)
Dept: PHYSICAL THERAPY | Facility: CLINIC | Age: 57
End: 2022-12-01

## 2022-12-01 DIAGNOSIS — M25.562 CHRONIC PAIN OF LEFT KNEE: ICD-10-CM

## 2022-12-01 DIAGNOSIS — G89.29 CHRONIC PAIN OF LEFT KNEE: ICD-10-CM

## 2022-12-01 DIAGNOSIS — Z51.89 AFTERCARE: Primary | ICD-10-CM

## 2022-12-01 NOTE — PROGRESS NOTES
Daily Note     Today's date: 2022  Patient name: Vin Diego  : 1965  MRN: [id number]  Referring provider: Meghann Pardo DO  Dx:   Encounter Diagnosis     ICD-10-CM    1  Aftercare  Z51 89       2  Chronic pain of left knee  M25 562     G89 29           Start Time:   Stop Time:   Total time in clinic (min): 55 minutes    Subjective: Pt reports that she has pain along her incision  Objective: See treatment diary below        Assessment: Pt tolerated session well  Still requiring UE for stairs  Plan: Continue per plan of care  Precautions:  HTN, Falls Osteoporosis, CHF,     Per Physician visit 10/13/22: Focus on extension avoid deep flexion until given clearance (MD APT 10/20 allowed to flex)   Slow progression and return to full activity secondary to his PE       Daily Treatment Diary  Manuals     Knee L PROM / SLR AAROM 10 min   10 min 10 min 5 min, education of TFM to incision  8 min     RE performed                           Ther Ex         Bike rock and roll ROM 6 min 6 min  6 min 6 min ROM 6 min     Gastroc slant bd 20" x 4 20"x4 20" x 4 20" x 4  20"x4    Quad set/GS         Heel slides         NBOS EC 1 min 1 min 1 min 1 min 1 min foam     NBOS EO Foam 1 min 1 min 1 min 1 min     Hip Abd + Ext 2 x 10 2x10 2 x 10 2 x 10 RTB 2x10 RTB    HR/TR 2 x 10 2x10 3 x 10 3 x 10 3x10    Ankle pump/circulation education         Neuro Re-ed                           LAQ 2 x 10 2# 2x10 3# 2 x 10 2 x 10 4# 2x10 4#     SLR Flex 2 x 10 2x10 2 x 10 2 x 10 2x10             Ther Activity         stairs FF FF FF FF FF    minisquats 2 x 10 2x10 2 x 10 2 x 10 2x10    Sit<>Stand 2 x 10 2 x 10 2 x 10 2 x 10 2x10                               Modalities         CP in elevation to L knee  10 min 10 min dec 10 min

## 2022-12-05 ENCOUNTER — OFFICE VISIT (OUTPATIENT)
Dept: PHYSICAL THERAPY | Facility: CLINIC | Age: 57
End: 2022-12-05

## 2022-12-05 DIAGNOSIS — M25.562 CHRONIC PAIN OF LEFT KNEE: ICD-10-CM

## 2022-12-05 DIAGNOSIS — G89.29 CHRONIC PAIN OF LEFT KNEE: ICD-10-CM

## 2022-12-05 DIAGNOSIS — Z51.89 AFTERCARE: Primary | ICD-10-CM

## 2022-12-05 DIAGNOSIS — I27.20 PULMONARY HYPERTENSION (HCC): ICD-10-CM

## 2022-12-05 RX ORDER — SILDENAFIL CITRATE 20 MG/1
20 TABLET ORAL 3 TIMES DAILY
Qty: 270 TABLET | Refills: 3 | Status: SHIPPED | OUTPATIENT
Start: 2022-12-05

## 2022-12-05 NOTE — PROGRESS NOTES
Daily Note     Today's date: 2022  Patient name: Saba Gutierrez  : 1965  MRN: [id number]  Referring provider: Pepe Gomez DO  Dx:   Encounter Diagnosis     ICD-10-CM    1  Aftercare  Z51 89       2  Chronic pain of left knee  M25 562     G89 29           Start Time:   Stop Time: 1804  Total time in clinic (min): 39 minutes    Subjective: Pt rates LLE pain as a 4/10, and reports majority of her pain along lateral aspect of her knee  She reports no medical changes since previous session  Objective: See treatment diary below      Assessment: Jamir Ferraro tolerated treatment well with minimal cuing  TE's were performed with the same resistance and reps  No new TE's were performed today  Following treatment, the patient exhibited good technique with therapeutic exercises and would benefit from continued PT  Plan: Continue per plan of care  Precautions:  HTN, Falls Osteoporosis, CHF,     Per Physician visit 10/13/22: Focus on extension avoid deep flexion until given clearance (MD APT 10/20 allowed to flex)   Slow progression and return to full activity secondary to his PE       Daily Treatment Diary  Manuals    Knee L PROM / SLR AAROM 10 min   10 min 10 min 5 min, education of TFM to incision  8 min  10 min   RE performed                           Ther Ex         Bike rock and roll ROM 6 min 6 min  6 min 6 min ROM 6 min  ROM 6 min   Gastroc slant bd 20" x 4 20"x4 20" x 4 20" x 4  20"x4 20''x4   Quad set/GS         Heel slides         NBOS EC 1 min 1 min 1 min 1 min 1 min foam  NV   NBOS EO Foam 1 min 1 min 1 min 1 min     Hip Abd + Ext 2 x 10 2x10 2 x 10 2 x 10 RTB 2x10 RTB 2x10 RTB   HR/TR 2 x 10 2x10 3 x 10 3 x 10 3x10 3x10   Ankle pump/circulation education         Neuro Re-ed                           LAQ 2 x 10 2# 2x10 3# 2 x 10 2 x 10 4# 2x10 4#  2x10 4#   SLR Flex 2 x 10 2x10 2 x 10 2 x 10 2x10 2x10            Ther Activity         stairs FF FF FF FF FF NV   minisquats 2 x 10 2x10 2 x 10 2 x 10 2x10 2x10   Sit<>Stand 2 x 10 2 x 10 2 x 10 2 x 10 2x10 2x10                              Modalities         CP in elevation to L knee  10 min 10 min dec 10 min  10 min

## 2022-12-08 ENCOUNTER — HOSPITAL ENCOUNTER (OUTPATIENT)
Dept: INFUSION CENTER | Facility: CLINIC | Age: 57
Discharge: HOME | End: 2022-12-08

## 2022-12-08 ENCOUNTER — OFFICE VISIT (OUTPATIENT)
Dept: PHYSICAL THERAPY | Facility: CLINIC | Age: 57
End: 2022-12-08

## 2022-12-08 VITALS
TEMPERATURE: 97.7 F | DIASTOLIC BLOOD PRESSURE: 87 MMHG | OXYGEN SATURATION: 99 % | HEART RATE: 101 BPM | SYSTOLIC BLOOD PRESSURE: 125 MMHG

## 2022-12-08 DIAGNOSIS — G89.29 CHRONIC PAIN OF LEFT KNEE: ICD-10-CM

## 2022-12-08 DIAGNOSIS — Z51.89 AFTERCARE: Primary | ICD-10-CM

## 2022-12-08 DIAGNOSIS — D50.0 IRON DEFICIENCY ANEMIA DUE TO CHRONIC BLOOD LOSS: Primary | ICD-10-CM

## 2022-12-08 DIAGNOSIS — M25.562 CHRONIC PAIN OF LEFT KNEE: ICD-10-CM

## 2022-12-08 RX ORDER — SODIUM CHLORIDE 9 MG/ML
20 INJECTION, SOLUTION INTRAVENOUS ONCE
Status: COMPLETED | OUTPATIENT
Start: 2022-12-08 | End: 2022-12-08

## 2022-12-08 RX ORDER — SODIUM CHLORIDE 9 MG/ML
20 INJECTION, SOLUTION INTRAVENOUS ONCE
Status: CANCELLED | OUTPATIENT
Start: 2022-12-14

## 2022-12-08 RX ADMIN — SODIUM CHLORIDE 20 ML/HR: 0.9 INJECTION, SOLUTION INTRAVENOUS at 11:53

## 2022-12-08 RX ADMIN — FERUMOXYTOL 510 MG: 510 INJECTION INTRAVENOUS at 12:06

## 2022-12-08 NOTE — PROGRESS NOTES
Daily Note     Today's date: 2022  Patient name: Saba Gutierrez  : 1965  MRN: [id number]  Referring provider: Pepe Gomez DO  Dx:   Encounter Diagnosis     ICD-10-CM    1  Aftercare  Z51 89       2  Chronic pain of left knee  M25 562     G89 29                      Subjective: Pt reports that her largest complaint is the pain around her scar  Objective: See treatment diary below      Assessment: Hassel Guess tolerated treatment well Able to progress with strengthening to fatigue  Plan: Continue per plan of care  Precautions:  HTN, Falls Osteoporosis, CHF,     Per Physician visit 10/13/22: Focus on extension avoid deep flexion until given clearance (MD APT 10/20 allowed to flex)   Slow progression and return to full activity secondary to his PE       Daily Treatment Diary  Manuals    Knee L PROM / SLR AAROM ROM WNL  10 min 5 min, education of TFM to incision  8 min  10 min   RE performed         IASTM LNK                 Ther Ex         Bike rock and roll ROM ROM 6 min   6 min 6 min ROM 6 min  ROM 6 min   Gastroc slant bd 20"x4  20" x 4 20" x 4  20"x4 20''x4   Quad set/GS         Heel slides         NBOS EC 1 min foam   1 min 1 min 1 min foam  NV   NBOS EO Foam   1 min 1 min     Hip Abd + Ext GTB 2x10  2 x 10 2 x 10 RTB 2x10 RTB 2x10 RTB   Leg press  SL 50#  2x10          HR/TR 2x10  3 x 10 3 x 10 3x10 3x10   Ankle pump/circulation education         Neuro Re-ed                           LAQ 5# 2x10   2 x 10 2 x 10 4# 2x10 4#  2x10 4#   SLR Flex 2x10  2 x 10 2 x 10 2x10 2x10            Ther Activity         stairs FF   FF FF FF NV   minisquats 2x10  2 x 10 2 x 10 2x10 2x10   Sit<>Stand 2x10  2 x 10 2 x 10 2x10 2x10                              Modalities         CP in elevation to L knee 10 min   10 min dec 10 min  10 min

## 2022-12-08 NOTE — PROGRESS NOTES
Pt tolerated treatment well  Aware that she has completed current treatment  Aware of when she needs labs drawn and next appt  AVS declined

## 2022-12-08 NOTE — PROGRESS NOTES
Pt resting with no complaints, vitals stable, labs, call bell within reach  All questions answered and emotional support given  All needs met at this time

## 2022-12-12 ENCOUNTER — OFFICE VISIT (OUTPATIENT)
Dept: PHYSICAL THERAPY | Facility: CLINIC | Age: 57
End: 2022-12-12

## 2022-12-12 ENCOUNTER — LAB (OUTPATIENT)
Dept: LAB | Facility: CLINIC | Age: 57
End: 2022-12-12

## 2022-12-12 DIAGNOSIS — Z51.89 AFTERCARE: Primary | ICD-10-CM

## 2022-12-12 DIAGNOSIS — M25.562 CHRONIC PAIN OF LEFT KNEE: ICD-10-CM

## 2022-12-12 DIAGNOSIS — G89.29 CHRONIC PAIN OF LEFT KNEE: ICD-10-CM

## 2022-12-12 LAB — INR PPP: 1.53 (ref 0.84–1.19)

## 2022-12-12 NOTE — PROGRESS NOTES
Daily Note     Today's date: 2022  Patient name: Donzella Cogan  : 1965  MRN: [id number]  Referring provider: Sophie De DO  Dx:   Encounter Diagnosis     ICD-10-CM    1  Aftercare  Z51 89       2  Chronic pain of left knee  M25 562     G89 29           Start Time: 013  Stop Time: 1745  Total time in clinic (min): 55 minutes    Subjective: Pt reports that she was sore following last visit's progressions  Objective: See treatment diary below      Assessment: Nathalie Bullard tolerated treatment well Continues with excellent ROM, fatigues with CKC quad strengthening  Plan: Continue per plan of care  Precautions:  HTN, Falls Osteoporosis, CHF,     Per Physician visit 10/13/22: Focus on extension avoid deep flexion until given clearance (MD APT 10/20 allowed to flex)   Slow progression and return to full activity secondary to his PE       Daily Treatment Diary  Manuals    Knee L PROM / SLR AAROM ROM WNL   5 min, education of TFM to incision  8 min  10 min   RE performed         IASTM LNK LNK                Ther Ex         Bike rock and roll ROM ROM 6 min  ROM 6 min   6 min ROM 6 min  ROM 6 min   Gastroc slant bd 20"x4 20"x4  20" x 4  20"x4 20''x4   Quad set/GS         Heel slides         NBOS EC 1 min foam  1 min foam   1 min 1 min foam  NV   NBOS EO Foam    1 min     Hip Abd + Ext GTB 2x10 GTB 2x10  2 x 10 RTB 2x10 RTB 2x10 RTB   Leg press  SL 50#  2x10   SL 50# 2x10        HR/TR 2x10 2x10  3 x 10 3x10 3x10   Ankle pump/circulation education         Neuro Re-ed                           LAQ 5# 2x10  5# 2x10  2 x 10 4# 2x10 4#  2x10 4#   SLR Flex 2x10   2 x 10 2x10 2x10            Ther Activity         stairs FF  FF  FF FF NV   minisquats 2x10 2x10  2 x 10 2x10 2x10   Sit<>Stand 2x10 2x10  2 x 10 2x10 2x10                              Modalities         CP in elevation to L knee 10 min  10  dec 10 min  10 min

## 2022-12-13 ENCOUNTER — PATIENT OUTREACH (OUTPATIENT)
Dept: FAMILY MEDICINE CLINIC | Facility: OTHER | Age: 57
End: 2022-12-13

## 2022-12-13 ENCOUNTER — ANTICOAG VISIT (OUTPATIENT)
Dept: HEMATOLOGY ONCOLOGY | Facility: CLINIC | Age: 57
End: 2022-12-13

## 2022-12-13 DIAGNOSIS — U07.1 PULMONARY EMBOLISM ASSOCIATED WITH COVID-19 (HCC): Primary | ICD-10-CM

## 2022-12-13 DIAGNOSIS — I26.99 PULMONARY EMBOLISM ASSOCIATED WITH COVID-19 (HCC): Primary | ICD-10-CM

## 2022-12-13 NOTE — PROGRESS NOTES
Bundle Episode L TKR  10/6/22-1/5/23    This OPCM RN received a call from patient who was extremely upset  She states that she had an INR drawn and it was subtherapeutic > 2  She states she called Dr Kennedy Lazcano office and spoke with someone-she forgets the persons name  This staff member told her she did not know what to do and she had no idea how much Coumadin patient was on and she would speak with someone and call her back  She states she still has not heard back from anyone  She states that her Coumadin order should be in the chart and staff should know this  Patient states she is on the Warfarin for a PE and she is afraid she will throw another clot/PE  I apologized and told her I would find out and call her back  Patient states that office is incompetent and she is changing providers  Chart was reviewed  I attempted to call [facility] and there was no answer  I left a detailed message regarding the above and requested a call back and for them to call patient ASAP  OPCM RN to follow

## 2022-12-13 NOTE — PROGRESS NOTES
This OPCM RN called [facility] and spoke with Benoit BIRD explaining the situation with patients INR an previous note  She spoke with Dr Alecia Means  She states Dr Alecia Means responded in the result note directions for patient  Benoit states patient was to take Coumadin 2 5 mg on Monday and Friday this week and 5 mg on Tuesday, Wednesday, Thursday, Saturday and Sunday  Then have INR drawn on Monday 12/19  This OPCM RN then called patient back and discussed the above  She states she will not have her INR drawn this Monday 12/19 she will wait 2 weeks  I asked her why and she states she would like to speak with the office and not get this [facility] RN caught in the middle of this all  I then called Benoit back at the office and made her aware patient would like to speak with her  She states understanding and said she will call patient

## 2022-12-15 ENCOUNTER — APPOINTMENT (OUTPATIENT)
Dept: PHYSICAL THERAPY | Facility: CLINIC | Age: 57
End: 2022-12-15

## 2022-12-19 ENCOUNTER — OFFICE VISIT (OUTPATIENT)
Dept: PHYSICAL THERAPY | Facility: CLINIC | Age: 57
End: 2022-12-19

## 2022-12-19 DIAGNOSIS — M25.562 CHRONIC PAIN OF LEFT KNEE: ICD-10-CM

## 2022-12-19 DIAGNOSIS — Z51.89 AFTERCARE: Primary | ICD-10-CM

## 2022-12-19 DIAGNOSIS — G89.29 CHRONIC PAIN OF LEFT KNEE: ICD-10-CM

## 2022-12-19 NOTE — PROGRESS NOTES
Daily Note     Today's date: 2022  Patient name: Antonio Cisse  : 1965  MRN: [id number]  Referring provider: Leroy Salazar DO  Dx:   Encounter Diagnosis     ICD-10-CM    1  Aftercare  Z51 89       2  Chronic pain of left knee  M25 562     G89 29           Start Time: 1700  Stop Time: 1740  Total time in clinic (min): 40 minutes    Subjective: Pt reports that she feels like he knee swings off to the side when she walks, notes improvement in scar sensitivity  Objective: See treatment diary below      Assessment: Tolerated treatment well  Decreased scar sensitivity  Patient demonstrated fatigue post treatment      Plan: Continue per plan of care  Precautions:  HTN, Falls Osteoporosis, CHF,     Per Physician visit 10/13/22: Focus on extension avoid deep flexion until given clearance (MD APT 10/20 allowed to flex)   Slow progression and return to full activity secondary to his PE       Daily Treatment Diary  Manuals    Knee L PROM / SLR AAROM ROM WNL    8 min  10 min   RE performed         IASTM LNK LNK LNK               Ther Ex         Bike rock and roll ROM ROM 6 min  ROM 6 min  ROM 6 min   ROM 6 min  ROM 6 min   Gastroc slant bd 20"x4 20"x4 20"x4  20"x4 20''x4   Quad set/GS         Heel slides         NBOS EC 1 min foam  1 min foam  1 min foam  1 min foam  NV   NBOS EO Foam         Hip Abd + Ext GTB 2x10 GTB 2x10 GTB 2x10  2x10 RTB 2x10 RTB   Leg press  SL 50#  2x10   SL 50# 2x10  SL 60#      HR/TR 2x10 2x10 2x10  3x10 3x10   Ankle pump/circulation education         Neuro Re-ed                           LAQ 5# 2x10  5# 2x10 5# 2x10   2x10 4#  2x10 4#   SLR Flex 2x10  2x10  2x10 2x10            Ther Activity         stairs FF  FF FF  FF NV   minisquats 2x10 2x10 2x10  2x10 2x10   Sit<>Stand 2x10 2x10 2x10   2x10 2x10                              Modalities         CP in elevation to L knee 10 min  10 10 min  10 min  10 min

## 2022-12-22 ENCOUNTER — APPOINTMENT (OUTPATIENT)
Dept: PHYSICAL THERAPY | Facility: CLINIC | Age: 57
End: 2022-12-22

## 2022-12-26 ENCOUNTER — APPOINTMENT (OUTPATIENT)
Dept: LAB | Facility: CLINIC | Age: 57
End: 2022-12-26

## 2022-12-26 DIAGNOSIS — I26.99 PULMONARY EMBOLISM ASSOCIATED WITH COVID-19 (HCC): ICD-10-CM

## 2022-12-26 DIAGNOSIS — U07.1 PULMONARY EMBOLISM ASSOCIATED WITH COVID-19 (HCC): ICD-10-CM

## 2022-12-27 ENCOUNTER — TELEPHONE (OUTPATIENT)
Dept: HEMATOLOGY ONCOLOGY | Facility: CLINIC | Age: 57
End: 2022-12-27

## 2022-12-27 ENCOUNTER — OFFICE VISIT (OUTPATIENT)
Dept: PHYSICAL THERAPY | Facility: CLINIC | Age: 57
End: 2022-12-27

## 2022-12-27 ENCOUNTER — ANTICOAG VISIT (OUTPATIENT)
Dept: HEMATOLOGY ONCOLOGY | Facility: CLINIC | Age: 57
End: 2022-12-27

## 2022-12-27 DIAGNOSIS — Z51.89 AFTERCARE: Primary | ICD-10-CM

## 2022-12-27 DIAGNOSIS — M25.562 CHRONIC PAIN OF LEFT KNEE: ICD-10-CM

## 2022-12-27 DIAGNOSIS — G89.29 CHRONIC PAIN OF LEFT KNEE: ICD-10-CM

## 2022-12-27 NOTE — TELEPHONE ENCOUNTER
CALL RETURN FORM   Reason for patient call? Patient calling about INR values   Patient's primary oncologist? [address]   Name of person the patient was calling for? [place]   Any additional information to add, if applicable? [phone]   Informed patient that the message will be forwarded to the team and someone will get back to them as soon as possible    Did you relay this information to the patient?   yes

## 2022-12-27 NOTE — PROGRESS NOTES
Daily Note     Today's date: 2022  Patient name: Jodi Mane  : 1965  MRN: [id number]  Referring provider: Celeste Mccurdy DO  Dx:   Encounter Diagnosis     ICD-10-CM    1  Aftercare  Z51 89       2  Chronic pain of left knee  M25 562     G89 29           Start Time: 1800  Stop Time: 1830  Total time in clinic (min): 30 minutes    Subjective: Pt reports that she has calf pain, notes her blood level are normal but will follow up with her MD tomorrow if it persists  Objective: See treatment diary below  No LE swelling, no edema, no redness  Assessment: Tolerated treatment well  Held some strengthening due to gastroc pain  Patient demonstrated fatigue post treatment      Plan: Continue per plan of care  Precautions:  HTN, Falls Osteoporosis, CHF,     Per Physician visit 10/13/22: Focus on extension avoid deep flexion until given clearance (MD APT 10/20 allowed to flex)   Slow progression and return to full activity secondary to his PE       Daily Treatment Diary  Manuals   12   Knee L PROM / SLR AAROM ROM WNL     10 min   RE performed         IASTM LNK LNK LNK LNK              Ther Ex         Bike rock and roll ROM ROM 6 min  ROM 6 min  ROM 6 min  ROM 6 min   ROM 6 min   Gastroc slant bd 20"x4 20"x4 20"x4 np   20''x4   Quad set/GS         Heel slides         NBOS EC 1 min foam  1 min foam  1 min foam 1 min foam   NV   NBOS EO Foam         Hip Abd + Ext GTB 2x10 GTB 2x10 GTB 2x10 GTB 2x10   2x10 RTB   Leg press  SL 50#  2x10   SL 50# 2x10  SL 60# SL 60#     HR/TR 2x10 2x10 2x10 np  3x10   Ankle pump/circulation education         Neuro Re-ed                           LAQ 5# 2x10  5# 2x10 5# 2x10  5# 2x10   2x10 4#   SLR Flex 2x10  2x10   2x10            Ther Activity         stairs FF  FF FF FF  NV   minisquats 2x10 2x10 2x10 2x10   2x10   Sit<>Stand 2x10 2x10 2x10  2x10  2x10                              Modalities         CP in elevation to L knee 10 min  10 10 min np 10 min

## 2022-12-27 NOTE — TELEPHONE ENCOUNTER
Pt notified via voicemail - to continue current Coumadin schedule and recheck in 2 weeks  See anticoag visit documentation

## 2022-12-29 ENCOUNTER — TELEPHONE (OUTPATIENT)
Dept: HEMATOLOGY ONCOLOGY | Facility: CLINIC | Age: 57
End: 2022-12-29

## 2022-12-29 ENCOUNTER — EVALUATION (OUTPATIENT)
Dept: PHYSICAL THERAPY | Facility: CLINIC | Age: 57
End: 2022-12-29

## 2022-12-29 ENCOUNTER — APPOINTMENT (OUTPATIENT)
Dept: RADIOLOGY | Facility: AMBULARY SURGERY CENTER | Age: 57
End: 2022-12-29
Attending: ORTHOPAEDIC SURGERY

## 2022-12-29 ENCOUNTER — OFFICE VISIT (OUTPATIENT)
Dept: OBGYN CLINIC | Facility: CLINIC | Age: 57
End: 2022-12-29

## 2022-12-29 VITALS
DIASTOLIC BLOOD PRESSURE: 85 MMHG | HEART RATE: 85 BPM | HEIGHT: 64 IN | SYSTOLIC BLOOD PRESSURE: 126 MMHG | WEIGHT: 219 LBS | BODY MASS INDEX: 37.39 KG/M2

## 2022-12-29 DIAGNOSIS — Z96.652 STATUS POST TOTAL KNEE REPLACEMENT USING CEMENT, LEFT: ICD-10-CM

## 2022-12-29 DIAGNOSIS — Z79.01 WARFARIN ANTICOAGULATION: ICD-10-CM

## 2022-12-29 DIAGNOSIS — G89.29 CHRONIC PAIN OF LEFT KNEE: Primary | ICD-10-CM

## 2022-12-29 DIAGNOSIS — M25.562 CHRONIC PAIN OF LEFT KNEE: Primary | ICD-10-CM

## 2022-12-29 DIAGNOSIS — Z96.652 STATUS POST TOTAL KNEE REPLACEMENT USING CEMENT, LEFT: Primary | ICD-10-CM

## 2022-12-29 DIAGNOSIS — Z51.89 AFTERCARE: ICD-10-CM

## 2022-12-29 DIAGNOSIS — U07.1 PULMONARY EMBOLISM ASSOCIATED WITH COVID-19 (HCC): Primary | ICD-10-CM

## 2022-12-29 DIAGNOSIS — I26.99 PULMONARY EMBOLISM ASSOCIATED WITH COVID-19 (HCC): Primary | ICD-10-CM

## 2022-12-29 RX ORDER — CLINDAMYCIN HYDROCHLORIDE 300 MG/1
CAPSULE ORAL
Qty: 3 CAPSULE | Refills: 0 | Status: SHIPPED | OUTPATIENT
Start: 2022-12-29 | End: 2023-01-29

## 2022-12-29 NOTE — PROGRESS NOTES
Assessment:  1   Status post total knee replacement using cement, left  XR knee 3 vw left non injury    Ambulatory Referral to Physical Therapy    CANCELED: XR knee 1 or 2 vw left        Patient Active Problem List   Diagnosis   • B12 deficiency   • Chronic cough   • Diffuse connective tissue disease ([facility])   • Dyspnea   • Edema   • Essential hypertension   • Hot flashes due to menopause   • Long-term use of hydroxychloroquine   • Other organ or system involvement in systemic lupus erythematosus ([facility])   • Lupus anticoagulant disorder ([facility])   • SOB (shortness of breath) on exertion   • Sinus tachycardia   • Secondary pulmonary hypertension   • Pulmonary hypertension ([facility])   • Post-nasal drip   • Pes anserine bursitis   • Positive HELENE (antinuclear antibody)   • Other chronic pulmonary heart diseases   • Osteoporosis   • Night sweats   • Patellofemoral disorder of right knee   • Pes anserinus bursitis of right knee   • Arthritis of right knee   • Other tear of medial meniscus, current injury, right knee, initial encounter   • Tear of medial meniscus of right knee, current   • Chronic pain of right knee   • Elevated serum creatinine   • Hyperuricemia   • Trochanteric bursitis of both hips   • Undifferentiated connective tissue disease ([facility] [address] )   • Vitamin D deficiency   • Stage 3a chronic kidney disease (CKD) ([facility] [address] )   • Right knee pain   • Status post total right knee replacement   • Left knee pain   • Tear of medial meniscus of left knee, current   • Primary osteoarthritis of left knee   • Obesity   • Leukopenia   • Venous stasis of lower extremity   • Acute pain of right knee   • PONV (postoperative nausea and vomiting)   • Status post total knee replacement using cement, left   • Pulmonary embolism associated with COVID-19 ([facility])   • SLE (systemic lupus erythematosus) ([facility])   • SIRS (systemic inflammatory response syndrome) ([facility])   • Arthritis of left knee   • Iron deficiency anemia due to chronic blood loss Plan      Patient will have a dentist appointment we will prescribe her antibiotics for that appointment  She will continue physical therapy  She will follow-up with us in 3 months which will be her 6-month follow-up we will repeat x-rays at that time  Subjective:     Patient ID:    Chief Complaint:Na Joseph 59 y o  female      HPI    Patient comes in today now 10 weeks out from her left total knee history of DVT and pulmonary embolism  She is currently on Coumadin in the therapeutic range  She has excellent range of motion  She is happy with her progress but does have some difficulty with going up the steps  She also reports that she has some clicking in the knee as well  She also reports that there are some mild knee pain on the medial proximal tibia      The following portions of the patient's history were reviewed and updated as appropriate: allergies, current medications, past family history, past social history, past surgical history and problem list     All organ systems normal    Social History     Socioeconomic History   • Marital status: /Civil Union     Spouse name: Not on file   • Number of children: 2   • Years of education: Not on file   • Highest education level: Not on file   Occupational History   • Not on file   Tobacco Use   • Smoking status: Former     Packs/day: 0 00     Years: 0 00     Pack years: 0 00     Types: Cigarettes     Quit date: 2006     Years since quittin 9   • Smokeless tobacco: Never   Vaping Use   • Vaping Use: Never used   Substance and Sexual Activity   • Alcohol use:  Yes     Alcohol/week: 0 0 standard drinks     Comment: socially   • Drug use: No   • Sexual activity: Yes     Partners: Male   Other Topics Concern   • Not on file   Social History Narrative    Daily caffeinated coffee consumption    Exercise habits     Social Determinants of Health     Financial Resource Strain: Not on file   Food Insecurity: Not on file Transportation Needs: Not on file   Physical Activity: Not on file   Stress: Not on file   Social Connections: Not on file   Intimate Partner Violence: Not on file   Housing Stability: Not on file     Past Medical History:   Diagnosis Date   • HELENE positive    • Anemia     Sildenafil causes decreased hematocrit   • Anxiety 03/2020   • CHF (congestive heart failure) (HCC)     right heart failure related to pulm HTN lupus   • Chronic kidney disease     borderline lab values   • Chronic rhinitis 06/04/2012   • Clotting disorder ([facility] [address] ) 2012   • Coronary artery disease 2018   • Encephalitis     Lasted Assessed 10/18/2017   • Gout 06/26/2018   • Hypertension    • Lupus anticoagulant disorder ([facility] [address] )    • Maxillary sinusitis     Lasted Assessed 10/18/2017   • Night sweat    • Osteopenia     Hip   • Osteoporosis     Spine   • Pneumonia     Last Assessed 10/18/2017   • PONV (postoperative nausea and vomiting)    • Pulmonary embolism (HCC)    • Pulmonary hypertension (HCC)    • Seizures ([facility] [address] )     Only during Encephalitis   • Shortness of breath     with exertion   • Sinus tachycardia    • Urinary incontinence      Past Surgical History:   Procedure Laterality Date   • ARTHRODESIS      Hand: IP Joint   • CHOLECYSTECTOMY     • COLONOSCOPY     • COLPOSCOPY     • HYSTERECTOMY      Vaginal   • JOINT REPLACEMENT Right     Knee replacement   • KNEE SURGERY  2/2019   • LAPAROSCOPIC ESOPHAGOGASTRIC FUNDOPLASTY  2008   • OH ARTHRP KNE CONDYLE&PLATU MEDIAL&LAT COMPARTMENTS Right 2/12/2019    Procedure: KNEE TOTAL ARTHROPLASTY AND ASSOCIATED PROCEDURES;  Surgeon: Lasha Mead DO;  [facility];  Service: Orthopedics   • OH ARTHRP KNE CONDYLE&PLATU MEDIAL&LAT COMPARTMENTS Left 10/6/2022    Procedure: ARTHROPLASTY KNEE TOTAL;  Surgeon: Lasha Mead DO;  [facility];  Service: Orthopedics   • OH ARTHRS KNEE W/MENISCECTOMY MED&LAT W/SHAVING Right 10/2/2018    Procedure: KNEE ARTHROSCOPY WITH PARTIAL MEDIAL MENISCECTOMY; Surgeon: Kip Wolf DO;  [facility];  Service: Orthopedics   • MD ARTHRS KNEE W/MENISCECTOMY MED&LAT W/SHAVING Left 12/17/2019    Procedure: KNEE ARTHROSCOPIC MENISCECTOMY /MEDIAL;  Chondyl medial and posterior;  Surgeon: Kip Wolf DO;  [facility];  Service: Orthopedics   • REDUCTION MAMMAPLASTY     • REPAIR ANKLE LIGAMENT Right    • TONSILLECTOMY       Allergies   Allergen Reactions   • Dilantin [Phenytoin] Anaphylaxis and Rash   • Penicillins Rash, Anaphylaxis, Dermatitis and Hives   • Augmentin [Amoxicillin-Pot Clavulanate] Rash and Dermatitis     Current Outpatient Medications on File Prior to Visit   Medication Sig Dispense Refill   • aspirin (ECOTRIN LOW STRENGTH) 81 mg EC tablet Take 162 mg by mouth daily     • B-D 3CC LUER-LILLIE SYR 25GX1/2" 25G X 1-1/2" 3 ML MISC USE 1 SYRINGE EVERY 30 DAYS     • Benlysta 200 MG/ML SOAJ Weekly on Fridays     • cholecalciferol (VITAMIN D3) 1,000 units tablet Take 2,000 Units by mouth daily in the early morning       • cyanocobalamin 1,000 mcg/mL Inject 1 mL (1,000 mcg total) into a muscle every 30 (thirty) days 12 mL 1   • gabapentin (Neurontin) 100 mg capsule Take 1 capsule (100 mg total) by mouth daily at bedtime 90 capsule 0   • hydroxychloroquine (PLAQUENIL) 200 mg tablet Take 400 mg by mouth daily with breakfast Alternating days/dosages     • sildenafil (REVATIO) 20 mg tablet Take 1 tablet (20 mg total) by mouth 3 (three) times a day 270 tablet 3   • Syringe/Needle, Disp, (SecureSafe Syringe/Needle) 25G X 1-1/2" 1 ML MISC Use 1 Syringe every 30 (thirty) days 12 each 1   • traZODone (DESYREL) 50 mg tablet Take 1 tablet (50 mg total) by mouth daily at bedtime 30 tablet 1   • triamcinolone (KENALOG) 0 1 % oral topical paste prn     • warfarin (Coumadin) 5 mg tablet Take 1 tablet (5 mg total) by mouth daily (Patient taking differently: Take 5 mg by mouth daily 2 5 tues and thursday 5mg Mon, Wed, Fri, Sat & Sun) 90 tablet 0   • ondansetron (ZOFRAN) 4 mg tablet Take 1 tablet (4 mg total) by mouth every 8 (eight) hours as needed for nausea or vomiting for up to 15 days (Patient not taking: Reported on 12/29/2022) 20 tablet 0   • spironolactone (ALDACTONE) 25 mg tablet Take 1 tablet (25 mg total) by mouth daily (Patient not taking: Reported on 11/28/2022) 90 tablet 3   • [DISCONTINUED] ascorbic acid (VITAMIN C) 500 MG tablet Take 1 tablet (500 mg total) by mouth 2 (two) times a day 60 tablet 1   • [DISCONTINUED] ferrous sulfate 324 (65 Fe) mg Take 1 tablet (324 mg total) by mouth 2 (two) times a day before meals 60 tablet 1   • [DISCONTINUED] folic acid (FOLVITE) 1 mg tablet TAKE 1 TABLET(1 MG) BY MOUTH DAILY 90 tablet 0   • [DISCONTINUED] methocarbamol (Robaxin-750) 750 mg tablet Take 1 tablet (750 mg total) by mouth every 6 (six) hours as needed for muscle spasms 20 tablet 0   • [DISCONTINUED] Multiple Vitamins-Minerals (multivitamin with minerals) tablet Take 1 tablet by mouth daily 30 tablet 1   • [DISCONTINUED] ondansetron (Zofran ODT) 4 mg disintegrating tablet Take 1 tablet (4 mg total) by mouth every 6 (six) hours as needed for nausea or vomiting 20 tablet 0     No current facility-administered medications on file prior to visit  Objective:        Ortho Exam  Range of motion is excellent over 120 degrees of flexion full extension incision clean dry intact no edema present  There is clicking of the knee at the 45 to 90 degree angle of the knee seems to be more patella than anything else  The medial pain is palpable over the proximal medial tibia seems to be emanating from the past  pes tendons    I have personally reviewed pertinent films in PACS and my interpretation is Stable healing total knee arthroplasty      Portions of the record may have been created with voice recognition software   Occasional wrong word or "sound a like" substitutions may have occurred due to the inherent limitations of voice recognition software   Read the chart carefully and recognize, using context, where substitutions have occurred

## 2022-12-29 NOTE — PROGRESS NOTES
PT Evaluation     Today's date: 2022  Patient name: Hanh Smith  : 1965  MRN: [id number]  Referring provider: Roxana Merino DO  Dx:   Encounter Diagnosis     ICD-10-CM    1  Chronic pain of left knee  M25 562     G89 29       2  Aftercare  Z51 89                      Assessment  Assessment details: Pt is continuing to progress very well at this time  They have demonstrated further improvement in pain levels, strength, range, flexibility, tolerance to activity as well as gait quality and speed  They are no longer a fall risk  They have achieved majority of LTGs sought out for them as well as by them  They will benefit continued Skilled PT intervention in order to achieve remainder of LTGs sought out for them as well as by them in order to perform all desired activities with minimal to nil symptom exacerbation  Further focus will be strengthening for performance of elevations  Thank you very much for this kind, familiar and motivated referral         Impairments: abnormal gait, abnormal or restricted ROM, impaired balance, impaired physical strength, lacks appropriate home exercise program, pain with function, poor posture  and poor body mechanics  Understanding of Dx/Px/POC: good   Prognosis: good    Goals  STG 4 Weeks:  Decrease pain at worst to 5 -met  Improve range to achieve TKE, and achieve 105 flexion -met  Improve strength to improve L hip to SLR AROM 3+/5, Knee ext 5/5  -met  Independent with HEP -met  LTG 8 Weeks:  Decrease pain at worst to 2  Improve range to achieve 120 flexion -met  Improve strength to L hip 4/5 or greater  -partial  Able to perform all desired activities with minimal to nil symptom exacerbation      Plan  Plan details: Melissa Peña is      Patient would benefit from: skilled physical therapy  Planned modality interventions: cryotherapy, TENS and thermotherapy: hydrocollator packs  Planned therapy interventions: joint mobilization, manual therapy, abdominal trunk stabilization, activity modification, neuromuscular re-education, patient education, postural training, strengthening, stretching, therapeutic activities, therapeutic exercise, therapeutic training, transfer training, home exercise program, graded motor, graded exercise, graded activity, gait training, functional ROM exercises, flexibility, balance, balance/weight bearing training, behavior modification and body mechanics training  Frequency: 2x week  Duration in weeks: 10  Treatment plan discussed with: patient and family        Subjective Evaluation    History of Present Illness  Date of onset: 10/10/2022  Mechanism of injury: Pt presents today stating as a whole she is feeling really well  Pt reports she had a meeting with Dr Johann Fritz today who was pleased with range  There is audible click with patella and Dr Johann Fritz desires more strength for pt to assist with this  Pt reports overall very content low pain levels at worst 3/10 only when she stands and walks for a long period of time such as that of gift shopping  Pt reports that her her goals at this time is to be able to improve endurance and strength  Incision symptoms are far less challenging at this time as to what they were  Very content with progression  No longer having incisional pain  Follows up with Dr Johann Fritz on 3/30/23      Quality of life: good    Pain  Current pain ratin  At best pain ratin  At worst pain rating: 3  Quality: dull ache, tight and throbbing  Relieving factors: ice, medications, rest and change in position  Aggravating factors: standing, walking, stair climbing and lifting  Progression: improved    Social Support  Steps to enter house: yes  Stairs in house: yes   Lives in: multiple-level home  Lives with: spouse      Diagnostic Tests  X-ray: abnormal  Treatments  Previous treatment: injection treatment and medication  Patient Goals  Patient goals for therapy: decreased pain, improved balance, increased motion, increased strength and return to sport/leisure activities          Objective     Active Range of Motion   Left Knee   Flexion: 126 (129) degrees   Extension: 0 (0) degrees     Right Knee   Flexion: 125 degrees   Extension: 0 degrees     Additional Active Range of Motion Details  Forward head, rounded shoulders, Lordosis  Genu valgum R > L, slow, decreased step heigh antalgia with L    B/L Sensation intact to L3,4,5,S1,S2  Hip strength  Quad Set Good R, good  (-) L    L Flex 4- Ext 4 Abd 4- Add 5 SLR AAROM  10 before TKE lag of < 5 occurs // flex 4-, ext 5, abd 4-, add 5  SLR AROM 15 reps before TKE lag occus  R Flex 5 Ext 5 Abd 5 Add 5 SLR AROM 20x  LE Screen  Knee strength  L 3 flex and ext // 5/5  R 5/5   Ankle    L strong and painless B  R strong and painless B   Joint Mobility  Palpation  Sts  Incision Clean dry and intact all except, small distal scab, small suture top 1/3rd of incision  No signs of infection  TUG with Rollator: 34 87"  // 10 41" without AD, mild antalgia with L IC, decreased stance phase   / 9 6"  Elevations reviewed and discussed  / alternate up and down 1 UE with rail  Fair eccentrics with descent  Precautions:  HTN, Falls Osteoporosis, CHF,     Per Physician visit 10/13/22: Focus on extension avoid deep flexion until given clearance (MD APT 10/20 allowed to flex)   Slow progression and return to full activity secondary to his PE       Daily Treatment Diary  Manuals 12/08 12/12 12/19 12/27 12/29 12/05   Knee L PROM / SLR AAROM ROM WNL     10 min   RE performed         IASTM LNK LNK LNK LNK TAS             Ther Ex         Bike rock and roll ROM ROM 6 min  ROM 6 min  ROM 6 min  ROM 6 min  6 min ROM 6 min   Gastroc slant bd 20"x4 20"x4 20"x4 np   20''x4   Quad set/GS         Heel slides         NBOS EC 1 min foam  1 min foam  1 min foam 1 min foam  1 min NV   NBOS EO Foam         Hip Abd + Ext GTB 2x10 GTB 2x10 GTB 2x10 GTB 2x10  GTB 2 x 10 2x10 RTB   Leg press  SL 50#  2x10   SL 50# 2x10  SL 60# SL 60#     HR/TR 2x10 2x10 2x10 np 3 x 10 3x10   Ankle pump/circulation education         Neuro Re-ed                           LAQ 5# 2x10  5# 2x10 5# 2x10  5# 2x10  6# 2 x 10 2x10 4#   SLR Flex 2x10  2x10   2x10            Ther Activity         stairs FF  FF FF FF FF NV   minisquats 2x10 2x10 2x10 2x10  2 x 10 2x10   Sit<>Stand 2x10 2x10 2x10  2x10 2 x 10 2x10                              Modalities         CP in elevation to L knee 10 min  10 10 min np dec 10 min

## 2023-01-03 ENCOUNTER — OFFICE VISIT (OUTPATIENT)
Dept: PHYSICAL THERAPY | Facility: CLINIC | Age: 58
End: 2023-01-03

## 2023-01-03 DIAGNOSIS — G89.29 CHRONIC PAIN OF LEFT KNEE: Primary | ICD-10-CM

## 2023-01-03 DIAGNOSIS — M25.562 CHRONIC PAIN OF LEFT KNEE: Primary | ICD-10-CM

## 2023-01-03 DIAGNOSIS — Z51.89 AFTERCARE: ICD-10-CM

## 2023-01-03 NOTE — PROGRESS NOTES
Daily Note     Today's date: 1/3/2023  Patient name: Stephan Hdez  : 1965  MRN: [id number]  Referring provider: Andressa Ro DO  Dx:   Encounter Diagnosis     ICD-10-CM    1  Chronic pain of left knee  M25 562     G89 29       2  Aftercare  Z51 89           Start Time: 1630  Stop Time: 1710  Total time in clinic (min): 40 minutes    Subjective: Pt reports that her gait has normalized  She desires more power to ascend/descend steps  Objective: See treatment diary below      Assessment: Tolerated treatment well  Progressing well with strength  Patient demonstrated fatigue post treatment      Plan: Continue per plan of care  Precautions:  HTN, Falls Osteoporosis, CHF,     Per Physician visit 10/13/22: Focus on extension avoid deep flexion until given clearance (MD PARIKH 10/20 allowed to flex)   Slow progression and return to full activity secondary to his PE       Daily Treatment Diary  Manuals    Knee L PROM / SLR AAROM ROM WNL        RE performed         IASTM LNK LNK LNK LNK TAS LNK            Ther Ex         Bike rock and roll ROM ROM 6 min  ROM 6 min  ROM 6 min  ROM 6 min  6 min 6 min ROM   Gastroc slant bd 20"x4 20"x4 20"x4 np      Quad set/GS         Heel slides         NBOS EC 1 min foam  1 min foam  1 min foam 1 min foam  1 min 1 min foam   NBOS EO Foam         Hip Abd + Ext GTB 2x10 GTB 2x10 GTB 2x10 GTB 2x10  GTB 2 x 10 GTB  2x10   Leg press  SL 50#  2x10   SL 50# 2x10  SL 60# SL 60#  65# 2x10   HR/TR 2x10 2x10 2x10 np 3 x 10 3x10   Ankle pump/circulation education         Neuro Re-ed                           LAQ 5# 2x10  5# 2x10 5# 2x10  5# 2x10  6# 2 x 10 6# 2x10    SLR Flex 2x10  2x10               Ther Activity         stairs FF  FF FF FF FF FF   minisquats 2x10 2x10 2x10 2x10  2 x 10 2x10   Sit<>Stand 2x10 2x10 2x10  2x10 2 x 10 2x10                              Modalities         CP in elevation to L knee 10 min  10 10 min np dec dec

## 2023-01-05 ENCOUNTER — OFFICE VISIT (OUTPATIENT)
Dept: FAMILY MEDICINE CLINIC | Facility: CLINIC | Age: 58
End: 2023-01-05

## 2023-01-05 ENCOUNTER — PATIENT OUTREACH (OUTPATIENT)
Dept: FAMILY MEDICINE CLINIC | Facility: OTHER | Age: 58
End: 2023-01-05

## 2023-01-05 ENCOUNTER — OFFICE VISIT (OUTPATIENT)
Dept: PHYSICAL THERAPY | Facility: CLINIC | Age: 58
End: 2023-01-05

## 2023-01-05 VITALS
RESPIRATION RATE: 16 BRPM | SYSTOLIC BLOOD PRESSURE: 134 MMHG | OXYGEN SATURATION: 96 % | BODY MASS INDEX: 39.12 KG/M2 | DIASTOLIC BLOOD PRESSURE: 74 MMHG | HEART RATE: 106 BPM | WEIGHT: 220.8 LBS | HEIGHT: 63 IN

## 2023-01-05 DIAGNOSIS — M25.562 CHRONIC PAIN OF LEFT KNEE: Primary | ICD-10-CM

## 2023-01-05 DIAGNOSIS — I26.99 OTHER ACUTE PULMONARY EMBOLISM WITHOUT ACUTE COR PULMONALE (HCC): ICD-10-CM

## 2023-01-05 DIAGNOSIS — M79.2 NEUROPATHIC PAIN, LEG, LEFT: ICD-10-CM

## 2023-01-05 DIAGNOSIS — N18.31 CHRONIC KIDNEY DISEASE, STAGE 3A (HCC): ICD-10-CM

## 2023-01-05 DIAGNOSIS — G47.9 SLEEPING DIFFICULTY: ICD-10-CM

## 2023-01-05 DIAGNOSIS — Z51.89 AFTERCARE: ICD-10-CM

## 2023-01-05 DIAGNOSIS — I27.20 PULMONARY HYPERTENSION (HCC): ICD-10-CM

## 2023-01-05 DIAGNOSIS — G47.00 INSOMNIA, UNSPECIFIED TYPE: Primary | ICD-10-CM

## 2023-01-05 DIAGNOSIS — E66.01 MORBID OBESITY (HCC): ICD-10-CM

## 2023-01-05 DIAGNOSIS — M32.9 SYSTEMIC LUPUS ERYTHEMATOSUS, UNSPECIFIED SLE TYPE, UNSPECIFIED ORGAN INVOLVEMENT STATUS (HCC): ICD-10-CM

## 2023-01-05 DIAGNOSIS — G89.29 CHRONIC PAIN OF LEFT KNEE: Primary | ICD-10-CM

## 2023-01-05 RX ORDER — GABAPENTIN 100 MG/1
100 CAPSULE ORAL
Qty: 90 CAPSULE | Refills: 0 | Status: SHIPPED | OUTPATIENT
Start: 2023-01-05

## 2023-01-05 RX ORDER — TRAZODONE HYDROCHLORIDE 50 MG/1
50 TABLET ORAL
Qty: 30 TABLET | Refills: 2 | Status: SHIPPED | OUTPATIENT
Start: 2023-01-05 | End: 2023-02-04

## 2023-01-05 NOTE — ASSESSMENT & PLAN NOTE
Lab Results   Component Value Date    EGFR 84 11/07/2022    EGFR 82 11/06/2022    EGFR 78 11/05/2022    CREATININE 0 77 11/07/2022    CREATININE 0 79 11/06/2022    CREATININE 0 82 11/05/2022   Patient has SLE  Renal function stable  Continue monitoring

## 2023-01-05 NOTE — PROGRESS NOTES
Subjective:      Patient ID: Alejandra Florence is a 59 y o  female  HPI      Patient is here to establish care  Patient has insomnia for which she is currently on gabapentin 100, trazodone 50  Combination of medication is working good for her  Denies any side effects  Patient with history of SLE  For which she follows with rheumatologist , on Plaquenil  Patient is also monitored by cardiology  Has history of pulmonary hypertension  Denies any symptoms of chest pain  On spironolactone 25 mg daily  Patient has a history of acute PE following COVID-19 infection  She is currently on warfarin  Coumadin monitored by hematology      Past Medical History:   Diagnosis Date   • HELENE positive    • Anemia     Sildenafil causes decreased hematocrit   • Anxiety 03/2020   • CHF (congestive heart failure) (HCC)     right heart failure related to pulm HTN lupus   • Chronic kidney disease     borderline lab values   • Chronic rhinitis 06/04/2012   • Clotting disorder ([facility] [address] ) 2012   • Coronary artery disease 2018   • Encephalitis     Lasted Assessed 10/18/2017   • Gout 06/26/2018   • Hypertension    • Lupus anticoagulant disorder (HCC)    • Maxillary sinusitis     Lasted Assessed 10/18/2017   • Night sweat    • Osteopenia     Hip   • Osteoporosis     Spine   • Pneumonia     Last Assessed 10/18/2017   • PONV (postoperative nausea and vomiting)    • Pulmonary embolism (HCC)    • Pulmonary hypertension (HCC)    • Seizures (HCC)     Only during Encephalitis   • Shortness of breath     with exertion   • Sinus tachycardia    • Urinary incontinence        Family History   Problem Relation Age of Onset   • Uterine cancer Mother    • Pancreatic cancer Mother 79   • Diabetes Mother    • Endometrial cancer Mother 77   • Arthritis Mother    • Cancer Mother         Pancreas, Uterine   • Vision loss Mother    • Heart disease Father         open heart surgery at age 48    • Stroke Father         CVA   • Hypertension Father    • Atrial fibrillation Father    • Hyperlipidemia Father    • Arthritis Father    • Rheum arthritis Father    • No Known Problems Sister    • No Known Problems Brother    • Thyroid disease Maternal Grandmother    • Asthma Daughter    • Heart attack Maternal Grandfather    • Heart attack Paternal Grandmother    • Skin cancer Paternal Grandfather    • No Known Problems Sister    • Thyroid disease Sister    • Depression Sister    • Osteoarthritis Sister    • Hemochromatosis Brother    • Migraines Daughter    • Asthma Daughter    • No Known Problems Maternal Aunt    • Anuerysm Neg Hx    • Clotting disorder Neg Hx    • Heart failure Neg Hx        Past Surgical History:   Procedure Laterality Date   • ARTHRODESIS      Hand: IP Joint   • CHOLECYSTECTOMY     • COLONOSCOPY     • COLPOSCOPY     • HYSTERECTOMY      Vaginal   • JOINT REPLACEMENT Right     Knee replacement   • KNEE SURGERY  2/2019   • LAPAROSCOPIC ESOPHAGOGASTRIC FUNDOPLASTY  2008   • IN ARTHRP KNE CONDYLE&PLATU MEDIAL&LAT COMPARTMENTS Right 2/12/2019    Procedure: KNEE TOTAL ARTHROPLASTY AND ASSOCIATED PROCEDURES;  Surgeon: Daniel Hurd DO;  [facility];  Service: Orthopedics   • IN ARTHRP KNE CONDYLE&PLATU MEDIAL&LAT COMPARTMENTS Left 10/6/2022    Procedure: ARTHROPLASTY KNEE TOTAL;  Surgeon: Daniel Hurd DO;  [facility];  Service: Orthopedics   • IN ARTHRS KNEE W/MENISCECTOMY MED&LAT W/SHAVING Right 10/2/2018    Procedure: KNEE ARTHROSCOPY WITH PARTIAL MEDIAL MENISCECTOMY;  Surgeon: Daniel Hurd DO;  [facility];  Service: Orthopedics   • IN ARTHRS KNEE W/MENISCECTOMY MED&LAT W/SHAVING Left 12/17/2019    Procedure: KNEE ARTHROSCOPIC MENISCECTOMY /MEDIAL; Chondyl medial and posterior;  Surgeon: Daniel Hurd DO;  [facility];  Service: Orthopedics   • REDUCTION MAMMAPLASTY     • REPAIR ANKLE LIGAMENT Right    • TONSILLECTOMY          reports that she quit smoking about 16 years ago  Her smoking use included cigarettes   She has never used smokeless tobacco  She reports current alcohol use  She reports that she does not use drugs        Current Outpatient Medications:   •  aspirin (ECOTRIN LOW STRENGTH) 81 mg EC tablet, Take 162 mg by mouth daily, Disp: , Rfl:   •  B-D 3CC LUER-LILLIE SYR 25GX1/2" 25G X 1-1/2" 3 ML MISC, USE 1 SYRINGE EVERY 30 DAYS, Disp: , Rfl:   •  Benlysta 200 MG/ML SOAJ, Weekly on Fridays, Disp: , Rfl:   •  cholecalciferol (VITAMIN D3) 1,000 units tablet, Take 2,000 Units by mouth daily in the early morning  , Disp: , Rfl:   •  clindamycin (CLEOCIN) 300 MG capsule, 3 pills 1 hour before procedure, Disp: 3 capsule, Rfl: 0  •  cyanocobalamin 1,000 mcg/mL, Inject 1 mL (1,000 mcg total) into a muscle every 30 (thirty) days, Disp: 12 mL, Rfl: 1  •  gabapentin (Neurontin) 100 mg capsule, Take 1 capsule (100 mg total) by mouth daily at bedtime, Disp: 90 capsule, Rfl: 0  •  hydroxychloroquine (PLAQUENIL) 200 mg tablet, Take 400 mg by mouth daily with breakfast Alternating days/dosages, Disp: , Rfl:   •  sildenafil (REVATIO) 20 mg tablet, Take 1 tablet (20 mg total) by mouth 3 (three) times a day, Disp: 270 tablet, Rfl: 3  •  spironolactone (ALDACTONE) 25 mg tablet, Take 1 tablet (25 mg total) by mouth daily, Disp: 90 tablet, Rfl: 3  •  Syringe/Needle, Disp, (SecureSafe Syringe/Needle) 25G X 1-1/2" 1 ML MISC, Use 1 Syringe every 30 (thirty) days, Disp: 12 each, Rfl: 1  •  traZODone (DESYREL) 50 mg tablet, Take 1 tablet (50 mg total) by mouth daily at bedtime, Disp: 30 tablet, Rfl: 2  •  triamcinolone (KENALOG) 0 1 % oral topical paste, prn, Disp: , Rfl:   •  warfarin (Coumadin) 5 mg tablet, Take 1 tablet (5 mg total) by mouth daily (Patient taking differently: Take 5 mg by mouth daily 2 5 tues and thursday 5mg Mon, Wed, Fri, Sat & Sun), Disp: 90 tablet, Rfl: 0  •  ondansetron (ZOFRAN) 4 mg tablet, Take 1 tablet (4 mg total) by mouth every 8 (eight) hours as needed for nausea or vomiting for up to 15 days (Patient not taking: Reported on 12/29/2022), Disp: 20 tablet, Rfl: 0    The following portions of the patient's history were reviewed and updated as appropriate: allergies, current medications, past family history, past medical history, past social history, past surgical history and problem list     Review of Systems   Constitutional: Negative  Respiratory:        Pulmonary embolism   Cardiovascular: Negative  Psychiatric/Behavioral: Positive for sleep disturbance  Objective:    /74 (BP Location: Left arm, Patient Position: Sitting, Cuff Size: Large)   Pulse (!) 106   Resp 16   Ht 5' 2 5" (1 588 m)   Wt 100 kg (220 lb 12 8 oz)   SpO2 96%   BMI 39 74 kg/m²      Physical Exam  Constitutional:       Appearance: Normal appearance  Cardiovascular:      Heart sounds: Normal heart sounds  Pulmonary:      Breath sounds: Normal breath sounds  Musculoskeletal:      Right lower leg: No edema  Left lower leg: No edema  Neurological:      General: No focal deficit present  Psychiatric:         Mood and Affect: Mood normal            Recent Results (from the past 1008 hour(s))   Protime-INR    Collection Time: 11/28/22  6:09 AM   Result Value Ref Range    Protime 32 4 (H) 11 6 - 14 5 seconds    INR 3 13 (H) 0 84 - 1 19   Protime-INR    Collection Time: 12/12/22 12:00 AM   Result Value Ref Range    INR 1 53 (A) 0 84 - 1 19   Protime-INR    Collection Time: 12/12/22  6:15 AM   Result Value Ref Range    Protime 18 6 (H) 11 6 - 14 5 seconds    INR 1 53 (H) 0 84 - 1 19   Protime-INR    Collection Time: 12/26/22  9:28 AM   Result Value Ref Range    Protime 28 0 (H) 11 6 - 14 5 seconds    INR 2 58 (H) 0 84 - 1 19       Assessment/Plan:    No problem-specific Assessment & Plan notes found for this encounter  Problem List Items Addressed This Visit        Cardiovascular and Mediastinum    Pulmonary hypertension ([address] )     Continue torsemide and spironolactone per cardiology           Pulmonary embolus (HCC)     Continue Coumadin per heme-onc            Genitourinary    Chronic kidney disease, stage 3a [facility])     Lab Results   Component Value Date    EGFR 84 11/07/2022    EGFR 82 11/06/2022    EGFR 78 11/05/2022    CREATININE 0 77 11/07/2022    CREATININE 0 79 11/06/2022    CREATININE 0 82 11/05/2022   Patient has SLE  Renal function stable  Continue monitoring  Other    SLE (systemic lupus erythematosus) (HCC) (Chronic)     Continue Plaquenil per rheumatology  Morbid obesity ([address] )     Patient interested in weight loss program   Refer to weight management  Relevant Orders    Ambulatory Referral to Weight Management    Insomnia - Primary     Stable on gabapentin 100 mg, trazodone 50 mg  Continue same  BMI 39 0-39 9,adult    Relevant Orders    Ambulatory Referral to Weight Management   Other Visit Diagnoses     Neuropathic pain, leg, left        Relevant Medications    gabapentin (Neurontin) 100 mg capsule    Sleeping difficulty        Prasanth [place] reports sleeping difficulties which have not improved with changes in sleep hygiene  Will trial trazodone which pt previously tolerated well in past      Relevant Medications    traZODone (DESYREL) 50 mg tablet        I have spent 45 minutes with Patient  today in which greater than 50% of this time was spent in counseling/coordination of care regarding Diagnostic results, Prognosis, Risks and benefits of tx options, Intructions for management, Patient and family education, Importance of tx compliance, Risk factor reductions and Impressions

## 2023-01-05 NOTE — PROGRESS NOTES
Daily Note     Today's date: 2023  Patient name: Chidi Chamorro  : 1965  MRN: [id number]  Referring provider: Tanvi Hanson DO  Dx:   Encounter Diagnosis     ICD-10-CM    1  Chronic pain of left knee  M25 562     G89 29       2  Aftercare  Z51 89           Start Time: 1800  Stop Time: 1845  Total time in clinic (min): 45 minutes    Subjective: Pt reports that her knee is doing good, noticing less pain  Objective: See treatment diary below      Assessment: Tolerated treatment well  Progressing well and maintaining ROM Patient demonstrated fatigue post treatment      Plan: Continue per plan of care  Precautions:  HTN, Falls Osteoporosis, CHF,     Per Physician visit 10/13/22: Focus on extension avoid deep flexion until given clearance (MD APT 10/20 allowed to flex)   Slow progression and return to full activity secondary to his PE       Daily Treatment Diary  Manuals    Knee L PROM / SLR AAROM         RE performed         IASTM LNK  LNK LNK TAS LNK            Ther Ex         Bike rock and roll ROM 6 min ROM  ROM 6 min  ROM 6 min  6 min 6 min ROM   Gastroc slant bd   20"x4 np      Quad set/GS         Heel slides         NBOS EC 1 min foam   1 min foam 1 min foam  1 min 1 min foam   NBOS EO Foam         Hip Abd + Ext GTB 2x10  GTB 2x10 GTB 2x10  GTB 2 x 10 GTB  2x10   Leg press    SL 60# SL 60#  65# 2x10   HR/TR 2x10  2x10 np 3 x 10 3x10   Ankle pump/circulation education         Neuro Re-ed                           LAQ 6# 2x10   5# 2x10  5# 2x10  6# 2 x 10 6# 2x10    SLR Flex   2x10               Ther Activity         stairs FF  FF FF FF FF   minisquats 2x10  2x10 2x10  2 x 10 2x10   Sit<>Stand 2x10  2x10  2x10 2 x 10 2x10                              Modalities         CP in elevation to L knee   10 min np dec dec

## 2023-01-06 DIAGNOSIS — E53.8 B12 DEFICIENCY: Primary | ICD-10-CM

## 2023-01-06 RX ORDER — SYRINGE WITH NEEDLE, 1 ML 25GX5/8"
SYRINGE, EMPTY DISPOSABLE MISCELLANEOUS
Qty: 12 EACH | Refills: 1 | Status: SHIPPED | OUTPATIENT
Start: 2023-01-06

## 2023-01-09 ENCOUNTER — APPOINTMENT (OUTPATIENT)
Dept: LAB | Facility: CLINIC | Age: 58
End: 2023-01-09

## 2023-01-09 ENCOUNTER — OFFICE VISIT (OUTPATIENT)
Dept: PHYSICAL THERAPY | Facility: CLINIC | Age: 58
End: 2023-01-09

## 2023-01-09 ENCOUNTER — TELEPHONE (OUTPATIENT)
Dept: HEMATOLOGY ONCOLOGY | Facility: CLINIC | Age: 58
End: 2023-01-09

## 2023-01-09 DIAGNOSIS — I26.99 PULMONARY EMBOLISM ASSOCIATED WITH COVID-19 (HCC): ICD-10-CM

## 2023-01-09 DIAGNOSIS — Z79.01 WARFARIN ANTICOAGULATION: Primary | ICD-10-CM

## 2023-01-09 DIAGNOSIS — M25.562 CHRONIC PAIN OF LEFT KNEE: Primary | ICD-10-CM

## 2023-01-09 DIAGNOSIS — G89.29 CHRONIC PAIN OF LEFT KNEE: Primary | ICD-10-CM

## 2023-01-09 DIAGNOSIS — Z51.89 AFTERCARE: ICD-10-CM

## 2023-01-09 DIAGNOSIS — U07.1 PULMONARY EMBOLISM ASSOCIATED WITH COVID-19 (HCC): ICD-10-CM

## 2023-01-09 LAB
INR PPP: 1.64 (ref 0.84–1.19)
PROTHROMBIN TIME: 19.6 SECONDS (ref 11.6–14.5)

## 2023-01-09 PROCEDURE — 85610 PROTHROMBIN TIME: CPT

## 2023-01-09 PROCEDURE — 36415 COLL VENOUS BLD VENIPUNCTURE: CPT

## 2023-01-09 NOTE — TELEPHONE ENCOUNTER
INR 1 64 today, verified with patient she is taking coumadin 2 5 mg on Tuesday and Thursday, 5 mg the remainder of the week  Discussed with Tom Jones PA-C, per her pt to take 5 mg daily and repeat INR in two weeks  Pt notified of updated plan, she verbalized understanding and was agreeable

## 2023-01-09 NOTE — TELEPHONE ENCOUNTER
CALL TRANSFER   Reason for patient call? Patients INR is under therapeutic level and she would like to discuss next steps   Patient's primary physician? Dr Leopoldo Prose   RN call was transferred to and time it was transferred? Opal Delgado @ 4:12PM   Informed patient that the message will be forwarded to the team and someone will get back to them as soon as possible    Did you relay this information to the patient?  Yes

## 2023-01-09 NOTE — PROGRESS NOTES
Daily Note     Today's date: 2023  Patient name: Swapna Dias  : 1965  MRN: [id number]  Referring provider: Cam Chiu DO  Dx:   Encounter Diagnosis     ICD-10-CM    1  Chronic pain of left knee  M25 562     G89 29       2  Aftercare  Z51 89           Start Time: 1630  Stop Time: 1700  Total time in clinic (min): 30 minutes    Subjective: Pt reports that she would like more endurance on stairs  Objective: See treatment diary below      Assessment: Tolerated treatment well  Progressed with stairs with difficulty, Patient demonstrated fatigue post treatment      Plan: Continue per plan of care  Precautions:  HTN, Falls Osteoporosis, CHF,     Per Physician visit 10/13/22: Focus on extension avoid deep flexion until given clearance (MD APT 10/20 allowed to flex)   Slow progression and return to full activity secondary to his PE       Daily Treatment Diary  Manuals    Knee L PROM / SLR AAROM         RE performed         IASTM LNK Decline   LNK TAS LNK            Ther Ex         Bike rock and roll ROM 6 min ROM 6 min ROM   ROM 6 min  6 min 6 min ROM   Gastroc slant bd    np      Quad set/GS         Heel slides         NBOS EC 1 min foam  1 min foam   1 min foam  1 min 1 min foam   NBOS EO Foam         Hip Abd + Ext GTB 2x10 GTB  2x10  GTB 2x10  GTB 2 x 10 GTB  2x10   Leg press   65# 2x10   SL 60#  65# 2x10   HR/TR 2x10 2x10  np 3 x 10 3x10   Ankle pump/circulation education         Neuro Re-ed                           LAQ 6# 2x10  6# 3x10   5# 2x10  6# 2 x 10 6# 2x10    SLR Flex                  Ther Activity         stairs FF 2 FF  FF FF FF   minisquats 2x10 2x10   2x10  2 x 10 2x10   Sit<>Stand 2x10 2x10  2x10 2 x 10 2x10                              Modalities         CP in elevation to L knee    np dec dec

## 2023-01-10 ENCOUNTER — APPOINTMENT (OUTPATIENT)
Dept: PHYSICAL THERAPY | Facility: CLINIC | Age: 58
End: 2023-01-10

## 2023-01-10 ENCOUNTER — ANTICOAG VISIT (OUTPATIENT)
Dept: HEMATOLOGY ONCOLOGY | Facility: CLINIC | Age: 58
End: 2023-01-10

## 2023-01-12 ENCOUNTER — APPOINTMENT (OUTPATIENT)
Dept: PHYSICAL THERAPY | Facility: CLINIC | Age: 58
End: 2023-01-12

## 2023-01-13 ENCOUNTER — OFFICE VISIT (OUTPATIENT)
Dept: PHYSICAL THERAPY | Facility: CLINIC | Age: 58
End: 2023-01-13

## 2023-01-13 DIAGNOSIS — Z51.89 AFTERCARE: ICD-10-CM

## 2023-01-13 DIAGNOSIS — G89.29 CHRONIC PAIN OF LEFT KNEE: Primary | ICD-10-CM

## 2023-01-13 DIAGNOSIS — M25.562 CHRONIC PAIN OF LEFT KNEE: Primary | ICD-10-CM

## 2023-01-13 NOTE — PROGRESS NOTES
Daily Note     Today's date: 2023  Patient name: Herbert Betancourt  : 1965  MRN: [id number]  Referring provider: Jazmín Siddiqi DO  Dx:   Encounter Diagnosis     ICD-10-CM    1  Chronic pain of left knee  M25 562     G89 29       2  Aftercare  Z51 89           Start Time: 1500  Stop Time: 1545  Total time in clinic (min): 45 minutes    Subjective: Pt reports that she is feeling pretty good today  Objective: See treatment diary below      Assessment: Tolerated treatment well  Progressed with stair ambulation  Patient demonstrated fatigue post treatment      Plan: Continue per plan of care  Precautions:  HTN, Falls Osteoporosis, CHF,     Per Physician visit 10/13/22: Focus on extension avoid deep flexion until given clearance (MD APT 10/20 allowed to flex)   Slow progression and return to full activity secondary to his PE       Daily Treatment Diary  Manuals    Knee L PROM / SLR AAROM         RE performed         IASTM LNK Decline    TAS LNK            Ther Ex         Bike rock and roll ROM 6 min ROM 6 min ROM  6 min ROM  6 min 6 min ROM   Gastroc slant bd         Quad set/GS         Heel slides         NBOS EC 1 min foam  1 min foam  1 min foam    1 min 1 min foam   NBOS EO Foam         Hip Abd + Ext GTB 2x10 GTB  2x10 GTB  2x10  GTB 2 x 10 GTB  2x10   Leg press   65# 2x10  65# 2x10   65# 2x10   HR/TR 2x10 2x10 2x10  3 x 10 3x10   Ankle pump/circulation education         Neuro Re-ed                           LAQ 6# 2x10  6# 3x10  7 5# 3x10  6# 2 x 10 6# 2x10    SLR Flex                  Ther Activity         stairs FF 2 FF 3 FF  FF FF   minisquats 2x10 2x10  2x10  2 x 10 2x10   Sit<>Stand 2x10 2x10 2x10  2 x 10 2x10                              Modalities         CP in elevation to L knee     dec dec

## 2023-01-16 ENCOUNTER — OFFICE VISIT (OUTPATIENT)
Dept: PHYSICAL THERAPY | Facility: CLINIC | Age: 58
End: 2023-01-16

## 2023-01-16 DIAGNOSIS — M25.562 CHRONIC PAIN OF LEFT KNEE: Primary | ICD-10-CM

## 2023-01-16 DIAGNOSIS — G89.29 CHRONIC PAIN OF LEFT KNEE: Primary | ICD-10-CM

## 2023-01-16 DIAGNOSIS — Z51.89 AFTERCARE: ICD-10-CM

## 2023-01-16 NOTE — PROGRESS NOTES
Daily Note     Today's date: 2023  Patient name: Trish Tomlinson  : 1965  MRN: [id number]  Referring provider: Kelli Malin DO  Dx:   Encounter Diagnosis     ICD-10-CM    1  Chronic pain of left knee  M25 562     G89 29       2  Aftercare  Z51 89           Start Time: 0463  Stop Time: 1800  Total time in clinic (min): 30 minutes    Subjective: Pt reports that she feels like she is doing better overall  Objective: See treatment diary below      Assessment: Tolerated treatment well  Anticipating discharge NV  Patient demonstrated fatigue post treatment      Plan: Continue per plan of care  Precautions:  HTN, Falls Osteoporosis, CHF,     Per Physician visit 10/13/22: Focus on extension avoid deep flexion until given clearance (MD APT 10/20 allowed to flex)   Slow progression and return to full activity secondary to his PE       Daily Treatment Diary  Manuals    Knee L PROM / SLR AAROM         RE performed         IASTM LNK Decline     LNK            Ther Ex         Bike rock and roll ROM 6 min ROM 6 min ROM  6 min ROM 6 min ROM   6 min ROM   Gastroc slant bd         Quad set/GS         Heel slides         NBOS EC 1 min foam  1 min foam  1 min foam   1 min foam  1 min foam   NBOS EO Foam         Hip Abd + Ext GTB 2x10 GTB  2x10 GTB  2x10   GTB  2x10   Leg press   65# 2x10  65# 2x10 70# 2x10  65# 2x10   HR/TR 2x10 2x10 2x10 2x10  3x10   Ankle pump/circulation education         Neuro Re-ed                           LAQ 6# 2x10  6# 3x10  7 5# 3x10 7 5 3x10   6# 2x10    SLR Flex                  Ther Activity         stairs FF 2 FF 3 FF 3 FF  FF   minisquats 2x10 2x10  2x10 2x10  2x10   Sit<>Stand 2x10 2x10 2x10 2x10  2x10                              Modalities         CP in elevation to L knee      dec

## 2023-01-17 ENCOUNTER — APPOINTMENT (OUTPATIENT)
Dept: PHYSICAL THERAPY | Facility: CLINIC | Age: 58
End: 2023-01-17

## 2023-01-18 ENCOUNTER — RA CDI HCC (OUTPATIENT)
Dept: OTHER | Facility: HOSPITAL | Age: 58
End: 2023-01-18

## 2023-01-18 NOTE — PROGRESS NOTES
Daily [address]  coding opportunities       Chart reviewed, no opportunity found: CHART REVIEWED, NO OPPORTUNITY FOUND        Patients Insurance     Medicare Insurance: Medicare

## 2023-01-19 ENCOUNTER — TELEPHONE (OUTPATIENT)
Dept: HEMATOLOGY ONCOLOGY | Facility: CLINIC | Age: 58
End: 2023-01-19

## 2023-01-19 LAB — INR PPP: 1.94 (ref 0.84–1.19)

## 2023-01-20 ENCOUNTER — EVALUATION (OUTPATIENT)
Dept: PHYSICAL THERAPY | Facility: CLINIC | Age: 58
End: 2023-01-20

## 2023-01-20 ENCOUNTER — ANTICOAG VISIT (OUTPATIENT)
Dept: HEMATOLOGY ONCOLOGY | Facility: CLINIC | Age: 58
End: 2023-01-20

## 2023-01-20 DIAGNOSIS — M25.562 CHRONIC PAIN OF LEFT KNEE: Primary | ICD-10-CM

## 2023-01-20 DIAGNOSIS — G89.29 CHRONIC PAIN OF LEFT KNEE: Primary | ICD-10-CM

## 2023-01-20 DIAGNOSIS — Z51.89 AFTERCARE: ICD-10-CM

## 2023-01-20 NOTE — PROGRESS NOTES
PT Evaluation     Today's date: 2023  Patient name: Yuan Silvestre  : 1965  MRN: [id number]  Referring provider: Nieves Patterson DO  Dx:   Encounter Diagnosis     ICD-10-CM    1  Chronic pain of left knee  M25 562     G89 29       2  Aftercare  Z51 89                      Assessment  Assessment details: Pt at this time has progressed excellently in regards of balance, flexibility, strength, range, mobility and has practically abolished pain  Pt at this time is likely safe to DC to [facility] and if she is to have a decline in any form she is welcome to return as needed  Thank you very much for this kind, motivated and familiar referral         Impairments: abnormal gait, abnormal or restricted ROM, impaired balance, impaired physical strength, lacks appropriate home exercise program, pain with function, poor posture  and poor body mechanics  Understanding of Dx/Px/POC: good   Prognosis: good    Goals  STG 4 Weeks:  Decrease pain at worst to 5 -met  Improve range to achieve TKE, and achieve 105 flexion -met  Improve strength to improve L hip to SLR AROM 3+/5, Knee ext 5/5  -met  Independent with HEP -met  LTG 8 Weeks:  Decrease pain at worst to 2 -met  Improve range to achieve 120 flexion -met  Improve strength to L hip 4/5 or greater  -met  Able to perform all desired activities with minimal to nil symptom exacerbation -met      Plan  Plan details: Sola Douglas is      Patient would benefit from: skilled physical therapy  Planned modality interventions: cryotherapy, TENS and thermotherapy: hydrocollator packs  Planned therapy interventions: joint mobilization, manual therapy, abdominal trunk stabilization, activity modification, neuromuscular re-education, patient education, postural training, strengthening, stretching, therapeutic activities, therapeutic exercise, therapeutic training, transfer training, home exercise program, graded motor, graded exercise, graded activity, gait training, functional ROM exercises, flexibility, balance, balance/weight bearing training, behavior modification and body mechanics training  Frequency: 2x week  Duration in weeks: 10  Treatment plan discussed with: patient and family        Subjective Evaluation    History of Present Illness  Date of onset: 10/10/2022  Mechanism of injury: Pt presents today stating that she is feeling really well  She reports that her knee has barely any pain, she is able to sleep, she is performing stairs, walking as long as she likes, performing tasks around her home  and is overall very content with her progression  She reports she has no complaints and at this time feels as though she is ready for DC  Pt follows up with Dr Ellender Harada on 3/30/23  Quality of life: good    Pain  Current pain ratin  At best pain ratin  At worst pain ratin  Quality: dull ache, tight and throbbing  Relieving factors: ice, medications, rest and change in position  Aggravating factors: standing, walking, stair climbing and lifting  Progression: improved    Social Support  Steps to enter house: yes  Stairs in house: yes   Lives in: multiple-level home  Lives with: spouse      Diagnostic Tests  X-ray: abnormal  Treatments  Previous treatment: injection treatment and medication  Patient Goals  Patient goals for therapy: decreased pain, improved balance, increased motion, increased strength and return to sport/leisure activities          Objective     Active Range of Motion   Left Knee   Flexion: 130 (135) degrees   Extension: 0 (0) degrees     Right Knee   Flexion: 125 degrees   Extension: 0 degrees     Additional Active Range of Motion Details  Forward head, rounded shoulders, Lordosis  Genu valgum R > L, slow, decreased step heigh antalgia with L    B/L Sensation intact to L3,4,5,S1,S2  Hip strength  Quad Set Good R, good  (-) L    L Flex 4- Ext 4 Abd 4- Add 5 SLR AAROM  10 before TKE lag of < 5 occurs // flex 4-, ext 5, abd 4-, add 5    SLR AROM 15 reps before TKE lag occus  // L flex 4, ext 5, abd 4, ext 5    R Flex 5 Ext 5 Abd 5 Add 5 SLR AROM 20x  LE Screen  Knee strength  L 3 flex and ext // 5/5  R 5/5   Ankle    L strong and painless B  R strong and painless B   Joint Mobility  Palpation  Sts  Incision Clean dry and intact all except, small distal scab, small suture top 1/3rd of incision  No signs of infection  TUG with Rollator: 34 87"  // 10 41" without AD, mild antalgia with L IC, decreased stance phase   / 9 6" //  7 48"  Elevations reviewed and discussed  / alternate up and down 1 UE with rail  [address] with descent    // alternate up and down good eccentrics, use of UE support with railing  Precautions:  HTN, Falls Osteoporosis, CHF,     Per Physician visit 10/13/22: Focus on extension avoid deep flexion until given clearance (MD APT 10/20 allowed to flex)   Slow progression and return to full activity secondary to his PE       Daily Treatment Diary  Manuals 01/05 01/09 01/13 01/16 1/20 01/03   Knee L PROM / SLR AAROM         RE performed         IASTM LNK Decline     LNK            Ther Ex         Bike rock and roll ROM 6 min ROM 6 min ROM  6 min ROM 6 min ROM  6 min ROM 6 min ROM   Gastroc slant bd     20" x 10    Quad set/GS     HR/TR x 10    Heel slides         NBOS EC 1 min foam  1 min foam  1 min foam   1 min foam  1 min foam   NBOS EO Foam         Hip Abd + Ext GTB 2x10 GTB  2x10 GTB  2x10   GTB  2x10   Leg press   65# 2x10  65# 2x10 70# 2x10  65# 2x10   HR/TR 2x10 2x10 2x10 2x10  3x10   Ankle pump/circulation education         Neuro Re-ed              assessment and educationx 10             LAQ 6# 2x10  6# 3x10  7 5# 3x10 7 5 3x10   6# 2x10    SLR Flex                  Ther Activity         stairs FF 2 FF 3 FF 3 FF 1FF FF   minisquats 2x10 2x10  2x10 2x10 2 x 10 2x10   Sit<>Stand 2x10 2x10 2x10 2x10 2 x 10 2x10                              Modalities         CP in elevation to L knee      dec

## 2023-02-06 ENCOUNTER — APPOINTMENT (OUTPATIENT)
Dept: LAB | Facility: CLINIC | Age: 58
End: 2023-02-06

## 2023-02-06 DIAGNOSIS — U07.1 PULMONARY EMBOLISM ASSOCIATED WITH COVID-19 (HCC): ICD-10-CM

## 2023-02-06 DIAGNOSIS — I26.99 PULMONARY EMBOLISM ASSOCIATED WITH COVID-19 (HCC): ICD-10-CM

## 2023-02-06 DIAGNOSIS — D68.61 ANTIPHOSPHOLIPID ANTIBODY SYNDROME (HCC): Primary | ICD-10-CM

## 2023-02-06 DIAGNOSIS — D50.9 IRON DEFICIENCY ANEMIA, UNSPECIFIED IRON DEFICIENCY ANEMIA TYPE: ICD-10-CM

## 2023-02-06 DIAGNOSIS — D68.62 LUPUS ANTICOAGULANT DISORDER (HCC): ICD-10-CM

## 2023-02-06 DIAGNOSIS — Z79.01 WARFARIN ANTICOAGULATION: ICD-10-CM

## 2023-02-06 DIAGNOSIS — E53.8 B12 DEFICIENCY: ICD-10-CM

## 2023-02-06 LAB
BASOPHILS # BLD AUTO: 0.02 THOUSANDS/ÂΜL (ref 0–0.1)
BASOPHILS NFR BLD AUTO: 1 % (ref 0–1)
CRP SERPL QL: <1 MG/L
D DIMER PPP FEU-MCNC: 0.36 UG/ML FEU
EOSINOPHIL # BLD AUTO: 0.09 THOUSAND/ÂΜL (ref 0–0.61)
EOSINOPHIL NFR BLD AUTO: 3 % (ref 0–6)
ERYTHROCYTE [DISTWIDTH] IN BLOOD BY AUTOMATED COUNT: 13.7 % (ref 11.6–15.1)
FERRITIN SERPL-MCNC: 255 NG/ML (ref 8–388)
HCT VFR BLD AUTO: 40.9 % (ref 34.8–46.1)
HGB BLD-MCNC: 13.6 G/DL (ref 11.5–15.4)
IMM GRANULOCYTES # BLD AUTO: 0.01 THOUSAND/UL (ref 0–0.2)
IMM GRANULOCYTES NFR BLD AUTO: 0 % (ref 0–2)
IRON SATN MFR SERPL: 20 % (ref 15–50)
IRON SERPL-MCNC: 60 UG/DL (ref 50–170)
LYMPHOCYTES # BLD AUTO: 1.03 THOUSANDS/ÂΜL (ref 0.6–4.47)
LYMPHOCYTES NFR BLD AUTO: 29 % (ref 14–44)
MCH RBC QN AUTO: 31.1 PG (ref 26.8–34.3)
MCHC RBC AUTO-ENTMCNC: 33.3 G/DL (ref 31.4–37.4)
MCV RBC AUTO: 93 FL (ref 82–98)
MONOCYTES # BLD AUTO: 0.33 THOUSAND/ÂΜL (ref 0.17–1.22)
MONOCYTES NFR BLD AUTO: 9 % (ref 4–12)
NEUTROPHILS # BLD AUTO: 2.03 THOUSANDS/ÂΜL (ref 1.85–7.62)
NEUTS SEG NFR BLD AUTO: 58 % (ref 43–75)
NRBC BLD AUTO-RTO: 0 /100 WBCS
PLATELET # BLD AUTO: 259 THOUSANDS/UL (ref 149–390)
PMV BLD AUTO: 10.9 FL (ref 8.9–12.7)
RBC # BLD AUTO: 4.38 MILLION/UL (ref 3.81–5.12)
TIBC SERPL-MCNC: 299 UG/DL (ref 250–450)
VIT B12 SERPL-MCNC: 818 PG/ML (ref 100–900)
WBC # BLD AUTO: 3.51 THOUSAND/UL (ref 4.31–10.16)

## 2023-02-06 RX ORDER — WARFARIN SODIUM 5 MG/1
5 TABLET ORAL
Qty: 120 TABLET | Refills: 0 | Status: SHIPPED | OUTPATIENT
Start: 2023-02-06 | End: 2023-02-07 | Stop reason: SDUPTHER

## 2023-02-06 RX ORDER — WARFARIN SODIUM 5 MG/1
TABLET ORAL
Qty: 120 TABLET | Refills: 1 | Status: SHIPPED | OUTPATIENT
Start: 2023-02-06 | End: 2023-06-26 | Stop reason: SDUPTHER

## 2023-02-06 RX ORDER — WARFARIN SODIUM 5 MG/1
TABLET ORAL
Qty: 180 TABLET | Refills: 3 | Status: SHIPPED | OUTPATIENT
Start: 2023-02-06 | End: 2023-02-06

## 2023-02-06 NOTE — TELEPHONE ENCOUNTER
Reviewed with Marily Galeano, pt to take coumadin 7 5mg M/W and remaining days 5mg  Called patient to discuss, mychart message was also sent to avoid confusion with change of dosage  Refill was sent on behalf of patient for coumadin to requested pharmacy

## 2023-02-07 ENCOUNTER — TELEPHONE (OUTPATIENT)
Dept: HEMATOLOGY ONCOLOGY | Facility: CLINIC | Age: 58
End: 2023-02-07

## 2023-02-07 DIAGNOSIS — I26.99 PULMONARY EMBOLISM ASSOCIATED WITH COVID-19 (HCC): Primary | ICD-10-CM

## 2023-02-07 DIAGNOSIS — U07.1 PULMONARY EMBOLISM ASSOCIATED WITH COVID-19 (HCC): Primary | ICD-10-CM

## 2023-02-07 RX ORDER — WARFARIN SODIUM 5 MG/1
5 TABLET ORAL
Qty: 104 TABLET | Refills: 0 | Status: SHIPPED | OUTPATIENT
Start: 2023-02-07

## 2023-02-07 NOTE — TELEPHONE ENCOUNTER
Received VM from patient asking for a call back to discuss Warfarin  Spoke with patient, she reports her insurance is requesting exact instructions on her Warfarin and quantity to reflect accurate quantity  New script pended for signature

## 2023-02-08 ENCOUNTER — OFFICE VISIT (OUTPATIENT)
Dept: FAMILY MEDICINE CLINIC | Facility: CLINIC | Age: 58
End: 2023-02-08

## 2023-02-08 VITALS
HEART RATE: 79 BPM | DIASTOLIC BLOOD PRESSURE: 74 MMHG | BODY MASS INDEX: 38.98 KG/M2 | OXYGEN SATURATION: 97 % | SYSTOLIC BLOOD PRESSURE: 122 MMHG | HEIGHT: 63 IN | WEIGHT: 220 LBS

## 2023-02-08 DIAGNOSIS — M81.0 AGE-RELATED OSTEOPOROSIS WITHOUT CURRENT PATHOLOGICAL FRACTURE: ICD-10-CM

## 2023-02-08 DIAGNOSIS — D68.62 LUPUS ANTICOAGULANT DISORDER (HCC): ICD-10-CM

## 2023-02-08 DIAGNOSIS — Z59.9 FINANCIAL DIFFICULTIES: ICD-10-CM

## 2023-02-08 DIAGNOSIS — G47.00 INSOMNIA, UNSPECIFIED TYPE: ICD-10-CM

## 2023-02-08 DIAGNOSIS — Z13.220 SCREENING, LIPID: ICD-10-CM

## 2023-02-08 DIAGNOSIS — Z12.11 SCREEN FOR COLON CANCER: ICD-10-CM

## 2023-02-08 DIAGNOSIS — Z00.00 MEDICARE ANNUAL WELLNESS VISIT, SUBSEQUENT: Primary | ICD-10-CM

## 2023-02-08 PROBLEM — R65.10 SIRS (SYSTEMIC INFLAMMATORY RESPONSE SYNDROME) (HCC): Status: RESOLVED | Noted: 2022-11-05 | Resolved: 2023-02-08

## 2023-02-08 PROBLEM — Z98.890 PONV (POSTOPERATIVE NAUSEA AND VOMITING): Chronic | Status: RESOLVED | Noted: 2022-10-06 | Resolved: 2023-02-08

## 2023-02-08 PROBLEM — R11.2 PONV (POSTOPERATIVE NAUSEA AND VOMITING): Chronic | Status: RESOLVED | Noted: 2022-10-06 | Resolved: 2023-02-08

## 2023-02-08 SDOH — ECONOMIC STABILITY - INCOME SECURITY: PROBLEM RELATED TO HOUSING AND ECONOMIC CIRCUMSTANCES, UNSPECIFIED: Z59.9

## 2023-02-08 NOTE — PROGRESS NOTES
Assessment and Plan:     Problem List Items Addressed This Visit    None  1  Medicare annual wellness visit, subsequent  Comments:  UTD      2  Financial difficulties  -     Ambulatory referral to social work care management program; Future; Expected date: 02/08/2023    3  Insomnia, unspecified type  Assessment & Plan:  Stable on gabapentin 100 mg, trazodone 50 mg  Continue same  Recommended 3 month fup        4  Screening, lipid  -     Lipid panel; Future    5  Screen for colon cancer  -     Cologuard    6  Lupus anticoagulant disorder [facility])  Assessment & Plan:  Continue medications per rheumatology  7  Age-related osteoporosis without current pathological fracture  Assessment & Plan:  Discussed Prolia injections every 6 months with patient  Patient would like to check with her specialists and insurance before starting the medication  BMI Counseling: Body mass index is 39 6 kg/m²  The BMI is above normal  Nutrition recommendations include encouraging healthy choices of fruits and vegetables  Rationale for BMI follow-up plan is due to patient being overweight or obese  Preventive health issues were discussed with patient, and age appropriate screening tests were ordered as noted in patient's After Visit Summary  Personalized health advice and appropriate referrals for health education or preventive services given if needed, as noted in patient's After Visit Summary  History of Present Illness:     Patient presents for a Medicare Wellness Visit    HPI     Patient is here for Medicare wellness check  Denies any symptoms of chest pain  Shortness of breath with activities is at her baseline, attributes to history of PE secondary to COVID, history of SLE and pulmonary hypertension  These are being monitored by specialists    Patient Care Team:  Bahman Garcia MD as PCP - General (Family Medicine)  Jess Baumgarten, DO Luretha Bowman, MD Beather Rash, RN as Nurse Navigator (Orthopedics) Review of Systems:     Review of Systems   Constitutional: Negative  Respiratory:        H/o PE  Cardiovascular: Negative  Gastrointestinal: Negative  Psychiatric/Behavioral: Positive for sleep disturbance          Problem List:     Patient Active Problem List   Diagnosis   • B12 deficiency   • Chronic cough   • Diffuse connective tissue disease (HCC)   • Dyspnea   • Edema   • Essential hypertension   • Hot flashes due to menopause   • Long-term use of hydroxychloroquine   • Other organ or system involvement in systemic lupus erythematosus ([facility])   • Lupus anticoagulant disorder ([facility])   • SOB (shortness of breath) on exertion   • Sinus tachycardia   • Secondary pulmonary hypertension   • Pulmonary hypertension ([facility])   • Post-nasal drip   • Pes anserine bursitis   • Positive HELENE (antinuclear antibody)   • Other chronic pulmonary heart diseases   • Osteoporosis   • Night sweats   • Patellofemoral disorder of right knee   • Pes anserinus bursitis of right knee   • Arthritis of right knee   • Other tear of medial meniscus, current injury, right knee, initial encounter   • Tear of medial meniscus of right knee, current   • Chronic pain of right knee   • Elevated serum creatinine   • Hyperuricemia   • Trochanteric bursitis of both hips   • Undifferentiated connective tissue disease ([facility] )   • Vitamin D deficiency   • Chronic kidney disease, stage 3a ([facility] )   • Right knee pain   • Status post total right knee replacement   • Left knee pain   • Tear of medial meniscus of left knee, current   • Primary osteoarthritis of left knee   • Morbid obesity ([facility])   • Leukopenia   • Venous stasis of lower extremity   • Acute pain of right knee   • PONV (postoperative nausea and vomiting)   • Status post total knee replacement using cement, left   • Pulmonary embolism associated with COVID-19 ([facility] )   • SLE (systemic lupus erythematosus) ([facility] )   • SIRS (systemic inflammatory response syndrome) ([facility])   • Arthritis of left knee • Iron deficiency anemia due to chronic blood loss   • Insomnia   • Pulmonary embolus (HCC)   • BMI 39 0-39 9,adult      Past Medical and Surgical History:     Past Medical History:   Diagnosis Date   • HELENE positive    • Anemia     Sildenafil causes decreased hematocrit   • Anxiety 03/2020   • CHF (congestive heart failure) ([facility] )     right heart failure related to pulm HTN lupus   • Chronic kidney disease     borderline lab values   • Chronic rhinitis 06/04/2012   • Clotting disorder ([facility] ) 2012   • Coronary artery disease 2018   • Encephalitis     Lasted Assessed 10/18/2017   • Gout 06/26/2018   • Hypertension    • Lupus anticoagulant disorder ([facility] )    • Maxillary sinusitis     Lasted Assessed 10/18/2017   • Night sweat    • Osteopenia     Hip   • Osteoporosis     Spine   • Pneumonia     Last Assessed 10/18/2017   • PONV (postoperative nausea and vomiting)    • Pulmonary embolism (HCC)    • Pulmonary hypertension ([facility] )    • Seizures [facility]     Only during Encephalitis   • Shortness of breath     with exertion   • Sinus tachycardia    • Urinary incontinence      Past Surgical History:   Procedure Laterality Date   • ARTHRODESIS      Hand: IP Joint   • CHOLECYSTECTOMY     • COLONOSCOPY     • COLPOSCOPY     • HYSTERECTOMY      Vaginal   • JOINT REPLACEMENT Right     Knee replacement   • KNEE SURGERY  2/2019   • LAPAROSCOPIC ESOPHAGOGASTRIC FUNDOPLASTY  2008   • NH ARTHRP KNE CONDYLE&PLATU MEDIAL&LAT COMPARTMENTS Right 2/12/2019    Procedure: KNEE TOTAL ARTHROPLASTY AND ASSOCIATED PROCEDURES;  Surgeon: Nisha Cardoza DO;  [facility];  Service: Orthopedics   • NH ARTHRP KNE CONDYLE&PLATU MEDIAL&LAT COMPARTMENTS Left 10/6/2022    Procedure: ARTHROPLASTY KNEE TOTAL;  Surgeon: Nisha Cardoza DO;  [facility];  Service: Orthopedics   • NH ARTHRS KNEE W/MENISCECTOMY MED&LAT W/SHAVING Right 10/2/2018    Procedure: KNEE ARTHROSCOPY WITH PARTIAL MEDIAL MENISCECTOMY;  Surgeon: Nisha Cardoza DO;  [facility];  Service: Orthopedics   • ND ARTHRS KNEE W/MENISCECTOMY MED&LAT W/SHAVING Left 2019    Procedure: KNEE ARTHROSCOPIC MENISCECTOMY /MEDIAL; Chondyl medial and posterior;  Surgeon: Yolanda Ward DO;  [facility];  Service: Orthopedics   • REDUCTION MAMMAPLASTY     • REPAIR ANKLE LIGAMENT Right    • TONSILLECTOMY        Family History:     Family History   Problem Relation Age of Onset   • Uterine cancer Mother    • Pancreatic cancer Mother 79   • Diabetes Mother    • Endometrial cancer Mother 77   • Arthritis Mother    • Cancer Mother         Pancreas, Uterine   • Vision loss Mother    • Heart disease Father         open heart surgery at age 48    • Stroke Father         CVA   • Hypertension Father    • Atrial fibrillation Father    • Hyperlipidemia Father    • Arthritis Father    • Rheum arthritis Father    • No Known Problems Sister    • No Known Problems Brother    • Thyroid disease Maternal Grandmother    • Asthma Daughter    • Heart attack Maternal Grandfather    • Heart attack Paternal Grandmother    • Skin cancer Paternal Grandfather    • No Known Problems Sister    • Thyroid disease Sister    • Depression Sister    • Osteoarthritis Sister    • Hemochromatosis Brother    • Migraines Daughter    • Asthma Daughter    • No Known Problems Maternal Aunt    • Anuerysm Neg Hx    • Clotting disorder Neg Hx    • Heart failure Neg Hx       Social History:     Social History     Socioeconomic History   • Marital status: /Civil Union     Spouse name: Not on file   • Number of children: 2   • Years of education: Not on file   • Highest education level: Not on file   Occupational History   • Not on file   Tobacco Use   • Smoking status: Former     Packs/day: 0 00     Years: 0 00     Pack years: 0 00     Types: Cigarettes     Quit date: 2006     Years since quittin 0   • Smokeless tobacco: Never   Vaping Use   • Vaping Use: Never used   Substance and Sexual Activity   • Alcohol use:  Yes Alcohol/week: 0 0 standard drinks     Comment: socially   • Drug use: No   • Sexual activity: Yes     Partners: Male   Other Topics Concern   • Not on file   Social History Narrative    Daily caffeinated coffee consumption    Exercise habits     Social Determinants of Health     Financial Resource Strain: High Risk   • Difficulty of Paying Living Expenses: Very hard   Food Insecurity: Not on file   Transportation Needs: No Transportation Needs   • Lack of Transportation (Medical): No   • Lack of Transportation (Non-Medical):  No   Physical Activity: Not on file   Stress: Not on file   Social Connections: Not on file   Intimate Partner Violence: Not on file   Housing Stability: Not on file      Medications and Allergies:     Current Outpatient Medications   Medication Sig Dispense Refill   • B-D 3CC LUER-LILLIE SYR 25GX1/2" 25G X 1-1/2" 3 ML MISC USE 1 SYRINGE EVERY 30 DAYS 12 each 1   • Benlysta 200 MG/ML SOAJ Weekly on Fridays     • cholecalciferol (VITAMIN D3) 1,000 units tablet Take 2,000 Units by mouth daily in the early morning       • cyanocobalamin 1,000 mcg/mL Inject 1 mL (1,000 mcg total) into a muscle every 30 (thirty) days 12 mL 1   • gabapentin (Neurontin) 100 mg capsule Take 1 capsule (100 mg total) by mouth daily at bedtime 90 capsule 0   • hydroxychloroquine (PLAQUENIL) 200 mg tablet Take 400 mg by mouth daily with breakfast Alternating days/dosages     • ondansetron (ZOFRAN) 4 mg tablet Take 1 tablet (4 mg total) by mouth every 8 (eight) hours as needed for nausea or vomiting for up to 15 days (Patient not taking: Reported on 12/29/2022) 20 tablet 0   • sildenafil (REVATIO) 20 mg tablet Take 1 tablet (20 mg total) by mouth 3 (three) times a day 270 tablet 3   • spironolactone (ALDACTONE) 25 mg tablet Take 1 tablet (25 mg total) by mouth daily 90 tablet 3   • Syringe/Needle, Disp, (SecureSafe Syringe/Needle) 25G X 1-1/2" 1 ML MISC Use 1 Syringe every 30 (thirty) days 12 each 1   • traZODone (DESYREL) 50 mg tablet Take 1 tablet (50 mg total) by mouth daily at bedtime 30 tablet 2   • triamcinolone (KENALOG) 0 1 % oral topical paste prn     • warfarin (Coumadin) 5 mg tablet Take 5mg po daily or as directed 120 tablet 1   • warfarin (Coumadin) 5 mg tablet Take 1 tablet (5 mg total) by mouth daily Take 5 mg Tuesday, Wednesday, Thursday, Saturday and Sunday  Take 7 5 mg on Monday and Friday  Follow up INR  104 tablet 0     No current facility-administered medications for this visit  Allergies   Allergen Reactions   • Dilantin [Phenytoin] Anaphylaxis and Rash   • Penicillins Rash, Anaphylaxis, Dermatitis and Hives   • Augmentin [Amoxicillin-Pot Clavulanate] Rash and Dermatitis      Immunizations:     Immunization History   Administered Date(s) Administered   • COVID-19 PFIZER VACCINE 0 3 ML IM 03/06/2021, 03/27/2021, 08/18/2021   • Influenza Quadrivalent 3 years and older 11/12/2018   • Influenza Quadrivalent Preservative Free 3 years and older IM 10/07/2021   • Influenza, injectable, quadrivalent, preservative free 0 5 mL 12/19/2018   • Influenza, recombinant, quadrivalent,injectable, preservative free 10/14/2019, 09/24/2020, 09/09/2022   • Influenza, seasonal, injectable, preservative free 10/03/2017   • Pneumococcal Polysaccharide PPV23 12/19/2018   • Tdap 12/12/2016      Health Maintenance:         Topic Date Due   • Colorectal Cancer Screening  12/27/2017   • Hepatitis C Screening  Completed   • HIV Screening  Addressed   • Cervical Cancer Screening  Discontinued         Topic Date Due   • Pneumococcal Vaccine: Pediatrics (0 to 5 Years) and At-Risk Patients (6 to 59 Years) (2 - PCV) 12/19/2019   • COVID-19 Vaccine (4 - Booster for Pfizer series) 10/13/2021      Medicare Screening Tests and Risk Assessments:     Keshia Issa is here for her Subsequent Wellness visit  Health Risk Assessment:   Patient rates overall health as good  Patient feels that their physical health rating is same   Patient is satisfied with their life  [place] was rated as same  Hearing was rated as same  Patient feels that their emotional and mental health rating is same  Patients states they are never, rarely angry  Patient states they are always unusually tired/fatigued  Pain experienced in the last 7 days has been some  Patient's pain rating has been 2/10  Patient states that she has experienced weight loss or gain in last 6 months  Fall Risk Screening: In the past year, patient has experienced: no history of falling in past year      Urinary Incontinence Screening:   Patient has leaked urine accidently in the last six months  Home Safety:  Patient has trouble with stairs inside or outside of their home  Patient has working smoke alarms and has working carbon monoxide detector  Home safety hazards include: none  Nutrition:   Current diet is Regular and Limited junk food  Medications:   Patient is currently taking over-the-counter supplements  OTC medications include: Vitamin D3, Tylenol  Patient is able to manage medications  Activities of Daily Living (ADLs)/Instrumental Activities of Daily Living (IADLs):   Walk and transfer into and out of bed and chair?: Yes  Dress and groom yourself?: Yes    Bathe or shower yourself?: Yes    Feed yourself? Yes  Do your laundry/housekeeping?: Yes  Manage your money, pay your bills and track your expenses?: Yes  Make your own meals?: Yes    Do your own shopping?: Yes    Previous Hospitalizations:   Any hospitalizations or ED visits within the last 12 months?: Yes    How many hospitalizations have you had in the last year?: 1-2    Advance Care Planning:   Living will: Yes    Durable POA for healthcare:  Yes    Advanced directive: Yes      Cognitive Screening:   Provider or family/friend/caregiver concerned regarding cognition?: No    PREVENTIVE SCREENINGS      Cardiovascular Screening:    General: Screening Current      Diabetes Screening:     General: Screening Current      Breast Cancer Screening:     General: Screening Current      Cervical Cancer Screening:    General: Screening Current      Osteoporosis Screening:    General: Screening Not Indicated and History Osteoporosis      Abdominal Aortic Aneurysm (AAA) Screening:        General: Screening Not Indicated      Lung Cancer Screening:     General: Screening Not Indicated      Hepatitis C Screening:    General: Screening Current    Screening, Brief Intervention, and Referral to Treatment (SBIRT)    Screening  Typical number of drinks in a day: 0  Typical number of drinks in a week: 1  Interpretation: Low risk drinking behavior  AUDIT-C Screenin) How often did you have a drink containing alcohol in the past year? monthly or less  2) How many drinks did you have on a typical day when you were drinking in the past year? 1 to 2  3) How often did you have 6 or more drinks on one occasion in the past year? never    AUDIT-C Score: 1  Interpretation: Score 0-2 (female): Negative screen for alcohol misuse    Single Item Drug Screening:  How often have you used an illegal drug (including marijuana) or a prescription medication for non-medical reasons in the past year? never    Single Item Drug Screen Score: 0  Interpretation: Negative screen for possible drug use disorder    Other Counseling Topics:   Car/seat belt/driving safety, skin self-exam, sunscreen and regular weightbearing exercise and calcium and vitamin D intake  No results found  Physical Exam:     There were no vitals taken for this visit  Physical Exam  Constitutional:       Appearance: Normal appearance  Cardiovascular:      Heart sounds: Normal heart sounds  Pulmonary:      Breath sounds: Normal breath sounds  Abdominal:      Palpations: Abdomen is soft  Neurological:      Mental Status: She is oriented to person, place, and time     Psychiatric:         Mood and Affect: Mood normal          Behavior: Behavior normal           Flaca Amador MD

## 2023-02-08 NOTE — PATIENT INSTRUCTIONS
Medicare Preventive Visit Patient Instructions  Thank you for completing your Welcome to Medicare Visit or Medicare Annual Wellness Visit today  Your next wellness visit will be due in one year (2/9/2024)  The screening/preventive services that you may require over the next 5-10 years are detailed below  Some tests may not apply to you based off risk factors and/or age  Screening tests ordered at today's visit but not completed yet may show as past due  Also, please note that scanned in results may not display below  Preventive Screenings:  Service Recommendations Previous Testing/Comments   Colorectal Cancer Screening  * Colonoscopy    * Fecal Occult Blood Test (FOBT)/Fecal Immunochemical Test (FIT)  * Fecal DNA/Cologuard Test  * Flexible Sigmoidoscopy Age: 39-70 years old   Colonoscopy: every 10 years (may be performed more frequently if at higher risk)  OR  FOBT/FIT: every 1 year  OR  Cologuard: every 3 years  OR  Sigmoidoscopy: every 5 years  Screening may be recommended earlier than age 39 if at higher risk for colorectal cancer  Also, an individualized decision between you and your healthcare provider will decide whether screening between the ages of 74-80 would be appropriate  Colonoscopy: 12/27/2007  FOBT/FIT: Not on file  Cologuard: Not on file  Sigmoidoscopy: Not on file          Breast Cancer Screening Age: 36 years old  Frequency: every 1-2 years  Not required if history of left and right mastectomy Mammogram: 02/21/2022    Screening Current   Cervical Cancer Screening Between the ages of 21-29, pap smear recommended once every 3 years  Between the ages of 33-67, can perform pap smear with HPV co-testing every 5 years     Recommendations may differ for women with a history of total hysterectomy, cervical cancer, or abnormal pap smears in past  Pap Smear: 03/04/2022    Screening Current   Hepatitis C Screening Once for adults born between 1945 and 1965  More frequently in patients at high risk for Hepatitis C Hep C Antibody: 07/23/2019    Screening Current   Diabetes Screening 1-2 times per year if you're at risk for diabetes or have pre-diabetes Fasting glucose: 107 mg/dL (9/15/2022)  A1C: 5 2 % (9/15/2022)  Screening Current   Cholesterol Screening Once every 5 years if you don't have a lipid disorder  May order more often based on risk factors  Lipid panel: 10/09/2020    Screening Current     Other Preventive Screenings Covered by Medicare:  1  Abdominal Aortic Aneurysm (AAA) Screening: covered once if your at risk  You're considered to be at risk if you have a family history of AAA  2  Lung Cancer Screening: covers low dose CT scan once per year if you meet all of the following conditions: (1) Age 50-69; (2) No signs or symptoms of lung cancer; (3) Current smoker or have quit smoking within the last 15 years; (4) You have a tobacco smoking history of at least 20 pack years (packs per day multiplied by number of years you smoked); (5) You get a written order from a healthcare provider  3  Glaucoma Screening: covered annually if you're considered high risk: (1) You have diabetes OR (2) Family history of glaucoma OR (3)  aged 48 and older OR (3)  American aged 72 and older  3  Osteoporosis Screening: covered every 2 years if you meet one of the following conditions: (1) You're estrogen deficient and at risk for osteoporosis based off medical history and other findings; (2) Have a vertebral abnormality; (3) On glucocorticoid therapy for more than 3 months; (4) Have primary hyperparathyroidism; (5) On osteoporosis medications and need to assess response to drug therapy  · Last bone density test (DXA Scan): 10/19/2021   5  HIV Screening: covered annually if you're between the age of 15-65  Also covered annually if you are younger than 13 and older than 72 with risk factors for HIV infection   For pregnant patients, it is covered up to 3 times per pregnancy  Immunizations:  Immunization Recommendations   Influenza Vaccine Annual influenza vaccination during flu season is recommended for all persons aged >= 6 months who do not have contraindications   Pneumococcal Vaccine   * Pneumococcal conjugate vaccine = PCV13 (Prevnar 13), PCV15 (Vaxneuvance), PCV20 (Prevnar 20)  * Pneumococcal polysaccharide vaccine = PPSV23 (Pneumovax) Adults 25-60 years old: 1-3 doses may be recommended based on certain risk factors  Adults 72 years old: 1-2 doses may be recommended based off what pneumonia vaccine you previously received   Hepatitis B Vaccine 3 dose series if at intermediate or high risk (ex: diabetes, end stage renal disease, liver disease)   Tetanus (Td) Vaccine - COST NOT COVERED BY MEDICARE PART B Following completion of primary series, a booster dose should be given every 10 years to maintain immunity against tetanus  Td may also be given as tetanus wound prophylaxis  Tdap Vaccine - COST NOT COVERED BY MEDICARE PART B Recommended at least once for all adults  For pregnant patients, recommended with each pregnancy  Shingles Vaccine (Shingrix) - COST NOT COVERED BY MEDICARE PART B  2 shot series recommended in those aged 48 and above     Health Maintenance Due:      Topic Date Due   • Colorectal Cancer Screening  12/27/2017   • Hepatitis C Screening  Completed   • HIV Screening  Addressed   • Cervical Cancer Screening  Discontinued     Immunizations Due:      Topic Date Due   • Pneumococcal Vaccine: Pediatrics (0 to 5 Years) and At-Risk Patients (6 to 59 Years) (2 - PCV) 12/19/2019   • COVID-19 Vaccine (4 - Booster for Pfizer series) 10/13/2021     Advance Directives   What are advance directives? Advance directives are legal documents that state your wishes and plans for medical care  These plans are made ahead of time in case you lose your ability to make decisions for yourself   Advance directives can apply to any medical decision, such as the treatments you want, and if you want to donate organs  What are the types of advance directives? There are many types of advance directives, and each state has rules about how to use them  You may choose a combination of any of the following:  · Living will: This is a written record of the treatment you want  You can also choose which treatments you do not want, which to limit, and which to stop at a certain time  This includes surgery, medicine, IV fluid, and tube feedings  · Durable power of  for healthcare [facility]): This is a written record that states who you want to make healthcare choices for you when you are unable to make them for yourself  This person, called a proxy, is usually a family member or a friend  You may choose more than 1 proxy  · Do not resuscitate (DNR) order:  A DNR order is used in case your heart stops beating or you stop breathing  It is a request not to have certain forms of treatment, such as CPR  A DNR order may be included in other types of advance directives  · Medical directive: This covers the care that you want if you are in a coma, near death, or unable to make decisions for yourself  You can list the treatments you want for each condition  Treatment may include pain medicine, surgery, blood transfusions, dialysis, IV or tube feedings, and a ventilator (breathing machine)  · Values history: This document has questions about your views, beliefs, and how you feel and think about life  This information can help others choose the care that you would choose  Why are advance directives important? An advance directive helps you control your care  Although spoken wishes may be used, it is better to have your wishes written down  Spoken wishes can be misunderstood, or not followed  Treatments may be given even if you do not want them  An advance directive may make it easier for your family to make difficult choices about your care     Urinary Incontinence   Urinary incontinence (UI)  is when you lose control of your bladder  UI develops because your bladder cannot store or empty urine properly  The 3 most common types of UI are stress incontinence, urge incontinence, or both  Medicines:   · May be given to help strengthen your bladder control  Report any side effects of medication to your healthcare provider  Do pelvic muscle exercises often:  Your pelvic muscles help you stop urinating  Squeeze these muscles tight for 5 seconds, then relax for 5 seconds  Gradually work up to squeezing for 10 seconds  Do 3 sets of 15 repetitions a day, or as directed  This will help strengthen your pelvic muscles and improve bladder control  Train your bladder:  Go to the bathroom at set times, such as every 2 hours, even if you do not feel the urge to go  You can also try to hold your urine when you feel the urge to go  For example, hold your urine for 5 minutes when you feel the urge to go  As that becomes easier, hold your urine for 10 minutes  Self-care:   · Keep a UI record  Write down how often you leak urine and how much you leak  Make a note of what you were doing when you leaked urine  · Drink liquids as directed  You may need to limit the amount of liquid you drink to help control your urine leakage  Do not drink any liquid right before you go to bed  Limit or do not have drinks that contain caffeine or alcohol  · Prevent constipation  Eat a variety of high-fiber foods  Good examples are high-fiber cereals, beans, vegetables, and whole-grain breads  Walking is the best way to trigger your intestines to have a bowel movement  · Exercise regularly and maintain a healthy weight  Weight loss and exercise will decrease pressure on your bladder and help you control your leakage  · Use a catheter as directed  to help empty your bladder  A catheter is a tiny, plastic tube that is put into your bladder to drain your urine  · Go to behavior therapy as directed    Behavior therapy may be used to help you learn to control your urge to urinate  Weight Management   Why it is important to manage your weight:  Being overweight increases your risk of health conditions such as heart disease, high blood pressure, type 2 diabetes, and certain types of cancer  It can also increase your risk for osteoarthritis, sleep apnea, and other respiratory problems  Aim for a slow, steady weight loss  Even a small amount of weight loss can lower your risk of health problems  How to lose weight safely:  A safe and healthy way to lose weight is to eat fewer calories and get regular exercise  You can lose up about 1 pound a week by decreasing the number of calories you eat by 500 calories each day  Healthy meal plan for weight management:  A healthy meal plan includes a variety of foods, contains fewer calories, and helps you stay healthy  A healthy meal plan includes the following:  · Eat whole-grain foods more often  A healthy meal plan should contain fiber  Fiber is the part of grains, fruits, and vegetables that is not broken down by your body  Whole-grain foods are healthy and provide extra fiber in your diet  Some examples of whole-grain foods are whole-wheat breads and pastas, oatmeal, brown rice, and bulgur  · Eat a variety of vegetables every day  Include dark, leafy greens such as spinach, kale, dion greens, and mustard greens  Eat yellow and orange vegetables such as carrots, sweet potatoes, and winter squash  · Eat a variety of fruits every day  Choose fresh or canned fruit (canned in its own juice or light syrup) instead of juice  Fruit juice has very little or no fiber  · Eat low-fat dairy foods  Drink fat-free (skim) milk or 1% milk  Eat fat-free yogurt and low-fat cottage cheese  Try low-fat cheeses such as mozzarella and other reduced-fat cheeses  · Choose meat and other protein foods that are low in fat  Choose beans or other legumes such as split peas or lentils   Choose fish, skinless poultry (chicken or turkey), or lean cuts of red meat (beef or pork)  Before you cook meat or poultry, cut off any visible fat  · Use less fat and oil  Try baking foods instead of frying them  Add less fat, such as margarine, sour cream, regular salad dressing and mayonnaise to foods  Eat fewer high-fat foods  Some examples of high-fat foods include french fries, doughnuts, ice cream, and cakes  · Eat fewer sweets  Limit foods and drinks that are high in sugar  This includes candy, cookies, regular soda, and sweetened drinks  Exercise:  Exercise at least 30 minutes per day on most days of the week  Some examples of exercise include walking, biking, dancing, and swimming  You can also fit in more physical activity by taking the stairs instead of the elevator or parking farther away from stores  Ask your healthcare provider about the best exercise plan for you  © Copyright [employer] 2018 Information is for End User's use only and may not be sold, redistributed or otherwise used for commercial purposes   All illustrations and images included in CareNotes® are the copyrighted property of [employer]  or [address] [place] [address]

## 2023-02-08 NOTE — ASSESSMENT & PLAN NOTE
Discussed Prolia injections every 6 months with patient  Patient would like to check with her specialists and insurance before starting the medication

## 2023-02-09 ENCOUNTER — PATIENT OUTREACH (OUTPATIENT)
Dept: FAMILY MEDICINE CLINIC | Facility: CLINIC | Age: 58
End: 2023-02-09

## 2023-02-09 ENCOUNTER — TELEPHONE (OUTPATIENT)
Dept: FAMILY MEDICINE CLINIC | Facility: CLINIC | Age: 58
End: 2023-02-09

## 2023-02-09 LAB
APTT HEX PL PPP: 7 SEC (ref 0–11)
APTT SCREEN TO CONFIRM RATIO: 1.11 RATIO (ref 0–1.34)
APTT-LA IMM 4:1 NP PPP: 40.9 SEC (ref 0–40.5)
CONFIRM APTT/NORMAL: 69.6 SEC (ref 0–47.6)
DRVVT IMM 1:2 NP PPP: 44.5 SEC (ref 0–40.4)
DRVVT SCREEN TO CONFIRM RATIO: 1.1 RATIO (ref 0.8–1.2)
LA PPP-IMP: ABNORMAL
SCREEN APTT: 44.9 SEC (ref 0–43.5)
SCREEN DRVVT: 54 SEC (ref 0–47)
THROMBIN TIME: 17.2 SEC (ref 0–23)

## 2023-02-09 NOTE — PROGRESS NOTES
OPCM ADDISON responding to [facility] referral from PCP office  Chart review completed  Patient reports that she has been paying on  hospital bills and it is becoming a hardship  Patient pays $100 a month  Patient has attempted to contact Financial Counselors who have not been of any assistance  Patient reports that 's insurance has a $5000 deductible per family member and then a 20% copay  SW discussed [facility] application with patient  Patient is unsure if she will qualify for this with her and her 's income  Patient is willing to complete a raghavendra care application and submit  Patient identifies no other needs at this time  Phone call to [employer] who is mailing a raghavendra care application to patient  SW remains available

## 2023-02-09 NOTE — TELEPHONE ENCOUNTER
Patient called and stated that she went home after her visit yesterday to think about having the colonoscopy, prolia and colorguard  She says she has a really high deductible and her insurance doesn't cover a lot of the expenses so at this time she is not having any of the tests done  She stated the cologuard sent her the packet to do and she was upset because she doesn't want to do it and doesn't know why it was sent when she said she was going to think about it  I told her its her choice whether to do it or not

## 2023-02-13 ENCOUNTER — TELEPHONE (OUTPATIENT)
Dept: OBGYN CLINIC | Facility: HOSPITAL | Age: 58
End: 2023-02-13

## 2023-02-13 DIAGNOSIS — Z96.652 STATUS POST TOTAL KNEE REPLACEMENT USING CEMENT, LEFT: Primary | ICD-10-CM

## 2023-02-13 RX ORDER — CLINDAMYCIN HYDROCHLORIDE 300 MG/1
CAPSULE ORAL
Qty: 3 CAPSULE | Refills: 2 | Status: SHIPPED | OUTPATIENT
Start: 2023-02-13 | End: 2023-05-13

## 2023-02-13 NOTE — TELEPHONE ENCOUNTER
Please contact the patient and let her know that we provided her the prescription for her dental antibiotics as well as 2 refills for the future if she should need them she could get it filled before future dental appointments

## 2023-02-13 NOTE — TELEPHONE ENCOUNTER
Caller: Patient    Doctor: Adiel Antonio    Reason for call: patient states her dental appointment was moved to tomorrow  Wondering if prescription can be expedited  Advised it was already sent with 2 refills for future appointments  Understanding verbalized       Call back#:  n/a

## 2023-02-13 NOTE — TELEPHONE ENCOUNTER
Caller: patient    Doctor: Niya Obrien    Reason for call: patient called she has an upcoming dental appt and will need to be pre medicated   Sx left [facility] 10/6/22    Call back#: [phone] no

## 2023-02-20 ENCOUNTER — TELEPHONE (OUTPATIENT)
Dept: HEMATOLOGY ONCOLOGY | Facility: CLINIC | Age: 58
End: 2023-02-20

## 2023-02-20 ENCOUNTER — APPOINTMENT (OUTPATIENT)
Dept: LAB | Facility: CLINIC | Age: 58
End: 2023-02-20

## 2023-02-20 ENCOUNTER — PATIENT MESSAGE (OUTPATIENT)
Dept: HEMATOLOGY ONCOLOGY | Facility: CLINIC | Age: 58
End: 2023-02-20

## 2023-02-20 ENCOUNTER — TELEPHONE (OUTPATIENT)
Dept: FAMILY MEDICINE CLINIC | Facility: OTHER | Age: 58
End: 2023-02-20

## 2023-02-20 DIAGNOSIS — D68.61 ANTIPHOSPHOLIPID ANTIBODY SYNDROME (HCC): ICD-10-CM

## 2023-02-20 DIAGNOSIS — I26.99 PULMONARY EMBOLISM ASSOCIATED WITH COVID-19 (HCC): Primary | ICD-10-CM

## 2023-02-20 DIAGNOSIS — U07.1 PULMONARY EMBOLISM ASSOCIATED WITH COVID-19 (HCC): Primary | ICD-10-CM

## 2023-02-20 DIAGNOSIS — Z79.01 WARFARIN ANTICOAGULATION: ICD-10-CM

## 2023-02-20 NOTE — TELEPHONE ENCOUNTER
Pt verified she is currently taking 7 5 mg Monday, Friday  5 mg Tuesday, Wednesday Thursday, Saturday and Sunday  Per Dr Robert Rees, Coumadin 7 5 mg Monday, Wednesday and Friday  5 mg Tuesday, Thursday, Saturday and Sunday  Recheck INR in two weeks  Pt called and notified, she verbalized understanding of new dosage

## 2023-02-20 NOTE — TELEPHONE ENCOUNTER
Patient seeing Dr Klaus Lisa as PCP  Please remove Dr Flower Hernandez under Additional team members  Thank you

## 2023-02-20 NOTE — TELEPHONE ENCOUNTER
----- Message from Geovani Rodriguez RN sent at 2/20/2023  9:29 AM EST -----  Regarding: FW: INR drawn this morning  Contact: [phone]    ----- Message -----  From: Author Lobo  Sent: 2/20/2023   9:23 AM EST  To: Hematology Oncology [facility]  Subject: INR drawn this morning                           My INR is still too low what is your recommendation

## 2023-02-23 ENCOUNTER — TELEPHONE (OUTPATIENT)
Dept: HEMATOLOGY ONCOLOGY | Facility: CLINIC | Age: 58
End: 2023-02-23

## 2023-02-23 NOTE — TELEPHONE ENCOUNTER
02/23/23 3:14 PM    Hello  The new [facility] PCP will be added to the patient's chart when they arrive for their first appointment with the office  Thank you    Kenya Lorenz

## 2023-02-23 NOTE — TELEPHONE ENCOUNTER
CALL RETURN FORM   Reason for patient call? Patient is on warfarin and wondering if she would be able to have just few drinks this weekend, patient is requesting a callback to discuss this  Patient's primary oncologist? Wandy Hall, KEN    Name of person the patient was calling for? Tanja Martínez     Any additional information to add, if applicable? N/A   Informed patient that the message will be forwarded to the team and someone will get back to them as soon as possible    Did you relay this information to the patient?  YES

## 2023-02-24 ENCOUNTER — TELEPHONE (OUTPATIENT)
Dept: CARDIOLOGY CLINIC | Facility: CLINIC | Age: 58
End: 2023-02-24

## 2023-02-27 ENCOUNTER — OFFICE VISIT (OUTPATIENT)
Dept: HEMATOLOGY ONCOLOGY | Facility: CLINIC | Age: 58
End: 2023-02-27

## 2023-02-27 VITALS
BODY MASS INDEX: 40.3 KG/M2 | WEIGHT: 219 LBS | HEIGHT: 62 IN | SYSTOLIC BLOOD PRESSURE: 125 MMHG | DIASTOLIC BLOOD PRESSURE: 75 MMHG | HEART RATE: 82 BPM | RESPIRATION RATE: 18 BRPM | OXYGEN SATURATION: 96 %

## 2023-02-27 DIAGNOSIS — D68.62 LUPUS ANTICOAGULANT DISORDER (HCC): ICD-10-CM

## 2023-02-27 DIAGNOSIS — E53.8 B12 DEFICIENCY: ICD-10-CM

## 2023-02-27 DIAGNOSIS — U07.1 PULMONARY EMBOLISM ASSOCIATED WITH COVID-19 (HCC): Primary | ICD-10-CM

## 2023-02-27 DIAGNOSIS — D68.61 ANTIPHOSPHOLIPID ANTIBODY SYNDROME (HCC): ICD-10-CM

## 2023-02-27 DIAGNOSIS — I26.99 PULMONARY EMBOLISM ASSOCIATED WITH COVID-19 (HCC): Primary | ICD-10-CM

## 2023-02-27 DIAGNOSIS — D50.0 IRON DEFICIENCY ANEMIA DUE TO CHRONIC BLOOD LOSS: ICD-10-CM

## 2023-02-27 DIAGNOSIS — M32.9 SYSTEMIC LUPUS ERYTHEMATOSUS, UNSPECIFIED SLE TYPE, UNSPECIFIED ORGAN INVOLVEMENT STATUS (HCC): Chronic | ICD-10-CM

## 2023-02-27 DIAGNOSIS — D70.9 NEUTROPENIA, UNSPECIFIED TYPE (HCC): ICD-10-CM

## 2023-02-27 RX ORDER — WARFARIN SODIUM 5 MG/1
TABLET ORAL
Qty: 45 TABLET | Refills: 1 | Status: SHIPPED | OUTPATIENT
Start: 2023-02-27

## 2023-02-27 NOTE — PROGRESS NOTES
Hematology/Oncology Outpatient Follow- up Note  Jaime Torres 59 y o  female MRN: @ Encounter: [id number]        Date:  2/27/2023      Assessment / Plan:      1  Vitamin B12 deficiency in a patient was diagnosed with autoimmune connective tissue disease most likely secondary to malabsorption as well because of history of gastric fundoplication secondary to GERD       B12 1000mcg IM monthly  B12 level 818 2/6/23  Continue        2  Post op anemia  Hgb 11/11/22 was 11 1g/dL  She was taking oral iron twice daily- she has not for some time due to nausea, however  Iron saturation 13%; ferritin 394 11/11/22((diagnosed with PE 11/5/22)  Feraheme weekly x2 12/2022 2/6/23 Iron saturation 20%; ferritin 255  Hemoglobin normal 13 6       Colonoscopy 12/2017  F/U with Dr Iesha Stout         2  Prior to 10/22, she never had thrombosis, DVT, PE  Diagnosed with Bilateral PE 11/5/22 while on  ASA 162mg po daily  She had d/c Lovenox 40mg sq daily s/p Left Knee replacement on 10/6/2022 -1 week prior  + for Covid 10/26/22  LE doppler 11/2022 - No evidence of acute or chronic deep vein thrombosis of either LE            Persistently Positive LA        Given her persistently positive lupus anticoagulant, her pulmonary embolism that occurred while on aspirin 162 milligrams p o  daily even though in the background of COVID positivity, her risk of recurrent thromboembolic event is high and I am in favor of chronic lifelong Coumadin for antiphospholipid antibody syndrome      We are currently managing coumadin -   2/20/22 Coumadin 7 5 mg Monday, Wednesday and Friday  5 mg Tuesday, Thursday, Saturday and Sunday  Goal to have INR 2 5-3    3   Exercise induced PAH and follows with cardiology      4   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, HELENE, anticardiolipin AB    Dx Lupus   Follows at [facility] with Dr Femi Espinoza       5  Leukopenia 2nd to SLE    6  Colon cancer screening    Her PCP is discussed possible colonoscopy with her  Reviewed would recommend Lovenox bridge  Patient undecided if she wishes to have this done  Discussed possibility of Cologuard, however, if positive she would need colonoscopy  She will discuss with her PCP        F/U in 6 months with repeat labs       Deb Ruff is a 59year old female seen 10/18/2017 for initial evaluation , self referred, secondary to low Vitamin B12 level          10/6/2017 labs at [facility]: hemoglobin 13 2, mcv 90, RDW 14 3, WBC 6 2, 68% neutrophils, 21% lymphs, 8% monocytes 2% eosinophils, platelet count 751,380  Iron saturation 15%, ferritin 52 Vitamin B12 184( 211-946) Tsh 1 98, free T4 1 18, negative lyme antibody  RF normal, Anti-scleroderma antibodies normal, anti-DS DNA normal, Styles AB normal, RNP antibodies normal  Anti-centromere AB normal  Normal sed rate   HELENE: Dense fine speckled pattern is noted which suggest presence of  antibody which has a low prevalence in systemic autoimmune rheumatic diseases  Normal immunofixation on SPEP  Normal serum immunoglobulins  Normal CCP antibodies IgG and IgA  U/A identified hyaline casts  Celiac panel was normal  CMP normal     POSITIVE HELENE  POSITIVE Lupus anticoagulant  IgM anticardiolipin antibody 18 (Indeterminate)  Normal IgG anticardiolipin antibody , normal IgA anticardiolipin antibody      7/11/2016: normal anticardiolipin antibodies  Lyme negative  Normal ANCA profile, normal C3 and C4 complement, normal CRP  Normal thyroglobulin profile  Normal ESR  + LUPUS ANTICOAGULANT  HELENE positive  On [facility] intake 3/13/2015 she noted that she had a history of + lupus anticoagulant  Andrew Isidro states she 1st had HELENE evaluated and was positive in 2012      Patient is extremely frustrated  She states she has been having symptoms of SOB, fatigue and has been diagnosed with a lupus-like disease but has not received any disease directed therapy   She took prednisone years ago without benefit and significant weight gain  Was previously seen at [address] [place] [address] by Raya Ugalde in 2012 regarding chronic cough, SOB without history of asthma, allergies  Quit smoking at 34years old after 15-20 pack years  PND treated and cough improved but ANSARI persisted  Bronchoscopy was normal  PFTs normal  Evaluation unrevealing to pulmonary source for her dyspnea  No benefit with Advair or high dose steroids  Falls asleep easily  Normal echo  Noted to have edema and sleep apnea suspected  She was advised to increase Lasix and have sleep study  Ashley Al, a cardiologist at the [facility] had her undergo echocardiogram, however, poor images were obtained  She been had a stress echocardiogram and a question of pulmonary artery hypertension and was raised  8/13/2012: stress echocardiogram report from [address] [place] [address]: estimated P  a systolic pressure increased to 56 mm mercury suggestive of significant exercise induced pulmonary hypertension  She states she saw Dr Brooke Cisneros, a specialist at [address] [place] [address] who didn't examine me and told me I look too good to have pulmonary artery hypertension " He did not perform additional testing but then referred her to Dr Serenity Snyder, a cardiac electrophysiologist at [facility] due to resting heart rate 114-120s who informed her she had a + HELENE      She saw Dr Ashleigh Lobato a few times but felt pressured by him as he wanted to perform a lip biopsy to evaluate for Sjogren's syndrome  She has been seeing TERI Chen  at the rheumatology center of [facility] but has not seeing him in approximately a year and a half as she states he was requesting repeated blood work that was not moving forward towards treating any symptoms she was having and feels that he can be adverserial       Lactose intolerant  Osteoporosis diagnosed at 26 y/o in Lumbar spine  Partial Hysterectomy 1996 for endometrial hyperplasia   Ovaries spared       She works at [employer] [place]  She returned from a vacation and had 3+ pitting edema  Dr Moses Gentile, 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 arranged for her to see Rebecca Rodriguez MD in [place], [place] who ordered the above labs  Sharonda Blackmon is very comfortable with injecting herself IM  No s/sx pancreatic insufficiency  She does not take PPI or H2- blocker  She does not drink alcohol regularly        Did have a first trimester miscarriage around the 10th week in between the birth of her 2 children  She takes Aspirin       Reports last EGD was 3-4 years ago performed by Dr Cash Gaviria at [facility]  She is s/p Nissen Fundoplication for severe GERD  She had a colonoscopy previously      She saw Dr Jose Manuel Haque with [facility] 2/13/2015 regarding persistently + HELENE and IGM anticardiolipin antibody  There was also question whether or not she had cotton wool spots secondary to embolic phenomenon  Patient states ultimately this was ruled out  Aspirin was hematologic recommendation at that time           10/2019:  Normal cardiolipin AB, normal C3, C4, dsDNA ab not present     10/2021: Hemoglobin 14 1, MCV 91, white blood cell count 3 9, 66% neutrophils, 22% lymphocytes, 8% monocytes, platelet count 115  Normal cardiolipin antibodies, normal beta 2 glycoprotein antibody       Status Post Arthroplasty Knee Total - Left on 10/6/2022  She was on ASA 162mg po daily and Lovenox 40mg sq daily x 30 days post-op      Presented to the hospital on 11/5/2022 due to acute onset of back pain, difficulty breathing, SOB and cough  Reported that she went on a cruise 10/24 and stated feeling sick on the 26th and tested + for Covid 10/26/22      11/5/22 CTA - Moderate quantity of right lower lobe segmental and small quantity of right upper lobe and left lower lobe acute PE      LE doppler was not done      She was discharged on Eliquis  Transitoned to  Coumadin due to APS      Interval History:    1/19/23 - normal cardiolipin antibodies, B-2 glycoprotein antibodies; She will be going on a cruise to [place] in September, leaving out of [place]  Review of Systems   Constitutional: Negative for appetite change, chills, diaphoresis, fatigue, fever and unexpected weight change  HENT:   Negative for mouth sores, nosebleeds, sore throat, tinnitus and voice change  Eyes: Negative for eye problems  Respiratory: Negative for chest tightness, cough, shortness of breath and wheezing  Cardiovascular: Negative for chest pain, leg swelling and palpitations  Gastrointestinal: Negative for abdominal distention, abdominal pain, blood in stool, constipation, diarrhea, nausea, rectal pain and vomiting  Endocrine: Negative for hot flashes  Genitourinary: Negative  Musculoskeletal: Positive for arthralgias and myalgias  Negative for gait problem  Skin: Negative for itching and rash  Neurological: Negative for dizziness, gait problem, headaches, light-headedness and numbness  Hematological: Negative for adenopathy  Psychiatric/Behavioral: Negative for confusion and sleep disturbance  The patient is nervous/anxious  Test Results:        Labs:   Lab Results   Component Value Date    HGB 13 6 02/06/2023    HCT 40 9 02/06/2023    MCV 93 02/06/2023     02/06/2023    WBC 3 51 (L) 02/06/2023    NRBC 0 02/06/2023     Lab Results   Component Value Date    K 3 7 11/07/2022    CL 99 11/07/2022    CO2 26 11/07/2022    BUN 12 11/07/2022    CREATININE 0 77 11/07/2022    GLUF 107 (H) 09/15/2022    CALCIUM 8 9 11/07/2022    AST 30 11/06/2022    ALT 24 11/06/2022    ALKPHOS 89 11/06/2022    EGFR 84 11/07/2022           Imaging: No results found  Allergies:    Allergies   Allergen Reactions   • Dilantin [Phenytoin] Anaphylaxis and Rash   • Penicillins Rash, Anaphylaxis, Dermatitis and Hives   • Augmentin [Amoxicillin-Pot Clavulanate] Rash and Dermatitis     Current Medications: Reviewed  PMH/FH/SH:  Reviewed      Physical Exam:    There is no height or weight on file to calculate BSA  Ht Readings from Last 3 Encounters:   02/08/23 5' 2 5" (1 588 m)   01/05/23 5' 2 5" (1 588 m)   12/29/22 5' 4" (1 626 m)        Wt Readings from Last 3 Encounters:   02/08/23 99 8 kg (220 lb)   01/05/23 100 kg (220 lb 12 8 oz)   12/29/22 99 3 kg (219 lb)        Temp Readings from Last 3 Encounters:   12/08/22 97 7 °F (36 5 °C) (Temporal)   11/30/22 (!) 97 4 °F (36 3 °C) (Temporal)   11/21/22 (!) 97 °F (36 1 °C)        BP Readings from Last 3 Encounters:   02/08/23 122/74   01/05/23 134/74   12/29/22 126/85             Physical Exam  Constitutional:       Appearance: She is well-developed  HENT:      Head: Normocephalic and atraumatic  Cardiovascular:      Rate and Rhythm: Normal rate  Pulmonary:      Effort: Pulmonary effort is normal  No respiratory distress  Skin:     General: Skin is warm and dry  Neurological:      General: No focal deficit present  Mental Status: She is alert     Psychiatric:         Behavior: Behavior normal                Emergency Contacts:    Extended Emergency Contact Information  Primary Emergency Contact: Hardeep Joseph  Address: [address] Phone: [phone]  Mobile Phone: [phone]  Relation: Spouse  Secondary Emergency Contact: Laura Castanon  Address: [address] [place] Phone: [phone]  Relation: Daughter

## 2023-03-06 ENCOUNTER — APPOINTMENT (OUTPATIENT)
Dept: LAB | Facility: CLINIC | Age: 58
End: 2023-03-06

## 2023-03-06 DIAGNOSIS — Z79.01 WARFARIN ANTICOAGULATION: ICD-10-CM

## 2023-03-06 DIAGNOSIS — U07.1 PULMONARY EMBOLISM ASSOCIATED WITH COVID-19 (HCC): ICD-10-CM

## 2023-03-06 DIAGNOSIS — D68.61 ANTIPHOSPHOLIPID ANTIBODY SYNDROME (HCC): ICD-10-CM

## 2023-03-06 DIAGNOSIS — I26.99 PULMONARY EMBOLISM ASSOCIATED WITH COVID-19 (HCC): ICD-10-CM

## 2023-03-09 NOTE — PROGRESS NOTES
Cardiology Outpatient Progress Note - Alexx Callejas 59 y o  female MRN: [id number]    @ Encounter: [id number]      Patient Active Problem List    Diagnosis Date Noted   • Antiphospholipid antibody syndrome ([facility] ) 02/27/2023   • Financial difficulties 02/08/2023   • Insomnia 01/05/2023   • Pulmonary embolus ([facility] ) 01/05/2023   • BMI 39 0-39 9,adult 01/05/2023   • Iron deficiency anemia due to chronic blood loss 11/21/2022   • Pulmonary embolism associated with COVID-19 ([place] [id number] ) 11/05/2022   • SLE (systemic lupus erythematosus) ([place] [id number] ) 11/05/2022   • Status post total knee replacement using cement, left 10/20/2022   • Venous stasis of lower extremity 05/09/2022   • Acute pain of right knee 05/09/2022   • Leukopenia 11/22/2021   • Morbid obesity ([place] [id number] )    • Primary osteoarthritis of left knee 02/12/2020   • Left knee pain 11/11/2019   • Tear of medial meniscus of left knee, current 11/11/2019   • Right knee pain 02/18/2019   • Status post total right knee replacement 02/18/2019   • Chronic kidney disease, stage 3a ([place] [id number] ) 02/12/2019   • Tear of medial meniscus of right knee, current 09/10/2018   • Other tear of medial meniscus, current injury, right knee, initial encounter 07/16/2018   • Patellofemoral disorder of right knee 06/04/2018   • Pes anserinus bursitis of right knee 06/04/2018   • Arthritis of right knee 06/04/2018   • Arthritis of left knee 06/04/2018   • Chronic pain of right knee 05/22/2018   • Elevated serum creatinine 05/22/2018   • Hyperuricemia 05/22/2018   • Long-term use of hydroxychloroquine 02/16/2018   • Pulmonary hypertension ([facility] ) 02/16/2018   • Undifferentiated connective tissue disease ([facility] ) 02/16/2018   • Screen for colon cancer 01/24/2018   • Secondary pulmonary hypertension 12/15/2017   • Diffuse connective tissue disease ([facility] ) 11/21/2017   • Pes anserine bursitis 11/21/2017   • Trochanteric bursitis of both hips 11/21/2017   • Vitamin D deficiency 11/21/2017   • Other organ or system involvement in systemic lupus erythematosus ([facility] ) 11/17/2017   • Sinus tachycardia 11/09/2017   • B12 deficiency 10/19/2017   • Osteoporosis 10/19/2017   • Lupus anticoagulant disorder ([facility] ) 10/18/2017   • Positive HELENE (antinuclear antibody) 10/18/2017   • Hot flashes due to menopause 09/01/2015   • Night sweats 09/01/2015   • SOB (shortness of breath) on exertion 11/12/2012   • Other chronic pulmonary heart diseases 11/05/2012   • Edema 06/04/2012   • Post-nasal drip 06/04/2012   • Chronic cough 02/09/2012   • Dyspnea 02/09/2012       Assessment:  # Acute Pulmonary Embolism, associated with COVID 19 infection, also had TKR on 10/6/22  AC: warfarin 5 mg alternating 7 5 mg    Positive lupus anticoagulant + 9/26/22    Echo 11/6/22:  LVEF: 65%  RV: upper normal size, normal function  PASP: 42 mmHg    CTA 11/5/22: moderate quantity of RLL segmental and small quantity RUL and LLL acute PE    # Exercise induced PAH; HFpEF with PH  Out of proportion to obesity/ deconditioning/ diastolic dysfunction (PCWP 12 mmHg)  Pt with MCTD/ SLE w/ lupus anticoagulant  Remote smoker  At rest, RV appears normal on TTE with coupled RV-PA w/o e/o of RVOT notching suggestive of normal PVR at rest  However, she did have a stress echo in 2012 that was suggestive of exercise induced pulmonary HTN  At the time she did not have e/o of elevated PVR  Her bubble was negative which makes an intracardiac shunt less likely      PAH Rx: sildanefil 20 mg Q8 (previously on macitentan)  Diuretic: torsemide 20 mg daily, spironolactone 25 mg daily  NT proBNP:   Weight: 237 lbs--> 229 lbs--> 218 lbs    Studies- personally reviewed by me  Echo 11/6/22: (in setting of acute PE)  LVEF: 65%  RV: upper normal size, normal function  PASP: 42 mmHg    Stress Echo 11/4/20: 3 min, 4 6 METs, max heart rate 155 bpm, resting /96    Echo 4/23/19:  Normal RV size and function    RHC with stress 12/19/17:  She had an appropriate HR response from 100 to 130 bpm with MAP throughout the study staying at 112 mmHg  Hgb 13 1     Rest:  RA 7  RV 37/4/13  PA 33/16/25  PCWP 12    SaO2 99%  MvO2 72 3%  CO/CI 4 8/2  3  TPG 13  DPG 4  PVR 2 7     Exercise:  CVP 10  PA 54/29/37  PCWP 18    SaO2 100%  MvO2 66%  CO/CI 4/1 9  TPG 19  DPG 11  PVR 4 8  CVP:PCWP 0 55    Stress echo 12/14/17:to evaluate pulmonary pressures, RV, and RVOT signal w/ exercise  5 min, 20 sec  HTNsive response, decrease in O2 saturation from 99% to 91% and increase in PA pressures from 45 mmHg to 70 mmHg with exercise  PFTs 12/1/17: normal, DLCO 84%    # SLE: Per outpatient Rheumatologist  Continue plaquenil  # ST: V/Q negative  May be 2/2 to deconditioning +/- inappropriate ST +/- diastolic dysfunction/HF  No e/o infection     Holter 12/4/20: , 87 bpm  # Morbid obesity: Continue weight loss  # TKR on 10/6/22    TODAY'S PLAN:  Warfarin probably lifelong  Can do D dimer at 3 months, if echo suggestive then can check VQ but no indication  Continue sildanefil  Torsemide 20 mg and spironolactone 25 mg daily- not taking torsemide  If lightheadedness persists than cut back torsemide to 10 mg daily  Likely has venous insufficiency      HPI:          60 yo followed for out of proportion PAH, exercise induced PAH, with MCTD/ SLE, + lupus anticoagulant, obesity  She has seen Dr Karyn Lezama  first started getting SOB -- 10 years ago  She has had several episodes of bacterial PNA and chronic cough (a few times/year)  She was referred to [facility] in 2012 for workup for PH  She had a a stress echo 8/2012 that showed that her PA pressures more than doubled from --24 mmHg to > 50 mmHg  Currently, she states she can walk up a couple flights of stairs but does get SOB  She also gets SOB with walking up inclines  She gets occasional LH  She was seeing a Rheumatologist in [place] but recently saw a specialist at [facility], Dr Yoseph Rowell (Rheumatologist - 11/16)  She was diagnosed with MCTD and SLE   EHLENE + 1:320 w/ speckled pattern  She has been on Plaquenil x 2 weeks now  She is on ASA for + lupus anticoagulant and anticardiolipin Abs  She has joint pains and a subtle malar rash  She states so far there has been no change with plaquenil  After her visit with Dr Latoya Siddiqi, she has had TTE which shows LVEF --65%, normal RV size and function without RVOT notching  Normal atria  CXR clear  She also has had a negative V/Q scan  She walked the patient in the hallway and had her go up and down stairs  Her resting HR went from --100 bpm to 110s with Pulse Ox starting from 98% @ rest to --90% with exercise  Stress Echo in 2017 showed hypertensive response and PA pressures to 70 mmHg w/ drop in pulse ox to 91%  Exercise RHC showed increase in PVR to 4 5 MOLINA from < 3 MOLINA  She was denied Tadalafil but approved for sildanefil  She did not feel any different and continued trouble with stairs  Called 5/12 that returned from cruise a week ago and developed swelling in right leg  Ultrasound negative for DVT  Had fluid removed from right knee by Dr Alejo Marcus rheumatologist wants her to see a [facility]ologist at [facility]  Admitted 11/5 to 11/7 with pulmonary embolism associated with COVID 19 infection; Pt though already had TKR on 10/6/22  On warfarin   Lupus anticoagulant    Interval History:   Had lost 25 lbs since COVID  Said torsemide was not working    [employer] down 11 lb smore    A little dizzy at times    Past Medical History:   Diagnosis Date   • HELENE positive    • Anemia     Sildenafil causes decreased hematocrit   • Anxiety 03/2020   • CHF (congestive heart failure) (HCC)     right heart failure related to pulm HTN lupus   • Chronic kidney disease     borderline lab values   • Chronic rhinitis 06/04/2012   • Clotting disorder ([facility] ) 2012   • Coronary artery disease 2018   • Encephalitis     Lasted Assessed 10/18/2017   • Gout 06/26/2018   • Hypertension    • Lupus anticoagulant disorder ([facility] )    • Maxillary sinusitis     Lasted Assessed 10/18/2017   • Night sweat    • Osteopenia     Hip   • Osteoporosis     Spine   • Pneumonia     Last Assessed 10/18/2017   • PONV (postoperative nausea and vomiting)    • Pulmonary embolism (HCC)    • Pulmonary hypertension (HCC)    • Seizures (HCC)     Only during Encephalitis   • Shortness of breath     with exertion   • Sinus tachycardia    • Urinary incontinence        Review of Systems   Constitutional: Negative for activity change, appetite change, fatigue and unexpected weight change (wt down 11 lbs more)  HENT: Negative for congestion and nosebleeds  Eyes: Negative  Respiratory: Negative for cough, chest tightness and shortness of breath  Cardiovascular: Negative for chest pain, palpitations and leg swelling  Gastrointestinal: Negative for abdominal distention  Endocrine: Negative  Genitourinary: Negative  Musculoskeletal: Negative  Skin: Negative  Neurological: Negative for dizziness, syncope and weakness  Hematological: Negative  Psychiatric/Behavioral: Negative  Allergies   Allergen Reactions   • Dilantin [Phenytoin] Anaphylaxis and Rash   • Penicillins Rash, Anaphylaxis, Dermatitis and Hives   • Augmentin [Amoxicillin-Pot Clavulanate] Rash and Dermatitis           Current Outpatient Medications:   •  B-D 3CC LUER-LILLIE SYR 25GX1/2" 25G X 1-1/2" 3 ML MISC, USE 1 SYRINGE EVERY 30 DAYS, Disp: 12 each, Rfl: 1  •  Benlysta 200 MG/ML SOAJ, Weekly on Fridays, Disp: , Rfl:   •  cholecalciferol (VITAMIN D3) 1,000 units tablet, Take 2,000 Units by mouth daily in the early morning  , Disp: , Rfl:   •  cyanocobalamin 1,000 mcg/mL, Inject 1 mL (1,000 mcg total) into a muscle every 30 (thirty) days, Disp: 12 mL, Rfl: 1  •  gabapentin (Neurontin) 100 mg capsule, Take 1 capsule (100 mg total) by mouth daily at bedtime, Disp: 90 capsule, Rfl: 0  •  hydroxychloroquine (PLAQUENIL) 200 mg tablet, Take 400 mg by mouth daily with breakfast Alternating days/dosages, Disp: , Rfl:   • sildenafil (REVATIO) 20 mg tablet, Take 1 tablet (20 mg total) by mouth 3 (three) times a day, Disp: 270 tablet, Rfl: 3  •  spironolactone (ALDACTONE) 25 mg tablet, Take 1 tablet (25 mg total) by mouth daily, Disp: 90 tablet, Rfl: 3  •  Syringe/Needle, Disp, (SecureSafe Syringe/Needle) 25G X 1-1/2" 1 ML MISC, Use 1 Syringe every 30 (thirty) days, Disp: 12 each, Rfl: 1  •  traZODone (DESYREL) 50 mg tablet, Take 1 tablet (50 mg total) by mouth daily at bedtime, Disp: 30 tablet, Rfl: 2  •  triamcinolone (KENALOG) 0 1 % oral topical paste, prn, Disp: , Rfl:   •  warfarin (Coumadin) 5 mg tablet, Take 5mg po daily or as directed, Disp: 120 tablet, Rfl: 1  •  clindamycin (CLEOCIN) 300 MG capsule, Take 3 capsules by mouth 1 hour prior to dental procedure (Patient not taking: Reported on 3/13/2023), Disp: 3 capsule, Rfl: 2  •  ondansetron (ZOFRAN) 4 mg tablet, Take 1 tablet (4 mg total) by mouth every 8 (eight) hours as needed for nausea or vomiting for up to 15 days (Patient not taking: Reported on 2022), Disp: 20 tablet, Rfl: 0  •  warfarin (Coumadin) 5 mg tablet, Take 1 tablet (5 mg total) by mouth daily Take 5 mg Tuesday, Wednesday, Thursday, Saturday and   Take 7 5 mg on Monday and Friday  Follow up INR , Disp: 104 tablet, Rfl: 0  •  warfarin (Coumadin) 5 mg tablet, 7 5mg po daily or as directed , Disp: 45 tablet, Rfl: 1    Social History     Socioeconomic History   • Marital status: /Civil Union     Spouse name: Not on file   • Number of children: 2   • Years of education: Not on file   • Highest education level: Not on file   Occupational History   • Not on file   Tobacco Use   • Smoking status: Former     Packs/day: 0 00     Years: 0 00     Pack years: 0 00     Types: Cigarettes     Quit date: 2006     Years since quittin 1   • Smokeless tobacco: Never   Vaping Use   • Vaping Use: Never used   Substance and Sexual Activity   • Alcohol use:  Yes     Alcohol/week: 0 0 standard drinks Comment: socially   • Drug use: No   • Sexual activity: Yes     Partners: Male   Other Topics Concern   • Not on file   Social History Narrative    Daily caffeinated coffee consumption    Exercise habits     Social Determinants of Health     Financial Resource Strain: High Risk   • Difficulty of Paying Living Expenses: Very hard   Food Insecurity: Not on file   Transportation Needs: No Transportation Needs   • Lack of Transportation (Medical): No   • Lack of Transportation (Non-Medical):  No   Physical Activity: Not on file   Stress: Not on file   Social Connections: Not on file   Intimate Partner Violence: Not on file   Housing Stability: Not on file       Family History   Problem Relation Age of Onset   • Uterine cancer Mother    • Pancreatic cancer Mother 79   • Diabetes Mother    • Endometrial cancer Mother 77   • Arthritis Mother    • Cancer Mother         Pancreas, Uterine   • Vision loss Mother    • Heart disease Father         open heart surgery at age 48    • Stroke Father         CVA   • Hypertension Father    • Atrial fibrillation Father    • Hyperlipidemia Father    • Arthritis Father    • Rheum arthritis Father    • No Known Problems Sister    • No Known Problems Brother    • Thyroid disease Maternal Grandmother    • Asthma Daughter    • Heart attack Maternal Grandfather    • Heart attack Paternal Grandmother    • Skin cancer Paternal Grandfather    • No Known Problems Sister    • Thyroid disease Sister    • Depression Sister    • Osteoarthritis Sister    • Hemochromatosis Brother    • Migraines Daughter    • Asthma Daughter    • No Known Problems Maternal Aunt    • Anuerysm Neg Hx    • Clotting disorder Neg Hx    • Heart failure Neg Hx        Physical Exam:    Vitals: Blood pressure 120/80, pulse 78, height 5' 2" (1 575 m), weight 98 9 kg (218 lb), SpO2 98 %, not currently breastfeeding , Body mass index is 39 87 kg/m² ,   Wt Readings from Last 3 Encounters:   03/13/23 98 9 kg (218 lb)   02/27/23 99 3 kg (219 lb)   02/08/23 99 8 kg (220 lb)         Physical Exam:    Physical Exam  Constitutional:       Appearance: She is well-developed  HENT:      Head: Normocephalic and atraumatic  Eyes:      Pupils: Pupils are equal, round, and reactive to light  Neck:      Vascular: No JVD  Cardiovascular:      Rate and Rhythm: Normal rate and regular rhythm  Heart sounds: No murmur heard  Pulmonary:      Effort: Pulmonary effort is normal  No respiratory distress  Breath sounds: Normal breath sounds  Abdominal:      General: There is no distension  Palpations: Abdomen is soft  Tenderness: There is no abdominal tenderness  Musculoskeletal:         General: Normal range of motion  Cervical back: Normal range of motion  Skin:     General: Skin is warm and dry  Findings: No rash  Neurological:      Mental Status: She is alert and oriented to person, place, and time  Labs & Results:    Lab Results   Component Value Date    SODIUM 134 (L) 11/07/2022    K 3 7 11/07/2022    CL 99 11/07/2022    CO2 26 11/07/2022    BUN 12 11/07/2022    CREATININE 0 77 11/07/2022    GLUC 110 11/07/2022    CALCIUM 8 9 11/07/2022     Lab Results   Component Value Date    WBC 3 51 (L) 02/06/2023    HGB 13 6 02/06/2023    HCT 40 9 02/06/2023    MCV 93 02/06/2023     02/06/2023     Lab Results   Component Value Date    BNP 16 11/05/2022      Lab Results   Component Value Date    CHOLESTEROL 176 10/09/2020    CHOLESTEROL 157 05/17/2018     Lab Results   Component Value Date    HDL 60 10/09/2020    HDL 45 05/17/2018     Lab Results   Component Value Date    TRIG 107 10/09/2020    TRIG 131 05/17/2018     Lab Results   Component Value Date    NONHDLC 116 10/09/2020    [place] 112 05/17/2018         EKG personally reviewed by Chantelle Garsia  Counseling / Coordination of Care  Time spent today 25 minutes    Greater than 50% of total time was spent with the patient and / or family counseling and / or coordination of care  We discussed diagnoses, most recent studies, tests and any changes in treatment plan    Thank you for the opportunity to participate in the care of this patient      [address] [facility] PULMONARY HYPERTENSION  MEDICAL DIRECTOR OF [facility] Luzma Richard

## 2023-03-13 ENCOUNTER — OFFICE VISIT (OUTPATIENT)
Dept: CARDIOLOGY CLINIC | Facility: CLINIC | Age: 58
End: 2023-03-13

## 2023-03-13 VITALS
DIASTOLIC BLOOD PRESSURE: 80 MMHG | HEIGHT: 62 IN | HEART RATE: 78 BPM | WEIGHT: 218 LBS | OXYGEN SATURATION: 98 % | SYSTOLIC BLOOD PRESSURE: 120 MMHG | BODY MASS INDEX: 40.12 KG/M2

## 2023-03-13 DIAGNOSIS — I27.20 PULMONARY HYPERTENSION (HCC): ICD-10-CM

## 2023-03-13 DIAGNOSIS — E66.01 MORBID OBESITY (HCC): Primary | ICD-10-CM

## 2023-03-13 DIAGNOSIS — D68.62 LUPUS ANTICOAGULANT DISORDER (HCC): ICD-10-CM

## 2023-03-13 NOTE — PATIENT INSTRUCTIONS
No new tests    Drink more fluid on those days    If it persists then cut back diuretic dosing to 10 mg daily

## 2023-03-20 ENCOUNTER — APPOINTMENT (OUTPATIENT)
Dept: LAB | Facility: CLINIC | Age: 58
End: 2023-03-20

## 2023-03-20 ENCOUNTER — TELEPHONE (OUTPATIENT)
Dept: HEMATOLOGY ONCOLOGY | Facility: CLINIC | Age: 58
End: 2023-03-20

## 2023-03-20 ENCOUNTER — ANTICOAG VISIT (OUTPATIENT)
Dept: HEMATOLOGY ONCOLOGY | Facility: CLINIC | Age: 58
End: 2023-03-20

## 2023-03-20 NOTE — TELEPHONE ENCOUNTER
Received voicemail from pt:    Martín Bains Samples calling date of birth [birth year]  I had my INR drawn at 6:00 o'clock this morning and the results are still not up  I'm just getting a little concerned  Maybe you could find out if there's an issue and give me a call back [phone]  Thank you

## 2023-03-23 ENCOUNTER — HOSPITAL ENCOUNTER (OUTPATIENT)
Dept: MAMMOGRAPHY | Facility: HOSPITAL | Age: 58
Discharge: HOME | End: 2023-03-23

## 2023-03-23 VITALS — WEIGHT: 218 LBS | HEIGHT: 62 IN | BODY MASS INDEX: 40.12 KG/M2

## 2023-03-23 DIAGNOSIS — Z12.31 ENCOUNTER FOR SCREENING MAMMOGRAM FOR MALIGNANT NEOPLASM OF BREAST: ICD-10-CM

## 2023-03-24 NOTE — RESULT ENCOUNTER NOTE
Deven Huynh,    Your mammogram looks normal   Please plan to repeat in 1 year      Take care,  Dr Cleaning Figures

## 2023-03-30 ENCOUNTER — APPOINTMENT (OUTPATIENT)
Dept: RADIOLOGY | Facility: AMBULARY SURGERY CENTER | Age: 58
End: 2023-03-30
Attending: ORTHOPAEDIC SURGERY

## 2023-03-30 ENCOUNTER — OFFICE VISIT (OUTPATIENT)
Dept: OBGYN CLINIC | Facility: CLINIC | Age: 58
End: 2023-03-30

## 2023-03-30 VITALS
DIASTOLIC BLOOD PRESSURE: 75 MMHG | HEIGHT: 62 IN | HEART RATE: 94 BPM | BODY MASS INDEX: 40.12 KG/M2 | SYSTOLIC BLOOD PRESSURE: 152 MMHG | WEIGHT: 218 LBS

## 2023-03-30 DIAGNOSIS — Z96.652 STATUS POST TOTAL KNEE REPLACEMENT USING CEMENT, LEFT: Primary | ICD-10-CM

## 2023-03-30 DIAGNOSIS — Z96.652 STATUS POST TOTAL KNEE REPLACEMENT USING CEMENT, LEFT: ICD-10-CM

## 2023-03-30 NOTE — PROGRESS NOTES
Assessment:  1   Status post total knee replacement using cement, left  XR knee 3 vw left non injury        Patient Active Problem List   Diagnosis   • Screen for colon cancer   • B12 deficiency   • Chronic cough   • Diffuse connective tissue disease ([facility] )   • Dyspnea   • Edema   • Hot flashes due to menopause   • Long-term use of hydroxychloroquine   • Other organ or system involvement in systemic lupus erythematosus ([facility])   • Lupus anticoagulant disorder ([facility])   • SOB (shortness of breath) on exertion   • Sinus tachycardia   • Secondary pulmonary hypertension   • Pulmonary hypertension ([facility])   • Post-nasal drip   • Pes anserine bursitis   • Positive HELENE (antinuclear antibody)   • Other chronic pulmonary heart diseases   • Osteoporosis   • Night sweats   • Patellofemoral disorder of right knee   • Pes anserinus bursitis of right knee   • Arthritis of right knee   • Other tear of medial meniscus, current injury, right knee, initial encounter   • Tear of medial meniscus of right knee, current   • Chronic pain of right knee   • Elevated serum creatinine   • Hyperuricemia   • Trochanteric bursitis of both hips   • Undifferentiated connective tissue disease ([place] [address] )   • Vitamin D deficiency   • Chronic kidney disease, stage 3a ([place] [address] )   • Right knee pain   • Status post total right knee replacement   • Left knee pain   • Tear of medial meniscus of left knee, current   • Primary osteoarthritis of left knee   • Morbid obesity ([facility])   • Leukopenia   • Venous stasis of lower extremity   • Acute pain of right knee   • Status post total knee replacement using cement, left   • Pulmonary embolism associated with COVID-19 ([facility])   • SLE (systemic lupus erythematosus) ([facility])   • Arthritis of left knee   • Iron deficiency anemia due to chronic blood loss   • Insomnia   • Pulmonary embolus ([facility])   • BMI 39 0-39 9,adult   • Financial difficulties   • Antiphospholipid antibody syndrome ([place] [address] )           Plan      59 y o  female 6 months status post left total knee arthroplasty DOS 10/6/2022    · Continue antibiotic prophylaxis before dental visits  · Continue HEP to maximize recovery, recommend focusing on VMO strength  · Follow up in 6 months for 1 year post operative visit            Subjective:     Patient ID:    Chief Complaint:Na Joseph 59 y o  female      HPI    Patient presents today 6 months status post left total knee arthroplasty  Patient states she is doing well, she is very happy that she walks with out a limp at this time  She denies any pain, notes difficulty getting up from a knelt down position  The following portions of the patient's history were reviewed and updated as appropriate: allergies, current medications, past family history, past social history, past surgical history and problem list         Social History     Socioeconomic History   • Marital status: /Civil Union     Spouse name: Not on file   • Number of children: 2   • Years of education: Not on file   • Highest education level: Not on file   Occupational History   • Not on file   Tobacco Use   • Smoking status: Former     Packs/day: 0 00     Years: 0 00     Pack years: 0 00     Types: Cigarettes     Quit date: 2006     Years since quittin 1   • Smokeless tobacco: Never   Vaping Use   • Vaping Use: Never used   Substance and Sexual Activity   • Alcohol use: Yes     Alcohol/week: 0 0 standard drinks     Comment: socially   • Drug use: No   • Sexual activity: Yes     Partners: Male   Other Topics Concern   • Not on file   Social History Narrative    Daily caffeinated coffee consumption    Exercise habits     Social Determinants of Health     Financial Resource Strain: High Risk   • Difficulty of Paying Living Expenses: Very hard   Food Insecurity: Not on file   Transportation Needs: No Transportation Needs   • Lack of Transportation (Medical): No   • Lack of Transportation (Non-Medical):  No   Physical Activity: Not on file   Stress: Not on file Social Connections: Not on file   Intimate Partner Violence: Not on file   Housing Stability: Not on file     Past Medical History:   Diagnosis Date   • HELENE positive    • Anemia     Sildenafil causes decreased hematocrit   • Anxiety 03/2020   • CHF (congestive heart failure) ([facility] )     right heart failure related to pulm HTN lupus   • Chronic kidney disease     borderline lab values   • Chronic rhinitis 06/04/2012   • Clotting disorder ([facility] ) 2012   • Coronary artery disease 2018   • Encephalitis     Lasted Assessed 10/18/2017   • Gout 06/26/2018   • Hypertension    • Lupus anticoagulant disorder ([facility] )    • Maxillary sinusitis     Lasted Assessed 10/18/2017   • Night sweat    • Osteopenia     Hip   • Osteoporosis     Spine   • Pneumonia     Last Assessed 10/18/2017   • PONV (postoperative nausea and vomiting)    • Pulmonary embolism (HCC)    • Pulmonary hypertension (HCC)    • Seizures ([facility] )     Only during Encephalitis   • Shortness of breath     with exertion   • Sinus tachycardia    • Urinary incontinence      Past Surgical History:   Procedure Laterality Date   • ARTHRODESIS      Hand: IP Joint   • CHOLECYSTECTOMY     • COLONOSCOPY     • COLPOSCOPY     • HYSTERECTOMY      Vaginal   • JOINT REPLACEMENT Right     Knee replacement   • KNEE SURGERY  2/2019   • LAPAROSCOPIC ESOPHAGOGASTRIC FUNDOPLASTY  2008   • WA ARTHRP KNE CONDYLE&PLATU MEDIAL&LAT COMPARTMENTS Right 2/12/2019    Procedure: KNEE TOTAL ARTHROPLASTY AND ASSOCIATED PROCEDURES;  Surgeon: Samira Gaston DO;  [facility];  Service: Orthopedics   • WA ARTHRP KNE CONDYLE&PLATU MEDIAL&LAT COMPARTMENTS Left 10/6/2022    Procedure: ARTHROPLASTY KNEE TOTAL;  Surgeon: Samira Gaston DO;  [facility];  Service: Orthopedics   • WA ARTHRS KNEE W/MENISCECTOMY MED&LAT W/SHAVING Right 10/2/2018    Procedure: KNEE ARTHROSCOPY WITH PARTIAL MEDIAL MENISCECTOMY;  Surgeon: Samira Gaston DO;  [facility];  Service: Orthopedics   • WA ARTHRS KNEE "W/MENISCECTOMY MED&LAT W/SHAVING Left 12/17/2019    Procedure: KNEE ARTHROSCOPIC MENISCECTOMY /MEDIAL;  Chondyl medial and posterior;  Surgeon: Homer Birch DO;  [facility];  Service: Orthopedics   • REDUCTION MAMMAPLASTY     • REPAIR ANKLE LIGAMENT Right    • TONSILLECTOMY       Allergies   Allergen Reactions   • Dilantin [Phenytoin] Anaphylaxis and Rash   • Penicillins Rash, Anaphylaxis, Dermatitis and Hives   • Augmentin [Amoxicillin-Pot Clavulanate] Rash and Dermatitis     Current Outpatient Medications on File Prior to Visit   Medication Sig Dispense Refill   • B-D 3CC LUER-LILLIE SYR 25GX1/2\" 25G X 1-1/2\" 3 ML MISC USE 1 SYRINGE EVERY 30 DAYS 12 each 1   • Benlysta 200 MG/ML SOAJ Weekly on Fridays     • cholecalciferol (VITAMIN D3) 1,000 units tablet Take 2,000 Units by mouth daily in the early morning       • clindamycin (CLEOCIN) 300 MG capsule Take 3 capsules by mouth 1 hour prior to dental procedure (Patient not taking: Reported on 3/13/2023) 3 capsule 2   • cyanocobalamin 1,000 mcg/mL Inject 1 mL (1,000 mcg total) into a muscle every 30 (thirty) days 12 mL 1   • gabapentin (Neurontin) 100 mg capsule Take 1 capsule (100 mg total) by mouth daily at bedtime 90 capsule 0   • hydroxychloroquine (PLAQUENIL) 200 mg tablet Take 400 mg by mouth daily with breakfast Alternating days/dosages     • ondansetron (ZOFRAN) 4 mg tablet Take 1 tablet (4 mg total) by mouth every 8 (eight) hours as needed for nausea or vomiting for up to 15 days (Patient not taking: Reported on 12/29/2022) 20 tablet 0   • sildenafil (REVATIO) 20 mg tablet Take 1 tablet (20 mg total) by mouth 3 (three) times a day 270 tablet 3   • spironolactone (ALDACTONE) 25 mg tablet Take 1 tablet (25 mg total) by mouth daily 90 tablet 3   • Syringe/Needle, Disp, (SecureSafe Syringe/Needle) 25G X 1-1/2\" 1 ML MISC Use 1 Syringe every 30 (thirty) days 12 each 1   • traZODone (DESYREL) 50 mg tablet Take 1 tablet (50 mg total) by mouth daily at bedtime " 30 tablet 2   • triamcinolone (KENALOG) 0 1 % oral topical paste prn     • warfarin (Coumadin) 5 mg tablet Take 5mg po daily or as directed 120 tablet 1   • warfarin (Coumadin) 5 mg tablet Take 1 tablet (5 mg total) by mouth daily Take 5 mg Tuesday, Wednesday, Thursday, Saturday and Sunday  Take 7 5 mg on Monday and Friday  Follow up INR  104 tablet 0   • warfarin (Coumadin) 5 mg tablet 7 5mg po daily or as directed  45 tablet 1   • [DISCONTINUED] ascorbic acid (VITAMIN C) 500 MG tablet Take 1 tablet (500 mg total) by mouth 2 (two) times a day 60 tablet 1   • [DISCONTINUED] ferrous sulfate 324 (65 Fe) mg Take 1 tablet (324 mg total) by mouth 2 (two) times a day before meals 60 tablet 1   • [DISCONTINUED] folic acid (FOLVITE) 1 mg tablet TAKE 1 TABLET(1 MG) BY MOUTH DAILY 90 tablet 0   • [DISCONTINUED] methocarbamol (Robaxin-750) 750 mg tablet Take 1 tablet (750 mg total) by mouth every 6 (six) hours as needed for muscle spasms 20 tablet 0   • [DISCONTINUED] Multiple Vitamins-Minerals (multivitamin with minerals) tablet Take 1 tablet by mouth daily 30 tablet 1   • [DISCONTINUED] ondansetron (Zofran ODT) 4 mg disintegrating tablet Take 1 tablet (4 mg total) by mouth every 6 (six) hours as needed for nausea or vomiting 20 tablet 0     No current facility-administered medications on file prior to visit  Objective:    Review of Systems   Constitutional: Negative for chills and fever  HENT: Negative for ear pain and sore throat  Eyes: Negative for pain and visual disturbance  Respiratory: Negative for cough and shortness of breath  Cardiovascular: Negative for chest pain and palpitations  Gastrointestinal: Negative for abdominal pain and vomiting  Genitourinary: Negative for dysuria and hematuria  Musculoskeletal: Negative for arthralgias and back pain  Skin: Negative for color change and rash  Neurological: Negative for seizures and syncope     All other systems reviewed and are "negative  Left Knee Exam     Tenderness   The patient is experiencing no tenderness  Range of Motion   Extension: 0   Flexion: 120     Tests   Varus: negative Valgus: negative    Other   Erythema: absent  Sensation: normal  Pulse: present  Swelling: none  Effusion: no effusion present    Comments:  Knee is stable to stress on exam  Patella tracks midline and flat   Well healed anterior scar  Sensation intact             Physical Exam  Vitals and nursing note reviewed  HENT:      Head: Normocephalic  Eyes:      Extraocular Movements: Extraocular movements intact  Cardiovascular:      Rate and Rhythm: Normal rate  Pulses: Normal pulses  Pulmonary:      Effort: Pulmonary effort is normal    Musculoskeletal:         General: Normal range of motion  Cervical back: Normal range of motion  Left knee: No effusion  Comments: As noted in HPI   Skin:     General: Skin is warm and dry  Neurological:      General: No focal deficit present  Mental Status: She is alert  Psychiatric:         Behavior: Behavior normal          Procedures  No Procedures performed today    I have personally reviewed pertinent films in PACS  X-rays taken today of the left knee demonstrate stable, well aligned, total knee arthroplasty  No evidence of complication  Scribe Attestation    I,:  Daisy Darnell am acting as a scribe while in the presence of the attending physician :       I,:  Zane Lara DO personally performed the services described in this documentation    as scribed in my presence :           Portions of the record may have been created with voice recognition software   Occasional wrong word or \"sound a like\" substitutions may have occurred due to the inherent limitations of voice recognition software   Read the chart carefully and recognize, using context, where substitutions have occurred      "

## 2023-04-03 ENCOUNTER — PATIENT MESSAGE (OUTPATIENT)
Dept: HEMATOLOGY ONCOLOGY | Facility: CLINIC | Age: 58
End: 2023-04-03

## 2023-04-03 ENCOUNTER — APPOINTMENT (OUTPATIENT)
Dept: LAB | Facility: CLINIC | Age: 58
End: 2023-04-03

## 2023-04-03 DIAGNOSIS — D68.61 ANTIPHOSPHOLIPID ANTIBODY SYNDROME (HCC): Primary | ICD-10-CM

## 2023-04-03 DIAGNOSIS — Z13.220 SCREENING, LIPID: ICD-10-CM

## 2023-04-03 DIAGNOSIS — U07.1 PULMONARY EMBOLISM ASSOCIATED WITH COVID-19 (HCC): ICD-10-CM

## 2023-04-03 DIAGNOSIS — Z79.01 WARFARIN ANTICOAGULATION: ICD-10-CM

## 2023-04-03 DIAGNOSIS — I26.99 PULMONARY EMBOLISM ASSOCIATED WITH COVID-19 (HCC): ICD-10-CM

## 2023-04-03 LAB
CHOLEST SERPL-MCNC: 181 MG/DL
HDLC SERPL-MCNC: 57 MG/DL
LDLC SERPL CALC-MCNC: 104 MG/DL (ref 0–100)
NONHDLC SERPL-MCNC: 124 MG/DL
TRIGL SERPL-MCNC: 100 MG/DL

## 2023-04-09 PROBLEM — Z12.11 SCREEN FOR COLON CANCER: Status: RESOLVED | Noted: 2018-01-24 | Resolved: 2023-04-09

## 2023-04-24 ENCOUNTER — OFFICE VISIT (OUTPATIENT)
Dept: FAMILY MEDICINE CLINIC | Facility: CLINIC | Age: 58
End: 2023-04-24

## 2023-04-24 VITALS
BODY MASS INDEX: 41.77 KG/M2 | HEIGHT: 62 IN | WEIGHT: 227 LBS | OXYGEN SATURATION: 98 % | SYSTOLIC BLOOD PRESSURE: 135 MMHG | DIASTOLIC BLOOD PRESSURE: 82 MMHG | HEART RATE: 82 BPM

## 2023-04-24 DIAGNOSIS — G47.9 SLEEPING DIFFICULTY: ICD-10-CM

## 2023-04-24 DIAGNOSIS — M79.2 NEUROPATHIC PAIN, LEG, LEFT: ICD-10-CM

## 2023-04-24 RX ORDER — TRAZODONE HYDROCHLORIDE 50 MG/1
50 TABLET ORAL
Qty: 30 TABLET | Refills: 5 | Status: SHIPPED | OUTPATIENT
Start: 2023-04-24 | End: 2023-05-24

## 2023-04-24 RX ORDER — GABAPENTIN 100 MG/1
100 CAPSULE ORAL
Qty: 90 CAPSULE | Refills: 1 | Status: SHIPPED | OUTPATIENT
Start: 2023-04-24

## 2023-04-24 NOTE — ASSESSMENT & PLAN NOTE
Symptoms improved significantly on trazodone 50 mg, gabapentin 100 mg daily  Continue same  Patient tolerating without any side effects  Requesting medication refill today

## 2023-05-01 ENCOUNTER — APPOINTMENT (OUTPATIENT)
Dept: LAB | Facility: CLINIC | Age: 58
End: 2023-05-01

## 2023-05-01 DIAGNOSIS — Z79.01 WARFARIN ANTICOAGULATION: ICD-10-CM

## 2023-05-01 DIAGNOSIS — I26.99 PULMONARY EMBOLISM ASSOCIATED WITH COVID-19 (HCC): ICD-10-CM

## 2023-05-01 DIAGNOSIS — D68.61 ANTIPHOSPHOLIPID ANTIBODY SYNDROME (HCC): ICD-10-CM

## 2023-05-01 DIAGNOSIS — U07.1 PULMONARY EMBOLISM ASSOCIATED WITH COVID-19 (HCC): ICD-10-CM

## 2023-06-05 ENCOUNTER — APPOINTMENT (OUTPATIENT)
Dept: LAB | Facility: CLINIC | Age: 58
End: 2023-06-05
Payer: MEDICARE

## 2023-06-26 ENCOUNTER — TELEPHONE (OUTPATIENT)
Dept: HEMATOLOGY ONCOLOGY | Facility: CLINIC | Age: 58
End: 2023-06-26

## 2023-06-26 DIAGNOSIS — D68.61 ANTIPHOSPHOLIPID ANTIBODY SYNDROME (HCC): ICD-10-CM

## 2023-06-26 DIAGNOSIS — U07.1 PULMONARY EMBOLISM ASSOCIATED WITH COVID-19 (HCC): ICD-10-CM

## 2023-06-26 DIAGNOSIS — D68.62 LUPUS ANTICOAGULANT DISORDER (HCC): ICD-10-CM

## 2023-06-26 DIAGNOSIS — I26.99 PULMONARY EMBOLISM ASSOCIATED WITH COVID-19 (HCC): ICD-10-CM

## 2023-06-26 RX ORDER — WARFARIN SODIUM 5 MG/1
TABLET ORAL
Qty: 120 TABLET | Refills: 1 | Status: SHIPPED | OUTPATIENT
Start: 2023-06-26

## 2023-06-26 RX ORDER — WARFARIN SODIUM 5 MG/1
TABLET ORAL
Qty: 45 TABLET | Refills: 1 | Status: SHIPPED | OUTPATIENT
Start: 2023-06-26

## 2023-06-26 NOTE — TELEPHONE ENCOUNTER
"\"Alexey Jacobsen calling  My date of birth is [birth year]  [facility] pharmacy just sent me an e-mail that you denied my refill for the warfarin  If somebody can please call me and explain why, I would appreciate it  My phone number is [phone]  Thank you  \"    "

## 2023-06-26 NOTE — TELEPHONE ENCOUNTER
Spoke with patient review current dosage  Due to fluctuation of dosages  She wishes to refill as taking both daily

## 2023-06-27 DIAGNOSIS — E53.8 B12 DEFICIENCY: Primary | ICD-10-CM

## 2023-06-27 RX ORDER — CYANOCOBALAMIN 1000 UG/ML
1000 INJECTION, SOLUTION INTRAMUSCULAR; SUBCUTANEOUS
Qty: 6 ML | Refills: 1 | Status: SHIPPED | OUTPATIENT
Start: 2023-06-27

## 2023-07-03 ENCOUNTER — APPOINTMENT (OUTPATIENT)
Dept: LAB | Facility: CLINIC | Age: 58
End: 2023-07-03
Payer: MEDICARE

## 2023-07-03 DIAGNOSIS — U07.1 PULMONARY EMBOLISM ASSOCIATED WITH COVID-19 (HCC): ICD-10-CM

## 2023-07-03 DIAGNOSIS — Z79.624 ENCOUNTER FOR MONITORING AZATHIOPRINE THERAPY: ICD-10-CM

## 2023-07-03 DIAGNOSIS — Z51.81 ENCOUNTER FOR MONITORING AZATHIOPRINE THERAPY: ICD-10-CM

## 2023-07-03 DIAGNOSIS — Z79.899 ENCOUNTER FOR LONG-TERM (CURRENT) USE OF OTHER MEDICATIONS: ICD-10-CM

## 2023-07-03 DIAGNOSIS — D68.61 ANTIPHOSPHOLIPID ANTIBODY SYNDROME (HCC): ICD-10-CM

## 2023-07-03 DIAGNOSIS — D50.0 IRON DEFICIENCY ANEMIA DUE TO CHRONIC BLOOD LOSS: ICD-10-CM

## 2023-07-03 DIAGNOSIS — I43 DILATED CARDIOMYOPATHY SECONDARY TO SYSTEMIC LUPUS ERYTHEMATOSUS (HCC): ICD-10-CM

## 2023-07-03 DIAGNOSIS — M32.19 DILATED CARDIOMYOPATHY SECONDARY TO SYSTEMIC LUPUS ERYTHEMATOSUS (HCC): ICD-10-CM

## 2023-07-03 DIAGNOSIS — M32.9 SYSTEMIC LUPUS ERYTHEMATOSUS, UNSPECIFIED SLE TYPE, UNSPECIFIED ORGAN INVOLVEMENT STATUS (HCC): Chronic | ICD-10-CM

## 2023-07-03 DIAGNOSIS — I26.99 PULMONARY EMBOLISM ASSOCIATED WITH COVID-19 (HCC): ICD-10-CM

## 2023-07-03 DIAGNOSIS — E53.8 B12 DEFICIENCY: ICD-10-CM

## 2023-07-03 LAB
ALBUMIN SERPL BCP-MCNC: 4.1 G/DL (ref 3.5–5)
ALP SERPL-CCNC: 82 U/L (ref 34–104)
ALT SERPL W P-5'-P-CCNC: 16 U/L (ref 7–52)
ANION GAP SERPL CALCULATED.3IONS-SCNC: 2 MMOL/L
AST SERPL W P-5'-P-CCNC: 13 U/L (ref 13–39)
BASOPHILS # BLD AUTO: 0.02 THOUSANDS/ÂΜL (ref 0–0.1)
BASOPHILS NFR BLD AUTO: 1 % (ref 0–1)
BILIRUB SERPL-MCNC: 0.57 MG/DL (ref 0.2–1)
BUN SERPL-MCNC: 13 MG/DL (ref 5–25)
CALCIUM SERPL-MCNC: 9.6 MG/DL (ref 8.4–10.2)
CHLORIDE SERPL-SCNC: 108 MMOL/L (ref 96–108)
CO2 SERPL-SCNC: 27 MMOL/L (ref 21–32)
CREAT SERPL-MCNC: 0.86 MG/DL (ref 0.6–1.3)
EOSINOPHIL # BLD AUTO: 0.1 THOUSAND/ÂΜL (ref 0–0.61)
EOSINOPHIL NFR BLD AUTO: 3 % (ref 0–6)
ERYTHROCYTE [DISTWIDTH] IN BLOOD BY AUTOMATED COUNT: 12.2 % (ref 11.6–15.1)
GFR SERPL CREATININE-BSD FRML MDRD: 73 ML/MIN/1.73SQ M
GLUCOSE SERPL-MCNC: 93 MG/DL (ref 65–140)
HCT VFR BLD AUTO: 43.4 % (ref 34.8–46.1)
HGB BLD-MCNC: 14.3 G/DL (ref 11.5–15.4)
IMM GRANULOCYTES # BLD AUTO: 0.01 THOUSAND/UL (ref 0–0.2)
IMM GRANULOCYTES NFR BLD AUTO: 0 % (ref 0–2)
LYMPHOCYTES # BLD AUTO: 1.14 THOUSANDS/ÂΜL (ref 0.6–4.47)
LYMPHOCYTES NFR BLD AUTO: 30 % (ref 14–44)
MCH RBC QN AUTO: 31.6 PG (ref 26.8–34.3)
MCHC RBC AUTO-ENTMCNC: 32.9 G/DL (ref 31.4–37.4)
MCV RBC AUTO: 96 FL (ref 82–98)
MONOCYTES # BLD AUTO: 0.4 THOUSAND/ÂΜL (ref 0.17–1.22)
MONOCYTES NFR BLD AUTO: 10 % (ref 4–12)
NEUTROPHILS # BLD AUTO: 2.19 THOUSANDS/ÂΜL (ref 1.85–7.62)
NEUTS SEG NFR BLD AUTO: 56 % (ref 43–75)
NRBC BLD AUTO-RTO: 0 /100 WBCS
PLATELET # BLD AUTO: 281 THOUSANDS/UL (ref 149–390)
PMV BLD AUTO: 10.3 FL (ref 8.9–12.7)
POTASSIUM SERPL-SCNC: 4 MMOL/L (ref 3.5–5.3)
PROT SERPL-MCNC: 6.5 G/DL (ref 6.4–8.4)
RBC # BLD AUTO: 4.52 MILLION/UL (ref 3.81–5.12)
SODIUM SERPL-SCNC: 137 MMOL/L (ref 135–147)
WBC # BLD AUTO: 3.86 THOUSAND/UL (ref 4.31–10.16)

## 2023-07-03 PROCEDURE — 36415 COLL VENOUS BLD VENIPUNCTURE: CPT

## 2023-07-03 PROCEDURE — 80053 COMPREHEN METABOLIC PANEL: CPT

## 2023-07-03 PROCEDURE — 85025 COMPLETE CBC W/AUTO DIFF WBC: CPT

## 2023-07-03 PROCEDURE — 84433 ASY THIOPURIN S-MTHYLTRNSFRS: CPT

## 2023-07-07 ENCOUNTER — PATIENT MESSAGE (OUTPATIENT)
Dept: GASTROENTEROLOGY | Facility: AMBULARY SURGERY CENTER | Age: 58
End: 2023-07-07

## 2023-07-10 ENCOUNTER — APPOINTMENT (OUTPATIENT)
Dept: LAB | Facility: CLINIC | Age: 58
End: 2023-07-10
Payer: MEDICARE

## 2023-07-10 LAB
REF LAB TEST METHOD: NORMAL
TEST INTERPRETATION: NORMAL
TPMT RBC-CCNC: 16.3 UNITS/ML RBC

## 2023-07-13 ENCOUNTER — NURSE TRIAGE (OUTPATIENT)
Age: 58
End: 2023-07-13

## 2023-07-13 NOTE — TELEPHONE ENCOUNTER
LMOM PER CONSENT INFORMING UNABLE TO PRESCRIBE ANTIANXIETY MEDICATION , PROVIDED REASON AND RATIONALE, PT TO RETURN CALL TO DISCUSS IF ANY QUESTIONS.

## 2023-07-13 NOTE — TELEPHONE ENCOUNTER
Regarding: med request  ----- Message from Sue Latif MA sent at 7/13/2023  9:57 AM EDT -----  Pt is asking if dr. Asaf Silva would be willing to write her a script for anxiety.  Please advise

## 2023-07-13 NOTE — TELEPHONE ENCOUNTER
SPOKE WITH PT, REQUESTING RX FOR MEDICATION FOR ANXIETY PRIOR TO PROCEDURE ON MONDAY, SHE HAS TAKEN SOMETHING IN THE PAST, DOES NOT REMEMBER THE NAME, ADVISED PT WE DO NOT TYPICALLY ORDER THIS AND DEFER TO PCP. PLEASE ADVISE.  [facility]

## 2023-07-14 DIAGNOSIS — I50.32 CHRONIC HEART FAILURE WITH PRESERVED EJECTION FRACTION (HCC): ICD-10-CM

## 2023-07-14 RX ORDER — SPIRONOLACTONE 25 MG/1
25 TABLET ORAL DAILY
Qty: 90 TABLET | Refills: 3 | Status: SHIPPED | OUTPATIENT
Start: 2023-07-14

## 2023-07-14 RX ORDER — TORSEMIDE 20 MG/1
20 TABLET ORAL DAILY
Qty: 90 TABLET | Refills: 3 | Status: SHIPPED | OUTPATIENT
Start: 2023-07-14

## 2023-07-17 ENCOUNTER — HOSPITAL ENCOUNTER (OUTPATIENT)
Dept: GASTROENTEROLOGY | Facility: HOSPITAL | Age: 58
Setting detail: OUTPATIENT SURGERY
Discharge: HOME | End: 2023-07-17
Attending: INTERNAL MEDICINE | Admitting: INTERNAL MEDICINE
Payer: MEDICARE

## 2023-07-17 ENCOUNTER — TELEPHONE (OUTPATIENT)
Dept: HEMATOLOGY ONCOLOGY | Facility: CLINIC | Age: 58
End: 2023-07-17

## 2023-07-17 ENCOUNTER — ANESTHESIA (OUTPATIENT)
Dept: GASTROENTEROLOGY | Facility: HOSPITAL | Age: 58
End: 2023-07-17

## 2023-07-17 ENCOUNTER — ANESTHESIA EVENT (OUTPATIENT)
Dept: GASTROENTEROLOGY | Facility: HOSPITAL | Age: 58
End: 2023-07-17

## 2023-07-17 VITALS
BODY MASS INDEX: 41.77 KG/M2 | HEIGHT: 62 IN | RESPIRATION RATE: 16 BRPM | DIASTOLIC BLOOD PRESSURE: 80 MMHG | OXYGEN SATURATION: 99 % | HEART RATE: 79 BPM | WEIGHT: 227 LBS | SYSTOLIC BLOOD PRESSURE: 127 MMHG | TEMPERATURE: 97.6 F

## 2023-07-17 DIAGNOSIS — Z12.11 COLON CANCER SCREENING: ICD-10-CM

## 2023-07-17 PROCEDURE — 88305 TISSUE EXAM BY PATHOLOGIST: CPT | Performed by: SPECIALIST

## 2023-07-17 RX ORDER — LIDOCAINE HYDROCHLORIDE 10 MG/ML
INJECTION, SOLUTION EPIDURAL; INFILTRATION; INTRACAUDAL; PERINEURAL AS NEEDED
Status: DISCONTINUED | OUTPATIENT
Start: 2023-07-17 | End: 2023-07-17

## 2023-07-17 RX ORDER — SODIUM CHLORIDE, SODIUM LACTATE, POTASSIUM CHLORIDE, CALCIUM CHLORIDE 600; 310; 30; 20 MG/100ML; MG/100ML; MG/100ML; MG/100ML
INJECTION, SOLUTION INTRAVENOUS CONTINUOUS PRN
Status: DISCONTINUED | OUTPATIENT
Start: 2023-07-17 | End: 2023-07-17

## 2023-07-17 RX ORDER — PROPOFOL 10 MG/ML
INJECTION, EMULSION INTRAVENOUS AS NEEDED
Status: DISCONTINUED | OUTPATIENT
Start: 2023-07-17 | End: 2023-07-17

## 2023-07-17 RX ADMIN — PROPOFOL 30 MG: 10 INJECTION, EMULSION INTRAVENOUS at 07:48

## 2023-07-17 RX ADMIN — PROPOFOL 30 MG: 10 INJECTION, EMULSION INTRAVENOUS at 07:52

## 2023-07-17 RX ADMIN — LIDOCAINE HYDROCHLORIDE 50 MG: 10 INJECTION, SOLUTION EPIDURAL; INFILTRATION; INTRACAUDAL; PERINEURAL at 07:36

## 2023-07-17 RX ADMIN — PROPOFOL 100 MG: 10 INJECTION, EMULSION INTRAVENOUS at 07:36

## 2023-07-17 RX ADMIN — Medication 40 MG: at 07:44

## 2023-07-17 RX ADMIN — PROPOFOL 50 MG: 10 INJECTION, EMULSION INTRAVENOUS at 07:39

## 2023-07-17 RX ADMIN — SODIUM CHLORIDE, SODIUM LACTATE, POTASSIUM CHLORIDE, AND CALCIUM CHLORIDE: .6; .31; .03; .02 INJECTION, SOLUTION INTRAVENOUS at 07:31

## 2023-07-17 RX ADMIN — PROPOFOL 30 MG: 10 INJECTION, EMULSION INTRAVENOUS at 07:45

## 2023-07-17 RX ADMIN — PROPOFOL 50 MG: 10 INJECTION, EMULSION INTRAVENOUS at 07:42

## 2023-07-17 NOTE — ANESTHESIA POSTPROCEDURE EVALUATION
Post-Op Assessment Note    CV Status:  Stable    Pain management: adequate     Mental Status:  Alert and awake   Hydration Status:  Euvolemic   PONV Controlled:  Controlled   Airway Patency:  Patent      Post Op Vitals Reviewed: Yes      Staff: CRNA         No notable events documented.     BP 92/58 (07/17/23 0758)    Temp 97.6 °F (36.4 °C) (07/17/23 0758)    Pulse 75 (07/17/23 0758)   Resp 16 (07/17/23 0758)    SpO2 97 % (07/17/23 0758)

## 2023-07-17 NOTE — H&P
History and Physical - [facility]  Rizwan Aviles 59 y.o. female MRN: [id number]    HPI: Rizwan Aviles is a 59y.o. year old female who presents for [facility] evaluation.        Review of Systems    Historical Information   Past Medical History:   Diagnosis Date   • HELENE positive    • Anemia     Sildenafil causes decreased hematocrit   • Anxiety 03/2020   • CHF (congestive heart failure) ([address])     right heart failure related to pulm HTN lupus   • Chronic kidney disease     borderline lab values   • Chronic rhinitis 06/04/2012   • Clotting disorder ([address]) 2012   • Coronary artery disease 2018   • Encephalitis     Lasted Assessed 10/18/2017   • Gout 06/26/2018   • Hypertension    • Lupus anticoagulant disorder ([address])    • Maxillary sinusitis     Lasted Assessed 10/18/2017   • Night sweat    • Osteopenia     Hip   • Osteoporosis     Spine   • Pneumonia     Last Assessed 10/18/2017   • PONV (postoperative nausea and vomiting)    • Pulmonary embolism (HCC)    • Pulmonary hypertension (HCC)    • Seizures ([address])     Only during Encephalitis   • Shortness of breath     with exertion   • Sinus tachycardia    • Urinary incontinence      Past Surgical History:   Procedure Laterality Date   • ARTHRODESIS      Hand: IP Joint   • CHOLECYSTECTOMY     • COLONOSCOPY     • COLPOSCOPY     • HYSTERECTOMY      Vaginal   • JOINT REPLACEMENT Right     Knee replacement   • KNEE SURGERY  2/2019   • LAPAROSCOPIC ESOPHAGOGASTRIC FUNDOPLASTY  2008   • VT ARTHRP KNE CONDYLE&PLATU MEDIAL&LAT COMPARTMENTS Right 2/12/2019    Procedure: KNEE TOTAL ARTHROPLASTY AND ASSOCIATED PROCEDURES;  Surgeon: Pablo Beaver DO;  [facility];  Service: Orthopedics   • VT ARTHRP KNE CONDYLE&PLATU MEDIAL&LAT COMPARTMENTS Left 10/6/2022    Procedure: ARTHROPLASTY KNEE TOTAL;  Surgeon: Pablo Beaver DO;  [facility];  Service: Orthopedics   • VT ARTHRS KNEE W/MENISCECTOMY MED&LAT W/SHAVING Right 10/2/2018    Procedure: KNEE ARTHROSCOPY WITH PARTIAL MEDIAL MENISCECTOMY;  Surgeon: Jesse Simon DO;  [facility];  Service: Orthopedics   • WA ARTHRS KNEE W/MENISCECTOMY MED&LAT W/SHAVING Left 2019    Procedure: KNEE ARTHROSCOPIC MENISCECTOMY /MEDIAL;  Chondyl medial and posterior;  Surgeon: Jesse Simon DO;  [facility];  Service: Orthopedics   • REDUCTION MAMMAPLASTY     • REPAIR ANKLE LIGAMENT Right    • TONSILLECTOMY       Social History   Social History     Substance and Sexual Activity   Alcohol Use Yes   • Alcohol/week: 0.0 standard drinks of alcohol    Comment: socially     Social History     Substance and Sexual Activity   Drug Use No     Social History     Tobacco Use   Smoking Status Former   • Packs/day: 0.00   • Years: 0.00   • Total pack years: 0.00   • Types: Cigarettes   • Quit date: 2006   • Years since quittin.4   Smokeless Tobacco Never     Family History   Problem Relation Age of Onset   • Uterine cancer Mother    • Pancreatic cancer Mother 79   • Diabetes Mother    • Endometrial cancer Mother 77   • Arthritis Mother    • Cancer Mother         Pancreas, Uterine   • Vision loss Mother    • Heart disease Father         open heart surgery at age 48    • Stroke Father         CVA   • Hypertension Father    • Atrial fibrillation Father    • Hyperlipidemia Father    • Arthritis Father    • Rheum arthritis Father    • No Known Problems Sister    • No Known Problems Sister    • Thyroid disease Sister    • Depression Sister    • Osteoarthritis Sister    • Asthma Daughter    • Migraines Daughter    • Asthma Daughter    • Thyroid disease Maternal Grandmother    • Heart attack Maternal Grandfather    • Heart attack Paternal Grandmother    • Skin cancer Paternal Grandfather    • No Known Problems Brother    • Hemochromatosis Brother    • No Known Problems Maternal Aunt    • No Known Problems Paternal Aunt    • Anuerysm Neg Hx    • Clotting disorder Neg Hx    • Heart failure Neg Hx        Meds/Allergies     (Not in a hospital admission)      Allergies   Allergen Reactions   • Dilantin [Phenytoin] Anaphylaxis and Rash   • Penicillins Rash, Anaphylaxis, Dermatitis and Hives   • Augmentin [Amoxicillin-Pot Clavulanate] Rash and Dermatitis       Objective     /91   Pulse (!) 194   Temp (!) 96.5 °F (35.8 °C) (Temporal)   Resp 20   Ht 5' 2" (1.575 m)   Wt 103 kg (227 lb)   SpO2 98%   BMI 41.52 kg/m²       PHYSICAL EXAM    Gen: NAD  CV: RRR  CHEST: Clear  ABD: soft, NT/ND  EXT: no edema  Neuro: AAO      ASSESSMENT/PLAN:  This is a 59y.o. year old female here for colon cancer screening. PLAN:   Procedure: Colonoscopy.

## 2023-07-17 NOTE — TELEPHONE ENCOUNTER
Spoke with patient, last dose of Warfarin was on Tuesday 7/11. Started Lovenox injections 100mg BID until  yesterday. Colonoscopy this morning. Previous Warfarin dosing 7.5mg every day except Saturday - 5mg. Will review with Dr. Dipak Dorado and call pt back with new instructions.

## 2023-07-17 NOTE — TELEPHONE ENCOUNTER
As per Dr. Suraj Harvey, pt will restart Warfarin tonight, no changes to current dosing. She will continue lovenox for another 2 days. Recheck INR in 2 weeks. Pt aware and agreeable.

## 2023-07-17 NOTE — ANESTHESIA PREPROCEDURE EVALUATION
Procedure:  COLONOSCOPY    Relevant Problems   CARDIO   (+) SOB (shortness of breath) on exertion      /RENAL   (+) Chronic kidney disease, stage 3a (HCC)      HEMATOLOGY   (+) Iron deficiency anemia due to chronic blood loss   (+) Lupus anticoagulant disorder (HCC)      MUSCULOSKELETAL   (+) Arthritis of left knee   (+) Arthritis of right knee   (+) Pes anserine bursitis   (+) Pes anserinus bursitis of right knee   (+) Primary osteoarthritis of left knee      PULMONARY   (+) Dyspnea   (+) SOB (shortness of breath) on exertion      Lab Results   Component Value Date    WBC 3.86 (L) 07/03/2023    HGB 14.3 07/03/2023    HCT 43.4 07/03/2023    MCV 96 07/03/2023     07/03/2023       Physical Exam    Airway    Mallampati score: II  TM Distance: >3 FB  Neck ROM: full     Dental   No notable dental hx     Cardiovascular  Rhythm: regular, Rate: normal,     Pulmonary  Breath sounds clear to auscultation,     Other Findings  Intercisor Distance > 3cm          Anesthesia Plan  ASA Score- 3     Anesthesia Type- IV sedation with anesthesia with ASA Monitors. Additional Monitors:   Airway Plan:     Comment: NPO appropriate. Discussed benefits/risks of monitored anesthetic care which involves providing a dynamic level of mild to deep sedation. Complications include awareness and/or airway obstruction/aspiration which may necessitate conversion to general anesthesia. All questions answered. Patient understands and wishes to proceed. .       Plan Factors-Exercise tolerance (METS): >4 METS. Chart reviewed. EKG reviewed. Existing labs reviewed. Induction-     Postoperative Plan- Plan for postoperative opioid use. Informed Consent- Anesthetic plan and risks discussed with patient. I personally reviewed this patient with the CRNA. Discussed and agreed on the Anesthesia Plan with the CRNA. Kelley Mendez

## 2023-07-17 NOTE — TELEPHONE ENCOUNTER
"Kendall Burden, it's Stephenson Fire. Date of birth [birth year]. I had my colonoscopy done this morning, Doctor said who was not very clear on how she wanted me to resume my warfarin. So I'm going to reach out to you and ask for guidance on what you want me to do as far as the warfarin and the Lovenox. And then I will be having blood work done on Friday. If you can give me a call at [phone].  Thank you."

## 2023-07-20 PROCEDURE — 88305 TISSUE EXAM BY PATHOLOGIST: CPT | Performed by: SPECIALIST

## 2023-07-24 NOTE — PROGRESS NOTES
Cardiology Outpatient Progress Note - Magda Purcell 59 y.o. female MRN: [id number]    @ Encounter: [id number]      Patient Active Problem List    Diagnosis Date Noted   • Sleeping difficulty 04/24/2023   • Neuropathic pain, leg, left 04/24/2023   • Antiphospholipid antibody syndrome ([address]) 02/27/2023   • Financial difficulties 02/08/2023   • Insomnia 01/05/2023   • Pulmonary embolus ([address]) 01/05/2023   • BMI 39.0-39.9,adult 01/05/2023   • Iron deficiency anemia due to chronic blood loss 11/21/2022   • Pulmonary embolism associated with COVID-19 ([address]) 11/05/2022   • SLE (systemic lupus erythematosus) ([address]) 11/05/2022   • Status post total knee replacement using cement, left 10/20/2022   • Venous stasis of lower extremity 05/09/2022   • Acute pain of right knee 05/09/2022   • Leukopenia 11/22/2021   • Morbid obesity ([address])    • Primary osteoarthritis of left knee 02/12/2020   • Left knee pain 11/11/2019   • Tear of medial meniscus of left knee, current 11/11/2019   • Right knee pain 02/18/2019   • Status post total right knee replacement 02/18/2019   • Chronic kidney disease, stage 3a ([address]) 02/12/2019   • Tear of medial meniscus of right knee, current 09/10/2018   • Other tear of medial meniscus, current injury, right knee, initial encounter 07/16/2018   • Patellofemoral disorder of right knee 06/04/2018   • Pes anserinus bursitis of right knee 06/04/2018   • Arthritis of right knee 06/04/2018   • Arthritis of left knee 06/04/2018   • Chronic pain of right knee 05/22/2018   • Elevated serum creatinine 05/22/2018   • Hyperuricemia 05/22/2018   • Long-term use of hydroxychloroquine 02/16/2018   • Pulmonary hypertension ([address]) 02/16/2018   • Undifferentiated connective tissue disease ([address]) 02/16/2018   • Secondary pulmonary hypertension 12/15/2017   • Diffuse connective tissue disease ([address]) 11/21/2017   • Pes anserine bursitis 11/21/2017   • Trochanteric bursitis of both hips 11/21/2017   • Vitamin D deficiency 11/21/2017   • Other organ or system involvement in systemic lupus erythematosus ([address]) 11/17/2017   • Sinus tachycardia 11/09/2017   • B12 deficiency 10/19/2017   • Osteoporosis 10/19/2017   • Lupus anticoagulant disorder ([address]) 10/18/2017   • Positive HELENE (antinuclear antibody) 10/18/2017   • Hot flashes due to menopause 09/01/2015   • Night sweats 09/01/2015   • SOB (shortness of breath) on exertion 11/12/2012   • Other chronic pulmonary heart diseases 11/05/2012   • Edema 06/04/2012   • Post-nasal drip 06/04/2012   • Chronic cough 02/09/2012   • Dyspnea 02/09/2012       Assessment:  # Acute Pulmonary Embolism, associated with COVID 19 infection, also had TKR on 10/6/22  AC: warfarin 5 mg alternating 7.5 mg    Positive lupus anticoagulant + 9/26/22    Echo 11/6/22:  LVEF: 65%  RV: upper normal size, normal function  PASP: 42 mmHg    CTA 11/5/22: moderate quantity of RLL segmental and small quantity RUL and LLL acute PE    # Exercise induced PAH; HFpEF with PH  Out of proportion to obesity/ deconditioning/ diastolic dysfunction (PCWP 12 mmHg). Pt with MCTD/ SLE w/ lupus anticoagulant. Remote smoker. At rest, RV appears normal on TTE with coupled RV-PA w/o e/o of RVOT notching suggestive of normal PVR at rest. However, she did have a stress echo in 2012 that was suggestive of exercise induced pulmonary HTN. At the time she did not have e/o of elevated PVR. Her bubble was negative which makes an intracardiac shunt less likely.     PAH Rx: sildanefil 20 mg Q8 (previously on macitentan)  Diuretic: torsemide 20 mg daily, spironolactone 25 mg daily  NT proBNP:   Weight: 237 lbs--> 229 lbs--> 218 lbs--> 225 lbs    Studies- personally reviewed by me  Echo 11/6/22: (in setting of acute PE)  LVEF: 65%  RV: upper normal size, normal function  PASP: 42 mmHg    Stress Echo 11/4/20: 3 min, 4.6 METs, max heart rate 155 bpm, resting /96    Echo 4/23/19:  Normal RV size and function    RHC with stress 12/19/17:  She had an appropriate HR response from 100 to 130 bpm with MAP throughout the study staying at 112 mmHg. Hgb 13.1     Rest:  RA 7  RV 37/4/13  PA 33/16/25  PCWP 12    SaO2 99%  MvO2 72.3%  CO/CI 4.8/2. 3  TPG 13  DPG 4  PVR 2.7     Exercise:  CVP 10  PA 54/29/37  PCWP 18    SaO2 100%  MvO2 66%  CO/CI 4/1.9  TPG 19  DPG 11  PVR 4.8  CVP:PCWP 0.55    Stress echo 12/14/17:  to evaluate pulmonary pressures, RV, and RVOT signal w/ exercise. 5 min, 20 sec. HTNsive response, decrease in O2 saturation from 99% to 91% and increase in PA pressures from 45 mmHg to 70 mmHg with exercise. PFTs 12/1/17: normal, DLCO 84%    # SLE: Per outpatient Rheumatologist. Continue plaquenil  # ST: V/Q negative. May be 2/2 to deconditioning +/- inappropriate ST +/- diastolic dysfunction/HF. No e/o infection.    Holter 12/4/20: , 87 bpm  # Morbid obesity: Continue weight loss  # TKR on 10/6/22    TODAY'S PLAN:  Warfarin probably lifelong  Can do D dimer at 3 months, if echo suggestive then can check VQ but no indication  Continue sildanefil  Torsemide 20 mg and spironolactone 25 mg daily-  If lightheadedness persists than cut back torsemide to 10 mg daily  Likely has venous insufficiency    Going on a cruise, always gets more edema- likely due to salt in food  Go up to torsemide 30 mg daily on those days      HPI:          58 yo followed for out of proportion PAH, exercise induced PAH, with MCTD/ SLE, + lupus anticoagulant, obesity. She has seen Dr Gaston Alanis. first started getting SOB -- 10 years ago. She has had several episodes of bacterial PNA and chronic cough (a few times/year). She was referred to [facility] in 2012 for workup for PH. She had a a stress echo 8/2012 that showed that her PA pressures more than doubled from --24 mmHg to > 50 mmHg. Currently, she states she can walk up a couple flights of stairs but does get SOB. She also gets SOB with walking up inclines. She gets occasional LH.       She was seeing a Rheumatologist in [place], [place] but recently saw a specialist at [facility], Dr. Leonardo Thompson (Rheumatologist - 11/16). She was diagnosed with MCTD and SLE. HELENE + 1:320 w/ speckled pattern. She has been on Plaquenil x 2 weeks now. She is on ASA for + lupus anticoagulant and anticardiolipin Abs. She has joint pains and a subtle malar rash. She states so far there has been no change with plaquenil. After her visit with Dr. Jarred Greene, she has had TTE which shows LVEF --65%, normal RV size and function without RVOT notching. Normal atria. CXR clear. She also has had a negative V/Q scan. She walked the patient in the hallway and had her go up and down stairs. Her resting HR went from --100 bpm to 110s with Pulse Ox starting from 98% @ rest to --90% with exercise. Stress Echo in 2017 showed hypertensive response and PA pressures to 70 mmHg w/ drop in pulse ox to 91%. Exercise RHC showed increase in PVR to 4.5 MOLINA from < 3 MOLINA. She was denied Tadalafil but approved for sildanefil. She did not feel any different and continued trouble with stairs. Called 5/12 that returned from cruise a week ago and developed swelling in right leg. Ultrasound negative for DVT. Had fluid removed from right knee by Dr Fernando Muniz rheumatologist wants her to see a cardiologist at [facility]  Admitted 11/5 to 11/7 with pulmonary embolism associated with COVID 19 infection; Pt though already had TKR on 10/6/22  On warfarin.  Lupus anticoagulant    Interval History:   Went on lovenox for colonoscopy- one precancerous polyp    Past Medical History:   Diagnosis Date   • HELENE positive    • Anemia     Sildenafil causes decreased hematocrit   • Anxiety 03/2020   • CHF (congestive heart failure) ([address])     right heart failure related to pulm HTN lupus   • Chronic kidney disease     borderline lab values   • Chronic rhinitis 06/04/2012   • Clotting disorder ([address]) 2012   • Coronary artery disease 2018   • Encephalitis     Lasted Assessed 10/18/2017   • Gout 06/26/2018   • Hypertension    • Lupus anticoagulant disorder (HCC)    • Maxillary sinusitis     Lasted Assessed 10/18/2017   • Night sweat    • Osteopenia     Hip   • Osteoporosis     Spine   • Pneumonia     Last Assessed 10/18/2017   • PONV (postoperative nausea and vomiting)    • Pulmonary embolism (HCC)    • Pulmonary hypertension (HCC)    • Seizures (HCC)     Only during Encephalitis   • Shortness of breath     with exertion   • Sinus tachycardia    • Urinary incontinence        Review of Systems   Constitutional: Negative for activity change, appetite change, fatigue and unexpected weight change. HENT: Negative for congestion and nosebleeds. Eyes: Negative. Respiratory: Negative for cough, chest tightness and shortness of breath. Cardiovascular: Negative for chest pain, palpitations and leg swelling. Gastrointestinal: Negative for abdominal distention. Endocrine: Negative. Genitourinary: Negative. Musculoskeletal: Negative. Skin: Negative. Neurological: Negative for dizziness, syncope and weakness. Hematological: Negative. Psychiatric/Behavioral: Negative. Allergies   Allergen Reactions   • Dilantin [Phenytoin] Anaphylaxis and Rash   • Penicillins Rash, Anaphylaxis, Dermatitis and Hives   • Augmentin [Amoxicillin-Pot Clavulanate] Rash and Dermatitis     . Current Outpatient Medications:   •  B-D 3CC LUER-LILLIE SYR 25GX1/2" 25G X 1-1/2" 3 ML MISC, USE 1 SYRINGE EVERY 30 DAYS, Disp: 12 each, Rfl: 1  •  Benlysta 200 MG/ML SOAJ, Weekly on Fridays, Disp: , Rfl:   •  cholecalciferol (VITAMIN D3) 1,000 units tablet, Take 2,000 Units by mouth daily in the early morning  , Disp: , Rfl:   •  cyanocobalamin 1,000 mcg/mL, Inject 1 mL (1,000 mcg total) into a muscle every 30 (thirty) days, Disp: 6 mL, Rfl: 1  •  enoxaparin (LOVENOX) 100 mg/mL, Inject 100 mg under the skin every 12 hours. Start 3 days prior to colonoscopy, and stop 24 hours prior to colonoscopy.  Resume injections 12 hours after colonoscopy or as directed by GI doctor., Disp: 20 mL, Rfl: 0  •  gabapentin (Neurontin) 100 mg capsule, Take 1 capsule (100 mg total) by mouth daily at bedtime, Disp: 90 capsule, Rfl: 1  •  hydroxychloroquine (PLAQUENIL) 200 mg tablet, Take 400 mg by mouth daily with breakfast Alternating days/dosages, Disp: , Rfl:   •  sildenafil (REVATIO) 20 mg tablet, Take 1 tablet (20 mg total) by mouth 3 (three) times a day, Disp: 270 tablet, Rfl: 3  •  spironolactone (ALDACTONE) 25 mg tablet, Take 1 tablet (25 mg total) by mouth daily, Disp: 90 tablet, Rfl: 3  •  Syringe/Needle, Disp, (SecureSafe Syringe/Needle) 25G X 1-1/2" 1 ML MISC, Use 1 Syringe every 30 (thirty) days, Disp: 12 each, Rfl: 1  •  torsemide (DEMADEX) 20 mg tablet, Take 1 tablet (20 mg total) by mouth daily, Disp: 90 tablet, Rfl: 3  •  traZODone (DESYREL) 50 mg tablet, Take 1 tablet (50 mg total) by mouth daily at bedtime, Disp: 30 tablet, Rfl: 5  •  triamcinolone (KENALOG) 0.1 % oral topical paste, prn, Disp: , Rfl:   •  warfarin (Coumadin) 5 mg tablet, Take 1 tablet (5 mg total) by mouth daily Take 5 mg Tuesday, Wednesday, Thursday, Saturday and . Take 7.5 mg on Monday and Friday.  Follow up INR., Disp: 104 tablet, Rfl: 0  •  warfarin (Coumadin) 5 mg tablet, Take 5mg po daily or as directed, Disp: 120 tablet, Rfl: 1  •  warfarin (Coumadin) 5 mg tablet, 7.5mg po daily or as directed., Disp: 45 tablet, Rfl: 1    Social History     Socioeconomic History   • Marital status: /Civil Union     Spouse name: Not on file   • Number of children: 2   • Years of education: Not on file   • Highest education level: Not on file   Occupational History   • Not on file   Tobacco Use   • Smoking status: Former     Packs/day: 0.00     Years: 0.00     Total pack years: 0.00     Types: Cigarettes     Quit date: 2006     Years since quittin.5   • Smokeless tobacco: Never   Vaping Use   • Vaping Use: Never used   Substance and Sexual Activity   • Alcohol use: Yes     Alcohol/week: 0.0 standard drinks of alcohol     Comment: socially   • Drug use: No   • Sexual activity: Yes     Partners: Male   Other Topics Concern   • Not on file   Social History Narrative    Daily caffeinated coffee consumption    Exercise habits     Social Determinants of Health     Financial Resource Strain: High Risk (2/1/2023)    Overall Financial Resource Strain (CARDIA)    • Difficulty of Paying Living Expenses: Very hard   Food Insecurity: Not on file   Transportation Needs: No Transportation Needs (2/1/2023)    PRAPARE - Transportation    • Lack of Transportation (Medical): No    • Lack of Transportation (Non-Medical): No   Physical Activity: Not on file   Stress: Not on file   Social Connections: Not on file   Intimate Partner Violence: Not on file   Housing Stability: Not on file       Family History   Problem Relation Age of Onset   • Uterine cancer Mother    • Pancreatic cancer Mother 79   • Diabetes Mother    • Endometrial cancer Mother 77   • Arthritis Mother    • Cancer Mother         Pancreas, Uterine   • Vision loss Mother    • Heart disease Father         open heart surgery at age 48    • Stroke Father         CVA   • Hypertension Father    • Atrial fibrillation Father    • Hyperlipidemia Father    • Arthritis Father    • Rheum arthritis Father    • No Known Problems Sister    • No Known Problems Sister    • Thyroid disease Sister    • Depression Sister    • Osteoarthritis Sister    • Asthma Daughter    • Migraines Daughter    • Asthma Daughter    • Thyroid disease Maternal Grandmother    • Heart attack Maternal Grandfather    • Heart attack Paternal Grandmother    • Skin cancer Paternal Grandfather    • No Known Problems Brother    • Hemochromatosis Brother    • No Known Problems Maternal Aunt    • No Known Problems Paternal Aunt    • Anuerysm Neg Hx    • Clotting disorder Neg Hx    • Heart failure Neg Hx        Physical Exam:    Vitals: not currently breastfeeding. , There is no height or weight on file to calculate BMI.,   Wt Readings from Last 3 Encounters:   07/17/23 103 kg (227 lb)   04/24/23 103 kg (227 lb)   04/13/23 99.9 kg (220 lb 3.2 oz)         Physical Exam:    Physical Exam  Constitutional:       Appearance: She is well-developed. HENT:      Head: Normocephalic and atraumatic. Eyes:      Pupils: Pupils are equal, round, and reactive to light. Neck:      Vascular: No JVD. Cardiovascular:      Rate and Rhythm: Normal rate and regular rhythm. Heart sounds: No murmur heard. Pulmonary:      Effort: Pulmonary effort is normal. No respiratory distress. Breath sounds: Normal breath sounds. Abdominal:      General: There is no distension. Palpations: Abdomen is soft. Tenderness: There is no abdominal tenderness. Musculoskeletal:         General: Normal range of motion. Cervical back: Normal range of motion. Skin:     General: Skin is warm and dry. Findings: No rash. Neurological:      Mental Status: She is alert and oriented to person, place, and time.          Labs & Results:    Lab Results   Component Value Date    SODIUM 137 07/03/2023    K 4.0 07/03/2023     07/03/2023    CO2 27 07/03/2023    BUN 13 07/03/2023    CREATININE 0.86 07/03/2023    GLUC 93 07/03/2023    CALCIUM 9.6 07/03/2023     Lab Results   Component Value Date    WBC 3.86 (L) 07/03/2023    HGB 14.3 07/03/2023    HCT 43.4 07/03/2023    MCV 96 07/03/2023     07/03/2023     Lab Results   Component Value Date    BNP 16 11/05/2022      Lab Results   Component Value Date    CHOLESTEROL 181 04/03/2023    CHOLESTEROL 176 10/09/2020    CHOLESTEROL 157 05/17/2018     Lab Results   Component Value Date    HDL 57 04/03/2023    HDL 60 10/09/2020    HDL 45 05/17/2018     Lab Results   Component Value Date    TRIG 100 04/03/2023    TRIG 107 10/09/2020    TRIG 131 05/17/2018     Lab Results   Component Value Date    [address] 124 04/03/2023    [address] 116 10/09/2020    [address] 112 05/17/2018         EKG personally reviewed by Merna Stephenson. Counseling / Coordination of Care  Time spent today 25 minutes. Greater than 50% of total time was spent with the patient and / or family counseling and / or coordination of care. We discussed diagnoses, most recent studies, tests and any changes in treatment plan    Thank you for the opportunity to participate in the care of this patient.     Omar Gutierrez [facility]  MEDICAL DIRECTOR OF [address] [place] [address]

## 2023-07-25 ENCOUNTER — OFFICE VISIT (OUTPATIENT)
Dept: CARDIOLOGY CLINIC | Facility: CLINIC | Age: 58
End: 2023-07-25
Payer: MEDICARE

## 2023-07-25 VITALS
WEIGHT: 225 LBS | SYSTOLIC BLOOD PRESSURE: 124 MMHG | HEIGHT: 62 IN | DIASTOLIC BLOOD PRESSURE: 80 MMHG | BODY MASS INDEX: 41.41 KG/M2 | OXYGEN SATURATION: 98 % | HEART RATE: 94 BPM

## 2023-07-25 DIAGNOSIS — M32.9 SLE (SYSTEMIC LUPUS ERYTHEMATOSUS RELATED SYNDROME) (HCC): ICD-10-CM

## 2023-07-25 DIAGNOSIS — I27.20 PULMONARY HYPERTENSION (HCC): Primary | ICD-10-CM

## 2023-07-25 DIAGNOSIS — E66.9 OBESITY (BMI 35.0-39.9 WITHOUT COMORBIDITY): ICD-10-CM

## 2023-07-25 PROCEDURE — 99214 OFFICE O/P EST MOD 30 MIN: CPT | Performed by: INTERNAL MEDICINE

## 2023-07-25 RX ORDER — AZATHIOPRINE 50 MG/1
50 TABLET ORAL DAILY
COMMUNITY

## 2023-07-25 NOTE — PATIENT INSTRUCTIONS
During cruise can go up to torsemide 30 mg daily- may need a little more    Just try to limit sodium intake    Try to get back down to 218 lbs before cruise

## 2023-07-31 ENCOUNTER — APPOINTMENT (OUTPATIENT)
Dept: LAB | Facility: CLINIC | Age: 58
End: 2023-07-31
Payer: MEDICARE

## 2023-07-31 ENCOUNTER — TELEPHONE (OUTPATIENT)
Dept: HEMATOLOGY ONCOLOGY | Facility: CLINIC | Age: 58
End: 2023-07-31

## 2023-07-31 DIAGNOSIS — I26.99 PULMONARY EMBOLISM ASSOCIATED WITH COVID-19 (HCC): Primary | ICD-10-CM

## 2023-07-31 DIAGNOSIS — U07.1 PULMONARY EMBOLISM ASSOCIATED WITH COVID-19 (HCC): Primary | ICD-10-CM

## 2023-07-31 NOTE — TELEPHONE ENCOUNTER
Called and spoke with patient regarding her 7/31 PT/INR results. Patient had a colonoscopy on 7/17, restarted coumadin on 7/17 and finished Lovenox on 7/19. She was told to continue taking 7.5 mg Coumadin Sunday through Friday and 5 mg Coumadin on Saturday. Patient to have her PT/INR rechecked in 2 weeks time. Standing order for PT/INR was placed.     ----- Message -----  From: Orville De Jesus  Sent: 7/31/2023  11:49 AM EDT  To: Genet Banks RN  Subject: INR Result                                       Please review      ----- Message -----  From: Vaughan Mcburney  Sent: 7/31/2023  11:48 AM EDT  To: [facility]  Subject: INR Result                                       Good Morning My INR today is 2.81 I assume you will want me too keep my medication at the same dosage. When do you want me to repeat my INR?

## 2023-08-14 ENCOUNTER — APPOINTMENT (OUTPATIENT)
Dept: LAB | Facility: CLINIC | Age: 58
End: 2023-08-14
Payer: MEDICARE

## 2023-08-14 DIAGNOSIS — M32.9 SYSTEMIC LUPUS ERYTHEMATOSUS, UNSPECIFIED SLE TYPE, UNSPECIFIED ORGAN INVOLVEMENT STATUS (HCC): ICD-10-CM

## 2023-08-14 DIAGNOSIS — D68.61 ANTIPHOSPHOLIPID ANTIBODY SYNDROME (HCC): ICD-10-CM

## 2023-08-14 DIAGNOSIS — I26.99 PULMONARY EMBOLISM ASSOCIATED WITH COVID-19 (HCC): ICD-10-CM

## 2023-08-14 DIAGNOSIS — E53.8 B12 DEFICIENCY: ICD-10-CM

## 2023-08-14 DIAGNOSIS — D50.0 IRON DEFICIENCY ANEMIA DUE TO CHRONIC BLOOD LOSS: ICD-10-CM

## 2023-08-14 DIAGNOSIS — U07.1 PULMONARY EMBOLISM ASSOCIATED WITH COVID-19 (HCC): ICD-10-CM

## 2023-08-14 LAB
BASOPHILS # BLD AUTO: 0.02 THOUSANDS/ÂΜL (ref 0–0.1)
BASOPHILS NFR BLD AUTO: 1 % (ref 0–1)
EOSINOPHIL # BLD AUTO: 0.16 THOUSAND/ÂΜL (ref 0–0.61)
EOSINOPHIL NFR BLD AUTO: 5 % (ref 0–6)
ERYTHROCYTE [DISTWIDTH] IN BLOOD BY AUTOMATED COUNT: 13 % (ref 11.6–15.1)
FERRITIN SERPL-MCNC: 177 NG/ML (ref 11–307)
HCT VFR BLD AUTO: 40.6 % (ref 34.8–46.1)
HGB BLD-MCNC: 13.7 G/DL (ref 11.5–15.4)
IMM GRANULOCYTES # BLD AUTO: 0.01 THOUSAND/UL (ref 0–0.2)
IMM GRANULOCYTES NFR BLD AUTO: 0 % (ref 0–2)
INR PPP: 3.46 (ref 0.84–1.19)
IRON SATN MFR SERPL: 28 % (ref 15–50)
IRON SERPL-MCNC: 84 UG/DL (ref 50–170)
LYMPHOCYTES # BLD AUTO: 1.06 THOUSANDS/ÂΜL (ref 0.6–4.47)
LYMPHOCYTES NFR BLD AUTO: 33 % (ref 14–44)
MCH RBC QN AUTO: 32.2 PG (ref 26.8–34.3)
MCHC RBC AUTO-ENTMCNC: 33.7 G/DL (ref 31.4–37.4)
MCV RBC AUTO: 95 FL (ref 82–98)
MONOCYTES # BLD AUTO: 0.3 THOUSAND/ÂΜL (ref 0.17–1.22)
MONOCYTES NFR BLD AUTO: 9 % (ref 4–12)
NEUTROPHILS # BLD AUTO: 1.68 THOUSANDS/ÂΜL (ref 1.85–7.62)
NEUTS SEG NFR BLD AUTO: 52 % (ref 43–75)
NRBC BLD AUTO-RTO: 0 /100 WBCS
PLATELET # BLD AUTO: 278 THOUSANDS/UL (ref 149–390)
PMV BLD AUTO: 10.5 FL (ref 8.9–12.7)
PROTHROMBIN TIME: 35.9 SECONDS (ref 11.6–14.5)
RBC # BLD AUTO: 4.26 MILLION/UL (ref 3.81–5.12)
TIBC SERPL-MCNC: 298 UG/DL (ref 250–450)
VIT B12 SERPL-MCNC: 328 PG/ML (ref 180–914)
WBC # BLD AUTO: 3.23 THOUSAND/UL (ref 4.31–10.16)

## 2023-08-14 PROCEDURE — 85025 COMPLETE CBC W/AUTO DIFF WBC: CPT

## 2023-08-14 PROCEDURE — 83540 ASSAY OF IRON: CPT

## 2023-08-14 PROCEDURE — 36415 COLL VENOUS BLD VENIPUNCTURE: CPT

## 2023-08-14 PROCEDURE — 82607 VITAMIN B-12: CPT

## 2023-08-14 PROCEDURE — 82728 ASSAY OF FERRITIN: CPT

## 2023-08-14 PROCEDURE — 83550 IRON BINDING TEST: CPT

## 2023-08-14 PROCEDURE — 85610 PROTHROMBIN TIME: CPT

## 2023-08-28 ENCOUNTER — APPOINTMENT (OUTPATIENT)
Dept: LAB | Facility: CLINIC | Age: 58
End: 2023-08-28
Payer: MEDICARE

## 2023-08-28 ENCOUNTER — OFFICE VISIT (OUTPATIENT)
Dept: HEMATOLOGY ONCOLOGY | Facility: CLINIC | Age: 58
End: 2023-08-28
Payer: MEDICARE

## 2023-08-28 VITALS
SYSTOLIC BLOOD PRESSURE: 122 MMHG | RESPIRATION RATE: 16 BRPM | HEART RATE: 86 BPM | DIASTOLIC BLOOD PRESSURE: 80 MMHG | HEIGHT: 62 IN | BODY MASS INDEX: 41.41 KG/M2 | OXYGEN SATURATION: 96 % | WEIGHT: 225 LBS

## 2023-08-28 DIAGNOSIS — D68.61 ANTIPHOSPHOLIPID ANTIBODY SYNDROME (HCC): ICD-10-CM

## 2023-08-28 DIAGNOSIS — E53.8 B12 DEFICIENCY: ICD-10-CM

## 2023-08-28 DIAGNOSIS — I27.20 PULMONARY HYPERTENSION (HCC): ICD-10-CM

## 2023-08-28 DIAGNOSIS — M81.0 AGE-RELATED OSTEOPOROSIS WITHOUT CURRENT PATHOLOGICAL FRACTURE: ICD-10-CM

## 2023-08-28 DIAGNOSIS — D50.9 IRON DEFICIENCY ANEMIA, UNSPECIFIED IRON DEFICIENCY ANEMIA TYPE: ICD-10-CM

## 2023-08-28 DIAGNOSIS — D70.9 NEUTROPENIA, UNSPECIFIED TYPE (HCC): ICD-10-CM

## 2023-08-28 DIAGNOSIS — D68.62 LUPUS ANTICOAGULANT DISORDER (HCC): ICD-10-CM

## 2023-08-28 DIAGNOSIS — Z79.01 ANTICOAGULATED: ICD-10-CM

## 2023-08-28 DIAGNOSIS — I26.99 OTHER ACUTE PULMONARY EMBOLISM WITHOUT ACUTE COR PULMONALE (HCC): Primary | ICD-10-CM

## 2023-08-28 PROCEDURE — 99215 OFFICE O/P EST HI 40 MIN: CPT | Performed by: PHYSICIAN ASSISTANT

## 2023-08-28 RX ORDER — CYANOCOBALAMIN 1000 UG/ML
1000 INJECTION, SOLUTION INTRAMUSCULAR; SUBCUTANEOUS
Qty: 12 ML | Refills: 1 | Status: SHIPPED | OUTPATIENT
Start: 2023-08-28

## 2023-08-28 NOTE — PROGRESS NOTES
Hematology/Oncology Outpatient Follow- up Note  Vikash Rae 59 y.o. female MRN: @ Encounter: [id number]        Date:  8/28/2023      Assessment / Plan:    1. 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   11/2017  Normal parietal cell AB, intrinsic factor AB.       B12 1000mcg IM monthly.   8/14/23    B12 level 328  Increase B12 1000mcg IM from q 4 weeks to  q 2 weeks          2. Prior to 10/22, she never had thrombosis, DVT, PE. Diagnosed with Bilateral PE 11/5/22 while on ASA 162mg po daily.  She had d/c Lovenox 40mg sq daily s/p Left Knee replacement on 10/6/2022 -1 week prior. + for Covid 10/26/22.   LE doppler 11/2022 - No evidence of acute or chronic deep vein thrombosis of either LE     Hsitory of  Positive LA; history of + anticardiolipin antibody.       Given her history of persistently positive lupus anticoagulant, her pulmonary embolism that occurred while on aspirin 162 milligrams p.o. daily even though in the background of COVID positivity, her risk of recurrent thromboembolic event is high and I am in favor of chronic lifelong Coumadin for antiphospholipid antibody syndrome.     We are managing coumadin. coumadin  7.5mg 5/week  5 mg  2/ week      5/25/23 lab work at [facility] - cardiolipin antibodies normal    Covid precautions reviewed. She is going on a Cruise next week.           3.  Exercise induced PAH and follows with cardiology.     4.  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   Dx Lupus.  Follows at [facility] with Dr. Loly Santamaria. On Hydroxychloroquine 300mg po daily,  Benlysta  Imuran added 5/2023             5. Leukopenia 2nd to SLE. White blood cell count  3.2-4.2. [place]berg 1.7-2.5 November 2022.   WBC slightly decreased 8/2023 2nd to Imuran (started 5/2023)     6.  Colon cancer screening. Her PCP is discussed possible colonoscopy with her. Reviewed would recommend Lovenox bridge. Patient undecided if she wishes to have this done. Discussed possibility of Cologuard, however, if positive she would need colonoscopy. She will discuss with her PCP.       7. Osteoporosis on 10/2021 DEXA .        HPI:  Na Zambrano is a 55 year old female seen 10/18/2017 for initial evaluation , self referred, secondary to low Vitamin B12 level.         10/6/2017 labs at [facility]: hemoglobin 13.2, mcv 90, RDW 14.3, WBC 6.2, 68% neutrophils, 21% lymphs, 8% monocytes 2% eosinophils, platelet count 721,426. Iron saturation 15%, ferritin 52 Vitamin B12 184( 211-946) Tsh 1.98, free T4 1.18, negative lyme antibody. RF normal, Anti-scleroderma antibodies normal, anti-DS DNA normal, Styles AB normal, RNP antibodies normal. Anti-centromere AB normal. Normal sed rate.  HELENE: Dense fine speckled pattern is noted which suggest presence of  antibody which has a low prevalence in systemic autoimmune rheumatic diseases. Normal immunofixation on SPEP. Normal serum immunoglobulins. Normal CCP antibodies IgG and IgA  U/A identified hyaline casts. Celiac panel was normal. CMP normal.    POSITIVE HELENE. POSITIVE Lupus anticoagulant. IgM anticardiolipin antibody 18 (Indeterminate). Normal IgG anticardiolipin antibody , normal IgA anticardiolipin antibody.     7/11/2016: normal anticardiolipin antibodies. Lyme negative. Normal ANCA profile, normal C3 and C4 complement, normal CRP. Normal thyroglobulin profile. Normal ESR. + LUPUS ANTICOAGULANT.   HELENE positive. On [facility] intake 3/13/2015 she noted that she had a history of + lupus anticoagulant. Artem Erickchandni states she 1st had HELENE evaluated and was positive in 2012.     Patient is extremely frustrated.  She states she has been having symptoms of SOB, fatigue and has been diagnosed with a lupus-like disease but has not received any disease directed therapy. She took prednisone years ago without benefit and significant weight gain. Was previously seen at [address] [place] [address] by Abundio Keita in 2012 regarding chronic cough, SOB without history of asthma, allergies. Quit smoking at 34years old after 15-20 pack years. PND treated and cough improved but ANSARI persisted. Bronchoscopy was normal. PFTs normal. Evaluation unrevealing to pulmonary source for her dyspnea. No benefit with Advair or high dose steroids. Falls asleep easily. Normal echo. Noted to have edema and sleep apnea suspected. She was advised to increase Lasix and have sleep study. Luzma Molina, a cardiologist at the [facility] had her undergo echocardiogram, however, poor images were obtained. She been had a stress echocardiogram and a question of pulmonary artery hypertension and was raised. 8/13/2012: stress echocardiogram report from [address] [place] [address] : estimated P. a systolic pressure increased to 56 mm mercury suggestive of significant exercise induced pulmonary hypertension  She states she saw Dr. Kat Rodriguez, a specialist at [address] [place] [address] who didn't examine me and told me I look too good to have pulmonary artery hypertension." He did not perform additional testing but then referred her to Dr. Dyllan Saunders, a cardiac electrophysiologist at [facility] due to resting heart rate 114-120s who informed her she had a + HELENE.     She saw Dr. Sebastien Young a few times but felt pressured by him as he wanted to perform a lip biopsy to evaluate for Sjogren's syndrome. She has been seeing Deng Dominguez D.O. at the rheumatology center of [facility] but has not seeing him in approximately a year and a half as she states he was requesting repeated blood work that was not moving forward towards treating any symptoms she was having and feels that he can be adverserial.      Lactose intolerant. Osteoporosis diagnosed at 28 y/o in Lumbar spine.  Partial Hysterectomy 1996 for endometrial hyperplasia. Ovaries spared.      She works at [employer] [place]. She returned from a vacation and had 3+ pitting edema. Dr. Smita Baptiste, 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 arranged for her to see Nathanel Najjar, MD in [facility] who ordered the above labs. Danni Pickard is very comfortable with injecting herself IM. No s/sx pancreatic insufficiency. She does not take PPI or H2- blocker. She does not drink alcohol regularly.       Did have a first trimester miscarriage around the 10th week in between the birth of her 2 children. She takes Aspirin.      Reports last EGD was 3-4 years ago performed by Dr. Deng Kathleen at [facility]. She is s/p Nissen Fundoplication for severe GERD. She had a colonoscopy previously.     She saw Dr. Caden Gutierrez with [facility] 2/13/2015 regarding persistently + HELENE and IGM anticardiolipin antibody. There was also question whether or not she had cotton wool spots secondary to embolic phenomenon. Patient states ultimately this was ruled out. Aspirin was hematologic recommendation at that time.          10/2019:  Normal cardiolipin AB, normal C3, C4, dsDNA ab not present     10/2021: Hemoglobin 14.1, MCV 91, white blood cell count 3.9, 66% neutrophils, 22% lymphocytes, 8% monocytes, platelet count 881  Normal cardiolipin antibodies, normal beta 2 glycoprotein antibody        Status Post Arthroplasty Knee Total - Left on 10/6/2022.  She was on ASA 162mg po daily and Lovenox 40mg sq daily x 30 days post-op.     Presented to the hospital on 11/5/2022 due to acute onset of back pain, difficulty breathing, SOB and cough.    Reported that she went on a cruise 10/24 and stated feeling sick on the 26th and tested + for Covid 10/26/22.     11/5/22 CTA - Moderate quantity of right lower lobe segmental and small quantity of right upper lobe and left lower lobe acute PE.     LE doppler was not done.     She was discharged on Eliquis.  Transitoned to  Coumadin due to APS.    History of Post op anemia.  Hgb 11/11/22 was 11.1g/dL. She was taking oral iron twice daily- she has not for some time due to nausea, however.  Iron saturation 13%; ferritin 394 11/11/22 (diagnosed with PE 11/5/22). Feraheme weekly x2 12/2022 8/2023  Hgb 13.7; iron saturation 28%; ferritin 177      1/19/23 - normal cardiolipin antibodies, B-2 glycoprotein antibodies. Interval History:    5/25/23  F/u with Dr. Brooke Rangel, rheumatologist at [facility] re: SLE. Benlysta continued . Imuran initiated. This has been helpful in reducing the swelling in her hands      Review of Systems   Constitutional: Negative for appetite change, chills, diaphoresis, fatigue, fever and unexpected weight change. HENT:   Negative for mouth sores, nosebleeds, sore throat, tinnitus and voice change. Eyes: Negative for eye problems. Respiratory: Positive for shortness of breath (chronic ANSARI). Negative for chest tightness, cough and wheezing. Cardiovascular: Negative for chest pain, leg swelling and palpitations. Gastrointestinal: Negative for abdominal distention, abdominal pain, blood in stool, constipation, diarrhea, nausea, rectal pain and vomiting. Endocrine: Negative for hot flashes. Genitourinary: Negative. Musculoskeletal: Positive for arthralgias. Negative for gait problem and myalgias. Skin: Negative for itching and rash. Neurological: Negative for dizziness, gait problem, headaches, light-headedness and numbness. Hematological: Negative for adenopathy. Psychiatric/Behavioral: Negative for confusion and sleep disturbance. The patient is not nervous/anxious.          Test Results:        Labs:   Lab Results   Component Value Date    HGB 13.7 08/14/2023    HCT 40.6 08/14/2023    MCV 95 08/14/2023     08/14/2023    WBC 3.23 (L) 08/14/2023    NRBC 0 08/14/2023     Lab Results   Component Value Date    K 4.0 07/03/2023     07/03/2023 CO2 27 07/03/2023    BUN 13 07/03/2023    CREATININE 0.86 07/03/2023    GLUF 107 (H) 09/15/2022    CALCIUM 9.6 07/03/2023    AST 13 07/03/2023    ALT 16 07/03/2023    ALKPHOS 82 07/03/2023    EGFR 73 07/03/2023           Imaging: No results found. Allergies: Allergies   Allergen Reactions   • Dilantin [Phenytoin] Anaphylaxis and Rash   • Penicillins Rash, Anaphylaxis, Dermatitis and Hives   • Augmentin [Amoxicillin-Pot Clavulanate] Rash and Dermatitis     Current Medications: Reviewed  PMH/FH/SH:  Reviewed      Physical Exam:    There is no height or weight on file to calculate BSA. Ht Readings from Last 3 Encounters:   07/25/23 5' 2" (1.575 m)   07/17/23 5' 2" (1.575 m)   04/24/23 5' 2" (1.575 m)        Wt Readings from Last 3 Encounters:   07/25/23 102 kg (225 lb)   07/17/23 103 kg (227 lb)   04/24/23 103 kg (227 lb)        Temp Readings from Last 3 Encounters:   07/17/23 97.6 °F (36.4 °C) (Temporal)   12/08/22 97.7 °F (36.5 °C) (Temporal)   11/30/22 (!) 97.4 °F (36.3 °C) (Temporal)        BP Readings from Last 3 Encounters:   07/25/23 124/80   07/17/23 127/80   04/24/23 135/82             Physical Exam  Constitutional:       Appearance: She is well-developed. HENT:      Head: Normocephalic and atraumatic. Cardiovascular:      Rate and Rhythm: Normal rate and regular rhythm. Heart sounds: Normal heart sounds. Pulmonary:      Effort: Pulmonary effort is normal. No respiratory distress. Breath sounds: Normal breath sounds. No wheezing or rhonchi. Musculoskeletal:      Cervical back: Neck supple. Skin:     General: Skin is warm and dry. Neurological:      General: No focal deficit present. Mental Status: She is alert and oriented to person, place, and time.    Psychiatric:         Behavior: Behavior normal.         Emergency Contacts:    Extended Emergency Contact Information  Primary Emergency Contact: Hardeep Joseph  Address: [address], [address] ([facility]) [facility] [address] [place] Phone: [phone]  Mobile Phone: [phone]  Relation: Spouse  Secondary Emergency Contact: Cesario Mcdonald  Address: [address] ([facility], [address] [facility] [place] Phone: [phone]  Relation: Daughter

## 2023-08-29 ENCOUNTER — TELEPHONE (OUTPATIENT)
Dept: FAMILY MEDICINE CLINIC | Facility: CLINIC | Age: 58
End: 2023-08-29

## 2023-08-29 DIAGNOSIS — T75.3XXA MOTION SICKNESS, INITIAL ENCOUNTER: Primary | ICD-10-CM

## 2023-08-29 RX ORDER — SCOLOPAMINE TRANSDERMAL SYSTEM 1 MG/1
1 PATCH, EXTENDED RELEASE TRANSDERMAL
Qty: 10 PATCH | Refills: 0 | Status: SHIPPED | OUTPATIENT
Start: 2023-08-29 | End: 2023-08-30 | Stop reason: SDUPTHER

## 2023-08-29 NOTE — TELEPHONE ENCOUNTER
Harris Lobo is leaving for a 2 week cruise on Friday 9/1. She would like to get a script for Scopolamine patches.

## 2023-08-30 ENCOUNTER — TELEPHONE (OUTPATIENT)
Dept: FAMILY MEDICINE CLINIC | Facility: CLINIC | Age: 58
End: 2023-08-30

## 2023-08-30 DIAGNOSIS — T75.3XXA MOTION SICKNESS, INITIAL ENCOUNTER: ICD-10-CM

## 2023-08-30 RX ORDER — SCOLOPAMINE TRANSDERMAL SYSTEM 1 MG/1
1 PATCH, EXTENDED RELEASE TRANSDERMAL
Qty: 10 PATCH | Refills: 0 | Status: SHIPPED | OUTPATIENT
Start: 2023-08-30

## 2023-09-18 ENCOUNTER — APPOINTMENT (OUTPATIENT)
Dept: LAB | Facility: CLINIC | Age: 58
End: 2023-09-18
Payer: MEDICARE

## 2023-09-18 DIAGNOSIS — Z79.01 WARFARIN ANTICOAGULATION: Primary | ICD-10-CM

## 2023-09-18 DIAGNOSIS — R76.8 POSITIVE ANA (ANTINUCLEAR ANTIBODY): ICD-10-CM

## 2023-09-18 DIAGNOSIS — I26.99 OTHER ACUTE PULMONARY EMBOLISM WITHOUT ACUTE COR PULMONALE (HCC): ICD-10-CM

## 2023-09-19 DIAGNOSIS — D68.62 LUPUS ANTICOAGULANT DISORDER (HCC): ICD-10-CM

## 2023-09-19 DIAGNOSIS — I26.99 PULMONARY EMBOLISM ASSOCIATED WITH COVID-19 (HCC): ICD-10-CM

## 2023-09-19 DIAGNOSIS — U07.1 PULMONARY EMBOLISM ASSOCIATED WITH COVID-19 (HCC): ICD-10-CM

## 2023-09-19 RX ORDER — WARFARIN SODIUM 5 MG/1
TABLET ORAL
Qty: 45 TABLET | Refills: 1 | Status: SHIPPED | OUTPATIENT
Start: 2023-09-19

## 2023-10-06 ENCOUNTER — OFFICE VISIT (OUTPATIENT)
Dept: OBGYN CLINIC | Facility: CLINIC | Age: 58
End: 2023-10-06
Payer: MEDICARE

## 2023-10-06 ENCOUNTER — APPOINTMENT (OUTPATIENT)
Dept: RADIOLOGY | Facility: AMBULARY SURGERY CENTER | Age: 58
End: 2023-10-06
Attending: ORTHOPAEDIC SURGERY
Payer: MEDICARE

## 2023-10-06 VITALS — WEIGHT: 225 LBS | HEIGHT: 62 IN | BODY MASS INDEX: 41.41 KG/M2

## 2023-10-06 DIAGNOSIS — Z96.652 STATUS POST TOTAL KNEE REPLACEMENT USING CEMENT, LEFT: Primary | ICD-10-CM

## 2023-10-06 DIAGNOSIS — Z96.652 STATUS POST TOTAL KNEE REPLACEMENT USING CEMENT, LEFT: ICD-10-CM

## 2023-10-06 PROCEDURE — 99212 OFFICE O/P EST SF 10 MIN: CPT | Performed by: ORTHOPAEDIC SURGERY

## 2023-10-06 PROCEDURE — 73562 X-RAY EXAM OF KNEE 3: CPT

## 2023-10-06 NOTE — PROGRESS NOTES
Assessment:  1.  Status post total knee replacement using cement, left  XR knee 3 vw left non injury        Patient Active Problem List   Diagnosis   • B12 deficiency   • Chronic cough   • Diffuse connective tissue disease ([facility])   • Dyspnea   • Edema   • Hot flashes due to menopause   • Long-term use of hydroxychloroquine   • Other organ or system involvement in systemic lupus erythematosus ([facility])   • Lupus anticoagulant disorder ([facility])   • SOB (shortness of breath) on exertion   • Sinus tachycardia   • Secondary pulmonary hypertension   • Pulmonary hypertension ([facility])   • Post-nasal drip   • Pes anserine bursitis   • Positive HELENE (antinuclear antibody)   • Other chronic pulmonary heart diseases   • Osteoporosis   • Night sweats   • Patellofemoral disorder of right knee   • Pes anserinus bursitis of right knee   • Arthritis of right knee   • Other tear of medial meniscus, current injury, right knee, initial encounter   • Tear of medial meniscus of right knee, current   • Chronic pain of right knee   • Elevated serum creatinine   • Hyperuricemia   • Trochanteric bursitis of both hips   • Undifferentiated connective tissue disease ([address])   • Vitamin D deficiency   • Chronic kidney disease, stage 3a ([address])   • Right knee pain   • Status post total right knee replacement   • Left knee pain   • Tear of medial meniscus of left knee, current   • Primary osteoarthritis of left knee   • Morbid obesity ([facility])   • Leukopenia   • Venous stasis of lower extremity   • Acute pain of right knee   • Status post total knee replacement using cement, left   • Pulmonary embolism associated with COVID-19 ([address])   • SLE (systemic lupus erythematosus) ([facility])   • Arthritis of left knee   • Iron deficiency anemia due to chronic blood loss   • Insomnia   • Pulmonary embolus ([facility])   • BMI 39.0-39.9,adult   • Financial difficulties   • Antiphospholipid antibody syndrome ([facility])   • Sleeping difficulty   • Neuropathic pain, leg, left   • Anticoagulated Plan      Follow-up as needed            Subjective:     Patient ID:    Chief Complaint:Na Joseph 59 y.o. female      HPI    Patient comes in now 1 year out after her left total knee replacement. She is doing very well she does report that she has a small area of redness at the superior aspect of the incision which seems to be consistent with a spitting suture. She otherwise has an occasional click in her knee when she walks for longer periods of time. It seems to be in the anterior aspect of the knee. The following portions of the patient's history were reviewed and updated as appropriate: allergies, current medications, past family history, past social history, past surgical history and problem list.    All organ systems normal    Social History     Socioeconomic History   • Marital status: /Civil Union     Spouse name: Not on file   • Number of children: 2   • Years of education: Not on file   • Highest education level: Not on file   Occupational History   • Not on file   Tobacco Use   • Smoking status: Former     Packs/day: 0.00     Years: 0.00     Total pack years: 0.00     Types: Cigarettes     Quit date: 2006     Years since quittin.7   • Smokeless tobacco: Never   Vaping Use   • Vaping Use: Never used   Substance and Sexual Activity   • Alcohol use: Yes     Alcohol/week: 0.0 standard drinks of alcohol     Comment: socially   • Drug use: No   • Sexual activity: Yes     Partners: Male   Other Topics Concern   • Not on file   Social History Narrative    Daily caffeinated coffee consumption    Exercise habits     Social Determinants of Health     Financial Resource Strain: High Risk (2023)    Overall Financial Resource Strain (CARDIA)    • Difficulty of Paying Living Expenses: Very hard   Food Insecurity: Not on file   Transportation Needs: No Transportation Needs (2023)    PRAPARE - Transportation    • Lack of Transportation (Medical):  No    • Lack of Transportation (Non-Medical):  No   Physical Activity: Not on file   Stress: Not on file   Social Connections: Not on file   Intimate Partner Violence: Not on file   Housing Stability: Not on file     Past Medical History:   Diagnosis Date   • HELENE positive    • Anemia     Sildenafil causes decreased hematocrit   • Anxiety 03/2020   • CHF (congestive heart failure) ([address])     right heart failure related to pulm HTN lupus   • Chronic kidney disease     borderline lab values   • Chronic rhinitis 06/04/2012   • Clotting disorder ([address]) 2012   • Coronary artery disease 2018   • Encephalitis     Lasted Assessed 10/18/2017   • Gout 06/26/2018   • Hypertension    • Lupus anticoagulant disorder ([address])    • Maxillary sinusitis     Lasted Assessed 10/18/2017   • Night sweat    • Osteopenia     Hip   • Osteoporosis     Spine   • Pneumonia     Last Assessed 10/18/2017   • PONV (postoperative nausea and vomiting)    • Pulmonary embolism (HCC)    • Pulmonary hypertension (HCC)    • Seizures ([address])     Only during Encephalitis   • Shortness of breath     with exertion   • Sinus tachycardia    • Urinary incontinence      Past Surgical History:   Procedure Laterality Date   • ARTHRODESIS      Hand: IP Joint   • CHOLECYSTECTOMY     • COLONOSCOPY     • COLPOSCOPY     • HYSTERECTOMY      Vaginal   • JOINT REPLACEMENT Right     Knee replacement   • KNEE SURGERY  2/2019   • LAPAROSCOPIC ESOPHAGOGASTRIC FUNDOPLASTY  2008   • PA ARTHRP KNE CONDYLE&PLATU MEDIAL&LAT COMPARTMENTS Right 2/12/2019    Procedure: KNEE TOTAL ARTHROPLASTY AND ASSOCIATED PROCEDURES;  Surgeon: Buck Blackwell DO;  [facility];  Service: Orthopedics   • PA ARTHRP KNE CONDYLE&PLATU MEDIAL&LAT COMPARTMENTS Left 10/6/2022    Procedure: ARTHROPLASTY KNEE TOTAL;  Surgeon: Buck Blackwell DO;  [facility];  Service: Orthopedics   • PA ARTHRS KNEE W/MENISCECTOMY MED&LAT W/SHAVING Right 10/2/2018    Procedure: KNEE ARTHROSCOPY WITH PARTIAL MEDIAL MENISCECTOMY;  Surgeon: Maritza Thomason DO;  [facility];  Service: Orthopedics   • RI ARTHRS KNEE W/MENISCECTOMY MED&LAT W/SHAVING Left 12/17/2019    Procedure: KNEE ARTHROSCOPIC MENISCECTOMY /MEDIAL;  Chondyl medial and posterior;  Surgeon: Maritza Thomason DO;  [facility];  Service: Orthopedics   • REDUCTION MAMMAPLASTY     • REPAIR ANKLE LIGAMENT Right    • TONSILLECTOMY       Allergies   Allergen Reactions   • Dilantin [Phenytoin] Anaphylaxis and Rash   • Penicillins Rash, Anaphylaxis, Dermatitis and Hives   • Augmentin [Amoxicillin-Pot Clavulanate] Rash and Dermatitis     Current Outpatient Medications on File Prior to Visit   Medication Sig Dispense Refill   • azaTHIOprine (IMURAN) 50 mg tablet Take 50 mg by mouth daily     • B-D 3CC LUER-LILLIE SYR 25GX1/2" 25G X 1-1/2" 3 ML MISC USE 1 SYRINGE EVERY 30 DAYS 12 each 1   • Benlysta 200 MG/ML SOAJ Weekly on Fridays     • cholecalciferol (VITAMIN D3) 1,000 units tablet Take 2,000 Units by mouth daily in the early morning       • cyanocobalamin 1,000 mcg/mL Inject 1 mL (1,000 mcg total) into a muscle every 14 (fourteen) days 12 mL 1   • gabapentin (Neurontin) 100 mg capsule Take 1 capsule (100 mg total) by mouth daily at bedtime 90 capsule 1   • hydroxychloroquine (PLAQUENIL) 200 mg tablet Take 400 mg by mouth daily with breakfast Alternating days/dosages     • scopolamine (TRANSDERM-SCOP) 1 mg/3 days TD 72 hr patch Place 1 patch on the skin over 72 hours every third day 10 patch 0   • sildenafil (REVATIO) 20 mg tablet Take 1 tablet (20 mg total) by mouth 3 (three) times a day 270 tablet 3   • spironolactone (ALDACTONE) 25 mg tablet Take 1 tablet (25 mg total) by mouth daily 90 tablet 3   • Syringe/Needle, Disp, (SecureSafe Syringe/Needle) 25G X 1-1/2" 1 ML MISC Use 1 Syringe every 30 (thirty) days 12 each 1   • torsemide (DEMADEX) 20 mg tablet Take 1 tablet (20 mg total) by mouth daily 90 tablet 3   • traZODone (DESYREL) 50 mg tablet Take 1 tablet (50 mg total) by mouth daily at bedtime 30 tablet 5   • triamcinolone (KENALOG) 0.1 % oral topical paste prn     • warfarin (Coumadin) 5 mg tablet 7.5mg po daily or as directed. 45 tablet 1   • [DISCONTINUED] ascorbic acid (VITAMIN C) 500 MG tablet Take 1 tablet (500 mg total) by mouth 2 (two) times a day 60 tablet 1   • [DISCONTINUED] ferrous sulfate 324 (65 Fe) mg Take 1 tablet (324 mg total) by mouth 2 (two) times a day before meals 60 tablet 1   • [DISCONTINUED] folic acid (FOLVITE) 1 mg tablet TAKE 1 TABLET(1 MG) BY MOUTH DAILY 90 tablet 0   • [DISCONTINUED] methocarbamol (Robaxin-750) 750 mg tablet Take 1 tablet (750 mg total) by mouth every 6 (six) hours as needed for muscle spasms 20 tablet 0   • [DISCONTINUED] Multiple Vitamins-Minerals (multivitamin with minerals) tablet Take 1 tablet by mouth daily 30 tablet 1   • [DISCONTINUED] ondansetron (Zofran ODT) 4 mg disintegrating tablet Take 1 tablet (4 mg total) by mouth every 6 (six) hours as needed for nausea or vomiting 20 tablet 0     No current facility-administered medications on file prior to visit. Objective:        Ortho Exam    Not able to appreciate the knee clicking at this time with multiple flexion extension and varus valgus stresses the x-ray does show a slightly lateral tracking patella which is most consistent with her symptoms she reports that after longer periods of walking she has the clicking it seems to be that the her VMO may be fatiguing. As far as the small area of redness we cleansed the area with rubbing alcohol deroofed it with a 18-gauge needle were able to remove suture material.  We then wiped with alcohol and placed a Band-Aid. Range of motion is well within normal limits she has done excellent for the very beginning. She will follow-up with us on an as-needed basis  I have personally reviewed pertinent films in PACS.     Portions of the record may have been created with voice recognition software.  Occasional wrong word or "sound a like" substitutions may have occurred due to the inherent limitations of voice recognition software.  Read the chart carefully and recognize, using context, where substitutions have occurred.

## 2023-10-10 ENCOUNTER — PATIENT MESSAGE (OUTPATIENT)
Dept: OBGYN CLINIC | Facility: CLINIC | Age: 58
End: 2023-10-10

## 2023-10-10 DIAGNOSIS — Z96.652 STATUS POST TOTAL KNEE REPLACEMENT USING CEMENT, LEFT: Primary | ICD-10-CM

## 2023-10-11 RX ORDER — CLINDAMYCIN HYDROCHLORIDE 300 MG/1
CAPSULE ORAL
Qty: 3 CAPSULE | Refills: 3 | Status: SHIPPED | OUTPATIENT
Start: 2023-10-11 | End: 2024-10-11

## 2023-10-12 ENCOUNTER — APPOINTMENT (OUTPATIENT)
Dept: LAB | Facility: CLINIC | Age: 58
End: 2023-10-12
Payer: MEDICARE

## 2023-10-12 ENCOUNTER — PATIENT MESSAGE (OUTPATIENT)
Dept: HEMATOLOGY ONCOLOGY | Facility: CLINIC | Age: 58
End: 2023-10-12

## 2023-10-12 DIAGNOSIS — R76.8 POSITIVE ANA (ANTINUCLEAR ANTIBODY): ICD-10-CM

## 2023-10-12 DIAGNOSIS — D68.62 LUPUS ANTICOAGULANT DISORDER (HCC): Primary | ICD-10-CM

## 2023-10-12 DIAGNOSIS — I26.99 PULMONARY EMBOLISM ASSOCIATED WITH COVID-19 (HCC): ICD-10-CM

## 2023-10-12 DIAGNOSIS — Z79.01 WARFARIN ANTICOAGULATION: ICD-10-CM

## 2023-10-12 DIAGNOSIS — U07.1 PULMONARY EMBOLISM ASSOCIATED WITH COVID-19 (HCC): ICD-10-CM

## 2023-10-18 DIAGNOSIS — M79.2 NEUROPATHIC PAIN, LEG, LEFT: ICD-10-CM

## 2023-10-18 DIAGNOSIS — G47.9 SLEEPING DIFFICULTY: ICD-10-CM

## 2023-10-18 RX ORDER — GABAPENTIN 100 MG/1
CAPSULE ORAL
Qty: 90 CAPSULE | Refills: 1 | Status: SHIPPED | OUTPATIENT
Start: 2023-10-18

## 2023-10-18 RX ORDER — TRAZODONE HYDROCHLORIDE 50 MG/1
50 TABLET ORAL
Qty: 30 TABLET | Refills: 5 | Status: SHIPPED | OUTPATIENT
Start: 2023-10-18

## 2023-10-25 ENCOUNTER — OFFICE VISIT (OUTPATIENT)
Dept: FAMILY MEDICINE CLINIC | Facility: CLINIC | Age: 58
End: 2023-10-25
Payer: MEDICARE

## 2023-10-25 VITALS
DIASTOLIC BLOOD PRESSURE: 80 MMHG | HEART RATE: 96 BPM | SYSTOLIC BLOOD PRESSURE: 118 MMHG | OXYGEN SATURATION: 98 % | BODY MASS INDEX: 43.24 KG/M2 | WEIGHT: 235 LBS | HEIGHT: 62 IN

## 2023-10-25 DIAGNOSIS — Z23 ENCOUNTER FOR IMMUNIZATION: ICD-10-CM

## 2023-10-25 DIAGNOSIS — G47.00 INSOMNIA, UNSPECIFIED TYPE: Primary | ICD-10-CM

## 2023-10-25 PROCEDURE — 99213 OFFICE O/P EST LOW 20 MIN: CPT | Performed by: FAMILY MEDICINE

## 2023-10-25 NOTE — PROGRESS NOTES
Subjective:      Patient ID: Soni Lacy is a 59 y.o. female. HPI      Patient is here to follow-up on chronic insomnia and medication refill. She is on trazodone 50 mg, gabapentin 100 mg. Patient tolerating a combination of this medication well. Denies any side effects.     Past Medical History:   Diagnosis Date    HELENE positive     Anemia     Sildenafil causes decreased hematocrit    Anxiety 03/2020    CHF (congestive heart failure) ([address])     right heart failure related to pulm HTN lupus    Chronic kidney disease     borderline lab values    Chronic rhinitis 06/04/2012    Clotting disorder ([address]) 2012    Coronary artery disease 2018    Encephalitis     Lasted Assessed 10/18/2017    Gout 06/26/2018    Hypertension     Lupus anticoagulant disorder ([address])     Maxillary sinusitis     Lasted Assessed 10/18/2017    Night sweat     Osteopenia     Hip    Osteoporosis     Spine    Pneumonia     Last Assessed 10/18/2017    PONV (postoperative nausea and vomiting)     Pulmonary embolism ([address])     Pulmonary hypertension (HCC)     Seizures (HCC)     Only during Encephalitis    Shortness of breath     with exertion    Sinus tachycardia     Urinary incontinence        Family History   Problem Relation Age of Onset    Uterine cancer Mother     Pancreatic cancer Mother 79    Diabetes Mother     Endometrial cancer Mother 77    Arthritis Mother     Cancer Mother         Pancreas, Uterine    Vision loss Mother     Heart disease Father         open heart surgery at age 48     Stroke Father         CVA    Hypertension Father     Atrial fibrillation Father     Hyperlipidemia Father     Arthritis Father     Rheum arthritis Father     No Known Problems Sister     No Known Problems Sister     Thyroid disease Sister     Depression Sister     Osteoarthritis Sister     Asthma Daughter     Migraines Daughter     Asthma Daughter     Thyroid disease Maternal Grandmother     Heart attack Maternal Grandfather     Heart attack Paternal Grandmother     Skin cancer Paternal Grandfather     No Known Problems Brother     Hemochromatosis Brother     No Known Problems Maternal Aunt     No Known Problems Paternal Aunt     Anuerysm Neg Hx     Clotting disorder Neg Hx     Heart failure Neg Hx        Past Surgical History:   Procedure Laterality Date    ARTHRODESIS      Hand: IP Joint    CHOLECYSTECTOMY      COLONOSCOPY      COLPOSCOPY      HYSTERECTOMY      Vaginal    JOINT REPLACEMENT Right     Knee replacement    KNEE SURGERY  2/2019    LAPAROSCOPIC ESOPHAGOGASTRIC FUNDOPLASTY  2008    DC ARTHRP KNE CONDYLE&PLATU MEDIAL&LAT COMPARTMENTS Right 2/12/2019    Procedure: KNEE TOTAL ARTHROPLASTY AND ASSOCIATED PROCEDURES;  Surgeon: Reny Babb DO;  [facility];  Service: Orthopedics    DC ARTHRP KNE CONDYLE&PLATU MEDIAL&LAT COMPARTMENTS Left 10/6/2022    Procedure: ARTHROPLASTY KNEE TOTAL;  Surgeon: Reny Babb DO;  [facility];  Service: Orthopedics    DC ARTHRS KNEE W/MENISCECTOMY MED&LAT W/SHAVING Right 10/2/2018    Procedure: KNEE ARTHROSCOPY WITH PARTIAL MEDIAL MENISCECTOMY;  Surgeon: Reny Babb DO;  [facility];  Service: Orthopedics    DC ARTHRS KNEE W/MENISCECTOMY MED&LAT W/SHAVING Left 12/17/2019    Procedure: KNEE ARTHROSCOPIC MENISCECTOMY /MEDIAL; Chondyl medial and posterior;  Surgeon: Reny Babb DO;  [facility];  Service: Orthopedics    REDUCTION MAMMAPLASTY      REPAIR ANKLE LIGAMENT Right     TONSILLECTOMY          reports that she quit smoking about 17 years ago. Her smoking use included cigarettes. She has never used smokeless tobacco. She reports current alcohol use. She reports that she does not use drugs.       Current Outpatient Medications:     azaTHIOprine (IMURAN) 50 mg tablet, Take 50 mg by mouth daily, Disp: , Rfl:     B-D 3CC LUER-LILLIE SYR 25GX1/2" 25G X 1-1/2" 3 ML MISC, USE 1 SYRINGE EVERY 30 DAYS, Disp: 12 each, Rfl: 1    Benlysta 200 MG/ML SOAJ, Weekly on Fridays, Disp: , Rfl: cholecalciferol (VITAMIN D3) 1,000 units tablet, Take 2,000 Units by mouth daily in the early morning  , Disp: , Rfl:     clindamycin (CLEOCIN) 300 MG capsule, Take 3 capsules by mouth 1 hour prior to dental procedure, Disp: 3 capsule, Rfl: 3    cyanocobalamin 1,000 mcg/mL, Inject 1 mL (1,000 mcg total) into a muscle every 14 (fourteen) days, Disp: 12 mL, Rfl: 1    gabapentin (NEURONTIN) 100 mg capsule, TAKE 1 CAPSULE(100 MG) BY MOUTH DAILY AT BEDTIME, Disp: 90 capsule, Rfl: 1    hydroxychloroquine (PLAQUENIL) 200 mg tablet, Take 400 mg by mouth daily with breakfast Alternating days/dosages, Disp: , Rfl:     scopolamine (TRANSDERM-SCOP) 1 mg/3 days TD 72 hr patch, Place 1 patch on the skin over 72 hours every third day, Disp: 10 patch, Rfl: 0    sildenafil (REVATIO) 20 mg tablet, Take 1 tablet (20 mg total) by mouth 3 (three) times a day, Disp: 270 tablet, Rfl: 3    spironolactone (ALDACTONE) 25 mg tablet, Take 1 tablet (25 mg total) by mouth daily, Disp: 90 tablet, Rfl: 3    Syringe/Needle, Disp, (SecureSafe Syringe/Needle) 25G X 1-1/2" 1 ML MISC, Use 1 Syringe every 30 (thirty) days, Disp: 12 each, Rfl: 1    torsemide (DEMADEX) 20 mg tablet, Take 1 tablet (20 mg total) by mouth daily, Disp: 90 tablet, Rfl: 3    traZODone (DESYREL) 50 mg tablet, TAKE 1 TABLET(50 MG) BY MOUTH DAILY AT BEDTIME, Disp: 30 tablet, Rfl: 5    triamcinolone (KENALOG) 0.1 % oral topical paste, prn, Disp: , Rfl:     warfarin (Coumadin) 5 mg tablet, 7.5mg po daily or as directed., Disp: 45 tablet, Rfl: 1    The following portions of the patient's history were reviewed and updated as appropriate: allergies, current medications, past family history, past medical history, past social history, past surgical history and problem list.    Review of Systems   Respiratory: Negative. Cardiovascular: Negative. Psychiatric/Behavioral:  Positive for sleep disturbance.             Objective:    /80   Pulse 96   Ht 5' 2" (1.575 m)   Wt 107 kg (235 lb)   SpO2 98%   BMI 42.98 kg/m²      Physical Exam  Constitutional:       Appearance: Normal appearance. Cardiovascular:      Heart sounds: Normal heart sounds. Pulmonary:      Breath sounds: Normal breath sounds. Recent Results (from the past 1008 hour(s))   Protime-INR    Collection Time: 09/18/23  7:40 AM   Result Value Ref Range    Protime 29.5 (H) 11.6 - 14.5 seconds    INR 2.68 (H) 0.84 - 1.19   Protime-INR    Collection Time: 10/12/23  6:04 AM   Result Value Ref Range    Protime 36.0 (H) 11.6 - 14.5 seconds    INR 3.48 (H) 0.84 - 1.19       Assessment/Plan:    No problem-specific Assessment & Plan notes found for this encounter. Problem List Items Addressed This Visit          Other    Insomnia - Primary     Stable on gabapentin 100 mg, trazodone 50 mg. Continue same. Recommended 6 month fup.            Other Visit Diagnoses       Encounter for immunization

## 2023-10-25 NOTE — ASSESSMENT & PLAN NOTE
Stable on gabapentin 100 mg, trazodone 50 mg. Continue same. Recommended 6 month fup.
Abormal VS: Temp > 100F or < 96.8F; SBP < 90 mmHG; HR > 120bpm; Resp > 24/min

## 2023-10-30 ENCOUNTER — APPOINTMENT (OUTPATIENT)
Dept: LAB | Facility: CLINIC | Age: 58
End: 2023-10-30
Payer: MEDICARE

## 2023-10-30 DIAGNOSIS — I43 DILATED CARDIOMYOPATHY SECONDARY TO SYSTEMIC LUPUS ERYTHEMATOSUS (HCC): ICD-10-CM

## 2023-10-30 DIAGNOSIS — Z79.624 ENCOUNTER FOR MONITORING AZATHIOPRINE THERAPY: ICD-10-CM

## 2023-10-30 DIAGNOSIS — R06.02 SHORTNESS OF BREATH: ICD-10-CM

## 2023-10-30 DIAGNOSIS — M32.19 DILATED CARDIOMYOPATHY SECONDARY TO SYSTEMIC LUPUS ERYTHEMATOSUS (HCC): ICD-10-CM

## 2023-10-30 DIAGNOSIS — B99.8 IMMUNOSUPPRESSION-RELATED INFECTIOUS DISEASE (HCC): ICD-10-CM

## 2023-10-30 DIAGNOSIS — Z51.81 ENCOUNTER FOR MONITORING AZATHIOPRINE THERAPY: ICD-10-CM

## 2023-10-30 DIAGNOSIS — D84.9 IMMUNOSUPPRESSION-RELATED INFECTIOUS DISEASE (HCC): ICD-10-CM

## 2023-10-30 DIAGNOSIS — E55.9 AVITAMINOSIS D: ICD-10-CM

## 2023-10-30 DIAGNOSIS — Z79.899 ENCOUNTER FOR LONG-TERM (CURRENT) USE OF OTHER MEDICATIONS: ICD-10-CM

## 2023-10-30 LAB
BASOPHILS # BLD AUTO: 0.02 THOUSANDS/ÂΜL (ref 0–0.1)
BASOPHILS NFR BLD AUTO: 1 % (ref 0–1)
EOSINOPHIL # BLD AUTO: 0.15 THOUSAND/ÂΜL (ref 0–0.61)
EOSINOPHIL NFR BLD AUTO: 6 % (ref 0–6)
ERYTHROCYTE [DISTWIDTH] IN BLOOD BY AUTOMATED COUNT: 12 % (ref 11.6–15.1)
HCT VFR BLD AUTO: 40.3 % (ref 34.8–46.1)
HGB BLD-MCNC: 13.2 G/DL (ref 11.5–15.4)
IMM GRANULOCYTES # BLD AUTO: 0.01 THOUSAND/UL (ref 0–0.2)
IMM GRANULOCYTES NFR BLD AUTO: 0 % (ref 0–2)
LYMPHOCYTES # BLD AUTO: 0.74 THOUSANDS/ÂΜL (ref 0.6–4.47)
LYMPHOCYTES NFR BLD AUTO: 28 % (ref 14–44)
MCH RBC QN AUTO: 31.9 PG (ref 26.8–34.3)
MCHC RBC AUTO-ENTMCNC: 32.8 G/DL (ref 31.4–37.4)
MCV RBC AUTO: 97 FL (ref 82–98)
MONOCYTES # BLD AUTO: 0.25 THOUSAND/ÂΜL (ref 0.17–1.22)
MONOCYTES NFR BLD AUTO: 10 % (ref 4–12)
NEUTROPHILS # BLD AUTO: 1.45 THOUSANDS/ÂΜL (ref 1.85–7.62)
NEUTS SEG NFR BLD AUTO: 55 % (ref 43–75)
NRBC BLD AUTO-RTO: 0 /100 WBCS
PLATELET # BLD AUTO: 237 THOUSANDS/UL (ref 149–390)
PMV BLD AUTO: 10.7 FL (ref 8.9–12.7)
RBC # BLD AUTO: 4.14 MILLION/UL (ref 3.81–5.12)
WBC # BLD AUTO: 2.62 THOUSAND/UL (ref 4.31–10.16)

## 2023-10-30 PROCEDURE — 85025 COMPLETE CBC W/AUTO DIFF WBC: CPT

## 2023-11-11 ENCOUNTER — APPOINTMENT (EMERGENCY)
Dept: CT IMAGING | Facility: HOSPITAL | Age: 58
End: 2023-11-11
Payer: COMMERCIAL

## 2023-11-11 ENCOUNTER — HOSPITAL ENCOUNTER (EMERGENCY)
Facility: HOSPITAL | Age: 58
Discharge: HOME | End: 2023-11-11
Attending: SURGERY
Payer: COMMERCIAL

## 2023-11-11 VITALS
SYSTOLIC BLOOD PRESSURE: 150 MMHG | OXYGEN SATURATION: 94 % | DIASTOLIC BLOOD PRESSURE: 98 MMHG | HEART RATE: 96 BPM | WEIGHT: 231.7 LBS | RESPIRATION RATE: 18 BRPM | TEMPERATURE: 97.6 F
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
## 2023-11-11 DIAGNOSIS — I27.20 PULMONARY HYPERTENSION (HCC): ICD-10-CM

## 2023-11-11 LAB
BASE EXCESS BLDA CALC-SCNC: 4 MMOL/L (ref -2–3)
BASE EXCESS BLDA CALC-SCNC: 4 MMOL/L (ref -2–3)
CA-I BLD-SCNC: 1.11 MMOL/L (ref 1.12–1.32)
CA-I BLD-SCNC: 1.11 MMOL/L (ref 1.12–1.32)
GLUCOSE SERPL-MCNC: 100 MG/DL (ref 65–140)
GLUCOSE SERPL-MCNC: 100 MG/DL (ref 65–140)
HCO3 BLDA-SCNC: 26.7 MMOL/L (ref 24–30)
HCO3 BLDA-SCNC: 26.7 MMOL/L (ref 24–30)
HCT VFR BLD CALC: 38 % (ref 34.8–46.1)
HCT VFR BLD CALC: 38 % (ref 34.8–46.1)
HGB BLDA-MCNC: 12.9 G/DL (ref 11.5–15.4)
HGB BLDA-MCNC: 12.9 G/DL (ref 11.5–15.4)
INR PPP: 3.88 (ref 0.84–1.19)
INR PPP: 3.88 (ref 0.84–1.19)
PCO2 BLD: 28 MMOL/L (ref 21–32)
PCO2 BLD: 28 MMOL/L (ref 21–32)
PCO2 BLD: 32.9 MM HG (ref 42–50)
PCO2 BLD: 32.9 MM HG (ref 42–50)
PH BLD: 7.52 [PH] (ref 7.3–7.4)
PH BLD: 7.52 [PH] (ref 7.3–7.4)
PO2 BLD: 25 MM HG (ref 35–45)
PO2 BLD: 25 MM HG (ref 35–45)
POTASSIUM BLD-SCNC: 4.1 MMOL/L (ref 3.5–5.3)
POTASSIUM BLD-SCNC: 4.1 MMOL/L (ref 3.5–5.3)
PROTHROMBIN TIME: 39.2 SECONDS (ref 11.6–14.5)
PROTHROMBIN TIME: 39.2 SECONDS (ref 11.6–14.5)
SAO2 % BLD FROM PO2: 55 % (ref 60–85)
SAO2 % BLD FROM PO2: 55 % (ref 60–85)
SODIUM BLD-SCNC: 140 MMOL/L (ref 136–145)
SODIUM BLD-SCNC: 140 MMOL/L (ref 136–145)
SPECIMEN SOURCE: ABNORMAL
SPECIMEN SOURCE: ABNORMAL

## 2023-11-11 PROCEDURE — 99284 EMERGENCY DEPT VISIT MOD MDM: CPT

## 2023-11-11 PROCEDURE — 36415 COLL VENOUS BLD VENIPUNCTURE: CPT | Performed by: SURGERY

## 2023-11-11 PROCEDURE — 99205 OFFICE O/P NEW HI 60 MIN: CPT | Performed by: SURGERY

## 2023-11-11 PROCEDURE — 82803 BLOOD GASES ANY COMBINATION: CPT

## 2023-11-11 PROCEDURE — 93308 TTE F-UP OR LMTD: CPT | Performed by: NURSE PRACTITIONER

## 2023-11-11 PROCEDURE — 84295 ASSAY OF SERUM SODIUM: CPT

## 2023-11-11 PROCEDURE — 70450 CT HEAD/BRAIN W/O DYE: CPT

## 2023-11-11 PROCEDURE — 72125 CT NECK SPINE W/O DYE: CPT

## 2023-11-11 PROCEDURE — 82330 ASSAY OF CALCIUM: CPT

## 2023-11-11 PROCEDURE — EDAIR PR ED AIR: Performed by: EMERGENCY MEDICINE

## 2023-11-11 PROCEDURE — 85014 HEMATOCRIT: CPT

## 2023-11-11 PROCEDURE — 90715 TDAP VACCINE 7 YRS/> IM: CPT | Performed by: NURSE PRACTITIONER

## 2023-11-11 PROCEDURE — 76705 ECHO EXAM OF ABDOMEN: CPT | Performed by: NURSE PRACTITIONER

## 2023-11-11 PROCEDURE — 84132 ASSAY OF SERUM POTASSIUM: CPT

## 2023-11-11 PROCEDURE — 90471 IMMUNIZATION ADMIN: CPT

## 2023-11-11 PROCEDURE — 85610 PROTHROMBIN TIME: CPT | Performed by: SURGERY

## 2023-11-11 PROCEDURE — 82947 ASSAY GLUCOSE BLOOD QUANT: CPT

## 2023-11-11 RX ORDER — ACETAMINOPHEN 325 MG/1
975 TABLET ORAL ONCE
Status: COMPLETED | OUTPATIENT
Start: 2023-11-11 | End: 2023-11-11

## 2023-11-11 RX ORDER — GINSENG 100 MG
1 CAPSULE ORAL 2 TIMES DAILY
Status: DISCONTINUED | OUTPATIENT
Start: 2023-11-11 | End: 2023-11-11 | Stop reason: HOSPADM

## 2023-11-11 RX ADMIN — ACETAMINOPHEN 975 MG: 325 TABLET, FILM COATED ORAL at 19:03

## 2023-11-11 RX ADMIN — BACITRACIN 1 SMALL APPLICATION: 500 OINTMENT TOPICAL at 18:43

## 2023-11-11 RX ADMIN — TETANUS TOXOID, REDUCED DIPHTHERIA TOXOID AND ACELLULAR PERTUSSIS VACCINE, ADSORBED 0.5 ML: 5; 2.5; 8; 8; 2.5 SUSPENSION INTRAMUSCULAR at 18:44

## 2023-11-11 NOTE — ASSESSMENT & PLAN NOTE
Initial fall was mechanical.    CT imaging was negative for acute traumatic injury. Patient passed ambulatory and p.o. trial.  While she did have a near syncopal event following the fall with associated dizziness. This is believed to be associated with concussion. Patient was educated on concussion, tolerance versus resistance approach, return precautions, and expected progress. She will follow-up with trauma in 2 weeks as needed if she continues to have any symptoms otherwise she can follow-up with her PCP. Shared decision-making was used and patient agreed with this plan of care versus staying in the hospital overnight for observation.  is at bedside and confirms he feels comfortable with his wife returning home at this time.

## 2023-11-11 NOTE — H&P
[facility]  H&P  Name: Geronimo Guthrie 59 y.o. female I MRN: [id number]  Unit/Bed#: ED-34 I Date of Admission: 11/11/2023   Date of Service: 11/11/2023 I Hospital Day: 0      Assessment/Plan   Fall  Assessment & Plan  Initial fall was mechanical.    CT imaging was negative for acute traumatic injury. Patient passed ambulatory and p.o. trial.  While she did have a near syncopal event following the fall with associated dizziness. This is believed to be associated with concussion. Patient was educated on concussion, tolerance versus resistance approach, return precautions, and expected progress. She will follow-up with trauma in 2 weeks as needed if she continues to have any symptoms otherwise she can follow-up with her PCP. Shared decision-making was used and patient agreed with this plan of care versus staying in the hospital overnight for observation.  is at bedside and confirms he feels comfortable with his wife returning home at this time. Elevated INR   -Confirms she is to follow-up with the Coumadin clinic on Monday.   -She confirms that she will hold her Coumadin tomorrow given INR of 3.8    Trauma Alert: Level B   Model of Arrival: Ambulance    Trauma Team: Attending Chantel Govea and [address] [place] [address]  Consultants:     None     History of Present Illness     Chief Complaint: " My head really hurts"  Mechanism:Fall     HPI:    Geronimo Guthrie is a 59 y.o. female with history of pulmonary hypertension and previous pulmonary embolisms that she takes Coumadin for, presenting today for evaluation after suffering a fall. Patient describes walking into a restaurant when she tripped on a curb falling forward impacting her head. No reported loss of consciousness. Patient noted feeling dizzy following the fall while on the ground. When she was assisted to her feet the dizziness got worse and she had a near syncopal event.   She was assisted onto the stretcher and brought to the hospital. She was noted to have contusion on left side of forehead. Patient complain of headache. No other complaints. Review of Systems   HENT:  Negative for tinnitus. Eyes:  Negative for photophobia and visual disturbance. Gastrointestinal:  Negative for abdominal pain and nausea. Musculoskeletal:  Negative for arthralgias, back pain, joint swelling, myalgias, neck pain and neck stiffness. Neurological:  Positive for dizziness, syncope and headaches. All other systems reviewed and are negative. 12-point, complete review of systems was reviewed and negative except as stated above. Historical Information   Medical History: Anxiety, gout, CAD, clotting disorder, HELENE positive, CHF, CKD, hypertension, lupus, osteoporosis, pulmonary embolism, pulmonary hypertension, seizure  Surgical history: Bilateral knee surgery, cholecystectomy, hysterectomy  Social history: No reported alcohol use, drug use, nicotine use     Immunization History   Administered Date(s) Administered    Tdap 11/11/2023     Last Tetanus: 2016--updated  Family History: Non-contributory     Meds/Allergies   all current active meds have been reviewed  Allergies have not been reviewed;   Not on File    Objective   Initial Vitals:   Temperature: 97.6 °F (36.4 °C) (11/11/23 1716)  Pulse: 105 (11/11/23 1716)  Respirations: 18 (11/11/23 1716)  Blood Pressure: (!) 194/118 (11/11/23 1716)    Primary Survey:   Airway:        Status: patent;        Pre-hospital Interventions: none        Hospital Interventions: none  Breathing:        Pre-hospital Interventions: none       Effort: normal       Right breath sounds: normal       Left breath sounds: normal  Circulation:        Rhythm: regular       Rate: regular   Right Pulses Left Pulses    R radial: 2+  R femoral: 2+  R pedal: 2+     L radial: 2+  L femoral: 2+  L pedal: 2+       Disability:        GCS: Eye: 4; Verbal: 5 Motor: 6 Total: 15       Right Pupil: 3 mm;  round;  reactive         Left Pupil:  3 mm;  round;  reactive      R Motor Strength L Motor Strength    R : 5/5  R dorsiflex: 5/5  R plantarflex: 5/5 L : 5/5  L dorsiflex: 5/5  L plantarflex: 5/5        Sensory:  No sensory deficit  Exposure:       Completed: Yes      Secondary Survey:  Physical Exam  Constitutional:       General: She is not in acute distress. Appearance: She is not ill-appearing. HENT:      Head:      Jaw: There is normal jaw occlusion. Right Ear: External ear normal.      Left Ear: External ear normal.      Nose: Nose normal.      Mouth/Throat:      Mouth: Mucous membranes are moist.      Pharynx: Oropharynx is clear. Eyes:      Extraocular Movements: Extraocular movements intact. Pupils: Pupils are equal, round, and reactive to light. Neck:      Comments: Cervical collar applied  Cardiovascular:      Rate and Rhythm: Normal rate and regular rhythm. Pulses: Normal pulses. Heart sounds: Normal heart sounds. No murmur heard. No friction rub. No gallop. Pulmonary:      Effort: Pulmonary effort is normal. No respiratory distress. Breath sounds: Normal breath sounds. No stridor. No wheezing, rhonchi or rales. Chest:      Chest wall: No tenderness. Abdominal:      General: Abdomen is flat. Bowel sounds are normal. There is no distension. Palpations: Abdomen is soft. Tenderness: There is no abdominal tenderness. There is no guarding. Genitourinary:     Comments: Pelvis stable  Musculoskeletal:         General: No tenderness or deformity. Cervical back: No rigidity or tenderness. Comments: Patient fully ranging all extremities. No extremity tenderness. No deformities of extremities. No C, T, L-spine tenderness. No palpable step-offs. Skin:     General: Skin is warm and dry. Coloration: Skin is not jaundiced or pale. Findings: No bruising. Neurological:      General: No focal deficit present.       Mental Status: She is alert and oriented to person, place, and time. Sensory: No sensory deficit. Motor: No weakness. Psychiatric:         Mood and Affect: Mood normal.         Behavior: Behavior normal.         Thought Content: Thought content normal.      Comments: Speech is clear. Invasive Devices       Peripheral Intravenous Line  Duration             Peripheral IV 11/11/23 Right Antecubital <1 day                  Lab Results: I have personally reviewed all pertinent laboratory/test results from 11/11/23, including the preceding 24 hours. Recent Labs     11/11/23  1725 11/11/23  1729   HGB  --  12.9   HCT  --  38   CO2  --  28   CAIONIZED  --  1.11*   INR 3.88*  --        Imaging Results: I have personally reviewed pertinent images saved in PACS. CT scan findings (and other pertinent positive findings on images) were discussed with radiology. My interpretation of the images/reports are as follows:  Chest Xray(s): negative for acute findings   FAST exam(s): negative for acute findings   CT Scan(s): negative for acute findings   Additional Xray(s): N/A     Other Studies: none    Code Status: No Order  Advance Directive and Living Will:      Power of :    POLST:    I have spent 25 minutes with Patient and  today in which greater than 50% of this time was spent in counseling/coordination of care regarding Diagnostic results, Prognosis, Risks and benefits of tx options, Instructions for management, Patient and family education, Importance of tx compliance, Risk factor reductions, Impressions, Counseling / Coordination of care, Documenting in the medical record, Reviewing / ordering tests, medicine, procedures  , Obtaining or reviewing history  , and Communicating with other healthcare professionals .

## 2023-11-11 NOTE — PROCEDURES
POC FAST US    Date/Time: 11/11/2023 5:27 PM    Performed by: KERI De Luna  Authorized by: KERI De Luna    Patient location:  Trauma  Procedure details:     Exam Type:  Diagnostic    Indications: blunt abdominal trauma and blunt chest trauma      Assess for:  Intra-abdominal fluid and pericardial effusion    Technique: FAST      Views obtained:  Heart - Pericardial sac, LUQ - Splenorenal space, Suprapubic - Pouch of Wilfrido and RUQ - Castro's Pouch    Image quality: diagnostic      Image availability:  Images available in PACS and video obtained  FAST Findings:     RUQ (Hepatorenal) free fluid: absent      LUQ (Splenorenal) free fluid: absent      Suprapubic free fluid: absent      Cardiac wall motion: identified      Pericardial effusion: absent    Interpretation:     Impressions: negative

## 2023-11-11 NOTE — ED PROVIDER NOTES
Emergency Department Airway Evaluation and Management Form    History  Obtained from: EMS  Patient has no allergy information on record. No chief complaint on file. HPI    60 y/o female, fall, hit head, on coumadin    Airway intact, rest of eval and treatment by trauma team    No past medical history on file. No past surgical history on file. No family history on file. I have reviewed and agree with the history as documented. Review of Systems    Physical Exam  There were no vitals taken for this visit.     Physical Exam    ED Medications  Medications - No data to display    Intubation  Procedures    Notes      Final Diagnosis  Final diagnoses:   None       ED Provider  Electronically Signed by     Dai Johnson MD  11/11/23 [phone]

## 2023-11-12 NOTE — DISCHARGE INSTR - AVS FIRST PAGE
Concussion Discharge Instructions    At Home:  ***  Most people feel better within the first 72 hours. However, an isolated concussion typically takes two weeks to heal and recover. Further concussions while symptomatic / recovering from an initial concussion may cause prolonged recovery and worsened symptoms. As such, please limit interactions and activities that would cause increased exposure to another head injury. Allow for uninterrupted sleep. Frequent wake-up checks are not encouraged and may prolong the recovery process. Keep surroundings calm and quiet. Diet:  You may resume your normal diet as tolerated. Some decrease in appetite and/or nausea/vomiting is not uncommon. You may return to your normal activity. However, if symptoms develop or worsen, stop the activity and rest until symptoms have resolved. At Work/Activities:  Exercise: 20-30 minutes of light cardiovascular exercise (walking, stationary bike) is permitted daily. If symptoms worsen with activity, rest for 24 hour prior to additional exercise. No team activities or swimming until cleared by trauma. No return to sports (contact and non-contact) until cleared by trauma. No driving until cleared by trauma. If Physical Therapy or Occupational Therapy has been prescribed, please call on day of discharge to begin therapy as soon as possible. Call the trauma office or return to the ER if you are experiencing:  Severe headaches  Blurry or double vision  Dizziness  Nausea  Vomiting    Please call the trauma office with any questions or concerns regarding the care plan.

## 2023-11-12 NOTE — TRAUMA DOCUMENTATION
Patient ambulated with steady gait and no use of assistive devices. Patient has no new complaints at this time.

## 2023-11-13 ENCOUNTER — TELEPHONE (OUTPATIENT)
Dept: HEMATOLOGY ONCOLOGY | Facility: CLINIC | Age: 58
End: 2023-11-13

## 2023-11-13 ENCOUNTER — APPOINTMENT (OUTPATIENT)
Dept: LAB | Facility: CLINIC | Age: 58
End: 2023-11-13
Payer: MEDICARE

## 2023-11-13 ENCOUNTER — TELEPHONE (OUTPATIENT)
Dept: LAB | Facility: HOSPITAL | Age: 58
End: 2023-11-13

## 2023-11-13 ENCOUNTER — APPOINTMENT (OUTPATIENT)
Dept: RADIOLOGY | Facility: AMBULARY SURGERY CENTER | Age: 58
End: 2023-11-13
Attending: ORTHOPAEDIC SURGERY
Payer: MEDICARE

## 2023-11-13 ENCOUNTER — OFFICE VISIT (OUTPATIENT)
Dept: OBGYN CLINIC | Facility: CLINIC | Age: 58
End: 2023-11-13
Payer: MEDICARE

## 2023-11-13 VITALS
RESPIRATION RATE: 16 BRPM | SYSTOLIC BLOOD PRESSURE: 150 MMHG | WEIGHT: 231.11 LBS | BODY MASS INDEX: 42.53 KG/M2 | DIASTOLIC BLOOD PRESSURE: 90 MMHG | HEIGHT: 62 IN | HEART RATE: 96 BPM

## 2023-11-13 DIAGNOSIS — Z79.01 WARFARIN ANTICOAGULATION: Primary | ICD-10-CM

## 2023-11-13 DIAGNOSIS — M25.561 ACUTE PAIN OF RIGHT KNEE: Primary | ICD-10-CM

## 2023-11-13 DIAGNOSIS — M25.561 ACUTE PAIN OF RIGHT KNEE: ICD-10-CM

## 2023-11-13 DIAGNOSIS — Z79.01 WARFARIN ANTICOAGULATION: ICD-10-CM

## 2023-11-13 LAB
INR PPP: 2.81 (ref 0.84–1.19)
PROTHROMBIN TIME: 30.5 SECONDS (ref 11.6–14.5)

## 2023-11-13 PROCEDURE — 85610 PROTHROMBIN TIME: CPT

## 2023-11-13 PROCEDURE — 99212 OFFICE O/P EST SF 10 MIN: CPT | Performed by: ORTHOPAEDIC SURGERY

## 2023-11-13 PROCEDURE — 73562 X-RAY EXAM OF KNEE 3: CPT

## 2023-11-13 PROCEDURE — 36415 COLL VENOUS BLD VENIPUNCTURE: CPT

## 2023-11-13 RX ORDER — SILDENAFIL CITRATE 20 MG/1
20 TABLET ORAL 3 TIMES DAILY
Qty: 90 TABLET | Refills: 3 | Status: SHIPPED | OUTPATIENT
Start: 2023-11-13

## 2023-11-13 NOTE — PROGRESS NOTES
Assessment:  1.  Acute pain of right knee  XR knee 3 vw right non injury        Patient Active Problem List   Diagnosis    B12 deficiency    Chronic cough    Diffuse connective tissue disease ([address])    Dyspnea    Edema    Hot flashes due to menopause    Long-term use of hydroxychloroquine    Other organ or system involvement in systemic lupus erythematosus (HCC)    Lupus anticoagulant disorder (HCC)    SOB (shortness of breath) on exertion    Sinus tachycardia    Secondary pulmonary hypertension    Pulmonary hypertension (HCC)    Post-nasal drip    Pes anserine bursitis    Positive HELENE (antinuclear antibody)    Other chronic pulmonary heart diseases    Osteoporosis    Night sweats    Patellofemoral disorder of right knee    Pes anserinus bursitis of right knee    Arthritis of right knee    Other tear of medial meniscus, current injury, right knee, initial encounter    Tear of medial meniscus of right knee, current    Chronic pain of right knee    Elevated serum creatinine    Hyperuricemia    Trochanteric bursitis of both hips    Undifferentiated connective tissue disease ([address])    Vitamin D deficiency    Chronic kidney disease, stage 3a ([address])    Right knee pain    Status post total right knee replacement    Left knee pain    Tear of medial meniscus of left knee, current    Primary osteoarthritis of left knee    Morbid obesity (HCC)    Leukopenia    Venous stasis of lower extremity    Acute pain of right knee    Status post total knee replacement using cement, left    Pulmonary embolism associated with COVID-19 ([address])    SLE (systemic lupus erythematosus) ([address])    Arthritis of left knee    Iron deficiency anemia due to chronic blood loss    Insomnia    Pulmonary embolus (HCC)    BMI 39.0-39.9,adult    Financial difficulties    Antiphospholipid antibody syndrome (HCC)    Sleeping difficulty    Neuropathic pain, leg, left    Anticoagulated    Fall           Plan      Activity as tolerated and to do knee flex and extension exercises at home            Subjective:     Patient ID:    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 She recently had a fall while tripping over a curb. She subsequently had hit her face and her right knee. She is just concerned that she may have injured the right knee. She is able to ambulate but she does have pain. She reports that the pain started later that night after the fall it did not start right away. The following portions of the patient's history were reviewed and updated as appropriate: allergies, current medications, past family history, past social history, past surgical history and problem list.    All organ systems normal    Social History     Socioeconomic History    Marital status: /Civil Union     Spouse name: Not on file    Number of children: 2    Years of education: Not on file    Highest education level: Not on file   Occupational History    Not on file   Tobacco Use    Smoking status: Former     Packs/day: 0.00     Years: 0.00     Total pack years: 0.00     Types: Cigarettes     Quit date: 2006     Years since quittin.8    Smokeless tobacco: Never   Vaping Use    Vaping Use: Never used   Substance and Sexual Activity    Alcohol use: Yes     Alcohol/week: 0.0 standard drinks of alcohol     Comment: socially    Drug use: No    Sexual activity: Yes     Partners: Male   Other Topics Concern    Not on file   Social History Narrative    Daily caffeinated coffee consumption    Exercise habits     Social Determinants of Health     Financial Resource Strain: High Risk (2023)    Overall Financial Resource Strain (CARDIA)     Difficulty of Paying Living Expenses: Very hard   Food Insecurity: Not on file   Transportation Needs: No Transportation Needs (2023)    PRAPARE - Transportation     Lack of Transportation (Medical): No     Lack of Transportation (Non-Medical):  No   Physical Activity: Not on file   Stress: Not on file   Social Connections: Not on file   Intimate Partner Violence: Not on file   Housing Stability: Not on file     Past Medical History:   Diagnosis Date    HELENE positive     Anemia     Sildenafil causes decreased hematocrit    Anxiety 03/2020    CHF (congestive heart failure) ([address])     right heart failure related to pulm HTN lupus    Chronic kidney disease     borderline lab values    Chronic rhinitis 06/04/2012    Clotting disorder ([address]) 2012    Coronary artery disease 2018    Encephalitis     Lasted Assessed 10/18/2017    Gout 06/26/2018    Hypertension     Lupus anticoagulant disorder ([address])     Maxillary sinusitis     Lasted Assessed 10/18/2017    Night sweat     Osteopenia     Hip    Osteoporosis     Spine    Pneumonia     Last Assessed 10/18/2017    PONV (postoperative nausea and vomiting)     Pulmonary embolism ([address])     Pulmonary hypertension (HCC)     Seizures ([address])     Only during Encephalitis    Shortness of breath     with exertion    Sinus tachycardia     Urinary incontinence      Past Surgical History:   Procedure Laterality Date    ARTHRODESIS      Hand: IP Joint    CHOLECYSTECTOMY      COLONOSCOPY      COLPOSCOPY      HYSTERECTOMY      Vaginal    JOINT REPLACEMENT Right     Knee replacement    KNEE SURGERY  2/2019    LAPAROSCOPIC ESOPHAGOGASTRIC FUNDOPLASTY  2008    NH ARTHRP KNE CONDYLE&PLATU MEDIAL&LAT COMPARTMENTS Right 2/12/2019    Procedure: KNEE TOTAL ARTHROPLASTY AND ASSOCIATED PROCEDURES;  Surgeon: Jordana Bolivar DO;  Location[facility];  Service: Orthopedics    NH ARTHRP KNE CONDYLE&PLATU MEDIAL&LAT COMPARTMENTS Left 10/6/2022    Procedure: ARTHROPLASTY KNEE TOTAL;  Surgeon: Jordana Bolivar DO;  [facility];  Service: Orthopedics    NH ARTHRS KNEE W/MENISCECTOMY MED&LAT W/SHAVING Right 10/2/2018    Procedure: KNEE ARTHROSCOPY WITH PARTIAL MEDIAL MENISCECTOMY;  Surgeon: Jordana Bolivar DO;  [facility];  Service: Orthopedics    NH ARTHRS KNEE W/MENISCECTOMY MED&LAT W/SHAVING Left 12/17/2019    Procedure: KNEE ARTHROSCOPIC MENISCECTOMY /MEDIAL;  Chondyl medial and posterior;  Surgeon: Janee Parkinson DO;  [facility];  Service: Orthopedics    REDUCTION MAMMAPLASTY      REPAIR ANKLE LIGAMENT Right     TONSILLECTOMY       Allergies   Allergen Reactions    Dilantin [Phenytoin] Anaphylaxis and Rash    Penicillins Rash, Anaphylaxis, Dermatitis and Hives    Augmentin [Amoxicillin-Pot Clavulanate] Rash and Dermatitis     Current Outpatient Medications on File Prior to Visit   Medication Sig Dispense Refill    azaTHIOprine (IMURAN) 50 mg tablet Take 50 mg by mouth daily      B-D 3CC LUER-LILLIE SYR 25GX1/2" 25G X 1-1/2" 3 ML MISC USE 1 SYRINGE EVERY 30 DAYS 12 each 1    Benlysta 200 MG/ML SOAJ Weekly on Fridays      cholecalciferol (VITAMIN D3) 1,000 units tablet Take 2,000 Units by mouth daily in the early morning        clindamycin (CLEOCIN) 300 MG capsule Take 3 capsules by mouth 1 hour prior to dental procedure 3 capsule 3    cyanocobalamin 1,000 mcg/mL Inject 1 mL (1,000 mcg total) into a muscle every 14 (fourteen) days 12 mL 1    gabapentin (NEURONTIN) 100 mg capsule TAKE 1 CAPSULE(100 MG) BY MOUTH DAILY AT BEDTIME 90 capsule 1    hydroxychloroquine (PLAQUENIL) 200 mg tablet Take 400 mg by mouth daily with breakfast Alternating days/dosages      scopolamine (TRANSDERM-SCOP) 1 mg/3 days TD 72 hr patch Place 1 patch on the skin over 72 hours every third day 10 patch 0    sildenafil (REVATIO) 20 mg tablet TAKE 1 TABLET THREE TIMES A DAY 90 tablet 3    spironolactone (ALDACTONE) 25 mg tablet Take 1 tablet (25 mg total) by mouth daily 90 tablet 3    Syringe/Needle, Disp, (SecureSafe Syringe/Needle) 25G X 1-1/2" 1 ML MISC Use 1 Syringe every 30 (thirty) days 12 each 1    torsemide (DEMADEX) 20 mg tablet Take 1 tablet (20 mg total) by mouth daily 90 tablet 3    traZODone (DESYREL) 50 mg tablet TAKE 1 TABLET(50 MG) BY MOUTH DAILY AT BEDTIME 30 tablet 5    triamcinolone (KENALOG) 0.1 % oral topical paste prn      warfarin (Coumadin) 5 mg tablet 7.5mg po daily or as directed. 45 tablet 1    [DISCONTINUED] ascorbic acid (VITAMIN C) 500 MG tablet Take 1 tablet (500 mg total) by mouth 2 (two) times a day 60 tablet 1    [DISCONTINUED] ferrous sulfate 324 (65 Fe) mg Take 1 tablet (324 mg total) by mouth 2 (two) times a day before meals 60 tablet 1    [DISCONTINUED] folic acid (FOLVITE) 1 mg tablet TAKE 1 TABLET(1 MG) BY MOUTH DAILY 90 tablet 0    [DISCONTINUED] methocarbamol (Robaxin-750) 750 mg tablet Take 1 tablet (750 mg total) by mouth every 6 (six) hours as needed for muscle spasms 20 tablet 0    [DISCONTINUED] Multiple Vitamins-Minerals (multivitamin with minerals) tablet Take 1 tablet by mouth daily 30 tablet 1    [DISCONTINUED] ondansetron (Zofran ODT) 4 mg disintegrating tablet Take 1 tablet (4 mg total) by mouth every 6 (six) hours as needed for nausea or vomiting 20 tablet 0    [DISCONTINUED] sildenafil (REVATIO) 20 mg tablet Take 1 tablet (20 mg total) by mouth 3 (three) times a day 270 tablet 3     No current facility-administered medications on file prior to visit. Objective:        Ortho Exam  No valgus laxity no instability there is ecchymosis over the medial aspect the proximal tibia nothing over the distal femur. I have personally reviewed pertinent films in PACS. X-rays show stable implant. There is some patella Baha and some scar tissue in the front of the knee but otherwise stable implant    Portions of the record may have been created with voice recognition software. Occasional wrong word or "sound a like" substitutions may have occurred due to the inherent limitations of voice recognition software. Read the chart carefully and recognize, using context, where substitutions have occurred.

## 2023-11-13 NOTE — TELEPHONE ENCOUNTER
Patient Call    Who are you speaking with? Patient    If it is not the patient, are they listed on an active communication consent form? N/A   What is the reason for this call? Patient is calling to request an emergency visit today to see Sophie Jimenez or Dr. Arleen Hubbard. She had a bad fall over the weekend and has hematomas, both eyes are black and blue  knee and her INR is high 3.8    Does this require a call back? yes   If a call back is required, please list best call back number [phone]   If a call back is required, advise that a message will be forwarded to their care team and someone will return their call as soon as possible. Did you relay this information to the patient?  yes

## 2023-11-13 NOTE — TELEPHONE ENCOUNTER
Reviewed results with Ian Hill, pt will continue to hold coumadin today. She will be seen in the office tomorrow and further instructions will be given at that time. She was agreeable and appreciative.

## 2023-11-13 NOTE — TELEPHONE ENCOUNTER
Spoke with patient, she fell over the weekend, hit her head, lost consciousness. She went to ED for eval. She now has bruising all over face, around eyes, both eyes are swollen almost shut. Coumadin has been held since yesterday. Pt will have daughter take her for repeat INR today. I scheduled pt for visit with Dr. Xiang Malik tomorrow. Attempted to call mobile lab for home draw today, but they do not do same day lab draws.

## 2023-11-14 ENCOUNTER — OFFICE VISIT (OUTPATIENT)
Dept: HEMATOLOGY ONCOLOGY | Facility: CLINIC | Age: 58
End: 2023-11-14
Payer: MEDICARE

## 2023-11-14 VITALS
WEIGHT: 227.2 LBS | BODY MASS INDEX: 41.81 KG/M2 | HEART RATE: 114 BPM | OXYGEN SATURATION: 97 % | SYSTOLIC BLOOD PRESSURE: 138 MMHG | DIASTOLIC BLOOD PRESSURE: 88 MMHG | TEMPERATURE: 98 F | RESPIRATION RATE: 17 BRPM | HEIGHT: 62 IN

## 2023-11-14 DIAGNOSIS — I27.20 PULMONARY HYPERTENSION (HCC): Primary | ICD-10-CM

## 2023-11-14 DIAGNOSIS — D68.61 ANTIPHOSPHOLIPID ANTIBODY SYNDROME (HCC): ICD-10-CM

## 2023-11-14 PROCEDURE — 99214 OFFICE O/P EST MOD 30 MIN: CPT | Performed by: INTERNAL MEDICINE

## 2023-11-14 NOTE — PROGRESS NOTES
Hematology Outpatient Follow - Up Note  Patrick Perez 59 y.o. female MRN: @ Encounter: [id number]        Date:  11/14/2023        Assessment/ Plan:      1. 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 11/2017  Normal parietal cell AB, intrinsic factor AB. B12 1000mcg IM monthly. 8/14/23    B12 level 328  Increase B12 1000mcg IM from q 4 weeks to  q 2 weeks            2. Prior to 10/22, she never had thrombosis, DVT, PE. Diagnosed with Bilateral PE 11/5/22 while on ASA 162mg po daily. She had d/c Lovenox 40mg sq daily s/p Left Knee replacement on 10/6/2022 -1 week prior. + for Covid 10/26/22. LE doppler 11/2022 - No evidence of acute or chronic deep vein thrombosis of either LE     Hsitory of  Positive LA; history of + anticardiolipin antibody. Given her history of persistently positive lupus anticoagulant, her pulmonary embolism that occurred while on aspirin 162 milligrams p.o. daily even though in the background of COVID positivity, her risk of recurrent thromboembolic event is high and I am in favor of chronic lifelong Coumadin for antiphospholipid antibody syndrome. We are managing coumadin. Status post a fall with ecchymosis of the forehead, cheeks area, currently she is off warfarin with INR of 3.8    We will start warfarin in 2 days at 5 mg p.o. daily and check INR in 1 week I will goal to keep INR between 2.4-2.8      Labs and imaging studies are reviewed by ordering provider once results are available. If there are findings that need immediate attention, you will be contacted when results available. Discussing results and the implication on your healthcare is best discussed in person at your follow-up visit.        HPI:    Sayra Gu is a 59year old female seen 10/18/2017 for initial evaluation , self referred, secondary to low Vitamin B12 level. 10/6/2017 labs at [facility]: hemoglobin 13.2, mcv 90, RDW 14.3, WBC 6.2, 68% neutrophils, 21% lymphs, 8% monocytes 2% eosinophils, platelet count 595,789. Iron saturation 15%, ferritin 52 Vitamin B12 184( 211-946) Tsh 1.98, free T4 1.18, negative lyme antibody. RF normal, Anti-scleroderma antibodies normal, anti-DS DNA normal, Styles AB normal, RNP antibodies normal. Anti-centromere AB normal. Normal sed rate. HELENE: Dense fine speckled pattern is noted which suggest presence of  antibody which has a low prevalence in systemic autoimmune rheumatic diseases. Normal immunofixation on SPEP. Normal serum immunoglobulins. Normal CCP antibodies IgG and IgA  U/A identified hyaline casts. Celiac panel was normal. CMP normal.    POSITIVE HELENE. POSITIVE Lupus anticoagulant. IgM anticardiolipin antibody 18 (Indeterminate). Normal IgG anticardiolipin antibody , normal IgA anticardiolipin antibody. 7/11/2016: normal anticardiolipin antibodies. Lyme negative. Normal ANCA profile, normal C3 and C4 complement, normal CRP. Normal thyroglobulin profile. Normal ESR. + LUPUS ANTICOAGULANT. HELENE positive. On [facility] intake 3/13/2015 she noted that she had a history of + lupus anticoagulant. Nicole Sorensen states she 1st had HELENE evaluated and was positive in 2012. Patient is extremely frustrated. She states she has been having symptoms of SOB, fatigue and has been diagnosed with a lupus-like disease but has not received any disease directed therapy. She took prednisone years ago without benefit and significant weight gain. Was previously seen at [address] [place] [address] by Roberto Perez in 2012 regarding chronic cough, SOB without history of asthma, allergies. Quit smoking at 34years old after 15-20 pack years. PND treated and cough improved but ANSARI persisted. Bronchoscopy was normal. PFTs normal. Evaluation unrevealing to pulmonary source for her dyspnea.  No benefit with Advair or high dose steroids. Falls asleep easily. Normal echo. Noted to have edema and sleep apnea suspected. She was advised to increase Lasix and have sleep study. Zachariah Pereira, a cardiologist at the [facility] had her undergo echocardiogram, however, poor images were obtained. She been had a stress echocardiogram and a question of pulmonary artery hypertension and was raised. 8/13/2012: stress echocardiogram report from [address] [place] [address] : estimated P. a systolic pressure increased to 56 mm mercury suggestive of significant exercise induced pulmonary hypertension  She states she saw Dr. Kris Jenkins, a specialist at [address] [place] [address] who didn't examine me and told me I look too good to have pulmonary artery hypertension." He did not perform additional testing but then referred her to Dr. Amara Jeffries, a cardiac electrophysiologist at [facility] due to resting heart rate 114-120s who informed her she had a + HELENE. She saw Dr. Airam Conte a few times but felt pressured by him as he wanted to perform a lip biopsy to evaluate for Sjogren's syndrome. She has been seeing Debbie Grullon D.O. at the rheumatology center of [facility] but has not seeing him in approximately a year and a half as she states he was requesting repeated blood work that was not moving forward towards treating any symptoms she was having and feels that he can be adverserial.      Lactose intolerant. Osteoporosis diagnosed at 28 y/o in Lumbar spine. Partial Hysterectomy 1996 for endometrial hyperplasia. Ovaries spared. She works at [employer] [place]. She returned from a vacation and had 3+ pitting edema. Dr. Aye Marcelino, 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 arranged for her to see Quan George MD in [place], [place] who ordered the above labs. Yessica Sidhu is very comfortable with injecting herself IM. No s/sx pancreatic insufficiency. She does not take PPI or H2- blocker. She does not drink alcohol regularly. Did have a first trimester miscarriage around the 10th week in between the birth of her 2 children. She takes Aspirin. Reports last EGD was 3-4 years ago performed by Dr. Eric Michelle at [facility]. She is s/p Nissen Fundoplication for severe GERD. She had a colonoscopy previously. She saw Dr. Marcos Gray with [facility] 2/13/2015 regarding persistently + HELENE and IGM anticardiolipin antibody. There was also question whether or not she had cotton wool spots secondary to embolic phenomenon. Patient states ultimately this was ruled out. Aspirin was hematologic recommendation at that time. 10/2019:  Normal cardiolipin AB, normal C3, C4, dsDNA ab not present     10/2021: Hemoglobin 14.1, MCV 91, white blood cell count 3.9, 66% neutrophils, 22% lymphocytes, 8% monocytes, platelet count 181  Normal cardiolipin antibodies, normal beta 2 glycoprotein antibody        Status Post Arthroplasty Knee Total - Left on 10/6/2022. She was on ASA 162mg po daily and Lovenox 40mg sq daily x 30 days post-op. Presented to the hospital on 11/5/2022 due to acute onset of back pain, difficulty breathing, SOB and cough. Reported that she went on a cruise 10/24 and stated feeling sick on the 26th and tested + for Covid 10/26/22.     11/5/22 CTA - Moderate quantity of right lower lobe segmental and small quantity of right upper lobe and left lower lobe acute PE.     LE doppler was not done. She was discharged on Eliquis. Transitoned to  Coumadin due to APS. History of Post op anemia. Hgb 11/11/22 was 11.1g/dL. She was taking oral iron twice daily- she has not for some time due to nausea, however. Iron saturation 13%; ferritin 394 11/11/22 (diagnosed with PE 11/5/22). Feraheme weekly x2 12/2022 8/2023  Hgb 13.7; iron saturation 28%; ferritin 177        1/19/23 - normal cardiolipin antibodies, B-2 glycoprotein antibodies.        5/25/23  F/u with Dr. Tonio Trejo, rheumatologist at [facility] re: SLE. Benlysta continued . Imuran initiated. This has been helpful in reducing the swelling in her hands  Interval History:    Status post fall with hematoma of the forehead, cheeks area, subconjunctival hemorrhage, CAT scan showed no evidence of fracture, chest x-ray showed retrocardiac mass/infiltration    Previous Treatment:         Test Results:    Imaging: XR chest 1 view    Result Date: 11/13/2023  Narrative: TRAUMA SERIES INDICATION:  TRAUMA. COMPARISON:  None VIEWS:  XR TRAUMA MULTIPLE FINDINGS: CHEST: Supine frontal view of the chest is obtained. Cardiomediastinal silhouette is within normal limits accounting for technique and patient positioning. Retrocardiac opacity is demonstrated which could be secondary to atelectasis/infiltrate and/or effusion. No pneumothorax is seen on this supine film. Upright images are more sensitive to detect anterior pneumothoraces if relevant. No displaced fractures. Impression: Retrocardiac opacity as described. Workstation performed: [id number]     [employer] bedside procedure    Result Date: 11/13/2023  Narrative: [id number]. [id number]    TRAUMA - CT spine cervical wo contrast    Result Date: 11/11/2023  Narrative: CT CERVICAL SPINE - WITHOUT CONTRAST INDICATION:   TRAUMA. Fall, on anticoagulant therapy COMPARISON:  None. TECHNIQUE:  CT examination of the cervical spine was performed without intravenous contrast.  Contiguous axial images were obtained. Multiplanar 2D reformatted images were created from the source data. Radiation dose length product (DLP) for this visit:  487 mGy-cm . This examination, like all CT scans performed in the [facility], was performed utilizing techniques to minimize radiation dose exposure, including the use of iterative reconstruction and automated exposure control. IMAGE QUALITY:  Diagnostic. FINDINGS: ALIGNMENT:  Normal alignment of the cervical spine. No subluxation.  VERTEBRAE: No fracture. DEGENERATIVE CHANGES: Multilevel disc space narrowing is seen with osteophyte formation, uncovertebral joint hypertrophy and mild facet arthritis. PREVERTEBRAL AND PARASPINAL SOFT TISSUES: Unremarkable THORACIC INLET:  Normal. If symptoms persist or patient has neurologic symptoms, repeat CT or evaluation with MRI cervical spine can be considered. Impression: Cervical spondylosis with no acute fracture visualized. Workstation performed: [id number]     TRAUMA - CT head wo contrast    Result Date: 11/11/2023  Narrative: CT BRAIN - WITHOUT CONTRAST INDICATION:   TRAUMA. On anticoagulant therapy COMPARISON:  None. TECHNIQUE:  CT examination of the brain was performed. Multiplanar 2D reformatted images were created from the source data. Radiation dose length product (DLP) for this visit:  1156 mGy-cm . This examination, like all CT scans performed in the [facility], was performed utilizing techniques to minimize radiation dose exposure, including the use of iterative reconstruction and automated exposure control. IMAGE QUALITY:  Diagnostic. FINDINGS: PARENCHYMA: Decreased attenuation is noted in periventricular and subcortical white matter demonstrating an appearance that is statistically most likely to represent mild microangiopathic change. No CT signs of acute infarction. No intracranial mass, mass effect or midline shift. No acute parenchymal hemorrhage. VENTRICLES AND EXTRA-AXIAL SPACES:  Normal for the patient's age. VISUALIZED ORBITS: Normal visualized orbits. PARANASAL SINUSES: Left sphenoid sinus disease. CALVARIUM AND EXTRACRANIAL SOFT TISSUES: Left frontal scalp hematoma with soft tissue laceration. Impression: Left frontal scalp hematoma with soft tissue laceration. Microangiopathic change. Workstation performed: [id number]     XR Trauma multiple (SLB/[facility] trauma bay ONLY)    Result Date: 11/11/2023  Narrative: TRAUMA SERIES INDICATION:  TRAUMA.  COMPARISON:  None VIEWS:  XR TRAUMA MULTIPLE FINDINGS: CHEST: Supine frontal view of the chest is obtained. Cardiomediastinal silhouette is within normal limits accounting for technique and patient positioning. Retrocardiac opacity is demonstrated which could be secondary to atelectasis/infiltrate and/or effusion. No pneumothorax is seen on this supine film. Upright images are more sensitive to detect anterior pneumothoraces if relevant. No displaced fractures. Impression: Retrocardiac opacity as described. Workstation performed: [id number]       Labs:   Lab Results   Component Value Date    WBC 2.62 (L) 10/30/2023    HGB 12.9 11/11/2023    HCT 38 11/11/2023    MCV 97 10/30/2023     10/30/2023     Lab Results   Component Value Date    K 4.0 07/03/2023     07/03/2023    CO2 28 11/11/2023    BUN 13 07/03/2023    CREATININE 0.86 07/03/2023    GLUCOSE 100 11/11/2023    GLUF 107 (H) 09/15/2022    CALCIUM 9.6 07/03/2023    AST 13 07/03/2023    ALT 16 07/03/2023    ALKPHOS 82 07/03/2023    EGFR 73 07/03/2023       Lab Results   Component Value Date    IRON 84 08/14/2023    TIBC 298 08/14/2023    FERRITIN 177 08/14/2023       Lab Results   Component Value Date    [id number] 328 08/14/2023         ROS: Review of Systems   Constitutional:  Negative for appetite change, chills, diaphoresis, fatigue and unexpected weight change. HENT:   Negative for mouth sores, nosebleeds, sore throat, trouble swallowing and voice change. Eyes:  Negative for eye problems and icterus. Respiratory:  Negative for chest tightness, cough, hemoptysis and wheezing. Cardiovascular:  Negative for chest pain, leg swelling and palpitations. Gastrointestinal:  Negative for abdominal distention, abdominal pain, blood in stool, constipation, diarrhea, nausea and vomiting. Endocrine: Negative for hot flashes. Genitourinary:  Negative for bladder incontinence, difficulty urinating, dyspareunia, dysuria and frequency.     Musculoskeletal: Negative for arthralgias, back pain, gait problem, neck pain and neck stiffness. Skin:  Negative for itching and rash. Neurological:  Negative for dizziness, gait problem, headaches, numbness, seizures and speech difficulty. Hematological:  Negative for adenopathy. Bruises/bleeds easily. Psychiatric/Behavioral:  Negative for decreased concentration, depression, sleep disturbance and suicidal ideas. The patient is nervous/anxious. Current Medications: Reviewed  Allergies: Reviewed  PMH/FH/SH:  Reviewed      Physical Exam:    Body surface area is 2.02 meters squared. Wt Readings from Last 3 Encounters:   11/14/23 103 kg (227 lb 3.2 oz)   11/13/23 105 kg (231 lb 1.8 oz)   11/11/23 105 kg (231 lb 11.3 oz)        Temp Readings from Last 3 Encounters:   11/14/23 98 °F (36.7 °C) (Temporal)   11/11/23 97.6 °F (36.4 °C) (Oral)   07/17/23 97.6 °F (36.4 °C) (Temporal)        BP Readings from Last 3 Encounters:   11/14/23 138/88   11/13/23 150/90   11/11/23 150/98         Pulse Readings from Last 3 Encounters:   11/14/23 (!) 114   11/13/23 96   11/11/23 96        Physical Exam  Vitals reviewed. Constitutional:       General: She is not in acute distress. Appearance: She is well-developed. She is not diaphoretic. HENT:      Head: Normocephalic and atraumatic. Eyes:      Conjunctiva/sclera: Conjunctivae normal.   Neck:      Trachea: No tracheal deviation. Cardiovascular:      Rate and Rhythm: Normal rate and regular rhythm. Heart sounds: No murmur heard. No friction rub. No gallop. Pulmonary:      Effort: Pulmonary effort is normal. No respiratory distress. Breath sounds: Normal breath sounds. No wheezing or rales. Chest:      Chest wall: No tenderness. Abdominal:      General: There is no distension. Palpations: Abdomen is soft. Tenderness: There is no abdominal tenderness. Musculoskeletal:      Cervical back: Normal range of motion and neck supple. Lymphadenopathy:      Cervical: No cervical adenopathy. Skin:     General: Skin is warm and dry. Coloration: Skin is not pale. Findings: Bruising present. No erythema. Neurological:      Mental Status: She is alert and oriented to person, place, and time. Psychiatric:         Behavior: Behavior normal.         Thought Content: Thought content normal.         Judgment: Judgment normal.         ECO    Goals and Barriers:  Current Goal: Minimize effects of disease. Barriers: None. Patient's Capacity to Self Care:  Patient is able to self care.     Code Status: [unfilled]

## 2023-11-20 ENCOUNTER — APPOINTMENT (OUTPATIENT)
Dept: LAB | Facility: CLINIC | Age: 58
End: 2023-11-20
Payer: MEDICARE

## 2023-11-20 DIAGNOSIS — D68.61 ANTIPHOSPHOLIPID ANTIBODY SYNDROME (HCC): ICD-10-CM

## 2023-11-20 DIAGNOSIS — I27.20 PULMONARY HYPERTENSION (HCC): ICD-10-CM

## 2023-11-20 LAB
ANION GAP SERPL CALCULATED.3IONS-SCNC: 4 MMOL/L
BASOPHILS # BLD AUTO: 0.02 THOUSANDS/ÂΜL (ref 0–0.1)
BASOPHILS NFR BLD AUTO: 1 % (ref 0–1)
BUN SERPL-MCNC: 13 MG/DL (ref 5–25)
CALCIUM SERPL-MCNC: 9.3 MG/DL (ref 8.4–10.2)
CHLORIDE SERPL-SCNC: 109 MMOL/L (ref 96–108)
CO2 SERPL-SCNC: 28 MMOL/L (ref 21–32)
CREAT SERPL-MCNC: 0.85 MG/DL (ref 0.6–1.3)
EOSINOPHIL # BLD AUTO: 0.21 THOUSAND/ÂΜL (ref 0–0.61)
EOSINOPHIL NFR BLD AUTO: 7 % (ref 0–6)
ERYTHROCYTE [DISTWIDTH] IN BLOOD BY AUTOMATED COUNT: 12.3 % (ref 11.6–15.1)
GFR SERPL CREATININE-BSD FRML MDRD: 74 ML/MIN/1.73SQ M
GLUCOSE P FAST SERPL-MCNC: 94 MG/DL (ref 65–99)
HCT VFR BLD AUTO: 39 % (ref 34.8–46.1)
HGB BLD-MCNC: 13.1 G/DL (ref 11.5–15.4)
IMM GRANULOCYTES # BLD AUTO: 0.01 THOUSAND/UL (ref 0–0.2)
IMM GRANULOCYTES NFR BLD AUTO: 0 % (ref 0–2)
LYMPHOCYTES # BLD AUTO: 1.01 THOUSANDS/ÂΜL (ref 0.6–4.47)
LYMPHOCYTES NFR BLD AUTO: 35 % (ref 14–44)
MCH RBC QN AUTO: 32.8 PG (ref 26.8–34.3)
MCHC RBC AUTO-ENTMCNC: 33.6 G/DL (ref 31.4–37.4)
MCV RBC AUTO: 98 FL (ref 82–98)
MONOCYTES # BLD AUTO: 0.34 THOUSAND/ÂΜL (ref 0.17–1.22)
MONOCYTES NFR BLD AUTO: 12 % (ref 4–12)
NEUTROPHILS # BLD AUTO: 1.28 THOUSANDS/ÂΜL (ref 1.85–7.62)
NEUTS SEG NFR BLD AUTO: 45 % (ref 43–75)
NRBC BLD AUTO-RTO: 0 /100 WBCS
PLATELET # BLD AUTO: 264 THOUSANDS/UL (ref 149–390)
PMV BLD AUTO: 10.4 FL (ref 8.9–12.7)
POTASSIUM SERPL-SCNC: 4.2 MMOL/L (ref 3.5–5.3)
RBC # BLD AUTO: 4 MILLION/UL (ref 3.81–5.12)
SODIUM SERPL-SCNC: 141 MMOL/L (ref 135–147)
WBC # BLD AUTO: 2.87 THOUSAND/UL (ref 4.31–10.16)

## 2023-11-20 PROCEDURE — 85025 COMPLETE CBC W/AUTO DIFF WBC: CPT

## 2023-11-20 PROCEDURE — 36415 COLL VENOUS BLD VENIPUNCTURE: CPT

## 2023-11-20 PROCEDURE — 80048 BASIC METABOLIC PNL TOTAL CA: CPT

## 2023-11-22 ENCOUNTER — TELEPHONE (OUTPATIENT)
Dept: HEMATOLOGY ONCOLOGY | Facility: CLINIC | Age: 58
End: 2023-11-22

## 2023-11-22 ENCOUNTER — HOSPITAL ENCOUNTER (OUTPATIENT)
Dept: CT IMAGING | Facility: HOSPITAL | Age: 58
Discharge: HOME | End: 2023-11-22
Attending: INTERNAL MEDICINE
Payer: MEDICARE

## 2023-11-22 ENCOUNTER — APPOINTMENT (OUTPATIENT)
Dept: LAB | Facility: CLINIC | Age: 58
End: 2023-11-22
Payer: MEDICARE

## 2023-11-22 DIAGNOSIS — D68.61 ANTIPHOSPHOLIPID ANTIBODY SYNDROME (HCC): ICD-10-CM

## 2023-11-22 DIAGNOSIS — D68.61 ANTIPHOSPHOLIPID ANTIBODY SYNDROME (HCC): Primary | ICD-10-CM

## 2023-11-22 DIAGNOSIS — I26.99 PULMONARY EMBOLISM ASSOCIATED WITH COVID-19 (HCC): ICD-10-CM

## 2023-11-22 DIAGNOSIS — I27.20 PULMONARY HYPERTENSION (HCC): ICD-10-CM

## 2023-11-22 DIAGNOSIS — Z79.01 WARFARIN ANTICOAGULATION: ICD-10-CM

## 2023-11-22 DIAGNOSIS — D68.62 LUPUS ANTICOAGULANT DISORDER (HCC): ICD-10-CM

## 2023-11-22 DIAGNOSIS — U07.1 PULMONARY EMBOLISM ASSOCIATED WITH COVID-19 (HCC): ICD-10-CM

## 2023-11-22 LAB
INR PPP: 1.89 (ref 0.84–1.19)
PROTHROMBIN TIME: 22.5 SECONDS (ref 11.6–14.5)

## 2023-11-22 PROCEDURE — G1004 CDSM NDSC: HCPCS

## 2023-11-22 PROCEDURE — 71260 CT THORAX DX C+: CPT

## 2023-11-22 PROCEDURE — 36415 COLL VENOUS BLD VENIPUNCTURE: CPT

## 2023-11-22 PROCEDURE — 85610 PROTHROMBIN TIME: CPT

## 2023-11-22 RX ADMIN — IOHEXOL 85 ML: 350 INJECTION, SOLUTION INTRAVENOUS at 17:25

## 2023-11-22 NOTE — TELEPHONE ENCOUNTER
Per Dr. Soumya Garcia continue same dose coumadin an recheck INR in one week. Pt called and notified, she verbalized understanding.

## 2023-11-22 NOTE — TELEPHONE ENCOUNTER
Patient Call    Who are you speaking with? Patient    If it is not the patient, are they listed on an active communication consent form? Yes   What is the reason for this call? Need to adjust warfarin dosage, please advise   Does this require a call back? Yes   If a call back is required, please list best call back number [phone]    If a call back is required, advise that a message will be forwarded to their care team and someone will return their call as soon as possible. Did you relay this information to the patient?  Yes

## 2023-11-29 ENCOUNTER — APPOINTMENT (OUTPATIENT)
Dept: LAB | Facility: CLINIC | Age: 58
End: 2023-11-29
Payer: MEDICARE

## 2023-11-29 ENCOUNTER — TELEPHONE (OUTPATIENT)
Dept: HEMATOLOGY ONCOLOGY | Facility: CLINIC | Age: 58
End: 2023-11-29

## 2023-11-29 DIAGNOSIS — D68.61 ANTIPHOSPHOLIPID ANTIBODY SYNDROME (HCC): ICD-10-CM

## 2023-11-29 DIAGNOSIS — Z79.01 WARFARIN ANTICOAGULATION: ICD-10-CM

## 2023-11-29 DIAGNOSIS — D68.61 ANTIPHOSPHOLIPID ANTIBODY SYNDROME (HCC): Primary | ICD-10-CM

## 2023-11-29 DIAGNOSIS — R76.8 POSITIVE ANA (ANTINUCLEAR ANTIBODY): ICD-10-CM

## 2023-11-29 NOTE — TELEPHONE ENCOUNTER
INR 2.19 from today, per Dr. Milligan Brochure continue same dose coumadin and repeat INR in two weeks. Pt called and notified of above information, she verbalized understanding.

## 2023-12-13 ENCOUNTER — TELEPHONE (OUTPATIENT)
Dept: CARDIOLOGY CLINIC | Facility: CLINIC | Age: 58
End: 2023-12-13

## 2023-12-13 ENCOUNTER — APPOINTMENT (OUTPATIENT)
Dept: LAB | Facility: CLINIC | Age: 58
End: 2023-12-13
Payer: MEDICARE

## 2023-12-13 ENCOUNTER — TELEMEDICINE (OUTPATIENT)
Dept: FAMILY MEDICINE CLINIC | Facility: CLINIC | Age: 58
End: 2023-12-13
Payer: MEDICARE

## 2023-12-13 ENCOUNTER — NURSE TRIAGE (OUTPATIENT)
Age: 58
End: 2023-12-13

## 2023-12-13 ENCOUNTER — TELEPHONE (OUTPATIENT)
Dept: HEMATOLOGY ONCOLOGY | Facility: CLINIC | Age: 58
End: 2023-12-13

## 2023-12-13 VITALS — BODY MASS INDEX: 41.77 KG/M2 | HEIGHT: 62 IN | WEIGHT: 227 LBS | TEMPERATURE: 100.3 F

## 2023-12-13 DIAGNOSIS — U07.1 COVID-19: Primary | ICD-10-CM

## 2023-12-13 DIAGNOSIS — Z79.01 WARFARIN ANTICOAGULATION: ICD-10-CM

## 2023-12-13 DIAGNOSIS — R76.8 POSITIVE ANA (ANTINUCLEAR ANTIBODY): ICD-10-CM

## 2023-12-13 DIAGNOSIS — D68.61 ANTIPHOSPHOLIPID ANTIBODY SYNDROME (HCC): ICD-10-CM

## 2023-12-13 LAB
INR PPP: 2.04 (ref 0.84–1.19)
PROTHROMBIN TIME: 23.9 SECONDS (ref 11.6–14.5)

## 2023-12-13 PROCEDURE — 85610 PROTHROMBIN TIME: CPT

## 2023-12-13 PROCEDURE — 36415 COLL VENOUS BLD VENIPUNCTURE: CPT

## 2023-12-13 PROCEDURE — 99213 OFFICE O/P EST LOW 20 MIN: CPT | Performed by: FAMILY MEDICINE

## 2023-12-13 RX ORDER — NIRMATRELVIR AND RITONAVIR 300-100 MG
3 KIT ORAL 2 TIMES DAILY
Qty: 30 TABLET | Refills: 0 | Status: SHIPPED | OUTPATIENT
Start: 2023-12-13 | End: 2023-12-18

## 2023-12-13 NOTE — TELEPHONE ENCOUNTER
Patient of Dr Kendall Hawthorne who is out of the office today and tomorrow. Elina Harris is positive for Covid 19 and her PCP has recommended Paxlovid. She was advised to contact cardiology along with the other specialists who treat her, for an opinion on Paxlovid,  given her history of Pulmonary hypertension and the fact that she is on Sildenafil. She is on warfarin for a history of PE associated with a prior Covid 19 infection. Please advise.

## 2023-12-13 NOTE — ASSESSMENT & PLAN NOTE
Patient is high risk, with h/o PE, pulmonary hypertension, connective tissue disorder, on azathioprine. Paxlovid has drug reaction morning with sildenafil and warfarin. Patient advised to contact her specialist to check if she will need close monitoring of warfarin while on paxlovid, and also instructions for sildenafil.  Patient will call back if she wants paxlovid to be sent to her pharmacy after clearance from her specialist.

## 2023-12-13 NOTE — TELEPHONE ENCOUNTER
Patient Call    Who are you speaking with? Patient    If it is not the patient, are they listed on an active communication consent form? N/A   What is the reason for this call? Patient is asking for a call back due to testing for Covid and would like to adjust everything that was discussed earlier   Does this require a call back? Yes   If a call back is required, please list best call back number [phone]   If a call back is required, advise that a message will be forwarded to their care team and someone will return their call as soon as possible. Did you relay this information to the patient?  Yes

## 2023-12-13 NOTE — PROGRESS NOTES
COVID-19 Outpatient Progress Note    Assessment/Plan:    Problem List Items Addressed This Visit          Other    COVID-19 - Primary      Disposition:     Discussed symptom directed medication options with patient. Discussed risks, benefits, and side effects of Remdesivir with patient and they agree to treatment. Clinical trials have shown a significant reduction in hospitalization when Remdesivir was given for 3 days in mild to moderate COVID-19 patients within seven days of symptom onset. Recommended dosing is 200 mg IV once on day 1, followed by 100mg IV on days 2-3. Most common adverse reactions can include: nausea, vomiting, diarrhea, headaches, rash, infusion reactions. Other less common reactions can include: bradycardia, seizure, hypersensitivity reactions, anaphylaxis/angioedema, elevated LFTs. Patient meets criteria for Paxlovid and they have been counseled appropriately regarding risks, benefits, side effects, and alternative treatment options. After discussion, patient agrees to treatment. Possible side effects of Paxlovid? Possible side effects of Paxlovid are:  - Liver Problems. Notify us right away if you start to experience loss of appetite, yellowing of your skin and the whites of eyes (jaundice), dark-colored urine, pale colored stools and itchy skin, stomach area (abdominal) pain. - Resistance to HIV Medicines. If you have untreated HIV infection, Paxlovid may lead to some HIV medicines not working as well in the future. - Other possible side effects include: altered sense of taste, diarrhea, high blood pressure, or muscle aches.     I have spent a total time of 15 minutes on the day of the encounter for this patient including risks and benefits of treatment options, instructions for management, patient and family education, importance of treatment compliance, risk factor reductions, impressions, counseling/coordination of care, documenting in the medical record, reviewing/ordering tests, medicine, procedures, obtaining or reviewing history and communicating with other healthcare professionals. Encounter provider: Felipe Blanco MD     Provider located at: [address] [id number]     Recent Visits  No visits were found meeting these conditions. Showing recent visits within past 7 days and meeting all other requirements  Today's Visits  Date Type Provider Dept   12/13/23 Telemedicine Felipe Blanco MD [facility]   Showing today's visits and meeting all other requirements  Future Appointments  No visits were found meeting these conditions. Showing future appointments within next 150 days and meeting all other requirements     This virtual check-in was done via [employer] and patient was informed that this is a secure, HIPAA-compliant platform. She agrees to proceed. Patient agrees to participate in a virtual check in via telephone or video visit instead of presenting to the office to address urgent/immediate medical needs. Patient is aware this is a billable service. She acknowledged consent and understanding of privacy and security of the video platform. The patient has agreed to participate and understands they can discontinue the visit at any time. After connecting through [facility], the patient was identified by name and date of birth. Lenard Pérez was informed that this was a telemedicine visit and that the exam was being conducted confidentially over secure lines. My office door was closed. No one else was in the room. Lenard Pérez acknowledged consent and understanding of privacy and security of the telemedicine visit. I informed the patient that I have reviewed her record in Epic and presented the opportunity for her to ask any questions regarding the visit today. The patient agreed to participate.      Verification of patient location:  Patient is located in the following state in which I hold an active license: [place]    Subjective:   Princess Mckeon is a 59 y.o. female who is concerned about COVID-19. Patient's symptoms include chills, nasal congestion, myalgias and headache.     - Date of symptom onset: 12/12/2023      COVID-19 vaccination status: Fully vaccinated with booster    Lab Results   Component Value Date    SARSCOV2 Positive (A) 11/05/2022       Review of Systems   Constitutional:  Positive for chills. HENT:  Positive for congestion. Musculoskeletal:  Positive for myalgias. Neurological:  Positive for headaches.      Current Outpatient Medications on File Prior to Visit   Medication Sig    azaTHIOprine (IMURAN) 50 mg tablet Take 50 mg by mouth daily    B-D 3CC LUER-LILLIE SYR 25GX1/2" 25G X 1-1/2" 3 ML MISC USE 1 SYRINGE EVERY 30 DAYS    Benlysta 200 MG/ML SOAJ Weekly on Fridays    cholecalciferol (VITAMIN D3) 1,000 units tablet Take 2,000 Units by mouth daily in the early morning      clindamycin (CLEOCIN) 300 MG capsule Take 3 capsules by mouth 1 hour prior to dental procedure    cyanocobalamin 1,000 mcg/mL Inject 1 mL (1,000 mcg total) into a muscle every 14 (fourteen) days    gabapentin (NEURONTIN) 100 mg capsule TAKE 1 CAPSULE(100 MG) BY MOUTH DAILY AT BEDTIME    hydroxychloroquine (PLAQUENIL) 200 mg tablet Take 400 mg by mouth daily with breakfast Alternating days/dosages    scopolamine (TRANSDERM-SCOP) 1 mg/3 days TD 72 hr patch Place 1 patch on the skin over 72 hours every third day    sildenafil (REVATIO) 20 mg tablet TAKE 1 TABLET THREE TIMES A DAY    spironolactone (ALDACTONE) 25 mg tablet Take 1 tablet (25 mg total) by mouth daily    Syringe/Needle, Disp, (SecureSafe Syringe/Needle) 25G X 1-1/2" 1 ML MISC Use 1 Syringe every 30 (thirty) days    torsemide (DEMADEX) 20 mg tablet Take 1 tablet (20 mg total) by mouth daily    traZODone (DESYREL) 50 mg tablet TAKE 1 TABLET(50 MG) BY MOUTH DAILY AT BEDTIME    triamcinolone (KENALOG) 0.1 % oral topical paste prn    warfarin (Coumadin) 5 mg tablet 7.5mg po daily or as directed. [DISCONTINUED] ascorbic acid (VITAMIN C) 500 MG tablet Take 1 tablet (500 mg total) by mouth 2 (two) times a day    [DISCONTINUED] ferrous sulfate 324 (65 Fe) mg Take 1 tablet (324 mg total) by mouth 2 (two) times a day before meals    [DISCONTINUED] folic acid (FOLVITE) 1 mg tablet TAKE 1 TABLET(1 MG) BY MOUTH DAILY    [DISCONTINUED] methocarbamol (Robaxin-750) 750 mg tablet Take 1 tablet (750 mg total) by mouth every 6 (six) hours as needed for muscle spasms    [DISCONTINUED] Multiple Vitamins-Minerals (multivitamin with minerals) tablet Take 1 tablet by mouth daily    [DISCONTINUED] ondansetron (Zofran ODT) 4 mg disintegrating tablet Take 1 tablet (4 mg total) by mouth every 6 (six) hours as needed for nausea or vomiting       Objective:    Temp 100.3 °F (37.9 °C)   Ht 5' 2" (1.575 m)   Wt 103 kg (227 lb)   BMI 41.52 kg/m²        Physical Exam  Constitutional:       Appearance: Normal appearance. Pulmonary:      Effort: No respiratory distress. Neurological:      Mental Status: She is oriented to person, place, and time.    Psychiatric:         Mood and Affect: Mood normal.       Areli Azar MD

## 2023-12-13 NOTE — TELEPHONE ENCOUNTER
Patient called in to report home test positive for Covid today; symptoms started yesterday 12/12. Mild symptoms as noted below. Patient is high risk; history of PE's in previous episode of Covid, immunocompromised, pulmonary HTN, and stage 3 CKD. Patient is interested in antiviral medication (NOT PAXLOVID). No OV availability for telemedicine. RN notified office of  patient to follow up [facility] PCP for orders or telemedicine visitn. INR-=2.04; will follow up with hematology (Dr. Vannessa Rogel) for direction. Reason for Disposition   HIGH RISK for severe COVID complications (e.g., weak immune system, age > 59 years, obesity with BMI > 25, pregnant, chronic lung disease or other chronic medical condition) (Exception: Already seen by PCP and no new or worsening symptoms.)    Answer Assessment - Initial Assessment Questions  Were you within 6 feet or less, for up to 15 minutes or more with a person that has a confirmed COVID-19 test? Unknown Symptoms started evening 12/12  What was the date of your exposure? Home test positive today 12/13  Are you experiencing any symptoms attributed to the virus?  (Assess for SOB, cough, fever, difficulty breathing) Chills and fever (.2 F)cough, runny nose, scratchy throat, nausea, headaches, little fatigue  HIGH RISK: Do you have any history heart or lung conditions, weakened immune system, diabetes, Asthma, CHF, HIV, COPD, Chemo, renal failure, sickle cell, etc? Immunocompromised; bilateral PE's, lupus, pulmonary hypertension; stage 3 CKD  PREGNANCY: Are you pregnant or did you recently give birth?  Post menopausal  VACCINE: "Have you gotten the COVID-19 vaccine?" If Yes ask: "Which one, how many shots, when did you get it?" All Pfizer vaccines and boosters    Protocols used: Coronavirus (COVID-19) Diagnosed or Suspected-ADULT-[place]

## 2023-12-13 NOTE — TELEPHONE ENCOUNTER
Patient reports she tested positive for COVID today. Patient is not on Paxlovid at this time. Per Dr. Pascale Iglesias patient to continue on current Coumadin dose - 5mg PO daily  If PCP prescribes Paxlovid patient will let us know so that further instruction can be given.     Patient verbalized understanding

## 2023-12-20 ENCOUNTER — OFFICE VISIT (OUTPATIENT)
Dept: CARDIOLOGY CLINIC | Facility: CLINIC | Age: 58
End: 2023-12-20
Payer: MEDICARE

## 2023-12-20 VITALS
DIASTOLIC BLOOD PRESSURE: 70 MMHG | OXYGEN SATURATION: 99 % | WEIGHT: 229 LBS | HEIGHT: 62 IN | SYSTOLIC BLOOD PRESSURE: 114 MMHG | HEART RATE: 84 BPM | BODY MASS INDEX: 42.14 KG/M2

## 2023-12-20 DIAGNOSIS — I26.09 OTHER PULMONARY EMBOLISM WITH ACUTE COR PULMONALE, UNSPECIFIED CHRONICITY (HCC): ICD-10-CM

## 2023-12-20 DIAGNOSIS — Z79.01 ANTICOAGULATED: ICD-10-CM

## 2023-12-20 DIAGNOSIS — I27.20 PULMONARY HYPERTENSION (HCC): Primary | ICD-10-CM

## 2023-12-20 PROCEDURE — 99214 OFFICE O/P EST MOD 30 MIN: CPT | Performed by: INTERNAL MEDICINE

## 2023-12-20 NOTE — PROGRESS NOTES
Cardiology Outpatient Progress Note - Na Joseph 58 y.o. female MRN: [id number]    @ Encounter: [id number]      Patient Active Problem List    Diagnosis Date Noted    Fall 11/11/2023    Anticoagulated 08/28/2023    Sleeping difficulty 04/24/2023    Neuropathic pain, leg, left 04/24/2023    Antiphospholipid antibody syndrome (HCC) 02/27/2023    Financial difficulties 02/08/2023    Insomnia 01/05/2023    Pulmonary embolus (HCC) 01/05/2023    BMI 39.0-39.9,adult 01/05/2023    Iron deficiency anemia due to chronic blood loss 11/21/2022    COVID-19 11/05/2022    SLE (systemic lupus erythematosus) (HCC) 11/05/2022    Status post total knee replacement using cement, left 10/20/2022    Venous stasis of lower extremity 05/09/2022    Acute pain of right knee 05/09/2022    Leukopenia 11/22/2021    Morbid obesity (HCC)     Primary osteoarthritis of left knee 02/12/2020    Left knee pain 11/11/2019    Tear of medial meniscus of left knee, current 11/11/2019    Right knee pain 02/18/2019    Status post total right knee replacement 02/18/2019    Chronic kidney disease, stage 3a (HCC) 02/12/2019    Tear of medial meniscus of right knee, current 09/10/2018    Other tear of medial meniscus, current injury, right knee, initial encounter 07/16/2018    Patellofemoral disorder of right knee 06/04/2018    Pes anserinus bursitis of right knee 06/04/2018    Arthritis of right knee 06/04/2018    Arthritis of left knee 06/04/2018    Chronic pain of right knee 05/22/2018    Elevated serum creatinine 05/22/2018    Hyperuricemia 05/22/2018    Long-term use of hydroxychloroquine 02/16/2018    Pulmonary hypertension (HCC) 02/16/2018    Undifferentiated connective tissue disease (HCC) 02/16/2018    Secondary pulmonary hypertension 12/15/2017    Diffuse connective tissue disease (HCC) 11/21/2017    Pes anserine bursitis 11/21/2017    Trochanteric bursitis of both hips 11/21/2017    Vitamin D deficiency 11/21/2017    Other organ or system  involvement in systemic lupus erythematosus (HCC) 11/17/2017    Sinus tachycardia 11/09/2017    B12 deficiency 10/19/2017    Osteoporosis 10/19/2017    Lupus anticoagulant disorder (HCC) 10/18/2017    Positive HELENE (antinuclear antibody) 10/18/2017    Hot flashes due to menopause 09/01/2015    SOB (shortness of breath) on exertion 11/12/2012    Other chronic pulmonary heart diseases 11/05/2012    Edema 06/04/2012    Post-nasal drip 06/04/2012    Chronic cough 02/09/2012    Dyspnea 02/09/2012       Assessment:  # Acute Pulmonary Embolism, associated with COVID 19 infection, also had TKR on 10/6/22  AC: warfarin 5 mg alternating 7.5 mg    Positive lupus anticoagulant + 9/26/22    Echo 11/6/22:  LVEF: 65%  RV: upper normal size, normal function  PASP: 42 mmHg    CTA 11/5/22: moderate quantity of RLL segmental and small quantity RUL and LLL acute PE    # Exercise induced PAH; HFpEF with PH  Out of proportion to obesity/ deconditioning/ diastolic dysfunction (PCWP 12 mmHg). Pt with MCTD/ SLE w/ lupus anticoagulant. Remote smoker. At rest, RV appears normal on TTE with coupled RV-PA w/o e/o of RVOT notching suggestive of normal PVR at rest. However, she did have a stress echo in 2012 that was suggestive of exercise induced pulmonary HTN. At the time she did not have e/o of elevated PVR. Her bubble was negative which makes an intracardiac shunt less likely.    PAH Rx: sildanefil 20 mg Q8 (previously on macitentan)  Diuretic: torsemide 20 mg daily, spironolactone 25 mg daily  NT proBNP:   Weight: 237 lbs--> 229 lbs--> 218 lbs--> 225 lbs    Studies- personally reviewed by me  Echo 11/6/22: (in setting of acute PE)  LVEF: 65%  RV: upper normal size, normal function  PASP: 42 mmHg    Stress Echo 11/4/20: 3 min, 4.6 METs, max heart rate 155 bpm, resting /96    Echo 4/23/19:  Normal RV size and function    RHC with stress 12/19/17:  She had an appropriate HR response from 100 to 130 bpm with MAP throughout the study staying  at 112 mmHg.  Hgb 13.1     Rest:  RA 7  RV 37/4/13  PA 33/16/25  PCWP 12    SaO2 99%  MvO2 72.3%  CO/CI 4.8/2.3  TPG 13  DPG 4  PVR 2.7     Exercise:  CVP 10  PA 54/29/37  PCWP 18    SaO2 100%  MvO2 66%  CO/CI 4/1.9  TPG 19  DPG 11  PVR 4.8  CVP:PCWP 0.55    Stress echo 12/14/17:  to evaluate pulmonary pressures, RV, and RVOT signal w/ exercise.  5 min, 20 sec.  HTNsive response, decrease in O2 saturation from 99% to 91% and increase in PA pressures from 45 mmHg to 70 mmHg with exercise.    PFTs 12/1/17: normal, DLCO 84%    # SLE: Per outpatient Rheumatologist. Continue plaquenil  # ST: V/Q negative. May be 2/2 to deconditioning +/- inappropriate ST +/- diastolic dysfunction/HF. No e/o infection.    Holter 12/4/20: , 87 bpm  # Morbid obesity: Continue weight loss  # TKR on 10/6/22    TODAY'S PLAN:  Warfarin probably lifelong  Can do D dimer at 3 months, if echo suggestive then can check VQ but no indication  Continue sildanefil  Torsemide 20 mg and spironolactone 25 mg daily-  If lightheadedness persists than cut back torsemide to 10 mg daily  Likely has venous insufficiency      HPI:          59 yo followed for out of proportion PAH, exercise induced PAH, with MCTD/ SLE, + lupus anticoagulant, obesity. She has seen Dr Baird.  first started getting SOB -- 10 years ago. She has had several episodes of bacterial PNA and chronic cough (a few times/year). She was referred to [facility] in 2012 for workup for PH. She had a a stress echo 8/2012 that showed that her PA pressures more than doubled from --24 mmHg to > 50 mmHg. Currently, she states she can walk up a couple flights of stairs but does get SOB. She also gets SOB with walking up inclines. She gets occasional LH.      She was seeing a Rheumatologist in [place], [place] but recently saw a specialist at [facility], Dr. Ivy (Rheumatologist - 11/16). She was diagnosed with MCTD and SLE. HELENE + 1:320 w/ speckled pattern. She has been on Plaquenil x 2  weeks now. She is on ASA for + lupus anticoagulant and anticardiolipin Abs. She has joint pains and a subtle malar rash. She states so far there has been no change with plaquenil.     After her visit with Dr. Stanton, she has had TTE which shows LVEF --65%, normal RV size and function without RVOT notching. Normal atria. CXR clear. She also has had a negative V/Q scan. She walked the patient in the hallway and had her go up and down stairs. Her resting HR went from --100 bpm to 110s with Pulse Ox starting from 98% @ rest to --90% with exercise.     Stress Echo in 2017 showed hypertensive response and PA pressures to 70 mmHg w/ drop in pulse ox to 91%. Exercise RHC showed increase in PVR to 4.5 MOLINA from < 3 MOLINA. She was denied Tadalafil but approved for sildanefil. She did not feel any different and continued trouble with stairs.       Called 5/12 that returned from cruise a week ago and developed swelling in right leg. Ultrasound negative for DVT. Had fluid removed from right knee by Dr Aashish Prasad rheumatologist wants her to see a cardiologist at [facility]  Admitted 11/5 to 11/7 with pulmonary embolism associated with COVID 19 infection; Pt though already had TKR on 10/6/22  On warfarin. Lupus anticoagulant    Interval History:   Was going on a cruise, always gets more edema- likely due to salt in food  Go up to torsemide 30 mg daily on those days    Had a fall, concussion, multiple facial bruises- Nov 11, INR was 3.88  Diagnosed with COVID 12/13  Did not due Paxlovid      Past Medical History:   Diagnosis Date    HELENE positive     Anemia     Sildenafil causes decreased hematocrit    Anxiety 03/2020    CHF (congestive heart failure) (HCC)     right heart failure related to pulm HTN lupus    Chronic kidney disease     borderline lab values    Chronic rhinitis 06/04/2012    Clotting disorder (HCC) 2012    Coronary artery disease 2018    Encephalitis     Lasted Assessed 10/18/2017    Gout 06/26/2018    Hypertension   "   Lupus anticoagulant disorder (HCC)     Maxillary sinusitis     Lasted Assessed 10/18/2017    Night sweat     Osteopenia     Hip    Osteoporosis     Spine    Pneumonia     Last Assessed 10/18/2017    PONV (postoperative nausea and vomiting)     Pulmonary embolism (HCC)     Pulmonary hypertension (HCC)     Seizures (HCC)     Only during Encephalitis    Shortness of breath     with exertion    Sinus tachycardia     Urinary incontinence        Review of Systems   Constitutional:  Negative for activity change, appetite change, fatigue and unexpected weight change.   HENT:  Negative for congestion and nosebleeds.    Eyes: Negative.    Respiratory:  Negative for cough, chest tightness and shortness of breath.    Cardiovascular:  Negative for chest pain, palpitations and leg swelling.   Gastrointestinal:  Negative for abdominal distention.   Endocrine: Negative.    Genitourinary: Negative.    Musculoskeletal: Negative.    Skin: Negative.    Neurological:  Negative for dizziness, syncope and weakness.   Hematological: Negative.    Psychiatric/Behavioral: Negative.         Allergies   Allergen Reactions    Dilantin [Phenytoin] Anaphylaxis and Rash    Penicillins Rash, Anaphylaxis, Dermatitis and Hives    Augmentin [Amoxicillin-Pot Clavulanate] Rash and Dermatitis     .    Current Outpatient Medications:     azaTHIOprine (IMURAN) 50 mg tablet, Take 50 mg by mouth daily, Disp: , Rfl:     B-D 3CC LUER-LILLIE SYR 25GX1/2\" 25G X 1-1/2\" 3 ML MISC, USE 1 SYRINGE EVERY 30 DAYS, Disp: 12 each, Rfl: 1    Benlysta 200 MG/ML SOAJ, Weekly on Fridays, Disp: , Rfl:     cholecalciferol (VITAMIN D3) 1,000 units tablet, Take 2,000 Units by mouth daily in the early morning  , Disp: , Rfl:     clindamycin (CLEOCIN) 300 MG capsule, Take 3 capsules by mouth 1 hour prior to dental procedure, Disp: 3 capsule, Rfl: 3    cyanocobalamin 1,000 mcg/mL, Inject 1 mL (1,000 mcg total) into a muscle every 14 (fourteen) days, Disp: 12 mL, Rfl: 1    gabapentin " "(NEURONTIN) 100 mg capsule, TAKE 1 CAPSULE(100 MG) BY MOUTH DAILY AT BEDTIME, Disp: 90 capsule, Rfl: 1    hydroxychloroquine (PLAQUENIL) 200 mg tablet, Take 400 mg by mouth daily with breakfast Alternating days/dosages, Disp: , Rfl:     scopolamine (TRANSDERM-SCOP) 1 mg/3 days TD 72 hr patch, Place 1 patch on the skin over 72 hours every third day, Disp: 10 patch, Rfl: 0    sildenafil (REVATIO) 20 mg tablet, TAKE 1 TABLET THREE TIMES A DAY, Disp: 90 tablet, Rfl: 3    spironolactone (ALDACTONE) 25 mg tablet, Take 1 tablet (25 mg total) by mouth daily, Disp: 90 tablet, Rfl: 3    Syringe/Needle, Disp, (SecureSafe Syringe/Needle) 25G X 1-1/2\" 1 ML MISC, Use 1 Syringe every 30 (thirty) days, Disp: 12 each, Rfl: 1    torsemide (DEMADEX) 20 mg tablet, Take 1 tablet (20 mg total) by mouth daily, Disp: 90 tablet, Rfl: 3    traZODone (DESYREL) 50 mg tablet, TAKE 1 TABLET(50 MG) BY MOUTH DAILY AT BEDTIME, Disp: 30 tablet, Rfl: 5    triamcinolone (KENALOG) 0.1 % oral topical paste, prn, Disp: , Rfl:     warfarin (Coumadin) 5 mg tablet, 7.5mg po daily or as directed., Disp: 45 tablet, Rfl: 1    Social History     Socioeconomic History    Marital status: /Civil Union     Spouse name: Not on file    Number of children: 2    Years of education: Not on file    Highest education level: Not on file   Occupational History    Not on file   Tobacco Use    Smoking status: Former     Current packs/day: 0.00     Types: Cigarettes     Quit date: 2006     Years since quittin.9    Smokeless tobacco: Never   Vaping Use    Vaping status: Never Used   Substance and Sexual Activity    Alcohol use: Yes     Alcohol/week: 0.0 standard drinks of alcohol     Comment: socially    Drug use: No    Sexual activity: Yes     Partners: Male   Other Topics Concern    Not on file   Social History Narrative    Daily caffeinated coffee consumption    Exercise habits     Social Determinants of Health     Financial Resource Strain: High Risk " (2/1/2023)    Overall Financial Resource Strain (CARDIA)     Difficulty of Paying Living Expenses: Very hard   Food Insecurity: Not on file   Transportation Needs: No Transportation Needs (2/1/2023)    PRAPARE - Transportation     Lack of Transportation (Medical): No     Lack of Transportation (Non-Medical): No   Physical Activity: Not on file   Stress: Not on file   Social Connections: Not on file   Intimate Partner Violence: Not on file   Housing Stability: Not on file       Family History   Problem Relation Age of Onset    Uterine cancer Mother     Pancreatic cancer Mother 70    Diabetes Mother     Endometrial cancer Mother 66    Arthritis Mother     Cancer Mother         Pancreas, Uterine    Vision loss Mother     Heart disease Father         open heart surgery at age 50     Stroke Father         CVA    Hypertension Father     Atrial fibrillation Father     Hyperlipidemia Father     Arthritis Father     Rheum arthritis Father     No Known Problems Sister     No Known Problems Sister     Thyroid disease Sister     Depression Sister     Osteoarthritis Sister     Asthma Daughter     Migraines Daughter     Asthma Daughter     Thyroid disease Maternal Grandmother     Heart attack Maternal Grandfather     Heart attack Paternal Grandmother     Skin cancer Paternal Grandfather     No Known Problems Brother     Hemochromatosis Brother     No Known Problems Maternal Aunt     No Known Problems Paternal Aunt     Anuerysm Neg Hx     Clotting disorder Neg Hx     Heart failure Neg Hx        Physical Exam:    Vitals: not currently breastfeeding., There is no height or weight on file to calculate BMI.,   Wt Readings from Last 3 Encounters:   12/13/23 103 kg (227 lb)   11/14/23 103 kg (227 lb 3.2 oz)   11/13/23 105 kg (231 lb 1.8 oz)         Physical Exam:    Physical Exam  Constitutional:       Appearance: She is well-developed.   HENT:      Head: Normocephalic and atraumatic.   Eyes:      Pupils: Pupils are equal, round, and  reactive to light.   Neck:      Vascular: No JVD.   Cardiovascular:      Rate and Rhythm: Normal rate and regular rhythm.      Heart sounds: No murmur heard.  Pulmonary:      Effort: Pulmonary effort is normal. No respiratory distress.      Breath sounds: Normal breath sounds.   Abdominal:      General: There is no distension.      Palpations: Abdomen is soft.      Tenderness: There is no abdominal tenderness.   Musculoskeletal:         General: Normal range of motion.      Cervical back: Normal range of motion.   Skin:     General: Skin is warm and dry.      Findings: No rash.   Neurological:      Mental Status: She is alert and oriented to person, place, and time.         Labs & Results:    Lab Results   Component Value Date    SODIUM 141 11/20/2023    K 4.2 11/20/2023     (H) 11/20/2023    CO2 28 11/20/2023    BUN 13 11/20/2023    CREATININE 0.85 11/20/2023    GLUC 93 07/03/2023    CALCIUM 9.3 11/20/2023     Lab Results   Component Value Date    WBC 2.87 (L) 11/20/2023    HGB 13.1 11/20/2023    HCT 39.0 11/20/2023    MCV 98 11/20/2023     11/20/2023     Lab Results   Component Value Date    BNP 16 11/05/2022      Lab Results   Component Value Date    CHOLESTEROL 181 04/03/2023    CHOLESTEROL 176 10/09/2020    CHOLESTEROL 157 05/17/2018     Lab Results   Component Value Date    HDL 57 04/03/2023    HDL 60 10/09/2020    HDL 45 05/17/2018     Lab Results   Component Value Date    TRIG 100 04/03/2023    TRIG 107 10/09/2020    TRIG 131 05/17/2018     Lab Results   Component Value Date    NONHDLC 124 04/03/2023    NONHDLC 116 10/09/2020    NONHDLC 112 05/17/2018         EKG personally reviewed by Hardeep Hughes.     Counseling / Coordination of Care  Time spent today 25 minutes.  Greater than 50% of total time was spent with the patient and / or family counseling and / or coordination of care.  We discussed diagnoses, most recent studies, tests and any changes in treatment plan    Thank you for the  opportunity to participate in the care of this patient.    YUMI ZAVALETA D.O.  DIRECTOR OF HEART FAILURE/ PULMONARY HYPERTENSION  MEDICAL DIRECTOR OF LVAD PROGRAM  [facility]

## 2023-12-26 ENCOUNTER — APPOINTMENT (OUTPATIENT)
Dept: LAB | Facility: CLINIC | Age: 58
End: 2023-12-26
Payer: MEDICARE

## 2023-12-26 DIAGNOSIS — Z79.01 WARFARIN ANTICOAGULATION: ICD-10-CM

## 2023-12-26 DIAGNOSIS — Z79.01 WARFARIN ANTICOAGULATION: Primary | ICD-10-CM

## 2023-12-26 DIAGNOSIS — Z79.01 ANTICOAGULATED ON COUMADIN: Primary | ICD-10-CM

## 2023-12-26 DIAGNOSIS — D68.61 ANTIPHOSPHOLIPID ANTIBODY SYNDROME (HCC): ICD-10-CM

## 2023-12-26 LAB
INR PPP: 2.98 (ref 0.84–1.19)
PROTHROMBIN TIME: 32 SECONDS (ref 11.6–14.5)

## 2023-12-26 PROCEDURE — 85610 PROTHROMBIN TIME: CPT

## 2023-12-26 PROCEDURE — 36415 COLL VENOUS BLD VENIPUNCTURE: CPT

## 2024-01-02 ENCOUNTER — APPOINTMENT (OUTPATIENT)
Dept: LAB | Facility: CLINIC | Age: 59
End: 2024-01-02
Payer: MEDICARE

## 2024-01-02 ENCOUNTER — PATIENT MESSAGE (OUTPATIENT)
Dept: HEMATOLOGY ONCOLOGY | Facility: CLINIC | Age: 59
End: 2024-01-02

## 2024-01-02 DIAGNOSIS — Z79.01 ANTICOAGULATED ON COUMADIN: Primary | ICD-10-CM

## 2024-01-02 DIAGNOSIS — Z79.01 WARFARIN ANTICOAGULATION: ICD-10-CM

## 2024-01-02 LAB
INR PPP: 2.27 (ref 0.84–1.19)
PROTHROMBIN TIME: 26 SECONDS (ref 11.6–14.5)

## 2024-01-02 PROCEDURE — 36415 COLL VENOUS BLD VENIPUNCTURE: CPT | Performed by: PHYSICIAN ASSISTANT

## 2024-01-02 PROCEDURE — 85610 PROTHROMBIN TIME: CPT | Performed by: PHYSICIAN ASSISTANT

## 2024-01-10 DIAGNOSIS — U07.1 PULMONARY EMBOLISM ASSOCIATED WITH COVID-19 (HCC): ICD-10-CM

## 2024-01-10 DIAGNOSIS — D68.62 LUPUS ANTICOAGULANT DISORDER (HCC): ICD-10-CM

## 2024-01-10 DIAGNOSIS — I26.99 PULMONARY EMBOLISM ASSOCIATED WITH COVID-19 (HCC): ICD-10-CM

## 2024-01-10 RX ORDER — WARFARIN SODIUM 5 MG/1
TABLET ORAL
Qty: 90 TABLET | Refills: 1 | Status: SHIPPED | OUTPATIENT
Start: 2024-01-10

## 2024-01-15 ENCOUNTER — TELEPHONE (OUTPATIENT)
Dept: HEMATOLOGY ONCOLOGY | Facility: CLINIC | Age: 59
End: 2024-01-15

## 2024-01-15 ENCOUNTER — APPOINTMENT (OUTPATIENT)
Dept: LAB | Facility: CLINIC | Age: 59
End: 2024-01-15
Payer: MEDICARE

## 2024-01-15 DIAGNOSIS — Z79.01 ANTICOAGULATED ON COUMADIN: ICD-10-CM

## 2024-01-15 DIAGNOSIS — Z79.01 WARFARIN ANTICOAGULATION: ICD-10-CM

## 2024-01-15 LAB
INR PPP: 1.56 (ref 0.84–1.19)
PROTHROMBIN TIME: 19.5 SECONDS (ref 11.6–14.5)

## 2024-01-15 PROCEDURE — 36415 COLL VENOUS BLD VENIPUNCTURE: CPT

## 2024-01-15 PROCEDURE — 85610 PROTHROMBIN TIME: CPT

## 2024-01-16 DIAGNOSIS — D68.62 LUPUS ANTICOAGULANT DISORDER (HCC): Primary | ICD-10-CM

## 2024-01-16 DIAGNOSIS — Z79.01 ANTICOAGULATED ON COUMADIN: ICD-10-CM

## 2024-01-22 ENCOUNTER — APPOINTMENT (OUTPATIENT)
Dept: LAB | Facility: CLINIC | Age: 59
End: 2024-01-22
Payer: MEDICARE

## 2024-02-05 ENCOUNTER — APPOINTMENT (OUTPATIENT)
Dept: LAB | Facility: CLINIC | Age: 59
End: 2024-02-05
Payer: MEDICARE

## 2024-02-05 DIAGNOSIS — D50.9 IRON DEFICIENCY ANEMIA, UNSPECIFIED IRON DEFICIENCY ANEMIA TYPE: ICD-10-CM

## 2024-02-05 DIAGNOSIS — D70.9 NEUTROPENIC TYPHLITIS (HCC): ICD-10-CM

## 2024-02-05 DIAGNOSIS — D70.9 NEUTROPENIA, UNSPECIFIED TYPE (HCC): ICD-10-CM

## 2024-02-05 DIAGNOSIS — E53.8 B12 DEFICIENCY: ICD-10-CM

## 2024-02-05 DIAGNOSIS — E53.8 BIOTIN-(PROPIONYL-COA-CARBOXYLASE) LIGASE DEFICIENCY: ICD-10-CM

## 2024-02-05 DIAGNOSIS — Z79.01 ANTICOAGULATED: Primary | ICD-10-CM

## 2024-02-05 DIAGNOSIS — K37 NEUTROPENIC TYPHLITIS (HCC): ICD-10-CM

## 2024-02-05 LAB
ALBUMIN SERPL BCP-MCNC: 4.3 G/DL (ref 3.5–5)
ALP SERPL-CCNC: 92 U/L (ref 34–104)
ALT SERPL W P-5'-P-CCNC: 15 U/L (ref 7–52)
ANION GAP SERPL CALCULATED.3IONS-SCNC: 4 MMOL/L
AST SERPL W P-5'-P-CCNC: 12 U/L (ref 13–39)
BASOPHILS # BLD AUTO: 0.02 THOUSANDS/ÂΜL (ref 0–0.1)
BASOPHILS NFR BLD AUTO: 1 % (ref 0–1)
BILIRUB SERPL-MCNC: 0.57 MG/DL (ref 0.2–1)
BUN SERPL-MCNC: 17 MG/DL (ref 5–25)
CALCIUM SERPL-MCNC: 9.9 MG/DL (ref 8.4–10.2)
CHLORIDE SERPL-SCNC: 105 MMOL/L (ref 96–108)
CO2 SERPL-SCNC: 29 MMOL/L (ref 21–32)
CREAT SERPL-MCNC: 0.87 MG/DL (ref 0.6–1.3)
EOSINOPHIL # BLD AUTO: 0.11 THOUSAND/ÂΜL (ref 0–0.61)
EOSINOPHIL NFR BLD AUTO: 3 % (ref 0–6)
ERYTHROCYTE [DISTWIDTH] IN BLOOD BY AUTOMATED COUNT: 12.2 % (ref 11.6–15.1)
FERRITIN SERPL-MCNC: 117 NG/ML (ref 11–307)
GFR SERPL CREATININE-BSD FRML MDRD: 72 ML/MIN/1.73SQ M
GLUCOSE P FAST SERPL-MCNC: 85 MG/DL (ref 65–99)
HCT VFR BLD AUTO: 40.1 % (ref 34.8–46.1)
HGB BLD-MCNC: 13.6 G/DL (ref 11.5–15.4)
IMM GRANULOCYTES # BLD AUTO: 0.01 THOUSAND/UL (ref 0–0.2)
IMM GRANULOCYTES NFR BLD AUTO: 0 % (ref 0–2)
IRON SATN MFR SERPL: 22 % (ref 15–50)
IRON SERPL-MCNC: 80 UG/DL (ref 50–212)
LYMPHOCYTES # BLD AUTO: 0.83 THOUSANDS/ÂΜL (ref 0.6–4.47)
LYMPHOCYTES NFR BLD AUTO: 23 % (ref 14–44)
MCH RBC QN AUTO: 32.5 PG (ref 26.8–34.3)
MCHC RBC AUTO-ENTMCNC: 33.9 G/DL (ref 31.4–37.4)
MCV RBC AUTO: 96 FL (ref 82–98)
MONOCYTES # BLD AUTO: 0.32 THOUSAND/ÂΜL (ref 0.17–1.22)
MONOCYTES NFR BLD AUTO: 9 % (ref 4–12)
NEUTROPHILS # BLD AUTO: 2.27 THOUSANDS/ÂΜL (ref 1.85–7.62)
NEUTS SEG NFR BLD AUTO: 64 % (ref 43–75)
NRBC BLD AUTO-RTO: 0 /100 WBCS
PLATELET # BLD AUTO: 271 THOUSANDS/UL (ref 149–390)
PMV BLD AUTO: 10.1 FL (ref 8.9–12.7)
POTASSIUM SERPL-SCNC: 4.1 MMOL/L (ref 3.5–5.3)
PROT SERPL-MCNC: 6.5 G/DL (ref 6.4–8.4)
RBC # BLD AUTO: 4.18 MILLION/UL (ref 3.81–5.12)
SODIUM SERPL-SCNC: 138 MMOL/L (ref 135–147)
TIBC SERPL-MCNC: 358 UG/DL (ref 250–450)
UIBC SERPL-MCNC: 278 UG/DL (ref 155–355)
VIT B12 SERPL-MCNC: 505 PG/ML (ref 180–914)
WBC # BLD AUTO: 3.56 THOUSAND/UL (ref 4.31–10.16)

## 2024-02-05 PROCEDURE — 80053 COMPREHEN METABOLIC PANEL: CPT

## 2024-02-05 PROCEDURE — 82607 VITAMIN B-12: CPT

## 2024-02-05 PROCEDURE — 83550 IRON BINDING TEST: CPT

## 2024-02-05 PROCEDURE — 82728 ASSAY OF FERRITIN: CPT

## 2024-02-05 PROCEDURE — 85025 COMPLETE CBC W/AUTO DIFF WBC: CPT

## 2024-02-05 PROCEDURE — 36415 COLL VENOUS BLD VENIPUNCTURE: CPT

## 2024-02-05 PROCEDURE — 83540 ASSAY OF IRON: CPT

## 2024-02-12 ENCOUNTER — TELEPHONE (OUTPATIENT)
Dept: HEMATOLOGY ONCOLOGY | Facility: CLINIC | Age: 59
End: 2024-02-12

## 2024-02-12 ENCOUNTER — APPOINTMENT (OUTPATIENT)
Dept: LAB | Facility: CLINIC | Age: 59
End: 2024-02-12
Payer: MEDICARE

## 2024-02-12 DIAGNOSIS — Z79.01 ANTICOAGULATED: ICD-10-CM

## 2024-02-12 NOTE — TELEPHONE ENCOUNTER
Left msg for patient to return my call   Will review that per Maile no change to current Coumadin dose and re check labs in 1 week

## 2024-02-12 NOTE — TELEPHONE ENCOUNTER
Patient aware that I will be faxing order form for MD INR so she can do at home PT/INR checks  Will fax in form once Dr. Brewster signs

## 2024-02-19 ENCOUNTER — APPOINTMENT (OUTPATIENT)
Dept: LAB | Facility: CLINIC | Age: 59
End: 2024-02-19
Payer: MEDICARE

## 2024-02-19 DIAGNOSIS — Z79.01 WARFARIN ANTICOAGULATION: ICD-10-CM

## 2024-02-19 DIAGNOSIS — D68.62 LUPUS ANTICOAGULANT DISORDER (HCC): Primary | ICD-10-CM

## 2024-02-21 ENCOUNTER — HOSPITAL ENCOUNTER (OUTPATIENT)
Dept: NON INVASIVE DIAGNOSTICS | Facility: CLINIC | Age: 59
Discharge: HOME | End: 2024-02-21
Payer: MEDICARE

## 2024-02-21 VITALS
WEIGHT: 229 LBS | BODY MASS INDEX: 42.14 KG/M2 | SYSTOLIC BLOOD PRESSURE: 114 MMHG | DIASTOLIC BLOOD PRESSURE: 70 MMHG | HEART RATE: 84 BPM | HEIGHT: 62 IN

## 2024-02-21 DIAGNOSIS — I27.20 PULMONARY HYPERTENSION (HCC): ICD-10-CM

## 2024-02-21 DIAGNOSIS — I26.09 OTHER PULMONARY EMBOLISM WITH ACUTE COR PULMONALE, UNSPECIFIED CHRONICITY (HCC): ICD-10-CM

## 2024-02-21 LAB
AORTIC ROOT: 2.5 CM
APICAL FOUR CHAMBER EJECTION FRACTION: 64 %
ASCENDING AORTA: 2.9 CM
BSA FOR ECHO PROCEDURE: 2.02 M2
E WAVE DECELERATION TIME: 156 MS
E/A RATIO: 0.75
FRACTIONAL SHORTENING: 33 (ref 28–44)
INTERVENTRICULAR SEPTUM IN DIASTOLE (PARASTERNAL SHORT AXIS VIEW): 0.8 CM
INTERVENTRICULAR SEPTUM: 0.8 CM (ref 0.6–1.1)
LAAS-AP2: 16.3 CM2
LAAS-AP4: 18 CM2
LEFT ATRIUM SIZE: 3.8 CM
LEFT ATRIUM VOLUME (MOD BIPLANE): 43 ML
LEFT ATRIUM VOLUME INDEX (MOD BIPLANE): 21.3 ML/M2
LEFT INTERNAL DIMENSION IN SYSTOLE: 2.8 CM (ref 2.1–4)
LEFT VENTRICULAR INTERNAL DIMENSION IN DIASTOLE: 4.2 CM (ref 3.5–6)
LEFT VENTRICULAR POSTERIOR WALL IN END DIASTOLE: 0.8 CM
LEFT VENTRICULAR STROKE VOLUME: 52 ML
LVSV (TEICH): 52 ML
MV E'TISSUE VEL-SEP: 14 CM/S
MV PEAK A VEL: 0.99 M/S
MV PEAK E VEL: 74 CM/S
MV STENOSIS PRESSURE HALF TIME: 45 MS
MV VALVE AREA P 1/2 METHOD: 4.89
RA PRESSURE ESTIMATED: 8 MMHG
RIGHT ATRIUM AREA SYSTOLE A4C: 13.5 CM2
RIGHT VENTRICLE ID DIMENSION: 3.9 CM
RV PSP: 39 MMHG
SL CV LEFT ATRIUM LENGTH A2C: 5.4 CM
SL CV LV EF: 60
SL CV PED ECHO LEFT VENTRICLE DIASTOLIC VOLUME (MOD BIPLANE) 2D: 80 ML
SL CV PED ECHO LEFT VENTRICLE SYSTOLIC VOLUME (MOD BIPLANE) 2D: 29 ML
TR MAX PG: 31 MMHG
TR PEAK VELOCITY: 2.8 M/S
TRICUSPID ANNULAR PLANE SYSTOLIC EXCURSION: 2.4 CM
TRICUSPID VALVE PEAK REGURGITATION VELOCITY: 2.77 M/S

## 2024-02-21 PROCEDURE — 93306 TTE W/DOPPLER COMPLETE: CPT

## 2024-02-21 PROCEDURE — 93306 TTE W/DOPPLER COMPLETE: CPT | Performed by: INTERNAL MEDICINE

## 2024-02-22 ENCOUNTER — CONSULT (OUTPATIENT)
Dept: PLASTIC SURGERY | Facility: CLINIC | Age: 59
End: 2024-02-22
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
## 2024-02-22 NOTE — PROGRESS NOTES
Consultation - Na Joseph 59 y.o. female MRN: [id number]    Unit/Bed#:  Encounter: [id number]      Assessment/Plan     Assessment:  S/P trauma, and hematoma of forehead after a fall. She is on coumadin for a hx of PE. She has post inflammatory hyperpigmentation.    Plan:  I gave her information on a couple derm offices where they may be able to treat her with laser tx.     History of Present Illness     HPI: Na Joseph is a 59 y.o. year old female who takes Coumadin for hx of a PE, presents with a hx of a fall in November 2023, which caused a hematoma of her forehead.  That has resolved, but with an oval area of discoloration that troubles her. It is mildly painful in one area. She is leaving on a trip in 8 weeks and would like to get this lightened before leaving.    Consults    Review of Systems   Constitutional: Negative.    HENT: Negative.     Eyes: Negative.    Respiratory:  Positive for shortness of breath.    Cardiovascular: Negative.    Gastrointestinal: Negative.    Endocrine: Negative.    Genitourinary: Negative.         Urinary incontinence   Musculoskeletal:  Positive for arthralgias.   Skin: Negative.    Allergic/Immunologic: Negative.  Negative for environmental allergies.   Neurological: Negative.  Negative for seizures, light-headedness and headaches.   Hematological: Negative.    Psychiatric/Behavioral: Negative.  Negative for agitation and confusion.        Historical Information   Past Medical History:   Diagnosis Date    HELENE positive     Anemia     Sildenafil causes decreased hematocrit    Anxiety 03/2020    CHF (congestive heart failure) ([facility])     right heart failure related to pulm HTN lupus    Chronic kidney disease     borderline lab values    Chronic rhinitis 06/04/2012    Clotting disorder ([facility]) 2012    Coronary artery disease 2018    Encephalitis     Lasted Assessed 10/18/2017    Gout 06/26/2018    Hypertension     Lupus anticoagulant disorder ([facility])     Maxillary sinusitis     Lasted  Assessed 10/18/2017    Night sweat     Osteopenia     Hip    Osteoporosis     Spine    Pneumonia     Last Assessed 10/18/2017    PONV (postoperative nausea and vomiting)     Pulmonary embolism ([facility])     Pulmonary hypertension (HCC)     Seizures (HCC)     Only during Encephalitis    Shortness of breath     with exertion    Sinus tachycardia     Urinary incontinence      Past Surgical History:   Procedure Laterality Date    ARTHRODESIS      Hand: IP Joint    CHOLECYSTECTOMY      COLONOSCOPY      COLPOSCOPY      HYSTERECTOMY      Vaginal    JOINT REPLACEMENT Right     Knee replacement    KNEE SURGERY  2019    LAPAROSCOPIC ESOPHAGOGASTRIC FUNDOPLASTY  2008    AR ARTHRP KNE CONDYLE&PLATU MEDIAL&LAT COMPARTMENTS Right 2019    Procedure: KNEE TOTAL ARTHROPLASTY AND ASSOCIATED PROCEDURES;  Surgeon: Donovan Zendejas DO;  [facility];  Service: Orthopedics    AR ARTHRP KNE CONDYLE&PLATU MEDIAL&LAT COMPARTMENTS Left 10/6/2022    Procedure: ARTHROPLASTY KNEE TOTAL;  Surgeon: Donovan Zendejas DO;  [facility];  Service: Orthopedics    AR ARTHRS KNEE W/MENISCECTOMY MED&LAT W/SHAVING Right 10/2/2018    Procedure: KNEE ARTHROSCOPY WITH PARTIAL MEDIAL MENISCECTOMY;  Surgeon: Donovan Zendejas DO;  [facility];  Service: Orthopedics    AR ARTHRS KNEE W/MENISCECTOMY MED&LAT W/SHAVING Left 2019    Procedure: KNEE ARTHROSCOPIC MENISCECTOMY /MEDIAL; Chondyl medial and posterior;  Surgeon: Donovan Zendejas DO;  [facility];  Service: Orthopedics    REDUCTION MAMMAPLASTY      REPAIR ANKLE LIGAMENT Right     TONSILLECTOMY       Social History   Social History     Substance and Sexual Activity   Alcohol Use Yes    Alcohol/week: 0.0 standard drinks of alcohol    Comment: socially     Social History     Substance and Sexual Activity   Drug Use No     Social History     Tobacco Use   Smoking Status Former    Current packs/day: 0.00    Types: Cigarettes    Quit date: 2006    Years since quittin.0  "  Smokeless Tobacco Never     E-Cigarette/Vaping    E-Cigarette Use Never User      E-Cigarette/Vaping Substances    Nicotine No     THC No     CBD No     Flavoring No     Other No     Unknown No       Family History:   Family History   Problem Relation Age of Onset    Uterine cancer Mother     Pancreatic cancer Mother 70    Diabetes Mother     Endometrial cancer Mother 66    Arthritis Mother     Cancer Mother         Pancreas, Uterine    Vision loss Mother     Heart disease Father         open heart surgery at age 50     Stroke Father         CVA    Hypertension Father     Atrial fibrillation Father     Hyperlipidemia Father     Arthritis Father     Rheum arthritis Father     No Known Problems Sister     No Known Problems Sister     Thyroid disease Sister     Depression Sister     Osteoarthritis Sister     Asthma Daughter     Migraines Daughter     Asthma Daughter     Thyroid disease Maternal Grandmother     Heart attack Maternal Grandfather     Heart attack Paternal Grandmother     Skin cancer Paternal Grandfather     No Known Problems Brother     Hemochromatosis Brother     No Known Problems Maternal Aunt     No Known Problems Paternal Aunt     Anuerysm Neg Hx     Clotting disorder Neg Hx     Heart failure Neg Hx        Meds/Allergies     Current Outpatient Medications:     azaTHIOprine (IMURAN) 50 mg tablet, Take 50 mg by mouth daily, Disp: , Rfl:     B-D 3CC LUER-LILLIE SYR 25GX1/2\" 25G X 1-1/2\" 3 ML MISC, USE 1 SYRINGE EVERY 30 DAYS, Disp: 12 each, Rfl: 1    Benlysta 200 MG/ML SOAJ, Weekly on Fridays, Disp: , Rfl:     cholecalciferol (VITAMIN D3) 1,000 units tablet, Take 2,000 Units by mouth daily in the early morning  , Disp: , Rfl:     clindamycin (CLEOCIN) 300 MG capsule, Take 3 capsules by mouth 1 hour prior to dental procedure, Disp: 3 capsule, Rfl: 3    cyanocobalamin 1,000 mcg/mL, Inject 1 mL (1,000 mcg total) into a muscle every 14 (fourteen) days, Disp: 12 mL, Rfl: 1    gabapentin (NEURONTIN) 100 mg " "capsule, TAKE 1 CAPSULE(100 MG) BY MOUTH DAILY AT BEDTIME, Disp: 90 capsule, Rfl: 1    hydroxychloroquine (PLAQUENIL) 200 mg tablet, Take 400 mg by mouth daily with breakfast Alternating days/dosages, Disp: , Rfl:     scopolamine (TRANSDERM-SCOP) 1 mg/3 days TD 72 hr patch, Place 1 patch on the skin over 72 hours every third day, Disp: 10 patch, Rfl: 0    sildenafil (REVATIO) 20 mg tablet, TAKE 1 TABLET THREE TIMES A DAY, Disp: 90 tablet, Rfl: 3    spironolactone (ALDACTONE) 25 mg tablet, Take 1 tablet (25 mg total) by mouth daily, Disp: 90 tablet, Rfl: 3    Syringe/Needle, Disp, (SecureSafe Syringe/Needle) 25G X 1-1/2\" 1 ML MISC, Use 1 Syringe every 30 (thirty) days, Disp: 12 each, Rfl: 1    torsemide (DEMADEX) 20 mg tablet, Take 1 tablet (20 mg total) by mouth daily, Disp: 90 tablet, Rfl: 3    traZODone (DESYREL) 50 mg tablet, TAKE 1 TABLET(50 MG) BY MOUTH DAILY AT BEDTIME, Disp: 30 tablet, Rfl: 5    triamcinolone (KENALOG) 0.1 % oral topical paste, prn, Disp: , Rfl:     warfarin (Coumadin) 5 mg tablet, 5mg po daily or as directed., Disp: 90 tablet, Rfl: 1   Allergies   Allergen Reactions    Dilantin [Phenytoin] Anaphylaxis and Rash    Penicillins Rash, Anaphylaxis, Dermatitis and Hives    Augmentin [Amoxicillin-Pot Clavulanate] Rash and Dermatitis       Objective   Vitals: not currently breastfeeding.  [unfilled]  Invasive Devices       None                   Physical Exam  Vitals and nursing note reviewed.   Constitutional:       General: She is not in acute distress.     Appearance: She is well-developed.   HENT:      Head: Normocephalic and atraumatic.     Eyes:      Conjunctiva/sclera: Conjunctivae normal.   Cardiovascular:      Rate and Rhythm: Normal rate and regular rhythm.      Heart sounds: No murmur heard.  Pulmonary:      Effort: Pulmonary effort is normal. No respiratory distress.      Breath sounds: Normal breath sounds.   Abdominal:      Palpations: Abdomen is soft.      Tenderness: There is no " abdominal tenderness.   Musculoskeletal:         General: No swelling.      Cervical back: Neck supple.   Skin:     General: Skin is warm and dry.      Capillary Refill: Capillary refill takes less than 2 seconds.   Neurological:      Mental Status: She is alert.   Psychiatric:         Mood and Affect: Mood normal.

## 2024-02-26 DIAGNOSIS — I27.20 PULMONARY HYPERTENSION (HCC): ICD-10-CM

## 2024-02-26 RX ORDER — SILDENAFIL CITRATE 20 MG/1
20 TABLET ORAL 3 TIMES DAILY
Qty: 90 TABLET | Refills: 11 | Status: SHIPPED | OUTPATIENT
Start: 2024-02-26

## 2024-02-28 ENCOUNTER — TELEPHONE (OUTPATIENT)
Dept: CARDIOLOGY CLINIC | Facility: CLINIC | Age: 59
End: 2024-02-28

## 2024-03-01 ENCOUNTER — OFFICE VISIT (OUTPATIENT)
Dept: HEMATOLOGY ONCOLOGY | Facility: CLINIC | Age: 59
End: 2024-03-01
Payer: MEDICARE

## 2024-03-01 ENCOUNTER — APPOINTMENT (OUTPATIENT)
Dept: LAB | Facility: CLINIC | Age: 59
End: 2024-03-01
Payer: MEDICARE

## 2024-03-01 VITALS
RESPIRATION RATE: 18 BRPM | TEMPERATURE: 97.8 F | HEART RATE: 84 BPM | BODY MASS INDEX: 41.88 KG/M2 | OXYGEN SATURATION: 98 % | SYSTOLIC BLOOD PRESSURE: 138 MMHG | DIASTOLIC BLOOD PRESSURE: 86 MMHG | HEIGHT: 62 IN

## 2024-03-01 DIAGNOSIS — M81.0 AGE-RELATED OSTEOPOROSIS WITHOUT CURRENT PATHOLOGICAL FRACTURE: ICD-10-CM

## 2024-03-01 DIAGNOSIS — E66.01 MORBID OBESITY (HCC): ICD-10-CM

## 2024-03-01 DIAGNOSIS — I26.99 OTHER ACUTE PULMONARY EMBOLISM WITHOUT ACUTE COR PULMONALE (HCC): Primary | ICD-10-CM

## 2024-03-01 DIAGNOSIS — R79.9 ABNORMAL FINDING OF BLOOD CHEMISTRY, UNSPECIFIED: ICD-10-CM

## 2024-03-01 DIAGNOSIS — D84.9 IMMUNOSUPPRESSION-RELATED INFECTIOUS DISEASE (HCC): ICD-10-CM

## 2024-03-01 DIAGNOSIS — B99.8 IMMUNOSUPPRESSION-RELATED INFECTIOUS DISEASE (HCC): ICD-10-CM

## 2024-03-01 DIAGNOSIS — M32.19 DILATED CARDIOMYOPATHY SECONDARY TO SYSTEMIC LUPUS ERYTHEMATOSUS (HCC): ICD-10-CM

## 2024-03-01 DIAGNOSIS — I43 DILATED CARDIOMYOPATHY SECONDARY TO SYSTEMIC LUPUS ERYTHEMATOSUS (HCC): ICD-10-CM

## 2024-03-01 DIAGNOSIS — E55.9 VITAMIN D DEFICIENCY: ICD-10-CM

## 2024-03-01 DIAGNOSIS — E53.8 B12 DEFICIENCY: ICD-10-CM

## 2024-03-01 DIAGNOSIS — N18.31 CHRONIC KIDNEY DISEASE, STAGE 3A (HCC): ICD-10-CM

## 2024-03-01 DIAGNOSIS — D68.61 ANTIPHOSPHOLIPID ANTIBODY SYNDROME (HCC): ICD-10-CM

## 2024-03-01 DIAGNOSIS — Z79.899 ENCOUNTER FOR LONG-TERM (CURRENT) USE OF OTHER MEDICATIONS: ICD-10-CM

## 2024-03-01 LAB
ALBUMIN SERPL BCP-MCNC: 4 G/DL (ref 3.5–5)
ALP SERPL-CCNC: 88 U/L (ref 34–104)
ALT SERPL W P-5'-P-CCNC: 16 U/L (ref 7–52)
ANION GAP SERPL CALCULATED.3IONS-SCNC: 4 MMOL/L
AST SERPL W P-5'-P-CCNC: 12 U/L (ref 13–39)
BASOPHILS # BLD AUTO: 0.02 THOUSANDS/ÂΜL (ref 0–0.1)
BASOPHILS NFR BLD AUTO: 1 % (ref 0–1)
BILIRUB SERPL-MCNC: 0.41 MG/DL (ref 0.2–1)
BUN SERPL-MCNC: 22 MG/DL (ref 5–25)
CALCIUM SERPL-MCNC: 9.6 MG/DL (ref 8.4–10.2)
CHLORIDE SERPL-SCNC: 105 MMOL/L (ref 96–108)
CO2 SERPL-SCNC: 30 MMOL/L (ref 21–32)
CREAT SERPL-MCNC: 0.82 MG/DL (ref 0.6–1.3)
EOSINOPHIL # BLD AUTO: 0.09 THOUSAND/ÂΜL (ref 0–0.61)
EOSINOPHIL NFR BLD AUTO: 3 % (ref 0–6)
ERYTHROCYTE [DISTWIDTH] IN BLOOD BY AUTOMATED COUNT: 12.6 % (ref 11.6–15.1)
GFR SERPL CREATININE-BSD FRML MDRD: 77 ML/MIN/1.73SQ M
GLUCOSE P FAST SERPL-MCNC: 105 MG/DL (ref 65–99)
HCT VFR BLD AUTO: 42.7 % (ref 34.8–46.1)
HGB BLD-MCNC: 14 G/DL (ref 11.5–15.4)
IMM GRANULOCYTES # BLD AUTO: 0.01 THOUSAND/UL (ref 0–0.2)
IMM GRANULOCYTES NFR BLD AUTO: 0 % (ref 0–2)
LYMPHOCYTES # BLD AUTO: 0.93 THOUSANDS/ÂΜL (ref 0.6–4.47)
LYMPHOCYTES NFR BLD AUTO: 28 % (ref 14–44)
MCH RBC QN AUTO: 32 PG (ref 26.8–34.3)
MCHC RBC AUTO-ENTMCNC: 32.8 G/DL (ref 31.4–37.4)
MCV RBC AUTO: 98 FL (ref 82–98)
MONOCYTES # BLD AUTO: 0.38 THOUSAND/ÂΜL (ref 0.17–1.22)
MONOCYTES NFR BLD AUTO: 12 % (ref 4–12)
NEUTROPHILS # BLD AUTO: 1.84 THOUSANDS/ÂΜL (ref 1.85–7.62)
NEUTS SEG NFR BLD AUTO: 56 % (ref 43–75)
NRBC BLD AUTO-RTO: 0 /100 WBCS
PLATELET # BLD AUTO: 240 THOUSANDS/UL (ref 149–390)
PMV BLD AUTO: 10.2 FL (ref 8.9–12.7)
POTASSIUM SERPL-SCNC: 4.3 MMOL/L (ref 3.5–5.3)
PROT SERPL-MCNC: 6.4 G/DL (ref 6.4–8.4)
RBC # BLD AUTO: 4.37 MILLION/UL (ref 3.81–5.12)
SODIUM SERPL-SCNC: 139 MMOL/L (ref 135–147)
WBC # BLD AUTO: 3.27 THOUSAND/UL (ref 4.31–10.16)

## 2024-03-01 PROCEDURE — 99214 OFFICE O/P EST MOD 30 MIN: CPT | Performed by: PHYSICIAN ASSISTANT

## 2024-03-01 PROCEDURE — 80053 COMPREHEN METABOLIC PANEL: CPT

## 2024-03-01 PROCEDURE — G2211 COMPLEX E/M VISIT ADD ON: HCPCS | Performed by: PHYSICIAN ASSISTANT

## 2024-03-01 PROCEDURE — 85025 COMPLETE CBC W/AUTO DIFF WBC: CPT

## 2024-03-01 RX ORDER — LEFLUNOMIDE 10 MG/1
10 TABLET ORAL DAILY
COMMUNITY

## 2024-03-04 ENCOUNTER — OFFICE VISIT (OUTPATIENT)
Dept: FAMILY MEDICINE CLINIC | Facility: OTHER | Age: 59
End: 2024-03-04
Payer: MEDICARE

## 2024-03-04 VITALS
TEMPERATURE: 98.2 F | WEIGHT: 242.2 LBS | OXYGEN SATURATION: 98 % | SYSTOLIC BLOOD PRESSURE: 112 MMHG | HEART RATE: 92 BPM | DIASTOLIC BLOOD PRESSURE: 80 MMHG | BODY MASS INDEX: 44.57 KG/M2 | RESPIRATION RATE: 18 BRPM | HEIGHT: 62 IN

## 2024-03-04 DIAGNOSIS — T75.3XXA MOTION SICKNESS, INITIAL ENCOUNTER: ICD-10-CM

## 2024-03-04 DIAGNOSIS — M54.50 CHRONIC MIDLINE LOW BACK PAIN WITHOUT SCIATICA: ICD-10-CM

## 2024-03-04 DIAGNOSIS — J44.9 CHRONIC OBSTRUCTIVE PULMONARY DISEASE, UNSPECIFIED COPD TYPE (HCC): ICD-10-CM

## 2024-03-04 DIAGNOSIS — G89.29 CHRONIC MIDLINE LOW BACK PAIN WITHOUT SCIATICA: ICD-10-CM

## 2024-03-04 DIAGNOSIS — Z12.31 ENCOUNTER FOR SCREENING MAMMOGRAM FOR BREAST CANCER: ICD-10-CM

## 2024-03-04 DIAGNOSIS — Z00.00 MEDICARE ANNUAL WELLNESS VISIT, INITIAL: Primary | ICD-10-CM

## 2024-03-04 DIAGNOSIS — M35.9 DIFFUSE CONNECTIVE TISSUE DISEASE (HCC): ICD-10-CM

## 2024-03-04 DIAGNOSIS — G47.00 INSOMNIA, UNSPECIFIED TYPE: ICD-10-CM

## 2024-03-04 DIAGNOSIS — M79.2 NEUROPATHIC PAIN, LEG, LEFT: ICD-10-CM

## 2024-03-04 DIAGNOSIS — M06.9 RHEUMATOID ARTHRITIS, INVOLVING UNSPECIFIED SITE, UNSPECIFIED WHETHER RHEUMATOID FACTOR PRESENT (HCC): ICD-10-CM

## 2024-03-04 DIAGNOSIS — D70.9 NEUTROPENIA, UNSPECIFIED TYPE (HCC): ICD-10-CM

## 2024-03-04 PROCEDURE — G0438 PPPS, INITIAL VISIT: HCPCS | Performed by: FAMILY MEDICINE

## 2024-03-04 PROCEDURE — 99214 OFFICE O/P EST MOD 30 MIN: CPT | Performed by: FAMILY MEDICINE

## 2024-03-04 RX ORDER — SCOLOPAMINE TRANSDERMAL SYSTEM 1 MG/1
1 PATCH, EXTENDED RELEASE TRANSDERMAL
Qty: 10 PATCH | Refills: 0 | Status: SHIPPED | OUTPATIENT
Start: 2024-03-04

## 2024-03-04 RX ORDER — GABAPENTIN 100 MG/1
100 CAPSULE ORAL
Qty: 90 CAPSULE | Refills: 1 | Status: SHIPPED | OUTPATIENT
Start: 2024-03-04

## 2024-03-04 RX ORDER — RAMELTEON 8 MG/1
8 TABLET ORAL
Qty: 30 TABLET | Refills: 0 | Status: SHIPPED | OUTPATIENT
Start: 2024-03-04 | End: 2024-03-12

## 2024-03-04 NOTE — PROGRESS NOTES
Assessment and Plan:     Problem List Items Addressed This Visit       Diffuse connective tissue disease ([facility])     Followed closely by Rheumatology at [facility]         Insomnia     Does not feel trazodone is helpful  Will d/c trazodone and start ramelteon 8 mg QHS  Sleep study ordered to r/o PLMD, GRIS         Relevant Medications    ramelteon (ROZEREM) 8 mg tablet    Other Relevant Orders    Ambulatory Referral to Sleep Medicine    Neuropathic pain, leg, left     Stable on gabapentin         Relevant Medications    gabapentin (NEURONTIN) 100 mg capsule    Neutropenia, unspecified type ([facility])     CBC stable  Followed by Heme/Onc         Rheumatoid arthritis, involving unspecified site, unspecified whether rheumatoid factor present ([facility])     Followed closely by Rheumatology at [facility]         BMI 40.0-44.9, adult ([facility])     Weight reduction advised         Chronic midline low back pain without sciatica     Exacerbated by fall in Nov 2023  Check lumbar XR given duration of sx (4 months) despite tylenol and NSAID use  Pt cautioned to avoid PO NSAIDs as she is using warfarin  Voltaren gel ok to trial, may consider PT referral pending imaging results         Relevant Orders    XR spine lumbar minimum 4 views non injury    Motion sickness     Going to [place] in April  Scopolamine refilled         Relevant Medications    scopolamine (TRANSDERM-SCOP) 1 mg/3 days TD 72 hr patch     Other Visit Diagnoses       Medicare annual wellness visit, initial    -  Primary    Chronic obstructive pulmonary disease, unspecified COPD type ([facility])        Encounter for screening mammogram for breast cancer        Relevant Orders    Mammo screening bilateral w 3d & cad            Depression Screening and Follow-up Plan: Patient was screened for depression during today's encounter. They screened negative with a PHQ-2 score of 0.      Preventive health issues were discussed with patient, and age appropriate screening tests were ordered  as noted in patient's After Visit Summary.  Personalized health advice and appropriate referrals for health education or preventive services given if needed, as noted in patient's After Visit Summary.    Return in about 6 months (around 9/4/2024) for Recheck low back pain, sleep.         History of Present Illness:     Patient presents for a Medicare Wellness Visit    Na is a 59 year old female presenting for her annual wellness visit. Today she presents for evaluation of sleep concerns as well as lower back pain. Patient reports that she has been having trouble staying asleep since her PE in November 2022. She has no problem falling asleep at 9:30 pm but will wake up every few hours. The awakenings are not connected to a need to use the bathroom. She has not noticed any snoring or waking up gasping for breath. Patient had a sleep study done around 5 years ago that was found to be normal. She also complains of lower back pain that began a few days after her fall in November 2023. She doesn't remember feeling any pain initially but says the pain started a few days later when she was home from the hospital. The pain is usually relieved by Tylenol and Aleve and is very focal over the center part of her lower back. The pain does not radiate and she has no associated numbness or tingling.       Patient Care Team:  Sultana Arnold DO as PCP - General (Family Medicine)  MD Mena Murry MD (Family Medicine)     Review of Systems:     Review of Systems   Constitutional:  Negative for appetite change, fatigue, fever and unexpected weight change.   HENT:  Negative for congestion, dental problem, ear pain, postnasal drip, sore throat and tinnitus.    Eyes:  Negative for pain, discharge and visual disturbance.   Respiratory:  Negative for cough, shortness of breath and wheezing.    Cardiovascular:  Negative for chest pain, palpitations and leg swelling.   Gastrointestinal:  Negative for abdominal pain,  constipation, diarrhea, nausea and vomiting.   Endocrine: Negative for cold intolerance and heat intolerance.   Genitourinary:  Negative for difficulty urinating, dysuria, flank pain and urgency.   Musculoskeletal:  Positive for back pain. Negative for arthralgias, joint swelling and myalgias.   Skin:  Negative for rash and wound.   Allergic/Immunologic: Negative for immunocompromised state.   Neurological:  Negative for dizziness, syncope, speech difficulty, weakness and numbness.   Hematological:  Negative for adenopathy. Does not bruise/bleed easily.   Psychiatric/Behavioral:  Positive for sleep disturbance. Negative for confusion and dysphoric mood. The patient is not nervous/anxious.         Problem List:     Patient Active Problem List   Diagnosis    B12 deficiency    Chronic cough    Diffuse connective tissue disease (HCC)    Dyspnea    Edema    Hot flashes due to menopause    Long-term use of hydroxychloroquine    Other organ or system involvement in systemic lupus erythematosus (HCC)    Lupus anticoagulant disorder (HCC)    SOB (shortness of breath) on exertion    Sinus tachycardia    Secondary pulmonary hypertension    Pulmonary hypertension (HCC)    Post-nasal drip    Pes anserine bursitis    Positive HELENE (antinuclear antibody)    Other chronic pulmonary heart diseases    Osteoporosis    Patellofemoral disorder of right knee    Pes anserinus bursitis of right knee    Arthritis of right knee    Other tear of medial meniscus, current injury, right knee, initial encounter    Tear of medial meniscus of right knee, current    Chronic pain of right knee    Elevated serum creatinine    Hyperuricemia    Trochanteric bursitis of both hips    Undifferentiated connective tissue disease (HCC)    Vitamin D deficiency    Chronic kidney disease, stage 3a (HCC)    Right knee pain    Status post total right knee replacement    Left knee pain    Tear of medial meniscus of left knee, current    Primary osteoarthritis of  left knee    Morbid obesity (HCC)    Leukopenia    Venous stasis of lower extremity    Acute pain of right knee    Status post total knee replacement using cement, left    COVID-19    SLE (systemic lupus erythematosus) ([facility])    Arthritis of left knee    Iron deficiency anemia due to chronic blood loss    Insomnia    Pulmonary embolus ([facility])    BMI 39.0-39.9,adult    Financial difficulties    Antiphospholipid antibody syndrome ([facility])    Sleeping difficulty    Neuropathic pain, leg, left    Anticoagulated    Fall    Neutropenia, unspecified type ([facility])    Rheumatoid arthritis, involving unspecified site, unspecified whether rheumatoid factor present ([facility])    BMI 40.0-44.9, adult ([facility])    Chronic midline low back pain without sciatica    Motion sickness      Past Medical and Surgical History:     Past Medical History:   Diagnosis Date    HELENE positive     Anemia     Sildenafil causes decreased hematocrit    Anxiety 03/2020    CHF (congestive heart failure) ([facility])     right heart failure related to pulm HTN lupus    Chronic kidney disease     borderline lab values    Chronic rhinitis 06/04/2012    Clotting disorder ([facility]) 2012    Coronary artery disease 2018    Encephalitis     Lasted Assessed 10/18/2017    Gout 06/26/2018    Hypertension     Lupus anticoagulant disorder ([facility])     Maxillary sinusitis     Lasted Assessed 10/18/2017    Night sweat     Osteopenia     Hip    Osteoporosis     Spine    Pneumonia     Last Assessed 10/18/2017    PONV (postoperative nausea and vomiting)     Pulmonary embolism ([facility])     Pulmonary hypertension ([facility])     Seizures ([facility])     Only during Encephalitis    Shortness of breath     with exertion    Sinus tachycardia     Urinary incontinence      Past Surgical History:   Procedure Laterality Date    ARTHRODESIS      Hand: IP Joint    CHOLECYSTECTOMY      COLONOSCOPY      COLPOSCOPY      HYSTERECTOMY      Vaginal    JOINT REPLACEMENT Right     Knee replacement    KNEE SURGERY  2/2019     LAPAROSCOPIC ESOPHAGOGASTRIC FUNDOPLASTY  2008    MN ARTHRP KNE CONDYLE&PLATU MEDIAL&LAT COMPARTMENTS Right 2/12/2019    Procedure: KNEE TOTAL ARTHROPLASTY AND ASSOCIATED PROCEDURES;  Surgeon: Donovan Zendejas DO;  [facility];  Service: Orthopedics    MN ARTHRP KNE CONDYLE&PLATU MEDIAL&LAT COMPARTMENTS Left 10/6/2022    Procedure: ARTHROPLASTY KNEE TOTAL;  Surgeon: Donovan Zendejas DO;  [facility];  Service: Orthopedics    MN ARTHRS KNEE W/MENISCECTOMY MED&LAT W/SHAVING Right 10/2/2018    Procedure: KNEE ARTHROSCOPY WITH PARTIAL MEDIAL MENISCECTOMY;  Surgeon: Donovan Zendejas DO;  [facility];  Service: Orthopedics    MN ARTHRS KNEE W/MENISCECTOMY MED&LAT W/SHAVING Left 12/17/2019    Procedure: KNEE ARTHROSCOPIC MENISCECTOMY /MEDIAL; Chondyl medial and posterior;  Surgeon: Donovan Zendejas DO;  [facility];  Service: Orthopedics    REDUCTION MAMMAPLASTY      REPAIR ANKLE LIGAMENT Right     TONSILLECTOMY        Family History:     Family History   Problem Relation Age of Onset    Uterine cancer Mother     Pancreatic cancer Mother 70    Diabetes Mother     Endometrial cancer Mother 66    Arthritis Mother     Cancer Mother         Pancreas, Uterine    Vision loss Mother     Heart disease Father         open heart surgery at age 50     Stroke Father         CVA    Hypertension Father     Atrial fibrillation Father     Hyperlipidemia Father     Arthritis Father     Rheum arthritis Father     No Known Problems Sister     No Known Problems Sister     Thyroid disease Sister     Depression Sister     Osteoarthritis Sister     Asthma Daughter     Migraines Daughter     Asthma Daughter     Thyroid disease Maternal Grandmother     Heart attack Maternal Grandfather     Heart attack Paternal Grandmother     Skin cancer Paternal Grandfather     No Known Problems Brother     Hemochromatosis Brother     No Known Problems Maternal Aunt     No Known Problems Paternal Aunt     Anuerysm Neg Hx     Clotting disorder  "Neg Hx     Heart failure Neg Hx       Social History:     Social History     Socioeconomic History    Marital status: /Civil Union     Spouse name: None    Number of children: 2    Years of education: None    Highest education level: None   Occupational History    None   Tobacco Use    Smoking status: Former     Current packs/day: 0.00     Types: Cigarettes     Quit date: 2006     Years since quittin.1    Smokeless tobacco: Never   Vaping Use    Vaping status: Never Used   Substance and Sexual Activity    Alcohol use: Yes     Alcohol/week: 0.0 standard drinks of alcohol     Comment: socially    Drug use: No    Sexual activity: Yes     Partners: Male   Other Topics Concern    None   Social History Narrative    Daily caffeinated coffee consumption    Exercise habits     Social Determinants of Health     Financial Resource Strain: Medium Risk (2024)    Overall Financial Resource Strain (CARDIA)     Difficulty of Paying Living Expenses: Somewhat hard   Food Insecurity: Not on file   Transportation Needs: No Transportation Needs (2024)    PRAPARE - Transportation     Lack of Transportation (Medical): No     Lack of Transportation (Non-Medical): No   Physical Activity: Not on file   Stress: Not on file   Social Connections: Not on file   Intimate Partner Violence: Not on file   Housing Stability: Not on file      Medications and Allergies:     Current Outpatient Medications   Medication Sig Dispense Refill    B-D 3CC LUER-LILLIE SYR 25GX1/2\" 25G X 1-1/2\" 3 ML MISC USE 1 SYRINGE EVERY 30 DAYS 12 each 1    Benlysta 200 MG/ML SOAJ Weekly on       clindamycin (CLEOCIN) 300 MG capsule Take 3 capsules by mouth 1 hour prior to dental procedure 3 capsule 3    cyanocobalamin 1,000 mcg/mL Inject 1 mL (1,000 mcg total) into a muscle every 14 (fourteen) days 12 mL 1    gabapentin (NEURONTIN) 100 mg capsule Take 1 capsule (100 mg total) by mouth daily at bedtime 90 capsule 1    hydroxychloroquine " "(PLAQUENIL) 200 mg tablet Take 400 mg by mouth daily with breakfast Alternating days/dosages      leflunomide (ARAVA) 10 MG tablet Take 10 mg by mouth daily      ramelteon (ROZEREM) 8 mg tablet Take 1 tablet (8 mg total) by mouth daily at bedtime 30 tablet 0    scopolamine (TRANSDERM-SCOP) 1 mg/3 days TD 72 hr patch Place 1 patch on the skin over 72 hours every third day 10 patch 0    sildenafil (REVATIO) 20 mg tablet Take 1 tablet (20 mg total) by mouth 3 (three) times a day 90 tablet 11    spironolactone (ALDACTONE) 25 mg tablet Take 1 tablet (25 mg total) by mouth daily 90 tablet 3    Syringe/Needle, Disp, (SecureSafe Syringe/Needle) 25G X 1-1/2\" 1 ML MISC Use 1 Syringe every 30 (thirty) days 12 each 1    torsemide (DEMADEX) 20 mg tablet Take 1 tablet (20 mg total) by mouth daily 90 tablet 3    triamcinolone (KENALOG) 0.1 % oral topical paste prn      warfarin (Coumadin) 5 mg tablet 5mg po daily or as directed. 90 tablet 1    azaTHIOprine (IMURAN) 50 mg tablet Take 50 mg by mouth daily      cholecalciferol (VITAMIN D3) 1,000 units tablet Take 2,000 Units by mouth daily in the early morning         No current facility-administered medications for this visit.     Allergies   Allergen Reactions    Dilantin [Phenytoin] Anaphylaxis and Rash    Penicillins Rash, Anaphylaxis, Dermatitis and Hives    Augmentin [Amoxicillin-Pot Clavulanate] Rash and Dermatitis      Immunizations:     Immunization History   Administered Date(s) Administered    COVID-19 PFIZER VACCINE 0.3 ML IM 03/06/2021, 03/27/2021, 08/18/2021    COVID-19 Pfizer Vac BIVALENT Dmitri-sucrose 12 Yr+ IM 09/09/2022, 06/02/2023    Fluzone Split Quad 0.5 mL 10/06/2023    Influenza Quadrivalent 3 years and older 11/12/2018    Influenza Quadrivalent Preservative Free 3 years and older IM 10/07/2021    Influenza, injectable, quadrivalent, preservative free 0.5 mL 12/19/2018    Influenza, recombinant, quadrivalent,injectable, preservative free 10/14/2019, 09/24/2020, " 09/09/2022    Influenza, seasonal, injectable, preservative free 10/03/2017    Pneumococcal Conjugate Vaccine 20-valent (Pcv20), Polysace 05/25/2023    Pneumococcal Polysaccharide PPV23 12/19/2018    Respiratory Syncytial Virus Vaccine (Recombinant, Adjuvanted) 10/22/2023    Tdap 12/12/2016, 11/11/2023      Health Maintenance:         Topic Date Due    Breast Cancer Screening: Mammogram  03/23/2024    Colorectal Cancer Screening  07/15/2028    Hepatitis C Screening  Completed    HIV Screening  Addressed    Cervical Cancer Screening  Discontinued         Topic Date Due    Influenza Vaccine (1) 09/01/2023    COVID-19 Vaccine (6 - 2023-24 season) 12/01/2023      Medicare Screening Tests and Risk Assessments:     Na is here for her Subsequent Wellness visit. Last Medicare Wellness visit information reviewed, patient interviewed and updates made to the record today.      Health Risk Assessment:   Patient rates overall health as good. Patient feels that their physical health rating is same. Patient is satisfied with their life. Eyesight was rated as slightly worse. Hearing was rated as slightly worse. Patient feels that their emotional and mental health rating is same. Patients states they are never, rarely angry. Patient states they are often unusually tired/fatigued. Pain experienced in the last 7 days has been some. Patient's pain rating has been 3/10. Patient states that she has experienced no weight loss or gain in last 6 months.     Depression Screening:   PHQ-2 Score: 0      Fall Risk Screening:   In the past year, patient has experienced: history of falling in past year    Number of falls: 1  Injured during fall?: Yes    Feels unsteady when standing or walking?: Yes    Worried about falling?: Yes      Urinary Incontinence Screening:   Patient has leaked urine accidently in the last six months.     Home Safety:  Patient has trouble with stairs inside or outside of their home. Patient has working smoke alarms and  has working carbon monoxide detector. Home safety hazards include: none.     Nutrition:   Current diet is Regular.     Medications:   Patient is currently taking over-the-counter supplements. OTC medications include: see medication list. Patient is able to manage medications.     Activities of Daily Living (ADLs)/Instrumental Activities of Daily Living (IADLs):   Walk and transfer into and out of bed and chair?: Yes  Dress and groom yourself?: Yes    Bathe or shower yourself?: Yes    Feed yourself? Yes  Do your laundry/housekeeping?: No  Manage your money, pay your bills and track your expenses?: Yes  Make your own meals?: Yes    Do your own shopping?: Yes    Previous Hospitalizations:   Any hospitalizations or ED visits within the last 12 months?: Yes    How many hospitalizations have you had in the last year?: 1-2    Advance Care Planning:   Living will: Yes    Durable POA for healthcare: Yes    Advanced directive: Yes      Cognitive Screening:   Provider or family/friend/caregiver concerned regarding cognition?: No    PREVENTIVE SCREENINGS      Cardiovascular Screening:    General: Screening Current      Diabetes Screening:     General: Screening Current      Colorectal Cancer Screening:     General: Screening Current      Breast Cancer Screening:     General: Screening Current      Cervical Cancer Screening:    General: Screening Current      Osteoporosis Screening:    General: Screening Not Indicated and History Osteoporosis      Abdominal Aortic Aneurysm (AAA) Screening:        General: Screening Not Indicated      Lung Cancer Screening:     General: Screening Not Indicated      Hepatitis C Screening:    General: Screening Current    Screening, Brief Intervention, and Referral to Treatment (SBIRT)    Screening  Typical number of drinks in a day: 0  Typical number of drinks in a week: 0  Interpretation: Low risk drinking behavior.    AUDIT-C Screenin) How often did you have a drink containing alcohol in  "the past year? monthly or less  2) How many drinks did you have on a typical day when you were drinking in the past year? 1 to 2  3) How often did you have 6 or more drinks on one occasion in the past year? never    AUDIT-C Score: 1  Interpretation: Score 0-2 (female): Negative screen for alcohol misuse    Single Item Drug Screening:  How often have you used an illegal drug (including marijuana) or a prescription medication for non-medical reasons in the past year? never    Single Item Drug Screen Score: 0  Interpretation: Negative screen for possible drug use disorder    Brief Intervention  Alcohol & drug use screenings were reviewed. No concerns regarding substance use disorder identified.     Other Counseling Topics:   Car/seat belt/driving safety, skin self-exam, sunscreen and calcium and vitamin D intake and regular weightbearing exercise.     No results found.     Physical Exam:     /80   Pulse 92   Temp 98.2 °F (36.8 °C)   Resp 18   Ht 5' 2\" (1.575 m)   Wt 110 kg (242 lb 3.2 oz)   SpO2 98%   BMI 44.30 kg/m²     Physical Exam  Vitals and nursing note reviewed.   Constitutional:       General: She is not in acute distress.     Appearance: Normal appearance. She is well-developed. She is not ill-appearing.      Comments: Body mass index is 44.3 kg/m².    HENT:      Head: Normocephalic and atraumatic.      Right Ear: Hearing, tympanic membrane, ear canal and external ear normal.      Left Ear: Hearing, tympanic membrane, ear canal and external ear normal.      Nose: Nose normal.      Mouth/Throat:      Mouth: Mucous membranes are moist.      Pharynx: Oropharynx is clear. Uvula midline.   Eyes:      General: No scleral icterus.     Conjunctiva/sclera: Conjunctivae normal.      Pupils: Pupils are equal, round, and reactive to light.   Neck:      Thyroid: No thyromegaly.   Cardiovascular:      Rate and Rhythm: Normal rate and regular rhythm.      Heart sounds: Normal heart sounds. No murmur " heard.  Pulmonary:      Effort: Pulmonary effort is normal. No respiratory distress.      Breath sounds: Normal breath sounds. No wheezing.   Abdominal:      General: Bowel sounds are normal. There is no distension.      Palpations: Abdomen is soft.      Tenderness: There is no abdominal tenderness.   Musculoskeletal:         General: Tenderness (midline lumbar TTP, diffuse) present. Normal range of motion.      Cervical back: Normal range of motion and neck supple.      Right lower leg: No edema.      Left lower leg: No edema.   Lymphadenopathy:      Cervical: No cervical adenopathy.   Skin:     General: Skin is warm and dry.      Coloration: Skin is not jaundiced.      Findings: No erythema or rash.   Neurological:      General: No focal deficit present.      Mental Status: She is alert and oriented to person, place, and time.      Cranial Nerves: No cranial nerve deficit.      Gait: Gait normal.   Psychiatric:         Mood and Affect: Mood normal.         Behavior: Behavior normal.          Sultana Arnold, DO

## 2024-03-04 NOTE — PATIENT INSTRUCTIONS
Medicare Preventive Visit Patient Instructions  Thank you for completing your Welcome to Medicare Visit or Medicare Annual Wellness Visit today. Your next wellness visit will be due in one year (3/5/2025).  The screening/preventive services that you may require over the next 5-10 years are detailed below. Some tests may not apply to you based off risk factors and/or age. Screening tests ordered at today's visit but not completed yet may show as past due. Also, please note that scanned in results may not display below.  Preventive Screenings:  Service Recommendations Previous Testing/Comments   Colorectal Cancer Screening  * Colonoscopy    * Fecal Occult Blood Test (FOBT)/Fecal Immunochemical Test (FIT)  * Fecal DNA/Cologuard Test  * Flexible Sigmoidoscopy Age: 45-75 years old   Colonoscopy: every 10 years (may be performed more frequently if at higher risk)  OR  FOBT/FIT: every 1 year  OR  Cologuard: every 3 years  OR  Sigmoidoscopy: every 5 years  Screening may be recommended earlier than age 45 if at higher risk for colorectal cancer. Also, an individualized decision between you and your healthcare provider will decide whether screening between the ages of 76-85 would be appropriate. Colonoscopy: 07/17/2023  FOBT/FIT: Not on file  Cologuard: Not on file  Sigmoidoscopy: Not on file    Screening Current     Breast Cancer Screening Age: 40+ years old  Frequency: every 1-2 years  Not required if history of left and right mastectomy Mammogram: 03/23/2023    Screening Current   Cervical Cancer Screening Between the ages of 21-29, pap smear recommended once every 3 years.   Between the ages of 30-65, can perform pap smear with HPV co-testing every 5 years.   Recommendations may differ for women with a history of total hysterectomy, cervical cancer, or abnormal pap smears in past. Pap Smear: 03/04/2022    Screening Current   Hepatitis C Screening Once for adults born between 1945 and 1965  More frequently in patients at  high risk for Hepatitis C Hep C Antibody: 07/23/2019    Screening Current   Diabetes Screening 1-2 times per year if you're at risk for diabetes or have pre-diabetes Fasting glucose: 105 mg/dL (3/1/2024)  A1C: 5.2 % (9/15/2022)  Screening Current   Cholesterol Screening Once every 5 years if you don't have a lipid disorder. May order more often based on risk factors. Lipid panel: 04/03/2023    Screening Current     Other Preventive Screenings Covered by Medicare:  Abdominal Aortic Aneurysm (AAA) Screening: covered once if your at risk. You're considered to be at risk if you have a family history of AAA.  Lung Cancer Screening: covers low dose CT scan once per year if you meet all of the following conditions: (1) Age 55-77; (2) No signs or symptoms of lung cancer; (3) Current smoker or have quit smoking within the last 15 years; (4) You have a tobacco smoking history of at least 20 pack years (packs per day multiplied by number of years you smoked); (5) You get a written order from a healthcare provider.  Glaucoma Screening: covered annually if you're considered high risk: (1) You have diabetes OR (2) Family history of glaucoma OR (3)  aged 50 and older OR (4)  American aged 65 and older  Osteoporosis Screening: covered every 2 years if you meet one of the following conditions: (1) You're estrogen deficient and at risk for osteoporosis based off medical history and other findings; (2) Have a vertebral abnormality; (3) On glucocorticoid therapy for more than 3 months; (4) Have primary hyperparathyroidism; (5) On osteoporosis medications and need to assess response to drug therapy.   Last bone density test (DXA Scan): 10/19/2021.  HIV Screening: covered annually if you're between the age of 15-65. Also covered annually if you are younger than 15 and older than 65 with risk factors for HIV infection. For pregnant patients, it is covered up to 3 times per pregnancy.    Immunizations:  Immunization  Recommendations   Influenza Vaccine Annual influenza vaccination during flu season is recommended for all persons aged >= 6 months who do not have contraindications   Pneumococcal Vaccine   * Pneumococcal conjugate vaccine = PCV13 (Prevnar 13), PCV15 (Vaxneuvance), PCV20 (Prevnar 20)  * Pneumococcal polysaccharide vaccine = PPSV23 (Pneumovax) Adults 19-65 yo with certain risk factors or if 65+ yo  If never received any pneumonia vaccine: recommend Prevnar 20 (PCV20)  Give PCV20 if previously received 1 dose of PCV13 or PPSV23   Hepatitis B Vaccine 3 dose series if at intermediate or high risk (ex: diabetes, end stage renal disease, liver disease)   Respiratory syncytial virus (RSV) Vaccine - COVERED BY MEDICARE PART D  * RSVPreF3 (Arexvy) CDC recommends that adults 60 years of age and older may receive a single dose of RSV vaccine using shared clinical decision-making (SCDM)   Tetanus (Td) Vaccine - COST NOT COVERED BY MEDICARE PART B Following completion of primary series, a booster dose should be given every 10 years to maintain immunity against tetanus. Td may also be given as tetanus wound prophylaxis.   Tdap Vaccine - COST NOT COVERED BY MEDICARE PART B Recommended at least once for all adults. For pregnant patients, recommended with each pregnancy.   Shingles Vaccine (Shingrix) - COST NOT COVERED BY MEDICARE PART B  2 shot series recommended in those 19 years and older who have or will have weakened immune systems or those 50 years and older     Health Maintenance Due:      Topic Date Due   • Breast Cancer Screening: Mammogram  03/23/2024   • Colorectal Cancer Screening  07/15/2028   • Hepatitis C Screening  Completed   • HIV Screening  Addressed   • Cervical Cancer Screening  Discontinued     Immunizations Due:      Topic Date Due   • Influenza Vaccine (1) 09/01/2023   • COVID-19 Vaccine (6 - 2023-24 season) 12/01/2023     Advance Directives   What are advance directives?  Advance directives are legal  documents that state your wishes and plans for medical care. These plans are made ahead of time in case you lose your ability to make decisions for yourself. Advance directives can apply to any medical decision, such as the treatments you want, and if you want to donate organs.   What are the types of advance directives?  There are many types of advance directives, and each state has rules about how to use them. You may choose a combination of any of the following:  Living will:  This is a written record of the treatment you want. You can also choose which treatments you do not want, which to limit, and which to stop at a certain time. This includes surgery, medicine, IV fluid, and tube feedings.   Durable power of  for healthcare (DPAHC):  This is a written record that states who you want to make healthcare choices for you when you are unable to make them for yourself. This person, called a proxy, is usually a family member or a friend. You may choose more than 1 proxy.  Do not resuscitate (DNR) order:  A DNR order is used in case your heart stops beating or you stop breathing. It is a request not to have certain forms of treatment, such as CPR. A DNR order may be included in other types of advance directives.  Medical directive:  This covers the care that you want if you are in a coma, near death, or unable to make decisions for yourself. You can list the treatments you want for each condition. Treatment may include pain medicine, surgery, blood transfusions, dialysis, IV or tube feedings, and a ventilator (breathing machine).  Values history:  This document has questions about your views, beliefs, and how you feel and think about life. This information can help others choose the care that you would choose.  Why are advance directives important?  An advance directive helps you control your care. Although spoken wishes may be used, it is better to have your wishes written down. Spoken wishes can be  misunderstood, or not followed. Treatments may be given even if you do not want them. An advance directive may make it easier for your family to make difficult choices about your care.   Urinary Incontinence   Urinary incontinence (UI)  is when you lose control of your bladder. UI develops because your bladder cannot store or empty urine properly. The 3 most common types of UI are stress incontinence, urge incontinence, or both.  Medicines:   May be given to help strengthen your bladder control. Report any side effects of medication to your healthcare provider.  Do pelvic muscle exercises often:  Your pelvic muscles help you stop urinating. Squeeze these muscles tight for 5 seconds, then relax for 5 seconds. Gradually work up to squeezing for 10 seconds. Do 3 sets of 15 repetitions a day, or as directed. This will help strengthen your pelvic muscles and improve bladder control.  Train your bladder:  Go to the bathroom at set times, such as every 2 hours, even if you do not feel the urge to go. You can also try to hold your urine when you feel the urge to go. For example, hold your urine for 5 minutes when you feel the urge to go. As that becomes easier, hold your urine for 10 minutes.   Self-care:   Keep a UI record.  Write down how often you leak urine and how much you leak. Make a note of what you were doing when you leaked urine.  Drink liquids as directed. You may need to limit the amount of liquid you drink to help control your urine leakage. Do not drink any liquid right before you go to bed. Limit or do not have drinks that contain caffeine or alcohol.   Prevent constipation.  Eat a variety of high-fiber foods. Good examples are high-fiber cereals, beans, vegetables, and whole-grain breads. Walking is the best way to trigger your intestines to have a bowel movement.  Exercise regularly and maintain a healthy weight.  Weight loss and exercise will decrease pressure on your bladder and help you control your  leakage.   Use a catheter as directed  to help empty your bladder. A catheter is a tiny, plastic tube that is put into your bladder to drain your urine.   Go to behavior therapy as directed.  Behavior therapy may be used to help you learn to control your urge to urinate.    Weight Management   Why it is important to manage your weight:  Being overweight increases your risk of health conditions such as heart disease, high blood pressure, type 2 diabetes, and certain types of cancer. It can also increase your risk for osteoarthritis, sleep apnea, and other respiratory problems. Aim for a slow, steady weight loss. Even a small amount of weight loss can lower your risk of health problems.  How to lose weight safely:  A safe and healthy way to lose weight is to eat fewer calories and get regular exercise. You can lose up about 1 pound a week by decreasing the number of calories you eat by 500 calories each day.   Healthy meal plan for weight management:  A healthy meal plan includes a variety of foods, contains fewer calories, and helps you stay healthy. A healthy meal plan includes the following:  Eat whole-grain foods more often.  A healthy meal plan should contain fiber. Fiber is the part of grains, fruits, and vegetables that is not broken down by your body. Whole-grain foods are healthy and provide extra fiber in your diet. Some examples of whole-grain foods are whole-wheat breads and pastas, oatmeal, brown rice, and bulgur.  Eat a variety of vegetables every day.  Include dark, leafy greens such as spinach, kale, dion greens, and mustard greens. Eat yellow and orange vegetables such as carrots, sweet potatoes, and winter squash.   Eat a variety of fruits every day.  Choose fresh or canned fruit (canned in its own juice or light syrup) instead of juice. Fruit juice has very little or no fiber.  Eat low-fat dairy foods.  Drink fat-free (skim) milk or 1% milk. Eat fat-free yogurt and low-fat cottage cheese. Try  low-fat cheeses such as mozzarella and other reduced-fat cheeses.  Choose meat and other protein foods that are low in fat.  Choose beans or other legumes such as split peas or lentils. Choose fish, skinless poultry (chicken or turkey), or lean cuts of red meat (beef or pork). Before you cook meat or poultry, cut off any visible fat.   Use less fat and oil.  Try baking foods instead of frying them. Add less fat, such as margarine, sour cream, regular salad dressing and mayonnaise to foods. Eat fewer high-fat foods. Some examples of high-fat foods include french fries, doughnuts, ice cream, and cakes.  Eat fewer sweets.  Limit foods and drinks that are high in sugar. This includes candy, cookies, regular soda, and sweetened drinks.  Exercise:  Exercise at least 30 minutes per day on most days of the week. Some examples of exercise include walking, biking, dancing, and swimming. You can also fit in more physical activity by taking the stairs instead of the elevator or parking farther away from stores. Ask your healthcare provider about the best exercise plan for you.      © Copyright [employer] 2018 Information is for End User's use only and may not be sold, redistributed or otherwise used for commercial purposes. All illustrations and images included in CareNotes® are the copyrighted property of [employer] or [employer]

## 2024-03-04 NOTE — ASSESSMENT & PLAN NOTE
Does not feel trazodone is helpful  Will d/c trazodone and start ramelteon 8 mg QHS  Sleep study ordered to r/o [facility], [facility]

## 2024-03-04 NOTE — ASSESSMENT & PLAN NOTE
Exacerbated by fall in Nov 2023  Check lumbar XR given duration of sx (4 months) despite tylenol and NSAID use  Pt cautioned to avoid PO NSAIDs as she is using warfarin  Voltaren gel ok to trial, may consider PT referral pending imaging results

## 2024-03-04 NOTE — PROGRESS NOTES
Name: Na Joseph      : 1965      MRN: [id number]  Encounter Provider: Sultana Arnold DO  Encounter Date: 3/4/2024   Encounter department: [facility]    Assessment & Plan     1. Medicare annual wellness visit, initial    2. BMI 40.0-44.9, adult (HCC)    3. Chronic midline low back pain without sciatica  -     XR spine lumbar minimum 4 views non injury; Future; Expected date: 2024    4. Insomnia, unspecified type  -     ramelteon (ROZEREM) 8 mg tablet; Take 1 tablet (8 mg total) by mouth daily at bedtime  -     Ambulatory Referral to Sleep Medicine; Future    5. Neutropenia, unspecified type (HCC)    6. Rheumatoid arthritis, involving unspecified site, unspecified whether rheumatoid factor present (HCC)    7. Chronic obstructive pulmonary disease, unspecified COPD type (HCC)    8. Diffuse connective tissue disease (HCC)    9. Encounter for screening mammogram for breast cancer  -     Mammo screening bilateral w 3d & cad; Future    10. Motion sickness, initial encounter  -     scopolamine (TRANSDERM-SCOP) 1 mg/3 days TD 72 hr patch; Place 1 patch on the skin over 72 hours every third day    11. Neuropathic pain, leg, left  -     gabapentin (NEURONTIN) 100 mg capsule; Take 1 capsule (100 mg total) by mouth daily at bedtime           Rajat Monzon is a 59 year old female presenting for her annual wellness visit. Today she presents for evaluation of sleep concerns as well as lower back pain. Patient reports that she has been having trouble staying asleep since her PE in 2022. She has no problem falling asleep at 9:30 pm but will wake up every few hours. The awakenings are not connected to a need to use the bathroom. She has not noticed any snoring or waking up gasping for breath. Patient had a sleep study done around 5 years ago that was found to be normal. She also complains of lower back pain that began a few days after her fall in 2023. She doesn't  "remember feeling any pain initially but says the pain started a few days later when she was home from the hospital. The pain is usually relieved by Tylenol and Aleve and is very focal over the center part of her lower back. The pain does not radiate and she has no associated numbness or tingling.      Review of Systems    Current Outpatient Medications on File Prior to Visit   Medication Sig    B-D 3CC LUER-LILLIE SYR 25GX1/2\" 25G X 1-1/2\" 3 ML MISC USE 1 SYRINGE EVERY 30 DAYS    Benlysta 200 MG/ML SOAJ Weekly on Fridays    clindamycin (CLEOCIN) 300 MG capsule Take 3 capsules by mouth 1 hour prior to dental procedure    cyanocobalamin 1,000 mcg/mL Inject 1 mL (1,000 mcg total) into a muscle every 14 (fourteen) days    hydroxychloroquine (PLAQUENIL) 200 mg tablet Take 400 mg by mouth daily with breakfast Alternating days/dosages    leflunomide (ARAVA) 10 MG tablet Take 10 mg by mouth daily    sildenafil (REVATIO) 20 mg tablet Take 1 tablet (20 mg total) by mouth 3 (three) times a day    spironolactone (ALDACTONE) 25 mg tablet Take 1 tablet (25 mg total) by mouth daily    Syringe/Needle, Disp, (SecureSafe Syringe/Needle) 25G X 1-1/2\" 1 ML MISC Use 1 Syringe every 30 (thirty) days    torsemide (DEMADEX) 20 mg tablet Take 1 tablet (20 mg total) by mouth daily    triamcinolone (KENALOG) 0.1 % oral topical paste prn    warfarin (Coumadin) 5 mg tablet 5mg po daily or as directed.    [DISCONTINUED] gabapentin (NEURONTIN) 100 mg capsule TAKE 1 CAPSULE(100 MG) BY MOUTH DAILY AT BEDTIME    [DISCONTINUED] scopolamine (TRANSDERM-SCOP) 1 mg/3 days TD 72 hr patch Place 1 patch on the skin over 72 hours every third day    [DISCONTINUED] traZODone (DESYREL) 50 mg tablet TAKE 1 TABLET(50 MG) BY MOUTH DAILY AT BEDTIME    azaTHIOprine (IMURAN) 50 mg tablet Take 50 mg by mouth daily    cholecalciferol (VITAMIN D3) 1,000 units tablet Take 2,000 Units by mouth daily in the early morning      [DISCONTINUED] ascorbic acid (VITAMIN C) 500 MG " "tablet Take 1 tablet (500 mg total) by mouth 2 (two) times a day    [DISCONTINUED] ferrous sulfate 324 (65 Fe) mg Take 1 tablet (324 mg total) by mouth 2 (two) times a day before meals    [DISCONTINUED] folic acid (FOLVITE) 1 mg tablet TAKE 1 TABLET(1 MG) BY MOUTH DAILY    [DISCONTINUED] methocarbamol (Robaxin-750) 750 mg tablet Take 1 tablet (750 mg total) by mouth every 6 (six) hours as needed for muscle spasms    [DISCONTINUED] Multiple Vitamins-Minerals (multivitamin with minerals) tablet Take 1 tablet by mouth daily    [DISCONTINUED] ondansetron (Zofran ODT) 4 mg disintegrating tablet Take 1 tablet (4 mg total) by mouth every 6 (six) hours as needed for nausea or vomiting       Objective     /80   Pulse 92   Temp 98.2 °F (36.8 °C)   Resp 18   Ht 5' 2\" (1.575 m)   Wt 110 kg (242 lb 3.2 oz)   SpO2 98%   BMI 44.30 kg/m²     Physical Exam  Sultana Arnold, DO    "

## 2024-03-06 ENCOUNTER — ANNUAL EXAM (OUTPATIENT)
Dept: OBGYN CLINIC | Facility: CLINIC | Age: 59
End: 2024-03-06
Payer: MEDICARE

## 2024-03-06 VITALS
SYSTOLIC BLOOD PRESSURE: 138 MMHG | WEIGHT: 241 LBS | HEIGHT: 62 IN | DIASTOLIC BLOOD PRESSURE: 88 MMHG | BODY MASS INDEX: 44.35 KG/M2

## 2024-03-06 DIAGNOSIS — N39.43 POST-VOID DRIBBLING: ICD-10-CM

## 2024-03-06 DIAGNOSIS — Z01.419 WELL WOMAN EXAM WITH ROUTINE GYNECOLOGICAL EXAM: Primary | ICD-10-CM

## 2024-03-06 DIAGNOSIS — N39.3 STRESS INCONTINENCE OF URINE: ICD-10-CM

## 2024-03-06 DIAGNOSIS — M81.0 AGE-RELATED OSTEOPOROSIS WITHOUT CURRENT PATHOLOGICAL FRACTURE: ICD-10-CM

## 2024-03-06 DIAGNOSIS — Z78.0 POSTMENOPAUSAL STATE: ICD-10-CM

## 2024-03-06 DIAGNOSIS — Z12.31 SCREENING MAMMOGRAM FOR BREAST CANCER: ICD-10-CM

## 2024-03-06 DIAGNOSIS — E04.1 THYROID NODULE: ICD-10-CM

## 2024-03-06 PROCEDURE — G0101 CA SCREEN;PELVIC/BREAST EXAM: HCPCS | Performed by: PHYSICIAN ASSISTANT

## 2024-03-06 RX ORDER — LEFLUNOMIDE 10 MG/1
10 TABLET ORAL DAILY
COMMUNITY
Start: 2024-02-01 | End: 2024-03-12

## 2024-03-06 NOTE — PROGRESS NOTES
Assessment   59 y.o.  presenting for annual exam.     Plan   Diagnoses and all orders for this visit:    Well woman exam with routine gynecological exam    Postmenopausal state  -     DXA bone density spine hip and pelvis; Future    Screening mammogram for breast cancer    Age-related osteoporosis without current pathological fracture  -     DXA bone density spine hip and pelvis; Future    Stress incontinence of urine  -     Ambulatory Referral to Urogynecology; Future    Post-void dribbling  -     Ambulatory Referral to Urogynecology; Future    Thyroid nodule    Other orders  -     leflunomide (ARAVA) 10 MG tablet; Take 10 mg by mouth daily        Pap no longer indicated  Mammo slip ordered by PCP   Colonoscopy up to date   DXA- due; has known osteoporosis but has not initiated a treatment. Discussed options with heme/onc and is considering her options   Stress incontinence and post void dribbling- discussed option of uro/gyn referral vs pelvic floor PT. She will consider options.   Hx of thyroid nodule - incidentally found on chest CT on 2023; has not had follow up imaging. Advised she discuss findings with PCP for possible thyroid US.     Perineal hygiene reviewed. Weight bearing exercises minium of 150 mins/weekly advised. Kegel exercises recommended.   SBE encouraged, A yearly mammogram is recommended for breast cancer screening starting at age 40. ASCCP guidelines reviewed. Condoms encouraged with all sexual activity to prevent STI's. Gardisil vaccines recommended up to age 45. Calcium/ Vit D dietary requirements discussed.   Advised to call with any issues, all concerns & questions addressed.   See provided information in your after visit summary     F/U Annually and PRN    Results will be released to [employer], if abnormal will call or message to review and discuss treatment plan.     __________________________________________________________________    Subjective     Na Joseph is a 59 y.o.   presenting for annual exam.     S/p vaginal hysterectomy for fibroids.     She reports more frequent stress incontinence and post void leakage. She states she saw uro/gyn in the past for this. Would be interested in another referral as she last saw uro/gyn about 15 years ago per pt    She also states she had a CT chest in November which incidentally showed a finding of 1.6 cm thyroid nodule. She states she has not heard from anyone about follow up for this.    SCREENING  Last Pap: s/p hysterectomy  Last Mammo: 2023 birads 2  Last Colonoscopy: 2023 5 year recall  Last DEXA: 10/19/2021 osteoporosis - not presently on medications. She has discussed      GYN  Sexually active: Yes - single partner - male    Hx Abnormal pap: denies  We reviewed ASCCP guidelines for Pap testing today.    Denies vaginal discharge, itching, odor, dyspareunia, pelvic pain and vulvar/vaginal symptoms      OB         Complaints:+ stress incontinence   Denies urgency, frequency, hematuria, difficulty urinating.       BREAST  Complaints: denies   Denies: breast lump, breast tenderness, nipple discharge, skin color change, and skin lesion(s)      Pertinent Family Hx:   Family hx of breast cancer: no  Family hx of ovarian cancer: no  Family hx of colon cancer: no  Family hx of endometrial cancer: mom      GENERAL  PMH reviewed/updated and is as below.   Patient does follow with a PCP.    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    Chronic kidney disease     borderline lab values    Chronic rhinitis 2012    Clotting disorder (HCC) 2012    Coronary artery disease 2018    Encephalitis     Lasted Assessed 10/18/2017    Gout 2018    Heart failure (HCC)  "2019    History of transfusion 2/13/2019    autologous    Hypertension     Lupus (HCC) 2018    Lupus anticoagulant disorder (HCC)     Maxillary sinusitis     Lasted Assessed 10/18/2017    Night sweat     Osteopenia     Hip    Osteoporosis     Spine    Pneumonia     Last Assessed 10/18/2017    PONV (postoperative nausea and vomiting)     Pulmonary embolism (HCC)     Pulmonary hypertension (HCC)     Seizures (HCC)     Only during Encephalitis    Shortness of breath     with exertion    Sinus tachycardia     Urinary incontinence        Past Surgical History:   Procedure Laterality Date    ARTHRODESIS      Hand: IP Joint    CHOLECYSTECTOMY      COLONOSCOPY      COLPOSCOPY      HYSTERECTOMY      Vaginal    JOINT REPLACEMENT Right     Knee replacement    KNEE SURGERY  2/2019    LAPAROSCOPIC ESOPHAGOGASTRIC FUNDOPLASTY  2008    ND ARTHRP KNE CONDYLE&PLATU MEDIAL&LAT COMPARTMENTS Right 2/12/2019    Procedure: KNEE TOTAL ARTHROPLASTY AND ASSOCIATED PROCEDURES;  Surgeon: Donovan Zendejas DO;  [facility];  Service: Orthopedics    ND ARTHRP KNE CONDYLE&PLATU MEDIAL&LAT COMPARTMENTS Left 10/6/2022    Procedure: ARTHROPLASTY KNEE TOTAL;  Surgeon: Donovan Zendejas DO;  [facility];  Service: Orthopedics    ND ARTHRS KNEE W/MENISCECTOMY MED&LAT W/SHAVING Right 10/2/2018    Procedure: KNEE ARTHROSCOPY WITH PARTIAL MEDIAL MENISCECTOMY;  Surgeon: Donovan Zendejas DO;  [facility];  Service: Orthopedics    ND ARTHRS KNEE W/MENISCECTOMY MED&LAT W/SHAVING Left 12/17/2019    Procedure: KNEE ARTHROSCOPIC MENISCECTOMY /MEDIAL; Chondyl medial and posterior;  Surgeon: Donovan Zendejas DO;  [facility];  Service: Orthopedics    REDUCTION MAMMAPLASTY      REPAIR ANKLE LIGAMENT Right     TONSILLECTOMY           Current Outpatient Medications:     azaTHIOprine (IMURAN) 50 mg tablet, Take 50 mg by mouth daily, Disp: , Rfl:     B-D 3CC LUER-LILLIE SYR 25GX1/2\" 25G X 1-1/2\" 3 ML MISC, USE 1 SYRINGE EVERY 30 DAYS, Disp: 12 each, " "Rfl: 1    Benlysta 200 MG/ML SOAJ, Weekly on Fridays, Disp: , Rfl:     cholecalciferol (VITAMIN D3) 1,000 units tablet, Take 2,000 Units by mouth daily in the early morning  , Disp: , Rfl:     clindamycin (CLEOCIN) 300 MG capsule, Take 3 capsules by mouth 1 hour prior to dental procedure, Disp: 3 capsule, Rfl: 3    cyanocobalamin 1,000 mcg/mL, Inject 1 mL (1,000 mcg total) into a muscle every 14 (fourteen) days, Disp: 12 mL, Rfl: 1    gabapentin (NEURONTIN) 100 mg capsule, Take 1 capsule (100 mg total) by mouth daily at bedtime, Disp: 90 capsule, Rfl: 1    hydroxychloroquine (PLAQUENIL) 200 mg tablet, Take 400 mg by mouth daily with breakfast Alternating days/dosages, Disp: , Rfl:     leflunomide (ARAVA) 10 MG tablet, Take 10 mg by mouth daily, Disp: , Rfl:     leflunomide (ARAVA) 10 MG tablet, Take 10 mg by mouth daily, Disp: , Rfl:     ramelteon (ROZEREM) 8 mg tablet, Take 1 tablet (8 mg total) by mouth daily at bedtime, Disp: 30 tablet, Rfl: 0    scopolamine (TRANSDERM-SCOP) 1 mg/3 days TD 72 hr patch, Place 1 patch on the skin over 72 hours every third day, Disp: 10 patch, Rfl: 0    sildenafil (REVATIO) 20 mg tablet, Take 1 tablet (20 mg total) by mouth 3 (three) times a day, Disp: 90 tablet, Rfl: 11    spironolactone (ALDACTONE) 25 mg tablet, Take 1 tablet (25 mg total) by mouth daily, Disp: 90 tablet, Rfl: 3    Syringe/Needle, Disp, (SecureSafe Syringe/Needle) 25G X 1-1/2\" 1 ML MISC, Use 1 Syringe every 30 (thirty) days, Disp: 12 each, Rfl: 1    torsemide (DEMADEX) 20 mg tablet, Take 1 tablet (20 mg total) by mouth daily, Disp: 90 tablet, Rfl: 3    triamcinolone (KENALOG) 0.1 % oral topical paste, prn, Disp: , Rfl:     warfarin (Coumadin) 5 mg tablet, 5mg po daily or as directed., Disp: 90 tablet, Rfl: 1    Allergies   Allergen Reactions    Dilantin [Phenytoin] Anaphylaxis and Rash    Penicillins Rash, Anaphylaxis, Dermatitis and Hives    Augmentin [Amoxicillin-Pot Clavulanate] Rash and Dermatitis       Social " History     Socioeconomic History    Marital status: /Civil Union     Spouse name: Not on file    Number of children: 2    Years of education: Not on file    Highest education level: Not on file   Occupational History    Not on file   Tobacco Use    Smoking status: Former     Current packs/day: 0.00     Types: Cigarettes     Quit date: 2006     Years since quittin.1    Smokeless tobacco: Never   Vaping Use    Vaping status: Never Used   Substance and Sexual Activity    Alcohol use: Yes     Comment: Socially    Drug use: No    Sexual activity: Yes     Partners: Male     Birth control/protection: Female Sterilization   Other Topics Concern    Not on file   Social History Narrative    Daily caffeinated coffee consumption    Exercise habits     Social Determinants of Health     Financial Resource Strain: Medium Risk (2024)    Overall Financial Resource Strain (CARDIA)     Difficulty of Paying Living Expenses: Somewhat hard   Food Insecurity: Not on file   Transportation Needs: No Transportation Needs (2024)    PRAPARE - Transportation     Lack of Transportation (Medical): No     Lack of Transportation (Non-Medical): No   Physical Activity: Not on file   Stress: Not on file   Social Connections: Not on file   Intimate Partner Violence: Not on file   Housing Stability: Not on file       Review of Systems     ROS:  Constitutional: Negative for fatigue and unexpected weight change.   Respiratory: Negative for cough and shortness of breath.    Cardiovascular: Negative for chest pain and palpitations.   Gastrointestinal: Negative for abdominal pain and change in bowel habits  Breasts:  Negative, other than as noted above.   Genitourinary: Negative, other than as noted above.   Psychiatric: Negative for mood difficulties.      Objective        Vitals:    24 1052   BP: 138/88       Physical Examination:    Patient appears well and is not in distress  Neck is supple without masses, no cervical or  supraclavicular lymphadenopathy  Cardiovascular: regular rate and rhythm; no murmurs  Lungs: clear to auscultation bilaterally; no wheezes  Breasts are symmetrical without mass, tenderness, nipple discharge, skin changes or adenopathy.   Abdomen is soft and nontender without masses.   External genitals are normal without lesions or rashes.  Urethral meatus and urethra are normal  Bladder is normal to palpation  Vagina is normal without discharge or bleeding.   Cervix is surgically absent   Uterus is surgically absent   Adnexa are not palpable

## 2024-03-06 NOTE — PROGRESS NOTES
Patient presents for a routine annual visit  PMB - none  Hysterectomy   Last Pap Smear- no longer indicated   Mammogram- 2023  Has referral   Colonoscopy- 2023  5 yr recall   Dexa- osteoporosis   5 year recall     nonsmoker  Currently sexually active - one partner   No family history of ovarian, cervical or breast cancer  Mom uterine  No concerns/questions for today's visit

## 2024-03-08 ENCOUNTER — TELEPHONE (OUTPATIENT)
Dept: HEMATOLOGY ONCOLOGY | Facility: CLINIC | Age: 59
End: 2024-03-08

## 2024-03-11 ENCOUNTER — TELEPHONE (OUTPATIENT)
Dept: HEMATOLOGY ONCOLOGY | Facility: CLINIC | Age: 59
End: 2024-03-11

## 2024-03-12 ENCOUNTER — OFFICE VISIT (OUTPATIENT)
Dept: SLEEP CENTER | Facility: CLINIC | Age: 59
End: 2024-03-12
Payer: MEDICARE

## 2024-03-12 VITALS
OXYGEN SATURATION: 96 % | BODY MASS INDEX: 44.46 KG/M2 | HEART RATE: 103 BPM | HEIGHT: 62 IN | DIASTOLIC BLOOD PRESSURE: 70 MMHG | SYSTOLIC BLOOD PRESSURE: 130 MMHG | WEIGHT: 241.6 LBS

## 2024-03-12 DIAGNOSIS — G47.9 SLEEP DISTURBANCE: ICD-10-CM

## 2024-03-12 DIAGNOSIS — E04.1 THYROID NODULE: ICD-10-CM

## 2024-03-12 DIAGNOSIS — G47.8 OTHER SLEEP DISORDERS: ICD-10-CM

## 2024-03-12 DIAGNOSIS — G47.09 OTHER INSOMNIA: Primary | ICD-10-CM

## 2024-03-12 PROCEDURE — 99204 OFFICE O/P NEW MOD 45 MIN: CPT | Performed by: INTERNAL MEDICINE

## 2024-03-12 RX ORDER — TRAZODONE HYDROCHLORIDE 50 MG/1
100 TABLET ORAL
Qty: 60 TABLET | Refills: 2 | Status: SHIPPED | OUTPATIENT
Start: 2024-03-12 | End: 2024-03-12

## 2024-03-12 RX ORDER — TRAZODONE HYDROCHLORIDE 50 MG/1
100 TABLET ORAL
Qty: 180 TABLET | Refills: 1 | Status: SHIPPED | OUTPATIENT
Start: 2024-03-12 | End: 2024-09-08

## 2024-03-12 NOTE — PATIENT INSTRUCTIONS
Strategies for improving sleep:    Keep a consistent bedtime and wake up time.  This will lead to a more regular sleep schedule and avoid periods of sleep deprivation or periods of extended wakefulness during the night.    Sleep in a colder environment.  Bedroom temperatures may need to be below 70 degrees Fahrenheit to help with decreasing body temperature.  The brain usually calms in colder temperatures.  Taking a hot shower/bath before bedtime may help with decreasing internal body temperature.      Avoid watching TV in bed.  If needing a form of media to help with sleep - can try sleep aid podcasts such Sleep With Me - a free podcast that helps with sleep initiation    Avoid napping, especially naps lasting longer than 1 hour or naps late in the day, which will likely affect your ability to fall asleep that night.    Limit caffeine/sugar, avoiding caffeine after lunch to allow it to get out of your system and not affect your ability to fall asleep or the quality of your sleep.  I usually recommend avoiding caffeine/sugar at least 6 hours before bedstime    Limit alcohol, alcohol can be sedating but also activating as it metabolizes, causing you to awaken from sleep earlier than desired.  It can affect the quality of your sleep by not letting you get into the more refreshing stages of sleep.    Avoid nicotine, of course not smoking or vaping at all is best, but nicotine is a stimulant and should be avoided near bedtime and during the night    Exercise and daytime physical activity is encouraged, in particular 4-6 hours before bedtime, as this may help you to fall asleep more easily and quality of sleep is improved.  Rigorous exercise within 3 hours of bedtime is discouraged.    Keep the sleep environment quiet and dark - Noise and light exposure during the night can disrupt sleep.  White noise or ear plugs are often recommended to reduce noise.  Using black out shades or an eye mask is commonly recommended to  "reduce light.  This also includes avoiding exposure to television or technology near bedtime, as this can have an impact on circadian rhythms by shifting sleep time later.    Bedroom clock - Avoid checking the time at night  This includes alarm clocks and other time pieces such as watches and phones.  Checking the time increases cognitive arousal and prolongs wakefulness.    Evening eating - Avoid eating close to bedtime which can cause acid reflux and unwanted weight gain, but don't go to bed hungry.  Eat a healthy and filling meal in the evening without over-eating and avoid late night snacks.    Be mindful of your medications - some medications if taken closer to bedtime can cause insomnia.  These medications include Effexor, Cymbalta, Wellbutrin, Metoprolol, Albuterol and others.  Of course, medications are important for other medical issues so a discussion needs to be had with your prescribing physician before changing medications on your own.     Avoid excessive time awake in bed:  Before going to bed, decreasing stressful thoughts is key to quieting the brain.  That's easier than done.  Some strategies to decrease these thoughts include a typical wind down routine (reading a book, hot shower/bath, calming TV show in the living room etc.), specified worry time (writing down your worries in a journal 2-3 hours before bed), progressive muscle relaxation ([employer] has some good tutorials on insomnia muscle relaxation), meditation.      With great difficulty falling asleep or with difficulty falling back asleep, laying in bed for a prolonged period time is not ideal.  This can increase worry and rumination (having racing thoughts, not able to \"turn off your brain\".  During the first part of the night - avoid going to bed unless you are drowsy.  Sometimes we go to bed because we feel like it's the \"right time\" but our brains aren't ready for sleeping.  This may paradoxically lead to more insomnia as you stay awake " in bed waiting to go to sleep.  If you are able to fall asleep under 30 minutes of closing your eyes with the lights off, that is reasonable.  If not, then maybe you need to get out of bed.  After what feels like 30 minutes, if you feel wide awake, worried, anxious, or can't clear your brain, it is better to leave the bedroom to do something relaxing , The typical recommendation is to read a boring book in dim light.  In general, if you leave the room, you want to do something that is relaxing, not stimulating. Avoid bright screens, doing work, doing chores, or anything that requires a lot of mental effort. Return to bed when you feel more calm, sleepy, or mentally clear.      It is common in insomnia to look at the time at night to see what time it is, how long you have been awake, etc.  However, this can negatively affect sleep. I strongly recommend avoiding looking at the time at night.  Here are some ways to do this  1) Turn the clock around so you cannot see the time at night  2) Avoid wearing a watch to bed.    3) Leave your phone on the other side of the room so you cannot look at it at night  4) Set an alarm if you need to wake up in he morning. If you have not heard the alarm, assume it is still time to sleep.    If you practice this consistently, sleep quality and anxiety regarding sleep may improve.      There are some on-line resources that do require a fee that can be of help.  Some of which will require a fee.    Http://www.[employer].[place].gov/apps/insomnia/index.html#dashboard (FREE)  Http://[employer]/cbt-online-insomnia-treatment.html  Http://www.[employer]/    Some apps that have helped people sleep:  Insomnia  (FREE)  Calm  CBT-I   Go! To Sleep by the [facility]  Sleep

## 2024-03-12 NOTE — PROGRESS NOTES
Sleep Medicine Outpatient Note   Na Joseph 59 y.o. female MRN: [id number]  3/13/2024      Referring Physician: Sultana Arnold DO    Reason for Consultation:    Chief Complaint   Patient presents with    Insomnia       Assessment/Plan:    1. Other insomnia  Assessment & Plan:  2 years of sleep maintenance insomnia without specific trigger.  No associated with any mood disorder.  Does have underlying rheumatologic d/o on immunosuppression.  She has some temperature intolerances overnight, night sweats as well.  ?post menopausal associated insomnia.  I am unsure if her rheum medications have some contribution.  Benlysta, leflunomide can cause insomnia.    Placed on trazodone with mild benefit.  Ramelteon was too sedating  I discussed various options including a short acting medication such as zaleplon.  She would like to try a higher dose of trazodone instead.    - avoid excessive time awake in bed - get out of bed if cannot fall back to sleep after 30 minutes  - avoid daytime naps if possible  - asked her to get TSH checked with next bloodwork  - increase trazodone to 100mg qhs  - watch for side effects  - she can message me on MyChart if there are side effects or if medication is efficacious  - home sleep testing to rule out obstructive sleep apnea leading to overnight awakenings    Orders:  -     Ambulatory Referral to Sleep Medicine  -     traZODone (DESYREL) 50 mg tablet; Take 2 tablets (100 mg total) by mouth daily at bedtime    2. Sleep disturbance  -     Home Study; Future    3. Other sleep disorders  Assessment & Plan:  Sudden awakenings overnight.  Crowded airway Mallampati IV, elevated BMI raising the possibility of obstructive sleep apnea.  She has daytime fatigue, sleepiness.  No known snoring/gasping/choking during sleep.    - home sleep study to evaluate for GRIS causing sleep maintenance issues  - if positive for GRIS - I would like to discuss with her in the office before initiating treatment.  May  not be amenable for CPAP.    Orders:  -     Home Study; Future    4. Thyroid nodule  Assessment & Plan:  1.6cm thyroid nodule R on CT chest 12/2023  She has temperature intolerence, night sweats overnight  Check thyroid ultrasound  I may need assistance from her PCP for further work up after ultrasound    Orders:  -     US thyroid; Future; Expected date: 03/12/2024        Health Maintenance  Immunization History   Administered Date(s) Administered    COVID-19 PFIZER VACCINE 0.3 ML IM 03/06/2021, 03/27/2021, 08/18/2021    COVID-19 Pfizer Vac BIVALENT Dmitri-sucrose 12 Yr+ IM 09/09/2022, 06/02/2023    Fluzone Split Quad 0.5 mL 10/06/2023    Influenza Quadrivalent 3 years and older 11/12/2018    Influenza Quadrivalent Preservative Free 3 years and older IM 10/07/2021    Influenza, injectable, quadrivalent, preservative free 0.5 mL 12/19/2018    Influenza, recombinant, quadrivalent,injectable, preservative free 10/14/2019, 09/24/2020, 09/09/2022    Influenza, seasonal, injectable, preservative free 10/03/2017    Pneumococcal Conjugate Vaccine 20-valent (Pcv20), Polysace 05/25/2023    Pneumococcal Polysaccharide PPV23 12/19/2018    Respiratory Syncytial Virus Vaccine (Recombinant, Adjuvanted) 10/22/2023    Tdap 12/12/2016, 11/11/2023        Return in about 4 months (around 7/12/2024).    History of Present Illness   HPI:  Na Joseph is a 59 y.o. female who has a past medical history of antiphospholipid syndrome on coumadin, bilateral PE, HFpEF, pulmonary hypertension, MCTD/SLE, CRISTAL, VB12 deficiency (on IM injections), who is presenting for evaluation of insomnia    She had 2 years of difficulty sleeping since having a pulmonary embolism.  Unsure of any trigger.  Falling sleep is not a big problem but staying asleep is a problem.  2 hours after falling asleep she wakes up, goes back to sleep but then wakes up again 2 hours later.  When she wakes up in the middle of the night, it could take up to one hour but unsure.   Waking up at night is usually due to feeling too hot.  Always sweaty.  She takes sildenafil, gabapentin, and trazodone at bedtime.      She does clock watching but try to avoid it.  Does not know of any snoring or gasping/choking during sleep.  No witnessed apneas.  No noticeable leg movements overnight.  While she is lying in bed she does not have restless legs.  She has chronic pain in her foot that  gets better with movements.      No sleepwalking.  No dream enactment behavior.    On disability.  Wake up 5-530am naturally and get out of bed.  She has 4 cats that wake up early.  Cats are in the bedroom but not on the bed.      She naps during the day - usually nap around late morning or early afternoon.  She usually naps for 1 hour and most of the time feel better.      Coffee only in the morning.      Patient was tried on trazodone by PCP but not helpful (has been on trazodone since 11/2022 but efficacy).  Ramelteon started on 3/4 but makes her too sleepy during the day (took it at 9:30pm).    No sadness, no depression/anxiety, very happy life.      Had T/A as an adult for peritonsilar abscess    Going on vacation in May  [place]      Historical Information   Past Medical History:   Diagnosis Date    HELENE positive     Anemia     Sildenafil causes decreased hematocrit    Antiphospholipid syndrome ([facility])     Anxiety 03/2020    CHF (congestive heart failure) ([facility])     right heart failure related to pulm HTN lupus    Chronic kidney disease     borderline lab values    Chronic rhinitis 06/04/2012    Clotting disorder ([facility]) 2012    Coronary artery disease 2018    Encephalitis     Lasted Assessed 10/18/2017    Gout 06/26/2018    Heart failure ([facility]) 2019    History of transfusion 2/13/2019    autologous    Hypertension     Lupus ([facility]) 2018    Lupus anticoagulant disorder ([facility])     Maxillary sinusitis     Lasted Assessed 10/18/2017    Night sweat     Osteopenia     Hip    Osteoporosis     Spine    Pneumonia     Last  Assessed 10/18/2017    PONV (postoperative nausea and vomiting)     Pulmonary embolism ([facility])     Pulmonary hypertension ([facility])     Seizures ([facility])     Only during Encephalitis    Shortness of breath     with exertion    Sinus tachycardia     Urinary incontinence      Past Surgical History:   Procedure Laterality Date    ARTHRODESIS      Hand: IP Joint    CHOLECYSTECTOMY      COLONOSCOPY      COLPOSCOPY      HYSTERECTOMY      Vaginal    JOINT REPLACEMENT Right     Knee replacement    KNEE SURGERY  2/2019    LAPAROSCOPIC ESOPHAGOGASTRIC FUNDOPLASTY  2008    [facility] ARTHRP KNE CONDYLE&PLATU MEDIAL&LAT COMPARTMENTS Right 2/12/2019    Procedure: KNEE TOTAL ARTHROPLASTY AND ASSOCIATED PROCEDURES;  Surgeon: Donovan Zendejas DO;  [facility];  Service: Orthopedics    VA ARTHRP KNE CONDYLE&PLATU MEDIAL&LAT COMPARTMENTS Left 10/6/2022    Procedure: ARTHROPLASTY KNEE TOTAL;  Surgeon: Donovan Zendejas DO;  [facility];  Service: Orthopedics    VA ARTHRS KNEE W/MENISCECTOMY MED&LAT W/SHAVING Right 10/2/2018    Procedure: KNEE ARTHROSCOPY WITH PARTIAL MEDIAL MENISCECTOMY;  Surgeon: Donovan Zendejas DO;  [facility];  Service: Orthopedics    VA ARTHRS KNEE W/MENISCECTOMY MED&LAT W/SHAVING Left 12/17/2019    Procedure: KNEE ARTHROSCOPIC MENISCECTOMY /MEDIAL; Chondyl medial and posterior;  Surgeon: Donovan Zendejas DO;  [facility];  Service: Orthopedics    REDUCTION MAMMAPLASTY      REPAIR ANKLE LIGAMENT Right     TONSILLECTOMY       Family History   Problem Relation Age of Onset    Uterine cancer Mother     Pancreatic cancer Mother 70    Diabetes Mother     Endometrial cancer Mother 66    Arthritis Mother     Cancer Mother         Pancreas, Uterine    Vision loss Mother     Osteoporosis Mother     Heart disease Father         open heart surgery at age 50     Stroke Father         CVA    Hypertension Father     Atrial fibrillation Father     Hyperlipidemia Father     Arthritis Father     Rheum arthritis Father      "Heart attack Father     No Known Problems Sister     No Known Problems Sister     Thyroid disease Sister     Depression Sister     Osteoarthritis Sister     Asthma Sister     Asthma Daughter     Migraines Daughter     Asthma Daughter     Thyroid disease Maternal Grandmother     Heart attack Maternal Grandfather     Heart disease Maternal Grandfather     Heart attack Paternal Grandmother     Heart disease Paternal Grandmother     Skin cancer Paternal Grandfather     No Known Problems Brother     Hemochromatosis Brother     No Known Problems Maternal Aunt     No Known Problems Paternal Aunt     Anuerysm Neg Hx     Clotting disorder Neg Hx     Heart failure Neg Hx          Meds/Allergies     Current Outpatient Medications:     B-D 3CC LUER-LILLIE SYR 25GX1/2\" 25G X 1-1/2\" 3 ML MISC, USE 1 SYRINGE EVERY 30 DAYS, Disp: 12 each, Rfl: 1    Benlysta 200 MG/ML SOAJ, Weekly on Fridays, Disp: , Rfl:     cholecalciferol (VITAMIN D3) 1,000 units tablet, Take 2,000 Units by mouth daily in the early morning  , Disp: , Rfl:     clindamycin (CLEOCIN) 300 MG capsule, Take 3 capsules by mouth 1 hour prior to dental procedure, Disp: 3 capsule, Rfl: 3    cyanocobalamin 1,000 mcg/mL, Inject 1 mL (1,000 mcg total) into a muscle every 14 (fourteen) days, Disp: 12 mL, Rfl: 1    gabapentin (NEURONTIN) 100 mg capsule, Take 1 capsule (100 mg total) by mouth daily at bedtime, Disp: 90 capsule, Rfl: 1    hydroxychloroquine (PLAQUENIL) 200 mg tablet, Take 400 mg by mouth daily with breakfast Alternating days/dosages, Disp: , Rfl:     leflunomide (ARAVA) 10 MG tablet, Take 10 mg by mouth daily, Disp: , Rfl:     scopolamine (TRANSDERM-SCOP) 1 mg/3 days TD 72 hr patch, Place 1 patch on the skin over 72 hours every third day, Disp: 10 patch, Rfl: 0    sildenafil (REVATIO) 20 mg tablet, Take 1 tablet (20 mg total) by mouth 3 (three) times a day, Disp: 90 tablet, Rfl: 11    spironolactone (ALDACTONE) 25 mg tablet, Take 1 tablet (25 mg total) by mouth " "daily, Disp: 90 tablet, Rfl: 3    Syringe/Needle, Disp, (SecureSafe Syringe/Needle) 25G X 1-1/2\" 1 ML MISC, Use 1 Syringe every 30 (thirty) days, Disp: 12 each, Rfl: 1    torsemide (DEMADEX) 20 mg tablet, Take 1 tablet (20 mg total) by mouth daily, Disp: 90 tablet, Rfl: 3    traZODone (DESYREL) 50 mg tablet, Take 2 tablets (100 mg total) by mouth daily at bedtime, Disp: 180 tablet, Rfl: 1    triamcinolone (KENALOG) 0.1 % oral topical paste, prn, Disp: , Rfl:     warfarin (Coumadin) 5 mg tablet, 5mg po daily or as directed., Disp: 90 tablet, Rfl: 1  Allergies   Allergen Reactions    Dilantin [Phenytoin] Anaphylaxis and Rash    Penicillins Rash, Anaphylaxis, Dermatitis and Hives    Augmentin [Amoxicillin-Pot Clavulanate] Rash and Dermatitis       Vitals: Blood pressure 130/70, pulse 103, height 5' 1.75\" (1.568 m), weight 110 kg (241 lb 9.6 oz), SpO2 96%, not currently breastfeeding. Body mass index is 44.55 kg/m². Oxygen Therapy  SpO2: 96 %      Physical Exam  Vitals and nursing note reviewed.   Constitutional:       General: She is not in acute distress.     Appearance: She is well-developed. She is not ill-appearing, toxic-appearing or diaphoretic.   HENT:      Head: Normocephalic and atraumatic.      Mouth/Throat:      Mouth: Mucous membranes are moist.      Pharynx: Oropharynx is clear. No oropharyngeal exudate.      Comments: Mallampati IV  Eyes:      General: No scleral icterus.     Extraocular Movements: Extraocular movements intact.      Conjunctiva/sclera: Conjunctivae normal.   Cardiovascular:      Rate and Rhythm: Normal rate and regular rhythm.   Pulmonary:      Effort: Pulmonary effort is normal. No respiratory distress.      Breath sounds: Normal breath sounds. No stridor.   Abdominal:      Tenderness: There is no guarding.   Musculoskeletal:         General: No swelling.      Cervical back: Normal range of motion and neck supple. No rigidity.      Right lower leg: No edema.      Left lower leg: No " "edema.   Skin:     General: Skin is warm and dry.      Coloration: Skin is not jaundiced.   Neurological:      General: No focal deficit present.      Mental Status: She is alert and oriented to person, place, and time. Mental status is at baseline.   Psychiatric:         Mood and Affect: Mood normal.       Neck Circumference: 15     Labs:   I have personally reviewed pertinent lab results.    ABG: No results found for: \"PHART\", \"HEN0RHV\", \"PO2ART\", \"JAT6VTH\", \"S9PSQQIZ\", \"BEART\", \"SOURCE\",   BNP:   Lab Results   Component Value Date    BNP 16 11/05/2022   ,   CBC:  Lab Results   Component Value Date    WBC 3.27 (L) 03/01/2024    HGB 14.0 03/01/2024    HCT 42.7 03/01/2024    MCV 98 03/01/2024     03/01/2024    EOSPCT 3 03/01/2024    EOSABS 0.09 03/01/2024    NEUTOPHILPCT 56 03/01/2024    LYMPHOPCT 28 03/01/2024   ,   CMP:   Lab Results   Component Value Date    SODIUM 139 03/01/2024    K 4.3 03/01/2024     03/01/2024    CO2 30 03/01/2024    BUN 22 03/01/2024    CREATININE 0.82 03/01/2024    GLUCOSE 100 11/11/2023    CALCIUM 9.6 03/01/2024    AST 12 (L) 03/01/2024    ALT 16 03/01/2024    ALKPHOS 88 03/01/2024    EGFR 77 03/01/2024   ,   PT/INR:   Lab Results   Component Value Date    INR 2.66 (H) 03/01/2024   ,   Ferrtin: No components found for: \"FERRTIN\",  Magensium: No results found for: \"MAGNESIUM\",      Imaging and other studies: I have personally reviewed pertinent reports.   and I have personally reviewed pertinent films in PACS  11/22/2023  Lungs clear, atelectasis.  No effusion.  R 1.6cm thyroid nodule    Sleep Study:      Transthoracic Echo:  2/21/2024 TTE - LVEF normal, diastolic function normal.  RVSP mildly elevsted 39mmHg    Pulmonary Function Testing:        Buzz Davis MD  Pulmonary, Critical Care and Sleep Medicine  [facility]     Portions of the record may have been created with voice recognition software. Occasional wrong word or \"sound a like\" " substitutions may have occurred due to the inherent limitations of voice recognition software. Please read the chart carefully and recognize, using context, where substitutions have occurred.

## 2024-03-13 PROBLEM — G47.8 OTHER SLEEP DISORDERS: Status: ACTIVE | Noted: 2024-03-13

## 2024-03-13 PROBLEM — E04.1 THYROID NODULE: Status: ACTIVE | Noted: 2024-03-13

## 2024-03-14 ENCOUNTER — HOSPITAL ENCOUNTER (OUTPATIENT)
Dept: ULTRASOUND IMAGING | Facility: HOSPITAL | Age: 59
Discharge: HOME | End: 2024-03-14
Payer: MEDICARE

## 2024-03-14 ENCOUNTER — HOSPITAL ENCOUNTER (OUTPATIENT)
Dept: RADIOLOGY | Facility: HOSPITAL | Age: 59
Discharge: HOME | End: 2024-03-14
Attending: FAMILY MEDICINE
Payer: MEDICARE

## 2024-03-14 DIAGNOSIS — G89.29 CHRONIC MIDLINE LOW BACK PAIN WITHOUT SCIATICA: ICD-10-CM

## 2024-03-14 DIAGNOSIS — M54.50 CHRONIC MIDLINE LOW BACK PAIN WITHOUT SCIATICA: ICD-10-CM

## 2024-03-14 DIAGNOSIS — E04.1 THYROID NODULE: ICD-10-CM

## 2024-03-14 PROCEDURE — 76536 US EXAM OF HEAD AND NECK: CPT

## 2024-03-14 PROCEDURE — 72110 X-RAY EXAM L-2 SPINE 4/>VWS: CPT

## 2024-03-14 NOTE — ASSESSMENT & PLAN NOTE
2 years of sleep maintenance insomnia without specific trigger.  No associated with any mood disorder.  Does have underlying rheumatologic d/o on immunosuppression.  She has some temperature intolerances overnight, night sweats as well.  ?post menopausal associated insomnia.  I am unsure if her rheum medications have some contribution.  Benlysta, leflunomide can cause insomnia.    Placed on trazodone with mild benefit.  Ramelteon was too sedating  I discussed various options including a short acting medication such as zaleplon.  She would like to try a higher dose of trazodone instead.    - avoid excessive time awake in bed - get out of bed if cannot fall back to sleep after 30 minutes  - avoid daytime naps if possible  - asked her to get TSH checked with next bloodwork  - increase trazodone to 100mg qhs  - watch for side effects  - she can message me on MyChart if there are side effects or if medication is efficacious  - home sleep testing to rule out obstructive sleep apnea leading to overnight awakenings

## 2024-03-14 NOTE — ASSESSMENT & PLAN NOTE
1.6cm thyroid nodule R on CT chest 12/2023  She has temperature intolerence, night sweats overnight  Check thyroid ultrasound  I may need assistance from her PCP for further work up after ultrasound

## 2024-03-14 NOTE — ASSESSMENT & PLAN NOTE
Sudden awakenings overnight.  Crowded airway Mallampati IV, elevated BMI raising the possibility of obstructive sleep apnea.  She has daytime fatigue, sleepiness.  No known snoring/gasping/choking during sleep.    - home sleep study to evaluate for GRIS causing sleep maintenance issues  - if positive for GRIS - I would like to discuss with her in the office before initiating treatment.  May not be amenable for CPAP.

## 2024-03-15 NOTE — RESULT ENCOUNTER NOTE
Na,     Your XR reveals some degenerative changes (arthritis).  Would you like to try PT for your lower back pain?    Dr Arnold

## 2024-03-20 ENCOUNTER — LAB (OUTPATIENT)
Dept: LAB | Facility: CLINIC | Age: 59
End: 2024-03-20
Payer: MEDICARE

## 2024-03-20 DIAGNOSIS — E53.8 B12 DEFICIENCY: ICD-10-CM

## 2024-03-20 DIAGNOSIS — I26.99 OTHER ACUTE PULMONARY EMBOLISM WITHOUT ACUTE COR PULMONALE (HCC): ICD-10-CM

## 2024-03-20 DIAGNOSIS — R79.9 ABNORMAL FINDING OF BLOOD CHEMISTRY, UNSPECIFIED: ICD-10-CM

## 2024-03-20 DIAGNOSIS — E04.1 THYROID NODULE: ICD-10-CM

## 2024-03-20 DIAGNOSIS — Z79.01 WARFARIN ANTICOAGULATION: ICD-10-CM

## 2024-03-20 DIAGNOSIS — Z79.01 ANTICOAGULATED ON COUMADIN: ICD-10-CM

## 2024-03-20 DIAGNOSIS — D68.62 LUPUS ANTICOAGULANT DISORDER (HCC): ICD-10-CM

## 2024-03-20 LAB — TSH SERPL DL<=0.05 MIU/L-ACNC: 2.35 UIU/ML (ref 0.45–4.5)

## 2024-03-20 PROCEDURE — 36415 COLL VENOUS BLD VENIPUNCTURE: CPT

## 2024-03-20 PROCEDURE — 84443 ASSAY THYROID STIM HORMONE: CPT

## 2024-03-21 DIAGNOSIS — U07.1 PULMONARY EMBOLISM ASSOCIATED WITH COVID-19 (HCC): ICD-10-CM

## 2024-03-21 DIAGNOSIS — D68.62 LUPUS ANTICOAGULANT DISORDER (HCC): ICD-10-CM

## 2024-03-21 DIAGNOSIS — I26.99 PULMONARY EMBOLISM ASSOCIATED WITH COVID-19 (HCC): ICD-10-CM

## 2024-03-21 RX ORDER — WARFARIN SODIUM 5 MG/1
TABLET ORAL
Qty: 150 TABLET | Refills: 3 | Status: SHIPPED | OUTPATIENT
Start: 2024-03-21

## 2024-03-22 NOTE — RESULT ENCOUNTER NOTE
Na,  Your thyroid blood test is normal.  No further action needed at this time.    Sultana Arnold, DO

## 2024-03-28 ENCOUNTER — HOSPITAL ENCOUNTER (OUTPATIENT)
Dept: SLEEP CENTER | Facility: CLINIC | Age: 59
Discharge: HOME | End: 2024-03-28
Payer: MEDICARE

## 2024-03-28 DIAGNOSIS — G47.9 SLEEP DISTURBANCE: ICD-10-CM

## 2024-03-28 DIAGNOSIS — G47.8 OTHER SLEEP DISORDERS: ICD-10-CM

## 2024-03-28 PROCEDURE — G0399 HOME SLEEP TEST/TYPE 3 PORTA: HCPCS

## 2024-03-28 NOTE — PROGRESS NOTES
Home Sleep Study Documentation    HOME STUDY DEVICE: Noxturnal no                                           Caroline G3 yes      Pre-Sleep Home Study:    Set-up and instructions performed by: Naya    Technician performed demonstration for Patient: yes    Return demonstration performed by Patient: yes    Written instructions provided to Patient: yes    Patient signed consent form: yes        Post-Sleep Home Study:    Additional comments by Patient: pending    Home Sleep Study Failed:pending    Failure reason: pending    Reported or Detected: pending    Scored by: pending         No

## 2024-04-02 PROBLEM — G47.33 OSA (OBSTRUCTIVE SLEEP APNEA): Status: ACTIVE | Noted: 2024-04-02

## 2024-04-04 PROCEDURE — 95806 SLEEP STUDY UNATT&RESP EFFT: CPT | Performed by: INTERNAL MEDICINE

## 2024-04-12 ENCOUNTER — TELEPHONE (OUTPATIENT)
Dept: OTHER | Facility: OTHER | Age: 59
End: 2024-04-12

## 2024-04-12 ENCOUNTER — TELEPHONE (OUTPATIENT)
Dept: HEMATOLOGY ONCOLOGY | Facility: CLINIC | Age: 59
End: 2024-04-12

## 2024-04-12 NOTE — TELEPHONE ENCOUNTER
[facility] outpatient service is calling with a CRITICAL lab for 5.9 at 6:46am per Anthony.  Patient at home    Please call Anthony at [phone]     Can someone tiger text Please.  Thank You

## 2024-04-12 NOTE — TELEPHONE ENCOUNTER
Patient Call    Who are you speaking with? Physician Office    If it is not the patient, are they listed on an active communication consent form? Yes   What is the reason for this call? INR is 5.9 today   Does this require a call back? No   If a call back is required, please list best call back number [phone]    If a call back is required, advise that a message will be forwarded to their care team and someone will return their call as soon as possible.   Did you relay this information to the patient? Yes

## 2024-04-25 DIAGNOSIS — I50.32 CHRONIC HEART FAILURE WITH PRESERVED EJECTION FRACTION (HCC): ICD-10-CM

## 2024-04-25 NOTE — TELEPHONE ENCOUNTER
Medication: spironolactone     Dose/Frequency: 25 mg qd    Quantity: 90    [facility]    Office:   [] PCP/Provider -   [x] Speciality/Provider -     Does the patient have enough for 3 days?   [] Yes   [x] No - Send as HP to POD

## 2024-04-26 RX ORDER — SPIRONOLACTONE 25 MG/1
25 TABLET ORAL DAILY
Qty: 90 TABLET | Refills: 1 | Status: SHIPPED | OUTPATIENT
Start: 2024-04-26 | End: 2024-04-29 | Stop reason: SDUPTHER

## 2024-04-29 DIAGNOSIS — I50.32 CHRONIC HEART FAILURE WITH PRESERVED EJECTION FRACTION (HCC): ICD-10-CM

## 2024-04-29 RX ORDER — SPIRONOLACTONE 25 MG/1
25 TABLET ORAL DAILY
Qty: 90 TABLET | Refills: 1 | Status: SHIPPED | OUTPATIENT
Start: 2024-04-29 | End: 2024-05-09 | Stop reason: SDUPTHER

## 2024-04-29 NOTE — TELEPHONE ENCOUNTER
*RX WAS SENT TO THE WRONG PHARMACY*    Medication: Spironolactone     Dose/Frequency: 25mg daily    Quantity: 90 tablets    [facility]    Office:   [x] PCP/Provider - Sultana Arnold, DO  [] Speciality/Provider -     Does the patient have enough for 3 days?   [] Yes   [x] No - Send as HP to POD

## 2024-05-01 NOTE — PROGRESS NOTES
Cardiology Outpatient Progress Note - Na Joseph 59 y.o. female MRN: [id number]    @ Encounter: [id number]      Patient Active Problem List    Diagnosis Date Noted    GRIS (obstructive sleep apnea) 04/02/2024    Other sleep disorders 03/13/2024    Thyroid nodule 03/13/2024    Neutropenia, unspecified type ([facility]) 03/04/2024    Rheumatoid arthritis, involving unspecified site, unspecified whether rheumatoid factor present ([facility]) 03/04/2024    BMI 40.0-44.9, adult ([facility]) 03/04/2024    Chronic midline low back pain without sciatica 03/04/2024    Motion sickness 03/04/2024    Fall 11/11/2023    Anticoagulated 08/28/2023    Sleep disturbance 04/24/2023    Neuropathic pain, leg, left 04/24/2023    Antiphospholipid antibody syndrome ([facility]) 02/27/2023    Financial difficulties 02/08/2023    Insomnia 01/05/2023    Pulmonary embolus ([facility]) 01/05/2023    BMI 39.0-39.9,adult 01/05/2023    Iron deficiency anemia due to chronic blood loss 11/21/2022    COVID-19 11/05/2022    SLE (systemic lupus erythematosus) ([facility]) 11/05/2022    Status post total knee replacement using cement, left 10/20/2022    Venous stasis of lower extremity 05/09/2022    Acute pain of right knee 05/09/2022    Leukopenia 11/22/2021    Morbid obesity ([facility])     Primary osteoarthritis of left knee 02/12/2020    Left knee pain 11/11/2019    Tear of medial meniscus of left knee, current 11/11/2019    Right knee pain 02/18/2019    Status post total right knee replacement 02/18/2019    Chronic kidney disease, stage 3a ([facility]) 02/12/2019    Tear of medial meniscus of right knee, current 09/10/2018    Other tear of medial meniscus, current injury, right knee, initial encounter 07/16/2018    Patellofemoral disorder of right knee 06/04/2018    Pes anserinus bursitis of right knee 06/04/2018    Arthritis of right knee 06/04/2018    Arthritis of left knee 06/04/2018    Chronic pain of right knee 05/22/2018    Elevated serum creatinine 05/22/2018    Hyperuricemia 05/22/2018     Long-term use of hydroxychloroquine 02/16/2018    Pulmonary hypertension (HCC) 02/16/2018    Undifferentiated connective tissue disease (HCC) 02/16/2018    Secondary pulmonary hypertension 12/15/2017    Diffuse connective tissue disease (HCC) 11/21/2017    Pes anserine bursitis 11/21/2017    Trochanteric bursitis of both hips 11/21/2017    Vitamin D deficiency 11/21/2017    Other organ or system involvement in systemic lupus erythematosus (HCC) 11/17/2017    Sinus tachycardia 11/09/2017    B12 deficiency 10/19/2017    Osteoporosis 10/19/2017    Lupus anticoagulant disorder (HCC) 10/18/2017    Positive HELENE (antinuclear antibody) 10/18/2017    Hot flashes due to menopause 09/01/2015    SOB (shortness of breath) on exertion 11/12/2012    Other chronic pulmonary heart diseases 11/05/2012    Edema 06/04/2012    Post-nasal drip 06/04/2012    Chronic cough 02/09/2012    Dyspnea 02/09/2012       Assessment:  # Hx of Pulmonary Embolis 11/5/22 associated with COVID 19 infection, also had TKR on 10/6/22  AC: warfarin 5 mg alternating 7.5 mg    Positive lupus anticoagulant + 9/26/22    Echo 11/6/22:  LVEF: 65%  RV: upper normal size, normal function  PASP: 42 mmHg    CTA 11/5/22: moderate quantity of RLL segmental and small quantity RUL and LLL acute PE    # Exercise induced PAH; HFpEF with PH  Out of proportion to obesity/ deconditioning/ diastolic dysfunction (PCWP 12 mmHg). Pt with MCTD/ SLE w/ lupus anticoagulant. Remote smoker. At rest, RV appears normal on TTE with coupled RV-PA w/o e/o of RVOT notching suggestive of normal PVR at rest. However, she did have a stress echo in 2012 that was suggestive of exercise induced pulmonary HTN. At the time she did not have e/o of elevated PVR. Her bubble was negative which makes an intracardiac shunt less likely.    PAH Rx: sildanefil 20 mg Q8 (previously on macitentan)  Diuretic: torsemide 20 mg daily, spironolactone 25 mg daily  NT proBNP:   Weight: 237 lbs--> 229 lbs--> 218  lbs--> 225 lbs--> 244 lbs    Studies- personally reviewed by me  Echo 2/21/24:  LVEF: 60%  RV: normal  PASP: 39 mmHg  RVOT: no notching    Echo 11/6/22: (in setting of acute PE)  LVEF: 65%  RV: upper normal size, normal function  PASP: 42 mmHg    Stress Echo 11/4/20: 3 min, 4.6 METs, max heart rate 155 bpm, resting /96    Echo 4/23/19:  Normal RV size and function    RHC with stress 12/19/17:  She had an appropriate HR response from 100 to 130 bpm with MAP throughout the study staying at 112 mmHg.  Hgb 13.1     Rest:  RA 7  RV 37/4/13  PA 33/16/25  PCWP 12    SaO2 99%  MvO2 72.3%  CO/CI 4.8/2.3  TPG 13  DPG 4  PVR 2.7     Exercise:  CVP 10  PA 54/29/37  PCWP 18    SaO2 100%  MvO2 66%  CO/CI 4/1.9  TPG 19  DPG 11  PVR 4.8  CVP:PCWP 0.55    Stress echo 12/14/17:  to evaluate pulmonary pressures, RV, and RVOT signal w/ exercise.  5 min, 20 sec.  HTNsive response, decrease in O2 saturation from 99% to 91% and increase in PA pressures from 45 mmHg to 70 mmHg with exercise.    PFTs 12/1/17: normal, DLCO 84%    # SLE: Per outpatient Rheumatologist. Continue plaquenil  # ST: V/Q negative. May be 2/2 to deconditioning +/- inappropriate ST +/- diastolic dysfunction/HF. No e/o infection.    Holter 12/4/20: , 87 bpm  # Morbid obesity: Continue weight loss  # TKR on 10/6/22    TODAY'S PLAN:  Warfarin probably lifelong  Continue sildanefil  HR running high, could add low dose bisoprolol given HTN as well (?Raynauds)  Torsemide 20 mg and spironolactone 25 mg daily-  If lightheadedness persists than cut back torsemide to 10 mg daily  Likely has venous insufficiency      HPI:          58 yo followed for out of proportion PAH, exercise induced PAH, with MCTD/ SLE, + lupus anticoagulant, obesity. She has seen Dr Baird.  first started getting SOB -- 10 years ago. She has had several episodes of bacterial PNA and chronic cough (a few times/year). She was referred to [facility] in 2012 for workup for PH. She had a a  stress echo 8/2012 that showed that her PA pressures more than doubled from --24 mmHg to > 50 mmHg. Currently, she states she can walk up a couple flights of stairs but does get SOB. She also gets SOB with walking up inclines. She gets occasional LH.      She was seeing a Rheumatologist in [place], [place] but recently saw a specialist at [facility], Dr. Ivy (Rheumatologist - 11/16). She was diagnosed with MCTD and SLE. HELENE + 1:320 w/ speckled pattern. She has been on Plaquenil x 2 weeks now. She is on ASA for + lupus anticoagulant and anticardiolipin Abs. She has joint pains and a subtle malar rash. She states so far there has been no change with plaquenil.     After her visit with Dr. Stanton, she has had TTE which shows LVEF --65%, normal RV size and function without RVOT notching. Normal atria. CXR clear. She also has had a negative V/Q scan. She walked the patient in the hallway and had her go up and down stairs. Her resting HR went from --100 bpm to 110s with Pulse Ox starting from 98% @ rest to --90% with exercise.     Stress Echo in 2017 showed hypertensive response and PA pressures to 70 mmHg w/ drop in pulse ox to 91%. Exercise RHC showed increase in PVR to 4.5 MOLINA from < 3 MOLINA. She was denied Tadalafil but approved for sildanefil. She did not feel any different and continued trouble with stairs.       Called 5/12 that returned from cruise a week ago and developed swelling in right leg. Ultrasound negative for DVT. Had fluid removed from right knee by Dr Aashish Prasad rheumatologist wants her to see a cardiologist at [facility]  Admitted 11/5 to 11/7 with pulmonary embolism associated with COVID 19 infection; Pt though already had TKR on 10/6/22  On warfarin. Lupus anticoagulant    Interval History:   Echo 2/21/24:  LVEF: 60%  RV: normal  PASP: 39 mmHg  RVOT: no notching    Could not get spironolactone filled      Past Medical History:   Diagnosis Date    HELENE positive     Anemia     Sildenafil causes  "decreased hematocrit    Antiphospholipid syndrome ([facility])     Anxiety 03/2020    CHF (congestive heart failure) ([facility])     right heart failure related to pulm HTN lupus    Chronic kidney disease     borderline lab values    Chronic rhinitis 06/04/2012    Clotting disorder ([facility]) 2012    Coronary artery disease 2018    Encephalitis     Lasted Assessed 10/18/2017    Gout 06/26/2018    Heart failure ([facility]) 2019    History of transfusion 2/13/2019    autologous    Hypertension     Lupus ([facility]) 2018    Lupus anticoagulant disorder ([facility])     Maxillary sinusitis     Lasted Assessed 10/18/2017    Night sweat     Osteopenia     Hip    Osteoporosis     Spine    Pneumonia     Last Assessed 10/18/2017    PONV (postoperative nausea and vomiting)     Pulmonary embolism ([facility])     Pulmonary hypertension ([facility])     Seizures ([facility])     Only during Encephalitis    Shortness of breath     with exertion    Sinus tachycardia     Urinary incontinence        Review of Systems   Constitutional:  Negative for activity change, appetite change, fatigue and unexpected weight change.   HENT:  Negative for congestion and nosebleeds.    Eyes: Negative.    Respiratory:  Negative for cough, chest tightness and shortness of breath.    Cardiovascular:  Negative for chest pain, palpitations and leg swelling.   Gastrointestinal:  Negative for abdominal distention.   Endocrine: Negative.    Genitourinary: Negative.    Musculoskeletal: Negative.    Skin: Negative.    Neurological:  Negative for dizziness, syncope and weakness.   Hematological: Negative.    Psychiatric/Behavioral: Negative.         Allergies   Allergen Reactions    Dilantin [Phenytoin] Anaphylaxis and Rash    Penicillins Rash, Anaphylaxis, Dermatitis and Hives    Augmentin [Amoxicillin-Pot Clavulanate] Rash and Dermatitis     .    Current Outpatient Medications:     B-D 3CC LUER-LILLIE SYR 25GX1/2\" 25G X 1-1/2\" 3 ML MISC, USE 1 SYRINGE EVERY 30 DAYS, Disp: 12 each, Rfl: 1    Benlysta 200 MG/ML SOAJ, " "Weekly on Fridays, Disp: , Rfl:     cholecalciferol (VITAMIN D3) 1,000 units tablet, Take 2,000 Units by mouth daily in the early morning  , Disp: , Rfl:     clindamycin (CLEOCIN) 300 MG capsule, Take 3 capsules by mouth 1 hour prior to dental procedure, Disp: 3 capsule, Rfl: 3    cyanocobalamin 1,000 mcg/mL, Inject 1 mL (1,000 mcg total) into a muscle every 14 (fourteen) days, Disp: 12 mL, Rfl: 1    gabapentin (NEURONTIN) 100 mg capsule, Take 1 capsule (100 mg total) by mouth daily at bedtime, Disp: 90 capsule, Rfl: 1    hydroxychloroquine (PLAQUENIL) 200 mg tablet, Take 400 mg by mouth daily with breakfast Alternating days/dosages, Disp: , Rfl:     leflunomide (ARAVA) 10 MG tablet, Take 10 mg by mouth daily, Disp: , Rfl:     scopolamine (TRANSDERM-SCOP) 1 mg/3 days TD 72 hr patch, Place 1 patch on the skin over 72 hours every third day, Disp: 10 patch, Rfl: 0    sildenafil (REVATIO) 20 mg tablet, Take 1 tablet (20 mg total) by mouth 3 (three) times a day, Disp: 90 tablet, Rfl: 11    spironolactone (ALDACTONE) 25 mg tablet, Take 1 tablet (25 mg total) by mouth daily, Disp: 90 tablet, Rfl: 1    Syringe/Needle, Disp, (SecureSafe Syringe/Needle) 25G X 1-1/2\" 1 ML MISC, Use 1 Syringe every 30 (thirty) days, Disp: 12 each, Rfl: 1    torsemide (DEMADEX) 20 mg tablet, Take 1 tablet (20 mg total) by mouth daily, Disp: 90 tablet, Rfl: 3    traZODone (DESYREL) 50 mg tablet, Take 2 tablets (100 mg total) by mouth daily at bedtime, Disp: 180 tablet, Rfl: 1    triamcinolone (KENALOG) 0.1 % oral topical paste, prn, Disp: , Rfl:     warfarin (Coumadin) 5 mg tablet, 7.5mg po daily or as directed., Disp: 150 tablet, Rfl: 3    Social History     Socioeconomic History    Marital status: /Civil Union     Spouse name: Not on file    Number of children: 2    Years of education: Not on file    Highest education level: Not on file   Occupational History    Not on file   Tobacco Use    Smoking status: Former     Current packs/day: " 0.00     Types: Cigarettes     Quit date: 2006     Years since quittin.2    Smokeless tobacco: Never   Vaping Use    Vaping status: Never Used   Substance and Sexual Activity    Alcohol use: Yes     Comment: Socially    Drug use: No    Sexual activity: Yes     Partners: Male     Birth control/protection: Female Sterilization   Other Topics Concern    Not on file   Social History Narrative    Daily caffeinated coffee consumption    Exercise habits     Social Determinants of Health     Financial Resource Strain: Medium Risk (2024)    Overall Financial Resource Strain (CARDIA)     Difficulty of Paying Living Expenses: Somewhat hard   Food Insecurity: Not on file   Transportation Needs: No Transportation Needs (2024)    PRAPARE - Transportation     Lack of Transportation (Medical): No     Lack of Transportation (Non-Medical): No   Physical Activity: Not on file   Stress: Not on file   Social Connections: Not on file   Intimate Partner Violence: Not on file   Housing Stability: Low Risk  (2024)    Received from [facility]    Housing Stability     Unstable Housing in the Last Year: Not on file       Family History   Problem Relation Age of Onset    Uterine cancer Mother     Pancreatic cancer Mother 70    Diabetes Mother     Endometrial cancer Mother 66    Arthritis Mother     Cancer Mother         Pancreas, Uterine    Vision loss Mother     Osteoporosis Mother     Heart disease Father         open heart surgery at age 50     Stroke Father         CVA    Hypertension Father     Atrial fibrillation Father     Hyperlipidemia Father     Arthritis Father     Rheum arthritis Father     Heart attack Father     No Known Problems Sister     No Known Problems Sister     Thyroid disease Sister     Depression Sister     Osteoarthritis Sister     Asthma Sister     Asthma Daughter     Migraines Daughter     Asthma Daughter     Thyroid disease Maternal Grandmother     Heart attack Maternal  Grandfather     Heart disease Maternal Grandfather     Heart attack Paternal Grandmother     Heart disease Paternal Grandmother     Skin cancer Paternal Grandfather     No Known Problems Brother     Hemochromatosis Brother     No Known Problems Maternal Aunt     No Known Problems Paternal Aunt     Anuerysm Neg Hx     Clotting disorder Neg Hx     Heart failure Neg Hx        Physical Exam:    Vitals: not currently breastfeeding., There is no height or weight on file to calculate BMI.,   Wt Readings from Last 3 Encounters:   03/12/24 110 kg (241 lb 9.6 oz)   03/06/24 109 kg (241 lb)   03/04/24 110 kg (242 lb 3.2 oz)         Physical Exam:    Physical Exam  Constitutional:       Appearance: She is well-developed.   HENT:      Head: Normocephalic and atraumatic.   Eyes:      Pupils: Pupils are equal, round, and reactive to light.   Neck:      Vascular: No JVD.   Cardiovascular:      Rate and Rhythm: Normal rate and regular rhythm.      Heart sounds: No murmur heard.  Pulmonary:      Effort: Pulmonary effort is normal. No respiratory distress.      Breath sounds: Normal breath sounds.   Abdominal:      General: There is no distension.      Palpations: Abdomen is soft.      Tenderness: There is no abdominal tenderness.   Musculoskeletal:         General: Normal range of motion.      Cervical back: Normal range of motion.   Skin:     General: Skin is warm and dry.      Findings: No rash.   Neurological:      Mental Status: She is alert and oriented to person, place, and time.         Labs & Results:    Lab Results   Component Value Date    SODIUM 139 03/01/2024    K 4.3 03/01/2024     03/01/2024    CO2 30 03/01/2024    BUN 22 03/01/2024    CREATININE 0.82 03/01/2024    GLUC 93 07/03/2023    CALCIUM 9.6 03/01/2024     Lab Results   Component Value Date    WBC 3.27 (L) 03/01/2024    HGB 14.0 03/01/2024    HCT 42.7 03/01/2024    MCV 98 03/01/2024     03/01/2024     Lab Results   Component Value Date    BNP 16  11/05/2022      Lab Results   Component Value Date    CHOLESTEROL 181 04/03/2023    CHOLESTEROL 176 10/09/2020    CHOLESTEROL 157 05/17/2018     Lab Results   Component Value Date    HDL 57 04/03/2023    HDL 60 10/09/2020    HDL 45 05/17/2018     Lab Results   Component Value Date    TRIG 100 04/03/2023    TRIG 107 10/09/2020    TRIG 131 05/17/2018     Lab Results   Component Value Date    NONHDLC 124 04/03/2023    NONHDLC 116 10/09/2020    NONHDLC 112 05/17/2018         EKG personally reviewed by Hardeep Zavaleta.     Counseling / Coordination of Care  Time spent today 25 minutes.  Greater than 50% of total time was spent with the patient and / or family counseling and / or coordination of care.  We discussed diagnoses, most recent studies, tests and any changes in treatment plan    Thank you for the opportunity to participate in the care of this patient.    HARDEEP ZAVALETA D.O.  DIRECTOR OF HEART FAILURE/ PULMONARY HYPERTENSION  MEDICAL DIRECTOR OF LVAD PROGRAM  [facility] [address] [facility]

## 2024-05-02 ENCOUNTER — TELEPHONE (OUTPATIENT)
Age: 59
End: 2024-05-02

## 2024-05-02 NOTE — TELEPHONE ENCOUNTER
Caller: Patient (Na)    Doctor: Dr Hughes    Reason for call: Patient is requesting to speak with you as soon as possible.    Call back#: [phone]

## 2024-05-03 ENCOUNTER — TELEPHONE (OUTPATIENT)
Dept: HEMATOLOGY ONCOLOGY | Facility: CLINIC | Age: 59
End: 2024-05-03

## 2024-05-03 NOTE — TELEPHONE ENCOUNTER
Patient Call    Who are you speaking with? [facility]     If it is not the patient, are they listed on an active communication consent form? N/A   What is the reason for this call? William is calling to report an out of range INR, 6.0 reported this morning 6:45 AM   Does this require a call back? No   If a call back is required, please list best call back number N/A   If a call back is required, advise that a message will be forwarded to their care team and someone will return their call as soon as possible.   Did you relay this information to the patient? N/A

## 2024-05-08 ENCOUNTER — TELEPHONE (OUTPATIENT)
Dept: HEMATOLOGY ONCOLOGY | Facility: CLINIC | Age: 59
End: 2024-05-08

## 2024-05-08 DIAGNOSIS — Z79.01 ANTICOAGULATED ON COUMADIN: Primary | ICD-10-CM

## 2024-05-08 NOTE — TELEPHONE ENCOUNTER
Spoke with patient who reports [facility] is requesting discontinuation letter be faxed to [phone]

## 2024-05-08 NOTE — TELEPHONE ENCOUNTER
Patient's INR readings have been very inconsistent on home meter.  Will go back to lab assessment.    Order placed for weekly pt/inr x 26    Patient needs d/c order for home pt/inr unit

## 2024-05-09 ENCOUNTER — OFFICE VISIT (OUTPATIENT)
Dept: CARDIOLOGY CLINIC | Facility: CLINIC | Age: 59
End: 2024-05-09
Payer: MEDICARE

## 2024-05-09 VITALS
OXYGEN SATURATION: 98 % | HEIGHT: 62 IN | SYSTOLIC BLOOD PRESSURE: 148 MMHG | WEIGHT: 244 LBS | BODY MASS INDEX: 44.9 KG/M2 | DIASTOLIC BLOOD PRESSURE: 90 MMHG | HEART RATE: 110 BPM

## 2024-05-09 DIAGNOSIS — I27.20 PULMONARY HYPERTENSION (HCC): Primary | ICD-10-CM

## 2024-05-09 DIAGNOSIS — I50.32 CHRONIC HEART FAILURE WITH PRESERVED EJECTION FRACTION (HCC): ICD-10-CM

## 2024-05-09 DIAGNOSIS — Z86.711 HISTORY OF PULMONARY EMBOLISM: ICD-10-CM

## 2024-05-09 DIAGNOSIS — G47.33 OSA (OBSTRUCTIVE SLEEP APNEA): ICD-10-CM

## 2024-05-09 DIAGNOSIS — R00.0 SINUS TACHYCARDIA: ICD-10-CM

## 2024-05-09 PROCEDURE — 93000 ELECTROCARDIOGRAM COMPLETE: CPT | Performed by: INTERNAL MEDICINE

## 2024-05-09 PROCEDURE — 99214 OFFICE O/P EST MOD 30 MIN: CPT | Performed by: INTERNAL MEDICINE

## 2024-05-09 RX ORDER — SPIRONOLACTONE 25 MG/1
25 TABLET ORAL DAILY
Qty: 90 TABLET | Refills: 3 | Status: SHIPPED | OUTPATIENT
Start: 2024-05-09

## 2024-05-09 RX ORDER — POTASSIUM CHLORIDE 20 MEQ/1
20 TABLET, EXTENDED RELEASE ORAL DAILY
Qty: 30 TABLET | Refills: 2 | Status: SHIPPED | OUTPATIENT
Start: 2024-05-09

## 2024-05-09 NOTE — PATIENT INSTRUCTIONS
Double up torsemide for one week, I wrote for potassium daily    Spironolactone in    I sent email to our

## 2024-05-10 ENCOUNTER — APPOINTMENT (OUTPATIENT)
Dept: LAB | Facility: CLINIC | Age: 59
End: 2024-05-10
Payer: MEDICARE

## 2024-05-10 ENCOUNTER — TELEPHONE (OUTPATIENT)
Dept: CARDIOLOGY CLINIC | Facility: CLINIC | Age: 59
End: 2024-05-10

## 2024-05-10 DIAGNOSIS — Z79.01 ANTICOAGULATED ON COUMADIN: ICD-10-CM

## 2024-05-10 LAB
INR PPP: 2.35 (ref 0.84–1.19)
PROTHROMBIN TIME: 26.6 SECONDS (ref 11.6–14.5)

## 2024-05-10 NOTE — TELEPHONE ENCOUNTER
2024@2:45 pm: I contacted Ms Joseph as per her request during her office visit with Dr. Hardeep Hughes regarding the delay in her RX refill.    Ms Joseph was very upset and explained that the pharmacy had confirmed that they had requested the refill on her Spironolactone twice before her reaching out to the office via [facility]. After a review of her chart I found the followin2024@8:52am Refill request sent to the RX refill POD  2024@2:08pm Refill submitted to "[facility] which is the incorrect pahrmacy  2024@4:52am Ms Joesph sent [facility] message requesting a reasoning for why the medication was sent to "[facility] and not [facility]  2024@8:16am request resent to the RX refill POD to resend to the correct pharmacy, ([facility])  2024@10:02am Refill sent to [facility] for processing  2024@10:27am Ms Joseph requested a call back from the office as soon as possible    Ms Joseph mentioned that the refill request that was sent to [facility] on 2024 was then canceled by the RX refill team.    I explained that I was not able to confirm the refill cancellation and so I asked that she give me sometime to contact the pharmacy for additional information. Ms Joseph agreed with this plan and also suggested that I contact her insurance company as well. I informed Ms Joseph that I will be reaching out to her once I am able to provide her with additional information. She verbalized understanding and will await my phone call.

## 2024-05-13 NOTE — TELEPHONE ENCOUNTER
Communication sent to the RX Refill Management team to assist with the reasoning for the RX Refill cancellation that was sent to the correct pharmacy.

## 2024-05-24 ENCOUNTER — APPOINTMENT (OUTPATIENT)
Dept: LAB | Facility: CLINIC | Age: 59
End: 2024-05-24
Payer: MEDICARE

## 2024-05-24 DIAGNOSIS — Z79.01 ANTICOAGULATED ON COUMADIN: ICD-10-CM

## 2024-05-24 LAB
INR PPP: 3.04 (ref 0.84–1.19)
PROTHROMBIN TIME: 32.4 SECONDS (ref 11.6–14.5)

## 2024-05-24 PROCEDURE — 36415 COLL VENOUS BLD VENIPUNCTURE: CPT

## 2024-05-24 PROCEDURE — 85610 PROTHROMBIN TIME: CPT

## 2024-06-04 ENCOUNTER — HOSPITAL ENCOUNTER (OUTPATIENT)
Dept: MAMMOGRAPHY | Facility: HOSPITAL | Age: 59
Discharge: HOME | End: 2024-06-04
Payer: COMMERCIAL

## 2024-06-04 VITALS — BODY MASS INDEX: 44.9 KG/M2 | WEIGHT: 244 LBS | HEIGHT: 62 IN

## 2024-06-04 DIAGNOSIS — Z12.31 ENCOUNTER FOR SCREENING MAMMOGRAM FOR BREAST CANCER: ICD-10-CM

## 2024-06-04 PROCEDURE — 77067 SCR MAMMO BI INCL CAD: CPT

## 2024-06-04 PROCEDURE — 77063 BREAST TOMOSYNTHESIS BI: CPT

## 2024-06-06 ENCOUNTER — PATIENT MESSAGE (OUTPATIENT)
Dept: HEMATOLOGY ONCOLOGY | Facility: CLINIC | Age: 59
End: 2024-06-06

## 2024-06-06 ENCOUNTER — TELEPHONE (OUTPATIENT)
Dept: HEMATOLOGY ONCOLOGY | Facility: CLINIC | Age: 59
End: 2024-06-06

## 2024-06-06 DIAGNOSIS — G47.09 OTHER INSOMNIA: ICD-10-CM

## 2024-06-06 RX ORDER — TRAZODONE HYDROCHLORIDE 50 MG/1
TABLET ORAL
Qty: 180 TABLET | Refills: 1 | Status: SHIPPED | OUTPATIENT
Start: 2024-06-06

## 2024-06-06 NOTE — TELEPHONE ENCOUNTER
Called and spoke with patient.  She is alternating 7.5mg with 5mg.  Took 7.5mg already today.      She will hold coumadin tomorrow and then resume 6/8/24 with 5mg and alternate daily with 7.5mg.      Recheck in 2 weeks.

## 2024-06-13 ENCOUNTER — APPOINTMENT (OUTPATIENT)
Dept: LAB | Facility: CLINIC | Age: 59
End: 2024-06-13

## 2024-06-15 ENCOUNTER — APPOINTMENT (OUTPATIENT)
Dept: LAB | Facility: CLINIC | Age: 59
End: 2024-06-15
Payer: MEDICARE

## 2024-06-15 DIAGNOSIS — E55.9 VITAMIN D DEFICIENCY DISEASE: ICD-10-CM

## 2024-06-15 PROCEDURE — 83993 ASSAY FOR CALPROTECTIN FECAL: CPT

## 2024-06-20 LAB — CALPROTECTIN STL-MCNT: 16 UG/G (ref 0–120)

## 2024-06-21 ENCOUNTER — APPOINTMENT (OUTPATIENT)
Dept: LAB | Facility: CLINIC | Age: 59
End: 2024-06-21
Payer: MEDICARE

## 2024-06-21 DIAGNOSIS — Z79.01 ANTICOAGULATED ON COUMADIN: ICD-10-CM

## 2024-06-21 LAB
INR PPP: 4.78 (ref 0.84–1.19)
PROTHROMBIN TIME: 46 SECONDS (ref 11.6–14.5)

## 2024-06-21 PROCEDURE — 85610 PROTHROMBIN TIME: CPT

## 2024-06-21 PROCEDURE — 36415 COLL VENOUS BLD VENIPUNCTURE: CPT

## 2024-06-25 ENCOUNTER — HOSPITAL ENCOUNTER (OUTPATIENT)
Dept: RADIOLOGY | Age: 59
Discharge: HOME | End: 2024-06-25
Payer: MEDICARE

## 2024-06-25 DIAGNOSIS — Z78.0 POSTMENOPAUSAL STATE: ICD-10-CM

## 2024-06-25 DIAGNOSIS — M81.0 AGE-RELATED OSTEOPOROSIS WITHOUT CURRENT PATHOLOGICAL FRACTURE: ICD-10-CM

## 2024-06-25 PROCEDURE — 77080 DXA BONE DENSITY AXIAL: CPT

## 2024-06-28 ENCOUNTER — APPOINTMENT (OUTPATIENT)
Dept: LAB | Facility: CLINIC | Age: 59
End: 2024-06-28
Payer: MEDICARE

## 2024-06-28 DIAGNOSIS — Z79.01 ANTICOAGULATED ON COUMADIN: ICD-10-CM

## 2024-06-28 LAB
INR PPP: 1.94 (ref 0.84–1.19)
PROTHROMBIN TIME: 23 SECONDS (ref 11.6–14.5)

## 2024-06-28 PROCEDURE — 36415 COLL VENOUS BLD VENIPUNCTURE: CPT

## 2024-06-28 PROCEDURE — 85610 PROTHROMBIN TIME: CPT

## 2024-07-09 ENCOUNTER — TELEPHONE (OUTPATIENT)
Dept: HEMATOLOGY ONCOLOGY | Facility: CLINIC | Age: 59
End: 2024-07-09

## 2024-07-09 DIAGNOSIS — M81.0 AGE-RELATED OSTEOPOROSIS WITHOUT CURRENT PATHOLOGICAL FRACTURE: Primary | ICD-10-CM

## 2024-07-09 NOTE — PROGRESS NOTES
Spoke with patient re: DEXA    She would like us to manage osteoporosis.      T score - 3.5 L spine.    Discussed oral vs. SQ vs. IV treatment.  Reviewed potential SE ONJ, hypocalcemia.  Invasive dental work recommended within 2 months of receiving treatment.  She verbalized understanding and wished to proceed with subcu treatment    Prolia 60mg SQ every 6 months requested.      Labs from 6/2024 reviewed.

## 2024-07-10 DIAGNOSIS — I50.32 CHRONIC HEART FAILURE WITH PRESERVED EJECTION FRACTION (HCC): ICD-10-CM

## 2024-07-10 RX ORDER — TORSEMIDE 20 MG/1
20 TABLET ORAL DAILY
Qty: 90 TABLET | Refills: 1 | Status: SHIPPED | OUTPATIENT
Start: 2024-07-10

## 2024-07-11 ENCOUNTER — TELEPHONE (OUTPATIENT)
Dept: RHEUMATOLOGY | Facility: CLINIC | Age: 59
End: 2024-07-11

## 2024-07-11 ENCOUNTER — OFFICE VISIT (OUTPATIENT)
Dept: RHEUMATOLOGY | Facility: CLINIC | Age: 59
End: 2024-07-11
Payer: MEDICARE

## 2024-07-11 VITALS
BODY MASS INDEX: 45.06 KG/M2 | WEIGHT: 244.4 LBS | HEART RATE: 104 BPM | SYSTOLIC BLOOD PRESSURE: 142 MMHG | DIASTOLIC BLOOD PRESSURE: 68 MMHG | TEMPERATURE: 98.2 F

## 2024-07-11 DIAGNOSIS — T38.0X5A STEROID-INDUCED OSTEOPOROSIS: Primary | ICD-10-CM

## 2024-07-11 DIAGNOSIS — M81.8 STEROID-INDUCED OSTEOPOROSIS: Primary | ICD-10-CM

## 2024-07-11 DIAGNOSIS — D68.61 ANTIPHOSPHOLIPID ANTIBODY SYNDROME (HCC): ICD-10-CM

## 2024-07-11 DIAGNOSIS — M81.0 AGE-RELATED OSTEOPOROSIS WITHOUT CURRENT PATHOLOGICAL FRACTURE: Primary | ICD-10-CM

## 2024-07-11 DIAGNOSIS — M32.9 SYSTEMIC LUPUS ERYTHEMATOSUS, UNSPECIFIED SLE TYPE, UNSPECIFIED ORGAN INVOLVEMENT STATUS (HCC): Chronic | ICD-10-CM

## 2024-07-11 PROCEDURE — 99204 OFFICE O/P NEW MOD 45 MIN: CPT | Performed by: STUDENT IN AN ORGANIZED HEALTH CARE EDUCATION/TRAINING PROGRAM

## 2024-07-11 NOTE — PATIENT INSTRUCTIONS
We will submit an authorization for insurance to start Evenity for severe Osteoporosis  followed by [facility]    We will also do blood work involving parathyroid hormone levels to make sure you do not have osteoporosis from abnormal levels

## 2024-07-11 NOTE — ASSESSMENT & PLAN NOTE
Patient understands that Evenity may increase the risk of stroke and heart attack but as she is on anticoagulation for her APLS, the risk is very low and benefits in preventing the osteoporotic fractures is optimal and she agrees with the plan

## 2024-07-11 NOTE — ASSESSMENT & PLAN NOTE
Patient takes treatment of her SLE with Dr Loredo at [facility] and does not want rheumatologists here to involve in the SLE care.

## 2024-07-11 NOTE — ASSESSMENT & PLAN NOTE
Noted to have <3.5 Tscore on Lumbar spine on DEXA which is considered to be severe osteoporosis. Will start on Revenity once insurance authorization is done followed by Reclast 2 years later. Will also test for PTH to r/o secondary causes of osteoporosis. Had negative multiple myeloma workup.

## 2024-07-11 NOTE — LETTER
2024     Maile March PA-C  [address] [facility] - 3rd Floor  [facility] [address]    Patient: Na Joseph   YOB: 1965   Date of Visit: 2024       Dear Dr. March:    Thank you for referring Na Joseph to me for evaluation. Below are my notes for this consultation.    If you have questions, please do not hesitate to call me. I look forward to following your patient along with you.         Sincerely,        David Mendez DO        CC: No Recipients    Tyrell Campos MD  2024  4:43 PM  Attested  Ambulatory Visit  Name: Na Joseph      : 1965      MRN: [id number]  Encounter Provider: David Mendez DO  Encounter Date: 2024   Encounter department: [facility] [place]    Assessment & Plan  1. Age-related osteoporosis without current pathological fracture  Assessment & Plan:  Noted to have <3.5 Tscore on Lumbar spine on DEXA which is considered to be severe osteoporosis. Will start on Revenity once insurance authorization is done followed by Reclast 2 years later. Will also test for PTH to r/o secondary causes of osteoporosis. Had negative multiple myeloma workup.  Orders:  -     PTH, intact; Future  2. Systemic lupus erythematosus, unspecified SLE type, unspecified organ involvement status (HCC)  Assessment & Plan:  Patient takes treatment of her SLE with Dr Loredo at [place] and does not want rheumatologists here to involve in the SLE care.  3. Antiphospholipid antibody syndrome (HCC)  Assessment & Plan:  Patient understands that Evenity may increase the risk of stroke and heart attack but as she is on anticoagulation for her APLS, the risk is very low and benefits in preventing the osteoporotic fractures is optimal and she agrees with the plan      History of Present Illness    Na Joseph is a 59 y.o. female with PMHx SLE, APLS, PE who was a referred from her rheumatologist (lupus specialist) for  osteoporosis treatment. She complains of back pain since the fall she had in Nov 2023. She had left forearm distal fracture when she was a teenager.    Review of Systems   Constitutional:  Negative for fatigue and unexpected weight change.   Respiratory:  Negative for shortness of breath.    Cardiovascular:  Negative for chest pain.   Gastrointestinal:  Negative for abdominal pain.   Musculoskeletal:  Positive for arthralgias.   Psychiatric/Behavioral:  Negative for dysphoric mood.        Objective    /68   Pulse 104   Temp 98.2 °F (36.8 °C)   Wt 111 kg (244 lb 6.4 oz)   BMI 45.06 kg/m²     Physical Exam  Constitutional:       Appearance: Normal appearance.   HENT:      Head: Normocephalic and atraumatic.   Eyes:      Extraocular Movements: Extraocular movements intact.   Pulmonary:      Effort: Pulmonary effort is normal.   Musculoskeletal:         General: No swelling, tenderness or deformity. Normal range of motion.   Skin:     Findings: No rash.   Neurological:      General: No focal deficit present.      Mental Status: She is alert and oriented to person, place, and time.       Administrative Statements          Attestation signed by David Mendez DO at 7/11/2024  4:43 PM:  Attending Attestation:    I reviewed the care furnished by the Resident/Fellow during the visit on 7/11/2024.  I was present in the office and personally saw and examined the patient and agree with the medical trainee's assessment and plan    Theoretically lower CV risk with Evenity given lifelong AC, discussed these r:b as well as ONJ r:b and she is amenable. Would follow with Reclast, r:b discussed as well    Per patient preference will not contribute to SLE management    Patient's rheumatologic disease(s) threaten long-term function if not appropriately treated.

## 2024-07-11 NOTE — LETTER
2024     Daniele Loredo  [address] [place] [address]    Patient: Na Joseph   YOB: 1965   Date of Visit: 2024       Dear Dr. Loredo:    Thank you for referring Na Joseph to me for evaluation. Below are my notes for this consultation.    If you have questions, please do not hesitate to call me. I look forward to following your patient along with you.         Sincerely,        David Mendez DO        CC: No Recipients    Tyrell Campos MD  2024  4:43 PM  Attested  Ambulatory Visit  Name: Na Joseph      : 1965      MRN: [id number]  Encounter Provider: David Mendez DO  Encounter Date: 2024   Encounter department: [facility] [place]    Assessment & Plan  1. Age-related osteoporosis without current pathological fracture  Assessment & Plan:  Noted to have <3.5 Tscore on Lumbar spine on DEXA which is considered to be severe osteoporosis. Will start on Revenity once insurance authorization is done followed by Reclast 2 years later. Will also test for PTH to r/o secondary causes of osteoporosis. Had negative multiple myeloma workup.  Orders:  -     PTH, intact; Future  2. Systemic lupus erythematosus, unspecified SLE type, unspecified organ involvement status (HCC)  Assessment & Plan:  Patient takes treatment of her SLE with Dr Loredo at [place] and does not want rheumatologists here to involve in the SLE care.  3. Antiphospholipid antibody syndrome (HCC)  Assessment & Plan:  Patient understands that Evenity may increase the risk of stroke and heart attack but as she is on anticoagulation for her APLS, the risk is very low and benefits in preventing the osteoporotic fractures is optimal and she agrees with the plan      History of Present Illness    Na Joseph is a 59 y.o. female with PMHx SLE, APLS, PE who was a referred from her rheumatologist (lupus specialist) for osteoporosis treatment. She complains of back pain since the  fall she had in Nov 2023. She had left forearm distal fracture when she was a teenager.    Review of Systems   Constitutional:  Negative for fatigue and unexpected weight change.   Respiratory:  Negative for shortness of breath.    Cardiovascular:  Negative for chest pain.   Gastrointestinal:  Negative for abdominal pain.   Musculoskeletal:  Positive for arthralgias.   Psychiatric/Behavioral:  Negative for dysphoric mood.        Objective    /68   Pulse 104   Temp 98.2 °F (36.8 °C)   Wt 111 kg (244 lb 6.4 oz)   BMI 45.06 kg/m²     Physical Exam  Constitutional:       Appearance: Normal appearance.   HENT:      Head: Normocephalic and atraumatic.   Eyes:      Extraocular Movements: Extraocular movements intact.   Pulmonary:      Effort: Pulmonary effort is normal.   Musculoskeletal:         General: No swelling, tenderness or deformity. Normal range of motion.   Skin:     Findings: No rash.   Neurological:      General: No focal deficit present.      Mental Status: She is alert and oriented to person, place, and time.       Administrative Statements          Attestation signed by David Mendez DO at 7/11/2024  4:43 PM:  Attending Attestation:    I reviewed the care furnished by the Resident/Fellow during the visit on 7/11/2024.  I was present in the office and personally saw and examined the patient and agree with the medical trainee's assessment and plan    Theoretically lower CV risk with Evenity given lifelong AC, discussed these r:b as well as ONJ r:b and she is amenable. Would follow with Reclast, r:b discussed as well    Per patient preference will not contribute to SLE management    Patient's rheumatologic disease(s) threaten long-term function if not appropriately treated.

## 2024-07-11 NOTE — PROGRESS NOTES
Ambulatory Visit  Name: Na Joseph      : 1965      MRN: [id number]  Encounter Provider: David Mendez DO  Encounter Date: 2024   Encounter department: [facility] [place]    Assessment & Plan   1. Age-related osteoporosis without current pathological fracture  Assessment & Plan:  Noted to have <3.5 Tscore on Lumbar spine on DEXA which is considered to be severe osteoporosis. Will start on Revenity once insurance authorization is done followed by Reclast 2 years later. Will also test for PTH to r/o secondary causes of osteoporosis. Had negative multiple myeloma workup.  Orders:  -     PTH, intact; Future  2. Systemic lupus erythematosus, unspecified SLE type, unspecified organ involvement status (HCC)  Assessment & Plan:  Patient takes treatment of her SLE with Dr Loredo at [place] and does not want rheumatologists here to involve in the SLE care.  3. Antiphospholipid antibody syndrome (HCC)  Assessment & Plan:  Patient understands that Evenity may increase the risk of stroke and heart attack but as she is on anticoagulation for her APLS, the risk is very low and benefits in preventing the osteoporotic fractures is optimal and she agrees with the plan      History of Present Illness     Na Joseph is a 59 y.o. female with PMHx SLE, APLS, PE who was a referred from her rheumatologist (lupus specialist) for osteoporosis treatment. She complains of back pain since the fall she had in 2023. She had left forearm distal fracture when she was a teenager.    Review of Systems   Constitutional:  Negative for fatigue and unexpected weight change.   Respiratory:  Negative for shortness of breath.    Cardiovascular:  Negative for chest pain.   Gastrointestinal:  Negative for abdominal pain.   Musculoskeletal:  Positive for arthralgias.   Psychiatric/Behavioral:  Negative for dysphoric mood.        Objective     /68   Pulse 104   Temp 98.2 °F (36.8 °C)   Wt 111 kg (244 lb  6.4 oz)   BMI 45.06 kg/m²     Physical Exam  Constitutional:       Appearance: Normal appearance.   HENT:      Head: Normocephalic and atraumatic.   Eyes:      Extraocular Movements: Extraocular movements intact.   Pulmonary:      Effort: Pulmonary effort is normal.   Musculoskeletal:         General: No swelling, tenderness or deformity. Normal range of motion.   Skin:     Findings: No rash.   Neurological:      General: No focal deficit present.      Mental Status: She is alert and oriented to person, place, and time.       Administrative Statements

## 2024-07-11 NOTE — TELEPHONE ENCOUNTER
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 Based on these data and given this patient's severe osteoporosis based on T-score of -3.5 at the lumbar spine, one year of romosozumab followed by a bisphosphonate will reduce her fracture risk by a very significant amount compared to a bisphosphonate alone and will reduce the risks of increase mortality that come with an osteoporotic fracture, including but not limited to hospitalization and death.    (1) Sanford KG, Luiz J, Raissa ML, Karen AC, Johnny M, John T, Rk J, Kody M, Lenard PD, Cande A. Romosozumab or Alendronate for Fracture Prevention in Women with Osteoporosis. N Engl J Med. 2017 Oct 12;377(15):2261-4557. doi: 10.1056/[id number]. Epub 2017 Sep 11. PMID: [id number].       David Mendez DO, MIMI  NPI: [id number]  Lic: [id number]

## 2024-07-12 ENCOUNTER — APPOINTMENT (OUTPATIENT)
Dept: LAB | Facility: CLINIC | Age: 59
End: 2024-07-12
Payer: MEDICARE

## 2024-07-12 DIAGNOSIS — Z79.01 ANTICOAGULATED ON COUMADIN: ICD-10-CM

## 2024-07-12 DIAGNOSIS — M81.0 AGE-RELATED OSTEOPOROSIS WITHOUT CURRENT PATHOLOGICAL FRACTURE: ICD-10-CM

## 2024-07-12 LAB
INR PPP: 3.46 (ref 0.84–1.19)
PROTHROMBIN TIME: 35.9 SECONDS (ref 11.6–14.5)
PTH-INTACT SERPL-MCNC: 62.3 PG/ML (ref 12–88)

## 2024-07-12 PROCEDURE — 83970 ASSAY OF PARATHORMONE: CPT

## 2024-07-12 PROCEDURE — 85610 PROTHROMBIN TIME: CPT

## 2024-07-12 PROCEDURE — 36415 COLL VENOUS BLD VENIPUNCTURE: CPT

## 2024-07-15 ENCOUNTER — TELEPHONE (OUTPATIENT)
Dept: HEMATOLOGY ONCOLOGY | Facility: CLINIC | Age: 59
End: 2024-07-15

## 2024-07-15 NOTE — TELEPHONE ENCOUNTER
Please schedule out Prolia   
Additional Notes: PT STATES HE HAD ALLERGY TESTING WITH DR. LEBRON (ALLERGIST), AND HE SHOWED ME POSITIVE TEST RESULTS TO :\\nHYDROXYETHYL METHACRYLATE AND BACITRACIN.\\n\\nPT IS FAIRLY CONVINCED THAT THIS IS WHAT’S CAUSING THE INTERMITTENT SKIN RASH; \\nI CANNOT CONFIRM THAT TO BE THE CASE.  A SKIN BIOPSY WAS DONE BY THE PATIENT’S PCP WHICH SHOWED A VERY NON SPECIFIC \\nDERMATITIS, AND WE HAVE NOT OF COURSE CONFIRMED THAT THIS MATERIAL WAS ACTUALLY USED IN THE PATIENT’S KNEE SURGERY. \\n\\nI ADVISED HIM THAT WE WILL CONT TO TREAT HIS DERMATITIS WITH STRONGER TOPICAL THERAPY, TRYING TO AVOID OVERUSE OF SYSTEMIC STEROIDS. \\n\\nHE WILL EXPLORE OTHER OPTIONS WITH ORTHOPEDICS FOR WHAT IF ANYTHING CAN OR SHOULD BE DONE WTIH RESPECT TO HIS KNEE SURGERY.
Detail Level: Simple
Render Risk Assessment In Note?: no

## 2024-07-19 ENCOUNTER — APPOINTMENT (OUTPATIENT)
Dept: LAB | Facility: CLINIC | Age: 59
End: 2024-07-19
Payer: MEDICARE

## 2024-07-19 DIAGNOSIS — Z79.01 ANTICOAGULATED ON COUMADIN: ICD-10-CM

## 2024-07-19 DIAGNOSIS — Z79.01 ANTICOAGULATED ON COUMADIN: Primary | ICD-10-CM

## 2024-07-19 LAB
INR PPP: 2.56 (ref 0.84–1.19)
PROTHROMBIN TIME: 28.5 SECONDS (ref 11.6–14.5)

## 2024-07-19 PROCEDURE — 36415 COLL VENOUS BLD VENIPUNCTURE: CPT

## 2024-07-19 PROCEDURE — 85610 PROTHROMBIN TIME: CPT

## 2024-07-22 ENCOUNTER — TELEPHONE (OUTPATIENT)
Dept: RHEUMATOLOGY | Facility: CLINIC | Age: 59
End: 2024-07-22

## 2024-07-22 NOTE — TELEPHONE ENCOUNTER
The patient was called and asked to schedule her Evenity. She stated she would call back once she checks with her insurance for her co-pay and call to schedule.

## 2024-07-22 NOTE — TELEPHONE ENCOUNTER
Pt returning phone call. States she is not sure how this will be covered. Did make her aware [facility] called and said that she did not require auth since they are secondary to Medicare. Pt states she spoke with Medicare and they told her they do not cover it. She is requesting a callback with more information to ensure she is covered and total OOP cost prior to scheduling an appt.

## 2024-07-23 NOTE — TELEPHONE ENCOUNTER
I spoke with the patient and relayed the information. She stated she called medicare and was told that they do not cover vaccines, which she told them it wasn't and [facility] told her she needs a prior authorization to receive the Evenity. She is very confused and would like to speak with you about it. I did try and explain it, but she said they need a special exemption ([employer]BS) to receive her shot.

## 2024-07-25 ENCOUNTER — TELEPHONE (OUTPATIENT)
Dept: RHEUMATOLOGY | Facility: CLINIC | Age: 59
End: 2024-07-25

## 2024-07-25 ENCOUNTER — TELEPHONE (OUTPATIENT)
Age: 59
End: 2024-07-25

## 2024-07-25 NOTE — TELEPHONE ENCOUNTER
Incoming call:    Na requesting nurse visit scheduling for [facility].   Routing.

## 2024-08-02 ENCOUNTER — CLINICAL SUPPORT (OUTPATIENT)
Dept: RHEUMATOLOGY | Facility: CLINIC | Age: 59
End: 2024-08-02
Payer: MEDICARE

## 2024-08-02 ENCOUNTER — APPOINTMENT (OUTPATIENT)
Dept: LAB | Facility: CLINIC | Age: 59
End: 2024-08-02
Payer: MEDICARE

## 2024-08-02 DIAGNOSIS — Z79.01 ANTICOAGULATED ON COUMADIN: ICD-10-CM

## 2024-08-02 DIAGNOSIS — M81.0 AGE-RELATED OSTEOPOROSIS WITHOUT CURRENT PATHOLOGICAL FRACTURE: Primary | ICD-10-CM

## 2024-08-02 LAB
INR PPP: 3.31 (ref 0.85–1.19)
PROTHROMBIN TIME: 34.2 SECONDS (ref 12.3–15)

## 2024-08-02 PROCEDURE — 96372 THER/PROPH/DIAG INJ SC/IM: CPT

## 2024-08-02 PROCEDURE — 85610 PROTHROMBIN TIME: CPT

## 2024-08-02 PROCEDURE — 36415 COLL VENOUS BLD VENIPUNCTURE: CPT

## 2024-08-02 NOTE — PROGRESS NOTES
Assessment/Plan:    Na Joseph came into the [facility] today 08/02/24 to receive Evenity injection.      Verbal consent obtained.  Consent given by: patient    patient states patient has been medically healthy with no underlining concerns/complications.      Na Joseph presents with no symptoms today.       All insturctions were reviewed with the patient.    If the patient should have any questions/concerns, advised patient to contacted [facility].       Subjective:     History provided by: patient    Patient ID: Na Joseph is a 59 y.o. female      Objective:    There were no vitals filed for this visit.    Patient tolerated the injection well without any complications.  Injection site/s Left and right Arms.  Medication was provided by Rheumatology.    Patient signed consent form yes   Patient signed ABN form no (If no patient is not a medicare patient).   Patient waited 15 minutes after injection yes (This only applies to patient's receiving first time injection).       Last Visit: 7/25/2024  Next visit:1/14/2025

## 2024-08-05 NOTE — PROGRESS NOTES
Sleep Medicine Outpatient Note   Na Joseph 59 y.o. female MRN: [id number]  8/6/2024    After connecting through the [employer] platform. She agrees to proceed., the patient was identified by name and date of birth. Na Joseph was informed that this is a telemedicine visit and that the visit is being conducted through the [employer] platform. She agrees to proceed..  My office door was closed. No one else was in the room.  She acknowledged consent and understanding of privacy and security of the video platform. The patient has agreed to participate and understands they can discontinue the visit at any time.      Reason for Consultation:    Chief Complaint   Patient presents with    Insomnia     Assessment/Plan:    1. Primary insomnia  Assessment & Plan:  2 years of sleep maintenance insomnia without a specific trigger not associated with mood disorders.  She does have some underlying rheumatologic disorders on immunosuppression which can have some contributions to insomnia.  Could also be related to postmenopausal changes.  She has some temperature intolerances and night sweats occasionally.    Increased dose of trazodone to 100 mg has helped significantly.  Sleep maintenance is much better and she is able to fall asleep within 15 minutes.  She does not have side effects from trazodone.    TSH was normal.    She does not need to follow-up with sleep medicine unless symptoms worsen in the future.  Trazodone can be managed by her PCP.  2. Sleep disturbance  Assessment & Plan:  Home sleep study was negative for GRIS. LEONELA 2.5.  No significant hypoxemia.    Health Maintenance  Immunization History   Administered Date(s) Administered    COVID-19 PFIZER VACCINE 0.3 ML IM 03/06/2021, 03/27/2021, 08/18/2021    COVID-19 Pfizer Vac BIVALENT Dmitri-sucrose 12 Yr+ IM 09/09/2022, 06/02/2023    COVID-19 Pfizer mRNA vacc PF dmitri-sucrose 12 yr and older (Comirnaty) 10/06/2023    Fluzone Split Quad 0.5 mL 10/06/2023    Influenza  Quadrivalent 3 years and older 11/12/2018    Influenza Quadrivalent Preservative Free 3 years and older IM 10/07/2021    Influenza, injectable, quadrivalent, preservative free 0.5 mL 12/19/2018    Influenza, recombinant, quadrivalent,injectable, preservative free 10/14/2019, 09/24/2020, 09/09/2022    Influenza, seasonal, injectable, preservative free 10/03/2017    Pneumococcal Conjugate Vaccine 20-valent (Pcv20), Polysace 05/25/2023    Pneumococcal Polysaccharide PPV23 12/19/2018    Respiratory Syncytial Virus Vaccine (Recombinant, Adjuvanted) 10/22/2023    Tdap 12/12/2016, 11/11/2023        Return if symptoms worsen or fail to improve.    History of Present Illness   HPI:  Na Joseph is a 59 y.o. female who has a past medical history of antiphospholipid syndrome on coumadin, bilateral PE, HFpEF, pulmonary hypertension, MCTD/SLE, CRISTAL, VB12 deficiency (on IM injections), who is following up for insomnia    8/6/24 - FU with me -sleep is much better with trazodone 100 mg nightly.  Now able to fall back asleep within 15 minutes.  Still having snoring but glad to hear that she did not have sleep apnea on home sleep study.  Overall doing well with sleep.  No side effects to trazodone.  No dizziness, lightheadedness, palpitations, chest pain, orthostatic hypotension.    [id number] 2.5 no significant nocturnal hypoxemia.  Primary snoring    3/12/24 - Consult with me - She had 2 years of difficulty sleeping since having a pulmonary embolism.  Unsure of any trigger.  Falling sleep is not a big problem but staying asleep is a problem.  2 hours after falling asleep she wakes up, goes back to sleep but then wakes up again 2 hours later.  When she wakes up in the middle of the night, it could take up to one hour but unsure.  Waking up at night is usually due to feeling too hot.  Always sweaty.  She takes sildenafil, gabapentin, and trazodone at bedtime.      She does clock watching but try to avoid it.  Does not know of  any snoring or gasping/choking during sleep.  No witnessed apneas.  No noticeable leg movements overnight.  While she is lying in bed she does not have restless legs.  She has chronic pain in her foot that  gets better with movements.      No sleepwalking.  No dream enactment behavior.    On disability.  Wake up 5-530am naturally and get out of bed.  She has 4 cats that wake up early.  Cats are in the bedroom but not on the bed.      She naps during the day - usually nap around late morning or early afternoon.  She usually naps for 1 hour and most of the time feel better.      Coffee only in the morning.      Patient was tried on trazodone by PCP but not helpful (has been on trazodone since 11/2022 but efficacy).  Ramelteon started on 3/4 but makes her too sleepy during the day (took it at 9:30pm).    No sadness, no depression/anxiety, very happy life.      Had T/A as an adult for peritonsilar abscess    Going on vacation in May  [place]      Historical Information   Past Medical History:   Diagnosis Date    HELENE positive     Anemia     Sildenafil causes decreased hematocrit    Antiphospholipid syndrome (HCC)     Anxiety 03/2020    CHF (congestive heart failure) ([facility])     right heart failure related to pulm HTN lupus    Chronic kidney disease     borderline lab values    Chronic rhinitis 06/04/2012    Clotting disorder ([facility]) 2012    Coronary artery disease 2018    Encephalitis     Lasted Assessed 10/18/2017    Gout 06/26/2018    Heart failure ([facility]) 2019    History of transfusion 2/13/2019    autologous    Hypertension     Lupus ([facility]) 2018    Lupus anticoagulant disorder ([facility])     Maxillary sinusitis     Lasted Assessed 10/18/2017    Night sweat     Osteopenia     Hip    Osteoporosis     Spine    Pneumonia     Last Assessed 10/18/2017    PONV (postoperative nausea and vomiting)     Pulmonary embolism ([facility])     Pulmonary hypertension ([facility])     Seizures ([facility])     Only during Encephalitis    Shortness of breath      with exertion    Sinus tachycardia     Urinary incontinence      Past Surgical History:   Procedure Laterality Date    ARTHRODESIS      Hand: IP Joint    CHOLECYSTECTOMY      COLONOSCOPY      COLPOSCOPY      HYSTERECTOMY      Vaginal    JOINT REPLACEMENT Right     Knee replacement    KNEE SURGERY  02/2019    LAPAROSCOPIC ESOPHAGOGASTRIC FUNDOPLASTY  2008    AR ARTHRP KNE CONDYLE&PLATU MEDIAL&LAT COMPARTMENTS Right 02/12/2019    Procedure: KNEE TOTAL ARTHROPLASTY AND ASSOCIATED PROCEDURES;  Surgeon: Donovan Zendejas DO;  [facility];  Service: Orthopedics    AR ARTHRP KNE CONDYLE&PLATU MEDIAL&LAT COMPARTMENTS Left 10/06/2022    Procedure: ARTHROPLASTY KNEE TOTAL;  Surgeon: Donovan Zendejas DO;  [facility];  Service: Orthopedics    AR ARTHRS KNEE W/MENISCECTOMY MED&LAT W/SHAVING Right 10/02/2018    Procedure: KNEE ARTHROSCOPY WITH PARTIAL MEDIAL MENISCECTOMY;  Surgeon: Donovan Zendejas DO;  [facility];  Service: Orthopedics    AR ARTHRS KNEE W/MENISCECTOMY MED&LAT W/SHAVING Left 12/17/2019    Procedure: KNEE ARTHROSCOPIC MENISCECTOMY /MEDIAL; Chondyl medial and posterior;  Surgeon: Donovan Zendejas DO;  [facility];  Service: Orthopedics    REDUCTION MAMMAPLASTY Bilateral     doesnt remember when    REPAIR ANKLE LIGAMENT Right     TONSILLECTOMY       Family History   Problem Relation Age of Onset    Uterine cancer Mother     Pancreatic cancer Mother 70    Diabetes Mother     Endometrial cancer Mother 66    Arthritis Mother     Cancer Mother         Pancreas, Uterine    Vision loss Mother     Osteoporosis Mother     Heart disease Father         open heart surgery at age 50     Stroke Father         CVA    Hypertension Father     Atrial fibrillation Father     Hyperlipidemia Father     Arthritis Father     Rheum arthritis Father     Heart attack Father     No Known Problems Sister     No Known Problems Sister     Thyroid disease Sister     Depression Sister     Osteoarthritis Sister     Asthma  "Sister     Asthma Daughter     Migraines Daughter     Asthma Daughter     Thyroid disease Maternal Grandmother     Heart attack Maternal Grandfather     Heart disease Maternal Grandfather     Heart attack Paternal Grandmother     Heart disease Paternal Grandmother     Skin cancer Paternal Grandfather     No Known Problems Brother     Hemochromatosis Brother     No Known Problems Maternal Aunt     No Known Problems Paternal Aunt     Anuerysm Neg Hx     Clotting disorder Neg Hx     Heart failure Neg Hx          Meds/Allergies     Current Outpatient Medications:     B-D 3CC LUER-LILLIE SYR 25GX1/2\" 25G X 1-1/2\" 3 ML MISC, USE 1 SYRINGE EVERY 30 DAYS, Disp: 12 each, Rfl: 1    Benlysta 200 MG/ML SOAJ, Weekly on Fridays, Disp: , Rfl:     cholecalciferol (VITAMIN D3) 1,000 units tablet, Take 2,000 Units by mouth daily in the early morning  , Disp: , Rfl:     clindamycin (CLEOCIN) 300 MG capsule, Take 3 capsules by mouth 1 hour prior to dental procedure (Patient not taking: Reported on 5/9/2024), Disp: 3 capsule, Rfl: 3    cyanocobalamin 1,000 mcg/mL, Inject 1 mL (1,000 mcg total) into a muscle every 14 (fourteen) days (Patient not taking: Reported on 5/9/2024), Disp: 12 mL, Rfl: 1    gabapentin (NEURONTIN) 100 mg capsule, Take 1 capsule (100 mg total) by mouth daily at bedtime, Disp: 90 capsule, Rfl: 1    hydroxychloroquine (PLAQUENIL) 200 mg tablet, Take 400 mg by mouth daily with breakfast Alternating days/dosages, Disp: , Rfl:     leflunomide (ARAVA) 10 MG tablet, Take 10 mg by mouth daily, Disp: , Rfl:     potassium chloride (Klor-Con M20) 20 mEq tablet, Take 1 tablet (20 mEq total) by mouth daily, Disp: 30 tablet, Rfl: 2    scopolamine (TRANSDERM-SCOP) 1 mg/3 days TD 72 hr patch, Place 1 patch on the skin over 72 hours every third day (Patient not taking: Reported on 5/9/2024), Disp: 10 patch, Rfl: 0    sildenafil (REVATIO) 20 mg tablet, Take 1 tablet (20 mg total) by mouth 3 (three) times a day, Disp: 90 tablet, Rfl: " "11    spironolactone (ALDACTONE) 25 mg tablet, Take 1 tablet (25 mg total) by mouth daily, Disp: 90 tablet, Rfl: 3    Syringe/Needle, Disp, (SecureSafe Syringe/Needle) 25G X 1-1/2\" 1 ML MISC, Use 1 Syringe every 30 (thirty) days, Disp: 12 each, Rfl: 1    torsemide (DEMADEX) 20 mg tablet, TAKE 1 TABLET(20 MG) BY MOUTH DAILY, Disp: 90 tablet, Rfl: 1    traZODone (DESYREL) 50 mg tablet, TAKE 2 TABLETS(100 MG) BY MOUTH DAILY AT BEDTIME, Disp: 180 tablet, Rfl: 1    triamcinolone (KENALOG) 0.1 % oral topical paste, prn, Disp: , Rfl:     warfarin (Coumadin) 5 mg tablet, 7.5mg po daily or as directed., Disp: 150 tablet, Rfl: 3  Allergies   Allergen Reactions    Dilantin [Phenytoin] Anaphylaxis and Rash    Penicillins Rash, Anaphylaxis, Dermatitis and Hives    Augmentin [Amoxicillin-Pot Clavulanate] Rash and Dermatitis       Vitals: not currently breastfeeding. There is no height or weight on file to calculate BMI.      Full physical exam not performed due to virtual visit.  Patient appears well, at baseline mental status, oriented with no distress.    Labs:   I have personally reviewed pertinent lab results.    ABG: No results found for: \"PHART\", \"LOW8SXB\", \"PO2ART\", \"RWT8SLM\", \"N2IHEZNU\", \"BEART\", \"SOURCE\",   BNP:   Lab Results   Component Value Date    BNP 16 11/05/2022   ,   CBC:  Lab Results   Component Value Date    WBC 3.27 (L) 03/01/2024    HGB 14.0 03/01/2024    HCT 42.7 03/01/2024    MCV 98 03/01/2024     03/01/2024    EOSPCT 3 03/01/2024    EOSABS 0.09 03/01/2024    NEUTOPHILPCT 56 03/01/2024    LYMPHOPCT 28 03/01/2024   ,   CMP:   Lab Results   Component Value Date    SODIUM 139 03/01/2024    K 4.3 03/01/2024     03/01/2024    CO2 30 03/01/2024    BUN 22 03/01/2024    CREATININE 0.82 03/01/2024    GLUCOSE 100 11/11/2023    CALCIUM 9.6 03/01/2024    AST 12 (L) 03/01/2024    ALT 16 03/01/2024    ALKPHOS 88 03/01/2024    EGFR 77 03/01/2024   ,   PT/INR:   Lab Results   Component Value Date    INR 3.31 " "(H) 08/02/2024   ,   Ferrtin: No components found for: \"FERRTIN\",  Magensium: No results found for: \"MAGNESIUM\",      Imaging and other studies: I have personally reviewed pertinent reports.   and I have personally reviewed pertinent films in PACS  11/22/2023  Lungs clear, atelectasis.  No effusion.  R 1.6cm thyroid nodule    Sleep Study:  4/2/24 - HST   The patient had a respiratory event index of 2.5, which does not meet criteria for obstructive sleep apnea.  Treatment options for primary snoring includes OTC nasal strips, nasal decongestants, oral appliance therapy, weight loss, ENT evaluation.    Transthoracic Echo:  2/21/2024 TTE - LVEF normal, diastolic function normal.  RVSP mildly elevsted 39mmHg    Pulmonary Function Testing:        Buzz Davis MD  Pulmonary, Critical Care and Sleep Medicine  [facility]     Portions of the record may have been created with voice recognition software. Occasional wrong word or \"sound a like\" substitutions may have occurred due to the inherent limitations of voice recognition software. Please read the chart carefully and recognize, using context, where substitutions have occurred.     "

## 2024-08-06 ENCOUNTER — TELEMEDICINE (OUTPATIENT)
Dept: SLEEP CENTER | Facility: CLINIC | Age: 59
End: 2024-08-06
Payer: MEDICARE

## 2024-08-06 DIAGNOSIS — G47.9 SLEEP DISTURBANCE: ICD-10-CM

## 2024-08-06 DIAGNOSIS — F51.01 PRIMARY INSOMNIA: Primary | ICD-10-CM

## 2024-08-06 PROCEDURE — 99213 OFFICE O/P EST LOW 20 MIN: CPT | Performed by: INTERNAL MEDICINE

## 2024-08-06 NOTE — ASSESSMENT & PLAN NOTE
2 years of sleep maintenance insomnia without a specific trigger not associated with mood disorders.  She does have some underlying rheumatologic disorders on immunosuppression which can have some contributions to insomnia.  Could also be related to postmenopausal changes.  She has some temperature intolerances and night sweats occasionally.    Increased dose of trazodone to 100 mg has helped significantly.  Sleep maintenance is much better and she is able to fall asleep within 15 minutes.  She does not have side effects from trazodone.    TSH was normal.    She does not need to follow-up with sleep medicine unless symptoms worsen in the future.  Trazodone can be managed by her PCP.

## 2024-08-09 ENCOUNTER — APPOINTMENT (OUTPATIENT)
Dept: LAB | Facility: CLINIC | Age: 59
End: 2024-08-09
Payer: MEDICARE

## 2024-08-09 DIAGNOSIS — Z79.01 ANTICOAGULATED ON COUMADIN: ICD-10-CM

## 2024-08-09 LAB
INR PPP: 2.32 (ref 0.85–1.19)
PROTHROMBIN TIME: 26.2 SECONDS (ref 12.3–15)

## 2024-08-09 PROCEDURE — 85610 PROTHROMBIN TIME: CPT

## 2024-08-09 PROCEDURE — 36415 COLL VENOUS BLD VENIPUNCTURE: CPT

## 2024-08-23 ENCOUNTER — APPOINTMENT (OUTPATIENT)
Dept: LAB | Facility: CLINIC | Age: 59
End: 2024-08-23
Payer: MEDICARE

## 2024-08-23 DIAGNOSIS — Z79.01 ANTICOAGULATED ON COUMADIN: Primary | ICD-10-CM

## 2024-08-23 DIAGNOSIS — Z79.01 ANTICOAGULATED ON COUMADIN: ICD-10-CM

## 2024-08-23 DIAGNOSIS — E53.8 B12 DEFICIENCY: ICD-10-CM

## 2024-08-23 DIAGNOSIS — I26.99 OTHER ACUTE PULMONARY EMBOLISM WITHOUT ACUTE COR PULMONALE (HCC): ICD-10-CM

## 2024-08-23 DIAGNOSIS — D68.61 ANTIPHOSPHOLIPID ANTIBODY SYNDROME (HCC): ICD-10-CM

## 2024-08-23 LAB
ALBUMIN SERPL BCG-MCNC: 4 G/DL (ref 3.5–5)
ALP SERPL-CCNC: 108 U/L (ref 34–104)
ALT SERPL W P-5'-P-CCNC: 13 U/L (ref 7–52)
ANION GAP SERPL CALCULATED.3IONS-SCNC: 5 MMOL/L (ref 4–13)
AST SERPL W P-5'-P-CCNC: 11 U/L (ref 13–39)
BASOPHILS # BLD AUTO: 0.01 THOUSANDS/ÂΜL (ref 0–0.1)
BASOPHILS NFR BLD AUTO: 0 % (ref 0–1)
BILIRUB SERPL-MCNC: 0.56 MG/DL (ref 0.2–1)
BUN SERPL-MCNC: 14 MG/DL (ref 5–25)
CALCIUM SERPL-MCNC: 9.2 MG/DL (ref 8.4–10.2)
CHLORIDE SERPL-SCNC: 106 MMOL/L (ref 96–108)
CO2 SERPL-SCNC: 30 MMOL/L (ref 21–32)
CREAT SERPL-MCNC: 0.91 MG/DL (ref 0.6–1.3)
EOSINOPHIL # BLD AUTO: 0.13 THOUSAND/ÂΜL (ref 0–0.61)
EOSINOPHIL NFR BLD AUTO: 4 % (ref 0–6)
ERYTHROCYTE [DISTWIDTH] IN BLOOD BY AUTOMATED COUNT: 12 % (ref 11.6–15.1)
FERRITIN SERPL-MCNC: 169 NG/ML (ref 11–307)
GFR SERPL CREATININE-BSD FRML MDRD: 68 ML/MIN/1.73SQ M
GLUCOSE P FAST SERPL-MCNC: 98 MG/DL (ref 65–99)
HCT VFR BLD AUTO: 38.4 % (ref 34.8–46.1)
HGB BLD-MCNC: 12.5 G/DL (ref 11.5–15.4)
IMM GRANULOCYTES # BLD AUTO: 0.03 THOUSAND/UL (ref 0–0.2)
IMM GRANULOCYTES NFR BLD AUTO: 1 % (ref 0–2)
INR PPP: 1.94 (ref 0.85–1.19)
IRON SATN MFR SERPL: 30 % (ref 15–50)
IRON SERPL-MCNC: 99 UG/DL (ref 50–212)
LYMPHOCYTES # BLD AUTO: 1.14 THOUSANDS/ÂΜL (ref 0.6–4.47)
LYMPHOCYTES NFR BLD AUTO: 32 % (ref 14–44)
MCH RBC QN AUTO: 30.5 PG (ref 26.8–34.3)
MCHC RBC AUTO-ENTMCNC: 32.6 G/DL (ref 31.4–37.4)
MCV RBC AUTO: 94 FL (ref 82–98)
MONOCYTES # BLD AUTO: 0.34 THOUSAND/ÂΜL (ref 0.17–1.22)
MONOCYTES NFR BLD AUTO: 10 % (ref 4–12)
NEUTROPHILS # BLD AUTO: 1.9 THOUSANDS/ÂΜL (ref 1.85–7.62)
NEUTS SEG NFR BLD AUTO: 53 % (ref 43–75)
NRBC BLD AUTO-RTO: 0 /100 WBCS
PLATELET # BLD AUTO: 298 THOUSANDS/UL (ref 149–390)
PMV BLD AUTO: 10.3 FL (ref 8.9–12.7)
POTASSIUM SERPL-SCNC: 4.1 MMOL/L (ref 3.5–5.3)
PROT SERPL-MCNC: 6.1 G/DL (ref 6.4–8.4)
PROTHROMBIN TIME: 22.9 SECONDS (ref 12.3–15)
RBC # BLD AUTO: 4.1 MILLION/UL (ref 3.81–5.12)
SODIUM SERPL-SCNC: 141 MMOL/L (ref 135–147)
TIBC SERPL-MCNC: 330 UG/DL (ref 250–450)
UIBC SERPL-MCNC: 231 UG/DL (ref 155–355)
VIT B12 SERPL-MCNC: 687 PG/ML (ref 180–914)
WBC # BLD AUTO: 3.55 THOUSAND/UL (ref 4.31–10.16)

## 2024-08-23 PROCEDURE — 36415 COLL VENOUS BLD VENIPUNCTURE: CPT

## 2024-08-23 PROCEDURE — 85610 PROTHROMBIN TIME: CPT

## 2024-08-23 PROCEDURE — 80053 COMPREHEN METABOLIC PANEL: CPT

## 2024-08-23 PROCEDURE — 85025 COMPLETE CBC W/AUTO DIFF WBC: CPT

## 2024-08-28 NOTE — PROGRESS NOTES
Cardiology Outpatient Progress Note - Na Joseph 59 y.o. female MRN: [id number]    @ Encounter: [id number]      Patient Active Problem List    Diagnosis Date Noted    GRIS (obstructive sleep apnea) 04/02/2024    Other sleep disorders 03/13/2024    Thyroid nodule 03/13/2024    Neutropenia, unspecified type ([facility]) 03/04/2024    Rheumatoid arthritis, involving unspecified site, unspecified whether rheumatoid factor present ([facility]) 03/04/2024    BMI 40.0-44.9, adult ([facility]) 03/04/2024    Chronic midline low back pain without sciatica 03/04/2024    Motion sickness 03/04/2024    Fall 11/11/2023    Anticoagulated 08/28/2023    Sleep disturbance 04/24/2023    Neuropathic pain, leg, left 04/24/2023    Antiphospholipid antibody syndrome ([facility]) 02/27/2023    Financial difficulties 02/08/2023    Insomnia 01/05/2023    Pulmonary embolus ([facility]) 01/05/2023    BMI 39.0-39.9,adult 01/05/2023    Iron deficiency anemia due to chronic blood loss 11/21/2022    COVID-19 11/05/2022    SLE (systemic lupus erythematosus) ([facility]) 11/05/2022    Status post total knee replacement using cement, left 10/20/2022    Venous stasis of lower extremity 05/09/2022    Acute pain of right knee 05/09/2022    Leukopenia 11/22/2021    Morbid obesity ([facility])     Primary osteoarthritis of left knee 02/12/2020    Left knee pain 11/11/2019    Tear of medial meniscus of left knee, current 11/11/2019    Right knee pain 02/18/2019    Status post total right knee replacement 02/18/2019    Chronic kidney disease, stage 3a ([facility]) 02/12/2019    Tear of medial meniscus of right knee, current 09/10/2018    Other tear of medial meniscus, current injury, right knee, initial encounter 07/16/2018    Patellofemoral disorder of right knee 06/04/2018    Pes anserinus bursitis of right knee 06/04/2018    Arthritis of right knee 06/04/2018    Arthritis of left knee 06/04/2018    Chronic pain of right knee 05/22/2018    Elevated serum creatinine 05/22/2018    Hyperuricemia 05/22/2018     Long-term use of hydroxychloroquine 02/16/2018    Pulmonary hypertension (HCC) 02/16/2018    Undifferentiated connective tissue disease (HCC) 02/16/2018    Secondary pulmonary hypertension 12/15/2017    Diffuse connective tissue disease (HCC) 11/21/2017    Pes anserine bursitis 11/21/2017    Trochanteric bursitis of both hips 11/21/2017    Vitamin D deficiency 11/21/2017    Other organ or system involvement in systemic lupus erythematosus (HCC) 11/17/2017    Sinus tachycardia 11/09/2017    B12 deficiency 10/19/2017    Age-related osteoporosis without current pathological fracture 10/19/2017    Lupus anticoagulant disorder (HCC) 10/18/2017    Positive HELENE (antinuclear antibody) 10/18/2017    Hot flashes due to menopause 09/01/2015    SOB (shortness of breath) on exertion 11/12/2012    Other chronic pulmonary heart diseases 11/05/2012    Edema 06/04/2012    Post-nasal drip 06/04/2012    Chronic cough 02/09/2012    Dyspnea 02/09/2012       Assessment:  # Hx of Pulmonary Embolis 11/5/22 associated with COVID 19 infection, also had TKR on 10/6/22  AC: warfarin 5 mg alternating 7.5 mg    Positive lupus anticoagulant + 9/26/22    Echo 11/6/22:  LVEF: 65%  RV: upper normal size, normal function  PASP: 42 mmHg    CTA 11/5/22: moderate quantity of RLL segmental and small quantity RUL and LLL acute PE    # Exercise induced PAH; HFpEF with PH  Out of proportion to obesity/ deconditioning/ diastolic dysfunction (PCWP 12 mmHg). Pt with MCTD/ SLE w/ lupus anticoagulant. Remote smoker. At rest, RV appears normal on TTE with coupled RV-PA w/o e/o of RVOT notching suggestive of normal PVR at rest. However, she did have a stress echo in 2012 that was suggestive of exercise induced pulmonary HTN. At the time she did not have e/o of elevated PVR. Her bubble was negative which makes an intracardiac shunt less likely.    PAH Rx: sildanefil 20 mg Q8 (previously on macitentan)  Diuretic: torsemide 20 mg daily, spironolactone 25 mg daily  NT  proBNP:   Weight: 237 lbs--> 229 lbs--> 218 lbs--> 225 lbs--> 244 lbs    Studies- personally reviewed by me  Echo 2/21/24:  LVEF: 60%  RV: normal  PASP: 39 mmHg  RVOT: no notching    Echo 11/6/22: (in setting of acute PE)  LVEF: 65%  RV: upper normal size, normal function  PASP: 42 mmHg    Stress Echo 11/4/20: 3 min, 4.6 METs, max heart rate 155 bpm, resting /96    Echo 4/23/19:  Normal RV size and function    RHC with stress 12/19/17:  She had an appropriate HR response from 100 to 130 bpm with MAP throughout the study staying at 112 mmHg.  Hgb 13.1     Rest:  RA 7  RV 37/4/13  PA 33/16/25  PCWP 12    SaO2 99%  MvO2 72.3%  CO/CI 4.8/2.3  TPG 13  DPG 4  PVR 2.7     Exercise:  CVP 10  PA 54/29/37  PCWP 18    SaO2 100%  MvO2 66%  CO/CI 4/1.9  TPG 19  DPG 11  PVR 4.8  CVP:PCWP 0.55    Stress echo 12/14/17:  to evaluate pulmonary pressures, RV, and RVOT signal w/ exercise.  5 min, 20 sec.  HTNsive response, decrease in O2 saturation from 99% to 91% and increase in PA pressures from 45 mmHg to 70 mmHg with exercise.    PFTs 12/1/17: normal, DLCO 84%    # SLE: Per outpatient Rheumatologist. Continue plaquenil  # ST: V/Q negative. May be 2/2 to deconditioning +/- inappropriate ST +/- diastolic dysfunction/HF. No e/o infection.    Holter 12/4/20: , 87 bpm  # Morbid obesity: Continue weight loss  # TKR on 10/6/22  # GRIS screen  Home study 4/2024: negative- AHI 2.5    TODAY'S PLAN:  Warfarin probably lifelong  Continue sildanefil- will try to slowly go up on sildanefil  HR running high, could add low dose bisoprolol given HTN as well (?Raynauds)  Torsemide 20 mg and spironolactone 25 mg daily-  If lightheadedness persists than cut back torsemide to 10 mg daily  Likely has venous insufficiency  Recommend Wegovy- I think weight loss will help her breathing     HPI:          58 yo followed for out of proportion PAH, exercise induced PAH, with MCTD/ SLE, + lupus anticoagulant, obesity. She has seen   Oli.  first started getting SOB -- 10 years ago. She has had several episodes of bacterial PNA and chronic cough (a few times/year). She was referred to [facility] in 2012 for workup for PH. She had a a stress echo 8/2012 that showed that her PA pressures more than doubled from --24 mmHg to > 50 mmHg. Currently, she states she can walk up a couple flights of stairs but does get SOB. She also gets SOB with walking up inclines. She gets occasional LH.      She was seeing a Rheumatologist in [place], [place] but recently saw a specialist at [facility], Dr. Ivy (Rheumatologist - 11/16). She was diagnosed with MCTD and SLE. HELENE + 1:320 w/ speckled pattern. She has been on Plaquenil x 2 weeks now. She is on ASA for + lupus anticoagulant and anticardiolipin Abs. She has joint pains and a subtle malar rash. She states so far there has been no change with plaquenil.     After her visit with Dr. Stanton, she has had TTE which shows LVEF --65%, normal RV size and function without RVOT notching. Normal atria. CXR clear. She also has had a negative V/Q scan. She walked the patient in the hallway and had her go up and down stairs. Her resting HR went from --100 bpm to 110s with Pulse Ox starting from 98% @ rest to --90% with exercise.     Stress Echo in 2017 showed hypertensive response and PA pressures to 70 mmHg w/ drop in pulse ox to 91%. Exercise RHC showed increase in PVR to 4.5 MOLINA from < 3 MOLINA. She was denied Tadalafil but approved for sildanefil. She did not feel any different and continued trouble with stairs.       Called 5/12 that returned from cruise a week ago and developed swelling in right leg. Ultrasound negative for DVT. Had fluid removed from right knee by Dr Aashish Prasad rheumatologist wants her to see a cardiologist at [facility]  Admitted 11/5 to 11/7 with pulmonary embolism associated with COVID 19 infection; Pt though already had TKR on 10/6/22  On warfarin. Lupus anticoagulant    Interval History:    Feeling ok      Past Medical History:   Diagnosis Date    HELENE positive     Anemia     Sildenafil causes decreased hematocrit    Antiphospholipid syndrome (HCC)     Anxiety 03/2020    CHF (congestive heart failure) ([facility])     right heart failure related to pulm HTN lupus    Chronic kidney disease     borderline lab values    Chronic rhinitis 06/04/2012    Clotting disorder ([facility]) 2012    Coronary artery disease 2018    Encephalitis     Lasted Assessed 10/18/2017    Gout 06/26/2018    Heart failure ([facility]) 2019    History of transfusion 2/13/2019    autologous    Hypertension     Lupus ([facility]) 2018    Lupus anticoagulant disorder ([facility])     Maxillary sinusitis     Lasted Assessed 10/18/2017    Night sweat     Osteopenia     Hip    Osteoporosis     Spine    Pneumonia     Last Assessed 10/18/2017    PONV (postoperative nausea and vomiting)     Pulmonary embolism ([facility])     Pulmonary hypertension ([facility])     Seizures ([facility])     Only during Encephalitis    Shortness of breath     with exertion    Sinus tachycardia     Urinary incontinence        Review of Systems   Constitutional:  Negative for activity change, appetite change, fatigue and unexpected weight change.   HENT:  Negative for congestion and nosebleeds.    Eyes: Negative.    Respiratory:  Negative for cough, chest tightness and shortness of breath.    Cardiovascular:  Negative for chest pain, palpitations and leg swelling.   Gastrointestinal:  Negative for abdominal distention.   Endocrine: Negative.    Genitourinary: Negative.    Musculoskeletal: Negative.    Skin: Negative.    Neurological:  Negative for dizziness, syncope and weakness.   Hematological: Negative.    Psychiatric/Behavioral: Negative.         Allergies   Allergen Reactions    Dilantin [Phenytoin] Anaphylaxis and Rash    Penicillins Rash, Anaphylaxis, Dermatitis and Hives    Augmentin [Amoxicillin-Pot Clavulanate] Rash and Dermatitis     .    Current Outpatient Medications:     B-D 3CC LUER-LILLIE SYR  "25GX1/2\" 25G X 1-1/2\" 3 ML MISC, USE 1 SYRINGE EVERY 30 DAYS, Disp: 12 each, Rfl: 1    Benlysta 200 MG/ML SOAJ, Weekly on Fridays, Disp: , Rfl:     cholecalciferol (VITAMIN D3) 1,000 units tablet, Take 2,000 Units by mouth daily in the early morning  , Disp: , Rfl:     clindamycin (CLEOCIN) 300 MG capsule, Take 3 capsules by mouth 1 hour prior to dental procedure (Patient not taking: Reported on 5/9/2024), Disp: 3 capsule, Rfl: 3    cyanocobalamin 1,000 mcg/mL, Inject 1 mL (1,000 mcg total) into a muscle every 14 (fourteen) days (Patient not taking: Reported on 5/9/2024), Disp: 12 mL, Rfl: 1    gabapentin (NEURONTIN) 100 mg capsule, Take 1 capsule (100 mg total) by mouth daily at bedtime, Disp: 90 capsule, Rfl: 1    hydroxychloroquine (PLAQUENIL) 200 mg tablet, Take 400 mg by mouth daily with breakfast Alternating days/dosages, Disp: , Rfl:     leflunomide (ARAVA) 10 MG tablet, Take 10 mg by mouth daily, Disp: , Rfl:     potassium chloride (Klor-Con M20) 20 mEq tablet, Take 1 tablet (20 mEq total) by mouth daily, Disp: 30 tablet, Rfl: 2    scopolamine (TRANSDERM-SCOP) 1 mg/3 days TD 72 hr patch, Place 1 patch on the skin over 72 hours every third day (Patient not taking: Reported on 5/9/2024), Disp: 10 patch, Rfl: 0    sildenafil (REVATIO) 20 mg tablet, Take 1 tablet (20 mg total) by mouth 3 (three) times a day, Disp: 90 tablet, Rfl: 11    spironolactone (ALDACTONE) 25 mg tablet, Take 1 tablet (25 mg total) by mouth daily, Disp: 90 tablet, Rfl: 3    Syringe/Needle, Disp, (SecureSafe Syringe/Needle) 25G X 1-1/2\" 1 ML MISC, Use 1 Syringe every 30 (thirty) days, Disp: 12 each, Rfl: 1    torsemide (DEMADEX) 20 mg tablet, TAKE 1 TABLET(20 MG) BY MOUTH DAILY, Disp: 90 tablet, Rfl: 1    traZODone (DESYREL) 50 mg tablet, TAKE 2 TABLETS(100 MG) BY MOUTH DAILY AT BEDTIME, Disp: 180 tablet, Rfl: 1    triamcinolone (KENALOG) 0.1 % oral topical paste, prn, Disp: , Rfl:     warfarin (Coumadin) 5 mg tablet, 7.5mg po daily or as " directed., Disp: 150 tablet, Rfl: 3    Social History     Socioeconomic History    Marital status: /Civil Union     Spouse name: Not on file    Number of children: 2    Years of education: Not on file    Highest education level: Not on file   Occupational History    Not on file   Tobacco Use    Smoking status: Former     Current packs/day: 0.00     Types: Cigarettes     Quit date: 2006     Years since quittin.6    Smokeless tobacco: Never   Vaping Use    Vaping status: Never Used   Substance and Sexual Activity    Alcohol use: Yes     Comment: Socially    Drug use: No    Sexual activity: Yes     Partners: Male     Birth control/protection: Female Sterilization   Other Topics Concern    Not on file   Social History Narrative    Daily caffeinated coffee consumption    Exercise habits     Social Determinants of Health     Financial Resource Strain: Medium Risk (2024)    Overall Financial Resource Strain (CARDIA)     Difficulty of Paying Living Expenses: Somewhat hard   Food Insecurity: Not on file   Transportation Needs: No Transportation Needs (2024)    PRAPARE - Transportation     Lack of Transportation (Medical): No     Lack of Transportation (Non-Medical): No   Physical Activity: Not on file   Stress: Not on file   Social Connections: Not on file   Intimate Partner Violence: Not on file   Housing Stability: Low Risk  (2024)    [facility]    Housing Stability     Unstable Housing in the Last Year: Not on file       Family History   Problem Relation Age of Onset    Uterine cancer Mother     Pancreatic cancer Mother 70    Diabetes Mother     Endometrial cancer Mother 66    Arthritis Mother     Cancer Mother         Pancreas, Uterine    Vision loss Mother     Osteoporosis Mother     Heart disease Father         open heart surgery at age 50     Stroke Father         CVA    Hypertension Father     Atrial fibrillation Father      Hyperlipidemia Father     Arthritis Father     Rheum arthritis Father     Heart attack Father     No Known Problems Sister     No Known Problems Sister     Thyroid disease Sister     Depression Sister     Osteoarthritis Sister     Asthma Sister     Asthma Daughter     Migraines Daughter     Asthma Daughter     Thyroid disease Maternal Grandmother     Heart attack Maternal Grandfather     Heart disease Maternal Grandfather     Heart attack Paternal Grandmother     Heart disease Paternal Grandmother     Skin cancer Paternal Grandfather     No Known Problems Brother     Hemochromatosis Brother     No Known Problems Maternal Aunt     No Known Problems Paternal Aunt     Anuerysm Neg Hx     Clotting disorder Neg Hx     Heart failure Neg Hx        Physical Exam:    Vitals: not currently breastfeeding., There is no height or weight on file to calculate BMI.,   Wt Readings from Last 3 Encounters:   07/11/24 111 kg (244 lb 6.4 oz)   06/04/24 111 kg (244 lb)   05/09/24 111 kg (244 lb)         Physical Exam:    Physical Exam  Constitutional:       Appearance: She is well-developed.   HENT:      Head: Normocephalic and atraumatic.   Eyes:      Pupils: Pupils are equal, round, and reactive to light.   Neck:      Vascular: No JVD.   Cardiovascular:      Rate and Rhythm: Normal rate and regular rhythm.      Heart sounds: No murmur heard.  Pulmonary:      Effort: Pulmonary effort is normal. No respiratory distress.      Breath sounds: Normal breath sounds.   Abdominal:      General: There is no distension.      Palpations: Abdomen is soft.      Tenderness: There is no abdominal tenderness.   Musculoskeletal:         General: Normal range of motion.      Cervical back: Normal range of motion.   Skin:     General: Skin is warm and dry.      Findings: No rash.   Neurological:      Mental Status: She is alert and oriented to person, place, and time.         Labs & Results:    Lab Results   Component Value Date    SODIUM 141 08/23/2024     K 4.1 08/23/2024     08/23/2024    CO2 30 08/23/2024    BUN 14 08/23/2024    CREATININE 0.91 08/23/2024    GLUC 93 07/03/2023    CALCIUM 9.2 08/23/2024     Lab Results   Component Value Date    WBC 3.55 (L) 08/23/2024    HGB 12.5 08/23/2024    HCT 38.4 08/23/2024    MCV 94 08/23/2024     08/23/2024     Lab Results   Component Value Date    BNP 16 11/05/2022      Lab Results   Component Value Date    CHOLESTEROL 181 04/03/2023    CHOLESTEROL 176 10/09/2020    CHOLESTEROL 157 05/17/2018     Lab Results   Component Value Date    HDL 57 04/03/2023    HDL 60 10/09/2020    HDL 45 05/17/2018     Lab Results   Component Value Date    TRIG 100 04/03/2023    TRIG 107 10/09/2020    TRIG 131 05/17/2018     Lab Results   Component Value Date    NONHDLC 124 04/03/2023    NONHDLC 116 10/09/2020    NONHDLC 112 05/17/2018         EKG personally reviewed by Hardeep Zavaleta.     Counseling / Coordination of Care  Time spent today 25 minutes.  Greater than 50% of total time was spent with the patient and / or family counseling and / or coordination of care.  We discussed diagnoses, most recent studies, tests and any changes in treatment plan    Thank you for the opportunity to participate in the care of this patient.    HARDEEP ZAVALETA D.O.  DIRECTOR OF HEART FAILURE/ PULMONARY HYPERTENSION  MEDICAL DIRECTOR OF LVAD PROGRAM  [facility]

## 2024-08-29 ENCOUNTER — OFFICE VISIT (OUTPATIENT)
Dept: CARDIOLOGY CLINIC | Facility: CLINIC | Age: 59
End: 2024-08-29
Payer: MEDICARE

## 2024-08-29 VITALS — SYSTOLIC BLOOD PRESSURE: 138 MMHG | OXYGEN SATURATION: 97 % | DIASTOLIC BLOOD PRESSURE: 80 MMHG | HEART RATE: 92 BPM

## 2024-08-29 DIAGNOSIS — I87.2 VENOUS INSUFFICIENCY OF BOTH LOWER EXTREMITIES: ICD-10-CM

## 2024-08-29 DIAGNOSIS — D68.61 ANTIPHOSPHOLIPID ANTIBODY SYNDROME (HCC): ICD-10-CM

## 2024-08-29 DIAGNOSIS — I77.1 ARTERIAL INSUFFICIENCY (HCC): ICD-10-CM

## 2024-08-29 DIAGNOSIS — G47.33 OSA (OBSTRUCTIVE SLEEP APNEA): ICD-10-CM

## 2024-08-29 DIAGNOSIS — I27.20 PULMONARY HYPERTENSION (HCC): Primary | ICD-10-CM

## 2024-08-29 DIAGNOSIS — I87.8 VENOUS STASIS OF LOWER EXTREMITY: ICD-10-CM

## 2024-08-29 PROCEDURE — 99214 OFFICE O/P EST MOD 30 MIN: CPT | Performed by: INTERNAL MEDICINE

## 2024-08-29 RX ORDER — UPADACITINIB 15 MG/1
TABLET, EXTENDED RELEASE ORAL
COMMUNITY
Start: 2024-07-19

## 2024-08-29 RX ORDER — SILDENAFIL CITRATE 20 MG/1
40 TABLET ORAL 3 TIMES DAILY
Qty: 180 TABLET | Refills: 4 | Status: SHIPPED | OUTPATIENT
Start: 2024-08-29

## 2024-09-03 ENCOUNTER — CLINICAL SUPPORT (OUTPATIENT)
Dept: RHEUMATOLOGY | Facility: CLINIC | Age: 59
End: 2024-09-03
Payer: MEDICARE

## 2024-09-03 DIAGNOSIS — M81.0 AGE-RELATED OSTEOPOROSIS WITHOUT CURRENT PATHOLOGICAL FRACTURE: Primary | ICD-10-CM

## 2024-09-03 PROCEDURE — 96372 THER/PROPH/DIAG INJ SC/IM: CPT

## 2024-09-03 NOTE — PROGRESS NOTES
Assessment/Plan:    Na Joseph came into the [facility] Rheumatology Office today 09/03/24 to receive Evenity injection.      Verbal consent obtained.  Consent given by: patient    patient states patient has been medically healthy with no underlining concerns/complications.      Na Joseph presents with no symptoms today.       All insturctions were reviewed with the patient.    If the patient should have any questions/concerns, advised patient to contacted [facility] Rheumatology Office.       Subjective:     History provided by: patient    Patient ID: aN Joseph is a 59 y.o. female      Objective:    There were no vitals filed for this visit.    Patient tolerated the injection well without any complications.  Injection site/s Left and right arms.  Medication was provided by Rheumatology.    Patient signed consent form yes   Patient signed ABN form no (If no patient is not a medicare patient).   Patient waited 15 minutes after injection no (This only applies to patient's receiving first time injection).       Last Visit: 8/2/2024  Next visit:1/14/2025

## 2024-09-04 ENCOUNTER — TELEPHONE (OUTPATIENT)
Dept: ADMINISTRATIVE | Facility: OTHER | Age: 59
End: 2024-09-04

## 2024-09-04 NOTE — TELEPHONE ENCOUNTER
09/04/24 10:59 AM    Patient contacted to bring Advance Directive, POLST, or Living Will document to next scheduled pcp visit.[facility] Department left message.    Thank you.  Maude Abad MA  [facility]

## 2024-09-05 ENCOUNTER — OFFICE VISIT (OUTPATIENT)
Age: 59
End: 2024-09-05
Payer: MEDICARE

## 2024-09-05 ENCOUNTER — TELEPHONE (OUTPATIENT)
Age: 59
End: 2024-09-05

## 2024-09-05 VITALS
RESPIRATION RATE: 18 BRPM | WEIGHT: 251.6 LBS | TEMPERATURE: 97.8 F | OXYGEN SATURATION: 96 % | HEART RATE: 91 BPM | BODY MASS INDEX: 46.3 KG/M2 | SYSTOLIC BLOOD PRESSURE: 132 MMHG | DIASTOLIC BLOOD PRESSURE: 80 MMHG | HEIGHT: 62 IN

## 2024-09-05 DIAGNOSIS — M54.50 CHRONIC MIDLINE LOW BACK PAIN WITHOUT SCIATICA: ICD-10-CM

## 2024-09-05 DIAGNOSIS — G89.29 CHRONIC MIDLINE LOW BACK PAIN WITHOUT SCIATICA: ICD-10-CM

## 2024-09-05 DIAGNOSIS — G47.00 INSOMNIA, UNSPECIFIED TYPE: Primary | ICD-10-CM

## 2024-09-05 PROCEDURE — G2211 COMPLEX E/M VISIT ADD ON: HCPCS | Performed by: FAMILY MEDICINE

## 2024-09-05 PROCEDURE — 99213 OFFICE O/P EST LOW 20 MIN: CPT | Performed by: FAMILY MEDICINE

## 2024-09-05 NOTE — TELEPHONE ENCOUNTER
Na is returning a call from her family doctor's office regarding her advanced directive and bringing it to her next appointment.

## 2024-09-05 NOTE — PROGRESS NOTES
"Ambulatory Visit  Name: Na Joseph      : 1965      MRN: [id number]  Encounter Provider: Sultana Arnold DO  Encounter Date: 2024   Encounter department: [facility]    Assessment & Plan   1. Insomnia, unspecified type  2. Chronic midline low back pain without sciatica  Assessment & Plan:  Pt reports continued low back pain. States pain is noticed when lying supine. Denies any pain when sitting or standing.     Plan:  - Advised pt to continue with appts with Ortho and pt states new appt with Pain Management  - Advised pt to reach out if any additional concerns develop  Orders:  -     Ambulatory Referral to Weight Management; Future  3. BMI 45.0-49.9, adult (HCC)  -     Ambulatory Referral to Weight Management; Future  4. Morbid obesity (HCC)  Assessment & Plan:  Pt expressed interest in starting GLP-1 agonist tirzepatide for weight loss.    Plan:  - Pt referred to weight management for weight loss including additional care and recs for starting tirzepatide or other weight loss medication.         Return in about 6 months (around 3/5/2025) for AWV.    The patient indicates understanding of these issues and agrees with the plan.          History of Present Illness     HPI  Pt states that she would like to start tirzepitide. Pt states that she spoke with cardiology who was \"on board\" with starting the medication.    Pt states that trazodone dose was increased and has been able to sleep throughout the night. Pt states that she is able to fall asleep within 15 min. Pt states trazodone was increased on . Pt states that she is no longer taking remlteon as they were too strong.     Pt reports low back pain since Nov. Pt states she fell when she was walking across parking lot. Pt states she fell and lost consciousness. Pt reports central low back pain. No pain when sitting. Pain is worse when lying down. Rates pain at 4/10 when lying down.     Pt reports continued joint pain but " "is following up with rheum to address pain.       Review of Systems   Constitutional:  Negative for activity change, diaphoresis, fatigue and fever.   HENT: Negative.     Eyes: Negative.    Respiratory:  Negative for cough, chest tightness, shortness of breath and wheezing.    Cardiovascular: Negative.    Gastrointestinal:  Negative for abdominal pain, constipation, diarrhea, nausea and vomiting.   Endocrine: Negative.    Genitourinary: Negative.    Musculoskeletal:  Positive for arthralgias and back pain.   Skin: Negative.    Allergic/Immunologic: Negative.    Neurological:  Negative for dizziness, syncope, light-headedness and headaches.   Hematological: Negative.    Psychiatric/Behavioral: Negative.       Medical History Reviewed by provider this encounter:  Tobacco  Allergies  Meds  Problems  Med Hx  Surg Hx  Fam Hx       Objective     /80   Pulse 91   Temp 97.8 °F (36.6 °C)   Resp 18   Ht 5' 1.75\" (1.568 m)   Wt 114 kg (251 lb 9.6 oz)   SpO2 96%   BMI 46.39 kg/m²     Physical Exam  Constitutional:       Appearance: Normal appearance.   HENT:      Head: Normocephalic and atraumatic.      Right Ear: Tympanic membrane normal.      Left Ear: Tympanic membrane normal.      Nose: Nose normal.      Mouth/Throat:      Mouth: Mucous membranes are moist.   Eyes:      Extraocular Movements: Extraocular movements intact.      Pupils: Pupils are equal, round, and reactive to light.   Cardiovascular:      Rate and Rhythm: Normal rate and regular rhythm.   Pulmonary:      Effort: Pulmonary effort is normal.      Breath sounds: Normal breath sounds.   Abdominal:      General: Bowel sounds are normal.   Musculoskeletal:      Cervical back: Normal range of motion and neck supple.   Skin:     General: Skin is warm and dry.   Neurological:      General: No focal deficit present.      Mental Status: She is alert and oriented to person, place, and time.   Psychiatric:         Mood and Affect: Mood normal.         " Behavior: Behavior normal.

## 2024-09-05 NOTE — ASSESSMENT & PLAN NOTE
Pt expressed interest in starting GLP-1 agonist tirzepatide for weight loss.    Plan:  - Pt referred to weight management for weight loss including additional care and recs for starting tirzepatide or other weight loss medication.

## 2024-09-05 NOTE — ASSESSMENT & PLAN NOTE
Chronic, acute exacerbation in Nov 2023 s/p fall  No relief with conservative management (Tylenol, Voltaren, home exercises)  Plans to see pain management about possible workup for compression fx  Declines further w/u ordered today

## 2024-09-05 NOTE — ASSESSMENT & PLAN NOTE
Pt interested in GLP-1  No T2DM  Referral made to Medical Weight Management to discuss all medication/management options with her as well as provide nutritional support

## 2024-09-05 NOTE — TELEPHONE ENCOUNTER
Lela from [employer] called and provided the following information for patients prior auth that is needed since patients dosage changed from 3 tabs daily to 6 tabs daily    Cover mymed # [id number]    Contact information;  Phone # [phone] (Ext [phone])  Fax # [phone]

## 2024-09-06 ENCOUNTER — OFFICE VISIT (OUTPATIENT)
Dept: HEMATOLOGY ONCOLOGY | Facility: CLINIC | Age: 59
End: 2024-09-06
Payer: MEDICARE

## 2024-09-06 ENCOUNTER — ANTICOAG VISIT (OUTPATIENT)
Dept: HEMATOLOGY ONCOLOGY | Facility: CLINIC | Age: 59
End: 2024-09-06

## 2024-09-06 ENCOUNTER — APPOINTMENT (OUTPATIENT)
Dept: LAB | Facility: CLINIC | Age: 59
End: 2024-09-06
Payer: MEDICARE

## 2024-09-06 VITALS
HEART RATE: 115 BPM | TEMPERATURE: 98 F | OXYGEN SATURATION: 95 % | HEIGHT: 62 IN | BODY MASS INDEX: 46.25 KG/M2 | SYSTOLIC BLOOD PRESSURE: 138 MMHG | RESPIRATION RATE: 17 BRPM | WEIGHT: 251.32 LBS | DIASTOLIC BLOOD PRESSURE: 84 MMHG

## 2024-09-06 DIAGNOSIS — Z79.01 ANTICOAGULATED ON COUMADIN: ICD-10-CM

## 2024-09-06 DIAGNOSIS — D68.61 ANTIPHOSPHOLIPID ANTIBODY SYNDROME (HCC): Primary | ICD-10-CM

## 2024-09-06 DIAGNOSIS — Z79.899 OTHER LONG TERM (CURRENT) DRUG THERAPY: ICD-10-CM

## 2024-09-06 DIAGNOSIS — R63.8 OTHER SYMPTOMS AND SIGNS CONCERNING FOOD AND FLUID INTAKE: ICD-10-CM

## 2024-09-06 DIAGNOSIS — D50.0 IRON DEFICIENCY ANEMIA DUE TO CHRONIC BLOOD LOSS: ICD-10-CM

## 2024-09-06 DIAGNOSIS — D70.2 OTHER DRUG-INDUCED NEUTROPENIA (HCC): ICD-10-CM

## 2024-09-06 DIAGNOSIS — E53.8 B12 DEFICIENCY: ICD-10-CM

## 2024-09-06 DIAGNOSIS — D70.9 NEUTROPENIA, UNSPECIFIED TYPE (HCC): ICD-10-CM

## 2024-09-06 LAB
INR PPP: 2.53 (ref 0.85–1.19)
PROTHROMBIN TIME: 27.9 SECONDS (ref 12.3–15)

## 2024-09-06 PROCEDURE — 85610 PROTHROMBIN TIME: CPT

## 2024-09-06 PROCEDURE — 99215 OFFICE O/P EST HI 40 MIN: CPT | Performed by: PHYSICIAN ASSISTANT

## 2024-09-06 PROCEDURE — 36415 COLL VENOUS BLD VENIPUNCTURE: CPT

## 2024-09-06 RX ORDER — NEEDLES, DISPOSABLE 25GX5/8"
NEEDLE, DISPOSABLE MISCELLANEOUS
Qty: 10 EACH | Refills: 3 | Status: SHIPPED | OUTPATIENT
Start: 2024-09-06

## 2024-09-06 RX ORDER — SYRINGE, DISPOSABLE, 1 ML
SYRINGE, EMPTY DISPOSABLE MISCELLANEOUS
Qty: 10 EACH | Refills: 3 | Status: SHIPPED | OUTPATIENT
Start: 2024-09-06

## 2024-09-06 RX ORDER — CYANOCOBALAMIN 1000 UG/ML
INJECTION, SOLUTION INTRAMUSCULAR; SUBCUTANEOUS
Qty: 10 ML | Refills: 3 | Status: SHIPPED | OUTPATIENT
Start: 2024-09-06

## 2024-09-06 NOTE — PROGRESS NOTES
Hematology/Oncology Outpatient Follow- up Note  Na Joseph 59 y.o. female MRN: @ Encounter: [id number]        Date:  9/6/2024      Assessment / Plan:    1. 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   11/2017  Normal parietal cell AB, intrinsic factor AB.       B12 1000mcg IM monthly.   8/14/23    B12 level 328  Increase B12 1000mcg IM from q 4 weeks to  q 2 weeks   2/5/24 B12 505  8/23/24 B12 687        2. Prior to 10/22, she never had thrombosis, DVT, PE.  Diagnosed with Bilateral PE 11/5/22 while on ASA 162mg po daily.  She had d/c Lovenox 40mg sq daily s/p Left Knee replacement on 10/6/2022 -1 week prior. + for Covid 10/26/22.   LE doppler 11/2022 - No evidence of acute or chronic deep vein thrombosis of either LE     Hsitory of  Positive LA; history of + anticardiolipin antibody.       Given her history of persistently positive lupus anticoagulant, her pulmonary embolism that occurred while on aspirin 162 milligrams p.o. daily even though in the background of COVID positivity, her risk of recurrent thromboembolic event is high and chronic lifelong Coumadin for antiphospholipid antibody syndrome recommended.     We are managing coumadin.     3.  Osteoporosis.  On Evenity per rheumatology locally.     4.  Still with some back pain s/p her fall 11/2023.  Met with ortho.  Referred to PM.  Has an appointment with Salbador Avilez.      5.  SLE.  Managed by Dr. Loredo at [facility].  On rinvoq.  Doesn't find much benefit.      6.  PAH.  Follows with Dr. Hughes       HPI:    Na Joseph is a 59 year old female seen 10/18/2017 for initial evaluation , self referred, secondary to low Vitamin B12 level.         10/6/2017 labs at [facility]: hemoglobin 13.2, mcv 90, RDW 14.3, WBC 6.2, 68% neutrophils, 21% lymphs, 8% monocytes 2% eosinophils, platelet count 330,000. Iron  saturation 15%, ferritin 52 Vitamin B12 184( 211-946) Tsh 1.98, free T4 1.18, negative lyme antibody.  RF normal, Anti-scleroderma antibodies normal, anti-DS DNA normal, Styles AB normal, RNP antibodies normal. Anti-centromere AB normal. Normal sed rate.  HELENE: Dense fine speckled pattern is noted which suggest presence of  antibody which has a low prevalence in systemic autoimmune rheumatic diseases. Normal immunofixation on SPEP. Normal serum immunoglobulins.   Normal CCP antibodies IgG and IgA  U/A identified hyaline casts.   Celiac panel was normal. CMP normal.    POSITIVE HELENE. POSITIVE Lupus anticoagulant. IgM anticardiolipin antibody 18 (Indeterminate). Normal IgG anticardiolipin antibody , normal IgA anticardiolipin antibody.     7/11/2016: normal anticardiolipin antibodies. Lyme negative.   Normal ANCA profile, normal C3 and C4 complement, normal CRP. Normal thyroglobulin profile. Normal ESR. + LUPUS ANTICOAGULANT.   HELENE positive.   On [facility]s OB[facility]GYN intake 3/13/2015 she noted that she had a history of + lupus anticoagulant. Lili states she 1st had HELENE evaluated and was positive in 2012.     Patient is extremely frustrated. She states she has been having symptoms of SOB, fatigue and has been diagnosed with a lupus-like disease but has not received any disease directed therapy. She took prednisone years ago without benefit and significant weight gain.   Was previously seen at [facility] by Twyla Styles in 2012 regarding chronic cough, SOB without history of asthma, allergies. Quit smoking at 38 years old after 15-20 pack years. PND treated and cough improved but ANSARI persisted. Bronchoscopy was normal. PFTs normal. Evaluation unrevealing to pulmonary source for her dyspnea. No benefit with Advair or high dose steroids. Falls asleep easily. Normal echo. Noted to have edema and sleep apnea suspected. She was advised to increase Lasix and have sleep study.  lucie Alcantara  "cardiologist at the [facility] had her undergo echocardiogram, however, poor images were obtained. She been had a stress echocardiogram and a question of pulmonary artery hypertension and was raised.   8/13/2012: stress echocardiogram report from [facility]: estimated P. a systolic pressure increased to 56 mm mercury suggestive of significant exercise induced pulmonary hypertension  She states she saw Dr. Salazar, a specialist at [facility] who didn't examine me and told me I look too good to have pulmonary artery hypertension.\" He did not perform additional testing but then referred her to Dr. Valiente, a cardiac electrophysiologist at [place] due to resting heart rate 114-120s who informed her she had a + HELENE.     She saw Dr. Raygoza a few times but felt pressured by him as he wanted to perform a lip biopsy to evaluate for Sjogren's syndrome. She has been seeing Ez Berg D.O. at the [facility] center of [place] but has not seeing him in approximately a year and a half as she states he was requesting repeated blood work that was not moving forward towards treating any symptoms she was having and feels that he can be adverserial.      Lactose intolerant. Osteoporosis diagnosed at 33 y/o in Lumbar spine. Partial Hysterectomy 1996 for endometrial hyperplasia. Ovaries spared.      She works at [employer]. She returned from a vacation and had 3+ pitting edema. Dr. Alonzo, 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 arranged for her to see Kemi Brizuela MD in [place], [place] who ordered the above labs.  Lili is very comfortable with injecting herself IM.   No s/sx pancreatic insufficiency. She does not take PPI or H2- blocker. She does not drink alcohol regularly.       Did have a first trimester miscarriage around the 10th week in between the birth of her 2 children. She takes Aspirin.      Reports last EGD was 3-4 years ago performed by Dr. Salmeron at " [facility]. She is s/p Nissen Fundoplication for severe GERD. She had a colonoscopy previously.     She saw Dr. Mercedes Pabon with [facility] 2/13/2015 regarding persistently + HELENE and IGM anticardiolipin antibody. There was also question whether or not she had cotton wool spots secondary to embolic phenomenon. Patient states ultimately this was ruled out. Aspirin was hematologic recommendation at that time.          10/2019:  Normal cardiolipin AB, normal C3, C4, dsDNA ab not present     10/2021: Hemoglobin 14.1, MCV 91, white blood cell count 3.9, 66% neutrophils, 22% lymphocytes, 8% monocytes, platelet count 249  Normal cardiolipin antibodies, normal beta 2 glycoprotein antibody        Status Post Arthroplasty Knee Total - Left on 10/6/2022.  She was on ASA 162mg po daily and Lovenox 40mg sq daily x 30 days post-op.     Presented to the hospital on 11/5/2022 due to acute onset of back pain, difficulty breathing, SOB and cough.   Reported that she went on a cruise 10/24 and stated feeling sick on the 26th and tested + for Covid 10/26/22.     11/5/22 CTA - Moderate quantity of right lower lobe segmental and small quantity of right upper lobe and left lower lobe acute PE.     LE doppler was not done.     She was discharged on Eliquis.  Transitoned to  Coumadin due to APS.     History of Post op anemia.  Hgb 11/11/22 was 11.1g/dL.  She was taking oral iron twice daily- she has not for some time due to nausea, however.  Iron saturation 13%; ferritin 394 11/11/22 (diagnosed with PE 11/5/22).  Feraheme weekly x2 12/2022 8/2023  Hgb 13.7; iron saturation 28%; ferritin 177        1/19/23 - normal cardiolipin antibodies, B-2 glycoprotein antibodies.        5/25/23  F/u with Dr. Loredo, rheumatologist at [facility] re: SLE. Benlysta continued .  Imuran initiated.  This has been helpful in reducing the swelling in her hands     Status post fall 11/2023 with hematoma of the forehead,  cheeks area, subconjunctival hemorrhage, CAT scan showed no evidence of fracture, chest x-ray showed retrocardiac mass/infiltration      Saw Dr. Loredo 2/1/24 2/1/24 normal cardiolipin antibodies     2/5/24 iron saturation 22%; ferritin 117, B12 500          Review of Systems   Constitutional:  Positive for fatigue. Negative for appetite change, chills, diaphoresis, fever and unexpected weight change.   HENT:   Negative for mouth sores, nosebleeds, sore throat, tinnitus and voice change.    Eyes:  Negative for eye problems.   Respiratory:  Positive for shortness of breath. Negative for chest tightness, cough and wheezing.    Cardiovascular:  Negative for chest pain, leg swelling and palpitations.   Gastrointestinal:  Negative for abdominal distention, abdominal pain, blood in stool, constipation, diarrhea, nausea, rectal pain and vomiting.   Endocrine: Negative for hot flashes.   Genitourinary: Negative.     Musculoskeletal:  Positive for arthralgias and back pain. Negative for gait problem and myalgias.   Skin:  Negative for itching and rash.   Neurological:  Negative for dizziness, gait problem, headaches, light-headedness and numbness.   Hematological:  Negative for adenopathy.   Psychiatric/Behavioral:  Negative for confusion and sleep disturbance. The patient is not nervous/anxious.         Test Results:        Labs:   Lab Results   Component Value Date    HGB 12.5 08/23/2024    HCT 38.4 08/23/2024    MCV 94 08/23/2024     08/23/2024    WBC 3.55 (L) 08/23/2024    NRBC 0 08/23/2024     Lab Results   Component Value Date    K 4.1 08/23/2024     08/23/2024    CO2 30 08/23/2024    BUN 14 08/23/2024    CREATININE 0.91 08/23/2024    GLUCOSE 100 11/11/2023    GLUF 98 08/23/2024    CALCIUM 9.2 08/23/2024    AST 11 (L) 08/23/2024    ALT 13 08/23/2024    ALKPHOS 108 (H) 08/23/2024    EGFR 68 08/23/2024           Imaging: No results found.          Allergies:   Allergies   Allergen Reactions    Dilantin  "[Phenytoin] Anaphylaxis and Rash    Penicillins Rash, Anaphylaxis, Dermatitis and Hives    Augmentin [Amoxicillin-Pot Clavulanate] Rash and Dermatitis     Current Medications: Reviewed  PMH/FH/SH:  Reviewed      Physical Exam:    2.1 meters squared    Ht Readings from Last 3 Encounters:   09/06/24 5' 1.75\" (1.568 m)   09/05/24 5' 1.75\" (1.568 m)   06/04/24 5' 1.75\" (1.568 m)        Wt Readings from Last 3 Encounters:   09/06/24 114 kg (251 lb 5.2 oz)   09/05/24 114 kg (251 lb 9.6 oz)   07/11/24 111 kg (244 lb 6.4 oz)        Temp Readings from Last 3 Encounters:   09/06/24 98 °F (36.7 °C) (Temporal)   09/05/24 97.8 °F (36.6 °C)   07/11/24 98.2 °F (36.8 °C)        BP Readings from Last 3 Encounters:   09/06/24 138/84   09/05/24 132/80   08/29/24 138/80             Physical Exam  Constitutional:       Appearance: She is well-developed.   HENT:      Head: Normocephalic and atraumatic.   Cardiovascular:      Heart sounds: Normal heart sounds.   Pulmonary:      Effort: Pulmonary effort is normal. No respiratory distress.      Breath sounds: Normal breath sounds. No stridor. No wheezing or rhonchi.   Abdominal:      General: There is no distension.      Palpations: Abdomen is soft.      Tenderness: There is no abdominal tenderness.   Neurological:      Mental Status: She is alert.   Psychiatric:         Behavior: Behavior normal.             Emergency Contacts:    Extended Emergency Contact Information  Primary Emergency Contact: Hardeep Joseph  Address: [address] [place]  Home Phone: [phone]  Mobile Phone: [phone]  Relation: Spouse  Secondary Emergency Contact: Iliana Billingsley  Address: [address]  Mobile Phone: [phone]  Relation: Daughter   "

## 2024-09-06 NOTE — TELEPHONE ENCOUNTER
PA for sildenafil 20 mg (6 tabs daily )SUBMITTED     via    []CMM-KEY:   [x]Graciela-Case ID # [id number]       []Faxed to plan   []Other website   []Phone call Case ID #     Office notes sent, clinical questions answered. Awaiting determination    Turnaround time for your insurance to make a decision on your Prior Authorization can take 7-21 business days.

## 2024-09-20 ENCOUNTER — APPOINTMENT (OUTPATIENT)
Dept: RADIOLOGY | Facility: AMBULARY SURGERY CENTER | Age: 59
End: 2024-09-20
Attending: ORTHOPAEDIC SURGERY
Payer: MEDICARE

## 2024-09-20 ENCOUNTER — APPOINTMENT (OUTPATIENT)
Dept: LAB | Facility: AMBULARY SURGERY CENTER | Age: 59
End: 2024-09-20
Payer: MEDICARE

## 2024-09-20 ENCOUNTER — OFFICE VISIT (OUTPATIENT)
Dept: OBGYN CLINIC | Facility: CLINIC | Age: 59
End: 2024-09-20
Payer: MEDICARE

## 2024-09-20 VITALS — HEIGHT: 62 IN | WEIGHT: 251 LBS | BODY MASS INDEX: 46.19 KG/M2

## 2024-09-20 DIAGNOSIS — M22.8X2 MALTRACKING OF LEFT PATELLA: ICD-10-CM

## 2024-09-20 DIAGNOSIS — T84.59XA INFECTION OF TOTAL KNEE REPLACEMENT, INITIAL ENCOUNTER  (HCC): ICD-10-CM

## 2024-09-20 DIAGNOSIS — Z96.652 STATUS POST TOTAL KNEE REPLACEMENT USING CEMENT, LEFT: ICD-10-CM

## 2024-09-20 DIAGNOSIS — Z96.652 STATUS POST TOTAL KNEE REPLACEMENT USING CEMENT, LEFT: Primary | ICD-10-CM

## 2024-09-20 DIAGNOSIS — Z96.659 INFECTION OF TOTAL KNEE REPLACEMENT, INITIAL ENCOUNTER  (HCC): ICD-10-CM

## 2024-09-20 LAB
CRP SERPL QL: 11.5 MG/L
ERYTHROCYTE [SEDIMENTATION RATE] IN BLOOD: 44 MM/HOUR (ref 0–29)
PROCALCITONIN SERPL-MCNC: <0.05 NG/ML

## 2024-09-20 PROCEDURE — 86140 C-REACTIVE PROTEIN: CPT

## 2024-09-20 PROCEDURE — 84145 PROCALCITONIN (PCT): CPT

## 2024-09-20 PROCEDURE — 36415 COLL VENOUS BLD VENIPUNCTURE: CPT

## 2024-09-20 PROCEDURE — 73562 X-RAY EXAM OF KNEE 3: CPT

## 2024-09-20 PROCEDURE — 85652 RBC SED RATE AUTOMATED: CPT

## 2024-09-20 PROCEDURE — 86200 CCP ANTIBODY: CPT

## 2024-09-20 PROCEDURE — 99214 OFFICE O/P EST MOD 30 MIN: CPT | Performed by: ORTHOPAEDIC SURGERY

## 2024-09-20 NOTE — PROGRESS NOTES
Assessment:  1. Status post total knee replacement using cement, left  XR knee 3 vw left non injury        Patient Active Problem List   Diagnosis    B12 deficiency    Chronic cough    Diffuse connective tissue disease (HCC)    Dyspnea    Edema    Hot flashes due to menopause    Long-term use of hydroxychloroquine    Other organ or system involvement in systemic lupus erythematosus (HCC)    Lupus anticoagulant disorder (HCC)    SOB (shortness of breath) on exertion    Sinus tachycardia    Secondary pulmonary hypertension    Pulmonary hypertension (HCC)    Post-nasal drip    Pes anserine bursitis    Positive HELENE (antinuclear antibody)    Other chronic pulmonary heart diseases    Age-related osteoporosis without current pathological fracture    Patellofemoral disorder of right knee    Pes anserinus bursitis of right knee    Arthritis of right knee    Other tear of medial meniscus, current injury, right knee, initial encounter    Tear of medial meniscus of right knee, current    Chronic pain of right knee    Elevated serum creatinine    Hyperuricemia    Trochanteric bursitis of both hips    Undifferentiated connective tissue disease (HCC)    Vitamin D deficiency    Chronic kidney disease, stage 3a (HCC)    Right knee pain    Status post total right knee replacement    Left knee pain    Tear of medial meniscus of left knee, current    Primary osteoarthritis of left knee    Morbid obesity (HCC)    Leukopenia    Venous stasis of lower extremity    Acute pain of right knee    Status post total knee replacement using cement, left    COVID-19    SLE (systemic lupus erythematosus) (HCC)    Arthritis of left knee    Iron deficiency anemia due to chronic blood loss    Insomnia    Pulmonary embolus (HCC)    Financial difficulties    Antiphospholipid antibody syndrome (HCC)    Sleep disturbance    Neuropathic pain, leg, left    Anticoagulated    Fall    Neutropenia, unspecified type (HCC)    Rheumatoid arthritis, involving  unspecified site, unspecified whether rheumatoid factor present (HCC)    BMI 45.0-49.9, adult (HCC)    Chronic midline low back pain without sciatica    Motion sickness    Other sleep disorders    Thyroid nodule    GRIS (obstructive sleep apnea)           Plan      58 yo female 2 years s/p left TKA, DOS 10/06/2024 with concern for septic prosthesis vs patellar maltracking. Patient is immunocompromised and has lupus.  Explained due to her recent fevers, chills, night sweats, warmth of the knee and knee swelling she has an inflammatory process happening in her knee. Patient started an immunosuppressant for her lupus around the time her symptoms began. Labs: CRP, CCP, ESR, and procalcitonin were ordered to evaluate for infection. Plan to defer any Synovasure testing to Dr. Wilson, as Dr. Zendejas is hesitant to stick a needle in the joint until blood work results have come back due to patient's immunocompromised status and it is known to be absolutely necessary.  Xrays of the left knee were obtained during today's visit which demonstrate lateral patellar tilt that could be leading to some of her pain.  Referral to Dr. Wilson to discuss possible revision due to septic prosthesis vs patellar maltracking procedure.  Use cane for stability and pain with ambulation.          Subjective:     Patient ID:    Chief Complaint:Na Joseph 59 y.o. female      HPI    Patient presents today s/p left TKA performed on 10/06/2022. Patient reports fever, chills, and significant night sweats since 9/11/2024. Patient has history of lupus. Highest fever is 101.8. She reports she is running 100.8 fever since, worse at night. She feels her knee is warm to the touch. She feels her knee is swollen. Patient reports pain in the medial and lateral knee that worsens with weightbearing. She is taking Tylenol as needed. Contraindicated for Aleve and Motrin.    The following portions of the patient's history were reviewed and updated as appropriate:  allergies, current medications, past family history, past social history, past surgical history and problem list.        Social History     Socioeconomic History    Marital status: /Civil Union     Spouse name: Not on file    Number of children: 2    Years of education: Not on file    Highest education level: Not on file   Occupational History    Not on file   Tobacco Use    Smoking status: Former     Current packs/day: 0.00     Types: Cigarettes     Quit date: 2006     Years since quittin.6    Smokeless tobacco: Never   Vaping Use    Vaping status: Never Used   Substance and Sexual Activity    Alcohol use: Yes     Comment: Socially    Drug use: No    Sexual activity: Yes     Partners: Male     Birth control/protection: Female Sterilization   Other Topics Concern    Not on file   Social History Narrative    Daily caffeinated coffee consumption    Exercise habits     Social Determinants of Health     Financial Resource Strain: Medium Risk (2024)    Overall Financial Resource Strain (CARDIA)     Difficulty of Paying Living Expenses: Somewhat hard   Food Insecurity: Not on file   Transportation Needs: No Transportation Needs (2024)    PRAPARE - Transportation     Lack of Transportation (Medical): No     Lack of Transportation (Non-Medical): No   Physical Activity: Not on file   Stress: Not on file   Social Connections: Not on file   Intimate Partner Violence: Not on file   Housing Stability: Low Risk  (2024)    [facility]    Housing Stability     Unstable Housing in the Last Year: Not on file     Past Medical History:   Diagnosis Date    HELENE positive     Anemia     Sildenafil causes decreased hematocrit    Antiphospholipid syndrome (HCC)     Anxiety 2020    CHF (congestive heart failure) (HCC)     right heart failure related to pulm HTN lupus    Chronic kidney disease     borderline lab values    Chronic rhinitis 2012    Clotting  disorder (HCC) 2012    Coronary artery disease 2018    Encephalitis     Lasted Assessed 10/18/2017    Gout 06/26/2018    Heart failure (HCC) 2019    History of transfusion 2/13/2019    autologous    Hypertension     Lupus (HCC) 2018    Lupus anticoagulant disorder (HCC)     Maxillary sinusitis     Lasted Assessed 10/18/2017    Night sweat     Osteopenia     Hip    Osteoporosis     Spine    Pneumonia     Last Assessed 10/18/2017    PONV (postoperative nausea and vomiting)     Pulmonary embolism (HCC)     Pulmonary hypertension (HCC)     Seizures (HCC)     Only during Encephalitis    Shortness of breath     with exertion    Sinus tachycardia     Urinary incontinence      Past Surgical History:   Procedure Laterality Date    ARTHRODESIS      Hand: IP Joint    CHOLECYSTECTOMY      COLONOSCOPY      COLPOSCOPY      HYSTERECTOMY      Vaginal    JOINT REPLACEMENT Right     Knee replacement    KNEE SURGERY  02/2019    LAPAROSCOPIC ESOPHAGOGASTRIC FUNDOPLASTY  2008    [place] ARTHRP KNE CONDYLE&PLATU MEDIAL&LAT COMPARTMENTS Right 02/12/2019    Procedure: KNEE TOTAL ARTHROPLASTY AND ASSOCIATED PROCEDURES;  Surgeon: Donovan Zendejas DO;  [facility];  Service: Orthopedics    [place] ARTHRP KNE CONDYLE&PLATU MEDIAL&LAT COMPARTMENTS Left 10/06/2022    Procedure: ARTHROPLASTY KNEE TOTAL;  Surgeon: Donovan Zendejas DO;  [facility];  Service: Orthopedics    [place] ARTHRS KNEE W/MENISCECTOMY MED&LAT W/SHAVING Right 10/02/2018    Procedure: KNEE ARTHROSCOPY WITH PARTIAL MEDIAL MENISCECTOMY;  Surgeon: Donovan Zendejas DO;  [facility];  Service: Orthopedics    [place] ARTHRS KNEE W/MENISCECTOMY MED&LAT W/SHAVING Left 12/17/2019    Procedure: KNEE ARTHROSCOPIC MENISCECTOMY /MEDIAL; Chondyl medial and posterior;  Surgeon: Donovan Zendejas DO;  [facility];  Service: Orthopedics    REDUCTION MAMMAPLASTY Bilateral     doesnt remember when    REPAIR ANKLE LIGAMENT Right     TONSILLECTOMY       Allergies   Allergen Reactions    Dilantin  "[Phenytoin] Anaphylaxis and Rash    Penicillins Rash, Anaphylaxis, Dermatitis and Hives    Augmentin [Amoxicillin-Pot Clavulanate] Rash and Dermatitis     Current Outpatient Medications on File Prior to Visit   Medication Sig Dispense Refill    B-D 3CC LUER-LILLIE SYR 25GX1/2\" 25G X 1-1/2\" 3 ML MISC USE 1 SYRINGE EVERY 30 DAYS 12 each 1    cholecalciferol (VITAMIN D3) 1,000 units tablet Take 2,000 Units by mouth daily in the early morning        clindamycin (CLEOCIN) 300 MG capsule Take 3 capsules by mouth 1 hour prior to dental procedure (Patient not taking: Reported on 5/9/2024) 3 capsule 3    cyanocobalamin 1,000 mcg/mL Inject 1 mL (1,000 mcg total) into a muscle every 14 (fourteen) days (Patient not taking: Reported on 5/9/2024) 12 mL 1    cyanocobalamin 1,000 mcg/mL 1000 mcg IM Q 2 weeks 10 mL 3    gabapentin (NEURONTIN) 100 mg capsule Take 1 capsule (100 mg total) by mouth daily at bedtime 90 capsule 1    hydroxychloroquine (PLAQUENIL) 200 mg tablet Take 400 mg by mouth daily with breakfast Alternating days/dosages      NEEDLE, DISP, 23 G (BD Disp Needle) 23G X 1\" MISC Use to administer B12 10 each 3    Needles & Syringes (Easy Touch Syringe Barrel 1ml) MISC Use to administer B12 IM 10 each 3    potassium chloride (Klor-Con M20) 20 mEq tablet Take 1 tablet (20 mEq total) by mouth daily 30 tablet 2    Rinvoq 15 MG TB24       romosozumab-aqqg (EVENITY) subcutaneous injection       sildenafil (REVATIO) 20 mg tablet Take 2 tablets (40 mg total) by mouth 3 (three) times a day 180 tablet 4    spironolactone (ALDACTONE) 25 mg tablet Take 1 tablet (25 mg total) by mouth daily 90 tablet 3    Syringe/Needle, Disp, (SecureSafe Syringe/Needle) 25G X 1-1/2\" 1 ML MISC Use 1 Syringe every 30 (thirty) days 12 each 1    torsemide (DEMADEX) 20 mg tablet TAKE 1 TABLET(20 MG) BY MOUTH DAILY 90 tablet 1    traZODone (DESYREL) 50 mg tablet TAKE 2 TABLETS(100 MG) BY MOUTH DAILY AT BEDTIME 180 tablet 1    triamcinolone (KENALOG) 0.1 % " "oral topical paste prn      warfarin (Coumadin) 5 mg tablet 7.5mg po daily or as directed. 150 tablet 3    [DISCONTINUED] ascorbic acid (VITAMIN C) 500 MG tablet Take 1 tablet (500 mg total) by mouth 2 (two) times a day 60 tablet 1    [DISCONTINUED] ferrous sulfate 324 (65 Fe) mg Take 1 tablet (324 mg total) by mouth 2 (two) times a day before meals 60 tablet 1    [DISCONTINUED] folic acid (FOLVITE) 1 mg tablet TAKE 1 TABLET(1 MG) BY MOUTH DAILY 90 tablet 0    [DISCONTINUED] methocarbamol (Robaxin-750) 750 mg tablet Take 1 tablet (750 mg total) by mouth every 6 (six) hours as needed for muscle spasms 20 tablet 0    [DISCONTINUED] Multiple Vitamins-Minerals (multivitamin with minerals) tablet Take 1 tablet by mouth daily 30 tablet 1    [DISCONTINUED] ondansetron (Zofran ODT) 4 mg disintegrating tablet Take 1 tablet (4 mg total) by mouth every 6 (six) hours as needed for nausea or vomiting 20 tablet 0     No current facility-administered medications on file prior to visit.              Objective:    Review of Systems    Ortho Exam  Left Knee Exam  Alignment:  Normal knee alignment.  Inspection:  No swelling. No edema. No erythema. No ecchymosis.  Palpation:   Mild tenderness at medial and lateral facet of patella. No effusion.  ROM:  Knee Extension 0. Knee Flexion 130.  Strength:  Able to SLR without lag.  Stability:  (-) Varus instability. (-) Valgus instability.  Tests:  No pertinent positive or negative tests.  Patella:  Patella tracks centrally with crepitus.  Neurovascular:  Sensation intact in DP/SP/Bundy/Sa/T nerve distributions.  2+ DP & PT pulses.  Gait:  Antalgic.    Physical Exam    Procedures  No Procedures performed today    I have personally reviewed pertinent films in PACS.      Portions of the record may have been created with voice recognition software.  Occasional wrong word or \"sound a like\" substitutions may have occurred due to the inherent limitations of voice recognition software.  Read the chart " carefully and recognize, using context, where substitutions have occurred.

## 2024-09-20 NOTE — PROGRESS NOTES
Assessment:  1. Status post total knee replacement using cement, left  XR knee 3 vw left non injury        Patient Active Problem List   Diagnosis    B12 deficiency    Chronic cough    Diffuse connective tissue disease (HCC)    Dyspnea    Edema    Hot flashes due to menopause    Long-term use of hydroxychloroquine    Other organ or system involvement in systemic lupus erythematosus (HCC)    Lupus anticoagulant disorder (HCC)    SOB (shortness of breath) on exertion    Sinus tachycardia    Secondary pulmonary hypertension    Pulmonary hypertension (HCC)    Post-nasal drip    Pes anserine bursitis    Positive HELENE (antinuclear antibody)    Other chronic pulmonary heart diseases    Age-related osteoporosis without current pathological fracture    Patellofemoral disorder of right knee    Pes anserinus bursitis of right knee    Arthritis of right knee    Other tear of medial meniscus, current injury, right knee, initial encounter    Tear of medial meniscus of right knee, current    Chronic pain of right knee    Elevated serum creatinine    Hyperuricemia    Trochanteric bursitis of both hips    Undifferentiated connective tissue disease (HCC)    Vitamin D deficiency    Chronic kidney disease, stage 3a (HCC)    Right knee pain    Status post total right knee replacement    Left knee pain    Tear of medial meniscus of left knee, current    Primary osteoarthritis of left knee    Morbid obesity (HCC)    Leukopenia    Venous stasis of lower extremity    Acute pain of right knee    Status post total knee replacement using cement, left    COVID-19    SLE (systemic lupus erythematosus) (HCC)    Arthritis of left knee    Iron deficiency anemia due to chronic blood loss    Insomnia    Pulmonary embolus (HCC)    Financial difficulties    Antiphospholipid antibody syndrome (HCC)    Sleep disturbance    Neuropathic pain, leg, left    Anticoagulated    Fall    Neutropenia, unspecified type (HCC)    Rheumatoid arthritis, involving  unspecified site, unspecified whether rheumatoid factor present (HCC)    BMI 45.0-49.9, adult (HCC)    Chronic midline low back pain without sciatica    Motion sickness    Other sleep disorders    Thyroid nodule    GRIS (obstructive sleep apnea)           Plan      ***            Subjective:     Patient ID:    Chief Complaint:Na Joseph 59 y.o. female      HPI    Patient comes in today with regards to ***.  The patient reports that the pain is in the *** and has been going on for ***.  The pain is rated at{Pain Score 0-10, 0 default:92532} at its best and{Pain Score 0-10, 0 default:39352} at its worst.  The pain is described as ***.  It is worsened with ***, and is made better with ***.  The patient has taken *** for treatment.      The following portions of the patient's history were reviewed and updated as appropriate: allergies, current medications, past family history, past social history, past surgical history and problem list.        Social History     Socioeconomic History    Marital status: /Civil Union     Spouse name: Not on file    Number of children: 2    Years of education: Not on file    Highest education level: Not on file   Occupational History    Not on file   Tobacco Use    Smoking status: Former     Current packs/day: 0.00     Types: Cigarettes     Quit date: 2006     Years since quittin.6    Smokeless tobacco: Never   Vaping Use    Vaping status: Never Used   Substance and Sexual Activity    Alcohol use: Yes     Comment: Socially    Drug use: No    Sexual activity: Yes     Partners: Male     Birth control/protection: Female Sterilization   Other Topics Concern    Not on file   Social History Narrative    Daily caffeinated coffee consumption    Exercise habits     Social Determinants of Health     Financial Resource Strain: Medium Risk (2024)    Overall Financial Resource Strain (CARDIA)     Difficulty of Paying Living Expenses: Somewhat hard   Food Insecurity: Not on file    Transportation Needs: No Transportation Needs (2/27/2024)    PRAPARE - Transportation     Lack of Transportation (Medical): No     Lack of Transportation (Non-Medical): No   Physical Activity: Not on file   Stress: Not on file   Social Connections: Not on file   Intimate Partner Violence: Not on file   Housing Stability: Low Risk  (4/25/2024)    [facility]    Housing Stability     Unstable Housing in the Last Year: Not on file     Past Medical History:   Diagnosis Date    HELENE positive     Anemia     Sildenafil causes decreased hematocrit    Antiphospholipid syndrome (HCC)     Anxiety 03/2020    CHF (congestive heart failure) (HCC)     right heart failure related to pulm HTN lupus    Chronic kidney disease     borderline lab values    Chronic rhinitis 06/04/2012    Clotting disorder (HCC) 2012    Coronary artery disease 2018    Encephalitis     Lasted Assessed 10/18/2017    Gout 06/26/2018    Heart failure (HCC) 2019    History of transfusion 2/13/2019    autologous    Hypertension     Lupus (HCC) 2018    Lupus anticoagulant disorder (HCC)     Maxillary sinusitis     Lasted Assessed 10/18/2017    Night sweat     Osteopenia     Hip    Osteoporosis     Spine    Pneumonia     Last Assessed 10/18/2017    PONV (postoperative nausea and vomiting)     Pulmonary embolism (HCC)     Pulmonary hypertension (HCC)     Seizures (HCC)     Only during Encephalitis    Shortness of breath     with exertion    Sinus tachycardia     Urinary incontinence      Past Surgical History:   Procedure Laterality Date    ARTHRODESIS      Hand: IP Joint    CHOLECYSTECTOMY      COLONOSCOPY      COLPOSCOPY      HYSTERECTOMY      Vaginal    JOINT REPLACEMENT Right     Knee replacement    KNEE SURGERY  02/2019    LAPAROSCOPIC ESOPHAGOGASTRIC FUNDOPLASTY  2008    [place] ARTHRP KNE CONDYLE&PLATU MEDIAL&LAT COMPARTMENTS Right 02/12/2019    Procedure: KNEE TOTAL ARTHROPLASTY AND ASSOCIATED PROCEDURES;   "Surgeon: Donovan Zendejas DO;  [facility];  Service: Orthopedics    [place] ARTHRP KNE CONDYLE&PLATU MEDIAL&LAT COMPARTMENTS Left 10/06/2022    Procedure: ARTHROPLASTY KNEE TOTAL;  Surgeon: Donovan Zendejas DO;  [facility];  Service: Orthopedics    [place] ARTHRS KNEE W/MENISCECTOMY MED&LAT W/SHAVING Right 10/02/2018    Procedure: KNEE ARTHROSCOPY WITH PARTIAL MEDIAL MENISCECTOMY;  Surgeon: Donovan Zendejas DO;  [facility];  Service: Orthopedics    [place] ARTHRS KNEE W/MENISCECTOMY MED&LAT W/SHAVING Left 12/17/2019    Procedure: KNEE ARTHROSCOPIC MENISCECTOMY /MEDIAL; Chondyl medial and posterior;  Surgeon: Donovan Zendejas DO;  [facility];  Service: Orthopedics    REDUCTION MAMMAPLASTY Bilateral     doesnt remember when    REPAIR ANKLE LIGAMENT Right     TONSILLECTOMY       Allergies   Allergen Reactions    Dilantin [Phenytoin] Anaphylaxis and Rash    Penicillins Rash, Anaphylaxis, Dermatitis and Hives    Augmentin [Amoxicillin-Pot Clavulanate] Rash and Dermatitis     Current Outpatient Medications on File Prior to Visit   Medication Sig Dispense Refill    B-D 3CC LUER-LILLIE SYR 25GX1/2\" 25G X 1-1/2\" 3 ML MISC USE 1 SYRINGE EVERY 30 DAYS 12 each 1    cholecalciferol (VITAMIN D3) 1,000 units tablet Take 2,000 Units by mouth daily in the early morning        clindamycin (CLEOCIN) 300 MG capsule Take 3 capsules by mouth 1 hour prior to dental procedure (Patient not taking: Reported on 5/9/2024) 3 capsule 3    cyanocobalamin 1,000 mcg/mL Inject 1 mL (1,000 mcg total) into a muscle every 14 (fourteen) days (Patient not taking: Reported on 5/9/2024) 12 mL 1    cyanocobalamin 1,000 mcg/mL 1000 mcg IM Q 2 weeks 10 mL 3    gabapentin (NEURONTIN) 100 mg capsule Take 1 capsule (100 mg total) by mouth daily at bedtime 90 capsule 1    hydroxychloroquine (PLAQUENIL) 200 mg tablet Take 400 mg by mouth daily with breakfast Alternating days/dosages      NEEDLE, DISP, 23 G (BD Disp Needle) 23G X 1\" MISC Use to administer B12 " "10 each 3    Needles & Syringes (Easy Touch Syringe Barrel 1ml) MISC Use to administer B12 IM 10 each 3    potassium chloride (Klor-Con M20) 20 mEq tablet Take 1 tablet (20 mEq total) by mouth daily 30 tablet 2    Rinvoq 15 MG TB24       romosozumab-aqqg (EVENITY) subcutaneous injection       sildenafil (REVATIO) 20 mg tablet Take 2 tablets (40 mg total) by mouth 3 (three) times a day 180 tablet 4    spironolactone (ALDACTONE) 25 mg tablet Take 1 tablet (25 mg total) by mouth daily 90 tablet 3    Syringe/Needle, Disp, (SecureSafe Syringe/Needle) 25G X 1-1/2\" 1 ML MISC Use 1 Syringe every 30 (thirty) days 12 each 1    torsemide (DEMADEX) 20 mg tablet TAKE 1 TABLET(20 MG) BY MOUTH DAILY 90 tablet 1    traZODone (DESYREL) 50 mg tablet TAKE 2 TABLETS(100 MG) BY MOUTH DAILY AT BEDTIME 180 tablet 1    triamcinolone (KENALOG) 0.1 % oral topical paste prn      warfarin (Coumadin) 5 mg tablet 7.5mg po daily or as directed. 150 tablet 3    [DISCONTINUED] ascorbic acid (VITAMIN C) 500 MG tablet Take 1 tablet (500 mg total) by mouth 2 (two) times a day 60 tablet 1    [DISCONTINUED] ferrous sulfate 324 (65 Fe) mg Take 1 tablet (324 mg total) by mouth 2 (two) times a day before meals 60 tablet 1    [DISCONTINUED] folic acid (FOLVITE) 1 mg tablet TAKE 1 TABLET(1 MG) BY MOUTH DAILY 90 tablet 0    [DISCONTINUED] methocarbamol (Robaxin-750) 750 mg tablet Take 1 tablet (750 mg total) by mouth every 6 (six) hours as needed for muscle spasms 20 tablet 0    [DISCONTINUED] Multiple Vitamins-Minerals (multivitamin with minerals) tablet Take 1 tablet by mouth daily 30 tablet 1    [DISCONTINUED] ondansetron (Zofran ODT) 4 mg disintegrating tablet Take 1 tablet (4 mg total) by mouth every 6 (six) hours as needed for nausea or vomiting 20 tablet 0     No current facility-administered medications on file prior to visit.              Objective:    Review of Systems    Ortho Exam    Physical Exam    Procedures  {Was Procdoc done:61282::\"No " "Procedures performed today\"}    {Imaging Review Statement:[id number]}        Portions of the record may have been created with voice recognition software.  Occasional wrong word or \"sound a like\" substitutions may have occurred due to the inherent limitations of voice recognition software.  Read the chart carefully and recognize, using context, where substitutions have occurred.    "

## 2024-09-22 LAB — CCP AB SER IA-ACNC: <0.4

## 2024-09-23 ENCOUNTER — TELEPHONE (OUTPATIENT)
Age: 59
End: 2024-09-23

## 2024-09-23 ENCOUNTER — OFFICE VISIT (OUTPATIENT)
Age: 59
End: 2024-09-23
Payer: MEDICARE

## 2024-09-23 VITALS
DIASTOLIC BLOOD PRESSURE: 80 MMHG | BODY MASS INDEX: 46.19 KG/M2 | SYSTOLIC BLOOD PRESSURE: 130 MMHG | HEIGHT: 62 IN | WEIGHT: 251 LBS

## 2024-09-23 DIAGNOSIS — M22.8X2 MALTRACKING OF LEFT PATELLA: ICD-10-CM

## 2024-09-23 DIAGNOSIS — Z96.659 INFECTION OF TOTAL KNEE REPLACEMENT, INITIAL ENCOUNTER  (HCC): ICD-10-CM

## 2024-09-23 DIAGNOSIS — T84.59XA INFECTION OF TOTAL KNEE REPLACEMENT, INITIAL ENCOUNTER  (HCC): ICD-10-CM

## 2024-09-23 DIAGNOSIS — M25.59 PAIN IN OTHER JOINT: ICD-10-CM

## 2024-09-23 DIAGNOSIS — Z96.652 STATUS POST TOTAL KNEE REPLACEMENT USING CEMENT, LEFT: ICD-10-CM

## 2024-09-23 DIAGNOSIS — M25.562 ACUTE PAIN OF LEFT KNEE: Primary | ICD-10-CM

## 2024-09-23 PROCEDURE — 99214 OFFICE O/P EST MOD 30 MIN: CPT | Performed by: STUDENT IN AN ORGANIZED HEALTH CARE EDUCATION/TRAINING PROGRAM

## 2024-09-23 PROCEDURE — 20610 DRAIN/INJ JOINT/BURSA W/O US: CPT | Performed by: STUDENT IN AN ORGANIZED HEALTH CARE EDUCATION/TRAINING PROGRAM

## 2024-09-23 NOTE — PROGRESS NOTES
Knee New Office Note    Assessment:     1. Status post total knee replacement using cement, left    2. Acute pain of left knee        Plan:     Problem List Items Addressed This Visit          Surgery/Wound/Pain    Left knee pain    Status post total knee replacement using cement, left - Primary    Relevant Orders    Large joint arthrocentesis: L knee      58 y/o female with left knee pain ongoing for 2 weeks following Left TKA from 2022.  Xrays of the left knee reviewed today showing her prosthesis in good position with no signs of loosening.  On physical exam she has maintained rage of motion, but with diffuse pain of the knee and an effusion.  We discussed that due to her symptoms and elevations in her CRP and ESR, we have to rule out infection. (CRP 11.5, ESR 44).  Left knee was aspirated today and sent for synovasure testing. Discussed surgical treatment options if she does have an infection in her knee, we will discuss more in depth at her next visit. We will see her back in 1-2 weeks to discuss results and surgical planning as needed.    Subjective:     Patient ID: Na Joseph is a 59 y.o. female.  Chief Complaint:  HPI:  59 y.o. female who presents today for an evaluation of her LEFT knee.  She has a history of a Left TKA performed on 10/06/2022 with Dr. Zendejas.   She initially felt that her pain was due to her starting Evenity due to her osteoporosis as her pain began in the thigh.  Her pain is now localized to the right knee.  Patient states that over the past 2 weeks she has been experiencing fever, chills, and significant night sweats since 9/11/2024 as well as 15 pounds of weight loss in this time period.  Highest fever is 101.8. She reports she is running 100.8 fever since, worse at night. She feels her knee is warm to the touch. She feels her knee is swollen. Patient reports pain in the medial and lateral knee that worsens with weightbearing. She is taking Tylenol as needed.  Prior to this, she had  no issues with her knee and was doing very well.  She was seen by Dr. Zendejas who was concerned about PJI, so ESR and CRP were ordered and was sent to us today for synovasure testing and further evaluation.    Patient has history of lupus. She is on Rinvoq and plaquenil.  She has a history of a PE and is on coumadin.    Allergy:  Allergies   Allergen Reactions    Dilantin [Phenytoin] Anaphylaxis and Rash    Penicillins Rash, Anaphylaxis, Dermatitis and Hives    Augmentin [Amoxicillin-Pot Clavulanate] Rash and Dermatitis     Medications:  all current active meds have been reviewed  Past Medical History:  Past Medical History:   Diagnosis Date    HELENE positive     Anemia     Sildenafil causes decreased hematocrit    Antiphospholipid syndrome (HCC)     Anxiety 03/2020    CHF (congestive heart failure) (HCC)     right heart failure related to pulm HTN lupus    Chronic kidney disease     borderline lab values    Chronic rhinitis 06/04/2012    Clotting disorder (HCC) 2012    Coronary artery disease 2018    Encephalitis     Lasted Assessed 10/18/2017    Gout 06/26/2018    Heart failure (HCC) 2019    History of transfusion 2/13/2019    autologous    Hypertension     Lupus (HCC) 2018    Lupus anticoagulant disorder (HCC)     Maxillary sinusitis     Lasted Assessed 10/18/2017    Night sweat     Osteopenia     Hip    Osteoporosis     Spine    Pneumonia     Last Assessed 10/18/2017    PONV (postoperative nausea and vomiting)     Pulmonary embolism (HCC)     Pulmonary hypertension (HCC)     Seizures ([facility])     Only during Encephalitis    Shortness of breath     with exertion    Sinus tachycardia     Urinary incontinence      Past Surgical History:  Past Surgical History:   Procedure Laterality Date    ARTHRODESIS      Hand: IP Joint    CHOLECYSTECTOMY      COLONOSCOPY      COLPOSCOPY      HYSTERECTOMY      Vaginal    JOINT REPLACEMENT Right     Knee replacement    KNEE SURGERY  02/2019    LAPAROSCOPIC ESOPHAGOGASTRIC FUNDOPLASTY   2008    HI ARTHRP KNE CONDYLE&PLATU MEDIAL&LAT COMPARTMENTS Right 02/12/2019    Procedure: KNEE TOTAL ARTHROPLASTY AND ASSOCIATED PROCEDURES;  Surgeon: Donovan Zendejas DO;  [facility];  Service: Orthopedics    HI ARTHRP KNE CONDYLE&PLATU MEDIAL&LAT COMPARTMENTS Left 10/06/2022    Procedure: ARTHROPLASTY KNEE TOTAL;  Surgeon: Donovan Zendejas DO;  [facility];  Service: Orthopedics    HI ARTHRS KNEE W/MENISCECTOMY MED&LAT W/SHAVING Right 10/02/2018    Procedure: KNEE ARTHROSCOPY WITH PARTIAL MEDIAL MENISCECTOMY;  Surgeon: Donovan Zendejas DO;  [facility];  Service: Orthopedics    HI ARTHRS KNEE W/MENISCECTOMY MED&LAT W/SHAVING Left 12/17/2019    Procedure: KNEE ARTHROSCOPIC MENISCECTOMY /MEDIAL; Chondyl medial and posterior;  Surgeon: Donovan Zendejas DO;  [facility];  Service: Orthopedics    REDUCTION MAMMAPLASTY Bilateral     doesnt remember when    REPAIR ANKLE LIGAMENT Right     TONSILLECTOMY       Family History:  Family History   Problem Relation Age of Onset    Uterine cancer Mother     Pancreatic cancer Mother 70    Diabetes Mother     Endometrial cancer Mother 66    Arthritis Mother     Cancer Mother         Pancreas, Uterine    Vision loss Mother     Osteoporosis Mother     Heart disease Father         open heart surgery at age 50     Stroke Father         CVA    Hypertension Father     Atrial fibrillation Father     Hyperlipidemia Father     Arthritis Father     Rheum arthritis Father     Heart attack Father     No Known Problems Sister     No Known Problems Sister     Thyroid disease Sister     Depression Sister     Osteoarthritis Sister     Asthma Sister     Asthma Daughter     Migraines Daughter     Asthma Daughter     Thyroid disease Maternal Grandmother     Heart attack Maternal Grandfather     Heart disease Maternal Grandfather     Heart attack Paternal Grandmother     Heart disease Paternal Grandmother     Skin cancer Paternal Grandfather     No Known Problems Brother      Hemochromatosis Brother     No Known Problems Maternal Aunt     No Known Problems Paternal Aunt     Anuerysm Neg Hx     Clotting disorder Neg Hx     Heart failure Neg Hx      Social History:  Social History     Substance and Sexual Activity   Alcohol Use Yes    Comment: Socially     Social History     Substance and Sexual Activity   Drug Use No     Social History     Tobacco Use   Smoking Status Former    Current packs/day: 0.00    Types: Cigarettes    Quit date: 2006    Years since quittin.6   Smokeless Tobacco Never           ROS:  General: Per HPI  Skin: Negative, except if noted below  HEENT: Negative  Respiratory: Negative  Cardiovascular: Negative  Gastrointestinal: Negative  Urinary: Negative  Vascular: Negative  Musculoskeletal: Positive per HPI   Neurologic: Positive per HPI  Endocrine: Negative    Objective:  BP Readings from Last 1 Encounters:   24 130/80      Wt Readings from Last 1 Encounters:   24 114 kg (251 lb)        Respiratory:   non-labored respirations    Lymphatics:  no palpable lymph nodes    Gait:   antalgic    Neurologic:   Alert and oriented times 3  Patient with normal sensation except as noted below  Deep tendon reflexes 2+ except as noted in MSK exam    Bilateral Lower Extremity:  Left Knee:      Inspection:  skin intact, well healed surgical incision    Overall limb alignment neutral    Effusion: large    ROM 0-120 with pain at end range of flexion    Extensor Lag: none    Palpation: medial Joint line tenderness to palpation    AP Stability at 90 deg stable    M/L stability in full extension stable    M/L stability in midflexion stable    Motor: 5/5 Q/HS/TA/GS/P    Pulses: 2+ DP / 2+ PT    SILT DP/SP/S/S/TN    Imaging:  My interpretation XR AP scanogram/AP bilateral knee/lateral/ibarra/sunrise left knee: s/p cemented PS RP attune TKA, components in good position. No fracture or dislocation.    Large joint arthrocentesis: L knee  Universal Protocol:  Consent:  "Verbal consent obtained.  Risks and benefits: risks, benefits and alternatives were discussed  Consent given by: patient  Patient understanding: patient states understanding of the procedure being performed  Supporting Documentation  Indications: pain, joint swelling and diagnostic evaluation   Procedure Details  Location: knee - L knee  Preparation: Patient was prepped and draped in the usual sterile fashion  Needle size: 18 G  Approach: lateral    Aspirate amount: 25 mL  Aspirate: serous, yellow and cloudy  Analysis: fluid sample sent for laboratory analysis  Patient tolerance: patient tolerated the procedure well with no immediate complications  Dressing:  Sterile dressing applied            BMI:   Estimated body mass index is 46.28 kg/m² as calculated from the following:    Height as of this encounter: 5' 1.75\" (1.568 m).    Weight as of this encounter: 114 kg (251 lb).  BSA:   Estimated body surface area is 2.1 meters squared as calculated from the following:    Height as of this encounter: 5' 1.75\" (1.568 m).    Weight as of this encounter: 114 kg (251 lb).           Scribe Attestation      I,:  Amina Goldberg PA-C am acting as a scribe while in the presence of the attending physician.:       I,:  Grupo Wilson, DO personally performed the services described in this documentation    as scribed in my presence.:               "

## 2024-09-23 NOTE — TELEPHONE ENCOUNTER
Caller: Self    Doctor: Steve    Reason for call: Patient being referred to Dr Wilson by Dr Zendejas for infection of knee post TKA. Can patient be seen today or tomorrow?    Call back#: [phone], can call home or mobile

## 2024-09-26 ENCOUNTER — ANTICOAG VISIT (OUTPATIENT)
Dept: HEMATOLOGY ONCOLOGY | Facility: CLINIC | Age: 59
End: 2024-09-26

## 2024-09-26 ENCOUNTER — APPOINTMENT (OUTPATIENT)
Dept: LAB | Facility: CLINIC | Age: 59
End: 2024-09-26
Payer: MEDICARE

## 2024-09-26 ENCOUNTER — TELEPHONE (OUTPATIENT)
Age: 59
End: 2024-09-26

## 2024-09-26 DIAGNOSIS — Z79.899 OTHER LONG TERM (CURRENT) DRUG THERAPY: ICD-10-CM

## 2024-09-26 DIAGNOSIS — D70.9 NEUTROPENIA, UNSPECIFIED TYPE (HCC): ICD-10-CM

## 2024-09-26 DIAGNOSIS — Z79.01 ANTICOAGULATED ON COUMADIN: ICD-10-CM

## 2024-09-26 DIAGNOSIS — D70.2 OTHER DRUG-INDUCED NEUTROPENIA (HCC): ICD-10-CM

## 2024-09-26 DIAGNOSIS — D68.61 ANTIPHOSPHOLIPID ANTIBODY SYNDROME (HCC): ICD-10-CM

## 2024-09-26 DIAGNOSIS — R63.8 OTHER SYMPTOMS AND SIGNS CONCERNING FOOD AND FLUID INTAKE: ICD-10-CM

## 2024-09-26 DIAGNOSIS — Z79.01 ANTICOAGULATED ON COUMADIN: Primary | ICD-10-CM

## 2024-09-26 LAB
INR PPP: 2.73 (ref 0.85–1.19)
PROTHROMBIN TIME: 29.6 SECONDS (ref 12.3–15)

## 2024-09-26 PROCEDURE — 85610 PROTHROMBIN TIME: CPT

## 2024-09-26 PROCEDURE — 36415 COLL VENOUS BLD VENIPUNCTURE: CPT

## 2024-09-26 RX ORDER — SODIUM CHLORIDE, SODIUM LACTATE, POTASSIUM CHLORIDE, CALCIUM CHLORIDE 600; 310; 30; 20 MG/100ML; MG/100ML; MG/100ML; MG/100ML
125 INJECTION, SOLUTION INTRAVENOUS CONTINUOUS
OUTPATIENT
Start: 2024-09-26

## 2024-09-26 RX ORDER — MULTIVIT-MIN/IRON FUM/FOLIC AC 7.5 MG-4
1 TABLET ORAL DAILY
Qty: 30 TABLET | Refills: 1 | Status: SHIPPED | OUTPATIENT
Start: 2024-09-26

## 2024-09-26 RX ORDER — CHLORHEXIDINE GLUCONATE ORAL RINSE 1.2 MG/ML
15 SOLUTION DENTAL ONCE
OUTPATIENT
Start: 2024-09-26 | End: 2024-09-26

## 2024-09-26 RX ORDER — CHLORHEXIDINE GLUCONATE 40 MG/ML
SOLUTION TOPICAL DAILY PRN
OUTPATIENT
Start: 2024-09-26

## 2024-09-26 RX ORDER — ASCORBIC ACID 500 MG
500 TABLET ORAL 2 TIMES DAILY
Qty: 60 TABLET | Refills: 1 | Status: SHIPPED | OUTPATIENT
Start: 2024-09-26

## 2024-09-26 RX ORDER — FOLIC ACID 1 MG/1
1 TABLET ORAL DAILY
Qty: 30 TABLET | Refills: 1 | Status: SHIPPED | OUTPATIENT
Start: 2024-09-26

## 2024-09-26 RX ORDER — ACETAMINOPHEN 325 MG/1
975 TABLET ORAL ONCE
OUTPATIENT
Start: 2024-09-26 | End: 2024-09-26

## 2024-09-26 NOTE — TELEPHONE ENCOUNTER
Pt called to schedule a pre-op clearance, she is having emergency surgery on 10/3. The surgeon stated he would order the labwork which pt will complete tomorrow and she had an ECG done back in May documented in her chart but there is a new order as well from today. Please be advised and if needs to be done at appt on 9/30, thank you.

## 2024-09-26 NOTE — TELEPHONE ENCOUNTER
Caller: Na     Doctor: Steve    Reason for call: She received her results in my chart and woulld like to speak with someone about them. Patient did send message via my chart also.     Call back#: [phone]

## 2024-09-26 NOTE — PROGRESS NOTES
Continue 5mg po daily currently.  Recheck in 2 weeks.  If ABX given, will need to monitor more closely and likely will need to reduce dose.

## 2024-09-27 ENCOUNTER — APPOINTMENT (OUTPATIENT)
Dept: LAB | Facility: CLINIC | Age: 59
End: 2024-09-27
Payer: MEDICARE

## 2024-09-27 ENCOUNTER — TELEPHONE (OUTPATIENT)
Age: 59
End: 2024-09-27

## 2024-09-27 ENCOUNTER — TELEPHONE (OUTPATIENT)
Dept: HEMATOLOGY ONCOLOGY | Facility: CLINIC | Age: 59
End: 2024-09-27

## 2024-09-27 DIAGNOSIS — M25.59 PAIN IN OTHER JOINT: ICD-10-CM

## 2024-09-27 DIAGNOSIS — Z79.01 ANTICOAGULATED ON COUMADIN: ICD-10-CM

## 2024-09-27 DIAGNOSIS — Z79.01 ANTICOAGULATED ON COUMADIN: Primary | ICD-10-CM

## 2024-09-27 DIAGNOSIS — Z96.659 INFECTION OF TOTAL KNEE REPLACEMENT, INITIAL ENCOUNTER  (HCC): ICD-10-CM

## 2024-09-27 DIAGNOSIS — D68.61 ANTIPHOSPHOLIPID ANTIBODY SYNDROME (HCC): ICD-10-CM

## 2024-09-27 DIAGNOSIS — Z96.652 STATUS POST TOTAL KNEE REPLACEMENT USING CEMENT, LEFT: ICD-10-CM

## 2024-09-27 DIAGNOSIS — T84.59XA INFECTION OF TOTAL KNEE REPLACEMENT, INITIAL ENCOUNTER  (HCC): ICD-10-CM

## 2024-09-27 LAB
ALBUMIN SERPL BCG-MCNC: 4 G/DL (ref 3.5–5)
ALP SERPL-CCNC: 87 U/L (ref 34–104)
ALT SERPL W P-5'-P-CCNC: 6 U/L (ref 7–52)
ANION GAP SERPL CALCULATED.3IONS-SCNC: 7 MMOL/L (ref 4–13)
APTT PPP: 36 SECONDS (ref 23–34)
AST SERPL W P-5'-P-CCNC: 10 U/L (ref 13–39)
BASOPHILS # BLD AUTO: 0.01 THOUSANDS/ΜL (ref 0–0.1)
BASOPHILS NFR BLD AUTO: 0 % (ref 0–1)
BILIRUB SERPL-MCNC: 0.53 MG/DL (ref 0.2–1)
BUN SERPL-MCNC: 12 MG/DL (ref 5–25)
CALCIUM SERPL-MCNC: 9.5 MG/DL (ref 8.4–10.2)
CHLORIDE SERPL-SCNC: 104 MMOL/L (ref 96–108)
CO2 SERPL-SCNC: 27 MMOL/L (ref 21–32)
CREAT SERPL-MCNC: 1.02 MG/DL (ref 0.6–1.3)
EOSINOPHIL # BLD AUTO: 0.03 THOUSAND/ΜL (ref 0–0.61)
EOSINOPHIL NFR BLD AUTO: 1 % (ref 0–6)
ERYTHROCYTE [DISTWIDTH] IN BLOOD BY AUTOMATED COUNT: 12.5 % (ref 11.6–15.1)
EST. AVERAGE GLUCOSE BLD GHB EST-MCNC: 111 MG/DL
FERRITIN SERPL-MCNC: 252 NG/ML (ref 11–307)
FERRITIN SERPL-MCNC: 259 NG/ML (ref 11–307)
GFR SERPL CREATININE-BSD FRML MDRD: 59 ML/MIN/1.73SQ M
GLUCOSE P FAST SERPL-MCNC: 104 MG/DL (ref 65–99)
HBA1C MFR BLD: 5.5 %
HCT VFR BLD AUTO: 37 % (ref 34.8–46.1)
HGB BLD-MCNC: 12.2 G/DL (ref 11.5–15.4)
IMM GRANULOCYTES # BLD AUTO: 0.01 THOUSAND/UL (ref 0–0.2)
IMM GRANULOCYTES NFR BLD AUTO: 0 % (ref 0–2)
INR PPP: 2.84 (ref 0.85–1.19)
IRON SATN MFR SERPL: 20 % (ref 15–50)
IRON SATN MFR SERPL: 20 % (ref 15–50)
IRON SERPL-MCNC: 52 UG/DL (ref 50–212)
IRON SERPL-MCNC: 53 UG/DL (ref 50–212)
LYMPHOCYTES # BLD AUTO: 0.76 THOUSANDS/ΜL (ref 0.6–4.47)
LYMPHOCYTES NFR BLD AUTO: 18 % (ref 14–44)
MCH RBC QN AUTO: 30.5 PG (ref 26.8–34.3)
MCHC RBC AUTO-ENTMCNC: 33 G/DL (ref 31.4–37.4)
MCV RBC AUTO: 93 FL (ref 82–98)
MONOCYTES # BLD AUTO: 0.36 THOUSAND/ΜL (ref 0.17–1.22)
MONOCYTES NFR BLD AUTO: 9 % (ref 4–12)
NEUTROPHILS # BLD AUTO: 3.04 THOUSANDS/ΜL (ref 1.85–7.62)
NEUTS SEG NFR BLD AUTO: 72 % (ref 43–75)
NRBC BLD AUTO-RTO: 0 /100 WBCS
PLATELET # BLD AUTO: 430 THOUSANDS/UL (ref 149–390)
PMV BLD AUTO: 10.4 FL (ref 8.9–12.7)
POTASSIUM SERPL-SCNC: 3.5 MMOL/L (ref 3.5–5.3)
PROT SERPL-MCNC: 6.7 G/DL (ref 6.4–8.4)
PROTHROMBIN TIME: 30.4 SECONDS (ref 12.3–15)
RBC # BLD AUTO: 4 MILLION/UL (ref 3.81–5.12)
RETICS # AUTO: NORMAL 10*3/UL (ref 14097–95744)
RETICS # CALC: 1.21 % (ref 0.37–1.87)
SODIUM SERPL-SCNC: 138 MMOL/L (ref 135–147)
TIBC SERPL-MCNC: 256 UG/DL (ref 250–450)
TIBC SERPL-MCNC: 261 UG/DL (ref 250–450)
UIBC SERPL-MCNC: 204 UG/DL (ref 155–355)
UIBC SERPL-MCNC: 208 UG/DL (ref 155–355)
VIT B12 SERPL-MCNC: 922 PG/ML (ref 180–914)
WBC # BLD AUTO: 4.21 THOUSAND/UL (ref 4.31–10.16)

## 2024-09-27 PROCEDURE — 82607 VITAMIN B-12: CPT

## 2024-09-27 PROCEDURE — 82728 ASSAY OF FERRITIN: CPT

## 2024-09-27 PROCEDURE — 83036 HEMOGLOBIN GLYCOSYLATED A1C: CPT

## 2024-09-27 PROCEDURE — 86901 BLOOD TYPING SEROLOGIC RH(D): CPT | Performed by: STUDENT IN AN ORGANIZED HEALTH CARE EDUCATION/TRAINING PROGRAM

## 2024-09-27 PROCEDURE — 85045 AUTOMATED RETICULOCYTE COUNT: CPT

## 2024-09-27 PROCEDURE — 85025 COMPLETE CBC W/AUTO DIFF WBC: CPT

## 2024-09-27 PROCEDURE — 86900 BLOOD TYPING SEROLOGIC ABO: CPT | Performed by: STUDENT IN AN ORGANIZED HEALTH CARE EDUCATION/TRAINING PROGRAM

## 2024-09-27 PROCEDURE — 36415 COLL VENOUS BLD VENIPUNCTURE: CPT

## 2024-09-27 PROCEDURE — 85610 PROTHROMBIN TIME: CPT

## 2024-09-27 PROCEDURE — 80053 COMPREHEN METABOLIC PANEL: CPT

## 2024-09-27 PROCEDURE — 83550 IRON BINDING TEST: CPT

## 2024-09-27 PROCEDURE — 83540 ASSAY OF IRON: CPT

## 2024-09-27 PROCEDURE — 85730 THROMBOPLASTIN TIME PARTIAL: CPT

## 2024-09-27 PROCEDURE — 86850 RBC ANTIBODY SCREEN: CPT | Performed by: STUDENT IN AN ORGANIZED HEALTH CARE EDUCATION/TRAINING PROGRAM

## 2024-09-27 RX ORDER — ENOXAPARIN SODIUM 100 MG/ML
INJECTION SUBCUTANEOUS
Qty: 20 ML | Refills: 1 | Status: SHIPPED | OUTPATIENT
Start: 2024-09-27

## 2024-09-27 RX ORDER — ENOXAPARIN SODIUM 100 MG/ML
INJECTION SUBCUTANEOUS
Qty: 20 ML | Refills: 1 | Status: SHIPPED | OUTPATIENT
Start: 2024-09-27 | End: 2024-09-27 | Stop reason: SDUPTHER

## 2024-09-27 NOTE — TELEPHONE ENCOUNTER
Caller: patient    Doctor: Steve    Reason for call: Patient would like to know if she should keep her appointment scheduled for 10/1 because she has surgery scheduled for 10/3    Call back#: [phone]

## 2024-09-27 NOTE — TELEPHONE ENCOUNTER
Called patient back and pt informed me they got ECG done this morning 9/27. Also told pt that I was calling to let them know we would have been able to do it at office visit or go to lab and get it done there.

## 2024-09-27 NOTE — TELEPHONE ENCOUNTER
Dr. Wilson spoke with the patient yesterday regarding her results.  Surgery scheduled for a TKA revision.

## 2024-09-27 NOTE — TELEPHONE ENCOUNTER
Spoke with patient this AM.  She is going for surgery 10/3/24 on her knee.      Patient to hold coumadin 5 days prior (Hold starting 9/28/24).    Will bridge with Lovenox 1mg/kg sq bid (100mg) starting 3 days prior to surgery 9/30/24 and hold all anticoagulation 24 hours prior to surgery.    Will order PT/INR for Weds to ensure PT/INR adequate for surgery    Orders placed

## 2024-09-28 ENCOUNTER — LAB REQUISITION (OUTPATIENT)
Dept: LAB | Facility: HOSPITAL | Age: 59
End: 2024-09-28
Payer: MEDICARE

## 2024-09-28 DIAGNOSIS — T84.59XA INFECTION AND INFLAMMATORY REACTION DUE TO OTHER INTERNAL JOINT PROSTHESIS, INITIAL ENCOUNTER (HCC): ICD-10-CM

## 2024-09-28 DIAGNOSIS — Z96.652 PRESENCE OF LEFT ARTIFICIAL KNEE JOINT: ICD-10-CM

## 2024-09-28 DIAGNOSIS — Z96.659 PRESENCE OF UNSPECIFIED ARTIFICIAL KNEE JOINT: ICD-10-CM

## 2024-09-28 LAB
ABO GROUP BLD: NORMAL
BLD GP AB SCN SERPL QL: NEGATIVE
RH BLD: POSITIVE
SPECIMEN EXPIRATION DATE: NORMAL

## 2024-09-29 LAB
ATRIAL RATE: 85 BPM
P AXIS: 28 DEGREES
PR INTERVAL: 126 MS
QRS AXIS: 49 DEGREES
QRSD INTERVAL: 90 MS
QT INTERVAL: 390 MS
QTC INTERVAL: 464 MS
T WAVE AXIS: 27 DEGREES
VENTRICULAR RATE: 85 BPM

## 2024-09-29 PROCEDURE — 93010 ELECTROCARDIOGRAM REPORT: CPT | Performed by: INTERNAL MEDICINE

## 2024-09-30 ENCOUNTER — CONSULT (OUTPATIENT)
Age: 59
End: 2024-09-30
Payer: MEDICARE

## 2024-09-30 VITALS
RESPIRATION RATE: 17 BRPM | OXYGEN SATURATION: 93 % | SYSTOLIC BLOOD PRESSURE: 112 MMHG | DIASTOLIC BLOOD PRESSURE: 78 MMHG | HEART RATE: 119 BPM | BODY MASS INDEX: 42.65 KG/M2 | WEIGHT: 231.8 LBS | HEIGHT: 62 IN | TEMPERATURE: 99.4 F

## 2024-09-30 DIAGNOSIS — M25.562 LEFT KNEE PAIN, UNSPECIFIED CHRONICITY: Primary | ICD-10-CM

## 2024-09-30 PROCEDURE — 99213 OFFICE O/P EST LOW 20 MIN: CPT

## 2024-09-30 RX ORDER — BELIMUMAB 200 MG/ML
SOLUTION SUBCUTANEOUS
COMMUNITY
Start: 2024-09-30

## 2024-09-30 NOTE — H&P (VIEW-ONLY)
"PRE-OPERATIVE EXAMINATION  Na Joseph  1965    Na Joseph is a 59 y.o. female with left knee pain who is planning to undergo LEFT ARTHROPLASTY KNEE TOTAL REVISION  under general anesthesia by Dr. Grupo Wilson on 10/3. The procedure is indicated for the following condition: left knee pain. Patient has had complications with anesthesia in the past - Nausea and vomiting.     ROS:   Chest pain: no   Shortness of breath: no  Shortness of breath with exertion: yes - baseline  Orthopnea: no  Dizziness: no  Unexplained weight change: no    PMH:  CAD: no  HTN: yes - Pulmonary HTN  CKD: yes  DM: no on insulin: no  History of CVA: no    She  reports that she quit smoking about 18 years ago. Her smoking use included cigarettes. She has never used smokeless tobacco. She reports current alcohol use. She reports that she does not use drugs.    /78   Pulse (!) 119   Temp 99.4 °F (37.4 °C)   Resp 17   Ht 5' 1.75\" (1.568 m)   Wt 105 kg (231 lb 12.8 oz)   SpO2 93%   BMI 42.74 kg/m²     Physical Exam  Vitals reviewed.   Constitutional:       General: She is not in acute distress.     Comments: Body mass index is 42.74 kg/m².   HENT:      Head: Normocephalic.      Right Ear: External ear normal.      Left Ear: External ear normal.      Nose: Nose normal.      Mouth/Throat:      Mouth: Mucous membranes are moist.   Eyes:      Pupils: Pupils are equal, round, and reactive to light.   Cardiovascular:      Rate and Rhythm: Normal rate.      Heart sounds: Normal heart sounds.   Pulmonary:      Effort: Pulmonary effort is normal.      Breath sounds: Normal breath sounds.   Abdominal:      General: Abdomen is flat.   Musculoskeletal:      Cervical back: Normal range of motion.      Right lower leg: No edema.      Left lower leg: No edema.      Comments: Decreased ROM of left knee with extension  Warmth to left knee with significant effusion present and tenderness to the knee  Left knee grossly enlarged when compared " with right   Skin:     General: Skin is warm and dry.      Capillary Refill: Capillary refill takes less than 2 seconds.      Comments: Well-healed surgical scars to BL knees   Neurological:      Mental Status: She is alert and oriented to person, place, and time. Mental status is at baseline.   Psychiatric:         Mood and Affect: Mood normal.         Behavior: Behavior normal.       Revised Cardiac Risk Index (RCRI) for Pre-Operative Risk   (estimates risk of cardiac complications after noncardiac surgery)    High-risk surgery: No 0 / Yes +1  Intraperitoneal, intrathoracic, suprainguinal vascular  History of ischemic heart disease: No 0 / Yes +1  Hx of MI, (+) exercise test, current chest pain considered due to myocardial ischemia, use of nitrate therapy or ECG with pathological Q waves)  History of CHF: No 0 / Yes +1  Pulmonary edema, B/L rales or S3 gallop; ANSARI, orthopnea, PND, CXR showing pulmonary vascular redistribution)  History of cerebrovascular disease: No 0 / Yes +1  Prior TIA or stroke  Pre-operative treatment with insulin: No 0 / Yes +1  Pre-operative creatinine >2 mg/dL: No 0 / Yes +1    RCRI Scorin points: Class I Risk, 3.9% 30-day risk of death, MI, or cardiac arrest  1 point: Class II Risk, 6.0% 30-day risk of death, MI, or cardiac arrest  2 points: Class III Risk, 10.1% 30-day risk of death, MI, or cardiac arrest  3 points: Class IV Risk, 15% 30-day risk of death, MI, or cardiac arrest  4 points: Class IV Risk, 15% 30-day risk of death, MI, or cardiac arrest  5 points: Class IV Risk, 15% 30-day risk of death, MI, or cardiac arrest  6 points: Class IV Risk, 15% 30-day risk of death, MI, or cardiac arrest    Lab Results   Component Value Date    CREATININE 1.02 2024       Na was seen today for pre-op exam.    Diagnoses and all orders for this visit:    Left knee pain, unspecified chronicity        Recommendations:  Na Joseph is undergoing an elective Low Risk surgery, ARTHROPLASTY  KNEE TOTAL REVISION- Left. She is RCRI class II risk (1 points for hx of pulmonary HTN) with 6% 30-day risk of death, MI, or cardiac arrest. She may proceed with surgery as planned without further workup. Pre-operative form completed and faxed today to office as requested.    Discussed with Dr. Randle, who is in agreement with the plan as outlined above.     Per Chart Review of Hematology:  Hold coumadin 5 days prior (Hold starting 9/28/24).    Will bridge with Lovenox 1mg/kg sq bid (100mg) starting 3 days prior to surgery 9/30/24 and hold all anticoagulation 24 hours prior to surgery.     Will order PT/INR for Weds to ensure PT/INR adequate for surgery    Per Chart Review for anesthesia:   Please consider following medication regimen to assist with nausea and vomiting with anesthesia as patient tolerated this regimen in the past (see Dr. Tonia Lainez's note 10/2024).   -Dexamethasone and Zofran  -Midazolam upon induction

## 2024-10-01 ENCOUNTER — PREP FOR PROCEDURE (OUTPATIENT)
Age: 59
End: 2024-10-01

## 2024-10-01 ENCOUNTER — OFFICE VISIT (OUTPATIENT)
Age: 59
End: 2024-10-01
Payer: MEDICARE

## 2024-10-01 VITALS — WEIGHT: 236.4 LBS | BODY MASS INDEX: 43.5 KG/M2 | HEIGHT: 62 IN

## 2024-10-01 DIAGNOSIS — T84.59XA INFECTION OF TOTAL KNEE REPLACEMENT, INITIAL ENCOUNTER  (HCC): Primary | ICD-10-CM

## 2024-10-01 DIAGNOSIS — Z96.659 INFECTION OF TOTAL KNEE REPLACEMENT, INITIAL ENCOUNTER  (HCC): Primary | ICD-10-CM

## 2024-10-01 PROCEDURE — 99214 OFFICE O/P EST MOD 30 MIN: CPT | Performed by: STUDENT IN AN ORGANIZED HEALTH CARE EDUCATION/TRAINING PROGRAM

## 2024-10-01 NOTE — PROGRESS NOTES
Knee Follow up Office Note    Assessment:     1. Infection of total knee replacement, initial encounter  ([facility])          Plan:     Problem List Items Addressed This Visit    None  Visit Diagnoses       Infection of total knee replacement, initial encounter  ([facility])    -  Primary             58 y/o female with left knee pain following Left TKA from 2022.  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  She is currently scheduled for a Left TKA revision this Thursday.  We discussed that for her Left TKA revision we will perform an explant, I&D, and implantation of articulating antibiotic spacer.  We discussed the surgical procedure at length as well as the recovery time. All questions were reviewed and answered today.   The patient has elected to proceed with Left TKA Revision to 1.5 stage antibiotic arthroplasty. Risks and benefits of surgery to include but not limited to bleeding, infection, damage to surrounding structures, hardware failure, instability, fracture, dislocation, need for further surgery, continued pain, stiffness, blood clots, stroke, and heart attack was discussed with the patient. Informed consent was signed today in the office. The patient has met with our surgical schedulers and our preoperative joint replacement pathway has been initiated. All questions were answered. Patient will follow-up 2 weeks post operatively.   She is on Coumadin and she will need bridging per cardiology.     Subjective:     Patient ID: Na Joseph is a 59 y.o. female.  Chief Complaint:  HPI:  59 y.o. female who presents today for a follow up evaluation of her LEFT knee.  She has a history of a Left TKA performed on 10/06/2022 with Dr. Zendejas. Patient states that she has been having over the past 2 weeks as well as fever, chills, and significant night sweats since 9/11/2024 as well as 15 pounds of weight loss in this time period.  Highest fever is 101.8. She reports she is running 100.8 fever since, worse  at night. She feels her knee is warm to the touch. She feels her knee is swollen.  Prior to this, she had no issues with her knee and was doing very well.  At her last visit the left knee was aspirated and sent for synovasure testing.  She was found to have a +synovasure with staph epidermidis.  She is here today to schedule her LEFT TKA explant, I&D and implantation of articulating antibiotic spacer.    Patient has history of lupus. She is on Rinvoq and plaquenil.  She has a history of a PE and is on coumadin.    Allergy:  Allergies   Allergen Reactions    Dilantin [Phenytoin] Anaphylaxis and Rash    Penicillins Rash, Anaphylaxis, Dermatitis and Hives    Augmentin [Amoxicillin-Pot Clavulanate] Rash and Dermatitis     Medications:  all current active meds have been reviewed  Past Medical History:  Past Medical History:   Diagnosis Date    HELENE positive     Anemia     Sildenafil causes decreased hematocrit    Antiphospholipid syndrome (HCC)     Anxiety 03/2020    CHF (congestive heart failure) ([facility])     right heart failure related to pulm HTN lupus    Chronic kidney disease     borderline lab values    Chronic rhinitis 06/04/2012    Clotting disorder ([facility]) 2012    Coronary artery disease 2018    Encephalitis     Lasted Assessed 10/18/2017    Gout 06/26/2018    Heart failure ([facility]) 2019    History of transfusion 2/13/2019    autologous    Hypertension     Lupus 2018    Lupus anticoagulant disorder ([facility])     Maxillary sinusitis     Lasted Assessed 10/18/2017    Night sweat     Osteopenia     Hip    Osteoporosis     Spine    Pneumonia     Last Assessed 10/18/2017    PONV (postoperative nausea and vomiting)     Pulmonary embolism (HCC)     Pulmonary hypertension ([facility])     Seizures ([facility])     Only during Encephalitis    Shortness of breath     with exertion    Sinus tachycardia     Urinary incontinence      Past Surgical History:  Past Surgical History:   Procedure Laterality Date    ARTHRODESIS      Hand: IP Joint     CHOLECYSTECTOMY      COLONOSCOPY      COLPOSCOPY      HYSTERECTOMY      Vaginal    JOINT REPLACEMENT Right     Knee replacement    KNEE SURGERY  02/2019    LAPAROSCOPIC ESOPHAGOGASTRIC FUNDOPLASTY  2008    MO ARTHRP KNE CONDYLE&PLATU MEDIAL&LAT COMPARTMENTS Right 02/12/2019    Procedure: KNEE TOTAL ARTHROPLASTY AND ASSOCIATED PROCEDURES;  Surgeon: Donovan Zendejas DO;  [facility];  Service: Orthopedics    MO ARTHRP KNE CONDYLE&PLATU MEDIAL&LAT COMPARTMENTS Left 10/06/2022    Procedure: ARTHROPLASTY KNEE TOTAL;  Surgeon: Donovan Zendejas DO;  [facility];  Service: Orthopedics    MO ARTHRS KNEE W/MENISCECTOMY MED&LAT W/SHAVING Right 10/02/2018    Procedure: KNEE ARTHROSCOPY WITH PARTIAL MEDIAL MENISCECTOMY;  Surgeon: Donovan Zendejas DO;  [facility];  Service: Orthopedics    MO ARTHRS KNEE W/MENISCECTOMY MED&LAT W/SHAVING Left 12/17/2019    Procedure: KNEE ARTHROSCOPIC MENISCECTOMY /MEDIAL; Chondyl medial and posterior;  Surgeon: Donovan Zendejas DO;  [facility];  Service: Orthopedics    REDUCTION MAMMAPLASTY Bilateral     doesnt remember when    REPAIR ANKLE LIGAMENT Right     TONSILLECTOMY       Family History:  Family History   Problem Relation Age of Onset    Uterine cancer Mother     Pancreatic cancer Mother 70    Diabetes Mother     Endometrial cancer Mother 66    Arthritis Mother     Cancer Mother         Pancreas, Uterine    Vision loss Mother     Osteoporosis Mother     Heart disease Father         open heart surgery at age 50     Stroke Father         CVA    Hypertension Father     Atrial fibrillation Father     Hyperlipidemia Father     Arthritis Father     Rheum arthritis Father     Heart attack Father     No Known Problems Sister     No Known Problems Sister     Thyroid disease Sister     Depression Sister     Osteoarthritis Sister     Asthma Sister     Asthma Daughter     Migraines Daughter     Asthma Daughter     Thyroid disease Maternal Grandmother     Heart attack Maternal  Grandfather     Heart disease Maternal Grandfather     Heart attack Paternal Grandmother     Heart disease Paternal Grandmother     Skin cancer Paternal Grandfather     No Known Problems Brother     Hemochromatosis Brother     No Known Problems Maternal Aunt     No Known Problems Paternal Aunt     Anuerysm Neg Hx     Clotting disorder Neg Hx     Heart failure Neg Hx      Social History:  Social History     Substance and Sexual Activity   Alcohol Use Yes    Comment: Socially     Social History     Substance and Sexual Activity   Drug Use No     Social History     Tobacco Use   Smoking Status Former    Current packs/day: 0.00    Types: Cigarettes    Quit date: 2006    Years since quittin.6   Smokeless Tobacco Never           ROS:  General: Per HPI  Skin: Negative, except if noted below  HEENT: Negative  Respiratory: Negative  Cardiovascular: Negative  Gastrointestinal: Negative  Urinary: Negative  Vascular: Negative  Musculoskeletal: Positive per HPI   Neurologic: Positive per HPI  Endocrine: Negative    Objective:  BP Readings from Last 1 Encounters:   24 112/78      Wt Readings from Last 1 Encounters:   10/01/24 107 kg (236 lb 6.4 oz)        Respiratory:   non-labored respirations    Lymphatics:  no palpable lymph nodes    Gait:   antalgic    Neurologic:   Alert and oriented times 3  Patient with normal sensation except as noted below  Deep tendon reflexes 2+ except as noted in MSK exam    Bilateral Lower Extremity:  Left Knee:      Inspection:  skin intact, well healed surgical incision    Overall limb alignment neutral    Effusion: moderate/large     ROM 0-120 with pain at end range of flexion    Extensor Lag: none    Palpation: medial Joint line tenderness to palpation    AP Stability at 90 deg stable    M/L stability in full extension stable    M/L stability in midflexion stable    Motor: 5/5 Q/HS/TA/GS/P    Pulses: 2+ DP / 2+ PT    SILT DP/SP/S/S/TN      BMI:   Estimated body mass index is 43.59  "kg/m² as calculated from the following:    Height as of this encounter: 5' 1.75\" (1.568 m).    Weight as of this encounter: 107 kg (236 lb 6.4 oz).  BSA:   Estimated body surface area is 2.04 meters squared as calculated from the following:    Height as of this encounter: 5' 1.75\" (1.568 m).    Weight as of this encounter: 107 kg (236 lb 6.4 oz).           Scribe Attestation      I,:  Amina Goldberg PA-C am acting as a scribe while in the presence of the attending physician.:       I,:  Grupo Wilson, DO personally performed the services described in this documentation    as scribed in my presence.:               "

## 2024-10-02 ENCOUNTER — TELEPHONE (OUTPATIENT)
Age: 59
End: 2024-10-02

## 2024-10-02 ENCOUNTER — TELEPHONE (OUTPATIENT)
Dept: HEMATOLOGY ONCOLOGY | Facility: CLINIC | Age: 59
End: 2024-10-02

## 2024-10-02 ENCOUNTER — EVALUATION (OUTPATIENT)
Dept: PHYSICAL THERAPY | Facility: CLINIC | Age: 59
End: 2024-10-02
Payer: MEDICARE

## 2024-10-02 ENCOUNTER — APPOINTMENT (OUTPATIENT)
Dept: LAB | Facility: CLINIC | Age: 59
End: 2024-10-02
Payer: MEDICARE

## 2024-10-02 ENCOUNTER — ANESTHESIA EVENT (OUTPATIENT)
Dept: PERIOP | Facility: HOSPITAL | Age: 59
DRG: 467 | End: 2024-10-02
Payer: MEDICARE

## 2024-10-02 DIAGNOSIS — Z79.01 ANTICOAGULATED ON COUMADIN: ICD-10-CM

## 2024-10-02 DIAGNOSIS — Z96.652 STATUS POST LEFT KNEE REPLACEMENT: ICD-10-CM

## 2024-10-02 DIAGNOSIS — D68.61 ANTIPHOSPHOLIPID ANTIBODY SYNDROME (HCC): ICD-10-CM

## 2024-10-02 DIAGNOSIS — Z79.01 ANTICOAGULATED ON COUMADIN: Primary | ICD-10-CM

## 2024-10-02 DIAGNOSIS — Z96.659 INFECTION OF PROSTHETIC KNEE JOINT, SUBSEQUENT ENCOUNTER: ICD-10-CM

## 2024-10-02 DIAGNOSIS — Z96.652 PRESENCE OF LEFT ARTIFICIAL KNEE JOINT: Primary | ICD-10-CM

## 2024-10-02 DIAGNOSIS — T84.59XD INFECTION OF PROSTHETIC KNEE JOINT, SUBSEQUENT ENCOUNTER: ICD-10-CM

## 2024-10-02 DIAGNOSIS — R79.1 PROLONGED INR: ICD-10-CM

## 2024-10-02 PROBLEM — T84.59XA: Status: ACTIVE | Noted: 2024-10-02

## 2024-10-02 LAB
INR PPP: 2.29 (ref 0.85–1.19)
PROTHROMBIN TIME: 25.9 SECONDS (ref 12.3–15)

## 2024-10-02 PROCEDURE — 97110 THERAPEUTIC EXERCISES: CPT | Performed by: PHYSICAL THERAPIST

## 2024-10-02 PROCEDURE — 85610 PROTHROMBIN TIME: CPT

## 2024-10-02 PROCEDURE — 97161 PT EVAL LOW COMPLEX 20 MIN: CPT | Performed by: PHYSICAL THERAPIST

## 2024-10-02 PROCEDURE — 36415 COLL VENOUS BLD VENIPUNCTURE: CPT

## 2024-10-02 RX ORDER — CEFAZOLIN SODIUM 2 G/50ML
2000 SOLUTION INTRAVENOUS EVERY 8 HOURS
Status: CANCELLED | OUTPATIENT
Start: 2024-10-02 | End: 2024-10-03

## 2024-10-02 RX ORDER — SODIUM CHLORIDE 9 MG/ML
125 INJECTION, SOLUTION INTRAVENOUS CONTINUOUS
Status: CANCELLED | OUTPATIENT
Start: 2024-10-02

## 2024-10-02 NOTE — TELEPHONE ENCOUNTER
Caller: Self    Doctor: Steve    Reason for call: Patient called in upset. Prior auth request was placed yesterday for surgery. Patient called insurance to check status and was told auth request was sent as standard review not an urgent. And as it stands, patient will be financially responsible. [employer] said someone from the office has to call in to get that changed to an urgent review. Please notify patient when there has been an update    Call back#: [phone]

## 2024-10-02 NOTE — TELEPHONE ENCOUNTER
D/W Dr. Brewster.  Not cleared yet.      Will repeat PT/INR tomorrow AM along with PTT, CBCD, CMP.      No vitamin K due to potential clot risk.    Called and spoke with patient.  Her surgery would be in the afternoon.  She will go to [facility] tomorrow a.m. for stat labs.    Patient verbalized understanding.

## 2024-10-02 NOTE — PROGRESS NOTES
PT Evaluation     Today's date: 10/2/2024  Patient name: Na Joseph  : 1965  MRN: [id number]  Referring provider: Sultana Arnold DO  Dx:   Encounter Diagnosis     ICD-10-CM    1. Presence of left artificial knee joint  Z96.652       2. Infection of prosthetic knee joint, subsequent encounter  T84.59XD     Z96.659       3. Status post left knee replacement  Z96.652                      Assessment  Impairments: abnormal gait, abnormal or restricted ROM, activity intolerance, impaired physical strength, lacks appropriate home exercise program and pain with function    Assessment details: Patient seen for preoperative visit with revision of L TKA scheduled for tomorrow.  She presents with pain, weakness, limited ROM and altered gait.  She has a good support system in place for post operative care at home to allow early access to outpatient PT.  She would benefit from ReEvaluation at first post operative visit.  She was issued initial HEP including preventative exercises (ankle pumps) and basic quad activation and ROM.  Thank you for this pleasant referral.      Understanding of Dx/Px/POC: good     Prognosis: good    Goals  ST-6 weeks  1.  Patient to be independent with HEP.  2.  Decrease pain at least 2 subjective levels.      LT-12 weeks.    1.   Patient to voice comfort with self management of condition.  2.  75% or > decreased pain.  3.  75% or > decreased functional deficits.  4.  Normalize AROM of all deficit planes.  5.  Normalize strength.  7.  Patient to voice understanding of activities/positions to avoid.  8.  Centralize symptoms.  9.  Normalize Gait    Plan  Patient would benefit from: skilled PT  Referral necessary: No  Planned modality interventions: cryotherapy    Planned therapy interventions: IADL retraining, joint mobilization, manual therapy, motor coordination training, neuromuscular re-education, patient education, postural training, self care, strengthening, stretching,  therapeutic activities, therapeutic exercise, home exercise program, flexibility, ADL training, balance and body mechanics training    Frequency: 2x week  Duration in weeks: 6  Treatment plan discussed with: patient        Subjective Evaluation    History of Present Illness  Onset date: Sept.  Mechanism of injury: Chief Complaint:  L knee pain    History:  Patient had a L TKA 2 years ago.  She began having L hip/thigh pain along with a fever, night sweats etc and she lost 15 pounds.  She was seen by Dr Zendejas then Dr Wilson.  She had her knee tapped and testing found a staph infection.  She is scheduled for L TKA revision on 10/3/24.  She does not work.  She lives with her  and grandson who can assist at home.  She has a multistory home with 4 steps to enter and can have a 1 floor set up if needed.      PMH: Lupus, B TKA, pulmonary HTN, Hx Pes, on Warfarin long term (not currently due to surgery)     Aggravating factors:  unsure  Relieving factors:  Tylenol    Functional Deficits:  Limiting WB, stair ambulation    Patient Goals:  Climbing stairs    Quality of life: good    Pain  At best pain ratin  At worst pain ratin  Location: L Knee          Objective     Active Range of Motion   Left Knee   Flexion: 113 degrees   Extension: 0 degrees     Right Knee   Flexion: 124 degrees   Extension: 0 degrees     Passive Range of Motion   Left Knee   Flexion: 118 degrees   Extension: WFL    Strength/Myotome Testing     Left Knee   Flexion: 4-  Extension: 4-    General Comments:      Knee Comments  Mid Patella Girth: L 49 cm; R 48 cm  SLR WFL  Hip MMT 4/5 on L  TU.8 sec no AD  Stairs: Prefers step to pattern with use of handrail, antalgic when reciprocal pattern used               Precautions: Infection, Lupus, On blood thinners      Manuals 10/2            PROM                                                    Neuro Re-Ed             SLS                                                                                            Ther Ex             HEP C            Bike              Q Sets             Heel Slides             LAQ             Stand Hip 3 way             TKE             Squat             Ther Activity                                       Gait Training                                       Modalities

## 2024-10-02 NOTE — PRE-PROCEDURE INSTRUCTIONS
Pre-Surgery Instructions:   Medication Instructions    ascorbic acid (VITAMIN C) 500 MG tablet Instructions provided by MD Sanchezlysta 200 MG/ML SOAJ Instructions provided by MD    Cholecalciferol (VITAMIN D3) 1,000 units tablet Stop taking 7 days prior to surgery.    cyanocobalamin 1,000 mcg/mL Stop taking 7 days prior to surgery.    enoxaparin (Lovenox) 100 mg/mL Last dose 10/1    folic acid (FOLVITE) 1 mg tablet Instructions provided by MD    gabapentin (NEURONTIN) 100 mg capsule Take night before surgery    hydroxychloroquine (PLAQUENIL) 200 mg tablet Take day of surgery    Multiple Vitamins-Minerals (multivitamin with minerals) tablet Instructions provided by MD    potassium chloride (Klor-Con M20) 20 mEq tablet Hold day of surgery.    romosozumab-aqqg (EVENITY) subcutaneous injection Instructions provided by MD    sildenafil (REVATIO) 20 mg tablet Instructions provided by MD    spironolactone (ALDACTONE) 25 mg tablet Hold day of surgery.    torsemide (DEMADEX) 20 mg tablet Hold day of surgery.    traZODone (DESYREL) 50 mg tablet Take night before surgery    triamcinolone (KENALOG) 0.1 % oral topical paste Hold day of surgery.    warfarin (Coumadin) 5 mg tablet Last dose 9/28/24    Medication instructions for day surgery reviewed. Please use only a sip of water to take your instructed medications. Avoid all over the counter vitamins, supplements and NSAIDS for one week prior to surgery per anesthesia guidelines. Tylenol is ok to take as needed.     You will receive a call one business day prior to surgery with an arrival time and hospital directions. If your surgery is scheduled on a Monday, the hospital will be calling you on the Friday prior to your surgery. If you have not heard from anyone by 8pm, please call the hospital supervisor through the hospital  at [phone]. ([place] [phone] or [place] [phone]).    Do not eat or drink anything after midnight the night before your surgery,  "including candy, mints, lifesavers, or chewing gum. Do not drink alcohol 24hrs before your surgery. Try not to smoke at least 24hrs before your surgery.       Follow the pre surgery showering instructions as listed in the “My Surgical Experience Booklet” or otherwise provided by your surgeon's office. Do not use a blade to shave the surgical area 1 week before surgery. It is okay to use a clean electric clippers up to 24 hours before surgery. Do not apply any lotions, creams, including makeup, cologne, deodorant, or perfumes after showering on the day of your surgery. Do not use dry shampoo, hair spray, hair gel, or any type of hair products.     No contact lenses, eye make-up, or artificial eyelashes. Remove nail polish, including gel polish, and any artificial, gel, or acrylic nails if possible. Remove all jewelry including rings and body piercing jewelry.     Wear causal clothing that is easy to take on and off. Consider your type of surgery.    Keep any valuables, jewelry, piercings at home. Please bring any specially ordered equipment (sling, braces) if indicated.    Arrange for a responsible person to drive you to and from the hospital on the day of your surgery. Please confirm the visitor policy for the day of your procedure when you receive your phone call with an arrival time.     Call the surgeon's office with any new illnesses, exposures, or additional questions prior to surgery.    Please reference your “My Surgical Experience Booklet” for additional information to prepare for your upcoming surgery.    Reviewed with patient, in detail, instructions from \"My Surgical Experience\". Verified with patient that he received TMLJ patient education from surgeon's office. Advised to call ortho office/surgery coordinator with any questions and/or concerns.   Confirmed that patient has hibiclens soap and CHG wipes. Incentive spirometer received.   Patient verbalized understanding of current visitor restrictions due " to Covid and will clarify with nurse DOS. Instructed to avoid all ASA and OTC Vit/Supp 1 week prior to surgery and to avoid NSAIDs 7 days prior to surgery per anesthesia guidelines.Tylenol ok to take prn.   No alcohol 24 hours prior to surgery. Patient aware Lovenox or other Blood Thinner prescribed is for POST OP ONLY. Instructed to call surgeon's office in meantime with any new illness.  Patient verbalized an understanding of all instructions reviewed and offers no concerns at this time.

## 2024-10-03 ENCOUNTER — APPOINTMENT (INPATIENT)
Dept: RADIOLOGY | Facility: HOSPITAL | Age: 59
DRG: 467 | End: 2024-10-03
Payer: MEDICARE

## 2024-10-03 ENCOUNTER — APPOINTMENT (OUTPATIENT)
Dept: LAB | Facility: CLINIC | Age: 59
End: 2024-10-03
Payer: MEDICARE

## 2024-10-03 ENCOUNTER — HOSPITAL ENCOUNTER (INPATIENT)
Facility: HOSPITAL | Age: 59
LOS: 6 days | Discharge: HOME HEALTH | DRG: 467 | End: 2024-10-09
Attending: STUDENT IN AN ORGANIZED HEALTH CARE EDUCATION/TRAINING PROGRAM | Admitting: STUDENT IN AN ORGANIZED HEALTH CARE EDUCATION/TRAINING PROGRAM
Payer: MEDICARE

## 2024-10-03 ENCOUNTER — ANESTHESIA (OUTPATIENT)
Dept: PERIOP | Facility: HOSPITAL | Age: 59
DRG: 467 | End: 2024-10-03
Payer: MEDICARE

## 2024-10-03 ENCOUNTER — TELEPHONE (OUTPATIENT)
Dept: HEMATOLOGY ONCOLOGY | Facility: CLINIC | Age: 59
End: 2024-10-03

## 2024-10-03 DIAGNOSIS — R79.1 PROLONGED INR: ICD-10-CM

## 2024-10-03 DIAGNOSIS — Z79.01 ANTICOAGULATED ON COUMADIN: ICD-10-CM

## 2024-10-03 DIAGNOSIS — D68.61 ANTIPHOSPHOLIPID ANTIBODY SYNDROME (HCC): ICD-10-CM

## 2024-10-03 DIAGNOSIS — Z96.659 INFECTION OF TOTAL KNEE REPLACEMENT, INITIAL ENCOUNTER  (HCC): Primary | ICD-10-CM

## 2024-10-03 DIAGNOSIS — Z96.652 STATUS POST TOTAL KNEE REPLACEMENT USING CEMENT, LEFT: ICD-10-CM

## 2024-10-03 DIAGNOSIS — T84.59XA INFECTION OF TOTAL KNEE REPLACEMENT, INITIAL ENCOUNTER  (HCC): Primary | ICD-10-CM

## 2024-10-03 DIAGNOSIS — T84.54XA INFECTION ASSOCIATED WITH INTERNAL LEFT KNEE PROSTHESIS, INITIAL ENCOUNTER (HCC): ICD-10-CM

## 2024-10-03 DIAGNOSIS — N18.31 CHRONIC KIDNEY DISEASE, STAGE 3A (HCC): ICD-10-CM

## 2024-10-03 LAB
ALBUMIN SERPL BCG-MCNC: 3.7 G/DL (ref 3.5–5)
ALP SERPL-CCNC: 78 U/L (ref 34–104)
ALT SERPL W P-5'-P-CCNC: 6 U/L (ref 7–52)
ANION GAP SERPL CALCULATED.3IONS-SCNC: 8 MMOL/L (ref 4–13)
APTT PPP: 40 SECONDS (ref 23–34)
AST SERPL W P-5'-P-CCNC: 8 U/L (ref 13–39)
BASOPHILS # BLD AUTO: 0.01 THOUSANDS/ΜL (ref 0–0.1)
BASOPHILS NFR BLD AUTO: 0 % (ref 0–1)
BILIRUB SERPL-MCNC: 0.99 MG/DL (ref 0.2–1)
BUN SERPL-MCNC: 6 MG/DL (ref 5–25)
CALCIUM SERPL-MCNC: 9 MG/DL (ref 8.4–10.2)
CHLORIDE SERPL-SCNC: 104 MMOL/L (ref 96–108)
CO2 SERPL-SCNC: 27 MMOL/L (ref 21–32)
CREAT SERPL-MCNC: 0.81 MG/DL (ref 0.6–1.3)
EOSINOPHIL # BLD AUTO: 0.09 THOUSAND/ΜL (ref 0–0.61)
EOSINOPHIL NFR BLD AUTO: 2 % (ref 0–6)
ERYTHROCYTE [DISTWIDTH] IN BLOOD BY AUTOMATED COUNT: 12.7 % (ref 11.6–15.1)
GFR SERPL CREATININE-BSD FRML MDRD: 78 ML/MIN/1.73SQ M
GLUCOSE P FAST SERPL-MCNC: 104 MG/DL (ref 65–99)
HCT VFR BLD AUTO: 34.2 % (ref 34.8–46.1)
HGB BLD-MCNC: 11.3 G/DL (ref 11.5–15.4)
IMM GRANULOCYTES # BLD AUTO: 0.02 THOUSAND/UL (ref 0–0.2)
IMM GRANULOCYTES NFR BLD AUTO: 1 % (ref 0–2)
INR PPP: 1.42 (ref 0.85–1.19)
INR PPP: 1.43 (ref 0.85–1.19)
LYMPHOCYTES # BLD AUTO: 0.61 THOUSANDS/ΜL (ref 0.6–4.47)
LYMPHOCYTES NFR BLD AUTO: 14 % (ref 14–44)
MCH RBC QN AUTO: 30.5 PG (ref 26.8–34.3)
MCHC RBC AUTO-ENTMCNC: 33 G/DL (ref 31.4–37.4)
MCV RBC AUTO: 92 FL (ref 82–98)
MONOCYTES # BLD AUTO: 0.61 THOUSAND/ΜL (ref 0.17–1.22)
MONOCYTES NFR BLD AUTO: 14 % (ref 4–12)
NEUTROPHILS # BLD AUTO: 3.01 THOUSANDS/ΜL (ref 1.85–7.62)
NEUTS SEG NFR BLD AUTO: 69 % (ref 43–75)
NRBC BLD AUTO-RTO: 0 /100 WBCS
PLATELET # BLD AUTO: 359 THOUSANDS/UL (ref 149–390)
PMV BLD AUTO: 10.2 FL (ref 8.9–12.7)
POTASSIUM SERPL-SCNC: 3.2 MMOL/L (ref 3.5–5.3)
PROT SERPL-MCNC: 6.5 G/DL (ref 6.4–8.4)
PROTHROMBIN TIME: 17.4 SECONDS (ref 12.3–15)
PROTHROMBIN TIME: 18.2 SECONDS (ref 12.3–15)
RBC # BLD AUTO: 3.71 MILLION/UL (ref 3.81–5.12)
SODIUM SERPL-SCNC: 139 MMOL/L (ref 135–147)
WBC # BLD AUTO: 4.35 THOUSAND/UL (ref 4.31–10.16)

## 2024-10-03 PROCEDURE — C1776 JOINT DEVICE (IMPLANTABLE): HCPCS | Performed by: STUDENT IN AN ORGANIZED HEALTH CARE EDUCATION/TRAINING PROGRAM

## 2024-10-03 PROCEDURE — 87075 CULTR BACTERIA EXCEPT BLOOD: CPT | Performed by: STUDENT IN AN ORGANIZED HEALTH CARE EDUCATION/TRAINING PROGRAM

## 2024-10-03 PROCEDURE — 27487 REVISE/REPLACE KNEE JOINT: CPT | Performed by: STUDENT IN AN ORGANIZED HEALTH CARE EDUCATION/TRAINING PROGRAM

## 2024-10-03 PROCEDURE — 36415 COLL VENOUS BLD VENIPUNCTURE: CPT

## 2024-10-03 PROCEDURE — 87176 TISSUE HOMOGENIZATION CULTR: CPT | Performed by: STUDENT IN AN ORGANIZED HEALTH CARE EDUCATION/TRAINING PROGRAM

## 2024-10-03 PROCEDURE — 0S9D0ZZ DRAINAGE OF LEFT KNEE JOINT, OPEN APPROACH: ICD-10-PCS | Performed by: STUDENT IN AN ORGANIZED HEALTH CARE EDUCATION/TRAINING PROGRAM

## 2024-10-03 PROCEDURE — 85025 COMPLETE CBC W/AUTO DIFF WBC: CPT

## 2024-10-03 PROCEDURE — C1713 ANCHOR/SCREW BN/BN,TIS/BN: HCPCS | Performed by: STUDENT IN AN ORGANIZED HEALTH CARE EDUCATION/TRAINING PROGRAM

## 2024-10-03 PROCEDURE — 87186 SC STD MICRODIL/AGAR DIL: CPT | Performed by: STUDENT IN AN ORGANIZED HEALTH CARE EDUCATION/TRAINING PROGRAM

## 2024-10-03 PROCEDURE — 0SRD0J9 REPLACEMENT OF LEFT KNEE JOINT WITH SYNTHETIC SUBSTITUTE, CEMENTED, OPEN APPROACH: ICD-10-PCS | Performed by: STUDENT IN AN ORGANIZED HEALTH CARE EDUCATION/TRAINING PROGRAM

## 2024-10-03 PROCEDURE — 85730 THROMBOPLASTIN TIME PARTIAL: CPT

## 2024-10-03 PROCEDURE — 87205 SMEAR GRAM STAIN: CPT | Performed by: STUDENT IN AN ORGANIZED HEALTH CARE EDUCATION/TRAINING PROGRAM

## 2024-10-03 PROCEDURE — 87147 CULTURE TYPE IMMUNOLOGIC: CPT | Performed by: STUDENT IN AN ORGANIZED HEALTH CARE EDUCATION/TRAINING PROGRAM

## 2024-10-03 PROCEDURE — 85610 PROTHROMBIN TIME: CPT

## 2024-10-03 PROCEDURE — 87077 CULTURE AEROBIC IDENTIFY: CPT | Performed by: STUDENT IN AN ORGANIZED HEALTH CARE EDUCATION/TRAINING PROGRAM

## 2024-10-03 PROCEDURE — 85610 PROTHROMBIN TIME: CPT | Performed by: STUDENT IN AN ORGANIZED HEALTH CARE EDUCATION/TRAINING PROGRAM

## 2024-10-03 PROCEDURE — 87102 FUNGUS ISOLATION CULTURE: CPT | Performed by: STUDENT IN AN ORGANIZED HEALTH CARE EDUCATION/TRAINING PROGRAM

## 2024-10-03 PROCEDURE — 27487 REVISE/REPLACE KNEE JOINT: CPT | Performed by: PHYSICIAN ASSISTANT

## 2024-10-03 PROCEDURE — 0HBLXZZ EXCISION OF LEFT LOWER LEG SKIN, EXTERNAL APPROACH: ICD-10-PCS | Performed by: STUDENT IN AN ORGANIZED HEALTH CARE EDUCATION/TRAINING PROGRAM

## 2024-10-03 PROCEDURE — C9290 INJ, BUPIVACAINE LIPOSOME: HCPCS | Performed by: ANESTHESIOLOGY

## 2024-10-03 PROCEDURE — 80053 COMPREHEN METABOLIC PANEL: CPT

## 2024-10-03 PROCEDURE — 0SBD0ZZ EXCISION OF LEFT KNEE JOINT, OPEN APPROACH: ICD-10-PCS | Performed by: STUDENT IN AN ORGANIZED HEALTH CARE EDUCATION/TRAINING PROGRAM

## 2024-10-03 PROCEDURE — 87070 CULTURE OTHR SPECIMN AEROBIC: CPT | Performed by: STUDENT IN AN ORGANIZED HEALTH CARE EDUCATION/TRAINING PROGRAM

## 2024-10-03 PROCEDURE — 0SPD0NZ REMOVAL OF PATELLOFEMORAL SYNTHETIC SUBSTITUTE FROM LEFT KNEE JOINT, OPEN APPROACH: ICD-10-PCS | Performed by: STUDENT IN AN ORGANIZED HEALTH CARE EDUCATION/TRAINING PROGRAM

## 2024-10-03 PROCEDURE — 73560 X-RAY EXAM OF KNEE 1 OR 2: CPT

## 2024-10-03 DEVICE — ATTUNE KNEE SYSTEM ALL POLY TIBIAL IMPLANT CRUCIATE RETAINING SIZE 4, 12MM AOX
Type: IMPLANTABLE DEVICE | Site: KNEE | Status: FUNCTIONAL
Brand: ATTUNE

## 2024-10-03 DEVICE — STIMULAN® RAPID CURE PROVIDED STERILE FOR SINGLE PATIENT USE. STIMULAN® RAPID CURE CONTAINS CALCIUM SULFATE POWDER AND MIXING SOLUTION IN PRE-MEASURED QUANTITIES SO THAT WHEN MIXED TOGETHER IN A STERILE MIXING BOWL, THE RESULTANT PASTE IS TO BE DIGITALLY PACKED INTO OPEN BONE VOID/GAP TO SET INSITU OR PLACED INTO THE MOULD PROVIDED, THE MIXTURE SETS TO FORM BEADS. THE BIODEGRADABLE, RADIOPAQUE BEADS ARE RESORBED IN APPROXIMATELY 30 – 60 DAYS WHEN USED IN ACCORDANCE WITH THE DEVICE LABELLING. STIMULAN® RAPID CURE IS MANUFACTURED FROM SYNTHETIC IMPLANT GRADE CALCIUM SULFATE DIHYDRATE(CASO4.2H2O) THAT RESORBS AND IS REPLACED WITH BONE DURING THE HEALING PROCESS. ALSO, AS THE BONE VOID FILLER BEADS ARE BIODEGRADABLE AND BIOCOMPATIBLE, THEY MAY BE USED AT AN INFECTED SITE.
Type: IMPLANTABLE DEVICE | Site: KNEE | Status: FUNCTIONAL
Brand: STIMULAN® RAPID CURE

## 2024-10-03 DEVICE — ATTUNE KNEE SYSTEM FEMORAL CRUCIATE RETAINING SIZE 5 LEFT CEMENTED
Type: IMPLANTABLE DEVICE | Site: KNEE | Status: FUNCTIONAL
Brand: ATTUNE

## 2024-10-03 DEVICE — ATTUNE PATELLA MEDIALIZED DOME 35MM CEMENTED AOX
Type: IMPLANTABLE DEVICE | Site: KNEE | Status: FUNCTIONAL
Brand: ATTUNE

## 2024-10-03 DEVICE — IMPLANTABLE DEVICE: Type: IMPLANTABLE DEVICE | Site: KNEE | Status: FUNCTIONAL

## 2024-10-03 RX ORDER — ROCURONIUM BROMIDE 10 MG/ML
INJECTION, SOLUTION INTRAVENOUS AS NEEDED
Status: DISCONTINUED | OUTPATIENT
Start: 2024-10-03 | End: 2024-10-03

## 2024-10-03 RX ORDER — OXYCODONE HYDROCHLORIDE 5 MG/1
5 TABLET ORAL EVERY 4 HOURS PRN
Status: DISCONTINUED | OUTPATIENT
Start: 2024-10-03 | End: 2024-10-09 | Stop reason: HOSPADM

## 2024-10-03 RX ORDER — VANCOMYCIN HYDROCHLORIDE 1 G/20ML
INJECTION, POWDER, LYOPHILIZED, FOR SOLUTION INTRAVENOUS AS NEEDED
Status: DISCONTINUED | OUTPATIENT
Start: 2024-10-03 | End: 2024-10-03 | Stop reason: HOSPADM

## 2024-10-03 RX ORDER — SIMETHICONE 80 MG
80 TABLET,CHEWABLE ORAL 4 TIMES DAILY PRN
Status: DISCONTINUED | OUTPATIENT
Start: 2024-10-03 | End: 2024-10-09 | Stop reason: HOSPADM

## 2024-10-03 RX ORDER — TRAZODONE HYDROCHLORIDE 100 MG/1
100 TABLET ORAL
Status: DISCONTINUED | OUTPATIENT
Start: 2024-10-03 | End: 2024-10-09 | Stop reason: HOSPADM

## 2024-10-03 RX ORDER — ONDANSETRON 2 MG/ML
4 INJECTION INTRAMUSCULAR; INTRAVENOUS EVERY 6 HOURS PRN
Status: DISCONTINUED | OUTPATIENT
Start: 2024-10-03 | End: 2024-10-09 | Stop reason: HOSPADM

## 2024-10-03 RX ORDER — CHLORHEXIDINE GLUCONATE ORAL RINSE 1.2 MG/ML
15 SOLUTION DENTAL ONCE
Status: COMPLETED | OUTPATIENT
Start: 2024-10-03 | End: 2024-10-03

## 2024-10-03 RX ORDER — OXYCODONE HYDROCHLORIDE 10 MG/1
10 TABLET ORAL EVERY 4 HOURS PRN
Status: DISCONTINUED | OUTPATIENT
Start: 2024-10-03 | End: 2024-10-09 | Stop reason: HOSPADM

## 2024-10-03 RX ORDER — ENOXAPARIN SODIUM 100 MG/ML
100 INJECTION SUBCUTANEOUS EVERY 12 HOURS SCHEDULED
Status: DISCONTINUED | OUTPATIENT
Start: 2024-10-04 | End: 2024-10-05

## 2024-10-03 RX ORDER — WARFARIN SODIUM 5 MG/1
10 TABLET ORAL
Status: COMPLETED | OUTPATIENT
Start: 2024-10-03 | End: 2024-10-03

## 2024-10-03 RX ORDER — CHLORHEXIDINE GLUCONATE 40 MG/ML
SOLUTION TOPICAL DAILY PRN
Status: DISCONTINUED | OUTPATIENT
Start: 2024-10-03 | End: 2024-10-03 | Stop reason: HOSPADM

## 2024-10-03 RX ORDER — ACETAMINOPHEN 500 MG
1000 TABLET ORAL EVERY 8 HOURS
Qty: 60 TABLET | Refills: 0 | Status: SHIPPED | OUTPATIENT
Start: 2024-10-03

## 2024-10-03 RX ORDER — MAGNESIUM HYDROXIDE 1200 MG/15ML
LIQUID ORAL AS NEEDED
Status: DISCONTINUED | OUTPATIENT
Start: 2024-10-03 | End: 2024-10-03 | Stop reason: HOSPADM

## 2024-10-03 RX ORDER — DEXAMETHASONE SODIUM PHOSPHATE 10 MG/ML
INJECTION, SOLUTION INTRAMUSCULAR; INTRAVENOUS AS NEEDED
Status: DISCONTINUED | OUTPATIENT
Start: 2024-10-03 | End: 2024-10-03

## 2024-10-03 RX ORDER — FENTANYL CITRATE/PF 50 MCG/ML
25 SYRINGE (ML) INJECTION
Status: DISCONTINUED | OUTPATIENT
Start: 2024-10-03 | End: 2024-10-03 | Stop reason: HOSPADM

## 2024-10-03 RX ORDER — HYDROMORPHONE HCL/PF 1 MG/ML
SYRINGE (ML) INJECTION AS NEEDED
Status: DISCONTINUED | OUTPATIENT
Start: 2024-10-03 | End: 2024-10-03

## 2024-10-03 RX ORDER — BUPIVACAINE HYDROCHLORIDE 5 MG/ML
INJECTION, SOLUTION EPIDURAL; INTRACAUDAL
Status: COMPLETED | OUTPATIENT
Start: 2024-10-03 | End: 2024-10-03

## 2024-10-03 RX ORDER — MIDAZOLAM HYDROCHLORIDE 2 MG/2ML
INJECTION, SOLUTION INTRAMUSCULAR; INTRAVENOUS AS NEEDED
Status: DISCONTINUED | OUTPATIENT
Start: 2024-10-03 | End: 2024-10-03

## 2024-10-03 RX ORDER — DOCUSATE SODIUM 100 MG/1
100 CAPSULE, LIQUID FILLED ORAL 2 TIMES DAILY
Status: DISCONTINUED | OUTPATIENT
Start: 2024-10-03 | End: 2024-10-09 | Stop reason: HOSPADM

## 2024-10-03 RX ORDER — TORSEMIDE 20 MG/1
20 TABLET ORAL DAILY
Status: DISCONTINUED | OUTPATIENT
Start: 2024-10-04 | End: 2024-10-04

## 2024-10-03 RX ORDER — ONDANSETRON 4 MG/1
4 TABLET, ORALLY DISINTEGRATING ORAL EVERY 6 HOURS PRN
Qty: 20 TABLET | Refills: 0 | Status: SHIPPED | OUTPATIENT
Start: 2024-10-03

## 2024-10-03 RX ORDER — AMOXICILLIN 250 MG
1 CAPSULE ORAL DAILY
Qty: 30 TABLET | Refills: 0 | Status: SHIPPED | OUTPATIENT
Start: 2024-10-03

## 2024-10-03 RX ORDER — ONDANSETRON 2 MG/ML
4 INJECTION INTRAMUSCULAR; INTRAVENOUS ONCE AS NEEDED
Status: DISCONTINUED | OUTPATIENT
Start: 2024-10-03 | End: 2024-10-03 | Stop reason: HOSPADM

## 2024-10-03 RX ORDER — SCOLOPAMINE TRANSDERMAL SYSTEM 1 MG/1
1 PATCH, EXTENDED RELEASE TRANSDERMAL ONCE AS NEEDED
Status: DISCONTINUED | OUTPATIENT
Start: 2024-10-03 | End: 2024-10-03

## 2024-10-03 RX ORDER — LIDOCAINE HYDROCHLORIDE 10 MG/ML
INJECTION, SOLUTION EPIDURAL; INFILTRATION; INTRACAUDAL; PERINEURAL AS NEEDED
Status: DISCONTINUED | OUTPATIENT
Start: 2024-10-03 | End: 2024-10-03

## 2024-10-03 RX ORDER — SILDENAFIL CITRATE 20 MG/1
40 TABLET ORAL 3 TIMES DAILY
Status: DISCONTINUED | OUTPATIENT
Start: 2024-10-03 | End: 2024-10-09 | Stop reason: HOSPADM

## 2024-10-03 RX ORDER — GABAPENTIN 300 MG/1
300 CAPSULE ORAL
Status: DISCONTINUED | OUTPATIENT
Start: 2024-10-03 | End: 2024-10-09 | Stop reason: HOSPADM

## 2024-10-03 RX ORDER — ACETAMINOPHEN 325 MG/1
975 TABLET ORAL ONCE
Status: COMPLETED | OUTPATIENT
Start: 2024-10-03 | End: 2024-10-03

## 2024-10-03 RX ORDER — ACETAMINOPHEN 325 MG/1
975 TABLET ORAL EVERY 8 HOURS
Status: DISCONTINUED | OUTPATIENT
Start: 2024-10-03 | End: 2024-10-09 | Stop reason: HOSPADM

## 2024-10-03 RX ORDER — SODIUM CHLORIDE, SODIUM LACTATE, POTASSIUM CHLORIDE, CALCIUM CHLORIDE 600; 310; 30; 20 MG/100ML; MG/100ML; MG/100ML; MG/100ML
100 INJECTION, SOLUTION INTRAVENOUS CONTINUOUS
Status: DISCONTINUED | OUTPATIENT
Start: 2024-10-03 | End: 2024-10-06

## 2024-10-03 RX ORDER — PROPOFOL 10 MG/ML
INJECTION, EMULSION INTRAVENOUS AS NEEDED
Status: DISCONTINUED | OUTPATIENT
Start: 2024-10-03 | End: 2024-10-03

## 2024-10-03 RX ORDER — ONDANSETRON 2 MG/ML
INJECTION INTRAMUSCULAR; INTRAVENOUS AS NEEDED
Status: DISCONTINUED | OUTPATIENT
Start: 2024-10-03 | End: 2024-10-03

## 2024-10-03 RX ORDER — TOBRAMYCIN 1.2 G/30ML
INJECTION, POWDER, LYOPHILIZED, FOR SOLUTION INTRAVENOUS AS NEEDED
Status: DISCONTINUED | OUTPATIENT
Start: 2024-10-03 | End: 2024-10-03 | Stop reason: HOSPADM

## 2024-10-03 RX ORDER — SODIUM CHLORIDE, SODIUM LACTATE, POTASSIUM CHLORIDE, CALCIUM CHLORIDE 600; 310; 30; 20 MG/100ML; MG/100ML; MG/100ML; MG/100ML
125 INJECTION, SOLUTION INTRAVENOUS CONTINUOUS
Status: DISCONTINUED | OUTPATIENT
Start: 2024-10-03 | End: 2024-10-03

## 2024-10-03 RX ORDER — PANTOPRAZOLE SODIUM 40 MG/1
40 TABLET, DELAYED RELEASE ORAL
Status: DISCONTINUED | OUTPATIENT
Start: 2024-10-04 | End: 2024-10-09 | Stop reason: HOSPADM

## 2024-10-03 RX ORDER — ASCORBIC ACID 500 MG
500 TABLET ORAL 2 TIMES DAILY
Status: DISCONTINUED | OUTPATIENT
Start: 2024-10-03 | End: 2024-10-09 | Stop reason: HOSPADM

## 2024-10-03 RX ORDER — FENTANYL CITRATE 50 UG/ML
INJECTION, SOLUTION INTRAMUSCULAR; INTRAVENOUS AS NEEDED
Status: DISCONTINUED | OUTPATIENT
Start: 2024-10-03 | End: 2024-10-03

## 2024-10-03 RX ORDER — SENNOSIDES 8.6 MG
1 TABLET ORAL DAILY
Status: DISCONTINUED | OUTPATIENT
Start: 2024-10-04 | End: 2024-10-09 | Stop reason: HOSPADM

## 2024-10-03 RX ORDER — ACETAMINOPHEN 325 MG/1
650 TABLET ORAL EVERY 4 HOURS PRN
Status: DISCONTINUED | OUTPATIENT
Start: 2024-10-03 | End: 2024-10-09 | Stop reason: HOSPADM

## 2024-10-03 RX ORDER — HYDROXYCHLOROQUINE SULFATE 200 MG/1
400 TABLET, FILM COATED ORAL
Status: DISCONTINUED | OUTPATIENT
Start: 2024-10-04 | End: 2024-10-09 | Stop reason: HOSPADM

## 2024-10-03 RX ORDER — OXYCODONE HYDROCHLORIDE 5 MG/1
5 TABLET ORAL EVERY 4 HOURS PRN
Qty: 42 TABLET | Refills: 0 | Status: SHIPPED | OUTPATIENT
Start: 2024-10-03 | End: 2024-10-13

## 2024-10-03 RX ORDER — SPIRONOLACTONE 25 MG/1
25 TABLET ORAL DAILY
Status: DISCONTINUED | OUTPATIENT
Start: 2024-10-04 | End: 2024-10-04

## 2024-10-03 RX ORDER — FOLIC ACID 1 MG/1
1 TABLET ORAL DAILY
Status: DISCONTINUED | OUTPATIENT
Start: 2024-10-04 | End: 2024-10-09 | Stop reason: HOSPADM

## 2024-10-03 RX ORDER — TRANEXAMIC ACID 10 MG/ML
1000 INJECTION, SOLUTION INTRAVENOUS ONCE
Status: COMPLETED | OUTPATIENT
Start: 2024-10-03 | End: 2024-10-03

## 2024-10-03 RX ORDER — CALCIUM CARBONATE 500 MG/1
1000 TABLET, CHEWABLE ORAL DAILY PRN
Status: DISCONTINUED | OUTPATIENT
Start: 2024-10-03 | End: 2024-10-09 | Stop reason: HOSPADM

## 2024-10-03 RX ADMIN — BUPIVACAINE HYDROCHLORIDE 10 ML: 5 INJECTION, SOLUTION EPIDURAL; INTRACAUDAL; PERINEURAL at 15:27

## 2024-10-03 RX ADMIN — HYDROMORPHONE HYDROCHLORIDE 1 MG: 1 INJECTION, SOLUTION INTRAMUSCULAR; INTRAVENOUS; SUBCUTANEOUS at 16:02

## 2024-10-03 RX ADMIN — ONDANSETRON 4 MG: 2 INJECTION INTRAMUSCULAR; INTRAVENOUS at 18:20

## 2024-10-03 RX ADMIN — OXYCODONE HYDROCHLORIDE AND ACETAMINOPHEN 500 MG: 500 TABLET ORAL at 20:43

## 2024-10-03 RX ADMIN — DOCUSATE SODIUM 100 MG: 100 CAPSULE, LIQUID FILLED ORAL at 20:43

## 2024-10-03 RX ADMIN — ACETAMINOPHEN 975 MG: 325 TABLET, FILM COATED ORAL at 20:43

## 2024-10-03 RX ADMIN — FENTANYL CITRATE 25 MCG: 50 INJECTION INTRAMUSCULAR; INTRAVENOUS at 19:24

## 2024-10-03 RX ADMIN — MORPHINE SULFATE 2 MG: 2 INJECTION, SOLUTION INTRAMUSCULAR; INTRAVENOUS at 22:05

## 2024-10-03 RX ADMIN — MIDAZOLAM 2 MG: 1 INJECTION INTRAMUSCULAR; INTRAVENOUS at 15:27

## 2024-10-03 RX ADMIN — FENTANYL CITRATE 100 MCG: 50 INJECTION INTRAMUSCULAR; INTRAVENOUS at 15:27

## 2024-10-03 RX ADMIN — PROPOFOL 200 MG: 10 INJECTION, EMULSION INTRAVENOUS at 15:40

## 2024-10-03 RX ADMIN — OXYCODONE HYDROCHLORIDE 10 MG: 10 TABLET ORAL at 20:43

## 2024-10-03 RX ADMIN — SODIUM CHLORIDE, SODIUM LACTATE, POTASSIUM CHLORIDE, AND CALCIUM CHLORIDE 125 ML/HR: .6; .31; .03; .02 INJECTION, SOLUTION INTRAVENOUS at 14:40

## 2024-10-03 RX ADMIN — DEXAMETHASONE SODIUM PHOSPHATE 10 MG: 10 INJECTION INTRAMUSCULAR; INTRAVENOUS at 15:45

## 2024-10-03 RX ADMIN — ACETAMINOPHEN 975 MG: 325 TABLET ORAL at 14:03

## 2024-10-03 RX ADMIN — WARFARIN SODIUM 10 MG: 5 TABLET ORAL at 20:43

## 2024-10-03 RX ADMIN — SODIUM CHLORIDE, SODIUM LACTATE, POTASSIUM CHLORIDE, AND CALCIUM CHLORIDE 100 ML/HR: .6; .31; .03; .02 INJECTION, SOLUTION INTRAVENOUS at 20:52

## 2024-10-03 RX ADMIN — ONDANSETRON 4 MG: 2 INJECTION INTRAMUSCULAR; INTRAVENOUS at 20:49

## 2024-10-03 RX ADMIN — HYDROMORPHONE HYDROCHLORIDE 0.5 MG: 1 INJECTION, SOLUTION INTRAMUSCULAR; INTRAVENOUS; SUBCUTANEOUS at 18:43

## 2024-10-03 RX ADMIN — SUGAMMADEX 200 MG: 100 INJECTION, SOLUTION INTRAVENOUS at 18:32

## 2024-10-03 RX ADMIN — FENTANYL CITRATE 25 MCG: 50 INJECTION INTRAMUSCULAR; INTRAVENOUS at 19:14

## 2024-10-03 RX ADMIN — VANCOMYCIN HYDROCHLORIDE 1500 MG: 1 INJECTION, POWDER, LYOPHILIZED, FOR SOLUTION INTRAVENOUS at 15:24

## 2024-10-03 RX ADMIN — PROPOFOL 50 MCG/KG/MIN: 10 INJECTION, EMULSION INTRAVENOUS at 15:38

## 2024-10-03 RX ADMIN — LIDOCAINE HYDROCHLORIDE 100 MG: 10 INJECTION, SOLUTION EPIDURAL; INFILTRATION; INTRACAUDAL; PERINEURAL at 15:40

## 2024-10-03 RX ADMIN — SODIUM CHLORIDE, SODIUM LACTATE, POTASSIUM CHLORIDE, AND CALCIUM CHLORIDE: .6; .31; .03; .02 INJECTION, SOLUTION INTRAVENOUS at 17:15

## 2024-10-03 RX ADMIN — SILDENAFIL 40 MG: 20 TABLET, FILM COATED ORAL at 20:43

## 2024-10-03 RX ADMIN — SCOPOLAMINE 1 PATCH: 1.5 PATCH, EXTENDED RELEASE TRANSDERMAL at 14:38

## 2024-10-03 RX ADMIN — FENTANYL CITRATE 25 MCG: 50 INJECTION INTRAMUSCULAR; INTRAVENOUS at 19:19

## 2024-10-03 RX ADMIN — TRANEXAMIC ACID 1000 MG: 10 INJECTION, SOLUTION INTRAVENOUS at 15:50

## 2024-10-03 RX ADMIN — BUPIVACAINE 10 ML: 13.3 INJECTION, SUSPENSION, LIPOSOMAL INFILTRATION at 15:27

## 2024-10-03 RX ADMIN — ROCURONIUM 50 MG: 50 INJECTION, SOLUTION INTRAVENOUS at 15:40

## 2024-10-03 RX ADMIN — CHLORHEXIDINE GLUCONATE 15 ML: 1.2 RINSE ORAL at 14:34

## 2024-10-03 RX ADMIN — GABAPENTIN 300 MG: 300 CAPSULE ORAL at 20:43

## 2024-10-03 NOTE — INTERVAL H&P NOTE
H&P reviewed. After examining the patient I find no changes in the patients condition since the H&P had been written. Plan for left TKA revision to abx spacer.

## 2024-10-03 NOTE — TELEPHONE ENCOUNTER
Reviewed Labs.,  Discussed with Dr. Brewster.  Cleared for surgery from heme standpoint at 330 pm.  Patient made aware.      She anticipates she will be inpatient until M, T.  She will be in touch.

## 2024-10-03 NOTE — DISCHARGE INSTR - AVS FIRST PAGE
Dr. Wilson Knee Replacement    What to Expect/Activity  It is normal to have some discomfort in your knee for several days to weeks.  You are weight bearing as tolerated to your operative leg with assist devices.  Please use crutches/walker when ambulating until your follow-up  Swelling and discomfort in the knee is normal for several days after surgery. For the first 2-3 days, use ice around the knee to help. Use for 20-30 minutes every 1-2 hours for 48 hours, while awake. You may continue beyond 48 hours as needed.  Place one or two pillows underneath your calf, not your knee, to reduce swelling.  Physical therapy on your own at home should start as soon as possible (see below). Please perform heel slides and extension exercises on your own as well (see diagram).  Please use incentive spirometer 10 times per hour while awake (see diagram).    Dressing/Wound Care/Bathing  You may remove your toe-to-groin dressing 24 hours after surgery. There will be a surgical dressing over your incision that stays in place until follow-up unless water gets under the bandage and then it should be removed.   You may start showering 24 hours after surgery, the surgical dressing will remain in place. Please pat the dressing dry. If you notice the dressing appears saturated or is starting to come off, please replace with dry dressing.  You can keep the dressing in place until follow-up in the office.   Do not place any creams, ointments or gels on or around the incision.  No baths, swimming or submerging until cleared by Dr. Wilson    Pain Management/Medications  You may resume your usual medications.  Please take the following medications:  Anti-coagulation (blood clot prevention) - Resume coumadin  Pain medication:  Narcotic: Take as directed  NSAID/Anti-inflammatory: Take as directed  Tylenol 1000mg every 8 hours  Zofran (ondasetron) - 4mg every 8 hours as needed for nausea  Stool softeners (senna/colace) - take daily to  prevent constipation as narcotic pain medication causes constipation  Antibiotic - take as directed if prescribed   If you have questions or pain concerns, please contact the office. Pain medication cannot remove all post-operative pain.    Follow up/Call if:  The findings of your surgery will be explained to you and your family immediately after surgery. However, in the post-operative period, during recovery from anesthesia you may not fully remember or fully understand what was said. This will be again gone over when you return for your post-op appointment.  Please contact Dr. Wilson's office if you experience the following:  Excessive bleeding (bleeding through your dressing)  Fever greater than 101 degrees F after 48 hours (low grade fevers the day or two after surgery are normal)  Persistent nausea or vomiting  Decreased sensation or discoloration of the operative limb  Pain or swelling that is getting worse and not better with medication    Dr. Wilson's Office Contact: [phone]

## 2024-10-03 NOTE — ANESTHESIA PROCEDURE NOTES
Peripheral Block    Patient location during procedure: holding area  Start time: 10/3/2024 3:27 PM  Reason for block: at surgeon's request and post-op pain management  Staffing  Performed by: Shelly Molina MD  Authorized by: Shelly Molina MD    Preanesthetic Checklist  Completed: patient identified, IV checked, site marked, risks and benefits discussed, surgical consent, monitors and equipment checked, pre-op evaluation and timeout performed  Peripheral Block  Patient position: supine  Prep: ChloraPrep  Patient monitoring: continuous pulse oximetry, frequent blood pressure checks and heart rate  Block type: Adductor Canal  Laterality: left  Injection technique: single-shot  Procedures: ultrasound guided, Ultrasound guidance required for the procedure to increase accuracy and safety of medication placement and decrease risk of complications.  Ultrasound permanent image saved  bupivacaine (PF) (MARCAINE) 0.5 % injection 20 mL - Perineural   10 mL - 10/3/2024 3:27:00 PM  bupivacaine liposomal (EXPAREL) 1.3 % injection 20 mL - Perineural   10 mL - 10/3/2024 3:27:00 PM  Needle  Needle type: Stimuplex   Needle gauge: 20 G  Needle length: 4 in  Needle localization: ultrasound guidance and anatomical landmarks  Assessment  Injection assessment: frequent aspiration, injected with ease, negative aspiration, no paresthesia on injection, no symptoms of intraneural/intravenous injection, negative for heart rate change, needle tip visualized at all times and incremental injection  Paresthesia pain: none  Post-procedure:  site cleaned  patient tolerated the procedure well with no immediate complications

## 2024-10-03 NOTE — ANESTHESIA POSTPROCEDURE EVALUATION
Post-Op Assessment Note    CV Status:  Stable  Pain Score: 1    Pain management: adequate       Mental Status:  Alert and awake   Hydration Status:  Euvolemic   PONV Controlled:  Controlled   Airway Patency:  Patent  Two or more mitigation strategies used for obstructive sleep apnea   Post Op Vitals Reviewed: Yes    No anethesia notable event occurred.    Staff: Anesthesiologist               /80 (10/03/24 1907)    Temp 97.8 °F (36.6 °C) (10/03/24 1907)    Pulse 86 (10/03/24 1914)   Resp 14 (10/03/24 1907)    SpO2 99 % (10/03/24 1914)

## 2024-10-03 NOTE — OP NOTE
OPERATIVE REPORT  PATIENT NAME: Na Joseph  : 1965  MRN: [id number]  Pt Location:  [facility] OR ROOM 01    Surgery Date: 10/3/2024    Surgeons and Role:     * Grupo Wilson, DO - Primary     * Amina Goldberg PA-C - Assisting      * Jessy Vazquez OT-C - Assisting     Preop Diagnosis:  Status post total knee replacement using cement, left [Z96.652]  Infection of total knee replacement, initial encounter  ([facility]) [T84.59XA, Z96.659]    Post-Op Diagnosis Codes:     * Status post total knee replacement using cement, left [Z96.652]     * Infection of total knee replacement, initial encounter  ([facility]) [T84.59XA, Z96.659]    Procedure(s):  Left - ARTHROPLASTY KNEE TOTAL REVISION TO HAND-CRAFTED ANTIBIOTIC ARTHROPLASTY    Left - INSERTION OF HAND-CRAFTED DRUG DELIVERY DEVICE (STIMULAN)    Left - EXTENSIVE EXCISIONAL DEBRIDEMENT DOWN TO AND INCLUDING BONE     Specimens:  ID Type Source Tests Collected by Time Destination   A : left knee culture #1 Tissue Joint, Left Knee ANAEROBIC CULTURE AND GRAM STAIN, FUNGAL CULTURE, CULTURE, TISSUE AND GRAM STAIN Grupo Wilson, DO 10/3/2024 1612    B : left knee culture #2 Tissue Joint, Left Knee ANAEROBIC CULTURE AND GRAM STAIN, FUNGAL CULTURE, CULTURE, TISSUE AND GRAM STAIN Grupo Wilson, DO 10/3/2024 1613    C : left knee culture #3 Tissue Joint, Left Knee ANAEROBIC CULTURE AND GRAM STAIN, FUNGAL CULTURE, CULTURE, TISSUE AND GRAM STAIN Grupo Wilson, DO 10/3/2024 1613        Estimated Blood Loss:   200 mL    Drains:  * No LDAs found *    Anesthesia Type:   GETA     Operative Indications:  Status post total knee replacement using cement, left [Z96.652]  Infection of total knee replacement, initial encounter  ([facility]) [T84.59XA, Z96.659]    59F with history of a Left TKA performed on 10/06/2022 with Dr. Zendejas. 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 She is currently scheduled for a Left TKA revision this Thursday. We discussed that for  her Left TKA revision we will perform an explant, I&D, and implantation of articulating antibiotic spacer. The patient has elected to proceed with Left TKA Revision to 1.5 stage antibiotic arthroplasty. Risks and benefits of surgery to include but not limited to bleeding, infection, damage to surrounding structures, hardware failure, instability, fracture, dislocation, need for further surgery, continued pain, stiffness, blood clots, stroke, and heart attack was discussed with the patient.     Tourniquet Time: 46 minutes on dirty side  Tourniquet Time: 32 minutes on clean implant side     Operative Findings:  Purulent fluid  Collection of purulence behind the femoral and patellar components  Well-fixed implants    Implant Name Type Inv. Item Serial No.  Lot No. LRB No. Used Action   INSERT TIBIAL 7MM SZ 5 ROTPLT ATTUNE - [id number]  INSERT TIBIAL 7MM SZ 5 ROTPLT ATTUNE  DEPUY [id number] Left 1 Explanted   COMPONENT PATELLA 35MM MEDIAL DOME ATTUNE - [id number]  COMPONENT PATELLA 35MM MEDIAL DOME ATTUNE  DEPUY [id number] Left 1 Explanted   COMPONENT FEM SZ 5 SINA LT CMNT PS ATTUNE - [id number]  COMPONENT FEM SZ 5 SINA LT CMNT PS ATTUNE  DEPUY [id number] Left 1 Explanted   CEMENT BONE SMART SET GRAY MED VISC - [id number]  CEMENT BONE SMART SET GRAY MED VISC  DEPUY [id number] Left 2 Explanted   BASEPLATE TIBIAL SZ 4 CMNT ROTPLT ATTUNE KNEE SYSTM - [id number]  BASEPLATE TIBIAL SZ 4 CMNT ROTPLT ATTUNE KNEE SYSTM  [id number] Left 1 Explanted   CEMENT BONE SIMPLEX P FULL DOSE - [id number]  CEMENT BONE SIMPLEX P FULL DOSE  ORLY [employer]THO [id number] Left 2 Implanted   FILLER SYNTHETIC STIMULAN RAPID CURE 10ML - [id number]  FILLER SYNTHETIC STIMULAN RAPID CURE 10ML  [employer] [id number] Left 1 Implanted   COMPONENT FEM SZ 5 LT CMNT CR ATTUNE - [id number]  COMPONENT FEM SZ 5 LT CMNT CR ATTUNE  DEPUY [id number] Left 1 Implanted   COMPONENT PATELLA 35MM MEDIAL DOME ATTUNE - [id number]  COMPONENT PATELLA 35MM MEDIAL DOME ATTUNE   [employer] [id number] Left 1 Implanted   Attune Knee System All Poly Tibial Implant Cruciate Retaining Cemented Size 4, 12 mm     [id number] Left 1 Implanted     Complications:   None    Knee Technique: Suture (direct) Repair  Knee Approach: Medial Parapatellar    Chronic Narcotic Use:  No      Procedure and Technique:  Patient was seen in the preoperative holding area.  Informed consent was confirmed and all questions were answered. Operative site was confirmed and marked. Patient was taken to the operating room and transferred to the operating room table. General anesthesia was performed. The patient was then placed supine and all bony prominences were well-padded. Left lower extremity was prepped and draped in usual sterile fashion with chlorhexidine scrub.  Patient was given perioperative antibiotics prior to incision and SCDs were placed on the non-operative leg.  A formal time-out was performed identifying the patient and confirmed operative site.  The knee was exsanguinated and a pneumatic tourniquet was inflated at 300 mmHg.  The patient's previous anterior knee incision was resected in its entirety with scar down to the level of the extensor mechanism.  A medial flap was created along with a small lateral flap.  A medial parapatellar approach was utilized to enter the knee.  Upon performing the arthrotomy there was a large effusion of purulent fluid.  We performed a medial peel and extensive incision and drainage of the knee down to and including bone.  We resected scar from the medial and lateral extensor mechanism to establish the gutters.  We then inspected the implants and found all to be well fixed. The polyethylene was removed without complication.  We then proceeded to evaluate the femoral component. The femoral component was well-fixed.  A combination of [place] osteotomes were used to loosen the bone cement interface. The femur was removed without complication. To note there was a large collection of  purulent behind the femoral component.      At this time we turned our attention back to the tibial component.  Retractors were carefully placed around the knee. A sagittal saw and osteotomes were used to remove the tibial component.  At this time a sagittal saw was used to resect under the patellar button and removed without complication.  The pegs were also then removed. Three soft tissue specimens were obtained for culture from the synovium, behind the femoral component and to from beneath the tibial component.  At this time we started to remove retained cement at the tibia and femoral bone surfaces.  This was removed with a combination of cement splitters, osteotomes, back scratcher's and pituitary rongeur.  This time we performed an extensive debridement of the knee down to and including bone.  We reestablished her femoral and tibial canals to irrigate and clean with a back scratcher.  We next soaked the knee and Irrisept solution and allowed this to sit for 3 minutes while we continued our debridement.  We followed this by 6 L of normal saline solution.  At this time we trialed the knee with a size 5 CR femur and a size 4 tibial component with 12 mm polyethylene.  We trialed the patellar button that was over 35 mm.  Patient had excellent stability and patellar tracking.  At this time we irrigated the knee with Biasurge solution.  We allowed this to sit in the knee and we closed the skin with 2-0 nylon suture in running fashion.  At this time we broke down all of the infected drapes.  All staff changed their gowns and gloves.  We reprepped the knee and with chlorhexidine solution and redraped as a clean set up.  For the remainder of the case we used clean instruments.    At this time we opened the knee and removed the trials.  We irrigated the knee with an additional 6 L of normal saline solution. At this time we mixed, form and placed 10 cc of Stimulan with 1 g of vancomycin and 600 mg of tobramycin into the  tibial and femoral canals.  At this time we hand mixed 2 40 g bags of [place] Simplex cement with 4 g of vancomycin and 2.4 g of tobramycin.  We cemented the all poly tibial component into place and removed peripheral cement.  We next cemented the femoral component into place followed by the patellar component which was clamped in place.  This was held in place until the cement cured.  We irrigated the knee again with Biasurge solution. The knee was taken through a final range of motion and found to have excellent stability and patellar tracking.  All instrument and sponge counts were correct x2. The arthrotomy was closed with #1 Stratafix suture.  Subcutaneous tissues were closed with 2-0 sutures.  The skin was closed with 3-0 Stratafix followed by Dermabond.  A sterile Mepilex was placed along with a thigh foot Ace wrap.  Patient was awoken from anesthesia and taken to recovery room in stable condition.        59F s/p L TKA revision for staph epi PJI 10/3  - multi-modal pain control  - vanco q12 pending ID consult   - DVT ppx: coumadin with lovenox bridge   - PT/OT  - WBAT  - ROM as tolerated, pillow/blankets under achilles not behind knee while in bed  - f/u I/O cultures  - synovasure + staphy epi  - f/u 10-14 days       I was present for the entire procedure, A qualified resident physician was not available and A physician assistant was required during the procedure for retraction tissue handling,dissection and suturing     Patient Disposition:  PACU          SIGNATURE: Grupo Wilson DO  DATE: October 3, 2024  TIME: 7:07 PM

## 2024-10-03 NOTE — ANESTHESIA PREPROCEDURE EVALUATION
Procedure:  ARTHROPLASTY KNEE TOTAL REVISION (Left: Knee)    Relevant Problems   ANESTHESIA   (+) Motion sickness   (+) PONV (postoperative nausea and vomiting)      CARDIO  02/2024    Findings    Left Ventricle Left ventricular cavity size is normal. Wall thickness is normal. The left ventricular ejection fraction is 60%. Systolic function is normal. Wall motion is normal. Diastolic function is normal.  Right Ventricle Right ventricular cavity size is normal. Systolic function is normal.  Left Atrium The atrium is normal in size.  Right Atrium The atrium is normal in size.  Aortic Valve The aortic valve is trileaflet. The leaflets are not thickened. The leaflets are not calcified. The leaflets exhibit normal mobility. There is no evidence of regurgitation. The aortic valve has no significant stenosis.  Mitral Valve Mitral valve structure is normal.  There is trace regurgitation. There is no evidence of stenosis.  Tricuspid Valve Tricuspid valve structure is normal. There is trace regurgitation. There is no evidence of stenosis. The right ventricular systolic pressure is mildly elevated. The estimated right ventricular systolic pressure is 39.00 mmHg.  Pulmonic Valve The pulmonic valve was not well visualized. There is trace regurgitation. There is no evidence of stenosis.  Ascending Aorta The aortic root is normal in size.  IVC/SVC The right atrial pressure is estimated at 8.0 mmHg. The inferior vena cava is normal in size. Respirophasic changes were blunted (less than 50% variation).  Pericardium There is no pericardial effusion.    Left Ventricle Measurements    Pulmonary Hypertension       (+) SOB (shortness of breath) on exertion      /RENAL   (+) Chronic kidney disease, stage 3a (HCC)      HEMATOLOGY   (+) Iron deficiency anemia due to chronic blood loss   (+) Lupus anticoagulant disorder (HCC)      MUSCULOSKELETAL  Systemic Lupus   (+) Arthritis of left knee   (+) Arthritis of right knee   (+) Chronic  midline low back pain without sciatica   (+) Primary osteoarthritis of left knee   (+) Rheumatoid arthritis, involving unspecified site, unspecified whether rheumatoid factor present (HCC)      NEURO/PSYCH   (+) Chronic midline low back pain without sciatica      PULMONARY   (+) Dyspnea   (+) GRIS (obstructive sleep apnea)   (+) SOB (shortness of breath) on exertion        Physical Exam    Airway    Mallampati score: III  TM Distance: <3 FB  Neck ROM: limited     Dental   No notable dental hx     Cardiovascular  Rhythm: regular, Rate: normal, Murmur    Pulmonary  Pulmonary exam normal     Other Findings  post-pubertal.      Anesthesia Plan  ASA Score- 3     Anesthesia Type- general with ASA Monitors.         Additional Monitors:     Airway Plan:     Comment: Spinal with adductor canal block vs GA adductor canal pending repeat INR results.       Plan Factors-Exercise tolerance (METS): >4 METS.    Chart reviewed. EKG reviewed.  Existing labs reviewed. Patient summary reviewed.          Obstructive sleep apnea risk education given perioperatively.        Induction- intravenous.    Postoperative Plan- Plan for postoperative opioid use.         Informed Consent- Anesthetic plan and risks discussed with patient and spouse.

## 2024-10-04 LAB
ANION GAP SERPL CALCULATED.3IONS-SCNC: 6 MMOL/L (ref 4–13)
BUN SERPL-MCNC: 7 MG/DL (ref 5–25)
CALCIUM SERPL-MCNC: 8.2 MG/DL (ref 8.4–10.2)
CHLORIDE SERPL-SCNC: 102 MMOL/L (ref 96–108)
CO2 SERPL-SCNC: 26 MMOL/L (ref 21–32)
CREAT SERPL-MCNC: 0.72 MG/DL (ref 0.6–1.3)
ERYTHROCYTE [DISTWIDTH] IN BLOOD BY AUTOMATED COUNT: 12.3 % (ref 11.6–15.1)
GFR SERPL CREATININE-BSD FRML MDRD: 90 ML/MIN/1.73SQ M
GLUCOSE SERPL-MCNC: 189 MG/DL (ref 65–140)
HCT VFR BLD AUTO: 25.8 % (ref 34.8–46.1)
HGB BLD-MCNC: 8.7 G/DL (ref 11.5–15.4)
INR PPP: 1.6 (ref 0.85–1.19)
MCH RBC QN AUTO: 31.2 PG (ref 26.8–34.3)
MCHC RBC AUTO-ENTMCNC: 33.7 G/DL (ref 31.4–37.4)
MCV RBC AUTO: 93 FL (ref 82–98)
PLATELET # BLD AUTO: 278 THOUSANDS/UL (ref 149–390)
PMV BLD AUTO: 10.4 FL (ref 8.9–12.7)
POTASSIUM SERPL-SCNC: 3.9 MMOL/L (ref 3.5–5.3)
PROTHROMBIN TIME: 19.1 SECONDS (ref 12.3–15)
RBC # BLD AUTO: 2.79 MILLION/UL (ref 3.81–5.12)
SODIUM SERPL-SCNC: 134 MMOL/L (ref 135–147)
WBC # BLD AUTO: 5.89 THOUSAND/UL (ref 4.31–10.16)

## 2024-10-04 PROCEDURE — 99223 1ST HOSP IP/OBS HIGH 75: CPT | Performed by: STUDENT IN AN ORGANIZED HEALTH CARE EDUCATION/TRAINING PROGRAM

## 2024-10-04 PROCEDURE — 99024 POSTOP FOLLOW-UP VISIT: CPT | Performed by: STUDENT IN AN ORGANIZED HEALTH CARE EDUCATION/TRAINING PROGRAM

## 2024-10-04 PROCEDURE — 80048 BASIC METABOLIC PNL TOTAL CA: CPT | Performed by: PHYSICIAN ASSISTANT

## 2024-10-04 PROCEDURE — 85027 COMPLETE CBC AUTOMATED: CPT | Performed by: PHYSICIAN ASSISTANT

## 2024-10-04 PROCEDURE — 97166 OT EVAL MOD COMPLEX 45 MIN: CPT

## 2024-10-04 PROCEDURE — 97163 PT EVAL HIGH COMPLEX 45 MIN: CPT

## 2024-10-04 PROCEDURE — 85610 PROTHROMBIN TIME: CPT

## 2024-10-04 PROCEDURE — 36569 INSJ PICC 5 YR+ W/O IMAGING: CPT

## 2024-10-04 PROCEDURE — C1751 CATH, INF, PER/CENT/MIDLINE: HCPCS

## 2024-10-04 PROCEDURE — NC001 PR NO CHARGE: Performed by: INTERNAL MEDICINE

## 2024-10-04 RX ORDER — CEFAZOLIN SODIUM 2 G/50ML
2000 SOLUTION INTRAVENOUS EVERY 8 HOURS
Status: DISCONTINUED | OUTPATIENT
Start: 2024-10-04 | End: 2024-10-09 | Stop reason: HOSPADM

## 2024-10-04 RX ORDER — VANCOMYCIN HYDROCHLORIDE 750 MG/150ML
10 INJECTION, SOLUTION INTRAVENOUS EVERY 12 HOURS
Status: DISCONTINUED | OUTPATIENT
Start: 2024-10-04 | End: 2024-10-04

## 2024-10-04 RX ADMIN — ENOXAPARIN SODIUM 100 MG: 100 INJECTION SUBCUTANEOUS at 08:13

## 2024-10-04 RX ADMIN — PANTOPRAZOLE SODIUM 40 MG: 40 TABLET, DELAYED RELEASE ORAL at 05:17

## 2024-10-04 RX ADMIN — ACETAMINOPHEN 975 MG: 325 TABLET, FILM COATED ORAL at 22:31

## 2024-10-04 RX ADMIN — DOCUSATE SODIUM 100 MG: 100 CAPSULE, LIQUID FILLED ORAL at 18:42

## 2024-10-04 RX ADMIN — WARFARIN SODIUM 7.5 MG: 5 TABLET ORAL at 18:42

## 2024-10-04 RX ADMIN — OXYCODONE HYDROCHLORIDE AND ACETAMINOPHEN 500 MG: 500 TABLET ORAL at 08:13

## 2024-10-04 RX ADMIN — OXYCODONE HYDROCHLORIDE 5 MG: 5 TABLET ORAL at 09:34

## 2024-10-04 RX ADMIN — ACETAMINOPHEN 975 MG: 325 TABLET, FILM COATED ORAL at 13:08

## 2024-10-04 RX ADMIN — SILDENAFIL 40 MG: 20 TABLET, FILM COATED ORAL at 08:13

## 2024-10-04 RX ADMIN — SENNOSIDES 8.6 MG: 8.6 TABLET, FILM COATED ORAL at 08:13

## 2024-10-04 RX ADMIN — OXYCODONE HYDROCHLORIDE 5 MG: 5 TABLET ORAL at 00:02

## 2024-10-04 RX ADMIN — FOLIC ACID 1 MG: 1 TABLET ORAL at 08:13

## 2024-10-04 RX ADMIN — DOCUSATE SODIUM 100 MG: 100 CAPSULE, LIQUID FILLED ORAL at 08:14

## 2024-10-04 RX ADMIN — OXYCODONE HYDROCHLORIDE 5 MG: 5 TABLET ORAL at 19:30

## 2024-10-04 RX ADMIN — SILDENAFIL 40 MG: 20 TABLET, FILM COATED ORAL at 22:31

## 2024-10-04 RX ADMIN — GABAPENTIN 300 MG: 300 CAPSULE ORAL at 22:30

## 2024-10-04 RX ADMIN — VANCOMYCIN HYDROCHLORIDE 750 MG: 750 INJECTION, SOLUTION INTRAVENOUS at 08:20

## 2024-10-04 RX ADMIN — SILDENAFIL 40 MG: 20 TABLET, FILM COATED ORAL at 16:40

## 2024-10-04 RX ADMIN — OXYCODONE HYDROCHLORIDE 10 MG: 10 TABLET ORAL at 03:20

## 2024-10-04 RX ADMIN — ENOXAPARIN SODIUM 100 MG: 100 INJECTION SUBCUTANEOUS at 22:32

## 2024-10-04 RX ADMIN — CEFAZOLIN SODIUM 2000 MG: 2 SOLUTION INTRAVENOUS at 20:06

## 2024-10-04 RX ADMIN — TORSEMIDE 20 MG: 20 TABLET ORAL at 08:13

## 2024-10-04 RX ADMIN — ACETAMINOPHEN 650 MG: 325 TABLET, FILM COATED ORAL at 16:43

## 2024-10-04 RX ADMIN — Medication 1 TABLET: at 08:13

## 2024-10-04 RX ADMIN — HYDROXYCHLOROQUINE SULFATE 400 MG: 200 TABLET, FILM COATED ORAL at 08:24

## 2024-10-04 RX ADMIN — Medication 2000 UNITS: at 11:34

## 2024-10-04 RX ADMIN — ONDANSETRON 4 MG: 2 INJECTION INTRAMUSCULAR; INTRAVENOUS at 13:08

## 2024-10-04 RX ADMIN — ACETAMINOPHEN 975 MG: 325 TABLET, FILM COATED ORAL at 05:17

## 2024-10-04 RX ADMIN — OXYCODONE HYDROCHLORIDE AND ACETAMINOPHEN 500 MG: 500 TABLET ORAL at 18:42

## 2024-10-04 RX ADMIN — OXYCODONE HYDROCHLORIDE 5 MG: 5 TABLET ORAL at 13:08

## 2024-10-04 RX ADMIN — SODIUM CHLORIDE, SODIUM LACTATE, POTASSIUM CHLORIDE, AND CALCIUM CHLORIDE 100 ML/HR: .6; .31; .03; .02 INJECTION, SOLUTION INTRAVENOUS at 05:25

## 2024-10-04 NOTE — PLAN OF CARE
"  Problem: PHYSICAL THERAPY ADULT  Goal: Performs mobility at highest level of function for planned discharge setting.  See evaluation for individualized goals.  Description: Treatment/Interventions: Functional transfer training, LE strengthening/ROM, Elevations, Therapeutic exercise, Endurance training, Patient/family training, Bed mobility, Gait training, Spoke to nursing, OT  Equipment Recommended: Walker (pt has)       See flowsheet documentation for full assessment, interventions and recommendations.  Note: Prognosis: Good  Problem List: Decreased strength, Decreased range of motion, Decreased endurance, Impaired balance, Decreased mobility, Pain  Assessment: Pt. 59 y.o.female s/p L TK revision on 10/3/24 2* to  Infection of total knee replacement  (HCC). Pt referred to PT for mobility assessment & D/C planning. Please see above for information re: home set-up & PLOF as well as objective findings during PT assessment. PTA, pt reports being I w/o AD. On eval, pt functioning below baseline hence will continue skilled PT to improve function & safety. Pt require modified I w/ bed mobility & S for transfers & amb w/ RW + cues for techniques & safety. Gait deviations as above but no gross LOB noted.  The patient's AM-PAC Basic Mobility Inpatient Short Form Raw Score is 20. A Raw score of greater than 16 suggests the patient may benefit from discharge to home. Please also refer to the recommendation of the Physical Therapist for safe discharge planning. From PT standpoint, will recommend Level III (minimum resource intensity) rehab services and inc family support at D/C. No SOB & dizziness reported t/o session. Nsg staff most recent vital signs as follows: /74 (BP Location: Right arm)   Pulse 90   Temp 98.1 °F (36.7 °C) (Temporal)   Resp 16   Ht 5' 2\" (1.575 m)   Wt 107 kg (234 lb 12.6 oz)   SpO2 98%   BMI 42.94 kg/m² . At end of session, pt back in bed in stable condition, call bell & phone in reach, bed " alarm activated. Fall precautions reinforced w/ good understanding. CM to follow. Nsg staff to continue to mobilized pt (OOB in chair for all meals & ambulate in room/unit) as tolerated to prevent further decline in function. Will also recommend [facility] for daily amb &/or daily OOB in chair as appropriate. Nsg notified. Co-eval was necessary to complete this PT eval for the pt's best interest given pt's medical acuity & complexity.        Rehab Resource Intensity Level, PT: III (Minimum Resource Intensity)    See flowsheet documentation for full assessment.

## 2024-10-04 NOTE — OCCUPATIONAL THERAPY NOTE
Occupational Therapy Evaluation     Patient Name: Na Joseph  Today's Date: 10/4/2024  Problem List  Principal Problem:    Infection of total knee replacement  (HCC)  Active Problems:    PONV (postoperative nausea and vomiting)    History of left knee replacement    SLE (systemic lupus erythematosus) (HCC)    Antiphospholipid antibody syndrome (HCC)    Past Medical History  Past Medical History:   Diagnosis Date    HELENE positive     Anemia     Sildenafil causes decreased hematocrit    Antiphospholipid syndrome (HCC)     Anxiety 03/2020    CHF (congestive heart failure) (HCC)     right heart failure related to pulm HTN lupus    Chronic kidney disease     borderline lab values    Chronic rhinitis 06/04/2012    Clotting disorder (HCC) 2012    Coronary artery disease 2018    Encephalitis     Lasted Assessed 10/18/2017    Gout 06/26/2018    Heart failure (HCC) 2019    History of transfusion 2/13/2019    autologous    Hypertension     Lupus 2018    Lupus anticoagulant disorder (HCC)     Maxillary sinusitis     Lasted Assessed 10/18/2017    Night sweat     Osteopenia     Hip    Osteoporosis     Spine    Pneumonia     Last Assessed 10/18/2017    PONV (postoperative nausea and vomiting)     Pulmonary embolism (HCC)     Pulmonary hypertension (HCC)     Seizures (HCC)     Only during Encephalitis    Shortness of breath     with exertion    Sinus tachycardia     Urinary incontinence      Past Surgical History  Past Surgical History:   Procedure Laterality Date    ARTHRODESIS      Hand: IP Joint    CHOLECYSTECTOMY      COLONOSCOPY      COLPOSCOPY      HYSTERECTOMY      Vaginal    JOINT REPLACEMENT Right     Knee replacement    KNEE SURGERY  02/2019    LAPAROSCOPIC ESOPHAGOGASTRIC FUNDOPLASTY  2008    [place] ARTHASHLYN CASTILLO CONDYLE&PLATU MEDIAL&LAT COMPARTMENTS Right 02/12/2019    Procedure: KNEE TOTAL ARTHROPLASTY AND ASSOCIATED PROCEDURES;  Surgeon: Donovan Zendejas DO;  [facility];  Service: Orthopedics    HEIDI ARTHASHLYN CASTILLO  CONDYLE&PLATU MEDIAL&LAT COMPARTMENTS Left 10/06/2022    Procedure: ARTHROPLASTY KNEE TOTAL;  Surgeon: Donovan Zendejas DO;  [facility];  Service: Orthopedics    [place] ARTHRS KNEE W/MENISCECTOMY MED&LAT W/SHAVING Right 10/02/2018    Procedure: KNEE ARTHROSCOPY WITH PARTIAL MEDIAL MENISCECTOMY;  Surgeon: Donovan Zendejas DO;  [facility];  Service: Orthopedics    MD ARTHRS KNEE W/MENISCECTOMY MED&LAT W/SHAVING Left 12/17/2019    Procedure: KNEE ARTHROSCOPIC MENISCECTOMY /MEDIAL; Chondyl medial and posterior;  Surgeon: Donovan Zendejas DO;  [facility];  Service: Orthopedics    REDUCTION MAMMAPLASTY Bilateral     doesnt remember when    REPAIR ANKLE LIGAMENT Right     TONSILLECTOMY           10/04/24 1040   OT Last Visit   OT Visit Date 10/04/24   Note Type   Note type Evaluation   Additional Comments greeted in supine and agreeable.   Pain Assessment   Pain Assessment Tool 0-10   Pain Score 5   Pain Location/Orientation Orientation: Left;Location: Knee   Restrictions/Precautions   Weight Bearing Precautions Per Order Yes   LLE Weight Bearing Per Order WBAT   Other Precautions Chair Alarm;Bed Alarm;Fall Risk;Pain   Home Living   Type of Home House   Home Layout Two level;Bed/bath upstairs;Stairs to enter with rails  (4 JINA)   Bathroom Shower/Tub Walk-in shower   Bathroom Toilet Raised   Bathroom Equipment Grab bars in shower;Shower chair;Grab bars around toilet   Home Equipment Walker;Wheelchair-manual  (rollator)   Prior Function   Level of [place] Independent with ADLs;Independent with functional mobility   Lives With Spouse   Receives Help From Family  (local daughters)   IADLs Independent with driving;Independent with medication management;Independent with meal prep   Falls in the last 6 months 0   ADL   Where Assessed Edge of bed   Eating Assistance 7  Independent   Grooming Assistance 7  Independent   UB Bathing Assistance 6  Modified Independent   LB Bathing Assistance 5  Supervision/Setup    UB Dressing Assistance 6  Modified independent   LB Dressing Assistance 4  Minimal Assistance   Toileting Assistance  5  Supervision/Setup   Bed Mobility   Supine to Sit 6  Modified independent   Additional items Leg    Sit to Supine 6  Modified independent   Additional items Leg    Transfers   Sit to Stand 5  Supervision   Additional items Increased time required   Stand to Sit 5  Supervision   Additional items Increased time required   Toilet transfer 5  Supervision   Additional items Increased time required   Additional Comments cues for safety and best tech.   Functional Mobility   Functional Mobility 5  Supervision   Additional Comments rollator, household distances   Balance   Static Sitting Good   Dynamic Sitting Fair +   Static Standing Fair   Dynamic Standing Fair -   Ambulatory Fair -   Activity Tolerance   Activity Tolerance Patient tolerated treatment well;Treatment limited secondary to medical complications (Comment)  (Nausea)   Medical Staff Made Aware PT Michele   Nurse Made Aware RN   RUE Assessment   RUE Assessment WFL   LUE Assessment   LUE Assessment WFL   Hand Function   Gross Motor Coordination Functional   Fine Motor Coordination Functional   Psychosocial   Psychosocial (WDL) WDL   Cognition   Overall Cognitive Status WFL   Arousal/Participation Cooperative   Attention Within functional limits   Orientation Level Oriented X4   Memory Within functional limits   Following Commands Follows all commands and directions without difficulty   Comments pleasant and cooperative.   Assessment   Limitation Decreased ADL status;Decreased UE strength;Decreased Safe judgement during ADL;Decreased endurance;Decreased self-care trans;Decreased high-level ADLs   Prognosis Good   Assessment Na Joseph is a 59 y.o. female seen for OT evaluation s/p admit to [facility] on 10/3/2024 w/ Infection of total knee replacement  (HCC). S/P left revision total knee arthroplasty to antibiotic spacer - WBAT.   Comorbidities affecting pt's functional performance at time of assessment include: HTN, obesity, and CHF. Pt with active OT orders and activity orders for Up and OOB as tolerated. Personal factors affecting pt at time of IE include:JINA home environment, steps within home environment, difficulty performing ADLs, difficulty performing IADLs, and difficulty performing transfers/mobility. Prior to admission, pt lives with  in a 2 level home. 1st floor setup if needed. I with ADLS, IADLS and mobility.  Upon evaluation: Pt currently requires Pedro for UB ADLs, Kaitlyn for LB ADLs, supervision for toileting, Pedro for bed mobility, supervision for functional transfers, and supervision mobility 2* the following deficits impacting occupational performance:weakness, decreased strength , and decreased balance. Pt to benefit from continued skilled OT tx while in the hospital to address deficits as defined above and maximize level of functional independence w ADL's and functional mobility. Occupational Performance areas to address include: grooming, bathing/shower, toilet hygiene, functional mobility, and clothing management. From OT standpoint, recommendation at time of d/c would be level 3 resources (OPPT) . OT to follow pt on caseload 3-5x/wk.   Goals   Patient Goals to go home.   STG Time Frame 3-5   Short Term Goal #1 Pt will improve activity tolerance to G for min 30 min txment sessions for increase engagement in functional tasks   Short Term Goal #2 Pt will complete LB dressing/self care w/ mod I using adaptive device and DME as needed   LTG Time Frame 10-14   Long Term Goal #1 Pt will improve functional transfers to Mod I on/off all surfaces using DME as needed w/ G balance/safety   Long Term Goal #2 Pt will improve functional mobility during ADL/IADL/leisure tasks to Mod I using DME as needed w/ G balance/safety   Long Term Goal Pt will participate in simulated IADL management task to increase independence to Mod I w/  G safety and endurance   Plan   Treatment Interventions ADL retraining;Functional transfer training;UE strengthening/ROM;Endurance training;Patient/family training;Equipment evaluation/education;Neuromuscular reeducation;Compensatory technique education;Activityengagement;Energy conservation   Goal Expiration Date 10/18/24   OT Treatment Day 0   OT Frequency 3-5x/wk   Discharge Recommendation   Rehab Resource Intensity Level, OT III (Minimum Resource Intensity)   AM-PAC Daily Activity Inpatient   Lower Body Dressing 3   Bathing 3   Toileting 3   Upper Body Dressing 4   Grooming 4   Eating 4   Daily Activity Raw Score 21   Daily Activity Standardized Score (Calc for Raw Score >=11) 44.27   AM-PAC Applied Cognition Inpatient   Following a Speech/Presentation 4   Understanding Ordinary Conversation 4   Taking Medications 4   Remembering Where Things Are Placed or Put Away 4   Remembering List of 4-5 Errands 4   Taking Care of Complicated Tasks 4   Applied Cognition Raw Score 24   Applied Cognition Standardized Score 62.21   Sherry Sage, OT

## 2024-10-04 NOTE — PROGRESS NOTES
Progress Note - Orthopedics   Name: Na Joseph 59 y.o. female I MRN: [id number]  Unit/Bed#: E2 -01 I Date of Admission: 10/3/2024   Date of Service: 10/4/2024 I Hospital Day: 1     Assessment & Plan  Infection of total knee replacement  (HCC)  POD1 S/P left revision total knee arthroplasty to antibiotic spacer with Dr. Wilson   WBAT left lower extremity  PT/OT  Multimodal analgesics prn  DVT ppx: Coumadin with Lovenox bridge until INR returns to baseline  Will continue to follow intraoperative cultures, no growth on preliminary cultures  Currently receiving Vancomycin q24 hours.   ID consulted for further recommendations.   PICC line order placed and consent obtained.   SLIM consulted for assistance with medical management  PONV (postoperative nausea and vomiting)      Please contact the SecureChat role for the Orthopedics service with any questions/concerns.    Subjective   59 y.o.female POD1 S/P left revision total knee arthroplasty to antibiotic spacer. Patient is sitting comfortably in hospital chair this morning in no acute distress. Patient reports pain in left knee is currently well controlled. Patient states ice packs are helping relieve pain. Patient denies any numbness or tingling in the left lower extremity. Patient mentions she has been sitting in the chair for several hours this morning and was able to ambulate with nursing assistance to the bathroom. Patient states she has not yet worked with PT/OT. Patient mentions she has a cough related to seasonal allergies. Patient also mentions she had an episode of lightheadedness when she ambulated to the bathroom this morning, but is has since resolved. Patient denies any chest pain, lightheadedness, dizziness, nausea, vomiting, fevers, and chills currently. Patient offers no additional complaints.       Objective :  Temp:  [97.3 °F (36.3 °C)-98.8 °F (37.1 °C)] 98.1 °F (36.7 °C)  HR:  [] 90  BP: (118-156)/(60-86) 118/74  Resp:  [12-18]  "16  SpO2:  [93 %-99 %] 98 %  O2 Device: None (Room air)  Nasal Cannula O2 Flow Rate (L/min):  [3 L/min-4 L/min] 3 L/min    Physical Exam  Musculoskeletal: left lower  Skin: Extremity appears well perfused overall.   Bulky dressings clean dry intact without soilage present.   Motor intact to +FHL/EHL, +ankle dorsi/plantar flexion  Sensation intact to saphenous, sural, tibial, superficial peroneal nerve, and deep peroneal  2+ DP pulse  No calf swelling or tenderness to palpation  Cap refill < 2 sec      Lab Results: I have reviewed the following results:  Recent Labs     10/02/24  0730 10/03/24  0645 10/03/24  0645 10/03/24  1433 10/04/24  0523   WBC  --  4.35  --   --  5.89   HGB  --  11.3*  --   --  8.7*   HCT  --  34.2*  --   --  25.8*   PLT  --  359   < >  --  278   BUN  --  6  --   --  7   CREATININE  --  0.81  --   --  0.72   PTT  --  40*  --   --   --    INR 2.29* 1.43*  --  1.42*  --     < > = values in this interval not displayed.     Blood Culture:  No results found for: \"BLOODCX\"  Wound Culture: No results found for: \"WOUNDCULT\"  "

## 2024-10-04 NOTE — ASSESSMENT & PLAN NOTE
Patient on hydroxychloroquine and Rinvoq outpatient.  Follows with a rheumatologist at [facility].  The Rinvoq has been on hold due to left knee prosthetic joint infection. -Management of immunosuppression per her rheumatologist.  She has been continued on hydroxychloroquine and Rinvoq is on hold

## 2024-10-04 NOTE — CONSULTS
Consultation - Infectious Disease   Name: Na Joseph 59 y.o. female I MRN: [id number]  Unit/Bed#: E2 -01 I Date of Admission: 10/3/2024   Date of Service: 10/4/2024 I Hospital Day: 1   Inpatient consult to Infectious Diseases  Consult performed by: Judi Gray MD  Consult ordered by: Amina Goldberg PA-C        Physician Requesting Evaluation: Grupo Wilson DO   Reason for Evaluation / Principal Problem: left knee PJI  Assessment & Plan  Infection of total knee replacement  (HCC)  History of left TKA 10/6/22. Developed 2 weeks of left knee pain, fevers, chills and was seen by orthopedic surgery 9/23.  Arthrocentesis performed and Synovasure was positive with Staph epidermidis isolated.  Now status post left TKA revision with implantation of an articulating antibiotic spacer (1.5 stage revision arthroplasty) 10/3/24.  Operative cultures are pending.   -Based on Synovasure culture, stop vancomycin and start IV cefazolin 2 g every 8 hours   -Follow-up operative cultures to guide final antibiotic plan   -Anticipate 6 weeks of IV antibiotics followed by 4.5 months p.o. antibiotics (6 months total) due to 1.5 stage revision arthroplasty   -Orthopedic surgery follow-up ongoing   -Okay for PICC line placement   -Will arrange ID follow-up once final antibiotic plan determined   -Monitor CBC and creatinine to assess for treatment response, drug toxicities  SLE (systemic lupus erythematosus) (HCC)  Patient on hydroxychloroquine and Rinvoq outpatient.  Follows with a rheumatologist at [facility].  The Rinvoq has been on hold due to left knee prosthetic joint infection. -Management of immunosuppression per her rheumatologist.  She has been continued on hydroxychloroquine and Rinvoq is on hold  Antiphospholipid antibody syndrome (HCC)  On Coumadin outpatient, follows with hematology.  Monitor INR  History of left knee replacement  10/6/2022.  Now complicated by #1 above.  Orthopedic surgery follow-up  ongoing.    I have discussed the above management plan to continue Cefazolin, follow up intra-operative cultures with the orthopedic surgery AP. Discussed with the patient at bedside. ID will follow cultures and formally re-evaluate 10/7/24. Please call with questions.    Antibiotics:  Vancomycin    History of Present Illness   Na Joseph is a 59 y.o. year old woman with a history of SLE on hydroxychloroquine and Rinvoq admitted due to left knee prosthetic joint infection.  The patient underwent left TKA 10/6/2022.  She did not have any problems until 9/10/2024 when she developed pain in her left thigh.  This was soon after she started an injectable medication for her osteoporosis so she thought it was related to this.  She states she then developed pain extending to the left knee as well as daily fever, chills, night sweats.  She was seen by orthopedic surgery and ESR and CRP were elevated.  The patient was seen by Dr. Wilson 9/23 and left knee arthrocentesis was performed.  Synovasure was positive and culture identified Staph epidermidis.  The patient was admitted and is now status post left TKA revision with implantation of an articulating antibiotic spacer (1.5 stage arthroplasty) 10/3/24.  There was purulent fluid noted in the knee joint and behind the femoral and patellar components.  The patient was started on IV vancomycin postop and ID is consulted for further evaluation.  She reports a small scratch from her cat in the last few weeks but denies any other skin abrasions or injuries. The Evenity for osteoporosis is her only injectable medication.    A complete review of systems is negative other than that noted in the HPI.    I have reviewed the patient's PMH, PSH, Social History, Family History, Meds, and Allergies  Historical Information   Past Medical History:   Diagnosis Date    HELENE positive     Anemia     Sildenafil causes decreased hematocrit    Antiphospholipid syndrome (HCC)     Anxiety 03/2020     CHF (congestive heart failure) (HCC)     right heart failure related to pulm HTN lupus    Chronic kidney disease     borderline lab values    Chronic rhinitis 06/04/2012    Clotting disorder (HCC) 2012    Coronary artery disease 2018    Encephalitis     Lasted Assessed 10/18/2017    Gout 06/26/2018    Heart failure (HCC) 2019    History of transfusion 2/13/2019    autologous    Hypertension     Lupus 2018    Lupus anticoagulant disorder (HCC)     Maxillary sinusitis     Lasted Assessed 10/18/2017    Night sweat     Osteopenia     Hip    Osteoporosis     Spine    Pneumonia     Last Assessed 10/18/2017    PONV (postoperative nausea and vomiting)     Pulmonary embolism (HCC)     Pulmonary hypertension (HCC)     Seizures (HCC)     Only during Encephalitis    Shortness of breath     with exertion    Sinus tachycardia     Urinary incontinence      Past Surgical History:   Procedure Laterality Date    ARTHRODESIS      Hand: IP Joint    CHOLECYSTECTOMY      COLONOSCOPY      COLPOSCOPY      HYSTERECTOMY      Vaginal    JOINT REPLACEMENT Right     Knee replacement    KNEE SURGERY  02/2019    LAPAROSCOPIC ESOPHAGOGASTRIC FUNDOPLASTY  2008    [place] ARTHRP KNE CONDYLE&PLATU MEDIAL&LAT COMPARTMENTS Right 02/12/2019    Procedure: KNEE TOTAL ARTHROPLASTY AND ASSOCIATED PROCEDURES;  Surgeon: Donovan Zendejas DO;  [facility];  Service: Orthopedics    [place] ARTHRP KNE CONDYLE&PLATU MEDIAL&LAT COMPARTMENTS Left 10/06/2022    Procedure: ARTHROPLASTY KNEE TOTAL;  Surgeon: Donovan Zendejas DO;  [facility];  Service: Orthopedics    [place] ARTHRS KNEE W/MENISCECTOMY MED&LAT W/SHAVING Right 10/02/2018    Procedure: KNEE ARTHROSCOPY WITH PARTIAL MEDIAL MENISCECTOMY;  Surgeon: Donovan Zendejas DO;  [facility];  Service: Orthopedics    [place] ARTHRS KNEE W/MENISCECTOMY MED&LAT W/SHAVING Left 12/17/2019    Procedure: KNEE ARTHROSCOPIC MENISCECTOMY /MEDIAL; Chondyl medial and posterior;  Surgeon: Donovan Zendejas DO;  [facility];   Service: Orthopedics    REDUCTION MAMMAPLASTY Bilateral     doesnt remember when    REPAIR ANKLE LIGAMENT Right     TONSILLECTOMY       Social History     Tobacco Use    Smoking status: Former     Current packs/day: 0.00     Types: Cigarettes     Quit date: 2006     Years since quittin.7    Smokeless tobacco: Never   Vaping Use    Vaping status: Never Used   Substance and Sexual Activity    Alcohol use: Yes     Comment: occasional    Drug use: No    Sexual activity: Yes     Partners: Male     Birth control/protection: Female Sterilization     E-Cigarette/Vaping    E-Cigarette Use Never User      E-Cigarette/Vaping Substances    Nicotine No     THC No     CBD No     Flavoring No     Other No     Unknown No      Family history non-contributory    Objective   Temp:  [97.3 °F (36.3 °C)-98.8 °F (37.1 °C)] 98.1 °F (36.7 °C)  HR:  [] 90  BP: (118-156)/(60-86) 118/74  Resp:  [12-18] 16  SpO2:  [93 %-99 %] 98 %  O2 Device: None (Room air)  Nasal Cannula O2 Flow Rate (L/min):  [3 L/min-4 L/min] 3 L/min    General:  No acute distress  Psychiatric:  Awake and alert  Pulmonary:  Normal respiratory excursion without accessory muscle use  Cardiac: RRR, no murmurs  Abdomen:  Soft, nontender  Extremities:  left knee wrapped in post op dressing. Right knee TKA  Skin:  No rashes      Lab Results: I have reviewed the following results:  Results from last 7 days   Lab Units 10/04/24  0523 10/03/24  0645   WBC Thousand/uL 5.89 4.35   HEMOGLOBIN g/dL 8.7* 11.3*   PLATELETS Thousands/uL 278 359     Results from last 7 days   Lab Units 10/04/24  0523 10/03/24  0645   SODIUM mmol/L 134* 139   POTASSIUM mmol/L 3.9 3.2*   CHLORIDE mmol/L 102 104   CO2 mmol/L 26 27   BUN mg/dL 7 6   CREATININE mg/dL 0.72 0.81   EGFR ml/min/1.73sq m 90 78   CALCIUM mg/dL 8.2* 9.0   AST U/L  --  8*   ALT U/L  --  6*   ALK PHOS U/L  --  78   ALBUMIN g/dL  --  3.7     Results from last 7 days   Lab Units 10/03/24  1612   GRAM STAIN RESULT  No Polys   No organisms seen                       I personally reviewed left knee xray in PACS which did not show any hardware loosening

## 2024-10-04 NOTE — ASSESSMENT & PLAN NOTE
POD1 S/P left revision total knee arthroplasty to antibiotic spacer with Dr. Wilson   WBAT left lower extremity  PT/OT  Multimodal analgesics prn  DVT ppx: Coumadin with Lovenox bridge until INR returns to baseline  Will continue to follow intraoperative cultures, no growth on preliminary cultures  Currently receiving Vancomycin q24 hours.   ID consulted for further recommendations.   PICC line order placed and consent obtained.   SLIM consulted for assistance with medical management

## 2024-10-04 NOTE — PROGRESS NOTES
Na Joseph is a 59 y.o. female who is currently ordered Vancomycin IV with management by the Pharmacy Consult service.  Relevant clinical data and objective / subjective history reviewed.  Vancomycin Assessment:  Indication and Goal AUC/Trough: Bone/joint infection (goal -600, trough >10)  Clinical Status:  new  Micro:   pending  Renal Function:  SCr: 0.81 mg/dL  CrCl: 83.9 mL/min  Renal replacement: Not on dialysis  Days of Therapy: 1  Current Dose: 1500 mg IV x 1 dose   Vancomycin Plan:  New Dosin mg IV q24h   Estimated AUC: 462 mcg*hr/mL  Estimated Trough: 11.5 mcg/mL  Next Level: 10/5 with AM labs  Renal Function Monitoring: Daily BMP and UOP  Pharmacy will continue to follow closely for s/sx of nephrotoxicity, infusion reactions and appropriateness of therapy.  BMP and CBC will be ordered per protocol. We will continue to follow the patient’s culture results and clinical progress daily.    Jose J Schrader, Pharmacist

## 2024-10-04 NOTE — PHYSICAL THERAPY NOTE
PT EVALUATION    Pt. Name: Na Joseph  Pt. Age: 59 y.o.  MRN: [id number]  LENGTH OF STAY: 1      Admitting Diagnoses:   Status post total knee replacement using cement, left [Z96.652]  Infection of total knee replacement, initial encounter  (HCC) [T84.59XA, Z96.659]    Past Medical History:   Diagnosis Date    HELENE positive     Anemia     Sildenafil causes decreased hematocrit    Antiphospholipid syndrome (HCC)     Anxiety 03/2020    CHF (congestive heart failure) (HCC)     right heart failure related to pulm HTN lupus    Chronic kidney disease     borderline lab values    Chronic rhinitis 06/04/2012    Clotting disorder (HCC) 2012    Coronary artery disease 2018    Encephalitis     Lasted Assessed 10/18/2017    Gout 06/26/2018    Heart failure (HCC) 2019    History of transfusion 2/13/2019    autologous    Hypertension     Lupus 2018    Lupus anticoagulant disorder (HCC)     Maxillary sinusitis     Lasted Assessed 10/18/2017    Night sweat     Osteopenia     Hip    Osteoporosis     Spine    Pneumonia     Last Assessed 10/18/2017    PONV (postoperative nausea and vomiting)     Pulmonary embolism (HCC)     Pulmonary hypertension ([facility])     Seizures ([facility])     Only during Encephalitis    Shortness of breath     with exertion    Sinus tachycardia     Urinary incontinence        Past Surgical History:   Procedure Laterality Date    ARTHRODESIS      Hand: IP Joint    CHOLECYSTECTOMY      COLONOSCOPY      COLPOSCOPY      HYSTERECTOMY      Vaginal    JOINT REPLACEMENT Right     Knee replacement    KNEE SURGERY  02/2019    LAPAROSCOPIC ESOPHAGOGASTRIC FUNDOPLASTY  2008    MT ARTHRP KNE CONDYLE&PLATU MEDIAL&LAT COMPARTMENTS Right 02/12/2019    Procedure: KNEE TOTAL ARTHROPLASTY AND ASSOCIATED PROCEDURES;  Surgeon: Donovan Zendejas DO;  [facility];  Service: Orthopedics    MT ARTHRP KNE CONDYLE&PLATU MEDIAL&LAT COMPARTMENTS Left 10/06/2022    Procedure: ARTHROPLASTY KNEE TOTAL;  Surgeon: Donovan Zendejas DO;   [facility];  Service: Orthopedics    UT ARTHRS KNEE W/MENISCECTOMY MED&LAT W/SHAVING Right 10/02/2018    Procedure: KNEE ARTHROSCOPY WITH PARTIAL MEDIAL MENISCECTOMY;  Surgeon: Donovan Zendejas DO;  [facility];  Service: Orthopedics    UT ARTHRS KNEE W/MENISCECTOMY MED&LAT W/SHAVING Left 12/17/2019    Procedure: KNEE ARTHROSCOPIC MENISCECTOMY /MEDIAL; Chondyl medial and posterior;  Surgeon: Donovan Zendejas DO;  [facility];  Service: Orthopedics    REDUCTION MAMMAPLASTY Bilateral     doesnt remember when    REPAIR ANKLE LIGAMENT Right     TONSILLECTOMY         Imaging Studies:  XR knee left 1 or 2 views   Final Result by Arcenio Hurst MD (10/04 0524)   Revised total left knee arthroplasty.               Computerized Assisted Algorithm (CAA) may have been used to analyze all applicable images.         Workstation performed: [id number]               10/04/24 1100   PT Last Visit   PT Visit Date 10/04/24   Note Type   Note type Evaluation   Pain Assessment   Pain Score 4   Pain Location/Orientation Orientation: Left;Location: Knee   Hospital Pain Intervention(s) Medication (See MAR);Cold applied;Repositioned;Ambulation/increased activity;Emotional support;Rest   Restrictions/Precautions   Weight Bearing Precautions Per Order Yes   LLE Weight Bearing Per Order WBAT   Other Precautions Chair Alarm;Bed Alarm;Fall Risk;Pain   Home Living   Type of Home House   Home Layout Two level;Bed/bath upstairs;Stairs to enter with rails;Other (Comment)  (4STE)   Bathroom Shower/Tub Walk-in shower   Bathroom Toilet Raised   Bathroom Equipment Grab bars in shower;Shower chair;Grab bars around toilet   Home Equipment Walker;Other (Comment);Wheelchair-manual  (& rollator)   Prior Function   Level of [place] Independent with functional mobility;Independent with ADLs  (w/o AD)   Lives With Spouse   Receives Help From Family   Falls in the last 6 months 0   Comments (+)    General   Additional  Pertinent History h/o L TKR 10/6/22 & R TKR 2019   Family/Caregiver Present No   Cognition   Overall Cognitive Status WFL   Arousal/Participation Alert   Orientation Level Oriented X4   Following Commands Follows all commands and directions without difficulty   Comments pleasant & cooperative   Subjective   Subjective Pt agreeable to PT/OT evals.   RUE Assessment   RUE Assessment   (refer to OT)   LUE Assessment   LUE Assessment   (refer to OT)   RLE Assessment   RLE Assessment WFL  (4/5 grossly)   LLE Assessment   LLE Assessment X  (3-/5 grossly)   Coordination   Movements are Fluid and Coordinated 1   Sensation WFL   Bed Mobility   Supine to Sit 6  Modified independent   Additional items HOB elevated;Bedrails;Increased time required;Verbal cues;LE management  (LE management w/ leg )   Sit to Supine 6  Modified independent   Additional items Increased time required;Verbal cues;LE management  (LE management w/ leg )   Additional Comments pt able to self assist LLE w/ leg    Transfers   Sit to Stand 5  Supervision   Additional items Increased time required;Verbal cues   Stand to Sit 5  Supervision   Additional items Increased time required;Verbal cues   Toilet transfer 5  Supervision   Additional items Increased time required;Verbal cues;Standard toilet   Additional Comments cues for techniques & safety   Ambulation/Elevation   Gait pattern Antalgic;L Knee Jaime;Decreased foot clearance;Decreased L stance;Wide NORIS;Step to;Excessively slow   Gait Assistance 5  Supervision   Additional items Verbal cues   Assistive Device Other (Comment)  (rollator)   Distance 150'x1   Stair Management Assistance 5  Supervision   Additional items Verbal cues;Increased time required   Stair Management Technique Two rails;Step to pattern;Foreward;Nonreciprocal   Number of Stairs 5  (x2;  stairs)   Ambulation/Elevation Additional Comments unsteady gait + slight L knee buckling especially towards end of amb but  "no gross LOB noted; pt prefers to use her rollator than RW; require seated rest prior to performing stair training   Balance   Static Sitting Good   Dynamic Sitting Fair +   Static Standing Fair  (w/ rollator)   Dynamic Standing Fair -  (w/ rollator)   Ambulatory Fair -  (w/ rollator)   Endurance Deficit   Endurance Deficit Yes   Endurance Deficit Description pain   Activity Tolerance   Activity Tolerance Patient limited by pain;Treatment limited secondary to medical complications (Comment)   Medical Staff Made Aware OTR Freda   Nurse Made Aware yes   Assessment   Prognosis Good   Problem List Decreased strength;Decreased range of motion;Decreased endurance;Impaired balance;Decreased mobility;Pain   Assessment Pt. 59 y.o.female s/p L TK revision on 10/3/24 2* to  Infection of total knee replacement  (HCC). Pt referred to PT for mobility assessment & D/C planning. Please see above for information re: home set-up & PLOF as well as objective findings during PT assessment. PTA, pt reports being I w/o AD. On eval, pt functioning below baseline hence will continue skilled PT to improve function & safety. Pt require modified I w/ bed mobility & S for transfers & amb w/ RW + cues for techniques & safety. Gait deviations as above but no gross LOB noted.  The patient's AM-PAC Basic Mobility Inpatient Short Form Raw Score is 20. A Raw score of greater than 16 suggests the patient may benefit from discharge to home. Please also refer to the recommendation of the Physical Therapist for safe discharge planning. From PT standpoint, will recommend Level III (minimum resource intensity) rehab services and inc family support at D/C. No SOB & dizziness reported t/o session. Nsg staff most recent vital signs as follows: /74 (BP Location: Right arm)   Pulse 90   Temp 98.1 °F (36.7 °C) (Temporal)   Resp 16   Ht 5' 2\" (1.575 m)   Wt 107 kg (234 lb 12.6 oz)   SpO2 98%   BMI 42.94 kg/m² . At end of session, pt back in bed in " stable condition, call bell & phone in reach, bed alarm activated. Fall precautions reinforced w/ good understanding. CM to follow. Nsg staff to continue to mobilized pt (OOB in chair for all meals & ambulate in room/unit) as tolerated to prevent further decline in function. Will also recommend Restorative for daily amb &/or daily OOB in chair as appropriate. Nsg notified. Co-eval was necessary to complete this PT eval for the pt's best interest given pt's medical acuity & complexity.   Goals   Patient Goals to go home   STG Expiration Date 10/11/24   Short Term Goal #1 Goals to be met in 7 days; pt will be able to: 1) inc strength & balance by 1/2 grade to improve overall functional mobility & dec fall risk; 2) inc bed mobility to I for pt to be able to get in/OOB safely w/ proper techniques 100% of the time, to dec caregiver burden & safely function at home; 3) inc transfers to modified I for pt to transition safely from one surface to another w/o % of the time, to dec caregiver burden & safely function at home; 4) inc amb w/ RW approx. >350' w/ modified I for pt to ambulate community distances w/o any % of the time, to dec caregiver burden & safely function at home; 5) negotiate stairs w/ modified I for inc safety during stair mgt inside/outside of home & dec caregiver burden; 6) pt/caregiver ed   PT Treatment Day 0   Plan   Treatment/Interventions Functional transfer training;LE strengthening/ROM;Elevations;Therapeutic exercise;Endurance training;Patient/family training;Bed mobility;Gait training;Spoke to nursing;OT   PT Frequency 3-5x/wk   Discharge Recommendation   Rehab Resource Intensity Level, PT III (Minimum Resource Intensity)   Equipment Recommended Walker  (pt has)   Additional Comments restorative for daily amb   AM-PAC Basic Mobility Inpatient   Turning in Flat Bed Without Bedrails 4   Lying on Back to Sitting on Edge of Flat Bed Without Bedrails 4   Moving Bed to Chair 3   Standing Up  From Chair Using Arms 3   Walk in Room 3   Climb 3-5 Stairs With Railing 3   Basic Mobility Inpatient Raw Score 20   Basic Mobility Standardized Score 43.99   [facility] Highest Level Of Mobility   -HLM Goal 6: Walk 10 steps or more   -HLM Achieved 7: Walk 25 feet or more   End of Consult   Patient Position at End of Consult Supine;Bed/Chair alarm activated;All needs within reach   End of Consult Comments Pt in stable condition. All needs in reach. Bed alarm activated.   Hx/personal factors: co-morbidities, inaccessible home, use of AD, pain, fall risk, and assist w/ ADL's  Examination: dec mobility, dec balance, dec endurance, dec amb, risk for falls, pain  Clinical: unpredictable (ongoing medical status, risk for falls, and pain mgt)  Complexity: high    Michele Alves

## 2024-10-04 NOTE — ASSESSMENT & PLAN NOTE
History of left TKA 10/6/22. Developed 2 weeks of left knee pain, fevers, chills and was seen by orthopedic surgery 9/23.  Arthrocentesis performed and Synovasure was positive with Staph epidermidis isolated.  Now status post left TKA revision with implantation of an articulating antibiotic spacer (1.5 stage revision arthroplasty) 10/3/24.  Operative cultures are pending.   -Based on Synovasure culture, stop vancomycin and start IV cefazolin 2 g every 8 hours   -Follow-up operative cultures to guide final antibiotic plan   -Anticipate 6 weeks of IV antibiotics followed by 4.5 months p.o. antibiotics (6 months total) due to 1.5 stage revision arthroplasty   -Orthopedic surgery follow-up ongoing   -Okay for PICC line placement   -Will arrange ID follow-up once final antibiotic plan determined   -Monitor CBC and creatinine to assess for treatment response, drug toxicities

## 2024-10-04 NOTE — PLAN OF CARE
Problem: Prexisting or High Potential for Compromised Skin Integrity  Goal: Skin integrity is maintained or improved  Description: INTERVENTIONS:  - Identify patients at risk for skin breakdown  - Assess and monitor skin integrity  - Assess and monitor nutrition and hydration status  - Monitor labs   - Assess for incontinence   - Turn and reposition patient  - Assist with mobility/ambulation  - Relieve pressure over bony prominences  - Avoid friction and shearing  - Provide appropriate hygiene as needed including keeping skin clean and dry  - Evaluate need for skin moisturizer/barrier cream  - Collaborate with interdisciplinary team   - Patient/family teaching  - Consider wound care consult   Outcome: Progressing     Problem: PAIN - ADULT  Goal: Verbalizes/displays adequate comfort level or baseline comfort level  Description: Interventions:  - Encourage patient to monitor pain and request assistance  - Assess pain using appropriate pain scale  - Administer analgesics based on type and severity of pain and evaluate response  - Implement non-pharmacological measures as appropriate and evaluate response  - Consider cultural and social influences on pain and pain management  - Notify physician/advanced practitioner if interventions unsuccessful or patient reports new pain  Outcome: Progressing     Problem: INFECTION - ADULT  Goal: Absence or prevention of progression during hospitalization  Description: INTERVENTIONS:  - Assess and monitor for signs and symptoms of infection  - Monitor lab/diagnostic results  - Monitor all insertion sites, i.e. indwelling lines, tubes, and drains  - Monitor endotracheal if appropriate and nasal secretions for changes in amount and color  - Harper appropriate cooling/warming therapies per order  - Administer medications as ordered  - Instruct and encourage patient and family to use good hand hygiene technique  - Identify and instruct in appropriate isolation precautions for  identified infection/condition  Outcome: Progressing     Problem: SAFETY ADULT  Goal: Patient will remain free of falls  Description: INTERVENTIONS:  - Educate patient/family on patient safety including physical limitations  - Instruct patient to call for assistance with activity   - Consult OT/PT to assist with strengthening/mobility   - Keep Call bell within reach  - Keep bed low and locked with side rails adjusted as appropriate  - Keep care items and personal belongings within reach  - Initiate and maintain comfort rounds  - Make Fall Risk Sign visible to staff  - Offer Toileting every 2 Hours, in advance of need  - Initiate/Maintain bed alarm  - Obtain necessary fall risk management equipment: gripper socks and call bell  - Apply yellow socks and bracelet for high fall risk patients  - Consider moving patient to room near nurses station  Outcome: Progressing  Goal: Maintain or return to baseline ADL function  Description: INTERVENTIONS:  -  Assess patient's ability to carry out ADLs; assess patient's baseline for ADL function and identify physical deficits which impact ability to perform ADLs (bathing, care of mouth/teeth, toileting, grooming, dressing, etc.)  - Assess/evaluate cause of self-care deficits   - Assess range of motion  - Assess patient's mobility; develop plan if impaired  - Assess patient's need for assistive devices and provide as appropriate  - Encourage maximum independence but intervene and supervise when necessary  - Involve family in performance of ADLs  - Assess for home care needs following discharge   - Consider OT consult to assist with ADL evaluation and planning for discharge  - Provide patient education as appropriate  Outcome: Progressing     Problem: DISCHARGE PLANNING  Goal: Discharge to home or other facility with appropriate resources  Description: INTERVENTIONS:  - Identify barriers to discharge w/patient and caregiver  - Arrange for needed discharge resources and  transportation as appropriate  - Identify discharge learning needs (meds, wound care, etc.)  - Arrange for interpretive services to assist at discharge as needed  - Refer to Case Management Department for coordinating discharge planning if the patient needs post-hospital services based on physician/advanced practitioner order or complex needs related to functional status, cognitive ability, or social support system  Outcome: Progressing

## 2024-10-04 NOTE — PLAN OF CARE
Problem: OCCUPATIONAL THERAPY ADULT  Goal: Performs self-care activities at highest level of function for planned discharge setting.  See evaluation for individualized goals.  Description: Treatment Interventions: ADL retraining, Functional transfer training, UE strengthening/ROM, Endurance training, Patient/family training, Equipment evaluation/education, Neuromuscular reeducation, Compensatory technique education, Activityengagement, Energy conservation          See flowsheet documentation for full assessment, interventions and recommendations.   Note: Limitation: Decreased ADL status, Decreased UE strength, Decreased Safe judgement during ADL, Decreased endurance, Decreased self-care trans, Decreased high-level ADLs  Prognosis: Good  Assessment: Na Joseph is a 59 y.o. female seen for OT evaluation s/p admit to [facility] on 10/3/2024 w/ Infection of total knee replacement  (HCC). S/P left revision total knee arthroplasty to antibiotic spacer - WBAT.  Comorbidities affecting pt's functional performance at time of assessment include: HTN, obesity, and CHF. Pt with active OT orders and activity orders for Up and OOB as tolerated. Personal factors affecting pt at time of IE include:JINA home environment, steps within home environment, difficulty performing ADLs, difficulty performing IADLs, and difficulty performing transfers/mobility. Prior to admission, pt lives with  in a 2 level home. 1st floor setup if needed. I with ADLS, IADLS and mobility.  Upon evaluation: Pt currently requires Pedro for UB ADLs, Kaitlyn for LB ADLs, supervision for toileting, Pedro for bed mobility, supervision for functional transfers, and supervision mobility 2* the following deficits impacting occupational performance:weakness, decreased strength , and decreased balance. Pt to benefit from continued skilled OT tx while in the hospital to address deficits as defined above and maximize level of functional independence w ADL's and  functional mobility. Occupational Performance areas to address include: grooming, bathing/shower, toilet hygiene, functional mobility, and clothing management. From OT standpoint, recommendation at time of d/c would be level 3 resources (OPPT) . OT to follow pt on caseload 3-5x/wk.     Rehab Resource Intensity Level, OT: III (Minimum Resource Intensity)

## 2024-10-04 NOTE — CONSULTS
This consult was generated through the SOC/Nephrology ANIL risk reduction program. The patient's chart was reviewed and the GFR is > 60 and the patient has not had any ANIL episodes within the last 3 months. Therefore a Nephrology consult is not needed. We will not formally see the patient but should you need any Nephrology follow up, please re-consult us.    Please hold diuretic therapy temporarily post operatively. Likely can resume in the next 24-48 hours as per Internal Medicine.

## 2024-10-04 NOTE — PROCEDURES
Insert Complex Venous Access Line    Date/Time: 10/4/2024 1:50 PM    Performed by: Awilda Pop RN  Authorized by: Amina Goldberg PA-C    Patient location:  Bedside  Consent:     Consent obtained:  Written    Consent given by:  Patient    Procedural risks discussed: consent obtained by KEN.  [place] protocol:     Procedure explained and questions answered to patient or proxy's satisfaction: yes      Immediately prior to procedure, a time out was called: yes      Relevant documents present and verified: yes      Test results available and properly labeled: yes      Radiology Images displayed and confirmed.  If images not available, report reviewed: yes      Required blood products, implants, devices, and special equipment available: yes      Site/side marked: yes      Patient identity confirmed:  Verbally with patient, arm band, provided demographic data and hospital-assigned identification number  Pre-procedure details:     Hand hygiene: Hand hygiene performed prior to insertion      Sterile barrier technique: All elements of maximal sterile technique followed      Skin preparation:  ChloraPrep    Skin preparation agent: Skin preparation agent completely dried prior to procedure    Procedure details:     Complex Venous Access Line Type: PICC      Complex Venous Access Line Indications: long term antibiotics      Catheter tip vessel location: superior vena cava      Orientation:  Right    Location:  [place]    Procedural supplies:  Single lumen    Catheter size:  4 Fr    Total catheter length (cm):  35    Catheter out on skin (cm):  0    Max flow rate:  999ml/hr    Arm circumference:  46cm    Patient evaluated for contraindications to access (i.e. fistula, thrombosis, etc): Yes      Approach: percutaneous technique used      Patient position:  Flat    Ultrasound image availability:  Not saved    Sterile ultrasound techniques: Sterile gel and sterile probe covers were used      Number of attempts:  1     Successful placement: yes      Landmarks identified: yes      Vessel of catheter tip end:  Sherlock 3CG confirmed (sherlock 3cg procedure record confirmed placement, sent to medical records with consent, picc okay to be utilized)  Anesthesia (see MAR for exact dosages):     Anesthesia method:  Local infiltration (3ml)    Local anesthetic:  Lidocaine 1% w/o epi  Post-procedure details:     Post-procedure:  Dressing applied and securement device placed    Assessment:  Blood return through all ports and free fluid flow    Post-procedure complications: none      Patient tolerance of procedure:  Tolerated well, no immediate complications    ID Dr Gray confirmed pt cleared for picc insertion  Lot#[id number] 2025-08-31

## 2024-10-04 NOTE — CONSULTS
The vancomycin IV has been discontinued; pharmacy will sign off as consultants. Thank you for this consult.

## 2024-10-04 NOTE — PLAN OF CARE
Problem: Prexisting or High Potential for Compromised Skin Integrity  Goal: Skin integrity is maintained or improved  Description: INTERVENTIONS:  - Identify patients at risk for skin breakdown  - Assess and monitor skin integrity  - Assess and monitor nutrition and hydration status  - Monitor labs   - Assess for incontinence   - Turn and reposition patient  - Assist with mobility/ambulation  - Relieve pressure over bony prominences  - Avoid friction and shearing  - Provide appropriate hygiene as needed including keeping skin clean and dry  - Evaluate need for skin moisturizer/barrier cream  - Collaborate with interdisciplinary team   - Patient/family teaching  - Consider wound care consult   Outcome: Progressing     Problem: PAIN - ADULT  Goal: Verbalizes/displays adequate comfort level or baseline comfort level  Description: Interventions:  - Encourage patient to monitor pain and request assistance  - Assess pain using appropriate pain scale  - Administer analgesics based on type and severity of pain and evaluate response  - Implement non-pharmacological measures as appropriate and evaluate response  - Consider cultural and social influences on pain and pain management  - Notify physician/advanced practitioner if interventions unsuccessful or patient reports new pain  Outcome: Progressing     Problem: INFECTION - ADULT  Goal: Absence or prevention of progression during hospitalization  Description: INTERVENTIONS:  - Assess and monitor for signs and symptoms of infection  - Monitor lab/diagnostic results  - Monitor all insertion sites, i.e. indwelling lines, tubes, and drains  - Monitor endotracheal if appropriate and nasal secretions for changes in amount and color  - Enloe appropriate cooling/warming therapies per order  - Administer medications as ordered  - Instruct and encourage patient and family to use good hand hygiene technique  - Identify and instruct in appropriate isolation precautions for  identified infection/condition  Outcome: Progressing     Problem: SAFETY ADULT  Goal: Patient will remain free of falls  Description: INTERVENTIONS:  - Educate patient/family on patient safety including physical limitations  - Instruct patient to call for assistance with activity   - Consult OT/PT to assist with strengthening/mobility   - Keep Call bell within reach  - Keep bed low and locked with side rails adjusted as appropriate  - Keep care items and personal belongings within reach  - Initiate and maintain comfort rounds  - Make Fall Risk Sign visible to staff  - Initiate/Maintain bed alarm  - Obtain necessary fall risk management equipment: bed alarm, call bell,  socks  - Apply yellow socks and bracelet for high fall risk patients  - Consider moving patient to room near nurses station  Outcome: Progressing  Goal: Maintain or return to baseline ADL function  Description: INTERVENTIONS:  -  Assess patient's ability to carry out ADLs; assess patient's baseline for ADL function and identify physical deficits which impact ability to perform ADLs (bathing, care of mouth/teeth, toileting, grooming, dressing, etc.)  - Assess/evaluate cause of self-care deficits   - Assess range of motion  - Assess patient's mobility; develop plan if impaired  - Assess patient's need for assistive devices and provide as appropriate  - Encourage maximum independence but intervene and supervise when necessary  - Involve family in performance of ADLs  - Assess for home care needs following discharge   - Consider OT consult to assist with ADL evaluation and planning for discharge  - Provide patient education as appropriate  Outcome: Progressing     Problem: DISCHARGE PLANNING  Goal: Discharge to home or other facility with appropriate resources  Description: INTERVENTIONS:  - Identify barriers to discharge w/patient and caregiver  - Arrange for needed discharge resources and transportation as appropriate  - Identify discharge learning  needs (meds, wound care, etc.)  - Arrange for interpretive services to assist at discharge as needed  - Refer to Case Management Department for coordinating discharge planning if the patient needs post-hospital services based on physician/advanced practitioner order or complex needs related to functional status, cognitive ability, or social support system  Outcome: Progressing

## 2024-10-05 PROBLEM — D64.9 ANEMIA: Status: ACTIVE | Noted: 2024-10-05

## 2024-10-05 LAB
ANION GAP SERPL CALCULATED.3IONS-SCNC: 11 MMOL/L (ref 4–13)
BUN SERPL-MCNC: 8 MG/DL (ref 5–25)
CALCIUM SERPL-MCNC: 8.4 MG/DL (ref 8.4–10.2)
CHLORIDE SERPL-SCNC: 98 MMOL/L (ref 96–108)
CO2 SERPL-SCNC: 26 MMOL/L (ref 21–32)
CREAT SERPL-MCNC: 1.03 MG/DL (ref 0.6–1.3)
ERYTHROCYTE [DISTWIDTH] IN BLOOD BY AUTOMATED COUNT: 12.8 % (ref 11.6–15.1)
GFR SERPL CREATININE-BSD FRML MDRD: 58 ML/MIN/1.73SQ M
GLUCOSE SERPL-MCNC: 125 MG/DL (ref 65–140)
HCT VFR BLD AUTO: 24.8 % (ref 34.8–46.1)
HGB BLD-MCNC: 8.3 G/DL (ref 11.5–15.4)
INR PPP: 3.52 (ref 0.85–1.19)
INR PPP: 3.55 (ref 0.85–1.19)
MCH RBC QN AUTO: 30.2 PG (ref 26.8–34.3)
MCHC RBC AUTO-ENTMCNC: 33.5 G/DL (ref 31.4–37.4)
MCV RBC AUTO: 90 FL (ref 82–98)
PLATELET # BLD AUTO: 314 THOUSANDS/UL (ref 149–390)
PMV BLD AUTO: 11 FL (ref 8.9–12.7)
POTASSIUM SERPL-SCNC: 3.5 MMOL/L (ref 3.5–5.3)
PROTHROMBIN TIME: 34.5 SECONDS (ref 12.3–15)
PROTHROMBIN TIME: 34.8 SECONDS (ref 12.3–15)
RBC # BLD AUTO: 2.75 MILLION/UL (ref 3.81–5.12)
SODIUM SERPL-SCNC: 135 MMOL/L (ref 135–147)
WBC # BLD AUTO: 6.54 THOUSAND/UL (ref 4.31–10.16)

## 2024-10-05 PROCEDURE — 97530 THERAPEUTIC ACTIVITIES: CPT

## 2024-10-05 PROCEDURE — 99222 1ST HOSP IP/OBS MODERATE 55: CPT | Performed by: INTERNAL MEDICINE

## 2024-10-05 PROCEDURE — 97116 GAIT TRAINING THERAPY: CPT

## 2024-10-05 PROCEDURE — 80048 BASIC METABOLIC PNL TOTAL CA: CPT | Performed by: PHYSICIAN ASSISTANT

## 2024-10-05 PROCEDURE — 85027 COMPLETE CBC AUTOMATED: CPT | Performed by: PHYSICIAN ASSISTANT

## 2024-10-05 PROCEDURE — 85610 PROTHROMBIN TIME: CPT | Performed by: ORTHOPAEDIC SURGERY

## 2024-10-05 PROCEDURE — 99024 POSTOP FOLLOW-UP VISIT: CPT | Performed by: ORTHOPAEDIC SURGERY

## 2024-10-05 RX ORDER — WARFARIN SODIUM 5 MG/1
5 TABLET ORAL
Status: DISCONTINUED | OUTPATIENT
Start: 2024-10-06 | End: 2024-10-09 | Stop reason: HOSPADM

## 2024-10-05 RX ORDER — WARFARIN SODIUM 5 MG/1
5 TABLET ORAL
Status: DISCONTINUED | OUTPATIENT
Start: 2024-10-05 | End: 2024-10-05

## 2024-10-05 RX ADMIN — CEFAZOLIN SODIUM 2000 MG: 2 SOLUTION INTRAVENOUS at 11:29

## 2024-10-05 RX ADMIN — ACETAMINOPHEN 975 MG: 325 TABLET, FILM COATED ORAL at 05:13

## 2024-10-05 RX ADMIN — OXYCODONE HYDROCHLORIDE 5 MG: 5 TABLET ORAL at 08:59

## 2024-10-05 RX ADMIN — PANTOPRAZOLE SODIUM 40 MG: 40 TABLET, DELAYED RELEASE ORAL at 05:13

## 2024-10-05 RX ADMIN — SILDENAFIL 40 MG: 20 TABLET, FILM COATED ORAL at 08:25

## 2024-10-05 RX ADMIN — DOCUSATE SODIUM 100 MG: 100 CAPSULE, LIQUID FILLED ORAL at 08:25

## 2024-10-05 RX ADMIN — Medication 2000 UNITS: at 08:25

## 2024-10-05 RX ADMIN — DOCUSATE SODIUM 100 MG: 100 CAPSULE, LIQUID FILLED ORAL at 17:02

## 2024-10-05 RX ADMIN — HYDROXYCHLOROQUINE SULFATE 400 MG: 200 TABLET, FILM COATED ORAL at 08:30

## 2024-10-05 RX ADMIN — ENOXAPARIN SODIUM 100 MG: 100 INJECTION SUBCUTANEOUS at 08:26

## 2024-10-05 RX ADMIN — Medication 1 TABLET: at 08:25

## 2024-10-05 RX ADMIN — CEFAZOLIN SODIUM 2000 MG: 2 SOLUTION INTRAVENOUS at 04:36

## 2024-10-05 RX ADMIN — FOLIC ACID 1 MG: 1 TABLET ORAL at 08:26

## 2024-10-05 RX ADMIN — ACETAMINOPHEN 975 MG: 325 TABLET, FILM COATED ORAL at 21:23

## 2024-10-05 RX ADMIN — SILDENAFIL 40 MG: 20 TABLET, FILM COATED ORAL at 16:44

## 2024-10-05 RX ADMIN — ACETAMINOPHEN 975 MG: 325 TABLET, FILM COATED ORAL at 13:41

## 2024-10-05 RX ADMIN — OXYCODONE HYDROCHLORIDE AND ACETAMINOPHEN 500 MG: 500 TABLET ORAL at 17:02

## 2024-10-05 RX ADMIN — OXYCODONE HYDROCHLORIDE 5 MG: 5 TABLET ORAL at 18:22

## 2024-10-05 RX ADMIN — ONDANSETRON 4 MG: 2 INJECTION INTRAMUSCULAR; INTRAVENOUS at 11:27

## 2024-10-05 RX ADMIN — CEFAZOLIN SODIUM 2000 MG: 2 SOLUTION INTRAVENOUS at 20:32

## 2024-10-05 RX ADMIN — GABAPENTIN 300 MG: 300 CAPSULE ORAL at 21:24

## 2024-10-05 RX ADMIN — OXYCODONE HYDROCHLORIDE AND ACETAMINOPHEN 500 MG: 500 TABLET ORAL at 08:25

## 2024-10-05 RX ADMIN — SILDENAFIL 40 MG: 20 TABLET, FILM COATED ORAL at 21:23

## 2024-10-05 RX ADMIN — SENNOSIDES 8.6 MG: 8.6 TABLET, FILM COATED ORAL at 08:25

## 2024-10-05 NOTE — CONSULTS
"Consultation - Hospitalist   Name: Na Joseph 59 y.o. female I MRN: [id number]  Unit/Bed#: E2 -01 I Date of Admission: 10/3/2024   Date of Service: 10/5/2024 I Hospital Day: 2   Consults  Physician Requesting Evaluation: Grupo Wilson DO   Reason for Evaluation / Principal Problem: \" Infection associated with internal left knee prosthesis, initial encounter\"    Assessment & Plan  Infection of total knee replacement  (HCC)  Managed per Ortho and infectious disease  SLE (systemic lupus erythematosus) (HCC)  She follows with Dr. Loredo her rheumatologist at [facility] who is aware of her current infection  She has been in communication with him regarding her medications  Rinvoq is on hold  She was allowed to continue her Plaquenil  She was told to resume her Benlysta injection weekly.  She reportedly injected yesterday    Antiphospholipid antibody syndrome (HCC)  With history of DVT and on lifelong anticoagulation  Outpatient Coumadin treatment plan noted.  Her INR goal is 2-3  INR is supratherapeutic  Hold warfarin tonight  Pulmonary hypertension (HCC)  Continue Revatio as prescribed  Follow-up with Dr. Hughes  Anemia  Postoperative anemia due to expected blood loss  Monitor and transfuse as needed for hemoglobin less than 7    VTE Pharmacologic Prophylaxis:    Coumadin  Code Status: Level 1 - Full Code   Discussion with family: Updated  () at bedside.      History of Present Illness   Chief Complaint: Knee infection    Na Joseph is a 59 y.o. female with a PMH of SLE, chronic anticoagulation due to history of DVT and antiphospholipid antibody, pulmonary hypertension, left total knee replacement in 2022.    She saw orthopedics on 9/20/2024 due to fever, chills, night  sweats and left knee swelling since 9/11/2024.  She was worked up for suspected infection and underwent left knee arthrocentesis on 9/23/2024 by Dr. Wilson.   She was confirmed to have left knee infection with " "Staph epidermidis.  She was admitted on 10/3/2024 for elective left knee arthroplasty revision and drug delivery device placement.    Internal medicine was consulted for \" infection associated with internal left knee prosthesis, initial encounter.\"    On further review, she has known history of SLE.  She follows with Dr. Loredo her rheumatologist at [facility].  She was previously on Rinvoq which is on hold.  She was structured to continue her hydroxychloroquine and restart weekly Benlysta injections she reportedly brought her Benlysta injection at home and injected last night.  She has been in contact with Dr. Loredo regarding her rheumatology medications.    She follows with Dr. Hughes for pulmonary hypertension maintained on revision.    Also follows with hematology for chronic anticoagulation due to history of DVT and positive antiphospholipid antibody. Her INR goal 2.0-3.0    At the time of my examination she admitted having intermittent left knee pain as expected    Historical Information   Past Medical History:   Diagnosis Date    HELENE positive     Anemia     Sildenafil causes decreased hematocrit    Antiphospholipid syndrome (HCC)     Anxiety 03/2020    CHF (congestive heart failure) (HCC)     right heart failure related to pulm HTN lupus    Chronic kidney disease     borderline lab values    Chronic rhinitis 06/04/2012    Clotting disorder (HCC) 2012    Coronary artery disease 2018    Encephalitis     Lasted Assessed 10/18/2017    Gout 06/26/2018    Heart failure (HCC) 2019    History of transfusion 2/13/2019    autologous    Hypertension     Lupus 2018    Lupus anticoagulant disorder (HCC)     Maxillary sinusitis     Lasted Assessed 10/18/2017    Night sweat     Osteopenia     Hip    Osteoporosis     Spine    Pneumonia     Last Assessed 10/18/2017    PONV (postoperative nausea and vomiting)     Pulmonary embolism (HCC)     Pulmonary hypertension (HCC)     Seizures (HCC)     Only during Encephalitis    " Shortness of breath     with exertion    Sinus tachycardia     Urinary incontinence      Past Surgical History:   Procedure Laterality Date    ARTHRODESIS      Hand: IP Joint    CHOLECYSTECTOMY      COLONOSCOPY      COLPOSCOPY      HYSTERECTOMY      Vaginal    JOINT REPLACEMENT Right     Knee replacement    KNEE SURGERY  2019    LAPAROSCOPIC ESOPHAGOGASTRIC FUNDOPLASTY  2008    CA ARTHRP KNE CONDYLE&PLATU MEDIAL&LAT COMPARTMENTS Right 2019    Procedure: KNEE TOTAL ARTHROPLASTY AND ASSOCIATED PROCEDURES;  Surgeon: Donovan Zendejas DO;  [facility];  Service: Orthopedics    CA ARTHRP KNE CONDYLE&PLATU MEDIAL&LAT COMPARTMENTS Left 10/06/2022    Procedure: ARTHROPLASTY KNEE TOTAL;  Surgeon: Donovan Zendejas DO;  [facility];  Service: Orthopedics    CA ARTHRS KNEE W/MENISCECTOMY MED&LAT W/SHAVING Right 10/02/2018    Procedure: KNEE ARTHROSCOPY WITH PARTIAL MEDIAL MENISCECTOMY;  Surgeon: Donovan Zendejas DO;  [facility];  Service: Orthopedics    CA ARTHRS KNEE W/MENISCECTOMY MED&LAT W/SHAVING Left 2019    Procedure: KNEE ARTHROSCOPIC MENISCECTOMY /MEDIAL; Chondyl medial and posterior;  Surgeon: Donovan Zendejas DO;  [facility];  Service: Orthopedics    REDUCTION MAMMAPLASTY Bilateral     doesnt remember when    REPAIR ANKLE LIGAMENT Right     TONSILLECTOMY       Social History     Tobacco Use    Smoking status: Former     Current packs/day: 0.00     Types: Cigarettes     Quit date: 2006     Years since quittin.7    Smokeless tobacco: Never   Vaping Use    Vaping status: Never Used   Substance and Sexual Activity    Alcohol use: Yes     Comment: occasional    Drug use: No    Sexual activity: Yes     Partners: Male     Birth control/protection: Female Sterilization     E-Cigarette/Vaping    E-Cigarette Use Never User      E-Cigarette/Vaping Substances    Nicotine No     THC No     CBD No     Flavoring No     Other No     Unknown No      Family History   Problem Relation Age of  Onset    Uterine cancer Mother     Pancreatic cancer Mother 70    Diabetes Mother     Endometrial cancer Mother 66    Arthritis Mother     Cancer Mother         Pancreas, Uterine    Vision loss Mother     Osteoporosis Mother     Heart disease Father         open heart surgery at age 50     Stroke Father         CVA    Hypertension Father     Atrial fibrillation Father     Hyperlipidemia Father     Arthritis Father     Rheum arthritis Father     Heart attack Father     No Known Problems Sister     No Known Problems Sister     Thyroid disease Sister     Depression Sister     Osteoarthritis Sister     Asthma Sister     Asthma Daughter     Migraines Daughter     Asthma Daughter     Thyroid disease Maternal Grandmother     Heart attack Maternal Grandfather     Heart disease Maternal Grandfather     Heart attack Paternal Grandmother     Heart disease Paternal Grandmother     Skin cancer Paternal Grandfather     No Known Problems Brother     Hemochromatosis Brother     No Known Problems Maternal Aunt     No Known Problems Paternal Aunt     Anuerysm Neg Hx     Clotting disorder Neg Hx     Heart failure Neg Hx      Social History:  Marital Status: /Civil Union   Occupation: Disabled  Patient Pre-hospital Living Situation: Home  Patient Pre-hospital Level of Mobility: unable to be assessed at time of evaluation  Patient Pre-hospital Diet Restrictions: none    Objective :  Temp:  [97.8 °F (36.6 °C)-99.8 °F (37.7 °C)] 99.8 °F (37.7 °C)  HR:  [] 108  BP: (114-139)/(70-79) 118/78  Resp:  [16-18] 18  SpO2:  [93 %-98 %] 95 %  O2 Device: None (Room air)    Physical Exam  Vitals reviewed.   Constitutional:       Appearance: She is not ill-appearing.   HENT:      Head: Normocephalic and atraumatic.      Nose: No congestion or rhinorrhea.   Eyes:      General: No scleral icterus.  Cardiovascular:      Rate and Rhythm: Normal rate and regular rhythm.   Pulmonary:      Breath sounds: No wheezing or rhonchi.   Abdominal:       Palpations: Abdomen is soft.      Tenderness: There is no abdominal tenderness. There is no guarding.   Musculoskeletal:      Right lower leg: No edema.      Left lower leg: No edema.   Skin:     General: Skin is warm and dry.   Neurological:      Mental Status: She is oriented to person, place, and time.   Psychiatric:         Mood and Affect: Mood normal.         Behavior: Behavior normal.         Lines/Drains:  Lines/Drains/Airways       Active Status       Name Placement date Placement time Site Days    PICC Line 10/04/24 Right Basilic 10/04/24  1353  Basilic  1                           Lab Results: I have reviewed the following results:  Results from last 7 days   Lab Units 10/05/24  0513 10/04/24  0523 10/03/24  0645   WBC Thousand/uL 6.54   < > 4.35   HEMOGLOBIN g/dL 8.3*   < > 11.3*   HEMATOCRIT % 24.8*   < > 34.2*   PLATELETS Thousands/uL 314   < > 359   SEGS PCT %  --   --  69   LYMPHO PCT %  --   --  14   MONO PCT %  --   --  14*   EOS PCT %  --   --  2    < > = values in this interval not displayed.     Results from last 7 days   Lab Units 10/05/24  0513 10/04/24  0523 10/03/24  0645   SODIUM mmol/L 135   < > 139   POTASSIUM mmol/L 3.5   < > 3.2*   CHLORIDE mmol/L 98   < > 104   CO2 mmol/L 26   < > 27   BUN mg/dL 8   < > 6   CREATININE mg/dL 1.03   < > 0.81   ANION GAP mmol/L 11   < > 8   CALCIUM mg/dL 8.4   < > 9.0   ALBUMIN g/dL  --   --  3.7   TOTAL BILIRUBIN mg/dL  --   --  0.99   ALK PHOS U/L  --   --  78   ALT U/L  --   --  6*   AST U/L  --   --  8*   GLUCOSE RANDOM mg/dL 125   < >  --     < > = values in this interval not displayed.     Results from last 7 days   Lab Units 10/05/24  1332   INR  3.55*         Lab Results   Component Value Date    HGBA1C 5.5 09/27/2024    HGBA1C 5.2 09/15/2022    HGBA1C 5.2 10/09/2020           Imaging Results Review: I reviewed radiology reports from this admission including: procedure reports.      ** Please Note: This note has been constructed using a voice  recognition system. **

## 2024-10-05 NOTE — CASE MANAGEMENT
Case Management Assessment & Discharge Planning Note    Patient name Na Joseph  Location [facility] 2 /E2 -* MRN [id number]  : 1965 Date 10/5/2024       Current Admission Date: 10/3/2024  Current Admission Diagnosis:Infection of total knee replacement  (HCC)   Patient Active Problem List    Diagnosis Date Noted Date Diagnosed    Infection of total knee replacement  ([facility]) 10/02/2024     Maltracking of left patella 2024     GRIS (obstructive sleep apnea) 2024     Other sleep disorders 2024     Thyroid nodule 2024     Neutropenia, unspecified type ([facility]) 2024     Rheumatoid arthritis, involving unspecified site, unspecified whether rheumatoid factor present ([facility]) 2024     BMI 45.0-49.9, adult ([facility]) 2024     Chronic midline low back pain without sciatica 2024     Motion sickness 2024     Fall 2023     Anticoagulated 2023     Sleep disturbance 2023     Neuropathic pain, leg, left 2023     Antiphospholipid antibody syndrome ([facility]) 2023     Financial difficulties 2023     Insomnia 2023     Pulmonary embolus ([facility]) 2023     Iron deficiency anemia due to chronic blood loss 2022     COVID-19 2022     SLE (systemic lupus erythematosus) ([facility]) 2022     History of left knee replacement 10/20/2022     PONV (postoperative nausea and vomiting) 10/06/2022     Venous stasis of lower extremity 2022     Acute pain of right knee 2022     Leukopenia 2021     Morbid obesity ([facility])      Primary osteoarthritis of left knee 2020     Left knee pain 2019     Tear of medial meniscus of left knee, current 2019     Right knee pain 2019     Status post total right knee replacement 2019     Chronic kidney disease, stage 3a ([facility]) 2019     Tear of medial meniscus of right knee, current 09/10/2018     Other tear of medial meniscus, current injury, right knee,  initial encounter 07/16/2018     Patellofemoral disorder of right knee 06/04/2018     Pes anserinus bursitis of right knee 06/04/2018     Arthritis of right knee 06/04/2018     Arthritis of left knee 06/04/2018     Chronic pain of right knee 05/22/2018     Elevated serum creatinine 05/22/2018     Hyperuricemia 05/22/2018     Long-term use of hydroxychloroquine 02/16/2018     Pulmonary hypertension (HCC) 02/16/2018     Undifferentiated connective tissue disease (HCC) 02/16/2018     Secondary pulmonary hypertension 12/15/2017     Diffuse connective tissue disease (HCC) 11/21/2017     Pes anserine bursitis 11/21/2017     Trochanteric bursitis of both hips 11/21/2017     Vitamin D deficiency 11/21/2017     Other organ or system involvement in systemic lupus erythematosus (HCC) 11/17/2017     Sinus tachycardia 11/09/2017     B12 deficiency 10/19/2017     Age-related osteoporosis without current pathological fracture 10/19/2017     Lupus anticoagulant disorder (HCC) 10/18/2017     Positive HELENE (antinuclear antibody) 10/18/2017     Hot flashes due to menopause 09/01/2015     SOB (shortness of breath) on exertion 11/12/2012     Other chronic pulmonary heart diseases 11/05/2012     Edema 06/04/2012     Post-nasal drip 06/04/2012     Chronic cough 02/09/2012     Dyspnea 02/09/2012       LOS (days): 2  Geometric Mean LOS (GMLOS) (days): 1.5  Days to GMLOS:-0.2     OBJECTIVE:    Risk of Unplanned Readmission Score: 14.4         Current admission status: Inpatient       Preferred Pharmacy:   [address] [address]  Phone: [phone] Fax: [phone]    [id number] - [place] [facility] [place] - [address]  [address] [place] [facility] [place] [address]  Phone: [phone] Fax: [phone]    [facility] - [place], [place] - [address]  [address] [address]  Phone:  [phone] Fax: [phone]    Primary Care Provider: Sultana Arnold DO    Primary Insurance: MEDICARE  Secondary Insurance: BLUE CROSS    ASSESSMENT:  Active Health Care Proxies       Hardeep Joseph Health Care Representative - Spouse   Primary Phone: [phone] (Mobile)  Home Phone: [phone]                 Advance Directives  Does patient have a Health Care POA?: Yes  Does patient have Advance Directives?: Yes  Advance Directives: Living will, Power of  for health care  Primary Contact: Hardeep Joseph (Spouse)  [phone] (Mobile)         Readmission Root Cause  30 Day Readmission: No    Patient Information  Admitted from:: Home  Mental Status: Alert  During Assessment patient was accompanied by: Spouse  Assessment information provided by:: Spouse, Patient  Primary Caregiver: Spouse  Caregiver's Telephone Number:: Hardeep Joseph (Spouse)  [phone] (Mobile)  Support Systems: Spouse/significant other, Family members  County of Residence: [place]  What city do you live in?: [place]  Home entry access options. Select all that apply.: Stairs  Number of steps to enter home.: 4  Do the steps have railings?: Yes  Type of Current Residence: [address] [place] [address] home  Upon entering residence, is there a bedroom on the main floor (no further steps)?: No  A bedroom is located on the following floor levels of residence (select all that apply):: 2nd Floor  Number of steps to 2nd floor from main floor:  (15 steps)  Living Arrangements: Lives w/ Spouse/significant other, Lives w/ Family members  Is patient a ?: No    Activities of Daily Living Prior to Admission  Functional Status: Independent  Completes ADLs independently?: Yes  Ambulates independently?: Yes  Does patient use assisted devices?: Yes  Assisted Devices (DME) used: Bedside Commode, Rollator, Shower Chair, Straight Cane  Does patient currently own DME?: Yes  What DME does the patient currently own?: Bedside Commode, Shower Chair, Straight Cane,  Malorie, Other (Comment) (Hand rails in the shower)  Does patient have a history of Outpatient Therapy (PT/OT)?: Yes  Does the patient have a history of Short-Term Rehab?: No  Does patient have a history of HHC?: No  Does patient currently have HHC?: No    Current Home Health Care  Home Health Agency Name:: [facility]    Patient Information Continued  Does patient have prescription coverage?: Yes  Does patient receive dialysis treatments?: No  Does patient have a history of substance abuse?: No  Does patient have a history of Mental Health Diagnosis?: No         Means of Transportation  Means of Transport to Appts:: Family transport      Social Determinants of Health (SDOH)      Flowsheet Row Most Recent Value   Housing Stability    In the last 12 months, was there a time when you were not able to pay the mortgage or rent on time? N   In the past 12 months, how many times have you moved where you were living? 0   At any time in the past 12 months, were you homeless or living in a shelter (including now)? N   Transportation Needs    In the past 12 months, has lack of transportation kept you from medical appointments or from getting medications? no   In the past 12 months, has lack of transportation kept you from meetings, work, or from getting things needed for daily living? No   Food Insecurity    Within the past 12 months, you worried that your food would run out before you got the money to buy more. Never true   Within the past 12 months, the food you bought just didn't last and you didn't have money to get more. Never true   Utilities    In the past 12 months has the electric, gas, oil, or water company threatened to shut off services in your home? No            DISCHARGE DETAILS:    Discharge planning discussed with:: Pt and   Freedom of Choice: Yes  Comments - Freedom of Choice: Pt is aware that she will need Long term IV ATBs 6-8 weeks.  Wants to go home and declined going to a SNF.  CM contacted  family/caregiver?: Yes     Did patient/caregiver verbalize understanding of patient care needs?: Yes  Were patient/caregiver advised of the risks associated with not following Treatment Team discharge recommendations?: Yes    Contacts  Patient Contacts: Hardeep Joseph (Spouse)  [phone] (Mobile)  Relationship to Patient:: Family  Contact Method: In Person  Reason/Outcome: Continuity of Care    Requested Home Health Care         Is the patient interested in HHC at discharge?: Yes  Home Health Discipline requested:: Nursing  [facility]  Home Health Follow-Up Provider:: PCP  Home Health Services Needed:: Administration of IV, IM or SC Medications  Homebound Criteria Met:: Uses an Assist Device (i.e. cane, walker, etc)  Supporting Clincal Findings:: Limited Endurance    DME Referral Provided  Referral made for DME?: No         Would you like to participate in our [facility] service program?  : No - Declined    Treatment Team Recommendation: Home with Home Health Care  Discharge Destination Plan:: Home with Home Health Care  Transport at Discharge : Family                                      Additional Comments: CM met with the pt and  was present in the room.  Assessment was completed.  Pt is aware that she has had an infected knee and will require IV ATBS for 6-8 weeks.  Final cultures pending to determine IV atb to be used.  PICC Line in place.  Pt stated that she will not go to any rehab.  She and her  will be able to manage the IV ATBs at home.  She will accept VNA to assist with the IV ATBs, and she would prefer [facility].  Referral put in place.  She stated that she will go to [facility] to con't PT/OT.  She was also aware that Medicare would not cover the ATB cost, but possibly the prescription plan from her [facility] ins will cover the ATB cost.   will trnasport on discharge.  Plan is to go home with around the 7th or 8th.  CM to follow.

## 2024-10-05 NOTE — ASSESSMENT & PLAN NOTE
She follows with Dr. Loredo her rheumatologist at [facility] who is aware of her current infection  She has been in communication with him regarding her medications  Rinvoq is on hold  She was allowed to continue her Plaquenil  She was told to resume her Benlysta injection weekly.  She reportedly injected yesterday

## 2024-10-05 NOTE — ASSESSMENT & PLAN NOTE
With history of DVT and on lifelong anticoagulation  Outpatient Coumadin treatment plan noted.  Her INR goal is 2-3  INR is supratherapeutic  Hold warfarin tonight

## 2024-10-05 NOTE — PROGRESS NOTES
Progress Note - Orthopedics   Name: Na Joseph 59 y.o. female I MRN: [id number]  Unit/Bed#: E2 -01 I Date of Admission: 10/3/2024   Date of Service: 10/5/2024 I Hospital Day: 2    Assessment & Plan  Infection of total knee replacement  (HCC)  59F s/p L TKA revision for staph epi PJI 10/3  - multi-modal pain control  - ancef q8 hrs   - DVT ppx: coumadin with lovenox bridge, will repeat INR today  - PT/OT  - WBAT  - ROM as tolerated, pillow/blankets under achilles not behind knee while in bed  - f/u I/O cultures: NGTD, preliminary results  - drop >2 gram in Hgb consistent with ABLA  - Morbid obesity due to excess calories a/e/b BMI 42.94 treated with nutritional diet and education   - synovasure + staphy epi  - med mgt per [facility]  - f/u 10-14 days   PONV (postoperative nausea and vomiting)  Continue with zofran, d/w nurse, nurse can contact me or [facility] if anything additional needed, patient states current medication helping        Subjective   59 y.o.female admits nausea currently.  No other acute events, no other complaints. Admit some SOB but this is baseline for her. She just got back from walking around.  Ice pack placed over incision by nurse.  This is helping to control her swelling.    Objective :  Temp:  [97.7 °F (36.5 °C)-98.2 °F (36.8 °C)] 98.2 °F (36.8 °C)  HR:  [] 100  BP: (114-139)/(70-79) 136/79  Resp:  [16-18] 18  SpO2:  [93 %-97 %] 96 %  O2 Device: None (Room air)    Physical Exam  Musculoskeletal: leftlower  Skin over knee: No erythema or ecchymosis.  Dressing intact  LLE:   EHL/AT/GS intact, sensation grossly intact L4, L5, S1, palpable pedal pulse  Ace wrap in place for compression over mepilex dressing      Lab Results: I have reviewed the following results:  Recent Labs     10/03/24  0645 10/03/24  1433 10/04/24  0523 10/04/24  1339 10/05/24  0513 10/05/24  1008   WBC 4.35  --  5.89  --  6.54  --    HGB 11.3*  --  8.7*  --  8.3*  --    HCT 34.2*  --  25.8*  --  24.8*  --      --   "278  --  314  --    BUN 6  --  7  --  8  --    CREATININE 0.81  --  0.72  --  1.03  --    PTT 40*  --   --   --   --   --    INR 1.43* 1.42*  --  1.60*  --  3.52*     Blood Culture:  No results found for: \"BLOODCX\"  Wound Culture: No results found for: \"WOUNDCULT\"  "

## 2024-10-05 NOTE — PHYSICAL THERAPY NOTE
Physical Therapy Treatment Note     10/05/24 1141   PT Last Visit   PT Visit Date 10/05/24   Note Type   Note Type Treatment   Pain Assessment   Pain Assessment Tool 0-10   Pain Score 4   Pain Location/Orientation Orientation: Left;Location: Knee   Restrictions/Precautions   Weight Bearing Precautions Per Order Yes   LLE Weight Bearing Per Order WBAT   Other Precautions Pain;Fall Risk;WBS   General   Chart Reviewed Yes   Family/Caregiver Present Yes   Subjective   Subjective Pt. agreeable to PT   Transfers   Sit to Stand 5  Supervision   Additional items Assist x 1;Armrests;Increased time required   Stand to Sit 5  Supervision   Additional items Assist x 1;Armrests;Increased time required   Stand pivot 5  Supervision   Additional items Increased time required   Ambulation/Elevation   Gait pattern Improper Weight shift;Antalgic;Decreased L stance;Excessively slow;Decreased toe off;Step through pattern;Decreased heel strike   Gait Assistance 5  Supervision   Additional items Assist x 1   Assistive Device Other (Comment)  (rollator)   Distance 140ft, 210ft   Stair Management Assistance 5  Supervision   Additional items Increased time required   Stair Management Technique Two rails;Foreward;Step to pattern;Nonreciprocal   Number of Stairs 5   Balance   Static Sitting Good   Dynamic Sitting Fair +   Static Standing Fair   Dynamic Standing Fair -   Ambulatory Fair -   Endurance Deficit   Endurance Deficit Yes   Endurance Deficit Description pain   Activity Tolerance   Activity Tolerance Patient tolerated treatment well   Nurse Made Aware Yes   Assessment   Prognosis Good   Problem List Decreased mobility;Decreased strength;Decreased range of motion;Pain;Orthopedic restrictions;Obesity   Assessment Pt. progressing with overall mobility. Reviviewed activities at home, icing and car transfer with pt. and she showed good understanding. Pt. able to recall LE sequencing for stair negotiation. Pt. noted with no knee buckling or  LOB t/o session. Step to gait pattern progressed to step through gait as distance progressed. Seated rest needed between ambulation due to fatigue. Ice provided to be applied to L knee post session when seated on chair. Jameson continue to follow per PT POC. Recommended pt. ambulate with staff on unit.   Barriers to Discharge None   Goals   Patient Goals None reproted   STG Expiration Date 10/11/24   PT Treatment Day 1   Plan   Treatment/Interventions Functional transfer training;Elevations;Spoke to nursing;Spoke to advanced practitioner;Gait training;Equipment eval/education;Patient/family training;Family   Progress Progressing toward goals   PT Frequency 3-5x/wk   Discharge Recommendation   Rehab Resource Intensity Level, PT III (Minimum Resource Intensity)   AM-PAC Basic Mobility Inpatient   Turning in Flat Bed Without Bedrails 3   Lying on Back to Sitting on Edge of Flat Bed Without Bedrails 3   Moving Bed to Chair 4   Standing Up From Chair Using Arms 4   Walk in Room 4   Climb 3-5 Stairs With Railing 4   Basic Mobility Inpatient Raw Score 22   Basic Mobility Standardized Score 47.4   [facility] Highest Level Of Mobility   -HLM Goal 7: Walk 25 feet or more   -HLM Achieved 8: Walk 250 feet ot more   End of Consult   Patient Position at End of Consult All needs within reach;Bedside chair           Justin Oseguera PTA    An AM-PAC basic mobility standardized score less than 42.9 suggest the patient may benefit from discharge to post-acute rehab services.

## 2024-10-05 NOTE — ASSESSMENT & PLAN NOTE
Continue with zofran, d/pallavi nurse, nurse can contact me or [facility] if anything additional needed, patient states current medication helping   100.4 Farenheit

## 2024-10-05 NOTE — PLAN OF CARE
Problem: Prexisting or High Potential for Compromised Skin Integrity  Goal: Skin integrity is maintained or improved  Description: INTERVENTIONS:  - Identify patients at risk for skin breakdown  - Assess and monitor skin integrity  - Assess and monitor nutrition and hydration status  - Monitor labs   - Assess for incontinence   - Turn and reposition patient  - Assist with mobility/ambulation  - Relieve pressure over bony prominences  - Avoid friction and shearing  - Provide appropriate hygiene as needed including keeping skin clean and dry  - Evaluate need for skin moisturizer/barrier cream  - Collaborate with interdisciplinary team   - Patient/family teaching  - Consider wound care consult   Outcome: Progressing     Problem: PAIN - ADULT  Goal: Verbalizes/displays adequate comfort level or baseline comfort level  Description: Interventions:  - Encourage patient to monitor pain and request assistance  - Assess pain using appropriate pain scale  - Administer analgesics based on type and severity of pain and evaluate response  - Implement non-pharmacological measures as appropriate and evaluate response  - Consider cultural and social influences on pain and pain management  - Notify physician/advanced practitioner if interventions unsuccessful or patient reports new pain  Outcome: Progressing     Problem: INFECTION - ADULT  Goal: Absence or prevention of progression during hospitalization  Description: INTERVENTIONS:  - Assess and monitor for signs and symptoms of infection  - Monitor lab/diagnostic results  - Monitor all insertion sites, i.e. indwelling lines, tubes, and drains  - Monitor endotracheal if appropriate and nasal secretions for changes in amount and color  - [place] appropriate cooling/warming therapies per order  - Administer medications as ordered  - Instruct and encourage patient and family to use good hand hygiene technique  - Identify and instruct in appropriate isolation precautions for  identified infection/condition  Outcome: Progressing     Problem: SAFETY ADULT  Goal: Patient will remain free of falls  Description: INTERVENTIONS:  - Educate patient/family on patient safety including physical limitations  - Instruct patient to call for assistance with activity   - Consult OT/PT to assist with strengthening/mobility   - Keep Call bell within reach  - Keep bed low and locked with side rails adjusted as appropriate  - Keep care items and personal belongings within reach  - Initiate and maintain comfort rounds  - Make Fall Risk Sign visible to staff  - Offer Toileting every 2 Hours, in advance of need  - Initiate/Maintain bed alarm  - Obtain necessary fall risk management equipment: gripper socks and call bell  - Apply yellow socks and bracelet for high fall risk patients  - Consider moving patient to room near nurses station  Outcome: Progressing  Goal: Maintain or return to baseline ADL function  Description: INTERVENTIONS:  -  Assess patient's ability to carry out ADLs; assess patient's baseline for ADL function and identify physical deficits which impact ability to perform ADLs (bathing, care of mouth/teeth, toileting, grooming, dressing, etc.)  - Assess/evaluate cause of self-care deficits   - Assess range of motion  - Assess patient's mobility; develop plan if impaired  - Assess patient's need for assistive devices and provide as appropriate  - Encourage maximum independence but intervene and supervise when necessary  - Involve family in performance of ADLs  - Assess for home care needs following discharge   - Consider OT consult to assist with ADL evaluation and planning for discharge  - Provide patient education as appropriate  Outcome: Progressing     Problem: DISCHARGE PLANNING  Goal: Discharge to home or other facility with appropriate resources  Description: INTERVENTIONS:  - Identify barriers to discharge w/patient and caregiver  - Arrange for needed discharge resources and  transportation as appropriate  - Identify discharge learning needs (meds, wound care, etc.)  - Arrange for interpretive services to assist at discharge as needed  - Refer to Case Management Department for coordinating discharge planning if the patient needs post-hospital services based on physician/advanced practitioner order or complex needs related to functional status, cognitive ability, or social support system  Outcome: Progressing

## 2024-10-05 NOTE — PLAN OF CARE
Problem: PHYSICAL THERAPY ADULT  Goal: Performs mobility at highest level of function for planned discharge setting.  See evaluation for individualized goals.  Description: Treatment/Interventions: Functional transfer training, LE strengthening/ROM, Elevations, Therapeutic exercise, Endurance training, Patient/family training, Bed mobility, Gait training, Spoke to nursing, OT  Equipment Recommended: Walker (pt has)       See flowsheet documentation for full assessment, interventions and recommendations.  Outcome: Progressing  Note: Prognosis: Good  Problem List: Decreased mobility, Decreased strength, Decreased range of motion, Pain, Orthopedic restrictions, Obesity  Assessment: Pt. progressing with overall mobility. Reviviewed activities at home, icing and car transfer with pt. and she showed good understanding. Pt. able to recall LE sequencing for stair negotiation. Pt. noted with no knee buckling or LOB t/o session. Step to gait pattern progressed to step through gait as distance progressed. Seated rest needed between ambulation due to fatigue. Ice provided to be applied to L knee post session when seated on chair. Jmaeson continue to follow per PT POC. Recommended pt. ambulate with staff on unit.  Barriers to Discharge: None     Rehab Resource Intensity Level, PT: III (Minimum Resource Intensity)    See flowsheet documentation for full assessment.

## 2024-10-05 NOTE — ASSESSMENT & PLAN NOTE
Postoperative anemia due to expected blood loss  Monitor and transfuse as needed for hemoglobin less than 7

## 2024-10-05 NOTE — ASSESSMENT & PLAN NOTE
59F s/p L TKA revision for staph epi PJI 10/3  - multi-modal pain control  - ancef q8 hrs   - DVT ppx: coumadin with lovenox bridge, will repeat INR today  - PT/OT  - WBAT  - ROM as tolerated, pillow/blankets under achilles not behind knee while in bed  - f/u I/O cultures: NGTD, preliminary results  - drop >2 gram in Hgb consistent with ABLA  - Morbid obesity due to excess calories a/e/b BMI 42.94 treated with nutritional diet and education   - synovasure + staphy epi  - med mgt per [facility]  - f/u 10-14 days

## 2024-10-06 PROBLEM — E87.6 HYPOKALEMIA: Status: ACTIVE | Noted: 2024-10-06

## 2024-10-06 PROBLEM — D62 ACUTE BLOOD LOSS AS CAUSE OF POSTOPERATIVE ANEMIA: Status: ACTIVE | Noted: 2024-10-06

## 2024-10-06 LAB
ABO GROUP BLD: NORMAL
ANION GAP SERPL CALCULATED.3IONS-SCNC: 6 MMOL/L (ref 4–13)
BACTERIA SPEC ANAEROBE CULT: NO GROWTH
BACTERIA TISS AEROBE CULT: NO GROWTH
BACTERIA TISS AEROBE CULT: NO GROWTH
BLD GP AB SCN SERPL QL: NEGATIVE
BUN SERPL-MCNC: 9 MG/DL (ref 5–25)
CALCIUM SERPL-MCNC: 8.1 MG/DL (ref 8.4–10.2)
CHLORIDE SERPL-SCNC: 100 MMOL/L (ref 96–108)
CO2 SERPL-SCNC: 31 MMOL/L (ref 21–32)
CREAT SERPL-MCNC: 0.77 MG/DL (ref 0.6–1.3)
ERYTHROCYTE [DISTWIDTH] IN BLOOD BY AUTOMATED COUNT: 13.1 % (ref 11.6–15.1)
GFR SERPL CREATININE-BSD FRML MDRD: 83 ML/MIN/1.73SQ M
GLUCOSE SERPL-MCNC: 112 MG/DL (ref 65–140)
GLUCOSE SERPL-MCNC: 93 MG/DL (ref 65–140)
GRAM STN SPEC: NORMAL
HCT VFR BLD AUTO: 21.4 % (ref 34.8–46.1)
HGB BLD-MCNC: 7.3 G/DL (ref 11.5–15.4)
INR PPP: 2.98 (ref 0.85–1.19)
MCH RBC QN AUTO: 30.5 PG (ref 26.8–34.3)
MCHC RBC AUTO-ENTMCNC: 34.1 G/DL (ref 31.4–37.4)
MCV RBC AUTO: 90 FL (ref 82–98)
PLATELET # BLD AUTO: 233 THOUSANDS/UL (ref 149–390)
PMV BLD AUTO: 10.4 FL (ref 8.9–12.7)
POTASSIUM SERPL-SCNC: 3.1 MMOL/L (ref 3.5–5.3)
PROTHROMBIN TIME: 30.5 SECONDS (ref 12.3–15)
RBC # BLD AUTO: 2.39 MILLION/UL (ref 3.81–5.12)
RH BLD: POSITIVE
SODIUM SERPL-SCNC: 137 MMOL/L (ref 135–147)
SPECIMEN EXPIRATION DATE: NORMAL
WBC # BLD AUTO: 3.8 THOUSAND/UL (ref 4.31–10.16)

## 2024-10-06 PROCEDURE — 86900 BLOOD TYPING SEROLOGIC ABO: CPT | Performed by: INTERNAL MEDICINE

## 2024-10-06 PROCEDURE — 99232 SBSQ HOSP IP/OBS MODERATE 35: CPT | Performed by: INTERNAL MEDICINE

## 2024-10-06 PROCEDURE — 85610 PROTHROMBIN TIME: CPT | Performed by: ORTHOPAEDIC SURGERY

## 2024-10-06 PROCEDURE — 86923 COMPATIBILITY TEST ELECTRIC: CPT

## 2024-10-06 PROCEDURE — NC001 PR NO CHARGE: Performed by: STUDENT IN AN ORGANIZED HEALTH CARE EDUCATION/TRAINING PROGRAM

## 2024-10-06 PROCEDURE — 86850 RBC ANTIBODY SCREEN: CPT | Performed by: INTERNAL MEDICINE

## 2024-10-06 PROCEDURE — 80048 BASIC METABOLIC PNL TOTAL CA: CPT | Performed by: PHYSICIAN ASSISTANT

## 2024-10-06 PROCEDURE — 86901 BLOOD TYPING SEROLOGIC RH(D): CPT | Performed by: INTERNAL MEDICINE

## 2024-10-06 PROCEDURE — 82948 REAGENT STRIP/BLOOD GLUCOSE: CPT

## 2024-10-06 PROCEDURE — 30233N1 TRANSFUSION OF NONAUTOLOGOUS RED BLOOD CELLS INTO PERIPHERAL VEIN, PERCUTANEOUS APPROACH: ICD-10-PCS | Performed by: STUDENT IN AN ORGANIZED HEALTH CARE EDUCATION/TRAINING PROGRAM

## 2024-10-06 PROCEDURE — 99024 POSTOP FOLLOW-UP VISIT: CPT | Performed by: ORTHOPAEDIC SURGERY

## 2024-10-06 PROCEDURE — P9040 RBC LEUKOREDUCED IRRADIATED: HCPCS

## 2024-10-06 PROCEDURE — 85027 COMPLETE CBC AUTOMATED: CPT | Performed by: PHYSICIAN ASSISTANT

## 2024-10-06 RX ORDER — POTASSIUM CHLORIDE 1500 MG/1
40 TABLET, EXTENDED RELEASE ORAL DAILY
Status: COMPLETED | OUTPATIENT
Start: 2024-10-06 | End: 2024-10-07

## 2024-10-06 RX ADMIN — ACETAMINOPHEN 975 MG: 325 TABLET, FILM COATED ORAL at 21:43

## 2024-10-06 RX ADMIN — FOLIC ACID 1 MG: 1 TABLET ORAL at 08:15

## 2024-10-06 RX ADMIN — Medication 2000 UNITS: at 08:15

## 2024-10-06 RX ADMIN — DOCUSATE SODIUM 100 MG: 100 CAPSULE, LIQUID FILLED ORAL at 17:20

## 2024-10-06 RX ADMIN — CEFAZOLIN SODIUM 2000 MG: 2 SOLUTION INTRAVENOUS at 12:11

## 2024-10-06 RX ADMIN — DOCUSATE SODIUM 100 MG: 100 CAPSULE, LIQUID FILLED ORAL at 08:14

## 2024-10-06 RX ADMIN — SENNOSIDES 8.6 MG: 8.6 TABLET, FILM COATED ORAL at 08:15

## 2024-10-06 RX ADMIN — OXYCODONE HYDROCHLORIDE 5 MG: 5 TABLET ORAL at 04:44

## 2024-10-06 RX ADMIN — SILDENAFIL 40 MG: 20 TABLET, FILM COATED ORAL at 21:43

## 2024-10-06 RX ADMIN — PANTOPRAZOLE SODIUM 40 MG: 40 TABLET, DELAYED RELEASE ORAL at 05:23

## 2024-10-06 RX ADMIN — HYDROXYCHLOROQUINE SULFATE 400 MG: 200 TABLET, FILM COATED ORAL at 08:18

## 2024-10-06 RX ADMIN — POTASSIUM CHLORIDE 40 MEQ: 1500 TABLET, EXTENDED RELEASE ORAL at 12:11

## 2024-10-06 RX ADMIN — OXYCODONE HYDROCHLORIDE AND ACETAMINOPHEN 500 MG: 500 TABLET ORAL at 08:15

## 2024-10-06 RX ADMIN — ACETAMINOPHEN 975 MG: 325 TABLET, FILM COATED ORAL at 15:09

## 2024-10-06 RX ADMIN — GABAPENTIN 300 MG: 300 CAPSULE ORAL at 21:43

## 2024-10-06 RX ADMIN — CEFAZOLIN SODIUM 2000 MG: 2 SOLUTION INTRAVENOUS at 19:37

## 2024-10-06 RX ADMIN — WARFARIN SODIUM 5 MG: 5 TABLET ORAL at 17:20

## 2024-10-06 RX ADMIN — SILDENAFIL 40 MG: 20 TABLET, FILM COATED ORAL at 15:09

## 2024-10-06 RX ADMIN — ACETAMINOPHEN 975 MG: 325 TABLET, FILM COATED ORAL at 05:23

## 2024-10-06 RX ADMIN — CEFAZOLIN SODIUM 2000 MG: 2 SOLUTION INTRAVENOUS at 03:39

## 2024-10-06 RX ADMIN — OXYCODONE HYDROCHLORIDE AND ACETAMINOPHEN 500 MG: 500 TABLET ORAL at 17:20

## 2024-10-06 RX ADMIN — SILDENAFIL 40 MG: 20 TABLET, FILM COATED ORAL at 08:15

## 2024-10-06 RX ADMIN — Medication 1 TABLET: at 08:14

## 2024-10-06 NOTE — PROGRESS NOTES
Progress Note - Hospitalist   Name: Na Joseph 59 y.o. female I MRN: [id number]  Unit/Bed#: E2 -01 I Date of Admission: 10/3/2024   Date of Service: 10/6/2024 I Hospital Day: 3    Assessment & Plan  Infection of total knee replacement  (HCC)  Managed per Ortho and infectious disease  Acute blood loss as cause of postoperative anemia  Hemoglobin 7.3 and symptomatic  Discussed blood transfusion.  Consent signed by patient and myself.    Will order blood  1 unit PRBC  SLE (systemic lupus erythematosus) (HCC)  She follows with Dr. Loredo her rheumatologist at [facility] who is aware of her current infection  She has been in communication with him regarding her medications  She was told to hold Rinvoq and resume her Benlysta injection weekly.  She was allowed to continue her Plaquenil    Antiphospholipid antibody syndrome (HCC)  With history of DVT and on lifelong anticoagulation  Outpatient Coumadin treatment plan noted.  Her INR goal is 2-3  Resume warfarin tonight.  INR is now therapeutic  Pulmonary hypertension (HCC)  Continue sildenafil as prescribed  Follow-up with Dr. Hughes  Hypokalemia  Replete    VTE Pharmacologic Prophylaxis:   Warfarin      Patient Centered Rounds: I performed bedside rounds with nursing staff today.   Discussions with Specialists or Other Care Team Provider: Discussed with Dr. Willingham  Education and Discussions with Family / Patient: Discussed with daughter at bedside    Current Length of Stay: 3 day(s)  Current Patient Status: Inpatient   Certification Statement: The patient will continue to require additional inpatient hospital stay due to anemia  Discharge Plan: [facility] is following this patient on consult. They are not yet medically stable for discharge secondary to anemia.    Code Status: Level 1 - Full Code    Subjective   + Fatigue, lightheadedness on exertion, increased exercise tolerance  Discussed her low hemoglobin today and possible blood transfusion  She agrees with blood  transfusion  No bleeding from postop site    Objective :  Temp:  [98.1 °F (36.7 °C)-99.8 °F (37.7 °C)] 98.3 °F (36.8 °C)  HR:  [] 110  BP: ()/(55-78) 158/76  Resp:  [16-20] 20  SpO2:  [95 %-100 %] 96 %  O2 Device: None (Room air)    Body mass index is 42.94 kg/m².     Input and Output Summary (last 24 hours):     Intake/Output Summary (Last 24 hours) at 10/6/2024 1140  Last data filed at 10/5/2024 1801  Gross per 24 hour   Intake 480 ml   Output --   Net 480 ml       Physical Exam  Vitals reviewed.   Constitutional:       Appearance: She is not ill-appearing.   HENT:      Head: Normocephalic and atraumatic.      Nose: No congestion or rhinorrhea.   Eyes:      General: No scleral icterus.  Cardiovascular:      Rate and Rhythm: Normal rate and regular rhythm.   Pulmonary:      Breath sounds: No wheezing or rhonchi.   Abdominal:      Palpations: Abdomen is soft.      Tenderness: There is no abdominal tenderness. There is no guarding.   Musculoskeletal:      Right lower leg: No edema.   Skin:     General: Skin is warm and dry.   Neurological:      Mental Status: She is oriented to person, place, and time.   Psychiatric:         Mood and Affect: Mood normal.         Behavior: Behavior normal.         Lines/Drains:  Lines/Drains/Airways       Active Status       Name Placement date Placement time Site Days    PICC Line 10/04/24 Right Basilic 10/04/24  1353  Basilic  1                    Central Line:  Goal for removal: N/A - Discharging with PICC for IV ABX/medications               Lab Results: I have reviewed the following results:   Results from last 7 days   Lab Units 10/06/24  0532 10/04/24  0523 10/03/24  0645   WBC Thousand/uL 3.80*   < > 4.35   HEMOGLOBIN g/dL 7.3*   < > 11.3*   HEMATOCRIT % 21.4*   < > 34.2*   PLATELETS Thousands/uL 233   < > 359   SEGS PCT %  --   --  69   LYMPHO PCT %  --   --  14   MONO PCT %  --   --  14*   EOS PCT %  --   --  2    < > = values in this interval not displayed.      Results from last 7 days   Lab Units 10/06/24  0532 10/04/24  0523 10/03/24  0645   SODIUM mmol/L 137   < > 139   POTASSIUM mmol/L 3.1*   < > 3.2*   CHLORIDE mmol/L 100   < > 104   CO2 mmol/L 31   < > 27   BUN mg/dL 9   < > 6   CREATININE mg/dL 0.77   < > 0.81   ANION GAP mmol/L 6   < > 8   CALCIUM mg/dL 8.1*   < > 9.0   ALBUMIN g/dL  --   --  3.7   TOTAL BILIRUBIN mg/dL  --   --  0.99   ALK PHOS U/L  --   --  78   ALT U/L  --   --  6*   AST U/L  --   --  8*   GLUCOSE RANDOM mg/dL 93   < >  --     < > = values in this interval not displayed.     Results from last 7 days   Lab Units 10/06/24  0534   INR  2.98*     Results from last 7 days   Lab Units 10/06/24  1115   POC GLUCOSE mg/dl 112               Recent Cultures (last 7 days):   Results from last 7 days   Lab Units 10/03/24  1613 10/03/24  1612   GRAM STAIN RESULT  No Polys or Bacteria seen  No Polys  No organisms seen No Polys  No organisms seen       Imaging Results Review: I reviewed radiology reports from this admission including: procedure reports.      Last 24 Hours Medication List:     Current Facility-Administered Medications:     acetaminophen (TYLENOL) tablet 650 mg, Q4H PRN    acetaminophen (TYLENOL) tablet 975 mg, Q8H    ascorbic acid (VITAMIN C) tablet 500 mg, BID    calcium carbonate (TUMS) chewable tablet 1,000 mg, Daily PRN    ceFAZolin (ANCEF) IVPB (premix in dextrose) 2,000 mg 50 mL, Q8H, Last Rate: 2,000 mg (10/06/24 0339)    Cholecalciferol (VITAMIN D3) tablet 2,000 Units, Daily    docusate sodium (COLACE) capsule 100 mg, BID    folic acid (FOLVITE) tablet 1 mg, Daily    gabapentin (NEURONTIN) capsule 300 mg, HS    hydroxychloroquine (PLAQUENIL) tablet 400 mg, Daily With Breakfast    lactated ringers infusion, Continuous, Last Rate: Stopped (10/04/24 0926)    multivitamin-minerals (CENTRUM) tablet 1 tablet, Daily    ondansetron (ZOFRAN) injection 4 mg, Q6H PRN    oxyCODONE (ROXICODONE) immediate release tablet 10 mg, Q4H PRN     oxyCODONE (ROXICODONE) IR tablet 5 mg, Q4H PRN    pantoprazole (PROTONIX) EC tablet 40 mg, Early Morning    senna (SENOKOT) tablet 8.6 mg, Daily    sildenafil (REVATIO) tablet 40 mg, TID    simethicone (MYLICON) chewable tablet 80 mg, 4x Daily PRN    traZODone (DESYREL) tablet 100 mg, HS PRN    [Held by provider] warfarin (COUMADIN) tablet 5 mg, Daily (warfarin)      **Please Note: This note may have been constructed using a voice recognition system.**

## 2024-10-06 NOTE — ASSESSMENT & PLAN NOTE
With history of DVT and on lifelong anticoagulation  Outpatient Coumadin treatment plan noted.  Her INR goal is 2-3  Resume warfarin tonight.  INR is now therapeutic

## 2024-10-06 NOTE — ASSESSMENT & PLAN NOTE
She follows with Dr. Loredo her rheumatologist at [facility] who is aware of her current infection  She has been in communication with him regarding her medications  She was told to hold Rinvoq and resume her Benlysta injection weekly.  She was allowed to continue her Plaquenil

## 2024-10-06 NOTE — PROGRESS NOTES
Progress Note - Orthopedics   Name: Na Joseph 59 y.o. female I MRN: [id number]  Unit/Bed#: E2 -01 I Date of Admission: 10/3/2024   Date of Service: 10/6/2024 I Hospital Day: 3    Assessment & Plan  Infection of total knee replacement  (HCC)  59F s/p L TKA revision for staph epi PJI 10/3  - multi-modal pain control  - ancef q8 hrs   - DVT ppx: coumadin, INR therapeutic  - PT/OT  - WBAT  - ROM as tolerated, pillow/blankets under achilles not behind knee while in bed  - f/u I/O cultures: NGTD, preliminary results for anaerobic cultures, tissues NG and final, fungal cultures pending  - drop >2 gram in Hgb consistent with ABLA  - Morbid obesity due to excess calories a/e/b BMI 42.94 treated with nutritional diet and education   - synovasure + staphy epi  - picc line in place  - med mgt per [facility]  - f/u 10-14 days   PONV (postoperative nausea and vomiting)  Continue with zofran,  Acute blood loss as cause of postoperative anemia  [facility] will transfuse PRBCs  Hypokalemia  Managed by [facility]        Subjective   59 y.o.female sitting up in a chair this AM.  Admits feeling tired but no other complaints.  Denies lightheadedness, dizziness.    Objective :  Temp:  [98.1 °F (36.7 °C)-99.8 °F (37.7 °C)] 98.3 °F (36.8 °C)  HR:  [] 110  BP: ()/(55-78) 158/76  Resp:  [16-20] 20  SpO2:  [95 %-100 %] 96 %  O2 Device: None (Room air)    Physical Exam  Musculoskeletal: leftlower  Skin intact . No erythema or ecchymosis.  Dressing intact, overlying ace wrap intact  LLE: EHL/AT/GS intact, sensation grossly intact L4, L5, S1, palpable pedal pulse        Lab Results: I have reviewed the following results:  Recent Labs     10/04/24  0523 10/04/24  1339 10/05/24  0513 10/05/24  1008 10/05/24  1332 10/06/24  0532 10/06/24  0534   WBC 5.89  --  6.54  --   --  3.80*  --    HGB 8.7*  --  8.3*  --   --  7.3*  --    HCT 25.8*  --  24.8*  --   --  21.4*  --      --  314  --   --  233  --    BUN 7  --  8  --   --  9  --   "  CREATININE 0.72  --  1.03  --   --  0.77  --    INR  --    < >  --  3.52* 3.55*  --  2.98*    < > = values in this interval not displayed.     Blood Culture:  No results found for: \"BLOODCX\"  Wound Culture: No results found for: \"WOUNDCULT\"  "

## 2024-10-06 NOTE — ASSESSMENT & PLAN NOTE
59F s/p L TKA revision for staph epi PJI 10/3  - multi-modal pain control  - ancef q8 hrs   - DVT ppx: coumadin, INR therapeutic  - PT/OT  - WBAT  - ROM as tolerated, pillow/blankets under achilles not behind knee while in bed  - f/u I/O cultures: NGTD, preliminary results for anaerobic cultures, tissues NG and final, fungal cultures pending  - drop >2 gram in Hgb consistent with ABLA  - Morbid obesity due to excess calories a/e/b BMI 42.94 treated with nutritional diet and education   - synovasure + staphy epi  - picc line in place  - med mgt per [facility]  - f/u 10-14 days

## 2024-10-06 NOTE — PLAN OF CARE
Problem: Prexisting or High Potential for Compromised Skin Integrity  Goal: Skin integrity is maintained or improved  Description: INTERVENTIONS:  - Identify patients at risk for skin breakdown  - Assess and monitor skin integrity  - Assess and monitor nutrition and hydration status  - Monitor labs   - Assess for incontinence   - Turn and reposition patient  - Assist with mobility/ambulation  - Relieve pressure over bony prominences  - Avoid friction and shearing  - Provide appropriate hygiene as needed including keeping skin clean and dry  - Evaluate need for skin moisturizer/barrier cream  - Collaborate with interdisciplinary team   - Patient/family teaching  - Consider wound care consult   Outcome: Progressing     Problem: PAIN - ADULT  Goal: Verbalizes/displays adequate comfort level or baseline comfort level  Description: Interventions:  - Encourage patient to monitor pain and request assistance  - Assess pain using appropriate pain scale  - Administer analgesics based on type and severity of pain and evaluate response  - Implement non-pharmacological measures as appropriate and evaluate response  - Consider cultural and social influences on pain and pain management  - Notify physician/advanced practitioner if interventions unsuccessful or patient reports new pain  Outcome: Progressing     Problem: INFECTION - ADULT  Goal: Absence or prevention of progression during hospitalization  Description: INTERVENTIONS:  - Assess and monitor for signs and symptoms of infection  - Monitor lab/diagnostic results  - Monitor all insertion sites, i.e. indwelling lines, tubes, and drains  - Monitor endotracheal if appropriate and nasal secretions for changes in amount and color  - [place] appropriate cooling/warming therapies per order  - Administer medications as ordered  - Instruct and encourage patient and family to use good hand hygiene technique  - Identify and instruct in appropriate isolation precautions for  identified infection/condition  Outcome: Progressing     Problem: SAFETY ADULT  Goal: Patient will remain free of falls  Description: INTERVENTIONS:  - Educate patient/family on patient safety including physical limitations  - Instruct patient to call for assistance with activity   - Consult OT/PT to assist with strengthening/mobility   - Keep Call bell within reach  - Keep bed low and locked with side rails adjusted as appropriate  - Keep care items and personal belongings within reach  - Initiate and maintain comfort rounds  - Make Fall Risk Sign visible to staff  - Offer Toileting every 2 Hours, in advance of need  - Initiate/Maintain bed alarm  - Obtain necessary fall risk management equipment: yellow socks  - Apply yellow socks and bracelet for high fall risk patients  - Consider moving patient to room near nurses station  Outcome: Progressing  Goal: Maintain or return to baseline ADL function  Description: INTERVENTIONS:  -  Assess patient's ability to carry out ADLs; assess patient's baseline for ADL function and identify physical deficits which impact ability to perform ADLs (bathing, care of mouth/teeth, toileting, grooming, dressing, etc.)  - Assess/evaluate cause of self-care deficits   - Assess range of motion  - Assess patient's mobility; develop plan if impaired  - Assess patient's need for assistive devices and provide as appropriate  - Encourage maximum independence but intervene and supervise when necessary  - Involve family in performance of ADLs  - Assess for home care needs following discharge   - Consider OT consult to assist with ADL evaluation and planning for discharge  - Provide patient education as appropriate  Outcome: Progressing     Problem: DISCHARGE PLANNING  Goal: Discharge to home or other facility with appropriate resources  Description: INTERVENTIONS:  - Identify barriers to discharge w/patient and caregiver  - Arrange for needed discharge resources and transportation as  appropriate  - Identify discharge learning needs (meds, wound care, etc.)  - Arrange for interpretive services to assist at discharge as needed  - Refer to Case Management Department for coordinating discharge planning if the patient needs post-hospital services based on physician/advanced practitioner order or complex needs related to functional status, cognitive ability, or social support system  Outcome: Progressing

## 2024-10-06 NOTE — ASSESSMENT & PLAN NOTE
Hemoglobin 7.3 and symptomatic  Discussed blood transfusion.  Consent signed by patient and myself.    Will order blood  1 unit PRBC

## 2024-10-06 NOTE — PLAN OF CARE
Problem: Prexisting or High Potential for Compromised Skin Integrity  Goal: Skin integrity is maintained or improved  Description: INTERVENTIONS:  - Identify patients at risk for skin breakdown  - Assess and monitor skin integrity  - Assess and monitor nutrition and hydration status  - Monitor labs   - Assess for incontinence   - Turn and reposition patient  - Assist with mobility/ambulation  - Relieve pressure over bony prominences  - Avoid friction and shearing  - Provide appropriate hygiene as needed including keeping skin clean and dry  - Evaluate need for skin moisturizer/barrier cream  - Collaborate with interdisciplinary team   - Patient/family teaching  - Consider wound care consult   Outcome: Progressing     Problem: PAIN - ADULT  Goal: Verbalizes/displays adequate comfort level or baseline comfort level  Description: Interventions:  - Encourage patient to monitor pain and request assistance  - Assess pain using appropriate pain scale  - Administer analgesics based on type and severity of pain and evaluate response  - Implement non-pharmacological measures as appropriate and evaluate response  - Consider cultural and social influences on pain and pain management  - Notify physician/advanced practitioner if interventions unsuccessful or patient reports new pain  Outcome: Progressing     Problem: INFECTION - ADULT  Goal: Absence or prevention of progression during hospitalization  Description: INTERVENTIONS:  - Assess and monitor for signs and symptoms of infection  - Monitor lab/diagnostic results  - Monitor all insertion sites, i.e. indwelling lines, tubes, and drains  - Monitor endotracheal if appropriate and nasal secretions for changes in amount and color  - [place] appropriate cooling/warming therapies per order  - Administer medications as ordered  - Instruct and encourage patient and family to use good hand hygiene technique  - Identify and instruct in appropriate isolation precautions for  identified infection/condition  Outcome: Progressing     Problem: SAFETY ADULT  Goal: Patient will remain free of falls  Description: INTERVENTIONS:  - Educate patient/family on patient safety including physical limitations  - Instruct patient to call for assistance with activity   - Consult OT/PT to assist with strengthening/mobility   - Keep Call bell within reach  - Keep bed low and locked with side rails adjusted as appropriate  - Keep care items and personal belongings within reach  - Initiate and maintain comfort rounds  - Make Fall Risk Sign visible to staff  - Offer Toileting every 2 Hours, in advance of need  - Initiate/Maintain bed alarm  - Obtain necessary fall risk management equipment: gripper socks and call bell  - Apply yellow socks and bracelet for high fall risk patients  - Consider moving patient to room near nurses station  Outcome: Progressing  Goal: Maintain or return to baseline ADL function  Description: INTERVENTIONS:  -  Assess patient's ability to carry out ADLs; assess patient's baseline for ADL function and identify physical deficits which impact ability to perform ADLs (bathing, care of mouth/teeth, toileting, grooming, dressing, etc.)  - Assess/evaluate cause of self-care deficits   - Assess range of motion  - Assess patient's mobility; develop plan if impaired  - Assess patient's need for assistive devices and provide as appropriate  - Encourage maximum independence but intervene and supervise when necessary  - Involve family in performance of ADLs  - Assess for home care needs following discharge   - Consider OT consult to assist with ADL evaluation and planning for discharge  - Provide patient education as appropriate  Outcome: Progressing     Problem: DISCHARGE PLANNING  Goal: Discharge to home or other facility with appropriate resources  Description: INTERVENTIONS:  - Identify barriers to discharge w/patient and caregiver  - Arrange for needed discharge resources and  transportation as appropriate  - Identify discharge learning needs (meds, wound care, etc.)  - Arrange for interpretive services to assist at discharge as needed  - Refer to Case Management Department for coordinating discharge planning if the patient needs post-hospital services based on physician/advanced practitioner order or complex needs related to functional status, cognitive ability, or social support system  Outcome: Progressing

## 2024-10-07 PROBLEM — Z88.0 PENICILLIN ALLERGY: Status: ACTIVE | Noted: 2024-10-07

## 2024-10-07 LAB
ABO GROUP BLD BPU: NORMAL
ANION GAP SERPL CALCULATED.3IONS-SCNC: 6 MMOL/L (ref 4–13)
BASOPHILS # BLD MANUAL: 0 THOUSAND/UL (ref 0–0.1)
BASOPHILS NFR MAR MANUAL: 0 % (ref 0–1)
BPU ID: NORMAL
BUN SERPL-MCNC: 9 MG/DL (ref 5–25)
CALCIUM SERPL-MCNC: 8.4 MG/DL (ref 8.4–10.2)
CHLORIDE SERPL-SCNC: 104 MMOL/L (ref 96–108)
CO2 SERPL-SCNC: 30 MMOL/L (ref 21–32)
CREAT SERPL-MCNC: 0.69 MG/DL (ref 0.6–1.3)
CROSSMATCH: NORMAL
EOSINOPHIL # BLD MANUAL: 0.04 THOUSAND/UL (ref 0–0.4)
EOSINOPHIL NFR BLD MANUAL: 1 % (ref 0–6)
ERYTHROCYTE [DISTWIDTH] IN BLOOD BY AUTOMATED COUNT: 14.2 % (ref 11.6–15.1)
FUNGUS SPEC CULT: NORMAL
GFR SERPL CREATININE-BSD FRML MDRD: 94 ML/MIN/1.73SQ M
GLUCOSE SERPL-MCNC: 104 MG/DL (ref 65–140)
HCT VFR BLD AUTO: 23.5 % (ref 34.8–46.1)
HGB BLD-MCNC: 7.8 G/DL (ref 11.5–15.4)
HYPERCHROMIA BLD QL SMEAR: PRESENT
INR PPP: 2.82 (ref 0.85–1.19)
LYMPHOCYTES # BLD AUTO: 0.76 THOUSAND/UL (ref 0.6–4.47)
LYMPHOCYTES # BLD AUTO: 19 % (ref 14–44)
MCH RBC QN AUTO: 29.8 PG (ref 26.8–34.3)
MCHC RBC AUTO-ENTMCNC: 33.2 G/DL (ref 31.4–37.4)
MCV RBC AUTO: 90 FL (ref 82–98)
MONOCYTES # BLD AUTO: 0.32 THOUSAND/UL (ref 0–1.22)
MONOCYTES NFR BLD: 8 % (ref 4–12)
NEUTROPHILS # BLD MANUAL: 2.87 THOUSAND/UL (ref 1.85–7.62)
NEUTS SEG NFR BLD AUTO: 72 % (ref 43–75)
PLATELET # BLD AUTO: 231 THOUSANDS/UL (ref 149–390)
PLATELET BLD QL SMEAR: ADEQUATE
PMV BLD AUTO: 9.7 FL (ref 8.9–12.7)
POTASSIUM SERPL-SCNC: 3.5 MMOL/L (ref 3.5–5.3)
PROTHROMBIN TIME: 29.2 SECONDS (ref 12.3–15)
RBC # BLD AUTO: 2.62 MILLION/UL (ref 3.81–5.12)
RBC MORPH BLD: PRESENT
SODIUM SERPL-SCNC: 140 MMOL/L (ref 135–147)
UNIT DISPENSE STATUS: NORMAL
UNIT PRODUCT CODE: NORMAL
UNIT PRODUCT VOLUME: 350 ML
UNIT RH: NORMAL
WBC # BLD AUTO: 3.98 THOUSAND/UL (ref 4.31–10.16)

## 2024-10-07 PROCEDURE — 99024 POSTOP FOLLOW-UP VISIT: CPT | Performed by: STUDENT IN AN ORGANIZED HEALTH CARE EDUCATION/TRAINING PROGRAM

## 2024-10-07 PROCEDURE — 85610 PROTHROMBIN TIME: CPT | Performed by: ORTHOPAEDIC SURGERY

## 2024-10-07 PROCEDURE — 99232 SBSQ HOSP IP/OBS MODERATE 35: CPT | Performed by: PHYSICIAN ASSISTANT

## 2024-10-07 PROCEDURE — 85027 COMPLETE CBC AUTOMATED: CPT | Performed by: INTERNAL MEDICINE

## 2024-10-07 PROCEDURE — 85007 BL SMEAR W/DIFF WBC COUNT: CPT | Performed by: INTERNAL MEDICINE

## 2024-10-07 PROCEDURE — 99233 SBSQ HOSP IP/OBS HIGH 50: CPT | Performed by: STUDENT IN AN ORGANIZED HEALTH CARE EDUCATION/TRAINING PROGRAM

## 2024-10-07 PROCEDURE — 97116 GAIT TRAINING THERAPY: CPT

## 2024-10-07 PROCEDURE — 97110 THERAPEUTIC EXERCISES: CPT

## 2024-10-07 PROCEDURE — 80048 BASIC METABOLIC PNL TOTAL CA: CPT | Performed by: INTERNAL MEDICINE

## 2024-10-07 RX ADMIN — SILDENAFIL 40 MG: 20 TABLET, FILM COATED ORAL at 20:03

## 2024-10-07 RX ADMIN — FOLIC ACID 1 MG: 1 TABLET ORAL at 08:08

## 2024-10-07 RX ADMIN — CEFAZOLIN SODIUM 2000 MG: 2 SOLUTION INTRAVENOUS at 14:05

## 2024-10-07 RX ADMIN — OXYCODONE HYDROCHLORIDE AND ACETAMINOPHEN 500 MG: 500 TABLET ORAL at 08:07

## 2024-10-07 RX ADMIN — OXYCODONE HYDROCHLORIDE 5 MG: 5 TABLET ORAL at 20:02

## 2024-10-07 RX ADMIN — PANTOPRAZOLE SODIUM 40 MG: 40 TABLET, DELAYED RELEASE ORAL at 05:45

## 2024-10-07 RX ADMIN — SILDENAFIL 40 MG: 20 TABLET, FILM COATED ORAL at 15:38

## 2024-10-07 RX ADMIN — OXYCODONE HYDROCHLORIDE AND ACETAMINOPHEN 500 MG: 500 TABLET ORAL at 17:34

## 2024-10-07 RX ADMIN — POTASSIUM CHLORIDE 40 MEQ: 1500 TABLET, EXTENDED RELEASE ORAL at 08:07

## 2024-10-07 RX ADMIN — CEFAZOLIN SODIUM 2000 MG: 2 SOLUTION INTRAVENOUS at 05:57

## 2024-10-07 RX ADMIN — ACETAMINOPHEN 975 MG: 325 TABLET, FILM COATED ORAL at 14:12

## 2024-10-07 RX ADMIN — GABAPENTIN 300 MG: 300 CAPSULE ORAL at 21:02

## 2024-10-07 RX ADMIN — ACETAMINOPHEN 975 MG: 325 TABLET, FILM COATED ORAL at 21:02

## 2024-10-07 RX ADMIN — ACETAMINOPHEN 975 MG: 325 TABLET, FILM COATED ORAL at 05:45

## 2024-10-07 RX ADMIN — WARFARIN SODIUM 5 MG: 5 TABLET ORAL at 17:34

## 2024-10-07 RX ADMIN — HYDROXYCHLOROQUINE SULFATE 400 MG: 200 TABLET, FILM COATED ORAL at 08:07

## 2024-10-07 RX ADMIN — Medication 1 TABLET: at 08:08

## 2024-10-07 RX ADMIN — SILDENAFIL 40 MG: 20 TABLET, FILM COATED ORAL at 08:08

## 2024-10-07 RX ADMIN — CEFAZOLIN SODIUM 2000 MG: 2 SOLUTION INTRAVENOUS at 20:55

## 2024-10-07 RX ADMIN — Medication 2000 UNITS: at 08:08

## 2024-10-07 NOTE — ASSESSMENT & PLAN NOTE
With history of DVT and on lifelong anticoagulation  Outpatient Coumadin treatment plan noted.  Coumadin with lovenox bridge  Her INR goal is 2-3  Resumed warfarin 10/6 -INR is in therapeutic range

## 2024-10-07 NOTE — PROGRESS NOTES
Progress Note - Infectious Disease   Name: Na Joseph 59 y.o. female I MRN: [id number]  Unit/Bed#: E2 -01 I Date of Admission: 10/3/2024   Date of Service: 10/7/2024 I Hospital Day: 4     Assessment & Plan  Infection of total knee replacement  (HCC)  History of left TKA 10/6/22. Developed 2 weeks of left knee pain, fevers, chills and was seen by orthopedic surgery 9/23.  Arthrocentesis performed and Synovasure was positive with Staph epidermidis isolated.  Now status post left TKA revision with implantation of an articulating antibiotic spacer (1.5 stage revision arthroplasty) 10/3/24.  Operative cultures growing staph epidermidis in broth only.   -Continue IV Cefazolin 2g every 8 hours   -Follow-up operative cultures to guide final antibiotic plan   -Recommend a 6 week course of IV antibiotics through 11/13/24 followed by 4.5 months p.o. antibiotics (6 months total) due to 1.5 stage revision arthroplasty   -Orthopedic surgery follow-up ongoing   -weekly CBC diff, Cr on IV antibiotics    -Will arrange ID follow-up after discharge   -Monitor CBC and creatinine to assess for treatment response, drug toxicities  SLE (systemic lupus erythematosus) (HCC)  Patient on hydroxychloroquine and Rinvoq outpatient.  Follows with a rheumatologist at [facility].  The Rinvoq has been on hold due to left knee prosthetic joint infection. -Management of immunosuppression per her rheumatologist.  She has been continued on hydroxychloroquine and Rinvoq is on hold  Antiphospholipid antibody syndrome (HCC)  On Coumadin outpatient, follows with hematology.  Monitor INR  History of left knee replacement  10/6/2022.  Now complicated by #1 above.  Orthopedic surgery follow-up ongoing.    Penicillin allergy  Tolerating Cefazolin. Reports as rash and anaphylaxis. Will continue to monitor     I have discussed the above management plan to continue Cefazolin with the orthopedic surgery AP CM. Discussed with the patient at bedside. ID  will follow.    Antibiotics:  Cefazolin day 4    Subjective   The patient is overall feeling well this AM. She continues to have left knee pain but feels it is more of a post-operative pain. She has no fevers here, reports some subjective chills. Tolerating Ancef without nausea, diarrhea.    Objective :  Temp:  [97.8 °F (36.6 °C)-99.6 °F (37.6 °C)] 99.6 °F (37.6 °C)  HR:  [89-97] 92  BP: (109-135)/(58-75) 109/58  Resp:  [18-20] 20  SpO2:  [96 %-98 %] 96 %  O2 Device: None (Room air)    General:  No acute distress  Psychiatric:  Awake and alert  Pulmonary:  Normal respiratory excursion without accessory muscle use  Cardiac: RRR, no murmurs  Abdomen:  Soft, nontender  Extremities:  left knee anterior dressing in place, no surrounding erythema. Right knee TKA  Skin:  No rashes      Lab Results: I have reviewed the following results:  Results from last 7 days   Lab Units 10/07/24  0557 10/06/24  0532 10/05/24  0513   WBC Thousand/uL 3.98* 3.80* 6.54   HEMOGLOBIN g/dL 7.8* 7.3* 8.3*   PLATELETS Thousands/uL 231 233 314     Results from last 7 days   Lab Units 10/07/24  0557 10/06/24  0532 10/05/24  0513 10/04/24  0523 10/03/24  0645   SODIUM mmol/L 140 137 135   < > 139   POTASSIUM mmol/L 3.5 3.1* 3.5   < > 3.2*   CHLORIDE mmol/L 104 100 98   < > 104   CO2 mmol/L 30 31 26   < > 27   BUN mg/dL 9 9 8   < > 6   CREATININE mg/dL 0.69 0.77 1.03   < > 0.81   EGFR ml/min/1.73sq m 94 83 58   < > 78   CALCIUM mg/dL 8.4 8.1* 8.4   < > 9.0   AST U/L  --   --   --   --  8*   ALT U/L  --   --   --   --  6*   ALK PHOS U/L  --   --   --   --  78   ALBUMIN g/dL  --   --   --   --  3.7    < > = values in this interval not displayed.     Results from last 7 days   Lab Units 10/03/24  1613 10/03/24  1612   GRAM STAIN RESULT  No Polys  No organisms seen  No Polys or Bacteria seen No Polys  No organisms seen   I personally reviewed left knee xray in PACS which did not show any hardware loosening

## 2024-10-07 NOTE — PLAN OF CARE
Problem: Prexisting or High Potential for Compromised Skin Integrity  Goal: Skin integrity is maintained or improved  Description: INTERVENTIONS:  - Identify patients at risk for skin breakdown  - Assess and monitor skin integrity  - Assess and monitor nutrition and hydration status  - Monitor labs   - Assess for incontinence   - Turn and reposition patient  - Assist with mobility/ambulation  - Relieve pressure over bony prominences  - Avoid friction and shearing  - Provide appropriate hygiene as needed including keeping skin clean and dry  - Evaluate need for skin moisturizer/barrier cream  - Collaborate with interdisciplinary team   - Patient/family teaching  - Consider wound care consult   Outcome: Progressing     Problem: PAIN - ADULT  Goal: Verbalizes/displays adequate comfort level or baseline comfort level  Description: Interventions:  - Encourage patient to monitor pain and request assistance  - Assess pain using appropriate pain scale  - Administer analgesics based on type and severity of pain and evaluate response  - Implement non-pharmacological measures as appropriate and evaluate response  - Consider cultural and social influences on pain and pain management  - Notify physician/advanced practitioner if interventions unsuccessful or patient reports new pain  Outcome: Progressing     Problem: INFECTION - ADULT  Goal: Absence or prevention of progression during hospitalization  Description: INTERVENTIONS:  - Assess and monitor for signs and symptoms of infection  - Monitor lab/diagnostic results  - Monitor all insertion sites, i.e. indwelling lines, tubes, and drains  - Monitor endotracheal if appropriate and nasal secretions for changes in amount and color  - [place] appropriate cooling/warming therapies per order  - Administer medications as ordered  - Instruct and encourage patient and family to use good hand hygiene technique  - Identify and instruct in appropriate isolation precautions for  identified infection/condition  Outcome: Progressing     Problem: SAFETY ADULT  Goal: Patient will remain free of falls  Description: INTERVENTIONS:  - Educate patient/family on patient safety including physical limitations  - Instruct patient to call for assistance with activity   - Consult OT/PT to assist with strengthening/mobility   - Keep Call bell within reach  - Keep bed low and locked with side rails adjusted as appropriate  - Keep care items and personal belongings within reach  - Initiate and maintain comfort rounds  - Make Fall Risk Sign visible to staff  - Offer Toileting every 2 Hours, in advance of need  - Initiate/Maintain bed alarm  - Obtain necessary fall risk management equipment: yellow socks, yellow bracelet, bed alarm  - Apply yellow socks and bracelet for high fall risk patients  - Consider moving patient to room near nurses station  Outcome: Progressing  Goal: Maintain or return to baseline ADL function  Description: INTERVENTIONS:  -  Assess patient's ability to carry out ADLs; assess patient's baseline for ADL function and identify physical deficits which impact ability to perform ADLs (bathing, care of mouth/teeth, toileting, grooming, dressing, etc.)  - Assess/evaluate cause of self-care deficits   - Assess range of motion  - Assess patient's mobility; develop plan if impaired  - Assess patient's need for assistive devices and provide as appropriate  - Encourage maximum independence but intervene and supervise when necessary  - Involve family in performance of ADLs  - Assess for home care needs following discharge   - Consider OT consult to assist with ADL evaluation and planning for discharge  - Provide patient education as appropriate  Outcome: Progressing     Problem: DISCHARGE PLANNING  Goal: Discharge to home or other facility with appropriate resources  Description: INTERVENTIONS:  - Identify barriers to discharge w/patient and caregiver  - Arrange for needed discharge resources and  transportation as appropriate  - Identify discharge learning needs (meds, wound care, etc.)  - Arrange for interpretive services to assist at discharge as needed  - Refer to Case Management Department for coordinating discharge planning if the patient needs post-hospital services based on physician/advanced practitioner order or complex needs related to functional status, cognitive ability, or social support system  Outcome: Progressing

## 2024-10-07 NOTE — CASE MANAGEMENT
Case Management Discharge Planning Note    Patient name Na Joseph  Location [facility] /E2 -* MRN [id number]  : 1965 Date 10/7/2024       Current Admission Date: 10/3/2024  Current Admission Diagnosis:Infection of total knee replacement  (HCC)   Patient Active Problem List    Diagnosis Date Noted Date Diagnosed    Penicillin allergy 10/07/2024     Acute blood loss as cause of postoperative anemia 10/06/2024     Hypokalemia 10/06/2024     Infection of total knee replacement  (HCC) 10/02/2024     Maltracking of left patella 2024     GRIS (obstructive sleep apnea) 2024     Other sleep disorders 2024     Thyroid nodule 2024     Neutropenia, unspecified type ([facility]) 2024     Rheumatoid arthritis, involving unspecified site, unspecified whether rheumatoid factor present ([facility]) 2024     BMI 45.0-49.9, adult ([facility]) 2024     Chronic midline low back pain without sciatica 2024     Motion sickness 2024     Fall 2023     Anticoagulated 2023     Sleep disturbance 2023     Neuropathic pain, leg, left 2023     Antiphospholipid antibody syndrome ([facility]) 2023     Financial difficulties 2023     Insomnia 2023     Pulmonary embolus ([facility]) 2023     Iron deficiency anemia due to chronic blood loss 2022     COVID-19 2022     SLE (systemic lupus erythematosus) ([facility]) 2022     History of left knee replacement 10/20/2022     PONV (postoperative nausea and vomiting) 10/06/2022     Venous stasis of lower extremity 2022     Acute pain of right knee 2022     Leukopenia 2021     Morbid obesity ([facility])      Primary osteoarthritis of left knee 2020     Left knee pain 2019     Tear of medial meniscus of left knee, current 2019     Right knee pain 2019     Status post total right knee replacement 2019     Chronic kidney disease, stage 3a ([facility]) 2019     Tear of medial  meniscus of right knee, current 09/10/2018     Other tear of medial meniscus, current injury, right knee, initial encounter 07/16/2018     Patellofemoral disorder of right knee 06/04/2018     Pes anserinus bursitis of right knee 06/04/2018     Arthritis of right knee 06/04/2018     Arthritis of left knee 06/04/2018     Chronic pain of right knee 05/22/2018     Elevated serum creatinine 05/22/2018     Hyperuricemia 05/22/2018     Long-term use of hydroxychloroquine 02/16/2018     Pulmonary hypertension (HCC) 02/16/2018     Undifferentiated connective tissue disease (HCC) 02/16/2018     Secondary pulmonary hypertension 12/15/2017     Diffuse connective tissue disease (HCC) 11/21/2017     Pes anserine bursitis 11/21/2017     Trochanteric bursitis of both hips 11/21/2017     Vitamin D deficiency 11/21/2017     Other organ or system involvement in systemic lupus erythematosus ([facility]) 11/17/2017     Sinus tachycardia 11/09/2017     B12 deficiency 10/19/2017     Age-related osteoporosis without current pathological fracture 10/19/2017     Lupus anticoagulant disorder ([facility]) 10/18/2017     Positive HELENE (antinuclear antibody) 10/18/2017     Hot flashes due to menopause 09/01/2015     SOB (shortness of breath) on exertion 11/12/2012     Other chronic pulmonary heart diseases 11/05/2012     Edema 06/04/2012     Post-nasal drip 06/04/2012     Chronic cough 02/09/2012     Dyspnea 02/09/2012       LOS (days): 4  Geometric Mean LOS (GMLOS) (days): 3  Days to GMLOS:-0.9     OBJECTIVE:  Risk of Unplanned Readmission Score: 15.68         Current admission status: Inpatient   Preferred Pharmacy:   [address] [address]  Phone: [phone] Fax: [phone]    [id number] - [place] - [address]  [address] [address]  Phone: [phone] Fax: [phone]    [facility]  [place], [place] - [address]  [address] [address]  Phone: [phone] Fax: [phone]    Primary Care Provider: Sultana Arnold DO    Primary Insurance: MEDICARE  Secondary Insurance: BLUE CROSS    DISCHARGE DETAILS:    Discharge planning discussed with:: Patient  Freedom of Choice: Yes  Comments - Freedom of Choice: Agreeable to home infusion with [facility]. [facility] able to supplement SN with [facility]. Referral placed to [facility]  CM contacted family/caregiver?: No- see comments (declined need)  Were Treatment Team discharge recommendations reviewed with patient/caregiver?: Yes  Did patient/caregiver verbalize understanding of patient care needs?: Yes            Requested Home Health Care         Is the patient interested in HHC at discharge?: Yes  Home Health Discipline requested:: Nursing  Home Health Agency Name::  [facility]  Home Health Follow-Up Provider:: Referring Provider  Home Health Services Needed:: Administration of IV, IM or SC Medications  Homebound Criteria Met:: Immunosuppressed  Supporting Clincal Findings:: Limited Endurance    DME Referral Provided  Referral made for DME?: No    Other Referral/Resources/Interventions Provided:  Interventions: HHC, Home Infusion  Referral Comments: Referrals placed via aidin         Treatment Team Recommendation: Home with Home Health Care (w/ infusion)  Discharge Destination Plan:: Home with Home Health Care (w/ infusion)  Transport at Discharge : Family                                      Additional Comments: Referrals placed for home infusion. [facility] is able to provide IV abx and supplement nursing as pt does not meet homebound criteria. Per [facility] pt still with $4,100 left until deductible is reached which will be her total cost for the IV abx. Once deductible would be met, she would be covered at 100%. IV abx plus supplies is $150/day and nurse visit would be $120/visit. Pt made aware and agreeable to this. She reports  having an HSA that she is able to submit anything going towards her deductible too which this CM made [facility] aware of. Pt would like to transition her blood draws and dressing changes to [facility] as that would be able to be billed to her insurance. CM did make pt aware that she needs to be independent with her IV abx prior to this. CM attempted to call [facility] but they were closed for the day. This CM did place in the handoff for covering CM tomorrow to contact [facility] to see if this is something they can do. [facility] Wednesday to have a nurse go out to do her teaching for IV abx.

## 2024-10-07 NOTE — UTILIZATION REVIEW
Initial Clinical Review    Elective  IP   surgical procedure    Age/Sex: 59 y.o. female    Surgery Date:   10/3/24    Procedure: Left - ARTHROPLASTY KNEE TOTAL REVISION TO HAND-CRAFTED ANTIBIOTIC ARTHROPLASTY   Left - INSERTION OF HAND-CRAFTED DRUG DELIVERY DEVICE (STIMULAN)     Left - EXTENSIVE EXCISIONAL DEBRIDEMENT DOWN TO AND INCLUDING BONE     Anesthesia:  GETA    Operative Findings: Purulent fluid  Collection of purulence behind the femoral and patellar components  Well-fixed implants    POD#1 Progress Note:   10/4   Continue psot op care.  Needs  PT/OT.  Can be  WBAT  LLE.   Continue pain control.   On SPRING  and  PICC to be placed.      ID consult  Needs  6  weeks  SPRING  followed by  4-5  months po antibiotics  due to  1.5  stage  revision  arthroplasty.          Admission Orders: Date/Time/Statement:   Admission Orders (From admission, onward)       Ordered        10/03/24 1853  Inpatient Admission  Once                          Orders Placed This Encounter   Procedures    Inpatient Admission     Standing Status:   Standing     Number of Occurrences:   1     Order Specific Question:   Level of Care     Answer:   Med Surg [16]     Order Specific Question:   Estimated length of stay     Answer:   Inpatient Only Surgery     Diet:  regular    Mobility:   PT/OT    DVT Prophylaxis:  SCD's    Medications/Pain Control:   Scheduled Medications:  acetaminophen, 975 mg, Oral, Q8H  ascorbic acid, 500 mg, Oral, BID  cefazolin, 2,000 mg, Intravenous, Q8H  cholecalciferol, 2,000 Units, Oral, Daily  docusate sodium, 100 mg, Oral, BID  folic acid, 1 mg, Oral, Daily  gabapentin, 300 mg, Oral, HS  hydroxychloroquine, 400 mg, Oral, Daily With Breakfast  multivitamin-minerals, 1 tablet, Oral, Daily  pantoprazole, 40 mg, Oral, Early Morning  senna, 1 tablet, Oral, Daily  sildenafil, 40 mg, Oral, TID  warfarin, 5 mg, Oral, Daily (warfarin)      Continuous IV Infusions:     PRN Meds:  acetaminophen, 650 mg, Oral, Q4H PRN  calcium  carbonate, 1,000 mg, Oral, Daily PRN  ondansetron, 4 mg, Intravenous, Q6H PRN  oxyCODONE, 10 mg, Oral, Q4H PRN  oxyCODONE, 5 mg, Oral, Q4H PRN  simethicone, 80 mg, Oral, 4x Daily PRN  traZODone, 100 mg, Oral, HS PRN      Vital Signs (last 3 days)       Date/Time Temp Pulse Resp BP MAP (mmHg) SpO2 O2 Device Patient Position - Orthostatic VS [place] Coma Scale Score Pain    10/07/24 1505 98.3 °F (36.8 °C) 95 20 105/66 -- 98 % None (Room air) Sitting -- --    10/07/24 1412 -- -- -- -- -- -- -- -- -- 4    10/07/24 0817 -- -- -- -- -- -- -- -- 15 3    10/07/24 0700 99.6 °F (37.6 °C) -- -- 109/58 -- 96 % -- -- -- No Pain    10/07/24 0545 -- -- -- -- -- -- -- -- -- 2    10/06/24 2143 -- -- -- -- -- -- -- -- -- 3    10/06/24 2125 97.8 °F (36.6 °C) 92 20 115/69 78 97 % None (Room air) Lying -- --    10/06/24 2007 -- -- -- -- -- -- -- -- 15 3    10/06/24 1637 98.8 °F (37.1 °C) 89 18 109/73 80 98 % None (Room air) Lying -- --    10/06/24 1509 -- -- -- -- -- -- -- -- -- 4    10/06/24 1448 99.1 °F (37.3 °C) 97 18 135/75 -- 98 % None (Room air) Lying -- 3    10/06/24 1359 99 °F (37.2 °C) 93 18 123/73 -- 100 % None (Room air) -- -- --    10/06/24 1343 98.5 °F (36.9 °C) 97 18 126/76 91 97 % None (Room air) Lying -- --    10/06/24 1118 -- 110 20 158/76 -- 96 % None (Room air) Sitting -- --    10/06/24 1057 98.3 °F (36.8 °C) 90 16 112/62 -- 100 % None (Room air) Sitting -- 3    10/06/24 0820 99.7 °F (37.6 °C) 98 16 113/55 -- 100 % None (Room air) Sitting 15 3    10/06/24 0523 -- -- -- -- -- -- -- -- -- Med Not Given for Pain - for MAR use only    10/06/24 0444 -- -- -- -- -- -- -- -- -- 5    10/05/24 2239 98.6 °F (37 °C) 95 16 111/62 -- 96 % None (Room air) Lying -- --    10/05/24 2123 -- -- -- -- -- -- -- -- -- 3    10/05/24 1956 -- -- -- -- -- -- -- -- 15 5    10/05/24 1949 98.6 °F (37 °C) 90 16 98/70 -- 96 % None (Room air) Lying -- --    10/05/24 1822 -- -- -- -- -- -- -- -- -- 7    10/05/24 1545 99.8 °F (37.7 °C) 108 18 118/78  88 95 % None (Room air) Sitting -- 5    10/05/24 1341 -- -- -- -- -- -- -- -- -- 5    10/05/24 1339 -- -- -- -- -- -- -- -- -- 4    10/05/24 1305 98.1 °F (36.7 °C) 100 16 130/70 79 98 % None (Room air) Sitting -- --    10/05/24 1141 -- -- -- -- -- -- -- -- -- 4    10/05/24 0859 -- -- -- -- -- -- -- -- -- 6    10/05/24 0802 -- -- -- -- -- -- -- -- -- 5    10/05/24 0800 98.2 °F (36.8 °C) 100 18 136/79 92 96 % None (Room air) Sitting -- No Pain    10/05/24 0513 -- -- -- -- -- -- -- -- -- Med Not Given for Pain - for MAR use only    10/05/24 0307 97.8 °F (36.6 °C) 94 16 134/72 83 93 % None (Room air) Lying -- --    10/04/24 2308 97.8 °F (36.6 °C) 104 18 139/78 92 97 % None (Room air) Lying -- --    10/04/24 2013 -- -- -- -- -- -- -- -- 15 4    10/04/24 1958 97.9 °F (36.6 °C) 98 18 114/78 90 97 % None (Room air) Lying -- --    10/04/24 1930 -- -- -- -- -- -- -- -- -- 5    10/04/24 1643 -- -- -- -- -- -- -- -- -- 3    10/04/24 1445 97.7 °F (36.5 °C) 95 18 120/70 82 97 % None (Room air) Lying -- --    10/04/24 1308 -- -- -- -- -- -- -- -- -- 5    10/04/24 1130 98.1 °F (36.7 °C) 90 16 118/74 83 98 % None (Room air) Lying -- --    10/04/24 1100 -- -- -- -- -- -- -- -- -- 4    10/04/24 1040 -- -- -- -- -- -- -- -- -- 5    10/04/24 0934 -- -- -- -- -- -- -- -- -- 4    10/04/24 0807 98 °F (36.7 °C) 96 18 121/65 72 94 % None (Room air) Lying -- --    10/04/24 0517 -- -- -- -- -- -- -- -- -- 4    10/04/24 0320 -- -- -- -- -- -- -- -- -- 7    10/04/24 0002 -- -- -- -- -- -- -- -- -- 4              Pertinent Labs/Diagnostic Test Results:   Radiology:  RADIOLOGY RESULTS   Final Interpretation by  (10/07 1109)      XR knee left 1 or 2 views   Final Interpretation by Arcenio Hurst MD (10/04 0524)   Revised total left knee arthroplasty.               Computerized Assisted Algorithm (CAA) may have been used to analyze all applicable images.         Workstation performed: [id number]               Results from last 7 days   Lab  Units 10/07/24  0557 10/06/24  0532 10/05/24  0513 10/04/24  0523 10/03/24  0645   WBC Thousand/uL 3.98* 3.80* 6.54 5.89 4.35   HEMOGLOBIN g/dL 7.8* 7.3* 8.3* 8.7* 11.3*   HEMATOCRIT % 23.5* 21.4* 24.8* 25.8* 34.2*   PLATELETS Thousands/uL 231 233 314 278 359   TOTAL NEUT ABS Thousands/µL  --   --   --   --  3.01         Results from last 7 days   Lab Units 10/07/24  0557 10/06/24  0532 10/05/24  0513 10/04/24  0523 10/03/24  0645   SODIUM mmol/L 140 137 135 134* 139   POTASSIUM mmol/L 3.5 3.1* 3.5 3.9 3.2*   CHLORIDE mmol/L 104 100 98 102 104   CO2 mmol/L 30 31 26 26 27   ANION GAP mmol/L 6 6 11 6 8   BUN mg/dL 9 9 8 7 6   CREATININE mg/dL 0.69 0.77 1.03 0.72 0.81   EGFR ml/min/1.73sq m 94 83 58 90 78   CALCIUM mg/dL 8.4 8.1* 8.4 8.2* 9.0     Results from last 7 days   Lab Units 10/03/24  0645   AST U/L 8*   ALT U/L 6*   ALK PHOS U/L 78   TOTAL PROTEIN g/dL 6.5   ALBUMIN g/dL 3.7   TOTAL BILIRUBIN mg/dL 0.99     Results from last 7 days   Lab Units 10/06/24  1115   POC GLUCOSE mg/dl 112     Results from last 7 days   Lab Units 10/07/24  0557 10/06/24  0532 10/05/24  0513 10/04/24  0523   GLUCOSE RANDOM mg/dL 104 93 125 189*               Results from last 7 days   Lab Units 10/07/24  0557 10/06/24  0534 10/05/24  1332 10/03/24  1433 10/03/24  0645   PROTIME seconds 29.2* 30.5* 34.8*   < > 18.2*   INR  2.82* 2.98* 3.55*   < > 1.43*   PTT seconds  --   --   --   --  40*    < > = values in this interval not displayed.               Results from last 7 days   Lab Units 10/07/24  0530   UNIT PRODUCT CODE  [id number]   UNIT NUMBER  [id number]   UNITABO  A   UNITRH  POS   CROSSMATCH  Compatible   UNIT DISPENSE STATUS  Presumed Trans   UNIT PRODUCT VOL mL 350           Results from last 7 days   Lab Units 10/03/24  1613 10/03/24  1612   GRAM STAIN RESULT  No Polys  No organisms seen  No Polys or Bacteria seen No Polys  No organisms seen                   Network Utilization Review Department  ATTENTION: Please call  with any questions or concerns to [phone] and carefully listen to the prompts so that you are directed to the right person. All voicemails are confidential.   For Discharge needs, contact Care Management DC Support Team at [phone] opt. 2  Send all requests for admission clinical reviews, approved or denied determinations and any other requests to dedicated fax number below belonging to the campus where the patient is receiving treatment. List of dedicated fax numbers for the Facilities:  FACILITY NAME UR FAX NUMBER   ADMISSION DENIALS (Administrative/Medical Necessity) [phone]   DISCHARGE SUPPORT TEAM (NETWORK) [phone]   PARENT CHILD HEALTH (Maternity/NICU/Pediatrics) [phone]   [facility] [phone]   [facility] [phone]   Formerly [facility] [phone]   General acute hospital [phone]   On license of [facility] [phone]   [facility] [phone]   [facility] [phone]   [facility] [phone]   [facility] [phone]   Formerly [facility] [phone]   [facility] [phone]   [facility] [phone]

## 2024-10-07 NOTE — PROGRESS NOTES
Progress Note - Orthopedics   Name: Na Joseph 59 y.o. female I MRN: [id number]  Unit/Bed#: E2 -01 I Date of Admission: 10/3/2024   Date of Service: 10/7/2024 I Hospital Day: 4    Assessment & Plan  Infection of total knee replacement  (HCC)  59F s/p L TKA revision for staph epi PJI 10/3  - multi-modal pain control  - ancef q8 hrs per Infectious disease  - DVT ppx: coumadin, INR therapeutic at 2.82  - PT/OT  - WBAT  - ROM as tolerated, pillow/blankets under achilles not behind knee while in bed  - f/u I/O cultures: One culture growing coag negative staph in broth only. Fungal cultures pending  - drop >2 gram in Hgb consistent with ABLA, Hemoglobin 7.8 this morning.  - synovasure + staph epi  - picc line in place  - med mgt per SLIM  - f/u 10-14 days   PONV (postoperative nausea and vomiting)  Continue with zofran,  Acute blood loss as cause of postoperative anemia  Slim will transfuse PRBCs        Subjective   59 y.o.female seen and examined at the bedside this morning.  She states that she is feeling well overall this morning.  Her knee feels sore, but overall okay.  She has no issues with her PICC line.  No numbness or tingling.  Denies lightheadedness, dizziness.    Objective :  Temp:  [97.8 °F (36.6 °C)-99.7 °F (37.6 °C)] 97.8 °F (36.6 °C)  HR:  [] 92  BP: (109-158)/(55-76) 115/69  Resp:  [16-20] 20  SpO2:  [96 %-100 %] 97 %  O2 Device: None (Room air)    Physical Exam  Musculoskeletal: leftlower  Skin intact . No erythema or ecchymosis.  Mepilex dressing intact, overlying ace wrap intact  Thigh and calf soft and compressible  LLE: EHL/AT/GS intact, sensation grossly intact L4, L5, S1, palpable pedal pulse        Lab Results: I have reviewed the following results:  Recent Labs     10/05/24  0513 10/05/24  1008 10/05/24  1332 10/06/24  0532 10/06/24  0534 10/07/24  0557   WBC 6.54  --   --  3.80*  --  3.98*   HGB 8.3*  --   --  7.3*  --  7.8*   HCT 24.8*  --   --  21.4*  --  23.5*     --   --  " 233  --  231   BUN 8  --   --  9  --  9   CREATININE 1.03  --   --  0.77  --  0.69   INR  --    < > 3.55*  --  2.98* 2.82*    < > = values in this interval not displayed.     Blood Culture:  No results found for: \"BLOODCX\"  Wound Culture: No results found for: \"WOUNDCULT\"        Amina Goldberg PA-C    "

## 2024-10-07 NOTE — ASSESSMENT & PLAN NOTE
Results from last 7 days   Lab Units 10/07/24  0557 10/06/24  0532 10/05/24  0513 10/04/24  0523   HEMOGLOBIN g/dL 7.8* 7.3* 8.3* 8.7*   Hemoglobin 7.3 and symptomatic  S/p 1 unit PRBC 10/6/24  Ongoing monitoring   Would transfuse for hgb <7

## 2024-10-07 NOTE — PHYSICAL THERAPY NOTE
Physical Therapy Treatment Note     10/07/24 1619   PT Last Visit   PT Visit Date 10/07/24   Note Type   Note Type Treatment   Pain Assessment   Pain Assessment Tool 0-10   Pain Score 4   Pain Location/Orientation Orientation: Left;Location: Knee   Restrictions/Precautions   Weight Bearing Precautions Per Order Yes   LLE Weight Bearing Per Order WBAT   Other Precautions WBS;Fall Risk;Pain   General   Chart Reviewed Yes   Family/Caregiver Present Yes   Subjective   Subjective Pt. agreeable to PT   Transfers   Sit to Stand 6  Modified independent   Additional items Armrests   Stand to Sit 6  Modified independent   Additional items Armrests   Stand pivot 6  Modified independent   Additional items Increased time required   Ambulation/Elevation   Gait pattern Improper Weight shift;Decreased L stance;Decreased foot clearance;Excessively slow;Step through pattern   Gait Assistance 5  Supervision   Assistive Device   (rollator)   Distance 350ft   Stair Management Assistance 6  Modified independent   Additional items Increased time required   Stair Management Technique Two rails;Step to pattern;Foreward;Nonreciprocal   Balance   Static Sitting Normal   Dynamic Sitting Good   Static Standing Fair +   Dynamic Standing Fair +   Ambulatory Fair   Endurance Deficit   Endurance Deficit Yes   Endurance Deficit Description pain   Activity Tolerance   Activity Tolerance Patient tolerated treatment well   Nurse Made Aware yes   Exercises   TKR Sitting;Standing;Bilateral;AROM;20 reps;AAROM   Assessment   Prognosis Good   Problem List Decreased strength;Decreased range of motion;Decreased mobility;Pain;Orthopedic restrictions;Obesity   Assessment Pt. progressing well with overall mobility. Encourgaed pt. to ambulate on the unit as able. No LOB noted t/o session. Pt. able to ambulate longer distance this session without rest. Pt. able to perform LLE with AA for LAQ for first 10 reps and AROM for all rest of the Lesley. Pt. provided with ice  pack post session for L knee. Will continue to follow per PT POC during hospital stay.   Barriers to Discharge None   Goals   Patient Goals None reproted   STG Expiration Date 10/11/24   PT Treatment Day 2   Plan   Treatment/Interventions Functional transfer training;LE strengthening/ROM;Elevations;Therapeutic exercise;Spoke to case management;Spoke to nursing;Gait training;Equipment eval/education;Patient/family training   Progress Progressing toward goals   PT Frequency 3-5x/wk   Discharge Recommendation   Rehab Resource Intensity Level, PT III (Minimum Resource Intensity)   AM-PAC Basic Mobility Inpatient   Turning in Flat Bed Without Bedrails 4   Lying on Back to Sitting on Edge of Flat Bed Without Bedrails 4   Moving Bed to Chair 4   Standing Up From Chair Using Arms 4   Walk in Room 4   Climb 3-5 Stairs With Railing 4   Basic Mobility Inpatient Raw Score 24   Basic Mobility Standardized Score 57.68   [facility] Highest Level Of Mobility   -HLM Goal 8: Walk 250 feet or more   -HLM Achieved 8: Walk 250 feet ot more   End of Consult   Patient Position at End of Consult All needs within reach;Bedside chair           Justin Oseguera PTA    An AM-PAC basic mobility standardized score less than 42.9 suggest the patient may benefit from discharge to post-acute rehab services.

## 2024-10-07 NOTE — PLAN OF CARE
Problem: PHYSICAL THERAPY ADULT  Goal: Performs mobility at highest level of function for planned discharge setting.  See evaluation for individualized goals.  Description: Treatment/Interventions: Functional transfer training, LE strengthening/ROM, Elevations, Therapeutic exercise, Endurance training, Patient/family training, Bed mobility, Gait training, Spoke to nursing, OT  Equipment Recommended: Walker (pt has)       See flowsheet documentation for full assessment, interventions and recommendations.  Outcome: Progressing  Note: Prognosis: Good  Problem List: Decreased strength, Decreased range of motion, Decreased mobility, Pain, Orthopedic restrictions, Obesity  Assessment: Pt. progressing well with overall mobility. Encourgaed pt. to ambulate on the unit as able. No LOB noted t/o session. Pt. able to ambulate longer distance this session without rest. Pt. able to perform LLE with AA for LAQ for first 10 reps and AROM for all rest of the Lesley. Pt. provided with ice pack post session for L knee. Will continue to follow per PT POC during hospital stay.  Barriers to Discharge: None     Rehab Resource Intensity Level, PT: III (Minimum Resource Intensity)    See flowsheet documentation for full assessment.

## 2024-10-07 NOTE — PROGRESS NOTES
Progress Note - Hospitalist   Name: Na Joseph 59 y.o. female I MRN: [id number]  Unit/Bed#: E2 -01 I Date of Admission: 10/3/2024   Date of Service: 10/7/2024 I Hospital Day: 4    Assessment & Plan  Infection of total knee replacement  (HCC)  S/p left TKA 10/6/22  Developed 2 weeks of left knee pain and fevers 9/23/24 - arthrocentesis with staph epidermidis   Underwent left TKA revision with implantation of antibiotic spacer 10/3/24  Managed per Ortho and infectious disease- fungal cultures pending, BC with 1/2 positive for likely contaminant  PICC line in place- now on ancef q 8 hrs   Planning on completing course of abx at home  Acute blood loss as cause of postoperative anemia  Results from last 7 days   Lab Units 10/07/24  0557 10/06/24  0532 10/05/24  0513 10/04/24  0523   HEMOGLOBIN g/dL 7.8* 7.3* 8.3* 8.7*   Hemoglobin 7.3 and symptomatic  S/p 1 unit PRBC 10/6/24  Ongoing monitoring   Would transfuse for hgb <7  SLE (systemic lupus erythematosus) (HCC)  She follows with Dr. Loredo her rheumatologist at [facility] who is aware of her current infection  She has been in communication with him regarding her medications  She was told to hold Rinvoq and resume her Benlysta injection weekly.  She was allowed to continue her Plaquenil  Antiphospholipid antibody syndrome (HCC)  With history of DVT and on lifelong anticoagulation  Outpatient Coumadin treatment plan noted.  Coumadin with lovenox bridge  Her INR goal is 2-3  Resumed warfarin 10/6 -INR is in therapeutic range  Pulmonary hypertension (HCC)  Continue sildenafil as prescribed  Follow-up with Dr. Hughes  Hypokalemia  Repleted and resloved    VTE Pharmacologic Prophylaxis:   coumadin    Mobility:   Basic Mobility Inpatient Raw Score: 18  -HLM Goal: 6: Walk 10 steps or more  JH-HLM Achieved: 1: Laying in bed      Patient Centered Rounds: I performed bedside rounds with nursing staff today.   Discussions with Specialists or Other Care Team Provider:  nursing    Education and Discussions with Family / Patient: patient    Current Length of Stay: 4 day(s)  Current Patient Status: Inpatient   Discharge Plan: per primary team, ortho    Code Status: Level 1 - Full Code    Subjective   Resting in chair at bedside. Being set up for home abx. She is eager to go home and hoping for tomorrow once visiting nurses can be set up. She is also eager to sleep in her own bed. No issues eating and drinking. Urinating without difficulty and has had BMs.    Objective :  Temp:  [97.8 °F (36.6 °C)-99.6 °F (37.6 °C)] 99.6 °F (37.6 °C)  HR:  [] 92  BP: (109-158)/(58-76) 109/58  Resp:  [16-20] 20  SpO2:  [96 %-100 %] 96 %  O2 Device: None (Room air)    Body mass index is 42.94 kg/m².     Input and Output Summary (last 24 hours):     Intake/Output Summary (Last 24 hours) at 10/7/2024 1019  Last data filed at 10/7/2024 0756  Gross per 24 hour   Intake 844.58 ml   Output --   Net 844.58 ml       Physical Exam  Vitals and nursing note reviewed.   Constitutional:       General: She is not in acute distress.     Appearance: Normal appearance. She is normal weight. She is not toxic-appearing.   HENT:      Head: Normocephalic and atraumatic.   Eyes:      General: No scleral icterus.  Cardiovascular:      Rate and Rhythm: Normal rate and regular rhythm.   Pulmonary:      Effort: Pulmonary effort is normal. No respiratory distress.      Breath sounds: Normal breath sounds. No stridor. No wheezing or rhonchi.   Abdominal:      General: Bowel sounds are normal. There is no distension.      Palpations: Abdomen is soft. There is no mass.      Tenderness: There is no abdominal tenderness.      Hernia: No hernia is present.   Musculoskeletal:         General: No swelling.      Cervical back: Neck supple.   Skin:     General: Skin is warm and dry.   Neurological:      Mental Status: She is alert and oriented to person, place, and time. Mental status is at baseline.   Psychiatric:         Mood and  Affect: Mood normal.         Behavior: Behavior normal.         Thought Content: Thought content normal.           Lines/Drains:  Lines/Drains/Airways       Active Status       Name Placement date Placement time Site Days    PICC Line 10/04/24 Right Basilic 10/04/24  1353  Basilic  2                    Central Line:  Goal for removal: N/A - Discharging with PICC for IV ABX/medications               Lab Results: I have reviewed the following results:   Results from last 7 days   Lab Units 10/07/24  0557 10/04/24  0523 10/03/24  0645   WBC Thousand/uL 3.98*   < > 4.35   HEMOGLOBIN g/dL 7.8*   < > 11.3*   HEMATOCRIT % 23.5*   < > 34.2*   PLATELETS Thousands/uL 231   < > 359   SEGS PCT %  --   --  69   LYMPHO PCT % 19  --  14   MONO PCT % 8  --  14*   EOS PCT % 1  --  2    < > = values in this interval not displayed.     Results from last 7 days   Lab Units 10/07/24  0557 10/04/24  0523 10/03/24  0645   SODIUM mmol/L 140   < > 139   POTASSIUM mmol/L 3.5   < > 3.2*   CHLORIDE mmol/L 104   < > 104   CO2 mmol/L 30   < > 27   BUN mg/dL 9   < > 6   CREATININE mg/dL 0.69   < > 0.81   ANION GAP mmol/L 6   < > 8   CALCIUM mg/dL 8.4   < > 9.0   ALBUMIN g/dL  --   --  3.7   TOTAL BILIRUBIN mg/dL  --   --  0.99   ALK PHOS U/L  --   --  78   ALT U/L  --   --  6*   AST U/L  --   --  8*   GLUCOSE RANDOM mg/dL 104   < >  --     < > = values in this interval not displayed.     Results from last 7 days   Lab Units 10/07/24  0557   INR  2.82*     Results from last 7 days   Lab Units 10/06/24  1115   POC GLUCOSE mg/dl 112               Recent Cultures (last 7 days):   Results from last 7 days   Lab Units 10/03/24  1613 10/03/24  1612   GRAM STAIN RESULT  No Polys  No organisms seen  No Polys or Bacteria seen No Polys  No organisms seen     Last 24 Hours Medication List:     Current Facility-Administered Medications:     acetaminophen (TYLENOL) tablet 650 mg, Q4H PRN    acetaminophen (TYLENOL) tablet 975 mg, Q8H    ascorbic acid  (VITAMIN C) tablet 500 mg, BID    calcium carbonate (TUMS) chewable tablet 1,000 mg, Daily PRN    ceFAZolin (ANCEF) IVPB (premix in dextrose) 2,000 mg 50 mL, Q8H, Last Rate: 2,000 mg (10/07/24 0557)    Cholecalciferol (VITAMIN D3) tablet 2,000 Units, Daily    docusate sodium (COLACE) capsule 100 mg, BID    folic acid (FOLVITE) tablet 1 mg, Daily    gabapentin (NEURONTIN) capsule 300 mg, HS    hydroxychloroquine (PLAQUENIL) tablet 400 mg, Daily With Breakfast    multivitamin-minerals (CENTRUM) tablet 1 tablet, Daily    ondansetron (ZOFRAN) injection 4 mg, Q6H PRN    oxyCODONE (ROXICODONE) immediate release tablet 10 mg, Q4H PRN    oxyCODONE (ROXICODONE) IR tablet 5 mg, Q4H PRN    pantoprazole (PROTONIX) EC tablet 40 mg, Early Morning    senna (SENOKOT) tablet 8.6 mg, Daily    sildenafil (REVATIO) tablet 40 mg, TID    simethicone (MYLICON) chewable tablet 80 mg, 4x Daily PRN    traZODone (DESYREL) tablet 100 mg, HS PRN    warfarin (COUMADIN) tablet 5 mg, Daily (warfarin)    Administrative Statements   Today, Patient Was Seen By: Daisy Toussaint PA-C      **Please Note: This note may have been constructed using a voice recognition system.**

## 2024-10-07 NOTE — ASSESSMENT & PLAN NOTE
History of left TKA 10/6/22. Developed 2 weeks of left knee pain, fevers, chills and was seen by orthopedic surgery 9/23.  Arthrocentesis performed and Synovasure was positive with Staph epidermidis isolated.  Now status post left TKA revision with implantation of an articulating antibiotic spacer (1.5 stage revision arthroplasty) 10/3/24.  Operative cultures growing staph epidermidis in broth only.   -Continue IV Cefazolin 2g every 8 hours   -Follow-up operative cultures to guide final antibiotic plan   -Recommend a 6 week course of IV antibiotics through 11/13/24 followed by 4.5 months p.o. antibiotics (6 months total) due to 1.5 stage revision arthroplasty   -Orthopedic surgery follow-up ongoing   -weekly CBC diff, Cr on IV antibiotics    -Will arrange ID follow-up after discharge   -Monitor CBC and creatinine to assess for treatment response, drug toxicities

## 2024-10-07 NOTE — ASSESSMENT & PLAN NOTE
S/p left TKA 10/6/22  Developed 2 weeks of left knee pain and fevers 9/23/24 - arthrocentesis with staph epidermidis   Underwent left TKA revision with implantation of antibiotic spacer 10/3/24  Managed per Ortho and infectious disease- fungal cultures pending, BC with 1/2 positive for likely contaminant  PICC line in place- now on ancef q 8 hrs   Planning on completing course of abx at home

## 2024-10-07 NOTE — PLAN OF CARE
Problem: Prexisting or High Potential for Compromised Skin Integrity  Goal: Skin integrity is maintained or improved  Description: INTERVENTIONS:  - Identify patients at risk for skin breakdown  - Assess and monitor skin integrity  - Assess and monitor nutrition and hydration status  - Monitor labs   - Assess for incontinence   - Turn and reposition patient  - Assist with mobility/ambulation  - Relieve pressure over bony prominences  - Avoid friction and shearing  - Provide appropriate hygiene as needed including keeping skin clean and dry  - Evaluate need for skin moisturizer/barrier cream  - Collaborate with interdisciplinary team   - Patient/family teaching  - Consider wound care consult   Outcome: Progressing     Problem: PAIN - ADULT  Goal: Verbalizes/displays adequate comfort level or baseline comfort level  Description: Interventions:  - Encourage patient to monitor pain and request assistance  - Assess pain using appropriate pain scale  - Administer analgesics based on type and severity of pain and evaluate response  - Implement non-pharmacological measures as appropriate and evaluate response  - Consider cultural and social influences on pain and pain management  - Notify physician/advanced practitioner if interventions unsuccessful or patient reports new pain  Outcome: Progressing     Problem: INFECTION - ADULT  Goal: Absence or prevention of progression during hospitalization  Description: INTERVENTIONS:  - Assess and monitor for signs and symptoms of infection  - Monitor lab/diagnostic results  - Monitor all insertion sites, i.e. indwelling lines, tubes, and drains  - Monitor endotracheal if appropriate and nasal secretions for changes in amount and color  - [place] appropriate cooling/warming therapies per order  - Administer medications as ordered  - Instruct and encourage patient and family to use good hand hygiene technique  - Identify and instruct in appropriate isolation precautions for  identified infection/condition  Outcome: Progressing     Problem: SAFETY ADULT  Goal: Patient will remain free of falls  Description: INTERVENTIONS:  - Educate patient/family on patient safety including physical limitations  - Instruct patient to call for assistance with activity   - Consult OT/PT to assist with strengthening/mobility   - Keep Call bell within reach  - Keep bed low and locked with side rails adjusted as appropriate  - Keep care items and personal belongings within reach  - Initiate and maintain comfort rounds  - Make Fall Risk Sign visible to staff  - Offer Toileting every 2 Hours, in advance of need  - Initiate/Maintain bed alarm  - Obtain necessary fall risk management equipment: yellow socks, yellow bracelet, bed alarm  - Apply yellow socks and bracelet for high fall risk patients  - Consider moving patient to room near nurses station  Outcome: Progressing  Goal: Maintain or return to baseline ADL function  Description: INTERVENTIONS:  -  Assess patient's ability to carry out ADLs; assess patient's baseline for ADL function and identify physical deficits which impact ability to perform ADLs (bathing, care of mouth/teeth, toileting, grooming, dressing, etc.)  - Assess/evaluate cause of self-care deficits   - Assess range of motion  - Assess patient's mobility; develop plan if impaired  - Assess patient's need for assistive devices and provide as appropriate  - Encourage maximum independence but intervene and supervise when necessary  - Involve family in performance of ADLs  - Assess for home care needs following discharge   - Consider OT consult to assist with ADL evaluation and planning for discharge  - Provide patient education as appropriate  Outcome: Progressing     Problem: DISCHARGE PLANNING  Goal: Discharge to home or other facility with appropriate resources  Description: INTERVENTIONS:  - Identify barriers to discharge w/patient and caregiver  - Arrange for needed discharge resources and  transportation as appropriate  - Identify discharge learning needs (meds, wound care, etc.)  - Arrange for interpretive services to assist at discharge as needed  - Refer to Case Management Department for coordinating discharge planning if the patient needs post-hospital services based on physician/advanced practitioner order or complex needs related to functional status, cognitive ability, or social support system  Outcome: Progressing

## 2024-10-07 NOTE — ASSESSMENT & PLAN NOTE
She follows with Dr. Loredo her rheumatologist at [facility] who is aware of her current infection  She has been in communication with him regarding her medications  She was told to hold Rinvoq and resume her Benlysta injection weekly.  She was allowed to continue her Plaquenil

## 2024-10-07 NOTE — ASSESSMENT & PLAN NOTE
Patient on hydroxychloroquine and Rinvoq outpatient.  Follows with a rheumatologist at [facility].  The Rinvoq has been on hold due to left knee prosthetic joint infection. -Management of immunosuppression per her rheumatologist.  She has been continued on hydroxychloroquine and Rinvoq is on hold

## 2024-10-07 NOTE — ASSESSMENT & PLAN NOTE
59F s/p L TKA revision for staph epi PJI 10/3  - multi-modal pain control  - ancef q8 hrs per Infectious disease  - DVT ppx: coumadin, INR therapeutic at 2.82  - PT/OT  - WBAT  - ROM as tolerated, pillow/blankets under achilles not behind knee while in bed  - f/u I/O cultures: One culture growing coag negative staph in broth only. Fungal cultures pending  - drop >2 gram in Hgb consistent with ABLA, Hemoglobin 7.8 this morning.  - synovasure + staph epi  - picc line in place  - med mgt per [facility]  - f/u 10-14 days

## 2024-10-08 ENCOUNTER — HOME HEALTH ADMISSION (OUTPATIENT)
Dept: HOME HEALTH SERVICES | Facility: HOME HEALTHCARE | Age: 59
End: 2024-10-08

## 2024-10-08 ENCOUNTER — TELEPHONE (OUTPATIENT)
Dept: INFUSION CENTER | Facility: CLINIC | Age: 59
End: 2024-10-08

## 2024-10-08 PROBLEM — E87.6 HYPOKALEMIA: Status: RESOLVED | Noted: 2024-10-06 | Resolved: 2024-10-08

## 2024-10-08 PROBLEM — T84.54XA INFECTION OF PROSTHETIC LEFT KNEE JOINT (HCC): Status: ACTIVE | Noted: 2024-10-08

## 2024-10-08 LAB
ANION GAP SERPL CALCULATED.3IONS-SCNC: 5 MMOL/L (ref 4–13)
BACTERIA TISS AEROBE CULT: ABNORMAL
BASOPHILS # BLD AUTO: 0.01 THOUSANDS/ΜL (ref 0–0.1)
BASOPHILS NFR BLD AUTO: 0 % (ref 0–1)
BUN SERPL-MCNC: 10 MG/DL (ref 5–25)
CALCIUM SERPL-MCNC: 8.2 MG/DL (ref 8.4–10.2)
CHLORIDE SERPL-SCNC: 104 MMOL/L (ref 96–108)
CO2 SERPL-SCNC: 30 MMOL/L (ref 21–32)
CREAT SERPL-MCNC: 0.69 MG/DL (ref 0.6–1.3)
EOSINOPHIL # BLD AUTO: 0.09 THOUSAND/ΜL (ref 0–0.61)
EOSINOPHIL NFR BLD AUTO: 3 % (ref 0–6)
ERYTHROCYTE [DISTWIDTH] IN BLOOD BY AUTOMATED COUNT: 14.5 % (ref 11.6–15.1)
GFR SERPL CREATININE-BSD FRML MDRD: 94 ML/MIN/1.73SQ M
GLUCOSE SERPL-MCNC: 113 MG/DL (ref 65–140)
GRAM STN SPEC: ABNORMAL
GRAM STN SPEC: ABNORMAL
HCT VFR BLD AUTO: 25.4 % (ref 34.8–46.1)
HGB BLD-MCNC: 8.3 G/DL (ref 11.5–15.4)
IMM GRANULOCYTES # BLD AUTO: 0.05 THOUSAND/UL (ref 0–0.2)
IMM GRANULOCYTES NFR BLD AUTO: 1 % (ref 0–2)
INR PPP: 3.15 (ref 0.85–1.19)
LYMPHOCYTES # BLD AUTO: 0.6 THOUSANDS/ΜL (ref 0.6–4.47)
LYMPHOCYTES NFR BLD AUTO: 17 % (ref 14–44)
MCH RBC QN AUTO: 29.9 PG (ref 26.8–34.3)
MCHC RBC AUTO-ENTMCNC: 32.7 G/DL (ref 31.4–37.4)
MCV RBC AUTO: 91 FL (ref 82–98)
MONOCYTES # BLD AUTO: 0.37 THOUSAND/ΜL (ref 0.17–1.22)
MONOCYTES NFR BLD AUTO: 11 % (ref 4–12)
NEUTROPHILS # BLD AUTO: 2.37 THOUSANDS/ΜL (ref 1.85–7.62)
NEUTS SEG NFR BLD AUTO: 68 % (ref 43–75)
NRBC BLD AUTO-RTO: 0 /100 WBCS
PLATELET # BLD AUTO: 218 THOUSANDS/UL (ref 149–390)
PMV BLD AUTO: 9.9 FL (ref 8.9–12.7)
POTASSIUM SERPL-SCNC: 3.7 MMOL/L (ref 3.5–5.3)
PROTHROMBIN TIME: 31.8 SECONDS (ref 12.3–15)
RBC # BLD AUTO: 2.78 MILLION/UL (ref 3.81–5.12)
SODIUM SERPL-SCNC: 139 MMOL/L (ref 135–147)
WBC # BLD AUTO: 3.49 THOUSAND/UL (ref 4.31–10.16)

## 2024-10-08 PROCEDURE — 99232 SBSQ HOSP IP/OBS MODERATE 35: CPT | Performed by: STUDENT IN AN ORGANIZED HEALTH CARE EDUCATION/TRAINING PROGRAM

## 2024-10-08 PROCEDURE — 97116 GAIT TRAINING THERAPY: CPT

## 2024-10-08 PROCEDURE — 80048 BASIC METABOLIC PNL TOTAL CA: CPT | Performed by: PHYSICIAN ASSISTANT

## 2024-10-08 PROCEDURE — 99232 SBSQ HOSP IP/OBS MODERATE 35: CPT

## 2024-10-08 PROCEDURE — 97110 THERAPEUTIC EXERCISES: CPT

## 2024-10-08 PROCEDURE — 85025 COMPLETE CBC W/AUTO DIFF WBC: CPT | Performed by: PHYSICIAN ASSISTANT

## 2024-10-08 PROCEDURE — 85610 PROTHROMBIN TIME: CPT | Performed by: PHYSICIAN ASSISTANT

## 2024-10-08 PROCEDURE — 99024 POSTOP FOLLOW-UP VISIT: CPT | Performed by: STUDENT IN AN ORGANIZED HEALTH CARE EDUCATION/TRAINING PROGRAM

## 2024-10-08 PROCEDURE — 97530 THERAPEUTIC ACTIVITIES: CPT

## 2024-10-08 RX ADMIN — ACETAMINOPHEN 975 MG: 325 TABLET, FILM COATED ORAL at 14:08

## 2024-10-08 RX ADMIN — OXYCODONE HYDROCHLORIDE AND ACETAMINOPHEN 500 MG: 500 TABLET ORAL at 17:38

## 2024-10-08 RX ADMIN — OXYCODONE HYDROCHLORIDE 10 MG: 10 TABLET ORAL at 14:39

## 2024-10-08 RX ADMIN — DOCUSATE SODIUM 100 MG: 100 CAPSULE, LIQUID FILLED ORAL at 17:38

## 2024-10-08 RX ADMIN — FOLIC ACID 1 MG: 1 TABLET ORAL at 08:10

## 2024-10-08 RX ADMIN — SILDENAFIL 40 MG: 20 TABLET, FILM COATED ORAL at 17:38

## 2024-10-08 RX ADMIN — CEFAZOLIN SODIUM 2000 MG: 2 SOLUTION INTRAVENOUS at 20:20

## 2024-10-08 RX ADMIN — SILDENAFIL 40 MG: 20 TABLET, FILM COATED ORAL at 08:10

## 2024-10-08 RX ADMIN — GABAPENTIN 300 MG: 300 CAPSULE ORAL at 21:18

## 2024-10-08 RX ADMIN — OXYCODONE HYDROCHLORIDE 5 MG: 5 TABLET ORAL at 08:10

## 2024-10-08 RX ADMIN — HYDROXYCHLOROQUINE SULFATE 400 MG: 200 TABLET, FILM COATED ORAL at 08:20

## 2024-10-08 RX ADMIN — ACETAMINOPHEN 975 MG: 325 TABLET, FILM COATED ORAL at 21:18

## 2024-10-08 RX ADMIN — CEFAZOLIN SODIUM 2000 MG: 2 SOLUTION INTRAVENOUS at 12:27

## 2024-10-08 RX ADMIN — ACETAMINOPHEN 975 MG: 325 TABLET, FILM COATED ORAL at 05:20

## 2024-10-08 RX ADMIN — OXYCODONE HYDROCHLORIDE AND ACETAMINOPHEN 500 MG: 500 TABLET ORAL at 08:10

## 2024-10-08 RX ADMIN — OXYCODONE HYDROCHLORIDE 5 MG: 5 TABLET ORAL at 20:19

## 2024-10-08 RX ADMIN — Medication 2000 UNITS: at 08:09

## 2024-10-08 RX ADMIN — CEFAZOLIN SODIUM 2000 MG: 2 SOLUTION INTRAVENOUS at 04:37

## 2024-10-08 RX ADMIN — SILDENAFIL 40 MG: 20 TABLET, FILM COATED ORAL at 21:18

## 2024-10-08 RX ADMIN — Medication 1 TABLET: at 08:09

## 2024-10-08 RX ADMIN — PANTOPRAZOLE SODIUM 40 MG: 40 TABLET, DELAYED RELEASE ORAL at 05:21

## 2024-10-08 NOTE — ASSESSMENT & PLAN NOTE
History of left TKA 10/6/22. Developed 2 weeks of left knee pain, fevers, chills and was seen by orthopedic surgery 9/23.  Arthrocentesis performed and Synovasure was positive with Staph epidermidis isolated.  Now status post left TKA revision with implantation of an articulating antibiotic spacer (1.5 stage revision arthroplasty) 10/3/24.  Operative cultures growing staph epidermidis in broth only.   -Continue IV Cefazolin 2g every 8 hours   -Recommend a 6 week course of IV antibiotics through 11/13/24 followed by 4.5 months p.o. antibiotics (6 months total) due to 1.5 stage revision arthroplasty   -Orthopedic surgery follow-up ongoing   -weekly CBC diff, BMP on IV antibiotics    -Will arrange ID follow-up 2 weeks after discharge   -PICC removal after last dose IV antibiotics    -[place] orders placed for PICC care and weekly labs   -Monitor CBC and creatinine while inpatient to assess for treatment response, drug toxicities

## 2024-10-08 NOTE — PROGRESS NOTES
Progress Note - Hospitalist   Name: Na Joseph 59 y.o. female I MRN: [id number]  Unit/Bed#: E2 -01 I Date of Admission: 10/3/2024   Date of Service: 10/8/2024 I Hospital Day: 5    Assessment & Plan  Infection of total knee replacement  (HCC)  S/p left TKA 10/6/22  Developed 2 weeks of left knee pain and fevers 9/23/24 - arthrocentesis with staph epidermidis   Underwent left TKA revision with implantation of antibiotic spacer 10/3/24  Managed per Ortho and infectious disease  PICC line in place- now on ancef 2g q 8 hrs   ID recommending 6-week course of IV antibiotics through 11/13/2024 followed by 4.5 months p.o. antibiotics (for 6 months total) due to 1.5 stage revision arthroplasty  Weekly CBC with differential, creatinine while on IV antibiotics  ID follow-up after discharge  Planning on completing course of abx at home  Acute blood loss as cause of postoperative anemia  Results from last 7 days   Lab Units 10/08/24  0857 10/07/24  0557 10/06/24  0532 10/05/24  0513   HEMOGLOBIN g/dL 8.3* 7.8* 7.3* 8.3*   Hemoglobin stable at 8.3  S/p 1 unit PRBC 10/6/24  Ongoing monitoring   Would transfuse for hgb <7  SLE (systemic lupus erythematosus) ([facility])  She follows with Dr. Loredo her rheumatologist at [facility] who is aware of her current infection  She has been in communication with him regarding her medications  She was told to hold Rinvoq and resume her Benlysta injection weekly.  She was allowed to continue her Plaquenil  Antiphospholipid antibody syndrome (HCC)  With history of DVT and on lifelong anticoagulation  Outpatient Coumadin treatment plan noted, managed by outpatient hematology  Coumadin with lovenox bridge  Her INR goal is 2-3  Resumed warfarin 10/6  -10/8 INR is in supratherapeutic range, we will hold this evening's Coumadin dose and reevaluate tomorrow    Lab Results   Component Value Date/Time    INR 3.15 (H) 10/08/2024 08:57 AM    INR 2.82 (H) 10/07/2024 05:57 AM    INR 2.98 (H) 10/06/2024  05:34 AM      Pulmonary hypertension (HCC)  Continue sildenafil as prescribed  Follow-up with Dr. Hughes  Hypokalemia (Resolved: 10/8/2024)  Repleted and resolved    VTE Pharmacologic Prophylaxis:   Moderate Risk (Score 3-4) - Pharmacological DVT Prophylaxis Ordered: warfarin (Coumadin).    Mobility:   Basic Mobility Inpatient Raw Score: 23  JH-HLM Goal: 7: Walk 25 feet or more  JH-HLM Achieved: 6: Walk 10 steps or more  JH-HLM Goal NOT achieved. Continue with multidisciplinary rounding and encourage appropriate mobility to improve upon JH-HLM goals.    Discussions with Specialists or Other Care Team Provider: Orthopedic surgery    Education and Discussions with Family / Patient:  Defer to primary team.     Current Length of Stay: 5 day(s)  Current Patient Status: Inpatient   Discharge Plan: [facility] is following this patient on consult. They are medically stable for discharge when deemed appropriate by primary service.    Code Status: Level 1 - Full Code    Subjective   Patient is frustrated by difficulty getting insurance to cover visiting nurses to take care of PICC line.  She is otherwise feeling well and looking forward to going home.  She reports she has had multiple bowel movements since surgery.  She reports some numbness in left leg but attributes it to edema.  No questions or concerns at this time.    Objective :  Temp:  [97.6 °F (36.4 °C)-98.9 °F (37.2 °C)] 97.6 °F (36.4 °C)  HR:  [92-95] 94  BP: (104-117)/(66-73) 117/73  Resp:  [18-20] 18  SpO2:  [96 %-98 %] 98 %  O2 Device: None (Room air)    Body mass index is 42.94 kg/m².     Input and Output Summary (last 24 hours):   No intake or output data in the 24 hours ending 10/08/24 0920    Physical Exam  Vitals and nursing note reviewed.   Constitutional:       General: She is not in acute distress.     Appearance: Normal appearance. She is obese.   HENT:      Head: Normocephalic.      Mouth/Throat:      Mouth: Mucous membranes are moist.      Pharynx: Oropharynx  is clear.   Cardiovascular:      Rate and Rhythm: Normal rate and regular rhythm.      Pulses: Normal pulses.      Heart sounds: Normal heart sounds. No murmur heard.  Pulmonary:      Effort: Pulmonary effort is normal. No respiratory distress.      Breath sounds: Normal breath sounds. No wheezing or rhonchi.   Abdominal:      General: Bowel sounds are normal. There is no distension.      Palpations: Abdomen is soft.      Tenderness: There is no abdominal tenderness. There is no guarding.   Musculoskeletal:      Right lower le+ Edema present.      Left lower le+ Edema present.   Skin:     General: Skin is warm and dry.      Comments: Just into left knee CDI   Neurological:      General: No focal deficit present.      Mental Status: She is alert. Mental status is at baseline.   Psychiatric:         Mood and Affect: Mood normal.         Thought Content: Thought content normal.         Lines/Drains:  Lines/Drains/Airways       Active Status       Name Placement date Placement time Site Days    PICC Line 10/04/24 Right Basilic 10/04/24  1353  Basilic  3                    Central Line:  Goal for removal: N/A - Discharging with PICC for IV ABX/medications               Lab Results: I have reviewed the following results:   Results from last 7 days   Lab Units 10/08/24  0857   WBC Thousand/uL 3.49*   HEMOGLOBIN g/dL 8.3*   HEMATOCRIT % 25.4*   PLATELETS Thousands/uL 218   SEGS PCT % 68   LYMPHO PCT % 17   MONO PCT % 11   EOS PCT % 3     Results from last 7 days   Lab Units 10/07/24  0557 10/04/24  0523 10/03/24  0645   SODIUM mmol/L 140   < > 139   POTASSIUM mmol/L 3.5   < > 3.2*   CHLORIDE mmol/L 104   < > 104   CO2 mmol/L 30   < > 27   BUN mg/dL 9   < > 6   CREATININE mg/dL 0.69   < > 0.81   ANION GAP mmol/L 6   < > 8   CALCIUM mg/dL 8.4   < > 9.0   ALBUMIN g/dL  --   --  3.7   TOTAL BILIRUBIN mg/dL  --   --  0.99   ALK PHOS U/L  --   --  78   ALT U/L  --   --  6*   AST U/L  --   --  8*   GLUCOSE RANDOM mg/dL 104    < >  --     < > = values in this interval not displayed.     Results from last 7 days   Lab Units 10/08/24  0857   INR  3.15*     Results from last 7 days   Lab Units 10/06/24  1115   POC GLUCOSE mg/dl 112               Recent Cultures (last 7 days):   Results from last 7 days   Lab Units 10/03/24  1613 10/03/24  1612   GRAM STAIN RESULT  No Polys  No organisms seen  No Polys or Bacteria seen No Polys  No organisms seen       Imaging Results Review: No pertinent imaging studies reviewed.  Other Study Results Review: No additional pertinent studies reviewed.    Last 24 Hours Medication List:     Current Facility-Administered Medications:     acetaminophen (TYLENOL) tablet 650 mg, Q4H PRN    acetaminophen (TYLENOL) tablet 975 mg, Q8H    ascorbic acid (VITAMIN C) tablet 500 mg, BID    calcium carbonate (TUMS) chewable tablet 1,000 mg, Daily PRN    ceFAZolin (ANCEF) IVPB (premix in dextrose) 2,000 mg 50 mL, Q8H, Last Rate: 2,000 mg (10/08/24 0437)    Cholecalciferol (VITAMIN D3) tablet 2,000 Units, Daily    docusate sodium (COLACE) capsule 100 mg, BID    folic acid (FOLVITE) tablet 1 mg, Daily    gabapentin (NEURONTIN) capsule 300 mg, HS    hydroxychloroquine (PLAQUENIL) tablet 400 mg, Daily With Breakfast    multivitamin-minerals (CENTRUM) tablet 1 tablet, Daily    ondansetron (ZOFRAN) injection 4 mg, Q6H PRN    oxyCODONE (ROXICODONE) immediate release tablet 10 mg, Q4H PRN    oxyCODONE (ROXICODONE) IR tablet 5 mg, Q4H PRN    pantoprazole (PROTONIX) EC tablet 40 mg, Early Morning    senna (SENOKOT) tablet 8.6 mg, Daily    sildenafil (REVATIO) tablet 40 mg, TID    simethicone (MYLICON) chewable tablet 80 mg, 4x Daily PRN    traZODone (DESYREL) tablet 100 mg, HS PRN    warfarin (COUMADIN) tablet 5 mg, Daily (warfarin)    Administrative Statements   Today, Patient Was Seen By: KERI Pina      **Please Note: This note may have been constructed using a voice recognition system.**

## 2024-10-08 NOTE — ASSESSMENT & PLAN NOTE
With history of DVT and on lifelong anticoagulation  Outpatient Coumadin treatment plan noted, managed by outpatient hematology  Coumadin with lovenox bridge  Her INR goal is 2-3  Resumed warfarin 10/6  -10/8 INR is in supratherapeutic range, we will hold this evening's Coumadin dose and reevaluate tomorrow    Lab Results   Component Value Date/Time    INR 3.15 (H) 10/08/2024 08:57 AM    INR 2.82 (H) 10/07/2024 05:57 AM    INR 2.98 (H) 10/06/2024 05:34 AM

## 2024-10-08 NOTE — PLAN OF CARE
Problem: Prexisting or High Potential for Compromised Skin Integrity  Goal: Skin integrity is maintained or improved  Description: INTERVENTIONS:  - Identify patients at risk for skin breakdown  - Assess and monitor skin integrity  - Assess and monitor nutrition and hydration status  - Monitor labs   - Assess for incontinence   - Turn and reposition patient  - Assist with mobility/ambulation  - Relieve pressure over bony prominences  - Avoid friction and shearing  - Provide appropriate hygiene as needed including keeping skin clean and dry  - Evaluate need for skin moisturizer/barrier cream  - Collaborate with interdisciplinary team   - Patient/family teaching  - Consider wound care consult   Outcome: Progressing     Problem: PAIN - ADULT  Goal: Verbalizes/displays adequate comfort level or baseline comfort level  Description: Interventions:  - Encourage patient to monitor pain and request assistance  - Assess pain using appropriate pain scale  - Administer analgesics based on type and severity of pain and evaluate response  - Implement non-pharmacological measures as appropriate and evaluate response  - Consider cultural and social influences on pain and pain management  - Notify physician/advanced practitioner if interventions unsuccessful or patient reports new pain  Outcome: Progressing     Problem: INFECTION - ADULT  Goal: Absence or prevention of progression during hospitalization  Description: INTERVENTIONS:  - Assess and monitor for signs and symptoms of infection  - Monitor lab/diagnostic results  - Monitor all insertion sites, i.e. indwelling lines, tubes, and drains  - Monitor endotracheal if appropriate and nasal secretions for changes in amount and color  - [place] appropriate cooling/warming therapies per order  - Administer medications as ordered  - Instruct and encourage patient and family to use good hand hygiene technique  - Identify and instruct in appropriate isolation precautions for  identified infection/condition  Outcome: Progressing     Problem: SAFETY ADULT  Goal: Patient will remain free of falls  Description: INTERVENTIONS:  - Educate patient/family on patient safety including physical limitations  - Instruct patient to call for assistance with activity   - Consult OT/PT to assist with strengthening/mobility   - Keep Call bell within reach  - Keep bed low and locked with side rails adjusted as appropriate  - Keep care items and personal belongings within reach  - Initiate and maintain comfort rounds  - Make Fall Risk Sign visible to staff  - Offer Toileting every 2 Hours, in advance of need  - Initiate/Maintain bed alarm  - Obtain necessary fall risk management equipment: yellow socks, yellow bracelet, bed alarm  - Apply yellow socks and bracelet for high fall risk patients  - Consider moving patient to room near nurses station  Outcome: Progressing  Goal: Maintain or return to baseline ADL function  Description: INTERVENTIONS:  -  Assess patient's ability to carry out ADLs; assess patient's baseline for ADL function and identify physical deficits which impact ability to perform ADLs (bathing, care of mouth/teeth, toileting, grooming, dressing, etc.)  - Assess/evaluate cause of self-care deficits   - Assess range of motion  - Assess patient's mobility; develop plan if impaired  - Assess patient's need for assistive devices and provide as appropriate  - Encourage maximum independence but intervene and supervise when necessary  - Involve family in performance of ADLs  - Assess for home care needs following discharge   - Consider OT consult to assist with ADL evaluation and planning for discharge  - Provide patient education as appropriate  Outcome: Progressing     Problem: DISCHARGE PLANNING  Goal: Discharge to home or other facility with appropriate resources  Description: INTERVENTIONS:  - Identify barriers to discharge w/patient and caregiver  - Arrange for needed discharge resources and  transportation as appropriate  - Identify discharge learning needs (meds, wound care, etc.)  - Arrange for interpretive services to assist at discharge as needed  - Refer to Case Management Department for coordinating discharge planning if the patient needs post-hospital services based on physician/advanced practitioner order or complex needs related to functional status, cognitive ability, or social support system  Outcome: Progressing

## 2024-10-08 NOTE — TELEPHONE ENCOUNTER
ADDISON Sanchez at [facility] case mgt. Pt would like to start coming to [facility] to get her port flushed. Port was placed by [facility]. She would need a port plan order entered in her chart in order for [facility] to schedule port flush appts.

## 2024-10-08 NOTE — PROGRESS NOTES
Progress Note - Infectious Disease   Name: Na Joseph 59 y.o. female I MRN: [id number]  Unit/Bed#: E2 -01 I Date of Admission: 10/3/2024   Date of Service: 10/8/2024 I Hospital Day: 5     Assessment & Plan  Infection of total knee replacement  (HCC)  History of left TKA 10/6/22. Developed 2 weeks of left knee pain, fevers, chills and was seen by orthopedic surgery 9/23.  Arthrocentesis performed and Synovasure was positive with Staph epidermidis isolated.  Now status post left TKA revision with implantation of an articulating antibiotic spacer (1.5 stage revision arthroplasty) 10/3/24.  Operative cultures growing staph epidermidis in broth only.   -Continue IV Cefazolin 2g every 8 hours   -Recommend a 6 week course of IV antibiotics through 11/13/24 followed by 4.5 months p.o. antibiotics (6 months total) due to 1.5 stage revision arthroplasty   -Orthopedic surgery follow-up ongoing   -weekly CBC diff, BMP on IV antibiotics    -Will arrange ID follow-up 2 weeks after discharge   -PICC removal after last dose IV antibiotics    -[place] orders placed for PICC care and weekly labs   -Monitor CBC and creatinine while inpatient to assess for treatment response, drug toxicities  SLE (systemic lupus erythematosus) (HCC)  Patient on hydroxychloroquine and Rinvoq outpatient.  Follows with a rheumatologist at [facility].  The Rinvoq has been on hold due to left knee prosthetic joint infection. -Management of immunosuppression per her rheumatologist.  She has been continued on hydroxychloroquine and Rinvoq is on hold  Antiphospholipid antibody syndrome (HCC)  On Coumadin outpatient, follows with hematology.  Monitor INR  History of left knee replacement  10/6/2022.  Now complicated by #1 above.  Orthopedic surgery follow-up ongoing.    Penicillin allergy  Tolerating Cefazolin. Reports as rash and anaphylaxis. Will continue to monitor     I have discussed the above management plan to continue Cefazolin with the  orthopedic surgery APTURNER. Discussed with the patient at bedside. Okay for discharge tomorrow after AM antibiotic dose.    Antibiotics:  Cefazolin day 5    Subjective   The patient is overall feeling well. Still has some post operative left knee pain but overall it is stable. She denies nausea, diarrhea, fever. No issues with the PICC line.    Objective :  Temp:  [97.6 °F (36.4 °C)-98.9 °F (37.2 °C)] 97.6 °F (36.4 °C)  HR:  [92-95] 94  BP: (104-117)/(66-73) 117/73  Resp:  [18-20] 18  SpO2:  [96 %-98 %] 98 %  O2 Device: None (Room air)    General:  No acute distress  Psychiatric:  Awake and alert  Pulmonary:  Normal respiratory excursion without accessory muscle use  Cardiac: RRR, no murmurs  Abdomen:  Soft, nontender  Extremities:  left knee anterior dressing in place, no surrounding erythema. Right knee TKA. RUE PICC in place  Skin:  No rashes      Lab Results: I have reviewed the following results:  Results from last 7 days   Lab Units 10/08/24  0857 10/07/24  0557 10/06/24  0532   WBC Thousand/uL 3.49* 3.98* 3.80*   HEMOGLOBIN g/dL 8.3* 7.8* 7.3*   PLATELETS Thousands/uL 218 231 233     Results from last 7 days   Lab Units 10/08/24  0857 10/07/24  0557 10/06/24  0532 10/04/24  0523 10/03/24  0645   SODIUM mmol/L 139 140 137   < > 139   POTASSIUM mmol/L 3.7 3.5 3.1*   < > 3.2*   CHLORIDE mmol/L 104 104 100   < > 104   CO2 mmol/L 30 30 31   < > 27   BUN mg/dL 10 9 9   < > 6   CREATININE mg/dL 0.69 0.69 0.77   < > 0.81   EGFR ml/min/1.73sq m 94 94 83   < > 78   CALCIUM mg/dL 8.2* 8.4 8.1*   < > 9.0   AST U/L  --   --   --   --  8*   ALT U/L  --   --   --   --  6*   ALK PHOS U/L  --   --   --   --  78   ALBUMIN g/dL  --   --   --   --  3.7    < > = values in this interval not displayed.     Results from last 7 days   Lab Units 10/03/24  1613 10/03/24  1612   GRAM STAIN RESULT  No Polys  No organisms seen  No Polys or Bacteria seen No Polys  No organisms seen   I personally reviewed left knee xray in PACS which did  not show any hardware loosening

## 2024-10-08 NOTE — PLAN OF CARE
Problem: Prexisting or High Potential for Compromised Skin Integrity  Goal: Skin integrity is maintained or improved  Description: INTERVENTIONS:  - Identify patients at risk for skin breakdown  - Assess and monitor skin integrity  - Assess and monitor nutrition and hydration status  - Monitor labs   - Assess for incontinence   - Turn and reposition patient  - Assist with mobility/ambulation  - Relieve pressure over bony prominences  - Avoid friction and shearing  - Provide appropriate hygiene as needed including keeping skin clean and dry  - Evaluate need for skin moisturizer/barrier cream  - Collaborate with interdisciplinary team   - Patient/family teaching  - Consider wound care consult   Outcome: Progressing     Problem: PAIN - ADULT  Goal: Verbalizes/displays adequate comfort level or baseline comfort level  Description: Interventions:  - Encourage patient to monitor pain and request assistance  - Assess pain using appropriate pain scale  - Administer analgesics based on type and severity of pain and evaluate response  - Implement non-pharmacological measures as appropriate and evaluate response  - Consider cultural and social influences on pain and pain management  - Notify physician/advanced practitioner if interventions unsuccessful or patient reports new pain  Outcome: Progressing     Problem: INFECTION - ADULT  Goal: Absence or prevention of progression during hospitalization  Description: INTERVENTIONS:  - Assess and monitor for signs and symptoms of infection  - Monitor lab/diagnostic results  - Monitor all insertion sites, i.e. indwelling lines, tubes, and drains  - Monitor endotracheal if appropriate and nasal secretions for changes in amount and color  - [place] appropriate cooling/warming therapies per order  - Administer medications as ordered  - Instruct and encourage patient and family to use good hand hygiene technique  - Identify and instruct in appropriate isolation precautions for  identified infection/condition  Outcome: Progressing     Problem: SAFETY ADULT  Goal: Patient will remain free of falls  Description: INTERVENTIONS:  - Educate patient/family on patient safety including physical limitations  - Instruct patient to call for assistance with activity   - Consult OT/PT to assist with strengthening/mobility   - Keep Call bell within reach  - Keep bed low and locked with side rails adjusted as appropriate  - Keep care items and personal belongings within reach  - Initiate and maintain comfort rounds  - Make Fall Risk Sign visible to staff  - Offer Toileting every 2 Hours, in advance of need  - Initiate/Maintain bed alarm  - Obtain necessary fall risk management equipment: yellow socks, yellow bracelet, bed alarm  - Apply yellow socks and bracelet for high fall risk patients  - Consider moving patient to room near nurses station  Outcome: Progressing  Goal: Maintain or return to baseline ADL function  Description: INTERVENTIONS:  -  Assess patient's ability to carry out ADLs; assess patient's baseline for ADL function and identify physical deficits which impact ability to perform ADLs (bathing, care of mouth/teeth, toileting, grooming, dressing, etc.)  - Assess/evaluate cause of self-care deficits   - Assess range of motion  - Assess patient's mobility; develop plan if impaired  - Assess patient's need for assistive devices and provide as appropriate  - Encourage maximum independence but intervene and supervise when necessary  - Involve family in performance of ADLs  - Assess for home care needs following discharge   - Consider OT consult to assist with ADL evaluation and planning for discharge  - Provide patient education as appropriate  Outcome: Progressing     Problem: DISCHARGE PLANNING  Goal: Discharge to home or other facility with appropriate resources  Description: INTERVENTIONS:  - Identify barriers to discharge w/patient and caregiver  - Arrange for needed discharge resources and  transportation as appropriate  - Identify discharge learning needs (meds, wound care, etc.)  - Arrange for interpretive services to assist at discharge as needed  - Refer to Case Management Department for coordinating discharge planning if the patient needs post-hospital services based on physician/advanced practitioner order or complex needs related to functional status, cognitive ability, or social support system  Outcome: Progressing

## 2024-10-08 NOTE — ASSESSMENT & PLAN NOTE
59 F POD 5 s/p L TKA revision for staph epi PJI with Dr. Wilson on 10/3  - multi-modal pain control  - ancef q8 hrs per Infectious disease  - DVT ppx: coumadin, INR elevated at 3.15   - SLIM holding Coumadin pending AM INR   - patient follows with hematology at baseline  - PT/OT  - WBAT LLE  - ROM as tolerated, pillow/blankets under achilles and not behind knee while in bed  - f/u I/O cultures: One culture growing Staph epidermidis in broth only. Fungal cultures pending  - drop >2 gram in Hgb consistent with ABLA, Hemoglobin 8.3 this morning.  - synovasure + staph epi  - picc line in place  - med mgt per SLIM  - f/u 10-14 days

## 2024-10-08 NOTE — PROGRESS NOTES
Progress Note - Orthopedics   Name: Na Joseph 59 y.o. female I MRN: [id number]  Unit/Bed#: E2 -01 I Date of Admission: 10/3/2024   Date of Service: 10/8/2024 I Hospital Day: 5    Assessment & Plan  Infection of total knee replacement  (HCC)  59 F POD 5 s/p L TKA revision for staph epi PJI with Dr. Wilson on 10/3  - multi-modal pain control  - ancef q8 hrs per Infectious disease  - DVT ppx: coumadin, INR elevated at 3.15   - SLIM holding Coumadin pending AM INR   - patient follows with hematology at baseline  - PT/OT  - WBAT LLE  - ROM as tolerated, pillow/blankets under achilles and not behind knee while in bed  - f/u I/O cultures: One culture growing Staph epidermidis in broth only. Fungal cultures pending  - drop >2 gram in Hgb consistent with ABLA, Hemoglobin 8.3 this morning.  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    Subjective   59 y.o.female POD 5 s/p L TKA revision for staph epi PJI.  No new acute overnight events, no new complaints.  Pain reasonably well-controlled with medication, but is increased after PT.  She reports a little numbness in her left leg which she attributes to post-operative swelling.  She denies subjective fevers, chills, headaches, CP, SOB, or N/V.    Objective :  Temp:  [97.6 °F (36.4 °C)-98.9 °F (37.2 °C)] 97.6 °F (36.4 °C)  HR:  [92-94] 94  BP: (104-117)/(68-73) 117/73  Resp:  [18] 18  SpO2:  [96 %-98 %] 98 %  O2 Device: None (Room air)    Physical Exam  Vitals and nursing note reviewed.   Constitutional:       General: She is not in acute distress.      "Appearance: She is well-developed.   HENT:      Head: Normocephalic and atraumatic.   Eyes:      Conjunctiva/sclera: Conjunctivae normal.   Cardiovascular:      Rate and Rhythm: Normal rate.      Comments: No discernible arrhthymias.  Pulmonary:      Effort: Pulmonary effort is normal. No respiratory distress.   Abdominal:      Palpations: Abdomen is soft.      Tenderness: There is no abdominal tenderness.   Musculoskeletal:         General: No swelling.      Cervical back: Neck supple.   Skin:     General: Skin is warm and dry.      Capillary Refill: Capillary refill takes less than 2 seconds.   Neurological:      Mental Status: She is alert.   Psychiatric:         Mood and Affect: Mood normal.       Musculoskeletal: left lower extremity  Visible skin is without erythema or ecchymosis.  Dressing C/D/I  TTP over the knee  Motor intact to +FHL/EHL, +ankle dorsi/plantar flexion  Toes WWP  Sensation intact over the toes      Lab Results: I have reviewed the following results:  Recent Labs     10/06/24  0532 10/06/24  0534 10/07/24  0557 10/08/24  0857   WBC 3.80*  --  3.98* 3.49*   HGB 7.3*  --  7.8* 8.3*   HCT 21.4*  --  23.5* 25.4*     --  231 218   BUN 9  --  9 10   CREATININE 0.77  --  0.69 0.69   INR  --  2.98* 2.82* 3.15*     Blood Culture:  No results found for: \"BLOODCX\"  Wound Culture: No results found for: \"WOUNDCULT\"  "

## 2024-10-08 NOTE — ASSESSMENT & PLAN NOTE
S/p left TKA 10/6/22  Developed 2 weeks of left knee pain and fevers 9/23/24 - arthrocentesis with staph epidermidis   Underwent left TKA revision with implantation of antibiotic spacer 10/3/24  Managed per Ortho and infectious disease  PICC line in place- now on ancef 2g q 8 hrs   ID recommending 6-week course of IV antibiotics through 11/13/2024 followed by 4.5 months p.o. antibiotics (for 6 months total) due to 1.5 stage revision arthroplasty  Weekly CBC with differential, creatinine while on IV antibiotics  ID follow-up after discharge  Planning on completing course of abx at home

## 2024-10-08 NOTE — CASE MANAGEMENT
Case Management Discharge Planning Note    Patient name Na Joseph  Location [facility] /E2 -* MRN [id number]  : 1965 Date 10/8/2024       Current Admission Date: 10/3/2024  Current Admission Diagnosis:Infection of total knee replacement  (HCC)   Patient Active Problem List    Diagnosis Date Noted Date Diagnosed    Penicillin allergy 10/07/2024     Acute blood loss as cause of postoperative anemia 10/06/2024     Infection of total knee replacement  ([facility]) 10/02/2024     Maltracking of left patella 2024     GRIS (obstructive sleep apnea) 2024     Other sleep disorders 2024     Thyroid nodule 2024     Neutropenia, unspecified type ([facility]) 2024     Rheumatoid arthritis, involving unspecified site, unspecified whether rheumatoid factor present ([facility]) 2024     BMI 45.0-49.9, adult ([facility]) 2024     Chronic midline low back pain without sciatica 2024     Motion sickness 2024     Fall 2023     Anticoagulated 2023     Sleep disturbance 2023     Neuropathic pain, leg, left 2023     Antiphospholipid antibody syndrome ([facility]) 2023     Financial difficulties 2023     Insomnia 2023     Pulmonary embolus ([facility]) 2023     Iron deficiency anemia due to chronic blood loss 2022     COVID-19 2022     SLE (systemic lupus erythematosus) ([facility]) 2022     History of left knee replacement 10/20/2022     PONV (postoperative nausea and vomiting) 10/06/2022     Venous stasis of lower extremity 2022     Acute pain of right knee 2022     Leukopenia 2021     Morbid obesity ([facility])      Primary osteoarthritis of left knee 2020     Left knee pain 2019     Tear of medial meniscus of left knee, current 2019     Right knee pain 2019     Status post total right knee replacement 2019     Chronic kidney disease, stage 3a ([facility]) 2019     Tear of medial meniscus of right knee,  current 09/10/2018     Other tear of medial meniscus, current injury, right knee, initial encounter 07/16/2018     Patellofemoral disorder of right knee 06/04/2018     Pes anserinus bursitis of right knee 06/04/2018     Arthritis of right knee 06/04/2018     Arthritis of left knee 06/04/2018     Chronic pain of right knee 05/22/2018     Elevated serum creatinine 05/22/2018     Hyperuricemia 05/22/2018     Long-term use of hydroxychloroquine 02/16/2018     Pulmonary hypertension (HCC) 02/16/2018     Undifferentiated connective tissue disease (HCC) 02/16/2018     Secondary pulmonary hypertension 12/15/2017     Diffuse connective tissue disease (HCC) 11/21/2017     Pes anserine bursitis 11/21/2017     Trochanteric bursitis of both hips 11/21/2017     Vitamin D deficiency 11/21/2017     Other organ or system involvement in systemic lupus erythematosus ([facility]) 11/17/2017     Sinus tachycardia 11/09/2017     B12 deficiency 10/19/2017     Age-related osteoporosis without current pathological fracture 10/19/2017     Lupus anticoagulant disorder ([facility]) 10/18/2017     Positive HELENE (antinuclear antibody) 10/18/2017     Hot flashes due to menopause 09/01/2015     SOB (shortness of breath) on exertion 11/12/2012     Other chronic pulmonary heart diseases 11/05/2012     Edema 06/04/2012     Post-nasal drip 06/04/2012     Chronic cough 02/09/2012     Dyspnea 02/09/2012       LOS (days): 5  Geometric Mean LOS (GMLOS) (days): 3  Days to GMLOS:-1.7     OBJECTIVE:  Risk of Unplanned Readmission Score: 14.09         Current admission status: Inpatient   Preferred Pharmacy:   [facility] - [address] [address]  Phone: [phone] Fax: [phone]    [id number] - [place] [address] - [address]  [address] [address]  Phone: [phone] Fax: [phone]    [facility] [address]  [address] [address]  Phone: [phone] Fax: [phone]    Primary Care Provider: Sultana Arnold,     Primary Insurance: MEDICARE  Secondary Insurance: BLUE CROSS    DISCHARGE DETAILS:    Discharge planning discussed with:: Patient  Freedom of Choice: Yes  Comments - Freedom of Choice: Pt agreeable to home infusion with [facility]. [facility] able to supplement SN with [facility]. [facility] reserved for 1200 tomorrow.  CM contacted family/caregiver?: No- see comments (Declined)  Were Treatment Team discharge recommendations reviewed with patient/caregiver?: Yes  Did patient/caregiver verbalize understanding of patient care needs?: Yes  Were patient/caregiver advised of the risks associated with not following Treatment Team discharge recommendations?: Yes       Treatment Team Recommendation: Home with Home Health Care  Discharge Destination Plan:: Home with Home Health Care      Additional Comments: CM confirmed with [facility] they are able to supplement SN ([facility]). [facility] to come tomorrow at 1200 for pts first at home infusion. Per [facility], the iv abx will be delivered to the hospital or pts home. [facility] to continue coordinating with [facility] for tomorrow's visit. CM followed up with [facility] infusion center ([phone]) to inquire if pt can transition her blood draws and dressing changes there to which she can. Pt will need orders in order to do this. Chat sent to primary service ID Dr. Wilson informing him of same. CM continues following

## 2024-10-08 NOTE — PROGRESS NOTES
Patient:    MRN:  [id number]    Audra Request ID:  [id number]    Level of care reserved:  Home Health Agency    Partner Reserved:  [facility], [place] [address] [phone]    Clinical needs requested:    Geography searched:  [phone]    Start of Service:    Request sent:  4:39pm EDT on 10/7/2024 by Xena Vivas    Partner reserved:  9:31am EDT on 10/8/2024 by Daniel Snowden    Choice list shared:

## 2024-10-08 NOTE — ASSESSMENT & PLAN NOTE
Patient on hydroxychloroquine and Rinvoq outpatient.  Follows with a rheumatologist at [facility].  The Rinvoq has been on hold due to left knee prosthetic joint infection. -Management of immunosuppression per her rheumatologist.  She has been continued on hydroxychloroquine and Rinvoq is on hold

## 2024-10-08 NOTE — ASSESSMENT & PLAN NOTE
Results from last 7 days   Lab Units 10/08/24  0857 10/07/24  0557 10/06/24  0532 10/05/24  0513   HEMOGLOBIN g/dL 8.3* 7.8* 7.3* 8.3*   Hemoglobin stable at 8.3  S/p 1 unit PRBC 10/6/24  Ongoing monitoring   Would transfuse for hgb <7

## 2024-10-08 NOTE — PLAN OF CARE
Problem: PHYSICAL THERAPY ADULT  Goal: Performs mobility at highest level of function for planned discharge setting.  See evaluation for individualized goals.  Description: Treatment/Interventions: Functional transfer training, LE strengthening/ROM, Elevations, Therapeutic exercise, Endurance training, Patient/family training, Bed mobility, Gait training, Spoke to nursing, OT  Equipment Recommended: Walker (pt has)       See flowsheet documentation for full assessment, interventions and recommendations.  Outcome: Adequate for Discharge  Note: Prognosis: Good  Problem List: Decreased strength, Decreased range of motion, Decreased endurance, Impaired balance, Decreased mobility, Obesity, Decreased skin integrity, Pain  Assessment: Pt seen for PT treatment session this date with interventions consisting of bed mobility, transfer training, gait training, stair training, and HEP, and education provided as needed for safety and direction to improve functional mobility, safety awareness, and activity tolerance. Pt agreeable to PT treatment session upon arrival, pt found supine in bed . At end of session, pt left  supine in bed with all needs in reach. In comparison to previous session, pt with improvement in activity tolerance, endurance, standing balance, ambulation distances, ambulatory balance, L le strength, and functional mobility. Pt  is showing good progress toward PT goals with improvements as noted. Pt  is functioning at mod I for bed mobility,  transfers and supervision for ambulation on levels and stairs with use of rollator walker for ambulation and hand rails for stair climbing.  Pt  demonstrates unsteady antalgic gait with lateral sway/ displacement however no gross LOB noted.  Pt  is able to ambulate > household distances and is ambulation community distances, pt  ascends and descends stairs with use of b/l rails with supervision and curb step training with forward approach with use of rollator with  supervision assist x1, pt  demonstrates safe and proper stair climbing techniques.  Pt  is unable to I'ly slr and requires use of leg  to lift L le into bed. Pt tolerated well good return demonstration of HEP. Pt  has made adequate gains toward PT goals and is appropriate for d/c to home with family support and assist and OPPT.   Continue to recommend  level III minimal rehab resource intensity  at time of d/c in order to maximize pt's functional independence and safety w/ mobility. Pt continues to be functioning below baseline level. PT will continue to see pt while here in order to address the deficits listed above and provide interventions consistent w/ POC in effort to achieve STGs.    The patient's AM-PAC Basic Mobility Inpatient Short Form Raw Score is 23. A raw score greater than 16 suggests the patient may benefit from discharge to home. Please also refer to the recommendation of the Physical Therapist for safe discharge planning.  Barriers to Discharge: None     Rehab Resource Intensity Level, PT: III (Minimum Resource Intensity)    See flowsheet documentation for full assessment.

## 2024-10-08 NOTE — PHYSICAL THERAPY NOTE
PHYSICAL THERAPY NOTE          Patient Name: Na Joseph  Today's Date: 10/8/2024    10/08/24 7200   Note Type   Note Type Treatment   Pain Assessment   Pain Assessment Tool 0-10   Pain Score 3   Pain Location/Orientation Orientation: Left;Location: Knee   Hospital Pain Intervention(s) Cold applied;Repositioned;Ambulation/increased activity;Emotional support;Rest   Restrictions/Precautions   LLE Weight Bearing Per Order WBAT   Other Precautions Pain;Fall Risk;WBS   General   Chart Reviewed Yes   Family/Caregiver Present No   Cognition   Overall Cognitive Status WFL   Arousal/Participation Responsive;Alert;Cooperative   Attention Within functional limits   Orientation Level Oriented X4   Memory Within functional limits   Following Commands Follows all commands and directions without difficulty   Subjective   Subjective PT agreeable to PT.   Bed Mobility   Supine to Sit 6  Modified independent   Additional items Assist x 1;HOB elevated;Increased time required;Bedrails   Sit to Supine 6  Modified independent   Additional items Assist x 1;HOB elevated;Increased time required;Leg ;Verbal cues;LE management  (use of leg  to lift L le into bed)   Transfers   Sit to Stand 6  Modified independent  (rollator walker)   Stand to Sit 6  Modified independent  (rollator walker)   Ambulation/Elevation   Gait pattern Improper Weight shift;Narrow NORIS;Antalgic;Decreased L stance;Short stride;Excessively slow;Step through pattern   Gait Assistance 5  Supervision   Additional items Assist x 1;Verbal cues   Assistive Device Other (Comment)  (rollator walker)   Distance 360'x1   Stair Management Assistance 5  Supervision   Additional items Assist x 1;Verbal cues;Increased time required   Stair Management Technique Two rails;Foreward;Nonreciprocal   Number of Stairs   (5 steps x2)   Ambulation/Elevation Additional Comments unsteady antlgic gait,  with lateral displacement no gross lob noted.   Balance   Static Sitting Normal   Dynamic Sitting Good   Static Standing Fair +  (w support of rollator)   Dynamic Standing Fair  (w support of rollator)   Ambulatory Fair  (with rollator)   Endurance Deficit   Endurance Deficit Description pain   Activity Tolerance   Activity Tolerance Patient limited by pain;Patient limited by fatigue;Patient tolerated treatment well   Exercises   TKR Supine;Sitting  (12- 15 reps.  to tolerance.)   Assessment   Prognosis Good   Problem List Decreased strength;Decreased range of motion;Decreased endurance;Impaired balance;Decreased mobility;Obesity;Decreased skin integrity;Pain   Assessment Pt seen for PT treatment session this date with interventions consisting of bed mobility, transfer training, gait training, stair training, and HEP, and education provided as needed for safety and direction to improve functional mobility, safety awareness, and activity tolerance. Pt agreeable to PT treatment session upon arrival, pt found supine in bed . At end of session, pt left  supine in bed with all needs in reach. In comparison to previous session, pt with improvement in activity tolerance, endurance, standing balance, ambulation distances, ambulatory balance, L le strength, and functional mobility. Pt  is showing good progress toward PT goals with improvements as noted. Pt  is functioning at mod I for bed mobility,  transfers and supervision for ambulation on levels and stairs with use of rollator walker for ambulation and hand rails for stair climbing.  Pt  demonstrates unsteady antalgic gait with lateral sway/ displacement however no gross LOB noted.  Pt  is able to ambulate > household distances and is ambulation community distances, pt  ascends and descends stairs with use of b/l rails with supervision and curb step training with forward approach with use of rollator with supervision assist x1, pt  demonstrates safe and proper stair  climbing techniques.  Pt  is unable to I'ly slr and requires use of leg  to lift L le into bed. Pt tolerated well good return demonstration of HEP. Pt  has made adequate gains toward PT goals and is appropriate for d/c to home with family support and assist and OPPT.   Continue to recommend  level III minimal rehab resource intensity  at time of d/c in order to maximize pt's functional independence and safety w/ mobility. Pt continues to be functioning below baseline level. PT will continue to see pt while here in order to address the deficits listed above and provide interventions consistent w/ POC in effort to achieve STGs.    The patient's AM-[facility] Basic Mobility Inpatient Short Form Raw Score is 23. A raw score greater than 16 suggests the patient may benefit from discharge to home. Please also refer to the recommendation of the Physical Therapist for safe discharge planning.   Goals   Patient Goals To get better inorder to go on vacation next year.   STG Expiration Date 10/11/24   PT Treatment Day 3   Plan   Treatment/Interventions Functional transfer training;LE strengthening/ROM;Elevations;Therapeutic exercise;Endurance training;Patient/family training;Equipment eval/education;Bed mobility;Gait training   Progress Progressing toward goals   PT Frequency 3-5x/wk   Discharge Recommendation   Rehab Resource Intensity Level, PT III (Minimum Resource Intensity)   AM-PAC Basic Mobility Inpatient   Turning in Flat Bed Without Bedrails 4   Lying on Back to Sitting on Edge of Flat Bed Without Bedrails 4   Moving Bed to Chair 4   Standing Up From Chair Using Arms 4   Walk in Room 4   Climb 3-5 Stairs With Railing 3   Basic Mobility Inpatient Raw Score 23   Basic Mobility Standardized Score 50.88   [facility] Highest Level Of Mobility   -HLM Goal 7: Walk 25 feet or more   -HLM Achieved 8: Walk 250 feet ot more   Education   Education Provided Mobility training;Home exercise program;Assistive device   Patient  Demonstrates acceptance/verbal understanding;Demonstrates verbal understanding;Reinforcement needed   End of Consult   Patient Position at End of Consult Supine;Bed/Chair alarm activated;All needs within reach   End of Consult Comments ice to L knee   Kathleen Reddy, PTA

## 2024-10-09 ENCOUNTER — APPOINTMENT (OUTPATIENT)
Dept: PHYSICAL THERAPY | Facility: CLINIC | Age: 59
End: 2024-10-09
Payer: MEDICARE

## 2024-10-09 ENCOUNTER — TELEPHONE (OUTPATIENT)
Age: 59
End: 2024-10-09

## 2024-10-09 ENCOUNTER — HOME CARE VISIT (OUTPATIENT)
Dept: HOME HEALTH SERVICES | Facility: HOME HEALTHCARE | Age: 59
End: 2024-10-09
Payer: MEDICARE

## 2024-10-09 VITALS
DIASTOLIC BLOOD PRESSURE: 82 MMHG | HEART RATE: 98 BPM | OXYGEN SATURATION: 99 % | SYSTOLIC BLOOD PRESSURE: 148 MMHG | TEMPERATURE: 98.2 F | RESPIRATION RATE: 20 BRPM

## 2024-10-09 VITALS
DIASTOLIC BLOOD PRESSURE: 63 MMHG | SYSTOLIC BLOOD PRESSURE: 105 MMHG | HEIGHT: 62 IN | TEMPERATURE: 98.2 F | HEART RATE: 98 BPM | BODY MASS INDEX: 43.21 KG/M2 | RESPIRATION RATE: 16 BRPM | OXYGEN SATURATION: 97 % | WEIGHT: 234.79 LBS

## 2024-10-09 LAB
ANION GAP SERPL CALCULATED.3IONS-SCNC: 6 MMOL/L (ref 4–13)
BASOPHILS # BLD AUTO: 0.01 THOUSANDS/ΜL (ref 0–0.1)
BASOPHILS NFR BLD AUTO: 0 % (ref 0–1)
BUN SERPL-MCNC: 8 MG/DL (ref 5–25)
CALCIUM SERPL-MCNC: 8.6 MG/DL (ref 8.4–10.2)
CHLORIDE SERPL-SCNC: 103 MMOL/L (ref 96–108)
CO2 SERPL-SCNC: 31 MMOL/L (ref 21–32)
CREAT SERPL-MCNC: 0.67 MG/DL (ref 0.6–1.3)
EOSINOPHIL # BLD AUTO: 0.13 THOUSAND/ΜL (ref 0–0.61)
EOSINOPHIL NFR BLD AUTO: 3 % (ref 0–6)
ERYTHROCYTE [DISTWIDTH] IN BLOOD BY AUTOMATED COUNT: 14.6 % (ref 11.6–15.1)
GFR SERPL CREATININE-BSD FRML MDRD: 95 ML/MIN/1.73SQ M
GLUCOSE SERPL-MCNC: 99 MG/DL (ref 65–140)
HCT VFR BLD AUTO: 27.1 % (ref 34.8–46.1)
HGB BLD-MCNC: 8.8 G/DL (ref 11.5–15.4)
IMM GRANULOCYTES # BLD AUTO: 0.03 THOUSAND/UL (ref 0–0.2)
IMM GRANULOCYTES NFR BLD AUTO: 1 % (ref 0–2)
INR PPP: 2.71 (ref 0.85–1.19)
LYMPHOCYTES # BLD AUTO: 1.03 THOUSANDS/ΜL (ref 0.6–4.47)
LYMPHOCYTES NFR BLD AUTO: 26 % (ref 14–44)
MCH RBC QN AUTO: 30.9 PG (ref 26.8–34.3)
MCHC RBC AUTO-ENTMCNC: 32.5 G/DL (ref 31.4–37.4)
MCV RBC AUTO: 95 FL (ref 82–98)
MONOCYTES # BLD AUTO: 0.48 THOUSAND/ΜL (ref 0.17–1.22)
MONOCYTES NFR BLD AUTO: 12 % (ref 4–12)
NEUTROPHILS # BLD AUTO: 2.22 THOUSANDS/ΜL (ref 1.85–7.62)
NEUTS SEG NFR BLD AUTO: 58 % (ref 43–75)
NRBC BLD AUTO-RTO: 1 /100 WBCS
PLATELET # BLD AUTO: 247 THOUSANDS/UL (ref 149–390)
PMV BLD AUTO: 10.3 FL (ref 8.9–12.7)
POTASSIUM SERPL-SCNC: 3.9 MMOL/L (ref 3.5–5.3)
PROTHROMBIN TIME: 28.3 SECONDS (ref 12.3–15)
RBC # BLD AUTO: 2.85 MILLION/UL (ref 3.81–5.12)
SODIUM SERPL-SCNC: 140 MMOL/L (ref 135–147)
WBC # BLD AUTO: 3.9 THOUSAND/UL (ref 4.31–10.16)

## 2024-10-09 PROCEDURE — 400013 VN SOC

## 2024-10-09 PROCEDURE — 85610 PROTHROMBIN TIME: CPT | Performed by: PHYSICIAN ASSISTANT

## 2024-10-09 PROCEDURE — G0299 HHS/HOSPICE OF RN EA 15 MIN: HCPCS

## 2024-10-09 PROCEDURE — 85025 COMPLETE CBC W/AUTO DIFF WBC: CPT | Performed by: PHYSICIAN ASSISTANT

## 2024-10-09 PROCEDURE — NC001 PR NO CHARGE

## 2024-10-09 PROCEDURE — 80048 BASIC METABOLIC PNL TOTAL CA: CPT | Performed by: PHYSICIAN ASSISTANT

## 2024-10-09 PROCEDURE — 99024 POSTOP FOLLOW-UP VISIT: CPT

## 2024-10-09 RX ADMIN — ACETAMINOPHEN 975 MG: 325 TABLET, FILM COATED ORAL at 05:24

## 2024-10-09 RX ADMIN — FOLIC ACID 1 MG: 1 TABLET ORAL at 08:15

## 2024-10-09 RX ADMIN — Medication 2000 UNITS: at 08:15

## 2024-10-09 RX ADMIN — HYDROXYCHLOROQUINE SULFATE 400 MG: 200 TABLET, FILM COATED ORAL at 07:27

## 2024-10-09 RX ADMIN — SILDENAFIL 40 MG: 20 TABLET, FILM COATED ORAL at 08:15

## 2024-10-09 RX ADMIN — CEFAZOLIN SODIUM 2000 MG: 2 SOLUTION INTRAVENOUS at 05:00

## 2024-10-09 RX ADMIN — Medication 1 TABLET: at 08:15

## 2024-10-09 RX ADMIN — OXYCODONE HYDROCHLORIDE AND ACETAMINOPHEN 500 MG: 500 TABLET ORAL at 08:15

## 2024-10-09 RX ADMIN — PANTOPRAZOLE SODIUM 40 MG: 40 TABLET, DELAYED RELEASE ORAL at 05:24

## 2024-10-09 RX ADMIN — OXYCODONE HYDROCHLORIDE 10 MG: 10 TABLET ORAL at 09:48

## 2024-10-09 NOTE — ASSESSMENT & PLAN NOTE
59 F POD 6 s/p L TKA revision for staph epi PJI with Dr. Wilson on 10/3  multi-modal pain control  ancef q8 hrs per Infectious disease  DVT ppx: coumadin, INR today is 2.71  Per SLIM, patient will restart Coumadin today and discuss with Dr. Brewster's office regarding outpatient INR testing  PT/OT  WBAT LLE  ROM as tolerated, pillow/blankets under achilles and not behind knee while in bed  f/u I/O cultures: One culture growing Staph epidermidis in broth only. Fungal cultures pending  drop >2 gram in Hgb consistent with ABLA, Hemoglobin 8.8 this morning.  synovasure + staph epi  PICC line in place  med mgt per SLIM  f/u 10-14 days

## 2024-10-09 NOTE — PLAN OF CARE
Problem: Prexisting or High Potential for Compromised Skin Integrity  Goal: Skin integrity is maintained or improved  Description: INTERVENTIONS:  - Identify patients at risk for skin breakdown  - Assess and monitor skin integrity  - Assess and monitor nutrition and hydration status  - Monitor labs   - Assess for incontinence   - Turn and reposition patient  - Assist with mobility/ambulation  - Relieve pressure over bony prominences  - Avoid friction and shearing  - Provide appropriate hygiene as needed including keeping skin clean and dry  - Evaluate need for skin moisturizer/barrier cream  - Collaborate with interdisciplinary team   - Patient/family teaching  - Consider wound care consult   Outcome: Progressing     Problem: PAIN - ADULT  Goal: Verbalizes/displays adequate comfort level or baseline comfort level  Description: Interventions:  - Encourage patient to monitor pain and request assistance  - Assess pain using appropriate pain scale  - Administer analgesics based on type and severity of pain and evaluate response  - Implement non-pharmacological measures as appropriate and evaluate response  - Consider cultural and social influences on pain and pain management  - Notify physician/advanced practitioner if interventions unsuccessful or patient reports new pain  Outcome: Progressing     Problem: INFECTION - ADULT  Goal: Absence or prevention of progression during hospitalization  Description: INTERVENTIONS:  - Assess and monitor for signs and symptoms of infection  - Monitor lab/diagnostic results  - Monitor all insertion sites, i.e. indwelling lines, tubes, and drains  - Monitor endotracheal if appropriate and nasal secretions for changes in amount and color  - [place] appropriate cooling/warming therapies per order  - Administer medications as ordered  - Instruct and encourage patient and family to use good hand hygiene technique  - Identify and instruct in appropriate isolation precautions for  identified infection/condition  Outcome: Progressing     Problem: SAFETY ADULT  Goal: Patient will remain free of falls  Description: INTERVENTIONS:  - Educate patient/family on patient safety including physical limitations  - Instruct patient to call for assistance with activity   - Consult OT/PT to assist with strengthening/mobility   - Keep Call bell within reach  - Keep bed low and locked with side rails adjusted as appropriate  - Keep care items and personal belongings within reach  - Initiate and maintain comfort rounds  - Make Fall Risk Sign visible to staff  - Offer Toileting every 2 Hours, in advance of need  - Initiate/Maintain bed alarm  - Obtain necessary fall risk management equipment: yellow socks, yellow bracelet, bed alarm  - Apply yellow socks and bracelet for high fall risk patients  - Consider moving patient to room near nurses station  Outcome: Progressing

## 2024-10-09 NOTE — CASE MANAGEMENT
Case Management Discharge Planning Note    Patient name Na Joseph  Location [facility] /E2 -* MRN [id number]  : 1965 Date 10/9/2024       Current Admission Date: 10/3/2024  Current Admission Diagnosis:Infection of total knee replacement  ([facility])   Patient Active Problem List    Diagnosis Date Noted Date Diagnosed    Infection of prosthetic left knee joint ([facility]) 10/08/2024     Penicillin allergy 10/07/2024     Acute blood loss as cause of postoperative anemia 10/06/2024     Infection of total knee replacement  ([facility]) 10/02/2024     Maltracking of left patella 2024     GRIS (obstructive sleep apnea) 2024     Other sleep disorders 2024     Thyroid nodule 2024     Neutropenia, unspecified type ([facility]) 2024     Rheumatoid arthritis, involving unspecified site, unspecified whether rheumatoid factor present ([facility]) 2024     BMI 45.0-49.9, adult ([facility]) 2024     Chronic midline low back pain without sciatica 2024     Motion sickness 2024     Fall 2023     Anticoagulated 2023     Sleep disturbance 2023     Neuropathic pain, leg, left 2023     Antiphospholipid antibody syndrome ([facility]) 2023     Financial difficulties 2023     Insomnia 2023     Pulmonary embolus ([facility]) 2023     Iron deficiency anemia due to chronic blood loss 2022     COVID-19 2022     SLE (systemic lupus erythematosus) ([facility]) 2022     History of left knee replacement 10/20/2022     PONV (postoperative nausea and vomiting) 10/06/2022     Venous stasis of lower extremity 2022     Acute pain of right knee 2022     Leukopenia 2021     Morbid obesity ([facility])      Primary osteoarthritis of left knee 2020     Left knee pain 2019     Tear of medial meniscus of left knee, current 2019     Right knee pain 2019     Status post total right knee replacement 2019     Chronic kidney disease, stage 3a  ([facility]) 02/12/2019     Tear of medial meniscus of right knee, current 09/10/2018     Other tear of medial meniscus, current injury, right knee, initial encounter 07/16/2018     Patellofemoral disorder of right knee 06/04/2018     Pes anserinus bursitis of right knee 06/04/2018     Arthritis of right knee 06/04/2018     Arthritis of left knee 06/04/2018     Chronic pain of right knee 05/22/2018     Elevated serum creatinine 05/22/2018     Hyperuricemia 05/22/2018     Long-term use of hydroxychloroquine 02/16/2018     Pulmonary hypertension ([facility]) 02/16/2018     Undifferentiated connective tissue disease ([facility]) 02/16/2018     Secondary pulmonary hypertension 12/15/2017     Diffuse connective tissue disease ([facility]) 11/21/2017     Pes anserine bursitis 11/21/2017     Trochanteric bursitis of both hips 11/21/2017     Vitamin D deficiency 11/21/2017     Other organ or system involvement in systemic lupus erythematosus ([facility]) 11/17/2017     Sinus tachycardia 11/09/2017     B12 deficiency 10/19/2017     Age-related osteoporosis without current pathological fracture 10/19/2017     Lupus anticoagulant disorder ([facility]) 10/18/2017     Positive HELENE (antinuclear antibody) 10/18/2017     Hot flashes due to menopause 09/01/2015     SOB (shortness of breath) on exertion 11/12/2012     Other chronic pulmonary heart diseases 11/05/2012     Edema 06/04/2012     Post-nasal drip 06/04/2012     Chronic cough 02/09/2012     Dyspnea 02/09/2012       LOS (days): 6  Geometric Mean LOS (GMLOS) (days): 3  Days to GMLOS:-2.6     OBJECTIVE:  Risk of Unplanned Readmission Score: 14.15         Current admission status: Inpatient   Preferred Pharmacy:   [address] [address]  Phone: [phone] Fax: [phone]    [id number] - [place] - [address]  [address] [address]  Phone: [phone] Fax:  [phone]    [facility] - [place], [place] - [address]  [address] [address]  Phone: [phone] Fax: [phone]    Primary Care Provider: Sultana Arnold DO    Primary Insurance: MEDICARE  Secondary Insurance: BLUE CROSS    DISCHARGE DETAILS:       Patient was with questions regarding her PICC dressing changes. Pt reported she was told that she would be going to the [facility] on Friday for the dressing change but nothing had yet been scheduled. CM informed pt that the visiting nurse would be coming to do the dressing change and if after the first dressing change she wanted to see if [facility] would be able to accommodate, she could place a call to them to schedule. Pt reported understanding. CM provided update to  [facility] about dressing change for Friday.

## 2024-10-09 NOTE — PLAN OF CARE
Problem: Prexisting or High Potential for Compromised Skin Integrity  Goal: Skin integrity is maintained or improved  Description: INTERVENTIONS:  - Identify patients at risk for skin breakdown  - Assess and monitor skin integrity  - Assess and monitor nutrition and hydration status  - Monitor labs   - Assess for incontinence   - Turn and reposition patient  - Assist with mobility/ambulation  - Relieve pressure over bony prominences  - Avoid friction and shearing  - Provide appropriate hygiene as needed including keeping skin clean and dry  - Evaluate need for skin moisturizer/barrier cream  - Collaborate with interdisciplinary team   - Patient/family teaching  - Consider wound care consult   Outcome: Progressing     Problem: PAIN - ADULT  Goal: Verbalizes/displays adequate comfort level or baseline comfort level  Description: Interventions:  - Encourage patient to monitor pain and request assistance  - Assess pain using appropriate pain scale  - Administer analgesics based on type and severity of pain and evaluate response  - Implement non-pharmacological measures as appropriate and evaluate response  - Consider cultural and social influences on pain and pain management  - Notify physician/advanced practitioner if interventions unsuccessful or patient reports new pain  Outcome: Progressing     Problem: INFECTION - ADULT  Goal: Absence or prevention of progression during hospitalization  Description: INTERVENTIONS:  - Assess and monitor for signs and symptoms of infection  - Monitor lab/diagnostic results  - Monitor all insertion sites, i.e. indwelling lines, tubes, and drains  - Monitor endotracheal if appropriate and nasal secretions for changes in amount and color  - [place] appropriate cooling/warming therapies per order  - Administer medications as ordered  - Instruct and encourage patient and family to use good hand hygiene technique  - Identify and instruct in appropriate isolation precautions for  identified infection/condition  Outcome: Progressing     Problem: SAFETY ADULT  Goal: Patient will remain free of falls  Description: INTERVENTIONS:  - Educate patient/family on patient safety including physical limitations  - Instruct patient to call for assistance with activity   - Consult OT/PT to assist with strengthening/mobility   - Keep Call bell within reach  - Keep bed low and locked with side rails adjusted as appropriate  - Keep care items and personal belongings within reach  - Initiate and maintain comfort rounds  - Make Fall Risk Sign visible to staff  - Offer Toileting every 2 Hours, in advance of need  - Initiate/Maintain bed alarm  - Obtain necessary fall risk management equipment: yellow socks, yellow bracelet, bed alarm  - Apply yellow socks and bracelet for high fall risk patients  - Consider moving patient to room near nurses station  Outcome: Progressing  Goal: Maintain or return to baseline ADL function  Description: INTERVENTIONS:  -  Assess patient's ability to carry out ADLs; assess patient's baseline for ADL function and identify physical deficits which impact ability to perform ADLs (bathing, care of mouth/teeth, toileting, grooming, dressing, etc.)  - Assess/evaluate cause of self-care deficits   - Assess range of motion  - Assess patient's mobility; develop plan if impaired  - Assess patient's need for assistive devices and provide as appropriate  - Encourage maximum independence but intervene and supervise when necessary  - Involve family in performance of ADLs  - Assess for home care needs following discharge   - Consider OT consult to assist with ADL evaluation and planning for discharge  - Provide patient education as appropriate  Outcome: Progressing     Problem: DISCHARGE PLANNING  Goal: Discharge to home or other facility with appropriate resources  Description: INTERVENTIONS:  - Identify barriers to discharge w/patient and caregiver  - Arrange for needed discharge resources and  transportation as appropriate  - Identify discharge learning needs (meds, wound care, etc.)  - Arrange for interpretive services to assist at discharge as needed  - Refer to Case Management Department for coordinating discharge planning if the patient needs post-hospital services based on physician/advanced practitioner order or complex needs related to functional status, cognitive ability, or social support system  Outcome: Progressing

## 2024-10-09 NOTE — PROGRESS NOTES
Progress Note - Orthopedics   Name: Na Joseph 59 y.o. female I MRN: [id number]  Unit/Bed#: E2 -01 I Date of Admission: 10/3/2024   Date of Service: 10/9/2024 I Hospital Day: 6    Assessment & Plan  Infection of total knee replacement  (HCC)  59 F POD 6 s/p L TKA revision for staph epi PJI with Dr. Wilson on 10/3  multi-modal pain control  ancef q8 hrs per Infectious disease  DVT ppx: coumadin, INR today is 2.71  Per SLIM, patient will restart Coumadin today and discuss with Dr. Brewster's office regarding outpatient INR testing  PT/OT  WBAT LLE  ROM as tolerated, pillow/blankets under achilles and not behind knee while in bed  f/u I/O cultures: One culture growing Staph epidermidis in broth only. Fungal cultures pending  drop >2 gram in Hgb consistent with ABLA, Hemoglobin 8.8 this morning.  synovasure + staph epi  PICC line in place  med mgt per SLIM  f/u 10-14 days   PONV (postoperative nausea and vomiting)  Continue with Zofran  Acute blood loss as cause of postoperative anemia  Stable at 8.8 today after SLM transfused 1 unit PRBCs 10/6/24  Penicillin allergy  Patient tolerating Ancef  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)  See above    Ok for discharge from Orthopedics service perspective after AM dose of Ancef.    Subjective   59 y.o.female POD 6 s/p L TKA revision for staph epi PJI.  No new acute overnight events, no new complaints.  Pain is reasonably well-controlled with medication.  She has continued numbness in her foot which she attributes to swelling.  She denies subjective fevers, chills, headaches, CP, SOB, or N/V.  She is very eager to go home today.    Objective :  Temp:  [98 °F (36.7 °C)-98.2 °F (36.8 °C)] 98.2 °F (36.8 °C)  HR:  [] 98  BP: ()/(60-69) 105/63  Resp:  [16-18] 16  SpO2:  [95 %-99 %] 97 %  O2 Device: None (Room air)    Physical Exam  Vitals and nursing note reviewed.   Constitutional:       General: She is not in acute distress.     Appearance: She is  "well-developed.   HENT:      Head: Normocephalic and atraumatic.   Eyes:      Conjunctiva/sclera: Conjunctivae normal.   Cardiovascular:      Rate and Rhythm: Normal rate.   Pulmonary:      Effort: Pulmonary effort is normal. No respiratory distress.   Abdominal:      Palpations: Abdomen is soft.      Tenderness: There is no abdominal tenderness.   Musculoskeletal:      Cervical back: Neck supple.   Skin:     General: Skin is warm and dry.      Capillary Refill: Capillary refill takes less than 2 seconds.   Neurological:      Mental Status: She is alert.   Psychiatric:         Mood and Affect: Mood normal.       Musculoskeletal: left lower extremity   Visible skin is without erythema or ecchymosis.  Dressing C/D/I  Minimally TTP over the knee  Motor intact to +FHL/EHL, +ankle dorsi/plantar flexion  Toes WWP  Sensation intact over the toes      Lab Results: I have reviewed the following results:  Recent Labs     10/07/24  0557 10/08/24  0857 10/09/24  0539   WBC 3.98* 3.49* 3.90*   HGB 7.8* 8.3* 8.8*   HCT 23.5* 25.4* 27.1*    218 247   BUN 9 10 8   CREATININE 0.69 0.69 0.67   INR 2.82* 3.15* 2.71*     Blood Culture:  No results found for: \"BLOODCX\"  Wound Culture: No results found for: \"WOUNDCULT\"  "

## 2024-10-09 NOTE — RESTORATIVE TECHNICIAN NOTE
Restorative Technician Note      Patient Name: Na Joseph     [facility] Tech Visit Date: 10/09/24  Note Type: Mobility  Patient Position Upon Consult: Supine  Activity Performed: Ambulated; Range of motion  Assistive Device: Other (Comment) (Rollator)  Patient Position at End of Consult: All needs within reach; Bedside chair

## 2024-10-09 NOTE — DISCHARGE SUMMARY
Discharge Summary - Orthopedics   Name: Na Joseph 59 y.o. female I MRN: [id number]  Unit/Bed#: E2 -01 I Date of Admission: 10/3/2024   Date of Service: 10/9/2024 I Hospital Day: 6    Admission Date: 10/3/2024 1323  Discharge Date: 10/09/24  Admitting Diagnosis: Status post total knee replacement using cement, left [Z96.652]  Infection of total knee replacement, initial encounter  ([facility]) [T84.59XA, Z96.659]  Discharge Diagnosis:   Medical Problems       Resolved Problems  Date Reviewed: 10/2/2024            Resolved    Hypokalemia 10/8/2024     Resolved by  KERI Pina          HPI: This is a 59 y.o. year old female seen and evaluated by Dr. Wilson in the clinic with a history of a Left TKA performed on 10/06/2022 with Dr. Zendejas.  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 She was scheduled for a Left TKA revision with explant, I&D, and implantation of articulating antibiotic spacer. The patient elected to proceed with Left TKA Revision to 1.5 stage antibiotic arthroplasty.  Risks and benefits of surgery to include but not limited to bleeding, infection, damage to surrounding structures, hardware failure, instability, fracture, dislocation, need for further surgery, continued pain, stiffness, blood clots, stroke, and heart attack was discussed with the patient.     Procedures Performed: Left - ARTHROPLASTY KNEE TOTAL REVISION TO HAND-CRAFTED ANTIBIOTIC ARTHROPLASTY  Left - INSERTION OF HAND-CRAFTED DRUG DELIVERY DEVICE (STIMULAN)  Left - EXTENSIVE EXCISIONAL DEBRIDEMENT DOWN TO AND INCLUDING BONE    Summary of Hospital Course: Hospital Course: The patient was admitted to the hospital on 10/3/2024 and underwent an uncomplicated left total knee arthroplasty revision. They were transferred to the floor after a brief stay in the post-anesthesia care unit. Their pain was well managed with IV and oral pain medications. They began therapy on post operative day #1. Baseline Coumadin  was continued for DVT prophylaxis.  NOLA was involved in management of patient's Coumadin dosing during her stay.  She did receive 1 unit pRBCs 10/6/24.  Patient's Hgb stable upon discharge at 8.8.  Infectious Disease was also involved in the patient's care while she was hospitalized and was instrumental in planning the patient's antibiotic course.  Patient did receive PICC line during her hospital stay for outpatient IV antibiotics.  On discharge date pt was cleared by PT and the medicine team and determined to be safe for discharge.  Daily discussion was had with the patient, nursing staff, orthopaedic team, and family members if present.  All questions were answered to the patients satisifaction.    Significant Findings, Care, Treatment and Services Provided: See above    Complications: None    Condition at Discharge: good       Discharge instructions/Information to patient and family:   See After Visit Summary (AVS) for information provided to patient and family.      Provisions for Follow-Up Care:  See after visit summary for information related to follow-up care and any pertinent home health orders.      PCP: Sultana Arnold,     Disposition: Home with home health care    Planned Readmission: No     Discharge Medications:  See after visit summary for reconciled discharge medications provided to patient and family.      Discharge Statement:  I have spent a total time of 40 minutes in caring for this patient on the day of the visit/encounter. >30 minutes of time was spent on: Instructions for management, Patient and family education, Counseling / Coordination of care, Documenting in the medical record, Reviewing / ordering tests, medicine, procedures  , and Communicating with other healthcare professionals .

## 2024-10-09 NOTE — CASE MANAGEMENT
Case Management Discharge Planning Note    Patient name Na Joseph  Location [facility] 2 /E2 -* MRN [id number]  : 1965 Date 10/9/2024       Current Admission Date: 10/3/2024  Current Admission Diagnosis:Infection of total knee replacement  ([facility])   Patient Active Problem List    Diagnosis Date Noted Date Diagnosed    Infection of prosthetic left knee joint ([facility]) 10/08/2024     Penicillin allergy 10/07/2024     Acute blood loss as cause of postoperative anemia 10/06/2024     Infection of total knee replacement  ([facility]) 10/02/2024     Maltracking of left patella 2024     GRIS (obstructive sleep apnea) 2024     Other sleep disorders 2024     Thyroid nodule 2024     Neutropenia, unspecified type ([facility]) 2024     Rheumatoid arthritis, involving unspecified site, unspecified whether rheumatoid factor present ([facility]) 2024     BMI 45.0-49.9, adult ([facility]) 2024     Chronic midline low back pain without sciatica 2024     Motion sickness 2024     Fall 2023     Anticoagulated 2023     Sleep disturbance 2023     Neuropathic pain, leg, left 2023     Antiphospholipid antibody syndrome ([facility]) 2023     Financial difficulties 2023     Insomnia 2023     Pulmonary embolus ([facility]) 2023     Iron deficiency anemia due to chronic blood loss 2022     COVID-19 2022     SLE (systemic lupus erythematosus) ([facility]) 2022     History of left knee replacement 10/20/2022     PONV (postoperative nausea and vomiting) 10/06/2022     Venous stasis of lower extremity 2022     Acute pain of right knee 2022     Leukopenia 2021     Morbid obesity ([facility])      Primary osteoarthritis of left knee 2020     Left knee pain 2019     Tear of medial meniscus of left knee, current 2019     Right knee pain 2019     Status post total right knee replacement 2019     Chronic kidney disease, stage 3a  ([facility]) 02/12/2019     Tear of medial meniscus of right knee, current 09/10/2018     Other tear of medial meniscus, current injury, right knee, initial encounter 07/16/2018     Patellofemoral disorder of right knee 06/04/2018     Pes anserinus bursitis of right knee 06/04/2018     Arthritis of right knee 06/04/2018     Arthritis of left knee 06/04/2018     Chronic pain of right knee 05/22/2018     Elevated serum creatinine 05/22/2018     Hyperuricemia 05/22/2018     Long-term use of hydroxychloroquine 02/16/2018     Pulmonary hypertension ([facility]) 02/16/2018     Undifferentiated connective tissue disease ([facility]) 02/16/2018     Secondary pulmonary hypertension 12/15/2017     Diffuse connective tissue disease ([facility]) 11/21/2017     Pes anserine bursitis 11/21/2017     Trochanteric bursitis of both hips 11/21/2017     Vitamin D deficiency 11/21/2017     Other organ or system involvement in systemic lupus erythematosus ([facility]) 11/17/2017     Sinus tachycardia 11/09/2017     B12 deficiency 10/19/2017     Age-related osteoporosis without current pathological fracture 10/19/2017     Lupus anticoagulant disorder ([facility]) 10/18/2017     Positive HELENE (antinuclear antibody) 10/18/2017     Hot flashes due to menopause 09/01/2015     SOB (shortness of breath) on exertion 11/12/2012     Other chronic pulmonary heart diseases 11/05/2012     Edema 06/04/2012     Post-nasal drip 06/04/2012     Chronic cough 02/09/2012     Dyspnea 02/09/2012       LOS (days): 6  Geometric Mean LOS (GMLOS) (days): 3  Days to GMLOS:-2.6     OBJECTIVE:  Risk of Unplanned Readmission Score: 14.15         Current admission status: Inpatient   Preferred Pharmacy:   [address] [address]  Phone: [phone] Fax: [phone]    [id number] - [place] - [address]  [address] [address]  Phone: [phone] Fax:  [phone]    [facility] - [place], [place] - [address]  [address] [address]  Phone: [phone] Fax: [phone]    Primary Care Provider: Sultana Arnold DO    Primary Insurance: MEDICARE  Secondary Insurance: BLUE CROSS    DISCHARGE DETAILS:                                                                                        IMM Given (Date):: 10/09/24  IMM Given to:: Patient  IMM was reviewed with the pt. Pt reports understanding of the ability to appeal discharge if feeling as though not medically stable for discharge. IMM was signed and placed in the scan bin.

## 2024-10-09 NOTE — TELEPHONE ENCOUNTER
Spoke to pt, she was just discharged from the hospital today. She is locked out of her MyChart, so she was unable to send a message to Maile. She wanted to let her know that she will be having her PICC line changed on Friday and will also have a PT/INR drawn that day as well. Pt wanted Maile to call her back as she is unable to access her MyChart at the moment. Please call pt back regarding recent hospitalization at [phone].

## 2024-10-10 ENCOUNTER — TELEPHONE (OUTPATIENT)
Dept: INFUSION CENTER | Facility: CLINIC | Age: 59
End: 2024-10-10

## 2024-10-10 ENCOUNTER — TELEPHONE (OUTPATIENT)
Dept: INFECTIOUS DISEASES | Facility: CLINIC | Age: 59
End: 2024-10-10

## 2024-10-10 ENCOUNTER — HOME CARE VISIT (OUTPATIENT)
Dept: HOME HEALTH SERVICES | Facility: HOME HEALTHCARE | Age: 59
End: 2024-10-10

## 2024-10-10 ENCOUNTER — TELEPHONE (OUTPATIENT)
Dept: HEMATOLOGY ONCOLOGY | Facility: CLINIC | Age: 59
End: 2024-10-10

## 2024-10-10 ENCOUNTER — OFFICE VISIT (OUTPATIENT)
Dept: PHYSICAL THERAPY | Facility: CLINIC | Age: 59
End: 2024-10-10
Payer: MEDICARE

## 2024-10-10 DIAGNOSIS — Z96.659 INFECTION OF PROSTHETIC KNEE JOINT, SUBSEQUENT ENCOUNTER: ICD-10-CM

## 2024-10-10 DIAGNOSIS — Z96.652 STATUS POST LEFT KNEE REPLACEMENT: ICD-10-CM

## 2024-10-10 DIAGNOSIS — T84.59XD INFECTION OF PROSTHETIC KNEE JOINT, SUBSEQUENT ENCOUNTER: ICD-10-CM

## 2024-10-10 DIAGNOSIS — Z96.652 PRESENCE OF LEFT ARTIFICIAL KNEE JOINT: Primary | ICD-10-CM

## 2024-10-10 DIAGNOSIS — D50.9 IRON DEFICIENCY ANEMIA, UNSPECIFIED IRON DEFICIENCY ANEMIA TYPE: ICD-10-CM

## 2024-10-10 DIAGNOSIS — Z79.01 ANTICOAGULATED ON COUMADIN: Primary | ICD-10-CM

## 2024-10-10 DIAGNOSIS — T84.54XD INFECTION ASSOCIATED WITH INTERNAL LEFT KNEE PROSTHESIS, SUBSEQUENT ENCOUNTER: Primary | ICD-10-CM

## 2024-10-10 PROCEDURE — 97140 MANUAL THERAPY 1/> REGIONS: CPT | Performed by: PHYSICAL THERAPIST

## 2024-10-10 PROCEDURE — 97164 PT RE-EVAL EST PLAN CARE: CPT | Performed by: PHYSICAL THERAPIST

## 2024-10-10 PROCEDURE — 97110 THERAPEUTIC EXERCISES: CPT | Performed by: PHYSICAL THERAPIST

## 2024-10-10 NOTE — TELEPHONE ENCOUNTER
9/27/24 hgb 12.2  10/6/24 hgb 7.3.  1 U PRBC  10/9/24 hgb 8.8;  INR 2.7    Called and spoke with patient    Coumadin  5mg/ day  (this is what she was on presurgery as well)    Abx 1 week  already- cefazolin    PICC line placed for outpatient ABX .    [facility] coming out tomorrow for weekly lab draws/ PICC care    Will assess weekly pt/inr.  Standing order placed weekly x 12    Will check hgb and iron panel.

## 2024-10-10 NOTE — PROGRESS NOTES
PT Evaluation     Today's date: 10/10/2024  Patient name: Na Joseph  : 1965  MRN: [id number]  Referring provider: Grupo Wilson, *  Dx:   Encounter Diagnosis     ICD-10-CM    1. Presence of left artificial knee joint  Z96.652       2. Status post left knee replacement  Z96.652       3. Infection of prosthetic knee joint, subsequent encounter  T84.59XD     Z96.659           Start Time: 1400  Stop Time: 1455  Total time in clinic (min): 55 minutes    Assessment  Impairments: abnormal gait, abnormal or restricted ROM, activity intolerance, impaired balance, impaired physical strength, lacks appropriate home exercise program, pain with function, poor posture , poor body mechanics, activity limitations and endurance    Assessment details: Pt presents with signs and symptoms synonymous of admitting diagnosis L TKA revision and is a fall risk per subjective and objective assessment.  Pt presents with pain, decreased strength, decreased range, flexibility, as well as tolerance to activity and gait dysfunction.  Pt would benefit skilled PT intervention in order to address these impairments in order to be able to perform all desired activities with minimal to nil symptom exacerbation.  Thank you very much for this kind, familiar and motivated referral.     Understanding of Dx/Px/POC: good     Prognosis: good    Goals  RE n.v. -met  STG 4 Weeks:  Decrease pain at worst to 4  Improve knee range to 105 flex  Improve strength to 4- hip, knee 5/5  Independent with HEP  LTG 8 Weeks:  Decrease pain at worst to 2  Improve Knee range to be sustained at 120  Improve strength to Hip 4/5 or greater.    Able to perform all desired activities with minimal to nil symptom exacerbation         Plan  Patient would benefit from: skilled physical therapy  Planned modality interventions: cryotherapy and thermotherapy: hydrocollator packs    Planned therapy interventions: joint mobilization, manual therapy, abdominal trunk  stabilization, activity modification, balance, balance/weight bearing training, behavior modification, body mechanics training, neuromuscular re-education, patient/caregiver education, postural training, strengthening, stretching, therapeutic activities, therapeutic exercise, therapeutic training, transfer training, IADL retraining, home exercise program, graded exercise, graded activity, gait training, functional ROM exercises, flexibility and graded motor    Frequency: 2x week  Duration in weeks: 10  Treatment plan discussed with: patient        Subjective Evaluation    History of Present Illness  Date of onset: 10/10/2024  Mechanism of injury: Pt is a 59 yofemale who is familiar to this facility presents today s/p L Knee revision performed by Dr. Wilson on 10/3/24.  Pt presents today stating the knee is doing fairly well. Pt reports that she has pain levels at 3 at worst is 6/10.  Pt reports she is distraught because she has to go through this process of recovery again.  She reports otherwise though she is doing okay.  Currently using rollator.  Pt's  and eldest daughter Iliana is assisting with her care and her adult grandchild Jagdeep is also assisting with her care.  Pt reports that sleeping is going okay, she is having to use rest room frequently but no issues being asleep or getting there.  Pt reports numbness in L foot due to swelling, R LE is going well, denies change in bowel or bladder.  Pt reports that goals at this time are to reduce pain, improve ability to stand, walking, perform stairs, get back to how she was prior to onset of infection approximately 6 weeks ago.  Pt follows up with Dr. Wilson on 10/15/24.   Quality of life: good    Patient Goals  Patient goals for therapy: increased strength, return to sport/leisure activities, increased motion, improved balance and decreased pain    Pain  Current pain ratin  At best pain rating: 3  At worst pain ratin  Quality: dull ache, sharp and  "tight  Relieving factors: ice and medications  Aggravating factors: sitting, standing, walking and stair climbing  Progression: no change    Social Support  Steps to enter house: yes  Stairs in house: yes   Lives in: multiple-level home  Lives with: adult children and spouse      Diagnostic Tests  X-ray: normal  Treatments  Previous treatment: medication, physical therapy and injection treatment        Objective     Active Range of Motion   Left Knee   Flexion: 95 (PROM 105) degrees with pain  Extension: 0 degrees with pain    Right Knee   Flexion: 130 degrees   Extension: 0 degrees     Additional Active Range of Motion Details  Forward head, rounded shoulders, Lordosis  Gait: Antalgia with L LE and clearance with RW.    B/L Sensation intact to L3,4,5,S1,S2  LE Strength  Hip   L Flex 3 Ext 5 abd 3 Add 3 SLR AROM unable.   R Flex Ext Abd Add all 5/5. SLR AROM 2 x 10  LE Screen R LE 5/5, L LE  Joint Mobility  Palpation  Sts  TUG: RW 30\"  Transfers: sit <>Stand, rolling, supine <> sit mod A for L LE.    Elevations: Performed up with R, down with L with use of railing, pt was in accord.  -reviewed             Precautions: S/P L TKA Revision secondary to infection with Dr. Wilson on 10/3/24, Falls, Hx of PE, Anemia, CHF, Osteoporosis, PICC Line R UE.       10/10                         PROM L Knee 10 min            RE performed done                         Neuro Re-Ed             NBOS EC             NBOS EO Foam             Hip Abd/Ext             SLR A/AROM nv                                                   Ther Ex             Bike Rock and Roll nv            AP for circulation prn            GS seated 6\" x 10            HS seated  6\" x 10            QS 6\" x 10            LAQ 2 x 10            HR/TR X 10                         Ther Activity             Sit to Stand             Mini Squats             Gait Training             LRD progression Rollator                         Modalities             CP to L knee prn 10 " min

## 2024-10-10 NOTE — TELEPHONE ENCOUNTER
Contacted [facility] to let them know beacon orders have been placed for weekly labs/picc care.    Spoke with Michele. He is going to reach out to patient to schedule. He confirms the orders. He is going to call her directly to schedule.

## 2024-10-10 NOTE — PROGRESS NOTES
OPAT NOTE    AP ONLY CAMPUSES ARE: [place], [place], and [place].   In these cases, physician is only cosigning notes.    Supervising/Discharge provider: Dr. Gray    Diagnosis: Infection of total knee replacement    Drug: Cefazolin 2g IV q8h till 11/13/24 then PO abx for 4.5 months    Labs/Frequency: weekly CBCd,BMP    End Date: IV abx:11/13/2024    Infusion/[facility] contact:  [place]  [facility]    Next appointment: 10/22/2024    RN assigned: Samuel Thompson

## 2024-10-10 NOTE — TELEPHONE ENCOUNTER
Pt called back (msg was left) to schedule picc dressing changes. She stated that she thinks [facility] is going to be taking care of the changes from here on out. She is expecting a call from them this afternoon and will ask at that time. If she needs AN INF to schedule appts.for the changes,she will call to let us know.

## 2024-10-11 ENCOUNTER — APPOINTMENT (OUTPATIENT)
Dept: LAB | Facility: HOSPITAL | Age: 59
End: 2024-10-11
Payer: COMMERCIAL

## 2024-10-11 ENCOUNTER — TRANSITIONAL CARE MANAGEMENT (OUTPATIENT)
Age: 59
End: 2024-10-11

## 2024-10-11 ENCOUNTER — HOME CARE VISIT (OUTPATIENT)
Dept: HOME HEALTH SERVICES | Facility: HOME HEALTHCARE | Age: 59
End: 2024-10-11

## 2024-10-11 VITALS
OXYGEN SATURATION: 99 % | RESPIRATION RATE: 18 BRPM | SYSTOLIC BLOOD PRESSURE: 132 MMHG | TEMPERATURE: 97.6 F | HEART RATE: 96 BPM | DIASTOLIC BLOOD PRESSURE: 72 MMHG

## 2024-10-11 DIAGNOSIS — Z79.01 ANTICOAGULATED ON COUMADIN: ICD-10-CM

## 2024-10-11 DIAGNOSIS — T84.54XD INFECTION ASSOCIATED WITH INTERNAL LEFT KNEE PROSTHESIS, SUBSEQUENT ENCOUNTER: ICD-10-CM

## 2024-10-11 DIAGNOSIS — D50.9 IRON DEFICIENCY ANEMIA, UNSPECIFIED IRON DEFICIENCY ANEMIA TYPE: ICD-10-CM

## 2024-10-11 LAB
BASOPHILS # BLD AUTO: 0.01 THOUSANDS/ΜL (ref 0–0.1)
BASOPHILS NFR BLD AUTO: 0 % (ref 0–1)
CREAT SERPL-MCNC: 0.71 MG/DL (ref 0.6–1.3)
EOSINOPHIL # BLD AUTO: 0.15 THOUSAND/ΜL (ref 0–0.61)
EOSINOPHIL NFR BLD AUTO: 3 % (ref 0–6)
ERYTHROCYTE [DISTWIDTH] IN BLOOD BY AUTOMATED COUNT: 15.3 % (ref 11.6–15.1)
FERRITIN SERPL-MCNC: 320 NG/ML (ref 11–307)
GFR SERPL CREATININE-BSD FRML MDRD: 92 ML/MIN/1.73SQ M
HCT VFR BLD AUTO: 28.2 % (ref 34.8–46.1)
HGB BLD-MCNC: 8.8 G/DL (ref 11.5–15.4)
IMM GRANULOCYTES # BLD AUTO: 0.03 THOUSAND/UL (ref 0–0.2)
IMM GRANULOCYTES NFR BLD AUTO: 1 % (ref 0–2)
INR PPP: 2.85 (ref 0.85–1.19)
IRON SATN MFR SERPL: 26 % (ref 15–50)
IRON SERPL-MCNC: 50 UG/DL (ref 50–212)
LYMPHOCYTES # BLD AUTO: 0.85 THOUSANDS/ΜL (ref 0.6–4.47)
LYMPHOCYTES NFR BLD AUTO: 15 % (ref 14–44)
MCH RBC QN AUTO: 30 PG (ref 26.8–34.3)
MCHC RBC AUTO-ENTMCNC: 31.2 G/DL (ref 31.4–37.4)
MCV RBC AUTO: 96 FL (ref 82–98)
MONOCYTES # BLD AUTO: 0.53 THOUSAND/ΜL (ref 0.17–1.22)
MONOCYTES NFR BLD AUTO: 9 % (ref 4–12)
NEUTROPHILS # BLD AUTO: 4.24 THOUSANDS/ΜL (ref 1.85–7.62)
NEUTS SEG NFR BLD AUTO: 72 % (ref 43–75)
NRBC BLD AUTO-RTO: 0 /100 WBCS
PLATELET # BLD AUTO: 287 THOUSANDS/UL (ref 149–390)
PMV BLD AUTO: 10.6 FL (ref 8.9–12.7)
PROTHROMBIN TIME: 29.9 SECONDS (ref 12.3–15)
RBC # BLD AUTO: 2.93 MILLION/UL (ref 3.81–5.12)
TIBC SERPL-MCNC: 195 UG/DL (ref 250–450)
UIBC SERPL-MCNC: 145 UG/DL (ref 155–355)
WBC # BLD AUTO: 5.81 THOUSAND/UL (ref 4.31–10.16)

## 2024-10-11 PROCEDURE — 82728 ASSAY OF FERRITIN: CPT

## 2024-10-11 PROCEDURE — 82565 ASSAY OF CREATININE: CPT

## 2024-10-11 PROCEDURE — 83540 ASSAY OF IRON: CPT

## 2024-10-11 PROCEDURE — 85610 PROTHROMBIN TIME: CPT

## 2024-10-11 PROCEDURE — G0299 HHS/HOSPICE OF RN EA 15 MIN: HCPCS

## 2024-10-11 PROCEDURE — 85025 COMPLETE CBC W/AUTO DIFF WBC: CPT

## 2024-10-11 PROCEDURE — 83550 IRON BINDING TEST: CPT

## 2024-10-11 PROCEDURE — 36415 COLL VENOUS BLD VENIPUNCTURE: CPT

## 2024-10-14 ENCOUNTER — OFFICE VISIT (OUTPATIENT)
Dept: PHYSICAL THERAPY | Facility: CLINIC | Age: 59
End: 2024-10-14
Payer: MEDICARE

## 2024-10-14 DIAGNOSIS — Z96.652 PRESENCE OF LEFT ARTIFICIAL KNEE JOINT: Primary | ICD-10-CM

## 2024-10-14 DIAGNOSIS — Z96.652 STATUS POST LEFT KNEE REPLACEMENT: ICD-10-CM

## 2024-10-14 DIAGNOSIS — Z96.659 INFECTION OF PROSTHETIC KNEE JOINT, SUBSEQUENT ENCOUNTER: ICD-10-CM

## 2024-10-14 DIAGNOSIS — T84.59XD INFECTION OF PROSTHETIC KNEE JOINT, SUBSEQUENT ENCOUNTER: ICD-10-CM

## 2024-10-14 LAB
FUNGUS SPEC CULT: NORMAL

## 2024-10-14 PROCEDURE — 97110 THERAPEUTIC EXERCISES: CPT

## 2024-10-14 PROCEDURE — 97140 MANUAL THERAPY 1/> REGIONS: CPT

## 2024-10-14 PROCEDURE — G0180 MD CERTIFICATION HHA PATIENT: HCPCS | Performed by: STUDENT IN AN ORGANIZED HEALTH CARE EDUCATION/TRAINING PROGRAM

## 2024-10-14 NOTE — PROGRESS NOTES
"Daily Note     Today's date: 10/14/2024  Patient name: Na Joseph  : 1965  MRN: [id number]  Referring provider: Sultana Arnold DO  Dx:   Encounter Diagnosis     ICD-10-CM    1. Presence of left artificial knee joint  Z96.652       2. Infection of prosthetic knee joint, subsequent encounter  T84.59XD     Z96.659       3. Status post left knee replacement  Z96.652           Start Time: 1630  Stop Time: 1700  Total time in clinic (min): 30 minutes    Subjective: Pt reports that her pain is not controlled and she is having difficulty sleeping.       Objective: See treatment diary below      Assessment: Tolerated treatment well. ROM progressing well. No further progressions due to pain. Patient would benefit from continued PT      Plan: Continue per plan of care.      Precautions: S/P L TKA Revision secondary to infection with Dr. Wilson on 10/3/24, Falls, Hx of PE, Anemia, CHF, Osteoporosis, PICC Line R UE.       10/10 10/14                        PROM L Knee 10 min LNK  0-90           RE performed done                         Neuro Re-Ed             NBOS EC             NBOS EO Foam             Hip Abd/Ext             SLR A/AROM nv                                                   Ther Ex             Bike Rock and Roll nv            AP for circulation prn Review            GS seated 6\" x 10            HS seated  6\" x 10            QS 6\" x 10            LAQ 2 x 10 2x10            HR/TR X 10                         Ther Activity             Sit to Stand             Mini Squats             Gait Training             LRD progression Rollator Rollator                         Modalities             CP to L knee prn 10 min Decline                                "

## 2024-10-14 NOTE — UTILIZATION REVIEW
NOTIFICATION OF ADMISSION DISCHARGE   This is a Notification of Discharge from [facility]. Please be advised that this patient has been discharge from our facility. Below you will find the admission and discharge date and time including the patient’s disposition.   UTILIZATION REVIEW CONTACT:  Lilia Muller  Utilization   Network Utilization Review Department  Phone: [phone] x carefully listen to the prompts. All voicemails are confidential.  Email: [email or web address]     ADMISSION INFORMATION  PRESENTATION DATE: 10/3/2024  1:23 PM  OBERVATION ADMISSION DATE: N/A  INPATIENT ADMISSION DATE: 10/3/24  6:53 PM   DISCHARGE DATE: 10/9/2024 10:44 AM   DISPOSITION:Home with Home Health Care    Network Utilization Review Department  ATTENTION: Please call with any questions or concerns to [phone] and carefully listen to the prompts so that you are directed to the right person. All voicemails are confidential.   For Discharge needs, contact Care Management DC Support Team at [phone] opt. 2  Send all requests for admission clinical reviews, approved or denied determinations and any other requests to dedicated fax number below belonging to the campus where the patient is receiving treatment. List of dedicated fax numbers for the Facilities:  FACILITY NAME UR FAX NUMBER   ADMISSION DENIALS (Administrative/Medical Necessity) [phone]   DISCHARGE SUPPORT TEAM ([facility]) [phone]   PARENT CHILD HEALTH (Maternity/NICU/Pediatrics) [phone]   [facility] [phone]   [facility] [phone]   [facility] [phone]   [facility] [phone]   [facility] [phone]   [facility] [phone]   [facility] [phone]   [facility]  [phone]   [facility] [phone]   [facility] [phone]   [facility] [phone]   [facility] [phone]

## 2024-10-15 ENCOUNTER — OFFICE VISIT (OUTPATIENT)
Age: 59
End: 2024-10-15

## 2024-10-15 ENCOUNTER — APPOINTMENT (OUTPATIENT)
Age: 59
End: 2024-10-15
Payer: MEDICARE

## 2024-10-15 VITALS — HEIGHT: 62 IN | WEIGHT: 234 LBS | BODY MASS INDEX: 43.06 KG/M2

## 2024-10-15 DIAGNOSIS — Z96.652 STATUS POST REVISION OF TOTAL KNEE, LEFT: ICD-10-CM

## 2024-10-15 DIAGNOSIS — Z96.652 STATUS POST REVISION OF TOTAL KNEE, LEFT: Primary | ICD-10-CM

## 2024-10-15 DIAGNOSIS — M79.2 NEUROPATHIC PAIN, LEG, LEFT: ICD-10-CM

## 2024-10-15 PROCEDURE — 73562 X-RAY EXAM OF KNEE 3: CPT

## 2024-10-15 PROCEDURE — 99024 POSTOP FOLLOW-UP VISIT: CPT | Performed by: STUDENT IN AN ORGANIZED HEALTH CARE EDUCATION/TRAINING PROGRAM

## 2024-10-15 RX ORDER — GABAPENTIN 100 MG/1
100 CAPSULE ORAL 3 TIMES DAILY
Qty: 90 CAPSULE | Refills: 1 | Status: SHIPPED | OUTPATIENT
Start: 2024-10-15

## 2024-10-15 RX ORDER — OXYCODONE HYDROCHLORIDE 5 MG/1
10 TABLET ORAL EVERY 4 HOURS PRN
Qty: 42 TABLET | Refills: 0 | Status: SHIPPED | OUTPATIENT
Start: 2024-10-15 | End: 2024-10-25

## 2024-10-15 NOTE — PROGRESS NOTES
Subjective: Patient seen and examined. She is struggling with pain control, she is taking oxycodone 5mg for pain. She does take gabapentin 100mg at night time.  She is ambulating with a rollator. Incision without drainage. Denies fevers or chills.  She is struggling to sleep due to the pain.  She has her PICC line and getting IV antibiotics of IV ancef q 8 hours.    Physical Exam:  Incision: CDI, no erythema or drainage noted  ROM: 5-100 passively   5/5 IP/Q/HS/TA/GS, 2+ DP/PT, SILT DP/SP/S/S/TN    XR Left knee: metal femur with all poly tibial component, components in good position. No fracture or dislocation.       Assessment/Plan:  60 y/o female 12 days s/p Left TKA Revision for Staph Epi PJI from 10/3/24.    - continue multi-modal pain control   - Oxycodone 10mg sent to the pharmacy  - Gabapentin 100mg increased to TID  - Weight bearing status: WBAT LLE with assistance  - DVT ppx: continue home Lovenox  - Continue with IV ancef via PICC line  - Incision care: May shower, do not scrub or submerge incision  - PT/OT  - F/U 4 weeks      Scribe Attestation      I,:  Amina Goldberg PA-C am acting as a scribe while in the presence of the attending physician.:       I,:  Grupo Wilson DO personally performed the services described in this documentation    as scribed in my presence.:

## 2024-10-16 ENCOUNTER — TELEPHONE (OUTPATIENT)
Dept: OBGYN CLINIC | Facility: HOSPITAL | Age: 59
End: 2024-10-16

## 2024-10-16 ENCOUNTER — OFFICE VISIT (OUTPATIENT)
Dept: PHYSICAL THERAPY | Facility: CLINIC | Age: 59
End: 2024-10-16
Payer: MEDICARE

## 2024-10-16 DIAGNOSIS — Z96.659 INFECTION OF PROSTHETIC KNEE JOINT, SUBSEQUENT ENCOUNTER: ICD-10-CM

## 2024-10-16 DIAGNOSIS — T84.59XD INFECTION OF PROSTHETIC KNEE JOINT, SUBSEQUENT ENCOUNTER: ICD-10-CM

## 2024-10-16 DIAGNOSIS — Z96.652 PRESENCE OF LEFT ARTIFICIAL KNEE JOINT: Primary | ICD-10-CM

## 2024-10-16 DIAGNOSIS — Z96.652 STATUS POST LEFT KNEE REPLACEMENT: ICD-10-CM

## 2024-10-16 PROCEDURE — 97110 THERAPEUTIC EXERCISES: CPT | Performed by: PHYSICAL THERAPIST

## 2024-10-16 PROCEDURE — 97140 MANUAL THERAPY 1/> REGIONS: CPT | Performed by: PHYSICAL THERAPIST

## 2024-10-16 NOTE — TELEPHONE ENCOUNTER
Caller: Patient    Doctor: Steve Ontiveros    Reason for call: Patient is calling to reschedule a POST OP apt for 10/29 at 4:15 PM. Patient states she has PT that day at 5 PM. No other apts were available. Please advise patient - TY.    Call back#: [phone]

## 2024-10-16 NOTE — PROGRESS NOTES
"Daily Note     Today's date: 10/16/2024  Patient name: Na Joseph  : 1965  MRN: [id number]  Referring provider: Sultana Arnold, *  Dx:   Encounter Diagnosis     ICD-10-CM    1. Presence of left artificial knee joint  Z96.652       2. Infection of prosthetic knee joint, subsequent encounter  T84.59XD     Z96.659       3. Status post left knee replacement  Z96.652                      Subjective: Pt reports that she met with Dr. Wilosn, who increased pain medication and pt reports that she is feeling quite a bit better.        Objective: See treatment diary below      Assessment: Very good tolerance to today's session, able to progress with all HEP and CKC based intervention.  Continue to progress as able.        Plan: Continue per plan of care.      Precautions: S/P L TKA Revision secondary to infection with Dr. Wilson on 10/3/24, Falls, Hx of PE, Anemia, CHF, Osteoporosis, PICC Line R UE.       10/10 10/14 10/16                       PROM L Knee 10 min LNK  0-90 TAS 0-107          RE performed done                         Neuro Re-Ed             NBOS EC             NBOS EO Foam             Hip Abd/Ext             SLR A/AROM nv                                                   Ther Ex             Bike Rock and Roll nv  6 min          AP for circulation prn Review            GS seated 6\" x 10  6\" x 10          HS seated  6\" x 10  6\" x 10          QS 6\" x 10  6\" x 10          LAQ 2 x 10 2x10  2 x 10          HR/TR X 10  2 x 10                       Ther Activity             Sit to Stand             Mini Squats   x10          Gait Training             LRD progression Rollator Rollator  \" \"                        Modalities             CP to L knee prn 10 min Decline  10 min                               "

## 2024-10-18 ENCOUNTER — HOME CARE VISIT (OUTPATIENT)
Dept: HOME HEALTH SERVICES | Facility: HOME HEALTHCARE | Age: 59
End: 2024-10-18

## 2024-10-18 ENCOUNTER — APPOINTMENT (OUTPATIENT)
Dept: LAB | Facility: HOSPITAL | Age: 59
End: 2024-10-18
Payer: COMMERCIAL

## 2024-10-18 VITALS
DIASTOLIC BLOOD PRESSURE: 72 MMHG | RESPIRATION RATE: 18 BRPM | SYSTOLIC BLOOD PRESSURE: 154 MMHG | HEART RATE: 89 BPM | OXYGEN SATURATION: 97 % | TEMPERATURE: 98.2 F

## 2024-10-18 DIAGNOSIS — Z79.01 ANTICOAGULATED ON COUMADIN: ICD-10-CM

## 2024-10-18 DIAGNOSIS — T84.54XD INFECTION ASSOCIATED WITH INTERNAL LEFT KNEE PROSTHESIS, SUBSEQUENT ENCOUNTER: ICD-10-CM

## 2024-10-18 LAB
BASOPHILS # BLD AUTO: 0.03 THOUSANDS/ΜL (ref 0–0.1)
BASOPHILS NFR BLD AUTO: 1 % (ref 0–1)
CREAT SERPL-MCNC: 0.62 MG/DL (ref 0.6–1.3)
EOSINOPHIL # BLD AUTO: 0.09 THOUSAND/ΜL (ref 0–0.61)
EOSINOPHIL NFR BLD AUTO: 2 % (ref 0–6)
ERYTHROCYTE [DISTWIDTH] IN BLOOD BY AUTOMATED COUNT: 16.9 % (ref 11.6–15.1)
GFR SERPL CREATININE-BSD FRML MDRD: 97 ML/MIN/1.73SQ M
HCT VFR BLD AUTO: 28.5 % (ref 34.8–46.1)
HGB BLD-MCNC: 8.7 G/DL (ref 11.5–15.4)
IMM GRANULOCYTES # BLD AUTO: 0.01 THOUSAND/UL (ref 0–0.2)
IMM GRANULOCYTES NFR BLD AUTO: 0 % (ref 0–2)
INR PPP: 3.11 (ref 0.85–1.19)
LYMPHOCYTES # BLD AUTO: 0.76 THOUSANDS/ΜL (ref 0.6–4.47)
LYMPHOCYTES NFR BLD AUTO: 15 % (ref 14–44)
MCH RBC QN AUTO: 30.7 PG (ref 26.8–34.3)
MCHC RBC AUTO-ENTMCNC: 30.5 G/DL (ref 31.4–37.4)
MCV RBC AUTO: 101 FL (ref 82–98)
MONOCYTES # BLD AUTO: 0.49 THOUSAND/ΜL (ref 0.17–1.22)
MONOCYTES NFR BLD AUTO: 10 % (ref 4–12)
NEUTROPHILS # BLD AUTO: 3.57 THOUSANDS/ΜL (ref 1.85–7.62)
NEUTS SEG NFR BLD AUTO: 72 % (ref 43–75)
NRBC BLD AUTO-RTO: 0 /100 WBCS
PLATELET # BLD AUTO: 350 THOUSANDS/UL (ref 149–390)
PMV BLD AUTO: 10.4 FL (ref 8.9–12.7)
PROTHROMBIN TIME: 31.9 SECONDS (ref 12.3–15)
RBC # BLD AUTO: 2.83 MILLION/UL (ref 3.81–5.12)
WBC # BLD AUTO: 4.95 THOUSAND/UL (ref 4.31–10.16)

## 2024-10-18 PROCEDURE — 85025 COMPLETE CBC W/AUTO DIFF WBC: CPT

## 2024-10-18 PROCEDURE — 36415 COLL VENOUS BLD VENIPUNCTURE: CPT

## 2024-10-18 PROCEDURE — 82565 ASSAY OF CREATININE: CPT

## 2024-10-18 PROCEDURE — G0299 HHS/HOSPICE OF RN EA 15 MIN: HCPCS

## 2024-10-18 PROCEDURE — 85610 PROTHROMBIN TIME: CPT

## 2024-10-18 NOTE — PROGRESS NOTES
10/18/24 INR 3.11  Hold tonight and then continue 5mg daily.  Recheck in 1 week    Patient verbalized understanding

## 2024-10-21 ENCOUNTER — OFFICE VISIT (OUTPATIENT)
Dept: PHYSICAL THERAPY | Facility: CLINIC | Age: 59
End: 2024-10-21
Payer: MEDICARE

## 2024-10-21 DIAGNOSIS — T84.59XD INFECTION OF PROSTHETIC KNEE JOINT, SUBSEQUENT ENCOUNTER: ICD-10-CM

## 2024-10-21 DIAGNOSIS — Z96.659 INFECTION OF PROSTHETIC KNEE JOINT, SUBSEQUENT ENCOUNTER: ICD-10-CM

## 2024-10-21 DIAGNOSIS — Z96.652 STATUS POST LEFT KNEE REPLACEMENT: ICD-10-CM

## 2024-10-21 DIAGNOSIS — Z96.652 PRESENCE OF LEFT ARTIFICIAL KNEE JOINT: Primary | ICD-10-CM

## 2024-10-21 LAB
FUNGUS SPEC CULT: NORMAL

## 2024-10-21 PROCEDURE — 97112 NEUROMUSCULAR REEDUCATION: CPT

## 2024-10-21 PROCEDURE — 97110 THERAPEUTIC EXERCISES: CPT

## 2024-10-21 NOTE — PROGRESS NOTES
"Daily Note     Today's date: 10/21/2024  Patient name: Na Joseph  : 1965  MRN: [id number]  Referring provider: Sultana Arnold, *  Dx:   Encounter Diagnosis     ICD-10-CM    1. Presence of left artificial knee joint  Z96.652       2. Infection of prosthetic knee joint, subsequent encounter  T84.59XD     Z96.659       3. Status post left knee replacement  Z96.652                      Subjective: Pt reports that she has some swelling and pain around the joint line.       Objective: See treatment diary below      Assessment: Pt progressing well with ROM. Progressed WB interventions without complaint. Did well with balance interventions       Plan: Continue per plan of care.      Precautions: S/P L TKA Revision secondary to infection with Dr. Wilson on 10/3/24, Falls, Hx of PE, Anemia, CHF, Osteoporosis, PICC Line R UE.       10/10 10/14 10/16 10/21                      PROM L Knee 10 min LNK  0-90 TAS 0-107 LNK         RE performed done                         Neuro Re-Ed             NBOS EC    1 min         NBOS EO Foam    1 min         Hip Abd/Ext             SLR A/AROM nv   10x AAROM                                                 Ther Ex             Bike Rock and Roll nv  6 min 6 min          AP for circulation prn Review            GS seated 6\" x 10  6\" x 10          HS seated  6\" x 10  6\" x 10 6\"x10         QS 6\" x 10  6\" x 10 6\"x10         LAQ 2 x 10 2x10  2 x 10 2x10          HR/TR X 10  2 x 10 X10                       Ther Activity             Sit to Stand             Mini Squats   x10 2x10          Gait Training             LRD progression Rollator Rollator  \" \"                        Modalities             CP to L knee prn 10 min Decline  10 min  10 min                              "

## 2024-10-22 ENCOUNTER — OFFICE VISIT (OUTPATIENT)
Age: 59
End: 2024-10-22
Payer: MEDICARE

## 2024-10-22 ENCOUNTER — HOME CARE VISIT (OUTPATIENT)
Dept: HOME HEALTH SERVICES | Facility: HOME HEALTHCARE | Age: 59
End: 2024-10-22

## 2024-10-22 VITALS
HEART RATE: 100 BPM | DIASTOLIC BLOOD PRESSURE: 72 MMHG | RESPIRATION RATE: 18 BRPM | TEMPERATURE: 97.6 F | HEIGHT: 63 IN | SYSTOLIC BLOOD PRESSURE: 126 MMHG | WEIGHT: 237 LBS | BODY MASS INDEX: 41.99 KG/M2 | OXYGEN SATURATION: 99 %

## 2024-10-22 DIAGNOSIS — M32.9 SYSTEMIC LUPUS ERYTHEMATOSUS, UNSPECIFIED SLE TYPE, UNSPECIFIED ORGAN INVOLVEMENT STATUS (HCC): Chronic | ICD-10-CM

## 2024-10-22 DIAGNOSIS — Z88.0 PENICILLIN ALLERGY: ICD-10-CM

## 2024-10-22 DIAGNOSIS — Z96.659 INFECTION OF TOTAL KNEE REPLACEMENT, SEQUELA: ICD-10-CM

## 2024-10-22 DIAGNOSIS — D68.61 ANTIPHOSPHOLIPID ANTIBODY SYNDROME (HCC): ICD-10-CM

## 2024-10-22 DIAGNOSIS — L30.4 INTERTRIGO: Primary | ICD-10-CM

## 2024-10-22 DIAGNOSIS — T84.59XS INFECTION OF TOTAL KNEE REPLACEMENT, SEQUELA: ICD-10-CM

## 2024-10-22 DIAGNOSIS — E66.01 MORBID OBESITY (HCC): ICD-10-CM

## 2024-10-22 PROCEDURE — 99215 OFFICE O/P EST HI 40 MIN: CPT | Performed by: PHYSICIAN ASSISTANT

## 2024-10-22 RX ORDER — FLUCONAZOLE 150 MG/1
150 TABLET ORAL ONCE
Qty: 3 TABLET | Refills: 0 | Status: SHIPPED | OUTPATIENT
Start: 2024-10-22 | End: 2024-10-22

## 2024-10-22 RX ORDER — NYSTATIN 100000 [USP'U]/G
POWDER TOPICAL 3 TIMES DAILY
Qty: 60 G | Refills: 1 | Status: SHIPPED | OUTPATIENT
Start: 2024-10-22

## 2024-10-22 NOTE — ASSESSMENT & PLAN NOTE
History of left TKA 10/6/22. Developed 2 weeks of left knee pain, fevers, chills and was seen by orthopedic surgery 9/23.  Arthrocentesis performed and Synovasure was positive with Staph epidermidis isolated.  Now status post left TKA revision with implantation of an articulating antibiotic spacer (1.5 stage revision arthroplasty) 10/3/24.  Operative cultures growing staph epidermidis in broth only. 10/18/24 labs reviewed. Cr 0.62, WBC 4.95, Hgb 8.7, Plt 350  -Continue IV Cefazolin 2g every 8 hours  -Recommend a 6 week course of IV antibiotics through 11/13/24 followed by 4.5 months p.o. antibiotics (6 months total) due to 1.5 stage revision arthroplasty  -Orthopedic surgery follow-up ongoing  -weekly CBC diff, BMP on IV antibiotics to assess for treatment response, drug toxicities  -PICC removal after last dose IV antibiotics   -Next ID office follow up appt 11/12/24 at 9:30 am

## 2024-10-22 NOTE — PROGRESS NOTES
Ambulatory Visit  Name: Na Joseph      : 1965      MRN: [id number]  Encounter Provider: Sherrill Topete PA-C  Encounter Date: 10/22/2024   Encounter department: [facility] INFECTIOUS DISEASE [facility]    Assessment & Plan  Infection of total knee replacement, sequela  History of left TKA 10/6/22. Developed 2 weeks of left knee pain, fevers, chills and was seen by orthopedic surgery .  Arthrocentesis performed and Synovasure was positive with Staph epidermidis isolated.  Now status post left TKA revision with implantation of an articulating antibiotic spacer (1.5 stage revision arthroplasty) 10/3/24.  Operative cultures growing staph epidermidis in broth only. 10/18/24 labs reviewed. Cr 0.62, WBC 4.95, Hgb 8.7, Plt 350  -Continue IV Cefazolin 2g every 8 hours  -Recommend a 6 week course of IV antibiotics through 24 followed by 4.5 months p.o. antibiotics (6 months total) due to 1.5 stage revision arthroplasty  -Orthopedic surgery follow-up ongoing  -weekly CBC diff, BMP on IV antibiotics to assess for treatment response, drug toxicities  -PICC removal after last dose IV antibiotics   -Next ID office follow up appt 24 at 9:30 am       Systemic lupus erythematosus, unspecified SLE type, unspecified organ involvement status (HCC)  Patient on hydroxychloroquine and Rinvoq outpatient. Follows with a rheumatologist at [facility]. The Rinvoq has been on hold due to left knee prosthetic joint infection.   -Management of immunosuppression per her rheumatologist. She has been continued on hydroxychloroquine and Benlysta.   -Rinvoq is on hold        Antiphospholipid antibody syndrome (HCC)  On Coumadin outpatient. 10/18/24 INR 3.1  -follows with hematology  -Monitor INR        Penicillin allergy  Tolerating Cefazolin. Reports as rash and anaphylaxis.   -Will continue to monitor        Morbid obesity (HCC)  BMI 42.8 Can affect antibiotic dosing  -dose affect antibiotics as indicated    "    Intertrigo  L > R groin and vaginal itching  -take Fluconazole 150 mg PO x 1 today and if needed weekly   -apply Nystatin powder to groin folds TID  Orders:    fluconazole (DIFLUCAN) 150 mg tablet; Take 1 tablet (150 mg total) by mouth once for 1 dose    nystatin (MYCOSTATIN) powder; Apply topically 3 (three) times a day    I have discussed the above management plan to continue Cefazolin and addition of Nystatin/Fluconazole with patient and daughter at chairside in detail.      Antibiotics:  Cefazolin day 19    History of Present Illness     Na Joseph is a 59 y.o. female who presents for outpatient ID follow up of infection of left TKR on IV Cefazolin    The patient is overall feeling as though post op Left knee pain is better managed with gabepentin and increased oxycodone dosing.  She denies nausea, diarrhea, fever, CP, SOB, cough, dysuria. No issues with the PICC line. She is having some vaginal itching and itching/burning of groin folds L > R and soreness of anal area with wiping.         Review of Systems   All other systems reviewed and are negative.          Objective     /72   Pulse 100   Temp 97.6 °F (36.4 °C)   Resp 18   Ht 5' 3\" (1.6 m)   Wt 108 kg (237 lb)   SpO2 99%   BMI 41.98 kg/m²     Physical Exam  Vitals reviewed.   Constitutional:       Appearance: Normal appearance.      Comments: Ambulatory in office with rollator and sitting comfortably in chair   HENT:      Head: Normocephalic and atraumatic.      Right Ear: External ear normal.      Left Ear: External ear normal.      Nose: Nose normal.      Mouth/Throat:      Pharynx: Oropharynx is clear.   Eyes:      Extraocular Movements: Extraocular movements intact.      Conjunctiva/sclera: Conjunctivae normal.   Cardiovascular:      Rate and Rhythm: Normal rate.   Pulmonary:      Effort: Pulmonary effort is normal.      Breath sounds: Normal breath sounds.   Abdominal:      General: Bowel sounds are normal.      Palpations: Abdomen " is soft.      Tenderness: There is no abdominal tenderness.      Comments: Protuberant pannus   Musculoskeletal:      Cervical back: Normal range of motion and neck supple.      Comments: L TKR incision closed, + swelling, no warmth/erytheam   Skin:     Comments: Pink maceration L > R groin fold  RUE PICC site dressing intact, nontender   Neurological:      Mental Status: She is alert and oriented to person, place, and time. Mental status is at baseline.       Administrative Statements   I have spent a total time of 45 minutes in caring for this patient on the day of the visit/encounter including Diagnostic results, Prognosis, Risks and benefits of tx options, Instructions for management, Patient and family education, Importance of tx compliance, Risk factor reductions, Impressions, Counseling / Coordination of care, Documenting in the medical record, Reviewing / ordering tests, medicine, procedures  , Obtaining or reviewing history  , and Communicating with other healthcare professionals .

## 2024-10-22 NOTE — ASSESSMENT & PLAN NOTE
Patient on hydroxychloroquine and Rinvoq outpatient. Follows with a rheumatologist at [facility]. The Rinvoq has been on hold due to left knee prosthetic joint infection.   -Management of immunosuppression per her rheumatologist. She has been continued on hydroxychloroquine and Benlysta.   -Rinvoq is on hold

## 2024-10-23 ENCOUNTER — OFFICE VISIT (OUTPATIENT)
Dept: PHYSICAL THERAPY | Facility: CLINIC | Age: 59
End: 2024-10-23
Payer: MEDICARE

## 2024-10-23 DIAGNOSIS — Z96.652 STATUS POST LEFT KNEE REPLACEMENT: ICD-10-CM

## 2024-10-23 DIAGNOSIS — Z96.659 INFECTION OF PROSTHETIC KNEE JOINT, SUBSEQUENT ENCOUNTER: ICD-10-CM

## 2024-10-23 DIAGNOSIS — T84.59XD INFECTION OF PROSTHETIC KNEE JOINT, SUBSEQUENT ENCOUNTER: ICD-10-CM

## 2024-10-23 DIAGNOSIS — Z96.652 PRESENCE OF LEFT ARTIFICIAL KNEE JOINT: Primary | ICD-10-CM

## 2024-10-23 PROCEDURE — 97110 THERAPEUTIC EXERCISES: CPT | Performed by: PHYSICAL THERAPIST

## 2024-10-23 PROCEDURE — 97140 MANUAL THERAPY 1/> REGIONS: CPT | Performed by: PHYSICAL THERAPIST

## 2024-10-23 NOTE — PROGRESS NOTES
"Daily Note     Today's date: 10/23/2024  Patient name: Na Joseph  : 1965  MRN: [id number]  Referring provider: Sultana Arnold, *  Dx:   Encounter Diagnosis     ICD-10-CM    1. Presence of left artificial knee joint  Z96.652       2. Infection of prosthetic knee joint, subsequent encounter  T84.59XD     Z96.659       3. Status post left knee replacement  Z96.652                      Subjective: Pt presents today stating that she is feeling much better as a whole.  Swelling seems to be coming down and her pain levels are also dropping.        Objective: See treatment diary below      Assessment:  Able to initiate SLR independently during today's session.  ROM is progressing positively as well.        Plan: Continue per plan of care.      Precautions: S/P L TKA Revision secondary to infection with Dr. Wilson on 10/3/24, Falls, Hx of PE, Anemia, CHF, Osteoporosis, PICC Line R UE.       10/10 10/14 10/16 10/21 10/23                     PROM L Knee 10 min LNK  0-90 TAS 0-107 LNK TAS 0-112        RE performed done                         Neuro Re-Ed             NBOS EC    1 min 1 min        NBOS EO Foam    1 min 1 min        Hip Abd/Ext             SLR A/AROM nv   10x AAROM  2 x 5 AROM                                               Ther Ex             Bike Rock and Roll nv  6 min 6 min  6 min        AP for circulation prn Review            GS seated 6\" x 10  6\" x 10          HS seated  6\" x 10  6\" x 10 6\"x10 6\" x 10        QS 6\" x 10  6\" x 10 6\"x10 6\" x 10        LAQ 2 x 10 2x10  2 x 10 2x10  3 x 10        HR/TR X 10  2 x 10 X10  2 x 10                     Ther Activity             Sit to Stand             Mini Squats   x10 2x10  2 x 10        Gait Training             LRD progression Rollator Rollator  \" \"                        Modalities             CP to L knee prn 10 min Decline  10 min  10 min  10 min                            "

## 2024-10-24 ENCOUNTER — APPOINTMENT (OUTPATIENT)
Dept: PHYSICAL THERAPY | Facility: CLINIC | Age: 59
End: 2024-10-24
Payer: MEDICARE

## 2024-10-25 ENCOUNTER — OFFICE VISIT (OUTPATIENT)
Age: 59
End: 2024-10-25
Payer: MEDICARE

## 2024-10-25 ENCOUNTER — HOME CARE VISIT (OUTPATIENT)
Dept: HOME HEALTH SERVICES | Facility: HOME HEALTHCARE | Age: 59
End: 2024-10-25

## 2024-10-25 ENCOUNTER — APPOINTMENT (OUTPATIENT)
Dept: LAB | Facility: HOSPITAL | Age: 59
End: 2024-10-25
Payer: COMMERCIAL

## 2024-10-25 VITALS
SYSTOLIC BLOOD PRESSURE: 112 MMHG | TEMPERATURE: 99.3 F | DIASTOLIC BLOOD PRESSURE: 80 MMHG | BODY MASS INDEX: 41.98 KG/M2 | HEART RATE: 115 BPM | HEIGHT: 63 IN | OXYGEN SATURATION: 98 %

## 2024-10-25 VITALS
RESPIRATION RATE: 18 BRPM | TEMPERATURE: 98 F | DIASTOLIC BLOOD PRESSURE: 76 MMHG | HEART RATE: 88 BPM | OXYGEN SATURATION: 99 % | SYSTOLIC BLOOD PRESSURE: 122 MMHG

## 2024-10-25 DIAGNOSIS — M25.562 LEFT KNEE PAIN, UNSPECIFIED CHRONICITY: ICD-10-CM

## 2024-10-25 DIAGNOSIS — Z79.01 ANTICOAGULATED ON COUMADIN: ICD-10-CM

## 2024-10-25 DIAGNOSIS — T84.59XD INFECTION OF TOTAL KNEE REPLACEMENT, SUBSEQUENT ENCOUNTER: Primary | ICD-10-CM

## 2024-10-25 DIAGNOSIS — Z96.659 INFECTION OF TOTAL KNEE REPLACEMENT, SUBSEQUENT ENCOUNTER: Primary | ICD-10-CM

## 2024-10-25 DIAGNOSIS — T84.54XD INFECTION ASSOCIATED WITH INTERNAL LEFT KNEE PROSTHESIS, SUBSEQUENT ENCOUNTER: ICD-10-CM

## 2024-10-25 LAB
BASOPHILS # BLD AUTO: 0.03 THOUSANDS/ΜL (ref 0–0.1)
BASOPHILS NFR BLD AUTO: 1 % (ref 0–1)
CREAT SERPL-MCNC: 0.64 MG/DL (ref 0.6–1.3)
EOSINOPHIL # BLD AUTO: 0.15 THOUSAND/ΜL (ref 0–0.61)
EOSINOPHIL NFR BLD AUTO: 4 % (ref 0–6)
ERYTHROCYTE [DISTWIDTH] IN BLOOD BY AUTOMATED COUNT: 16.4 % (ref 11.6–15.1)
GFR SERPL CREATININE-BSD FRML MDRD: 96 ML/MIN/1.73SQ M
HCT VFR BLD AUTO: 30.4 % (ref 34.8–46.1)
HGB BLD-MCNC: 9.4 G/DL (ref 11.5–15.4)
IMM GRANULOCYTES # BLD AUTO: 0.01 THOUSAND/UL (ref 0–0.2)
IMM GRANULOCYTES NFR BLD AUTO: 0 % (ref 0–2)
INR PPP: 2.06 (ref 0.85–1.19)
LYMPHOCYTES # BLD AUTO: 0.74 THOUSANDS/ΜL (ref 0.6–4.47)
LYMPHOCYTES NFR BLD AUTO: 17 % (ref 14–44)
MCH RBC QN AUTO: 31 PG (ref 26.8–34.3)
MCHC RBC AUTO-ENTMCNC: 30.9 G/DL (ref 31.4–37.4)
MCV RBC AUTO: 100 FL (ref 82–98)
MONOCYTES # BLD AUTO: 0.4 THOUSAND/ΜL (ref 0.17–1.22)
MONOCYTES NFR BLD AUTO: 9 % (ref 4–12)
NEUTROPHILS # BLD AUTO: 2.95 THOUSANDS/ΜL (ref 1.85–7.62)
NEUTS SEG NFR BLD AUTO: 69 % (ref 43–75)
NRBC BLD AUTO-RTO: 0 /100 WBCS
PLATELET # BLD AUTO: 303 THOUSANDS/UL (ref 149–390)
PMV BLD AUTO: 10.7 FL (ref 8.9–12.7)
PROTHROMBIN TIME: 23.4 SECONDS (ref 12.3–15)
RBC # BLD AUTO: 3.03 MILLION/UL (ref 3.81–5.12)
WBC # BLD AUTO: 4.28 THOUSAND/UL (ref 4.31–10.16)

## 2024-10-25 PROCEDURE — 99213 OFFICE O/P EST LOW 20 MIN: CPT

## 2024-10-25 PROCEDURE — 85610 PROTHROMBIN TIME: CPT

## 2024-10-25 PROCEDURE — G2211 COMPLEX E/M VISIT ADD ON: HCPCS

## 2024-10-25 PROCEDURE — G0299 HHS/HOSPICE OF RN EA 15 MIN: HCPCS

## 2024-10-25 PROCEDURE — 82565 ASSAY OF CREATININE: CPT

## 2024-10-25 PROCEDURE — 85025 COMPLETE CBC W/AUTO DIFF WBC: CPT

## 2024-10-25 PROCEDURE — 36415 COLL VENOUS BLD VENIPUNCTURE: CPT

## 2024-10-25 RX ORDER — FLUCONAZOLE 150 MG/1
TABLET ORAL
COMMUNITY
Start: 2024-10-22

## 2024-10-25 NOTE — PROGRESS NOTES
"Ambulatory Visit  Name: Na Joseph      : 1965      MRN: [id number]  Encounter Provider: Melonie Degroot MD  Encounter Date: 10/25/2024   Encounter department: [facility]    Assessment & Plan  Infection of total knee replacement, subsequent encounter         Left knee pain, unspecified chronicity         Patient is a 59-year-old female who presents to the clinic for follow-up after recent surgery secondary to infection of left knee after prior knee replacement.  Patient has significant pain which is well-managed with oxycodone.  Patient is following with interventional radiology.  Patient has home health set up.  Patient has multidisciplinary team available for her. Discussed taking probiotic as an option to replenish gut darshan. Patient had tachycardia in the office with repeat slightly improved to 110.  Patient states that she has a history of tachycardia frequently and this does not concern her.  Elevated heart rate may be in the setting of patient's pain.  Will continue to monitor on next routine visit.       History of Present Illness     HPI  Na is a 59-year-old female who reports reports to the clinic status post repeat left knee replacement secondary to hardware infection.  Patient was discharged on 10/9 and she tolerated the procedure well however states that it has been extremely painful and that it was \"worse than childbirth\"and was more painful than her previous 2 knee surgeries..  Her oxycodone 5 was increased to Oxy 10 by Dr. Wilson her surgeon.  Pain is well-managed with current regimen.  Icing is also been improving symptoms.    Patient saw infectious disease on the .  Patient also reports that she has been having some tremor and blurry vision which she believes may be related to medication side effects.  She plans to continue IV antibiotics with infectious disease for 6 weeks followed by 4 to 6 months of oral antibiotics.  She denies any history of significant " "alcohol use or recreational drug use.      Review of Systems   Constitutional:  Negative for chills and fever.   Respiratory:  Negative for shortness of breath.    Cardiovascular:  Negative for chest pain.   Musculoskeletal:         (+) Left knee pain   Skin:  Negative for rash.   Neurological:  Positive for tremors (Bilateral).           Objective     /80   Pulse (!) 115   Temp 99.3 °F (37.4 °C)   Ht 5' 3\" (1.6 m)   SpO2 98%   BMI 41.98 kg/m²     Physical Exam  Vitals reviewed.   Constitutional:       Comments: Ambulates with walker   HENT:      Head: Normocephalic and atraumatic.      Right Ear: External ear normal.      Left Ear: External ear normal.      Nose: Nose normal.      Mouth/Throat:      Mouth: Mucous membranes are moist.      Pharynx: Oropharynx is clear.   Eyes:      General:         Right eye: No discharge.         Left eye: No discharge.      Extraocular Movements: Extraocular movements intact.      Pupils: Pupils are equal, round, and reactive to light.   Cardiovascular:      Rate and Rhythm: Normal rate.      Heart sounds: Normal heart sounds.   Pulmonary:      Effort: Pulmonary effort is normal. No respiratory distress.      Breath sounds: Normal breath sounds.   Musculoskeletal:      Comments: PICC line in right upper extremity  Well-healing scar on left knee with no sign of infection or warmth.  Some swelling present   Skin:     General: Skin is warm and dry.      Capillary Refill: Capillary refill takes less than 2 seconds.   Neurological:      Mental Status: She is alert and oriented to person, place, and time. Mental status is at baseline.   Psychiatric:         Mood and Affect: Mood normal.         Behavior: Behavior normal.         "

## 2024-10-28 ENCOUNTER — APPOINTMENT (OUTPATIENT)
Dept: PHYSICAL THERAPY | Facility: CLINIC | Age: 59
End: 2024-10-28
Payer: MEDICARE

## 2024-10-28 ENCOUNTER — TELEPHONE (OUTPATIENT)
Dept: INFECTIOUS DISEASES | Facility: CLINIC | Age: 59
End: 2024-10-28

## 2024-10-28 ENCOUNTER — HOME CARE VISIT (OUTPATIENT)
Dept: HOME HEALTH SERVICES | Facility: HOME HEALTHCARE | Age: 59
End: 2024-10-28

## 2024-10-28 LAB
FUNGUS SPEC CULT: NORMAL

## 2024-10-28 NOTE — TELEPHONE ENCOUNTER
-Called and spoke with Mrs. Joseph regarding her PICC line. Asked her questions regarding the line's functionality. [facility] and asked about the possibility of cathflo to help with her line.     -She states the line works well but when she tries to get some blood return out of the line before starting an infusion like she was educated to do, it pulls back slowly and acts sluggish. She indicated that she is following the techniques the visiting nurses showed her for flushing her line. It was explained that if the line is functioning properly at this time then she should continue to use the techniques learned and if the line needs maintenance then interventions will be offered. She verbalizes her understanding and appreciated the call. She has no further questions at this time.

## 2024-10-29 ENCOUNTER — OFFICE VISIT (OUTPATIENT)
Age: 59
End: 2024-10-29

## 2024-10-29 ENCOUNTER — HOME CARE VISIT (OUTPATIENT)
Dept: HOME HEALTH SERVICES | Facility: HOME HEALTHCARE | Age: 59
End: 2024-10-29

## 2024-10-29 ENCOUNTER — TELEPHONE (OUTPATIENT)
Age: 59
End: 2024-10-29

## 2024-10-29 ENCOUNTER — OFFICE VISIT (OUTPATIENT)
Dept: PHYSICAL THERAPY | Facility: CLINIC | Age: 59
End: 2024-10-29
Payer: MEDICARE

## 2024-10-29 VITALS
BODY MASS INDEX: 41.98 KG/M2 | HEIGHT: 63 IN | DIASTOLIC BLOOD PRESSURE: 74 MMHG | HEART RATE: 77 BPM | SYSTOLIC BLOOD PRESSURE: 126 MMHG

## 2024-10-29 DIAGNOSIS — T84.59XD INFECTION OF PROSTHETIC KNEE JOINT, SUBSEQUENT ENCOUNTER: ICD-10-CM

## 2024-10-29 DIAGNOSIS — Z96.659 INFECTION OF PROSTHETIC KNEE JOINT, SUBSEQUENT ENCOUNTER: ICD-10-CM

## 2024-10-29 DIAGNOSIS — Z96.652 STATUS POST LEFT KNEE REPLACEMENT: ICD-10-CM

## 2024-10-29 DIAGNOSIS — Z96.652 STATUS POST REVISION OF TOTAL KNEE, LEFT: Primary | ICD-10-CM

## 2024-10-29 DIAGNOSIS — Z96.652 PRESENCE OF LEFT ARTIFICIAL KNEE JOINT: Primary | ICD-10-CM

## 2024-10-29 PROCEDURE — 97140 MANUAL THERAPY 1/> REGIONS: CPT

## 2024-10-29 PROCEDURE — 97110 THERAPEUTIC EXERCISES: CPT

## 2024-10-29 PROCEDURE — 97530 THERAPEUTIC ACTIVITIES: CPT

## 2024-10-29 PROCEDURE — 99024 POSTOP FOLLOW-UP VISIT: CPT | Performed by: STUDENT IN AN ORGANIZED HEALTH CARE EDUCATION/TRAINING PROGRAM

## 2024-10-29 NOTE — PROGRESS NOTES
"Daily Note     Today's date: 10/29/2024  Patient name: Na Joseph  : 1965  MRN: [id number]  Referring provider: Sultana Arnold, *  Dx:   Encounter Diagnosis     ICD-10-CM    1. Presence of left artificial knee joint  Z96.652       2. Infection of prosthetic knee joint, subsequent encounter  T84.59XD     Z96.659       3. Status post left knee replacement  Z96.652           Start Time: 1654  Stop Time: 1745  Total time in clinic (min): 51 minutes    Subjective: Pt reports MD jeong went well, would like to add ITB stretching. She would also like to trial the SPC.       Objective: See treatment diary below      Assessment: Tolerated treatment well. Ambulates around the clinic with SPC safely, recommend it be used with supervision at this point. Patient would benefit from continued PT      Plan: Continue per plan of care.      Precautions: S/P L TKA Revision secondary to infection with Dr. Wilson on 10/3/24, Falls, Hx of PE, Anemia, CHF, Osteoporosis, PICC Line R UE.       10/10 10/14 10/16 10/21 10/23 10/29                    PROM L Knee 10 min LNK  0-90 TAS 0-107 LNK TAS 0-112 LNK 0-110       RE performed done                         Neuro Re-Ed             NBOS EC    1 min 1 min 1 min       NBOS EO Foam    1 min 1 min 1 min       Hip Abd/Ext      2x10        SLR A/AROM nv   10x AAROM  2 x 5 AROM 4x5                                               Ther Ex             Bike Rock and Roll nv  6 min 6 min  6 min 6 min        AP for circulation prn Review            GS seated 6\" x 10  6\" x 10          HS seated  6\" x 10  6\" x 10 6\"x10 6\" x 10        QS 6\" x 10  6\" x 10 6\"x10 6\" x 10 6\"x10       LAQ 2 x 10 2x10  2 x 10 2x10  3 x 10 3x10       HR/TR X 10  2 x 10 X10  2 x 10 2x10                    Ther Activity             Sit to Stand             Mini Squats   x10 2x10  2 x 10 2x10       Gait Training             LRD progression Rollator Rollator  \" \"    R/SPC                    Modalities             CP to L " knee prn 10 min Decline  10 min  10 min  10 min 10 min

## 2024-10-29 NOTE — PROGRESS NOTES
Subjective: Patient seen and examined. Pain has improved since her last visit and is better controlled. He will take Tylenol and 5 mg of Oxycodone if needed. She is attending therapy and progressing well. Incision without drainage. Denies fevers or chills. She continues with IV antibiotics until 11/14/24.    Physical Exam:  Incision: healing well  ROM: 0-110 limited by soft tissues  5/5 IP/Q/HS/TA/GS, 2+ DP/PT, SILT DP/SP/S/S/TN    XR: no new x-rays were reviewed in the office today    Assessment/Plan:  4 weeks s/p Left TKA Revision for Staph Epi PJI from 10/3/24.      - continue multi-modal pain control   - Weight bearing status: WBAT LLE  - DVT ppx: Coumadin   - Incision care: may shower   - continue IV abx   - PT/OT add in IT band stretching  - may return to driving as long as she is no longer taking narcotic pain medication prior to driving   - F/U 4 weeks    Scribe Attestation      I,:  Dee Cortez MA am acting as a scribe while in the presence of the attending physician.:       I,:  Grupo Wilson DO personally performed the services described in this documentation    as scribed in my presence.:

## 2024-10-29 NOTE — TELEPHONE ENCOUNTER
Pt called refill line and stated she was to have a script for oxycodone called into the pharmacy today. Pt was seen in the office today. Pt would like the script sent to [id number] [place] - [address]

## 2024-10-30 RX ORDER — OXYCODONE HYDROCHLORIDE 5 MG/1
10 TABLET ORAL EVERY 4 HOURS PRN
Qty: 42 TABLET | Refills: 0 | Status: SHIPPED | OUTPATIENT
Start: 2024-10-30 | End: 2024-11-09

## 2024-10-31 ENCOUNTER — OFFICE VISIT (OUTPATIENT)
Dept: PHYSICAL THERAPY | Facility: CLINIC | Age: 59
End: 2024-10-31
Payer: MEDICARE

## 2024-10-31 DIAGNOSIS — Z96.652 STATUS POST LEFT KNEE REPLACEMENT: ICD-10-CM

## 2024-10-31 DIAGNOSIS — Z96.659 INFECTION OF PROSTHETIC KNEE JOINT, SUBSEQUENT ENCOUNTER: ICD-10-CM

## 2024-10-31 DIAGNOSIS — T84.59XD INFECTION OF PROSTHETIC KNEE JOINT, SUBSEQUENT ENCOUNTER: ICD-10-CM

## 2024-10-31 DIAGNOSIS — Z96.652 PRESENCE OF LEFT ARTIFICIAL KNEE JOINT: Primary | ICD-10-CM

## 2024-10-31 PROCEDURE — 97530 THERAPEUTIC ACTIVITIES: CPT

## 2024-10-31 PROCEDURE — 97140 MANUAL THERAPY 1/> REGIONS: CPT

## 2024-10-31 PROCEDURE — 97110 THERAPEUTIC EXERCISES: CPT

## 2024-10-31 NOTE — PROGRESS NOTES
"Daily Note     Today's date: 10/31/2024  Patient name: Na Joseph  : 1965  MRN: [id number]  Referring provider: Sultana Arnold, *  Dx:   Encounter Diagnosis     ICD-10-CM    1. Presence of left artificial knee joint  Z96.652       2. Infection of prosthetic knee joint, subsequent encounter  T84.59XD     Z96.659       3. Status post left knee replacement  Z96.652           Start Time: 1700  Stop Time: 1748  Total time in clinic (min): 48 minutes    Subjective: Pt reports that she was in the car for 6 hours today and was sore.       Objective: See treatment diary below      Assessment: Tolerated treatment well. Decreased intensity due to soreness. Patient would benefit from continued PT      Plan: Continue per plan of care.      Precautions: S/P L TKA Revision secondary to infection with Dr. Wilson on 10/3/24, Falls, Hx of PE, Anemia, CHF, Osteoporosis, PICC Line R UE.       10/10 10/14 10/16 10/21 10/23 10/29 10/31                   PROM L Knee 10 min LNK  0-90 TAS 0-107 LNK TAS 0-112 LNK 0-110 LNK 0-113      RE performed done                         Neuro Re-Ed             NBOS EC    1 min 1 min 1 min 1 min       NBOS EO Foam    1 min 1 min 1 min 1 min       Hip Abd/Ext      2x10  nv      SLR A/AROM nv   10x AAROM  2 x 5 AROM 4x5                                               Ther Ex             Bike Rock and Roll nv  6 min 6 min  6 min 6 min  6 min       AP for circulation prn Review            GS seated 6\" x 10  6\" x 10          HS seated  6\" x 10  6\" x 10 6\"x10 6\" x 10  6\"x10       QS 6\" x 10  6\" x 10 6\"x10 6\" x 10 6\"x10 6\"x10       LAQ 2 x 10 2x10  2 x 10 2x10  3 x 10 3x10 3x10      HR/TR X 10  2 x 10 X10  2 x 10 2x10 2x10                   Ther Activity             Sit to Stand             Mini Squats   x10 2x10  2 x 10 2x10 2x10       Gait Training             LRD progression Rollator Rollator  \" \"    R/SPC RW/SPC                   Modalities             CP to L knee prn 10 min Decline  10 min  " 10 min  10 min 10 min  10 min

## 2024-11-01 ENCOUNTER — TRANSCRIBE ORDERS (OUTPATIENT)
Dept: LAB | Facility: HOSPITAL | Age: 59
End: 2024-11-01

## 2024-11-01 ENCOUNTER — APPOINTMENT (OUTPATIENT)
Dept: LAB | Facility: HOSPITAL | Age: 59
End: 2024-11-01
Payer: COMMERCIAL

## 2024-11-01 ENCOUNTER — TELEPHONE (OUTPATIENT)
Age: 59
End: 2024-11-01

## 2024-11-01 ENCOUNTER — HOME CARE VISIT (OUTPATIENT)
Dept: HOME HEALTH SERVICES | Facility: HOME HEALTHCARE | Age: 59
End: 2024-11-01

## 2024-11-01 VITALS
HEART RATE: 84 BPM | SYSTOLIC BLOOD PRESSURE: 122 MMHG | DIASTOLIC BLOOD PRESSURE: 64 MMHG | TEMPERATURE: 98.6 F | RESPIRATION RATE: 18 BRPM | OXYGEN SATURATION: 97 %

## 2024-11-01 DIAGNOSIS — I43 DILATED CARDIOMYOPATHY SECONDARY TO SYSTEMIC LUPUS ERYTHEMATOSUS (HCC): Primary | ICD-10-CM

## 2024-11-01 DIAGNOSIS — Z79.01 ANTICOAGULATED ON COUMADIN: Primary | ICD-10-CM

## 2024-11-01 DIAGNOSIS — Z79.01 ANTICOAGULATED ON COUMADIN: ICD-10-CM

## 2024-11-01 DIAGNOSIS — Z96.659 INFECTION OF TOTAL KNEE REPLACEMENT, SEQUELA: Primary | ICD-10-CM

## 2024-11-01 DIAGNOSIS — B99.8 IMMUNOSUPPRESSION-RELATED INFECTIOUS DISEASE (HCC): ICD-10-CM

## 2024-11-01 DIAGNOSIS — Z79.899 NEED FOR PROPHYLACTIC CHEMOTHERAPY: ICD-10-CM

## 2024-11-01 DIAGNOSIS — M32.19 DILATED CARDIOMYOPATHY SECONDARY TO SYSTEMIC LUPUS ERYTHEMATOSUS (HCC): ICD-10-CM

## 2024-11-01 DIAGNOSIS — D84.9 IMMUNOSUPPRESSION-RELATED INFECTIOUS DISEASE (HCC): ICD-10-CM

## 2024-11-01 DIAGNOSIS — D68.61 ANTIPHOSPHOLIPID ANTIBODY SYNDROME (HCC): ICD-10-CM

## 2024-11-01 DIAGNOSIS — Z96.659 INFECTION OF TOTAL KNEE REPLACEMENT, SEQUELA: ICD-10-CM

## 2024-11-01 DIAGNOSIS — E55.9 AVITAMINOSIS D: ICD-10-CM

## 2024-11-01 DIAGNOSIS — I43 DILATED CARDIOMYOPATHY SECONDARY TO SYSTEMIC LUPUS ERYTHEMATOSUS (HCC): ICD-10-CM

## 2024-11-01 DIAGNOSIS — T84.59XS INFECTION OF TOTAL KNEE REPLACEMENT, SEQUELA: ICD-10-CM

## 2024-11-01 DIAGNOSIS — T84.59XS INFECTION OF TOTAL KNEE REPLACEMENT, SEQUELA: Primary | ICD-10-CM

## 2024-11-01 DIAGNOSIS — M32.19 DILATED CARDIOMYOPATHY SECONDARY TO SYSTEMIC LUPUS ERYTHEMATOSUS (HCC): Primary | ICD-10-CM

## 2024-11-01 LAB
25(OH)D3 SERPL-MCNC: 56.2 NG/ML (ref 30–100)
ALBUMIN SERPL BCG-MCNC: 3.6 G/DL (ref 3.5–5)
ALP SERPL-CCNC: 85 U/L (ref 34–104)
ALT SERPL W P-5'-P-CCNC: <3 U/L (ref 7–52)
ANION GAP SERPL CALCULATED.3IONS-SCNC: 8 MMOL/L (ref 4–13)
AST SERPL W P-5'-P-CCNC: 11 U/L (ref 13–39)
BASOPHILS # BLD AUTO: 0.02 THOUSANDS/ΜL (ref 0–0.1)
BASOPHILS NFR BLD AUTO: 1 % (ref 0–1)
BILIRUB SERPL-MCNC: 0.39 MG/DL (ref 0.2–1)
BUN SERPL-MCNC: 10 MG/DL (ref 5–25)
C3 SERPL-MCNC: 161 MG/DL (ref 87–200)
C4 SERPL-MCNC: 46 MG/DL (ref 19–52)
CALCIUM SERPL-MCNC: 9.5 MG/DL (ref 8.4–10.2)
CHLORIDE SERPL-SCNC: 105 MMOL/L (ref 96–108)
CO2 SERPL-SCNC: 27 MMOL/L (ref 21–32)
CREAT SERPL-MCNC: 0.61 MG/DL (ref 0.6–1.3)
EOSINOPHIL # BLD AUTO: 0.16 THOUSAND/ΜL (ref 0–0.61)
EOSINOPHIL NFR BLD AUTO: 6 % (ref 0–6)
ERYTHROCYTE [DISTWIDTH] IN BLOOD BY AUTOMATED COUNT: 15 % (ref 11.6–15.1)
ERYTHROCYTE [SEDIMENTATION RATE] IN BLOOD: 29 MM/HOUR (ref 0–29)
GFR SERPL CREATININE-BSD FRML MDRD: 98 ML/MIN/1.73SQ M
GLUCOSE SERPL-MCNC: 83 MG/DL (ref 65–140)
HCT VFR BLD AUTO: 30.2 % (ref 34.8–46.1)
HGB BLD-MCNC: 9.5 G/DL (ref 11.5–15.4)
IMM GRANULOCYTES # BLD AUTO: 0 THOUSAND/UL (ref 0–0.2)
IMM GRANULOCYTES NFR BLD AUTO: 0 % (ref 0–2)
INR PPP: 1.76 (ref 0.85–1.19)
LYMPHOCYTES # BLD AUTO: 0.63 THOUSANDS/ΜL (ref 0.6–4.47)
LYMPHOCYTES NFR BLD AUTO: 22 % (ref 14–44)
MCH RBC QN AUTO: 31.7 PG (ref 26.8–34.3)
MCHC RBC AUTO-ENTMCNC: 31.5 G/DL (ref 31.4–37.4)
MCV RBC AUTO: 101 FL (ref 82–98)
MONOCYTES # BLD AUTO: 0.39 THOUSAND/ΜL (ref 0.17–1.22)
MONOCYTES NFR BLD AUTO: 13 % (ref 4–12)
NEUTROPHILS # BLD AUTO: 1.73 THOUSANDS/ΜL (ref 1.85–7.62)
NEUTS SEG NFR BLD AUTO: 58 % (ref 43–75)
NRBC BLD AUTO-RTO: 0 /100 WBCS
PLATELET # BLD AUTO: 254 THOUSANDS/UL (ref 149–390)
PMV BLD AUTO: 10.8 FL (ref 8.9–12.7)
POTASSIUM SERPL-SCNC: 3.9 MMOL/L (ref 3.5–5.3)
PROT SERPL-MCNC: 5.9 G/DL (ref 6.4–8.4)
PROTHROMBIN TIME: 20.8 SECONDS (ref 12.3–15)
RBC # BLD AUTO: 3 MILLION/UL (ref 3.81–5.12)
SODIUM SERPL-SCNC: 140 MMOL/L (ref 135–147)
WBC # BLD AUTO: 2.93 THOUSAND/UL (ref 4.31–10.16)

## 2024-11-01 PROCEDURE — 85025 COMPLETE CBC W/AUTO DIFF WBC: CPT

## 2024-11-01 PROCEDURE — 85652 RBC SED RATE AUTOMATED: CPT

## 2024-11-01 PROCEDURE — G0299 HHS/HOSPICE OF RN EA 15 MIN: HCPCS

## 2024-11-01 PROCEDURE — 80220 DRUG ASY HYDROXYCHLOROQUINE: CPT

## 2024-11-01 PROCEDURE — 86225 DNA ANTIBODY NATIVE: CPT

## 2024-11-01 PROCEDURE — 36415 COLL VENOUS BLD VENIPUNCTURE: CPT

## 2024-11-01 PROCEDURE — 86160 COMPLEMENT ANTIGEN: CPT

## 2024-11-01 PROCEDURE — 85613 RUSSELL VIPER VENOM DILUTED: CPT

## 2024-11-01 PROCEDURE — 80053 COMPREHEN METABOLIC PANEL: CPT

## 2024-11-01 PROCEDURE — 86147 CARDIOLIPIN ANTIBODY EA IG: CPT

## 2024-11-01 PROCEDURE — 85610 PROTHROMBIN TIME: CPT

## 2024-11-01 PROCEDURE — 82306 VITAMIN D 25 HYDROXY: CPT

## 2024-11-01 NOTE — TELEPHONE ENCOUNTER
Shawna with [facility] calling regarding pt's weekly labs. No orders remaining on the CBC with diff or the creat. IV Cefazolin through 11/13. She is going to pt's home this morning to draw.

## 2024-11-02 LAB — DSDNA AB SER-ACNC: <1 IU/ML (ref 0–9)

## 2024-11-04 ENCOUNTER — EVALUATION (OUTPATIENT)
Dept: PHYSICAL THERAPY | Facility: CLINIC | Age: 59
End: 2024-11-04
Payer: MEDICARE

## 2024-11-04 DIAGNOSIS — Z96.652 PRESENCE OF LEFT ARTIFICIAL KNEE JOINT: Primary | ICD-10-CM

## 2024-11-04 DIAGNOSIS — Z96.652 STATUS POST LEFT KNEE REPLACEMENT: ICD-10-CM

## 2024-11-04 DIAGNOSIS — Z96.659 INFECTION OF PROSTHETIC KNEE JOINT, SUBSEQUENT ENCOUNTER: ICD-10-CM

## 2024-11-04 DIAGNOSIS — T84.59XD INFECTION OF PROSTHETIC KNEE JOINT, SUBSEQUENT ENCOUNTER: ICD-10-CM

## 2024-11-04 LAB
FUNGUS SPEC CULT: NORMAL

## 2024-11-04 PROCEDURE — 97112 NEUROMUSCULAR REEDUCATION: CPT | Performed by: PHYSICAL THERAPIST

## 2024-11-04 PROCEDURE — 97110 THERAPEUTIC EXERCISES: CPT | Performed by: PHYSICAL THERAPIST

## 2024-11-04 PROCEDURE — 97140 MANUAL THERAPY 1/> REGIONS: CPT | Performed by: PHYSICAL THERAPIST

## 2024-11-04 NOTE — PROGRESS NOTES
PT Evaluation     Today's date: 2024  Patient name: Na Joseph  : 1965  MRN: [id number]  Referring provider: Grupo Wilson, *  Dx:   Encounter Diagnosis     ICD-10-CM    1. Presence of left artificial knee joint  Z96.652 PT plan of care cert/re-cert      2. Infection of prosthetic knee joint, subsequent encounter  T84.59XD PT plan of care cert/re-cert    Z96.659       3. Status post left knee replacement  Z96.652 PT plan of care cert/re-cert                       Assessment  Impairments: abnormal gait, abnormal or restricted ROM, activity intolerance, impaired balance, impaired physical strength, lacks appropriate home exercise program, pain with function, poor posture , poor body mechanics, activity limitations and endurance    Assessment details: Pt is progressing very well at this time.  They have demonstrated improvement in pain levels, strength, range, flexibility as well as tolerance to activity.  They have achieved all STGs sought out for them as well as by them with exception of pain levels which are far less than initial presentation post surgery 4 weeks ago.  They will benefit continued Skilled PT intervention in order to achieve all LTGs sought out for them as well as by them in order to perform all desired activities with minimal to nil symptom exacerbation.  Thank you very much for this kind and motivated referral.          Understanding of Dx/Px/POC: good     Prognosis: good    Goals  RE n.v. -met  STG 4 Weeks:  Decrease pain at worst to 4 -partial  Improve knee range to 105 flex -met  Improve strength to 4- hip, knee 5/5 -met  Independent with HEP -met  LTG 8 Weeks:  Decrease pain at worst to 2  Improve Knee range to be sustained at 120  Improve strength to Hip 4/5 or greater.    Able to perform all desired activities with minimal to nil symptom exacerbation         Plan  Patient would benefit from: skilled physical therapy  Planned modality interventions: cryotherapy and  SUBJECTIVE:   29 y.o. female   for vaginal bleeding in early pregnancy. Patient's last menstrual period was 2019 (lmp unknown)..  Seen 9-3-19 for + UPT.  Had Kylena recently removed and hasn't had time to really establish regular periods.  BHCG 9-3-19 was 1252 with prog 10.3.  O+ blood type.  Started bleeding heavy 3 days ago.  Went to outside ER.  U/S showed IUP and FCA- but did not get a formal u/s report.  Was told BHCG had increased.  Had some bleeding again this morning and desires to be seen.   Also considering .  Had two prior c/S.           Past Medical History:   Diagnosis Date    History of kidney cancer 2013    excisional bx     Past Surgical History:   Procedure Laterality Date     SECTION      x 2    LAPAROSCOPIC GASTRIC BANDING      tummy tuck       Social History     Socioeconomic History    Marital status:      Spouse name: Not on file    Number of children: Not on file    Years of education: Not on file    Highest education level: Not on file   Occupational History    Not on file   Social Needs    Financial resource strain: Not on file    Food insecurity:     Worry: Not on file     Inability: Not on file    Transportation needs:     Medical: Not on file     Non-medical: Not on file   Tobacco Use    Smoking status: Former Smoker     Packs/day: 0.25     Years: 2.00     Pack years: 0.50     Types: Cigarettes    Smokeless tobacco: Former User   Substance and Sexual Activity    Alcohol use: Yes     Comment: On Occasion    Drug use: Never    Sexual activity: Yes     Partners: Male     Birth control/protection: None   Lifestyle    Physical activity:     Days per week: Not on file     Minutes per session: Not on file    Stress: Not on file   Relationships    Social connections:     Talks on phone: Not on file     Gets together: Not on file     Attends Holiness service: Not on file     Active member of club or organization: Not on file     Attends  meetings of clubs or organizations: Not on file     Relationship status: Not on file   Other Topics Concern    Not on file   Social History Narrative    Not on file     Family History   Problem Relation Age of Onset    Breast cancer Maternal Grandmother     Colon cancer Neg Hx     Ovarian cancer Neg Hx      OB History    Para Term  AB Living   3 2 2     2   SAB TAB Ectopic Multiple Live Births           2      # Outcome Date GA Lbr Kevan/2nd Weight Sex Delivery Anes PTL Lv   3 Current            2 Term 18 39w0d  3.204 kg (7 lb 1 oz) F CS-LTranv EPI N ISABELA   1 Term 13 39w0d  3.714 kg (8 lb 3 oz) F CS-LTranv EPI N ISABELA      Birth Comments: staph infection         Current Outpatient Medications   Medication Sig Dispense Refill    FLUoxetine 20 MG capsule Take 20 mg by mouth once daily.       No current facility-administered medications for this visit.      Allergies: Patient has no known allergies.     ROS:  Constitutional: no weight loss, weight gain, fever, fatigue  Eyes:  No vision changes, glasses/contacts  ENT/Mouth: No ulcers, sinus problems, ears ringing, headache  Cardiovascular: No inability to lie flat, chest pain, exercise intolerance, swelling, heart palpitations  Respiratory: No wheezing, coughing blood, shortness of breath, or cough  Gastrointestinal: No diarrhea, bloody stool, nausea/vomiting, constipation, gas, hemorrhoids  Genitourinary: No blood in urine, painful urination, urgency of urination, frequency of urination, incomplete emptying, incontinence, +abnormal bleeding, painful periods, heavy periods, vaginal discharge, vaginal odor, painful intercourse, sexual problems, bleeding after intercourse.  Musculoskeletal: No muscle weakness  Skin/Breast: No painful breasts, nipple discharge, masses, rash, ulcers  Neurological: No passing out, seizures, numbness, headache  Endocrine: No diabetes, hypothyroid, hyperthyroid, hot flashes, hair loss, abnormal hair growth,  thermotherapy: hydrocollator packs    Planned therapy interventions: joint mobilization, manual therapy, abdominal trunk stabilization, activity modification, balance, balance/weight bearing training, behavior modification, body mechanics training, neuromuscular re-education, patient/caregiver education, postural training, strengthening, stretching, therapeutic activities, therapeutic exercise, therapeutic training, transfer training, IADL retraining, home exercise program, graded exercise, graded activity, gait training, functional ROM exercises, flexibility and graded motor    Frequency: 2x week  Duration in weeks: 10  Treatment plan discussed with: patient      Subjective Evaluation    History of Present Illness  Date of onset: 10/10/2024  Mechanism of injury: Pt presents today stating that she is feeling really well as a whole.  Pain at worst 5/10, periods of time without.  Primarily using cane t/o entire home and sort distances, still utilizing RW when distances are greater.  Overall content with progression.  Goals are to reduce pain further, enhance endurance, strength, power, ability to utilize LRD, and ambulate stairs with greater confidence.  Pt reports back to Dr. Wilson 24.    Quality of life: good    Patient Goals  Patient goals for therapy: increased strength, return to sport/leisure activities, increased motion, improved balance and decreased pain    Pain  Current pain ratin  At best pain ratin  At worst pain ratin  Quality: dull ache, sharp and tight  Relieving factors: ice and medications  Aggravating factors: sitting, standing, walking and stair climbing  Progression: no change    Social Support  Steps to enter house: yes  Stairs in house: yes   Lives in: multiple-level home  Lives with: adult children and spouse      Diagnostic Tests  X-ray: normal  Treatments  Previous treatment: medication, physical therapy and injection treatment      Objective     Active Range of Motion  "  Left Knee   Flexion: 114 (PROM 120) degrees with pain  Extension: 0 degrees     Right Knee   Flexion: 130 degrees   Extension: 0 degrees     Additional Active Range of Motion Details  Forward head, rounded shoulders, Lordosis  Gait: Antalgia with L LE and clearance with RW.  // improved, utilizing SPC.   B/L Sensation intact to L3,4,5,S1,S2  LE Strength  Hip   L Flex 4- Ext 5 abd 4 Add 4 SLR AROM 10 x 2.   R Flex Ext Abd Add all 5/5. SLR AROM 2 x 10  LE Screen R LE 5/5, L LE  Joint Mobility  Palpation: Incision clean, dry and intact mild scab distal aspect x 2.    Sts  TUG: RW 30\" //14.9\" with SPC  Transfers: sit <>Stand, rolling, supine <> sit mod A for L LE.    Elevations: Performed up with R, down with L with use of railing, pt was in accord.  -reviewed             Precautions: S/P L TKA Revision secondary to infection with Dr. Wilson on 10/3/24, Falls, Hx of PE, Anemia, CHF, Osteoporosis, PICC Line R UE.       10/10 10/14 10/16 10/21 10/23 10/29 10/31 11/4                  PROM L Knee 10 min LNK  0-90 TAS 0-107 LNK TAS 0-112 LNK 0-110 LNK 0-113 TAS     RE performed done                         Neuro Re-Ed             NBOS EC    1 min 1 min 1 min 1 min  1 min     NBOS EO Foam    1 min 1 min 1 min 1 min  1 min     Hip Abd/Ext      2x10  nv 2 x 10     SLR A/AROM nv   10x AAROM  2 x 5 AROM 4x5   4 x 5                                            Ther Ex             Bike Rock and Roll nv  6 min 6 min  6 min 6 min  6 min  6 min     AP for circulation prn Review            GS seated 6\" x 10  6\" x 10          HS seated  6\" x 10  6\" x 10 6\"x10 6\" x 10  6\"x10       QS 6\" x 10  6\" x 10 6\"x10 6\" x 10 6\"x10 6\"x10       LAQ 2 x 10 2x10  2 x 10 2x10  3 x 10 3x10 3x10 3 x 10     HR/TR X 10  2 x 10 X10  2 x 10 2x10 2x10 2 x 10                  Ther Activity             Sit to Stand             Mini Squats   x10 2x10  2 x 10 2x10 2x10  2 x 10     Gait Training             LRD progression Rollator Rollator  \" \"    R/SPC RW/SPC SPC " "ance  Psychiatric: No depression, crying  Hematologic: No bruises, bleeding, swollen lymph nodes, anemia.      OBJECTIVE:   The patient appears well, alert, oriented x 3, in no distress.  /70 (BP Location: Left arm, Patient Position: Sitting, BP Method: Large (Manual))   Ht 5' 3" (1.6 m)   Wt 104.2 kg (229 lb 11.5 oz)   LMP 2019 (LMP Unknown)   Breastfeeding? No   BMI 40.69 kg/m²   ABDOMEN: no hernias, masses, or hepatosplenomegaly  GENITALIA: normal external genitalia, no erythema, bloody discharge  URETHRA: normal urethra, normal urethral meatus  VAGINA: Normal  CERVIX: no lesions or cervical motion tenderness  UTERUS: normal size, contour, position, consistency, mobility, non-tender  ADNEXA: no mass, fullness, tenderness      ASSESSMENT:   1. Threatened , antepartum  hCG, quantitative    Progesterone       PLAN:   Orders Placed This Encounter    hCG, quantitative    Progesterone     Discussed exam today.  Cervix closed.  No active bleeding.  Brown blood in vault.  Threatened ab.  Has dating u/s tomorrow.  Bleeding precautions.  Records release for u/s on Friday.  Discussed prior c/S x 2.  Desires .  She is concerned about staph infection after first C/S.  Discussed ACOG guidelines for - not recommended for two prior C/S due to risk of rupture.  May have MFM consult to discuss further.  Medical release for op notes and post op staph infection.  Return to clinic after dating u/s  "                  Modalities             CP to L knee prn 10 min Decline  10 min  10 min  10 min 10 min  10 min  10 min

## 2024-11-05 LAB
CARDIOLIPIN IGA SER IA-ACNC: 2.1
CARDIOLIPIN IGG SER IA-ACNC: 2.3
CARDIOLIPIN IGM SER IA-ACNC: <0.9

## 2024-11-06 ENCOUNTER — OFFICE VISIT (OUTPATIENT)
Dept: PHYSICAL THERAPY | Facility: CLINIC | Age: 59
End: 2024-11-06
Payer: MEDICARE

## 2024-11-06 DIAGNOSIS — Z96.659 INFECTION OF PROSTHETIC KNEE JOINT, SUBSEQUENT ENCOUNTER: ICD-10-CM

## 2024-11-06 DIAGNOSIS — T84.59XD INFECTION OF PROSTHETIC KNEE JOINT, SUBSEQUENT ENCOUNTER: ICD-10-CM

## 2024-11-06 DIAGNOSIS — Z96.652 STATUS POST LEFT KNEE REPLACEMENT: ICD-10-CM

## 2024-11-06 DIAGNOSIS — Z96.652 PRESENCE OF LEFT ARTIFICIAL KNEE JOINT: Primary | ICD-10-CM

## 2024-11-06 PROCEDURE — 97110 THERAPEUTIC EXERCISES: CPT | Performed by: PHYSICAL THERAPIST

## 2024-11-06 PROCEDURE — 97140 MANUAL THERAPY 1/> REGIONS: CPT | Performed by: PHYSICAL THERAPIST

## 2024-11-06 NOTE — PROGRESS NOTES
"Daily Note     Today's date: 2024  Patient name: Na Joseph  : 1965  MRN: [id number]  Referring provider: Sultana Arnold, *  Dx:   Encounter Diagnosis     ICD-10-CM    1. Presence of left artificial knee joint  Z96.652       2. Infection of prosthetic knee joint, subsequent encounter  T84.59XD     Z96.659       3. Status post left knee replacement  Z96.652                      Subjective: Pt presents today stating that she is doing well during the day.  Largest challenge she has right now is sleeping.        Objective: See treatment diary below      Assessment: Discussed how to support knee so sleeping could improve without compromising ext development.  Continue to progress as able.       Precautions: S/P L TKA Revision secondary to infection with Dr. Wilson on 10/3/24, Falls, Hx of PE, Anemia, CHF, Osteoporosis, PICC Line R UE.       10/10 10/14 10/16 10/21 10/23 10/29 10/31 11/4 11/6                 PROM L Knee 10 min LNK  0-90 TAS 0-107 LNK TAS 0-112 LNK 0-110 LNK 0-113 TAS TAS    RE performed done                         Neuro Re-Ed             NBOS EC    1 min 1 min 1 min 1 min  1 min EC 1 min    NBOS EO Foam    1 min 1 min 1 min 1 min  1 min     Hip Abd/Ext      2x10  nv 2 x 10 2 x 10    SLR A/AROM nv   10x AAROM  2 x 5 AROM 4x5   4 x 5 4 x 5                                           Ther Ex             Bike Rock and Roll nv  6 min 6 min    6 min 6 min  6 min  6 min 6 min    AP for circulation prn Review            GS seated 6\" x 10  6\" x 10          HS seated  6\" x 10  6\" x 10 6\"x10 6\" x 10  6\"x10       QS 6\" x 10  6\" x 10 6\"x10 6\" x 10 6\"x10 6\"x10       LAQ 2 x 10 2x10  2 x 10 2x10  3 x 10 3x10 3x10 3 x 10 3 x 10 2#    HR/TR X 10  2 x 10 X10  2 x 10 2x10 2x10 2 x 10 2 x 10                 Ther Activity             Sit to Stand         nv    Mini Squats   x10 2x10  2 x 10 2x10 2x10  2 x 10 2 x 10    Gait Training             LRD progression Rollator Rollator  \" \"    R/SPC RW/SPC SPC SPC "                 Modalities             CP to L knee prn 10 min Decline  10 min  10 min  10 min 10 min  10 min  10 min 10 min

## 2024-11-07 LAB — MISCELLANEOUS LAB TEST RESULT: NORMAL

## 2024-11-08 ENCOUNTER — PATIENT MESSAGE (OUTPATIENT)
Dept: RHEUMATOLOGY | Facility: CLINIC | Age: 59
End: 2024-11-08

## 2024-11-08 ENCOUNTER — APPOINTMENT (OUTPATIENT)
Dept: LAB | Facility: HOSPITAL | Age: 59
End: 2024-11-08
Attending: STUDENT IN AN ORGANIZED HEALTH CARE EDUCATION/TRAINING PROGRAM
Payer: COMMERCIAL

## 2024-11-08 ENCOUNTER — HOME CARE VISIT (OUTPATIENT)
Dept: HOME HEALTH SERVICES | Facility: HOME HEALTHCARE | Age: 59
End: 2024-11-08

## 2024-11-08 VITALS
DIASTOLIC BLOOD PRESSURE: 82 MMHG | RESPIRATION RATE: 16 BRPM | HEART RATE: 88 BPM | TEMPERATURE: 98.5 F | OXYGEN SATURATION: 98 % | SYSTOLIC BLOOD PRESSURE: 130 MMHG

## 2024-11-08 DIAGNOSIS — T84.59XS INFECTION OF TOTAL KNEE REPLACEMENT, SEQUELA: ICD-10-CM

## 2024-11-08 DIAGNOSIS — Z79.01 ANTICOAGULATED ON COUMADIN: ICD-10-CM

## 2024-11-08 DIAGNOSIS — Z96.659 INFECTION OF TOTAL KNEE REPLACEMENT, SEQUELA: ICD-10-CM

## 2024-11-08 LAB
BASOPHILS # BLD AUTO: 0.03 THOUSANDS/ÂΜL (ref 0–0.1)
BASOPHILS NFR BLD AUTO: 1 % (ref 0–1)
CREAT SERPL-MCNC: 0.62 MG/DL (ref 0.6–1.3)
EOSINOPHIL # BLD AUTO: 0.19 THOUSAND/ÂΜL (ref 0–0.61)
EOSINOPHIL NFR BLD AUTO: 5 % (ref 0–6)
ERYTHROCYTE [DISTWIDTH] IN BLOOD BY AUTOMATED COUNT: 13.8 % (ref 11.6–15.1)
GFR SERPL CREATININE-BSD FRML MDRD: 97 ML/MIN/1.73SQ M
HCT VFR BLD AUTO: 31.4 % (ref 34.8–46.1)
HGB BLD-MCNC: 9.8 G/DL (ref 11.5–15.4)
IMM GRANULOCYTES # BLD AUTO: 0.02 THOUSAND/UL (ref 0–0.2)
IMM GRANULOCYTES NFR BLD AUTO: 1 % (ref 0–2)
INR PPP: 2.19 (ref 0.85–1.19)
LYMPHOCYTES # BLD AUTO: 0.64 THOUSANDS/ÂΜL (ref 0.6–4.47)
LYMPHOCYTES NFR BLD AUTO: 17 % (ref 14–44)
MCH RBC QN AUTO: 30.7 PG (ref 26.8–34.3)
MCHC RBC AUTO-ENTMCNC: 31.2 G/DL (ref 31.4–37.4)
MCV RBC AUTO: 98 FL (ref 82–98)
MONOCYTES # BLD AUTO: 0.38 THOUSAND/ÂΜL (ref 0.17–1.22)
MONOCYTES NFR BLD AUTO: 10 % (ref 4–12)
NEUTROPHILS # BLD AUTO: 2.54 THOUSANDS/ÂΜL (ref 1.85–7.62)
NEUTS SEG NFR BLD AUTO: 66 % (ref 43–75)
NRBC BLD AUTO-RTO: 0 /100 WBCS
PLATELET # BLD AUTO: 254 THOUSANDS/UL (ref 149–390)
PMV BLD AUTO: 10.7 FL (ref 8.9–12.7)
PROTHROMBIN TIME: 24.5 SECONDS (ref 12.3–15)
RBC # BLD AUTO: 3.19 MILLION/UL (ref 3.81–5.12)
WBC # BLD AUTO: 3.8 THOUSAND/UL (ref 4.31–10.16)

## 2024-11-08 PROCEDURE — 82565 ASSAY OF CREATININE: CPT

## 2024-11-08 PROCEDURE — 85610 PROTHROMBIN TIME: CPT

## 2024-11-08 PROCEDURE — 36415 COLL VENOUS BLD VENIPUNCTURE: CPT

## 2024-11-08 PROCEDURE — 85025 COMPLETE CBC W/AUTO DIFF WBC: CPT

## 2024-11-08 PROCEDURE — G0299 HHS/HOSPICE OF RN EA 15 MIN: HCPCS

## 2024-11-11 ENCOUNTER — OFFICE VISIT (OUTPATIENT)
Dept: PHYSICAL THERAPY | Facility: CLINIC | Age: 59
End: 2024-11-11
Payer: MEDICARE

## 2024-11-11 DIAGNOSIS — T84.59XD INFECTION OF PROSTHETIC KNEE JOINT, SUBSEQUENT ENCOUNTER: ICD-10-CM

## 2024-11-11 DIAGNOSIS — Z96.659 INFECTION OF PROSTHETIC KNEE JOINT, SUBSEQUENT ENCOUNTER: ICD-10-CM

## 2024-11-11 DIAGNOSIS — Z96.652 PRESENCE OF LEFT ARTIFICIAL KNEE JOINT: Primary | ICD-10-CM

## 2024-11-11 DIAGNOSIS — Z96.652 STATUS POST LEFT KNEE REPLACEMENT: ICD-10-CM

## 2024-11-11 PROCEDURE — 97110 THERAPEUTIC EXERCISES: CPT | Performed by: PHYSICAL THERAPIST

## 2024-11-11 PROCEDURE — 97140 MANUAL THERAPY 1/> REGIONS: CPT | Performed by: PHYSICAL THERAPIST

## 2024-11-11 NOTE — PROGRESS NOTES
"Daily Note     Today's date: 2024  Patient name: Na Joseph  : 1965  MRN: [id number]  Referring provider: Sultana Arnold, *  Dx:   Encounter Diagnosis     ICD-10-CM    1. Presence of left artificial knee joint  Z96.652       2. Infection of prosthetic knee joint, subsequent encounter  T84.59XD     Z96.659       3. Status post left knee replacement  Z96.652                      Subjective: Pt presents today stating that she is feeling well.  Made adjustments to her bed and is sleeping better as well.         Objective: See treatment diary below      Assessment: Progressing very well in quality of movement with ambulation and quantity of ROM.  Continue to progress with resistance as able.       Precautions: S/P L TKA Revision secondary to infection with Dr. Wilson on 10/3/24, Falls, Hx of PE, Anemia, CHF, Osteoporosis, PICC Line R UE.       10/10 10/14 10/16 10/21 10/23 10/29 10/31 11/4 11/6 11/11                PROM L Knee 10 min LNK  0-90 TAS 0-107 LNK TAS 0-112 LNK 0-110 LNK 0-113 TAS TAS TAS 0-125   RE performed done                         Neuro Re-Ed             NBOS EC    1 min 1 min 1 min 1 min  1 min EC 1 min EC 1 min   NBOS EO Foam    1 min 1 min 1 min 1 min  1 min     Hip Abd/Ext      2x10  nv 2 x 10 2 x 10 2 x 10   SLR A/AROM nv   10x AAROM  2 x 5 AROM 4x5   4 x 5 4 x 5 4 x 5                                          Ther Ex             Bike Rock and Roll nv  6 min 6 min    6 min 6 min  6 min  6 min 6 min 6 min   AP for circulation prn Review            GS seated 6\" x 10  6\" x 10          HS seated  6\" x 10  6\" x 10 6\"x10 6\" x 10  6\"x10       QS 6\" x 10  6\" x 10 6\"x10 6\" x 10 6\"x10 6\"x10       LAQ 2 x 10 2x10  2 x 10 2x10  3 x 10 3x10 3x10 3 x 10 3 x 10 2# 3 x 10 2#   HR/TR X 10  2 x 10 X10  2 x 10 2x10 2x10 2 x 10 2 x 10 2 x 10                Ther Activity             Sit to Stand         nv    Mini Squats   x10 2x10  2 x 10 2x10 2x10  2 x 10 2 x 10 2 x 10   Gait Training           " "  LRD progression Rollator Rollator  \" \"    R/SPC RW/SPC SPC SPC SPC                Modalities             CP to L knee prn 10 min Decline  10 min  10 min  10 min 10 min  10 min  10 min 10 min 10 min                       "

## 2024-11-12 ENCOUNTER — OFFICE VISIT (OUTPATIENT)
Age: 59
End: 2024-11-12
Payer: MEDICARE

## 2024-11-12 ENCOUNTER — APPOINTMENT (OUTPATIENT)
Dept: LAB | Facility: AMBULARY SURGERY CENTER | Age: 59
End: 2024-11-12

## 2024-11-12 VITALS
RESPIRATION RATE: 18 BRPM | HEIGHT: 62 IN | SYSTOLIC BLOOD PRESSURE: 130 MMHG | TEMPERATURE: 97.2 F | WEIGHT: 237 LBS | BODY MASS INDEX: 43.61 KG/M2 | DIASTOLIC BLOOD PRESSURE: 78 MMHG | HEART RATE: 111 BPM | OXYGEN SATURATION: 99 %

## 2024-11-12 DIAGNOSIS — Z96.659 INFECTION OF TOTAL KNEE REPLACEMENT, SEQUELA: ICD-10-CM

## 2024-11-12 DIAGNOSIS — L30.4 INTERTRIGO: ICD-10-CM

## 2024-11-12 DIAGNOSIS — M32.9 SYSTEMIC LUPUS ERYTHEMATOSUS, UNSPECIFIED SLE TYPE, UNSPECIFIED ORGAN INVOLVEMENT STATUS (HCC): Chronic | ICD-10-CM

## 2024-11-12 DIAGNOSIS — Z88.0 PENICILLIN ALLERGY: Primary | ICD-10-CM

## 2024-11-12 DIAGNOSIS — D68.61 ANTIPHOSPHOLIPID ANTIBODY SYNDROME (HCC): ICD-10-CM

## 2024-11-12 DIAGNOSIS — T84.59XS INFECTION OF TOTAL KNEE REPLACEMENT, SEQUELA: ICD-10-CM

## 2024-11-12 DIAGNOSIS — E66.01 MORBID OBESITY (HCC): ICD-10-CM

## 2024-11-12 DIAGNOSIS — R19.7 DIARRHEA: ICD-10-CM

## 2024-11-12 LAB — MISCELLANEOUS LAB TEST RESULT: NORMAL

## 2024-11-12 PROCEDURE — 99215 OFFICE O/P EST HI 40 MIN: CPT | Performed by: PHYSICIAN ASSISTANT

## 2024-11-12 RX ORDER — FLUCONAZOLE 150 MG/1
150 TABLET ORAL ONCE
Qty: 3 TABLET | Refills: 0 | Status: SHIPPED | OUTPATIENT
Start: 2024-11-12 | End: 2024-11-12

## 2024-11-12 RX ORDER — CEFADROXIL 500 MG/1
1000 CAPSULE ORAL EVERY 12 HOURS SCHEDULED
Qty: 120 CAPSULE | Refills: 4 | Status: SHIPPED | OUTPATIENT
Start: 2024-11-14 | End: 2025-04-13

## 2024-11-12 RX ORDER — NYSTATIN 100000 [USP'U]/G
POWDER TOPICAL 3 TIMES DAILY
Qty: 60 G | Refills: 0 | Status: SHIPPED | OUTPATIENT
Start: 2024-11-12

## 2024-11-12 NOTE — ASSESSMENT & PLAN NOTE
History of left TKA 10/6/22. Developed 2 weeks of left knee pain, fevers, chills and was seen by orthopedic surgery 9/23.  Arthrocentesis performed and Synovasure was positive with Staph epidermidis isolated.  Now status post left TKA revision with implantation of an articulating antibiotic spacer (1.5 stage revision arthroplasty) 10/3/24.  Operative cultures growing staph epidermidis in broth only. 10/18/24 labs reviewed. Cr 0.62, WBC 4.95, Hgb 8.7, Plt 350  -Continue IV Cefazolin 2g every 8 hours  -Recommend a 6 week course of IV antibiotics through 11/13/24 followed by 4.5 months p.o. antibiotics (6 months total) due to 1.5 stage revision arthroplasty  -Orthopedic surgery follow-up ongoing  -weekly CBC diff, BMP on IV antibiotics to assess for treatment response, drug toxicities  -PICC removal after last dose IV antibiotics   -Next ID office follow up appt 12/17/24 at 10:30 am  Orders:    Discontinue PICC line; Future    cefadroxil (DURICEF) 500 mg capsule; Take 2 capsules (1,000 mg total) by mouth every 12 (twelve) hours Do not start before November 14, 2024.

## 2024-11-12 NOTE — ASSESSMENT & PLAN NOTE
Patient on hydroxychloroquine and Benlysta outpatient. Follows with a rheumatologist at [facility]. The Rinvoq has been on hold due to left knee prosthetic joint infection.   -Management of immunosuppression per her rheumatologist. She has been continued on hydroxychloroquine and Benlysta.   -Rinvoq is on hold

## 2024-11-12 NOTE — PROGRESS NOTES
Ambulatory Visit  Name: Na Joseph      : 1965      MRN: [id number]  Encounter Provider: Sherrill Topete PA-C  Encounter Date: 2024   Encounter department: [facility] INFECTIOUS DISEASE [facility]    Assessment & Plan  Infection of total knee replacement, sequela  History of left TKA 10/6/22. Developed 2 weeks of left knee pain, fevers, chills and was seen by orthopedic surgery .  Arthrocentesis performed and Synovasure was positive with Staph epidermidis isolated.  Now status post left TKA revision with implantation of an articulating antibiotic spacer (1.5 stage revision arthroplasty) 10/3/24.  Operative cultures growing staph epidermidis in broth only. 10/18/24 labs reviewed. Cr 0.62, WBC 4.95, Hgb 8.7, Plt 350  -Continue IV Cefazolin 2g every 8 hours  -Recommend a 6 week course of IV antibiotics through 24 followed by 4.5 months p.o. antibiotics (6 months total) due to 1.5 stage revision arthroplasty  -Orthopedic surgery follow-up ongoing  -weekly CBC diff, BMP on IV antibiotics to assess for treatment response, drug toxicities  -PICC removal after last dose IV antibiotics   -Next ID office follow up appt 24 at 10:30 am  Orders:    Discontinue PICC line; Future    cefadroxil (DURICEF) 500 mg capsule; Take 2 capsules (1,000 mg total) by mouth every 12 (twelve) hours Do not start before 2024.    Systemic lupus erythematosus, unspecified SLE type, unspecified organ involvement status (HCC)  Patient on hydroxychloroquine and Benlysta outpatient. Follows with a rheumatologist at [facility]. The Rinvoq has been on hold due to left knee prosthetic joint infection.   -Management of immunosuppression per her rheumatologist. She has been continued on hydroxychloroquine and Benlysta.   -Rinvoq is on hold          Antiphospholipid antibody syndrome (HCC)  On Coumadin outpatient. 24 INR 2.1  -follows with hematology  -Monitor INR        Penicillin  "allergy  Tolerating Cefazolin. Reports as rash and anaphylaxis.   -Will continue to monitor           Morbid obesity (HCC)  BMI 43.3 Can affect antibiotic dosing  -dose affect antibiotics as indicated       Intertrigo  L > R groin eruption better. Inner thigh irritation persists. No vaginal itching  -take Fluconazole 150 mg PO x 1 if needed prn   -apply Nystatin powder to groin folds/inner thighs TID  Orders:    fluconazole (DIFLUCAN) 150 mg tablet; Take 1 tablet (150 mg total) by mouth once for 1 dose    nystatin (MYCOSTATIN) powder; Apply topically 3 (three) times a day    Diarrhea  Patient with 3-5 loose, urgent BMs daily.  Patient taking probiotic and raw milk.   -monitor stool output   -check C diff PCR   -continues probiotics   -if c diff negative, can take imodium prn  Orders:    Clostridium difficile toxin by PCR with EIA; Future    I have discussed the above management plan to continue Cefazolin, diarrhea work/management, prn Nystatin/Fluconazole with patient in detail.      Antibiotics:  Cefazolin POD 41    History of Present Illness     Na Joseph is a 59 y.o. female who presents for outpatient ID follow up of infection of left TKR on IV Cefazolin    The patient is overall feeling as though post op Left knee pain is better managed and ROM improving.  She denies nausea, vomiting, fever, CP, SOB, cough, dysuria.  PICC line difficult flushing at times. vaginal itching and itching/burning of groin folds L > R and soreness of anal area better but inner thigh irritation persists. Patient all experiencing increased loose stool frequency  and urgency. On activa and raw milk daily.         Review of Systems   All other systems reviewed and are negative.          Objective     /78   Pulse (!) 111   Temp (!) 97.2 °F (36.2 °C)   Resp 18   Ht 5' 2\" (1.575 m)   Wt 108 kg (237 lb)   SpO2 99%   BMI 43.35 kg/m²     Physical Exam  Vitals reviewed.   Constitutional:       Appearance: Normal appearance.      " Comments: Ambulatory in office with cane and sitting comfortably in chair   HENT:      Head: Normocephalic and atraumatic.      Right Ear: External ear normal.      Left Ear: External ear normal.      Nose: Nose normal.      Mouth/Throat:      Pharynx: Oropharynx is clear.   Eyes:      Extraocular Movements: Extraocular movements intact.      Conjunctiva/sclera: Conjunctivae normal.   Cardiovascular:      Rate and Rhythm: Normal rate.   Pulmonary:      Effort: Pulmonary effort is normal.      Breath sounds: Normal breath sounds.   Abdominal:      General: Bowel sounds are normal.      Palpations: Abdomen is soft.      Tenderness: There is no abdominal tenderness.      Comments: Protuberant pannus   Musculoskeletal:      Cervical back: Normal range of motion and neck supple.      Comments: L TKR incision closed, + swelling, no warmth/erythema. Dry, flaking around incision. Patient applying moisturizer   Skin:     Comments: groin fold eruption resolved  RUE PICC site dressing intact, nontender   Neurological:      Mental Status: She is alert and oriented to person, place, and time. Mental status is at baseline.       Administrative Statements   I have spent a total time of 45 minutes in caring for this patient on the day of the visit/encounter including Diagnostic results, Prognosis, Risks and benefits of tx options, Instructions for management, Patient and family education, Importance of tx compliance, Risk factor reductions, Impressions, Counseling / Coordination of care, Documenting in the medical record, Reviewing / ordering tests, medicine, procedures  , Obtaining or reviewing history  , and Communicating with other healthcare professionals .

## 2024-11-13 ENCOUNTER — APPOINTMENT (OUTPATIENT)
Dept: LAB | Facility: CLINIC | Age: 59
End: 2024-11-13
Payer: COMMERCIAL

## 2024-11-13 ENCOUNTER — OFFICE VISIT (OUTPATIENT)
Dept: PHYSICAL THERAPY | Facility: CLINIC | Age: 59
End: 2024-11-13
Payer: MEDICARE

## 2024-11-13 DIAGNOSIS — Z96.652 PRESENCE OF LEFT ARTIFICIAL KNEE JOINT: Primary | ICD-10-CM

## 2024-11-13 DIAGNOSIS — Z96.652 STATUS POST LEFT KNEE REPLACEMENT: ICD-10-CM

## 2024-11-13 DIAGNOSIS — R19.7 DIARRHEA: ICD-10-CM

## 2024-11-13 DIAGNOSIS — Z96.659 INFECTION OF PROSTHETIC KNEE JOINT, SUBSEQUENT ENCOUNTER: ICD-10-CM

## 2024-11-13 DIAGNOSIS — T84.59XD INFECTION OF PROSTHETIC KNEE JOINT, SUBSEQUENT ENCOUNTER: ICD-10-CM

## 2024-11-13 PROCEDURE — 97112 NEUROMUSCULAR REEDUCATION: CPT

## 2024-11-13 PROCEDURE — 87493 C DIFF AMPLIFIED PROBE: CPT

## 2024-11-13 PROCEDURE — 97530 THERAPEUTIC ACTIVITIES: CPT

## 2024-11-13 PROCEDURE — 97110 THERAPEUTIC EXERCISES: CPT

## 2024-11-13 NOTE — PROGRESS NOTES
"Daily Note     Today's date: 2024  Patient name: Na Joseph  : 1965  MRN: [id number]  Referring provider: Sultana Arnold, *  Dx:   Encounter Diagnosis     ICD-10-CM    1. Presence of left artificial knee joint  Z96.652       2. Infection of prosthetic knee joint, subsequent encounter  T84.59XD     Z96.659       3. Status post left knee replacement  Z96.652           Start Time: 1719  Stop Time: 1807  Total time in clinic (min): 48 minutes    Subjective: Pt reports that she is still having difficulty sleeping.       Objective: See treatment diary below      Assessment: Pt tolerated session well. Maintaining ROM. Progressed with strengthening.     Plan: Continue PT POC.      Precautions: S/P L TKA Revision secondary to infection with Dr. Wilson on 10/3/24, Falls, Hx of PE, Anemia, CHF, Osteoporosis, PICC Line R UE.       11/13  10/16 10/21 10/23 10/29 10/31 11/4 11/6 11/11                PROM L Knee LNK  TAS 0-107 LNK TAS 0-112 LNK 0-110 LNK 0-113 TAS TAS TAS 0-125   RE performed                          Neuro Re-Ed             NBOS EC EC 1 min    1 min 1 min 1 min 1 min  1 min EC 1 min EC 1 min   NBOS EO Foam    1 min 1 min 1 min 1 min  1 min     Hip Abd/Ext 2x10      2x10  nv 2 x 10 2 x 10 2 x 10   SLR A/AROM 4x5   10x AAROM  2 x 5 AROM 4x5   4 x 5 4 x 5 4 x 5                                          Ther Ex             Bike Rock and Roll 6 min   6 min 6 min    6 min 6 min  6 min  6 min 6 min 6 min   AP for circulation             GS seated   6\" x 10          HS seated    6\" x 10 6\"x10 6\" x 10  6\"x10       QS   6\" x 10 6\"x10 6\" x 10 6\"x10 6\"x10       LAQ 2# 3x10   2 x 10 2x10  3 x 10 3x10 3x10 3 x 10 3 x 10 2# 3 x 10 2#   HR/TR 2x10   2 x 10 X10  2 x 10 2x10 2x10 2 x 10 2 x 10 2 x 10   LP SL    35# 2x10             Ther Activity             Sit to Stand         nv    Mini Squats 2x10   x10 2x10  2 x 10 2x10 2x10  2 x 10 2 x 10 2 x 10   elevations 4\" 10x            Gait Training             LRD " "progression SPC  \" \"    R/SPC RW/SPC SPC SPC SPC                Modalities             CP to L knee prn 10 min   10 min  10 min  10 min 10 min  10 min  10 min 10 min 10 min                       "

## 2024-11-13 NOTE — ASSESSMENT & PLAN NOTE
Patient with 3-5 loose, urgent BMs daily.  Patient taking probiotic and raw milk.   -monitor stool output   -check C diff PCR   -continues probiotics   -if c diff negative, can take imodium prn  Orders:    Clostridium difficile toxin by PCR with EIA; Future

## 2024-11-14 ENCOUNTER — HOSPITAL ENCOUNTER (OUTPATIENT)
Dept: NON INVASIVE DIAGNOSTICS | Facility: CLINIC | Age: 59
Discharge: HOME | End: 2024-11-14
Payer: MEDICARE

## 2024-11-14 ENCOUNTER — HOME CARE VISIT (OUTPATIENT)
Dept: HOME HEALTH SERVICES | Facility: HOME HEALTHCARE | Age: 59
End: 2024-11-14

## 2024-11-14 VITALS
OXYGEN SATURATION: 99 % | RESPIRATION RATE: 18 BRPM | TEMPERATURE: 98.2 F | DIASTOLIC BLOOD PRESSURE: 74 MMHG | SYSTOLIC BLOOD PRESSURE: 128 MMHG | HEART RATE: 96 BPM

## 2024-11-14 DIAGNOSIS — I87.8 VENOUS STASIS OF LOWER EXTREMITY: ICD-10-CM

## 2024-11-14 DIAGNOSIS — I87.2 VENOUS INSUFFICIENCY OF BOTH LOWER EXTREMITIES: ICD-10-CM

## 2024-11-14 DIAGNOSIS — I77.1 ARTERIAL INSUFFICIENCY (HCC): ICD-10-CM

## 2024-11-14 LAB — C DIFF TOX GENS STL QL NAA+PROBE: NEGATIVE

## 2024-11-14 PROCEDURE — 93925 LOWER EXTREMITY STUDY: CPT

## 2024-11-14 PROCEDURE — 93923 UPR/LXTR ART STDY 3+ LVLS: CPT

## 2024-11-14 PROCEDURE — G0299 HHS/HOSPICE OF RN EA 15 MIN: HCPCS

## 2024-11-14 PROCEDURE — 93970 EXTREMITY STUDY: CPT

## 2024-11-15 DIAGNOSIS — R19.7 DIARRHEA, UNSPECIFIED TYPE: Primary | ICD-10-CM

## 2024-11-15 PROCEDURE — 93970 EXTREMITY STUDY: CPT | Performed by: SURGERY

## 2024-11-15 PROCEDURE — 93925 LOWER EXTREMITY STUDY: CPT | Performed by: SURGERY

## 2024-11-15 PROCEDURE — 93922 UPR/L XTREMITY ART 2 LEVELS: CPT | Performed by: SURGERY

## 2024-11-15 RX ORDER — SACCHAROMYCES BOULARDII 250 MG
250 CAPSULE ORAL 2 TIMES DAILY
Qty: 60 CAPSULE | Refills: 4 | Status: SHIPPED | OUTPATIENT
Start: 2024-11-15

## 2024-11-17 ENCOUNTER — RESULTS FOLLOW-UP (OUTPATIENT)
Dept: CARDIOLOGY CLINIC | Facility: CLINIC | Age: 59
End: 2024-11-17

## 2024-11-18 ENCOUNTER — OFFICE VISIT (OUTPATIENT)
Dept: PHYSICAL THERAPY | Facility: CLINIC | Age: 59
End: 2024-11-18
Payer: MEDICARE

## 2024-11-18 DIAGNOSIS — T84.59XD INFECTION OF PROSTHETIC KNEE JOINT, SUBSEQUENT ENCOUNTER: ICD-10-CM

## 2024-11-18 DIAGNOSIS — Z96.652 PRESENCE OF LEFT ARTIFICIAL KNEE JOINT: Primary | ICD-10-CM

## 2024-11-18 DIAGNOSIS — Z96.652 STATUS POST LEFT KNEE REPLACEMENT: ICD-10-CM

## 2024-11-18 DIAGNOSIS — Z96.659 INFECTION OF PROSTHETIC KNEE JOINT, SUBSEQUENT ENCOUNTER: ICD-10-CM

## 2024-11-18 PROCEDURE — 97140 MANUAL THERAPY 1/> REGIONS: CPT

## 2024-11-18 PROCEDURE — 97110 THERAPEUTIC EXERCISES: CPT

## 2024-11-18 NOTE — PROGRESS NOTES
"Daily Note     Today's date: 2024  Patient name: Na Joseph  : 1965  MRN: [id number]  Referring provider: Sultana Arnold, *  Dx:   Encounter Diagnosis     ICD-10-CM    1. Presence of left artificial knee joint  Z96.652       2. Infection of prosthetic knee joint, subsequent encounter  T84.59XD     Z96.659       3. Status post left knee replacement  Z96.652                      Subjective: Pt reports that she is having posterior knee pain. Also has noticed that her scar has a black line on it and will reach out to her MD if it gets worse.       Objective: See treatment diary below      Assessment: Pt tolerated session well. Progressing well with strengthening. Safe to ambulate short distances without her SPC.     Plan: Continue PT POC.      Precautions: S/P L TKA Revision secondary to infection with Dr. Wilson on 10/3/24, Falls, Hx of PE, Anemia, CHF, Osteoporosis, PICC Line R UE.       11/13 11/18  10/21 10/23 10/29 10/31 11/4 11/6 11/11                PROM L Knee LNK LNK  LNK TAS 0-112 LNK 0-110 LNK 0-113 TAS TAS TAS 0-125   RE performed                          Neuro Re-Ed             NBOS EC EC 1 min  EC 1 min  1 min 1 min 1 min 1 min  1 min EC 1 min EC 1 min   NBOS EO Foam    1 min 1 min 1 min 1 min  1 min     Hip Abd/Ext 2x10  2x10     2x10  nv 2 x 10 2 x 10 2 x 10   SLR A/AROM 4x5 2x10  10x AAROM  2 x 5 AROM 4x5   4 x 5 4 x 5 4 x 5                                          Ther Ex             Bike Rock and Roll 6 min  6 min   6 min    6 min 6 min  6 min  6 min 6 min 6 min   AP for circulation             GS seated             HS seated     6\"x10 6\" x 10  6\"x10       QS    6\"x10 6\" x 10 6\"x10 6\"x10       LAQ 2# 3x10  2# 2x10  2x10  3 x 10 3x10 3x10 3 x 10 3 x 10 2# 3 x 10 2#   HR/TR 2x10  2x10   X10  2 x 10 2x10 2x10 2 x 10 2 x 10 2 x 10   LP SL    35# 2x10  35# 3x10            Ther Activity             Sit to Stand         nv    Mini Squats 2x10  2x10   2x10  2 x 10 2x10 2x10  2 x 10 2 x " "10 2 x 10   elevations 4\" 10x 4\" 2x10            Gait Training             LRD progression SPC SPC/nil     R/SPC RW/SPC SPC SPC SPC                Modalities             CP to L knee prn 10 min  At home   10 min  10 min 10 min  10 min  10 min 10 min 10 min                       "

## 2024-11-20 ENCOUNTER — OFFICE VISIT (OUTPATIENT)
Dept: PHYSICAL THERAPY | Facility: CLINIC | Age: 59
End: 2024-11-20
Payer: MEDICARE

## 2024-11-20 DIAGNOSIS — Z96.652 PRESENCE OF LEFT ARTIFICIAL KNEE JOINT: Primary | ICD-10-CM

## 2024-11-20 DIAGNOSIS — Z96.659 INFECTION OF PROSTHETIC KNEE JOINT, SUBSEQUENT ENCOUNTER: ICD-10-CM

## 2024-11-20 DIAGNOSIS — T84.59XD INFECTION OF PROSTHETIC KNEE JOINT, SUBSEQUENT ENCOUNTER: ICD-10-CM

## 2024-11-20 DIAGNOSIS — Z96.652 STATUS POST LEFT KNEE REPLACEMENT: ICD-10-CM

## 2024-11-20 PROCEDURE — 97530 THERAPEUTIC ACTIVITIES: CPT

## 2024-11-20 PROCEDURE — 97110 THERAPEUTIC EXERCISES: CPT

## 2024-11-20 PROCEDURE — 97112 NEUROMUSCULAR REEDUCATION: CPT

## 2024-11-20 NOTE — PROGRESS NOTES
"Daily Note     Today's date: 2024  Patient name: Na Joseph  : 1965  MRN: [id number]  Referring provider: Sultana Arnold, *  Dx:   Encounter Diagnosis     ICD-10-CM    1. Presence of left artificial knee joint  Z96.652       2. Infection of prosthetic knee joint, subsequent encounter  T84.59XD     Z96.659       3. Status post left knee replacement  Z96.652           Start Time: 1722  Stop Time: 1810  Total time in clinic (min): 48 minutes    Subjective: Pt reports that her surgeon believes it is a suture that is coming through. Continues with posterior knee pain, otherwise doing well.       Objective: See treatment diary below      Assessment: Pt tolerated session well. Progressed again with strengthening.     Plan: Continue PT POC.      Precautions: S/P L TKA Revision secondary to infection with Dr. Wilson on 10/3/24, Falls, Hx of PE, Anemia, CHF, Osteoporosis, PICC Line R UE.       11/13 11/18 11/20  10/23 10/29 10/31 11/4 11/6 11/11                PROM L Knee LNK LNK LNK  0-125  TAS 0-112 LNK 0-110 LNK 0-113 TAS TAS TAS 0-125   RE performed                          Neuro Re-Ed             NBOS EC EC 1 min  EC 1 min Foam 1 min   1 min 1 min 1 min  1 min EC 1 min EC 1 min   NBOS EO Foam     1 min 1 min 1 min  1 min     Hip Abd/Ext 2x10  2x10  2x12   2x10  nv 2 x 10 2 x 10 2 x 10   SLR A/AROM 4x5 2x10 2x10   2 x 5 AROM 4x5   4 x 5 4 x 5 4 x 5                                          Ther Ex             Bike Rock and Roll 6 min  6 min  6 min     6 min 6 min  6 min  6 min 6 min 6 min   AP for circulation             GS seated             HS seated      6\" x 10  6\"x10       QS     6\" x 10 6\"x10 6\"x10       LAQ 2# 3x10  2# 2x10 2# 2x10  3 x 10 3x10 3x10 3 x 10 3 x 10 2# 3 x 10 2#   HR/TR 2x10  2x10  2x10  2 x 10 2x10 2x10 2 x 10 2 x 10 2 x 10   LP SL ( 3 showing)    35# 2x10  35# 3x10  35# 3x10           Ther Activity             Sit to Stand   10x foam       nv    Mini Squats 2x10  2x10  2x10   2 " "x 10 2x10 2x10  2 x 10 2 x 10 2 x 10   elevations 4\" 10x 4\" 2x10  6\" 10x           Gait Training             LRD progression SPC SPC/nil  SPC/nil   R/SPC RW/SPC SPC SPC SPC                Modalities             CP to L knee prn 10 min  At home  10 min  10 min 10 min  10 min  10 min 10 min 10 min                       "

## 2024-11-22 ENCOUNTER — APPOINTMENT (OUTPATIENT)
Dept: LAB | Facility: CLINIC | Age: 59
End: 2024-11-22
Payer: COMMERCIAL

## 2024-11-22 DIAGNOSIS — Z79.01 ANTICOAGULATED ON COUMADIN: ICD-10-CM

## 2024-11-22 LAB
INR PPP: 3.11 (ref 0.85–1.19)
PROTHROMBIN TIME: 32.6 SECONDS (ref 12.3–15)

## 2024-11-22 PROCEDURE — 36415 COLL VENOUS BLD VENIPUNCTURE: CPT

## 2024-11-22 PROCEDURE — 85610 PROTHROMBIN TIME: CPT

## 2024-11-25 ENCOUNTER — OFFICE VISIT (OUTPATIENT)
Dept: PHYSICAL THERAPY | Facility: CLINIC | Age: 59
End: 2024-11-25
Payer: MEDICARE

## 2024-11-25 ENCOUNTER — TELEPHONE (OUTPATIENT)
Dept: BARIATRICS | Facility: CLINIC | Age: 59
End: 2024-11-25

## 2024-11-25 DIAGNOSIS — Z96.652 STATUS POST LEFT KNEE REPLACEMENT: ICD-10-CM

## 2024-11-25 DIAGNOSIS — Z96.659 INFECTION OF PROSTHETIC KNEE JOINT, SUBSEQUENT ENCOUNTER: ICD-10-CM

## 2024-11-25 DIAGNOSIS — T84.59XD INFECTION OF PROSTHETIC KNEE JOINT, SUBSEQUENT ENCOUNTER: ICD-10-CM

## 2024-11-25 DIAGNOSIS — Z96.652 PRESENCE OF LEFT ARTIFICIAL KNEE JOINT: Primary | ICD-10-CM

## 2024-11-25 PROCEDURE — 97140 MANUAL THERAPY 1/> REGIONS: CPT | Performed by: PHYSICAL THERAPIST

## 2024-11-25 PROCEDURE — 97110 THERAPEUTIC EXERCISES: CPT | Performed by: PHYSICAL THERAPIST

## 2024-11-25 NOTE — TELEPHONE ENCOUNTER
Pt contacted to reschedule consult that was canceled on mychart. Pt will call the office when she is able to reschedule

## 2024-11-25 NOTE — PROGRESS NOTES
"Daily Note     Today's date: 2024  Patient name: Na Joseph  : 1965  MRN: [id number]  Referring provider: Sultana Arnold, *  Dx:   Encounter Diagnosis     ICD-10-CM    1. Presence of left artificial knee joint  Z96.652       2. Infection of prosthetic knee joint, subsequent encounter  T84.59XD     Z96.659       3. Status post left knee replacement  Z96.652                      Subjective: Pt indicates that she is feeling fairly well.  Some soreness and irritation on the outside of her knee, but doing well otherwise.      Objective: See treatment diary below      Assessment: Pt tolerated session well. Progressed with strengthening in CKC based intervention. Continue to proceed as able.      Plan: Continue PT POC.      Precautions: S/P L TKA Revision secondary to infection with Dr. Wilson on 10/3/24, Falls, Hx of PE, Anemia, CHF, Osteoporosis, PICC Line R UE.       11/13 11/18 11/20 11/25 10/23 10/29 10/31 11/4 11/6 11/11                PROM L Knee LNK LNK LNK  0-125 TAS TAS 0-112 LNK 0-110 LNK 0-113 TAS TAS TAS 0-125   RE performed                          Neuro Re-Ed             NBOS EC EC 1 min  EC 1 min Foam 1 min  Foam 1 min 1 min 1 min 1 min  1 min EC 1 min EC 1 min   NBOS EO Foam     1 min 1 min 1 min  1 min     Hip Abd/Ext 2x10  2x10  2x12 2 x 10  2x10  nv 2 x 10 2 x 10 2 x 10   SLR A/AROM 4x5 2x10 2x10  2 x 10 2 x 5 AROM 4x5   4 x 5 4 x 5 4 x 5                                          Ther Ex             Bike Rock and Roll 6 min  6 min  6 min  6 min   6 min 6 min  6 min  6 min 6 min 6 min   AP for circulation             GS seated             HS seated      6\" x 10  6\"x10       QS     6\" x 10 6\"x10 6\"x10       LAQ 2# 3x10  2# 2x10 2# 2x10 2# 3 x 10 3 x 10 3x10 3x10 3 x 10 3 x 10 2# 3 x 10 2#   HR/TR 2x10  2x10  2x10 2 x 10 2 x 10 2x10 2x10 2 x 10 2 x 10 2 x 10   LP SL ( 3 showing)    35# 2x10  35# 3x10  35# 3x10  35# 3 x 10         Ther Activity             Sit to Stand   10x foam  10x  " "   nv    Mini Squats 2x10  2x10  2x10  2 x 10 2 x 10 2x10 2x10  2 x 10 2 x 10 2 x 10   elevations 4\" 10x 4\" 2x10  6\" 10x  6\" x 10         Gait Training             LRD progression SPC SPC/nil  SPC/nil SPC  R/SPC RW/SPC SPC SPC SPC                Modalities             CP to L knee prn 10 min  At home  10 min 10 min 10 min 10 min  10 min  10 min 10 min 10 min                       "

## 2024-11-26 ENCOUNTER — OFFICE VISIT (OUTPATIENT)
Age: 59
End: 2024-11-26

## 2024-11-26 VITALS — WEIGHT: 237 LBS | BODY MASS INDEX: 43.61 KG/M2 | HEIGHT: 62 IN

## 2024-11-26 DIAGNOSIS — T84.59XA INFECTION OF TOTAL KNEE REPLACEMENT, INITIAL ENCOUNTER  (HCC): Primary | ICD-10-CM

## 2024-11-26 DIAGNOSIS — Z96.659 INFECTION OF TOTAL KNEE REPLACEMENT, INITIAL ENCOUNTER  (HCC): Primary | ICD-10-CM

## 2024-11-26 PROCEDURE — 99024 POSTOP FOLLOW-UP VISIT: CPT | Performed by: STUDENT IN AN ORGANIZED HEALTH CARE EDUCATION/TRAINING PROGRAM

## 2024-11-26 NOTE — PROGRESS NOTES
"Subjective: Patient seen and examined. Pain in the anterior knee has improved since her last visit. She notes a \"pinching\" pain in the lateral knee with flexion. Reports inability to sleep at night due to soft tissue pain. She is attending therapy and progressing well. Incision without drainage. Denies fevers or chills.    Physical Exam:   Incision: healing well  ROM: 0-115 limited by soft tissues  TTP ITB insertion   5/5 IP/Q/HS/TA/GS, 2+ DP/PT, SILT DP/SP/S/S/TN    XR: No new x-rays were reviewed in the office today    Assessment/Plan:  Nearly 8 weeks s/p Left TKA Revision to 1.5 stage abx spacer for Staph Epi PJI from 10/3/24     - continue multi-modal pain control   - Weight bearing status: WBAT LLE with cane   - DVT ppx: ambulation  - Incision care: may shower  - PT/OT. Continue to focus on IT band stretching  - CRP and ESR orders placed at today's visit for routine follow-up  - recent ESR 29  - F/U 6 weeks    Scribe Attestation      I,:  Frank Sood am acting as a scribe while in the presence of the attending physician.:       I,:  Grupo Wilson, DO personally performed the services described in this documentation    as scribed in my presence.:            "

## 2024-11-27 ENCOUNTER — OFFICE VISIT (OUTPATIENT)
Dept: PHYSICAL THERAPY | Facility: CLINIC | Age: 59
End: 2024-11-27
Payer: MEDICARE

## 2024-11-27 DIAGNOSIS — Z96.652 STATUS POST LEFT KNEE REPLACEMENT: ICD-10-CM

## 2024-11-27 DIAGNOSIS — Z96.659 INFECTION OF PROSTHETIC KNEE JOINT, SUBSEQUENT ENCOUNTER: ICD-10-CM

## 2024-11-27 DIAGNOSIS — T84.59XD INFECTION OF PROSTHETIC KNEE JOINT, SUBSEQUENT ENCOUNTER: ICD-10-CM

## 2024-11-27 DIAGNOSIS — Z96.652 PRESENCE OF LEFT ARTIFICIAL KNEE JOINT: Primary | ICD-10-CM

## 2024-11-27 PROCEDURE — 97110 THERAPEUTIC EXERCISES: CPT

## 2024-11-27 PROCEDURE — 97530 THERAPEUTIC ACTIVITIES: CPT

## 2024-11-27 PROCEDURE — 97112 NEUROMUSCULAR REEDUCATION: CPT

## 2024-11-27 NOTE — PROGRESS NOTES
"Daily Note     Today's date: 2024  Patient name: Na Joseph  : 1965  MRN: [id number]  Referring provider: Sultana Arnold, *  Dx:   Encounter Diagnosis     ICD-10-CM    1. Presence of left artificial knee joint  Z96.652       2. Infection of prosthetic knee joint, subsequent encounter  T84.59XD     Z96.659       3. 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     Objective: See treatment diary below      Assessment: Pt tolerated session well. Progressed with hip strengthening to tolerance.      Plan: Continue PT POC.      Precautions: S/P L TKA Revision secondary to infection with Dr. Wilson on 10/3/24, Falls, Hx of PE, Anemia, CHF, Osteoporosis, PICC Line R UE.       11/13 11/18 11/20 11/25 11/27  10/31 11/4 11/6 11/11                PROM L Knee LNK LNK LNK  0-125 TAS LNK  LNK 0-113 TAS TAS TAS 0-125   RE performed                          Neuro Re-Ed             NBOS EC EC 1 min  EC 1 min Foam 1 min  Foam 1 min Foam 1 min   1 min  1 min EC 1 min EC 1 min   NBOS EO Foam       1 min  1 min     Hip Abd/Ext 2x10  2x10  2x12 2 x 10 RTB 2x10   nv 2 x 10 2 x 10 2 x 10   SLR A/AROM 4x5 2x10 2x10  2 x 10 2x10   4 x 5 4 x 5 4 x 5                                          Ther Ex             Bike Rock and Roll 6 min  6 min  6 min  6 min 6 min   6 min  6 min 6 min 6 min   AP for circulation             GS seated             HS seated        6\"x10       QS       6\"x10       LAQ 2# 3x10  2# 2x10 2# 2x10 2# 3 x 10 2# 3x10  3x10 3 x 10 3 x 10 2# 3 x 10 2#   HR/TR 2x10  2x10  2x10 2 x 10 2x10   2x10 2 x 10 2 x 10 2 x 10   LP SL ( 3 showing)    35# 2x10  35# 3x10  35# 3x10  35# 3 x 10 35# 3x10        Ther Activity             Sit to Stand   10x foam  10x 10x     nv    Mini Squats 2x10  2x10  2x10  2 x 10 2x10   2x10  2 x 10 " "2 x 10 2 x 10   elevations 4\" 10x 4\" 2x10  6\" 10x  6\" x 10 6\" 2x10        Sidesteps      3 laps         Gait Training             LRD progression SPC SPC/nil  SPC/nil SPC SPC  RW/SPC SPC SPC SPC                Modalities             CP to L knee prn 10 min  At home  10 min 10 min 10 min   10 min  10 min 10 min 10 min                       "

## 2024-12-02 ENCOUNTER — OFFICE VISIT (OUTPATIENT)
Dept: PHYSICAL THERAPY | Facility: CLINIC | Age: 59
End: 2024-12-02
Payer: MEDICARE

## 2024-12-02 DIAGNOSIS — Z96.652 STATUS POST LEFT KNEE REPLACEMENT: ICD-10-CM

## 2024-12-02 DIAGNOSIS — Z96.652 PRESENCE OF LEFT ARTIFICIAL KNEE JOINT: Primary | ICD-10-CM

## 2024-12-02 DIAGNOSIS — Z96.659 INFECTION OF PROSTHETIC KNEE JOINT, SUBSEQUENT ENCOUNTER: ICD-10-CM

## 2024-12-02 DIAGNOSIS — T84.59XD INFECTION OF PROSTHETIC KNEE JOINT, SUBSEQUENT ENCOUNTER: ICD-10-CM

## 2024-12-02 PROCEDURE — 97110 THERAPEUTIC EXERCISES: CPT | Performed by: PHYSICAL THERAPIST

## 2024-12-02 PROCEDURE — 97140 MANUAL THERAPY 1/> REGIONS: CPT | Performed by: PHYSICAL THERAPIST

## 2024-12-02 NOTE — PROGRESS NOTES
"Daily Note     Today's date: 2024  Patient name: Na Joseph  : 1965  MRN: [id number]  Referring provider: Sultana Arnold, *  Dx:   Encounter Diagnosis     ICD-10-CM    1. Presence of left artificial knee joint  Z96.652       2. Infection of prosthetic knee joint, subsequent encounter  T84.59XD     Z96.659       3. Status post left knee replacement  Z96.652                      Subjective: Patient without c/o prior to treatment session.    Objective: See treatment diary below      Assessment: Patient performed exercises without c/o pain; no c/o pain with manual PROM.       Plan: Continue PT POC.      Precautions: S/P L TKA Revision secondary to infection with Dr. Wilson on 10/3/24, Falls, Hx of PE, Anemia, CHF, Osteoporosis, PICC Line R UE.       11/13 11/18 11/20 11/25 11/27 12/2 10/31 11/4 11/6 11/11                PROM L Knee LNK LNK LNK  0-125 TAS LNK KK LNK 0-113 TAS TAS TAS 0-125   RE performed                          Neuro Re-Ed             NBOS EC EC 1 min  EC 1 min Foam 1 min  Foam 1 min Foam 1 min  Foam 1 min 1 min  1 min EC 1 min EC 1 min   NBOS EO Foam       1 min  1 min     Hip Abd/Ext 2x10  2x10  2x12 2 x 10 RTB 2x10  RTB 2x10 nv 2 x 10 2 x 10 2 x 10   SLR A/AROM 4x5 2x10 2x10  2 x 10 2x10 2x10  4 x 5 4 x 5 4 x 5                                          Ther Ex             Bike Rock and Roll 6 min  6 min  6 min  6 min 6 min  6 min 6 min  6 min 6 min 6 min   AP for circulation             GS seated             HS seated        6\"x10       QS       6\"x10       LAQ 2# 3x10  2# 2x10 2# 2x10 2# 3 x 10 2# 3x10 3# 3x10 3x10 3 x 10 3 x 10 2# 3 x 10 2#   HR/TR 2x10  2x10  2x10 2 x 10 2x10  2x10 2x10 2 x 10 2 x 10 2 x 10   LP SL ( 3 showing)    35# 2x10  35# 3x10  35# 3x10  35# 3 x 10 35# 3x10 40# 3x10       Ther Activity             Sit to Stand   10x foam  10x 10x  10x   nv    Mini Squats 2x10  2x10  2x10  2 x 10 2x10  2x10 2x10  2 x 10 2 x 10 2 x 10   elevations 4\" 10x 4\" 2x10  6\" 10x  " "6\" x 10 6\" 2x10 6\" 2x10       Sidesteps      3 laps   3 laps       Gait Training             LRD progression SPC SPC/nil  SPC/nil SPC SPC  RW/SPC SPC SPC SPC                Modalities             CP to L knee prn 10 min  At home  10 min 10 min 10 min  10 min 10 min  10 min 10 min 10 min                       "

## 2024-12-03 ENCOUNTER — TELEPHONE (OUTPATIENT)
Age: 59
End: 2024-12-03

## 2024-12-03 NOTE — TELEPHONE ENCOUNTER
Caller: Na     Doctor: Fatmata     Reason for call: Please mail new patient paperwork to address on file. Thank you     Call back#: [phone]

## 2024-12-04 ENCOUNTER — EVALUATION (OUTPATIENT)
Dept: PHYSICAL THERAPY | Facility: CLINIC | Age: 59
End: 2024-12-04
Payer: MEDICARE

## 2024-12-04 DIAGNOSIS — Z96.659 INFECTION OF PROSTHETIC KNEE JOINT, SUBSEQUENT ENCOUNTER: ICD-10-CM

## 2024-12-04 DIAGNOSIS — Z96.652 PRESENCE OF LEFT ARTIFICIAL KNEE JOINT: Primary | ICD-10-CM

## 2024-12-04 DIAGNOSIS — T84.59XD INFECTION OF PROSTHETIC KNEE JOINT, SUBSEQUENT ENCOUNTER: ICD-10-CM

## 2024-12-04 DIAGNOSIS — Z96.652 STATUS POST LEFT KNEE REPLACEMENT: ICD-10-CM

## 2024-12-04 PROCEDURE — 97110 THERAPEUTIC EXERCISES: CPT | Performed by: PHYSICAL THERAPIST

## 2024-12-04 PROCEDURE — 97112 NEUROMUSCULAR REEDUCATION: CPT | Performed by: PHYSICAL THERAPIST

## 2024-12-04 PROCEDURE — 97140 MANUAL THERAPY 1/> REGIONS: CPT | Performed by: PHYSICAL THERAPIST

## 2024-12-04 NOTE — PROGRESS NOTES
PT Evaluation     Today's date: 2024  Patient name: Na Joseph  : 1965  MRN: [id number]  Referring provider: Sultana Arnold, *  Dx:   Encounter Diagnosis     ICD-10-CM    1. Presence of left artificial knee joint  Z96.652       2. Infection of prosthetic knee joint, subsequent encounter  T84.59XD     Z96.659       3. Status post left knee replacement  Z96.652                        Assessment  Impairments: abnormal gait, abnormal or restricted ROM, activity intolerance, impaired balance, impaired physical strength, lacks appropriate home exercise program, pain with function, poor posture , poor body mechanics, activity limitations and endurance    Assessment details: Pt is progressing very well at this time.  They have demonstrated further improvement in pain levels, strength, range, flexibility as well as tolerance to activity.  They will benefit continued Skilled PT intervention in order to achieve remaining LTGs sought out for them as well as by them in order to perform all desired activities with minimal to nil symptom exacerbation.  Primary focus will be power and strength to improve ability to go up and down stairs.  Thank you very much for this kind and motivated referral.          Understanding of Dx/Px/POC: good     Prognosis: good    Goals  RE n.v. -met  STG 4 Weeks:  Decrease pain at worst to 4 -met  Improve knee range to 105 flex -met  Improve strength to 4- hip, knee 5/5 -met  Independent with HEP -met  LTG 8 Weeks:  Decrease pain at worst to 2 -partial   Improve Knee range to be sustained at 120 -met  Improve strength to Hip 4/5 or greater.  -partial  Alternate up and down elevations with 1 UE and good eccentrics.   Able to perform all desired activities with minimal to nil symptom exacerbation         Plan  Patient would benefit from: skilled physical therapy  Planned modality interventions: cryotherapy and thermotherapy: hydrocollator packs    Planned therapy interventions:  joint mobilization, manual therapy, abdominal trunk stabilization, activity modification, balance, balance/weight bearing training, behavior modification, body mechanics training, neuromuscular re-education, patient/caregiver education, postural training, strengthening, stretching, therapeutic activities, therapeutic exercise, therapeutic training, transfer training, IADL retraining, home exercise program, graded exercise, graded activity, gait training, functional ROM exercises, flexibility and graded motor    Frequency: 2x week  Duration in weeks: 10  Treatment plan discussed with: patient        Subjective Evaluation    History of Present Illness  Date of onset: 10/10/2024  Mechanism of injury: Pt presents today stating that she is feeling quite well.  Pt reports pain levels at worst are 3/10.  No longer using SPC.  Reports hardest thing at this time is still performing stairs.  Up > Down.  Pt reports that she is not able to alternate.  Reports that endurance is coming along positively, desires overall more power and generalized strength.  Follows up with Dr. Wilson on 24.    Quality of life: good    Patient Goals  Patient goals for therapy: increased strength, return to sport/leisure activities, increased motion, improved balance and decreased pain    Pain  Current pain ratin  At best pain ratin  At worst pain rating: 3  Quality: dull ache, sharp and tight  Relieving factors: ice and medications  Aggravating factors: sitting, standing, walking and stair climbing  Progression: no change    Social Support  Steps to enter house: yes  Stairs in house: yes   Lives in: multiple-level home  Lives with: adult children and spouse      Diagnostic Tests  X-ray: normal  Treatments  Previous treatment: medication, physical therapy and injection treatment        Objective     Active Range of Motion   Left Knee   Flexion: 124 (PROM 129) degrees with pain  Extension: 0 degrees     Right Knee   Flexion: 130 degrees  "  Extension: 0 degrees     Additional Active Range of Motion Details  Forward head, rounded shoulders, Lordosis  Gait: Antalgia with L LE and clearance with RW.  // improved, utilizing SPC. // Min antalgia with L IC.    B/L Sensation intact to L3,4,5,S1,S2  LE Strength  Hip   L Flex 4- Ext 5 abd 5 Add 5 SLR AROM 10 x 2.   R Flex Ext Abd Add all 5/5. SLR AROM 2 x 10  LE Screen R LE 5/5, L LE  Joint Mobility  Palpation: Incision clean, dry and intact mild scab distal aspect x 2.    Sts  TUG: RW 30\" //14.9\" with SPC // no AD 12.5\"  Transfers: sit <>Stand, rolling, supine <> sit mod A for L LE.    Elevations: Alternate up and down with railings, poor eccentrics with descent, significant compensation for ascending.               Precautions: S/P L TKA Revision secondary to infection with Dr. Wilson on 10/3/24, Falls, Hx of PE, Anemia, CHF, Osteoporosis, PICC Line R UE.       11/13 11/18 11/20 11/25 11/27 12/2 12/4 11/4 11/6 11/11                PROM L Knee LNK LNK LNK  0-125 TAS LNK KK TAS TAS TAS TAS 0-125   RE performed                          Neuro Re-Ed             NBOS EC EC 1 min  EC 1 min Foam 1 min  Foam 1 min Foam 1 min  Foam 1 min Foam 1 min 1 min EC 1 min EC 1 min   NBOS EO Foam        1 min     Hip Abd/Ext 2x10  2x10  2x12 2 x 10 RTB 2x10  RTB 2x10 RTB 2 x 10 2 x 10 2 x 10 2 x 10   SLR A/AROM 4x5 2x10 2x10  2 x 10 2x10 2x10 2 x 10 4 x 5 4 x 5 4 x 5                                          Ther Ex             Bike Rock and Roll 6 min  6 min  6 min  6 min 6 min  6 min 6 min 6 min 6 min 6 min   AP for circulation             GS seated             HS seated              QS             LAQ 2# 3x10  2# 2x10 2# 2x10 2# 3 x 10 2# 3x10 3# 3x10 5# 3 x 10 3 x 10 3 x 10 2# 3 x 10 2#   HR/TR 2x10  2x10  2x10 2 x 10 2x10  2x10 3 x 10 2 x 10 2 x 10 2 x 10   LP SL ( 3 showing)    35# 2x10  35# 3x10  35# 3x10  35# 3 x 10 35# 3x10 40# 3x10 45# 2 x 10      Ther Activity             Sit to Stand   10x foam  10x 10x  10x 10x  nv " "   Mini Squats 2x10  2x10  2x10  2 x 10 2x10  2x10 2 x 10 2 x 10 2 x 10 2 x 10   elevations 4\" 10x 4\" 2x10  6\" 10x  6\" x 10 6\" 2x10 6\" 2x10 FF      Sidesteps      3 laps   3 laps 3 laps       Gait Training             LRD progression SPC SPC/nil  SPC/nil SPC SPC   SPC SPC SPC                Modalities             CP to L knee prn 10 min  At home  10 min 10 min 10 min  10 min 10 min 10 min 10 min 10 min                       "

## 2024-12-06 ENCOUNTER — APPOINTMENT (OUTPATIENT)
Dept: LAB | Facility: CLINIC | Age: 59
End: 2024-12-06
Payer: COMMERCIAL

## 2024-12-06 DIAGNOSIS — Z79.01 ANTICOAGULATED ON COUMADIN: ICD-10-CM

## 2024-12-06 LAB
INR PPP: 4.01 (ref 0.85–1.19)
PROTHROMBIN TIME: 39.5 SECONDS (ref 12.3–15)

## 2024-12-06 PROCEDURE — 85610 PROTHROMBIN TIME: CPT

## 2024-12-06 PROCEDURE — 36415 COLL VENOUS BLD VENIPUNCTURE: CPT

## 2024-12-09 ENCOUNTER — OFFICE VISIT (OUTPATIENT)
Dept: PHYSICAL THERAPY | Facility: CLINIC | Age: 59
End: 2024-12-09
Payer: MEDICARE

## 2024-12-09 DIAGNOSIS — Z96.659 INFECTION OF PROSTHETIC KNEE JOINT, SUBSEQUENT ENCOUNTER: ICD-10-CM

## 2024-12-09 DIAGNOSIS — Z96.652 STATUS POST LEFT KNEE REPLACEMENT: ICD-10-CM

## 2024-12-09 DIAGNOSIS — Z96.652 PRESENCE OF LEFT ARTIFICIAL KNEE JOINT: Primary | ICD-10-CM

## 2024-12-09 DIAGNOSIS — T84.59XD INFECTION OF PROSTHETIC KNEE JOINT, SUBSEQUENT ENCOUNTER: ICD-10-CM

## 2024-12-09 PROCEDURE — 97112 NEUROMUSCULAR REEDUCATION: CPT

## 2024-12-09 PROCEDURE — 97110 THERAPEUTIC EXERCISES: CPT

## 2024-12-09 NOTE — PROGRESS NOTES
Daily Note     Today's date: 2024  Patient name: Na Joseph  : 1965  MRN: [id number]  Referring provider: Sultana Arnold, *  Dx:   Encounter Diagnosis     ICD-10-CM    1. Presence of left artificial knee joint  Z96.652       2. Infection of prosthetic knee joint, subsequent encounter  T84.59XD     Z96.659       3. Status post left knee replacement  Z96.652           Start Time: 1730  Stop Time: 1815  Total time in clinic (min): 45 minutes    Subjective: Pt reports that she has been having sciatica symptoms.       Objective: See treatment diary below      Assessment: Tolerated treatment well. Continues with difficulty ascending stairs without compensation. Patient would benefit from continued PT      Plan: Continue per plan of care.      Precautions: S/P L TKA Revision secondary to infection with Dr. Wilson on 10/3/24, Falls, Hx of PE, Anemia, CHF, Osteoporosis, PICC Line R UE.                       PROM L Knee LNK LNK LNK  0-125 TAS LNK KK TAS LNK  TAS 0-125   RE performed                          Neuro Re-Ed             NBOS EC EC 1 min  EC 1 min Foam 1 min  Foam 1 min Foam 1 min  Foam 1 min Foam 1 min Foam 1 min   EC 1 min   NBOS EO Foam             Hip Abd/Ext 2x10  2x10  2x12 2 x 10 RTB 2x10  RTB 2x10 RTB 2 x 10 RTB 2x10  2 x 10   SLR A/AROM 4x5 2x10 2x10  2 x 10 2x10 2x10 2 x 10 2x10  4 x 5   SLS        nv                               Ther Ex             Bike Rock and Roll 6 min  6 min  6 min  6 min 6 min  6 min 6 min 6 min   6 min   AP for circulation             GS seated             HS seated              QS             LAQ 2# 3x10  2# 2x10 2# 2x10 2# 3 x 10 2# 3x10 3# 3x10 5# 3 x 10 5# 3x10  3 x 10 2#   HR/TR 2x10  2x10  2x10 2 x 10 2x10  2x10 3 x 10 3x10   2 x 10   LP SL ( 3 showing)    35# 2x10  35# 3x10  35# 3x10  35# 3 x 10 35# 3x10 40# 3x10 45# 2 x 10 45# 2 x 10     Ther Activity             Sit to Stand   10x foam  10x 10x  10x  "10x 10x     Mini Squats 2x10  2x10  2x10  2 x 10 2x10  2x10 2 x 10 2x10  2 x 10   elevations 4\" 10x 4\" 2x10  6\" 10x  6\" x 10 6\" 2x10 6\" 2x10 FF FF     Sidesteps      3 laps   3 laps 3 laps  3 laps      Gait Training             LRD progression SPC SPC/nil  SPC/nil SPC SPC     SPC                Modalities             CP to L knee prn 10 min  At home  10 min 10 min 10 min  10 min 10 min 10 min   10 min                            "

## 2024-12-09 NOTE — PROGRESS NOTES
Cardiology Outpatient Progress Note - Na Joseph 59 y.o. female MRN: [id number]    @ Encounter: [id number]      Patient Active Problem List    Diagnosis Date Noted    GRIS (obstructive sleep apnea) 04/02/2024    Other sleep disorders 03/13/2024    Thyroid nodule 03/13/2024    Neutropenia, unspecified type ([facility]) 03/04/2024    Rheumatoid arthritis, involving unspecified site, unspecified whether rheumatoid factor present ([facility]) 03/04/2024    BMI 45.0-49.9, adult ([facility]) 03/04/2024    Chronic midline low back pain without sciatica 03/04/2024    Motion sickness 03/04/2024    Fall 11/11/2023    Anticoagulated 08/28/2023    Sleep disturbance 04/24/2023    Neuropathic pain, leg, left 04/24/2023    Antiphospholipid antibody syndrome ([facility]) 02/27/2023    Financial difficulties 02/08/2023    Insomnia 01/05/2023    Pulmonary embolus ([facility]) 01/05/2023    Iron deficiency anemia due to chronic blood loss 11/21/2022    COVID-19 11/05/2022    SLE (systemic lupus erythematosus) ([facility]) 11/05/2022    History of left knee replacement 10/20/2022    Venous stasis of lower extremity 05/09/2022    Acute pain of right knee 05/09/2022    Leukopenia 11/22/2021    Morbid obesity ([facility])     Primary osteoarthritis of left knee 02/12/2020    Left knee pain 11/11/2019    Tear of medial meniscus of left knee, current 11/11/2019    Right knee pain 02/18/2019    Status post total right knee replacement 02/18/2019    Chronic kidney disease, stage 3a ([facility]) 02/12/2019    Tear of medial meniscus of right knee, current 09/10/2018    Other tear of medial meniscus, current injury, right knee, initial encounter 07/16/2018    Patellofemoral disorder of right knee 06/04/2018    Pes anserinus bursitis of right knee 06/04/2018    Arthritis of right knee 06/04/2018    Arthritis of left knee 06/04/2018    Chronic pain of right knee 05/22/2018    Elevated serum creatinine 05/22/2018    Hyperuricemia 05/22/2018    Long-term use of hydroxychloroquine 02/16/2018     Pulmonary hypertension (HCC) 02/16/2018    Undifferentiated connective tissue disease (HCC) 02/16/2018    Secondary pulmonary hypertension 12/15/2017    Diffuse connective tissue disease (HCC) 11/21/2017    Pes anserine bursitis 11/21/2017    Trochanteric bursitis of both hips 11/21/2017    Vitamin D deficiency 11/21/2017    Other organ or system involvement in systemic lupus erythematosus (HCC) 11/17/2017    Sinus tachycardia 11/09/2017    B12 deficiency 10/19/2017    Age-related osteoporosis without current pathological fracture 10/19/2017    Lupus anticoagulant disorder (HCC) 10/18/2017    Positive HELENE (antinuclear antibody) 10/18/2017    Hot flashes due to menopause 09/01/2015    SOB (shortness of breath) on exertion 11/12/2012    Other chronic pulmonary heart diseases 11/05/2012    Edema 06/04/2012    Post-nasal drip 06/04/2012    Chronic cough 02/09/2012    Dyspnea 02/09/2012    Diarrhea 11/12/2024    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) 10/08/2024    Penicillin allergy 10/07/2024    Acute blood loss as cause of postoperative anemia 10/06/2024    Infection of total knee replacement  (HCC) 10/02/2024    Maltracking of left patella 09/20/2024    PONV (postoperative nausea and vomiting) 10/06/2022       Assessment:  # Hx of Pulmonary Embolis 11/5/22 associated with COVID 19 infection, also had TKR on 10/6/22  AC: warfarin 5 mg alternating 7.5 mg    Positive lupus anticoagulant + 9/26/22    Echo 11/6/22:  LVEF: 65%  RV: upper normal size, normal function  PASP: 42 mmHg    CTA 11/5/22: moderate quantity of RLL segmental and small quantity RUL and LLL acute PE    # Exercise induced PAH; HFpEF with PH  Out of proportion to obesity/ deconditioning/ diastolic dysfunction (PCWP 12 mmHg). Pt with MCTD/ SLE w/ lupus anticoagulant. Remote smoker. At rest, RV appears normal on TTE with coupled RV-PA w/o e/o of RVOT notching suggestive of normal PVR at rest. However, she did have a stress echo in 2012 that was  suggestive of exercise induced pulmonary HTN. At the time she did not have e/o of elevated PVR. Her bubble was negative which makes an intracardiac shunt less likely.    PAH Rx: sildanefil 40 mg Q8 (previously on macitentan)  Diuretic: torsemide 20 mg daily, spironolactone 25 mg daily  NT proBNP:   Weight: 237 lbs--> 229 lbs--> 218 lbs--> 225 lbs--> 244 lbs    Studies- personally reviewed by me  Echo 2/21/24:  LVEF: 60%  RV: normal  PASP: 39 mmHg  RVOT: no notching    Echo 11/6/22: (in setting of acute PE)  LVEF: 65%  RV: upper normal size, normal function  PASP: 42 mmHg    Stress Echo 11/4/20: 3 min, 4.6 METs, max heart rate 155 bpm, resting /96    Echo 4/23/19:  Normal RV size and function    RHC with stress 12/19/17:  She had an appropriate HR response from 100 to 130 bpm with MAP throughout the study staying at 112 mmHg.  Hgb 13.1     Rest:  RA 7  RV 37/4/13  PA 33/16/25  PCWP 12    SaO2 99%  MvO2 72.3%  CO/CI 4.8/2.3  TPG 13  DPG 4  PVR 2.7     Exercise:  CVP 10  PA 54/29/37  PCWP 18    SaO2 100%  MvO2 66%  CO/CI 4/1.9  TPG 19  DPG 11  PVR 4.8  CVP:PCWP 0.55    Stress echo 12/14/17:  to evaluate pulmonary pressures, RV, and RVOT signal w/ exercise.  5 min, 20 sec.  HTNsive response, decrease in O2 saturation from 99% to 91% and increase in PA pressures from 45 mmHg to 70 mmHg with exercise.    PFTs 12/1/17: normal, DLCO 84%    # SLE: Per outpatient Rheumatologist. Continue plaquenil  # ST: V/Q negative. May be 2/2 to deconditioning +/- inappropriate ST +/- diastolic dysfunction/HF. No e/o infection.    Holter 12/4/20: , 87 bpm  # Morbid obesity: Continue weight loss  # TKR on 10/6/22  # GRIS screen  Home study 4/2024: negative- AHI 2.5    TODAY'S PLAN:  Warfarin probably lifelong  Continue sildanefil- will try to slowly go up on sildanefil- doing better on 40 mg TID  Torsemide 20 mg and spironolactone 25 mg daily-  If lightheadedness persists than cut back torsemide to 10 mg daily  Likely  has venous insufficiency  Recommend Wegovy- I think weight loss will help her breathing     HPI:          60 yo followed for out of proportion PAH, exercise induced PAH, with MCTD/ SLE, + lupus anticoagulant, obesity. She has seen Dr Baird.  first started getting SOB -- 10 years ago. She has had several episodes of bacterial PNA and chronic cough (a few times/year). She was referred to [facility] in 2012 for workup for PH. She had a a stress echo 8/2012 that showed that her PA pressures more than doubled from --24 mmHg to > 50 mmHg. Currently, she states she can walk up a couple flights of stairs but does get SOB. She also gets SOB with walking up inclines. She gets occasional LH.      She was seeing a Rheumatologist in [place], [place] but recently saw a specialist at [facility], Dr. Ivy (Rheumatologist - 11/16). She was diagnosed with MCTD and SLE. HELENE + 1:320 w/ speckled pattern. She has been on Plaquenil x 2 weeks now. She is on ASA for + lupus anticoagulant and anticardiolipin Abs. She has joint pains and a subtle malar rash. She states so far there has been no change with plaquenil.     After her visit with Dr. Stanton, she has had TTE which shows LVEF --65%, normal RV size and function without RVOT notching. Normal atria. CXR clear. She also has had a negative V/Q scan. She walked the patient in the hallway and had her go up and down stairs. Her resting HR went from --100 bpm to 110s with Pulse Ox starting from 98% @ rest to --90% with exercise.     Stress Echo in 2017 showed hypertensive response and PA pressures to 70 mmHg w/ drop in pulse ox to 91%. Exercise RHC showed increase in PVR to 4.5 MOLINA from < 3 MOLINA. She was denied Tadalafil but approved for sildanefil. She did not feel any different and continued trouble with stairs.       Called 5/12 that returned from cruise a week ago and developed swelling in right leg. Ultrasound negative for DVT. Had fluid removed from right knee by Dr Aashish Prasad  rheumatologist wants her to see a cardiologist at [place]  Admitted 11/5 to 11/7 with pulmonary embolism associated with COVID 19 infection; Pt though already had TKR on 10/6/22  On warfarin. Lupus anticoagulant    Interval History:   Feeling ok  Infection TKA   Any better on sildanefil 40 mg Q8- feels much better on increased dose  Past Medical History:   Diagnosis Date    HELENE positive     Anemia     Sildenafil causes decreased hematocrit    Antiphospholipid syndrome (HCC)     Anxiety 03/2020    CHF (congestive heart failure) ([facility])     right heart failure related to pulm HTN lupus    Chronic kidney disease     borderline lab values    Chronic rhinitis 06/04/2012    Clotting disorder ([facility]) 2012    Coronary artery disease 2018    Encephalitis     Lasted Assessed 10/18/2017    Gout 06/26/2018    Heart failure ([facility]) 2019    History of transfusion 2/13/2019    autologous    Hypertension     Lupus 2018    Lupus anticoagulant disorder ([facility])     Maxillary sinusitis     Lasted Assessed 10/18/2017    Night sweat     Osteopenia     Hip    Osteoporosis     Spine    Pneumonia     Last Assessed 10/18/2017    PONV (postoperative nausea and vomiting)     Pulmonary embolism ([facility])     Pulmonary hypertension ([facility])     Seizures ([facility])     Only during Encephalitis    Shortness of breath     with exertion    Sinus tachycardia     Urinary incontinence        Review of Systems   Constitutional:  Negative for activity change, appetite change, fatigue and unexpected weight change.   HENT:  Negative for congestion and nosebleeds.    Eyes: Negative.    Respiratory:  Negative for cough, chest tightness and shortness of breath.    Cardiovascular:  Negative for chest pain, palpitations and leg swelling.   Gastrointestinal:  Negative for abdominal distention.   Endocrine: Negative.    Genitourinary: Negative.    Musculoskeletal: Negative.    Skin: Negative.    Neurological:  Negative for dizziness, syncope and weakness.   Hematological: Negative.   "  Psychiatric/Behavioral: Negative.         Allergies   Allergen Reactions    Dilantin [Phenytoin] Anaphylaxis and Rash    Penicillins Rash, Anaphylaxis, Dermatitis and Hives    Augmentin [Amoxicillin-Pot Clavulanate] Rash and Dermatitis     .    Current Outpatient Medications:     acetaminophen (TYLENOL) 500 mg tablet, Take 2 tablets (1,000 mg total) by mouth every 8 (eight) hours, Disp: 60 tablet, Rfl: 0    ascorbic acid (VITAMIN C) 500 MG tablet, Take 1 tablet (500 mg total) by mouth 2 (two) times a day, Disp: 60 tablet, Rfl: 1    B-D 3CC LUER-LILLIE SYR 25GX1/2\" 25G X 1-1/2\" 3 ML MISC, USE 1 SYRINGE EVERY 30 DAYS, Disp: 12 each, Rfl: 1    Benlysta 200 MG/ML SOAJ, Every friday subcutaneous injection, Disp: , Rfl:     cefadroxil (DURICEF) 500 mg capsule, Take 2 capsules (1,000 mg total) by mouth every 12 (twelve) hours Do not start before November 14, 2024., Disp: 120 capsule, Rfl: 4    cholecalciferol (VITAMIN D3) 1,000 units tablet, Take 2,000 Units by mouth daily in the early morning  , Disp: , Rfl:     cyanocobalamin 1,000 mcg/mL, 1000 mcg IM Q 2 weeks, Disp: 10 mL, Rfl: 3    folic acid (FOLVITE) 1 mg tablet, Take 1 tablet (1 mg total) by mouth daily, Disp: 30 tablet, Rfl: 1    gabapentin (NEURONTIN) 100 mg capsule, Take 1 capsule (100 mg total) by mouth 3 (three) times a day, Disp: 90 capsule, Rfl: 1    hydroxychloroquine (PLAQUENIL) 200 mg tablet, Take 400 mg by mouth daily with breakfast Alternating days/dosages odd days 1tabs  even days 2tabs, Disp: , Rfl:     Multiple Vitamins-Minerals (multivitamin with minerals) tablet, Take 1 tablet by mouth daily, Disp: 30 tablet, Rfl: 1    NEEDLE, DISP, 23 G (BD Disp Needle) 23G X 1\" MISC, Use to administer B12, Disp: 10 each, Rfl: 3    nystatin (MYCOSTATIN) powder, Apply topically 3 (three) times a day, Disp: 60 g, Rfl: 0    ondansetron (ZOFRAN-ODT) 4 mg disintegrating tablet, Take 1 tablet (4 mg total) by mouth every 6 (six) hours as needed for nausea or vomiting, " Disp: 20 tablet, Rfl: 0    potassium chloride (Klor-Con M20) 20 mEq tablet, Take 1 tablet (20 mEq total) by mouth daily, Disp: 30 tablet, Rfl: 2    romosozumab-aqqg (EVENITY) subcutaneous injection, Monthly 2024 last dose, Disp: , Rfl:     saccharomyces boulardii (FLORASTOR) 250 mg capsule, Take 1 capsule (250 mg total) by mouth 2 (two) times a day, Disp: 60 capsule, Rfl: 4    sildenafil (REVATIO) 20 mg tablet, Take 2 tablets (40 mg total) by mouth 3 (three) times a day, Disp: 180 tablet, Rfl: 4    spironolactone (ALDACTONE) 25 mg tablet, Take 1 tablet (25 mg total) by mouth daily, Disp: 90 tablet, Rfl: 3    torsemide (DEMADEX) 20 mg tablet, TAKE 1 TABLET(20 MG) BY MOUTH DAILY, Disp: 90 tablet, Rfl: 1    traZODone (DESYREL) 50 mg tablet, TAKE 2 TABLETS(100 MG) BY MOUTH DAILY AT BEDTIME, Disp: 180 tablet, Rfl: 1    triamcinolone (KENALOG) 0.1 % oral topical paste, prn, Disp: , Rfl:     warfarin (Coumadin) 5 mg tablet, 7.5mg po daily or as directed., Disp: 150 tablet, Rfl: 3    Social History     Socioeconomic History    Marital status: /Civil Union     Spouse name: Not on file    Number of children: 2    Years of education: Not on file    Highest education level: Not on file   Occupational History    Not on file   Tobacco Use    Smoking status: Former     Current packs/day: 0.00     Types: Cigarettes     Quit date: 2006     Years since quittin.8    Smokeless tobacco: Never   Vaping Use    Vaping status: Never Used   Substance and Sexual Activity    Alcohol use: Yes     Comment: occasional    Drug use: No    Sexual activity: Yes     Partners: Male     Birth control/protection: Female Sterilization   Other Topics Concern    Not on file   Social History Narrative    Daily caffeinated coffee consumption    Exercise habits     Social Drivers of Health     Financial Resource Strain: Medium Risk (2024)    Overall Financial Resource Strain (CARDIA)     Difficulty of Paying Living Expenses:  Somewhat hard   Food Insecurity: No Food Insecurity (10/5/2024)    Nursing - Inadequate Food Risk Classification     Worried About Running Out of Food in the Last Year: Never true     Ran Out of Food in the Last Year: Never true     Ran Out of Food in the Last Year: Not on file   Transportation Needs: No Transportation Needs (11/14/2024)    OASIS : Transportation     Lack of Transportation (Medical): No     Lack of Transportation (Non-Medical): No     Patient Unable or Declines to Respond: No   Physical Activity: Not on file   Stress: Not on file   Social Connections: Not on file   Intimate Partner Violence: Not on file   Housing Stability: Low Risk  (10/5/2024)    Housing Stability Vital Sign     Unable to Pay for Housing in the Last Year: No     Number of Times Moved in the Last Year: 0     Homeless in the Last Year: No       Family History   Problem Relation Age of Onset    Uterine cancer Mother     Pancreatic cancer Mother 70    Diabetes Mother     Endometrial cancer Mother 66    Arthritis Mother     Cancer Mother         Pancreas, Uterine    Vision loss Mother     Osteoporosis Mother     Heart disease Father         open heart surgery at age 50     Stroke Father         CVA    Hypertension Father     Atrial fibrillation Father     Hyperlipidemia Father     Arthritis Father     Rheum arthritis Father     Heart attack Father     No Known Problems Sister     No Known Problems Sister     Thyroid disease Sister     Depression Sister     Osteoarthritis Sister     Asthma Sister     Asthma Daughter     Migraines Daughter     Asthma Daughter     Thyroid disease Maternal Grandmother     Heart attack Maternal Grandfather     Heart disease Maternal Grandfather     Heart attack Paternal Grandmother     Heart disease Paternal Grandmother     Skin cancer Paternal Grandfather     No Known Problems Brother     Hemochromatosis Brother     No Known Problems Maternal Aunt     No Known Problems Paternal Aunt     Anuerysm Neg  Hx     Clotting disorder Neg Hx     Heart failure Neg Hx        Physical Exam:    Vitals: not currently breastfeeding., There is no height or weight on file to calculate BMI.,   Wt Readings from Last 3 Encounters:   11/26/24 108 kg (237 lb)   11/12/24 108 kg (237 lb)   10/22/24 108 kg (237 lb)         Physical Exam:    Physical Exam  Constitutional:       Appearance: She is well-developed.   HENT:      Head: Normocephalic and atraumatic.   Eyes:      Pupils: Pupils are equal, round, and reactive to light.   Neck:      Vascular: No JVD.   Cardiovascular:      Rate and Rhythm: Normal rate and regular rhythm.      Heart sounds: No murmur heard.  Pulmonary:      Effort: Pulmonary effort is normal. No respiratory distress.      Breath sounds: Normal breath sounds.   Abdominal:      General: There is no distension.      Palpations: Abdomen is soft.      Tenderness: There is no abdominal tenderness.   Musculoskeletal:         General: Normal range of motion.      Cervical back: Normal range of motion.   Skin:     General: Skin is warm and dry.      Findings: No rash.   Neurological:      Mental Status: She is alert and oriented to person, place, and time.         Labs & Results:    Lab Results   Component Value Date    SODIUM 140 11/01/2024    K 3.9 11/01/2024     11/01/2024    CO2 27 11/01/2024    BUN 10 11/01/2024    CREATININE 0.62 11/08/2024    GLUC 83 11/01/2024    CALCIUM 9.5 11/01/2024     Lab Results   Component Value Date    WBC 3.80 (L) 11/08/2024    HGB 9.8 (L) 11/08/2024    HCT 31.4 (L) 11/08/2024    MCV 98 11/08/2024     11/08/2024     Lab Results   Component Value Date    BNP 16 11/05/2022      Lab Results   Component Value Date    CHOLESTEROL 181 04/03/2023    CHOLESTEROL 176 10/09/2020    CHOLESTEROL 157 05/17/2018     Lab Results   Component Value Date    HDL 57 04/03/2023    HDL 60 10/09/2020    HDL 45 05/17/2018     Lab Results   Component Value Date    TRIG 100 04/03/2023    TRIG 107  10/09/2020    TRIG 131 05/17/2018     Lab Results   Component Value Date    NONHDLC 124 04/03/2023    NONHDLC 116 10/09/2020    NONHDL 112 05/17/2018         EKG personally reviewed by Hardeep Zavaleta.     Counseling / Coordination of Care  Time spent today 25 minutes.  Greater than 50% of total time was spent with the patient and / or family counseling and / or coordination of care.  We discussed diagnoses, most recent studies, tests and any changes in treatment plan    Thank you for the opportunity to participate in the care of this patient.    HARDEEP ZAVALETA D.O.  DIRECTOR OF HEART FAILURE/ PULMONARY HYPERTENSION  MEDICAL DIRECTOR OF LVAD PROGRAM  [facility]

## 2024-12-09 NOTE — PATIENT COMMUNICATION
Submitted benefit investigation on [employer] portal.  She has  primary.  I need to see what her [employer] policy looks like. Will reach out to patient when it is returned.

## 2024-12-10 ENCOUNTER — OFFICE VISIT (OUTPATIENT)
Dept: CARDIOLOGY CLINIC | Facility: CLINIC | Age: 59
End: 2024-12-10
Payer: MEDICARE

## 2024-12-10 VITALS
HEIGHT: 62 IN | DIASTOLIC BLOOD PRESSURE: 80 MMHG | BODY MASS INDEX: 44.72 KG/M2 | HEART RATE: 87 BPM | WEIGHT: 243 LBS | SYSTOLIC BLOOD PRESSURE: 130 MMHG | OXYGEN SATURATION: 98 %

## 2024-12-10 DIAGNOSIS — I87.8 VENOUS STASIS OF LOWER EXTREMITY: ICD-10-CM

## 2024-12-10 DIAGNOSIS — I27.20 PULMONARY HYPERTENSION (HCC): Primary | ICD-10-CM

## 2024-12-10 DIAGNOSIS — G47.33 OSA (OBSTRUCTIVE SLEEP APNEA): ICD-10-CM

## 2024-12-10 PROCEDURE — 99214 OFFICE O/P EST MOD 30 MIN: CPT | Performed by: INTERNAL MEDICINE

## 2024-12-11 ENCOUNTER — OFFICE VISIT (OUTPATIENT)
Dept: PHYSICAL THERAPY | Facility: CLINIC | Age: 59
End: 2024-12-11
Payer: MEDICARE

## 2024-12-11 DIAGNOSIS — Z96.652 STATUS POST LEFT KNEE REPLACEMENT: ICD-10-CM

## 2024-12-11 DIAGNOSIS — Z96.659 INFECTION OF PROSTHETIC KNEE JOINT, SUBSEQUENT ENCOUNTER: ICD-10-CM

## 2024-12-11 DIAGNOSIS — T84.59XD INFECTION OF PROSTHETIC KNEE JOINT, SUBSEQUENT ENCOUNTER: ICD-10-CM

## 2024-12-11 DIAGNOSIS — Z96.652 PRESENCE OF LEFT ARTIFICIAL KNEE JOINT: Primary | ICD-10-CM

## 2024-12-11 PROCEDURE — 97140 MANUAL THERAPY 1/> REGIONS: CPT | Performed by: PHYSICAL THERAPIST

## 2024-12-11 PROCEDURE — 97110 THERAPEUTIC EXERCISES: CPT | Performed by: PHYSICAL THERAPIST

## 2024-12-11 PROCEDURE — 97112 NEUROMUSCULAR REEDUCATION: CPT | Performed by: PHYSICAL THERAPIST

## 2024-12-11 NOTE — PROGRESS NOTES
"Daily Note     Today's date: 2024  Patient name: Na Joseph  : 1965  MRN: [id number]  Referring provider: Sultana Arnold, *  Dx:   Encounter Diagnosis     ICD-10-CM    1. Presence of left artificial knee joint  Z96.652       2. Infection of prosthetic knee joint, subsequent encounter  T84.59XD     Z96.659       3. Status post left knee replacement  Z96.652                      Subjective: Pt presents today stating that she is feeling really well.  Confident with her generalized mobility with stairs.        Objective: See treatment diary below      Assessment: Descending elevations is improving.  Still having difficulty with descent.  Continue to progress as able.        Plan: Continue per plan of care.      Precautions: S/P L TKA Revision secondary to infection with Dr. Wilson on 10/3/24, Falls, Hx of PE, Anemia, CHF, Osteoporosis, PICC Line R UE.                       PROM L Knee LNK LNK LNK  0-125 TAS LNK KK TAS LNK TAS TAS 0-125   RE performed                          Neuro Re-Ed             NBOS EC EC 1 min  EC 1 min Foam 1 min  Foam 1 min Foam 1 min  Foam 1 min Foam 1 min Foam 1 min  Foam 1 min EC 1 min   NBOS EO Foam             Hip Abd/Ext 2x10  2x10  2x12 2 x 10 RTB 2x10  RTB 2x10 RTB 2 x 10 RTB 2x10 RTB 2 x 10 2 x 10   SLR A/AROM 4x5 2x10 2x10  2 x 10 2x10 2x10 2 x 10 2x10 2 x 10 4 x 5   SLS        nv 20\" x 4                              Ther Ex             Bike Rock and Roll 6 min  6 min  6 min  6 min 6 min  6 min 6 min 6 min  6 min 6 min   AP for circulation             GS seated             HS seated              QS             LAQ 2# 3x10  2# 2x10 2# 2x10 2# 3 x 10 2# 3x10 3# 3x10 5# 3 x 10 5# 3x10 5# 3 x 10 3 x 10 2#   HR/TR 2x10  2x10  2x10 2 x 10 2x10  2x10 3 x 10 3x10  3 x 10 2 x 10   LP SL ( 3 showing)    35# 2x10  35# 3x10  35# 3x10  35# 3 x 10 35# 3x10 40# 3x10 45# 2 x 10 45# 2 x 10 45# 3 x 10    Ther Activity         " "    Sit to Stand   10x foam  10x 10x  10x 10x 10x 10x    Mini Squats 2x10  2x10  2x10  2 x 10 2x10  2x10 2 x 10 2x10 2 x 10 2 x 10   elevations 4\" 10x 4\" 2x10  6\" 10x  6\" x 10 6\" 2x10 6\" 2x10 FF FF FF    Sidesteps      3 laps   3 laps 3 laps  3 laps  3 lap    Gait Training             LRD progression SPC SPC/nil  SPC/nil SPC SPC     SPC                Modalities             CP to L knee prn 10 min  At home  10 min 10 min 10 min  10 min 10 min 10 min  10 min 10 min                            "

## 2024-12-13 ENCOUNTER — PATIENT MESSAGE (OUTPATIENT)
Dept: CARDIOLOGY CLINIC | Facility: CLINIC | Age: 59
End: 2024-12-13

## 2024-12-13 ENCOUNTER — TELEPHONE (OUTPATIENT)
Age: 59
End: 2024-12-13

## 2024-12-13 DIAGNOSIS — I27.20 PULMONARY HYPERTENSION (HCC): ICD-10-CM

## 2024-12-13 RX ORDER — SILDENAFIL CITRATE 20 MG/1
40 TABLET ORAL 3 TIMES DAILY
Qty: 180 TABLET | Refills: 11 | Status: SHIPPED | OUTPATIENT
Start: 2024-12-13

## 2024-12-13 NOTE — TELEPHONE ENCOUNTER
PA for Tymlos SUBMITTED to prime     via    []CMM-KEY:    [x]Surescripts-Case ID #    []Availity-Auth ID #  NDC #    []Faxed to plan   []Other website    []Phone call Case ID #      [x]PA sent as URGENT    All office notes, labs and other pertaining documents and studies sent. Clinical questions answered. Awaiting determination from insurance company.     Turnaround time for your insurance to make a decision on your Prior Authorization can take 7-21 business days.

## 2024-12-16 ENCOUNTER — OFFICE VISIT (OUTPATIENT)
Dept: PHYSICAL THERAPY | Facility: CLINIC | Age: 59
End: 2024-12-16
Payer: MEDICARE

## 2024-12-16 DIAGNOSIS — Z96.652 STATUS POST LEFT KNEE REPLACEMENT: ICD-10-CM

## 2024-12-16 DIAGNOSIS — Z96.652 PRESENCE OF LEFT ARTIFICIAL KNEE JOINT: Primary | ICD-10-CM

## 2024-12-16 DIAGNOSIS — Z96.659 INFECTION OF PROSTHETIC KNEE JOINT, SUBSEQUENT ENCOUNTER: ICD-10-CM

## 2024-12-16 DIAGNOSIS — T84.59XD INFECTION OF PROSTHETIC KNEE JOINT, SUBSEQUENT ENCOUNTER: ICD-10-CM

## 2024-12-16 PROCEDURE — 97110 THERAPEUTIC EXERCISES: CPT

## 2024-12-16 NOTE — TELEPHONE ENCOUNTER
PA for Tymlos [address] APPROVED     Date(s) approved 12/14/2024 - 12/14/2025    Case #[id number]       Patient advised by          []MyChart Message  [x]Phone call   []LMOM  []L/M to call office as no active Communication consent on file  []Unable to leave detailed message as VM not approved on Communication consent       Pharmacy advised by    [x]Fax  []Phone call    Approval letter scanned into Media Yes

## 2024-12-16 NOTE — PROGRESS NOTES
"Daily Note     Today's date: 2024  Patient name: Na Joseph  : 1965  MRN: [id number]  Referring provider: Sultana Arnold, *  Dx:   Encounter Diagnosis     ICD-10-CM    1. Presence of left artificial knee joint  Z96.652       2. Infection of prosthetic knee joint, subsequent encounter  T84.59XD     Z96.659       3. Status post left knee replacement  Z96.652           Start Time: 1730  Stop Time: 1800  Total time in clinic (min): 30 minutes    Subjective: Pt reports that she feels comfortable making today her last visit.       Objective: See treatment diary below  ROM: 0-130    Assessment: Pt tolerated session well. Stair ascent improving.       Plan: Pt will do HEP and follow up as needed.      Precautions: S/P L TKA Revision secondary to infection with Dr. Wilson on 10/3/24, Falls, Hx of PE, Anemia, CHF, Osteoporosis, PICC Line R UE.                       PROM L Knee LNK LNK LNK  0-125 TAS LNK KK TAS LNK TAS LNK   RE performed                          Neuro Re-Ed             NBOS EC EC 1 min  EC 1 min Foam 1 min  Foam 1 min Foam 1 min  Foam 1 min Foam 1 min Foam 1 min  Foam 1 min Foam 1 min    NBOS EO Foam             Hip Abd/Ext 2x10  2x10  2x12 2 x 10 RTB 2x10  RTB 2x10 RTB 2 x 10 RTB 2x10 RTB 2 x 10 RTB 2 x 10   SLR A/AROM 4x5 2x10 2x10  2 x 10 2x10 2x10 2 x 10 2x10 2 x 10 2x10    SLS        nv 20\" x 4 20\"x4                             Ther Ex             Bike Rock and Roll 6 min  6 min  6 min  6 min 6 min  6 min 6 min 6 min  6 min 6 min    AP for circulation             GS seated             HS seated              QS             LAQ 2# 3x10  2# 2x10 2# 2x10 2# 3 x 10 2# 3x10 3# 3x10 5# 3 x 10 5# 3x10 5# 3 x 10 5# 3 x 10   HR/TR 2x10  2x10  2x10 2 x 10 2x10  2x10 3 x 10 3x10  3 x 10 3x10    LP SL ( 3 showing)    35# 2x10  35# 3x10  35# 3x10  35# 3 x 10 35# 3x10 40# 3x10 45# 2 x 10 45# 2 x 10 45# 3 x 10 45# 3x10    Ther Activity        " "     Sit to Stand   10x foam  10x 10x  10x 10x 10x 10x 10x   Mini Squats 2x10  2x10  2x10  2 x 10 2x10  2x10 2 x 10 2x10 2 x 10 2x10   elevations 4\" 10x 4\" 2x10  6\" 10x  6\" x 10 6\" 2x10 6\" 2x10 FF FF FF FF    Sidesteps      3 laps   3 laps 3 laps  3 laps  3 lap 3 laps   Gait Training             LRD progression SPC SPC/nil  SPC/nil SPC SPC                     Modalities             CP to L knee prn 10 min  At home  10 min 10 min 10 min  10 min 10 min 10 min  10 min dec                            "

## 2024-12-17 ENCOUNTER — OFFICE VISIT (OUTPATIENT)
Age: 59
End: 2024-12-17
Payer: MEDICARE

## 2024-12-17 ENCOUNTER — APPOINTMENT (OUTPATIENT)
Dept: LAB | Facility: AMBULARY SURGERY CENTER | Age: 59
End: 2024-12-17

## 2024-12-17 VITALS
DIASTOLIC BLOOD PRESSURE: 72 MMHG | BODY MASS INDEX: 44.16 KG/M2 | OXYGEN SATURATION: 99 % | WEIGHT: 240 LBS | HEART RATE: 88 BPM | HEIGHT: 62 IN | RESPIRATION RATE: 18 BRPM | SYSTOLIC BLOOD PRESSURE: 130 MMHG

## 2024-12-17 DIAGNOSIS — T84.59XS INFECTION OF TOTAL KNEE REPLACEMENT, SEQUELA: Primary | ICD-10-CM

## 2024-12-17 DIAGNOSIS — M32.9 SYSTEMIC LUPUS ERYTHEMATOSUS, UNSPECIFIED SLE TYPE, UNSPECIFIED ORGAN INVOLVEMENT STATUS (HCC): Chronic | ICD-10-CM

## 2024-12-17 DIAGNOSIS — D68.61 ANTIPHOSPHOLIPID ANTIBODY SYNDROME (HCC): ICD-10-CM

## 2024-12-17 DIAGNOSIS — R30.0 DYSURIA: ICD-10-CM

## 2024-12-17 DIAGNOSIS — Z88.0 PENICILLIN ALLERGY: ICD-10-CM

## 2024-12-17 DIAGNOSIS — E66.01 MORBID OBESITY (HCC): ICD-10-CM

## 2024-12-17 DIAGNOSIS — Z96.659 INFECTION OF TOTAL KNEE REPLACEMENT, SEQUELA: Primary | ICD-10-CM

## 2024-12-17 PROCEDURE — 99215 OFFICE O/P EST HI 40 MIN: CPT | Performed by: PHYSICIAN ASSISTANT

## 2024-12-17 NOTE — ASSESSMENT & PLAN NOTE
History of left TKA 10/6/22. Developed 2 weeks of left knee pain, fevers, chills and was seen by orthopedic surgery 9/23.  Arthrocentesis performed and Synovasure was positive with Staph epidermidis isolated.  Now status post left TKA revision with implantation of an articulating antibiotic spacer (1.5 stage revision arthroplasty) 10/3/24.  Operative cultures growing staph epidermidis in broth only. 10/18/24 labs reviewed. Cr 0.62, WBC 4.95, Hgb 8.7, Plt 350  -Completed IV Cefazolin 6 week course of IV antibiotics through 11/13/24 now followed by 4.5 months p.o. antibiotics (6 months total) due to 1.5 stage revision arthroplasty  -Orthopedic surgery follow-up ongoing  -check CBC diff, creatinine before next visit to assess for treatment response, drug toxicities  -continue Cefadroxil 1000 mg PO q 12 hours  -Next ID office follow up appt 1/28/24 at 10:00 am  Orders:    CBC and differential; Future    Creatinine, serum; Future

## 2024-12-17 NOTE — ASSESSMENT & PLAN NOTE
Patient on hydroxychloroquine and Benlysta outpatient. Follows with a rheumatologist at [facility]. The Rinvoq has been on hold due to left knee prosthetic joint infection.   -Management of immunosuppression per her rheumatologist. She has been continued on hydroxychloroquine and Benlysta.   -Rinvoq is on hold

## 2024-12-17 NOTE — PROGRESS NOTES
Ambulatory Visit  Name: Na Joseph      : 1965      MRN: [id number]  Encounter Provider: Sherrill Topete PA-C  Encounter Date: 2024   Encounter department: [facility] INFECTIOUS DISEASE [facility]    Assessment & Plan  Infection of total knee replacement, sequela  History of left TKA 10/6/22. Developed 2 weeks of left knee pain, fevers, chills and was seen by orthopedic surgery .  Arthrocentesis performed and Synovasure was positive with Staph epidermidis isolated.  Now status post left TKA revision with implantation of an articulating antibiotic spacer (1.5 stage revision arthroplasty) 10/3/24.  Operative cultures growing staph epidermidis in broth only. 10/18/24 labs reviewed. Cr 0.62, WBC 4.95, Hgb 8.7, Plt 350  -Completed IV Cefazolin 6 week course of IV antibiotics through 24 now followed by 4.5 months p.o. antibiotics (6 months total) due to 1.5 stage revision arthroplasty  -Orthopedic surgery follow-up ongoing  -check CBC diff, creatinine before next visit to assess for treatment response, drug toxicities  -continue Cefadroxil 1000 mg PO q 12 hours  -Next ID office follow up appt 24 at 10:00 am  Orders:    CBC and differential; Future    Creatinine, serum; Future    Dysuria  Patient with experiencing some urinary frequency/urgency and dysuria last 1 week   -check U/A   -monitor urine symptoms/output   -encouraged hydration  Orders:    Urinalysis with microscopic; Future    Systemic lupus erythematosus, unspecified SLE type, unspecified organ involvement status (HCC)  Patient on hydroxychloroquine and Benlysta outpatient. Follows with a rheumatologist at [facility]. The Rinvoq has been on hold due to left knee prosthetic joint infection.   -Management of immunosuppression per her rheumatologist. She has been continued on hydroxychloroquine and Benlysta.   -Rinvoq is on hold           Antiphospholipid antibody syndrome (HCC)  On Coumadin outpatient. 24 INR  "4.01  -follows with hematology  -Monitor INR        Penicillin allergy  Tolerating Cefadroxil. Reports as rash and anaphylaxis.   -Will continue to monitor        Morbid obesity (HCC)  BMI 43.9 Can affect antibiotic dosing  -dose affect antibiotics as indicated       I have discussed the above management plan to continue Cefadroxil, diarrhea work/management, prn Nystatin/Fluconazole with patient in detail.      Antibiotics:  Cefadroxil since 11/14/24    History of Present Illness     Na Joseph is a 59 y.o. female who presents for outpatient ID follow up of infection of left TKR on IV Cefazolin    The patient is overall feeling as though post op Left knee pain is better managed and ROM improving.  She denies nausea, vomiting, fever, CP, SOB, cough.  PICC line out. Few episodes of vaginal itching and itching/burning of groin folds L > R and soreness of anal area better with nystatin powder. Patient all was experiencing increased loose stool frequency and urgency. On activa and raw milk daily and now 1 imodium daily is helping to produced 1 semi-formed stool daily. Patient is experiencing some urinary frequency/urgency and dysuria.          Review of Systems   All other systems reviewed and are negative.          Objective     /72   Pulse 88   Resp 18   Ht 5' 2\" (1.575 m)   Wt 109 kg (240 lb)   SpO2 99%   BMI 43.90 kg/m²     Physical Exam  Vitals reviewed.   Constitutional:       Appearance: Normal appearance.      Comments: Ambulatory in office with assistive devices and discharged from PT yesterday.   HENT:      Head: Normocephalic and atraumatic.      Right Ear: External ear normal.      Left Ear: External ear normal.      Nose: Nose normal.      Mouth/Throat:      Pharynx: Oropharynx is clear.   Eyes:      Extraocular Movements: Extraocular movements intact.      Conjunctiva/sclera: Conjunctivae normal.   Cardiovascular:      Rate and Rhythm: Normal rate.   Pulmonary:      Effort: Pulmonary effort " is normal.      Breath sounds: Normal breath sounds.   Abdominal:      General: Bowel sounds are normal.      Palpations: Abdomen is soft.      Tenderness: There is no abdominal tenderness.      Comments: Protuberant pannus   Musculoskeletal:      Cervical back: Normal range of motion and neck supple.      Comments: L TKR incision closed, + swelling, no warmth/erythema. Dry, flaking around incision and on lower leg. Patient applying moisturizer   Skin:     Comments: groin fold eruption resolved  RUE PICC removed   Neurological:      Mental Status: She is alert and oriented to person, place, and time. Mental status is at baseline.       Administrative Statements   I have spent a total time of 45 minutes in caring for this patient on the day of the visit/encounter including Diagnostic results, Prognosis, Risks and benefits of tx options, Instructions for management, Patient and family education, Importance of tx compliance, Risk factor reductions, Impressions, Counseling / Coordination of care, Documenting in the medical record, Reviewing / ordering tests, medicine, procedures  , Obtaining or reviewing history  , and Communicating with other healthcare professionals .

## 2024-12-18 ENCOUNTER — APPOINTMENT (OUTPATIENT)
Dept: LAB | Facility: CLINIC | Age: 59
End: 2024-12-18
Payer: COMMERCIAL

## 2024-12-18 ENCOUNTER — PATIENT MESSAGE (OUTPATIENT)
Age: 59
End: 2024-12-18

## 2024-12-18 ENCOUNTER — APPOINTMENT (OUTPATIENT)
Dept: PHYSICAL THERAPY | Facility: CLINIC | Age: 59
End: 2024-12-18
Payer: MEDICARE

## 2024-12-18 DIAGNOSIS — B99.8 IMMUNOSUPPRESSION-RELATED INFECTIOUS DISEASE (HCC): ICD-10-CM

## 2024-12-18 DIAGNOSIS — Z96.659 INFECTION OF TOTAL KNEE REPLACEMENT, SEQUELA: ICD-10-CM

## 2024-12-18 DIAGNOSIS — R30.0 DYSURIA: Primary | ICD-10-CM

## 2024-12-18 DIAGNOSIS — Z79.899 NEED FOR PROPHYLACTIC CHEMOTHERAPY: ICD-10-CM

## 2024-12-18 DIAGNOSIS — M32.19 DILATED CARDIOMYOPATHY SECONDARY TO SYSTEMIC LUPUS ERYTHEMATOSUS (HCC): ICD-10-CM

## 2024-12-18 DIAGNOSIS — E55.9 AVITAMINOSIS D: ICD-10-CM

## 2024-12-18 DIAGNOSIS — D84.9 IMMUNOSUPPRESSION-RELATED INFECTIOUS DISEASE (HCC): ICD-10-CM

## 2024-12-18 DIAGNOSIS — Z96.652 STATUS POST REVISION OF TOTAL KNEE, LEFT: ICD-10-CM

## 2024-12-18 DIAGNOSIS — T84.59XS INFECTION OF TOTAL KNEE REPLACEMENT, SEQUELA: ICD-10-CM

## 2024-12-18 DIAGNOSIS — I43 DILATED CARDIOMYOPATHY SECONDARY TO SYSTEMIC LUPUS ERYTHEMATOSUS (HCC): ICD-10-CM

## 2024-12-18 DIAGNOSIS — R30.0 DYSURIA: ICD-10-CM

## 2024-12-18 LAB
BACTERIA UR QL AUTO: ABNORMAL /HPF
BILIRUB UR QL STRIP: NEGATIVE
CAOX CRY URNS QL MICRO: ABNORMAL /HPF
CLARITY UR: ABNORMAL
COLOR UR: ABNORMAL
CREAT UR-MCNC: 111.7 MG/DL
GLUCOSE UR STRIP-MCNC: NEGATIVE MG/DL
HGB UR QL STRIP.AUTO: ABNORMAL
KETONES UR STRIP-MCNC: NEGATIVE MG/DL
LEUKOCYTE ESTERASE UR QL STRIP: ABNORMAL
MUCOUS THREADS UR QL AUTO: ABNORMAL
NITRITE UR QL STRIP: POSITIVE
NON-SQ EPI CELLS URNS QL MICRO: ABNORMAL /HPF
PH UR STRIP.AUTO: 5.5 [PH]
PROT UR STRIP-MCNC: ABNORMAL MG/DL
PROT UR-MCNC: 28.7 MG/DL
PROT/CREAT UR: 0.3 MG/G{CREAT} (ref 0–0.1)
RBC #/AREA URNS AUTO: ABNORMAL /HPF
SP GR UR STRIP.AUTO: 1.02 (ref 1–1.03)
UROBILINOGEN UR STRIP-ACNC: <2 MG/DL
WBC #/AREA URNS AUTO: ABNORMAL /HPF

## 2024-12-18 PROCEDURE — 87186 SC STD MICRODIL/AGAR DIL: CPT

## 2024-12-18 PROCEDURE — 87086 URINE CULTURE/COLONY COUNT: CPT

## 2024-12-18 PROCEDURE — 82570 ASSAY OF URINE CREATININE: CPT

## 2024-12-18 PROCEDURE — 87077 CULTURE AEROBIC IDENTIFY: CPT

## 2024-12-18 PROCEDURE — 81001 URINALYSIS AUTO W/SCOPE: CPT

## 2024-12-18 PROCEDURE — 84156 ASSAY OF PROTEIN URINE: CPT

## 2024-12-18 RX ORDER — GABAPENTIN 100 MG/1
100 CAPSULE ORAL 3 TIMES DAILY
Qty: 90 CAPSULE | Refills: 1 | Status: SHIPPED | OUTPATIENT
Start: 2024-12-18

## 2024-12-20 ENCOUNTER — TELEPHONE (OUTPATIENT)
Dept: INFECTIOUS DISEASES | Facility: CLINIC | Age: 59
End: 2024-12-20

## 2024-12-20 ENCOUNTER — TELEPHONE (OUTPATIENT)
Age: 59
End: 2024-12-20

## 2024-12-20 ENCOUNTER — APPOINTMENT (OUTPATIENT)
Dept: LAB | Facility: CLINIC | Age: 59
End: 2024-12-20
Payer: COMMERCIAL

## 2024-12-20 DIAGNOSIS — Z79.01 ANTICOAGULATED ON COUMADIN: ICD-10-CM

## 2024-12-20 LAB
INR PPP: 3.52 (ref 0.85–1.19)
PROTHROMBIN TIME: 35.8 SECONDS (ref 12.3–15)

## 2024-12-20 PROCEDURE — 85610 PROTHROMBIN TIME: CPT

## 2024-12-20 PROCEDURE — 36415 COLL VENOUS BLD VENIPUNCTURE: CPT

## 2024-12-20 NOTE — TELEPHONE ENCOUNTER
"-Called and spoke with Mrs. Joseph regarding her recent call into the ID office. Her call was concerning her recent Urine culture, Urinalysis and a urine protein/creatinine ratio. She was requesting to speak with her provider who was currently out of office and will return on Monday 12/23. Mrs. Joseph indicated that she has requested a call from her provider regarding her results. The results of her Urinalysis are finalized where her Urine culture is not. Office RN placed call to triage patients concerns and offer possible solutions to accommodate her wish to speak with a provider. Furthermore she was asked if she followed up with the provider who ordered the urine protein/creatine ratio, she indicated that she has contacted that providers office and waiting for a return call from them as well.    -Mrs. Joseph voiced her concerns related to a potential UTI as evidenced by her urinary symptoms. She was informed that as her Urine culture is still in process and identification and susceptibility is incomplete. This makes it more challenging for a provider to make the best recommendations. She verbalizes her understanding but again voiced frustration. Mrs. Joseph was asked if she has continued taking her prescribed cefadroxil, she stated \"yes\". She was also asked if she has continue to use the prescribed Nystatin powder. Again she stated \"yes\". Mrs. Joseph was asked if she would like her case to be discussed with an office provider in the interim. She stated \"No\". She indicated she will wait until her provider returns and her urine culture finalizes.    -Expressed apologies related to the delay in communication and again reminded Mrs. Joseph that if she does have any additional concerns to not hesitate to contact the office. She verbalizes her understanding and has no additional questions at this time.  "

## 2024-12-20 NOTE — TELEPHONE ENCOUNTER
Patient calling upset that no one called her to inform her the appt with Maile March 3/6/25 @ 12:30pm was RS.  She also states 3/31/25 is not good for her.    I offered to RS and she declined stating she wants the office to call her because they shouldn't have RS her without calling her.

## 2024-12-20 NOTE — PATIENT COMMUNICATION
Pt calling in to follow up regarding her 2 my chart messages she has sent and not getting a response back from the office. She is asking for Sherrill to give a call to her today regarding her messages or is expecting a call from someone in the office.

## 2024-12-21 LAB
BACTERIA UR CULT: ABNORMAL
BACTERIA UR CULT: ABNORMAL

## 2024-12-23 ENCOUNTER — RESULTS FOLLOW-UP (OUTPATIENT)
Dept: HEMATOLOGY ONCOLOGY | Facility: CLINIC | Age: 59
End: 2024-12-23

## 2024-12-23 ENCOUNTER — APPOINTMENT (OUTPATIENT)
Dept: PHYSICAL THERAPY | Facility: CLINIC | Age: 59
End: 2024-12-23
Payer: MEDICARE

## 2024-12-23 ENCOUNTER — TELEPHONE (OUTPATIENT)
Age: 59
End: 2024-12-23

## 2024-12-23 DIAGNOSIS — D50.9 IRON DEFICIENCY ANEMIA, UNSPECIFIED IRON DEFICIENCY ANEMIA TYPE: ICD-10-CM

## 2024-12-23 DIAGNOSIS — R79.1 PROLONGED INR: ICD-10-CM

## 2024-12-23 DIAGNOSIS — D50.0 IRON DEFICIENCY ANEMIA DUE TO CHRONIC BLOOD LOSS: Primary | ICD-10-CM

## 2024-12-23 DIAGNOSIS — E53.8 B12 DEFICIENCY: ICD-10-CM

## 2024-12-23 DIAGNOSIS — N39.0 UTI (URINARY TRACT INFECTION): Primary | ICD-10-CM

## 2024-12-23 RX ORDER — CIPROFLOXACIN 500 MG/1
500 TABLET, FILM COATED ORAL EVERY 12 HOURS SCHEDULED
Qty: 14 TABLET | Refills: 0 | Status: SHIPPED | OUTPATIENT
Start: 2024-12-23 | End: 2024-12-30

## 2024-12-24 ENCOUNTER — TELEPHONE (OUTPATIENT)
Age: 59
End: 2024-12-24

## 2024-12-26 ENCOUNTER — APPOINTMENT (OUTPATIENT)
Dept: PHYSICAL THERAPY | Facility: CLINIC | Age: 59
End: 2024-12-26
Payer: MEDICARE

## 2025-01-03 ENCOUNTER — APPOINTMENT (OUTPATIENT)
Dept: LAB | Facility: CLINIC | Age: 60
End: 2025-01-03
Payer: COMMERCIAL

## 2025-01-03 DIAGNOSIS — T84.59XA INFECTION OF TOTAL KNEE REPLACEMENT, INITIAL ENCOUNTER  (HCC): ICD-10-CM

## 2025-01-03 DIAGNOSIS — T84.59XS INFECTION OF TOTAL KNEE REPLACEMENT, SEQUELA: ICD-10-CM

## 2025-01-03 DIAGNOSIS — Z96.659 INFECTION OF TOTAL KNEE REPLACEMENT, INITIAL ENCOUNTER  (HCC): ICD-10-CM

## 2025-01-03 DIAGNOSIS — Z79.01 ANTICOAGULATED ON COUMADIN: ICD-10-CM

## 2025-01-03 DIAGNOSIS — Z96.659 INFECTION OF TOTAL KNEE REPLACEMENT, SEQUELA: ICD-10-CM

## 2025-01-03 LAB
BASOPHILS # BLD AUTO: 0.02 THOUSANDS/ΜL (ref 0–0.1)
BASOPHILS NFR BLD AUTO: 1 % (ref 0–1)
CREAT SERPL-MCNC: 1.09 MG/DL (ref 0.6–1.3)
CRP SERPL QL: 1.8 MG/L
EOSINOPHIL # BLD AUTO: 0.45 THOUSAND/ΜL (ref 0–0.61)
EOSINOPHIL NFR BLD AUTO: 13 % (ref 0–6)
ERYTHROCYTE [DISTWIDTH] IN BLOOD BY AUTOMATED COUNT: 12.1 % (ref 11.6–15.1)
ERYTHROCYTE [SEDIMENTATION RATE] IN BLOOD: 15 MM/HOUR (ref 0–29)
GFR SERPL CREATININE-BSD FRML MDRD: 54 ML/MIN/1.73SQ M
HCT VFR BLD AUTO: 36.3 % (ref 34.8–46.1)
HGB BLD-MCNC: 11.9 G/DL (ref 11.5–15.4)
IMM GRANULOCYTES # BLD AUTO: 0.01 THOUSAND/UL (ref 0–0.2)
IMM GRANULOCYTES NFR BLD AUTO: 0 % (ref 0–2)
INR PPP: 1.6 (ref 0.85–1.19)
LYMPHOCYTES # BLD AUTO: 0.97 THOUSANDS/ΜL (ref 0.6–4.47)
LYMPHOCYTES NFR BLD AUTO: 28 % (ref 14–44)
MCH RBC QN AUTO: 30.7 PG (ref 26.8–34.3)
MCHC RBC AUTO-ENTMCNC: 32.8 G/DL (ref 31.4–37.4)
MCV RBC AUTO: 94 FL (ref 82–98)
MONOCYTES # BLD AUTO: 0.42 THOUSAND/ΜL (ref 0.17–1.22)
MONOCYTES NFR BLD AUTO: 12 % (ref 4–12)
NEUTROPHILS # BLD AUTO: 1.61 THOUSANDS/ΜL (ref 1.85–7.62)
NEUTS SEG NFR BLD AUTO: 46 % (ref 43–75)
NRBC BLD AUTO-RTO: 0 /100 WBCS
PLATELET # BLD AUTO: 242 THOUSANDS/UL (ref 149–390)
PMV BLD AUTO: 10.5 FL (ref 8.9–12.7)
PROTHROMBIN TIME: 19.8 SECONDS (ref 12.3–15)
RBC # BLD AUTO: 3.87 MILLION/UL (ref 3.81–5.12)
WBC # BLD AUTO: 3.48 THOUSAND/UL (ref 4.31–10.16)

## 2025-01-03 PROCEDURE — 85025 COMPLETE CBC W/AUTO DIFF WBC: CPT

## 2025-01-03 PROCEDURE — 86140 C-REACTIVE PROTEIN: CPT

## 2025-01-03 PROCEDURE — 36415 COLL VENOUS BLD VENIPUNCTURE: CPT

## 2025-01-03 PROCEDURE — 85610 PROTHROMBIN TIME: CPT

## 2025-01-03 PROCEDURE — 85652 RBC SED RATE AUTOMATED: CPT

## 2025-01-03 PROCEDURE — 82565 ASSAY OF CREATININE: CPT

## 2025-01-06 DIAGNOSIS — E53.8 B12 DEFICIENCY: Primary | ICD-10-CM

## 2025-01-06 RX ORDER — CYANOCOBALAMIN 1000 UG/ML
INJECTION, SOLUTION INTRAMUSCULAR; SUBCUTANEOUS
Qty: 10 ML | Refills: 3 | Status: SHIPPED | OUTPATIENT
Start: 2025-01-06 | End: 2025-01-06 | Stop reason: SDUPTHER

## 2025-01-06 RX ORDER — NEEDLES, DISPOSABLE 25GX5/8"
NEEDLE, DISPOSABLE MISCELLANEOUS
Qty: 10 EACH | Refills: 3 | Status: SHIPPED | OUTPATIENT
Start: 2025-01-06 | End: 2025-01-06 | Stop reason: SDUPTHER

## 2025-01-13 ENCOUNTER — OFFICE VISIT (OUTPATIENT)
Age: 60
End: 2025-01-13
Payer: MEDICARE

## 2025-01-13 VITALS — WEIGHT: 240 LBS | HEIGHT: 62 IN | BODY MASS INDEX: 44.16 KG/M2

## 2025-01-13 DIAGNOSIS — Z96.652 STATUS POST REVISION OF TOTAL KNEE, LEFT: Primary | ICD-10-CM

## 2025-01-13 DIAGNOSIS — T84.59XA INFECTION OF TOTAL KNEE REPLACEMENT, INITIAL ENCOUNTER  (HCC): ICD-10-CM

## 2025-01-13 DIAGNOSIS — Z96.659 INFECTION OF TOTAL KNEE REPLACEMENT, INITIAL ENCOUNTER  (HCC): ICD-10-CM

## 2025-01-13 DIAGNOSIS — M79.2 NEUROPATHIC PAIN, LEG, LEFT: ICD-10-CM

## 2025-01-13 PROCEDURE — 99213 OFFICE O/P EST LOW 20 MIN: CPT | Performed by: STUDENT IN AN ORGANIZED HEALTH CARE EDUCATION/TRAINING PROGRAM

## 2025-01-13 NOTE — PROGRESS NOTES
Knee Post Op Office Note    Assessment:     1. Status post revision of total knee, left    2. Neuropathic pain, leg, left    3. Infection of total knee replacement, initial encounter  (HCC)        Plan:     Problem List Items Addressed This Visit       Neuropathic pain, leg, left    Relevant Orders    Ambulatory referral to Spine & Pain Management    Infection of total knee replacement  (HCC)     Other Visit Diagnoses         Status post revision of total knee, left    -  Primary             Plan:  59 y.o. female  3 months s/p Left TKA from Revision to 1.5 stage abx spacer for Staph Epi PJI from 10/3/24.  Recent CRP was 1.8 and ESR was 15.  She is currently on duricef 500mg twice daily per infectious disease.  Overall she is doing well and her pain is overall controlled. Referral placed for Dr. Vergara today for spine and pain.   Follow up in 6 weeks with xrays. If ITB tendonitis has not resolved at that time can consider CSI.       Subjective:     Patient ID: Na Joseph is a 59 y.o. female.    Chief Complaint:  HPI:  Patient is a 59 y.o. female here today for 3 month post op evaluation of their left knee.  She is 3 mo s/p Left TKA Revision to 1.5 stage abx spacer for Staph Epi PJI, DOS: 10/3/24.  She is currently on duricef 500mg twice daily per infectious disease.  At last visit an updated CRP and sed rate were ordered.  She states that overall she is doing well.  She has stiffness in the knee and a pain over the lateral aspect of the knee that has been present since her surgery.  Since her last visit she was treated for a UTI with ID, however she feels that this may not yet be resolved.    Allergy:  Allergies   Allergen Reactions    Dilantin [Phenytoin] Anaphylaxis and Rash    Penicillins Rash, Anaphylaxis, Dermatitis and Hives    Augmentin [Amoxicillin-Pot Clavulanate] Rash and Dermatitis     Medications:  all current active meds have been reviewed and current meds: No current facility-administered  medications for this visit.  Past Medical History:  Past Medical History:   Diagnosis Date    HELENE positive     Anemia     Sildenafil causes decreased hematocrit    Antiphospholipid syndrome (HCC)     Anxiety 03/2020    CHF (congestive heart failure) (HCC)     right heart failure related to pulm HTN lupus    Chronic kidney disease     borderline lab values    Chronic rhinitis 06/04/2012    Clotting disorder (HCC) 2012    Coronary artery disease 2018    Encephalitis     Lasted Assessed 10/18/2017    Gout 06/26/2018    Heart failure (HCC) 2019    History of transfusion 2/13/2019    autologous    Hypertension     Lupus 2018    Lupus anticoagulant disorder (HCC)     Maxillary sinusitis     Lasted Assessed 10/18/2017    Night sweat     Osteopenia     Hip    Osteoporosis     Spine    Pneumonia     Last Assessed 10/18/2017    PONV (postoperative nausea and vomiting)     Pulmonary embolism (HCC)     Pulmonary hypertension (HCC)     Seizures (HCC)     Only during Encephalitis    Shortness of breath     with exertion    Sinus tachycardia     Urinary incontinence      Past Surgical History:  Past Surgical History:   Procedure Laterality Date    ARTHRODESIS      Hand: IP Joint    CHOLECYSTECTOMY      COLONOSCOPY      COLPOSCOPY      HYSTERECTOMY      Vaginal    JOINT REPLACEMENT Right     Knee replacement    KNEE SURGERY  02/2019    LAPAROSCOPIC ESOPHAGOGASTRIC FUNDOPLASTY  2008    HI ARTHRP KNE CONDYLE&PLATU MEDIAL&LAT COMPARTMENTS Right 02/12/2019    Procedure: KNEE TOTAL ARTHROPLASTY AND ASSOCIATED PROCEDURES;  Surgeon: Donovan Zendejas DO;  [facility]: [facility];  Service: Orthopedics    HI ARTHRP KNE CONDYLE&PLATU MEDIAL&LAT COMPARTMENTS Left 10/06/2022    Procedure: ARTHROPLASTY KNEE TOTAL;  Surgeon: Donovan Zendejas DO;  [facility];  Service: Orthopedics    HI ARTHRS KNEE W/MENISCECTOMY MED&LAT W/SHAVING Right 10/02/2018    Procedure: KNEE ARTHROSCOPY WITH PARTIAL MEDIAL MENISCECTOMY;  Surgeon: Donovan Zendejas DO;   [facility];  Service: Orthopedics    IN ARTHRS KNEE W/MENISCECTOMY MED&LAT W/SHAVING Left 12/17/2019    Procedure: KNEE ARTHROSCOPIC MENISCECTOMY /MEDIAL; Chondyl medial and posterior;  Surgeon: Donovan Zendejas DO;  [facility];  Service: Orthopedics    REDUCTION MAMMAPLASTY Bilateral     doesnt remember when    REPAIR ANKLE LIGAMENT Right     TONSILLECTOMY      TOTAL KNEE ARTHROPLASTY REVISION Left 10/3/2024    Procedure: ARTHROPLASTY KNEE TOTAL REVISION;  Surgeon: Grupo Wilson DO;  Location: [facility];  Service: Orthopedics     Family History:  Family History   Problem Relation Age of Onset    Uterine cancer Mother     Pancreatic cancer Mother 70    Diabetes Mother     Endometrial cancer Mother 66    Arthritis Mother     Cancer Mother         Pancreas, Uterine    Vision loss Mother     Osteoporosis Mother     Heart disease Father         open heart surgery at age 50     Stroke Father         CVA    Hypertension Father     Atrial fibrillation Father     Hyperlipidemia Father     Arthritis Father     Rheum arthritis Father     Heart attack Father     No Known Problems Sister     No Known Problems Sister     Thyroid disease Sister     Depression Sister     Osteoarthritis Sister     Asthma Sister     Asthma Daughter     Migraines Daughter     Asthma Daughter     Thyroid disease Maternal Grandmother     Heart attack Maternal Grandfather     Heart disease Maternal Grandfather     Heart attack Paternal Grandmother     Heart disease Paternal Grandmother     Skin cancer Paternal Grandfather     No Known Problems Brother     Hemochromatosis Brother     No Known Problems Maternal Aunt     No Known Problems Paternal Aunt     Anuerysm Neg Hx     Clotting disorder Neg Hx     Heart failure Neg Hx      Social History:  Social History     Substance and Sexual Activity   Alcohol Use Yes    Comment: occasional     Social History     Substance and Sexual Activity   Drug Use No     Social History     Tobacco  "Use   Smoking Status Former    Current packs/day: 0.00    Types: Cigarettes    Quit date: 2006    Years since quittin.9   Smokeless Tobacco Never           ROS:  General: Per HPI  Skin: Negative, except if noted below  HEENT: Negative  Respiratory: Negative  Cardiovascular: Negative  Gastrointestinal: Negative  Urinary: Negative  Vascular: Negative  Musculoskeletal: Positive per HPI   Neurologic: Positive per HPI  Endocrine: Negative    Objective:  BP Readings from Last 1 Encounters:   25 142/84      Wt Readings from Last 1 Encounters:   25 110 kg (243 lb)        Respiratory:   non-labored respirations    Lymphatics:  no palpable lymph nodes    Gait:   normal    Neurologic:   Alert and oriented times 3  Patient with normal sensation except as noted below  Deep tendon reflexes 2+ except as noted in MSK exam    Bilateral Lower Extremity:  Physical Exam Left knee:  Incision: healing well  ROM: 0-125  TTP ITB insertion   5/5 IP/Q/HS/TA/GS, 2+ DP/PT, SILT DP/SP/S/S/TN      BMI:   Estimated body mass index is 43.9 kg/m² as calculated from the following:    Height as of this encounter: 5' 2\" (1.575 m).    Weight as of this encounter: 109 kg (240 lb).  BSA:   Estimated body surface area is 2.07 meters squared as calculated from the following:    Height as of this encounter: 5' 2\" (1.575 m).    Weight as of this encounter: 109 kg (240 lb).           Scribe Attestation      I,:  Amina Goldberg PA-C am acting as a scribe while in the presence of the attending physician.:       I,:  Grupo Wilson DO personally performed the services described in this documentation    as scribed in my presence.:               "

## 2025-01-14 ENCOUNTER — OFFICE VISIT (OUTPATIENT)
Dept: RHEUMATOLOGY | Facility: CLINIC | Age: 60
End: 2025-01-14

## 2025-01-14 VITALS
HEIGHT: 62 IN | OXYGEN SATURATION: 99 % | DIASTOLIC BLOOD PRESSURE: 84 MMHG | SYSTOLIC BLOOD PRESSURE: 142 MMHG | WEIGHT: 243 LBS | BODY MASS INDEX: 44.72 KG/M2 | HEART RATE: 86 BPM

## 2025-01-14 DIAGNOSIS — M81.0 AGE-RELATED OSTEOPOROSIS WITHOUT CURRENT PATHOLOGICAL FRACTURE: Primary | ICD-10-CM

## 2025-01-14 DIAGNOSIS — R11.0 NAUSEA: ICD-10-CM

## 2025-01-14 RX ORDER — ONDANSETRON 4 MG/1
4 TABLET, FILM COATED ORAL EVERY 8 HOURS PRN
Qty: 20 TABLET | Refills: 1 | Status: SHIPPED | OUTPATIENT
Start: 2025-01-14

## 2025-01-14 NOTE — ASSESSMENT & PLAN NOTE
Tymlos r:b rediscussed, she will start it. If she can't tolerate for some reason, will try for Forteo

## 2025-01-14 NOTE — PROGRESS NOTES
"Name: Na Joseph      : 1965      MRN: [id number]  Encounter Provider: David Mendez DO  Encounter Date: 2025   Encounter department: [facility]  :  Assessment & Plan  Age-related osteoporosis without current pathological fracture  Tymlos r:b rediscussed, she will start it. If she can't tolerate for some reason, will try for Forteo       Nausea  In case she gets worsening nausea from Tymlos  Orders:    ondansetron (ZOFRAN) 4 mg tablet; Take 1 tablet (4 mg total) by mouth every 8 (eight) hours as needed for nausea or vomiting      Patient's rheumatologic disease(s) threaten long-term function if not appropriately managed.    History of Present Illness     Tymlos JUST got delivered, hasn't started it yet    Had PJI and subsequent revision of the L knee    Continues having back pain worse with laying down, feels it's worsened from her knee issues    Permanent history: First saw me 2024 for osteoporosis. History L distal forearm fracture as a teenager. Severe osteoporosis based on T-score < -3.5, tried to get Evenity approved but was not preferred per her insurance so pursued Tymlos instead. SPEP, PTH WNL    Also with SLE, sees specialist Dr. Loredo for this     Objective   /84 (BP Location: Left arm)   Pulse 86   Ht 5' 2\" (1.575 m)   Wt 110 kg (243 lb)   SpO2 99%   BMI 44.45 kg/m²     General: Well appearing, in no distress.   Eyes: Sclera non-icteric. EOMI  Extremities: Warm, well perfused, no edema.   Neuro: Alert and oriented. No gross focal neurological deficits.   Skin: No rashes.  MSK exam: L knee mildly warm to touch, tenderness to palpation L-spine and paraspinals. MSK exam otherwise grossly unremarkable  "

## 2025-01-17 ENCOUNTER — APPOINTMENT (OUTPATIENT)
Dept: LAB | Facility: CLINIC | Age: 60
End: 2025-01-17
Payer: COMMERCIAL

## 2025-01-17 DIAGNOSIS — D50.9 IRON DEFICIENCY ANEMIA, UNSPECIFIED IRON DEFICIENCY ANEMIA TYPE: ICD-10-CM

## 2025-01-17 DIAGNOSIS — R79.1 PROLONGED INR: ICD-10-CM

## 2025-01-17 DIAGNOSIS — E53.8 B12 DEFICIENCY: ICD-10-CM

## 2025-01-17 DIAGNOSIS — Z79.01 ANTICOAGULATED ON COUMADIN: ICD-10-CM

## 2025-01-17 DIAGNOSIS — D50.0 IRON DEFICIENCY ANEMIA DUE TO CHRONIC BLOOD LOSS: ICD-10-CM

## 2025-01-17 LAB
ALBUMIN SERPL BCG-MCNC: 4.1 G/DL (ref 3.5–5)
ALP SERPL-CCNC: 106 U/L (ref 34–104)
ALT SERPL W P-5'-P-CCNC: 9 U/L (ref 7–52)
ANION GAP SERPL CALCULATED.3IONS-SCNC: 5 MMOL/L (ref 4–13)
AST SERPL W P-5'-P-CCNC: 10 U/L (ref 13–39)
BASOPHILS # BLD AUTO: 0.04 THOUSANDS/ΜL (ref 0–0.1)
BASOPHILS NFR BLD AUTO: 1 % (ref 0–1)
BILIRUB SERPL-MCNC: 0.38 MG/DL (ref 0.2–1)
BUN SERPL-MCNC: 17 MG/DL (ref 5–25)
CALCIUM SERPL-MCNC: 9.8 MG/DL (ref 8.4–10.2)
CHLORIDE SERPL-SCNC: 104 MMOL/L (ref 96–108)
CO2 SERPL-SCNC: 30 MMOL/L (ref 21–32)
CREAT SERPL-MCNC: 0.81 MG/DL (ref 0.6–1.3)
EOSINOPHIL # BLD AUTO: 0.38 THOUSAND/ΜL (ref 0–0.61)
EOSINOPHIL NFR BLD AUTO: 10 % (ref 0–6)
ERYTHROCYTE [DISTWIDTH] IN BLOOD BY AUTOMATED COUNT: 11.9 % (ref 11.6–15.1)
FERRITIN SERPL-MCNC: 50 NG/ML (ref 11–307)
GFR SERPL CREATININE-BSD FRML MDRD: 78 ML/MIN/1.73SQ M
GLUCOSE P FAST SERPL-MCNC: 96 MG/DL (ref 65–99)
HCT VFR BLD AUTO: 39.6 % (ref 34.8–46.1)
HGB BLD-MCNC: 12.7 G/DL (ref 11.5–15.4)
IMM GRANULOCYTES # BLD AUTO: 0.01 THOUSAND/UL (ref 0–0.2)
IMM GRANULOCYTES NFR BLD AUTO: 0 % (ref 0–2)
INR PPP: 2.63 (ref 0.85–1.19)
IRON SATN MFR SERPL: 13 % (ref 15–50)
IRON SERPL-MCNC: 48 UG/DL (ref 50–212)
LYMPHOCYTES # BLD AUTO: 0.92 THOUSANDS/ΜL (ref 0.6–4.47)
LYMPHOCYTES NFR BLD AUTO: 25 % (ref 14–44)
MCH RBC QN AUTO: 29.8 PG (ref 26.8–34.3)
MCHC RBC AUTO-ENTMCNC: 32.1 G/DL (ref 31.4–37.4)
MCV RBC AUTO: 93 FL (ref 82–98)
MONOCYTES # BLD AUTO: 0.38 THOUSAND/ΜL (ref 0.17–1.22)
MONOCYTES NFR BLD AUTO: 10 % (ref 4–12)
NEUTROPHILS # BLD AUTO: 1.91 THOUSANDS/ΜL (ref 1.85–7.62)
NEUTS SEG NFR BLD AUTO: 54 % (ref 43–75)
NRBC BLD AUTO-RTO: 0 /100 WBCS
PLATELET # BLD AUTO: 264 THOUSANDS/UL (ref 149–390)
PMV BLD AUTO: 10.3 FL (ref 8.9–12.7)
POTASSIUM SERPL-SCNC: 3.9 MMOL/L (ref 3.5–5.3)
PROT SERPL-MCNC: 6.5 G/DL (ref 6.4–8.4)
PROTHROMBIN TIME: 28.7 SECONDS (ref 12.3–15)
RBC # BLD AUTO: 4.26 MILLION/UL (ref 3.81–5.12)
SODIUM SERPL-SCNC: 139 MMOL/L (ref 135–147)
TIBC SERPL-MCNC: 369.6 UG/DL (ref 250–450)
TRANSFERRIN SERPL-MCNC: 264 MG/DL (ref 203–362)
UIBC SERPL-MCNC: 322 UG/DL (ref 155–355)
VIT B12 SERPL-MCNC: 305 PG/ML (ref 180–914)
WBC # BLD AUTO: 3.64 THOUSAND/UL (ref 4.31–10.16)

## 2025-01-17 PROCEDURE — 85610 PROTHROMBIN TIME: CPT

## 2025-01-17 PROCEDURE — 80053 COMPREHEN METABOLIC PANEL: CPT

## 2025-01-17 PROCEDURE — 82607 VITAMIN B-12: CPT

## 2025-01-17 PROCEDURE — 83540 ASSAY OF IRON: CPT

## 2025-01-17 PROCEDURE — 85025 COMPLETE CBC W/AUTO DIFF WBC: CPT

## 2025-01-17 PROCEDURE — 82728 ASSAY OF FERRITIN: CPT

## 2025-01-17 PROCEDURE — 83550 IRON BINDING TEST: CPT

## 2025-01-17 PROCEDURE — 36415 COLL VENOUS BLD VENIPUNCTURE: CPT

## 2025-01-23 ENCOUNTER — CONSULT (OUTPATIENT)
Dept: PAIN MEDICINE | Facility: MEDICAL CENTER | Age: 60
End: 2025-01-23
Payer: MEDICARE

## 2025-01-23 VITALS — HEIGHT: 62 IN | WEIGHT: 247 LBS | BODY MASS INDEX: 45.45 KG/M2

## 2025-01-23 DIAGNOSIS — G89.29 CHRONIC BILATERAL LOW BACK PAIN WITHOUT SCIATICA: ICD-10-CM

## 2025-01-23 DIAGNOSIS — M47.816 LUMBAR SPONDYLOSIS: Primary | ICD-10-CM

## 2025-01-23 DIAGNOSIS — M54.50 CHRONIC BILATERAL LOW BACK PAIN WITHOUT SCIATICA: ICD-10-CM

## 2025-01-23 PROCEDURE — 99204 OFFICE O/P NEW MOD 45 MIN: CPT | Performed by: PHYSICAL MEDICINE & REHABILITATION

## 2025-01-23 PROCEDURE — G2211 COMPLEX E/M VISIT ADD ON: HCPCS | Performed by: PHYSICAL MEDICINE & REHABILITATION

## 2025-01-23 NOTE — PROGRESS NOTES
Assessment  1. Lumbar spondylosis    2. Chronic bilateral low back pain without sciatica        Plan  Patient to complete her antibiotic regimen and then we may consider medial branch nerve blocks to the lumbar facet joints bilateral L3-5 would be our targets.    The patient has been experiencing moderate to severe axial spine pain that is causing functional deficit.  The pain has been present for at least 3 months and is not improving with conservative care including one or more of the following: home exercise regimen, physician directed home exercise program, physical therapy, or chiropractic care.  Currently the patient is not experiencing any radicular features nor neurogenic claudication.  Non-facet pathology has been ruled out on clinical evaluation.     We will offer the patient for bilateral L3-5 medial branch nerve blocks with intention of moving forward towards radiofrequency ablation if there is an appropriate diagnostic response. The initial blocks will be performed with 2% lidocaine and if an appropriate response is obtained upon review of the patient's pain diary, a confirmatory block will be scheduled with 0.5% bupivacaine at least 2 weeks after the initial block.    In the office today, we reviewed the nature of facet joint pathology in depth using a spine model. We discussed the approach we would use for the injections and provided literature for home review.     My impressions and treatment recommendations were discussed in detail with the patient who verbalized understanding and had no further questions.  Discharge instructions were provided. I personally saw and examined the patient and I agree with the above discussed plan of care.    No orders of the defined types were placed in this encounter.    No orders of the defined types were placed in this encounter.      History of Present Illness    Na Joseph is a 60 y.o. female seen in consultation at the request of Dr. Wilson regarding chronic  lower back pain.  The patient's been experiencing symptoms since November 2023 and did have some x-rays from her primary care physician documenting lumbar spondylosis and facet arthropathy of the lower lumbar facet joints.    Currently describing moderate intensity symptoms rated as a 4/10 which is intermittent in nature worse in the morning and nighttime.  She describes this as shooting sharp dull and aching localized to the axial lumbar spine without radiation down the legs.    Aggravating factors include lying down standing bending and exercise.  Alleviating factors include sitting and relaxation.    She has tried physical therapy in the past which has provided some temporary moderate relief.    Social history negative for tobacco marijuana and alcohol use.    She did have a total knee revision which unfortunately was infected.  She completed PICC line antibiotics and is now on oral antibiotics and will continue this.  I reviewed with her that we will not be able to provide any interventional approaches until she has completed her antibiotic course.    Did discuss the option of physical therapy however she has been doing a home exercise program independently.    I have personally reviewed and/or updated the patient's past medical history, past surgical history, family history, social history, current medications, allergies, and vital signs today.     Review of Systems   Constitutional:  Positive for unexpected weight change. Negative for fever.   HENT:  Negative for trouble swallowing.    Eyes:  Negative for visual disturbance.   Respiratory:  Positive for shortness of breath. Negative for wheezing.    Cardiovascular:  Negative for chest pain and palpitations.   Gastrointestinal:  Negative for constipation, diarrhea, nausea and vomiting.   Endocrine: Positive for polyuria. Negative for cold intolerance, heat intolerance and polydipsia.   Genitourinary:  Negative for difficulty urinating and frequency.    Musculoskeletal:  Positive for arthralgias, back pain and joint swelling. Negative for gait problem and myalgias.   Skin:  Negative for rash.   Neurological:  Negative for dizziness, seizures, syncope, weakness and headaches.   Hematological:  Does not bruise/bleed easily.   Psychiatric/Behavioral:  Negative for dysphoric mood.    All other systems reviewed and are negative.      Patient Active Problem List   Diagnosis    B12 deficiency    Chronic cough    Diffuse connective tissue disease (HCC)    Dyspnea    Edema    Hot flashes due to menopause    Long-term use of hydroxychloroquine    Other organ or system involvement in systemic lupus erythematosus (HCC)    Lupus anticoagulant disorder (HCC)    SOB (shortness of breath) on exertion    Sinus tachycardia    Secondary pulmonary hypertension    Pulmonary hypertension (HCC)    Post-nasal drip    Pes anserine bursitis    Positive HELENE (antinuclear antibody)    Other chronic pulmonary heart diseases    Age-related osteoporosis without current pathological fracture    Patellofemoral disorder of right knee    Pes anserinus bursitis of right knee    Arthritis of right knee    Other tear of medial meniscus, current injury, right knee, initial encounter    Tear of medial meniscus of right knee, current    Chronic pain of right knee    Elevated serum creatinine    Hyperuricemia    Trochanteric bursitis of both hips    Undifferentiated connective tissue disease (HCC)    Vitamin D deficiency    Chronic kidney disease, stage 3a ([facility])    Right knee pain    Status post total right knee replacement    Left knee pain    Tear of medial meniscus of left knee, current    Primary osteoarthritis of left knee    Morbid obesity ([facility])    Leukopenia    Venous stasis of lower extremity    Acute pain of right knee    PONV (postoperative nausea and vomiting)    History of left knee replacement    COVID-19    SLE (systemic lupus erythematosus) ([facility])    Arthritis of left knee    Iron deficiency  anemia due to chronic blood loss    Insomnia    Pulmonary embolus (HCC)    Financial difficulties    Antiphospholipid antibody syndrome (HCC)    Sleep disturbance    Neuropathic pain, leg, left    Anticoagulated    Fall    Neutropenia, unspecified type (HCC)    Rheumatoid arthritis, involving unspecified site, unspecified whether rheumatoid factor present (HCC)    BMI 40.0-44.9, adult (HCC)    Chronic midline low back pain without sciatica    Motion sickness    Other sleep disorders    Thyroid nodule    GRIS (obstructive sleep apnea)    Maltracking of left patella    Infection of total knee replacement  (HCC)    Acute blood loss as cause of postoperative anemia    Penicillin allergy    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)    Diarrhea       Past Medical History:   Diagnosis Date    HELENE positive     Anemia     Sildenafil causes decreased hematocrit    Antiphospholipid syndrome (HCC)     Anxiety 3/2020    CHF (congestive heart failure) ([facility])     right heart failure related to pulm HTN lupus    Chronic kidney disease 2018    borderline lab values    Chronic rhinitis 06/04/2012    Clotting disorder (HCC) 2012    Coronary artery disease 2018    Encephalitis     Lasted Assessed 10/18/2017    Gout 06/26/2018    Head injury 11/11/2023    Heart failure ([facility]) 2019    History of transfusion 2/13/2019    autologous    Hypertension     Lupus 2018    Lupus anticoagulant disorder ([facility])     Maxillary sinusitis     Lasted Assessed 10/18/2017    Night sweat     Osteopenia     Hip    Osteoporosis     Spine    Pneumonia     Last Assessed 10/18/2017    PONV (postoperative nausea and vomiting)     Pulmonary embolism (HCC)     Pulmonary hypertension (HCC)     Rheumatic disease     Seizures ([facility])     Only during Encephalitis    Shortness of breath     with exertion    Sinus tachycardia     Urinary incontinence        Past Surgical History:   Procedure Laterality Date    ANKLE SURGERY  6/2015    ARTHRODESIS      Hand: IP Joint     CHOLECYSTECTOMY      COLONOSCOPY      COLPOSCOPY      HYSTERECTOMY      Vaginal    JOINT REPLACEMENT Right 10/03/2024    Knee replacement    KNEE SURGERY  2/2019    LAPAROSCOPIC ESOPHAGOGASTRIC FUNDOPLASTY  2008    ORTHOPEDIC SURGERY  10/03/2024    10/06/2022    WA ARTHRP KNE CONDYLE&PLATU MEDIAL&LAT COMPARTMENTS Right 02/12/2019    Procedure: KNEE TOTAL ARTHROPLASTY AND ASSOCIATED PROCEDURES;  Surgeon: Donovan Zendejas DO;  [facility];  Service: Orthopedics    WA ARTHRP KNE CONDYLE&PLATU MEDIAL&LAT COMPARTMENTS Left 10/06/2022    Procedure: ARTHROPLASTY KNEE TOTAL;  Surgeon: Donovan Zendejas DO;  [facility];  Service: Orthopedics    WA ARTHRS KNEE W/MENISCECTOMY MED&LAT W/SHAVING Right 10/02/2018    Procedure: KNEE ARTHROSCOPY WITH PARTIAL MEDIAL MENISCECTOMY;  Surgeon: Donovan Zendejas DO;  [facility];  Service: Orthopedics    WA ARTHRS KNEE W/MENISCECTOMY MED&LAT W/SHAVING Left 12/17/2019    Procedure: KNEE ARTHROSCOPIC MENISCECTOMY /MEDIAL; Chondyl medial and posterior;  Surgeon: Donovan Zendejas DO;  [facility];  Service: Orthopedics    REDUCTION MAMMAPLASTY Bilateral     doesnt remember when    REPAIR ANKLE LIGAMENT Right     TENDON REPAIR      TONSILLECTOMY      TOTAL KNEE ARTHROPLASTY REVISION Left 10/03/2024    Procedure: ARTHROPLASTY KNEE TOTAL REVISION;  Surgeon: Grupo Wilson DO;  [facility];  Service: Orthopedics       Family History   Problem Relation Age of Onset    Uterine cancer Mother     Pancreatic cancer Mother 70    Diabetes Mother     Endometrial cancer Mother 66    Arthritis Mother     Cancer Mother         Pancreas, Uterine    Vision loss Mother     Osteoporosis Mother     Heart disease Father         open heart surgery at age 50     Stroke Father         CVA    Hypertension Father     Atrial fibrillation Father     Hyperlipidemia Father     Arthritis Father     Rheum arthritis Father     Heart attack Father     No Known Problems Sister     No Known  Problems Sister     Thyroid disease Sister     Depression Sister     Osteoarthritis Sister     Asthma Sister     Asthma Daughter     Migraines Daughter     Asthma Daughter     Thyroid disease Maternal Grandmother     Heart attack Maternal Grandfather     Heart disease Maternal Grandfather     Heart attack Paternal Grandmother     Heart disease Paternal Grandmother     Skin cancer Paternal Grandfather     No Known Problems Brother     Hemochromatosis Brother     Alcohol abuse Brother     Early death Brother         heriditary hemachromotosis    No Known Problems Maternal Aunt     No Known Problems Paternal Aunt     Anuerysm Neg Hx     Clotting disorder Neg Hx     Heart failure Neg Hx        Social History     Occupational History    Not on file   Tobacco Use    Smoking status: Former     Current packs/day: 0.00     Types: Cigarettes     Quit date: 2006     Years since quittin.0    Smokeless tobacco: Never   Vaping Use    Vaping status: Never Used   Substance and Sexual Activity    Alcohol use: Yes     Comment: Socially    Drug use: No    Sexual activity: Yes     Partners: Male     Birth control/protection: Female Sterilization       Current Outpatient Medications on File Prior to Visit   Medication Sig    Abaloparatide (Tymlos) 3120 MCG/1.56ML SOPN Inject 80 mcg under the skin in the morning    acetaminophen (TYLENOL) 500 mg tablet Take 2 tablets (1,000 mg total) by mouth every 8 (eight) hours    Benlysta 200 MG/ML SOAJ Every friday subcutaneous injection    cefadroxil (DURICEF) 500 mg capsule Take 2 capsules (1,000 mg total) by mouth every 12 (twelve) hours Do not start before 2024.    cholecalciferol (VITAMIN D3) 1,000 units tablet Take 2,000 Units by mouth daily in the early morning      nystatin (MYCOSTATIN) powder Apply topically 3 (three) times a day    ondansetron (ZOFRAN) 4 mg tablet Take 1 tablet (4 mg total) by mouth every 8 (eight) hours as needed for nausea or vomiting    potassium  "chloride (Klor-Con M20) 20 mEq tablet Take 1 tablet (20 mEq total) by mouth daily (Patient taking differently: Take 20 mEq by mouth as needed)    sildenafil (REVATIO) 20 mg tablet Take 2 tablets (40 mg total) by mouth 3 (three) times a day    spironolactone (ALDACTONE) 25 mg tablet Take 1 tablet (25 mg total) by mouth daily    torsemide (DEMADEX) 20 mg tablet TAKE 1 TABLET(20 MG) BY MOUTH DAILY    traZODone (DESYREL) 50 mg tablet TAKE 2 TABLETS(100 MG) BY MOUTH DAILY AT BEDTIME    triamcinolone (KENALOG) 0.1 % oral topical paste prn (Patient taking differently: as needed)    warfarin (Coumadin) 5 mg tablet 7.5mg po daily or as directed.    ascorbic acid (VITAMIN C) 500 MG tablet Take 1 tablet (500 mg total) by mouth 2 (two) times a day (Patient not taking: Reported on 1/14/2025)    B-D 3CC LUER-LILLIE SYR 25GX1/2\" 25G X 1-1/2\" 3 ML MISC USE 1 SYRINGE EVERY 30 DAYS    cyanocobalamin 1,000 mcg/mL 1000 mcg IM Q 2 weeks    folic acid (FOLVITE) 1 mg tablet Take 1 tablet (1 mg total) by mouth daily (Patient not taking: Reported on 1/14/2025)    gabapentin (NEURONTIN) 100 mg capsule TAKE 1 CAPSULE(100 MG) BY MOUTH THREE TIMES DAILY    hydroxychloroquine (PLAQUENIL) 200 mg tablet Take 400 mg by mouth daily with breakfast Alternating days/dosages odd days 1tabs  even days 2tabs    Multiple Vitamins-Minerals (multivitamin with minerals) tablet Take 1 tablet by mouth daily (Patient not taking: Reported on 1/14/2025)    NEEDLE, DISP, 23 G (BD Disp Needle) 23G X 1\" MISC Use to administer B12    romosozumab-aqqg (EVENITY) subcutaneous injection Monthly sept 4, 2024 last dose (Patient not taking: Reported on 1/14/2025)    saccharomyces boulardii (FLORASTOR) 250 mg capsule Take 1 capsule (250 mg total) by mouth 2 (two) times a day (Patient not taking: Reported on 1/23/2025)    [DISCONTINUED] ferrous sulfate 324 (65 Fe) mg Take 1 tablet (324 mg total) by mouth 2 (two) times a day before meals     No current facility-administered " "medications on file prior to visit.       Allergies   Allergen Reactions    Dilantin [Phenytoin] Anaphylaxis and Rash    Penicillins Rash, Anaphylaxis, Dermatitis and Hives    Augmentin [Amoxicillin-Pot Clavulanate] Rash and Dermatitis       Physical Exam    Ht 5' 2\" (1.575 m)   Wt 112 kg (247 lb) Comment: patient stated  BMI 45.18 kg/m²     Constitutional: normal, well developed, well nourished, alert, in no distress and non-toxic and no overt pain behavior.  Eyes: anicteric  HEENT: grossly intact  Neck: supple, symmetric, trachea midline and no masses   Pulmonary:even and unlabored  Cardiovascular:No edema or pitting edema present  Psychiatric:Mood and affect appropriate  Neurologic:Cranial Nerves II-XII grossly intact  Musculoskeletal:normal, except for pain in the axial lumbar spine worse with extension and rotation in both directions    Imaging  tudy Result    Narrative & Impression         LUMBAR SPINE     INDICATION:   Low back pain, unspecified. Other chronic pain.      COMPARISON:  None.     VIEWS:  XR SPINE LUMBAR MINIMUM 4 VIEWS NON INJURY  Images: 4     FINDINGS:     There are 5 non rib bearing lumbar vertebral bodies.      There is no evidence of acute fracture or destructive osseous lesion.     Mild scoliotic deformity is noted.  Alignment is otherwise unremarkable.      Lower lumbar facet arthropathy without significant disc space narrowing.     The pedicles appear intact.     There are atherosclerotic calcifications. Soft tissues are otherwise unremarkable.     IMPRESSION:        No acute osseous abnormality.       Degenerative changes as described.     Electronically signed: 03/14/2024 01:46 PM Merlin Gardner, MPH,MD       "

## 2025-01-24 DIAGNOSIS — I50.32 CHRONIC HEART FAILURE WITH PRESERVED EJECTION FRACTION (HCC): ICD-10-CM

## 2025-01-24 RX ORDER — TORSEMIDE 20 MG/1
20 TABLET ORAL DAILY
Qty: 90 TABLET | Refills: 1 | Status: SHIPPED | OUTPATIENT
Start: 2025-01-24

## 2025-01-28 ENCOUNTER — OFFICE VISIT (OUTPATIENT)
Age: 60
End: 2025-01-28
Payer: MEDICARE

## 2025-01-28 VITALS
OXYGEN SATURATION: 97 % | RESPIRATION RATE: 18 BRPM | DIASTOLIC BLOOD PRESSURE: 76 MMHG | HEIGHT: 62 IN | BODY MASS INDEX: 45.45 KG/M2 | WEIGHT: 247 LBS | SYSTOLIC BLOOD PRESSURE: 128 MMHG | HEART RATE: 90 BPM | TEMPERATURE: 97.7 F

## 2025-01-28 DIAGNOSIS — T84.54XD INFECTION ASSOCIATED WITH INTERNAL LEFT KNEE PROSTHESIS, SUBSEQUENT ENCOUNTER: ICD-10-CM

## 2025-01-28 DIAGNOSIS — E66.01 MORBID OBESITY (HCC): ICD-10-CM

## 2025-01-28 DIAGNOSIS — R05.9 COUGH: Primary | ICD-10-CM

## 2025-01-28 DIAGNOSIS — R30.0 DYSURIA: ICD-10-CM

## 2025-01-28 DIAGNOSIS — Z88.0 PENICILLIN ALLERGY: ICD-10-CM

## 2025-01-28 DIAGNOSIS — M32.9 SYSTEMIC LUPUS ERYTHEMATOSUS, UNSPECIFIED SLE TYPE, UNSPECIFIED ORGAN INVOLVEMENT STATUS (HCC): Chronic | ICD-10-CM

## 2025-01-28 DIAGNOSIS — D68.61 ANTIPHOSPHOLIPID ANTIBODY SYNDROME (HCC): ICD-10-CM

## 2025-01-28 PROBLEM — M00.062: Status: ACTIVE | Noted: 2025-01-28

## 2025-01-28 PROCEDURE — 99215 OFFICE O/P EST HI 40 MIN: CPT | Performed by: PHYSICIAN ASSISTANT

## 2025-01-28 RX ORDER — BENZONATATE 100 MG/1
100 CAPSULE ORAL 3 TIMES DAILY PRN
Qty: 20 CAPSULE | Refills: 0 | Status: SHIPPED | OUTPATIENT
Start: 2025-01-28

## 2025-01-28 NOTE — ASSESSMENT & PLAN NOTE
History of left TKA 10/6/22. Developed 2 weeks of left knee pain, fevers, chills and was seen by orthopedic surgery 9/23.  Arthrocentesis performed and Synovasure was positive with Staph epidermidis isolated.  Now status post left TKA revision with implantation of an articulating antibiotic spacer (1.5 stage revision arthroplasty) 10/3/24.  Operative cultures growing staph epidermidis in broth only. 1/17/25 labs reviewed. Cr 0.81, WBC 3.64, Hgb 12.7, Plt 264  -Completed IV Cefazolin 6 week course of IV antibiotics through 11/13/24 now followed by 4.5 months p.o. antibiotics (6 months total) due to 1.5 stage revision arthroplasty  -Orthopedic surgery follow-up ongoing  -check CBC diff, creatinine before next visit to assess for treatment response, drug toxicities  -continue Cefadroxil 1000 mg PO q 12 hours  -Next ID office follow up appt 3/18/24 at 10:00 am    Orders:    CBC and differential; Future    Comprehensive metabolic panel; Future

## 2025-01-28 NOTE — PROGRESS NOTES
Ambulatory Visit  Name: Na Joseph      : 1965      MRN: [id number]  Encounter Provider: Sherrill Topete PA-C  Encounter Date: 2025   Encounter department: [facility] INFECTIOUS DISEASE [facility]    Assessment & Plan  Cough  Patient with flu like symptoms starting Thursday, 25 with cough, congestion, fatigue, wheezing. Overall slowly improving and  is a few days ahead of patient in terms of symptomatic respiratory improvement.   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 Developed 2 weeks of left knee pain, fevers, chills and was seen by orthopedic surgery .  Arthrocentesis performed and Synovasure was positive with Staph epidermidis isolated.  Now status post left TKA revision with implantation of an articulating antibiotic spacer (1.5 stage revision arthroplasty) 10/3/24.  Operative cultures growing staph epidermidis in broth only. 25 labs reviewed. Cr 0.81, WBC 3.64, Hgb 12.7, Plt 264  -Completed IV Cefazolin 6 week course of IV antibiotics through 24 now followed by 4.5 months p.o. antibiotics (6 months total) due to 1.5 stage revision arthroplasty  -Orthopedic surgery follow-up ongoing  -check CBC diff, creatinine before next visit to assess for treatment response, drug toxicities  -continue Cefadroxil 1000 mg PO q 12 hours  -Next ID office follow up appt 3/18/24 at 10:00 am    Orders:    CBC and differential; Future    Comprehensive metabolic panel; Future    Systemic lupus erythematosus, unspecified SLE type, unspecified organ involvement status (HCC)  Patient on hydroxychloroquine and Benlysta outpatient. Follows with a [facility]ologist [facility]. The Rinvoq has been on hold due to left knee prosthetic joint infection.   -Management of immunosuppression per her rheumatologist. She has been continued on hydroxychloroquine and Benlysta.   -Rinvoq is on hold        Penicillin allergy  Tolerating Cefadroxil. Reports as rash and anaphylaxis.   -Will continue to monitor        Morbid obesity (HCC)  BMI 45. Can affect antibiotic dosing  -dose affect antibiotics as indicated          Antiphospholipid antibody syndrome (HCC)  On Coumadin outpatient. 1/17/25 INR 2.63  -follows with hematology  -Monitor INR        Dysuria  Previous urinary frequency/urgency and dysuria have resolved following 1 week of Cipro for Pseudomonas UTI.               -monitor urine symptoms/output               -encouraged hydration       I have discussed the above management plan to continue Cefadroxil, cough management prn with patient in detail.      Antibiotics:  Cefadroxil since 11/14/24    History of Present Illness     Na Joseph is a 60 y.o. female who presents for outpatient ID follow up of infection of left TKR on IV Cefazolin    Patient with flu like symptoms starting Thursday, 1/23/25 with cough, congestion, fatigue, wheezing. Overall slowly improving and  is a few days ahead of patient in terms of sympto. Left knee pain is increased today and with cold. She denies nausea, vomiting, fever, CP.  PICC line out. Few episodes of vaginal itching and itching/burning of groin folds L > R and soreness of anal area better with patient dying extremely well in those areas. Patient's increased loose stool frequency and urgency no better on activa and raw milk daily but more controlled with imodium daily. Patient's urinary frequency/urgency and dysuria has resolved s/p UTI management with Cipro.         Review of Systems   All other systems reviewed and are negative.          Objective     /76   Pulse 90   Temp 97.7 °F (36.5 °C)   Resp 18   Ht 5' 2\" (1.575 m)   Wt " 112 kg (247 lb)   SpO2 97%   BMI 45.18 kg/m²     Physical Exam  Vitals reviewed.   Constitutional:       Appearance: Normal appearance.      Comments: Ambulatory in office without assistive devices, with mild limp.   HENT:      Head: Normocephalic and atraumatic.      Right Ear: External ear normal.      Left Ear: External ear normal.      Nose: Nose normal.      Mouth/Throat:      Pharynx: Oropharynx is clear.   Eyes:      Extraocular Movements: Extraocular movements intact.      Conjunctiva/sclera: Conjunctivae normal.   Cardiovascular:      Rate and Rhythm: Normal rate.   Pulmonary:      Effort: Pulmonary effort is normal.      Breath sounds: Normal breath sounds.   Abdominal:      General: Bowel sounds are normal.      Palpations: Abdomen is soft.      Tenderness: There is no abdominal tenderness.      Comments: Protuberant pannus   Musculoskeletal:      Cervical back: Normal range of motion and neck supple.      Comments: L TKR incision closed, + swelling, no warmth/erythema or point tenderness on exam. Dry, flaking around incision and on lower leg. Encouraged applying moisturizer at least daily during winter monthgs   Skin:     Findings: No rash.   Neurological:      Mental Status: She is alert and oriented to person, place, and time. Mental status is at baseline.       Administrative Statements   I have spent a total time of 45 minutes in caring for this patient on the day of the visit/encounter including Diagnostic results, Prognosis, Risks and benefits of tx options, Instructions for management, Patient and family education, Importance of tx compliance, Risk factor reductions, Impressions, Counseling / Coordination of care, Documenting in the medical record, Reviewing / ordering tests, medicine, procedures  , Obtaining or reviewing history  , and Communicating with other healthcare professionals .

## 2025-01-28 NOTE — ASSESSMENT & PLAN NOTE
Patient on hydroxychloroquine and Benlysta outpatient. Follows with a rheumatologist at [facility]. The Rinvoq has been on hold due to left knee prosthetic joint infection.   -Management of immunosuppression per her rheumatologist. She has been continued on hydroxychloroquine and Benlysta.   -Rinvoq is on hold

## 2025-01-31 ENCOUNTER — RESULTS FOLLOW-UP (OUTPATIENT)
Dept: HEMATOLOGY ONCOLOGY | Facility: CLINIC | Age: 60
End: 2025-01-31

## 2025-01-31 ENCOUNTER — APPOINTMENT (OUTPATIENT)
Dept: LAB | Facility: CLINIC | Age: 60
End: 2025-01-31
Payer: COMMERCIAL

## 2025-01-31 DIAGNOSIS — Z79.01 ANTICOAGULATED ON COUMADIN: ICD-10-CM

## 2025-01-31 LAB
INR PPP: 2.72 (ref 0.85–1.19)
PROTHROMBIN TIME: 29.5 SECONDS (ref 12.3–15)

## 2025-01-31 PROCEDURE — 85610 PROTHROMBIN TIME: CPT

## 2025-01-31 PROCEDURE — 36415 COLL VENOUS BLD VENIPUNCTURE: CPT

## 2025-02-03 ENCOUNTER — TELEPHONE (OUTPATIENT)
Dept: HEMATOLOGY ONCOLOGY | Facility: CLINIC | Age: 60
End: 2025-02-03

## 2025-02-03 ENCOUNTER — OFFICE VISIT (OUTPATIENT)
Dept: HEMATOLOGY ONCOLOGY | Facility: CLINIC | Age: 60
End: 2025-02-03
Payer: MEDICARE

## 2025-02-03 VITALS
HEIGHT: 62 IN | TEMPERATURE: 97.9 F | BODY MASS INDEX: 45.08 KG/M2 | SYSTOLIC BLOOD PRESSURE: 130 MMHG | OXYGEN SATURATION: 98 % | RESPIRATION RATE: 16 BRPM | WEIGHT: 245 LBS | DIASTOLIC BLOOD PRESSURE: 66 MMHG | HEART RATE: 89 BPM

## 2025-02-03 DIAGNOSIS — I26.99 OTHER ACUTE PULMONARY EMBOLISM WITHOUT ACUTE COR PULMONALE (HCC): Primary | ICD-10-CM

## 2025-02-03 DIAGNOSIS — T84.59XS INFECTION OF TOTAL KNEE REPLACEMENT, SEQUELA: ICD-10-CM

## 2025-02-03 DIAGNOSIS — M81.0 AGE-RELATED OSTEOPOROSIS WITHOUT CURRENT PATHOLOGICAL FRACTURE: ICD-10-CM

## 2025-02-03 DIAGNOSIS — Z96.659 INFECTION OF TOTAL KNEE REPLACEMENT, SEQUELA: ICD-10-CM

## 2025-02-03 DIAGNOSIS — D68.61 ANTIPHOSPHOLIPID ANTIBODY SYNDROME (HCC): ICD-10-CM

## 2025-02-03 DIAGNOSIS — D50.8 IRON DEFICIENCY ANEMIA SECONDARY TO INADEQUATE DIETARY IRON INTAKE: ICD-10-CM

## 2025-02-03 DIAGNOSIS — E53.8 B12 DEFICIENCY: ICD-10-CM

## 2025-02-03 DIAGNOSIS — N18.31 CHRONIC KIDNEY DISEASE, STAGE 3A (HCC): ICD-10-CM

## 2025-02-03 DIAGNOSIS — I27.20 PULMONARY HYPERTENSION (HCC): ICD-10-CM

## 2025-02-03 DIAGNOSIS — M32.9 SYSTEMIC LUPUS ERYTHEMATOSUS, UNSPECIFIED SLE TYPE, UNSPECIFIED ORGAN INVOLVEMENT STATUS (HCC): Chronic | ICD-10-CM

## 2025-02-03 PROBLEM — I27.21 PULMONARY ARTERY HYPERTENSION (HCC): Status: ACTIVE | Noted: 2017-12-15

## 2025-02-03 PROCEDURE — G2211 COMPLEX E/M VISIT ADD ON: HCPCS | Performed by: PHYSICIAN ASSISTANT

## 2025-02-03 PROCEDURE — 99214 OFFICE O/P EST MOD 30 MIN: CPT | Performed by: PHYSICIAN ASSISTANT

## 2025-02-03 RX ORDER — CYANOCOBALAMIN 1000 UG/ML
1000 INJECTION, SOLUTION INTRAMUSCULAR; SUBCUTANEOUS ONCE
Status: CANCELLED
Start: 2025-02-13 | End: 2025-02-12

## 2025-02-03 RX ORDER — SODIUM CHLORIDE 9 MG/ML
20 INJECTION, SOLUTION INTRAVENOUS ONCE
Status: CANCELLED | OUTPATIENT
Start: 2025-02-13

## 2025-02-03 NOTE — ASSESSMENT & PLAN NOTE
Lab Results   Component Value Date    EGFR 78 01/17/2025    EGFR 54 01/03/2025    EGFR 97 11/08/2024    CREATININE 0.81 01/17/2025    CREATININE 1.09 01/03/2025    CREATININE 0.62 11/08/2024

## 2025-02-03 NOTE — ASSESSMENT & PLAN NOTE
Prior to 10/22, she never had thrombosis, DVT, PE.  Diagnosed with Bilateral PE 11/5/22 while on ASA 162mg po daily.  She had d/c Lovenox 40mg sq daily s/p Left Knee replacement on 10/6/2022 -1 week prior. + for Covid 10/26/22.   LE doppler 11/2022 - No evidence of acute or chronic deep vein thrombosis of either LE     Hsitory of  Positive LA; history of + anticardiolipin antibody.       Given her history of persistently positive lupus anticoagulant, her pulmonary embolism that occurred while on aspirin 162 milligrams p.o. daily even though in the background of COVID positivity, her risk of recurrent thromboembolic event is high and chronic lifelong Coumadin for antiphospholipid antibody syndrome recommended.     We are managing coumadin.

## 2025-02-03 NOTE — ASSESSMENT & PLAN NOTE
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   11/2017  Normal parietal cell AB, intrinsic factor AB.      EGD performed around 2021 by Dr. Salmeron at [facility].      B12 1000mcg IM monthly.   8/14/23    B12 level 328  Frequency Increase. B12 1000mcg IM q 2 weeks     2/5/24 B12 505  8/23/24 B12 687  1/17/25 305  Will give 2 additional doses of B12 IM when she receives IV iron       Orders:  •  Vitamin B12; Future  •  Folate; Future

## 2025-02-03 NOTE — ASSESSMENT & PLAN NOTE
7/2023 colonoscopy- diverticula, polyps, small hemorrhoids.  She is s/p Nissen Fundoplication for severe GERD     11/2022  Received Feraheme x 2     1/17/25  iron saturation 13%;  ferritin 50    Again discussed IV iron replacement.  She is agreeable.    Feraheme x 2 doses requested.  Follow-up in 4 months with repeat labs    Orders:  •  CBC and differential; Future  •  Iron Panel (Includes Ferritin, Iron Sat%, Iron, and TIBC); Future  •  Comprehensive metabolic panel; Future

## 2025-02-03 NOTE — PROGRESS NOTES
Name: Na Joseph      : 1965      MRN: [id number]  Encounter Provider: Maile March PA-C  Encounter Date: 2/3/2025   Encounter department: [facility]  :  Assessment & Plan  Other acute pulmonary embolism without acute cor pulmonale (HCC)  Prior to 10/22, she never had thrombosis, DVT, PE.  Diagnosed with Bilateral PE 22 while on ASA 162mg po daily.  She had d/c Lovenox 40mg sq daily s/p Left Knee replacement on 10/6/2022 -1 week prior. + for Covid 10/26/22.   LE doppler 2022 - No evidence of acute or chronic deep vein thrombosis of either LE     Hsitory of  Positive LA; history of + anticardiolipin antibody.       Given her history of persistently positive lupus anticoagulant, her pulmonary embolism that occurred while on aspirin 162 milligrams p.o. daily even though in the background of COVID positivity, her risk of recurrent thromboembolic event is high and chronic lifelong Coumadin for antiphospholipid antibody syndrome recommended.     We are managing coumadin.       Antiphospholipid antibody syndrome (HCC)         Iron deficiency anemia secondary to inadequate dietary iron intake  2023 colonoscopy- diverticula, polyps, small hemorrhoids.  She is s/p Nissen Fundoplication for severe GERD     2022  Received Feraheme x 2     25  iron saturation 13%;  ferritin 50    Again discussed IV iron replacement.  She is agreeable.    Feraheme x 2 doses requested.  Follow-up in 4 months with repeat labs    Orders:  •  CBC and differential; Future  •  Iron Panel (Includes Ferritin, Iron Sat%, Iron, and TIBC); Future  •  Comprehensive metabolic panel; Future    B12 deficiency    Vitamin B12 deficiency in a patient was diagnosed with autoimmune connective tissue disease most likely secondary to malabsorption as well because of history of gastric fundoplication secondary to GERD.   2017  Normal parietal cell AB, intrinsic factor AB.      EGD  performed around 2021 by Dr. Salmeron at [facility].      B12 1000mcg IM monthly.   8/14/23    B12 level 328  Frequency Increase. B12 1000mcg IM q 2 weeks     2/5/24 B12 505  8/23/24 B12 687  1/17/25 305  Will give 2 additional doses of B12 IM when she receives IV iron       Orders:  •  Vitamin B12; Future  •  Folate; Future    Age-related osteoporosis without current pathological fracture    Osteoporosis diagnosed at 33 y/o in Lumbar spine. Partial Hysterectomy 1996 for endometrial hyperplasia. Ovaries spared.     Osteoporosis.  On Evenity per rheumatology locally.        Systemic lupus erythematosus, unspecified SLE type, unspecified organ involvement status ([facility])  Managed by Dr. Loredo at [facility].      Pulmonary hypertension ([facility])  Follows with Dr. Hughes.         Chronic kidney disease, stage 3a ([facility])  Lab Results   Component Value Date    EGFR 78 01/17/2025    EGFR 54 01/03/2025    EGFR 97 11/08/2024    CREATININE 0.81 01/17/2025    CREATININE 1.09 01/03/2025    CREATININE 0.62 11/08/2024          Infection of total knee replacement, sequela    10/6/22 S/P LTKR  10/2024 - Infection of total knee replacement   10/3/24 s/p revision with ABX spacer placement.    On Duricef 500mg po bid per ID.   Patient states possibly around 5/2025, ID may consider d/c and evaluate for elevation of CRP, ESR                   History of Present Illness   Chief Complaint   Patient presents with   • Follow-up       Pertinent Medical History   02/03/25: Na Joseph is a 59 year old female seen 10/18/2017 for initial evaluation , self referred, secondary to low Vitamin B12 level.         10/6/2017 labs at [facility]: hemoglobin 13.2, mcv 90, RDW 14.3, WBC 6.2, 68% neutrophils, 21% lymphs, 8% monocytes 2% eosinophils, platelet count 330,000. Iron saturation 15%, ferritin 52 Vitamin B12 184( 211-946) Tsh 1.98, free T4 1.18, negative lyme antibody.  RF normal, Anti-scleroderma antibodies normal, anti-DS DNA normal, Smith AB  normal, RNP antibodies normal. Anti-centromere AB normal. Normal sed rate.  HELENE: Dense fine speckled pattern is noted which suggest presence of  antibody which has a low prevalence in systemic autoimmune rheumatic diseases. Normal immunofixation on SPEP. Normal serum immunoglobulins.   Normal CCP antibodies IgG and IgA  U/A identified hyaline casts.   Celiac panel was normal. CMP normal.    POSITIVE HELENE. POSITIVE Lupus anticoagulant. IgM anticardiolipin antibody 18 (Indeterminate). Normal IgG anticardiolipin antibody , normal IgA anticardiolipin antibody.     7/11/2016: normal anticardiolipin antibodies. Lyme negative.   Normal ANCA profile, normal C3 and C4 complement, normal CRP. Normal thyroglobulin profile. Normal ESR. + LUPUS ANTICOAGULANT.   HELENE positive.   On [facility]’s OB/GYN intake 3/13/2015 she noted that she had a history of + lupus anticoagulant. Lili states she 1st had HELENE evaluated and was positive in 2012.     Patient is extremely frustrated. She states she has been having symptoms of SOB, fatigue and has been diagnosed with a lupus-like disease but has not received any disease directed therapy. She took prednisone years ago without benefit and significant weight gain.   Was previously seen at [facility] by Twyla Styles in 2012 regarding chronic cough, SOB without history of asthma, allergies. Quit smoking at 38 years old after 15-20 pack years. PND treated and cough improved but ANSARI persisted. Bronchoscopy was normal. PFTs normal. Evaluation unrevealing to pulmonary source for her dyspnea. No benefit with Advair or high dose steroids. Falls asleep easily. Normal echo. Noted to have edema and sleep apnea suspected. She was advised to increase Lasix and have sleep study.  Serg Fuentes, a cardiologist at the [facility] had her undergo echocardiogram, however, poor images were obtained. She been had a stress echocardiogram and a question of pulmonary  "artery hypertension and was raised.   8/13/2012: stress echocardiogram report from [facility]: estimated P. a systolic pressure increased to 56 mm mercury suggestive of significant exercise induced pulmonary hypertension  She states she saw Dr. Salazar, a specialist at [facility] who didn't examine me and told me I look too good to have pulmonary artery hypertension.\" He did not perform additional testing but then referred her to Dr. Valiente, a cardiac electrophysiologist at [facility] due to resting heart rate 114-120s who informed her she had a + HELENE.     She saw Dr. Raygoza a few times but felt pressured by him as he wanted to perform a lip biopsy to evaluate for Sjogren's syndrome. She has been seeing Ez Berg D.O. at the rheumatology center of [place] but has not seeing him in approximately a year and a half as she states he was requesting repeated blood work that was not moving forward towards treating any symptoms she was having and feels that he can be adverserial.      Lactose intolerant. Osteoporosis diagnosed at 33 y/o in Lumbar spine. Partial Hysterectomy 1996 for endometrial hyperplasia. Ovaries spared.      She works at [employer]. She returned from a vacation and had 3+ pitting edema. Dr. Alonzo, 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 arranged for her to see Kemi Brizuela MD in [place], [place] who ordered the above labs.  Lili is very comfortable with injecting herself IM.   No s/sx pancreatic insufficiency. She does not take PPI or H2- blocker. She does not drink alcohol regularly.       Did have a first trimester miscarriage around the 10th week in between the birth of her 2 children. She takes Aspirin.      Reports last EGD was 3-4 years ago performed by Dr. Salmeron at [facility]. She is s/p Nissen Fundoplication for severe GERD. She had a colonoscopy previously.     She saw Dr. Mercedes Pabon with [facility] " 2/13/2015 regarding persistently + HELENE and IGM anticardiolipin antibody. There was also question whether or not she had cotton wool spots secondary to embolic phenomenon. Patient states ultimately this was ruled out. Aspirin was hematologic recommendation at that time.          10/2019:  Normal cardiolipin AB, normal C3, C4, dsDNA ab not present     10/2021: Hemoglobin 14.1, MCV 91, white blood cell count 3.9, 66% neutrophils, 22% lymphocytes, 8% monocytes, platelet count 249  Normal cardiolipin antibodies, normal beta 2 glycoprotein antibody        Status Post Arthroplasty Knee Total - Left on 10/6/2022.  She was on ASA 162mg po daily and Lovenox 40mg sq daily x 30 days post-op.     Presented to the hospital on 11/5/2022 due to acute onset of back pain, difficulty breathing, SOB and cough.   Reported that she went on a cruise 10/24 and stated feeling sick on the 26th and tested + for Covid 10/26/22.     11/5/22 CTA - Moderate quantity of right lower lobe segmental and small quantity of right upper lobe and left lower lobe acute PE.     LE doppler was not done.     She was discharged on Eliquis.  Transitoned to  Coumadin due to APS.     History of Post op anemia.  Hgb 11/11/22 was 11.1g/dL.  She was taking oral iron twice daily- she has not for some time due to nausea, however.  Iron saturation 13%; ferritin 394 11/11/22 (diagnosed with PE 11/5/22).  Feraheme weekly x2 12/2022 8/2023  Hgb 13.7; iron saturation 28%; ferritin 177     1/19/23 - normal cardiolipin antibodies, B-2 glycoprotein antibodies.     5/25/23  F/u with Dr. Loredo, rheumatologist at [facility] re: SLE. Benlysta continued .  Imuran initiated.  This has been helpful in reducing the swelling in her hands     Status post fall 11/2023 with hematoma of the forehead, cheeks area, subconjunctival hemorrhage, CAT scan showed no evidence of fracture, chest x-ray showed retrocardiac mass/infiltration      Saw Dr. Loredo 2/1/24 2/1/24 normal  "cardiolipin antibodies     2/5/24 iron saturation 22%; ferritin 117, B12 500         Review of Systems   Constitutional:  Positive for fatigue.   Musculoskeletal:  Positive for arthralgias, back pain and myalgias.           Objective   /66 (BP Location: Right arm, Patient Position: Sitting, Cuff Size: Adult)   Pulse 89   Temp 97.9 °F (36.6 °C) (Temporal)   Resp 16   Ht 5' 2\" (1.575 m)   Wt 111 kg (245 lb)   SpO2 98%   BMI 44.81 kg/m²     Physical Exam  Vitals reviewed.   Constitutional:       General: She is not in acute distress.     Appearance: She is well-developed. She is not diaphoretic.   HENT:      Head: Normocephalic and atraumatic.   Eyes:      Conjunctiva/sclera: Conjunctivae normal.   Neck:      Trachea: No tracheal deviation.   Cardiovascular:      Rate and Rhythm: Normal rate and regular rhythm.      Heart sounds: No murmur heard.     No friction rub. No gallop.   Pulmonary:      Effort: Pulmonary effort is normal. No respiratory distress.      Breath sounds: Normal breath sounds. No wheezing or rales.   Chest:      Chest wall: No tenderness.   Abdominal:      General: There is no distension.      Palpations: Abdomen is soft.      Tenderness: There is no abdominal tenderness.   Musculoskeletal:      Cervical back: Normal range of motion and neck supple.   Lymphadenopathy:      Cervical: No cervical adenopathy.   Skin:     General: Skin is warm and dry.      Coloration: Skin is not pale.      Findings: No erythema.   Neurological:      Mental Status: She is alert.   Psychiatric:         Behavior: Behavior normal.         Thought Content: Thought content normal.         Labs: I have reviewed the following labs:  Results for orders placed or performed in visit on 01/31/25   Protime-INR   Result Value Ref Range    Protime 29.5 (H) 12.3 - 15.0 seconds    INR 2.72 (H) 0.85 - 1.19     *Note: Due to a large number of results and/or encounters for the requested time period, some results have not " been displayed. A complete set of results can be found in Results Review.

## 2025-02-03 NOTE — ASSESSMENT & PLAN NOTE
Osteoporosis diagnosed at 33 y/o in Lumbar spine. Partial Hysterectomy 1996 for endometrial hyperplasia. Ovaries spared.     Osteoporosis.  On Evenity per rheumatology locally.

## 2025-02-03 NOTE — ASSESSMENT & PLAN NOTE
10/6/22 S/P LTKR  10/2024 - Infection of total knee replacement   10/3/24 s/p revision with ABX spacer placement.    On Duricef 500mg po bid per ID.   Patient states possibly around 5/2025, ID may consider d/c and evaluate for elevation of CRP, ESR

## 2025-02-10 DIAGNOSIS — Z96.652 STATUS POST REVISION OF TOTAL KNEE, LEFT: ICD-10-CM

## 2025-02-11 ENCOUNTER — TELEPHONE (OUTPATIENT)
Dept: OTHER | Facility: HOSPITAL | Age: 60
End: 2025-02-11

## 2025-02-11 RX ORDER — GABAPENTIN 100 MG/1
100 CAPSULE ORAL 3 TIMES DAILY
Qty: 90 CAPSULE | Refills: 5 | Status: SHIPPED | OUTPATIENT
Start: 2025-02-11

## 2025-02-11 NOTE — TELEPHONE ENCOUNTER
Patient called the rx line requesting a refill on fluconazole (DIFLUCAN) 150 mg tablet . Patient said she sent Sherrill a message but did not hear anything back. If appropriate, [id number], [place] - [address]

## 2025-02-12 ENCOUNTER — TELEPHONE (OUTPATIENT)
Age: 60
End: 2025-02-12

## 2025-02-12 DIAGNOSIS — N76.0 VAGINITIS: Primary | ICD-10-CM

## 2025-02-12 DIAGNOSIS — N76.0 VAGINITIS: ICD-10-CM

## 2025-02-12 RX ORDER — FLUCONAZOLE 150 MG/1
150 TABLET ORAL ONCE
Qty: 3 TABLET | Refills: 0 | Status: SHIPPED | OUTPATIENT
Start: 2025-02-12 | End: 2025-02-12

## 2025-02-12 NOTE — TELEPHONE ENCOUNTER
Pt calling asking to speak with  regarding patient care concern. She asks for a call back today at [phone]. States she will be available the rest of the day until 5:30.

## 2025-02-13 ENCOUNTER — APPOINTMENT (OUTPATIENT)
Dept: LAB | Facility: CLINIC | Age: 60
End: 2025-02-13
Payer: COMMERCIAL

## 2025-02-13 ENCOUNTER — TELEPHONE (OUTPATIENT)
Dept: OBGYN CLINIC | Facility: HOSPITAL | Age: 60
End: 2025-02-13

## 2025-02-13 ENCOUNTER — HOSPITAL ENCOUNTER (OUTPATIENT)
Dept: INFUSION CENTER | Facility: CLINIC | Age: 60
Discharge: HOME | End: 2025-02-13
Payer: MEDICARE

## 2025-02-13 VITALS
TEMPERATURE: 98 F | OXYGEN SATURATION: 98 % | RESPIRATION RATE: 18 BRPM | DIASTOLIC BLOOD PRESSURE: 79 MMHG | SYSTOLIC BLOOD PRESSURE: 128 MMHG | HEART RATE: 89 BPM

## 2025-02-13 DIAGNOSIS — Z96.659 INFECTION OF TOTAL KNEE REPLACEMENT, INITIAL ENCOUNTER  (HCC): ICD-10-CM

## 2025-02-13 DIAGNOSIS — T84.59XA INFECTION OF TOTAL KNEE REPLACEMENT, INITIAL ENCOUNTER  (HCC): ICD-10-CM

## 2025-02-13 DIAGNOSIS — T84.59XA INFECTION OF TOTAL KNEE REPLACEMENT, INITIAL ENCOUNTER  (HCC): Primary | ICD-10-CM

## 2025-02-13 DIAGNOSIS — Z96.659 INFECTION OF TOTAL KNEE REPLACEMENT, INITIAL ENCOUNTER  (HCC): Primary | ICD-10-CM

## 2025-02-13 DIAGNOSIS — E53.8 B12 DEFICIENCY: ICD-10-CM

## 2025-02-13 DIAGNOSIS — D50.0 IRON DEFICIENCY ANEMIA DUE TO CHRONIC BLOOD LOSS: Primary | ICD-10-CM

## 2025-02-13 LAB
CRP SERPL QL: 1.2 MG/L
ERYTHROCYTE [SEDIMENTATION RATE] IN BLOOD: 18 MM/HOUR (ref 0–29)

## 2025-02-13 PROCEDURE — 86140 C-REACTIVE PROTEIN: CPT

## 2025-02-13 PROCEDURE — 36415 COLL VENOUS BLD VENIPUNCTURE: CPT

## 2025-02-13 PROCEDURE — 85652 RBC SED RATE AUTOMATED: CPT

## 2025-02-13 PROCEDURE — 96372 THER/PROPH/DIAG INJ SC/IM: CPT

## 2025-02-13 PROCEDURE — 96365 THER/PROPH/DIAG IV INF INIT: CPT

## 2025-02-13 RX ORDER — SODIUM CHLORIDE 9 MG/ML
20 INJECTION, SOLUTION INTRAVENOUS ONCE
Status: COMPLETED | OUTPATIENT
Start: 2025-02-13 | End: 2025-02-13

## 2025-02-13 RX ORDER — CYANOCOBALAMIN 1000 UG/ML
1000 INJECTION, SOLUTION INTRAMUSCULAR; SUBCUTANEOUS ONCE
Status: CANCELLED
Start: 2025-02-20 | End: 2025-02-20

## 2025-02-13 RX ORDER — SODIUM CHLORIDE 9 MG/ML
20 INJECTION, SOLUTION INTRAVENOUS ONCE
Status: CANCELLED | OUTPATIENT
Start: 2025-02-20

## 2025-02-13 RX ORDER — CYANOCOBALAMIN 1000 UG/ML
1000 INJECTION, SOLUTION INTRAMUSCULAR; SUBCUTANEOUS ONCE
Status: COMPLETED | OUTPATIENT
Start: 2025-02-13 | End: 2025-02-13

## 2025-02-13 RX ADMIN — CYANOCOBALAMIN 1000 MCG: 1000 INJECTION, SOLUTION INTRAMUSCULAR at 08:33

## 2025-02-13 RX ADMIN — SODIUM CHLORIDE 20 ML/HR: 0.9 INJECTION, SOLUTION INTRAVENOUS at 08:31

## 2025-02-13 RX ADMIN — FERUMOXYTOL 510 MG: 510 INJECTION INTRAVENOUS at 08:58

## 2025-02-13 NOTE — TELEPHONE ENCOUNTER
Caller: Patient    Doctor: Dr. Wilson    Reason for call: Patients left knee is warm to touch and painful(3/10). Surgery 10/03/24    Call back#:

## 2025-02-13 NOTE — TELEPHONE ENCOUNTER
Called and spoke w/pt and relayed BARBARA Goldberg's msg. She will go to [place] to get labs done. No further questions.

## 2025-02-13 NOTE — PROGRESS NOTES
B12 admin. IM in without incident. Pt. tolerated injection in DEBI and Feraheme infusion without incident, AVS declined.  Next appt confirmed for 2/20 @ 8:30.

## 2025-02-13 NOTE — TELEPHONE ENCOUNTER
Caller: Patient     Doctor: Dr. Wilson     Reason for call: Patient returning missed call     Call back#: [phone]-[place] or [phone]-[place]

## 2025-02-13 NOTE — TELEPHONE ENCOUNTER
Called and spoke w/pt and she states that she is having increase left knee pain 3-4/10 and taking Aleve, which is helping. L knee is not red or swollen but feels warm. She is having difficulty walking, bearing weight.  Has hx of infection in knee. No fever. Is on Cefadroxil 500mg twice a day. Had flu 3 weeks ago and has a residual cough that sound productive over the phone. Pt states it is just occasional and she did not see PCP for flu. Pt has f/u appt w/Dr Wilson on 2/24/25 and would like to know if she should be seen sooner given her hx? Please review and advise. Thank you.

## 2025-02-13 NOTE — TELEPHONE ENCOUNTER
CLM on  for pt to return call.  Pt had on 10/3/24 ARTHROPLASTY KNEE TOTAL REVISION (Left: Knee) . Has f/u scheduled 2/24/25 w/Dr Wilson. Await CB.

## 2025-02-14 NOTE — TELEPHONE ENCOUNTER
Received transfer call and relayed BARBARA Goldberg's msg. No further questions/concerns. Will keep appt as scheduled.

## 2025-02-14 NOTE — TELEPHONE ENCOUNTER
Caller: pt     Doctor/Office: Dr Gilson    Call regarding :  labs      Call was transferred to: nurse

## 2025-02-20 ENCOUNTER — HOSPITAL ENCOUNTER (OUTPATIENT)
Dept: INFUSION CENTER | Facility: CLINIC | Age: 60
Discharge: HOME | End: 2025-02-20
Payer: MEDICARE

## 2025-02-20 VITALS
TEMPERATURE: 97.6 F | OXYGEN SATURATION: 99 % | RESPIRATION RATE: 18 BRPM | DIASTOLIC BLOOD PRESSURE: 85 MMHG | HEART RATE: 86 BPM | SYSTOLIC BLOOD PRESSURE: 125 MMHG

## 2025-02-20 DIAGNOSIS — E53.8 B12 DEFICIENCY: ICD-10-CM

## 2025-02-20 DIAGNOSIS — D50.0 IRON DEFICIENCY ANEMIA DUE TO CHRONIC BLOOD LOSS: Primary | ICD-10-CM

## 2025-02-20 PROCEDURE — 96365 THER/PROPH/DIAG IV INF INIT: CPT

## 2025-02-20 PROCEDURE — 96375 TX/PRO/DX INJ NEW DRUG ADDON: CPT

## 2025-02-20 RX ORDER — CYANOCOBALAMIN 1000 UG/ML
1000 INJECTION, SOLUTION INTRAMUSCULAR; SUBCUTANEOUS ONCE
Status: CANCELLED
Start: 2025-02-20 | End: 2025-02-20

## 2025-02-20 RX ORDER — SODIUM CHLORIDE 9 MG/ML
20 INJECTION, SOLUTION INTRAVENOUS ONCE
Status: COMPLETED | OUTPATIENT
Start: 2025-02-20 | End: 2025-02-20

## 2025-02-20 RX ORDER — SODIUM CHLORIDE 9 MG/ML
20 INJECTION, SOLUTION INTRAVENOUS ONCE
Status: CANCELLED | OUTPATIENT
Start: 2025-02-20

## 2025-02-20 RX ORDER — CYANOCOBALAMIN 1000 UG/ML
1000 INJECTION, SOLUTION INTRAMUSCULAR; SUBCUTANEOUS ONCE
Status: COMPLETED | OUTPATIENT
Start: 2025-02-20 | End: 2025-02-20

## 2025-02-20 RX ADMIN — CYANOCOBALAMIN 1000 MCG: 1000 INJECTION, SOLUTION INTRAMUSCULAR at 08:44

## 2025-02-20 RX ADMIN — FERUMOXYTOL 510 MG: 510 INJECTION INTRAVENOUS at 08:44

## 2025-02-20 RX ADMIN — SODIUM CHLORIDE 20 ML/HR: 9 INJECTION, SOLUTION INTRAVENOUS at 08:44

## 2025-02-20 NOTE — PROGRESS NOTES
Procedure tolerated well, no more treatments ordered at this time. Patient confirms follow up blood work ordered. Declined AVS.

## 2025-02-24 ENCOUNTER — APPOINTMENT (OUTPATIENT)
Age: 60
End: 2025-02-24
Payer: MEDICARE

## 2025-02-24 ENCOUNTER — OFFICE VISIT (OUTPATIENT)
Age: 60
End: 2025-02-24
Payer: MEDICARE

## 2025-02-24 VITALS — HEIGHT: 62 IN | BODY MASS INDEX: 45.08 KG/M2 | WEIGHT: 245 LBS

## 2025-02-24 DIAGNOSIS — Z96.659 INFECTION OF TOTAL KNEE REPLACEMENT, INITIAL ENCOUNTER  (HCC): ICD-10-CM

## 2025-02-24 DIAGNOSIS — T84.59XA INFECTION OF TOTAL KNEE REPLACEMENT, INITIAL ENCOUNTER  (HCC): ICD-10-CM

## 2025-02-24 DIAGNOSIS — Z96.652 STATUS POST REVISION OF TOTAL KNEE, LEFT: ICD-10-CM

## 2025-02-24 DIAGNOSIS — Z96.652 STATUS POST REVISION OF TOTAL KNEE, LEFT: Primary | ICD-10-CM

## 2025-02-24 PROCEDURE — 99214 OFFICE O/P EST MOD 30 MIN: CPT | Performed by: STUDENT IN AN ORGANIZED HEALTH CARE EDUCATION/TRAINING PROGRAM

## 2025-02-24 PROCEDURE — 73562 X-RAY EXAM OF KNEE 3: CPT

## 2025-02-24 NOTE — PROGRESS NOTES
Knee New Office Note    Assessment:     1. Status post revision of total knee, left        Plan:  Assessment & Plan  Status post revision of total knee, left  Findings today are consistent with 5 months s/p left total knee arthroplasty from revision to 1.5 stage antibiotic spacer for staph epi PJI performed on 10/03/2024. Imaging and prognosis was reviewed with the patient today. Reviewed that her knee has good motion and is stable on exam. She is experiencing pain over her IT band bilaterally. We discussed that she does have the traditional first stage of a 2 stage exchange. Revision total knee arthroplasty was reviewed and that she may not have complete resolution of her symptoms. She is frustrated with the knee and wishes that it was how it was prior to developing a PJI. She will need to complete her antibiotics and have an aspiration 2 weeks following completion of her antibiotics prior to proceeding with any surgical discussion. Her ESR and CRP have normalized which points towards clearance of PJI. Follow up mid-May for consideration of aspiration and further discussion of revision total knee arthroplasty if she chooses.    Orders:    XR knee 3 vw left non injury; Future    Subjective:     Patient ID: Na Joseph is a 60 y.o. female.  Chief Complaint:  HPI:  60 y.o. female presents to the office 5 months s/p left total knee arthroplasty from revision to 1.5 stage antibiotic spacer for staph epi PJI performed on 10/03/2024. She continues to experience posterior and lateral left knee pain. She notes that her pain is worst with transitional activities and ambulation. She describes that her left knee feels unstable and she has to take time to get going due to this sensation. She takes aleve to control her pain. She also notes right knee pain with history of right total knee arthroplasty performed on 02/12/2019 by Dr. Zendejas. She notes that the right knee feels as though the patella is being pulled laterally and  rubbing.    Allergy:  Allergies   Allergen Reactions    Dilantin [Phenytoin] Anaphylaxis and Rash    Penicillins Rash, Anaphylaxis, Dermatitis and Hives    Augmentin [Amoxicillin-Pot Clavulanate] Rash and Dermatitis     Medications:  all current active meds have been reviewed  Past Medical History:  Past Medical History:   Diagnosis Date    HELENE positive     Anemia     Sildenafil causes decreased hematocrit    Antiphospholipid syndrome (HCC)     Anxiety 3/2020    CHF (congestive heart failure) (HCC)     right heart failure related to pulm HTN lupus    Chronic kidney disease 2018    borderline lab values    Chronic rhinitis 06/04/2012    Clotting disorder (HCC) 2012    Coronary artery disease 2018    Encephalitis     Lasted Assessed 10/18/2017    Gout 06/26/2018    Head injury 11/11/2023    Heart failure (HCC) 2019    History of transfusion 2/13/2019    autologous    Hypertension     Lupus 2018    Lupus anticoagulant disorder (HCC)     Maxillary sinusitis     Lasted Assessed 10/18/2017    Night sweat     Osteopenia     Hip    Osteoporosis     Spine    Pneumonia     Last Assessed 10/18/2017    PONV (postoperative nausea and vomiting)     Pulmonary embolism (HCC)     Pulmonary hypertension (HCC)     Rheumatic disease     Seizures (HCC)     Only during Encephalitis    Shortness of breath     with exertion    Sinus tachycardia     Urinary incontinence      Past Surgical History:  Past Surgical History:   Procedure Laterality Date    ANKLE SURGERY  6/2015    ARTHRODESIS      Hand: IP Joint    CHOLECYSTECTOMY      COLONOSCOPY      COLPOSCOPY      HYSTERECTOMY      Vaginal    JOINT REPLACEMENT Right 10/03/2024    Knee replacement    KNEE SURGERY  2/2019    LAPAROSCOPIC ESOPHAGOGASTRIC FUNDOPLASTY  2008    ORTHOPEDIC SURGERY  10/03/2024    10/06/2022    MO ARTHRP KNE CONDYLE&PLATU MEDIAL&LAT COMPARTMENTS Right 02/12/2019    Procedure: KNEE TOTAL ARTHROPLASTY AND ASSOCIATED PROCEDURES;  Surgeon: Donovan Zendejas DO;  Location: [facility];  Service: Orthopedics    WA ARTHRP KNE CONDYLE&PLATU MEDIAL&LAT COMPARTMENTS Left 10/06/2022    Procedure: ARTHROPLASTY KNEE TOTAL;  Surgeon: Donovan Zendejas DO;  [facility];  Service: Orthopedics    WA ARTHRS KNEE W/MENISCECTOMY MED&LAT W/SHAVING Right 10/02/2018    Procedure: KNEE ARTHROSCOPY WITH PARTIAL MEDIAL MENISCECTOMY;  Surgeon: Donovan Zendejas DO;  [facility];  Service: Orthopedics    WA ARTHRS KNEE W/MENISCECTOMY MED&LAT W/SHAVING Left 12/17/2019    Procedure: KNEE ARTHROSCOPIC MENISCECTOMY /MEDIAL; Chondyl medial and posterior;  Surgeon: Donovan Zendejas DO;  [facility];  Service: Orthopedics    REDUCTION MAMMAPLASTY Bilateral     doesnt remember when    REPAIR ANKLE LIGAMENT Right     TENDON REPAIR      TONSILLECTOMY      TOTAL KNEE ARTHROPLASTY REVISION Left 10/03/2024    Procedure: ARTHROPLASTY KNEE TOTAL REVISION;  Surgeon: Grupo Wilson DO;  Location: [facility];  Service: Orthopedics     Family History:  Family History   Problem Relation Age of Onset    Uterine cancer Mother     Pancreatic cancer Mother 70    Diabetes Mother     Endometrial cancer Mother 66    Arthritis Mother     Cancer Mother         Pancreas, Uterine    Vision loss Mother     Osteoporosis Mother     Heart disease Father         open heart surgery at age 50     Stroke Father         CVA    Hypertension Father     Atrial fibrillation Father     Hyperlipidemia Father     Arthritis Father     Rheum arthritis Father     Heart attack Father     No Known Problems Sister     No Known Problems Sister     Thyroid disease Sister     Depression Sister     Osteoarthritis Sister     Asthma Sister     Asthma Daughter     Migraines Daughter     Asthma Daughter     Thyroid disease Maternal Grandmother     Heart attack Maternal Grandfather     Heart disease Maternal Grandfather     Heart attack Paternal Grandmother     Heart disease Paternal Grandmother     Skin cancer Paternal Grandfather     No Known  Problems Brother     Hemochromatosis Brother     Alcohol abuse Brother     Early death Brother         heriditary hemachromotosis    No Known Problems Maternal Aunt     No Known Problems Paternal Aunt     Anuerysm Neg Hx     Clotting disorder Neg Hx     Heart failure Neg Hx      Social History:  Social History     Substance and Sexual Activity   Alcohol Use Yes    Comment: Socially     Social History     Substance and Sexual Activity   Drug Use No     Social History     Tobacco Use   Smoking Status Former    Current packs/day: 0.00    Types: Cigarettes    Quit date: 2006    Years since quittin.0   Smokeless Tobacco Never         ROS:  General: Per HPI  Skin: Negative, except if noted below  HEENT: Negative  Respiratory: Negative  Cardiovascular: Negative  Gastrointestinal: Negative  Urinary: Negative  Vascular: Negative  Musculoskeletal: Positive per HPI   Neurologic: Positive per HPI  Endocrine: Negative    Objective:  BP Readings from Last 1 Encounters:   25 125/85      Wt Readings from Last 1 Encounters:   25 111 kg (245 lb)        Respiratory:   non-labored respirations    Lymphatics:  no palpable lymph nodes    Gait:   Normal    Neurologic:   Alert and oriented times 3  Patient with normal sensation except as noted below  Deep tendon reflexes 2+ except as noted in MSK exam    Bilateral Lower Extremity:  Left Knee:      Inspection:  well healed incision    Overall limb alignment neutral    Effusion: none    ROM 0-125 with pain deep flexion     Extensor Lag: none    Palpation: IT band tender to palpation    AP Stability at 90 deg stable    M/L stability in full extension stable    M/L stability in midflexion stable    Motor: 5/5 Q/HS/TA/GS/P    Pulses: 2+ DP / 2+ PT    SILT DP/SP/S/S/TN    Right Knee:      Inspection:  well healed incision    Overall limb alignment neutral    Effusion: none    ROM 0-125 without pain    Extensor Lag: none    Palpation: IT band tender to palpation    AP  "Stability at 90 deg stable    M/L stability in full extension stable    M/L stability in midflexion stable    Motor: 5/5 Q/HS/TA/GS/P    Pulses: 2+ DP / 2+ PT    SILT DP/SP/S/S/TN    Imaging:  My interpretation XR AP/lateral/sunrise left knee: metal femur with all poly tibial component, components in good position. No fracture or dislocation. No loosening.    BMI:   Estimated body mass index is 44.81 kg/m² as calculated from the following:    Height as of this encounter: 5' 2\" (1.575 m).    Weight as of this encounter: 111 kg (245 lb).  BSA:   Estimated body surface area is 2.08 meters squared as calculated from the following:    Height as of this encounter: 5' 2\" (1.575 m).    Weight as of this encounter: 111 kg (245 lb).           Scribe Attestation      I,:  Hafsa Vazquez am acting as a scribe while in the presence of the attending physician.:       I,:  Grupo Wilson DO personally performed the services described in this documentation    as scribed in my presence.:              "

## 2025-02-27 ENCOUNTER — RA CDI HCC (OUTPATIENT)
Dept: OTHER | Facility: HOSPITAL | Age: 60
End: 2025-02-27

## 2025-02-27 NOTE — PROGRESS NOTES
I50.30, I13.0  HCC coding opportunities          Chart Reviewed number of suggestions sent to Provider: 2     Patients Insurance     Medicare Insurance: [employer]vantage

## 2025-02-28 ENCOUNTER — APPOINTMENT (OUTPATIENT)
Dept: LAB | Facility: CLINIC | Age: 60
End: 2025-02-28
Payer: MEDICARE

## 2025-02-28 DIAGNOSIS — Z79.01 ANTICOAGULATED ON COUMADIN: ICD-10-CM

## 2025-02-28 LAB
INR PPP: 1.82 (ref 0.85–1.19)
PROTHROMBIN TIME: 21.8 SECONDS (ref 12.3–15)

## 2025-02-28 PROCEDURE — 85610 PROTHROMBIN TIME: CPT

## 2025-02-28 PROCEDURE — 36415 COLL VENOUS BLD VENIPUNCTURE: CPT

## 2025-03-06 ENCOUNTER — OFFICE VISIT (OUTPATIENT)
Age: 60
End: 2025-03-06
Payer: MEDICARE

## 2025-03-06 VITALS
WEIGHT: 240 LBS | SYSTOLIC BLOOD PRESSURE: 122 MMHG | TEMPERATURE: 98.2 F | BODY MASS INDEX: 43.9 KG/M2 | HEART RATE: 87 BPM | DIASTOLIC BLOOD PRESSURE: 64 MMHG | OXYGEN SATURATION: 95 %

## 2025-03-06 DIAGNOSIS — Z00.00 MEDICARE ANNUAL WELLNESS VISIT, SUBSEQUENT: Primary | ICD-10-CM

## 2025-03-06 DIAGNOSIS — H91.93 DECREASED HEARING, BILATERAL: ICD-10-CM

## 2025-03-06 DIAGNOSIS — E04.1 THYROID NODULE: ICD-10-CM

## 2025-03-06 DIAGNOSIS — Z13.6 SCREENING FOR CARDIOVASCULAR CONDITION: ICD-10-CM

## 2025-03-06 DIAGNOSIS — N18.31 CHRONIC KIDNEY DISEASE, STAGE 3A (HCC): ICD-10-CM

## 2025-03-06 DIAGNOSIS — M06.9 RHEUMATOID ARTHRITIS, INVOLVING UNSPECIFIED SITE, UNSPECIFIED WHETHER RHEUMATOID FACTOR PRESENT (HCC): ICD-10-CM

## 2025-03-06 DIAGNOSIS — H69.92 ETD (EUSTACHIAN TUBE DYSFUNCTION), LEFT: ICD-10-CM

## 2025-03-06 PROCEDURE — G0439 PPPS, SUBSEQ VISIT: HCPCS | Performed by: FAMILY MEDICINE

## 2025-03-06 RX ORDER — FLUCONAZOLE 150 MG/1
TABLET ORAL
COMMUNITY
Start: 2025-02-12

## 2025-03-06 NOTE — ASSESSMENT & PLAN NOTE
Chronic, stable  Check TSH with next lab draw  Last thyroid ultrasound completed 3/14/2024--impression stated that no nodule meets current ACR criteria for requiring biopsy or follow-up ultrasound    Orders:    TSH, 3rd generation with Free T4 reflex; Future

## 2025-03-06 NOTE — PROGRESS NOTES
Name: Na Joseph      : 1965      MRN: [id number]  Encounter Provider: Sultana Arnold DO  Encounter Date: 3/6/2025   Encounter department: [facility]  Medicare annual wellness visit, subsequent         BMI 40.0-44.9, adult (HCC)    Orders:    Lipid Panel with Direct LDL reflex; Future    TSH, 3rd generation with Free T4 reflex; Future    Insulin, fasting; Future    Decreased hearing, bilateral  Referral placed for comprehensive hearing evaluation    Orders:    Ambulatory Referral to Audiology; Future    Screening for cardiovascular condition    Orders:    Lipid Panel with Direct LDL reflex; Future    Thyroid nodule  Chronic, stable  Check TSH with next lab draw  Last thyroid ultrasound completed 3/14/2024--impression stated that no nodule meets current ACR criteria for requiring biopsy or follow-up ultrasound    Orders:    TSH, 3rd generation with Free T4 reflex; Future    Rheumatoid arthritis, involving unspecified site, unspecified whether rheumatoid factor present (HCC)  Chronic, stable  Patient following with rheumatology at [facility]       ETD (Eustachian tube dysfunction), left  Subacute status post influenza  Recommend over-the-counter oral antihistamine and nasal steroid spray combination  If not improving can consider trial of oral steroids       Chronic kidney disease, stage 3a (HCC)  Stable  Baseline creatinine approximately 0.8, EGFR approximately 75  Continue to monitor closely and avoid nephrotoxic agents  Magnesium and phosphorus ordered to next lab draw--CBC and vitamin D stable    Lab Results   Component Value Date    EGFR 78 2025    EGFR 54 2025    EGFR 97 2024    CREATININE 0.81 2025    CREATININE 1.09 2025    CREATININE 0.62 2024       Orders:    Magnesium; Future    Phosphorus; Future      Depression Screening and Follow-up Plan: Patient was screened for depression during today's encounter. They  screened negative with a PHQ-2 score of 0.        Preventive health issues were discussed with patient, and age appropriate screening tests were ordered as noted in patient's After Visit Summary. Personalized health advice and appropriate referrals for health education or preventive services given if needed, as noted in patient's After Visit Summary.      Return in about 6 months (around 9/6/2025) for Recheck CKD, CTD.            History of Present Illness     HPI   Left ear pain s/p flu      Patient Care Team:  Sultana Arnold DO as PCP - General (Family Medicine)  MD Maile Murry PA-C (Hematology and Oncology)    Review of Systems   Constitutional:  Negative for appetite change, fatigue, fever and unexpected weight change.   HENT:  Positive for ear pain (Left). Negative for congestion, dental problem, postnasal drip, sore throat and tinnitus.    Eyes:  Negative for pain, discharge and visual disturbance.   Respiratory:  Negative for cough, shortness of breath and wheezing.    Cardiovascular:  Negative for chest pain, palpitations and leg swelling.   Gastrointestinal:  Negative for abdominal pain, constipation, diarrhea, nausea and vomiting.   Endocrine: Negative for cold intolerance and heat intolerance.   Genitourinary:  Negative for difficulty urinating, dysuria, flank pain and urgency.   Musculoskeletal:  Positive for arthralgias (chronic b/l knee pain L>R). Negative for back pain, joint swelling and myalgias.   Skin:  Negative for rash and wound.   Allergic/Immunologic: Negative for immunocompromised state.   Neurological:  Negative for dizziness, syncope, speech difficulty, weakness and numbness.   Hematological:  Negative for adenopathy. Does not bruise/bleed easily.   Psychiatric/Behavioral:  Negative for confusion, dysphoric mood and sleep disturbance. The patient is not nervous/anxious.      Medical History Reviewed by provider this encounter:  Tobacco  Allergies  Meds   Problems  Med Hx  Surg Hx  Fam Hx       Annual Wellness Visit Questionnaire   Na is here for her Subsequent Wellness visit. Last Medicare Wellness visit information reviewed, patient interviewed and updates made to the record today.      Health Risk Assessment:   Patient rates overall health as good. Patient feels that their physical health rating is slightly worse. Patient is satisfied with their life. Eyesight was rated as same. Hearing was rated as slightly worse. Patient feels that their emotional and mental health rating is same. Patients states they are sometimes angry. Patient states they are always unusually tired/fatigued. Pain experienced in the last 7 days has been some. Patient's pain rating has been 4/10. Patient states that she has experienced weight loss or gain in last 6 months. Gained -- thinks secondary to ortho issues    Depression Screening:   PHQ-2 Score: 0      Fall Risk Screening:   In the past year, patient has experienced: no history of falling in past year      Urinary Incontinence Screening:   Patient has leaked urine accidently in the last six months.     Home Safety:  Patient has trouble with stairs inside or outside of their home. Patient has working smoke alarms and has working carbon monoxide detector. Home safety hazards include: none.     Nutrition:   Current diet is Regular.     Medications:   Patient is currently taking over-the-counter supplements. OTC medications include: see medication list. Patient is able to manage medications.     Activities of Daily Living (ADLs)/Instrumental Activities of Daily Living (IADLs):   Walk and transfer into and out of bed and chair?: Yes  Dress and groom yourself?: Yes    Bathe or shower yourself?: Yes    Feed yourself? Yes  Do your laundry/housekeeping?: No  Manage your money, pay your bills and track your expenses?: Yes  Make your own meals?: Yes    Do your own shopping?: Yes    ADL comments: shopping usually w my  He bring in  the groceries, laundry is in the basement nit easilt accesable for me    Previous Hospitalizations:   Any hospitalizations or ED visits within the last 12 months?: Yes    How many hospitalizations have you had in the last year?: 1-2    Advance Care Planning:   Living will: Yes    Durable POA for healthcare: Yes    Advanced directive: Yes      Cognitive Screening:   Provider or family/friend/caregiver concerned regarding cognition?: No    PREVENTIVE SCREENINGS      Cardiovascular Screening:    General: Screening Current      Diabetes Screening:     General: Screening Current      Colorectal Cancer Screening:     General: Screening Current      Breast Cancer Screening:     General: Screening Current      Cervical Cancer Screening:    General: Screening Current      Osteoporosis Screening:    General: Screening Not Indicated and History Osteoporosis      Abdominal Aortic Aneurysm (AAA) Screening:        General: Screening Not Indicated      Lung Cancer Screening:     General: Screening Not Indicated      Hepatitis C Screening:    General: Screening Current    Screening, Brief Intervention, and Referral to Treatment (SBIRT)     Screening  Typical number of drinks in a day: 0  Typical number of drinks in a week: 1  Interpretation: Low risk drinking behavior.    AUDIT-C Screenin) How often did you have a drink containing alcohol in the past year? monthly or less  2) How many drinks did you have on a typical day when you were drinking in the past year? 1 to 2  3) How often did you have 6 or more drinks on one occasion in the past year? never    AUDIT-C Score: 1  Interpretation: Score 0-2 (female): Negative screen for alcohol misuse    Single Item Drug Screening:  How often have you used an illegal drug (including marijuana) or a prescription medication for non-medical reasons in the past year? never    Single Item Drug Screen Score: 0  Interpretation: Negative screen for possible drug use disorder    Brief  Intervention  Alcohol & drug use screenings were reviewed. No concerns regarding substance use disorder identified.     SDOH Risk Assessment  Social determinants of health (SDOH) risk assesment tool was completed. The tool at a minimum covered housing stability, food insecurity, transportation needs, and utility difficulty. Patient had at risk responses for the following SDOH domains: housing stability.     Other Counseling Topics:   Car/seat belt/driving safety, skin self-exam, sunscreen and calcium and vitamin D intake and regular weightbearing exercise.     Social Drivers of Health     Financial Resource Strain: Medium Risk (2/27/2024)    Overall Financial Resource Strain (CARDIA)     Difficulty of Paying Living Expenses: Somewhat hard   Food Insecurity: No Food Insecurity (3/1/2025)    Hunger Vital Sign     Worried About Running Out of Food in the Last Year: Never true     Ran Out of Food in the Last Year: Never true   Transportation Needs: No Transportation Needs (3/1/2025)    PRAPARE - Transportation     Lack of Transportation (Medical): No     Lack of Transportation (Non-Medical): No   Housing Stability: High Risk (3/1/2025)    Housing Stability Vital Sign     Unable to Pay for Housing in the Last Year: Yes     Number of Times Moved in the Last Year: 0     Homeless in the Last Year: No   Utilities: Not At Risk (3/1/2025)    [facility] Utilities     Threatened with loss of utilities: No     No results found.    Objective   /64   Pulse 87   Temp 98.2 °F (36.8 °C)   Wt 109 kg (240 lb)   SpO2 95%   BMI 43.90 kg/m²     Physical Exam  Vitals and nursing note reviewed.   Constitutional:       General: She is not in acute distress.     Appearance: Normal appearance. She is well-developed.      Comments: Body mass index is 43.9 kg/m².    HENT:      Head: Normocephalic and atraumatic.      Right Ear: Hearing, tympanic membrane, ear canal and external ear normal.      Left Ear: Hearing, tympanic membrane, ear canal  and external ear normal.      Nose: Nose normal.      Mouth/Throat:      Mouth: Mucous membranes are moist.      Pharynx: Uvula midline. No oropharyngeal exudate.   Eyes:      General: No scleral icterus.     Conjunctiva/sclera: Conjunctivae normal.      Pupils: Pupils are equal, round, and reactive to light.   Neck:      Thyroid: No thyromegaly.   Cardiovascular:      Rate and Rhythm: Normal rate and regular rhythm.      Heart sounds: Normal heart sounds. No murmur heard.  Pulmonary:      Effort: Pulmonary effort is normal. No respiratory distress.      Breath sounds: Normal breath sounds. No wheezing or rales.   Abdominal:      General: Bowel sounds are normal.      Palpations: Abdomen is soft. There is no mass.      Tenderness: There is no abdominal tenderness.   Musculoskeletal:         General: No tenderness.      Cervical back: Normal range of motion and neck supple.      Right lower leg: No edema.      Left lower leg: No edema.   Lymphadenopathy:      Cervical: No cervical adenopathy.   Skin:     General: Skin is warm and dry.      Coloration: Skin is not jaundiced.      Findings: No erythema or rash.   Neurological:      General: No focal deficit present.      Mental Status: She is alert and oriented to person, place, and time.      Cranial Nerves: No cranial nerve deficit.      Deep Tendon Reflexes: Reflexes are normal and symmetric.   Psychiatric:         Mood and Affect: Mood normal.         Behavior: Behavior normal.

## 2025-03-06 NOTE — ASSESSMENT & PLAN NOTE
Stable  Baseline creatinine approximately 0.8, EGFR approximately 75  Continue to monitor closely and avoid nephrotoxic agents  Magnesium and phosphorus ordered to next lab draw--CBC and vitamin D stable    Lab Results   Component Value Date    EGFR 78 01/17/2025    EGFR 54 01/03/2025    EGFR 97 11/08/2024    CREATININE 0.81 01/17/2025    CREATININE 1.09 01/03/2025    CREATININE 0.62 11/08/2024       Orders:    Magnesium; Future    Phosphorus; Future

## 2025-03-06 NOTE — ASSESSMENT & PLAN NOTE
Chronic, stable  Patient following with rheumatology at [facility]

## 2025-03-06 NOTE — PATIENT INSTRUCTIONS
Medicare Preventive Visit Patient Instructions  Thank you for completing your Welcome to Medicare Visit or Medicare Annual Wellness Visit today. Your next wellness visit will be due in one year (3/7/2026).  The screening/preventive services that you may require over the next 5-10 years are detailed below. Some tests may not apply to you based off risk factors and/or age. Screening tests ordered at today's visit but not completed yet may show as past due. Also, please note that scanned in results may not display below.  Preventive Screenings:  Service Recommendations Previous Testing/Comments   Colorectal Cancer Screening  * Colonoscopy    * Fecal Occult Blood Test (FOBT)/Fecal Immunochemical Test (FIT)  * Fecal DNA/Cologuard Test  * Flexible Sigmoidoscopy Age: 45-75 years old   Colonoscopy: every 10 years (may be performed more frequently if at higher risk)  OR  FOBT/FIT: every 1 year  OR  Cologuard: every 3 years  OR  Sigmoidoscopy: every 5 years  Screening may be recommended earlier than age 45 if at higher risk for colorectal cancer. Also, an individualized decision between you and your healthcare provider will decide whether screening between the ages of 76-85 would be appropriate. Colonoscopy: 07/17/2023  FOBT/FIT: Not on file  Cologuard: Not on file  Sigmoidoscopy: Not on file    Screening Current     Breast Cancer Screening Age: 40+ years old  Frequency: every 1-2 years  Not required if history of left and right mastectomy Mammogram: 06/04/2024    Screening Current   Cervical Cancer Screening Between the ages of 21-29, pap smear recommended once every 3 years.   Between the ages of 30-65, can perform pap smear with HPV co-testing every 5 years.   Recommendations may differ for women with a history of total hysterectomy, cervical cancer, or abnormal pap smears in past. Pap Smear: 03/06/2024    Screening Current   Hepatitis C Screening Once for adults born between 1945 and 1965  More frequently in patients at  high risk for Hepatitis C Hep C Antibody: 07/23/2019    Screening Current   Diabetes Screening 1-2 times per year if you're at risk for diabetes or have pre-diabetes Fasting glucose: 96 mg/dL (1/17/2025)  A1C: 5.5 % (9/27/2024)  Screening Current   Cholesterol Screening Once every 5 years if you don't have a lipid disorder. May order more often based on risk factors. Lipid panel: 04/03/2023    Screening Current     Other Preventive Screenings Covered by Medicare:  Abdominal Aortic Aneurysm (AAA) Screening: covered once if your at risk. You're considered to be at risk if you have a family history of AAA.  Lung Cancer Screening: covers low dose CT scan once per year if you meet all of the following conditions: (1) Age 55-77; (2) No signs or symptoms of lung cancer; (3) Current smoker or have quit smoking within the last 15 years; (4) You have a tobacco smoking history of at least 20 pack years (packs per day multiplied by number of years you smoked); (5) You get a written order from a healthcare provider.  Glaucoma Screening: covered annually if you're considered high risk: (1) You have diabetes OR (2) Family history of glaucoma OR (3)  aged 50 and older OR (4)  American aged 65 and older  Osteoporosis Screening: covered every 2 years if you meet one of the following conditions: (1) You're estrogen deficient and at risk for osteoporosis based off medical history and other findings; (2) Have a vertebral abnormality; (3) On glucocorticoid therapy for more than 3 months; (4) Have primary hyperparathyroidism; (5) On osteoporosis medications and need to assess response to drug therapy.   Last bone density test (DXA Scan): 06/25/2024.  HIV Screening: covered annually if you're between the age of 15-65. Also covered annually if you are younger than 15 and older than 65 with risk factors for HIV infection. For pregnant patients, it is covered up to 3 times per pregnancy.    Immunizations:  Immunization  Recommendations   Influenza Vaccine Annual influenza vaccination during flu season is recommended for all persons aged >= 6 months who do not have contraindications   Pneumococcal Vaccine   * Pneumococcal conjugate vaccine = PCV13 (Prevnar 13), PCV15 (Vaxneuvance), PCV20 (Prevnar 20)  * Pneumococcal polysaccharide vaccine = PPSV23 (Pneumovax) Adults 19-63 yo with certain risk factors or if 65+ yo  If never received any pneumonia vaccine: recommend Prevnar 20 (PCV20)  Give PCV20 if previously received 1 dose of PCV13 or PPSV23   Hepatitis B Vaccine 3 dose series if at intermediate or high risk (ex: diabetes, end stage renal disease, liver disease)   Respiratory syncytial virus (RSV) Vaccine - COVERED BY MEDICARE PART D  * RSVPreF3 (Arexvy) CDC recommends that adults 60 years of age and older may receive a single dose of RSV vaccine using shared clinical decision-making (SCDM)   Tetanus (Td) Vaccine - COST NOT COVERED BY MEDICARE PART B Following completion of primary series, a booster dose should be given every 10 years to maintain immunity against tetanus. Td may also be given as tetanus wound prophylaxis.   Tdap Vaccine - COST NOT COVERED BY MEDICARE PART B Recommended at least once for all adults. For pregnant patients, recommended with each pregnancy.   Shingles Vaccine (Shingrix) - COST NOT COVERED BY MEDICARE PART B  2 shot series recommended in those 19 years and older who have or will have weakened immune systems or those 50 years and older     Health Maintenance Due:      Topic Date Due   • Breast Cancer Screening: Mammogram  06/04/2025   • Colorectal Cancer Screening  07/15/2028   • Hepatitis C Screening  Completed   • HIV Screening  Addressed   • Cervical Cancer Screening  Discontinued     Immunizations Due:      Topic Date Due   • Influenza Vaccine (1) 09/01/2024   • COVID-19 Vaccine (7 - 2024-25 season) 09/01/2024     Advance Directives   What are advance directives?  Advance directives are legal  documents that state your wishes and plans for medical care. These plans are made ahead of time in case you lose your ability to make decisions for yourself. Advance directives can apply to any medical decision, such as the treatments you want, and if you want to donate organs.   What are the types of advance directives?  There are many types of advance directives, and each state has rules about how to use them. You may choose a combination of any of the following:  Living will:  This is a written record of the treatment you want. You can also choose which treatments you do not want, which to limit, and which to stop at a certain time. This includes surgery, medicine, IV fluid, and tube feedings.   Durable power of  for healthcare (DPAHC):  This is a written record that states who you want to make healthcare choices for you when you are unable to make them for yourself. This person, called a proxy, is usually a family member or a friend. You may choose more than 1 proxy.  Do not resuscitate (DNR) order:  A DNR order is used in case your heart stops beating or you stop breathing. It is a request not to have certain forms of treatment, such as CPR. A DNR order may be included in other types of advance directives.  Medical directive:  This covers the care that you want if you are in a coma, near death, or unable to make decisions for yourself. You can list the treatments you want for each condition. Treatment may include pain medicine, surgery, blood transfusions, dialysis, IV or tube feedings, and a ventilator (breathing machine).  Values history:  This document has questions about your views, beliefs, and how you feel and think about life. This information can help others choose the care that you would choose.  Why are advance directives important?  An advance directive helps you control your care. Although spoken wishes may be used, it is better to have your wishes written down. Spoken wishes can be  misunderstood, or not followed. Treatments may be given even if you do not want them. An advance directive may make it easier for your family to make difficult choices about your care.   Urinary Incontinence   Urinary incontinence (UI)  is when you lose control of your bladder. UI develops because your bladder cannot store or empty urine properly. The 3 most common types of UI are stress incontinence, urge incontinence, or both.  Medicines:   May be given to help strengthen your bladder control. Report any side effects of medication to your healthcare provider.  Do pelvic muscle exercises often:  Your pelvic muscles help you stop urinating. Squeeze these muscles tight for 5 seconds, then relax for 5 seconds. Gradually work up to squeezing for 10 seconds. Do 3 sets of 15 repetitions a day, or as directed. This will help strengthen your pelvic muscles and improve bladder control.  Train your bladder:  Go to the bathroom at set times, such as every 2 hours, even if you do not feel the urge to go. You can also try to hold your urine when you feel the urge to go. For example, hold your urine for 5 minutes when you feel the urge to go. As that becomes easier, hold your urine for 10 minutes.   Self-care:   Keep a UI record.  Write down how often you leak urine and how much you leak. Make a note of what you were doing when you leaked urine.  Drink liquids as directed. You may need to limit the amount of liquid you drink to help control your urine leakage. Do not drink any liquid right before you go to bed. Limit or do not have drinks that contain caffeine or alcohol.   Prevent constipation.  Eat a variety of high-fiber foods. Good examples are high-fiber cereals, beans, vegetables, and whole-grain breads. Walking is the best way to trigger your intestines to have a bowel movement.  Exercise regularly and maintain a healthy weight.  Weight loss and exercise will decrease pressure on your bladder and help you control your  leakage.   Use a catheter as directed  to help empty your bladder. A catheter is a tiny, plastic tube that is put into your bladder to drain your urine.   Go to behavior therapy as directed.  Behavior therapy may be used to help you learn to control your urge to urinate.    Weight Management   Why it is important to manage your weight:  Being overweight increases your risk of health conditions such as heart disease, high blood pressure, type 2 diabetes, and certain types of cancer. It can also increase your risk for osteoarthritis, sleep apnea, and other respiratory problems. Aim for a slow, steady weight loss. Even a small amount of weight loss can lower your risk of health problems.  How to lose weight safely:  A safe and healthy way to lose weight is to eat fewer calories and get regular exercise. You can lose up about 1 pound a week by decreasing the number of calories you eat by 500 calories each day.   Healthy meal plan for weight management:  A healthy meal plan includes a variety of foods, contains fewer calories, and helps you stay healthy. A healthy meal plan includes the following:  Eat whole-grain foods more often.  A healthy meal plan should contain fiber. Fiber is the part of grains, fruits, and vegetables that is not broken down by your body. Whole-grain foods are healthy and provide extra fiber in your diet. Some examples of whole-grain foods are whole-wheat breads and pastas, oatmeal, brown rice, and bulgur.  Eat a variety of vegetables every day.  Include dark, leafy greens such as spinach, kale, dion greens, and mustard greens. Eat yellow and orange vegetables such as carrots, sweet potatoes, and winter squash.   Eat a variety of fruits every day.  Choose fresh or canned fruit (canned in its own juice or light syrup) instead of juice. Fruit juice has very little or no fiber.  Eat low-fat dairy foods.  Drink fat-free (skim) milk or 1% milk. Eat fat-free yogurt and low-fat cottage cheese. Try  low-fat cheeses such as mozzarella and other reduced-fat cheeses.  Choose meat and other protein foods that are low in fat.  Choose beans or other legumes such as split peas or lentils. Choose fish, skinless poultry (chicken or turkey), or lean cuts of red meat (beef or pork). Before you cook meat or poultry, cut off any visible fat.   Use less fat and oil.  Try baking foods instead of frying them. Add less fat, such as margarine, sour cream, regular salad dressing and mayonnaise to foods. Eat fewer high-fat foods. Some examples of high-fat foods include french fries, doughnuts, ice cream, and cakes.  Eat fewer sweets.  Limit foods and drinks that are high in sugar. This includes candy, cookies, regular soda, and sweetened drinks.  Exercise:  Exercise at least 30 minutes per day on most days of the week. Some examples of exercise include walking, biking, dancing, and swimming. You can also fit in more physical activity by taking the stairs instead of the elevator or parking farther away from stores. Ask your healthcare provider about the best exercise plan for you.      © Copyright [employer] 2018 Information is for End User's use only and may not be sold, redistributed or otherwise used for commercial purposes. All illustrations and images included in CareNotes® are the copyrighted property of A.D.A.M., Inc. or [employer]

## 2025-03-06 NOTE — ASSESSMENT & PLAN NOTE
Orders:    Lipid Panel with Direct LDL reflex; Future    TSH, 3rd generation with Free T4 reflex; Future    Insulin, fasting; Future

## 2025-03-10 ENCOUNTER — HOSPITAL ENCOUNTER (OUTPATIENT)
Dept: NON INVASIVE DIAGNOSTICS | Facility: CLINIC | Age: 60
Discharge: HOME | End: 2025-03-10
Payer: MEDICARE

## 2025-03-10 VITALS
HEART RATE: 85 BPM | DIASTOLIC BLOOD PRESSURE: 64 MMHG | SYSTOLIC BLOOD PRESSURE: 122 MMHG | BODY MASS INDEX: 44.16 KG/M2 | HEIGHT: 62 IN | WEIGHT: 240 LBS

## 2025-03-10 DIAGNOSIS — I27.20 PULMONARY HYPERTENSION (HCC): ICD-10-CM

## 2025-03-10 LAB
AORTIC ROOT: 2.9 CM
AORTIC VALVE MEAN VELOCITY: 12.6 M/S
ASCENDING AORTA: 3.3 CM
AV AREA BY CONTINUOUS VTI: 2.6 CM2
AV AREA PEAK VELOCITY: 2.5 CM2
AV LVOT MEAN GRADIENT: 5 MMHG
AV LVOT PEAK GRADIENT: 11 MMHG
AV MEAN PRESS GRAD SYS DOP V1V2: 7 MMHG
AV ORIFICE AREA US: 2.55 CM2
AV PEAK GRADIENT: 15 MMHG
AV VELOCITY RATIO: 0.9
AV VMAX SYS DOP: 1.92 M/S
BSA FOR ECHO PROCEDURE: 2.07 M2
DOP CALC AO VTI: 39.13 CM
DOP CALC LVOT AREA: 2.83 CM2
DOP CALC LVOT CARDIAC INDEX: 6.39 L/MIN/M2
DOP CALC LVOT CARDIAC OUTPUT: 13.22 L/MIN
DOP CALC LVOT DIAMETER: 1.9 CM
DOP CALC LVOT PEAK VEL VTI: 35.2 CM
DOP CALC LVOT PEAK VEL: 1.67 M/S
DOP CALC LVOT STROKE INDEX: 48.3 ML/M2
DOP CALC LVOT STROKE VOLUME: 100
E WAVE DECELERATION TIME: 187 MS
E/A RATIO: 1.06
FRACTIONAL SHORTENING: 32 (ref 28–44)
INTERVENTRICULAR SEPTUM IN DIASTOLE (PARASTERNAL SHORT AXIS VIEW): 1.2 CM
INTERVENTRICULAR SEPTUM: 1.2 CM (ref 0.6–1.1)
LAAS-AP2: 22 CM2
LAAS-AP4: 20.8 CM2
LEFT ATRIUM SIZE: 3.5 CM
LEFT ATRIUM VOLUME (MOD BIPLANE): 64 ML
LEFT ATRIUM VOLUME INDEX (MOD BIPLANE): 30.9 ML/M2
LEFT INTERNAL DIMENSION IN SYSTOLE: 3 CM (ref 2.1–4)
LEFT VENTRICULAR INTERNAL DIMENSION IN DIASTOLE: 4.4 CM (ref 3.5–6)
LEFT VENTRICULAR POSTERIOR WALL IN END DIASTOLE: 1.1 CM
LEFT VENTRICULAR STROKE VOLUME: 51 ML
LV EF US.2D.A4C+ESTIMATED: 66 %
LVSV (TEICH): 51 ML
MV E'TISSUE VEL-LAT: 14 CM/S
MV E'TISSUE VEL-SEP: 11 CM/S
MV PEAK A VEL: 0.97 M/S
MV PEAK E VEL: 103 CM/S
MV STENOSIS PRESSURE HALF TIME: 54 MS
MV VALVE AREA P 1/2 METHOD: 4.1
RIGHT ATRIAL 2D VOLUME: 50 ML
RIGHT ATRIUM AREA SYSTOLE A4C: 18 CM2
RIGHT VENTRICLE ID DIMENSION: 3.7 CM
SL CV LEFT ATRIUM LENGTH A2C: 5.6 CM
SL CV LV EF: 65
SL CV PED ECHO LEFT VENTRICLE DIASTOLIC VOLUME (MOD BIPLANE) 2D: 86 ML
SL CV PED ECHO LEFT VENTRICLE SYSTOLIC VOLUME (MOD BIPLANE) 2D: 35 ML
TR MAX PG: 36 MMHG
TR PEAK VELOCITY: 3 M/S
TRICUSPID ANNULAR PLANE SYSTOLIC EXCURSION: 2.3 CM
TRICUSPID VALVE PEAK REGURGITATION VELOCITY: 3.01 M/S

## 2025-03-10 PROCEDURE — 93306 TTE W/DOPPLER COMPLETE: CPT

## 2025-03-10 PROCEDURE — 93306 TTE W/DOPPLER COMPLETE: CPT | Performed by: INTERNAL MEDICINE

## 2025-03-11 ENCOUNTER — OFFICE VISIT (OUTPATIENT)
Dept: AUDIOLOGY | Age: 60
End: 2025-03-11
Payer: MEDICARE

## 2025-03-11 DIAGNOSIS — H90.3 SENSORY HEARING LOSS, BILATERAL: Primary | ICD-10-CM

## 2025-03-11 DIAGNOSIS — H91.93 DECREASED HEARING, BILATERAL: ICD-10-CM

## 2025-03-11 PROCEDURE — 92557 COMPREHENSIVE HEARING TEST: CPT

## 2025-03-11 PROCEDURE — 92567 TYMPANOMETRY: CPT

## 2025-03-11 NOTE — PROGRESS NOTES
Diagnostic Hearing Evaluation    Name:  Na Joseph  :  1965  Age:  60 y.o.   MRN:  [id number]  Date of Evaluation: 25     HISTORY:     Reason for visit: Difficulty Understanding    Na Joseph is being seen today at the request of Dr. Arnold for an initial  evaluation of hearing. The patient reports  some difficulty understanding and tinnitus, bilaterally . The patient  denies otalgia, otorrhea, dizziness, and fullness. She did note some noise exposure through work and family history of hearing loss.    EVALUATION:    Otoscopic Evaluation:   Right Ear: Unremarkable, canal clear   Left Ear: Unremarkable, canal clear    Tympanometry:   Right Ear: Type A; normal middle ear pressure and static compliance    Left Ear: Type A; normal middle ear pressure and static compliance     Speech Audiometry:  Speech Reception (SRT)    Right Ear: 10 dB HL    Left Ear: 5 dB HL    Word Recognition Scores (WRS):  Right Ear: excellent (96 % correct)     Left Ear: excellent (100 % correct)    Stimuli: [id number]    Pure Tone Audiometry:  Conventional pure tone audiometry from 250 - 8000 Hz  was obtained with good reliability and revealed the following:     Right Ear: Normal sloping to severe sensorineural hearing loss (SNHL)    Left Ear: Normal sloping to severe sensorineural hearing loss (SNHL)     *see attached audiogram    RECOMMENDATIONS:  Annual hearing eval, Return to [facility]. for F/U, and Copy to Patient/Caregiver    PATIENT EDUCATION:   The results of today's results and recommendations were reviewed with the patient and her hearing thresholds were explained at length. Treatment options, including amplification and communication strategies, were discussed as appropriate. The patient voiced understanding of her test results. Questions were addressed and the patient was encouraged to contact our department should concerns arise.      Nick Pérez., [facility]  Clinical Audiologist  [facility]  [address] [id number]

## 2025-03-13 ENCOUNTER — PATIENT MESSAGE (OUTPATIENT)
Dept: OBGYN CLINIC | Facility: CLINIC | Age: 60
End: 2025-03-13

## 2025-03-13 DIAGNOSIS — Z96.652 STATUS POST REVISION OF TOTAL KNEE, LEFT: Primary | ICD-10-CM

## 2025-03-13 RX ORDER — CLINDAMYCIN HYDROCHLORIDE 300 MG/1
CAPSULE ORAL
Qty: 3 CAPSULE | Refills: 1 | Status: SHIPPED | OUTPATIENT
Start: 2025-03-13 | End: 2026-03-13

## 2025-03-14 ENCOUNTER — APPOINTMENT (OUTPATIENT)
Dept: LAB | Facility: CLINIC | Age: 60
End: 2025-03-14
Payer: MEDICARE

## 2025-03-14 DIAGNOSIS — Z79.01 ANTICOAGULATED ON COUMADIN: ICD-10-CM

## 2025-03-14 LAB
INR PPP: 1.89 (ref 0.85–1.19)
PROTHROMBIN TIME: 22.4 SECONDS (ref 12.3–15)

## 2025-03-14 PROCEDURE — 36415 COLL VENOUS BLD VENIPUNCTURE: CPT

## 2025-03-14 PROCEDURE — 85610 PROTHROMBIN TIME: CPT

## 2025-03-18 DIAGNOSIS — I27.20 PULMONARY HYPERTENSION (HCC): ICD-10-CM

## 2025-03-19 RX ORDER — SILDENAFIL CITRATE 20 MG/1
40 TABLET ORAL 3 TIMES DAILY
Qty: 180 TABLET | Refills: 5 | Status: SHIPPED | OUTPATIENT
Start: 2025-03-19

## 2025-03-24 ENCOUNTER — TELEPHONE (OUTPATIENT)
Dept: INFECTIOUS DISEASES | Facility: CLINIC | Age: 60
End: 2025-03-24

## 2025-03-24 NOTE — TELEPHONE ENCOUNTER
Called and spoke with patient today.   Reminded patient of upcoming appointment and to have labs done prior so results can be gone over at the appointment.   Patient verbalizes understanding at this time.

## 2025-03-26 DIAGNOSIS — I26.99 PULMONARY EMBOLISM ASSOCIATED WITH COVID-19 (HCC): Primary | ICD-10-CM

## 2025-03-26 DIAGNOSIS — U07.1 PULMONARY EMBOLISM ASSOCIATED WITH COVID-19 (HCC): Primary | ICD-10-CM

## 2025-03-26 DIAGNOSIS — D68.62 LUPUS ANTICOAGULANT DISORDER (HCC): ICD-10-CM

## 2025-03-27 DIAGNOSIS — D68.62 LUPUS ANTICOAGULANT DISORDER (HCC): ICD-10-CM

## 2025-03-27 DIAGNOSIS — U07.1 PULMONARY EMBOLISM ASSOCIATED WITH COVID-19 (HCC): ICD-10-CM

## 2025-03-27 DIAGNOSIS — I26.99 PULMONARY EMBOLISM ASSOCIATED WITH COVID-19 (HCC): ICD-10-CM

## 2025-03-27 RX ORDER — WARFARIN SODIUM 5 MG/1
TABLET ORAL
Qty: 150 TABLET | Refills: 3 | Status: SHIPPED | OUTPATIENT
Start: 2025-03-27 | End: 2025-03-27 | Stop reason: SDUPTHER

## 2025-03-27 NOTE — TELEPHONE ENCOUNTER
Reason for call: pt very upset this medication keeps being sent to wrong pharmacy pt upset and feels neglected with her care. Med needs to be sent ASAP to [facility]   [x] Refill   [] Prior Auth  [] Other:     Office:   [] PCP/Provider -   [x] Specialty/Provider -     Medication: warfarin (Coumadin) 5 mg tablet     Dose/Frequency: 7.5mg po daily or as directed     Quantity: 150 tablet     Pharmacy: [id number] [place], [place] - [address]      Local Pharmacy   Does the patient have enough for 3 days?   [] Yes   [x] No - Send as HP to POD

## 2025-03-28 ENCOUNTER — APPOINTMENT (OUTPATIENT)
Dept: LAB | Facility: CLINIC | Age: 60
End: 2025-03-28
Payer: MEDICARE

## 2025-03-28 ENCOUNTER — RESULTS FOLLOW-UP (OUTPATIENT)
Age: 60
End: 2025-03-28

## 2025-03-28 DIAGNOSIS — Z13.6 SCREENING FOR CARDIOVASCULAR CONDITION: ICD-10-CM

## 2025-03-28 DIAGNOSIS — N18.31 CHRONIC KIDNEY DISEASE, STAGE 3A (HCC): ICD-10-CM

## 2025-03-28 DIAGNOSIS — E04.1 THYROID NODULE: ICD-10-CM

## 2025-03-28 DIAGNOSIS — Z79.01 ANTICOAGULATED ON COUMADIN: ICD-10-CM

## 2025-03-28 DIAGNOSIS — T84.54XD INFECTION ASSOCIATED WITH INTERNAL LEFT KNEE PROSTHESIS, SUBSEQUENT ENCOUNTER: ICD-10-CM

## 2025-03-28 LAB
ALBUMIN SERPL BCG-MCNC: 4.1 G/DL (ref 3.5–5)
ALP SERPL-CCNC: 99 U/L (ref 34–104)
ALT SERPL W P-5'-P-CCNC: 10 U/L (ref 7–52)
ANION GAP SERPL CALCULATED.3IONS-SCNC: 6 MMOL/L (ref 4–13)
AST SERPL W P-5'-P-CCNC: 11 U/L (ref 13–39)
BASOPHILS # BLD AUTO: 0.02 THOUSANDS/ÂΜL (ref 0–0.1)
BASOPHILS NFR BLD AUTO: 1 % (ref 0–1)
BILIRUB SERPL-MCNC: 0.52 MG/DL (ref 0.2–1)
BUN SERPL-MCNC: 16 MG/DL (ref 5–25)
CALCIUM SERPL-MCNC: 9.7 MG/DL (ref 8.4–10.2)
CHLORIDE SERPL-SCNC: 104 MMOL/L (ref 96–108)
CHOLEST SERPL-MCNC: 189 MG/DL (ref ?–200)
CO2 SERPL-SCNC: 29 MMOL/L (ref 21–32)
CREAT SERPL-MCNC: 0.88 MG/DL (ref 0.6–1.3)
EOSINOPHIL # BLD AUTO: 0.16 THOUSAND/ÂΜL (ref 0–0.61)
EOSINOPHIL NFR BLD AUTO: 6 % (ref 0–6)
ERYTHROCYTE [DISTWIDTH] IN BLOOD BY AUTOMATED COUNT: 14 % (ref 11.6–15.1)
GFR SERPL CREATININE-BSD FRML MDRD: 70 ML/MIN/1.73SQ M
GLUCOSE P FAST SERPL-MCNC: 95 MG/DL (ref 65–99)
HCT VFR BLD AUTO: 40.6 % (ref 34.8–46.1)
HDLC SERPL-MCNC: 56 MG/DL
HGB BLD-MCNC: 13.3 G/DL (ref 11.5–15.4)
IMM GRANULOCYTES # BLD AUTO: 0.01 THOUSAND/UL (ref 0–0.2)
IMM GRANULOCYTES NFR BLD AUTO: 0 % (ref 0–2)
INR PPP: 3.34 (ref 0.85–1.19)
INSULIN SERPL-ACNC: 11.79 UIU/ML (ref 1.9–23)
LDLC SERPL CALC-MCNC: 112 MG/DL (ref 0–100)
LYMPHOCYTES # BLD AUTO: 0.69 THOUSANDS/ÂΜL (ref 0.6–4.47)
LYMPHOCYTES NFR BLD AUTO: 25 % (ref 14–44)
MAGNESIUM SERPL-MCNC: 1.9 MG/DL (ref 1.9–2.7)
MCH RBC QN AUTO: 30.2 PG (ref 26.8–34.3)
MCHC RBC AUTO-ENTMCNC: 32.8 G/DL (ref 31.4–37.4)
MCV RBC AUTO: 92 FL (ref 82–98)
MONOCYTES # BLD AUTO: 0.32 THOUSAND/ÂΜL (ref 0.17–1.22)
MONOCYTES NFR BLD AUTO: 11 % (ref 4–12)
NEUTROPHILS # BLD AUTO: 1.6 THOUSANDS/ÂΜL (ref 1.85–7.62)
NEUTS SEG NFR BLD AUTO: 57 % (ref 43–75)
NRBC BLD AUTO-RTO: 0 /100 WBCS
PHOSPHATE SERPL-MCNC: 3.4 MG/DL (ref 2.3–4.1)
PLATELET # BLD AUTO: 217 THOUSANDS/UL (ref 149–390)
PMV BLD AUTO: 11.5 FL (ref 8.9–12.7)
POTASSIUM SERPL-SCNC: 3.9 MMOL/L (ref 3.5–5.3)
PROT SERPL-MCNC: 6.2 G/DL (ref 6.4–8.4)
PROTHROMBIN TIME: 34.4 SECONDS (ref 12.3–15)
RBC # BLD AUTO: 4.41 MILLION/UL (ref 3.81–5.12)
SODIUM SERPL-SCNC: 139 MMOL/L (ref 135–147)
TRIGL SERPL-MCNC: 103 MG/DL (ref ?–150)
TSH SERPL DL<=0.05 MIU/L-ACNC: 2.25 UIU/ML (ref 0.45–4.5)
WBC # BLD AUTO: 2.8 THOUSAND/UL (ref 4.31–10.16)

## 2025-03-28 PROCEDURE — 85025 COMPLETE CBC W/AUTO DIFF WBC: CPT

## 2025-03-28 PROCEDURE — 83735 ASSAY OF MAGNESIUM: CPT

## 2025-03-28 PROCEDURE — 36415 COLL VENOUS BLD VENIPUNCTURE: CPT

## 2025-03-28 PROCEDURE — 83525 ASSAY OF INSULIN: CPT

## 2025-03-28 PROCEDURE — 85610 PROTHROMBIN TIME: CPT

## 2025-03-28 PROCEDURE — 84100 ASSAY OF PHOSPHORUS: CPT

## 2025-03-28 PROCEDURE — 84443 ASSAY THYROID STIM HORMONE: CPT

## 2025-03-28 PROCEDURE — 80053 COMPREHEN METABOLIC PANEL: CPT

## 2025-03-28 PROCEDURE — 80061 LIPID PANEL: CPT

## 2025-03-28 RX ORDER — WARFARIN SODIUM 5 MG/1
TABLET ORAL
Qty: 150 TABLET | Refills: 3 | Status: SHIPPED | OUTPATIENT
Start: 2025-03-28

## 2025-03-28 NOTE — RESULT ENCOUNTER NOTE
Please inform pt that labs are stable.  Continue current medications and continue to engage in healthy lifestyle (diet, exercise).    Thanks!  Sultana Arnold, DO

## 2025-04-01 ENCOUNTER — OFFICE VISIT (OUTPATIENT)
Age: 60
End: 2025-04-01
Payer: MEDICARE

## 2025-04-01 VITALS
SYSTOLIC BLOOD PRESSURE: 138 MMHG | TEMPERATURE: 97.4 F | DIASTOLIC BLOOD PRESSURE: 74 MMHG | OXYGEN SATURATION: 99 % | RESPIRATION RATE: 18 BRPM | HEART RATE: 91 BPM

## 2025-04-01 DIAGNOSIS — D68.61 ANTIPHOSPHOLIPID ANTIBODY SYNDROME (HCC): ICD-10-CM

## 2025-04-01 DIAGNOSIS — Z88.0 PENICILLIN ALLERGY: ICD-10-CM

## 2025-04-01 DIAGNOSIS — M32.9 SYSTEMIC LUPUS ERYTHEMATOSUS, UNSPECIFIED SLE TYPE, UNSPECIFIED ORGAN INVOLVEMENT STATUS (HCC): Chronic | ICD-10-CM

## 2025-04-01 DIAGNOSIS — D72.819 LEUKOPENIA: ICD-10-CM

## 2025-04-01 DIAGNOSIS — T84.54XD INFECTION ASSOCIATED WITH INTERNAL LEFT KNEE PROSTHESIS, SUBSEQUENT ENCOUNTER: Primary | ICD-10-CM

## 2025-04-01 PROCEDURE — 99215 OFFICE O/P EST HI 40 MIN: CPT | Performed by: PHYSICIAN ASSISTANT

## 2025-04-01 NOTE — ASSESSMENT & PLAN NOTE
History of left TKA 10/6/22. Developed 2 weeks of left knee pain, fevers, chills and was seen by orthopedic surgery 9/23.  Arthrocentesis performed and Synovasure was positive with Staph epidermidis isolated.  Now status post left TKA revision with implantation of an articulating antibiotic spacer (1.5 stage revision arthroplasty) 10/3/24.  Operative cultures growing staph epidermidis in broth only. 3/28/25 labs reviewed. Cr 0.88, WBC 2.80, Hgb 13.3, Plt 217  -Completed IV Cefazolin 6 week course of IV antibiotics through 11/13/24 now followed by 4.5 months p.o. antibiotics (6 months total) due to 1.5 stage revision arthroplasty through 4/14/25  -Orthopedic surgery follow-up ongoing.  -check CBC diff, CMP, ESR/CRP 4/28/25  -continue Cefadroxil 1000 mg PO q 12 hours through 4/14/25  -Next ID office follow up appt prn hereafter  -patient planned for knee aspiration 5/6/25, and if clear, knee revision on 6/11/25  Orders:    CBC and differential; Future    Comprehensive metabolic panel; Future    ESR-Jensen+CRP; Future

## 2025-04-01 NOTE — ASSESSMENT & PLAN NOTE
Patient on hydroxychloroquine and Benlysta outpatient. Follows with a rheumatologist at [facility]. The Rinvoq has been on hold due to left knee prosthetic joint infection.   -Management of immunosuppression per her rheumatologist. She has been continued on hydroxychloroquine and Benlysta.   -Rinvoq is on hold

## 2025-04-01 NOTE — ASSESSMENT & PLAN NOTE
11/1/24 WBC 2.93 > 1/17/25 3.64 > 3/28/25 2.8 ANC 1600  -continue monitoring CBC diff monthly to assess for treatment response, drug toxicities  -check CBC diff 4/28/25; 2 weeks after Cefadroxil completed  Orders:    CBC and differential; Future

## 2025-04-01 NOTE — PROGRESS NOTES
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 Developed 2 weeks of left knee pain, fevers, chills and was seen by orthopedic surgery .  Arthrocentesis performed and Synovasure was positive with Staph epidermidis isolated.  Now status post left TKA revision with implantation of an articulating antibiotic spacer (1.5 stage revision arthroplasty) 10/3/24.  Operative cultures growing staph epidermidis in broth only. 3/28/25 labs reviewed. Cr 0.88, WBC 2.80, Hgb 13.3, Plt 217  -Completed IV Cefazolin 6 week course of IV antibiotics through 24 now followed by 4.5 months p.o. antibiotics (6 months total) due to 1.5 stage revision arthroplasty through 25  -Orthopedic surgery follow-up ongoing.  -check CBC diff, CMP, ESR/CRP 25  -continue Cefadroxil 1000 mg PO q 12 hours through 25  -Next ID office follow up appt prn hereafter  -patient planned for knee aspiration 25, and if clear, knee revision on 25  Orders:    CBC and differential; Future    Comprehensive metabolic panel; Future    ESR-Jensen+CRP; Future    Leukopenia  24 WBC 2.93 > 25 3.64 > 3/28/25 2.8 ANC 1600  -continue monitoring CBC diff monthly to assess for treatment response, drug toxicities  -check CBC diff 25; 2 weeks after Cefadroxil completed  Orders:    CBC and differential; Future    Systemic lupus erythematosus, unspecified SLE type, unspecified organ involvement status (HCC)  Patient on hydroxychloroquine and Benlysta outpatient. Follows with a rheumatologist at [facility]. The Rinvoq has been on hold due to left knee prosthetic joint infection.   -Management of immunosuppression per her rheumatologist. She  has been continued on hydroxychloroquine and Benlysta.   -Rinvoq is on hold        Antiphospholipid antibody syndrome (HCC)  On Coumadin outpatient. 3/28/25 INR 3.34  -follows with hematology  -Monitor INR        Penicillin allergy  Tolerating Cefadroxil. Reports as rash and anaphylaxis.   -Will continue to monitor        BMI 40.0-44.9, adult (HCC)  BMI 43.9 Can affect antibiotic dosing  -dose affect antibiotics as indicated       I have discussed the above management plan to continue Cefadroxil and check labs as above 2 weeks after course completed with patient in detail. All inquiries discussed and reassurance provided.     Antibiotics:  Cefadroxil since 11/14/24    History of Present Illness     Na Joseph is a 60 y.o. female who presents for outpatient ID follow up of infection of left TKR on PO Cefadroxil    Patient recovered from flulike illness in January. Left knee pain is increased today and with cold. She denies nausea, vomiting, fever, CP.  PICC line out. Few episodes of vaginal itching and itching/burning of groin folds L > R and soreness of anal area better with patient dying extremely well in those areas. Patient's loose stool more controlled with imodium daily; has 1 soft stool per day Patient's urinary frequency/urgency and dysuria has resolved s/p UTI management with Cipro.         Review of Systems   All other systems reviewed and are negative.          Objective     /74   Pulse 91   Temp (!) 97.4 °F (36.3 °C)   Resp 18   SpO2 99%     Physical Exam  Vitals reviewed.   Constitutional:       Appearance: Normal appearance.      Comments: Ambulatory in office without assistive devices, with mild limp.   HENT:      Head: Normocephalic and atraumatic.      Right Ear: External ear normal.      Left Ear: External ear normal.      Nose: Nose normal.      Mouth/Throat:      Pharynx: Oropharynx is clear.   Eyes:      Extraocular Movements: Extraocular movements intact.      Conjunctiva/sclera:  Conjunctivae normal.   Cardiovascular:      Rate and Rhythm: Normal rate.   Pulmonary:      Effort: Pulmonary effort is normal.      Breath sounds: Normal breath sounds.   Abdominal:      General: Bowel sounds are normal.      Palpations: Abdomen is soft.      Tenderness: There is no abdominal tenderness.      Comments: Protuberant pannus   Musculoskeletal:      Cervical back: Normal range of motion and neck supple.      Comments: L TKR incision closed, + swelling, no warmth/erythema or point tenderness on exam.    Skin:     Findings: No rash.   Neurological:      Mental Status: She is alert and oriented to person, place, and time. Mental status is at baseline.       Administrative Statements   I have spent a total time of 40 minutes in caring for this patient on the day of the visit/encounter including Diagnostic results, Prognosis, Risks and benefits of tx options, Instructions for management, Patient and family education, Importance of tx compliance, Risk factor reductions, Impressions, Counseling / Coordination of care, Documenting in the medical record, Reviewing / ordering tests, medicine, procedures  , Obtaining or reviewing history  , and Communicating with other healthcare professionals .

## 2025-04-11 ENCOUNTER — APPOINTMENT (OUTPATIENT)
Dept: LAB | Facility: CLINIC | Age: 60
End: 2025-04-11
Attending: PHYSICIAN ASSISTANT
Payer: MEDICARE

## 2025-04-11 DIAGNOSIS — U07.1 PULMONARY EMBOLISM ASSOCIATED WITH COVID-19 (HCC): Primary | ICD-10-CM

## 2025-04-11 DIAGNOSIS — Z79.01 ANTICOAGULATED ON COUMADIN: ICD-10-CM

## 2025-04-11 DIAGNOSIS — I26.99 PULMONARY EMBOLISM ASSOCIATED WITH COVID-19 (HCC): Primary | ICD-10-CM

## 2025-04-11 DIAGNOSIS — D68.61 ANTIPHOSPHOLIPID ANTIBODY SYNDROME (HCC): ICD-10-CM

## 2025-04-11 LAB
INR PPP: 2.74 (ref 0.85–1.19)
PROTHROMBIN TIME: 29.6 SECONDS (ref 12.3–15)

## 2025-04-11 PROCEDURE — 36415 COLL VENOUS BLD VENIPUNCTURE: CPT

## 2025-04-11 PROCEDURE — 85610 PROTHROMBIN TIME: CPT

## 2025-04-14 NOTE — PROGRESS NOTES
Cardiology Outpatient Progress Note - Na Joseph 60 y.o. female MRN: [id number]    @ Encounter: [id number]      Patient Active Problem List    Diagnosis Date Noted    GRIS (obstructive sleep apnea) 04/02/2024    Other sleep disorders 03/13/2024    Thyroid nodule 03/13/2024    Neutropenia, unspecified type ([facility]) 03/04/2024    Rheumatoid arthritis, involving unspecified site, unspecified whether rheumatoid factor present ([facility]) 03/04/2024    BMI 40.0-44.9, adult ([facility]) 03/04/2024    Chronic midline low back pain without sciatica 03/04/2024    Motion sickness 03/04/2024    Fall 11/11/2023    Anticoagulated 08/28/2023    Sleep disturbance 04/24/2023    Neuropathic pain, leg, left 04/24/2023    Antiphospholipid antibody syndrome ([facility]) 02/27/2023    Financial difficulties 02/08/2023    Insomnia 01/05/2023    Pulmonary embolus ([facility]) 01/05/2023    Iron deficiency anemia due to chronic blood loss 11/21/2022    COVID-19 11/05/2022    SLE (systemic lupus erythematosus) ([facility]) 11/05/2022    History of left knee replacement 10/20/2022    Venous stasis of lower extremity 05/09/2022    Acute pain of right knee 05/09/2022    Leukopenia 11/22/2021    Morbid obesity ([facility])     Primary osteoarthritis of left knee 02/12/2020    Left knee pain 11/11/2019    Tear of medial meniscus of left knee, current 11/11/2019    Right knee pain 02/18/2019    Status post total right knee replacement 02/18/2019    Chronic kidney disease, stage 3a ([facility]) 02/12/2019    Tear of medial meniscus of right knee, current 09/10/2018    Other tear of medial meniscus, current injury, right knee, initial encounter 07/16/2018    Patellofemoral disorder of right knee 06/04/2018    Pes anserinus bursitis of right knee 06/04/2018    Arthritis of right knee 06/04/2018    Arthritis of left knee 06/04/2018    Chronic pain of right knee 05/22/2018    Elevated serum creatinine 05/22/2018    Hyperuricemia 05/22/2018    Long-term use of hydroxychloroquine 02/16/2018     Pulmonary hypertension (HCC) 02/16/2018    Undifferentiated connective tissue disease (HCC) 02/16/2018    Pulmonary artery hypertension (HCC) 12/15/2017    Diffuse connective tissue disease (HCC) 11/21/2017    Pes anserine bursitis 11/21/2017    Trochanteric bursitis of both hips 11/21/2017    Vitamin D deficiency 11/21/2017    Other organ or system involvement in systemic lupus erythematosus (HCC) 11/17/2017    Sinus tachycardia 11/09/2017    B12 deficiency 10/19/2017    Age-related osteoporosis without current pathological fracture 10/19/2017    Lupus anticoagulant disorder (HCC) 10/18/2017    Positive HELENE (antinuclear antibody) 10/18/2017    Hot flashes due to menopause 09/01/2015    SOB (shortness of breath) on exertion 11/12/2012    Other chronic pulmonary heart diseases 11/05/2012    Edema 06/04/2012    Post-nasal drip 06/04/2012    Chronic cough 02/09/2012    Dyspnea 02/09/2012    Staphylococcal arthritis, left knee ([facility]) 01/28/2025    Diarrhea 11/12/2024    Infection of prosthetic left knee joint ([facility]) 10/08/2024    Penicillin allergy 10/07/2024    Acute blood loss as cause of postoperative anemia 10/06/2024    Infection of total knee replacement  ([facility]) 10/02/2024    Maltracking of left patella 09/20/2024    PONV (postoperative nausea and vomiting) 10/06/2022       Assessment:  # Hx of Pulmonary Embolis 11/5/22 associated with COVID 19 infection, also had TKR on 10/6/22  AC: warfarin 5 mg alternating 7.5 mg    Positive lupus anticoagulant + 9/26/22    Echo 11/6/22:  LVEF: 65%  RV: upper normal size, normal function  PASP: 42 mmHg    CTA 11/5/22: moderate quantity of RLL segmental and small quantity RUL and LLL acute PE    # Exercise induced PAH; HFpEF with PH  Out of proportion to obesity/ deconditioning/ diastolic dysfunction (PCWP 12 mmHg). Pt with MCTD/ SLE w/ lupus anticoagulant. Remote smoker. At rest, RV appears normal on TTE with coupled RV-PA w/o e/o of RVOT notching suggestive of normal PVR at rest.  However, she did have a stress echo in 2012 that was suggestive of exercise induced pulmonary HTN. At the time she did not have e/o of elevated PVR. Her bubble was negative which makes an intracardiac shunt less likely.    PAH Rx: sildanefil 40 mg Q8 (previously on macitentan)  Diuretic: torsemide 20 mg daily, spironolactone 25 mg daily  NT proBNP:   Weight: 237 lbs--> 229 lbs--> 218 lbs--> 225 lbs--> 244 lbs    Studies- personally reviewed by me  Echo 3/10/25:  LVEF: 65%  RV: normal  PASP: 36 mmHg    Echo 2/21/24:  LVEF: 60%  RV: normal  PASP: 39 mmHg  RVOT: no notching    Echo 11/6/22: (in setting of acute PE)  LVEF: 65%  RV: upper normal size, normal function  PASP: 42 mmHg    Stress Echo 11/4/20: 3 min, 4.6 METs, max heart rate 155 bpm, resting /96    Echo 4/23/19:  Normal RV size and function    RHC with stress 12/19/17:  She had an appropriate HR response from 100 to 130 bpm with MAP throughout the study staying at 112 mmHg.  Hgb 13.1     Rest:  RA 7  RV 37/4/13  PA 33/16/25  PCWP 12    SaO2 99%  MvO2 72.3%  CO/CI 4.8/2.3  TPG 13  DPG 4  PVR 2.7     Exercise:  CVP 10  PA 54/29/37  PCWP 18    SaO2 100%  MvO2 66%  CO/CI 4/1.9  TPG 19  DPG 11  PVR 4.8  CVP:PCWP 0.55    Stress echo 12/14/17:  to evaluate pulmonary pressures, RV, and RVOT signal w/ exercise.  5 min, 20 sec.  HTNsive response, decrease in O2 saturation from 99% to 91% and increase in PA pressures from 45 mmHg to 70 mmHg with exercise.    PFTs 12/1/17: normal, DLCO 84%    # SLE: Per outpatient Rheumatologist. Continue plaquenil  # ST: V/Q negative. May be 2/2 to deconditioning +/- inappropriate ST +/- diastolic dysfunction/HF. No e/o infection.    Holter 12/4/20: , 87 bpm  # Morbid obesity: Continue weight loss  # TKR on 10/6/22  # GRIS screen  Home study 4/2024: negative- AHI 2.5    TODAY'S PLAN:  Warfarin probably lifelong  Continue sildanefil- will try to slowly go up on sildanefil- doing better on 40 mg TID  RV looks great,  low risk RV  Torsemide 20 mg and spironolactone 25 mg daily-  Likely has venous insufficiency  Continued weight loss strategies    HPI:          59 yo followed for out of proportion PAH, exercise induced PAH, with MCTD/ SLE, + lupus anticoagulant, obesity. She has seen Dr Baird.  first started getting SOB -- 10 years ago. She has had several episodes of bacterial PNA and chronic cough (a few times/year). She was referred to [facility] in 2012 for workup for PH. She had a a stress echo 8/2012 that showed that her PA pressures more than doubled from --24 mmHg to > 50 mmHg. Currently, she states she can walk up a couple flights of stairs but does get SOB. She also gets SOB with walking up inclines. She gets occasional LH.      She was seeing a Rheumatologist in [place], [place] but recently saw a specialist at [facility], Dr. Ivy (Rheumatologist - 11/16). She was diagnosed with MCTD and SLE. HELENE + 1:320 w/ speckled pattern. She has been on Plaquenil x 2 weeks now. She is on ASA for + lupus anticoagulant and anticardiolipin Abs. She has joint pains and a subtle malar rash. She states so far there has been no change with plaquenil.     After her visit with Dr. Stanton, she has had TTE which shows LVEF --65%, normal RV size and function without RVOT notching. Normal atria. CXR clear. She also has had a negative V/Q scan. She walked the patient in the hallway and had her go up and down stairs. Her resting HR went from --100 bpm to 110s with Pulse Ox starting from 98% @ rest to --90% with exercise.     Stress Echo in 2017 showed hypertensive response and PA pressures to 70 mmHg w/ drop in pulse ox to 91%. Exercise RHC showed increase in PVR to 4.5 MOLINA from < 3 MOLINA. She was denied Tadalafil but approved for sildanefil. She did not feel any different and continued trouble with stairs.       Called 5/12 that returned from cruise a week ago and developed swelling in right leg. Ultrasound negative for DVT. Had fluid removed from  right knee by Dr Zendejas      [facility] rheumatologist wants her to see a cardiologist at [facility]  Admitted 11/5 to 11/7 with pulmonary embolism associated with COVID 19 infection; Pt though already had TKR on 10/6/22  On warfarin. Lupus anticoagulant    Interval History:   Feeling ok  Infection TKA   Any better on sildanefil 40 mg Q8- feels much better on increased dose  Plan for knee aspiration on 5/6 and if clear then knee revision on 6/11/25    Echo 3/10/25:  LVEF: 65%, mild LVH  RV: normal  PASP: 36 mmHg    Down 8 lbs  Past Medical History:   Diagnosis Date    HELENE positive     Anemia     Sildenafil causes decreased hematocrit    Antiphospholipid syndrome (HCC)     Anxiety 3/2020    CHF (congestive heart failure) ([facility])     right heart failure related to pulm HTN lupus    Chronic kidney disease 2018    borderline lab values    Chronic rhinitis 06/04/2012    Clotting disorder (HCC) 2012    Coronary artery disease 2018    Encephalitis     Lasted Assessed 10/18/2017    Gout 06/26/2018    Head injury 11/11/2023    Heart failure (HCC) 2019    History of transfusion 2/13/2019    autologous    Hypertension     Lupus 2018    Lupus anticoagulant disorder (HCC)     Maxillary sinusitis     Lasted Assessed 10/18/2017    Night sweat     Osteopenia     Hip    Osteoporosis     Spine    Pneumonia     Last Assessed 10/18/2017    PONV (postoperative nausea and vomiting)     Pulmonary embolism (HCC)     Pulmonary hypertension (HCC)     Rheumatic disease     Seizures (HCC)     Only during Encephalitis    Shortness of breath     with exertion    Sinus tachycardia     Urinary incontinence        Review of Systems   Constitutional:  Negative for activity change, appetite change, fatigue and unexpected weight change.   HENT:  Negative for congestion and nosebleeds.    Eyes: Negative.    Respiratory:  Negative for cough, chest tightness and shortness of breath.    Cardiovascular:  Negative for chest pain, palpitations and leg swelling.  "  Gastrointestinal:  Negative for abdominal distention.   Endocrine: Negative.    Genitourinary: Negative.    Musculoskeletal: Negative.    Skin: Negative.    Neurological:  Negative for dizziness, syncope and weakness.   Hematological: Negative.    Psychiatric/Behavioral: Negative.         Allergies   Allergen Reactions    Dilantin [Phenytoin] Anaphylaxis and Rash    Penicillins Rash, Anaphylaxis, Dermatitis and Hives    Augmentin [Amoxicillin-Pot Clavulanate] Rash and Dermatitis     .    Current Outpatient Medications:     Abaloparatide (Tymlos) 3120 MCG/1.56ML SOPN, Inject 80 mcg under the skin in the morning, Disp: 1.56 mL, Rfl: 4    acetaminophen (TYLENOL) 500 mg tablet, Take 2 tablets (1,000 mg total) by mouth every 8 (eight) hours, Disp: 60 tablet, Rfl: 0    B-D 3CC LUER-LILLIE SYR 25GX1/2\" 25G X 1-1/2\" 3 ML MISC, USE 1 SYRINGE EVERY 30 DAYS, Disp: 12 each, Rfl: 1    Benlysta 200 MG/ML SOAJ, Every friday subcutaneous injection, Disp: , Rfl:     cholecalciferol (VITAMIN D3) 1,000 units tablet, Take 2,000 Units by mouth daily in the early morning  , Disp: , Rfl:     clindamycin (CLEOCIN) 300 MG capsule, Take 3 tablets by mouth, 1 hour prior to procedure., Disp: 3 capsule, Rfl: 1    cyanocobalamin 1,000 mcg/mL, 1000 mcg IM Q 2 weeks, Disp: 10 mL, Rfl: 3    fluconazole (DIFLUCAN) 150 mg tablet, , Disp: , Rfl:     gabapentin (NEURONTIN) 100 mg capsule, TAKE 1 CAPSULE(100 MG) BY MOUTH THREE TIMES DAILY, Disp: 90 capsule, Rfl: 5    hydroxychloroquine (PLAQUENIL) 200 mg tablet, Take 400 mg by mouth daily with breakfast Alternating days/dosages odd days 1tabs  even days 2tabs, Disp: , Rfl:     NEEDLE, DISP, 23 G (BD Disp Needle) 23G X 1\" MISC, Use to administer B12, Disp: 10 each, Rfl: 3    nystatin (MYCOSTATIN) powder, Apply topically 3 (three) times a day, Disp: 60 g, Rfl: 0    ondansetron (ZOFRAN) 4 mg tablet, Take 1 tablet (4 mg total) by mouth every 8 (eight) hours as needed for nausea or vomiting, Disp: 20 tablet, " Rfl: 1    potassium chloride (Klor-Con M20) 20 mEq tablet, Take 1 tablet (20 mEq total) by mouth daily, Disp: 30 tablet, Rfl: 2    saccharomyces boulardii (FLORASTOR) 250 mg capsule, Take 1 capsule (250 mg total) by mouth 2 (two) times a day, Disp: 60 capsule, Rfl: 4    sildenafil (REVATIO) 20 mg tablet, Take 2 tablets (40 mg total) by mouth 3 (three) times a day, Disp: 180 tablet, Rfl: 5    spironolactone (ALDACTONE) 25 mg tablet, Take 1 tablet (25 mg total) by mouth daily, Disp: 90 tablet, Rfl: 3    torsemide (DEMADEX) 20 mg tablet, TAKE 1 TABLET(20 MG) BY MOUTH DAILY, Disp: 90 tablet, Rfl: 1    traZODone (DESYREL) 50 mg tablet, TAKE 2 TABLETS(100 MG) BY MOUTH DAILY AT BEDTIME, Disp: 180 tablet, Rfl: 1    ascorbic acid (VITAMIN C) 500 MG tablet, Take 1 tablet (500 mg total) by mouth 2 (two) times a day (Patient not taking: Reported on 4/16/2025), Disp: 60 tablet, Rfl: 1    cefadroxil (DURICEF) 500 mg capsule, Take 2 capsules (1,000 mg total) by mouth every 12 (twelve) hours Do not start before November 14, 2024., Disp: 120 capsule, Rfl: 4    folic acid (FOLVITE) 1 mg tablet, Take 1 tablet (1 mg total) by mouth daily (Patient not taking: Reported on 4/16/2025), Disp: 30 tablet, Rfl: 1    Multiple Vitamins-Minerals (multivitamin with minerals) tablet, Take 1 tablet by mouth daily (Patient not taking: Reported on 4/16/2025), Disp: 30 tablet, Rfl: 1    triamcinolone (KENALOG) 0.1 % oral topical paste, prn (Patient not taking: Reported on 2/3/2025), Disp: , Rfl:     warfarin (Coumadin) 5 mg tablet, 7.5mg po daily or as directed., Disp: 150 tablet, Rfl: 3    Social History     Socioeconomic History    Marital status: /Civil Union     Spouse name: Not on file    Number of children: 2    Years of education: Not on file    Highest education level: Not on file   Occupational History    Not on file   Tobacco Use    Smoking status: Former     Current packs/day: 0.00     Types: Cigarettes     Quit date: 1/24/2006      Years since quittin.2    Smokeless tobacco: Never   Vaping Use    Vaping status: Never Used   Substance and Sexual Activity    Alcohol use: Yes     Comment: Socially    Drug use: No    Sexual activity: Yes     Partners: Male     Birth control/protection: Female Sterilization   Other Topics Concern    Not on file   Social History Narrative    Daily caffeinated coffee consumption    Exercise habits     Social Drivers of Health     Financial Resource Strain: Medium Risk (2024)    Overall Financial Resource Strain (CARDIA)     Difficulty of Paying Living Expenses: Somewhat hard   Food Insecurity: No Food Insecurity (3/1/2025)    Hunger Vital Sign     Worried About Running Out of Food in the Last Year: Never true     Ran Out of Food in the Last Year: Never true   Transportation Needs: No Transportation Needs (3/1/2025)    PRAPARE - Transportation     Lack of Transportation (Medical): No     Lack of Transportation (Non-Medical): No   Physical Activity: Not on file   Stress: Not on file   Social Connections: Not on file   Intimate Partner Violence: Not on file   Housing Stability: High Risk (3/1/2025)    Housing Stability Vital Sign     Unable to Pay for Housing in the Last Year: Yes     Number of Times Moved in the Last Year: 0     Homeless in the Last Year: No       Family History   Problem Relation Age of Onset    Uterine cancer Mother     Pancreatic cancer Mother 70    Diabetes Mother     Endometrial cancer Mother 66    Arthritis Mother     Cancer Mother         Pancreas, Uterine    Vision loss Mother     Osteoporosis Mother     Heart disease Father         open heart surgery at age 50     Stroke Father         CVA    Hypertension Father     Atrial fibrillation Father     Hyperlipidemia Father     Arthritis Father     Rheum arthritis Father     Heart attack Father     No Known Problems Sister     No Known Problems Sister     Thyroid disease Sister     Depression Sister     Osteoarthritis Sister     Asthma  "Sister     Asthma Daughter     Migraines Daughter     Asthma Daughter     Thyroid disease Maternal Grandmother     Heart attack Maternal Grandfather     Heart disease Maternal Grandfather     Heart attack Paternal Grandmother     Heart disease Paternal Grandmother     Skin cancer Paternal Grandfather     No Known Problems Brother     Hemochromatosis Brother     Alcohol abuse Brother     Early death Brother         heriditary hemachromotosis    No Known Problems Maternal Aunt     No Known Problems Paternal Aunt     Anuerysm Neg Hx     Clotting disorder Neg Hx     Heart failure Neg Hx        Physical Exam:    Vitals: Blood pressure 130/78, pulse 85, height 5' 2\" (1.575 m), weight 107 kg (235 lb), SpO2 98%, not currently breastfeeding., Body mass index is 42.98 kg/m².,   Wt Readings from Last 3 Encounters:   04/16/25 107 kg (235 lb)   03/10/25 109 kg (240 lb)   03/06/25 109 kg (240 lb)         Physical Exam:    Physical Exam  Constitutional:       Appearance: She is well-developed.   HENT:      Head: Normocephalic and atraumatic.   Eyes:      Pupils: Pupils are equal, round, and reactive to light.   Neck:      Vascular: No JVD.   Cardiovascular:      Rate and Rhythm: Normal rate and regular rhythm.      Heart sounds: No murmur heard.  Pulmonary:      Effort: Pulmonary effort is normal. No respiratory distress.      Breath sounds: Normal breath sounds.   Abdominal:      General: There is no distension.      Palpations: Abdomen is soft.      Tenderness: There is no abdominal tenderness.   Musculoskeletal:         General: Normal range of motion.      Cervical back: Normal range of motion.   Skin:     General: Skin is warm and dry.      Findings: No rash.   Neurological:      Mental Status: She is alert and oriented to person, place, and time.         Labs & Results:    Lab Results   Component Value Date    SODIUM 139 03/28/2025    K 3.9 03/28/2025     03/28/2025    CO2 29 03/28/2025    BUN 16 03/28/2025    " CREATININE 0.88 03/28/2025    GLUC 83 11/01/2024    CALCIUM 9.7 03/28/2025     Lab Results   Component Value Date    WBC 2.80 (L) 03/28/2025    HGB 13.3 03/28/2025    HCT 40.6 03/28/2025    MCV 92 03/28/2025     03/28/2025     Lab Results   Component Value Date    BNP 16 11/05/2022      Lab Results   Component Value Date    CHOLESTEROL 189 03/28/2025    CHOLESTEROL 181 04/03/2023    CHOLESTEROL 176 10/09/2020     Lab Results   Component Value Date    HDL 56 03/28/2025    HDL 57 04/03/2023    HDL 60 10/09/2020     Lab Results   Component Value Date    TRIG 103 03/28/2025    TRIG 100 04/03/2023    TRIG 107 10/09/2020     Lab Results   Component Value Date    NONHDLC 124 04/03/2023    NONHDLC 116 10/09/2020    NONHDLC 112 05/17/2018         EKG personally reviewed by Hardeep Zavaleta.     Counseling / Coordination of Care  Time spent today 25 minutes.  Greater than 50% of total time was spent with the patient and / or family counseling and / or coordination of care.  We discussed diagnoses, most recent studies, tests and any changes in treatment plan    Thank you for the opportunity to participate in the care of this patient.    HARDEEP ZAVALETA D.O.  DIRECTOR OF HEART FAILURE/ PULMONARY HYPERTENSION  MEDICAL DIRECTOR OF LVAD PROGRAM  [facility]

## 2025-04-16 ENCOUNTER — OFFICE VISIT (OUTPATIENT)
Dept: CARDIOLOGY CLINIC | Facility: CLINIC | Age: 60
End: 2025-04-16
Payer: MEDICARE

## 2025-04-16 VITALS
DIASTOLIC BLOOD PRESSURE: 78 MMHG | OXYGEN SATURATION: 98 % | SYSTOLIC BLOOD PRESSURE: 130 MMHG | BODY MASS INDEX: 43.24 KG/M2 | HEIGHT: 62 IN | HEART RATE: 85 BPM | WEIGHT: 235 LBS

## 2025-04-16 DIAGNOSIS — G47.33 OSA (OBSTRUCTIVE SLEEP APNEA): ICD-10-CM

## 2025-04-16 DIAGNOSIS — I27.21 PULMONARY ARTERY HYPERTENSION (HCC): Primary | ICD-10-CM

## 2025-04-16 PROCEDURE — 99214 OFFICE O/P EST MOD 30 MIN: CPT | Performed by: INTERNAL MEDICINE

## 2025-04-23 ENCOUNTER — TELEPHONE (OUTPATIENT)
Dept: OTHER | Facility: HOSPITAL | Age: 60
End: 2025-04-23

## 2025-04-23 ENCOUNTER — NURSE TRIAGE (OUTPATIENT)
Age: 60
End: 2025-04-23

## 2025-04-23 ENCOUNTER — APPOINTMENT (EMERGENCY)
Dept: CT IMAGING | Facility: HOSPITAL | Age: 60
End: 2025-04-23
Payer: COMMERCIAL

## 2025-04-23 ENCOUNTER — HOSPITAL ENCOUNTER (EMERGENCY)
Facility: HOSPITAL | Age: 60
Discharge: HOME | End: 2025-04-23
Attending: EMERGENCY MEDICINE
Payer: COMMERCIAL

## 2025-04-23 VITALS
HEART RATE: 84 BPM | RESPIRATION RATE: 18 BRPM | SYSTOLIC BLOOD PRESSURE: 139 MMHG | OXYGEN SATURATION: 96 % | DIASTOLIC BLOOD PRESSURE: 65 MMHG | TEMPERATURE: 98.1 F

## 2025-04-23 DIAGNOSIS — D68.61 ANTIPHOSPHOLIPID ANTIBODY SYNDROME (HCC): ICD-10-CM

## 2025-04-23 DIAGNOSIS — R31.9 HEMATURIA, UNSPECIFIED TYPE: ICD-10-CM

## 2025-04-23 DIAGNOSIS — N20.0 NEPHROLITHIASIS: Primary | ICD-10-CM

## 2025-04-23 DIAGNOSIS — Z79.01 ANTICOAGULATED ON COUMADIN: ICD-10-CM

## 2025-04-23 DIAGNOSIS — N39.0 UTI (URINARY TRACT INFECTION): ICD-10-CM

## 2025-04-23 DIAGNOSIS — I26.99 PULMONARY EMBOLISM ASSOCIATED WITH COVID-19 (HCC): Primary | ICD-10-CM

## 2025-04-23 DIAGNOSIS — U07.1 PULMONARY EMBOLISM ASSOCIATED WITH COVID-19 (HCC): Primary | ICD-10-CM

## 2025-04-23 LAB
ALBUMIN SERPL BCG-MCNC: 4.3 G/DL (ref 3.5–5)
ALP SERPL-CCNC: 113 U/L (ref 34–104)
ALT SERPL W P-5'-P-CCNC: 9 U/L (ref 7–52)
ANION GAP SERPL CALCULATED.3IONS-SCNC: 9 MMOL/L (ref 4–13)
APTT PPP: 31 SECONDS (ref 23–34)
AST SERPL W P-5'-P-CCNC: 10 U/L (ref 13–39)
BACTERIA UR QL AUTO: ABNORMAL /HPF
BASOPHILS # BLD AUTO: 0.02 THOUSANDS/ÂΜL (ref 0–0.1)
BASOPHILS NFR BLD AUTO: 1 % (ref 0–1)
BILIRUB SERPL-MCNC: 0.7 MG/DL (ref 0.2–1)
BILIRUB UR QL STRIP: NEGATIVE
BUN SERPL-MCNC: 13 MG/DL (ref 5–25)
CALCIUM SERPL-MCNC: 10 MG/DL (ref 8.4–10.2)
CHLORIDE SERPL-SCNC: 103 MMOL/L (ref 96–108)
CLARITY UR: ABNORMAL
CO2 SERPL-SCNC: 27 MMOL/L (ref 21–32)
COLOR UR: ABNORMAL
CREAT SERPL-MCNC: 0.87 MG/DL (ref 0.6–1.3)
EOSINOPHIL # BLD AUTO: 0.05 THOUSAND/ÂΜL (ref 0–0.61)
EOSINOPHIL NFR BLD AUTO: 1 % (ref 0–6)
ERYTHROCYTE [DISTWIDTH] IN BLOOD BY AUTOMATED COUNT: 13.8 % (ref 11.6–15.1)
GFR SERPL CREATININE-BSD FRML MDRD: 71 ML/MIN/1.73SQ M
GLUCOSE SERPL-MCNC: 110 MG/DL (ref 65–140)
GLUCOSE UR STRIP-MCNC: NEGATIVE MG/DL
HCT VFR BLD AUTO: 41 % (ref 34.8–46.1)
HGB BLD-MCNC: 13.5 G/DL (ref 11.5–15.4)
HGB UR QL STRIP.AUTO: ABNORMAL
IMM GRANULOCYTES # BLD AUTO: 0 THOUSAND/UL (ref 0–0.2)
IMM GRANULOCYTES NFR BLD AUTO: 0 % (ref 0–2)
INR PPP: 1.7 (ref 0.85–1.19)
KETONES UR STRIP-MCNC: NEGATIVE MG/DL
LACTATE SERPL-SCNC: 1.4 MMOL/L (ref 0.5–2)
LEUKOCYTE ESTERASE UR QL STRIP: ABNORMAL
LYMPHOCYTES # BLD AUTO: 0.99 THOUSANDS/ÂΜL (ref 0.6–4.47)
LYMPHOCYTES NFR BLD AUTO: 27 % (ref 14–44)
MCH RBC QN AUTO: 30.3 PG (ref 26.8–34.3)
MCHC RBC AUTO-ENTMCNC: 32.9 G/DL (ref 31.4–37.4)
MCV RBC AUTO: 92 FL (ref 82–98)
MONOCYTES # BLD AUTO: 0.37 THOUSAND/ÂΜL (ref 0.17–1.22)
MONOCYTES NFR BLD AUTO: 10 % (ref 4–12)
NEUTROPHILS # BLD AUTO: 2.29 THOUSANDS/ÂΜL (ref 1.85–7.62)
NEUTS SEG NFR BLD AUTO: 61 % (ref 43–75)
NITRITE UR QL STRIP: NEGATIVE
NON-SQ EPI CELLS URNS QL MICRO: ABNORMAL /HPF
NRBC BLD AUTO-RTO: 0 /100 WBCS
PH UR STRIP.AUTO: 7.5 [PH]
PLATELET # BLD AUTO: 241 THOUSANDS/UL (ref 149–390)
PMV BLD AUTO: 11 FL (ref 8.9–12.7)
POTASSIUM SERPL-SCNC: 4 MMOL/L (ref 3.5–5.3)
PROCALCITONIN SERPL-MCNC: <0.05 NG/ML
PROT SERPL-MCNC: 6.7 G/DL (ref 6.4–8.4)
PROT UR STRIP-MCNC: ABNORMAL MG/DL
PROTHROMBIN TIME: 20.7 SECONDS (ref 12.3–15)
RBC # BLD AUTO: 4.45 MILLION/UL (ref 3.81–5.12)
RBC #/AREA URNS AUTO: ABNORMAL /HPF
SODIUM SERPL-SCNC: 139 MMOL/L (ref 135–147)
SP GR UR STRIP.AUTO: 1.01 (ref 1–1.03)
UROBILINOGEN UR STRIP-ACNC: <2 MG/DL
WBC # BLD AUTO: 3.72 THOUSAND/UL (ref 4.31–10.16)
WBC #/AREA URNS AUTO: ABNORMAL /HPF

## 2025-04-23 PROCEDURE — 99284 EMERGENCY DEPT VISIT MOD MDM: CPT

## 2025-04-23 PROCEDURE — 85730 THROMBOPLASTIN TIME PARTIAL: CPT

## 2025-04-23 PROCEDURE — 87086 URINE CULTURE/COLONY COUNT: CPT

## 2025-04-23 PROCEDURE — 80053 COMPREHEN METABOLIC PANEL: CPT

## 2025-04-23 PROCEDURE — 85610 PROTHROMBIN TIME: CPT

## 2025-04-23 PROCEDURE — 81001 URINALYSIS AUTO W/SCOPE: CPT

## 2025-04-23 PROCEDURE — 36415 COLL VENOUS BLD VENIPUNCTURE: CPT

## 2025-04-23 PROCEDURE — 74177 CT ABD & PELVIS W/CONTRAST: CPT

## 2025-04-23 PROCEDURE — 87040 BLOOD CULTURE FOR BACTERIA: CPT

## 2025-04-23 PROCEDURE — 84145 PROCALCITONIN (PCT): CPT

## 2025-04-23 PROCEDURE — 83605 ASSAY OF LACTIC ACID: CPT

## 2025-04-23 PROCEDURE — 99285 EMERGENCY DEPT VISIT HI MDM: CPT | Performed by: EMERGENCY MEDICINE

## 2025-04-23 PROCEDURE — 96361 HYDRATE IV INFUSION ADD-ON: CPT

## 2025-04-23 PROCEDURE — 85025 COMPLETE CBC W/AUTO DIFF WBC: CPT

## 2025-04-23 PROCEDURE — 96374 THER/PROPH/DIAG INJ IV PUSH: CPT

## 2025-04-23 RX ORDER — KETOROLAC TROMETHAMINE 30 MG/ML
15 INJECTION, SOLUTION INTRAMUSCULAR; INTRAVENOUS ONCE
Status: COMPLETED | OUTPATIENT
Start: 2025-04-23 | End: 2025-04-23

## 2025-04-23 RX ORDER — NITROFURANTOIN 25; 75 MG/1; MG/1
100 CAPSULE ORAL 2 TIMES DAILY
Qty: 14 CAPSULE | Refills: 0 | Status: SHIPPED | OUTPATIENT
Start: 2025-04-23 | End: 2025-04-23

## 2025-04-23 RX ORDER — NITROFURANTOIN 25; 75 MG/1; MG/1
100 CAPSULE ORAL 2 TIMES DAILY
Qty: 14 CAPSULE | Refills: 0 | Status: SHIPPED | OUTPATIENT
Start: 2025-04-23 | End: 2025-04-25

## 2025-04-23 RX ORDER — NITROFURANTOIN 25; 75 MG/1; MG/1
100 CAPSULE ORAL ONCE
Status: COMPLETED | OUTPATIENT
Start: 2025-04-23 | End: 2025-04-23

## 2025-04-23 RX ADMIN — IOHEXOL 100 ML: 350 INJECTION, SOLUTION INTRAVENOUS at 12:06

## 2025-04-23 RX ADMIN — SODIUM CHLORIDE 500 ML: 0.9 INJECTION, SOLUTION INTRAVENOUS at 14:42

## 2025-04-23 RX ADMIN — KETOROLAC TROMETHAMINE 15 MG: 30 INJECTION, SOLUTION INTRAMUSCULAR; INTRAVENOUS at 14:42

## 2025-04-23 RX ADMIN — NITROFURANTOIN MONOHYDRATE/MACROCRYSTALLINE 100 MG: 25; 75 CAPSULE ORAL at 15:26

## 2025-04-23 NOTE — TELEPHONE ENCOUNTER
"FOLLOW UP: Reviewed with covering provider Dr. Metzger and recommended reaching out to PCP and will make heme provider aware. Patient would like to speak with Maile March PA-C prior to contacting PCP.     REASON FOR CONVERSATION: Symptom Management and Blood in Urine    SYMPTOMS: Hematuria since Saturday 4/19  Held 2 doses warfarin 5mg on own over weekend with improvement  Warfarin 5mg taken yesterday and hematuria restarted   Bleeding with each urination, denies bleeding in between episodes  Mixed dark red and bright red blood with urine, moderate amount  Mild-moderate back pain since last week, took one dose of aleve last week  Nausea last night  Denies fever, abdominal pain, vomiting, bloody or dark tarry stools    OTHER: Patient requesting INR, CBC, and UA orders to be placed to have done today. Reviewed with covering provider. Will make Maile March PA-C for further recommendations. Reviewed ED precautions and patient prefers call back at [phone]     DISPOSITION: Go to Office Now/Urgent Care, Discuss with Provider and Call Back Patient (overriding Go to Office Now/Urgent Care)      Reason for Disposition   Taking Coumadin (warfarin) or other strong blood thinner, or known bleeding disorder (e.g., thrombocytopenia)    Answer Assessment - Initial Assessment Questions  1. COLOR of URINE: \"Describe the color of the urine.\"  (e.g., tea-colored, pink, red, bloody) \"Do you have blood clots in your urine?\" (e.g., none, pea, grape, small coin)      Mixed dark and bright red urine    2. ONSET: \"When did the bleeding start?\"       Over the weekend, held 2 doses of Warfarin 5mg on own over weekend and bleeding improved. Took Warfarin 5mg yesterday and bleeding restarted today    3. EPISODES: \"How many times has there been blood in the urine?\" or \"How many times today?\"      Each urination    5. FEVER: \"Do you have a fever?\" If Yes, ask: \"What is your temperature, how was it measured, and when did it " "start?\"      Denies    6. ASSOCIATED SYMPTOMS: \"Are you passing urine more frequently than usual?\"      Denies    7. OTHER SYMPTOMS: \"Do you have any other symptoms?\" (e.g., back/flank pain, abdomen pain, vomiting)      Mild to moderate back pain since last week  Denies fever, abdominal pain, vomiting    Protocols used: Urine - Blood In-Adult-OH    "

## 2025-04-23 NOTE — TELEPHONE ENCOUNTER
Reviewed with Dr. Brewster, per him patient needs ED evaluation for CT abdomen pelvis and urology workup ASAP. (Maile is out of office)

## 2025-04-23 NOTE — ED PROVIDER NOTES
"Time reflects when diagnosis was documented in both MDM as applicable and the Disposition within this note       Time User Action Codes Description Comment    4/23/2025  3:18 PM Mickey Lombardo Add [N20.0] Nephrolithiasis     4/23/2025  3:18 PM Mickey Lombardo Add [N39.0] UTI (urinary tract infection)           ED Disposition       ED Disposition   Discharge    Condition   Stable    Date/Time   Wed Apr 23, 2025  3:14 PM    Comment   Na Joseph discharge to home/self care.                   Assessment & Plan       Medical Decision Making  Differential diagnosis includes but not limited to: Gastritis, enteritis, gastroenteritis, nephrolithiasis, pancreatitis, UTI, pyelonephritis    Patient came in with hematuria with right flank pain suspicious for nephrolithiasis.  Initially did not need any pain medications.  She arrived tachycardic, which resolved with pain control.  CT abdomen pelvis showed 7 mm obstructing stone with mild hydroureteronephrosis.  Patient's urine was suggestive of mild infection and will be treated with Macrobid with first dose here and then 7 days outpatient.  Discussed the patient with urology, who stated they are comfortable with outpatient follow-up due to patient's pain being well-controlled.  Patient's INR was subtherapeutic and she was instructed to follow-up with her primary care for medication management.   pain well-controlled with Toradol and IV fluids.  Patient was comfortable with the plan of outpatient follow-up with urology and she was instructed to strain all her urine for which directions were provided.  Patient understands and agrees with the plan.  Strict return precautions given if symptoms are worsening or not resolving.  Patient discharged in stable condition.      Portions of the record have been created with voice recognition software.  Occasional wrong word or \"sound a like\" substitution may have occurred due to the inherent limitations of voice recognition " software.  Read the chart carefully and recognize, using context, where substitutions have occurred.       Amount and/or Complexity of Data Reviewed  Labs: ordered.  Radiology: ordered.    Risk  Prescription drug management.        ED Course as of 04/23/25 1717   Wed Apr 23, 2025   1514 Discussed patient with urology, they are comfortable seeing her outpatient due to pain being controlled, no systemic fevers or chills, patient is limited urinate.  They state they will hopefully be able to get her in within the week and perform surgery if needed.  Discussed this with the patient, they felt comfortable with the plan.       Medications   iohexol (OMNIPAQUE) 350 MG/ML injection (MULTI-DOSE) 100 mL (100 mL Intravenous Given 4/23/25 1206)   ketorolac (TORADOL) injection 15 mg (15 mg Intravenous Given 4/23/25 1442)   sodium chloride 0.9 % bolus 500 mL (0 mL Intravenous Stopped 4/23/25 1551)   nitrofurantoin (MACROBID) extended-release capsule 100 mg (100 mg Oral Given 4/23/25 1526)       ED Risk Strat Scores                    No data recorded                            History of Present Illness       Chief Complaint   Patient presents with    Blood in Urine     Patient reports blood in urine on Saturday/Sunday. Concerned PT INR was low skipped 2 doses of Warfarin. Urine was clear until this am. Sent by hematology for Evaluation C/o right flank pain as well.        Past Medical History:   Diagnosis Date    HELENE positive     Anemia     Sildenafil causes decreased hematocrit    Antiphospholipid syndrome (HCC)     Anxiety 3/2020    CHF (congestive heart failure) (HCC)     right heart failure related to pulm HTN lupus    Chronic kidney disease 2018    borderline lab values    Chronic rhinitis 06/04/2012    Clotting disorder (HCC) 2012    Coronary artery disease 2018    Encephalitis     Lasted Assessed 10/18/2017    Gout 06/26/2018    Head injury 11/11/2023    Heart failure (HCC) 2019    History of transfusion 2/13/2019     autologous    Hypertension     Lupus 2018    Lupus anticoagulant disorder (HCC)     Maxillary sinusitis     Lasted Assessed 10/18/2017    Night sweat     Osteopenia     Hip    Osteoporosis     Spine    Pneumonia     Last Assessed 10/18/2017    PONV (postoperative nausea and vomiting)     Pulmonary embolism (HCC)     Pulmonary hypertension (HCC)     Rheumatic disease     Seizures (HCC)     Only during Encephalitis    Shortness of breath     with exertion    Sinus tachycardia     Urinary incontinence       Past Surgical History:   Procedure Laterality Date    ANKLE SURGERY  6/2015    ARTHRODESIS      Hand: IP Joint    CHOLECYSTECTOMY      COLONOSCOPY      COLPOSCOPY      HYSTERECTOMY      Vaginal    JOINT REPLACEMENT Right 10/03/2024    Knee replacement    KNEE SURGERY  2/2019    LAPAROSCOPIC ESOPHAGOGASTRIC FUNDOPLASTY  2008    ORTHOPEDIC SURGERY  10/03/2024    10/06/2022    ID ARTHRP KNE CONDYLE&PLATU MEDIAL&LAT COMPARTMENTS Right 02/12/2019    Procedure: KNEE TOTAL ARTHROPLASTY AND ASSOCIATED PROCEDURES;  Surgeon: Donovan Zendejas DO;  [facility];  Service: Orthopedics    ID ARTHRP KNE CONDYLE&PLATU MEDIAL&LAT COMPARTMENTS Left 10/06/2022    Procedure: ARTHROPLASTY KNEE TOTAL;  Surgeon: Donovan Zendejas DO;  [facility];  Service: Orthopedics    ID ARTHRS KNEE W/MENISCECTOMY MED&LAT W/SHAVING Right 10/02/2018    Procedure: KNEE ARTHROSCOPY WITH PARTIAL MEDIAL MENISCECTOMY;  Surgeon: Donovan Zendejas DO;  [facility];  Service: Orthopedics    ID ARTHRS KNEE W/MENISCECTOMY MED&LAT W/SHAVING Left 12/17/2019    Procedure: KNEE ARTHROSCOPIC MENISCECTOMY /MEDIAL; Chondyl medial and posterior;  Surgeon: Donovan Zendejas DO;  [facility];  Service: Orthopedics    REDUCTION MAMMAPLASTY Bilateral     doesnt remember when    REPAIR ANKLE LIGAMENT Right     TENDON REPAIR      TONSILLECTOMY      TOTAL KNEE ARTHROPLASTY REVISION Left 10/03/2024    Procedure: ARTHROPLASTY KNEE TOTAL REVISION;  Surgeon:  Grupo Wilson DO;  Location: [facility];  Service: Orthopedics      Family History   Problem Relation Age of Onset    Uterine cancer Mother     Pancreatic cancer Mother 70    Diabetes Mother     Endometrial cancer Mother 66    Arthritis Mother     Cancer Mother         Pancreas, Uterine    Vision loss Mother     Osteoporosis Mother     Heart disease Father         open heart surgery at age 50     Stroke Father         CVA    Hypertension Father     Atrial fibrillation Father     Hyperlipidemia Father     Arthritis Father     Rheum arthritis Father     Heart attack Father     No Known Problems Sister     No Known Problems Sister     Thyroid disease Sister     Depression Sister     Osteoarthritis Sister     Asthma Sister     Asthma Daughter     Migraines Daughter     Asthma Daughter     Thyroid disease Maternal Grandmother     Heart attack Maternal Grandfather     Heart disease Maternal Grandfather     Heart attack Paternal Grandmother     Heart disease Paternal Grandmother     Skin cancer Paternal Grandfather     No Known Problems Brother     Hemochromatosis Brother     Alcohol abuse Brother     Early death Brother         heriditary hemachromotosis    No Known Problems Maternal Aunt     No Known Problems Paternal Aunt     Anuerysm Neg Hx     Clotting disorder Neg Hx     Heart failure Neg Hx       Social History     Tobacco Use    Smoking status: Former     Current packs/day: 0.00     Types: Cigarettes     Quit date: 2006     Years since quittin.2    Smokeless tobacco: Never   Vaping Use    Vaping status: Never Used   Substance Use Topics    Alcohol use: Yes     Comment: Socially    Drug use: No      E-Cigarette/Vaping    E-Cigarette Use Never User       E-Cigarette/Vaping Substances    Nicotine No     THC No     CBD No     Flavoring No     Other No     Unknown No       I have reviewed and agree with the history as documented.     60-year-old female with history of lupus, antiphospholipid syndrome,  multiple PEs on warfarin, presents with moshe hematuria for approximately 4 to 5 days.  She states that started over the weekend and she self discontinued her warfarin, and resolved over the next 2 days.  She then  started her warfarin again and noted gross hematuria again this morning.  She also has right sided flank pain that started today.  She denies any constipation, diarrhea, chest pain, shortness of breath, other urinary symptoms.        Review of Systems   Constitutional:  Negative for chills and fever.   HENT:  Negative for congestion and rhinorrhea.    Gastrointestinal:  Positive for abdominal pain (Radiation from right flank into the left groin). Negative for constipation, diarrhea, nausea and vomiting.   Genitourinary:  Positive for flank pain and hematuria. Negative for difficulty urinating and frequency.   All other systems reviewed and are negative.          Objective       ED Triage Vitals [04/23/25 0950]   Temperature Pulse Blood Pressure Respirations SpO2 Patient Position - Orthostatic VS   98.1 °F (36.7 °C) (!) 107 150/67 18 97 % Sitting      Temp Source Heart Rate Source BP Location FiO2 (%) Pain Score    Oral Monitor Right arm -- --      Vitals      Date and Time Temp Pulse SpO2 Resp BP Pain Score FACES Pain Rating User   04/23/25 1441 -- 84 96 % -- 139/65 -- -- AS   04/23/25 0950 98.1 °F (36.7 °C) 107 97 % 18 150/67 -- -- KD            Physical Exam  Vitals and nursing note reviewed.   Constitutional:       General: She is not in acute distress.     Appearance: She is not ill-appearing.   HENT:      Head: Normocephalic and atraumatic.      Right Ear: External ear normal.      Left Ear: External ear normal.      Nose: Nose normal. No congestion or rhinorrhea.      Mouth/Throat:      Mouth: Mucous membranes are moist.      Pharynx: Oropharynx is clear.   Eyes:      General: No scleral icterus.     Extraocular Movements: Extraocular movements intact.   Cardiovascular:      Rate and Rhythm: Normal  rate and regular rhythm.      Pulses: Normal pulses.      Heart sounds: Normal heart sounds.   Pulmonary:      Effort: Pulmonary effort is normal.      Breath sounds: Normal breath sounds.   Abdominal:      Palpations: Abdomen is soft.      Tenderness: There is abdominal tenderness (Mild, diffuse). There is right CVA tenderness. There is no left CVA tenderness.   Musculoskeletal:         General: Normal range of motion.      Cervical back: Normal range of motion.   Skin:     General: Skin is warm and dry.   Neurological:      General: No focal deficit present.      Mental Status: She is alert and oriented to person, place, and time.   Psychiatric:         Mood and Affect: Mood normal.         Behavior: Behavior normal.         Results Reviewed       Procedure Component Value Units Date/Time    Blood culture #1 [[id number]] Collected: 04/23/25 1013    Lab Status: Preliminary result Specimen: Blood from Arm, Left Updated: 04/23/25 1556     Blood Culture Received in Microbiology Lab. Culture in Progress.    Blood culture #2 [[id number]] Collected: 04/23/25 1013    Lab Status: Preliminary result Specimen: Blood from Arm, Right Updated: 04/23/25 1502     Blood Culture Received in Microbiology Lab. Culture in Progress.    Procalcitonin [[id number]]  (Normal) Collected: 04/23/25 1013    Lab Status: Final result Specimen: Blood from Arm, Right Updated: 04/23/25 1105     Procalcitonin <0.05 ng/ml     Comprehensive metabolic panel [[id number]]  (Abnormal) Collected: 04/23/25 1013    Lab Status: Final result Specimen: Blood from Arm, Right Updated: 04/23/25 1102     Sodium 139 mmol/L      Potassium 4.0 mmol/L      Chloride 103 mmol/L      CO2 27 mmol/L      ANION GAP 9 mmol/L      BUN 13 mg/dL      Creatinine 0.87 mg/dL      Glucose 110 mg/dL      Calcium 10.0 mg/dL      AST 10 U/L      ALT 9 U/L      Alkaline Phosphatase 113 U/L      Total Protein 6.7 g/dL      Albumin 4.3 g/dL      Total Bilirubin 0.70 mg/dL      eGFR 71  ml/min/1.73sq m     Narrative:      [employer] guidelines for Chronic Kidney Disease (CKD):     Stage 1 with normal or high GFR (GFR > 90 mL/min/1.73 square meters)    Stage 2 Mild CKD (GFR = 60-89 mL/min/1.73 square meters)    Stage 3A Moderate CKD (GFR = 45-59 mL/min/1.73 square meters)    Stage 3B Moderate CKD (GFR = 30-44 mL/min/1.73 square meters)    Stage 4 Severe CKD (GFR = 15-29 mL/min/1.73 square meters)    Stage 5 End Stage CKD (GFR <15 mL/min/1.73 square meters)  Note: GFR calculation is accurate only with a steady state creatinine    Lactic acid [[id number]]  (Normal) Collected: 04/23/25 1013    Lab Status: Final result Specimen: Blood from Arm, Right Updated: 04/23/25 1100     LACTIC ACID 1.4 mmol/L     Narrative:      Result may be elevated if tourniquet was used during collection.    Urine Microscopic [[id number]]  (Abnormal) Collected: 04/23/25 1028    Lab Status: Final result Specimen: Urine, Clean Catch Updated: 04/23/25 1059     RBC, UA Innumerable /hpf      WBC, UA 10-20 /hpf      Epithelial Cells Occasional /hpf      Bacteria, UA Occasional /hpf     Urine culture [[id number]] Collected: 04/23/25 1028    Lab Status: In process Specimen: Urine, Clean Catch Updated: 04/23/25 1059    Protime-INR [[id number]]  (Abnormal) Collected: 04/23/25 1013    Lab Status: Final result Specimen: Blood from Arm, Right Updated: 04/23/25 1057     Protime 20.7 seconds      INR 1.70    Narrative:      INR Therapeutic Range    Indication                                             INR Range      Atrial Fibrillation                                               2.0-3.0  Hypercoagulable State                                    2.0.2.3  Left Ventricular Asist Device                            2.0-3.0  Mechanical Heart Valve                                  -    Aortic(with afib, MI, embolism, HF, LA enlargement,    and/or coagulopathy)                                     2.0-3.0 (2.5-3.5)     Mitral                                                              2.5-3.5  Prosthetic/Bioprosthetic Heart Valve               2.0-3.0  Venous thromboembolism (VTE: VT, PE        2.0-3.0    APTT [[id number]]  (Normal) Collected: 04/23/25 1013    Lab Status: Final result Specimen: Blood from Arm, Right Updated: 04/23/25 1057     PTT 31 seconds     UA w Reflex to Microscopic w Reflex to Culture [[id number]]  (Abnormal) Collected: 04/23/25 1028    Lab Status: Final result Specimen: Urine, Clean Catch Updated: 04/23/25 1047     Color, UA Light Orange     Clarity, UA Turbid     Specific Gravity, UA 1.012     pH, UA 7.5     Leukocytes, UA Small     Nitrite, UA Negative     Protein, UA 30 (1+) mg/dl      Glucose, UA Negative mg/dl      Ketones, UA Negative mg/dl      Urobilinogen, UA <2.0 mg/dl      Bilirubin, UA Negative     Occult Blood, UA Large    CBC and differential [[id number]]  (Abnormal) Collected: 04/23/25 1013    Lab Status: Final result Specimen: Blood from Arm, Right Updated: 04/23/25 1027     WBC 3.72 Thousand/uL      RBC 4.45 Million/uL      Hemoglobin 13.5 g/dL      Hematocrit 41.0 %      MCV 92 fL      MCH 30.3 pg      MCHC 32.9 g/dL      RDW 13.8 %      MPV 11.0 fL      Platelets 241 Thousands/uL      nRBC 0 /100 WBCs      Segmented % 61 %      Immature Grans % 0 %      Lymphocytes % 27 %      Monocytes % 10 %      Eosinophils Relative 1 %      Basophils Relative 1 %      Absolute Neutrophils 2.29 Thousands/µL      Absolute Immature Grans 0.00 Thousand/uL      Absolute Lymphocytes 0.99 Thousands/µL      Absolute Monocytes 0.37 Thousand/µL      Eosinophils Absolute 0.05 Thousand/µL      Basophils Absolute 0.02 Thousands/µL             CT abdomen pelvis with contrast   Final Interpretation by Dennis Medina MD (04/23 1323)      1.  0.7 cm obstructing calculus in the proximal left ureter with mild upstream hydroureteronephrosis. Recommend urology consultation.   2.  Moderate to large rectosigmoid colonic stool burden.   3.   "2.1 cm fat density benign-appearing lesion in the left hepatic lobe.         The study was marked in EPIC for immediate notification.      Workstation performed: [id number]             Procedures    ED Medication and Procedure Management   Prior to Admission Medications   Prescriptions Last Dose Informant Patient Reported? Taking?   Abaloparatide (Tymlos) 3120 MCG/1.56ML SOPN   No No   Sig: Inject 80 mcg under the skin in the morning   B-D 3CC LUER-LILLIE SYR 25GX1/2\" 25G X 1-1/2\" 3 ML MISC  Self No No   Sig: USE 1 SYRINGE EVERY 30 DAYS   Benlysta 200 MG/ML SOAJ   Yes No   Sig: Every friday subcutaneous injection   Multiple Vitamins-Minerals (multivitamin with minerals) tablet   No No   Sig: Take 1 tablet by mouth daily   Patient not taking: Reported on 2025   NEEDLE, DISP, 23 G (BD Disp Needle) 23G X 1\" MISC   No No   Sig: Use to administer B12   acetaminophen (TYLENOL) 500 mg tablet   No No   Sig: Take 2 tablets (1,000 mg total) by mouth every 8 (eight) hours   ascorbic acid (VITAMIN C) 500 MG tablet   No No   Sig: Take 1 tablet (500 mg total) by mouth 2 (two) times a day   Patient not taking: Reported on 2025   cefadroxil (DURICEF) 500 mg capsule   No No   Sig: Take 2 capsules (1,000 mg total) by mouth every 12 (twelve) hours Do not start before 2024.   cholecalciferol (VITAMIN D3) 1,000 units tablet  Self Yes No   Sig: Take 2,000 Units by mouth daily in the early morning     clindamycin (CLEOCIN) 300 MG capsule   No No   Sig: Take 3 tablets by mouth, 1 hour prior to procedure.   cyanocobalamin 1,000 mcg/mL   No No   Si mcg IM Q 2 weeks   fluconazole (DIFLUCAN) 150 mg tablet   Yes No   folic acid (FOLVITE) 1 mg tablet   No No   Sig: Take 1 tablet (1 mg total) by mouth daily   Patient not taking: Reported on 2025   gabapentin (NEURONTIN) 100 mg capsule   No No   Sig: TAKE 1 CAPSULE(100 MG) BY MOUTH THREE TIMES DAILY   hydroxychloroquine (PLAQUENIL) 200 mg tablet  Self Yes No   Sig: " Take 400 mg by mouth daily with breakfast Alternating days/dosages odd days 1tabs  even days 2tabs   nystatin (MYCOSTATIN) powder   No No   Sig: Apply topically 3 (three) times a day   ondansetron (ZOFRAN) 4 mg tablet   No No   Sig: Take 1 tablet (4 mg total) by mouth every 8 (eight) hours as needed for nausea or vomiting   potassium chloride (Klor-Con M20) 20 mEq tablet   No No   Sig: Take 1 tablet (20 mEq total) by mouth daily   saccharomyces boulardii (FLORASTOR) 250 mg capsule   No No   Sig: Take 1 capsule (250 mg total) by mouth 2 (two) times a day   sildenafil (REVATIO) 20 mg tablet   No No   Sig: Take 2 tablets (40 mg total) by mouth 3 (three) times a day   spironolactone (ALDACTONE) 25 mg tablet   No No   Sig: Take 1 tablet (25 mg total) by mouth daily   torsemide (DEMADEX) 20 mg tablet   No No   Sig: TAKE 1 TABLET(20 MG) BY MOUTH DAILY   traZODone (DESYREL) 50 mg tablet   No No   Sig: TAKE 2 TABLETS(100 MG) BY MOUTH DAILY AT BEDTIME   triamcinolone (KENALOG) 0.1 % oral topical paste  Self Yes No   Sig: prn   Patient not taking: Reported on 2/3/2025   warfarin (Coumadin) 5 mg tablet   No No   Si.5mg po daily or as directed.      Facility-Administered Medications: None     Discharge Medication List as of 2025  3:21 PM        START taking these medications    Details   nitrofurantoin (MACROBID) 100 mg capsule Take 1 capsule (100 mg total) by mouth 2 (two) times a day for 7 days, Starting 2025, Until 2025, Normal           CONTINUE these medications which have NOT CHANGED    Details   Abaloparatide (Tymlos) 3120 MCG/1.56ML SOPN Inject 80 mcg under the skin in the morning, Starting 2024, Normal      acetaminophen (TYLENOL) 500 mg tablet Take 2 tablets (1,000 mg total) by mouth every 8 (eight) hours, Starting Thu 10/3/2024, Normal      ascorbic acid (VITAMIN C) 500 MG tablet Take 1 tablet (500 mg total) by mouth 2 (two) times a day, Starting Thu 2024, Normal      B-D 3CC  "LUER-LILLIE SYR 25GX1/2\" 25G X 1-1/2\" 3 ML MISC USE 1 SYRINGE EVERY 30 DAYS, Normal      Benlysta 200 MG/ML SOAJ Every friday subcutaneous injection, Historical Med      cefadroxil (DURICEF) 500 mg capsule Take 2 capsules (1,000 mg total) by mouth every 12 (twelve) hours Do not start before November 14, 2024., Starting Thu 11/14/2024, Until Sun 4/13/2025, Normal      cholecalciferol (VITAMIN D3) 1,000 units tablet Take 2,000 Units by mouth daily in the early morning  , Historical Med      clindamycin (CLEOCIN) 300 MG capsule Take 3 tablets by mouth, 1 hour prior to procedure., Normal      cyanocobalamin 1,000 mcg/mL 1000 mcg IM Q 2 weeks, Normal      fluconazole (DIFLUCAN) 150 mg tablet Historical Med      folic acid (FOLVITE) 1 mg tablet Take 1 tablet (1 mg total) by mouth daily, Starting Thu 9/26/2024, Normal      gabapentin (NEURONTIN) 100 mg capsule TAKE 1 CAPSULE(100 MG) BY MOUTH THREE TIMES DAILY, Starting Tue 2/11/2025, Normal      hydroxychloroquine (PLAQUENIL) 200 mg tablet Take 400 mg by mouth daily with breakfast Alternating days/dosages odd days 1tabs  even days 2tabs, Historical Med      Multiple Vitamins-Minerals (multivitamin with minerals) tablet Take 1 tablet by mouth daily, Starting Thu 9/26/2024, Normal      NEEDLE, DISP, 23 G (BD Disp Needle) 23G X 1\" MISC Use to administer B12, Normal      nystatin (MYCOSTATIN) powder Apply topically 3 (three) times a day, Starting Tue 11/12/2024, Normal      ondansetron (ZOFRAN) 4 mg tablet Take 1 tablet (4 mg total) by mouth every 8 (eight) hours as needed for nausea or vomiting, Starting Tue 1/14/2025, Normal      potassium chloride (Klor-Con M20) 20 mEq tablet Take 1 tablet (20 mEq total) by mouth daily, Starting Thu 5/9/2024, Normal      saccharomyces boulardii (FLORASTOR) 250 mg capsule Take 1 capsule (250 mg total) by mouth 2 (two) times a day, Starting Fri 11/15/2024, Normal      sildenafil (REVATIO) 20 mg tablet Take 2 tablets (40 mg total) by mouth 3 " (three) times a day, Starting Wed 3/19/2025, Normal      spironolactone (ALDACTONE) 25 mg tablet Take 1 tablet (25 mg total) by mouth daily, Starting Thu 5/9/2024, Normal      torsemide (DEMADEX) 20 mg tablet TAKE 1 TABLET(20 MG) BY MOUTH DAILY, Starting Fri 1/24/2025, Normal      traZODone (DESYREL) 50 mg tablet TAKE 2 TABLETS(100 MG) BY MOUTH DAILY AT BEDTIME, Normal      triamcinolone (KENALOG) 0.1 % oral topical paste prn, Historical Med      warfarin (Coumadin) 5 mg tablet 7.5mg po daily or as directed., Normal             ED SEPSIS DOCUMENTATION   Time reflects when diagnosis was documented in both MDM as applicable and the Disposition within this note       Time User Action Codes Description Comment    4/23/2025  3:18 PM Mickey Lombardo [N20.0] Nephrolithiasis     4/23/2025  3:18 PM Mickey Lombardo Add [N39.0] UTI (urinary tract infection)                  Mickey Lombardo DO  04/23/25 1716

## 2025-04-23 NOTE — TELEPHONE ENCOUNTER
Called and spoke with patient to make her aware of Dr. Brewster's recommendations per Katie RN's note. She verbalized understanding and agreeable with plan. She will go to [facility] within the next hour.

## 2025-04-23 NOTE — TELEPHONE ENCOUNTER
Na was referred to the ED for gross hematuria.  CT scan showing 7 mm obstructing stone in the proximal left ureter with mild hydro.  Vital signs stable, afebrile.  Creatinine at baseline.    Pain is controlled.  Please schedule stone follow-up, expedited if possible with [facility].

## 2025-04-23 NOTE — TELEPHONE ENCOUNTER
New Patient      Insurance   Current Insurance? /[facility]   Insurance E-verified? Yes    History   Reason for appointment/active symptoms? NP- ER f/u for 6mm obstructing kidney stone      Has the patient had any previous Urologist(s)? No     Was the patient seen in the ED? Yes     Labs/Imaging(Including Out Of Network)? Yes     Records Requested? No  Records Visible in EPIC? Yes      Personal history of cancer? No     Appointment   Office location preference: [place]   ?   Appointment Details   Date: 4/24/25   Time: 840am  Location: [place]  Provider: AURORA Dumont  Does the appointment need further review? No

## 2025-04-24 ENCOUNTER — OFFICE VISIT (OUTPATIENT)
Dept: UROLOGY | Facility: MEDICAL CENTER | Age: 60
End: 2025-04-24
Payer: MEDICARE

## 2025-04-24 ENCOUNTER — ANESTHESIA EVENT (OUTPATIENT)
Dept: PERIOP | Facility: HOSPITAL | Age: 60
End: 2025-04-24
Payer: MEDICARE

## 2025-04-24 ENCOUNTER — HOSPITAL ENCOUNTER (OUTPATIENT)
Facility: HOSPITAL | Age: 60
Setting detail: OBSERVATION
Discharge: HOME | End: 2025-04-25
Attending: EMERGENCY MEDICINE | Admitting: FAMILY MEDICINE
Payer: MEDICARE

## 2025-04-24 ENCOUNTER — TELEPHONE (OUTPATIENT)
Dept: UROLOGY | Facility: MEDICAL CENTER | Age: 60
End: 2025-04-24

## 2025-04-24 VITALS
BODY MASS INDEX: 43.24 KG/M2 | SYSTOLIC BLOOD PRESSURE: 130 MMHG | HEART RATE: 80 BPM | WEIGHT: 235 LBS | HEIGHT: 62 IN | OXYGEN SATURATION: 97 % | DIASTOLIC BLOOD PRESSURE: 80 MMHG

## 2025-04-24 DIAGNOSIS — R11.10 VOMITING: ICD-10-CM

## 2025-04-24 DIAGNOSIS — N20.0 NEPHROLITHIASIS: ICD-10-CM

## 2025-04-24 DIAGNOSIS — N20.0 NEPHROLITHIASIS: Primary | ICD-10-CM

## 2025-04-24 DIAGNOSIS — N39.0 UTI (URINARY TRACT INFECTION): ICD-10-CM

## 2025-04-24 DIAGNOSIS — R11.0 NAUSEA: ICD-10-CM

## 2025-04-24 PROBLEM — I50.810 RIGHT-SIDED CONGESTIVE HEART FAILURE (HCC): Status: ACTIVE | Noted: 2025-04-24

## 2025-04-24 PROBLEM — N20.1 URETERAL CALCULUS: Status: ACTIVE | Noted: 2025-04-24

## 2025-04-24 PROBLEM — R56.9 SEIZURES (HCC): Status: ACTIVE | Noted: 2025-04-24

## 2025-04-24 LAB
ANION GAP SERPL CALCULATED.3IONS-SCNC: 10 MMOL/L (ref 4–13)
APTT PPP: 29 SECONDS (ref 23–34)
BACTERIA UR CULT: NORMAL
BASOPHILS # BLD AUTO: 0.02 THOUSANDS/ÂΜL (ref 0–0.1)
BASOPHILS NFR BLD AUTO: 0 % (ref 0–1)
BUN SERPL-MCNC: 18 MG/DL (ref 5–25)
CALCIUM SERPL-MCNC: 9.7 MG/DL (ref 8.4–10.2)
CHLORIDE SERPL-SCNC: 106 MMOL/L (ref 96–108)
CO2 SERPL-SCNC: 23 MMOL/L (ref 21–32)
CREAT SERPL-MCNC: 1.12 MG/DL (ref 0.6–1.3)
EOSINOPHIL # BLD AUTO: 0.03 THOUSAND/ÂΜL (ref 0–0.61)
EOSINOPHIL NFR BLD AUTO: 0 % (ref 0–6)
ERYTHROCYTE [DISTWIDTH] IN BLOOD BY AUTOMATED COUNT: 13.5 % (ref 11.6–15.1)
GFR SERPL CREATININE-BSD FRML MDRD: 52 ML/MIN/1.73SQ M
GLUCOSE SERPL-MCNC: 148 MG/DL (ref 65–140)
HCT VFR BLD AUTO: 39.6 % (ref 34.8–46.1)
HGB BLD-MCNC: 13.2 G/DL (ref 11.5–15.4)
IMM GRANULOCYTES # BLD AUTO: 0.02 THOUSAND/UL (ref 0–0.2)
IMM GRANULOCYTES NFR BLD AUTO: 0 % (ref 0–2)
INR PPP: 2.13 (ref 0.85–1.19)
LYMPHOCYTES # BLD AUTO: 0.52 THOUSANDS/ÂΜL (ref 0.6–4.47)
LYMPHOCYTES NFR BLD AUTO: 7 % (ref 14–44)
MCH RBC QN AUTO: 30.6 PG (ref 26.8–34.3)
MCHC RBC AUTO-ENTMCNC: 33.3 G/DL (ref 31.4–37.4)
MCV RBC AUTO: 92 FL (ref 82–98)
MONOCYTES # BLD AUTO: 0.37 THOUSAND/ÂΜL (ref 0.17–1.22)
MONOCYTES NFR BLD AUTO: 5 % (ref 4–12)
NEUTROPHILS # BLD AUTO: 6.78 THOUSANDS/ÂΜL (ref 1.85–7.62)
NEUTS SEG NFR BLD AUTO: 88 % (ref 43–75)
NRBC BLD AUTO-RTO: 0 /100 WBCS
PLATELET # BLD AUTO: 227 THOUSANDS/UL (ref 149–390)
PMV BLD AUTO: 11.3 FL (ref 8.9–12.7)
POTASSIUM SERPL-SCNC: 3.7 MMOL/L (ref 3.5–5.3)
PROTHROMBIN TIME: 23.9 SECONDS (ref 12.3–15)
RBC # BLD AUTO: 4.31 MILLION/UL (ref 3.81–5.12)
SODIUM SERPL-SCNC: 139 MMOL/L (ref 135–147)
WBC # BLD AUTO: 7.74 THOUSAND/UL (ref 4.31–10.16)

## 2025-04-24 PROCEDURE — 99215 OFFICE O/P EST HI 40 MIN: CPT | Performed by: UROLOGY

## 2025-04-24 PROCEDURE — 80048 BASIC METABOLIC PNL TOTAL CA: CPT

## 2025-04-24 PROCEDURE — 85730 THROMBOPLASTIN TIME PARTIAL: CPT

## 2025-04-24 PROCEDURE — 93005 ELECTROCARDIOGRAM TRACING: CPT

## 2025-04-24 PROCEDURE — 96374 THER/PROPH/DIAG INJ IV PUSH: CPT

## 2025-04-24 PROCEDURE — 96375 TX/PRO/DX INJ NEW DRUG ADDON: CPT

## 2025-04-24 PROCEDURE — 36415 COLL VENOUS BLD VENIPUNCTURE: CPT

## 2025-04-24 PROCEDURE — 99284 EMERGENCY DEPT VISIT MOD MDM: CPT

## 2025-04-24 PROCEDURE — 99204 OFFICE O/P NEW MOD 45 MIN: CPT

## 2025-04-24 PROCEDURE — 99285 EMERGENCY DEPT VISIT HI MDM: CPT | Performed by: EMERGENCY MEDICINE

## 2025-04-24 PROCEDURE — 85610 PROTHROMBIN TIME: CPT

## 2025-04-24 PROCEDURE — 85025 COMPLETE CBC W/AUTO DIFF WBC: CPT

## 2025-04-24 RX ORDER — ONDANSETRON 2 MG/ML
4 INJECTION INTRAMUSCULAR; INTRAVENOUS ONCE
Status: COMPLETED | OUTPATIENT
Start: 2025-04-24 | End: 2025-04-24

## 2025-04-24 RX ORDER — HYDROXYCHLOROQUINE SULFATE 200 MG/1
400 TABLET, FILM COATED ORAL
Status: DISCONTINUED | OUTPATIENT
Start: 2025-04-25 | End: 2025-04-25 | Stop reason: HOSPADM

## 2025-04-24 RX ORDER — OXYCODONE HYDROCHLORIDE 5 MG/1
5 TABLET ORAL EVERY 4 HOURS PRN
Refills: 0 | Status: CANCELLED | OUTPATIENT
Start: 2025-04-24

## 2025-04-24 RX ORDER — OXYCODONE HYDROCHLORIDE 5 MG/1
5 TABLET ORAL EVERY 4 HOURS PRN
Qty: 18 TABLET | Refills: 0 | Status: SHIPPED | OUTPATIENT
Start: 2025-04-24

## 2025-04-24 RX ORDER — CIPROFLOXACIN 2 MG/ML
400 INJECTION, SOLUTION INTRAVENOUS ONCE
Status: CANCELLED | OUTPATIENT
Start: 2025-04-24 | End: 2025-04-24

## 2025-04-24 RX ORDER — TRAZODONE HYDROCHLORIDE 100 MG/1
100 TABLET ORAL
Status: DISCONTINUED | OUTPATIENT
Start: 2025-04-24 | End: 2025-04-25 | Stop reason: HOSPADM

## 2025-04-24 RX ORDER — ONDANSETRON 4 MG/1
4 TABLET, FILM COATED ORAL EVERY 8 HOURS PRN
Qty: 30 TABLET | Refills: 2 | Status: SHIPPED | OUTPATIENT
Start: 2025-04-24

## 2025-04-24 RX ORDER — OXYCODONE HYDROCHLORIDE 5 MG/1
5 TABLET ORAL EVERY 6 HOURS PRN
Refills: 0 | Status: DISCONTINUED | OUTPATIENT
Start: 2025-04-24 | End: 2025-04-24

## 2025-04-24 RX ORDER — GABAPENTIN 100 MG/1
100 CAPSULE ORAL
Status: DISCONTINUED | OUTPATIENT
Start: 2025-04-24 | End: 2025-04-25 | Stop reason: HOSPADM

## 2025-04-24 RX ORDER — SPIRONOLACTONE 25 MG/1
25 TABLET ORAL DAILY
Status: DISCONTINUED | OUTPATIENT
Start: 2025-04-25 | End: 2025-04-25 | Stop reason: HOSPADM

## 2025-04-24 RX ORDER — ACETAMINOPHEN 325 MG/1
975 TABLET ORAL EVERY 8 HOURS SCHEDULED
Status: DISCONTINUED | OUTPATIENT
Start: 2025-04-24 | End: 2025-04-25 | Stop reason: HOSPADM

## 2025-04-24 RX ORDER — TORSEMIDE 20 MG/1
20 TABLET ORAL DAILY
Status: DISCONTINUED | OUTPATIENT
Start: 2025-04-25 | End: 2025-04-25 | Stop reason: HOSPADM

## 2025-04-24 RX ORDER — SILDENAFIL CITRATE 20 MG/1
40 TABLET ORAL 3 TIMES DAILY
Status: DISCONTINUED | OUTPATIENT
Start: 2025-04-24 | End: 2025-04-25 | Stop reason: HOSPADM

## 2025-04-24 RX ORDER — HYDROMORPHONE HCL/PF 1 MG/ML
0.5 SYRINGE (ML) INJECTION
Status: DISCONTINUED | OUTPATIENT
Start: 2025-04-24 | End: 2025-04-25 | Stop reason: HOSPADM

## 2025-04-24 RX ORDER — WARFARIN SODIUM 5 MG/1
5 TABLET ORAL
Status: DISCONTINUED | OUTPATIENT
Start: 2025-04-25 | End: 2025-04-25 | Stop reason: HOSPADM

## 2025-04-24 RX ORDER — ONDANSETRON 2 MG/ML
4 INJECTION INTRAMUSCULAR; INTRAVENOUS EVERY 6 HOURS PRN
Status: DISCONTINUED | OUTPATIENT
Start: 2025-04-24 | End: 2025-04-25 | Stop reason: HOSPADM

## 2025-04-24 RX ORDER — KETOROLAC TROMETHAMINE 30 MG/ML
15 INJECTION, SOLUTION INTRAMUSCULAR; INTRAVENOUS ONCE AS NEEDED
Status: COMPLETED | OUTPATIENT
Start: 2025-04-24 | End: 2025-04-25

## 2025-04-24 RX ORDER — HYDROMORPHONE HCL/PF 1 MG/ML
1 SYRINGE (ML) INJECTION ONCE
Status: COMPLETED | OUTPATIENT
Start: 2025-04-24 | End: 2025-04-24

## 2025-04-24 RX ADMIN — ACETAMINOPHEN 975 MG: 325 TABLET, FILM COATED ORAL at 21:28

## 2025-04-24 RX ADMIN — ONDANSETRON 4 MG: 2 INJECTION, SOLUTION INTRAMUSCULAR; INTRAVENOUS at 22:29

## 2025-04-24 RX ADMIN — TRIMETHOBENZAMIDE HYDROCHLORIDE 200 MG: 100 INJECTION INTRAMUSCULAR at 17:35

## 2025-04-24 RX ADMIN — TRAZODONE HYDROCHLORIDE 100 MG: 50 TABLET ORAL at 21:28

## 2025-04-24 RX ADMIN — GABAPENTIN 100 MG: 100 CAPSULE ORAL at 21:28

## 2025-04-24 RX ADMIN — HYDROMORPHONE HYDROCHLORIDE 0.5 MG: 1 INJECTION, SOLUTION INTRAMUSCULAR; INTRAVENOUS; SUBCUTANEOUS at 22:25

## 2025-04-24 RX ADMIN — SILDENAFIL 40 MG: 20 TABLET ORAL at 16:22

## 2025-04-24 RX ADMIN — ACETAMINOPHEN 975 MG: 325 TABLET, FILM COATED ORAL at 17:33

## 2025-04-24 RX ADMIN — HYDROMORPHONE HYDROCHLORIDE 0.5 MG: 1 INJECTION, SOLUTION INTRAMUSCULAR; INTRAVENOUS; SUBCUTANEOUS at 17:41

## 2025-04-24 RX ADMIN — ONDANSETRON 4 MG: 2 INJECTION, SOLUTION INTRAMUSCULAR; INTRAVENOUS at 13:11

## 2025-04-24 RX ADMIN — ONDANSETRON 4 MG: 2 INJECTION, SOLUTION INTRAMUSCULAR; INTRAVENOUS at 15:11

## 2025-04-24 RX ADMIN — ABALOPARATIDE 80 MCG: 2000 INJECTION, SOLUTION SUBCUTANEOUS at 22:13

## 2025-04-24 RX ADMIN — OXYCODONE HYDROCHLORIDE 5 MG: 5 TABLET ORAL at 16:13

## 2025-04-24 RX ADMIN — HYDROMORPHONE HYDROCHLORIDE 1 MG: 1 INJECTION, SOLUTION INTRAMUSCULAR; INTRAVENOUS; SUBCUTANEOUS at 13:11

## 2025-04-24 RX ADMIN — SILDENAFIL 40 MG: 20 TABLET ORAL at 21:28

## 2025-04-24 NOTE — ED PROVIDER NOTES
Time reflects when diagnosis was documented in both MDM as applicable and the Disposition within this note       Time User Action Codes Description Comment    4/24/2025  1:12 PM Adeline Mckay Add [N20.0] Nephrolithiasis     4/24/2025  2:36 PM WoodyAdeline Add [R11.10] Vomiting           ED Disposition       ED Disposition   Admit    Condition   Stable    Date/Time   Thu Apr 24, 2025  2:37 PM    Comment   Case was discussed with FM resident and the patient's admission status was agreed to be Admission Status: observation status to the service of Dr. Reddy.               Assessment & Plan       Medical Decision Making  Amount and/or Complexity of Data Reviewed  Labs: ordered.    Risk  Prescription drug management.  Decision regarding hospitalization.      Patient is a 60 y.o. female with PMH of antiphospholipid syndrome, prior PE on warfarin, renal stone who presents to the ED with worsening left flank pain and vomiting.    Vital signs hypertensive otherwise stable. On exam left flank pain, patient uncomfortable appearing.    History and physical exam most consistent with renal stone as patient had diagnosed on CT yesterday. Will check labs to evaluate for anemia, infection, coagulopathy.     Plan: CBC, BMP, PT, PTT, zofran, dilaudid    View ED course for further discussion on patient workup.     On review of previous records reviewed ED note from yesterday - CT with 0.7 cm obstructing calculus in the proximal left ureter with mild hydroureteronephrosis.    All labs reviewed and utilized in the medical decision making process  All radiology studies independently viewed by me and interpreted by the radiologist.  I reviewed all testing with the patient.     Upon re-evaluation patients pain improving.    Disposition: I discussed the case with FM resident.  We reviewed the HPI, pertinent PMH, ED course and workup. Agreed with plan and will admit the patient to the hospital for further evaluation and management of  "renal stone. Patient in stable condition at this time.       Portions of the record may have been created with voice recognition software. Occasional wrong word or \"sound a like\" substitutions may have occurred due to the inherent limitations of voice recognition software. Read the chart carefully and recognize, using context, where substitutions have occurred.    ED Course as of 04/24/25 1726   Thu Apr 24, 2025   1433 Discussed with urology - recommend admission to medicine and will see patient tomorrow for procedure.          Medications   gabapentin (NEURONTIN) capsule 100 mg (has no administration in time range)   hydroxychloroquine (PLAQUENIL) tablet 400 mg (has no administration in time range)   sildenafil (REVATIO) tablet 40 mg (has no administration in time range)   spironolactone (ALDACTONE) tablet 25 mg (has no administration in time range)   torsemide (DEMADEX) tablet 20 mg (has no administration in time range)   traZODone (DESYREL) tablet 100 mg (has no administration in time range)   HYDROmorphone (DILAUDID) injection 1 mg (1 mg Intravenous Given 4/24/25 1311)   ondansetron (ZOFRAN) injection 4 mg (4 mg Intravenous Given 4/24/25 1311)   ondansetron (ZOFRAN) injection 4 mg (4 mg Intravenous Given 4/24/25 1511)       ED Risk Strat Scores                    No data recorded        SBIRT 22yo+      Flowsheet Row Most Recent Value   Initial Alcohol Screen: US AUDIT-C     1. How often do you have a drink containing alcohol? 0 Filed at: 04/24/2025 1305   2. How many drinks containing alcohol do you have on a typical day you are drinking?  0 Filed at: 04/24/2025 1305   3a. Male UNDER 65: How often do you have five or more drinks on one occasion? 0 Filed at: 04/24/2025 1305   3b. FEMALE Any Age, or MALE 65+: How often do you have 4 or more drinks on one occassion? 0 Filed at: 04/24/2025 1305   Audit-C Score 0 Filed at: 04/24/2025 1305   ARIK: How many times in the past year have you...    Used an illegal drug or " "used a prescription medication for non-medical reasons? Never Filed at: 04/24/2025 1305                            History of Present Illness       Chief Complaint   Patient presents with    Flank Pain     Left flank pain since yesterday, nausea and vomiting, \"I have a kidney stone\", seen in ER at [place] yesterday, today is 100% worse, 7mm stone       Past Medical History:   Diagnosis Date    HELENE positive     Anemia     Sildenafil causes decreased hematocrit    Antiphospholipid syndrome (HCC)     Anxiety 3/2020    CHF (congestive heart failure) (HCC)     right heart failure related to pulm HTN lupus    Chronic kidney disease 2018    borderline lab values    Chronic rhinitis 06/04/2012    Clotting disorder (HCC) 2012    Coronary artery disease 2018    Encephalitis     Lasted Assessed 10/18/2017    Gout 06/26/2018    Head injury 11/11/2023    Heart failure (HCC) 2019    History of transfusion 2/13/2019    autologous    Hypertension     Lupus 2018    Lupus anticoagulant disorder (HCC)     Maxillary sinusitis     Lasted Assessed 10/18/2017    Night sweat     Osteopenia     Hip    Osteoporosis     Spine    Pneumonia     Last Assessed 10/18/2017    PONV (postoperative nausea and vomiting)     Pulmonary embolism (HCC)     Pulmonary hypertension (HCC)     Rheumatic disease     Seizures (HCC)     Only during Encephalitis    Shortness of breath     with exertion    Sinus tachycardia     Urinary incontinence       Past Surgical History:   Procedure Laterality Date    ANKLE SURGERY  6/2015    ARTHRODESIS      Hand: IP Joint    CHOLECYSTECTOMY      COLONOSCOPY      COLPOSCOPY      HYSTERECTOMY      Vaginal    JOINT REPLACEMENT Right 10/03/2024    Knee replacement    KNEE SURGERY  2/2019    LAPAROSCOPIC ESOPHAGOGASTRIC FUNDOPLASTY  2008    ORTHOPEDIC SURGERY  10/03/2024    10/06/2022    MA ARTHRP KNE CONDYLE&PLATU MEDIAL&LAT COMPARTMENTS Right 02/12/2019    Procedure: KNEE TOTAL ARTHROPLASTY AND ASSOCIATED PROCEDURES;  Surgeon: " Donovan Zendejas DO;  [facility];  Service: Orthopedics    IN ARTHRP KNE CONDYLE&PLATU MEDIAL&LAT COMPARTMENTS Left 10/06/2022    Procedure: ARTHROPLASTY KNEE TOTAL;  Surgeon: Donovan Zendejas DO;  [facility];  Service: Orthopedics    IN ARTHRS KNEE W/MENISCECTOMY MED&LAT W/SHAVING Right 10/02/2018    Procedure: KNEE ARTHROSCOPY WITH PARTIAL MEDIAL MENISCECTOMY;  Surgeon: Donovan Zendejas DO;  [facility];  Service: Orthopedics    IN ARTHRS KNEE W/MENISCECTOMY MED&LAT W/SHAVING Left 12/17/2019    Procedure: KNEE ARTHROSCOPIC MENISCECTOMY /MEDIAL; Chondyl medial and posterior;  Surgeon: Donovan Zendejas DO;  [facility];  Service: Orthopedics    REDUCTION MAMMAPLASTY Bilateral     doesnt remember when    REPAIR ANKLE LIGAMENT Right     TENDON REPAIR      TONSILLECTOMY      TOTAL KNEE ARTHROPLASTY REVISION Left 10/03/2024    Procedure: ARTHROPLASTY KNEE TOTAL REVISION;  Surgeon: Grupo Wilson DO;  [facility];  Service: Orthopedics      Family History   Problem Relation Age of Onset    Uterine cancer Mother     Pancreatic cancer Mother 70    Diabetes Mother     Endometrial cancer Mother 66    Arthritis Mother     Cancer Mother         Pancreas, Uterine    Vision loss Mother     Osteoporosis Mother     Heart disease Father         open heart surgery at age 50     Stroke Father         CVA    Hypertension Father     Atrial fibrillation Father     Hyperlipidemia Father     Arthritis Father     Rheum arthritis Father     Heart attack Father     No Known Problems Sister     No Known Problems Sister     Thyroid disease Sister     Depression Sister     Osteoarthritis Sister     Asthma Sister     Asthma Daughter     Migraines Daughter     Asthma Daughter     Thyroid disease Maternal Grandmother     Heart attack Maternal Grandfather     Heart disease Maternal Grandfather     Heart attack Paternal Grandmother     Heart disease Paternal Grandmother     Skin cancer Paternal Grandfather      No Known Problems Brother     Hemochromatosis Brother     Alcohol abuse Brother     Early death Brother         heriditary hemachromotosis    No Known Problems Maternal Aunt     No Known Problems Paternal Aunt     Anuerysm Neg Hx     Clotting disorder Neg Hx     Heart failure Neg Hx       Social History     Tobacco Use    Smoking status: Former     Current packs/day: 0.00     Types: Cigarettes     Quit date: 2006     Years since quittin.2    Smokeless tobacco: Never   Vaping Use    Vaping status: Never Used   Substance Use Topics    Alcohol use: Yes     Comment: Socially    Drug use: No      E-Cigarette/Vaping    E-Cigarette Use Never User       E-Cigarette/Vaping Substances    Nicotine No     THC No     CBD No     Flavoring No     Other No     Unknown No       I have reviewed and agree with the history as documented.     HPI  Patient is a 60 y.o. female with history of antiphospholipid syndrome, prior PE on warfarin, renal stone presenting to the emergency department for left flank pain. Per chart review patient was evaluated at [place] ED yesterday for left flank pain, was diagnosed with 0.7 cm obstructing renal stone with mild hydroureteronephrosis as well as concern for UTI. Was discharged home with pain medication and macrobid. Followed up with urology today and per their note will be scheduled for cystoscopy, left uteroscopy with laser lithotripsy and stent placement. Patient states that she felt ok after she was seen in the ED but then developed worsening pain today with multiple episodes of non-bloody non-bilious vomiting. She also complains of having chills but denies having any fevers. Has not taken anything for her pain / nausea today.     Review of Systems   Constitutional:  Positive for chills. Negative for fever.   HENT:  Negative for congestion.    Eyes:  Negative for visual disturbance.   Respiratory:  Negative for shortness of breath.    Cardiovascular:  Negative for chest pain.    Gastrointestinal:  Positive for abdominal pain, nausea and vomiting. Negative for diarrhea.   Endocrine: Negative for polyuria.   Genitourinary:  Positive for flank pain and hematuria.   Musculoskeletal:  Negative for gait problem.   Skin:  Negative for rash.   Neurological:  Negative for dizziness.   All other systems reviewed and are negative.          Objective       ED Triage Vitals   Temperature Pulse Blood Pressure Respirations SpO2 Patient Position - Orthostatic VS   04/24/25 1253 04/24/25 1253 04/24/25 1253 04/24/25 1253 04/24/25 1253 04/24/25 1253   (!) 97.2 °F (36.2 °C) 84 (!) 193/86 20 97 % Sitting      Temp Source Heart Rate Source BP Location FiO2 (%) Pain Score    04/24/25 1253 04/24/25 1253 04/24/25 1253 -- 04/24/25 1311    Temporal Monitor Right arm  9      Vitals      Date and Time Temp Pulse SpO2 Resp BP Pain Score FACES Pain Rating User   04/24/25 1613 -- -- -- -- -- 7 -- KS   04/24/25 1555 98.2 °F (36.8 °C) -- -- -- 138/83 -- -- DII   04/24/25 1400 -- 76 93 % 16 139/64 3 -- JS   04/24/25 1311 -- -- -- -- -- 9 -- JS   04/24/25 1253 97.2 °F (36.2 °C) 84 97 % 20 193/86 -- -- CEK            Physical Exam  Vitals and nursing note reviewed.   Constitutional:       Appearance: Normal appearance.   HENT:      Head: Normocephalic and atraumatic.      Mouth/Throat:      Mouth: Mucous membranes are moist.   Eyes:      Conjunctiva/sclera: Conjunctivae normal.   Cardiovascular:      Rate and Rhythm: Normal rate and regular rhythm.      Pulses: Normal pulses.      Heart sounds: Normal heart sounds.   Pulmonary:      Effort: Pulmonary effort is normal.      Breath sounds: Normal breath sounds.   Abdominal:      Palpations: Abdomen is soft.      Tenderness: There is left CVA tenderness.   Musculoskeletal:         General: No tenderness.      Cervical back: Neck supple.   Skin:     General: Skin is warm and dry.      Capillary Refill: Capillary refill takes less than 2 seconds.   Neurological:      General: No  focal deficit present.      Mental Status: She is alert. Mental status is at baseline.   Psychiatric:         Mood and Affect: Mood normal.         Results Reviewed       Procedure Component Value Units Date/Time    Basic metabolic panel [[id number]]  (Abnormal) Collected: 04/24/25 1311    Lab Status: Final result Specimen: Blood from Arm, Right Updated: 04/24/25 1415     Sodium 139 mmol/L      Potassium 3.7 mmol/L      Chloride 106 mmol/L      CO2 23 mmol/L      ANION GAP 10 mmol/L      BUN 18 mg/dL      Creatinine 1.12 mg/dL      Glucose 148 mg/dL      Calcium 9.7 mg/dL      eGFR 52 ml/min/1.73sq m     Narrative:      National [employer] guidelines for Chronic Kidney Disease (CKD):     Stage 1 with normal or high GFR (GFR > 90 mL/min/1.73 square meters)    Stage 2 Mild CKD (GFR = 60-89 mL/min/1.73 square meters)    Stage 3A Moderate CKD (GFR = 45-59 mL/min/1.73 square meters)    Stage 3B Moderate CKD (GFR = 30-44 mL/min/1.73 square meters)    Stage 4 Severe CKD (GFR = 15-29 mL/min/1.73 square meters)    Stage 5 End Stage CKD (GFR <15 mL/min/1.73 square meters)  Note: GFR calculation is accurate only with a steady state creatinine    Protime-INR [[id number]]  (Abnormal) Collected: 04/24/25 1311    Lab Status: Final result Specimen: Blood from Arm, Right Updated: 04/24/25 1404     Protime 23.9 seconds      INR 2.13    Narrative:      INR Therapeutic Range    Indication                                             INR Range      Atrial Fibrillation                                               2.0-3.0  Hypercoagulable State                                    2.0.2.3  Left Ventricular Asist Device                            2.0-3.0  Mechanical Heart Valve                                  -    Aortic(with afib, MI, embolism, HF, LA enlargement,    and/or coagulopathy)                                     2.0-3.0 (2.5-3.5)     Mitral                                                              "2.5-3.5  Prosthetic/Bioprosthetic Heart Valve               2.0-3.0  Venous thromboembolism (VTE: VT, PE        2.0-3.0    APTT [[id number]]  (Normal) Collected: 04/24/25 1311    Lab Status: Final result Specimen: Blood from Arm, Right Updated: 04/24/25 1404     PTT 29 seconds     CBC and differential [[id number]]  (Abnormal) Collected: 04/24/25 1311    Lab Status: Final result Specimen: Blood from Arm, Right Updated: 04/24/25 1348     WBC 7.74 Thousand/uL      RBC 4.31 Million/uL      Hemoglobin 13.2 g/dL      Hematocrit 39.6 %      MCV 92 fL      MCH 30.6 pg      MCHC 33.3 g/dL      RDW 13.5 %      MPV 11.3 fL      Platelets 227 Thousands/uL      nRBC 0 /100 WBCs      Segmented % 88 %      Immature Grans % 0 %      Lymphocytes % 7 %      Monocytes % 5 %      Eosinophils Relative 0 %      Basophils Relative 0 %      Absolute Neutrophils 6.78 Thousands/µL      Absolute Immature Grans 0.02 Thousand/uL      Absolute Lymphocytes 0.52 Thousands/µL      Absolute Monocytes 0.37 Thousand/µL      Eosinophils Absolute 0.03 Thousand/µL      Basophils Absolute 0.02 Thousands/µL             No orders to display       Procedures    ED Medication and Procedure Management   Prior to Admission Medications   Prescriptions Last Dose Informant Patient Reported? Taking?   Abaloparatide (Tymlos) 3120 MCG/1.56ML SOPN   No Yes   Sig: Inject 80 mcg under the skin in the morning   B-D 3CC LUER-LILLIE SYR 25GX1/2\" 25G X 1-1/2\" 3 ML MISC  Self No Yes   Sig: USE 1 SYRINGE EVERY 30 DAYS   Benlysta 200 MG/ML SOAJ   Yes Yes   Sig: Every friday subcutaneous injection   Multiple Vitamins-Minerals (multivitamin with minerals) tablet Not Taking  No No   Sig: Take 1 tablet by mouth daily   Patient not taking: Reported on 4/24/2025   NEEDLE, DISP, 23 G (BD Disp Needle) 23G X 1\" MISC   No Yes   Sig: Use to administer B12   acetaminophen (TYLENOL) 500 mg tablet   No Yes   Sig: Take 2 tablets (1,000 mg total) by mouth every 8 (eight) hours   ascorbic acid " (VITAMIN C) 500 MG tablet Not Taking  No No   Sig: Take 1 tablet (500 mg total) by mouth 2 (two) times a day   Patient not taking: Reported on 2025   cefadroxil (DURICEF) 500 mg capsule   No No   Sig: Take 2 capsules (1,000 mg total) by mouth every 12 (twelve) hours Do not start before 2024.   cholecalciferol (VITAMIN D3) 1,000 units tablet  Self Yes Yes   Sig: Take 2,000 Units by mouth daily in the early morning     clindamycin (CLEOCIN) 300 MG capsule Not Taking  No No   Sig: Take 3 tablets by mouth, 1 hour prior to procedure.   Patient not taking: Reported on 2025   cyanocobalamin 1,000 mcg/mL   No Yes   Si mcg IM Q 2 weeks   fluconazole (DIFLUCAN) 150 mg tablet   Yes Yes   folic acid (FOLVITE) 1 mg tablet Not Taking  No No   Sig: Take 1 tablet (1 mg total) by mouth daily   Patient not taking: Reported on 2025   gabapentin (NEURONTIN) 100 mg capsule   No Yes   Sig: TAKE 1 CAPSULE(100 MG) BY MOUTH THREE TIMES DAILY   hydroxychloroquine (PLAQUENIL) 200 mg tablet  Self Yes Yes   Sig: Take 400 mg by mouth daily with breakfast Alternating days/dosages odd days 1tabs  even days 2tabs   nitrofurantoin (MACROBID) 100 mg capsule   No Yes   Sig: Take 1 capsule (100 mg total) by mouth 2 (two) times a day for 7 days   nystatin (MYCOSTATIN) powder   No Yes   Sig: Apply topically 3 (three) times a day   ondansetron (ZOFRAN) 4 mg tablet   No Yes   Sig: Take 1 tablet (4 mg total) by mouth every 8 (eight) hours as needed for nausea or vomiting   oxyCODONE (Roxicodone) 5 immediate release tablet   No Yes   Sig: Take 1 tablet (5 mg total) by mouth every 4 (four) hours as needed for moderate pain Max Daily Amount: 30 mg   potassium chloride (Klor-Con M20) 20 mEq tablet   No Yes   Sig: Take 1 tablet (20 mEq total) by mouth daily   saccharomyces boulardii (FLORASTOR) 250 mg capsule Not Taking  No No   Sig: Take 1 capsule (250 mg total) by mouth 2 (two) times a day   Patient not taking: Reported on  "2025   sildenafil (REVATIO) 20 mg tablet   No Yes   Sig: Take 2 tablets (40 mg total) by mouth 3 (three) times a day   spironolactone (ALDACTONE) 25 mg tablet   No Yes   Sig: Take 1 tablet (25 mg total) by mouth daily   torsemide (DEMADEX) 20 mg tablet   No Yes   Sig: TAKE 1 TABLET(20 MG) BY MOUTH DAILY   traZODone (DESYREL) 50 mg tablet   No Yes   Sig: TAKE 2 TABLETS(100 MG) BY MOUTH DAILY AT BEDTIME   triamcinolone (KENALOG) 0.1 % oral topical paste Not Taking Self Yes No   Patient not taking: Reported on 2025   warfarin (Coumadin) 5 mg tablet   No Yes   Si.5mg po daily or as directed.      Facility-Administered Medications: None     Current Discharge Medication List        CONTINUE these medications which have NOT CHANGED    Details   Abaloparatide (Tymlos) 3120 MCG/1.56ML SOPN Inject 80 mcg under the skin in the morning  Qty: 1.56 mL, Refills: 4    Comments: PRIOR AUTHORIZATION IS PENDING  Associated Diagnoses: Steroid-induced osteoporosis      acetaminophen (TYLENOL) 500 mg tablet Take 2 tablets (1,000 mg total) by mouth every 8 (eight) hours  Qty: 60 tablet, Refills: 0    Associated Diagnoses: Infection of total knee replacement, initial encounter  ([facility])      B-D 3CC LUER-LILLIE SYR 25GX1/2\" 25G X 1-1/2\" 3 ML MISC USE 1 SYRINGE EVERY 30 DAYS  Qty: 12 each, Refills: 1    Associated Diagnoses: B12 deficiency      Benlysta 200 MG/ML SOAJ Every friday subcutaneous injection      cholecalciferol (VITAMIN D3) 1,000 units tablet Take 2,000 Units by mouth daily in the early morning        cyanocobalamin 1,000 mcg/mL 1000 mcg IM Q 2 weeks  Qty: 10 mL, Refills: 3    Associated Diagnoses: B12 deficiency      fluconazole (DIFLUCAN) 150 mg tablet       gabapentin (NEURONTIN) 100 mg capsule TAKE 1 CAPSULE(100 MG) BY MOUTH THREE TIMES DAILY  Qty: 90 capsule, Refills: 5    Associated Diagnoses: Status post revision of total knee, left      hydroxychloroquine (PLAQUENIL) 200 mg tablet Take 400 mg by mouth daily with " "breakfast Alternating days/dosages odd days 1tabs  even days 2tabs      NEEDLE, DISP, 23 G (BD Disp Needle) 23G X 1\" MISC Use to administer B12  Qty: 10 each, Refills: 3    Associated Diagnoses: B12 deficiency      nitrofurantoin (MACROBID) 100 mg capsule Take 1 capsule (100 mg total) by mouth 2 (two) times a day for 7 days  Qty: 14 capsule, Refills: 0    Associated Diagnoses: UTI (urinary tract infection)      nystatin (MYCOSTATIN) powder Apply topically 3 (three) times a day  Qty: 60 g, Refills: 0    Associated Diagnoses: Intertrigo      ondansetron (ZOFRAN) 4 mg tablet Take 1 tablet (4 mg total) by mouth every 8 (eight) hours as needed for nausea or vomiting  Qty: 30 tablet, Refills: 2    Associated Diagnoses: Nephrolithiasis; Nausea      oxyCODONE (Roxicodone) 5 immediate release tablet Take 1 tablet (5 mg total) by mouth every 4 (four) hours as needed for moderate pain Max Daily Amount: 30 mg  Qty: 18 tablet, Refills: 0    Associated Diagnoses: Nephrolithiasis      potassium chloride (Klor-Con M20) 20 mEq tablet Take 1 tablet (20 mEq total) by mouth daily  Qty: 30 tablet, Refills: 2    Associated Diagnoses: Chronic heart failure with preserved ejection fraction (HCC)      sildenafil (REVATIO) 20 mg tablet Take 2 tablets (40 mg total) by mouth 3 (three) times a day  Qty: 180 tablet, Refills: 5    Associated Diagnoses: Pulmonary hypertension (HCC)      spironolactone (ALDACTONE) 25 mg tablet Take 1 tablet (25 mg total) by mouth daily  Qty: 90 tablet, Refills: 3    Associated Diagnoses: Chronic heart failure with preserved ejection fraction (HCC)      torsemide (DEMADEX) 20 mg tablet TAKE 1 TABLET(20 MG) BY MOUTH DAILY  Qty: 90 tablet, Refills: 1    Associated Diagnoses: Chronic heart failure with preserved ejection fraction (HCC)      traZODone (DESYREL) 50 mg tablet TAKE 2 TABLETS(100 MG) BY MOUTH DAILY AT BEDTIME  Qty: 180 tablet, Refills: 1    Associated Diagnoses: Other insomnia      warfarin (Coumadin) 5 mg " tablet 7.5mg po daily or as directed.  Qty: 150 tablet, Refills: 3    Associated Diagnoses: Pulmonary embolism associated with COVID-19 ([facility]); Lupus anticoagulant disorder (HCC)      ascorbic acid (VITAMIN C) 500 MG tablet Take 1 tablet (500 mg total) by mouth 2 (two) times a day  Qty: 60 tablet, Refills: 1    Associated Diagnoses: Status post total knee replacement using cement, left; Infection of total knee replacement, initial encounter  ([facility])      cefadroxil (DURICEF) 500 mg capsule Take 2 capsules (1,000 mg total) by mouth every 12 (twelve) hours Do not start before November 14, 2024.  Qty: 120 capsule, Refills: 4    Associated Diagnoses: Infection of total knee replacement, sequela      clindamycin (CLEOCIN) 300 MG capsule Take 3 tablets by mouth, 1 hour prior to procedure.  Qty: 3 capsule, Refills: 1    Associated Diagnoses: Status post revision of total knee, left      folic acid (FOLVITE) 1 mg tablet Take 1 tablet (1 mg total) by mouth daily  Qty: 30 tablet, Refills: 1    Associated Diagnoses: Status post total knee replacement using cement, left; Infection of total knee replacement, initial encounter  ([facility])      Multiple Vitamins-Minerals (multivitamin with minerals) tablet Take 1 tablet by mouth daily  Qty: 30 tablet, Refills: 1    Associated Diagnoses: Status post total knee replacement using cement, left; Infection of total knee replacement, initial encounter  ([facility])      saccharomyces boulardii (FLORASTOR) 250 mg capsule Take 1 capsule (250 mg total) by mouth 2 (two) times a day  Qty: 60 capsule, Refills: 4    Associated Diagnoses: Diarrhea, unspecified type      triamcinolone (KENALOG) 0.1 % oral topical paste            No discharge procedures on file.  ED SEPSIS DOCUMENTATION   Time reflects when diagnosis was documented in both MDM as applicable and the Disposition within this note       Time User Action Codes Description Comment    4/24/2025  1:12 PM Adeline Mckay Add [N20.0] Nephrolithiasis      4/24/2025  2:36 PM Adeline Mckay Add [R11.10] Vomiting                  Adeline Mckay DO  04/24/25 1727

## 2025-04-24 NOTE — ANESTHESIA PREPROCEDURE EVALUATION
Procedure:  CYSTOSCOPY URETEROSCOPY WITH LITHOTRIPSY HOLMIUM LASER, RETROGRADE PYELOGRAM AND INSERTION STENT URETERAL; possible stent only (Left: Bladder)    Relevant Problems   ANESTHESIA   (+) Motion sickness   (+) PONV (postoperative nausea and vomiting)      CARDIO   (+) Pulmonary artery hypertension (HCC)   (+) Pulmonary embolus (HCC)   (+) Pulmonary hypertension (HCC)   (+) Right-sided congestive heart failure ([facility])   (+) SOB (shortness of breath) on exertion   (+) Venous stasis of lower extremity      /RENAL   (+) Chronic kidney disease, stage 3a ([facility])   (+) Nephrolithiasis      HEMATOLOGY   (+) Acute blood loss as cause of postoperative anemia   (+) Iron deficiency anemia due to chronic blood loss   (+) Lupus anticoagulant disorder ([facility])      MUSCULOSKELETAL   (+) Arthritis of right knee   (+) Chronic midline low back pain without sciatica   (+) Primary osteoarthritis of left knee   (+) Rheumatoid arthritis, involving unspecified site, unspecified whether rheumatoid factor present ([facility])   (+) Staphylococcal arthritis, left knee ([facility])      NEURO/PSYCH   (+) Chronic midline low back pain without sciatica   (+) Seizures ([facility])      PULMONARY   (+) Dyspnea   (+) GRIS (obstructive sleep apnea)   (+) SOB (shortness of breath) on exertion      Blood   (+) Antiphospholipid antibody syndrome ([facility])   (+) Leukopenia   (+) Neutropenia, unspecified type ([facility])      Rheumatology   (+) Diffuse connective tissue disease ([facility])   (+) Infection of prosthetic left knee joint ([facility])   (+) Other organ or system involvement in systemic lupus erythematosus ([facility])   (+) SLE (systemic lupus erythematosus) ([facility])      Orthopedic/Musculoskeletal   (+) Status post total right knee replacement      Cardiovascular/Peripheral Vascular   (+) Other chronic pulmonary heart diseases      Other   (+) Anticoagulated   (+) COVID-19   (+) Chronic cough   (+) Morbid obesity ([facility])   (+) Sinus tachycardia      CMP: Dep=790  CBC: WNL  EKG & TTE:  Normal  Physical Exam    Airway    Mallampati score: III  TM Distance: >3 FB  Neck ROM: full     Dental       Cardiovascular      Pulmonary      Other Findings  post-pubertal.    Anesthesia Plan  ASA Score- 3     Anesthesia Type- general with ASA Monitors.         Additional Monitors:     Airway Plan: ETT.           Plan Factors-    Chart reviewed.  Imaging results reviewed. Existing labs reviewed. Patient summary reviewed.    Patient is not a current smoker.  Patient did not smoke on day of surgery.            Induction- intravenous.    Postoperative Plan- Plan for postoperative opioid use. Planned trial extubation    Perioperative Resuscitation Plan - Level 1 - Full Code.       Informed Consent- Anesthetic plan and risks discussed with patient.  I personally reviewed this patient with the CRNA. Discussed and agreed on the Anesthesia Plan with the CRNA..      NPO Status:  No vitals data found for the desired time range.         Yes

## 2025-04-24 NOTE — ASSESSMENT & PLAN NOTE
"59 y.o. female with a PMHx of PAH, with MCTD/SLE, + lupus anticoagulant and antiphospholipid antibody syndrome who presents for L flank pain and found to have ureteral calculi. Patient saw outpatient Urology today and was given Zofran/pain meds and scheduled for left uteroscopy laser lithotripsy in the near future. Patient presented to the ED today due to increasing pain and nausea.     Upon examination, patient is afebrile, vital signs stable.  WBC 7.74, Creat 1.12  Urine:+ Pyuria and RBCs  Urine culture positive mixed contaminants; was prophylactically given Macrobid in the ED, 4/23    CT abdomen and pelvis with contrast performed 4/23/25 noted a obstructing 7 mm left proximal ureteral calculi with mild upstream hydroureteronephrosis.    Plan:  - Urology consulted, appreciate recs  Urological procedure scheduled for today, 4/15  \"Planning for cystoscopy, ureteroscopy, laser lithotripsy, retrograde pyelogram insertion of left ureteral stent versus stent only\"  - Monitor VS  - Pain regimen ordered  - Zofran PRN for nausea   "

## 2025-04-24 NOTE — CONSULTS
Consultation - Urology   Name: Na Joseph 60 y.o. female I MRN: [id number]  Unit/Bed#: ED 08 I Date of Admission: 4/24/2025   Date of Service: 4/24/2025 I Hospital Day: 0   Inpatient consult to Urology  Consult performed by: KERI Byers  Consult ordered by: Adeline Mckay DO        Physician Requesting Evaluation: Naya Oropeza MD   Reason for Evaluation / Principal Problem: Ureteral calculi you    Assessment & Plan  Ureteral calculus  CT: 7 mm proximal left ureteral calculus with hydronephrosis  Afebrile, vital signs stable  WBC 7.74  Creat 1.12  Urine:+ Pyuria and RBCs  Urine culture positive mixed contaminants; was prophylactically given Macrobid  Pain management antiemetics per primary team  N.p.o. midnight  Case requested for cystoscopy, ureteroscopy, laser lithotripsy, retrograde pyelogram insertion of left ureteral stent versus stent only on 4/25  E consent obtained  If patient should decompensate: Develop fevers, shaking chills, hypotension, tachycardia please notify urology immediately for possible urgent stent placement; this was communicated to primary team      History of Present Illness   Na Joseph is a 60 y.o. female who presents with left-sided flank pain.  Patient was seen in the emergency room yesterday due to gross hematuria.  She was having mild left flank pain at that time.  Due to Coumadin use she called her hematologist who recommended she come to the emergency department for hematuria evaluation.  She is CT with IV contrast that revealed a 7 mm left proximal ureteral calculi.  She was discharged home with urology follow-up.  She was seen in the urology office this morning.  She reports having nausea while in the office and her pain started to return and increased.  Case was requested for surgical intervention in the office today however on her way home she vomited multiple times and had worsening of flank pain prompting an ER visit.  She has been afebrile with stable  vital signs and lab.  She completed 6 months of antibiotics due to recent infection in her knee (IV followed by oral)    She has a history of B12 deficiency, chronic cough, diffuse connective tissue disease, edema, systemic lupus erythematous, antiphospholipid syndrome, shortness of breath, sinus tachycardia, pulmonary artery hypertension, osteoporosis, PE, RA.  See additional history under snapshot    Review of Systems   Constitutional:  Negative for activity change, appetite change, chills, fatigue, fever and unexpected weight change.   HENT:  Negative for facial swelling.    Eyes:  Negative for discharge.   Respiratory: Negative.  Negative for cough and shortness of breath.    Cardiovascular:  Negative for chest pain and leg swelling.   Gastrointestinal:  Positive for diarrhea, nausea and vomiting. Negative for abdominal distention, abdominal pain and constipation.   Endocrine: Negative.    Genitourinary:  Positive for flank pain. Negative for decreased urine volume, difficulty urinating, dysuria, enuresis, frequency, genital sores, hematuria and urgency.   Musculoskeletal:  Negative for back pain and myalgias.   Skin:  Negative for pallor and rash.   Allergic/Immunologic: Negative.  Negative for immunocompromised state.   Neurological:  Negative for facial asymmetry and speech difficulty.   Psychiatric/Behavioral:  Negative for agitation and confusion.      Medical History Review: I have reviewed the patient's PMH, PSH, Social History, Family History, Meds, and Allergies     Objective :  Temp:  [97.2 °F (36.2 °C)] 97.2 °F (36.2 °C)  HR:  [76-84] 76  BP: (130-193)/(64-86) 139/64  Resp:  [16-20] 16  SpO2:  [93 %-97 %] 93 %  O2 Device: Nasal cannula  Nasal Cannula O2 Flow Rate (L/min):  [2 L/min] 2 L/min    I/O       None            Physical Exam  Vitals reviewed.   Constitutional:       General: She is not in acute distress.     Appearance: Normal appearance. She is not ill-appearing, toxic-appearing or  "diaphoretic.   HENT:      Head: Normocephalic and atraumatic.   Eyes:      General: No scleral icterus.  Cardiovascular:      Rate and Rhythm: Normal rate and regular rhythm.      Heart sounds: Normal heart sounds.   Pulmonary:      Effort: Pulmonary effort is normal. No respiratory distress.      Breath sounds: Normal breath sounds. No wheezing, rhonchi or rales.   Abdominal:      General: Abdomen is flat. There is no distension.      Palpations: Abdomen is soft.      Tenderness: There is no abdominal tenderness. There is left CVA tenderness. There is no right CVA tenderness.   Musculoskeletal:         General: No swelling.      Cervical back: Normal range of motion.   Skin:     General: Skin is warm and dry.      Coloration: Skin is not jaundiced or pale.      Findings: No rash.   Neurological:      General: No focal deficit present.      Mental Status: She is alert and oriented to person, place, and time.      Gait: Gait normal.   Psychiatric:         Mood and Affect: Mood normal.         Behavior: Behavior normal.         Thought Content: Thought content normal.         Judgment: Judgment normal.            Lab Results: I have reviewed the following results:CBC/BMP:   .     04/24/25  1311   WBC 7.74   HGB 13.2   HCT 39.6      SODIUM 139   K 3.7      CO2 23   BUN 18   CREATININE 1.12   GLUC 148*    , Urinalysis: No results found for: \"COLORU\", \"CLARITYU\", \"SPECGRAV\", \"PHUR\", \"LEUKOCYTESUR\", \"NITRITE\", \"PROTEINUA\", \"GLUCOSEU\", \"KETONESU\", \"BILIRUBINUR\", \"BLOODU\", Urine Culture: No results found for: \"URINECX\"  Recent Labs     04/23/25  1013 04/24/25  1311   WBC 3.72* 7.74   HGB 13.5 13.2   HCT 41.0 39.6    227   SODIUM 139  --    K 4.0  --      --    CO2 27  --    BUN 13  --    CREATININE 0.87  --    GLUC 110  --    AST 10*  --    ALT 9  --    ALB 4.3  --    TBILI 0.70  --    ALKPHOS 113*  --    PTT 31 29   INR 1.70* 2.13*     Lab Results   Component Value Date    COLORU Light Orange " 04/23/2025    CLARITYU Turbid 04/23/2025    SPECGRAV 1.012 04/23/2025    PHUR 7.5 04/23/2025    PHUR 6.0 01/25/2019    LEUKOCYTESUR Small (A) 04/23/2025    NITRITE Negative 04/23/2025    GLUCOSEU Negative 04/23/2025    KETONESU Negative 04/23/2025    BILIRUBINUR Negative 04/23/2025    BLOODU Large (A) 04/23/2025   ,   Lab Results   Component Value Date    URINECX 70,000-79,000 cfu/ml 04/23/2025       Imaging Results Review: I personally reviewed the following image studies in PACS and associated radiology reports: CT abdomen/pelvis. My interpretation of the radiology images/reports is: Left ureteral calculi with left hydronephrosis.  Other Study Results Review: No additional pertinent studies reviewed.    VTE Prophylaxis: VTE covered by:    None       Administrative Statements   I have spent a total time of 30 minutes in caring for this patient on the day of the visit/encounter including Diagnostic results, Prognosis, Risks and benefits of tx options, Instructions for management, Patient and family education, Importance of tx compliance, Risk factor reductions, Impressions, Counseling / Coordination of care, Documenting in the medical record, Reviewing/placing orders in the medical record (including tests, medications, and/or procedures), Obtaining or reviewing history  , Communicating with other healthcare professionals , and scheduling surgical procedure.

## 2025-04-24 NOTE — ASSESSMENT & PLAN NOTE
CT abdomen and pelvis with contrast performed/23/25 noted a obstructing 7 mm left proximal ureteral calculi with mild upstream hydroureteronephrosis  Patient's pain at this time is poorly controlled and continues to experience intermittent left-sided flank pain  We discussed options with medical expulsive therapy with trialing Flomax versus definitive stone management with a surgical procedure.  After discussion, the patient would prefer to not initiate therapy with the Flomax and would like to schedule a stone removal procedure.  We discussed that the procedure would be a cystoscopy, left uteroscopy laser lithotripsy, basket extraction, retrograde pyelogram, and left ureteral stent placement for treatment of her obstructing 7 mm stone.  The procedure was explained in detail as well as the risks involved and consent was obtained in the office today.  Patient will be scheduled for a definitive stone removal procedure.  Zofran and pain medication sent to her pharmacy.

## 2025-04-24 NOTE — ED ATTENDING ATTESTATION
4/24/2025  I, Naya Oropeza MD, saw and evaluated the patient. I have discussed the patient with the resident/non-physician practitioner and agree with the resident's/non-physician practitioner's findings, Plan of Care, and MDM as documented in the resident's/non-physician practitioner's note, except where noted. All available labs and Radiology studies were reviewed.  I was present for key portions of any procedure(s) performed by the resident/non-physician practitioner and I was immediately available to provide assistance.       At this point I agree with the current assessment done in the Emergency Department.  I have conducted an independent evaluation of this patient a history and physical is as follows:  This is a 60-year-old woman with known kidney stone presenting with worsening left flank pain and vomiting.  Patient has past medical history of PE and is on warfarin.  Patient had a CT yesterday for the same symptoms and was diagnosed with a kidney stone.  Patient without patient follow-up with urology, but has been having ongoing pain and vomiting.  On exam the patient is pale and appears uncomfortable.  Her HEENT exam is nonfocal.  Her neck is supple and nontender.  Her heart is regular without murmurs, rubs, or gallops.  Her lungs are clear with good air movement.  Her abdomen is soft.  She has left CVA tenderness.  Her extremities are intact.  She has no rashes or other lateralizing limb swelling. MEDICAL DECISION MAKING    Number and Complexity of Problems  Differential diagnosis: Nephrolithiasis, doubt superinfected stone, doubt other intra-abdominal or vascular pathology    Medical Decision Making Data  External documents reviewed: CT scan from yesterday reviewed  My EKG interpretation:   My CT interpretation:   My X-ray interpretation:   My ultrasound interpretation: Ultrasound with grade 1 hydronephrosis    No orders to display       Labs Reviewed   CBC AND DIFFERENTIAL - Abnormal       Result  Value Ref Range Status    WBC 7.74  4.31 - 10.16 Thousand/uL Final    RBC 4.31  3.81 - 5.12 Million/uL Final    Hemoglobin 13.2  11.5 - 15.4 g/dL Final    Hematocrit 39.6  34.8 - 46.1 % Final    MCV 92  82 - 98 fL Final    MCH 30.6  26.8 - 34.3 pg Final    MCHC 33.3  31.4 - 37.4 g/dL Final    RDW 13.5  11.6 - 15.1 % Final    MPV 11.3  8.9 - 12.7 fL Final    Platelets 227  149 - 390 Thousands/uL Final    nRBC 0  /100 WBCs Final    Segmented % 88 (*) 43 - 75 % Final    Immature Grans % 0  0 - 2 % Final    Lymphocytes % 7 (*) 14 - 44 % Final    Monocytes % 5  4 - 12 % Final    Eosinophils Relative 0  0 - 6 % Final    Basophils Relative 0  0 - 1 % Final    Absolute Neutrophils 6.78  1.85 - 7.62 Thousands/µL Final    Absolute Immature Grans 0.02  0.00 - 0.20 Thousand/uL Final    Absolute Lymphocytes 0.52 (*) 0.60 - 4.47 Thousands/µL Final    Absolute Monocytes 0.37  0.17 - 1.22 Thousand/µL Final    Eosinophils Absolute 0.03  0.00 - 0.61 Thousand/µL Final    Basophils Absolute 0.02  0.00 - 0.10 Thousands/µL Final   BASIC METABOLIC PANEL - Abnormal    Sodium 139  135 - 147 mmol/L Final    Potassium 3.7  3.5 - 5.3 mmol/L Final    Chloride 106  96 - 108 mmol/L Final    CO2 23  21 - 32 mmol/L Final    ANION GAP 10  4 - 13 mmol/L Final    BUN 18  5 - 25 mg/dL Final    Creatinine 1.12  0.60 - 1.30 mg/dL Final    Comment: Standardized to IDMS reference method    Glucose 148 (*) 65 - 140 mg/dL Final    Comment: If the patient is fasting, the ADA then defines impaired fasting glucose as > 100 mg/dL and diabetes as > or equal to 123 mg/dL.    Calcium 9.7  8.4 - 10.2 mg/dL Final    eGFR 52  ml/min/1.73sq m Final    Narrative:     National Kid[employer] Foundation guidelines for Chronic Kidney Disease (CKD):     Stage 1 with normal or high GFR (GFR > 90 mL/min/1.73 square meters)    Stage 2 Mild CKD (GFR = 60-89 mL/min/1.73 square meters)    Stage 3A Moderate CKD (GFR = 45-59 mL/min/1.73 square meters)    Stage 3B Moderate CKD (GFR  = 30-44 mL/min/1.73 square meters)    Stage 4 Severe CKD (GFR = 15-29 mL/min/1.73 square meters)    Stage 5 End Stage CKD (GFR <15 mL/min/1.73 square meters)  Note: GFR calculation is accurate only with a steady state creatinine   PROTIME-INR - Abnormal    Protime 23.9 (*) 12.3 - 15.0 seconds Final    INR 2.13 (*) 0.85 - 1.19 Final    Narrative:     INR Therapeutic Range    Indication                                             INR Range      Atrial Fibrillation                                               2.0-3.0  Hypercoagulable State                                    2.0.2.3  Left Ventricular Asist Device                            2.0-3.0  Mechanical Heart Valve                                  -    Aortic(with afib, MI, embolism, HF, LA enlargement,    and/or coagulopathy)                                     2.0-3.0 (2.5-3.5)     Mitral                                                             2.5-3.5  Prosthetic/Bioprosthetic Heart Valve               2.0-3.0  Venous thromboembolism (VTE: VT, PE        2.0-3.0   APTT - Normal    PTT 29  23 - 34 seconds Final    Comment: Therapeutic Heparin Range =  60-90 seconds       Labs reviewed by me are significant for:     Clinical decision rules/scores are significant for:     Discussed case with:   Considered admission for:     Treatment and Disposition  ED course: Patient seen and examined.  Provided with IV analgesia, will plan to admit for urological consultation and management of stone disease  Shared decision making:   Code status:     ED Course         Critical Care Time  Procedures

## 2025-04-24 NOTE — QUICK NOTE
Patient evaluated at bedside during evening rounds.  Patient was initially in the bathroom for urination, noted emesis bag with nonbloody nonbilious vomiting.  Patient returned laying comfortably in bed.     Patient believes nausea is triggered by pain.  Pain is currently controlled, rating 3 out of 10.  Patient currently not nauseous.  Denies lightheadedness or dizziness.  Reports having a bowel movement this morning.    Patient reports that she has had oxycodone before and a prior knee surgery and noted that she gets nauseous when she gets the oxycodone.    Reviewed the current pain regimen and antiemetic regimen.    /83 (BP Location: Right arm)   Pulse 92   Temp 98.2 °F (36.8 °C) (Oral)   Resp 18   Wt 107 kg (235 lb)   SpO2 93%   BMI 42.98 kg/m²     Physical Exam  Vitals reviewed.   Constitutional:       General: She is not in acute distress.     Appearance: She is obese. She is not ill-appearing, toxic-appearing or diaphoretic.   Cardiovascular:      Rate and Rhythm: Normal rate and regular rhythm.      Pulses: Normal pulses.      Heart sounds: Normal heart sounds. No murmur heard.  Abdominal:      General: Abdomen is flat. There is no distension.      Palpations: Abdomen is soft.      Tenderness: There is no abdominal tenderness. There is left CVA tenderness (very mild). There is no right CVA tenderness or guarding.   Musculoskeletal:      Right lower leg: No edema.      Left lower leg: No edema.   Skin:     General: Skin is warm and dry.   Neurological:      Mental Status: She is alert.         A/P: Currently stable.   - Not nauseous currently.  Will continue with the current antiemetic regimen of IV Zofran and second line IM Tigan.  - Pain well-controlled.  Will continue with current regimen of scheduled Tylenol, and as needed: P.o. oxy 2.5/5 every 6 and IV Dilaudid 0.5 Q3.  -Discussed with patient and the nurse that if she is requiring oxycodone for pain, to notify primary so that we can trial a  shot of IV Toradol instead of the oxy.  Reviewed patient's kidney function, will do low-dose IV Toradol if needed.  -Instructed nurse to contact urology if patient develops any of the following symptoms: Fever, chills, shaking, hypotension, tachycardia for potential expedition of her urology intervention.

## 2025-04-24 NOTE — PROGRESS NOTES
4/24/2025      Assessment and Plan    60 y.o. female new patient [facility] for urology    Nephrolithiasis  CT abdomen and pelvis with contrast performed/23/25 noted a obstructing 7 mm left proximal ureteral calculi with mild upstream hydroureteronephrosis  Patient's pain at this time is poorly controlled and continues to experience intermittent left-sided flank pain  We discussed options with medical expulsive therapy with trialing Flomax versus definitive stone management with a surgical procedure.  After discussion, the patient would prefer to not initiate therapy with the Flomax and would like to schedule a stone removal procedure.  We discussed that the procedure would be a cystoscopy, left uteroscopy laser lithotripsy, basket extraction, retrograde pyelogram, and left ureteral stent placement for treatment of her obstructing 7 mm stone.  The procedure was explained in detail as well as the risks involved and consent was obtained in the office today.  Patient will be scheduled for a definitive stone removal procedure.  Zofran and pain medication sent to her pharmacy.        History of Present Illness  Na Joseph is a 60 y.o. female here for evaluation of nephrolithiasis.  Patient was seen in the emergency department on 4/23/2025 with acute onset of right-sided flank pain and gross hematuria.  Patient underwent CT abdomen and pelvis with contrast at that time which noted a 7 mm obstructing calculi in the proximal left ureter with mild upstream hydroureteronephrosis.  Patient's urinalysis at that time revealed 10-20 WBCs and a RBCs.  Patient was prophylactically covered with Macrobid.    Today, the patient does continue to experience significant intermittent left-sided flank pain.  Patient denies any new episodes of gross hematuria following her emergency department visit.  Patient defers pharmacotherapy for medical expulsive therapy due to risk of orthostatic hypotension.  Patient denies any infectious  "symptoms including fevers, chills, or dysuria.  Patient does continue to experience nausea without vomiting.        Review of Systems   Constitutional:  Negative for chills and fever.   HENT:  Negative for ear pain and sore throat.    Eyes:  Negative for pain and visual disturbance.   Respiratory:  Negative for cough and shortness of breath.    Cardiovascular:  Negative for chest pain and palpitations.   Gastrointestinal:  Positive for nausea. Negative for abdominal pain and vomiting.   Genitourinary:  Positive for flank pain. Negative for decreased urine volume, difficulty urinating, dysuria, frequency, hematuria and urgency.   Musculoskeletal:  Negative for arthralgias and back pain.   Skin:  Negative for color change and rash.   Neurological:  Negative for seizures and syncope.   All other systems reviewed and are negative.               Vitals  Vitals:    04/24/25 0822   BP: 130/80   BP Location: Left arm   Patient Position: Sitting   Cuff Size: Standard   Pulse: 80   SpO2: 97%   Weight: 107 kg (235 lb)   Height: 5' 2\" (1.575 m)       Physical Exam  Vitals reviewed.   Constitutional:       General: She is not in acute distress.     Appearance: Normal appearance. She is not ill-appearing.   HENT:      Head: Normocephalic and atraumatic.      Nose: Nose normal.   Eyes:      General: No scleral icterus.  Pulmonary:      Effort: No respiratory distress.   Abdominal:      General: Abdomen is flat. There is no distension.      Palpations: Abdomen is soft.      Tenderness: There is no abdominal tenderness.   Musculoskeletal:         General: Normal range of motion.      Cervical back: Normal range of motion.   Skin:     General: Skin is warm.      Coloration: Skin is not jaundiced.   Neurological:      Mental Status: She is alert and oriented to person, place, and time.      Gait: Gait normal.   Psychiatric:         Mood and Affect: Mood normal.         Behavior: Behavior normal.           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    Chronic kidney disease 2018    borderline lab values    Chronic rhinitis 2012    Clotting disorder (HCC) 2012    Coronary artery disease 2018    Encephalitis     Lasted Assessed 10/18/2017    Gout 2018    Head injury 2023    Heart failure (HCC) 2019    History of transfusion 2019    autologous    Hypertension     Lupus 2018    Lupus anticoagulant disorder (HCC)     Maxillary sinusitis     Lasted Assessed 10/18/2017    Night sweat     Osteopenia     Hip    Osteoporosis     Spine    Pneumonia     Last Assessed 10/18/2017    PONV (postoperative nausea and vomiting)     Pulmonary embolism (HCC)     Pulmonary hypertension (HCC)     Rheumatic disease     Seizures (HCC)     Only during Encephalitis    Shortness of breath     with exertion    Sinus tachycardia     Urinary incontinence      Social History     Socioeconomic History    Marital status: /Civil Union     Spouse name: None    Number of children: 2    Years of education: None    Highest education level: None   Occupational History    None   Tobacco Use    Smoking status: Former     Current packs/day: 0.00     Types: Cigarettes     Quit date: 2006     Years since quittin.2    Smokeless tobacco: Never   Vaping Use    Vaping status: Never Used   Substance and Sexual Activity    Alcohol use: Yes     Comment: Socially    Drug use: No    Sexual activity: Yes     Partners: Male     Birth control/protection: Female Sterilization   Other Topics Concern    None   Social History Narrative    Daily caffeinated coffee consumption    Exercise habits     Social Drivers of Health     Financial Resource Strain: Medium Risk (2024)    Overall Financial Resource Strain (CARDIA)     Difficulty of Paying  Living Expenses: Somewhat hard   Food Insecurity: No Food Insecurity (3/1/2025)    Hunger Vital Sign     Worried About Running Out of Food in the Last Year: Never true     Ran Out of Food in the Last Year: Never true   Transportation Needs: No Transportation Needs (3/1/2025)    PRAPARE - Transportation     Lack of Transportation (Medical): No     Lack of Transportation (Non-Medical): No   Physical Activity: Not on file   Stress: Not on file   Social Connections: Not on file   Intimate Partner Violence: Not on file   Housing Stability: High Risk (3/1/2025)    Housing Stability Vital Sign     Unable to Pay for Housing in the Last Year: Yes     Number of Times Moved in the Last Year: 0     Homeless in the Last Year: No     Social History     Tobacco Use   Smoking Status Former    Current packs/day: 0.00    Types: Cigarettes    Quit date: 2006    Years since quittin.2   Smokeless Tobacco Never     Family History   Problem Relation Age of Onset    Uterine cancer Mother     Pancreatic cancer Mother 70    Diabetes Mother     Endometrial cancer Mother 66    Arthritis Mother     Cancer Mother         Pancreas, Uterine    Vision loss Mother     Osteoporosis Mother     Heart disease Father         open heart surgery at age 50     Stroke Father         CVA    Hypertension Father     Atrial fibrillation Father     Hyperlipidemia Father     Arthritis Father     Rheum arthritis Father     Heart attack Father     No Known Problems Sister     No Known Problems Sister     Thyroid disease Sister     Depression Sister     Osteoarthritis Sister     Asthma Sister     Asthma Daughter     Migraines Daughter     Asthma Daughter     Thyroid disease Maternal Grandmother     Heart attack Maternal Grandfather     Heart disease Maternal Grandfather     Heart attack Paternal Grandmother     Heart disease Paternal Grandmother     Skin cancer Paternal Grandfather     No Known Problems Brother     Hemochromatosis Brother     Alcohol abuse  "Brother     Early death Brother         heriditary hemachromotosis    No Known Problems Maternal Aunt     No Known Problems Paternal Aunt     Anuerysm Neg Hx     Clotting disorder Neg Hx     Heart failure Neg Hx        The following portions of the patient's history were reviewed and updated as appropriate: allergies, current medications, past medical history, past social history, past surgical history and problem list.    Results  No results found for this or any previous visit (from the past hour).]  No results found for: \"PSA\"  Lab Results   Component Value Date    GLUCOSE 100 11/11/2023    CALCIUM 10.0 04/23/2025    K 4.0 04/23/2025    CO2 27 04/23/2025     04/23/2025    BUN 13 04/23/2025    CREATININE 0.87 04/23/2025     Lab Results   Component Value Date    WBC 3.72 (L) 04/23/2025    HGB 13.5 04/23/2025    HCT 41.0 04/23/2025    MCV 92 04/23/2025     04/23/2025      "

## 2025-04-24 NOTE — ASSESSMENT & PLAN NOTE
Home regimen: Warfarin 5mg daily  Follows w/ Hematology outpatient     INR=2.13    Plan:  - Continue   - Monitor INR

## 2025-04-24 NOTE — ASSESSMENT & PLAN NOTE
CT: 7 mm proximal left ureteral calculus with hydronephrosis  Afebrile, vital signs stable  WBC 7.74  Creat 1.12  Urine:+ Pyuria and RBCs  Urine culture positive mixed contaminants; was prophylactically given Macrobid  Pain management antiemetics per primary team  N.p.o. midnight  Case requested for cystoscopy, ureteroscopy, laser lithotripsy, retrograde pyelogram insertion of left ureteral stent versus stent only on 4/25  E consent obtained  If patient should decompensate: Develop fevers, shaking chills, hypotension, tachycardia please notify urology immediately for possible urgent stent placement; this was communicated to primary team

## 2025-04-24 NOTE — ASSESSMENT & PLAN NOTE
Follows with outpatient Cardiology.     Plan:  - Continue sildanefil TID  - Continue torsemide 20 mg and spironolactone 25 mg daily

## 2025-04-24 NOTE — Clinical Note
Case was discussed with FM resident and the patient's admission status was agreed to be Admission Status: observation status to the service of Dr. SANDOVAL.

## 2025-04-24 NOTE — PLAN OF CARE
Problem: PAIN - ADULT  Goal: Verbalizes/displays adequate comfort level or baseline comfort level  Description: Interventions:- Encourage patient to monitor pain and request assistance- Assess pain using appropriate pain scale- Administer analgesics based on type and severity of pain and evaluate response- Implement non-pharmacological measures as appropriate and evaluate response- Consider cultural and social influences on pain and pain management- Notify physician/advanced practitioner if interventions unsuccessful or patient reports new pain  Outcome: Progressing     Problem: INFECTION - ADULT  Goal: Absence or prevention of progression during hospitalization  Description: INTERVENTIONS:- Assess and monitor for signs and symptoms of infection- Monitor lab/diagnostic results- Monitor all insertion sites, i.e. indwelling lines, tubes, and drains- Monitor endotracheal if appropriate and nasal secretions for changes in amount and color- [place] appropriate cooling/warming therapies per order- Administer medications as ordered- Instruct and encourage patient and family to use good hand hygiene technique- Identify and instruct in appropriate isolation precautions for identified infection/condition  Outcome: Progressing  Goal: Absence of fever/infection during neutropenic period  Description: INTERVENTIONS:- Monitor WBC  Outcome: Progressing     Problem: SAFETY ADULT  Goal: Patient will remain free of falls  Description: INTERVENTIONS:- Educate patient/family on patient safety including physical limitations- Instruct patient to call for assistance with activity - Consult OT/PT to assist with strengthening/mobility - Keep Call bell within reach- Keep bed low and locked with side rails adjusted as appropriate- Keep care items and personal belongings within reach- Initiate and maintain comfort rounds- Apply yellow socks and bracelet for high fall risk patients- Consider moving patient to room near nurses  station  Outcome: Progressing  Goal: Maintain or return to baseline ADL function  Description: INTERVENTIONS:-  Assess patient's ability to carry out ADLs; assess patient's baseline for ADL function and identify physical deficits which impact ability to perform ADLs (bathing, care of mouth/teeth, toileting, grooming, dressing, etc.)- Assess/evaluate cause of self-care deficits - Assess range of motion- Assess patient's mobility; develop plan if impaired- Assess patient's need for assistive devices and provide as appropriate- Encourage maximum independence but intervene and supervise when necessary- Involve family in performance of ADLs- Assess for home care needs following discharge - Consider OT consult to assist with ADL evaluation and planning for discharge- Provide patient education as appropriate  Outcome: Progressing  Goal: Maintains/Returns to pre admission functional level  Description: INTERVENTIONS:- Perform AM-PAC 6 Click Basic Mobility/ Daily Activity assessment daily.- Set and communicate daily mobility goal to care team and patient/family/caregiver. - Collaborate with rehabilitation services on mobility goals if consulted- Ambulate patient 3 times a day- Out of bed to chair 3 times a day - Out of bed for meals 3 times a day- Out of bed for toileting- Record patient progress and toleration of activity level   Outcome: Progressing     Problem: DISCHARGE PLANNING  Goal: Discharge to home or other facility with appropriate resources  Description: INTERVENTIONS:- Identify barriers to discharge w/patient and caregiver- Arrange for needed discharge resources and transportation as appropriate- Identify discharge learning needs (meds, wound care, etc.)- Arrange for interpretive services to assist at discharge as needed- Refer to Case Management Department for coordinating discharge planning if the patient needs post-hospital services based on physician/advanced practitioner order or complex needs related to  functional status, cognitive ability, or social support system  Outcome: Progressing     Problem: Knowledge Deficit  Goal: Patient/family/caregiver demonstrates understanding of disease process, treatment plan, medications, and discharge instructions  Description: Complete learning assessment and assess knowledge base.Interventions:- Provide teaching at level of understanding- Provide teaching via preferred learning methods  Outcome: Progressing

## 2025-04-24 NOTE — TELEPHONE ENCOUNTER
Patient seen in clinic today on 4/24/2025 for consultation in regards to an obstructing left ureteral calculi.  CT scan performed 4/23 revealed an obstructing 6 mm proximal left ureteral calculi with moderate left-sided hydroureteronephrosis.    Patient signed consent to proceed with scheduling a cystoscopy, left uteroscopy with laser lithotripsy, basket stone extraction, retrograde pyelogram, and left ureteral stent placement for definitive management of the obstructing 6 mm stone.  Please assist with scheduling the patient for this procedure.  Patient is open to undergoing the procedure at any hospital and notes that she can be flexible with day/times.

## 2025-04-24 NOTE — H&P
"H&P - Family Medicine   Name: Na Joseph 60 y.o. female I MRN: [id number]  Unit/Bed#: -01 I Date of Admission: 4/24/2025   Date of Service: 4/24/2025 I Hospital Day: 0    Primary Care Physician: Sultana Arnold DO  Admitting Provider: Troy Diaz MD    Patient is a 60 y.o. female being admitted for ureteral calculus pending procedure under [facility] with:     Ureteral calculus  Assessment & Plan  59 y.o. female with a PMHx of PAH, with MCTD/SLE, + lupus anticoagulant and antiphospholipid antibody syndrome who presents for L flank pain and found to have ureteral calculi. Patient saw outpatient Urology today and was given Zofran/pain meds and scheduled for left uteroscopy laser lithotripsy in the near future. Patient presented to the ED today due to increasing pain and nausea.     Upon examination, patient is afebrile, vital signs stable.  WBC 7.74, Creat 1.12  Urine:+ Pyuria and RBCs  Urine culture positive mixed contaminants; was prophylactically given Macrobid in the ED, 4/23    CT abdomen and pelvis with contrast performed 4/23/25 noted a obstructing 7 mm left proximal ureteral calculi with mild upstream hydroureteronephrosis.    Plan:  - Urology consulted, appreciate recs  NPO midnight  \"Planning for cystoscopy, ureteroscopy, laser lithotripsy, retrograde pyelogram insertion of left ureteral stent versus stent only on 4/25\"  - Monitor VS  - Pain regimen ordered  - Zofran PRN for nausea     Antiphospholipid antibody syndrome (HCC)  Assessment & Plan  Home regimen: Warfarin 5mg daily  Follows w/ Hematology outpatient     INR=2.13    Plan:  - Continue   - Monitor INR    SLE (systemic lupus erythematosus) ([facility])  Assessment & Plan  Home regimen: hydroxychloroquine and Rinvoq    Plan:  - Continue    Pulmonary artery hypertension (HCC)  Assessment & Plan  Follows with outpatient Cardiology.     Plan:  - Continue sildanefil TID  - Continue torsemide 20 mg and spironolactone 25 mg daily         Diet:      "   Diet Orders   (From admission, onward)                 Start     Ordered    04/25/25 0001  Diet NPO  Diet effective midnight        References:    Adult Nutrition Support Algorithm    RD Therapeutic Diet Order Protocol   Question Answer Comment   Diet Type NPO    Allow Registered Dietitian to adjust diet order per protocol Yes        04/24/25 1441    04/24/25 1540  Diet Regular; Regular House  Diet effective now        References:    Adult Nutrition Support Algorithm    RD Therapeutic Diet Order Protocol   Question Answer Comment   Diet Type Regular    Regular Regular House    Allow Registered Dietitian to adjust diet order per protocol Yes        04/24/25 1539                    Code Status: Level 1 - Full Code  POLST:    VTE Prophylaxis: Warfarin (Coumadin) sequential compression device  Admission Status: OBSERVATION  Dispo:    History of Present Illness     HPI:  Na Joseph is a 60 y.o. female with pmhx of pulmonary embolis on warfarin, Pulmonary artery HTN, GRIS, SLE, antiphospholipid antibody syndrome on warfarin,  who presents with left sided flank pain and nausea in the setting of 7mm nephrolithiasis.    Patient was seen in the ED yesterday for flank pain and hematuria and was found to have a 7 mm obsturcting calculus in the proximal left ureter with mild upstream hydroureteronephrosis. She was discharged from the ED with urology follow up. Patient saw urology this AM and was prescribed pain medications and anti emetics with plan for outpatient procedure early next week. However, patient reports her pain has gotten progressively worse throughout the day today. She also reports her nausea is worsening and she has had multiple episodes of non bloody, non bilious emesis.     Patient reports left sided back pain. Denies dysuria, urgency, or frequency. Reports hematuria. Patient reports this is her first episode of nephrolithiasis. Denies fevers or chills. Patient states that her nausea improved with Zofran.  She also reports that her pain is currently a 3/10 after receiving IV pain medication.     ED Management: CBC, BMP, PT, PTT, zofran, dilaudid     Review of Systems   Constitutional:  Negative for chills and fever.   HENT:  Negative for ear pain and sore throat.    Eyes:  Negative for pain and visual disturbance.   Respiratory:  Negative for cough and shortness of breath.    Cardiovascular:  Negative for chest pain and palpitations.   Gastrointestinal:  Negative for abdominal pain and vomiting.   Genitourinary:  Positive for flank pain and hematuria. Negative for dysuria.   Musculoskeletal:  Negative for arthralgias and back pain.   Skin:  Negative for color change and rash.   Neurological:  Negative for seizures and syncope.   All other systems reviewed and are negative.      Historical Information   Past Medical History:   Diagnosis Date    HELENE positive     Anemia     Sildenafil causes decreased hematocrit    Antiphospholipid syndrome ([facility])     Anxiety 3/2020    CHF (congestive heart failure) ([facility])     right heart failure related to pulm HTN lupus    Chronic kidney disease 2018    borderline lab values    Chronic rhinitis 06/04/2012    Clotting disorder ([facility]) 2012    Coronary artery disease 2018    Encephalitis     Lasted Assessed 10/18/2017    Gout 06/26/2018    Head injury 11/11/2023    Heart failure ([facility]) 2019    History of transfusion 2/13/2019    autologous    Hypertension     Lupus 2018    Lupus anticoagulant disorder ([facility])     Maxillary sinusitis     Lasted Assessed 10/18/2017    Night sweat     Osteopenia     Hip    Osteoporosis     Spine    Pneumonia     Last Assessed 10/18/2017    PONV (postoperative nausea and vomiting)     Pulmonary embolism ([facility])     Pulmonary hypertension ([facility])     Rheumatic disease     Seizures ([facility])     Only during Encephalitis    Shortness of breath     with exertion    Sinus tachycardia     Urinary incontinence      Past Surgical History:   Procedure Laterality Date    ANKLE  SURGERY  2015    ARTHRODESIS      Hand: IP Joint    CHOLECYSTECTOMY      COLONOSCOPY      COLPOSCOPY      HYSTERECTOMY      Vaginal    JOINT REPLACEMENT Right 10/03/2024    Knee replacement    KNEE SURGERY  2019    LAPAROSCOPIC ESOPHAGOGASTRIC FUNDOPLASTY  2008    ORTHOPEDIC SURGERY  10/03/2024    10/06/2022    OK ARTHRP KNE CONDYLE&PLATU MEDIAL&LAT COMPARTMENTS Right 2019    Procedure: KNEE TOTAL ARTHROPLASTY AND ASSOCIATED PROCEDURES;  Surgeon: Donovan Zendejas DO;  [facility];  Service: Orthopedics    OK ARTHRP KNE CONDYLE&PLATU MEDIAL&LAT COMPARTMENTS Left 10/06/2022    Procedure: ARTHROPLASTY KNEE TOTAL;  Surgeon: Donovan Zendejas DO;  [facility];  Service: Orthopedics    OK ARTHRS KNEE W/MENISCECTOMY MED&LAT W/SHAVING Right 10/02/2018    Procedure: KNEE ARTHROSCOPY WITH PARTIAL MEDIAL MENISCECTOMY;  Surgeon: Donovan Zendejas DO;  [facility];  Service: Orthopedics    OK ARTHRS KNEE W/MENISCECTOMY MED&LAT W/SHAVING Left 2019    Procedure: KNEE ARTHROSCOPIC MENISCECTOMY /MEDIAL; Chondyl medial and posterior;  Surgeon: Donovan Zendejas DO;  [facility];  Service: Orthopedics    REDUCTION MAMMAPLASTY Bilateral     doesnt remember when    REPAIR ANKLE LIGAMENT Right     TENDON REPAIR      TONSILLECTOMY      TOTAL KNEE ARTHROPLASTY REVISION Left 10/03/2024    Procedure: ARTHROPLASTY KNEE TOTAL REVISION;  Surgeon: Grupo Wilson DO;  [facility];  Service: Orthopedics     Social History   Social History     Substance and Sexual Activity   Alcohol Use Yes    Comment: Socially     Social History     Substance and Sexual Activity   Drug Use No     Social History     Tobacco Use   Smoking Status Former    Current packs/day: 0.00    Types: Cigarettes    Quit date: 2006    Years since quittin.2   Smokeless Tobacco Never     Family History:   Family History   Problem Relation Age of Onset    Uterine cancer Mother     Pancreatic cancer Mother 70    Diabetes  "Mother     Endometrial cancer Mother 66    Arthritis Mother     Cancer Mother         Pancreas, Uterine    Vision loss Mother     Osteoporosis Mother     Heart disease Father         open heart surgery at age 50     Stroke Father         CVA    Hypertension Father     Atrial fibrillation Father     Hyperlipidemia Father     Arthritis Father     Rheum arthritis Father     Heart attack Father     No Known Problems Sister     No Known Problems Sister     Thyroid disease Sister     Depression Sister     Osteoarthritis Sister     Asthma Sister     Asthma Daughter     Migraines Daughter     Asthma Daughter     Thyroid disease Maternal Grandmother     Heart attack Maternal Grandfather     Heart disease Maternal Grandfather     Heart attack Paternal Grandmother     Heart disease Paternal Grandmother     Skin cancer Paternal Grandfather     No Known Problems Brother     Hemochromatosis Brother     Alcohol abuse Brother     Early death Brother         heriditary hemachromotosis    No Known Problems Maternal Aunt     No Known Problems Paternal Aunt     Anuerysm Neg Hx     Clotting disorder Neg Hx     Heart failure Neg Hx        Meds/Allergies   PTA meds:   Prior to Admission Medications   Prescriptions Last Dose Informant Patient Reported? Taking?   Abaloparatide (Tymlos) 3120 MCG/1.56ML SOPN   No Yes   Sig: Inject 80 mcg under the skin in the morning   B-D 3CC LUER-LILLIE SYR 25GX1/2\" 25G X 1-1/2\" 3 ML MISC  Self No Yes   Sig: USE 1 SYRINGE EVERY 30 DAYS   Benlysta 200 MG/ML SOAJ   Yes Yes   Sig: Every friday subcutaneous injection   Multiple Vitamins-Minerals (multivitamin with minerals) tablet Not Taking  No No   Sig: Take 1 tablet by mouth daily   Patient not taking: Reported on 4/24/2025   NEEDLE, DISP, 23 G (BD Disp Needle) 23G X 1\" MISC   No Yes   Sig: Use to administer B12   acetaminophen (TYLENOL) 500 mg tablet   No Yes   Sig: Take 2 tablets (1,000 mg total) by mouth every 8 (eight) hours   ascorbic acid (VITAMIN C) 500 " MG tablet Not Taking  No No   Sig: Take 1 tablet (500 mg total) by mouth 2 (two) times a day   Patient not taking: Reported on 2025   cefadroxil (DURICEF) 500 mg capsule   No No   Sig: Take 2 capsules (1,000 mg total) by mouth every 12 (twelve) hours Do not start before 2024.   cholecalciferol (VITAMIN D3) 1,000 units tablet  Self Yes Yes   Sig: Take 2,000 Units by mouth daily in the early morning     clindamycin (CLEOCIN) 300 MG capsule Not Taking  No No   Sig: Take 3 tablets by mouth, 1 hour prior to procedure.   Patient not taking: Reported on 2025   cyanocobalamin 1,000 mcg/mL   No Yes   Si mcg IM Q 2 weeks   fluconazole (DIFLUCAN) 150 mg tablet   Yes Yes   folic acid (FOLVITE) 1 mg tablet Not Taking  No No   Sig: Take 1 tablet (1 mg total) by mouth daily   Patient not taking: Reported on 2025   gabapentin (NEURONTIN) 100 mg capsule   No Yes   Sig: TAKE 1 CAPSULE(100 MG) BY MOUTH THREE TIMES DAILY   hydroxychloroquine (PLAQUENIL) 200 mg tablet  Self Yes Yes   Sig: Take 400 mg by mouth daily with breakfast Alternating days/dosages odd days 1tabs  even days 2tabs   nitrofurantoin (MACROBID) 100 mg capsule   No Yes   Sig: Take 1 capsule (100 mg total) by mouth 2 (two) times a day for 7 days   nystatin (MYCOSTATIN) powder   No Yes   Sig: Apply topically 3 (three) times a day   ondansetron (ZOFRAN) 4 mg tablet   No Yes   Sig: Take 1 tablet (4 mg total) by mouth every 8 (eight) hours as needed for nausea or vomiting   oxyCODONE (Roxicodone) 5 immediate release tablet   No Yes   Sig: Take 1 tablet (5 mg total) by mouth every 4 (four) hours as needed for moderate pain Max Daily Amount: 30 mg   potassium chloride (Klor-Con M20) 20 mEq tablet   No Yes   Sig: Take 1 tablet (20 mEq total) by mouth daily   saccharomyces boulardii (FLORASTOR) 250 mg capsule Not Taking  No No   Sig: Take 1 capsule (250 mg total) by mouth 2 (two) times a day   Patient not taking: Reported on 2025    sildenafil (REVATIO) 20 mg tablet   No Yes   Sig: Take 2 tablets (40 mg total) by mouth 3 (three) times a day   spironolactone (ALDACTONE) 25 mg tablet   No Yes   Sig: Take 1 tablet (25 mg total) by mouth daily   torsemide (DEMADEX) 20 mg tablet   No Yes   Sig: TAKE 1 TABLET(20 MG) BY MOUTH DAILY   traZODone (DESYREL) 50 mg tablet   No Yes   Sig: TAKE 2 TABLETS(100 MG) BY MOUTH DAILY AT BEDTIME   triamcinolone (KENALOG) 0.1 % oral topical paste Not Taking Self Yes No   Patient not taking: Reported on 2025   warfarin (Coumadin) 5 mg tablet   No Yes   Si.5mg po daily or as directed.      Facility-Administered Medications: None     Allergies   Allergen Reactions    Dilantin [Phenytoin] Anaphylaxis and Rash    Penicillins Rash, Anaphylaxis, Dermatitis and Hives    Augmentin [Amoxicillin-Pot Clavulanate] Rash and Dermatitis       Objective   Vitals: Blood pressure 138/83, pulse 76, temperature 98.2 °F (36.8 °C), resp. rate 16, weight 107 kg (235 lb), SpO2 93%, not currently breastfeeding.    No intake or output data in the 24 hours ending 25 1644    Invasive Devices       Peripheral Intravenous Line  Duration             Peripheral IV 25 Right Antecubital <1 day                    Physical Exam  Constitutional:       General: She is not in acute distress.     Appearance: She is not ill-appearing.   HENT:      Head: Normocephalic and atraumatic.      Mouth/Throat:      Mouth: Mucous membranes are moist.      Pharynx: Oropharynx is clear.   Eyes:      Conjunctiva/sclera: Conjunctivae normal.   Cardiovascular:      Rate and Rhythm: Normal rate and regular rhythm.      Pulses: Normal pulses.      Heart sounds: Normal heart sounds. No murmur heard.  Pulmonary:      Effort: Pulmonary effort is normal. No respiratory distress.      Breath sounds: Normal breath sounds.   Abdominal:      General: Abdomen is flat. There is no distension.      Palpations: Abdomen is soft.      Tenderness: There is no  abdominal tenderness.   Genitourinary:     Comments: Left CVA tenderness  Musculoskeletal:         General: No swelling.   Skin:     General: Skin is warm and dry.   Neurological:      General: No focal deficit present.      Mental Status: She is alert. Mental status is at baseline.         Lab Results:   Recent Results (from the past 24 hours)   CBC and differential    Collection Time: 04/24/25  1:11 PM   Result Value Ref Range    WBC 7.74 4.31 - 10.16 Thousand/uL    RBC 4.31 3.81 - 5.12 Million/uL    Hemoglobin 13.2 11.5 - 15.4 g/dL    Hematocrit 39.6 34.8 - 46.1 %    MCV 92 82 - 98 fL    MCH 30.6 26.8 - 34.3 pg    MCHC 33.3 31.4 - 37.4 g/dL    RDW 13.5 11.6 - 15.1 %    MPV 11.3 8.9 - 12.7 fL    Platelets 227 149 - 390 Thousands/uL    nRBC 0 /100 WBCs    Segmented % 88 (H) 43 - 75 %    Immature Grans % 0 0 - 2 %    Lymphocytes % 7 (L) 14 - 44 %    Monocytes % 5 4 - 12 %    Eosinophils Relative 0 0 - 6 %    Basophils Relative 0 0 - 1 %    Absolute Neutrophils 6.78 1.85 - 7.62 Thousands/µL    Absolute Immature Grans 0.02 0.00 - 0.20 Thousand/uL    Absolute Lymphocytes 0.52 (L) 0.60 - 4.47 Thousands/µL    Absolute Monocytes 0.37 0.17 - 1.22 Thousand/µL    Eosinophils Absolute 0.03 0.00 - 0.61 Thousand/µL    Basophils Absolute 0.02 0.00 - 0.10 Thousands/µL   Basic metabolic panel    Collection Time: 04/24/25  1:11 PM   Result Value Ref Range    Sodium 139 135 - 147 mmol/L    Potassium 3.7 3.5 - 5.3 mmol/L    Chloride 106 96 - 108 mmol/L    CO2 23 21 - 32 mmol/L    ANION GAP 10 4 - 13 mmol/L    BUN 18 5 - 25 mg/dL    Creatinine 1.12 0.60 - 1.30 mg/dL    Glucose 148 (H) 65 - 140 mg/dL    Calcium 9.7 8.4 - 10.2 mg/dL    eGFR 52 ml/min/1.73sq m   Protime-INR    Collection Time: 04/24/25  1:11 PM   Result Value Ref Range    Protime 23.9 (H) 12.3 - 15.0 seconds    INR 2.13 (H) 0.85 - 1.19   APTT    Collection Time: 04/24/25  1:11 PM   Result Value Ref Range    PTT 29 23 - 34 seconds     Blood Culture:   Lab Results  "  Component Value Date    BLOODCX No Growth at 24 hrs. 04/23/2025    BLOODCX No Growth at 24 hrs. 04/23/2025   ,   Urinalysis:   Lab Results   Component Value Date    COLORU Light Orange 04/23/2025    CLARITYU Turbid 04/23/2025    SPECGRAV 1.012 04/23/2025    PHUR 7.5 04/23/2025    PHUR 6.0 01/25/2019    LEUKOCYTESUR Small (A) 04/23/2025    NITRITE Negative 04/23/2025    GLUCOSEU Negative 04/23/2025    KETONESU Negative 04/23/2025    BILIRUBINUR Negative 04/23/2025    BLOODU Large (A) 04/23/2025   ,   Urine Culture:   Lab Results   Component Value Date    URINECX 70,000-79,000 cfu/ml 04/23/2025   ,   Wound Culure: No results found for: \"WOUNDCULT\"    Imaging:   EKG, Pathology, and Other Studies: Results Review Statement: I reviewed radiology reports from this admission including: CT abdomen/pelvis.from 4/23/2025        Tony Blunt DO  PGY-1  [facility]    "

## 2025-04-25 ENCOUNTER — APPOINTMENT (OUTPATIENT)
Dept: RADIOLOGY | Facility: HOSPITAL | Age: 60
End: 2025-04-25
Payer: MEDICARE

## 2025-04-25 ENCOUNTER — TELEPHONE (OUTPATIENT)
Dept: UROLOGY | Facility: CLINIC | Age: 60
End: 2025-04-25

## 2025-04-25 ENCOUNTER — ANESTHESIA (OUTPATIENT)
Dept: PERIOP | Facility: HOSPITAL | Age: 60
End: 2025-04-25
Payer: MEDICARE

## 2025-04-25 VITALS
BODY MASS INDEX: 42.98 KG/M2 | RESPIRATION RATE: 16 BRPM | OXYGEN SATURATION: 92 % | SYSTOLIC BLOOD PRESSURE: 107 MMHG | WEIGHT: 235 LBS | DIASTOLIC BLOOD PRESSURE: 60 MMHG | HEART RATE: 75 BPM | TEMPERATURE: 97.8 F

## 2025-04-25 LAB
ALBUMIN SERPL BCG-MCNC: 3.9 G/DL (ref 3.5–5)
ALP SERPL-CCNC: 105 U/L (ref 34–104)
ALT SERPL W P-5'-P-CCNC: 8 U/L (ref 7–52)
ANION GAP SERPL CALCULATED.3IONS-SCNC: 8 MMOL/L (ref 4–13)
AST SERPL W P-5'-P-CCNC: 12 U/L (ref 13–39)
ATRIAL RATE: 76 BPM
BILIRUB SERPL-MCNC: 0.6 MG/DL (ref 0.2–1)
BUN SERPL-MCNC: 17 MG/DL (ref 5–25)
CALCIUM SERPL-MCNC: 9.4 MG/DL (ref 8.4–10.2)
CHLORIDE SERPL-SCNC: 103 MMOL/L (ref 96–108)
CO2 SERPL-SCNC: 27 MMOL/L (ref 21–32)
CREAT SERPL-MCNC: 1.08 MG/DL (ref 0.6–1.3)
ERYTHROCYTE [DISTWIDTH] IN BLOOD BY AUTOMATED COUNT: 13.8 % (ref 11.6–15.1)
GFR SERPL CREATININE-BSD FRML MDRD: 55 ML/MIN/1.73SQ M
GLUCOSE SERPL-MCNC: 103 MG/DL (ref 65–140)
HCT VFR BLD AUTO: 36.8 % (ref 34.8–46.1)
HGB BLD-MCNC: 12.2 G/DL (ref 11.5–15.4)
MCH RBC QN AUTO: 30.5 PG (ref 26.8–34.3)
MCHC RBC AUTO-ENTMCNC: 33.2 G/DL (ref 31.4–37.4)
MCV RBC AUTO: 92 FL (ref 82–98)
P AXIS: 21 DEGREES
PLATELET # BLD AUTO: 209 THOUSANDS/UL (ref 149–390)
PMV BLD AUTO: 11 FL (ref 8.9–12.7)
POTASSIUM SERPL-SCNC: 4.1 MMOL/L (ref 3.5–5.3)
PR INTERVAL: 134 MS
PROT SERPL-MCNC: 6.3 G/DL (ref 6.4–8.4)
QRS AXIS: 54 DEGREES
QRSD INTERVAL: 86 MS
QT INTERVAL: 414 MS
QTC INTERVAL: 465 MS
RBC # BLD AUTO: 4 MILLION/UL (ref 3.81–5.12)
SODIUM SERPL-SCNC: 138 MMOL/L (ref 135–147)
T WAVE AXIS: 33 DEGREES
VENTRICULAR RATE: 76 BPM
WBC # BLD AUTO: 4.42 THOUSAND/UL (ref 4.31–10.16)

## 2025-04-25 PROCEDURE — 85027 COMPLETE CBC AUTOMATED: CPT

## 2025-04-25 PROCEDURE — 52356 CYSTO/URETERO W/LITHOTRIPSY: CPT | Performed by: UROLOGY

## 2025-04-25 PROCEDURE — C1769 GUIDE WIRE: HCPCS | Performed by: UROLOGY

## 2025-04-25 PROCEDURE — 99223 1ST HOSP IP/OBS HIGH 75: CPT | Performed by: FAMILY MEDICINE

## 2025-04-25 PROCEDURE — 93010 ELECTROCARDIOGRAM REPORT: CPT | Performed by: INTERNAL MEDICINE

## 2025-04-25 PROCEDURE — NC001 PR NO CHARGE: Performed by: FAMILY MEDICINE

## 2025-04-25 PROCEDURE — C2625 STENT, NON-COR, TEM W/DEL SY: HCPCS | Performed by: UROLOGY

## 2025-04-25 PROCEDURE — C1758 CATHETER, URETERAL: HCPCS | Performed by: UROLOGY

## 2025-04-25 PROCEDURE — 99232 SBSQ HOSP IP/OBS MODERATE 35: CPT | Performed by: UROLOGY

## 2025-04-25 PROCEDURE — 74420 UROGRAPHY RTRGR +-KUB: CPT

## 2025-04-25 PROCEDURE — 80053 COMPREHEN METABOLIC PANEL: CPT

## 2025-04-25 DEVICE — INLAY OPTIMA URETERAL STENT W/O GUIDEWIRE
Type: IMPLANTABLE DEVICE | Site: URETER | Status: FUNCTIONAL
Brand: BARD® INLAY OPTIMA® URETERAL STENT

## 2025-04-25 RX ORDER — OXYBUTYNIN CHLORIDE 5 MG/1
5 TABLET, EXTENDED RELEASE ORAL DAILY PRN
Status: DISCONTINUED | OUTPATIENT
Start: 2025-04-25 | End: 2025-04-25 | Stop reason: HOSPADM

## 2025-04-25 RX ORDER — NITROFURANTOIN 25; 75 MG/1; MG/1
100 CAPSULE ORAL 2 TIMES DAILY
Qty: 6 CAPSULE | Refills: 0 | Status: SHIPPED | OUTPATIENT
Start: 2025-04-25 | End: 2025-04-28

## 2025-04-25 RX ORDER — SUCCINYLCHOLINE/SOD CL,ISO/PF 100 MG/5ML
SYRINGE (ML) INTRAVENOUS AS NEEDED
Status: DISCONTINUED | OUTPATIENT
Start: 2025-04-25 | End: 2025-04-25

## 2025-04-25 RX ORDER — CIPROFLOXACIN 2 MG/ML
400 INJECTION, SOLUTION INTRAVENOUS ONCE
Status: COMPLETED | OUTPATIENT
Start: 2025-04-25 | End: 2025-04-25

## 2025-04-25 RX ORDER — HYDROMORPHONE HCL/PF 1 MG/ML
SYRINGE (ML) INJECTION AS NEEDED
Status: DISCONTINUED | OUTPATIENT
Start: 2025-04-25 | End: 2025-04-25

## 2025-04-25 RX ORDER — PROMETHAZINE HYDROCHLORIDE 25 MG/ML
25 INJECTION, SOLUTION INTRAMUSCULAR; INTRAVENOUS ONCE AS NEEDED
Status: DISCONTINUED | OUTPATIENT
Start: 2025-04-25 | End: 2025-04-25 | Stop reason: HOSPADM

## 2025-04-25 RX ORDER — FENTANYL CITRATE/PF 50 MCG/ML
50 SYRINGE (ML) INJECTION
Status: DISCONTINUED | OUTPATIENT
Start: 2025-04-25 | End: 2025-04-25 | Stop reason: HOSPADM

## 2025-04-25 RX ORDER — PROPOFOL 10 MG/ML
INJECTION, EMULSION INTRAVENOUS AS NEEDED
Status: DISCONTINUED | OUTPATIENT
Start: 2025-04-25 | End: 2025-04-25

## 2025-04-25 RX ORDER — EPHEDRINE SULFATE 50 MG/ML
INJECTION INTRAVENOUS AS NEEDED
Status: DISCONTINUED | OUTPATIENT
Start: 2025-04-25 | End: 2025-04-25

## 2025-04-25 RX ORDER — SODIUM CHLORIDE, SODIUM LACTATE, POTASSIUM CHLORIDE, CALCIUM CHLORIDE 600; 310; 30; 20 MG/100ML; MG/100ML; MG/100ML; MG/100ML
INJECTION, SOLUTION INTRAVENOUS CONTINUOUS PRN
Status: DISCONTINUED | OUTPATIENT
Start: 2025-04-25 | End: 2025-04-25

## 2025-04-25 RX ORDER — FENTANYL CITRATE 50 UG/ML
INJECTION, SOLUTION INTRAMUSCULAR; INTRAVENOUS AS NEEDED
Status: DISCONTINUED | OUTPATIENT
Start: 2025-04-25 | End: 2025-04-25

## 2025-04-25 RX ORDER — DEXAMETHASONE SODIUM PHOSPHATE 10 MG/ML
INJECTION, SOLUTION INTRAMUSCULAR; INTRAVENOUS AS NEEDED
Status: DISCONTINUED | OUTPATIENT
Start: 2025-04-25 | End: 2025-04-25

## 2025-04-25 RX ORDER — HYDROMORPHONE HCL IN WATER/PF 6 MG/30 ML
0.2 PATIENT CONTROLLED ANALGESIA SYRINGE INTRAVENOUS
Status: DISCONTINUED | OUTPATIENT
Start: 2025-04-25 | End: 2025-04-25 | Stop reason: HOSPADM

## 2025-04-25 RX ORDER — OXYBUTYNIN CHLORIDE 5 MG/1
5 TABLET, EXTENDED RELEASE ORAL DAILY
Qty: 30 TABLET | Refills: 0 | Status: SHIPPED | OUTPATIENT
Start: 2025-04-25

## 2025-04-25 RX ORDER — CEFAZOLIN SODIUM 2 G/50ML
2000 SOLUTION INTRAVENOUS ONCE
Status: DISCONTINUED | OUTPATIENT
Start: 2025-04-25 | End: 2025-04-25

## 2025-04-25 RX ORDER — MIDAZOLAM HYDROCHLORIDE 2 MG/2ML
INJECTION, SOLUTION INTRAMUSCULAR; INTRAVENOUS AS NEEDED
Status: DISCONTINUED | OUTPATIENT
Start: 2025-04-25 | End: 2025-04-25

## 2025-04-25 RX ORDER — ONDANSETRON 2 MG/ML
INJECTION INTRAMUSCULAR; INTRAVENOUS AS NEEDED
Status: DISCONTINUED | OUTPATIENT
Start: 2025-04-25 | End: 2025-04-25

## 2025-04-25 RX ORDER — TAMSULOSIN HYDROCHLORIDE 0.4 MG/1
0.4 CAPSULE ORAL DAILY
Qty: 30 CAPSULE | Refills: 0 | Status: SHIPPED | OUTPATIENT
Start: 2025-04-25

## 2025-04-25 RX ORDER — LIDOCAINE HYDROCHLORIDE 10 MG/ML
INJECTION, SOLUTION EPIDURAL; INFILTRATION; INTRACAUDAL; PERINEURAL AS NEEDED
Status: DISCONTINUED | OUTPATIENT
Start: 2025-04-25 | End: 2025-04-25

## 2025-04-25 RX ORDER — ONDANSETRON 2 MG/ML
4 INJECTION INTRAMUSCULAR; INTRAVENOUS ONCE AS NEEDED
Status: DISCONTINUED | OUTPATIENT
Start: 2025-04-25 | End: 2025-04-25 | Stop reason: HOSPADM

## 2025-04-25 RX ORDER — MAGNESIUM HYDROXIDE 1200 MG/15ML
LIQUID ORAL AS NEEDED
Status: DISCONTINUED | OUTPATIENT
Start: 2025-04-25 | End: 2025-04-25 | Stop reason: HOSPADM

## 2025-04-25 RX ADMIN — BELIMUMAB 200 MG: 200 SOLUTION SUBCUTANEOUS at 08:13

## 2025-04-25 RX ADMIN — ONDANSETRON 4 MG: 2 INJECTION, SOLUTION INTRAMUSCULAR; INTRAVENOUS at 08:13

## 2025-04-25 RX ADMIN — PROPOFOL 150 MG: 10 INJECTION, EMULSION INTRAVENOUS at 12:32

## 2025-04-25 RX ADMIN — ACETAMINOPHEN 975 MG: 325 TABLET, FILM COATED ORAL at 16:02

## 2025-04-25 RX ADMIN — TRIMETHOBENZAMIDE HYDROCHLORIDE 200 MG: 100 INJECTION INTRAMUSCULAR at 03:45

## 2025-04-25 RX ADMIN — KETOROLAC TROMETHAMINE 15 MG: 30 INJECTION, SOLUTION INTRAMUSCULAR; INTRAVENOUS at 03:44

## 2025-04-25 RX ADMIN — HYDROMORPHONE HYDROCHLORIDE 0.5 MG: 1 INJECTION, SOLUTION INTRAMUSCULAR; INTRAVENOUS; SUBCUTANEOUS at 07:36

## 2025-04-25 RX ADMIN — ONDANSETRON 4 MG: 2 INJECTION INTRAMUSCULAR; INTRAVENOUS at 12:44

## 2025-04-25 RX ADMIN — LIDOCAINE HYDROCHLORIDE 50 MG: 10 INJECTION, SOLUTION EPIDURAL; INFILTRATION; INTRACAUDAL; PERINEURAL at 12:32

## 2025-04-25 RX ADMIN — FENTANYL CITRATE 50 MCG: 50 INJECTION INTRAMUSCULAR; INTRAVENOUS at 12:40

## 2025-04-25 RX ADMIN — SILDENAFIL 40 MG: 20 TABLET ORAL at 16:02

## 2025-04-25 RX ADMIN — SODIUM CHLORIDE, SODIUM LACTATE, POTASSIUM CHLORIDE, AND CALCIUM CHLORIDE: .6; .31; .03; .02 INJECTION, SOLUTION INTRAVENOUS at 12:12

## 2025-04-25 RX ADMIN — Medication 100 MG: at 12:33

## 2025-04-25 RX ADMIN — FENTANYL CITRATE 50 MCG: 50 INJECTION INTRAMUSCULAR; INTRAVENOUS at 12:32

## 2025-04-25 RX ADMIN — EPHEDRINE SULFATE 5 MG: 50 INJECTION, SOLUTION INTRAVENOUS at 13:08

## 2025-04-25 RX ADMIN — MIDAZOLAM 2 MG: 1 INJECTION INTRAMUSCULAR; INTRAVENOUS at 12:23

## 2025-04-25 RX ADMIN — DEXAMETHASONE SODIUM PHOSPHATE 5 MG: 10 INJECTION, SOLUTION INTRAMUSCULAR; INTRAVENOUS at 12:44

## 2025-04-25 RX ADMIN — HYDROXYCHLOROQUINE SULFATE 400 MG: 200 TABLET, FILM COATED ORAL at 07:37

## 2025-04-25 RX ADMIN — SILDENAFIL 40 MG: 20 TABLET ORAL at 07:37

## 2025-04-25 RX ADMIN — PROPOFOL 50 MG: 10 INJECTION, EMULSION INTRAVENOUS at 12:40

## 2025-04-25 RX ADMIN — HYDROMORPHONE HYDROCHLORIDE 0.5 MG: 1 INJECTION, SOLUTION INTRAMUSCULAR; INTRAVENOUS; SUBCUTANEOUS at 12:56

## 2025-04-25 RX ADMIN — CIPROFLOXACIN: 2 INJECTION, SOLUTION INTRAVENOUS at 12:28

## 2025-04-25 NOTE — TELEPHONE ENCOUNTER
Patient status post left ureteroscopy while inpatient at [place] on 4/25/2025    Has left-sided 6 x 24 double-J ureteral stent in place.  No string.    Patient needs office cystoscopy, stent removal in about 2 weeks

## 2025-04-25 NOTE — ANESTHESIA POSTPROCEDURE EVALUATION
Post-Op Assessment Note    Last Filed PACU Vitals:  Vitals Value Taken Time   Temp 97.1 °F (36.2 °C) 04/25/25 1330   Pulse 84 04/25/25 1350   /64 04/25/25 1345   Resp 60 04/25/25 1349   SpO2 98 % 04/25/25 1350   Vitals shown include unfiled device data.    Modified Naveen:     Vitals Value Taken Time   Activity 2 04/25/25 1345   Respiration 2 04/25/25 1345   Circulation 2 04/25/25 1345   Consciousness 2 04/25/25 1345   Oxygen Saturation 1 04/25/25 1345     Modified Naveen Score: 9

## 2025-04-25 NOTE — PROGRESS NOTES
Progress Note - Urology   Name: Na Joseph 60 y.o. female I MRN: [id number]  Unit/Bed#: -01 I Date of Admission: 4/24/2025   Date of Service: 4/25/2025 I Hospital Day: 0    Assessment & Plan  Ureteral calculus  acute left flank pain nausea and vomiting x 3 days  CTa/p wo: 7 mm proximal left ureteral calculus with hydronephrosis. No other renal calculi.  Afebrile, vital signs stable  urine cola color with mild sediment    Urine culture positive mixed contaminants; was prophylactically given Macrobid  Analgesia & Antiemetics as needed  N.p.o. since midnight for OR today  reviewed risks benefit and technique and recovery for planne dprocedure CYSTOSCOPY, LEFT URETEROSCOPY, LASER LITHOTRIPSY, BASKET STONE EXTRACTION, RETROGRADE PYELOGRAM, URETERAL STENT INSERTION  patient consents to proceed with operative plan- she asks for a picture taken of her stone inside, since it will be decimated with laser and won't be able to have the specimen    /59   Pulse 71   Temp 98.4 °F (36.9 °C)   Resp 18   Wt 107 kg (235 lb)   SpO2 95%   BMI 42.98 kg/m²     Lab Results   Component Value Date    WBC 4.42 04/25/2025     Lab Results   Component Value Date    CREATININE 1.08 04/25/2025             Urology service will follow. Hopefully can discharge home after operation today.    Subjective   pain slightly better still mostly in the left flank occasionally wraps to the llq. Has ongoing nausea queasiness generally the same. best if staying still. no fever chills rigors. no vomiting. no other recent illness. this is her first ever stone episode.     she is on warfarin for antiphospholipid syndrome lupus and bilateral PEs. breathing stable on sildenafil. no breathing issues with anesthesia but has had postop nausea and vomiting. requests specific cocktail that she had in october 2022 surgery that was best for her, NOT october 2024 which made her sick    Objective :  Temp:  [97.2 °F (36.2 °C)-98.4 °F (36.9 °C)] 98.4 °F  (36.9 °C)  HR:  [68-92] 71  BP: (114-193)/(58-86) 124/59  Resp:  [16-20] 18  SpO2:  [93 %-97 %] 95 %  O2 Device: None (Room air)  Nasal Cannula O2 Flow Rate (L/min):  [2 L/min] 2 L/min    I/O         04/23 0701  04/24 0700 04/24 0701 04/25 0700 04/25 0701 04/26 0700    Urine (mL/kg/hr)  50     Emesis/NG output  451     Total Output  501     Net  -501            Unmeasured Emesis Occurrence  1 x             Physical Exam  Constitutional:       General: She is not in acute distress.     Appearance: Normal appearance. She is not ill-appearing or diaphoretic.   HENT:      Head: Normocephalic and atraumatic.   Cardiovascular:      Rate and Rhythm: Normal rate and regular rhythm.      Pulses: Normal pulses.      Heart sounds: Normal heart sounds.   Pulmonary:      Effort: Pulmonary effort is normal.      Breath sounds: Normal breath sounds.   Abdominal:      General: Abdomen is flat.      Palpations: Abdomen is soft.      Tenderness: There is no abdominal tenderness. There is no right CVA tenderness, left CVA tenderness, guarding or rebound.   Musculoskeletal:      Right lower leg: No edema.      Left lower leg: No edema.   Skin:     Coloration: Skin is not pale.      Findings: No rash.   Neurological:      General: No focal deficit present.      Mental Status: She is alert. Mental status is at baseline.   Psychiatric:         Mood and Affect: Mood normal.           Lab Results: I have reviewed the following results:  Recent Labs     04/23/25  1013 04/24/25  1311 04/25/25  0441   WBC 3.72* 7.74 4.42   HGB 13.5 13.2 12.2   HCT 41.0 39.6 36.8    227 209   SODIUM 139 139 138   K 4.0 3.7 4.1    106 103   CO2 27 23 27   BUN 13 18 17   CREATININE 0.87 1.12 1.08   GLUC 110 148* 103   AST 10*  --  12*   ALT 9  --  8   ALB 4.3  --  3.9   TBILI 0.70  --  0.60   ALKPHOS 113*  --  105*   PTT 31 29  --    INR 1.70* 2.13*  --    LACTICACID 1.4  --   --        Imaging Results Review: No pertinent imaging studies  reviewed.  Other Study Results Review: No additional pertinent studies reviewed.    VTE Pharmacologic Prophylaxis: VTE covered by:  warfarin, Oral      VTE Mechanical Prophylaxis: sequential compression device

## 2025-04-25 NOTE — OP NOTE
OPERATIVE REPORT  PATIENT NAME: Na Joseph    :  1965  MRN: [id number]  Pt Location: [facility] ROOM 01    SURGERY DATE: 2025    Surgeons and Role:     * Yusuf Briseno, DO [facility]    Preop Diagnosis:  Nephrolithiasis [N20.0]    Post-Op Diagnosis Codes:     * Nephrolithiasis [N20.0]    Procedure(s):      Cystourethroscopy  Left retrograde pyelogram with live fluoroscopic interpretation  Left ureteroscopy  Laser lithotripsy  Left ureteral stent placement        Specimen(s):  * No specimens in log *    Estimated Blood Loss:   Minimal    Drains:  Ureteral Internal Stent Left ureter 6 Fr. (Active)   Number of days: 0       Anesthesia Type:   General    Operative Indications:  Nephrolithiasis [N20.0]        60-year-old female with urolithiasis     Presented to the ED on 2025 with left flank pain     CT abdomen pelvis with contrast 2025: 7 mm left proximal ureteral stone with mild upstream hydroureteronephrosis     UA micro 2025: Innumerable RBC, innumerable WBC, occasional bacteria     Urine culture grew out mixed contaminants     Afebrile, vital signs stable since admission     Labs 2025: White count 4.42, hemoglobin 12.2, creatinine 1.08        On evaluation 2025, patient was still having flank discomfort.  Patient was opting for ureteral stent versus ureteroscopy.     She was consented for cystoscopy, left retrograde pyelogram, left ureteroscopy, laser lithotripsy, stone basket extraction, left ureteral stent placement.        Operative Findings:          No meatal stenosis  No urethral strictures  Bilateral ureteral orifices in orthotopic positions  No bladder lesions  No stones in bladder  No trabeculations  No diverticuli  Left retrograde pyelogram: mild hydronephrosis, no filling in the kidney  Left distal and mid ureteroscopy with semirigid ureteroscope demonstrated no stones  Left pyeloscopy: 7 mm stone seen in the upper pole.  Stone was dusted using 272 µm laser  fiber.  Back out ureteroscopy: no stone fragments, positive urothelial inflammation at the proximal ureter  Successfully placed 6 x 24 double-J ureteral stent with no string attached              Complications:   None    Procedure and Technique:           Patient identified in the preop holding area.  Consent was obtained.  Risks and benefits of the procedure were explained to the patient.  Patient was in agreement.  Patient was brought back to the OR and placed upon the table.  Bilateral lower extremity SCDs were placed and turned on.  Patient received IV Cipro for antibiotics.  Patient underwent smooth induction of anesthesia.  Bilateral lower extremities were placed in stirrups.  Patient was in dorsal lithotomy position.  Area was prepped and draped in sterile fashion.  Timeout was performed by the OR team.     Case began with insertion of 21 Peruvian rigid cystoscope.  Pan cystourethroscopy was performed.  See the above findings for details.     Attention was turned toward the left ureteral orifice.      5 Peruvian open-ended catheter was advanced through the bridge of the scope toward the ureteral orifice.  Guidewire was advanced through the 5 [place]uvian open-ended catheter, into the ureter, and coiled in renal pelvis under fluoroscopic guidance.      Semirigid ureteroscope was assembled and entered into the urethra and bladder.  The scope was navigated into the left ureteral orifice.  The distal and mid ureter was examined and no stones were seen.    Semirigid ureteroscope was then advanced into the mid to proximal ureter.  Gentle retrograde pyelogram was performed through the scope.  See above findings for details.    Second wire was advanced through the semirigid ureteroscope and coiled into the kidney under fluoroscopic guidance.  Scope was backed out over the wire and a push pull fashion.    It was decided that an access sheath would be utilized.  10-12 Fr 35 cm access sheath was advanced over one of the wires  under fluoroscopic guidance toward the proximal ureter.  Wire and inner sheath were removed. Flexible reusable ureteroscope was assembled.  Scope was advanced into the renal pelvis.  Pyeloscopy was performed.  See the above findings for details regarding stone works.      Backout flexible ureteroscopy was then performed.  See above findings for details.    The ureteroscope was then removed from the bladder and urethra.     Cystoscope was reentered into the bladder over the wire toward the left ureteral orifice.  6 x 24 double-J ureteral stent was advanced over the wire toward the renal pelvis under fluoroscopic guidance.  Wire was removed and stent was deployed.  Good proximal curl was seen on fluoroscopy.  Good distal curl was seen on direct cystoscopy.      There was no string left on the stent.      Bladder was emptied and scope was removed.     Patient was uneventfully awoken from anesthesia and extubated.  Patient was brought to PACU in good condition.               A qualified resident physician was not available.    Patient Disposition:  PACU              SIGNATURE: Yusuf Briseno,   DATE: April 25, 2025  TIME: 1:14 PM        Plan  - Left-sided 6 x 26 double-J ureteral stent in place.  No string.  Urology team will arrange for office cystoscopy, stent removal in about 2 weeks.  - Patient okay for diet from urology perspective  - If patient feels well this afternoon with no fevers and good vital signs, can go home from urology perspective  - Stent colic medications  - Recommend 3 additional days of empiric antibiotics upon discharge

## 2025-04-25 NOTE — ASSESSMENT & PLAN NOTE
acute left flank pain nausea and vomiting x 3 days  CTa/p wo: 7 mm proximal left ureteral calculus with hydronephrosis. No other renal calculi.  Afebrile, vital signs stable  urine cola color with mild sediment    Urine culture positive mixed contaminants; was prophylactically given Macrobid  Analgesia & Antiemetics as needed  N.p.o. since midnight for OR today  reviewed risks benefit and technique and recovery for planne dprocedure CYSTOSCOPY, LEFT URETEROSCOPY, LASER LITHOTRIPSY, BASKET STONE EXTRACTION, RETROGRADE PYELOGRAM, URETERAL STENT INSERTION  patient consents to proceed with operative plan- she asks for a picture taken of her stone inside, since it will be decimated with laser and won't be able to have the specimen    /59   Pulse 71   Temp 98.4 °F (36.9 °C)   Resp 18   Wt 107 kg (235 lb)   SpO2 95%   BMI 42.98 kg/m²     Lab Results   Component Value Date    WBC 4.42 04/25/2025     Lab Results   Component Value Date    CREATININE 1.08 04/25/2025

## 2025-04-25 NOTE — DISCHARGE INSTR - AVS FIRST PAGE
Ms. Joseph,      I was able to laser the stone that was blocking your left kidney.  Unfortunately, the stone broke into tiny pieces that were essentially grains of sand, so was not able to actually remove a piece big enough to send you home with.    You therefore will pass little grain of sand along your stent for the next few days.    Please see the postoperative instructions below for details.    Please call the office with any questions or concerns.        Sincerely,  Yusuf Singhjoseamy        You had procedure on your ureter and kidney.      You had ureteral stent placed.  This is a tube that is placed in your ureter to keep urine draining from your kidney to your bladder.  It may cause discomfort in the form of back spasms or lower abdominal spasms.  This is normal and can be treated with medications if need be.      You may see some blood in your urine.  This is normal.  Please drink plenty of fluids.  Generally speaking, as long as your urine is fruit punch color or lighter, that is okay.  You may even pass small blood clots in your urine.  That is also okay.  If you notice that you are passing large blood clots, if it is becoming more difficult to urinate, or if your urine is very dark like the color of Merlot wine, please call the office for further instruction.      You have been given a prescription for Flomax.  Please take this daily while your ureteral stent is in place. This medication can occasionally cause lightheadedness. Please stop medication if you experience this or any other concerning side effects.      You have been given a prescription for oxybutynin.  Please take this daily as needed for stent discomfort. Please note that this medication may have side effects including but not limited to: dry eyes, dry mouth, constipation, urinary retention. Please drink plenty of water with this medication.      You may take Tylenol as needed for pain.      Pain control plan: Having a ureteral stent is often  very uncomfortable. In order to stay on top of your pain, please take over the counter Tylenol around the clock. Additionally take Flomax daily with your stent in place whether or not you are having discomfort. Additionally if you are having discomfort, you need to take your oxybutynin daily, as this helps a lot with bladder spasms and stent discomfort.   Most importantly, please drink lots of fluids, as this is the best way to avoid pain with your stent!      You may shower and bathe as usual when you get home.    You do not have any incisions and can resume typical physical activities, as long as you are not having too much discomfort with stent in place.    Please call the office or present to the ED if you experience concerning signs or symptoms including but not limited to: fevers, chills, nausea, vomiting, worsening flank pain, worsening abdominal pain, passage of large blood clots in the urine, inability to urinate.    You will follow up in the office in about 2 weeks to have your stent removed. Office will call you with details.

## 2025-04-25 NOTE — PROGRESS NOTES
"Progress Note - Family Medicine   Name: Na oJseph 60 y.o. female I MRN: [id number]  Unit/Bed#: -01 I Date of Admission: 4/24/2025   Date of Service: 4/25/2025 I Hospital Day: 0     Assessment & Plan  Ureteral calculus  59 y.o. female with a PMHx of PAH, with MCTD/SLE, + lupus anticoagulant and antiphospholipid antibody syndrome who presents for L flank pain and found to have ureteral calculi. Patient saw outpatient Urology today and was given Zofran/pain meds and scheduled for left uteroscopy laser lithotripsy in the near future. Patient presented to the ED today due to increasing pain and nausea.     Upon examination, patient is afebrile, vital signs stable.  WBC 7.74, Creat 1.12  Urine:+ Pyuria and RBCs  Urine culture positive mixed contaminants; was prophylactically given Macrobid in the ED, 4/23    CT abdomen and pelvis with contrast performed 4/23/25 noted a obstructing 7 mm left proximal ureteral calculi with mild upstream hydroureteronephrosis.    Plan:  - Urology consulted, appreciate recs  Urological procedure scheduled for today, 4/15  \"Planning for cystoscopy, ureteroscopy, laser lithotripsy, retrograde pyelogram insertion of left ureteral stent versus stent only\"  - Monitor VS  - Pain regimen ordered  - Zofran PRN for nausea   Pulmonary artery hypertension (HCC)  Follows with outpatient Cardiology.     Plan:  - Continue sildanefil TID  - Continue torsemide 20 mg and spironolactone 25 mg daily   SLE (systemic lupus erythematosus) (HCC)  Home regimen: hydroxychloroquine and Rinvoq    Plan:  - Continue  Antiphospholipid antibody syndrome (HCC)  Home regimen: Warfarin 5mg daily  Follows w/ Hematology outpatient     INR=2.13    Plan:  - Continue   - Monitor INR  Seizures (HCC)    Right-sided congestive heart failure (HCC)        Subjective :   No acute events overnight. Patient was seen and examined at bedside. Pt reports pain is well controlled. Awaiting urological procedure today. All questions and " concerns answered.     Objective :  Temp:  [97.2 °F (36.2 °C)-98.4 °F (36.9 °C)] 98.4 °F (36.9 °C)  HR:  [68-92] 71  BP: (114-193)/(58-86) 124/59  Resp:  [16-20] 18  SpO2:  [93 %-97 %] 95 %  O2 Device: None (Room air)  Nasal Cannula O2 Flow Rate (L/min):  [2 L/min] 2 L/min    Physical Exam  Vitals and nursing note reviewed.   Constitutional:       General: She is not in acute distress.     Appearance: She is well-developed.   HENT:      Head: Normocephalic and atraumatic.   Eyes:      Conjunctiva/sclera: Conjunctivae normal.   Cardiovascular:      Rate and Rhythm: Normal rate and regular rhythm.      Heart sounds: No murmur heard.  Pulmonary:      Effort: Pulmonary effort is normal. No respiratory distress.      Breath sounds: Normal breath sounds.   Abdominal:      Palpations: Abdomen is soft.      Tenderness: There is no abdominal tenderness.   Genitourinary:     Comments: L CVA tenderness  Musculoskeletal:         General: No swelling.      Cervical back: Neck supple.   Skin:     General: Skin is warm and dry.      Capillary Refill: Capillary refill takes less than 2 seconds.   Neurological:      Mental Status: She is alert.   Psychiatric:         Mood and Affect: Mood normal.         Lab Results: I have reviewed the following results:CBC/BMP:   .     04/25/25  0441   WBC 4.42   HGB 12.2   HCT 36.8      SODIUM 138   K 4.1      CO2 27   BUN 17   CREATININE 1.08   GLUC 103    , Creatinine Clearance: Estimated Creatinine Clearance: 62.2 mL/min (by C-G formula based on SCr of 1.08 mg/dL).    Imaging Results Review: I reviewed radiology reports from this admission including: CT abdomen/pelvis.      VTE Pharmacologic Prophylaxis: Warfarin (Coumadin)  VTE Mechanical Prophylaxis: sequential compression device            Tony Blunt DO  [facility] [place] PGY1

## 2025-04-25 NOTE — TELEPHONE ENCOUNTER
Patient has been scheduled for stent removal 5/8/25 11 am at the [place] office with Zora SAAVEDRA.

## 2025-04-25 NOTE — QUICK NOTE
Requested by nurse for review of anti-emetic regimen and possible addition of phenergan or droperidol or reglan.     Per nurse:   Pt refused oxycodone as it makes her nauseous so PRN IV dilaudid was given instead for 6/10 pain @2220 as well as IV zofran at same time. Pt reports that neither the JOSE LUIS tylenol nor the IM Tigan helped, but now trying to sleep after receiving dose of IV dilaudid and zofran    On review of meds, patient received   IV zofran @ 1300, 1500, and 2220 + second line IM Tigan @1735.  PO oxy 5 @1600  IV dilaudid @ 1300 (1mg) , 1740 and 2220 (0.5mg).    EKG checked and NSR with QTC 465ms.  QTC is acceptable but would not add a second-line antiemetic such as droperidol or compazine on top of first-line zofran due to QTC prolonging effects.     Pt has a noted hx of seizures. 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 Unable to locate charts about encephalitis.   Although it is probably safe to administer, due to unclear seizure hx, will hold on metoclopramide or phenergan for now.     Plan:  - continue with current anti-emetic regimen as zofran relieves nausea  - d/orin oxy  - added one time dose of IV toradol 15mg before trying IV dilaudid. Please see other quick note for further details.  - can most likely switch Tigan to metoclopramide or phenergan as 2nd line. Will need to confirm with patient first about seizure hx. Will assess to ask this hx if patient needs a second line again.

## 2025-04-25 NOTE — ASSESSMENT & PLAN NOTE
59 y.o. female with a PMHx of PAH, with MCTD/SLE, + lupus anticoagulant and antiphospholipid antibody syndrome who presents for L flank pain and found to have ureteral calculi. Patient saw outpatient Urology today and was given Zofran/pain meds and scheduled for left uteroscopy laser lithotripsy in the near future. Patient presented to the ED today due to increasing pain and nausea.     Upon examination, patient is afebrile, vital signs stable.  WBC 7.74, Creat 1.12  Urine:+ Pyuria and RBCs  Urine culture positive mixed contaminants; was prophylactically given Macrobid in the ED, 4/23    CT abdomen and pelvis with contrast performed 4/23/25 noted a obstructing 7 mm left proximal ureteral calculi with mild upstream hydroureteronephrosis.    Plan:  Urology consulted and patient received a double-J ureteral stent. Urology team has arranged for office cystoscopy in about 2 weeksand patient made aware. Per Uro, patient is to be discharged with 3 days of empiric antibiotics and start oxybutynin and Flomax. Adjustments can be made once patient sees outpatient Urology.

## 2025-04-25 NOTE — PLAN OF CARE
Problem: PAIN - ADULT  Goal: Verbalizes/displays adequate comfort level or baseline comfort level  Description: Interventions:- Encourage patient to monitor pain and request assistance- Assess pain using appropriate pain scale- Administer analgesics based on type and severity of pain and evaluate response- Implement non-pharmacological measures as appropriate and evaluate response- Consider cultural and social influences on pain and pain management- Notify physician/advanced practitioner if interventions unsuccessful or patient reports new pain  Outcome: Progressing     Problem: INFECTION - ADULT  Goal: Absence or prevention of progression during hospitalization  Description: INTERVENTIONS:- Assess and monitor for signs and symptoms of infection- Monitor lab/diagnostic results- Monitor all insertion sites, i.e. indwelling lines, tubes, and drains- Monitor endotracheal if appropriate and nasal secretions for changes in amount and color- [place] appropriate cooling/warming therapies per order- Administer medications as ordered- Instruct and encourage patient and family to use good hand hygiene technique- Identify and instruct in appropriate isolation precautions for identified infection/condition  Outcome: Progressing  Goal: Absence of fever/infection during neutropenic period  Description: INTERVENTIONS:- Monitor WBC  Outcome: Progressing     Problem: SAFETY ADULT  Goal: Patient will remain free of falls  Description: INTERVENTIONS:- Educate patient/family on patient safety including physical limitations- Instruct patient to call for assistance with activity - Consult OT/PT to assist with strengthening/mobility - Keep Call bell within reach- Keep bed low and locked with side rails adjusted as appropriate- Keep care items and personal belongings within reach- Initiate and maintain comfort rounds- Apply yellow socks and bracelet for high fall risk patients- Consider moving patient to room near nurses  station  Outcome: Progressing  Goal: Maintain or return to baseline ADL function  Description: INTERVENTIONS:-  Assess patient's ability to carry out ADLs; assess patient's baseline for ADL function and identify physical deficits which impact ability to perform ADLs (bathing, care of mouth/teeth, toileting, grooming, dressing, etc.)- Assess/evaluate cause of self-care deficits - Assess range of motion- Assess patient's mobility; develop plan if impaired- Assess patient's need for assistive devices and provide as appropriate- Encourage maximum independence but intervene and supervise when necessary- Involve family in performance of ADLs- Assess for home care needs following discharge - Consider OT consult to assist with ADL evaluation and planning for discharge- Provide patient education as appropriate  Outcome: Progressing  Goal: Maintains/Returns to pre admission functional level  Description: INTERVENTIONS:- Perform AM-PAC 6 Click Basic Mobility/ Daily Activity assessment daily.- Set and communicate daily mobility goal to care team and patient/family/caregiver. - Collaborate with rehabilitation services on mobility goals if consulted- Ambulate patient 3 times a day- Out of bed to chair 3 times a day - Out of bed for meals 3 times a day- Out of bed for toileting- Record patient progress and toleration of activity level   Outcome: Progressing     Problem: DISCHARGE PLANNING  Goal: Discharge to home or other facility with appropriate resources  Description: INTERVENTIONS:- Identify barriers to discharge w/patient and caregiver- Arrange for needed discharge resources and transportation as appropriate- Identify discharge learning needs (meds, wound care, etc.)- Arrange for interpretive services to assist at discharge as needed- Refer to Case Management Department for coordinating discharge planning if the patient needs post-hospital services based on physician/advanced practitioner order or complex needs related to  functional status, cognitive ability, or social support system  Outcome: Progressing     Problem: Knowledge Deficit  Goal: Patient/family/caregiver demonstrates understanding of disease process, treatment plan, medications, and discharge instructions  Description: Complete learning assessment and assess knowledge base.Interventions:- Provide teaching at level of understanding- Provide teaching via preferred learning methods  Outcome: Progressing     Problem: Nutrition/Hydration-ADULT  Goal: Nutrient/Hydration intake appropriate for improving, restoring or maintaining nutritional needs  Description: Monitor and assess patient's nutrition/hydration status for malnutrition. Collaborate with interdisciplinary team and initiate plan and interventions as ordered.  Monitor patient's weight and dietary intake as ordered or per policy. Utilize nutrition screening tool and intervene as necessary. Determine patient's food preferences and provide high-protein, high-caloric foods as appropriate. INTERVENTIONS:- Monitor oral intake, urinary output, labs, and treatment plans- Assess nutrition and hydration status and recommend course of action- Evaluate amount of meals eaten- Assist patient with eating if necessary - Allow adequate time for meals- Recommend/ encourage appropriate diets, oral nutritional supplements, and vitamin/mineral supplements- Order, calculate, and assess calorie counts as needed- Recommend, monitor, and adjust tube feedings and TPN/PPN based on assessed needs- Assess need for intravenous fluids- Provide specific nutrition/hydration education as appropriate- Include patient/family/caregiver in decisions related to nutrition  Outcome: Progressing

## 2025-04-25 NOTE — ASSESSMENT & PLAN NOTE
Follows with outpatient Cardiology.     Plan:  Continue sildanefil TID and continue torsemide 20 mg and spironolactone 25 mg daily

## 2025-04-25 NOTE — ANESTHESIA POSTPROCEDURE EVALUATION
Post-Op Assessment Note    CV Status:  Stable  Pain Score: 0    Pain management: adequate       Mental Status:  Arousable and sleepy   Hydration Status:  Euvolemic and stable   PONV Controlled:  Controlled   Airway Patency:  Patent     Post Op Vitals Reviewed: Yes    No anethesia notable event occurred.    Staff: CRNA           Last Filed PACU Vitals:  Vitals Value Taken Time   Temp 97.1    Pulse 91 04/25/25 1329   /77    Resp 28 04/25/25 1329   SpO2 94 % 04/25/25 1329   Vitals shown include unfiled device data.            No

## 2025-04-25 NOTE — DISCHARGE SUMMARY
Discharge Summary - Family Medicine   Name: Na Joseph 60 y.o. female I MRN: [id number]  Unit/Bed#: -01 I Date of Admission: 4/24/2025   Date of Service: 4/25/2025 I Hospital Day: 0     Assessment & Plan  Ureteral calculus  59 y.o. female with a PMHx of PAH, with MCTD/SLE, + lupus anticoagulant and antiphospholipid antibody syndrome who presents for L flank pain and found to have ureteral calculi. Patient saw outpatient Urology today and was given Zofran/pain meds and scheduled for left uteroscopy laser lithotripsy in the near future. Patient presented to the ED today due to increasing pain and nausea.     Upon examination, patient is afebrile, vital signs stable.  WBC 7.74, Creat 1.12  Urine:+ Pyuria and RBCs  Urine culture positive mixed contaminants; was prophylactically given Macrobid in the ED, 4/23    CT abdomen and pelvis with contrast performed 4/23/25 noted a obstructing 7 mm left proximal ureteral calculi with mild upstream hydroureteronephrosis.    Plan:  Urology consulted and patient received a double-J ureteral stent. Urology team has arranged for office cystoscopy in about 2 weeksand patient made aware. Per Uro, patient is to be discharged with 3 days of empiric antibiotics and start oxybutynin and Flomax. Adjustments can be made once patient sees outpatient Urology.  Pulmonary artery hypertension (HCC)  Follows with outpatient Cardiology.     Plan:  Continue sildanefil TID and continue torsemide 20 mg and spironolactone 25 mg daily   SLE (systemic lupus erythematosus) (Formerly [facility])  Home regimen: hydroxychloroquine and Rinvoq    Antiphospholipid antibody syndrome ([facility])  Home regimen: Warfarin 5mg daily  Follows w/ Hematology outpatient     Seizures (Formerly [facility])    Right-sided congestive heart failure (HCC)        Diagnosis:   Principal Problem:    Ureteral calculus  Active Problems:    Pulmonary artery hypertension (HCC)    SLE (systemic lupus erythematosus) (Former[facility])    Antiphospholipid antibody syndrome  "(HCC)    Seizures (HCC)    Right-sided congestive heart failure (HCC)  Resolved Problems:    * No resolved hospital problems. *      HPI: (per admission H&P note on 04/24/2025)      \"Na Joseph is a 60 y.o. female with pmhx of pulmonary embolis on warfarin, Pulmonary artery HTN, GRIS, SLE, antiphospholipid antibody syndrome on warfarin,  who presents with left sided flank pain and nausea in the setting of 7mm nephrolithiasis.     Patient was seen in the ED yesterday for flank pain and hematuria and was found to have a 7 mm obsturcting calculus in the proximal left ureter with mild upstream hydroureteronephrosis. She was discharged from the ED with urology follow up. Patient saw urology this AM and was prescribed pain medications and anti emetics with plan for outpatient procedure early next week. However, patient reports her pain has gotten progressively worse throughout the day today. She also reports her nausea is worsening and she has had multiple episodes of non bloody, non bilious emesis.      Patient reports left sided back pain. Denies dysuria, urgency, or frequency. Reports hematuria. Patient reports this is her first episode of nephrolithiasis. Denies fevers or chills. Patient states that her nausea improved with Zofran. She also reports that her pain is currently a 3/10 after receiving IV pain medication.      ED Management: CBC, BMP, PT, PTT, zofran, dilaudid\"      HOSPITAL COURSE:   Please see progress note on discharge day for detailed list of diagnoses and related plan for additional information.    Hospital Course:   60 y.o. female admitted on 4/24/2025 admitted for L flank pain and found to have ureteral calculi on CT abdomen/pelvis. Patient saw outpatient Urology on day of presentation and was given Zofran/pain meds and scheduled for left uteroscopy laser lithotripsy. Patient presented to the ED today due to increasing pain and nausea. Urology was consulted and patient received a double-J ureteral " stent on 04/25/2025. Patient tolerated the procedure well and was cleared for discharge per Urology. Patient is homodontically stable recovering well on day of discharge.     On 04/25/25, HD# 0, pt remains stable and is medically cleared for discharge. Patient will need to make close follow-up appointment with PCP and additional providers listed on AVS.    Patient is informed of and is aware of current health status and understand the plan of treatment and outpatient follow-up. The patient understood and agreed with the plan. All pertinent lab results, imaging studies, procedures, and/or any incidental findings have been disclosed to the patient. All pertinent questions are answered to patient's satisfaction.    Complications: NONE     Condition at Discharge: good   Basic Mobility Inpatient Raw Score: 24  JH-HLM Goal: 8: Walk 250 feet or more  JH-HLM Achieved: 8: Walk 250 feet ot more    PROCEDURES:     Procedures Performed:     No orders of the defined types were placed in this encounter.        SIGNIFICANT FINDINGS / ABNORMAL RESULTS:     Significant Findings/Abnormal Results with this admission:    FL retrograde pyelogram  Result Date: 4/25/2025  Narrative: LEFT RETROGRADE PYELOGRAM INDICATION: Nephrolithiasis. COMPARISON: None IMAGES: 2 FLUOROSCOPY TIME: 19s CONTRAST: 10 mL of iohexol FINDINGS: Fluoroscopic guidance provided for retrograde pyelogram. Opacified portions of the left ureter demonstrate normal course and caliber without evidence of filling defects. Post lithotripsy right ureteral stent placed. Osseous and soft tissue detail limited by technique.     Impression: Fluoroscopic guidance provided for left retrograde pyelogram. Please see separate procedure report for additional details. Workstation performed: [id number]     US bedside procedure  Result Date: 4/25/2025  Narrative: [id number]    CT abdomen pelvis with contrast  Result Date: 4/23/2025  Narrative: CT ABDOMEN AND  PELVIS WITH IV CONTRAST INDICATION: Rigth flank pain, hematuria, nausea.. . COMPARISON: None. TECHNIQUE: CT examination of the abdomen and pelvis was performed. Multiplanar 2D reformatted images were created from the source data. This examination, like all CT scans performed in the [facility], was performed utilizing techniques to minimize radiation dose exposure, including the use of iterative reconstruction and automated exposure control. Radiation dose length product (DLP) for this visit: 1268 mGy-cm IV Contrast: 100 mL of iohexol Enteric Contrast: Not administered. FINDINGS: ABDOMEN LOWER CHEST: Bibasilar atelectasis. LIVER/BILIARY TREE: Fat density benign-appearing lesion in the left hepatic lobe measuring 2.1 x 0.5 cm (series 301 image 13). GALLBLADDER: Surgically absent. SPLEEN: Unremarkable. PANCREAS: Unremarkable. ADRENAL GLANDS: Unremarkable. KIDNEYS/URETERS: There is 0.7 cm obstructing calculus in the proximal left ureter with mild upstream hydroureteronephrosis (series 601 image 91) Subcentimeter left kidney hypoattenuating lesion statistically favored to represent benign cysts. No right kidney or ureteric calculus. STOMACH AND BOWEL: Moderate to large rectosigmoid colonic stool burden. APPENDIX: Appendix is not visualized with suggested appendectomy. Correlate with history.. ABDOMINOPELVIC CAVITY: No ascites. No pneumoperitoneum. No lymphadenopathy. VESSELS: Atherosclerosis without abdominal aortic aneurysm. PELVIS REPRODUCTIVE ORGANS: Patient is status post hysterectomy.. URINARY BLADDER: Unremarkable. ABDOMINAL WALL/INGUINAL REGIONS: Unremarkable. BONES: No acute fracture or suspicious osseous lesion.     Impression: 1.  0.7 cm obstructing calculus in the proximal left ureter with mild upstream hydroureteronephrosis. Recommend urology consultation. 2.  Moderate to large rectosigmoid colonic stool burden. 3.  2.1 cm fat density benign-appearing lesion in the left hepatic lobe. The  study was marked in EPIC for immediate notification. Workstation performed: [id number]         VITALS ON DISCHARGE DATE:     Vitals  Blood Pressure: 118/60 (04/25/25 1408)  Temperature: (!) 97.4 °F (36.3 °C) (04/25/25 1408)  Temp Source: Oral (04/24/25 1555)  Pulse: 79 (04/25/25 1408)  Respirations: 16 (04/25/25 1408)  SpO2: 94 % (04/25/25 1408)  Weight - Scale: 107 kg (235 lb) (04/24/25 1253)    Temp:  [97.1 °F (36.2 °C)-98.4 °F (36.9 °C)] 97.4 °F (36.3 °C)  HR:  [68-92] 79  BP: (114-138)/(58-83) 118/60  Resp:  [16-20] 16  SpO2:  [93 %-98 %] 94 %  O2 Device: Nasal cannula  Nasal Cannula O2 Flow Rate (L/min):  [2 L/min] 2 L/min    Weight (last 2 days)       Date/Time Weight    04/24/25 1253 107 (235)              Intake/Output Summary (Last 24 hours) at 4/25/2025 1441  Last data filed at 4/25/2025 1314  Gross per 24 hour   Intake 600 ml   Output 501 ml   Net 99 ml       Invasive Devices       Peripheral Intravenous Line  Duration             Peripheral IV 04/24/25 Right Antecubital 1 day              Drain  Duration             Ureteral Internal Stent Left ureter 6 Fr. <1 day                      PHYSICAL EXAM ON DAY OF DISCHARGE:     Physical Exam  Constitutional:       General: She is not in acute distress.     Appearance: She is not ill-appearing.   HENT:      Head: Normocephalic and atraumatic.      Mouth/Throat:      Mouth: Mucous membranes are moist.      Pharynx: Oropharynx is clear.   Eyes:      Conjunctiva/sclera: Conjunctivae normal.   Cardiovascular:      Rate and Rhythm: Normal rate and regular rhythm.      Pulses: Normal pulses.      Heart sounds: Normal heart sounds. No murmur heard.  Pulmonary:      Effort: Pulmonary effort is normal. No respiratory distress.      Breath sounds: Normal breath sounds.   Abdominal:      General: Abdomen is flat. There is no distension.      Palpations: Abdomen is soft.   Musculoskeletal:         General: No swelling.   Skin:     General: Skin is warm and dry.    Neurological:      General: No focal deficit present.      Mental Status: She is alert. Mental status is at baseline.         DISCHARGE MEDICATIONS:     Discharge Medications:  See after visit summary (AVS) for detailed reconciled discharge medications, which was provided to patient and family. Summary of medication changes made with this admission:    START:  Oxybutynin XL 5 mg daily  Flomax 0.4 mg daily, if BP tolerates  Continue Macrobid 100 mg BID for 3 more days    RESUME:  All other home medications       FOLLOW-UP APPOINTMENTS / INSTRUCTION :     Important Physician Related Follow Up:     No follow-up provider specified.      Comfirmed future (up-coming) appointments:  Future Appointments   Date Time Provider Department Center   5/6/2025  2:30 PM Grupo Wilson DO ORTHO [facility]   5/8/2025 11:00 AM KERI Manuel URO [facility]   6/9/2025  8:00 AM Maile March PA-C [place] ONC [facility]   9/8/2025  8:20 AM Sultana Arnold DO FP [place] [facility]   1/15/2026  8:00 AM David Mendez DO RHUE [facility] R[facility]       Discharge instructions/Information to patient and family:   See after visit summary (AVS) for information provided to patient and family.      Provisions for Follow-Up Care:  See after visit summary for information related to follow-up care and any pertinent home health orders.        DISPOSITION:     Disposition: Home    Discharge Statement   I spent 30  minutes discharging the patient. This time was spent on the day of discharge. I had direct contact with the patient on the day of discharge. Additional documentation is required if more than 30 minutes were spent on discharge.     Planned Readmission: No      Tony Blunt DO  PGY-1, Family Medicine  04/25/25

## 2025-04-27 DIAGNOSIS — G47.09 OTHER INSOMNIA: ICD-10-CM

## 2025-04-27 LAB
BACTERIA BLD CULT: NORMAL
BACTERIA BLD CULT: NORMAL

## 2025-04-28 LAB
BACTERIA BLD CULT: NORMAL
BACTERIA BLD CULT: NORMAL

## 2025-04-29 ENCOUNTER — TRANSITIONAL CARE MANAGEMENT (OUTPATIENT)
Age: 60
End: 2025-04-29

## 2025-04-30 RX ORDER — TRAZODONE HYDROCHLORIDE 50 MG/1
100 TABLET ORAL
Qty: 180 TABLET | Refills: 1 | Status: SHIPPED | OUTPATIENT
Start: 2025-04-30

## 2025-04-30 NOTE — TELEPHONE ENCOUNTER
Last office visit 8/6/2024.    Call placed to patient to schedule follow-up appointment with Dr. Davis.  Patient states the refill request went to the wrong provider.  Request was to be sent to Dr. Sultana Arnold.    Medication request forwarded to Dr. Arnold.

## 2025-04-30 NOTE — ED ATTENDING ATTESTATION
4/23/2025  I, Jodi Zhao MD, saw and evaluated the patient. I have discussed the patient with the resident/non-physician practitioner and agree with the resident's/non-physician practitioner's findings, Plan of Care, and MDM as documented in the resident's/non-physician practitioner's note, except where noted. All available labs and Radiology studies were reviewed.  I was present for key portions of any procedure(s) performed by the resident/non-physician practitioner and I was immediately available to provide assistance.       At this point I agree with the current assessment done in the Emergency Department.  I have conducted an independent evaluation of this patient a history and physical is as follows:    59 yo female with anti-phospholipid syndrome on a/c presents with left flank pain and hematuria.  No other abnormal bleeding or bruising.  No h/o fever/chills or  symptoms.  No h/o nephrolithiasis.  Pain without alleviating/exacerbating factors.  No clots in urine.  Urine described as tea colored.  On exam pt well appearing, afebrile, mild left CVA TTP without significant abdominal TTP.  No guarding/rebound or significant focal TTP.  Labs reassuring.  Imaging with 7mm obstructing uretal stone with associated hydro.  Discussed in real time with urology who state if pain controlled, renal function intact, safe for dispo to home.  Pt amenable to discharge.  Has good insight and access to care.  Safe for dispo to home.  HD stable throughout ED stay.      ED Course  ED Course as of 04/29/25 2157 Wed Apr 23, 2025   1112 UA w Reflex to Microscopic w Reflex to Culture(!)  Light orange in color, small LE and large blood, micro with innumerable RBC and 10-20 WBC, awaiting imaging   1112 Comprehensive metabolic panel(!)  Reassuring, no end organ damage, no AG, normal bicarb.       1112 Procalcitonin: <0.05  wnl   1112 LACTIC ACID: 1.4  wnl   1112 PTT: 31  wnl   1331 CT abdomen pelvis with contrast  IMPRESSION:      1.  0.7 cm obstructing calculus in the proximal left ureter with mild upstream hydroureteronephrosis. Recommend urology consultation.  2.  Moderate to large rectosigmoid colonic stool burden.  3.  2.1 cm fat density benign-appearing lesion in the left hepatic lobe.        The study was marked in EPIC for immediate notification.     Workstation performed: [id number]           Critical Care Time  Procedures

## 2025-05-01 ENCOUNTER — APPOINTMENT (OUTPATIENT)
Dept: LAB | Facility: CLINIC | Age: 60
End: 2025-05-01
Attending: PHYSICIAN ASSISTANT
Payer: MEDICARE

## 2025-05-01 DIAGNOSIS — D72.819 LEUKOPENIA: ICD-10-CM

## 2025-05-01 DIAGNOSIS — T84.54XD INFECTION ASSOCIATED WITH INTERNAL LEFT KNEE PROSTHESIS, SUBSEQUENT ENCOUNTER: ICD-10-CM

## 2025-05-01 DIAGNOSIS — I50.32 CHRONIC HEART FAILURE WITH PRESERVED EJECTION FRACTION (HCC): ICD-10-CM

## 2025-05-01 LAB
ALBUMIN SERPL BCG-MCNC: 3.8 G/DL (ref 3.5–5)
ALP SERPL-CCNC: 97 U/L (ref 34–104)
ALT SERPL W P-5'-P-CCNC: 7 U/L (ref 7–52)
ANION GAP SERPL CALCULATED.3IONS-SCNC: 5 MMOL/L (ref 4–13)
AST SERPL W P-5'-P-CCNC: 8 U/L (ref 13–39)
BASOPHILS # BLD AUTO: 0.02 THOUSANDS/ÂΜL (ref 0–0.1)
BASOPHILS NFR BLD AUTO: 1 % (ref 0–1)
BILIRUB SERPL-MCNC: 0.55 MG/DL (ref 0.2–1)
BUN SERPL-MCNC: 11 MG/DL (ref 5–25)
CALCIUM SERPL-MCNC: 9.4 MG/DL (ref 8.4–10.2)
CHLORIDE SERPL-SCNC: 107 MMOL/L (ref 96–108)
CO2 SERPL-SCNC: 29 MMOL/L (ref 21–32)
CREAT SERPL-MCNC: 0.91 MG/DL (ref 0.6–1.3)
CRP SERPL QL: 2.3 MG/L
EOSINOPHIL # BLD AUTO: 0.18 THOUSAND/ÂΜL (ref 0–0.61)
EOSINOPHIL NFR BLD AUTO: 6 % (ref 0–6)
ERYTHROCYTE [DISTWIDTH] IN BLOOD BY AUTOMATED COUNT: 13.3 % (ref 11.6–15.1)
ERYTHROCYTE [SEDIMENTATION RATE] IN BLOOD: 13 MM/HOUR (ref 0–29)
GFR SERPL CREATININE-BSD FRML MDRD: 67 ML/MIN/1.73SQ M
GLUCOSE P FAST SERPL-MCNC: 98 MG/DL (ref 65–99)
HCT VFR BLD AUTO: 37.5 % (ref 34.8–46.1)
HGB BLD-MCNC: 12.3 G/DL (ref 11.5–15.4)
IMM GRANULOCYTES # BLD AUTO: 0 THOUSAND/UL (ref 0–0.2)
IMM GRANULOCYTES NFR BLD AUTO: 0 % (ref 0–2)
LYMPHOCYTES # BLD AUTO: 0.84 THOUSANDS/ÂΜL (ref 0.6–4.47)
LYMPHOCYTES NFR BLD AUTO: 26 % (ref 14–44)
MCH RBC QN AUTO: 30.7 PG (ref 26.8–34.3)
MCHC RBC AUTO-ENTMCNC: 32.8 G/DL (ref 31.4–37.4)
MCV RBC AUTO: 94 FL (ref 82–98)
MONOCYTES # BLD AUTO: 0.33 THOUSAND/ÂΜL (ref 0.17–1.22)
MONOCYTES NFR BLD AUTO: 10 % (ref 4–12)
NEUTROPHILS # BLD AUTO: 1.86 THOUSANDS/ÂΜL (ref 1.85–7.62)
NEUTS SEG NFR BLD AUTO: 57 % (ref 43–75)
NRBC BLD AUTO-RTO: 0 /100 WBCS
PLATELET # BLD AUTO: 229 THOUSANDS/UL (ref 149–390)
PMV BLD AUTO: 11.5 FL (ref 8.9–12.7)
POTASSIUM SERPL-SCNC: 4.5 MMOL/L (ref 3.5–5.3)
PROT SERPL-MCNC: 5.9 G/DL (ref 6.4–8.4)
RBC # BLD AUTO: 4.01 MILLION/UL (ref 3.81–5.12)
SODIUM SERPL-SCNC: 141 MMOL/L (ref 135–147)
WBC # BLD AUTO: 3.23 THOUSAND/UL (ref 4.31–10.16)

## 2025-05-01 PROCEDURE — 86140 C-REACTIVE PROTEIN: CPT

## 2025-05-01 PROCEDURE — 85025 COMPLETE CBC W/AUTO DIFF WBC: CPT

## 2025-05-01 PROCEDURE — 36415 COLL VENOUS BLD VENIPUNCTURE: CPT

## 2025-05-01 PROCEDURE — 80053 COMPREHEN METABOLIC PANEL: CPT

## 2025-05-01 PROCEDURE — 85652 RBC SED RATE AUTOMATED: CPT

## 2025-05-01 NOTE — TELEPHONE ENCOUNTER
Patient called in asking if she will need a ride for stent removal next week. Advised no need for a . No further assistance needed.

## 2025-05-02 RX ORDER — SPIRONOLACTONE 25 MG/1
25 TABLET ORAL DAILY
Qty: 90 TABLET | Refills: 1 | Status: SHIPPED | OUTPATIENT
Start: 2025-05-02

## 2025-05-05 DIAGNOSIS — M81.8 STEROID-INDUCED OSTEOPOROSIS: ICD-10-CM

## 2025-05-05 DIAGNOSIS — T38.0X5A STEROID-INDUCED OSTEOPOROSIS: ICD-10-CM

## 2025-05-06 ENCOUNTER — OFFICE VISIT (OUTPATIENT)
Age: 60
End: 2025-05-06
Payer: MEDICARE

## 2025-05-06 VITALS — HEIGHT: 62 IN | BODY MASS INDEX: 43.24 KG/M2 | WEIGHT: 235 LBS

## 2025-05-06 DIAGNOSIS — M25.562 ACUTE PAIN OF LEFT KNEE: ICD-10-CM

## 2025-05-06 DIAGNOSIS — Z96.652 STATUS POST REVISION OF TOTAL KNEE, LEFT: Primary | ICD-10-CM

## 2025-05-06 DIAGNOSIS — Z96.652 STATUS POST TOTAL KNEE REPLACEMENT USING CEMENT, LEFT: ICD-10-CM

## 2025-05-06 DIAGNOSIS — M25.59 PAIN IN OTHER JOINT: ICD-10-CM

## 2025-05-06 PROCEDURE — 20610 DRAIN/INJ JOINT/BURSA W/O US: CPT | Performed by: STUDENT IN AN ORGANIZED HEALTH CARE EDUCATION/TRAINING PROGRAM

## 2025-05-06 PROCEDURE — 99215 OFFICE O/P EST HI 40 MIN: CPT | Performed by: STUDENT IN AN ORGANIZED HEALTH CARE EDUCATION/TRAINING PROGRAM

## 2025-05-06 RX ORDER — ASCORBIC ACID 500 MG
500 TABLET ORAL 2 TIMES DAILY
Qty: 60 TABLET | Refills: 1 | Status: SHIPPED | OUTPATIENT
Start: 2025-05-06

## 2025-05-06 RX ORDER — MUPIROCIN 20 MG/G
OINTMENT TOPICAL
Qty: 15 G | Refills: 1 | Status: SHIPPED | OUTPATIENT
Start: 2025-05-06

## 2025-05-06 RX ORDER — CHLORHEXIDINE GLUCONATE 40 MG/ML
SOLUTION TOPICAL DAILY PRN
OUTPATIENT
Start: 2025-05-06

## 2025-05-06 RX ORDER — MULTIVIT-MIN/IRON FUM/FOLIC AC 7.5 MG-4
1 TABLET ORAL DAILY
Qty: 30 TABLET | Refills: 1 | Status: SHIPPED | OUTPATIENT
Start: 2025-05-06 | End: 2025-05-15

## 2025-05-06 RX ORDER — FOLIC ACID 1 MG/1
1 TABLET ORAL DAILY
Qty: 30 TABLET | Refills: 1 | Status: SHIPPED | OUTPATIENT
Start: 2025-05-06

## 2025-05-06 RX ORDER — SODIUM CHLORIDE, SODIUM LACTATE, POTASSIUM CHLORIDE, CALCIUM CHLORIDE 600; 310; 30; 20 MG/100ML; MG/100ML; MG/100ML; MG/100ML
125 INJECTION, SOLUTION INTRAVENOUS CONTINUOUS
OUTPATIENT
Start: 2025-05-06

## 2025-05-06 RX ORDER — SODIUM CHLORIDE, SODIUM LACTATE, POTASSIUM CHLORIDE, CALCIUM CHLORIDE 600; 310; 30; 20 MG/100ML; MG/100ML; MG/100ML; MG/100ML
20 INJECTION, SOLUTION INTRAVENOUS CONTINUOUS
OUTPATIENT
Start: 2025-05-06

## 2025-05-06 RX ORDER — ACETAMINOPHEN 325 MG/1
975 TABLET ORAL ONCE
OUTPATIENT
Start: 2025-05-06 | End: 2025-05-06

## 2025-05-06 RX ORDER — CHLORHEXIDINE GLUCONATE ORAL RINSE 1.2 MG/ML
15 SOLUTION DENTAL ONCE
OUTPATIENT
Start: 2025-05-06 | End: 2025-05-06

## 2025-05-06 RX ORDER — VITAMIN B COMPLEX
2000 TABLET ORAL DAILY
Qty: 60 TABLET | Refills: 1 | Status: SHIPPED | OUTPATIENT
Start: 2025-05-06

## 2025-05-06 RX ORDER — ABALOPARATIDE 2000 UG/ML
80 INJECTION, SOLUTION SUBCUTANEOUS DAILY
Qty: 1.56 ML | Refills: 0 | OUTPATIENT
Start: 2025-05-06

## 2025-05-06 NOTE — PROGRESS NOTES
Knee New Office Note    Assessment:     1. Status post revision of total knee, left    2. Status post total knee replacement using cement, left          Plan:  Assessment & Plan  Status post revision of total knee, left         Status post total knee replacement using cement, left         ESR/CRP WNL  At today's visit, approximately 15 cc of clear serous fluid was drawn from the knee and is sent for Synovasure analysis  Provided that the Synovasure analysis has results that are negative for an infection, we will proceed with conversion of her total knee spacer to a total knee arthroplasty due to continued symptoms with antibiotic arthroplasty   The patient has elected to proceed with left TKA revision. Risks and benefits of surgery to include but not limited to bleeding, infection, damage to surrounding structures, hardware failure, instability, fracture, dislocation, need for further surgery, continued pain, stiffness, blood clots, stroke, and heart attack was discussed with the patient. Informed consent was signed today in the office. The patient has met with our surgical schedulers and our preoperative joint replacement pathway has been initiated. All questions were answered. Patient will follow-up 2 weeks post operatively. Increased risk due to history of PE, PAH, SLE, APLS and obesity.   Will see the patient back on date of surgery    Large joint arthrocentesis: L knee  Universal Protocol:  Consent given by: patient  Supporting Documentation  Indications: joint swelling     Is this a Visco injection? NoProcedure Details  Location: knee - L knee  Needle size: 18 G  Ultrasound guidance: no  Approach: lateral    Aspirate amount: 15 mL  Aspirate: serous and clear  Patient tolerance: patient tolerated the procedure well with no immediate complications          Update 5/6/2025: Patient is here today to discuss the results of her ESR and CRP, both of which came back to be normal.  She has remained afebrile and has not  had any recent infections.  She would like to proceed with the second stage of her revisionor to conversion total knee arthroplasty on the left side because she still has symptoms of instability.     Patient ID: Na Joseph is a 60 y.o. female.  Chief Complaint:  HPI:  60 y.o. female presents to the office 5 months s/p left total knee arthroplasty from revision to 1.5 stage antibiotic spacer for staph epi PJI performed on 10/03/2024. She continues to experience posterior and lateral left knee pain. She notes that her pain is worst with transitional activities and ambulation. She describes that her left knee feels unstable and she has to take time to get going due to this sensation. She takes aleve to control her pain. She also notes right knee pain with history of right total knee arthroplasty performed on 02/12/2019 by Dr. Zendejas. She notes that the right knee feels as though the patella is being pulled laterally and rubbing.    Allergy:  Allergies   Allergen Reactions    Dilantin [Phenytoin] Anaphylaxis and Rash    Penicillins Anaphylaxis, Hives, Dermatitis and Rash     Includes augmentin     Medications:  all current active meds have been reviewed  Past Medical History:  Past Medical History:   Diagnosis Date    HELENE positive     Anemia     Sildenafil causes decreased hematocrit    Antiphospholipid syndrome ([facility])     Anxiety 3/2020    CHF (congestive heart failure) ([facility])     right heart failure related to pulm HTN lupus    Chronic kidney disease 2018    borderline lab values    Chronic rhinitis 06/04/2012    Clotting disorder ([facility]) 2012    Coronary artery disease 2018    Encephalitis     Lasted Assessed 10/18/2017    Gout 06/26/2018    Head injury 11/11/2023    Heart failure ([facility]) 2019    History of transfusion 2/13/2019    autologous    Hypertension     Lupus 2018    Lupus anticoagulant disorder ([facility])     Maxillary sinusitis     Lasted Assessed 10/18/2017    Night sweat     Osteopenia     Hip    Osteoporosis      Spine    Pneumonia     Last Assessed 10/18/2017    PONV (postoperative nausea and vomiting)     Pulmonary embolism (HCC)     Pulmonary hypertension (HCC)     Rheumatic disease     Seizures (HCC)     Only during Encephalitis    Shortness of breath     with exertion    Sinus tachycardia     Urinary incontinence      Past Surgical History:  Past Surgical History:   Procedure Laterality Date    ANKLE SURGERY  6/2015    ARTHRODESIS      Hand: IP Joint    CHOLECYSTECTOMY      COLONOSCOPY      COLPOSCOPY      FL RETROGRADE PYELOGRAM  4/25/2025    HYSTERECTOMY      Vaginal    JOINT REPLACEMENT Right 10/03/2024    Knee replacement    KNEE SURGERY  2/2019    LAPAROSCOPIC ESOPHAGOGASTRIC FUNDOPLASTY  2008    ORTHOPEDIC SURGERY  10/03/2024    10/06/2022    KY ARTHRP KNE CONDYLE&PLATU MEDIAL&LAT COMPARTMENTS Right 02/12/2019    Procedure: KNEE TOTAL ARTHROPLASTY AND ASSOCIATED PROCEDURES;  Surgeon: Donovan Zendejas DO;  [facility];  Service: Orthopedics    KY ARTHRP KNE CONDYLE&PLATU MEDIAL&LAT COMPARTMENTS Left 10/06/2022    Procedure: ARTHROPLASTY KNEE TOTAL;  Surgeon: Donovan Zendejas DO;  [facility];  Service: Orthopedics    KY ARTHRS KNEE W/MENISCECTOMY MED&LAT W/SHAVING Right 10/02/2018    Procedure: KNEE ARTHROSCOPY WITH PARTIAL MEDIAL MENISCECTOMY;  Surgeon: Donovan Zendejas DO;  [facility];  Service: Orthopedics    KY ARTHRS KNEE W/MENISCECTOMY MED&LAT W/SHAVING Left 12/17/2019    Procedure: KNEE ARTHROSCOPIC MENISCECTOMY /MEDIAL; Chondyl medial and posterior;  Surgeon: Donovan Zendejas DO;  [facility];  Service: Orthopedics    KY CYSTO/URETERO W/LITHOTRIPSY &INDWELL STENT INSRT Left 4/25/2025    Procedure: CYSTOSCOPY URETEROSCOPY WITH LITHOTRIPSY HOLMIUM LASER, RETROGRADE PYELOGRAM AND INSERTION STENT URETERAL;;  Surgeon: Yusuf Briseno DO;  [facility];  Service: Urology    REDUCTION MAMMAPLASTY Bilateral     doesnt remember when    REPAIR ANKLE LIGAMENT Right     TENDON REPAIR       TONSILLECTOMY      TOTAL KNEE ARTHROPLASTY REVISION Left 10/03/2024    Procedure: ARTHROPLASTY KNEE TOTAL REVISION;  Surgeon: Grupo Wilson DO;  Location: [facility];  Service: Orthopedics     Family History:  Family History   Problem Relation Age of Onset    Uterine cancer Mother     Pancreatic cancer Mother 70    Diabetes Mother     Endometrial cancer Mother 66    Arthritis Mother     Cancer Mother         Pancreas, Uterine    Vision loss Mother     Osteoporosis Mother     Heart disease Father         open heart surgery at age 50     Stroke Father         CVA    Hypertension Father     Atrial fibrillation Father     Hyperlipidemia Father     Arthritis Father     Rheum arthritis Father     Heart attack Father     No Known Problems Sister     No Known Problems Sister     Thyroid disease Sister     Depression Sister     Osteoarthritis Sister     Asthma Sister     Asthma Daughter     Migraines Daughter     Asthma Daughter     Thyroid disease Maternal Grandmother     Heart attack Maternal Grandfather     Heart disease Maternal Grandfather     Heart attack Paternal Grandmother     Heart disease Paternal Grandmother     Skin cancer Paternal Grandfather     No Known Problems Brother     Hemochromatosis Brother     Alcohol abuse Brother     Early death Brother         heriditary hemachromotosis    No Known Problems Maternal Aunt     No Known Problems Paternal Aunt     Anuerysm Neg Hx     Clotting disorder Neg Hx     Heart failure Neg Hx      Social History:  Social History     Substance and Sexual Activity   Alcohol Use Yes    Comment: Socially     Social History     Substance and Sexual Activity   Drug Use No     Social History     Tobacco Use   Smoking Status Former    Current packs/day: 0.00    Types: Cigarettes    Quit date: 2006    Years since quittin.2   Smokeless Tobacco Never         ROS:  General: Per HPI  Skin: Negative, except if noted below  HEENT: Negative  Respiratory:  "Negative  Cardiovascular: Negative  Gastrointestinal: Negative  Urinary: Negative  Vascular: Negative  Musculoskeletal: Positive per HPI   Neurologic: Positive per HPI  Endocrine: Negative    Objective:  BP Readings from Last 1 Encounters:   04/25/25 107/60      Wt Readings from Last 1 Encounters:   05/06/25 107 kg (235 lb)        Respiratory:   non-labored respirations    Lymphatics:  no palpable lymph nodes    Gait:   Normal    Neurologic:   Alert and oriented times 3  Patient with normal sensation except as noted below  Deep tendon reflexes 2+ except as noted in MSK exam    Bilateral Lower Extremity:  Respiratory:   non-labored respirations    Lymphatics:  no palpable lymph nodes    Gait:   antalgic    Neurologic:   Alert and oriented times 3  Patient with normal sensation except as noted below  Deep tendon reflexes 2+ except as noted in MSK exam      Knee Exam    Left Knee:      Inspection: skin intact    Overall limb alignment neutral    Effusion: mild-moderate     ROM 0-120 without pain    Extensor Lag: none    Palpation: lateral Joint line tenderness to palpation    AP Stability at 90 deg stable    M/L stability in full extension stable    M/L stability in midflexion stable    Motor: 5/5 IP/Q/HS/TA/GS    Pulses: 2+ DP / 2+ PT    SILT DP/SP/S/S/TN      Imaging:  My interpretation XR AP/lateral/sunrise left knee: metal femur with all poly tibial component, components in good position. No fracture or dislocation. No loosening.    BMI:   Estimated body mass index is 42.98 kg/m² as calculated from the following:    Height as of this encounter: 5' 2\" (1.575 m).    Weight as of this encounter: 107 kg (235 lb).  BSA:   Estimated body surface area is 2.05 meters squared as calculated from the following:    Height as of this encounter: 5' 2\" (1.575 m).    Weight as of this encounter: 107 kg (235 lb).           Scribe Attestation      I,:   am acting as a scribe while in the presence of the attending physician.:     "   I,:   personally performed the services described in this documentation    as scribed in my presence.:

## 2025-05-08 ENCOUNTER — PROCEDURE VISIT (OUTPATIENT)
Dept: UROLOGY | Facility: CLINIC | Age: 60
End: 2025-05-08
Payer: MEDICARE

## 2025-05-08 VITALS
BODY MASS INDEX: 43.61 KG/M2 | DIASTOLIC BLOOD PRESSURE: 76 MMHG | OXYGEN SATURATION: 97 % | WEIGHT: 237 LBS | SYSTOLIC BLOOD PRESSURE: 132 MMHG | HEART RATE: 92 BPM | HEIGHT: 62 IN

## 2025-05-08 DIAGNOSIS — Z96.659 INFECTION OF TOTAL KNEE REPLACEMENT, SEQUELA: ICD-10-CM

## 2025-05-08 DIAGNOSIS — N20.0 NEPHROLITHIASIS: Primary | ICD-10-CM

## 2025-05-08 DIAGNOSIS — T84.59XS INFECTION OF TOTAL KNEE REPLACEMENT, SEQUELA: ICD-10-CM

## 2025-05-08 DIAGNOSIS — Z29.89 NEED FOR PROPHYLAXIS AGAINST URINARY TRACT INFECTION: ICD-10-CM

## 2025-05-08 PROCEDURE — 52310 CYSTOSCOPY AND TREATMENT: CPT

## 2025-05-08 RX ORDER — SULFAMETHOXAZOLE AND TRIMETHOPRIM 800; 160 MG/1; MG/1
1 TABLET ORAL EVERY 12 HOURS SCHEDULED
Qty: 6 TABLET | Refills: 0 | Status: CANCELLED | OUTPATIENT
Start: 2025-05-08 | End: 2025-05-11

## 2025-05-08 NOTE — PROGRESS NOTES
Cystoscopy     Date/Time  5/8/2025 11:00 AM     Performed by  KERI Manuel   Authorized by  KERI Manuel       Universal Protocol:  procedure performed by consultantConsent: Verbal consent obtained. Written consent obtained.  Risks and benefits: risks, benefits and alternatives were discussed  Consent given by: patient  Patient understanding: patient states understanding of the procedure being performed  Patient consent: the patient's understanding of the procedure matches consent given  Procedure consent: procedure consent matches procedure scheduled  Relevant documents: relevant documents present and verified  Test results: test results available and properly labeled  Site marked: the operative site was marked  Radiology Images displayed and confirmed. If images not available, report reviewed: imaging studies available  Required items: required blood products, implants, devices, and special equipment available  Patient identity confirmed: verbally with patient      Procedure Details:  Procedure type: unilateral ureteral stent    Patient tolerance: Patient tolerated the procedure well with no immediate complications    Additional Procedure Details: The patient returns to the office today to undergo cystoscopy with ureteral stent removal. Risk and benefits of the procedure were discussed and informed consent was obtained.  The patient was placed in the modified supine position. The genitalia were prepped and draped in a sterile fashion. Viscous lidocaine was used for local anesthesia. The flexible cystoscope was passed. The bladder was inspected. The stent was identified coming from the left ureteral orifice. The stent was grasped with a flexible grasper and was then removed in its entirety without complications. Overall the patient tolerated the procedure.    The patient will take 3 days of cefadroxil BID for infection prophylaxis following the procedure.    They were made aware to advise our  office of any fevers greater than 101 degrees Fahrenheit, malaise, or chills.  They were advised that it is normal to have cramping pain on the ipsilateral side for a day or so after ureteral stent removal.  They are to remain well hydrated in the coming days.         Assessment and Plan:  Nephrolithiasis  - Status post left ureteroscopy with Dr. Briseno on 4-25-25.  -Consent obtained for ureteral stent removal today  - Follow up in 6 weeks with US prior to visit  - ER precautions            KERI Caballero

## 2025-05-09 ENCOUNTER — RESULTS FOLLOW-UP (OUTPATIENT)
Dept: HEMATOLOGY ONCOLOGY | Facility: CLINIC | Age: 60
End: 2025-05-09

## 2025-05-09 ENCOUNTER — TELEPHONE (OUTPATIENT)
Dept: OBGYN CLINIC | Facility: HOSPITAL | Age: 60
End: 2025-05-09

## 2025-05-09 ENCOUNTER — APPOINTMENT (OUTPATIENT)
Dept: LAB | Facility: CLINIC | Age: 60
End: 2025-05-09
Attending: PHYSICIAN ASSISTANT
Payer: MEDICARE

## 2025-05-09 DIAGNOSIS — I26.99 PULMONARY EMBOLISM ASSOCIATED WITH COVID-19 (HCC): ICD-10-CM

## 2025-05-09 DIAGNOSIS — Z79.01 ANTICOAGULATED ON COUMADIN: ICD-10-CM

## 2025-05-09 DIAGNOSIS — D68.61 ANTIPHOSPHOLIPID ANTIBODY SYNDROME (HCC): Primary | ICD-10-CM

## 2025-05-09 DIAGNOSIS — U07.1 PULMONARY EMBOLISM ASSOCIATED WITH COVID-19 (HCC): ICD-10-CM

## 2025-05-09 LAB
INR PPP: 2.24 (ref 0.85–1.19)
PROTHROMBIN TIME: 25.5 SECONDS (ref 12.3–15)

## 2025-05-09 PROCEDURE — 85610 PROTHROMBIN TIME: CPT

## 2025-05-09 PROCEDURE — 36415 COLL VENOUS BLD VENIPUNCTURE: CPT

## 2025-05-09 NOTE — TELEPHONE ENCOUNTER
Preoperative Elective Admission Assessment    EKG/LAB/MRSA SWAB/CXR date: 5/14    Reviewed the following Appt: 5/22     Living Situation:    Who does pt live with: spouse, grandson, his girlfriend and their baby   What kind of home: multi-level  How do they enter the home: front  How many levels in home: 2 level home   # of steps to enter home: 4 to enter   # of steps to second floor: 13 to 2nd floor   Are there handrails: Yes  Are there landings: Yes  Sleeping arrangement: first/entry floor  Where is Bathroom: Second floor only- walk in shower, with seat and bars   Toilet height? Concerns for low toilets: Handicap     First Floor Setup:   Is there a bathroom: No- but has BSC with tent  Where would pt sleep: recliner     DME: rolling walker, cane, and 3-in-1 bedside commode (rollator, instructed to bring standard RW)     We discussed clearing pathways in the home and making sure there is accessibly to use the walker, for example, removing throw rugs.      Patient's Current Level of Function: Ambulates: Independently and ADLs: Independent    Post-op Caregiver: spouse and grandchildren   Currently receive any HHC/aides/community supports: No     Post-op Transport: spouse  To/from hospital: spouse  To/from PT 2-3x/week: spouse and grandchildren   Uses community transport now: No     Outpatient Physical Therapy Site:  Site: [facility]   pre and post-op appts scheduled? Yes     Medication Management: self  Preferred Pharmacy for Post-op Medications: [id number] - [place], [place] - [address] [facility]  [address] [place] [facility] [address]  Phone: [phone]  Fax: [phone]   Blood Management Vitamin Regimen: Pt confirms taking as prescribed  Post-op anticoagulant: to be determined by surgical team postoperatively  Has Bactroban for 5 days preop: Yes  Educated on Preoperative Bathing Instructions, and use of Soap for 5 days before surgery.      DC Plan: Pt plans to be discharged  home    Barriers to DC identified preoperatively: none identified    BMI: 43.35- sending to surgeon     Patient Education:  Pt educated on post-op pain, early mobilization (POD0), LOS goals, OP PT goals, and preoperative bathing. Patient educated that our goal is to appropriately discharge patient based off their post-op function while striving to maintain maximal independence. The goal is to discharge patient to home and for them to attend outpatient physical therapy.    Assigned to care team? Yes

## 2025-05-09 NOTE — PROGRESS NOTES
"total knee revision scheduled for 6/11/2025     Patient is on warfarin for secondary prophylaxis    Her crcl is >69mL/min    Limited data for anticoagulants other than warfarin for APS.  DOAC treatment known to be inferior.    Per medscape\" Antiphospholipid Syndrome Medication  Updated: Nov 15, 2023   Author: Sultana Lundberg DO; Chief : Robert Zabala MD    \"For APS, higher doses (1 mg/kg q12h or 1.5 mg/kg/d) are used for thrombosis prophylaxis in patients (pregnant or nonpregnant) who have had prior thrombotic events.\"    Patient is 108 kg    D/W Dr. Brewster given nature of her surgery.  Recent normal cardiolipin AB.      Dr. Brewster in agreement to have lower dose of Lovenox 100mg SQ daily      Hold coumadin 5 days before surgery   Start Lovenox 3 days prior to surgery and hold Lovenox 24 hours prior to surgery.    Post op, initiate Lovenox 100 mg subcu daily along with warfarin until INR is therapeutic range         "

## 2025-05-09 NOTE — TELEPHONE ENCOUNTER
Call placed to patient to notify of below information. She verbalized understanding. She is however scheduled for surgery with ortho on 6/11, and will need Lovenox instructions. She stated she sent a [employer] message regarding this which was forwarded to you. Thanks!    ----- Message from Maile March PA-C sent at 5/9/2025  9:25 AM EDT -----  Same dose of coumadin.  Repeat pt/inr in 2 weeks

## 2025-05-12 RX ORDER — ENOXAPARIN SODIUM 100 MG/ML
100 INJECTION SUBCUTANEOUS EVERY 24 HOURS
Qty: 20 ML | Refills: 0 | Status: SHIPPED | OUTPATIENT
Start: 2025-05-12 | End: 2025-05-12 | Stop reason: SDUPTHER

## 2025-05-13 ENCOUNTER — TELEPHONE (OUTPATIENT)
Age: 60
End: 2025-05-13

## 2025-05-13 NOTE — TELEPHONE ENCOUNTER
PROGRESS NOTE      Patient:  Leo Nevarez  Unit/Bed:6K-03/003-A  YOB: 1943  MRN: [id number]   Acct: [id number]    PCP: Aileen Valadez APRN - CNP    Date of Admission: 5/7/2025 LOS: 6    Date of Evaluation:  5/13/2025    Anticipated Discharge:  pending clinical course     Assessment/Plan:  Acute hypoxic respiratory failure secondary to heart failure exacerbation: pt is also at risk for a PE unable to do a CTA with renal dysfunction Pro BNP on discharge 4/29 70377 increased to 40110 on 5/8. Patient presented from home due to worsening shortness of breath he saw his PCP who advised him to go to the ED found to have SpO2 of 78%.  In the ED patient was given Bumex 1 mg IV once along with Rocephin 2 mg IV and azithromycin 500 mg IV.  Patient is afebrile, white blood cell count 4.9, lactic acid 1.1, Pro-Nick 0.08 low suspicion for pneumonia antibiotics were discontinued on admission.  Blood cultures collected in the ED NGTD.  Shortness of breath likely due to worsening CHF on exam patient has 1+ pitting edema bilateral lower extremity and bilateral rales in mid lungs.  Patient started on Bumex 1 mg IV twice daily 5/8, strict I's and O's, daily weights  Repeat echo 5/7 showed mildly reduced LV systolic function with EF 45-50% no change from study on 7/3/24  metolazone 2.5 mg given on 5/10 and 5/11   Pt is breathing comfortably on room air.  Acute on chronic heart failure with mildly reduced EF: Echo 7/3 showed mildly reduced left ventricular systolic function with a EF 45 to 50% left ventricle size is normal with normal wall thickness mild global hypokinesis.   Start Bumex 1 mg PO BID 5/12  Strict I's and O's Daily weights  CKD stage IIIb:Cr 2.2 on 5/8 on admission.Creatinine 2.4 on 5/12.  Baseline of 1.7-2.2. Patient does follow-up with Dr. 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  Renal ultrasound normal.  Continue to monitor kidney    PROGRESS NOTE      Patient:  Leo Nevarez  Unit/Bed:6K-03/003-A  YOB: 1943  MRN: [id number]   Acct: [id number]    PCP: Aileen Valadez APRN - CNP    Date of Admission: 5/7/2025 LOS: 5    Date of Evaluation:  5/12/2025    Anticipated Discharge: pending clinical course     Assessment/Plan:    Acute hypoxic respiratory failure secondary to heart failure exacerbation: pt is also at risk for a PE unable to do a CTA with renal dysfunction Pro BNP on discharge 4/29 63936 increased to 60669 on 5/8. Patient presented from home due to worsening shortness of breath he saw his PCP who advised him to go to the ED found to have SpO2 of 78%.  In the ED patient was given Bumex 1 mg IV once along with Rocephin 2 mg IV and azithromycin 500 mg IV.  Patient is afebrile, white blood cell count 4.9, lactic acid 1.1, Pro-Nick 0.08 low suspicion for pneumonia antibiotics were discontinued on admission.  Blood cultures collected in the ED NGTD.  Shortness of breath likely due to worsening CHF on exam patient has 1+ pitting edema bilateral lower extremity and bilateral rales in mid lungs.  Patient started on Bumex 1 mg IV twice daily 5/8, strict I's and O's, daily weights  Repeat echo 5/7 showed mildly reduced LV systolic function with EF 45-50% no change from study on 7/3/24  metolazone 2.5 mg given on 5/10 and 5/11   Pt was weaned to room air on 5/11 however per RN he got a little of short of breath with therapy and spo2 was 85 so they put him on 1 L NC.  Acute on chronic heart failure with mildly reduced EF: Echo 7/3 showed mildly reduced left ventricular systolic function with a EF 45 to 50% left ventricle size is normal with normal wall thickness mild global hypokinesis.   Start Bumex 1 mg PO BID 5/12  Strict I's and O's Daily weights  CKD stage IIIb:Cr 2.2 on 5/8 on admission.Creatinine 2.4 on 5/12.  Baseline of 1.7-2.2. Patient does follow-up with Dr. Hicks per office not from march 2025 CKD believed 2/2 senile    PROGRESS NOTE      Patient:  Leo Nevarez  Unit/Bed:6K-03/003-A  YOB: 1943  MRN: [id number]   Acct: [id number]    PCP: Aileen Valadez APRN - CNP    Date of Admission: 5/7/2025 LOS: 4    Date of Evaluation:  5/11/2025    Anticipated Discharge: Pending clinical course    Assessment/Plan:    Acute hypoxic respiratory failure secondary to heart failure exacerbation:  - Pro BNP on discharge 4/29 92066 increased to 68755 on 5/8.  - Patient presented from home due to worsening shortness of breath he saw his PCP who advised him to go to the ED found to have SpO2 of 78%.  - In the ED patient was given Bumex 1 mg IV once along with Rocephin 2 mg IV and azithromycin 500 mg IV.  - ED course: Patient is afebrile, white blood cell count 4.9, lactic acid 1.1, Pro-Nick 0.08 low suspicion for pneumonia antibiotics were discontinued on admission.  - Blood cultures collected in the ED NGTD.  - Pt is also at risk for a PE unable to do a CTA with renal dysfunction  - Shortness of breath likely due to worsening CHF on exam patient has 1+ pitting edema bilateral lower extremity and bilateral rales in mid lungs.  - Patient started on Bumex 1 mg IV twice daily 5/8, strict I's and O's, daily weights  - On 2 L nasal cannula not on oxygen at baseline continue to wean to room air as tolerated  - Repeat echo 5/7 showed mildly reduced LV systolic function with EF 45-50% no change from study on 7/3/24  - Metolazone 2.5 mg x 2 given on 5/10 5/11, did have about 1 L of output  - Patient has been successfully weaned to room air on 5/11  - Will continue with diuresis at this time and reassess in a.m.    Acute on chronic heart failure with mildly reduced EF:  - Echo 7/3 showed mildly reduced left ventricular systolic function with a EF 45 to 50% left ventricle size is normal with normal wall thickness mild global hypokinesis.  - Continue Bumex 1 mg IV twice daily  - Metolazone 2.5 mg x 2 doses given on 5/10 5/11, since    Physician Progress Note      PATIENT:               VIKAS HANSEN  [facility] #:                  [id number]  :                       1943  ADMIT DATE:       2025 6:17 PM  DISCH DATE:  RESPONDING  PROVIDER #:        Mckenna Trent DO          QUERY TEXT:    Acute respiratory failure is documented in the medical record . Please provide   additional clinical indicators supportive of your documentation. Or please   document if the diagnosis of acute respiratory failure has been ruled out   after study.    The clinical indicators include:  82yo presented with shortness of breath. Hx of CHF    Documentation in nursing note dated , \"Pt noted to be 77% on RA upon   arrival to this ED. Pt placed on 2L via NC\".  As per ED provider documentation in note dated  ( Dr. Tripathi),   \"Pulmonary: Effort: Pulmonary effort is normal\"  As per documentation in history and physcial (Dr. Odell), \"Respiratory:    Normal respiratory effort.  Bilateral crackles up to mid lungs\"  RR ranging 16-23( VS flowsheet)    Supplemental oxygen 2l (VS flowsheet)  Options provided:  -- Acute Respiratory Failure as evidenced by, Please document evidence.  -- Acute Respiratory Failure ruled out after study  -- Other - I will add my own diagnosis  -- Disagree - Not applicable / Not valid  -- Disagree - Clinically unable to determine / Unknown  -- Refer to Clinical Documentation Reviewer    PROVIDER RESPONSE TEXT:    This patient is in acute respiratory failure as evidenced by requiring 2L NC    Query created by: SERENA GAN on 2025 9:36 AM      Electronically signed by:  Mckenna Trent DO 2025 2:22 PM           Assumed care of Pt. Pt eating lunch and appears comfortable in bed.    [facility]  OCCUPATIONAL THERAPY MISSED TREATMENT NOTE  STRZ RENAL TELEMETRY 6K  6K-003-A      Date: 2025  Patient Name: Leo Nevarez        CSN: [id number]   : 1943  (81 y.o.)  Gender: male   Referring Practitioner: Arcelia Odell MD            REASON FOR MISSED TREATMENT:  Per RN, Pt & family has decided to transition to comfort care & go home with hospice. Will sign off.                  Echo completed. Dr. Spears to read.    [facility]  PHYSICAL THERAPY MISSED TREATMENT NOTE  STRZ RENAL TELEMETRY 6K    Date: 2025  Patient Name: Leo Nevarez        MRN: [id number]   : 1943  (81 y.o.)  Gender: male   Referring Practitioner: Arcelia Odell MD            REASON FOR MISSED TREATMENT:  Per RN, Pt & family has decided to transition to comfort care & go home with hospice. Will sign off.  .      Denise Dey, PT, DPT         Patient called in to inform us that social security disability sent a request for medical records release  A good call back number for patient is [phone]  Patient received sacramental anointing by a . If you are in need of  support, please call [phone]. If you are in need of a  after 6:30pm, please call the house supervisor for the on-call .      Ericka Agarwal  [facility] Health Coordinator  [phone]    [facility]  STRZ RENAL TELEMETRY 6K  Occupational Therapy  Daily Note    Discharge Recommendations: ECF with OT  Equipment Recommendations:   RW use at home      Time In: 913  Time Out: 943  Timed Code Treatment Minutes: 30 Minutes  Minutes: 30          Date: 2025  Patient Name: Leo Nevarez,   Gender: male      Room: [facility]/003-A  MRN: [id number]  : 1943  (81 y.o.)  Referring Practitioner: Arcelia Odell MD  Diagnosis: Acute on chronic HFrEF (heart failure with reduced ejection fraction) (HCC)  Additional Pertinent Hx: Per MD H & P: 81 y.o. male with PMHx of heart failure with mildly reduced ejection fraction, CKD, insulin-dependent diabetes mellitus, hypertension, hyperlipidemia, hypothyroidism and hypertension who presents to [facility] with her daughter for shortness of breath.    Patient has baseline dementia, history was obtained from the daughter.  Per daughter, patient has dementia and has been on Aricept for at least 3 years.  She states that a visiting nurse comes home to check on her both parents.  While examining her dad, she found some bibasal lung abnormal sounds and advised them to go to the PCP.  Patient was taken to the PCP, where his oxygen saturation was in the 70s which according to daughter dropped to 68 and he was put on the supplemental oxygen.  Per daughter, patient does not use any supplemental oxygen at home.  Patient was advised to go to the ED for further evaluation.    Restrictions/Precautions:  Restrictions/Precautions: General Precautions, Contact Precautions  Position Activity Restriction  Other Position/Activity Restrictions: RLE shorter than LLE     Social/Functional History:  Lives With: Family (daughter & grand daughters, wife)  Type of Home: House  Home Layout: One level  Home Access: Stairs to enter without rails (3-4)  Entrance Stairs - Number of Steps: 8 MATHEW with rails (no family to confirm this)  Home Equipment: Walker - Rolling,  Pt.'s son, Shane, states the pt. Is asking for a \"fish sandwich\" and asks if it would be ok to get him one. Shane confirms that this would be from \"[employer]\". This RN has a nice conversation with much education, POC and discussion concerning GOC. Shane indicates his father went to stay with him in [place] and was doing better with no admissions during this time. Shane acknowledges understanding and is agreeable to speaking with his brothers concerning goals of care. He states that his sister is \"decision maker\" and she should be in soon and asks that I speak to her.    1300 - Shane returns to visit pt. Again. Further discussion concerning pt and pt's wifes wishes for the rest of their lives is initiated by Shane. He indicates that his father does have lucid times and in those times his father and his mother both have indicated they want to return \"home\" to [place] - their house - where Shane lives to live out the rest of their days. This RN introduces palliative care (who have seen pt before) and let Shane know they will come discuss goals of care and options with the family. He indicates that he is willing and that both brothers would likely be able to come for a meeting. Pt. Sleeps throughout this conversation. Daughter hasn't been in. Dr. Muñoz is updated. Primary RN, Mitali is updated. Palliative Care consult is made.   Report called to receiving RN on 6K. Discussed POC, history, meds and labs. Transported in bed by staff and accompanied by family member to 6K3   [facility]  PHYSICAL THERAPY MISSED TREATMENT NOTE  STRZ RENAL TELEMETRY 6K    Date: 2025  Patient Name: Leo Nevarez        MRN: [id number]   : 1943  (81 y.o.)  Gender: male   Referring Practitioner: Arcelia Odell MD            REASON FOR MISSED TREATMENT:  Patient transitioned to comfort care.      Per RN and palliative care, patient's family has decided to bring patient home with hospice.      Sit to Supine: Moderate Assistance, X 1      TRANSFERS:  Sit to Stand:  Minimal Assistance.    Stand to Sit: Contact Guard Assistance.      FUNCTIONAL MOBILITY:  Assistive Device: Rolling Walker  Assist Level:  Contact Guard Assistance.   Distance: To and from bathroom   Vcs for posture     Activity Tolerance:  Patient tolerance of  treatment: Fair treatment tolerance       Functional Outcome Measures:    AM-PAC Inpatient Daily Activity Raw Score: 13     Modified [place]:  Premorbid Functional Status: Not Applicable  Current Functional Status:  Not Applicable    Education:  Learners: Patient  Plan of Care, Role of OT, see above    Assessment:  Assessment: Pt demo decreased ADL & functional mobility indep over PLOF s/p admission with acute on chronic heart failure. Continued OT recommended to faciliate improvements in the above to increase indep & safety for returning home with family.  Performance deficits / Impairments: Decreased functional mobility , Decreased endurance, Decreased ADL status, Decreased cognition, Decreased balance, Decreased strength, Decreased safe awareness  Prognosis: Fair  REQUIRES OT FOLLOW-UP: Yes  Decision Making: Medium Complexity    Treatment Initiated: Treatment and education initiated within context of evaluation.  Evaluation time included review of current medical information, gathering information related to past medical, social and functional history, completion of standardized testing, formal and informal observation of tasks, assessment of data and development of plan of care and goals.  Treatment time included skilled education and facilitation of tasks to increase safety and independence with ADL's for improved functional independence and quality of life.    Plan:  Times Per Week: 5x  Current Treatment Recommendations: Functional mobility training, Balance training, Strengthening, Safety education & training, Endurance training, Self-Care / ADL.  See long-term goal time frame  Skilled Therapeutic Intervention: Decreased functional mobility , Decreased strength, Decreased safe awareness, Decreased cognition, Decreased balance  Assessment: Leo Nevarez is a 81 y.o. male that presents with Acute exacerbation of chronic heart failure. Pt demonstrates a decrease in baseline by way of bed mobility, transfers and ambulation secondary to decreased activity tolerance, strength, fatigue, and balance deficits. Pt will benefit from skilled PT services throughout admission and beyond hospital discharge for improvements in functional mobility and in order to decrease fall risk and return pt to PLOF.     Therapy Prognosis: Good    Requires PT Follow-Up: Yes    Patient Education:      .    Patient Education  Education Given To: Patient  Education Provided: Role of Therapy, Plan of Care  Education Method: Verbal  Barriers to Learning: Cognition  Education Outcome: Continued education needed       Plan:  Current Treatment Recommendations: Strengthening, Balance training, Functional mobility training, Transfer training, Gait training, Stair training, Safety education & training, Patient/Caregiver education & training, Equipment evaluation, education, & procurement, Therapeutic activities  General Plan:  (5x)    Goals:  Patient Goals : Pt goal to go home  Short Term Goals  Time Frame for Short Term Goals: By discharge  Short Term Goal 1: Supine to/from sit at Mod I in order to get in/out of bed.  Short Term Goal 2: Sit to/from stand at Supervision in order to get up to walk.  Short Term Goal 3: Ambulate 100 feet with RW at Supervision in order to walk in home safely.  Short Term Goal 4: Ascend/Descend 4 steps with [place] handrails at CGA in order to enter/exit home.  Long Term Goals  Time Frame for Long Term Goals : NA due to short ELOS    Following session, patient left in safe position with all fall risk precautions in place.         needed for comfort through 1 avoid noninvasive positive airway pressure antibiotics and IV fluids unless it is consistent with comfort goal.       PMH, SURGICAL HX, FH, SOCIAL HX reviewed and updated as needed.    Medications:  Reviewed    Infusion Medications    sodium chloride      dextrose       Scheduled Medications    metOLazone  2.5 mg Oral Daily    insulin glargine  15 Units SubCUTAneous Nightly    bumetanide  1 mg IntraVENous BID    levothyroxine  150 mcg Oral Daily    sodium chloride flush  5-40 mL IntraVENous 2 times per day    heparin (porcine)  5,000 Units SubCUTAneous q8h    insulin lispro  0-8 Units SubCUTAneous 4x Daily AC & HS    buPROPion  300 mg Oral QAM    citalopram  40 mg Oral Daily    donepezil  10 mg Oral Nightly    metoprolol tartrate  25 mg Oral BID    pantoprazole  40 mg Oral QAM AC    pravastatin  80 mg Oral Nightly    traZODone  25 mg Oral Nightly    docusate sodium  100 mg Oral Daily    melatonin  3 mg Oral Nightly     PRN Meds: sodium chloride flush, sodium chloride, potassium chloride **OR** potassium alternative oral replacement **OR** potassium chloride, magnesium sulfate, ondansetron **OR** ondansetron, polyethylene glycol, acetaminophen **OR** acetaminophen, glucose, dextrose bolus **OR** dextrose bolus, glucagon (rDNA), dextrose, HYDROmorphone, prochlorperazine      Intake/Output Summary (Last 24 hours) at 5/10/2025 1154  Last data filed at 5/10/2025 0555  Gross per 24 hour   Intake 495 ml   Output 2950 ml   Net -2455 ml       Exam:  BP (!) 144/63   Pulse 62   Temp 97.7 °F (36.5 °C) (Oral)   Resp 18   Ht 1.72 m (5' 7.72\")   Wt 115 kg (253 lb 8.5 oz)   SpO2 99%   BMI 38.87 kg/m²     Physical Exam  Constitutional:       Appearance: He is ill-appearing.   HENT:      Head: Normocephalic and atraumatic.      Nose: Nose normal.      Mouth/Throat:      Mouth: Mucous membranes are moist.   Eyes:      Extraocular Movements: Extraocular movements intact.      Conjunctiva/sclera:  []No  Telemetry Review of past 24 hours:  nsr    LDA: []CVC / []PICC / []Midline / []Fung / []Drains / []Mediport / [x]PIV / []None    Labs (still needed?): [x]Yes / []No  IVF (still needed?): [x]Yes / []No    Level of care: [x]Step Down / []Med-Surg  Bed Status: [x]Inpatient / []Observation    DVT Prophylaxis: [] Lovenox / [x] Heparin / [] SCDs / [] Already on Systemic Anticoagulation / [] None     PT/OT: [x]Yes / []No    Disposition:    [x] Home       [] TCU       [] Rehab       [] Psych       [] SNF       [] Long Term Care Facility       [] Other-    Code Status: DNR-CC      An electronic signature was used to authenticate this note  - Mckenna Trent, DO PGY-1 on 5/9/2025 at 2:42 PM

## 2025-05-14 ENCOUNTER — EVALUATION (OUTPATIENT)
Dept: PHYSICAL THERAPY | Facility: CLINIC | Age: 60
End: 2025-05-14
Payer: MEDICARE

## 2025-05-14 ENCOUNTER — LAB REQUISITION (OUTPATIENT)
Dept: LAB | Facility: HOSPITAL | Age: 60
End: 2025-05-14
Payer: MEDICARE

## 2025-05-14 ENCOUNTER — APPOINTMENT (OUTPATIENT)
Dept: LAB | Facility: CLINIC | Age: 60
End: 2025-05-14
Attending: PHYSICIAN ASSISTANT
Payer: MEDICARE

## 2025-05-14 DIAGNOSIS — M25.562 ACUTE PAIN OF LEFT KNEE: Primary | ICD-10-CM

## 2025-05-14 DIAGNOSIS — Z96.652 STATUS POST REVISION OF TOTAL KNEE, LEFT: ICD-10-CM

## 2025-05-14 DIAGNOSIS — M25.59 PAIN IN OTHER JOINT: ICD-10-CM

## 2025-05-14 DIAGNOSIS — Z51.89 AFTERCARE: ICD-10-CM

## 2025-05-14 DIAGNOSIS — Z96.652 PRESENCE OF LEFT ARTIFICIAL KNEE JOINT: ICD-10-CM

## 2025-05-14 LAB
ABO GROUP BLD: NORMAL
ALBUMIN SERPL BCG-MCNC: 4.1 G/DL (ref 3.5–5)
ALP SERPL-CCNC: 104 U/L (ref 34–104)
ALT SERPL W P-5'-P-CCNC: 9 U/L (ref 7–52)
ANION GAP SERPL CALCULATED.3IONS-SCNC: 6 MMOL/L (ref 4–13)
APTT PPP: 30 SECONDS (ref 23–34)
AST SERPL W P-5'-P-CCNC: 10 U/L (ref 13–39)
BASOPHILS # BLD AUTO: 0.03 THOUSANDS/ÂΜL (ref 0–0.1)
BASOPHILS NFR BLD AUTO: 1 % (ref 0–1)
BILIRUB SERPL-MCNC: 0.67 MG/DL (ref 0.2–1)
BLD GP AB SCN SERPL QL: NEGATIVE
BUN SERPL-MCNC: 14 MG/DL (ref 5–25)
CALCIUM SERPL-MCNC: 9.7 MG/DL (ref 8.4–10.2)
CHLORIDE SERPL-SCNC: 107 MMOL/L (ref 96–108)
CO2 SERPL-SCNC: 28 MMOL/L (ref 21–32)
CREAT SERPL-MCNC: 0.79 MG/DL (ref 0.6–1.3)
EOSINOPHIL # BLD AUTO: 0.17 THOUSAND/ÂΜL (ref 0–0.61)
EOSINOPHIL NFR BLD AUTO: 6 % (ref 0–6)
ERYTHROCYTE [DISTWIDTH] IN BLOOD BY AUTOMATED COUNT: 13.4 % (ref 11.6–15.1)
EST. AVERAGE GLUCOSE BLD GHB EST-MCNC: 105 MG/DL
GFR SERPL CREATININE-BSD FRML MDRD: 80 ML/MIN/1.73SQ M
GLUCOSE P FAST SERPL-MCNC: 110 MG/DL (ref 65–99)
HBA1C MFR BLD: 5.3 %
HCT VFR BLD AUTO: 40.4 % (ref 34.8–46.1)
HGB BLD-MCNC: 13.4 G/DL (ref 11.5–15.4)
IMM GRANULOCYTES # BLD AUTO: 0 THOUSAND/UL (ref 0–0.2)
IMM GRANULOCYTES NFR BLD AUTO: 0 % (ref 0–2)
INR PPP: 1.63 (ref 0.85–1.19)
LYMPHOCYTES # BLD AUTO: 0.72 THOUSANDS/ÂΜL (ref 0.6–4.47)
LYMPHOCYTES NFR BLD AUTO: 25 % (ref 14–44)
MCH RBC QN AUTO: 30.9 PG (ref 26.8–34.3)
MCHC RBC AUTO-ENTMCNC: 33.2 G/DL (ref 31.4–37.4)
MCV RBC AUTO: 93 FL (ref 82–98)
MONOCYTES # BLD AUTO: 0.25 THOUSAND/ÂΜL (ref 0.17–1.22)
MONOCYTES NFR BLD AUTO: 9 % (ref 4–12)
NEUTROPHILS # BLD AUTO: 1.76 THOUSANDS/ÂΜL (ref 1.85–7.62)
NEUTS SEG NFR BLD AUTO: 59 % (ref 43–75)
NRBC BLD AUTO-RTO: 0 /100 WBCS
PLATELET # BLD AUTO: 263 THOUSANDS/UL (ref 149–390)
PMV BLD AUTO: 10.5 FL (ref 8.9–12.7)
POTASSIUM SERPL-SCNC: 4 MMOL/L (ref 3.5–5.3)
PROT SERPL-MCNC: 6.4 G/DL (ref 6.4–8.4)
PROTHROMBIN TIME: 20.1 SECONDS (ref 12.3–15)
RBC # BLD AUTO: 4.34 MILLION/UL (ref 3.81–5.12)
RH BLD: POSITIVE
SODIUM SERPL-SCNC: 141 MMOL/L (ref 135–147)
SPECIMEN EXPIRATION DATE: NORMAL
WBC # BLD AUTO: 2.93 THOUSAND/UL (ref 4.31–10.16)

## 2025-05-14 PROCEDURE — 87081 CULTURE SCREEN ONLY: CPT

## 2025-05-14 PROCEDURE — 85730 THROMBOPLASTIN TIME PARTIAL: CPT

## 2025-05-14 PROCEDURE — 86901 BLOOD TYPING SEROLOGIC RH(D): CPT | Performed by: PHYSICIAN ASSISTANT

## 2025-05-14 PROCEDURE — 97140 MANUAL THERAPY 1/> REGIONS: CPT | Performed by: PHYSICAL THERAPIST

## 2025-05-14 PROCEDURE — 83036 HEMOGLOBIN GLYCOSYLATED A1C: CPT

## 2025-05-14 PROCEDURE — 85025 COMPLETE CBC W/AUTO DIFF WBC: CPT

## 2025-05-14 PROCEDURE — 97110 THERAPEUTIC EXERCISES: CPT | Performed by: PHYSICAL THERAPIST

## 2025-05-14 PROCEDURE — 80053 COMPREHEN METABOLIC PANEL: CPT

## 2025-05-14 PROCEDURE — 97161 PT EVAL LOW COMPLEX 20 MIN: CPT | Performed by: PHYSICAL THERAPIST

## 2025-05-14 PROCEDURE — 86900 BLOOD TYPING SEROLOGIC ABO: CPT | Performed by: PHYSICIAN ASSISTANT

## 2025-05-14 PROCEDURE — 36415 COLL VENOUS BLD VENIPUNCTURE: CPT

## 2025-05-14 PROCEDURE — 86850 RBC ANTIBODY SCREEN: CPT | Performed by: PHYSICIAN ASSISTANT

## 2025-05-14 PROCEDURE — 85610 PROTHROMBIN TIME: CPT

## 2025-05-14 NOTE — PROGRESS NOTES
PT Evaluation     Today's date: 2025  Patient name: Na Joseph  : 1965  MRN: [id number]  Referring provider: Grupo Wilson, *  Dx:   Encounter Diagnosis     ICD-10-CM    1. Acute pain of left knee  M25.562       2. Aftercare  Z51.89                      Assessment  Impairments: abnormal gait, abnormal or restricted ROM, activity intolerance, impaired balance, impaired physical strength, lacks appropriate home exercise program, pain with function, poor posture , poor body mechanics, activity limitations and endurance    Assessment details: Today's session was performed in regards of allowing pt to be aware of her home safety set up, course of recovery, hospital stay, prevention of infection, clot awareness, and HEP preparation for post surgery recovery.  Pt at this time will benefit PT intervention once she returns post procedure.  All questions were answered and she is welcome to reach out to therapist prior to surgery if she is to have any questions.  Based on today's session she will likely have a strong outcome due to good range, strength, prior experience and not being an objective fall risk leading up to surgery.  Thank you very much for this kind, familiar and motivated referral.    Understanding of Dx/Px/POC: good     Prognosis: good    Goals  RE n.v.  STG 4 Weeks:  Decrease pain at worst to XX  Improve knee range to 105 flex, TKE achievement  Improve strength to 4- hip, knee 5/5  Independent with HEP  LTG 8 Weeks:  Decrease pain at worst to XX  Improve knee range to be 120 flex  Improve strength to Hip 4/5 or greater.    Able to perform all desired activities with minimal to nil symptom exacerbation               Plan  Patient would benefit from: skilled physical therapy and home program  Planned modality interventions: cryotherapy and thermotherapy: hydrocollator packs    Planned therapy interventions: joint mobilization, manual therapy, neuromuscular re-education, strengthening,  stretching, therapeutic activities, therapeutic exercise, therapeutic training, transfer training, IADL retraining, home exercise program, graded motor, graded exercise, graded activity, gait training, functional ROM exercises, flexibility, abdominal trunk stabilization, activity modification, balance, behavior modification and body mechanics training    Frequency: 2x week  Duration in weeks: 10  Treatment plan discussed with: patient  Plan details:  is Pavel.          Subjective Evaluation    History of Present Illness  Date of onset: 2025  Mechanism of injury: Pt is a 59 yo female who is familiar to this facility presents today for a pre-operative spacer replacement which will be performed on Dr. Wilson on 25 with anticipated return here on 25.  Pt reports that she had infection last year, had revision done, received antibiotic spacer at that time and now presents today without infection and will have more stable hardware placed.  Pt is rather familiar with rehabilitation of this surgery as she has not only had it performed but rehabilitated here at [facility].  Pt reports that the knee is having some pain, and some instability.  Pain at worst 5/10, with rest nil pain.  Pt reports that she lives with her  Pavel in a multilevel house with a first floor set up, 4 JINA railing on both sides and will have multiple healthy members of family to assist with her recovery.  Pt reports no numbness or tingling, no recent change in bowel or bladder, is having some back pain, but is managing with injections in the near future.  Pt reports goals at this time are to improve stability, improve ambulation, improve ability to manage home, yard and keep up with family and go on vacation.    Quality of life: good    Patient Goals  Patient goals for therapy: increased strength, return to sport/leisure activities, increased motion, decreased pain and improved balance    Pain  Current pain ratin  At  "best pain ratin  At worst pain ratin  Quality: dull ache and tight  Relieving factors: ice, medications and change in position  Aggravating factors: standing, walking and stair climbing  Progression: no change    Social Support  Steps to enter house: yes  Stairs in house: yes   Lives in: multiple-level home  Lives with: spouse      Diagnostic Tests  X-ray: normal  Treatments  Previous treatment: medication, injection treatment and physical therapy        Objective     Active Range of Motion   Left Knee   Flexion: 120 degrees   Extension: 0 degrees     Right Knee   Flexion: 130 degrees   Extension: 0 degrees     Additional Active Range of Motion Details  Genu Varum R > L   Gait: Increased trunk sway to compensation for L LE > R  B/L Sensation intact to L3,4,5,S1,S2  LE Strength  Hip   L Flex 5 Ext 5 abd 5 Add 5 SLR AROM   x10    R Flex Ext Abd Add all 5/5. SLR AROM  x10  LE Screen all 5/5  Joint Mobility  Palpation  Sts  TUG: 10.2\" no AD  Transfers: sit <>Stand, rolling, supine <> sit Independent.   Elevations: Performed up with R, down with L with use of b/l railing, pt was in accord.                         Precautions: S/P L TKA Revision secondary to infection with Dr. Wilson on 10/3/24, Falls, Hx of PE, Anemia, CHF, Osteoporosis.        Precautions: L TKA by Dr. Mae on 25.  Fall concern, LBBB, HTN        Manuals            Education of procedure and recovery 10 min            PROM L Knee                                       Neuro Re-Ed             NBOS EC             NBOS EO Foam             Hip Abd/Ext             SLR A/AROM                                                    Ther Ex             Bike             AP for circulation prn            QS/GS  6\" x 10            HS   6\" x 10            Gastroc towel ST 10\" x 10            LAQ 2 x 10            HR/TR                          Ther Activity             Sit to Stand             Mini Squats             Elevations FF review and " performance            LRD progression RW to SPC discussion                          Modalities             CP to L Knee prn

## 2025-05-15 ENCOUNTER — CONSULT (OUTPATIENT)
Age: 60
End: 2025-05-15
Payer: MEDICARE

## 2025-05-15 VITALS
WEIGHT: 234 LBS | DIASTOLIC BLOOD PRESSURE: 62 MMHG | HEART RATE: 84 BPM | BODY MASS INDEX: 43.06 KG/M2 | TEMPERATURE: 98 F | OXYGEN SATURATION: 99 % | HEIGHT: 62 IN | SYSTOLIC BLOOD PRESSURE: 126 MMHG

## 2025-05-15 DIAGNOSIS — R56.9 SEIZURES (HCC): ICD-10-CM

## 2025-05-15 DIAGNOSIS — Z01.818 PREOP GENERAL PHYSICAL EXAM: Primary | ICD-10-CM

## 2025-05-15 DIAGNOSIS — Z12.31 ENCOUNTER FOR SCREENING MAMMOGRAM FOR BREAST CANCER: ICD-10-CM

## 2025-05-15 LAB — MRSA NOSE QL CULT: NORMAL

## 2025-05-15 PROCEDURE — 99213 OFFICE O/P EST LOW 20 MIN: CPT | Performed by: FAMILY MEDICINE

## 2025-05-15 NOTE — PROGRESS NOTES
Pre-operative Clearance  Name: Na Joseph      : 1965      MRN: [id number]  Encounter Provider: Sultana Arnold DO  Encounter Date: 5/15/2025   Encounter department: [facility]    :  Assessment & Plan  Preop general physical exam         Encounter for screening mammogram for breast cancer    Orders:  •  Mammo screening bilateral w 3d and cad; Future    Seizures (HCC)  Historic -- occurred , just once, associated with viral encephalitis  Added to PMH           Pre-operative Clearance:     Revised Cardiac Risk Index:  RCI RISK CLASS II (1 risk factor, risk of major cardiac complications approximately 1.3%)    Clearance:  Patient is medically optimized (CLEARED) for proposed surgery without any additional cardiac testing.      Medication Instructions:   - Avoid herbs or non-directed vitamins one week prior to surgery    - Avoid aspirin containing medications or non-steroidal anti-inflammatory drugs one week preceding surgery    - May take tylenol for pain up until the night before surgery    - 5HT3 Antagonist (ie, zofran):  Continue to take this medication on your normal schedule.   - Alpha Adrenergic Antagonist (ie, trazodone, viibryd, trintellix): Continue to take this medication on your normal schedule.  - Antidepressants: Continue to take this medication on your normal schedule.  - Antiepileptic meds: Continue to take this medication on your normal schedule.  - Diuretics: Continue taking this medication up to the evening before surgery/procedure, but do not take in the morning of the day of surgery/procedure.  - Low molecular weight heparin: Stop taking medication 12-24 hours prior to procedure/surgery.  - Warfarin: Stop medication 5 days prior to procedure/surgery. Bridging anticoagulation may be needed if patient has artificial heart valve or if at high risk for stroke.  - Other med instructions: Please notify Hematology (managing warfarin) that you are having surgery  and need to stop medication prior.         Return in about 5 weeks (around 6/19/2025) for TCM virtual.    The patient indicates understanding of these issues and agrees with the plan.                  History of Present Illness     Pre-Op Examination     Surgery: EXPLANT ANTIBIOTIC SPACER, ARTHROPLASTY KNEE TOTAL REVISION (Left: Knee)   Anticipated Date of Surgery: 6/11/25   Surgeon: Dr Wilson     Previous history of bleeding disorders or clots?: Yes    Previous Anesthesia reaction?: Yes    Prolonged steroid use in the last 6 months?: No      Assessment of Cardiac Risk:   - Unstable or severe angina or MI in the last 6 weeks or history of stent placement in the last year?: No    - Decompensated heart failure (e.g. New onset heart failure, NYHA  Class IV heart failure, or worsening existing heart failure)?: No    - Significant arrhythmias such as high grade AV block, symptomatic ventricular arrhythmia, newly recognized ventricular tachycardia, supraventricular tachycardia with resting heart rate >100, or symptomatic bradycardia?: No    - Severe heart valve disease including aortic stenosis or symptomatic mitral stenosis?: No      Pre-operative Risk Factors:  - Elevated-risk surgery: No    - History of cerebrovascular disease: No    - History of ischemic heart disease: No    - History of congestive heart failure: Yes    - Pre-operative treatment with insulin: No    - Pre-operative creatinine >2 mg/dL: No      D[facility] Activity Status Index (DASI):    - DASI Total Score: 24.2   - METs: 5.7     Medications of Perioperative Concern:    Anti-coagulants (Coumadin, Xarelto, Pradaxa, Eliquis, Lixiana)    Review of Systems   Constitutional:  Negative for activity change, fatigue and fever.   HENT:  Negative for congestion, ear pain, sinus pain and sore throat.    Eyes:  Negative for pain and itching.   Respiratory:  Negative for cough and shortness of breath.    Cardiovascular:  Negative for chest pain and palpitations.    Gastrointestinal:  Negative for abdominal pain, constipation, diarrhea, nausea and vomiting.   Endocrine: Negative for cold intolerance and heat intolerance.   Genitourinary:  Negative for dysuria.   Musculoskeletal:  Positive for arthralgias and joint swelling. Negative for myalgias.   Skin:  Negative for color change and rash.   Neurological:  Negative for dizziness, syncope and headaches.   Hematological:  Negative for adenopathy.   Psychiatric/Behavioral:  Negative for behavioral problems, dysphoric mood and sleep disturbance. The patient is not nervous/anxious.      Past Medical History   Past Medical History:   Diagnosis Date   • HELENE positive    • Anemia     Sildenafil causes decreased hematocrit   • Antiphospholipid syndrome (HCC)    • Anxiety 3/2020   • CHF (congestive heart failure) ([facility])     right heart failure related to pulm HTN lupus   • Chronic kidney disease 2018    borderline lab values   • Chronic rhinitis 06/04/2012   • Clotting disorder ([facility]) 2012   • Coronary artery disease 2018   • Encephalitis     Lasted Assessed 10/18/2017   • Gout 06/26/2018   • Head injury 11/11/2023   • Heart failure ([facility]) 2019   • History of transfusion 2/13/2019    autologous   • Hypertension    • Lupus 2018   • Lupus anticoagulant disorder ([facility])    • Maxillary sinusitis     Lasted Assessed 10/18/2017   • Night sweat    • Osteopenia     Hip   • Osteoporosis     Spine   • Pneumonia     Last Assessed 10/18/2017   • PONV (postoperative nausea and vomiting)    • Pulmonary embolism ([facility])    • Pulmonary hypertension ([facility])    • Rheumatic disease    • Seizures ([facility])     Only during Encephalitis   • Seizures ([facility]) 04/24/2025    Occurred once at age 18-something associated with viral encephalitis     • Shortness of breath     with exertion   • Sinus tachycardia    • Urinary incontinence      Past Surgical History:   Procedure Laterality Date   • ANKLE SURGERY  6/2015   • ARTHRODESIS      Hand: IP Joint   • CHOLECYSTECTOMY     •  COLONOSCOPY     • COLPOSCOPY     • FL RETROGRADE PYELOGRAM  4/25/2025   • HYSTERECTOMY      Vaginal   • JOINT REPLACEMENT Right 10/03/2024    Knee replacement   • KNEE SURGERY  2/2019   • LAPAROSCOPIC ESOPHAGOGASTRIC FUNDOPLASTY  2008   • ORTHOPEDIC SURGERY  10/03/2024    10/06/2022   • CO ARTHRP KNE CONDYLE&PLATU MEDIAL&LAT COMPARTMENTS Right 02/12/2019    Procedure: KNEE TOTAL ARTHROPLASTY AND ASSOCIATED PROCEDURES;  Surgeon: Donovan Zendejas DO;  Location: [facility];  Service: Orthopedics   • CO ARTHRP KNE CONDYLE&PLATU MEDIAL&LAT COMPARTMENTS Left 10/06/2022    Procedure: ARTHROPLASTY KNEE TOTAL;  Surgeon: Donovan Zendejas DO;  [facility];  Service: Orthopedics   • CO ARTHRS KNEE W/MENISCECTOMY MED&LAT W/SHAVING Right 10/02/2018    Procedure: KNEE ARTHROSCOPY WITH PARTIAL MEDIAL MENISCECTOMY;  Surgeon: Donovan Zendejas DO;  [facility];  Service: Orthopedics   • CO ARTHRS KNEE W/MENISCECTOMY MED&LAT W/SHAVING Left 12/17/2019    Procedure: KNEE ARTHROSCOPIC MENISCECTOMY /MEDIAL; Chondyl medial and posterior;  Surgeon: Donovan Zendejas DO;  [facility];  Service: Orthopedics   • CO CYSTO/URETERO W/LITHOTRIPSY &INDWELL STENT INSRT Left 4/25/2025    Procedure: CYSTOSCOPY URETEROSCOPY WITH LITHOTRIPSY HOLMIUM LASER, RETROGRADE PYELOGRAM AND INSERTION STENT URETERAL;;  Surgeon: Yusuf Briseno DO;  Location: [facility];  Service: Urology   • REDUCTION MAMMAPLASTY Bilateral     doesnt remember when   • REPAIR ANKLE LIGAMENT Right    • TENDON REPAIR     • TONSILLECTOMY     • TOTAL KNEE ARTHROPLASTY REVISION Left 10/03/2024    Procedure: ARTHROPLASTY KNEE TOTAL REVISION;  Surgeon: Grupo Wilson DO;  Location: [facility];  Service: Orthopedics     Family History   Problem Relation Age of Onset   • Uterine cancer Mother    • Pancreatic cancer Mother 70   • Diabetes Mother    • Endometrial cancer Mother 66   • Arthritis Mother    • Cancer Mother         Pancreas, Uterine   • Vision loss Mother   "  • Osteoporosis Mother    • Heart disease Father         open heart surgery at age 50    • Stroke Father         CVA   • Hypertension Father    • Atrial fibrillation Father    • Hyperlipidemia Father    • Arthritis Father    • Rheum arthritis Father    • Heart attack Father    • No Known Problems Sister    • No Known Problems Sister    • Thyroid disease Sister    • Depression Sister    • Osteoarthritis Sister    • Asthma Sister    • Asthma Daughter    • Migraines Daughter    • Asthma Daughter    • Thyroid disease Maternal Grandmother    • Heart attack Maternal Grandfather    • Heart disease Maternal Grandfather    • Heart attack Paternal Grandmother    • Heart disease Paternal Grandmother    • Skin cancer Paternal Grandfather    • No Known Problems Brother    • Hemochromatosis Brother    • Alcohol abuse Brother    • Early death Brother         heriditary hemachromotosis   • No Known Problems Maternal Aunt    • No Known Problems Paternal Aunt    • Anuerysm Neg Hx    • Clotting disorder Neg Hx    • Heart failure Neg Hx      Social History     Tobacco Use   • Smoking status: Former     Current packs/day: 0.00     Types: Cigarettes     Quit date: 2006     Years since quittin.3   • Smokeless tobacco: Never   Vaping Use   • Vaping status: Never Used   Substance and Sexual Activity   • Alcohol use: Yes     Comment: Socially   • Drug use: No   • Sexual activity: Yes     Partners: Male     Birth control/protection: Female Sterilization     Medications[1]  Allergies   Allergen Reactions   • Amoxicillin-Pot Clavulanate Dermatitis, Rash, Anaphylaxis, Hives, Itching and Swelling   • Dilantin [Phenytoin] Anaphylaxis and Rash   • Penicillins Anaphylaxis, Hives, Dermatitis and Rash     Includes augmentin     Objective   /62   Pulse 84   Temp 98 °F (36.7 °C)   Ht 5' 2\" (1.575 m)   Wt 106 kg (234 lb)   SpO2 99%   BMI 42.80 kg/m²     Physical Exam  Vitals and nursing note reviewed.   Constitutional:       " "General: She is not in acute distress.     Appearance: Normal appearance. She is well-developed.      Comments: Body mass index is 42.8 kg/m².    HENT:      Head: Normocephalic and atraumatic.      Right Ear: Hearing, tympanic membrane, ear canal and external ear normal.      Left Ear: Hearing, tympanic membrane, ear canal and external ear normal.      Nose: Nose normal.      Mouth/Throat:      Mouth: Mucous membranes are moist.      Pharynx: Uvula midline. No oropharyngeal exudate.     Eyes:      General: No scleral icterus.     Conjunctiva/sclera: Conjunctivae normal.      Pupils: Pupils are equal, round, and reactive to light.     Neck:      Thyroid: No thyromegaly.     Cardiovascular:      Rate and Rhythm: Normal rate and regular rhythm.      Heart sounds: Normal heart sounds. No murmur heard.  Pulmonary:      Effort: Pulmonary effort is normal. No respiratory distress.      Breath sounds: Normal breath sounds. No wheezing or rales.   Abdominal:      General: Bowel sounds are normal.      Palpations: Abdomen is soft. There is no mass.      Tenderness: There is no abdominal tenderness.     Musculoskeletal:         General: No tenderness.      Cervical back: Normal range of motion and neck supple.   Lymphadenopathy:      Cervical: No cervical adenopathy.     Skin:     General: Skin is warm and dry.      Coloration: Skin is not jaundiced.      Findings: No erythema or rash.     Neurological:      General: No focal deficit present.      Mental Status: She is alert and oriented to person, place, and time.      Cranial Nerves: No cranial nerve deficit.      Deep Tendon Reflexes: Reflexes are normal and symmetric.     Psychiatric:         Mood and Affect: Mood normal.         Behavior: Behavior normal.         Sultana Arnold DO         [1]  Current Outpatient Medications on File Prior to Visit   Medication Sig   • B-D 3CC LUER-LILLIE SYR 25GX1/2\" 25G X 1-1/2\" 3 ML MISC USE 1 SYRINGE EVERY 30 DAYS   • cyanocobalamin " "1,000 mcg/mL 1000 mcg IM Q 2 weeks   • enoxaparin (Lovenox) 100 mg/mL Inject 1 mL (100 mg total) under the skin every 24 hours   • fluconazole (DIFLUCAN) 150 mg tablet    • folic acid (FOLVITE) 1 mg tablet Take 1 tablet (1 mg total) by mouth daily   • gabapentin (NEURONTIN) 100 mg capsule TAKE 1 CAPSULE(100 MG) BY MOUTH THREE TIMES DAILY   • hydroxychloroquine (PLAQUENIL) 200 mg tablet 200mg every other day, 400mg alternating days. Changing as needed d/t theraputic level   • NEEDLE, DISP, 23 G (BD Disp Needle) 23G X 1\" MISC Use to administer B12   • nystatin (MYCOSTATIN) powder Apply topically 3 (three) times a day   • ondansetron (ZOFRAN) 4 mg tablet Take 1 tablet (4 mg total) by mouth every 8 (eight) hours as needed for nausea or vomiting   • potassium chloride (Klor-Con M20) 20 mEq tablet Take 1 tablet (20 mEq total) by mouth daily   • sildenafil (REVATIO) 20 mg tablet Take 2 tablets (40 mg total) by mouth 3 (three) times a day   • spironolactone (ALDACTONE) 25 mg tablet TAKE 1 TABLET(25 MG) BY MOUTH DAILY   • torsemide (DEMADEX) 20 mg tablet TAKE 1 TABLET(20 MG) BY MOUTH DAILY   • traZODone (DESYREL) 50 mg tablet Take 2 tablets (100 mg total) by mouth daily at bedtime   • warfarin (Coumadin) 5 mg tablet 7.5mg po daily or as directed.   • ascorbic acid (VITAMIN C) 500 MG tablet Take 1 tablet (500 mg total) by mouth 2 (two) times a day   • Benlysta 200 MG/ML SOAJ Every friday subcutaneous injection   • cholecalciferol (VITAMIN D3) 25 mcg (1,000 units) tablet Take 2 tablets (2,000 Units total) by mouth daily (Patient not taking: Reported on 5/8/2025)   • mupirocin (BACTROBAN) 2 % ointment Apply topically to the inside of the left and right nostrils twice daily for 5 days before surgery, including the morning of surgery. (Patient not taking: Reported on 5/8/2025)   • [DISCONTINUED] Abaloparatide (Tymlos) 3120 MCG/1.56ML SOPN Inject 80 mcg under the skin in the morning (Patient not taking: Reported on 5/8/2025)   • " [DISCONTINUED] cefadroxil (DURICEF) 500 mg capsule Take 2 capsules (1,000 mg total) by mouth every 12 (twelve) hours Do not start before November 14, 2024. (Patient not taking: Reported on 5/8/2025)   • [DISCONTINUED] cholecalciferol (VITAMIN D3) 1,000 units tablet Take 2,000 Units by mouth daily in the early morning     • [DISCONTINUED] ferrous sulfate 324 (65 Fe) mg Take 1 tablet (324 mg total) by mouth 2 (two) times a day before meals   • [DISCONTINUED] folic acid (FOLVITE) 1 mg tablet Take 1 tablet (1 mg total) by mouth daily (Patient not taking: Reported on 5/8/2025)   • [DISCONTINUED] Multiple Vitamins-Minerals (multivitamin with minerals) tablet Take 1 tablet by mouth daily (Patient not taking: Reported on 4/24/2025)   • [DISCONTINUED] Multiple Vitamins-Minerals (multivitamin with minerals) tablet Take 1 tablet by mouth daily (Patient not taking: Reported on 5/8/2025)   • [DISCONTINUED] oxybutynin (DITROPAN-XL) 5 mg 24 hr tablet Take 1 tablet (5 mg total) by mouth daily   • [DISCONTINUED] oxyCODONE (Roxicodone) 5 immediate release tablet Take 1 tablet (5 mg total) by mouth every 4 (four) hours as needed for moderate pain Max Daily Amount: 30 mg (Patient not taking: Reported on 5/8/2025)   • [DISCONTINUED] tamsulosin (FLOMAX) 0.4 mg Take 1 capsule (0.4 mg total) by mouth in the morning

## 2025-05-15 NOTE — H&P (VIEW-ONLY)
Pre-operative Clearance  Name: Na Joseph      : 1965      MRN: [id number]  Encounter Provider: Sultana Arnold DO  Encounter Date: 5/15/2025   Encounter department: [facility]    :  Assessment & Plan  Preop general physical exam         Encounter for screening mammogram for breast cancer    Orders:  •  Mammo screening bilateral w 3d and cad; Future    Seizures (HCC)  Historic -- occurred , just once, associated with viral encephalitis  Added to PMH           Pre-operative Clearance:     Revised Cardiac Risk Index:  RCI RISK CLASS II (1 risk factor, risk of major cardiac complications approximately 1.3%)    Clearance:  Patient is medically optimized (CLEARED) for proposed surgery without any additional cardiac testing.      Medication Instructions:   - Avoid herbs or non-directed vitamins one week prior to surgery    - Avoid aspirin containing medications or non-steroidal anti-inflammatory drugs one week preceding surgery    - May take tylenol for pain up until the night before surgery    - 5HT3 Antagonist (ie, zofran):  Continue to take this medication on your normal schedule.   - Alpha Adrenergic Antagonist (ie, trazodone, viibryd, trintellix): Continue to take this medication on your normal schedule.  - Antidepressants: Continue to take this medication on your normal schedule.  - Antiepileptic meds: Continue to take this medication on your normal schedule.  - Diuretics: Continue taking this medication up to the evening before surgery/procedure, but do not take in the morning of the day of surgery/procedure.  - Low molecular weight heparin: Stop taking medication 12-24 hours prior to procedure/surgery.  - Warfarin: Stop medication 5 days prior to procedure/surgery. Bridging anticoagulation may be needed if patient has artificial heart valve or if at high risk for stroke.  - Other med instructions: Please notify Hematology (managing warfarin) that you are having surgery  and need to stop medication prior.         Return in about 5 weeks (around 6/19/2025) for TCM virtual.    The patient indicates understanding of these issues and agrees with the plan.                  History of Present Illness     Pre-Op Examination     Surgery: EXPLANT ANTIBIOTIC SPACER, ARTHROPLASTY KNEE TOTAL REVISION (Left: Knee)   Anticipated Date of Surgery: 6/11/25   Surgeon: Dr Wilson     Previous history of bleeding disorders or clots?: Yes    Previous Anesthesia reaction?: Yes    Prolonged steroid use in the last 6 months?: No      Assessment of Cardiac Risk:   - Unstable or severe angina or MI in the last 6 weeks or history of stent placement in the last year?: No    - Decompensated heart failure (e.g. New onset heart failure, NYHA  Class IV heart failure, or worsening existing heart failure)?: No    - Significant arrhythmias such as high grade AV block, symptomatic ventricular arrhythmia, newly recognized ventricular tachycardia, supraventricular tachycardia with resting heart rate >100, or symptomatic bradycardia?: No    - Severe heart valve disease including aortic stenosis or symptomatic mitral stenosis?: No      Pre-operative Risk Factors:  - Elevated-risk surgery: No    - History of cerebrovascular disease: No    - History of ischemic heart disease: No    - History of congestive heart failure: Yes    - Pre-operative treatment with insulin: No    - Pre-operative creatinine >2 mg/dL: No      Duke Activity Status Index (DASI):    - DASI Total Score: 24.2   - METs: 5.7     Medications of Perioperative Concern:    Anti-coagulants (Coumadin, Xarelto, Pradaxa, Eliquis, Lixiana)    Review of Systems   Constitutional:  Negative for activity change, fatigue and fever.   HENT:  Negative for congestion, ear pain, sinus pain and sore throat.    Eyes:  Negative for pain and itching.   Respiratory:  Negative for cough and shortness of breath.    Cardiovascular:  Negative for chest pain and palpitations.    Gastrointestinal:  Negative for abdominal pain, constipation, diarrhea, nausea and vomiting.   Endocrine: Negative for cold intolerance and heat intolerance.   Genitourinary:  Negative for dysuria.   Musculoskeletal:  Positive for arthralgias and joint swelling. Negative for myalgias.   Skin:  Negative for color change and rash.   Neurological:  Negative for dizziness, syncope and headaches.   Hematological:  Negative for adenopathy.   Psychiatric/Behavioral:  Negative for behavioral problems, dysphoric mood and sleep disturbance. The patient is not nervous/anxious.      Past Medical History   Past Medical History:   Diagnosis Date   • HELENE positive    • Anemia     Sildenafil causes decreased hematocrit   • Antiphospholipid syndrome (HCC)    • Anxiety 3/2020   • CHF (congestive heart failure) ([facility])     right heart failure related to pulm HTN lupus   • Chronic kidney disease 2018    borderline lab values   • Chronic rhinitis 06/04/2012   • Clotting disorder ([facility]) 2012   • Coronary artery disease 2018   • Encephalitis     Lasted Assessed 10/18/2017   • Gout 06/26/2018   • Head injury 11/11/2023   • Heart failure ([facility]) 2019   • History of transfusion 2/13/2019    autologous   • Hypertension    • Lupus 2018   • Lupus anticoagulant disorder ([facility])    • Maxillary sinusitis     Lasted Assessed 10/18/2017   • Night sweat    • Osteopenia     Hip   • Osteoporosis     Spine   • Pneumonia     Last Assessed 10/18/2017   • PONV (postoperative nausea and vomiting)    • Pulmonary embolism ([facility])    • Pulmonary hypertension ([facility])    • Rheumatic disease    • Seizures ([facility])     Only during Encephalitis   • Seizures ([facility]) 04/24/2025    Occurred once at age 18-something associated with viral encephalitis     • Shortness of breath     with exertion   • Sinus tachycardia    • Urinary incontinence      Past Surgical History:   Procedure Laterality Date   • ANKLE SURGERY  6/2015   • ARTHRODESIS      Hand: IP Joint   • CHOLECYSTECTOMY     •  COLONOSCOPY     • COLPOSCOPY     • FL RETROGRADE PYELOGRAM  4/25/2025   • HYSTERECTOMY      Vaginal   • JOINT REPLACEMENT Right 10/03/2024    Knee replacement   • KNEE SURGERY  2/2019   • LAPAROSCOPIC ESOPHAGOGASTRIC FUNDOPLASTY  2008   • ORTHOPEDIC SURGERY  10/03/2024    10/06/2022   • WA ARTHRP KNE CONDYLE&PLATU MEDIAL&LAT COMPARTMENTS Right 02/12/2019    Procedure: KNEE TOTAL ARTHROPLASTY AND ASSOCIATED PROCEDURES;  Surgeon: Donovan Zendejas DO;  [facility];  Service: Orthopedics   • WA ARTHRP KNE CONDYLE&PLATU MEDIAL&LAT COMPARTMENTS Left 10/06/2022    Procedure: ARTHROPLASTY KNEE TOTAL;  Surgeon: Donovan Zendejas DO;  [facility];  Service: Orthopedics   • WA ARTHRS KNEE W/MENISCECTOMY MED&LAT W/SHAVING Right 10/02/2018    Procedure: KNEE ARTHROSCOPY WITH PARTIAL MEDIAL MENISCECTOMY;  Surgeon: Donovan Zendejas DO;  [facility];  Service: Orthopedics   • WA ARTHRS KNEE W/MENISCECTOMY MED&LAT W/SHAVING Left 12/17/2019    Procedure: KNEE ARTHROSCOPIC MENISCECTOMY /MEDIAL; Chondyl medial and posterior;  Surgeon: Donovan Zendeajs DO;  [facility];  Service: Orthopedics   • WA CYSTO/URETERO W/LITHOTRIPSY &INDWELL STENT INSRT Left 4/25/2025    Procedure: CYSTOSCOPY URETEROSCOPY WITH LITHOTRIPSY HOLMIUM LASER, RETROGRADE PYELOGRAM AND INSERTION STENT URETERAL;;  Surgeon: Yusuf Briseno DO;  [facility]: [facility];  Service: Urology   • REDUCTION MAMMAPLASTY Bilateral     doesnt remember when   • REPAIR ANKLE LIGAMENT Right    • TENDON REPAIR     • TONSILLECTOMY     • TOTAL KNEE ARTHROPLASTY REVISION Left 10/03/2024    Procedure: ARTHROPLASTY KNEE TOTAL REVISION;  Surgeon: Grupo Wilson DO;  Location: [facility];  Service: Orthopedics     Family History   Problem Relation Age of Onset   • Uterine cancer Mother    • Pancreatic cancer Mother 70   • Diabetes Mother    • Endometrial cancer Mother 66   • Arthritis Mother    • Cancer Mother         Pancreas, Uterine   • Vision loss Mother   "  • Osteoporosis Mother    • Heart disease Father         open heart surgery at age 50    • Stroke Father         CVA   • Hypertension Father    • Atrial fibrillation Father    • Hyperlipidemia Father    • Arthritis Father    • Rheum arthritis Father    • Heart attack Father    • No Known Problems Sister    • No Known Problems Sister    • Thyroid disease Sister    • Depression Sister    • Osteoarthritis Sister    • Asthma Sister    • Asthma Daughter    • Migraines Daughter    • Asthma Daughter    • Thyroid disease Maternal Grandmother    • Heart attack Maternal Grandfather    • Heart disease Maternal Grandfather    • Heart attack Paternal Grandmother    • Heart disease Paternal Grandmother    • Skin cancer Paternal Grandfather    • No Known Problems Brother    • Hemochromatosis Brother    • Alcohol abuse Brother    • Early death Brother         heriditary hemachromotosis   • No Known Problems Maternal Aunt    • No Known Problems Paternal Aunt    • Anuerysm Neg Hx    • Clotting disorder Neg Hx    • Heart failure Neg Hx      Social History     Tobacco Use   • Smoking status: Former     Current packs/day: 0.00     Types: Cigarettes     Quit date: 2006     Years since quittin.3   • Smokeless tobacco: Never   Vaping Use   • Vaping status: Never Used   Substance and Sexual Activity   • Alcohol use: Yes     Comment: Socially   • Drug use: No   • Sexual activity: Yes     Partners: Male     Birth control/protection: Female Sterilization     Medications[1]  Allergies   Allergen Reactions   • Amoxicillin-Pot Clavulanate Dermatitis, Rash, Anaphylaxis, Hives, Itching and Swelling   • Dilantin [Phenytoin] Anaphylaxis and Rash   • Penicillins Anaphylaxis, Hives, Dermatitis and Rash     Includes augmentin     Objective   /62   Pulse 84   Temp 98 °F (36.7 °C)   Ht 5' 2\" (1.575 m)   Wt 106 kg (234 lb)   SpO2 99%   BMI 42.80 kg/m²     Physical Exam  Vitals and nursing note reviewed.   Constitutional:       " "General: She is not in acute distress.     Appearance: Normal appearance. She is well-developed.      Comments: Body mass index is 42.8 kg/m².    HENT:      Head: Normocephalic and atraumatic.      Right Ear: Hearing, tympanic membrane, ear canal and external ear normal.      Left Ear: Hearing, tympanic membrane, ear canal and external ear normal.      Nose: Nose normal.      Mouth/Throat:      Mouth: Mucous membranes are moist.      Pharynx: Uvula midline. No oropharyngeal exudate.     Eyes:      General: No scleral icterus.     Conjunctiva/sclera: Conjunctivae normal.      Pupils: Pupils are equal, round, and reactive to light.     Neck:      Thyroid: No thyromegaly.     Cardiovascular:      Rate and Rhythm: Normal rate and regular rhythm.      Heart sounds: Normal heart sounds. No murmur heard.  Pulmonary:      Effort: Pulmonary effort is normal. No respiratory distress.      Breath sounds: Normal breath sounds. No wheezing or rales.   Abdominal:      General: Bowel sounds are normal.      Palpations: Abdomen is soft. There is no mass.      Tenderness: There is no abdominal tenderness.     Musculoskeletal:         General: No tenderness.      Cervical back: Normal range of motion and neck supple.   Lymphadenopathy:      Cervical: No cervical adenopathy.     Skin:     General: Skin is warm and dry.      Coloration: Skin is not jaundiced.      Findings: No erythema or rash.     Neurological:      General: No focal deficit present.      Mental Status: She is alert and oriented to person, place, and time.      Cranial Nerves: No cranial nerve deficit.      Deep Tendon Reflexes: Reflexes are normal and symmetric.     Psychiatric:         Mood and Affect: Mood normal.         Behavior: Behavior normal.         Sultana Arnold DO         [1]  Current Outpatient Medications on File Prior to Visit   Medication Sig   • B-D 3CC LUER-LILLIE SYR 25GX1/2\" 25G X 1-1/2\" 3 ML MISC USE 1 SYRINGE EVERY 30 DAYS   • cyanocobalamin " "1,000 mcg/mL 1000 mcg IM Q 2 weeks   • enoxaparin (Lovenox) 100 mg/mL Inject 1 mL (100 mg total) under the skin every 24 hours   • fluconazole (DIFLUCAN) 150 mg tablet    • folic acid (FOLVITE) 1 mg tablet Take 1 tablet (1 mg total) by mouth daily   • gabapentin (NEURONTIN) 100 mg capsule TAKE 1 CAPSULE(100 MG) BY MOUTH THREE TIMES DAILY   • hydroxychloroquine (PLAQUENIL) 200 mg tablet 200mg every other day, 400mg alternating days. Changing as needed d/t theraputic level   • NEEDLE, DISP, 23 G (BD Disp Needle) 23G X 1\" MISC Use to administer B12   • nystatin (MYCOSTATIN) powder Apply topically 3 (three) times a day   • ondansetron (ZOFRAN) 4 mg tablet Take 1 tablet (4 mg total) by mouth every 8 (eight) hours as needed for nausea or vomiting   • potassium chloride (Klor-Con M20) 20 mEq tablet Take 1 tablet (20 mEq total) by mouth daily   • sildenafil (REVATIO) 20 mg tablet Take 2 tablets (40 mg total) by mouth 3 (three) times a day   • spironolactone (ALDACTONE) 25 mg tablet TAKE 1 TABLET(25 MG) BY MOUTH DAILY   • torsemide (DEMADEX) 20 mg tablet TAKE 1 TABLET(20 MG) BY MOUTH DAILY   • traZODone (DESYREL) 50 mg tablet Take 2 tablets (100 mg total) by mouth daily at bedtime   • warfarin (Coumadin) 5 mg tablet 7.5mg po daily or as directed.   • ascorbic acid (VITAMIN C) 500 MG tablet Take 1 tablet (500 mg total) by mouth 2 (two) times a day   • Benlysta 200 MG/ML SOAJ Every friday subcutaneous injection   • cholecalciferol (VITAMIN D3) 25 mcg (1,000 units) tablet Take 2 tablets (2,000 Units total) by mouth daily (Patient not taking: Reported on 5/8/2025)   • mupirocin (BACTROBAN) 2 % ointment Apply topically to the inside of the left and right nostrils twice daily for 5 days before surgery, including the morning of surgery. (Patient not taking: Reported on 5/8/2025)   • [DISCONTINUED] Abaloparatide (Tymlos) 3120 MCG/1.56ML SOPN Inject 80 mcg under the skin in the morning (Patient not taking: Reported on 5/8/2025)   • " [DISCONTINUED] cefadroxil (DURICEF) 500 mg capsule Take 2 capsules (1,000 mg total) by mouth every 12 (twelve) hours Do not start before November 14, 2024. (Patient not taking: Reported on 5/8/2025)   • [DISCONTINUED] cholecalciferol (VITAMIN D3) 1,000 units tablet Take 2,000 Units by mouth daily in the early morning     • [DISCONTINUED] ferrous sulfate 324 (65 Fe) mg Take 1 tablet (324 mg total) by mouth 2 (two) times a day before meals   • [DISCONTINUED] folic acid (FOLVITE) 1 mg tablet Take 1 tablet (1 mg total) by mouth daily (Patient not taking: Reported on 5/8/2025)   • [DISCONTINUED] Multiple Vitamins-Minerals (multivitamin with minerals) tablet Take 1 tablet by mouth daily (Patient not taking: Reported on 4/24/2025)   • [DISCONTINUED] Multiple Vitamins-Minerals (multivitamin with minerals) tablet Take 1 tablet by mouth daily (Patient not taking: Reported on 5/8/2025)   • [DISCONTINUED] oxybutynin (DITROPAN-XL) 5 mg 24 hr tablet Take 1 tablet (5 mg total) by mouth daily   • [DISCONTINUED] oxyCODONE (Roxicodone) 5 immediate release tablet Take 1 tablet (5 mg total) by mouth every 4 (four) hours as needed for moderate pain Max Daily Amount: 30 mg (Patient not taking: Reported on 5/8/2025)   • [DISCONTINUED] tamsulosin (FLOMAX) 0.4 mg Take 1 capsule (0.4 mg total) by mouth in the morning

## 2025-05-16 NOTE — PROGRESS NOTES
Dr. Arnold forwarded me INR results from 5/14/25 that were ordered by Amina Goldberg PA-C with orthopedics.    INR 1.63, ptt normal.    Called patient.  She took 7.5 mg coumadin night after pt/inr

## 2025-05-19 ENCOUNTER — ANESTHESIA EVENT (OUTPATIENT)
Dept: PERIOP | Facility: HOSPITAL | Age: 60
DRG: 467 | End: 2025-05-19
Payer: MEDICARE

## 2025-05-19 RX ORDER — LOPERAMIDE HYDROCHLORIDE 2 MG/1
2 TABLET ORAL 4 TIMES DAILY PRN
COMMUNITY

## 2025-05-19 RX ORDER — DIPHENOXYLATE HYDROCHLORIDE AND ATROPINE SULFATE 2.5; .025 MG/1; MG/1
1 TABLET ORAL DAILY
COMMUNITY

## 2025-05-19 NOTE — PRE-PROCEDURE INSTRUCTIONS
Pre-Surgery Instructions:   Medication Instructions    ascorbic acid (VITAMIN C) 500 MG tablet Take as prescribed y surgeon    Benlysta 200 MG/ML SOAJ Takes on Fridays as prescribed    cholecalciferol (VITAMIN D3) 25 mcg (1,000 units) tablet Stop taking 7 days prior to surgery.    cyanocobalamin 1,000 mcg/mL Next dose 5/23/25    fluconazole (DIFLUCAN) 150 mg tablet Take as prescribed    folic acid (FOLVITE) 1 mg tablet Take as prescribed by surgeon    hydroxychloroquine (PLAQUENIL) 200 mg tablet Take day of surgery.    loperamide (IMODIUM A-D) 2 MG tablet Uses PRN- OK to take day of surgery    multivitamin (THERAGRAN) TABS Take as prescribed by surgeon    ondansetron (ZOFRAN) 4 mg tablet Uses PRN- OK to take day of surgery    potassium chloride (Klor-Con M20) 20 mEq tablet Uses PRN- OK to take day of surgery    sildenafil (REVATIO) 20 mg tablet Take day of surgery.    spironolactone (ALDACTONE) 25 mg tablet Take night before surgery    torsemide (DEMADEX) 20 mg tablet Hold day of surgery.    traZODone (DESYREL) 50 mg tablet Take night before surgery    warfarin (Coumadin) 5 mg tablet Last dose 6/5/25    Medication instructions for day of surgery reviewed. Please take all instructed medications with only a sip of water.       You will receive a call one business day prior to surgery with an arrival time and hospital directions. If your surgery is scheduled on a Monday, the hospital will be calling you on the Friday prior to your surgery. If you have not heard from anyone by 8pm, please call the hospital supervisor through the hospital  at [phone]. ([place] [phone] or [place] [phone]).    Do not eat or drink anything after midnight the night before your surgery, including candy, mints, lifesavers, or chewing gum. Do not drink alcohol 24hrs before your surgery. Try not to smoke at least 24hrs before your surgery.       Follow the pre surgery showering instructions as listed in the “My Surgical  "Experience Booklet” or otherwise provided by your surgeon's office. Do not use a blade to shave the surgical area 1 week before surgery. It is okay to use a clean electric clippers up to 24 hours before surgery. Do not apply any lotions, creams, including makeup, cologne, deodorant, or perfumes after showering on the day of your surgery. Do not use dry shampoo, hair spray, hair gel, or any type of hair products.     No contact lenses, eye make-up, or artificial eyelashes. Remove nail polish, including gel polish, and any artificial, gel, or acrylic nails if possible. Remove all jewelry including rings and body piercing jewelry.     Wear causal clothing that is easy to take on and off. Consider your type of surgery.    Keep any valuables, jewelry, piercings at home. Please bring any specially ordered equipment (sling, braces) if indicated.    Arrange for a responsible person to drive you to and from the hospital on the day of your surgery. Please confirm the visitor policy for the day of your procedure when you receive your phone call with an arrival time.     Call the surgeon's office with any new illnesses, exposures, or additional questions prior to surgery.    Please reference your “My Surgical Experience Booklet” for additional information to prepare for your upcoming surgery.    Reviewed with patient, in detail, instructions from \"My Surgical Experience\". Verified with patient that he received [facility] patient education from surgeon's office. Advised to call ortho office/surgery coordinator with any questions and/or concerns.   Confirmed that patient has hibiclens soap and Mupirocin 5 days before surgery and day of surgery.Patient verbalized understanding of current visitor restrictions due to Covid and will clarify with nurse DOS. Instructed to avoid all ASA and OTC Vit/Supp 1 week prior to surgery and to avoid NSAIDs 7 days prior to surgery per anesthesia guidelines.Tylenol ok to take prn.   No alcohol 24 hours " prior to surgery. Patient aware Lovenox or other Blood Thinner prescribed is for POST OP ONLY. Instructed to call surgeon's office in meantime with any new illness.  Patient verbalized an understanding of all instructions reviewed and offers no concerns at this time.

## 2025-05-20 NOTE — ANESTHESIA PREPROCEDURE EVALUATION
Procedure:  EXPLANT ANTIBIOTIC SPACER, ARTHROPLASTY KNEE TOTAL REVISION (Left: Knee)    Relevant Problems   ANESTHESIA   (+) Motion sickness   (+) PONV (postoperative nausea and vomiting)      CARDIO   (+) Pulmonary artery hypertension (HCC)   (+) Pulmonary embolus (HCC)   (+) Pulmonary hypertension (HCC)   (+) Right-sided congestive heart failure (HCC)   (+) SOB (shortness of breath) on exertion   (+) Venous stasis of lower extremity      /RENAL   (+) Chronic kidney disease, stage 3a (HCC)   (+) Nephrolithiasis      HEMATOLOGY   (+) Acute blood loss as cause of postoperative anemia   (+) Iron deficiency anemia due to chronic blood loss   (+) Lupus anticoagulant disorder (HCC)      MUSCULOSKELETAL   (+) Arthritis of left knee   (+) Arthritis of right knee   (+) Chronic midline low back pain without sciatica   (+) Post-traumatic osteoarthritis of right ankle   (+) Primary osteoarthritis of left knee   (+) Rheumatoid arthritis, involving unspecified site, unspecified whether rheumatoid factor present (HCC)   (+) Staphylococcal arthritis, left knee (HCC)      NEURO/PSYCH   (+) Chronic midline low back pain without sciatica      PULMONARY   (+) Dyspnea   (+) GRIS (obstructive sleep apnea)   (+) SOB (shortness of breath) on exertion      Antiphospholipid syndrome. Heme onc note: Hold coumadin 5 days before surgery   Start Lovenox 3 days prior to surgery and hold Lovenox 24 hours prior to surgery.      Heme/onc eval: hold coumadin 5 days before surgery and start lovenox 3 days before. Hold lovenox 24 hours before surgery    Physical Exam    Airway     Mallampati score: II  TM Distance: <3 FB  Neck ROM: full  Mouth opening: < 4 cm      Cardiovascular      Dental   Comment: None loose     Pulmonary      Neurological    She appears awake, alert and oriented x3.      Other Findings  post-pubertal.         Latest Reference Range & Units 05/23/25 07:32   Sodium 135 - 147 mmol/L 141   Potassium 3.5 - 5.3 mmol/L 4.1   Chloride  96 - 108 mmol/L 105   Carbon Dioxide 21 - 32 mmol/L 31   ANION GAP 4 - 13 mmol/L 5   BUN 5 - 25 mg/dL 19   Creatinine 0.60 - 1.30 mg/dL 0.79   GLUCOSE 65 - 140 mg/dL 115   Calcium 8.4 - 10.2 mg/dL 9.5   AST 13 - 39 U/L 10 (L)   ALT 7 - 52 U/L 13   ALK PHOS 34 - 104 U/L 103   Total Protein 6.4 - 8.4 g/dL 6.1 (L)   Albumin 3.5 - 5.0 g/dL 4.0   Total Bilirubin 0.20 - 1.00 mg/dL 0.45   GFR, Calculated ml/min/1.73sq m 80   (L): Data is abnormally low       Latest Reference Range & Units 05/23/25 07:32   WBC 4.31 - 10.16 Thousand/uL 3.10 (L)   RBC 3.81 - 5.12 Million/uL 4.10   Hemoglobin 11.5 - 15.4 g/dL 13.1   Hematocrit 34.8 - 46.1 % 38.9   MCV 82 - 98 fL 95   MCH 26.8 - 34.3 pg 32.0   MCHC 31.4 - 37.4 g/dL 33.7   RDW 11.6 - 15.1 % 12.9   Platelet Count 149 - 390 Thousands/uL 207   (L): Data is abnormally low       Latest Reference Range & Units 06/06/25 07:49   PROTIME 12.3 - 15.0 seconds 25.7 (H)   POCT INR 0.85 - 1.19  2.27 (H)   (H): Data is abnormally high      EKG (4/2025)  Normal sinus rhythm  Normal ECG  When compared with ECG of 27-Sep-2024 07:23,  No significant change was found       ECHO (3/2025)      Left Ventricle: Left ventricular cavity size is normal. Wall thickness is mildly increased. There is mild concentric hypertrophy. The left ventricular ejection fraction is 65%. Systolic function is vigorous. Wall motion is normal. Diastolic function is normal.    Aortic Valve: The aortic valve has no significant stenosis. The aortic valve velocity is mildly increased due to increased flow.    Mitral Valve: There is trace regurgitation.    Tricuspid Valve: There is mild regurgitation. The right ventricular systolic pressure is mildly elevated.    Pulmonic Valve: There is trace regurgitation.    Prior TTE study available for comparison. Prior study date: 2/21/2024. No significant changes noted compared to the prior study.    Right Ventricle Right ventricular cavity size is normal. Systolic function is normal.  Wall thickness is normal.           Anesthesia Plan  ASA Score- 3     Anesthesia Type- general and regional with ASA Monitors.         Additional Monitors:     Airway Plan: Oral ETT.    Comment: Npo after MN (sip of water with meds)    Preop adductor canal block with exparel  Available labwork, imaging, and diagnostic studies relevant to the procedure reviewed including prior anesthetic records. Patient is acceptable for the intended procedure    Patient has profound PONV - will avoid all inhaled anesthetics and use TIVA  .       Plan Factors-Exercise tolerance (METS): >4 METS.    Chart reviewed. EKG reviewed.  Existing labs reviewed. Patient summary reviewed.    Patient is not a current smoker.              Induction- intravenous.    Postoperative Plan- Plan for postoperative opioid use.   Monitoring Plan - Monitoring plan - standard ASA monitoring  Post Operative Pain Plan - non-opiod analgesics, plan for postoperative opioid use, peripheral nerve block and multimodal analgesia    Perioperative Resuscitation Plan - Level 1 - Full Code.       Informed Consent- Anesthetic plan and risks discussed with patient and spouse.  I personally reviewed this patient with the CRNA. Discussed and agreed on the Anesthesia Plan with the CRNA..      NPO Status:  No vitals data found for the desired time range.

## 2025-05-23 ENCOUNTER — APPOINTMENT (OUTPATIENT)
Dept: LAB | Facility: CLINIC | Age: 60
End: 2025-05-23
Attending: PHYSICIAN ASSISTANT
Payer: MEDICARE

## 2025-05-23 ENCOUNTER — RESULTS FOLLOW-UP (OUTPATIENT)
Dept: HEMATOLOGY ONCOLOGY | Facility: CLINIC | Age: 60
End: 2025-05-23

## 2025-05-23 DIAGNOSIS — E53.8 B12 DEFICIENCY: ICD-10-CM

## 2025-05-23 DIAGNOSIS — D50.8 IRON DEFICIENCY ANEMIA SECONDARY TO INADEQUATE DIETARY IRON INTAKE: ICD-10-CM

## 2025-05-23 DIAGNOSIS — U07.1 PULMONARY EMBOLISM ASSOCIATED WITH COVID-19 (HCC): ICD-10-CM

## 2025-05-23 DIAGNOSIS — Z79.01 ANTICOAGULATED ON COUMADIN: ICD-10-CM

## 2025-05-23 DIAGNOSIS — D68.61 ANTIPHOSPHOLIPID ANTIBODY SYNDROME (HCC): ICD-10-CM

## 2025-05-23 DIAGNOSIS — I26.99 PULMONARY EMBOLISM ASSOCIATED WITH COVID-19 (HCC): ICD-10-CM

## 2025-05-23 LAB
ALBUMIN SERPL BCG-MCNC: 4 G/DL (ref 3.5–5)
ALP SERPL-CCNC: 103 U/L (ref 34–104)
ALT SERPL W P-5'-P-CCNC: 13 U/L (ref 7–52)
ANION GAP SERPL CALCULATED.3IONS-SCNC: 5 MMOL/L (ref 4–13)
AST SERPL W P-5'-P-CCNC: 10 U/L (ref 13–39)
BASOPHILS # BLD AUTO: 0.02 THOUSANDS/ÂΜL (ref 0–0.1)
BASOPHILS NFR BLD AUTO: 1 % (ref 0–1)
BILIRUB SERPL-MCNC: 0.45 MG/DL (ref 0.2–1)
BUN SERPL-MCNC: 19 MG/DL (ref 5–25)
CALCIUM SERPL-MCNC: 9.5 MG/DL (ref 8.4–10.2)
CHLORIDE SERPL-SCNC: 105 MMOL/L (ref 96–108)
CO2 SERPL-SCNC: 31 MMOL/L (ref 21–32)
CREAT SERPL-MCNC: 0.79 MG/DL (ref 0.6–1.3)
EOSINOPHIL # BLD AUTO: 0.21 THOUSAND/ÂΜL (ref 0–0.61)
EOSINOPHIL NFR BLD AUTO: 7 % (ref 0–6)
ERYTHROCYTE [DISTWIDTH] IN BLOOD BY AUTOMATED COUNT: 12.9 % (ref 11.6–15.1)
FERRITIN SERPL-MCNC: 213 NG/ML (ref 30–307)
FOLATE SERPL-MCNC: >22.3 NG/ML
GFR SERPL CREATININE-BSD FRML MDRD: 80 ML/MIN/1.73SQ M
GLUCOSE SERPL-MCNC: 115 MG/DL (ref 65–140)
HCT VFR BLD AUTO: 38.9 % (ref 34.8–46.1)
HGB BLD-MCNC: 13.1 G/DL (ref 11.5–15.4)
IMM GRANULOCYTES # BLD AUTO: 0.01 THOUSAND/UL (ref 0–0.2)
IMM GRANULOCYTES NFR BLD AUTO: 0 % (ref 0–2)
INR PPP: 2.05 (ref 0.85–1.19)
IRON SATN MFR SERPL: 28 % (ref 15–50)
IRON SERPL-MCNC: 81 UG/DL (ref 50–212)
LYMPHOCYTES # BLD AUTO: 0.75 THOUSANDS/ÂΜL (ref 0.6–4.47)
LYMPHOCYTES NFR BLD AUTO: 24 % (ref 14–44)
MCH RBC QN AUTO: 32 PG (ref 26.8–34.3)
MCHC RBC AUTO-ENTMCNC: 33.7 G/DL (ref 31.4–37.4)
MCV RBC AUTO: 95 FL (ref 82–98)
MONOCYTES # BLD AUTO: 0.27 THOUSAND/ÂΜL (ref 0.17–1.22)
MONOCYTES NFR BLD AUTO: 9 % (ref 4–12)
NEUTROPHILS # BLD AUTO: 1.84 THOUSANDS/ÂΜL (ref 1.85–7.62)
NEUTS SEG NFR BLD AUTO: 59 % (ref 43–75)
NRBC BLD AUTO-RTO: 0 /100 WBCS
PLATELET # BLD AUTO: 207 THOUSANDS/UL (ref 149–390)
PMV BLD AUTO: 10.9 FL (ref 8.9–12.7)
POTASSIUM SERPL-SCNC: 4.1 MMOL/L (ref 3.5–5.3)
PROT SERPL-MCNC: 6.1 G/DL (ref 6.4–8.4)
PROTHROMBIN TIME: 23.8 SECONDS (ref 12.3–15)
RBC # BLD AUTO: 4.1 MILLION/UL (ref 3.81–5.12)
SODIUM SERPL-SCNC: 141 MMOL/L (ref 135–147)
TIBC SERPL-MCNC: 292.6 UG/DL (ref 250–450)
TRANSFERRIN SERPL-MCNC: 209 MG/DL (ref 203–362)
UIBC SERPL-MCNC: 212 UG/DL (ref 155–355)
VIT B12 SERPL-MCNC: 565 PG/ML (ref 180–914)
WBC # BLD AUTO: 3.1 THOUSAND/UL (ref 4.31–10.16)

## 2025-05-23 PROCEDURE — 83540 ASSAY OF IRON: CPT

## 2025-05-23 PROCEDURE — 80053 COMPREHEN METABOLIC PANEL: CPT

## 2025-05-23 PROCEDURE — 85025 COMPLETE CBC W/AUTO DIFF WBC: CPT

## 2025-05-23 PROCEDURE — 82746 ASSAY OF FOLIC ACID SERUM: CPT

## 2025-05-23 PROCEDURE — 82607 VITAMIN B-12: CPT

## 2025-05-23 PROCEDURE — 36415 COLL VENOUS BLD VENIPUNCTURE: CPT

## 2025-05-23 PROCEDURE — 85610 PROTHROMBIN TIME: CPT

## 2025-05-23 PROCEDURE — 83550 IRON BINDING TEST: CPT

## 2025-05-23 PROCEDURE — 82728 ASSAY OF FERRITIN: CPT

## 2025-06-02 ENCOUNTER — OFFICE VISIT (OUTPATIENT)
Dept: OBGYN CLINIC | Facility: CLINIC | Age: 60
End: 2025-06-02
Payer: MEDICARE

## 2025-06-02 ENCOUNTER — APPOINTMENT (OUTPATIENT)
Dept: RADIOLOGY | Facility: AMBULARY SURGERY CENTER | Age: 60
End: 2025-06-02
Attending: ORTHOPAEDIC SURGERY
Payer: MEDICARE

## 2025-06-02 DIAGNOSIS — M19.171 POST-TRAUMATIC OSTEOARTHRITIS OF RIGHT ANKLE: Primary | ICD-10-CM

## 2025-06-02 DIAGNOSIS — M25.371 RIGHT ANKLE INSTABILITY: ICD-10-CM

## 2025-06-02 DIAGNOSIS — M76.71 PERONEAL TENDONITIS, RIGHT: ICD-10-CM

## 2025-06-02 DIAGNOSIS — M25.571 PAIN, JOINT, ANKLE AND FOOT, RIGHT: ICD-10-CM

## 2025-06-02 PROCEDURE — 73610 X-RAY EXAM OF ANKLE: CPT

## 2025-06-02 PROCEDURE — 99214 OFFICE O/P EST MOD 30 MIN: CPT | Performed by: ORTHOPAEDIC SURGERY

## 2025-06-02 NOTE — ASSESSMENT & PLAN NOTE
Physical exam, diagnostic imaging, and subjective history are all most consistent with the above diagnosis. The pathoanatomy and natural history of this diagnosis was explained to the patient in detail.   X-ray obtained today and reviewed with the patient demonstrating talocrural, subtalar, and talonavicular osteoarthritis  Recommend CSI of the subtalar joint for diagnostic and therapeutic purposes, however she is undergoing left knee revision arthroplasty next week with Dr. Wilson  Order placed today for MSK US guided subtalar injection which she will schedule once approved by Dr. Wilson  Perform Epsom salt soaks as needed for pain relief  Apply Voltaren gel to painful area up to 4 times a day  May use ice or heat as needed for pain relief  May take NSAIDs/Tylenol as needed for pain control  Follow up on an as needed basis        Orders:    XR ankle 3+ vw right; Future    US msk guidance; Future

## 2025-06-02 NOTE — ASSESSMENT & PLAN NOTE
Physical exam, diagnostic imaging, and subjective history are all most consistent with the above diagnosis. The pathoanatomy and natural history of this diagnosis was explained to the patient in detail.   X-ray obtained today and reviewed with the patient demonstrating talocrural, subtalar, and talonavicular osteoarthritis  Recommend CSI of the subtalar joint for diagnostic and therapeutic purposes, however she is undergoing left knee revision arthroplasty next week with Dr. iWlson  Order placed today for MSK US guided subtalar injection which she will schedule once approved by Dr. Wilson  Perform Epsom salt soaks as needed for pain relief  Apply Voltaren gel to painful area up to 4 times a day  May use ice or heat as needed for pain relief  May take NSAIDs/Tylenol as needed for pain control  Follow up on an as needed basis        Orders:    XR ankle 3+ vw right; Future    US msk guidance; Future

## 2025-06-02 NOTE — PROGRESS NOTES
Name: Na Joseph      : 1965      MRN: [id number]  Encounter Provider: Donovan Zendejas DO  Encounter Date: 2025   Encounter department: [facility]    Assessment & Plan  Post-traumatic osteoarthritis of right ankle  Peroneal tendonitis, right  Right ankle instability  Physical exam, diagnostic imaging, and subjective history are all most consistent with the above diagnosis. The pathoanatomy and natural history of this diagnosis was explained to the patient in detail.   X-ray obtained today and reviewed with the patient demonstrating talocrural, subtalar, and talonavicular osteoarthritis  Recommend CSI of the subtalar joint for diagnostic and therapeutic purposes, however she is undergoing left knee revision arthroplasty next week with Dr. Wilson  Order placed today for MSK US guided subtalar injection which she will schedule once approved by Dr. Wilson  Perform Epsom salt soaks as needed for pain relief  Apply Voltaren gel to painful area up to 4 times a day  May use ice or heat as needed for pain relief  May take NSAIDs/Tylenol as needed for pain control  Follow up on an as needed basis        Orders:    XR ankle 3+ vw right; Future    US msk guidance; Future      Subjective:  Na Joseph is a 60 y.o. female who presents today for evaluation and treatment of right ankle pain that has been ongoing for approximately 2 weeks.  She states 10 years ago she had a modified Broström procedure with good outcome.  Without inciting event or mechanism of injury, she developed lateral ankle pain approximately 1 month ago aggravated with ambulating, ascending and descending stairs, prolonged walking rising from prolonged rest.  Pain is intermittent, moderate, achy in nature.  She takes Tylenol for pain, cannot take anti-inflammatories or use topical Voltaren gel due to current use of warfarin.    She also will be undergoing left total knee revision arthroplasty next week with   Steve due to history of infection within her left knee in 2024, she underwent revision to 1.5 stage antibiotic spacer.  Her ESR, CRP, and repeat Synovasure have since normalized, she has remained afebrile, and she will proceed with second stage of her revision to conversion total knee arthroplasty due to continued sensation of instability and pain.      The following portions of the patient's history were reviewed and updated as appropriate: allergies, current medications, past family history, past social history, past surgical history and problem list.      Social History     Socioeconomic History    Marital status: /Civil Union     Spouse name: Not on file    Number of children: 2    Years of education: Not on file    Highest education level: Not on file   Occupational History    Not on file   Tobacco Use    Smoking status: Former     Current packs/day: 0.00     Types: Cigarettes     Quit date: 2006     Years since quittin.3    Smokeless tobacco: Never   Vaping Use    Vaping status: Never Used   Substance and Sexual Activity    Alcohol use: Not Currently     Comment: Socially    Drug use: No    Sexual activity: Yes     Partners: Male     Birth control/protection: Female Sterilization   Other Topics Concern    Not on file   Social History Narrative    Daily caffeinated coffee consumption    Exercise habits     Social Drivers of Health     Financial Resource Strain: Medium Risk (2024)    Overall Financial Resource Strain (CARDIA)     Difficulty of Paying Living Expenses: Somewhat hard   Food Insecurity: No Food Insecurity (2025)    Nursing - Inadequate Food Risk Classification     Worried About Running Out of Food in the Last Year: Never true     Ran Out of Food in the Last Year: Never true     Ran Out of Food in the Last Year: Never true   Transportation Needs: No Transportation Needs (2025)    Nursing - Transportation Risk Classification     Lack of Transportation: Not on  "file     Lack of Transportation: No   Physical Activity: Not on file   Stress: Not on file   Social Connections: Not on file   Intimate Partner Violence: Unknown (2025)    Nursing IPS     Feels Physically and Emotionally Safe: Not on file     Physically Hurt by Someone: Not on file     Humiliated or Emotionally Abused by Someone: Not on file     Physically Hurt by Someone: No     Hurt or Threatened by Someone: No   Housing Stability: Unknown (2025)    Nursing: Inadequate Housing Risk Classification     Has Housing: Not on file     Worried About Losing Housing: Not on file     Unable to Get Utilities: Not on file     Unable to Pay for Housing in the Last Year: No     Has Housin   Recent Concern: Housing Stability - High Risk (3/1/2025)    Housing Stability Vital Sign     Unable to Pay for Housing in the Last Year: Yes     Number of Times Moved in the Last Year: 0     Homeless in the Last Year: No     Past Medical History[1]  Past Surgical History[2]  Allergies[3]  Medications Ordered Prior to Encounter[4]         Objective:    Review of Systems  Pertinent items are noted in HPI.  All other systems were reviewed and are negative.    Physical Exam  Cons: Appears well.  No apparent distress.  Psych: Alert. Oriented x3.  Mood and affect normal.  Eyes: PERRLA, EOMI  Resp: Normal effort.  No audible wheezing or stridor.  CV: Palpable pulse.  No discernable arrhythmia.  No LE edema.  Lymph:  No palpable cervical, axillary, or inguinal lymphadenopathy.  Skin: Warm.  No palpable masses.  No visible lesions.  Neuro: Normal muscle tone.  Normal and symmetric DTR's.    BMI:   Estimated body mass index is 42.8 kg/m² as calculated from the following:    Height as of 5/15/25: 5' 2\" (1.575 m).    Weight as of 5/15/25: 106 kg (234 lb).  Lab Results   Component Value Date    HGBA1C 5.3 2025         Right Ankle Exam     Other   Erythema: absent  Sensation: normal  Pulse: present     Comments:    TTP sinus tarsi, " "peroneal tendon posterior to the lateral malleolus  ROM WNL and pain at end ranges  MMT 5/5 in all planes  Sensation intact in DP/SP/Bundy/Sa/T nerve distributions  2+ DP & PT pulses  Brisk capillary refill in all toes              Procedures  Procedures  No Procedures performed today    Diagnostics, reviewed and taken today if performed as documented:  I have personally reviewed pertinent films in PACS and my interpretation is talocrural, talonavicular, and subtalar osteoarthritis with joint space narrowing, sclerotic changes, osteophyte formation.        Scribe Attestation      I,:  Isha Hauser am acting as a scribe while in the presence of the attending physician.:       I,:  Donovan Zendejas, DO personally performed the services described in this documentation    as scribed in my presence.:             Portions of the record may have been created with voice recognition software.  Occasional wrong word or \"sound a like\" substitutions may have occurred due to the inherent limitations of voice recognition software.  Read the chart carefully and recognize, using context, where substitutions have occurred.         [1]   Past Medical History:  Diagnosis Date    HELENE positive     Anemia     Sildenafil causes decreased hematocrit    Antiphospholipid syndrome (HCC)     Anxiety 3/2020    CHF (congestive heart failure) ([facility])     right heart failure related to pulm HTN lupus    Chronic kidney disease 2018    borderline lab values    Chronic rhinitis 06/04/2012    Clotting disorder ([facility]) 2012    Coronary artery disease 2018    Encephalitis     Lasted Assessed 10/18/2017    Gout 06/26/2018    Head injury 11/11/2023    Heart failure ([facility]) 2019    History of transfusion 2/13/2019    autologous    Hypertension     Lupus 2018    Lupus anticoagulant disorder ([facility])     Maxillary sinusitis     Lasted Assessed 10/18/2017    Night sweat     Osteopenia     Hip    Osteoporosis     Spine    Pneumonia     Last Assessed 10/18/2017    PONV " (postoperative nausea and vomiting)     Pulmonary embolism (HCC)     Pulmonary hypertension (HCC)     Rheumatic disease     Seizures (HCC)     Only during Encephalitis    Seizures (HCC) 04/24/2025    Occurred once at age 18-something associated with viral encephalitis      Shortness of breath     with exertion    Sinus tachycardia     Urinary incontinence    [2]   Past Surgical History:  Procedure Laterality Date    ANKLE SURGERY  6/2015    ARTHRODESIS      Hand: IP Joint    CHOLECYSTECTOMY      COLONOSCOPY      COLPOSCOPY      FL RETROGRADE PYELOGRAM  4/25/2025    HYSTERECTOMY      Vaginal    JOINT REPLACEMENT Right 10/03/2024    Knee replacement    KNEE SURGERY  2/2019    LAPAROSCOPIC ESOPHAGOGASTRIC FUNDOPLASTY  2008    ORTHOPEDIC SURGERY  10/03/2024    10/06/2022    OK ARTHRP KNE CONDYLE&PLATU MEDIAL&LAT COMPARTMENTS Right 02/12/2019    Procedure: KNEE TOTAL ARTHROPLASTY AND ASSOCIATED PROCEDURES;  Surgeon: Donovan Zendejas DO;  Location: [facility];  Service: Orthopedics    OK ARTHRP KNE CONDYLE&PLATU MEDIAL&LAT COMPARTMENTS Left 10/06/2022    Procedure: ARTHROPLASTY KNEE TOTAL;  Surgeon: Donovan Zendejas DO;  [facility]: [facility];  Service: Orthopedics    OK ARTHRS KNEE W/MENISCECTOMY MED&LAT W/SHAVING Right 10/02/2018    Procedure: KNEE ARTHROSCOPY WITH PARTIAL MEDIAL MENISCECTOMY;  Surgeon: Donovan Zendejas DO;  [facility];  Service: Orthopedics    OK ARTHRS KNEE W/MENISCECTOMY MED&LAT W/SHAVING Left 12/17/2019    Procedure: KNEE ARTHROSCOPIC MENISCECTOMY /MEDIAL; Chondyl medial and posterior;  Surgeon: Donovan Zendejas DO;  [facility]: [facility];  Service: Orthopedics    OK CYSTO/URETERO W/LITHOTRIPSY &INDWELL STENT INSRT Left 4/25/2025    Procedure: CYSTOSCOPY URETEROSCOPY WITH LITHOTRIPSY HOLMIUM LASER, RETROGRADE PYELOGRAM AND INSERTION STENT URETERAL;;  Surgeon: Yusfu Briseno DO;  Location: [facility];  Service: Urology    REDUCTION MAMMAPLASTY Bilateral     doesnt remember when    REPAIR ANKLE  "LIGAMENT Right     TENDON REPAIR      TONSILLECTOMY      TOTAL KNEE ARTHROPLASTY REVISION Left 10/03/2024    Procedure: ARTHROPLASTY KNEE TOTAL REVISION;  Surgeon: Grupo Wilson DO;  Location: [facility];  Service: Orthopedics   [3]   Allergies  Allergen Reactions    Amoxicillin-Pot Clavulanate Dermatitis, Rash, Anaphylaxis, Hives, Itching and Swelling    Dilantin [Phenytoin] Anaphylaxis and Rash    Penicillins Anaphylaxis, Hives, Dermatitis and Rash     Includes augmentin   [4]   Current Outpatient Medications on File Prior to Visit   Medication Sig Dispense Refill    ascorbic acid (VITAMIN C) 500 MG tablet Take 1 tablet (500 mg total) by mouth 2 (two) times a day 60 tablet 1    B-D 3CC LUER-LILLIE SYR 25GX1/2\" 25G X 1-1/2\" 3 ML MISC USE 1 SYRINGE EVERY 30 DAYS 12 each 1    Benlysta 200 MG/ML SOAJ Inject under the skin Every friday      cholecalciferol (VITAMIN D3) 25 mcg (1,000 units) tablet Take 2 tablets (2,000 Units total) by mouth daily 60 tablet 1    cyanocobalamin 1,000 mcg/mL 1000 mcg IM Q 2 weeks (Patient taking differently: 1000 mcg IM Q 2 weeks  next dose 5/23/25) 10 mL 3    enoxaparin (Lovenox) 100 mg/mL Inject 1 mL (100 mg total) under the skin every 24 hours (Patient taking differently: Inject 100 mg under the skin every 24 hours Bridge for surgery) 20 mL 0    fluconazole (DIFLUCAN) 150 mg tablet if needed      folic acid (FOLVITE) 1 mg tablet Take 1 tablet (1 mg total) by mouth daily 30 tablet 1    gabapentin (NEURONTIN) 100 mg capsule TAKE 1 CAPSULE(100 MG) BY MOUTH THREE TIMES DAILY (Patient taking differently: Take 100 mg by mouth daily at bedtime) 90 capsule 5    hydroxychloroquine (PLAQUENIL) 200 mg tablet 200mg every other day, 400mg alternating days. Changing as needed d/t theraputic level      loperamide (IMODIUM A-D) 2 MG tablet Take 2 mg by mouth 4 (four) times a day as needed for diarrhea      multivitamin (THERAGRAN) TABS Take 1 tablet by mouth daily      mupirocin (BACTROBAN) 2 % " "ointment Apply topically to the inside of the left and right nostrils twice daily for 5 days before surgery, including the morning of surgery. (Patient not taking: Reported on 5/8/2025) 15 g 1    NEEDLE, DISP, 23 G (BD Disp Needle) 23G X 1\" MISC Use to administer B12 10 each 3    nystatin (MYCOSTATIN) powder Apply topically 3 (three) times a day (Patient taking differently: Apply topically if needed) 60 g 0    ondansetron (ZOFRAN) 4 mg tablet Take 1 tablet (4 mg total) by mouth every 8 (eight) hours as needed for nausea or vomiting 30 tablet 2    potassium chloride (Klor-Con M20) 20 mEq tablet Take 1 tablet (20 mEq total) by mouth daily (Patient taking differently: Take 20 mEq by mouth if needed) 30 tablet 2    sildenafil (REVATIO) 20 mg tablet Take 2 tablets (40 mg total) by mouth 3 (three) times a day 180 tablet 5    spironolactone (ALDACTONE) 25 mg tablet TAKE 1 TABLET(25 MG) BY MOUTH DAILY 90 tablet 1    torsemide (DEMADEX) 20 mg tablet TAKE 1 TABLET(20 MG) BY MOUTH DAILY 90 tablet 1    traZODone (DESYREL) 50 mg tablet Take 2 tablets (100 mg total) by mouth daily at bedtime 180 tablet 1    warfarin (Coumadin) 5 mg tablet 7.5mg po daily or as directed. (Patient taking differently: 7.5mg po daily or as directed. Last dose 6/5/25) 150 tablet 3    [DISCONTINUED] ferrous sulfate 324 (65 Fe) mg Take 1 tablet (324 mg total) by mouth 2 (two) times a day before meals 60 tablet 1     No current facility-administered medications on file prior to visit.     "

## 2025-06-06 ENCOUNTER — APPOINTMENT (OUTPATIENT)
Dept: LAB | Facility: CLINIC | Age: 60
End: 2025-06-06
Payer: MEDICARE

## 2025-06-06 DIAGNOSIS — Z79.01 ANTICOAGULATED ON COUMADIN: ICD-10-CM

## 2025-06-06 LAB
INR PPP: 2.27 (ref 0.85–1.19)
PROTHROMBIN TIME: 25.7 SECONDS (ref 12.3–15)

## 2025-06-06 PROCEDURE — 36415 COLL VENOUS BLD VENIPUNCTURE: CPT

## 2025-06-06 PROCEDURE — 85610 PROTHROMBIN TIME: CPT

## 2025-06-06 NOTE — ASSESSMENT & PLAN NOTE
"  Managed by Dr. Daniele Loredo at [facility]    2/17/2025 note \"SLE/seronegative RA overlap. Onset 7/2017. Manifestations included oral/nasal ulcers, arthritis, leukopenia, HELENE, and lupus anticoagulant + anti-CL IgM. Treatments included hydroxychloroquine, methotrexate (nausea), Benlysta (6/2022-), Imuran (5/2023-2/2024; TPMT 12-borderline intermediate; unable to uptitrate past 50mg due to nausea)......  We previously switched Benlysta to Rinvoq because of inadequate control. Given the higher infection risk with Rinvoq and recent surgery with plans of abx for 6 month, I favor continuing benlysta for the time being and consider Rinvoq at a later time\"         "

## 2025-06-06 NOTE — PROGRESS NOTES
Name: Na Joseph      : 1965      MRN: [id number]  Encounter Provider: Maile March PA-C  Encounter Date: 2025   Encounter department: [facility]  :  Assessment & Plan  Pulmonary embolism associated with COVID-19 (HCC)  Prior to 10/22, she never had thrombosis, DVT, PE.  Diagnosed with Bilateral PE 22 while on ASA 162mg po daily.  She had d/c Lovenox 40mg sq daily s/p Left Knee replacement on 10/6/2022 -1 week prior. + for Covid 10/26/22.   LE doppler 2022 - No evidence of acute or chronic deep vein thrombosis of either LE     History of  Positive LA; history of + anticardiolipin antibody.       Given her history of persistently positive lupus anticoagulant, her pulmonary embolism that occurred while on aspirin 162 milligrams p.o. daily even though in the background of COVID positivity, her risk of recurrent thromboembolic event is high and chronic lifelong Coumadin for antiphospholipid antibody syndrome recommended.  She was agreeable.     We are managing coumadin.  Orders:  •  Uric acid; Future    Antiphospholipid antibody syndrome (HCC)  History of  Positive LA; history of + anticardiolipin antibody.      2015 positive lupus anticoagulant  2016 positive lupus anticoagulant  2022 positive lupus anticoagulant  2023 no lupus anticoagulant was detected    2015 normal beta-2 glycoprotein antibodies  2018 normal beta-2 glycoprotein antibodies  2021 normal beta-2 glycoprotein antibodies  2023 normal beta-2 glycoprotein antibodies      2015 IgM anticardiolipin antibody 14.5  2016  normal anticardiolipin antibodies  10/2017  IgM anticardiolipin antibody 18 (Indeterminate). Normal IgG anticardiolipin antibody , normal IgA anticardiolipin antibody.  2018  normal anticardiolipin antibodies  2018 normal anticardiolipin antibodies  2018 normal anticardiolipin antibodies  2019 normal anticardiolipin antibodies  10/2019  normal anticardiolipin antibodies  10/2020 normal anticardiolipin antibodies  4/2021 normal anticardiolipin antibodies  9/2022 normal anticardiolipin antibodies  11/2024 normal anticardiolipin antibodies    Orders:  •  Uric acid; Future  •  Protime-INR; Future    Anticoagulated on Coumadin  She has instructions for knee surgery bridge-     Hold coumadin 5 days before surgery   Start Lovenox 100mg SQ daily 3 days prior to surgery and hold Lovenox 24 hours prior to surgery.     Post op, initiate Lovenox 100 mg subcu daily along with warfarin until INR is therapeutic range      Orders:  •  Uric acid; Future  •  Protime-INR; Future    B12 deficiency  Vitamin B12 deficiency in a patient was diagnosed with autoimmune connective tissue disease most likely secondary to malabsorption as well because of history of gastric fundoplication secondary to GERD.   11/2017  Normal parietal cell AB, intrinsic factor AB.       EGD performed around 2021 by Dr. Salmeron at [facility].      B12 1000mcg IM monthly.   8/14/23    B12 level 328  Frequency Increase. B12 1000mcg IM q 2 weeks      2/5/24 B12 505  8/23/24 B12 687  1/17/25 305  2 additional doses of B12 IM when she received IV iron  5/23/25 B12 565    Orders:  •  Vitamin B12; Future    Iron deficiency anemia due to chronic blood loss  7/2023 colonoscopy- diverticula, polyps, small hemorrhoids.  She is s/p Nissen Fundoplication for severe GERD      11/2022  Received Feraheme x 2      1/17/25  iron saturation 13%;  ferritin 50  Feraheme x 2 doses     5/23/25 ferritin 213, iron saturation 28%    F/U in 6 weeks to assess CBCD, iron panel, B12 post operatively.      Orders:  •  CBC and differential; Future  •  Iron Panel (Includes Ferritin, Iron Sat%, Iron, and TIBC); Future  •  Comprehensive metabolic panel; Future    Systemic lupus erythematosus, unspecified SLE type, unspecified organ involvement status (HCC)    Managed by Dr. Daniele Loredo at [facility]    2/17/2025  "note \"SLE/seronegative RA overlap. Onset 7/2017. Manifestations included oral/nasal ulcers, arthritis, leukopenia, HELENE, and lupus anticoagulant + anti-CL IgM. Treatments included hydroxychloroquine, methotrexate (nausea), Benlysta (6/2022-), Imuran (5/2023-2/2024; TPMT 12-borderline intermediate; unable to uptitrate past 50mg due to nausea)......  We previously switched Benlysta to Rinvoq because of inadequate control. Given the higher infection risk with Rinvoq and recent surgery with plans of abx for 6 month, I favor continuing benlysta for the time being and consider Rinvoq at a later time\"           Pulmonary artery hypertension (HCC)  Follows with Dr. Hughes.   She had sildenafil dose increased with improvement in her breathing       Age-related osteoporosis without current pathological fracture  Osteoporosis diagnosed at 31 y/o in Lumbar spine. Partial Hysterectomy 1996 for endometrial hyperplasia. Ovaries spared.      Sees Dr. Mendez, Rheumatology locally.     Evenity recommended, not covered by insurance.    Tymlos(human parathyroid hormone related peptide) 80 mcg subcutaneously once daily rx with start date 1/2025.  Tymlos on hold 5/2025 2nd to possible SE of ureteral calculus.  1/15/2026 f/u with Dr. Mendez scheduled.      She has 7/3/25 f/u with Dr. Loredo    Post knee surgery, she will re-explore osteoporosis options.       Infection of total knee replacement, sequela    10/6/22 S/P LTKR  10/2024 - Infection of total knee replacement   10/3/24 s/p revision with ABX spacer placement.    On Duricef 500mg po bid per ID.     6/11/25 left knee total revision with explant of antibiotic spacer scheduled    Lovenox bridge                 Ureteral calculus  4/2025 Had painless hematuria. Progressed to have extreme left sided pain with n/v    4/23/25 CT A/P- 0.7 cm obstructing calculus in the proximal left ureter with mild upstream hydroureteronephrosis     Ureteral stent placed             Return in about 6 " weeks (around 7/21/2025).    History of Present Illness   Chief Complaint   Patient presents with   • Follow-up     Pertinent Medical History   02/03/25: Na Joseph is a 59 year old female seen 10/18/2017 for initial evaluation , self referred, secondary to low Vitamin B12 level.         10/6/2017 labs at [facility]: hemoglobin 13.2, mcv 90, RDW 14.3, WBC 6.2, 68% neutrophils, 21% lymphs, 8% monocytes 2% eosinophils, platelet count 330,000. Iron saturation 15%, ferritin 52 Vitamin B12 184( 211-946) Tsh 1.98, free T4 1.18, negative lyme antibody.  RF normal, Anti-scleroderma antibodies normal, anti-DS DNA normal, Styles AB normal, RNP antibodies normal. Anti-centromere AB normal. Normal sed rate.  HELENE: Dense fine speckled pattern is noted which suggest presence of  antibody which has a low prevalence in systemic autoimmune rheumatic diseases. Normal immunofixation on SPEP. Normal serum immunoglobulins.   Normal CCP antibodies IgG and IgA  U/A identified hyaline casts.   Celiac panel was normal. CMP normal.    POSITIVE HELENE. POSITIVE Lupus anticoagulant. IgM anticardiolipin antibody 18 (Indeterminate). Normal IgG anticardiolipin antibody , normal IgA anticardiolipin antibody.     7/11/2016: normal anticardiolipin antibodies. Lyme negative.   Normal ANCA profile, normal C3 and C4 complement, normal CRP. Normal thyroglobulin profile. Normal ESR. + LUPUS ANTICOAGULANT.   HELENE positive.   On [facility]s OB/GYN intake 3/13/2015 she noted that she had a history of + lupus anticoagulant. Lili states she 1st had HELENE evaluated and was positive in 2012.     Patient is extremely frustrated. She states she has been having symptoms of SOB, fatigue and has been diagnosed with a lupus-like disease but has not received any disease directed therapy. She took prednisone years ago without benefit and significant weight gain.   Was previously seen at [facility] by Twyla Styles in 2012 regarding chronic cough, SOB  "without history of asthma, allergies. Quit smoking at 38 years old after 15-20 pack years. PND treated and cough improved but ANSARI persisted. Bronchoscopy was normal. PFTs normal. Evaluation unrevealing to pulmonary source for her dyspnea. No benefit with Advair or high dose steroids. Falls asleep easily. Normal echo. Noted to have edema and sleep apnea suspected. She was advised to increase Lasix and have sleep study.  Serg Fuentes, a cardiologist at the [facility] had her undergo echocardiogram, however, poor images were obtained. She been had a stress echocardiogram and a question of pulmonary artery hypertension and was raised.   8/13/2012: stress echocardiogram report from [facility]: estimated P. a systolic pressure increased to 56 mm mercury suggestive of significant exercise induced pulmonary hypertension  She states she saw Dr. Salazar, a specialist at [facility] who didn't examine me and told me I look too good to have pulmonary artery hypertension.\" He did not perform additional testing but then referred her to Dr. Valiente, a cardiac electrophysiologist at [place] due to resting heart rate 114-120s who informed her she had a + HELENE.     She saw Dr. Raygoza a few times but felt pressured by him as he wanted to perform a lip biopsy to evaluate for Sjogren's syndrome. She has been seeing Ez Berg D.O. at the rheumatology center of [place] but has not seeing him in approximately a year and a half as she states he was requesting repeated blood work that was not moving forward towards treating any symptoms she was having and feels that he can be adverserial.      Lactose intolerant. Osteoporosis diagnosed at 31 y/o in Lumbar spine. Partial Hysterectomy 1996 for endometrial hyperplasia. Ovaries spared.      She works at [employer]. She returned from a vacation and had 3+ pitting edema. Dr. Alonzo, 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 arranged " for her to see Kemi Brizuela MD in [place], [place] who ordered the above labs.  Lili is very comfortable with injecting herself IM.   No s/sx pancreatic insufficiency. She does not take PPI or H2- blocker. She does not drink alcohol regularly.       Did have a first trimester miscarriage around the 10th week in between the birth of her 2 children. She takes Aspirin.      Reports last EGD was 3-4 years ago performed by Dr. Salmeron at [facility]. She is s/p Nissen Fundoplication for severe GERD. She had a colonoscopy previously.     She saw Dr. Mercedes Pabon with [facility] 2/13/2015 regarding persistently + HELENE and IGM anticardiolipin antibody. There was also question whether or not she had cotton wool spots secondary to embolic phenomenon. Patient states ultimately this was ruled out. Aspirin was hematologic recommendation at that time.          10/2019:  Normal cardiolipin AB, normal C3, C4, dsDNA ab not present     10/2021: Hemoglobin 14.1, MCV 91, white blood cell count 3.9, 66% neutrophils, 22% lymphocytes, 8% monocytes, platelet count 249  Normal cardiolipin antibodies, normal beta 2 glycoprotein antibody        Status Post Arthroplasty Knee Total - Left on 10/6/2022.  She was on ASA 162mg po daily and Lovenox 40mg sq daily x 30 days post-op.     Presented to the hospital on 11/5/2022 due to acute onset of back pain, difficulty breathing, SOB and cough.   Reported that she went on a cruise 10/24 and stated feeling sick on the 26th and tested + for Covid 10/26/22.       11/5/22 CTA - Moderate quantity of right lower lobe segmental and small quantity of right upper lobe and left lower lobe acute PE.     LE doppler was not done.     She was discharged on Eliquis.  Transitoned to  Coumadin due to APS.     History of Post op anemia.  Hgb 11/11/22 was 11.1g/dL.  She was taking oral iron twice daily- she has not for some time due to nausea, however.  Iron saturation 13%;  ferritin 394 11/11/22 (diagnosed with PE 11/5/22).  Feraheme weekly x2 12/2022 8/2023  Hgb 13.7; iron saturation 28%; ferritin 177     1/19/23 - normal cardiolipin antibodies, B-2 glycoprotein antibodies.     5/25/23  F/u with Dr. Loredo, rheumatologist at [facility] re: SLE. Benlysta continued .  Imuran initiated.  This has been helpful in reducing the swelling in her hands     Status post fall 11/2023 with hematoma of the forehead, cheeks area, subconjunctival hemorrhage, CAT scan showed no evidence of fracture, chest x-ray showed retrocardiac mass/infiltration      Saw Dr. Loredo 2/1/24 2/1/24 normal cardiolipin antibodies     2/5/24 iron saturation 22%; ferritin 117, B12 500      10/6/22 S/P LTKR  10/2024 - Infection of total knee replacement   10/3/24 s/p revision with ABX spacer placement.    On Duricef 500mg po bid per ID.      1/17/25  iron saturation 13%;  ferritin 50  Feraheme x 2 doses     5/23/25 ferritin 213, iron saturation 28%, B12 565     6/11/25 left knee total revision with explant of antibiotic spacer scheduled  Lovenox bridge perioperatively           06/09/25: Reports she is ready for Left knee revision surgery     Review of Systems   Constitutional:  Negative for activity change, appetite change, chills, diaphoresis, fatigue, fever and unexpected weight change.   HENT:  Negative for congestion, dental problem, ear pain, facial swelling, hearing loss, mouth sores, nosebleeds, rhinorrhea, trouble swallowing and voice change.    Eyes:  Negative for photophobia, pain, discharge, redness, itching and visual disturbance.   Respiratory:  Positive for shortness of breath (chronic). Negative for cough, chest tightness, wheezing and stridor.    Cardiovascular:  Negative for chest pain and palpitations.   Gastrointestinal:  Negative for abdominal distention, abdominal pain, anal bleeding, blood in stool, constipation, diarrhea, nausea, rectal pain and vomiting.   Endocrine: Negative for cold  "intolerance and heat intolerance.   Genitourinary:  Negative for decreased urine volume, difficulty urinating, dysuria, flank pain, frequency, hematuria and urgency.   Musculoskeletal:  Positive for arthralgias, gait problem and joint swelling. Negative for back pain and myalgias.   Skin:  Negative for color change, pallor, rash and wound.   Neurological:  Negative for dizziness, tremors, seizures, syncope, weakness, light-headedness, numbness and headaches.   Hematological:  Negative for adenopathy. Does not bruise/bleed easily.   Psychiatric/Behavioral:  Negative for agitation, confusion, decreased concentration and sleep disturbance. The patient is not nervous/anxious.    All other systems reviewed and are negative.          Objective   /78 (BP Location: Left arm, Patient Position: Sitting, Cuff Size: Large)   Pulse 98   Temp 98.3 °F (36.8 °C) (Temporal)   Resp 18   Ht 5' 2\" (1.575 m)   SpO2 98%   BMI 42.80 kg/m²     Physical Exam  Constitutional:       Appearance: She is well-developed.   HENT:      Head: Normocephalic and atraumatic.     Cardiovascular:      Rate and Rhythm: Normal rate and regular rhythm.      Heart sounds: Normal heart sounds.   Pulmonary:      Effort: Pulmonary effort is normal. No respiratory distress.      Breath sounds: Normal breath sounds.     Musculoskeletal:      Cervical back: Neck supple.     Neurological:      General: No focal deficit present.      Mental Status: She is alert.     Psychiatric:         Behavior: Behavior normal.         Labs: I have reviewed the following labs:  Results for orders placed or performed in visit on 06/06/25   Protime-INR   Result Value Ref Range    Protime 25.7 (H) 12.3 - 15.0 seconds    INR 2.27 (H) 0.85 - 1.19     *Note: Due to a large number of results and/or encounters for the requested time period, some results have not been displayed. A complete set of results can be found in Results Review.             "

## 2025-06-06 NOTE — ASSESSMENT & PLAN NOTE
10/6/22 S/P LTKR  10/2024 - Infection of total knee replacement   10/3/24 s/p revision with ABX spacer placement.    On Duricef 500mg po bid per ID.     6/11/25 left knee total revision with explant of antibiotic spacer scheduled    Lovenox bridge

## 2025-06-06 NOTE — ASSESSMENT & PLAN NOTE
7/2023 colonoscopy- diverticula, polyps, small hemorrhoids.  She is s/p Nissen Fundoplication for severe GERD      11/2022  Received Feraheme x 2      1/17/25  iron saturation 13%;  ferritin 50  Feraheme x 2 doses     5/23/25 ferritin 213, iron saturation 28%    F/U in 6 weeks to assess CBCD, iron panel, B12 post operatively.      Orders:  •  CBC and differential; Future  •  Iron Panel (Includes Ferritin, Iron Sat%, Iron, and TIBC); Future  •  Comprehensive metabolic panel; Future

## 2025-06-06 NOTE — ASSESSMENT & PLAN NOTE
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   11/2017  Normal parietal cell AB, intrinsic factor AB.       EGD performed around 2021 by Dr. Salmeron at [facility].      B12 1000mcg IM monthly.   8/14/23    B12 level 328  Frequency Increase. B12 1000mcg IM q 2 weeks      2/5/24 B12 505  8/23/24 B12 687  1/17/25 305  2 additional doses of B12 IM when she received IV iron  5/23/25 B12 565    Orders:  •  Vitamin B12; Future

## 2025-06-06 NOTE — ASSESSMENT & PLAN NOTE
History of  Positive LA; history of + anticardiolipin antibody.      2/2015 positive lupus anticoagulant  7/2016 positive lupus anticoagulant  9/2022 positive lupus anticoagulant  2/2023 no lupus anticoagulant was detected    2/2015 normal beta-2 glycoprotein antibodies  12/20/2018 normal beta-2 glycoprotein antibodies  4/2021 normal beta-2 glycoprotein antibodies  1/2023 normal beta-2 glycoprotein antibodies      2/2015 IgM anticardiolipin antibody 14.5  7/2016  normal anticardiolipin antibodies  10/2017  IgM anticardiolipin antibody 18 (Indeterminate). Normal IgG anticardiolipin antibody , normal IgA anticardiolipin antibody.  2/2018  normal anticardiolipin antibodies  6/2018 normal anticardiolipin antibodies  12/2018 normal anticardiolipin antibodies  4/2019 normal anticardiolipin antibodies  10/2019 normal anticardiolipin antibodies  10/2020 normal anticardiolipin antibodies  4/2021 normal anticardiolipin antibodies  9/2022 normal anticardiolipin antibodies  11/2024 normal anticardiolipin antibodies    Orders:  •  Uric acid; Future  •  Protime-INR; Future

## 2025-06-06 NOTE — ASSESSMENT & PLAN NOTE
Osteoporosis diagnosed at 33 y/o in Lumbar spine. Partial Hysterectomy 1996 for endometrial hyperplasia. Ovaries spared.      Sees Dr. Mendez, Rheumatology locally.     Evenity recommended, not covered by insurance.    Tymlos(human parathyroid hormone related peptide) 80 mcg subcutaneously once daily rx with start date 1/2025.  Tymlos on hold 5/2025 2nd to possible SE of ureteral calculus.  1/15/2026 f/u with Dr. Mendez scheduled.      She has 7/3/25 f/u with Dr. Loredo    Post knee surgery, she will re-explore osteoporosis options.

## 2025-06-09 ENCOUNTER — OFFICE VISIT (OUTPATIENT)
Dept: HEMATOLOGY ONCOLOGY | Facility: CLINIC | Age: 60
End: 2025-06-09
Payer: MEDICARE

## 2025-06-09 VITALS
HEART RATE: 98 BPM | HEIGHT: 62 IN | TEMPERATURE: 98.3 F | BODY MASS INDEX: 42.8 KG/M2 | RESPIRATION RATE: 18 BRPM | SYSTOLIC BLOOD PRESSURE: 124 MMHG | OXYGEN SATURATION: 98 % | DIASTOLIC BLOOD PRESSURE: 78 MMHG

## 2025-06-09 DIAGNOSIS — I27.21 PULMONARY ARTERY HYPERTENSION (HCC): ICD-10-CM

## 2025-06-09 DIAGNOSIS — U07.1 PULMONARY EMBOLISM ASSOCIATED WITH COVID-19 (HCC): ICD-10-CM

## 2025-06-09 DIAGNOSIS — I26.99 PULMONARY EMBOLISM ASSOCIATED WITH COVID-19 (HCC): ICD-10-CM

## 2025-06-09 DIAGNOSIS — M81.0 AGE-RELATED OSTEOPOROSIS WITHOUT CURRENT PATHOLOGICAL FRACTURE: ICD-10-CM

## 2025-06-09 DIAGNOSIS — D50.0 IRON DEFICIENCY ANEMIA DUE TO CHRONIC BLOOD LOSS: ICD-10-CM

## 2025-06-09 DIAGNOSIS — N20.1 URETERAL CALCULUS: ICD-10-CM

## 2025-06-09 DIAGNOSIS — D68.61 ANTIPHOSPHOLIPID ANTIBODY SYNDROME (HCC): Primary | ICD-10-CM

## 2025-06-09 DIAGNOSIS — Z96.659 INFECTION OF TOTAL KNEE REPLACEMENT, SEQUELA: ICD-10-CM

## 2025-06-09 DIAGNOSIS — Z79.01 ANTICOAGULATED ON COUMADIN: ICD-10-CM

## 2025-06-09 DIAGNOSIS — T84.59XS INFECTION OF TOTAL KNEE REPLACEMENT, SEQUELA: ICD-10-CM

## 2025-06-09 DIAGNOSIS — M32.9 SYSTEMIC LUPUS ERYTHEMATOSUS, UNSPECIFIED SLE TYPE, UNSPECIFIED ORGAN INVOLVEMENT STATUS (HCC): ICD-10-CM

## 2025-06-09 DIAGNOSIS — E53.8 B12 DEFICIENCY: ICD-10-CM

## 2025-06-09 PROCEDURE — 99215 OFFICE O/P EST HI 40 MIN: CPT | Performed by: PHYSICIAN ASSISTANT

## 2025-06-09 NOTE — ASSESSMENT & PLAN NOTE
4/2025 Had painless hematuria. Progressed to have extreme left sided pain with n/v    4/23/25 CT A/P- 0.7 cm obstructing calculus in the proximal left ureter with mild upstream hydroureteronephrosis     Ureteral stent placed

## 2025-06-10 RX ORDER — DOXYCYCLINE 100 MG/1
100 CAPSULE ORAL 2 TIMES DAILY
Qty: 28 CAPSULE | Refills: 0 | Status: CANCELLED | OUTPATIENT
Start: 2025-06-10 | End: 2025-06-24

## 2025-06-10 NOTE — DISCHARGE INSTR - AVS FIRST PAGE
Dr. Wilson Knee Replacement    What to Expect/Activity  It is normal to have some discomfort in your knee for several days to weeks.  You are weight bearing as tolerated to your operative leg with assist devices.  Please use crutches/walker when ambulating until your follow-up  Swelling and discomfort in the knee is normal for several days after surgery. For the first 2-3 days, use ice around the knee to help. Use for 20-30 minutes every 1-2 hours for 48 hours, while awake. You may continue beyond 48 hours as needed.  Place one or two pillows underneath your calf, not your knee, to reduce swelling.  Physical therapy on your own at home should start as soon as possible (see below). Please perform heel slides and extension exercises on your own as well (see diagram).  Please use incentive spirometer 10 times per hour while awake (see diagram).    Dressing/Wound Care/Bathing  You may remove your toe-to-groin dressing 24 hours after surgery. There will be a surgical dressing over your incision that stays in place until follow-up unless water gets under the bandage and then it should be removed.   You may start showering 24 hours after surgery, the surgical dressing will remain in place. Please pat the dressing dry. If you notice the dressing appears saturated or is starting to come off, please replace with dry dressing.  You can keep the dressing in place until follow-up in the office.   Do not place any creams, ointments or gels on or around the incision.  No baths, swimming or submerging until cleared by Dr. Wilson    Pain Management/Medications  You may resume your usual medications.  Please take the following medications:  Anti-coagulation (blood clot prevention) - Resume Coumadin and lovenox bridge until INR therapeutic per heme/onc   Pain medication:  Narcotic: Take as directed  NSAID/Anti-inflammatory: Take as directed  Tylenol 1000mg every 8 hours  Zofran (ondasetron) - 4mg every 8 hours as needed for  nausea  Stool softeners (senna/colace) - take daily to prevent constipation as narcotic pain medication causes constipation  Antibiotic - take as directed if prescribed   If you have questions or pain concerns, please contact the office. Pain medication cannot remove all post-operative pain.    Follow up/Call if:  The findings of your surgery will be explained to you and your family immediately after surgery. However, in the post-operative period, during recovery from anesthesia you may not fully remember or fully understand what was said. This will be again gone over when you return for your post-op appointment.  Please contact Dr. Wilson's office if you experience the following:  Excessive bleeding (bleeding through your dressing)  Fever greater than 101 degrees F after 48 hours (low grade fevers the day or two after surgery are normal)  Persistent nausea or vomiting  Decreased sensation or discoloration of the operative limb  Pain or swelling that is getting worse and not better with medication    Dr. Wilson's Office Contact: [phone]

## 2025-06-11 ENCOUNTER — HOSPITAL ENCOUNTER (INPATIENT)
Facility: HOSPITAL | Age: 60
LOS: 1 days | Discharge: HOME | DRG: 467 | End: 2025-06-12
Attending: STUDENT IN AN ORGANIZED HEALTH CARE EDUCATION/TRAINING PROGRAM | Admitting: STUDENT IN AN ORGANIZED HEALTH CARE EDUCATION/TRAINING PROGRAM
Payer: MEDICARE

## 2025-06-11 ENCOUNTER — APPOINTMENT (INPATIENT)
Dept: RADIOLOGY | Facility: HOSPITAL | Age: 60
DRG: 467 | End: 2025-06-11
Payer: MEDICARE

## 2025-06-11 ENCOUNTER — ANESTHESIA (OUTPATIENT)
Dept: PERIOP | Facility: HOSPITAL | Age: 60
DRG: 467 | End: 2025-06-11
Payer: MEDICARE

## 2025-06-11 DIAGNOSIS — T84.59XS INFECTION OF TOTAL KNEE REPLACEMENT, SEQUELA: Primary | ICD-10-CM

## 2025-06-11 DIAGNOSIS — Z96.659 INFECTION OF TOTAL KNEE REPLACEMENT, SEQUELA: Primary | ICD-10-CM

## 2025-06-11 DIAGNOSIS — Z96.652 STATUS POST REVISION OF TOTAL KNEE, LEFT: ICD-10-CM

## 2025-06-11 LAB
ABO GROUP BLD: NORMAL
INR PPP: 1.02 (ref 0.85–1.19)
PLATELET # BLD AUTO: 211 THOUSANDS/UL (ref 149–390)
PMV BLD AUTO: 10.3 FL (ref 8.9–12.7)
PROTHROMBIN TIME: 13.9 SECONDS (ref 12.3–15)
RH BLD: POSITIVE

## 2025-06-11 PROCEDURE — 87205 SMEAR GRAM STAIN: CPT | Performed by: STUDENT IN AN ORGANIZED HEALTH CARE EDUCATION/TRAINING PROGRAM

## 2025-06-11 PROCEDURE — C1776 JOINT DEVICE (IMPLANTABLE): HCPCS | Performed by: STUDENT IN AN ORGANIZED HEALTH CARE EDUCATION/TRAINING PROGRAM

## 2025-06-11 PROCEDURE — 85049 AUTOMATED PLATELET COUNT: CPT | Performed by: PHYSICIAN ASSISTANT

## 2025-06-11 PROCEDURE — 73560 X-RAY EXAM OF KNEE 1 OR 2: CPT

## 2025-06-11 PROCEDURE — 0SRD0J9 REPLACEMENT OF LEFT KNEE JOINT WITH SYNTHETIC SUBSTITUTE, CEMENTED, OPEN APPROACH: ICD-10-PCS | Performed by: STUDENT IN AN ORGANIZED HEALTH CARE EDUCATION/TRAINING PROGRAM

## 2025-06-11 PROCEDURE — 99221 1ST HOSP IP/OBS SF/LOW 40: CPT | Performed by: PHYSICIAN ASSISTANT

## 2025-06-11 PROCEDURE — C1713 ANCHOR/SCREW BN/BN,TIS/BN: HCPCS | Performed by: STUDENT IN AN ORGANIZED HEALTH CARE EDUCATION/TRAINING PROGRAM

## 2025-06-11 PROCEDURE — 0SPD0EZ REMOVAL OF ARTICULATING SPACER FROM LEFT KNEE JOINT, OPEN APPROACH: ICD-10-PCS | Performed by: STUDENT IN AN ORGANIZED HEALTH CARE EDUCATION/TRAINING PROGRAM

## 2025-06-11 PROCEDURE — 85610 PROTHROMBIN TIME: CPT | Performed by: PHYSICIAN ASSISTANT

## 2025-06-11 PROCEDURE — 87186 SC STD MICRODIL/AGAR DIL: CPT | Performed by: STUDENT IN AN ORGANIZED HEALTH CARE EDUCATION/TRAINING PROGRAM

## 2025-06-11 PROCEDURE — 27487 REVISE/REPLACE KNEE JOINT: CPT | Performed by: STUDENT IN AN ORGANIZED HEALTH CARE EDUCATION/TRAINING PROGRAM

## 2025-06-11 PROCEDURE — 87102 FUNGUS ISOLATION CULTURE: CPT | Performed by: STUDENT IN AN ORGANIZED HEALTH CARE EDUCATION/TRAINING PROGRAM

## 2025-06-11 PROCEDURE — 87077 CULTURE AEROBIC IDENTIFY: CPT | Performed by: STUDENT IN AN ORGANIZED HEALTH CARE EDUCATION/TRAINING PROGRAM

## 2025-06-11 PROCEDURE — 87070 CULTURE OTHR SPECIMN AEROBIC: CPT | Performed by: STUDENT IN AN ORGANIZED HEALTH CARE EDUCATION/TRAINING PROGRAM

## 2025-06-11 PROCEDURE — 87075 CULTR BACTERIA EXCEPT BLOOD: CPT | Performed by: STUDENT IN AN ORGANIZED HEALTH CARE EDUCATION/TRAINING PROGRAM

## 2025-06-11 PROCEDURE — 87147 CULTURE TYPE IMMUNOLOGIC: CPT | Performed by: STUDENT IN AN ORGANIZED HEALTH CARE EDUCATION/TRAINING PROGRAM

## 2025-06-11 DEVICE — ATTUNE KNEE SYSTEM REVISION ROTATING PLATFORM TIBIAL BASE CEMENTED SIZE 4
Type: IMPLANTABLE DEVICE | Site: KNEE | Status: FUNCTIONAL
Brand: ATTUNE

## 2025-06-11 DEVICE — ATTUNE KNEE SYSTEM REVISION DISTAL FEMORAL AUGMENT 12MM CEMENTED SIZE 4
Type: IMPLANTABLE DEVICE | Site: KNEE | Status: FUNCTIONAL
Brand: ATTUNE

## 2025-06-11 DEVICE — ATTUNE KNEE SYSTEM REVISION POSTERIOR FEMORAL AUGMENT 4MM CEMENTED SIZE 4
Type: IMPLANTABLE DEVICE | Site: KNEE | Status: FUNCTIONAL
Brand: ATTUNE

## 2025-06-11 DEVICE — ATTUNE KNEE SYSTEM REVISION PRESSFIT STEM 14X60MM
Type: IMPLANTABLE DEVICE | Site: KNEE | Status: FUNCTIONAL
Brand: ATTUNE

## 2025-06-11 DEVICE — ATTUNE KNEE SYSTEM REVISION DISTAL FEMORAL AUGMENT 4MM CEMENTED SIZE 4
Type: IMPLANTABLE DEVICE | Site: KNEE | Status: FUNCTIONAL
Brand: ATTUNE

## 2025-06-11 DEVICE — ATTUNE KNEE SYSTEM REVISION TIBIAL SLEEVE POROCOAT PARTIALLY COATED 29MM
Type: IMPLANTABLE DEVICE | Site: KNEE | Status: FUNCTIONAL
Brand: ATTUNE

## 2025-06-11 DEVICE — ATTUNE KNEE SYSTEM REVISION CRS FEMORAL CEMENTED LEFT SIZE 4
Type: IMPLANTABLE DEVICE | Site: KNEE | Status: FUNCTIONAL
Brand: ATTUNE

## 2025-06-11 DEVICE — SMARTSET HIGH PERFORMANCE MV MEDIUM VISCOSITY BONE CEMENT 40G
Type: IMPLANTABLE DEVICE | Site: KNEE | Status: FUNCTIONAL
Brand: SMARTSET

## 2025-06-11 DEVICE — ATTUNE KNEE SYSTEM REVISION PRESSFIT STEM 18X110MM
Type: IMPLANTABLE DEVICE | Site: KNEE | Status: FUNCTIONAL
Brand: ATTUNE

## 2025-06-11 DEVICE — ATTUNE KNEE SYSTEM REVISION CRS ROTATING PLATFORM INSERT AOX 16MM SIZE 4
Type: IMPLANTABLE DEVICE | Site: KNEE | Status: FUNCTIONAL
Brand: ATTUNE

## 2025-06-11 RX ORDER — LIDOCAINE HYDROCHLORIDE 10 MG/ML
INJECTION, SOLUTION EPIDURAL; INFILTRATION; INTRACAUDAL; PERINEURAL AS NEEDED
Status: DISCONTINUED | OUTPATIENT
Start: 2025-06-11 | End: 2025-06-11

## 2025-06-11 RX ORDER — ONDANSETRON 2 MG/ML
4 INJECTION INTRAMUSCULAR; INTRAVENOUS EVERY 6 HOURS PRN
Status: DISCONTINUED | OUTPATIENT
Start: 2025-06-11 | End: 2025-06-12 | Stop reason: HOSPADM

## 2025-06-11 RX ORDER — PROPOFOL 10 MG/ML
INJECTION, EMULSION INTRAVENOUS CONTINUOUS PRN
Status: DISCONTINUED | OUTPATIENT
Start: 2025-06-11 | End: 2025-06-11

## 2025-06-11 RX ORDER — SODIUM CHLORIDE, SODIUM LACTATE, POTASSIUM CHLORIDE, CALCIUM CHLORIDE 600; 310; 30; 20 MG/100ML; MG/100ML; MG/100ML; MG/100ML
INJECTION, SOLUTION INTRAVENOUS CONTINUOUS PRN
Status: DISCONTINUED | OUTPATIENT
Start: 2025-06-11 | End: 2025-06-11

## 2025-06-11 RX ORDER — BUPIVACAINE HYDROCHLORIDE 5 MG/ML
INJECTION, SOLUTION EPIDURAL; INTRACAUDAL; PERINEURAL AS NEEDED
Status: DISCONTINUED | OUTPATIENT
Start: 2025-06-11 | End: 2025-06-11

## 2025-06-11 RX ORDER — AMOXICILLIN 250 MG
1 CAPSULE ORAL DAILY
Qty: 30 TABLET | Refills: 0 | Status: SHIPPED | OUTPATIENT
Start: 2025-06-11

## 2025-06-11 RX ORDER — DOXYCYCLINE 100 MG/1
100 CAPSULE ORAL EVERY 12 HOURS SCHEDULED
Status: CANCELLED | OUTPATIENT
Start: 2025-06-12

## 2025-06-11 RX ORDER — TRAZODONE HYDROCHLORIDE 100 MG/1
100 TABLET ORAL
Status: DISCONTINUED | OUTPATIENT
Start: 2025-06-11 | End: 2025-06-12 | Stop reason: HOSPADM

## 2025-06-11 RX ORDER — ACETAMINOPHEN 325 MG/1
975 TABLET ORAL ONCE
Status: COMPLETED | OUTPATIENT
Start: 2025-06-11 | End: 2025-06-11

## 2025-06-11 RX ORDER — ACETAMINOPHEN 10 MG/ML
1000 INJECTION, SOLUTION INTRAVENOUS ONCE
Status: COMPLETED | OUTPATIENT
Start: 2025-06-11 | End: 2025-06-12

## 2025-06-11 RX ORDER — LIDOCAINE HYDROCHLORIDE 20 MG/ML
INJECTION, SOLUTION EPIDURAL; INFILTRATION; INTRACAUDAL; PERINEURAL AS NEEDED
Status: DISCONTINUED | OUTPATIENT
Start: 2025-06-11 | End: 2025-06-11

## 2025-06-11 RX ORDER — SENNOSIDES 8.6 MG
1 TABLET ORAL DAILY
Status: DISCONTINUED | OUTPATIENT
Start: 2025-06-12 | End: 2025-06-12 | Stop reason: HOSPADM

## 2025-06-11 RX ORDER — FENTANYL CITRATE 50 UG/ML
INJECTION, SOLUTION INTRAMUSCULAR; INTRAVENOUS AS NEEDED
Status: DISCONTINUED | OUTPATIENT
Start: 2025-06-11 | End: 2025-06-11

## 2025-06-11 RX ORDER — SIMETHICONE 80 MG
80 TABLET,CHEWABLE ORAL 4 TIMES DAILY PRN
Status: DISCONTINUED | OUTPATIENT
Start: 2025-06-11 | End: 2025-06-12 | Stop reason: HOSPADM

## 2025-06-11 RX ORDER — OXYCODONE HYDROCHLORIDE 5 MG/1
5 TABLET ORAL EVERY 4 HOURS PRN
Status: DISCONTINUED | OUTPATIENT
Start: 2025-06-11 | End: 2025-06-12 | Stop reason: HOSPADM

## 2025-06-11 RX ORDER — OXYCODONE HYDROCHLORIDE 5 MG/1
5 TABLET ORAL EVERY 4 HOURS PRN
Qty: 42 TABLET | Refills: 0 | Status: SHIPPED | OUTPATIENT
Start: 2025-06-11 | End: 2025-06-21

## 2025-06-11 RX ORDER — ASCORBIC ACID 500 MG
500 TABLET ORAL 2 TIMES DAILY
Status: DISCONTINUED | OUTPATIENT
Start: 2025-06-11 | End: 2025-06-12 | Stop reason: HOSPADM

## 2025-06-11 RX ORDER — ACETAMINOPHEN 325 MG/1
650 TABLET ORAL EVERY 4 HOURS PRN
Status: DISCONTINUED | OUTPATIENT
Start: 2025-06-11 | End: 2025-06-12 | Stop reason: HOSPADM

## 2025-06-11 RX ORDER — ONDANSETRON 2 MG/ML
INJECTION INTRAMUSCULAR; INTRAVENOUS AS NEEDED
Status: DISCONTINUED | OUTPATIENT
Start: 2025-06-11 | End: 2025-06-11

## 2025-06-11 RX ORDER — CALCIUM CARBONATE 500 MG/1
1000 TABLET, CHEWABLE ORAL DAILY PRN
Status: DISCONTINUED | OUTPATIENT
Start: 2025-06-11 | End: 2025-06-12 | Stop reason: HOSPADM

## 2025-06-11 RX ORDER — ACETAMINOPHEN 500 MG
1000 TABLET ORAL EVERY 8 HOURS
Qty: 60 TABLET | Refills: 0 | Status: SHIPPED | OUTPATIENT
Start: 2025-06-11

## 2025-06-11 RX ORDER — SODIUM CHLORIDE, SODIUM LACTATE, POTASSIUM CHLORIDE, CALCIUM CHLORIDE 600; 310; 30; 20 MG/100ML; MG/100ML; MG/100ML; MG/100ML
20 INJECTION, SOLUTION INTRAVENOUS CONTINUOUS
Status: DISCONTINUED | OUTPATIENT
Start: 2025-06-11 | End: 2025-06-11

## 2025-06-11 RX ORDER — DOCUSATE SODIUM 100 MG/1
100 CAPSULE, LIQUID FILLED ORAL 2 TIMES DAILY
Status: DISCONTINUED | OUTPATIENT
Start: 2025-06-11 | End: 2025-06-12 | Stop reason: HOSPADM

## 2025-06-11 RX ORDER — SODIUM CHLORIDE, SODIUM LACTATE, POTASSIUM CHLORIDE, CALCIUM CHLORIDE 600; 310; 30; 20 MG/100ML; MG/100ML; MG/100ML; MG/100ML
125 INJECTION, SOLUTION INTRAVENOUS CONTINUOUS
Status: DISCONTINUED | OUTPATIENT
Start: 2025-06-11 | End: 2025-06-11

## 2025-06-11 RX ORDER — ENOXAPARIN SODIUM 150 MG/ML
1 INJECTION SUBCUTANEOUS
Status: DISCONTINUED | OUTPATIENT
Start: 2025-06-11 | End: 2025-06-11

## 2025-06-11 RX ORDER — HYDROMORPHONE HCL/PF 1 MG/ML
0.5 SYRINGE (ML) INJECTION
Status: DISCONTINUED | OUTPATIENT
Start: 2025-06-11 | End: 2025-06-11 | Stop reason: HOSPADM

## 2025-06-11 RX ORDER — ONDANSETRON 2 MG/ML
4 INJECTION INTRAMUSCULAR; INTRAVENOUS ONCE AS NEEDED
Status: DISCONTINUED | OUTPATIENT
Start: 2025-06-11 | End: 2025-06-11 | Stop reason: HOSPADM

## 2025-06-11 RX ORDER — DEXAMETHASONE SODIUM PHOSPHATE 10 MG/ML
INJECTION, SOLUTION INTRAMUSCULAR; INTRAVENOUS AS NEEDED
Status: DISCONTINUED | OUTPATIENT
Start: 2025-06-11 | End: 2025-06-11

## 2025-06-11 RX ORDER — ENOXAPARIN SODIUM 100 MG/ML
100 INJECTION SUBCUTANEOUS EVERY 24 HOURS
Status: DISCONTINUED | OUTPATIENT
Start: 2025-06-12 | End: 2025-06-12 | Stop reason: HOSPADM

## 2025-06-11 RX ORDER — DIPHENHYDRAMINE HCL 25 MG
25 TABLET ORAL EVERY 6 HOURS PRN
Status: DISCONTINUED | OUTPATIENT
Start: 2025-06-11 | End: 2025-06-12 | Stop reason: HOSPADM

## 2025-06-11 RX ORDER — OXYCODONE HYDROCHLORIDE 10 MG/1
10 TABLET ORAL EVERY 4 HOURS PRN
Status: DISCONTINUED | OUTPATIENT
Start: 2025-06-11 | End: 2025-06-12 | Stop reason: HOSPADM

## 2025-06-11 RX ORDER — HYDROMORPHONE HCL/PF 1 MG/ML
0.4 SYRINGE (ML) INJECTION
Status: DISCONTINUED | OUTPATIENT
Start: 2025-06-11 | End: 2025-06-11 | Stop reason: HOSPADM

## 2025-06-11 RX ORDER — CEFAZOLIN SODIUM 2 G/50ML
2000 SOLUTION INTRAVENOUS ONCE
Status: COMPLETED | OUTPATIENT
Start: 2025-06-11 | End: 2025-06-11

## 2025-06-11 RX ORDER — CEFADROXIL 500 MG/1
500 CAPSULE ORAL EVERY 12 HOURS SCHEDULED
Status: DISCONTINUED | OUTPATIENT
Start: 2025-06-12 | End: 2025-06-12 | Stop reason: HOSPADM

## 2025-06-11 RX ORDER — MAGNESIUM HYDROXIDE 1200 MG/15ML
LIQUID ORAL AS NEEDED
Status: DISCONTINUED | OUTPATIENT
Start: 2025-06-11 | End: 2025-06-11 | Stop reason: HOSPADM

## 2025-06-11 RX ORDER — GABAPENTIN 300 MG/1
300 CAPSULE ORAL
Status: DISCONTINUED | OUTPATIENT
Start: 2025-06-11 | End: 2025-06-12 | Stop reason: HOSPADM

## 2025-06-11 RX ORDER — PANTOPRAZOLE SODIUM 40 MG/1
40 TABLET, DELAYED RELEASE ORAL DAILY
Status: DISCONTINUED | OUTPATIENT
Start: 2025-06-12 | End: 2025-06-12 | Stop reason: HOSPADM

## 2025-06-11 RX ORDER — SILDENAFIL CITRATE 20 MG/1
40 TABLET ORAL 3 TIMES DAILY
Status: DISCONTINUED | OUTPATIENT
Start: 2025-06-11 | End: 2025-06-12 | Stop reason: HOSPADM

## 2025-06-11 RX ORDER — MIDAZOLAM HYDROCHLORIDE 2 MG/2ML
INJECTION, SOLUTION INTRAMUSCULAR; INTRAVENOUS AS NEEDED
Status: DISCONTINUED | OUTPATIENT
Start: 2025-06-11 | End: 2025-06-11

## 2025-06-11 RX ORDER — TORSEMIDE 20 MG/1
20 TABLET ORAL DAILY
Status: DISCONTINUED | OUTPATIENT
Start: 2025-06-11 | End: 2025-06-12 | Stop reason: HOSPADM

## 2025-06-11 RX ORDER — ONDANSETRON 4 MG/1
4 TABLET, ORALLY DISINTEGRATING ORAL EVERY 6 HOURS PRN
Qty: 20 TABLET | Refills: 0 | Status: SHIPPED | OUTPATIENT
Start: 2025-06-11

## 2025-06-11 RX ORDER — SPIRONOLACTONE 25 MG/1
25 TABLET ORAL DAILY
Status: DISCONTINUED | OUTPATIENT
Start: 2025-06-12 | End: 2025-06-12 | Stop reason: HOSPADM

## 2025-06-11 RX ORDER — CEFAZOLIN SODIUM 2 G/50ML
2000 SOLUTION INTRAVENOUS EVERY 8 HOURS
Status: COMPLETED | OUTPATIENT
Start: 2025-06-11 | End: 2025-06-12

## 2025-06-11 RX ORDER — PROPOFOL 10 MG/ML
INJECTION, EMULSION INTRAVENOUS AS NEEDED
Status: DISCONTINUED | OUTPATIENT
Start: 2025-06-11 | End: 2025-06-11

## 2025-06-11 RX ORDER — CEFADROXIL 500 MG/1
500 CAPSULE ORAL EVERY 12 HOURS SCHEDULED
Qty: 28 CAPSULE | Refills: 0 | Status: SHIPPED | OUTPATIENT
Start: 2025-06-11 | End: 2025-06-25

## 2025-06-11 RX ORDER — SODIUM CHLORIDE, SODIUM LACTATE, POTASSIUM CHLORIDE, CALCIUM CHLORIDE 600; 310; 30; 20 MG/100ML; MG/100ML; MG/100ML; MG/100ML
100 INJECTION, SOLUTION INTRAVENOUS CONTINUOUS
Status: DISCONTINUED | OUTPATIENT
Start: 2025-06-11 | End: 2025-06-12 | Stop reason: HOSPADM

## 2025-06-11 RX ORDER — ROCURONIUM BROMIDE 10 MG/ML
INJECTION, SOLUTION INTRAVENOUS AS NEEDED
Status: DISCONTINUED | OUTPATIENT
Start: 2025-06-11 | End: 2025-06-11

## 2025-06-11 RX ORDER — FENTANYL CITRATE/PF 50 MCG/ML
50 SYRINGE (ML) INJECTION
Status: DISCONTINUED | OUTPATIENT
Start: 2025-06-11 | End: 2025-06-11 | Stop reason: HOSPADM

## 2025-06-11 RX ORDER — ACETAMINOPHEN 325 MG/1
975 TABLET ORAL EVERY 8 HOURS
Status: DISCONTINUED | OUTPATIENT
Start: 2025-06-12 | End: 2025-06-12 | Stop reason: HOSPADM

## 2025-06-11 RX ORDER — FOLIC ACID 1 MG/1
1 TABLET ORAL DAILY
Status: DISCONTINUED | OUTPATIENT
Start: 2025-06-12 | End: 2025-06-12 | Stop reason: HOSPADM

## 2025-06-11 RX ORDER — WARFARIN SODIUM 5 MG/1
10 TABLET ORAL
Status: COMPLETED | OUTPATIENT
Start: 2025-06-11 | End: 2025-06-11

## 2025-06-11 RX ORDER — TRANEXAMIC ACID 10 MG/ML
1000 INJECTION, SOLUTION INTRAVENOUS ONCE
Status: COMPLETED | OUTPATIENT
Start: 2025-06-11 | End: 2025-06-12

## 2025-06-11 RX ORDER — WARFARIN SODIUM 5 MG/1
7.5 TABLET ORAL
Status: COMPLETED | OUTPATIENT
Start: 2025-06-12 | End: 2025-06-12

## 2025-06-11 RX ORDER — CHLORHEXIDINE GLUCONATE ORAL RINSE 1.2 MG/ML
15 SOLUTION DENTAL ONCE
Status: COMPLETED | OUTPATIENT
Start: 2025-06-11 | End: 2025-06-11

## 2025-06-11 RX ORDER — CHLORHEXIDINE GLUCONATE 40 MG/ML
SOLUTION TOPICAL DAILY PRN
Status: DISCONTINUED | OUTPATIENT
Start: 2025-06-11 | End: 2025-06-12 | Stop reason: HOSPADM

## 2025-06-11 RX ADMIN — LIDOCAINE HYDROCHLORIDE 50 ML: 10 INJECTION, SOLUTION EPIDURAL; INFILTRATION; INTRACAUDAL; PERINEURAL at 12:53

## 2025-06-11 RX ADMIN — SODIUM CHLORIDE, SODIUM LACTATE, POTASSIUM CHLORIDE, CALCIUM CHLORIDE: 600; 310; 30; 20 INJECTION, SOLUTION INTRAVENOUS at 13:00

## 2025-06-11 RX ADMIN — FENTANYL CITRATE 50 MCG: 50 INJECTION INTRAMUSCULAR; INTRAVENOUS at 14:53

## 2025-06-11 RX ADMIN — MIDAZOLAM 2 MG: 1 INJECTION INTRAMUSCULAR; INTRAVENOUS at 12:27

## 2025-06-11 RX ADMIN — BUPIVACAINE HYDROCHLORIDE 5 ML: 5 INJECTION, SOLUTION EPIDURAL; INTRACAUDAL; PERINEURAL at 12:30

## 2025-06-11 RX ADMIN — PROPOFOL 100 MCG/KG/MIN: 10 INJECTION, EMULSION INTRAVENOUS at 12:58

## 2025-06-11 RX ADMIN — LIDOCAINE HYDROCHLORIDE 1 ML: 20 INJECTION, SOLUTION EPIDURAL; INFILTRATION; INTRACAUDAL; PERINEURAL at 12:27

## 2025-06-11 RX ADMIN — FENTANYL CITRATE 50 MCG: 50 INJECTION INTRAMUSCULAR; INTRAVENOUS at 15:05

## 2025-06-11 RX ADMIN — OXYCODONE HYDROCHLORIDE 5 MG: 5 TABLET ORAL at 18:18

## 2025-06-11 RX ADMIN — GABAPENTIN 300 MG: 300 CAPSULE ORAL at 20:40

## 2025-06-11 RX ADMIN — FENTANYL CITRATE 75 MCG: 50 INJECTION INTRAMUSCULAR; INTRAVENOUS at 12:53

## 2025-06-11 RX ADMIN — HYDROMORPHONE HYDROCHLORIDE 0.5 MG: 1 INJECTION, SOLUTION INTRAMUSCULAR; INTRAVENOUS; SUBCUTANEOUS at 16:14

## 2025-06-11 RX ADMIN — TRANEXAMIC ACID 1000 MG: 10 INJECTION, SOLUTION INTRAVENOUS at 12:32

## 2025-06-11 RX ADMIN — REMIFENTANIL HYDROCHLORIDE 0.1 MCG/KG/MIN: 1 INJECTION, POWDER, LYOPHILIZED, FOR SOLUTION INTRAVENOUS at 12:58

## 2025-06-11 RX ADMIN — HYDROMORPHONE HYDROCHLORIDE 0.4 MG: 1 INJECTION, SOLUTION INTRAMUSCULAR; INTRAVENOUS; SUBCUTANEOUS at 16:03

## 2025-06-11 RX ADMIN — HYDROMORPHONE HYDROCHLORIDE 0.4 MG: 1 INJECTION, SOLUTION INTRAMUSCULAR; INTRAVENOUS; SUBCUTANEOUS at 15:21

## 2025-06-11 RX ADMIN — ROCURONIUM 50 MG: 50 INJECTION, SOLUTION INTRAVENOUS at 12:53

## 2025-06-11 RX ADMIN — SODIUM CHLORIDE, SODIUM LACTATE, POTASSIUM CHLORIDE, AND CALCIUM CHLORIDE: .6; .31; .03; .02 INJECTION, SOLUTION INTRAVENOUS at 12:22

## 2025-06-11 RX ADMIN — OXYCODONE HYDROCHLORIDE AND ACETAMINOPHEN 500 MG: 500 TABLET ORAL at 17:16

## 2025-06-11 RX ADMIN — DEXAMETHASONE SODIUM PHOSPHATE 10 MG: 10 INJECTION, SOLUTION INTRAMUSCULAR; INTRAVENOUS at 13:06

## 2025-06-11 RX ADMIN — TRAZODONE HYDROCHLORIDE 100 MG: 100 TABLET ORAL at 22:18

## 2025-06-11 RX ADMIN — SILDENAFIL 40 MG: 20 TABLET ORAL at 17:16

## 2025-06-11 RX ADMIN — FENTANYL CITRATE 25 MCG: 50 INJECTION INTRAMUSCULAR; INTRAVENOUS at 12:27

## 2025-06-11 RX ADMIN — WARFARIN SODIUM 10 MG: 5 TABLET ORAL at 20:37

## 2025-06-11 RX ADMIN — PROPOFOL 200 MG: 10 INJECTION, EMULSION INTRAVENOUS at 12:53

## 2025-06-11 RX ADMIN — ACETAMINOPHEN 1000 MG: 10 INJECTION INTRAVENOUS at 16:06

## 2025-06-11 RX ADMIN — CEFAZOLIN SODIUM 2000 MG: 2 SOLUTION INTRAVENOUS at 13:02

## 2025-06-11 RX ADMIN — MORPHINE SULFATE 2 MG: 2 INJECTION, SOLUTION INTRAMUSCULAR; INTRAVENOUS at 19:24

## 2025-06-11 RX ADMIN — SILDENAFIL 40 MG: 20 TABLET ORAL at 20:37

## 2025-06-11 RX ADMIN — ONDANSETRON 4 MG: 2 INJECTION, SOLUTION INTRAMUSCULAR; INTRAVENOUS at 13:02

## 2025-06-11 RX ADMIN — ACETAMINOPHEN 975 MG: 325 TABLET, FILM COATED ORAL at 11:44

## 2025-06-11 RX ADMIN — ACETAMINOPHEN 975 MG: 325 TABLET, FILM COATED ORAL at 23:55

## 2025-06-11 RX ADMIN — CEFAZOLIN SODIUM 2000 MG: 2 SOLUTION INTRAVENOUS at 20:37

## 2025-06-11 RX ADMIN — DIPHENHYDRAMINE HYDROCHLORIDE 25 MG: 25 TABLET ORAL at 16:39

## 2025-06-11 RX ADMIN — OXYCODONE HYDROCHLORIDE 10 MG: 10 TABLET ORAL at 22:18

## 2025-06-11 RX ADMIN — SODIUM CHLORIDE, SODIUM LACTATE, POTASSIUM CHLORIDE, AND CALCIUM CHLORIDE 125 ML/HR: .6; .31; .03; .02 INJECTION, SOLUTION INTRAVENOUS at 11:44

## 2025-06-11 RX ADMIN — SODIUM CHLORIDE, SODIUM LACTATE, POTASSIUM CHLORIDE, AND CALCIUM CHLORIDE 100 ML/HR: .6; .31; .03; .02 INJECTION, SOLUTION INTRAVENOUS at 17:13

## 2025-06-11 RX ADMIN — ENOXAPARIN SODIUM 100 MG: 100 INJECTION SUBCUTANEOUS at 23:55

## 2025-06-11 RX ADMIN — CHLORHEXIDINE GLUCONATE 15 ML: 1.2 SOLUTION ORAL at 11:44

## 2025-06-11 RX ADMIN — BUPIVACAINE 20 ML: 13.3 INJECTION, SUSPENSION, LIPOSOMAL INFILTRATION at 12:30

## 2025-06-11 NOTE — OP NOTE
OPERATIVE REPORT  PATIENT NAME: Na Joseph  : 1965  MRN: [id number]  Pt Location:  [facility] OR ROOM 03    Surgery Date: 2025    Surgeons and Role:     * Grupo Wilson DO - Primary     * Amina Goldberg PA-C - Assisting     * Jesus Chau MD - Assisting     Preop Diagnosis:  Status post revision of total knee, left [Z96.652]    Post-Op Diagnosis Codes:     * Status post revision of total knee, left [Z96.652]    Procedure(s):  Left - ARTHROPLASTY KNEE TOTAL REVISION BOTH COMPONENTS    Specimens:  ID Type Source Tests Collected by Time Destination   A : Left Knee Tissue Culture 1 Tissue Leg, Left ANAEROBIC CULTURE AND GRAM STAIN, FUNGAL CULTURE, CULTURE, TISSUE AND GRAM STAIN Grupo Wilson, DO 2025 1333    B : Left Knee Tissue Culture 2 Tissue Leg, Left ANAEROBIC CULTURE AND GRAM STAIN, FUNGAL CULTURE, CULTURE, TISSUE AND GRAM STAIN Grupo Wilson, DO 2025 1333    C : Left Knee Tissue Culture 3 Tissue Leg, Left ANAEROBIC CULTURE AND GRAM STAIN, FUNGAL CULTURE, CULTURE, TISSUE AND GRAM STAIN Grupo Wilson, DO 2025 1333        Estimated Blood Loss:   50 cc    Drains:  Ureteral Internal Stent Left ureter 6 Fr. 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 She has cleared her infection. Pre-op ESR/CRP have returned to normal and Synovasure negative for infection. She continues with pain in the knee and instability. The patient has elected to proceed with left TKA revision. Risks and benefits of surgery to include but not limited to bleeding, infection, damage to surrounding structures, hardware failure, instability, fracture, dislocation, need for further surgery, continued pain, stiffness, blood clots, stroke, and heart attack was discussed with the patient.     Operative  Findings:  Serous effusion  No purulence   Well-fixed implants   Pre-op ROM 0-120+  Post-op ROM 0-130+ calf to thigh gravity-assisted flexion    Implant Name Type Inv. Item Serial No.  Lot No. LRB No. Used Action   KNEE SYSTEM REV PRESSFIT STEM 14 X 60MM ATTUNE - [id number]  KNEE SYSTEM REV PRESSFIT STEM 14 X 60MM ATTUNE  DEPUY [id number] Left 1 Implanted   SLEEVE TIB REVISION 29MM ATTUNE HALF COAT - [id number]  SLEEVE TIB REVISION 29MM ATTUNE HALF COAT  DEPUY [id number] Left 1 Implanted   BASEPLATE TIB REVISION SZ 4 ROTPLT ATTUNE - [id number]  BASEPLATE TIB REVISION SZ 4 ROTPLT ATTUNE  DEPUY [id number] Left 1 Implanted   KNEE SYSTEM REV POST FEM AUG SZ 4 4MM ATTUNE - [id number]  KNEE SYSTEM REV POST FEM AUG SZ 4 4MM ATTUNE  DEPUY [id number] Left 1 Implanted   KNEE SYSTEM REV PRESSFIT STEM 18 X 100MM ATTUNE - [id number]  KNEE SYSTEM REV PRESSFIT STEM 18 X 100MM ATTUNE  DEPUY [id number] Left 1 Implanted   CEMENT BONE SMART SET GRAY MED VISC - [id number]  CEMENT BONE SMART SET GRAY MED VISC  DEPUY [id number] Left 1 Implanted   CEMENT BONE SMART SET GRAY MED VISC - [id number]  CEMENT BONE SMART SET GRAY MED VISC  DEPUY [id number] Left 1 Implanted   AUG DSTL FEM SZ 4 12MM ATTUNE - [id number]  AUG DSTL FEM SZ 4 12MM ATTUNE  DEPUY [id number] Left 1 Implanted   AUG DSTL FEM SZ 4 4MM ATTUNE - [id number]  AUG DSTL FEM SZ 4 4MM ATTUNE  DEPUY [id number] Left 1 Implanted   KNEE SYSTEM REV CRS FEM SZ 4 LT CMT ATTUNE - [id number]  KNEE SYSTEM REV CRS FEM SZ 4 LT CMT ATTUNE  DEPUY [id number] Left 1 Implanted   KNEE SYSTEM REV CRS RP INSRT SZ 4 16MM ATTUNE - [id number]  KNEE SYSTEM REV CRS RP INSRT SZ 4 16MM ATTUNE  DEPUY [id number] Left 1 Implanted   COMPONENT FEM SZ 5 LT CMNT CR ATTUNE - [id number]  COMPONENT FEM SZ 5 LT CMNT CR ATTUNE  DEPUY [id number] Left 1 Explanted   Attune Knee System All Poly Tibial Implant Cruciate Retaining Cemented Size 4, 12 mm    DEPUY [id number] Left 1 Explanted     Complications:   None    Knee Technique: Suture (direct)  Repair  Knee Approach: Medial Parapatellar    Chronic Narcotic Use:  No      Procedure and Technique:  Patient was seen in the preoperative holding area.  Informed consent was confirmed and all questions were answered. Operative site was confirmed and marked. Patient was taken to the operating room and transferred to the operating room table. General anesthesia was performed and all bony prominences were well-padded. Left lower extremity was prepped and draped in usual sterile fashion with chlorhexidine scrub.  Patient was given perioperative antibiotics prior to incision and SCDs were placed on the non-operative leg.  A formal time-out was performed identifying the patient and confirmed operative site.  The knee was exsanguinated and a pneumatic tourniquet was inflated at 250 mmHg.  The patient's previous anterior knee incision was resected in its entirety with scar down to the level of the extensor mechanism.  A medial flap was created along with a small lateral flap.  A medial parapatellar approach was utilized to enter the knee.  Upon performing the arthrotomy there was an effusion of straw-colored fluid.  We performed a medial peel and extensive incision and drainage of the knee down to and including bone.  We resected scar from the medial and lateral extensor mechanism to establish the gutters.  We then inspected the implants and found all to be well-fixed. A combination of Taryn osteotomes were used to loosen the bone cement interface. The femur was removed without complication.     At this time we turned our attention back to the tibial component.  Retractors were carefully placed around the knee. A sagittal saw was utilized at the implant cement interface. The all poly tibial component was removed without complication. Three soft tissue specimens were obtained for culture from the synovium, behind the femoral component and to from beneath the tibial component.  At this time we started to remove retained  cement at the tibia and femoral bone surfaces.  This was removed with a combination of cement splitters, osteotomes, back scratcher's and pituitary rongeur. Once all cement was removed we reestablished the tibial canal.  We reamed up to a size 14 reamer with excellent chatter.  At this time the tibia was broached to a size 29 sleeve with excellent rotational stability.  We then trialed the tibia and found a size 4 attune revision tibia to be appropriate size.  This was locked into place rotationally and the keel was punched.     At this time our attention was drawn back to the femur.  The canal was reestablished and femur was reamed up to a size 18 with excellent chatter.  At this time a cut through trial was assembled with a size 5 then 4 attune revision femur. This was impacted into place and rotation was confirmed with a combination of the transepicondylar axis and Whitesides line. The femur required a 12 mm distal lateral, 4 mm distal medial and a 4 mm posterolateral augment. At this time the knee was trialed with semiconstrained poly's and found to have excellent stability and full motion with a 16 mm attune revision poly to restore the joint line using the landmarks of patella, meniscal scar and fibular head.  At this time we evaluated the patellar component and found to be stable. The knee was then taken through motion and found to have excellent stability.     The trials were then removed from the knee.  At this time a thorough debridement of all devitalized tissue and scar was performed around the knee.  The knee was irrigated with Irrisept solution and this was allowed to sit for 3 minutes. The knee was then irrigated with normal saline solution while the final components were assembled on the back table.  The femoral and tibial components were impacted and tightened to specification.  The bone surfaces were dried and the tibial component was press-fit and cemented at the epiphysis.  Peripheral cement was  removed.  Next the femur was impacted into place also cementing at the epiphysis.  Excess cement was removed.  At this time the real 16 mm revision poly was placed.  Knee was held in extension. The tourniquet was released at 41 minutes and all bleeders were coagulated.  The knee was irrigated with the remainder of the 3 L of normal saline solution. This was followed by Biasurge solution. The knee was taken through a final range of motion and found to have excellent stability and patellar tracking.  All instrument and sponge counts were correct x2. The arthrotomy was closed with #1 Stratafix suture.  Subcutaneous tissues were closed with 2-0 Vicryl.  The skin was closed with 3-0 Stratafix followed by Dermabond.  A sterile Mepilex was placed along with a thigh foot Ace wrap.  Patient was awoken from anesthesia and taken to recovery room in stable condition.        60F s/p L TKA revision for instability of antibiotic arthroplasty 6/11  - multi-modal pain control  - ancef x 24 hrs, duricef x 2 weeks  - DVT ppx: coumadin 10 mg tonight with lovenox bridge until therapeutic   - PT/OT  - WBAT  - ROM as tolerated, pillow/blankets under achilles not behind knee while in bed  - f/u I/O cultures  - f/u 10-14 days         I was present for the entire procedure. and A physician assistant was required during the procedure for retraction, tissue handling, dissection and suturing.    Patient Disposition:  PACU       SIGNATURE: Grupo Wilson DO  DATE: June 11, 2025  TIME: 2:30 PM

## 2025-06-11 NOTE — OCCUPATIONAL THERAPY NOTE
Occupational Therapy Cx Note     Patient Name: Na Joseph  Today's Date: 6/11/2025  Problem List  Principal Problem:    Status post revision of total knee, left  Active Problems:    SLE (systemic lupus erythematosus) (HCC)    Antiphospholipid antibody syndrome (HCC)    Infection of total knee replacement  (HCC)    Right-sided congestive heart failure (HCC)              06/11/25 1700   OT Last Visit   OT Visit Date 06/11/25   Note Type   Note type Cancelled Session   Additional Comments OT orders received. Secure chat with ARIANNA Huynh states pt to be seen POD#1. Will f/u as able & appropriate       Angelika Sykes, OTR/L

## 2025-06-11 NOTE — INTERVAL H&P NOTE
H&P reviewed. After examining the patient I find no changes in the patients condition since the H&P had been written. Plan for left TKA revision.

## 2025-06-11 NOTE — ANESTHESIA POSTPROCEDURE EVALUATION
Post-Op Assessment Note    Pain Score: 4    Pain management: adequate    Multimodal analgesia used between 6 hours prior to anesthesia start to PACU discharge    Mental Status:  Sleepy   Hydration Status:  Stable   PONV Controlled:  None   Airway Patency:  Patent  Airway: intubated  There is a medical reason for not screening for obstructive sleep apnea and/or for not using two or more mitigation strategies   Post Op Vitals Reviewed: Yes    No anethesia notable event occurred.    Staff: CRNA           Last Filed PACU Vitals:  Vitals Value Taken Time   Temp     Pulse     BP     Resp     SpO2

## 2025-06-11 NOTE — CONSULTS
Consultation - Hospitalist   Name: Na Joseph 60 y.o. female I MRN: [id number]  Unit/Bed#: OR POOL I Date of Admission: 6/11/2025   Date of Service: 6/11/2025 I Hospital Day: 0   Inpatient consult to Internal Medicine  Consult performed by: Monroe Avalos PA-C  Consult ordered by: Amina Goldberg PA-C         Physician Requesting Evaluation: Grupo Wilson DO   Reason for Evaluation / Principal Problem: Medical management    Assessment & Plan  Status post revision of total knee, left  POD #0 s/p L TKA revision with Dr. Wilson  Management per primary team  DVT prophylaxis as below  PT/OT, incentive spirometry, pain control  Fall precautions  CM eval tomorrow for discharge planning  Infection of total knee replacement  (HCC)  History of left TKA 10/6/22. Developed 2 weeks of left knee pain, fevers, chills and was seen by orthopedic surgery 9/23.  Arthrocentesis performed and Synovasure was positive with Staph epidermidis isolated.  Now status post left TKA revision with implantation of an articulating antibiotic spacer (1.5 stage revision arthroplasty) 10/3/24.  Operative cultures growing staph epidermidis in broth only.   Completed IV Cefazolin 6 week course of IV antibiotics through 11/13/24 now followed by 4.5 months p.o. antibiotics with Cefadroxil (6 months total) due to 1.5 stage revision arthroplasty through 4/14/25  Recommend continued outpatient follow-up with ID, will be on p.o. doxycycline postoperatively per orthopedics  Antiphospholipid antibody syndrome (HCC)  Follows with outpatient hematology maintained on Coumadin goal INR 2.0-3.0  Post op, initiate Lovenox 100 mg subcu daily along with warfarin until INR is therapeutic range   SLE (systemic lupus erythematosus) ([facility])  Continue outpatient rheumatologic follow-up  On Plaquenil as an outpatient  Right-sided congestive heart failure ([facility])  Resume home diuretics postoperatively and monitor BMP  Intake and output, daily weights        VTE  Pharmacologic Prophylaxis:   High Risk (Score >/= 5) - Pharmacological DVT Prophylaxis Ordered: warfarin (Coumadin). Sequential Compression Devices Ordered.  Code Status: full code  Discussion with family: Patient declined call to .     Anticipated Length of Stay: Patient will be admitted on an inpatient basis with an anticipated length of stay of greater than 2 midnights secondary to L TKA requiring medical mangement.    History of Present Illness   Chief Complaint: L knee pain    Na Joseph is a 60 y.o. female with a PMH of APAS, SLE, CHF who presents with knee pain.  Patient is here for elective left TKA revision.  Was bridged with Lovenox preoperatively and follows with hematology.  Followed by ID in the outpatient setting for joint infection as above.    Review of Systems   Constitutional:  Negative for activity change, appetite change, chills, fatigue and fever.   HENT:  Negative for congestion, rhinorrhea, sinus pressure and sore throat.    Eyes:  Negative for photophobia, pain and visual disturbance.   Respiratory:  Negative for cough, shortness of breath and wheezing.    Cardiovascular:  Negative for chest pain, palpitations and leg swelling.   Gastrointestinal:  Negative for abdominal distention, abdominal pain, constipation, diarrhea, nausea and vomiting.   Endocrine: Negative for cold intolerance, heat intolerance, polydipsia and polyuria.   Genitourinary:  Negative for difficulty urinating, dysuria, flank pain, frequency and hematuria.   Musculoskeletal:  Positive for arthralgias. Negative for back pain and joint swelling.   Skin:  Negative for color change, pallor and rash.   Allergic/Immunologic: Negative.    Neurological:  Negative for dizziness, syncope, weakness, light-headedness and headaches.   Hematological: Negative.    Psychiatric/Behavioral: Negative.          Historical Information   Past Medical History[1]  Past Surgical History[2]  Social History[3]  E-Cigarette/Vaping     E-Cigarette Use Never User      E-Cigarette/Vaping Substances    Nicotine No     THC No     CBD No     Flavoring No     Other No     Unknown No      Family history non-contributory  Social History:  Marital Status: /Civil Union   Occupation: noncontributory  Patient Pre-hospital Living Situation: Home  Patient Pre-hospital Level of Mobility: walks  Patient Pre-hospital Diet Restrictions: none    Meds/Allergies   I have reviewed home medications with patient personally.  Prior to Admission medications    Medication Sig Start Date End Date Taking? Authorizing Provider   ascorbic acid (VITAMIN C) 500 MG tablet Take 1 tablet (500 mg total) by mouth 2 (two) times a day 5/6/25  Yes Amina Goldberg PA-C   Benlysta 200 MG/ML SOAJ Inject under the skin Every friday 9/30/24  Yes Historical Provider, MD   cholecalciferol (VITAMIN D3) 25 mcg (1,000 units) tablet Take 2 tablets (2,000 Units total) by mouth daily 5/6/25  Yes Amina Goldberg PA-C   cyanocobalamin 1,000 mcg/mL 1000 mcg IM Q 2 weeks  Patient taking differently: 1000 mcg IM Q 2 weeks  next dose 5/23/25 1/6/25  Yes Maile March PA-C   fluconazole (DIFLUCAN) 150 mg tablet if needed 2/12/25  Yes Historical Provider, MD   folic acid (FOLVITE) 1 mg tablet Take 1 tablet (1 mg total) by mouth daily 5/6/25  Yes Amina Goldberg PA-C   gabapentin (NEURONTIN) 100 mg capsule TAKE 1 CAPSULE(100 MG) BY MOUTH THREE TIMES DAILY  Patient taking differently: Take 100 mg by mouth daily at bedtime 2/11/25  Yes Amina Goldberg PA-C   hydroxychloroquine (PLAQUENIL) 200 mg tablet 200mg every other day, 400mg alternating days. Changing as needed d/t theraputic level   Yes Historical Provider, MD   loperamide (IMODIUM A-D) 2 MG tablet Take 2 mg by mouth 4 (four) times a day as needed for diarrhea   Yes Historical Provider, MD   multivitamin (THERAGRAN) TABS Take 1 tablet by mouth daily   Yes Historical Provider, MD   mupirocin (BACTROBAN) 2 % ointment Apply topically  "to the inside of the left and right nostrils twice daily for 5 days before surgery, including the morning of surgery. 5/6/25  Yes Amina Goldberg PA-C   ondansetron (ZOFRAN) 4 mg tablet Take 1 tablet (4 mg total) by mouth every 8 (eight) hours as needed for nausea or vomiting 4/24/25  Yes Tim Lazcano PA-C   potassium chloride (Klor-Con M20) 20 mEq tablet Take 1 tablet (20 mEq total) by mouth daily  Patient taking differently: Take 20 mEq by mouth if needed 5/9/24  Yes Hardeep Hughes DO   sildenafil (REVATIO) 20 mg tablet Take 2 tablets (40 mg total) by mouth 3 (three) times a day 3/19/25  Yes KERI Fuller   spironolactone (ALDACTONE) 25 mg tablet TAKE 1 TABLET(25 MG) BY MOUTH DAILY 5/2/25  Yes Hardeep Hughes DO   torsemide (DEMADEX) 20 mg tablet TAKE 1 TABLET(20 MG) BY MOUTH DAILY 1/24/25  Yes Hardeep Hughes DO   traZODone (DESYREL) 50 mg tablet Take 2 tablets (100 mg total) by mouth daily at bedtime 4/30/25  Yes Sultana Arnold,    warfarin (Coumadin) 5 mg tablet 7.5mg po daily or as directed.  Patient taking differently: 7.5mg po daily or as directed. Last dose 6/5/25 3/28/25  Yes Clarice Brewster MD   B-D 3CC LUER-LILLIE SYR 25GX1/2\" 25G X 1-1/2\" 3 ML MISC USE 1 SYRINGE EVERY 30 DAYS 1/6/23   Maile March PA-C   enoxaparin (Lovenox) 100 mg/mL Inject 1 mL (100 mg total) under the skin every 24 hours  Patient taking differently: Inject 100 mg under the skin every 24 hours Bridge for surgery 5/12/25   Maile March PA-C   NEEDLE, DISP, 23 G (BD Disp Needle) 23G X 1\" MISC Use to administer B12 1/6/25   Maile March PA-C   nystatin (MYCOSTATIN) powder Apply topically 3 (three) times a day  Patient taking differently: Apply topically if needed 11/12/24   Sherrill Warner PA-C   ferrous sulfate 324 (65 Fe) mg Take 1 tablet (324 mg total) by mouth 2 (two) times a day before meals 7/7/22 11/5/22  Ryne Mcrae PA-C     Allergies   Allergen Reactions    Amoxicillin-Pot " Clavulanate Dermatitis, Rash, Anaphylaxis, Hives, Itching and Swelling    Dilantin [Phenytoin] Anaphylaxis and Rash    Penicillins Anaphylaxis, Hives, Dermatitis and Rash     Includes augmentin       Objective :  Temp:  [97.1 °F (36.2 °C)] 97.1 °F (36.2 °C)  HR:  [92] 92  BP: (171-183)/(77-81) 171/77  Resp:  [14] 14  SpO2:  [99 %] 99 %  O2 Device: None (Room air)    Physical Exam  Vitals and nursing note reviewed.   Constitutional:       General: She is not in acute distress.     Appearance: Normal appearance. She is not ill-appearing.   HENT:      Head: Normocephalic and atraumatic.      Mouth/Throat:      Mouth: Mucous membranes are moist.     Eyes:      General: No scleral icterus.        Right eye: No discharge.         Left eye: No discharge.      Pupils: Pupils are equal, round, and reactive to light.       Cardiovascular:      Rate and Rhythm: Normal rate and regular rhythm.      Heart sounds: No murmur heard.     No friction rub. No gallop.   Pulmonary:      Effort: No respiratory distress.      Breath sounds: No wheezing, rhonchi or rales.   Abdominal:      General: Bowel sounds are normal. There is no distension.      Palpations: Abdomen is soft.      Tenderness: There is no abdominal tenderness. There is no guarding or rebound.     Musculoskeletal:         General: Swelling (L knee) present. No deformity.      Cervical back: Neck supple.      Right lower leg: No edema.      Left lower leg: No edema.     Skin:     General: Skin is warm and dry.      Capillary Refill: Capillary refill takes less than 2 seconds.      Coloration: Skin is not jaundiced or pale.      Findings: No erythema or rash.     Neurological:      General: No focal deficit present.      Mental Status: She is alert and oriented to person, place, and time. Mental status is at baseline.     Psychiatric:         Mood and Affect: Mood normal.         Behavior: Behavior normal.          Lines/Drains:  Lines/Drains/Airways       Active Status        Name Placement date Placement time Site Days    ETT  Cuffed;Oral 7 mm 06/11/25  1255  -- less than 1                          Lab Results: I have reviewed the following results:          Results from last 7 days   Lab Units 06/06/25  0749   INR  2.27*         Lab Results   Component Value Date    HGBA1C 5.3 05/14/2025    HGBA1C 5.5 09/27/2024    HGBA1C 5.2 09/15/2022           Imaging Results Review: No pertinent imaging studies reviewed.  Other Study Results Review: No additional pertinent studies reviewed.    Administrative Statements       ** Please Note: This note has been constructed using a voice recognition system. **         [1]   Past Medical History:  Diagnosis Date    HELENE positive     Anemia     Sildenafil causes decreased hematocrit    Antiphospholipid syndrome ([facility])     Anxiety 3/2020    CHF (congestive heart failure) ([facility])     right heart failure related to pulm HTN lupus    Chronic kidney disease 2018    borderline lab values    Chronic rhinitis 06/04/2012    Clotting disorder ([facility]) 2012    Coronary artery disease 2018    Encephalitis     Lasted Assessed 10/18/2017    Gout 06/26/2018    Head injury 11/11/2023    Heart failure ([facility]) 2019    History of transfusion 2/13/2019    autologous    Hypertension     Lupus 2018    Lupus anticoagulant disorder ([facility])     Maxillary sinusitis     Lasted Assessed 10/18/2017    Night sweat     Osteopenia     Hip    Osteoporosis     Spine    Pneumonia     Last Assessed 10/18/2017    PONV (postoperative nausea and vomiting)     Pulmonary embolism ([facility])     Pulmonary hypertension ([facility])     Rheumatic disease     Seizures ([facility])     Only during Encephalitis    Seizures ([facility]) 04/24/2025    Occurred once at age 18-something associated with viral encephalitis      Shortness of breath     with exertion    Sinus tachycardia     Urinary incontinence    [2]   Past Surgical History:  Procedure Laterality Date    ANKLE SURGERY  6/2015    ARTHRODESIS      Hand: IP Joint     CHOLECYSTECTOMY      COLONOSCOPY      COLPOSCOPY      FL RETROGRADE PYELOGRAM  2025    HYSTERECTOMY      Vaginal    JOINT REPLACEMENT Right 10/03/2024    Knee replacement    KNEE SURGERY  2019    LAPAROSCOPIC ESOPHAGOGASTRIC FUNDOPLASTY  2008    ORTHOPEDIC SURGERY  10/03/2024    10/06/2022    KS ARTHRP KNE CONDYLE&PLATU MEDIAL&LAT COMPARTMENTS Right 2019    Procedure: KNEE TOTAL ARTHROPLASTY AND ASSOCIATED PROCEDURES;  Surgeon: Donovan Zendejas DO;  [facility];  Service: Orthopedics    KS ARTHRP KNE CONDYLE&PLATU MEDIAL&LAT COMPARTMENTS Left 10/06/2022    Procedure: ARTHROPLASTY KNEE TOTAL;  Surgeon: Donovan Zendejas DO;  [facility];  Service: Orthopedics    KS ARTHRS KNEE W/MENISCECTOMY MED&LAT W/SHAVING Right 10/02/2018    Procedure: KNEE ARTHROSCOPY WITH PARTIAL MEDIAL MENISCECTOMY;  Surgeon: Donovan Zendejas DO;  [facility];  Service: Orthopedics    KS ARTHRS KNEE W/MENISCECTOMY MED&LAT W/SHAVING Left 2019    Procedure: KNEE ARTHROSCOPIC MENISCECTOMY /MEDIAL; Chondyl medial and posterior;  Surgeon: Donovan Zendejas DO;  [facility];  Service: Orthopedics    KS CYSTO/URETERO W/LITHOTRIPSY &INDWELL STENT INSRT Left 2025    Procedure: CYSTOSCOPY URETEROSCOPY WITH LITHOTRIPSY HOLMIUM LASER, RETROGRADE PYELOGRAM AND INSERTION STENT URETERAL;;  Surgeon: Yusuf Briseno DO;  [facility];  Service: Urology    REDUCTION MAMMAPLASTY Bilateral     doesnt remember when    REPAIR ANKLE LIGAMENT Right     TENDON REPAIR      TONSILLECTOMY      TOTAL KNEE ARTHROPLASTY REVISION Left 10/03/2024    Procedure: ARTHROPLASTY KNEE TOTAL REVISION;  Surgeon: Grupo Wilson DO;  Location: [facility];  Service: Orthopedics   [3]   Social History  Tobacco Use    Smoking status: Former     Current packs/day: 0.00     Types: Cigarettes     Quit date: 2006     Years since quittin.3    Smokeless tobacco: Never   Vaping Use    Vaping status: Never Used   Substance  and Sexual Activity    Alcohol use: Not Currently     Comment: Socially    Drug use: No    Sexual activity: Yes     Partners: Male     Birth control/protection: Female Sterilization

## 2025-06-11 NOTE — PHYSICAL THERAPY NOTE
PHYSICAL THERAPY CANCELLATION NOTE      Patient Name: Na Joseph  Today's Date: 6/11/2025 06/11/25 1653   PT Last Visit   PT Visit Date 06/11/25   Note Type   Note type Cancelled Session   Additional Comments PT orders anticipated but not yet received, spoke to Dr. Wilson, AURORA Huynh, planned for PT eval POD1.       Shawna Vasques, PT, DPT

## 2025-06-11 NOTE — PLAN OF CARE
Problem: PAIN - ADULT  Goal: Verbalizes/displays adequate comfort level or baseline comfort level  Description: Interventions:  - Encourage patient to monitor pain and request assistance  - Assess pain using appropriate pain scale  - Administer analgesics as ordered based on type and severity of pain and evaluate response  - Implement non-pharmacological measures as appropriate and evaluate response  - Consider cultural and social influences on pain and pain management  - Notify physician/advanced practitioner if interventions unsuccessful or patient reports new pain  - Educate patient/family on pain management process including their role and importance of  reporting pain   - Provide non-pharmacologic/complimentary pain relief interventions  Outcome: Progressing     Problem: INFECTION - ADULT  Goal: Absence or prevention of progression during hospitalization  Description: INTERVENTIONS:  - Assess and monitor for signs and symptoms of infection  - Monitor lab/diagnostic results  - Monitor all insertion sites, i.e. indwelling lines, tubes, and drains  - Monitor endotracheal if appropriate and nasal secretions for changes in amount and color  - [place] appropriate cooling/warming therapies per order  - Administer medications as ordered  - Instruct and encourage patient and family to use good hand hygiene technique  - Identify and instruct in appropriate isolation precautions for identified infection/condition  Outcome: Progressing  Goal: Absence of fever/infection during neutropenic period  Description: INTERVENTIONS:  - Monitor WBC  - Perform strict hand hygiene  - Limit to healthy visitors only  - No plants, dried, fresh or silk flowers with wells in patient room  Outcome: Progressing     Problem: SAFETY ADULT  Goal: Patient will remain free of falls  Description: INTERVENTIONS:  - Educate patient/family on patient safety including physical limitations  - Instruct patient to call for assistance with activity   -  Consider consulting OT/PT to assist with strengthening/mobility based on AM PAC & JH-HLM score  - Consult OT/PT to assist with strengthening/mobility   - Keep Call bell within reach  - Keep bed low and locked with side rails adjusted as appropriate  - Keep care items and personal belongings within reach  - Initiate and maintain comfort rounds  - Make Fall Risk Sign visible to staff  - Offer Toileting every 2 Hours, in advance of need  - Initiate/Maintain alarm  - Obtain necessary fall risk management equipment:   - Apply yellow socks and bracelet for high fall risk patients  - Consider moving patient to room near nurses station  Outcome: Progressing  Goal: Maintain or return to baseline ADL function  Description: INTERVENTIONS:  -  Assess patient's ability to carry out ADLs; assess patient's baseline for ADL function and identify physical deficits which impact ability to perform ADLs (bathing, care of mouth/teeth, toileting, grooming, dressing, etc.)  - Assess/evaluate cause of self-care deficits   - Assess range of motion  - Assess patient's mobility; develop plan if impaired  - Assess patient's need for assistive devices and provide as appropriate  - Encourage maximum independence but intervene and supervise when necessary  - Involve family in performance of ADLs  - Assess for home care needs following discharge   - Consider OT consult to assist with ADL evaluation and planning for discharge  - Provide patient education as appropriate  - Monitor functional capacity and physical performance, use of AM PAC & JH-HLM   - Monitor gait, balance and fatigue with ambulation    Outcome: Progressing  Goal: Maintains/Returns to pre admission functional level  Description: INTERVENTIONS:  - Perform AM-PAC 6 Click Basic Mobility/ Daily Activity assessment daily.  - Set and communicate daily mobility goal to care team and patient/family/caregiver.   - Collaborate with rehabilitation services on mobility goals if consulted  -  Perform Range of Motion 3 times a day.  - Reposition patient every 2 hours.  - Dangle patient 3 times a day  - Stand patient 3 times a day  - Ambulate patient 3 times a day  - Out of bed to chair 3 times a day   - Out of bed for meals 3 times a day  - Out of bed for toileting  - Record patient progress and toleration of activity level   Outcome: Progressing     Problem: DISCHARGE PLANNING  Goal: Discharge to home or other facility with appropriate resources  Description: INTERVENTIONS:  - Identify barriers to discharge w/patient and caregiver  - Arrange for needed discharge resources and transportation as appropriate  - Identify discharge learning needs (meds, wound care, etc.)  - Arrange for interpretive services to assist at discharge as needed  - Refer to Case Management Department for coordinating discharge planning if the patient needs post-hospital services based on physician/advanced practitioner order or complex needs related to functional status, cognitive ability, or social support system  Outcome: Progressing     Problem: Knowledge Deficit  Goal: Patient/family/caregiver demonstrates understanding of disease process, treatment plan, medications, and discharge instructions  Description: Complete learning assessment and assess knowledge base.  Interventions:  - Provide teaching at level of understanding  - Provide teaching via preferred learning methods  Outcome: Progressing

## 2025-06-11 NOTE — ANESTHESIA PROCEDURE NOTES
Peripheral Block    Patient location during procedure: holding area  Start time: 6/11/2025 12:30 PM  Reason for block: at surgeon's request and post-op pain management  Staffing  Performed by: Nay Cooper MD  Authorized by: Nay Cooper MD    Preanesthetic Checklist  Completed: patient identified, IV checked, site marked, risks and benefits discussed, surgical consent, monitors and equipment checked, pre-op evaluation and timeout performed  Peripheral Block  Patient position: supine  Prep: ChloraPrep  Patient monitoring: frequent blood pressure checks, continuous pulse oximetry and heart rate  Block type: Adductor Canal  Laterality: left  Injection technique: single-shot  Procedures: ultrasound guided, Ultrasound guidance required for the procedure to increase accuracy and safety of medication placement and decrease risk of complications.  Ultrasound permanent image saved    Needle  Needle type: Stimuplex   Needle gauge: 20 G  Needle length: 6 in  Needle localization: anatomical landmarks and ultrasound guidance  Needle insertion depth: 5 cm  Assessment  Injection assessment: incremental injection, frequent aspiration, injected with ease, negative aspiration, negative for heart rate change, no paresthesia on injection, no symptoms of intraneural/intravenous injection and needle tip visualized at all times  Paresthesia pain: none  Post-procedure:  site cleaned  patient tolerated the procedure well with no immediate complications

## 2025-06-11 NOTE — ASSESSMENT & PLAN NOTE
History of left TKA 10/6/22. Developed 2 weeks of left knee pain, fevers, chills and was seen by orthopedic surgery 9/23.  Arthrocentesis performed and Synovasure was positive with Staph epidermidis isolated.  Now status post left TKA revision with implantation of an articulating antibiotic spacer (1.5 stage revision arthroplasty) 10/3/24.  Operative cultures growing staph epidermidis in broth only.   Completed IV Cefazolin 6 week course of IV antibiotics through 11/13/24 now followed by 4.5 months p.o. antibiotics with Cefadroxil (6 months total) due to 1.5 stage revision arthroplasty through 4/14/25  Recommend continued outpatient follow-up with ID, will be on p.o. doxycycline postoperatively per orthopedics

## 2025-06-11 NOTE — ASSESSMENT & PLAN NOTE
Follows with outpatient hematology maintained on Coumadin goal INR 2.0-3.0  Post op, initiate Lovenox 100 mg subcu daily along with warfarin until INR is therapeutic range

## 2025-06-12 ENCOUNTER — RA CDI HCC (OUTPATIENT)
Dept: OTHER | Facility: HOSPITAL | Age: 60
End: 2025-06-12

## 2025-06-12 VITALS
TEMPERATURE: 97.9 F | OXYGEN SATURATION: 97 % | HEIGHT: 62 IN | BODY MASS INDEX: 43.21 KG/M2 | HEART RATE: 93 BPM | WEIGHT: 234.79 LBS | DIASTOLIC BLOOD PRESSURE: 74 MMHG | RESPIRATION RATE: 16 BRPM | SYSTOLIC BLOOD PRESSURE: 139 MMHG

## 2025-06-12 LAB
ANION GAP SERPL CALCULATED.3IONS-SCNC: 9 MMOL/L (ref 4–13)
BUN SERPL-MCNC: 12 MG/DL (ref 5–25)
CALCIUM SERPL-MCNC: 9.3 MG/DL (ref 8.4–10.2)
CHLORIDE SERPL-SCNC: 103 MMOL/L (ref 96–108)
CO2 SERPL-SCNC: 23 MMOL/L (ref 21–32)
CREAT SERPL-MCNC: 0.71 MG/DL (ref 0.6–1.3)
ERYTHROCYTE [DISTWIDTH] IN BLOOD BY AUTOMATED COUNT: 12.1 % (ref 11.6–15.1)
GFR SERPL CREATININE-BSD FRML MDRD: 91 ML/MIN/1.73SQ M
GLUCOSE SERPL-MCNC: 142 MG/DL (ref 65–140)
GLUCOSE SERPL-MCNC: 164 MG/DL (ref 65–140)
HCT VFR BLD AUTO: 34.7 % (ref 34.8–46.1)
HGB BLD-MCNC: 11.7 G/DL (ref 11.5–15.4)
INR PPP: 1.1 (ref 0.85–1.19)
MCH RBC QN AUTO: 31.7 PG (ref 26.8–34.3)
MCHC RBC AUTO-ENTMCNC: 33.7 G/DL (ref 31.4–37.4)
MCV RBC AUTO: 94 FL (ref 82–98)
PLATELET # BLD AUTO: 202 THOUSANDS/UL (ref 149–390)
PMV BLD AUTO: 11.3 FL (ref 8.9–12.7)
POTASSIUM SERPL-SCNC: 4.2 MMOL/L (ref 3.5–5.3)
PROTHROMBIN TIME: 14.7 SECONDS (ref 12.3–15)
RBC # BLD AUTO: 3.69 MILLION/UL (ref 3.81–5.12)
SODIUM SERPL-SCNC: 135 MMOL/L (ref 135–147)
WBC # BLD AUTO: 6.62 THOUSAND/UL (ref 4.31–10.16)

## 2025-06-12 PROCEDURE — 99024 POSTOP FOLLOW-UP VISIT: CPT

## 2025-06-12 PROCEDURE — 85610 PROTHROMBIN TIME: CPT | Performed by: INTERNAL MEDICINE

## 2025-06-12 PROCEDURE — 97535 SELF CARE MNGMENT TRAINING: CPT

## 2025-06-12 PROCEDURE — 97166 OT EVAL MOD COMPLEX 45 MIN: CPT

## 2025-06-12 PROCEDURE — 97162 PT EVAL MOD COMPLEX 30 MIN: CPT

## 2025-06-12 PROCEDURE — 85027 COMPLETE CBC AUTOMATED: CPT | Performed by: INTERNAL MEDICINE

## 2025-06-12 PROCEDURE — 82948 REAGENT STRIP/BLOOD GLUCOSE: CPT

## 2025-06-12 PROCEDURE — 99233 SBSQ HOSP IP/OBS HIGH 50: CPT | Performed by: STUDENT IN AN ORGANIZED HEALTH CARE EDUCATION/TRAINING PROGRAM

## 2025-06-12 PROCEDURE — 80048 BASIC METABOLIC PNL TOTAL CA: CPT | Performed by: INTERNAL MEDICINE

## 2025-06-12 PROCEDURE — 97116 GAIT TRAINING THERAPY: CPT

## 2025-06-12 RX ADMIN — ACETAMINOPHEN 975 MG: 325 TABLET, FILM COATED ORAL at 08:55

## 2025-06-12 RX ADMIN — SENNOSIDES 8.6 MG: 8.6 TABLET, FILM COATED ORAL at 09:02

## 2025-06-12 RX ADMIN — MORPHINE SULFATE 2 MG: 2 INJECTION, SOLUTION INTRAMUSCULAR; INTRAVENOUS at 15:06

## 2025-06-12 RX ADMIN — TORSEMIDE 20 MG: 20 TABLET ORAL at 09:04

## 2025-06-12 RX ADMIN — OXYCODONE HYDROCHLORIDE 10 MG: 10 TABLET ORAL at 13:26

## 2025-06-12 RX ADMIN — SILDENAFIL 40 MG: 20 TABLET ORAL at 09:03

## 2025-06-12 RX ADMIN — PANTOPRAZOLE SODIUM 40 MG: 40 TABLET, DELAYED RELEASE ORAL at 09:02

## 2025-06-12 RX ADMIN — MORPHINE SULFATE 2 MG: 2 INJECTION, SOLUTION INTRAMUSCULAR; INTRAVENOUS at 10:10

## 2025-06-12 RX ADMIN — WARFARIN SODIUM 7.5 MG: 5 TABLET ORAL at 09:03

## 2025-06-12 RX ADMIN — OXYCODONE HYDROCHLORIDE 10 MG: 10 TABLET ORAL at 04:36

## 2025-06-12 RX ADMIN — DOCUSATE SODIUM 100 MG: 100 CAPSULE, LIQUID FILLED ORAL at 09:02

## 2025-06-12 RX ADMIN — OXYCODONE HYDROCHLORIDE 10 MG: 10 TABLET ORAL at 09:01

## 2025-06-12 RX ADMIN — SODIUM CHLORIDE, SODIUM LACTATE, POTASSIUM CHLORIDE, AND CALCIUM CHLORIDE 100 ML/HR: .6; .31; .03; .02 INJECTION, SOLUTION INTRAVENOUS at 03:49

## 2025-06-12 RX ADMIN — FOLIC ACID 1 MG: 1 TABLET ORAL at 09:04

## 2025-06-12 RX ADMIN — Medication 1 TABLET: at 09:04

## 2025-06-12 RX ADMIN — CEFAZOLIN SODIUM 2000 MG: 2 SOLUTION INTRAVENOUS at 03:50

## 2025-06-12 RX ADMIN — OXYCODONE HYDROCHLORIDE AND ACETAMINOPHEN 500 MG: 500 TABLET ORAL at 08:55

## 2025-06-12 RX ADMIN — SPIRONOLACTONE 25 MG: 25 TABLET ORAL at 09:02

## 2025-06-12 NOTE — PHYSICAL THERAPY NOTE
"                                                                                  PHYSICAL THERAPY EVAL/TX  Physical Therapy Evaluation    Performed at least 2 patient identifiers during session:  Problem List[1]    Past Medical History[2]    Past Surgical History[3]       06/12/25 0810   PT Last Visit   PT Visit Date 06/12/25   Note Type   Note type Evaluation   Pain Assessment   Pain Assessment Tool 0-10   Pain Score 7   Pain Location/Orientation Orientation: Left;Location: Knee   Hospital Pain Intervention(s) Repositioned;Ambulation/increased activity   Restrictions/Precautions   Weight Bearing Precautions Per Order Yes   LLE Weight Bearing Per Order WBAT   Other Precautions Chair Alarm;Bed Alarm;Multiple lines;Fall Risk;Pain   Home Living   Type of Home House   Home Layout Two level;Bed/bath upstairs  (4STE with railing)   Bathroom Shower/Tub Walk-in shower   Bathroom Toilet Raised   Bathroom Equipment Grab bars in shower;Shower chair   Home Equipment Walker;Other (Comment)  (Rollator. Leg )   Prior Function   Level of [place] Independent with ADLs;Independent with functional mobility;Needs assistance with IADLS   Lives With Spouse  (Grandson, Fiance and grandchild)   Receives Help From Family   IADLs Independent with driving;Family/Friend/Other provides meals;Independent with medication management   Falls in the last 6 months 0   Vocational On disability   General   Additional Pertinent History Patient underwent 2 prior surgeries for the L knee   Family/Caregiver Present Yes   Cognition   Overall Cognitive Status WFL   Arousal/Participation Alert   Attention Within functional limits   Orientation Level Oriented to person;Oriented to place;Oriented to time;Oriented to situation   Memory Within functional limits   Following Commands Follows all commands and directions without difficulty   Subjective   Subjective \"I want to stay one more day for pain management.\"   RLE Assessment   RLE Assessment WFL "   Bed Mobility   Supine to Sit 6  Modified independent   Additional items HOB elevated;Leg ;Bedrails   Additional Comments Plans to sleep in a recliner chair   Transfers   Sit to Stand 5  Supervision   Additional items Armrests   Stand to Sit 5  Supervision   Additional items Armrests   Ambulation/Elevation   Gait pattern Step through pattern;Decreased L stance;Antalgic;Decreased heel strike   Gait Assistance 5  Supervision   Assistive Device 4-wheeled walker   Distance 20ft   Ambulation/Elevation Additional Comments patient ambulated from bed to bathroom.  (Refer to treatment session for additional mobility.)   Balance   Static Sitting Normal   Dynamic Sitting Normal   Static Standing Good   Dynamic Standing Good   Ambulatory Good   Endurance Deficit   Endurance Deficit Yes   Endurance Deficit Description Limited by pain   Activity Tolerance   Activity Tolerance Patient limited by pain   Medical Staff Made Aware Janene LEW  (MD and CM)   Nurse Made Aware yes   Assessment   Prognosis Good   Problem List Decreased strength;Decreased range of motion;Decreased endurance;Impaired balance;Decreased mobility;Obesity;Pain   Assessment Patient is a 61y/o F POD 1 Left total knee arthroplasty revision with Dr. Wilson. Patient had two prior knee surgeries. Patient resides with spouse in a home with steps to enter. She will be staying on the first floor in a recliner. She is independent at baseline. Uses a rollator. Current medical status includes obesity, multiple lines, fall risk, bed/chair alarm, pain, impulsivity, decreased ROM, strength, balance, endurance and mobility. Patient tolerated session well. She progressed during session from a supervision to Mod I level for transfers and ambulation. She ambulated further than a household distance with a step through pattern. Patient does use a rollator even though a RW is recommended. Patient completed a stair trial without physical assistance. Reviewed walking program,  ice, and elevation. Patient has no further inpatient P.T. needs. Will discharge orders. Level 3 resources. Low intensity. The patient's AM-PAC Basic Mobility Inpatient Short Form Raw Score is 23. A Raw score of greater than 17 suggests the patient may benefit from discharge to home. Please also refer to the recommendation of the Physical Therapist for safe discharge planning.   Barriers to Discharge None   Goals   Patient Goals To have better pain control   PT Treatment Day 1   Plan   Treatment/Interventions   (D/C P.T.)   Discharge Recommendation   Rehab Resource Intensity Level, PT III (Minimum Resource Intensity)   AM-PAC Basic Mobility Inpatient   Turning in Flat Bed Without Bedrails 4   Lying on Back to Sitting on Edge of Flat Bed Without Bedrails 4   Moving Bed to Chair 4   Standing Up From Chair Using Arms 4   Walk in Room 4   Climb 3-5 Stairs With Railing 3   Basic Mobility Inpatient Raw Score 23   Basic Mobility Standardized Score 50.88   [facility] Highest Level Of Mobility   JH-HLM Goal 7: Walk 25 feet or more   JH-HLM Achieved 7: Walk 25 feet or more   Additional Treatment Session   Start Time 0825   End Time 0835   Treatment Assessment Ambulated an additional 150ft with Rollator mod I level. Step through pattern, decreased stance on the LLE and antalgic gait. Ascended/descended 3 steps with 2 rails supervision level. Progressing towards mod I. Stand to sit mod I level.   Equipment Use Rollator   End of Consult   Patient Position at End of Consult Bedside chair;Bed/Chair alarm activated;All needs within reach   End of Consult Comments OT present with patient     Zora Horan, PT,DPT             Patient Name: Na Joseph  Today's Date: 6/12/2025       [1]   Patient Active Problem List  Diagnosis    B12 deficiency    Chronic cough    Diffuse connective tissue disease (HCC)    Dyspnea    Edema    Hot flashes due to menopause    Long-term use of hydroxychloroquine    Other organ or system  involvement in systemic lupus erythematosus (HCC)    Lupus anticoagulant disorder (HCC)    SOB (shortness of breath) on exertion    Sinus tachycardia    Pulmonary artery hypertension (HCC)    Pulmonary hypertension (HCC)    Post-nasal drip    Pes anserine bursitis    Positive HELENE (antinuclear antibody)    Other chronic pulmonary heart diseases    Age-related osteoporosis without current pathological fracture    Patellofemoral disorder of right knee    Pes anserinus bursitis of right knee    Arthritis of right knee    Other tear of medial meniscus, current injury, right knee, initial encounter    Tear of medial meniscus of right knee, current    Chronic pain of right knee    Elevated serum creatinine    Hyperuricemia    Trochanteric bursitis of both hips    Undifferentiated connective tissue disease (HCC)    Vitamin D deficiency    Chronic kidney disease, stage 3a ([facility])    Right knee pain    Status post total right knee replacement    Left knee pain    Tear of medial meniscus of left knee, current    Primary osteoarthritis of left knee    Morbid obesity ([facility])    Leukopenia    Venous stasis of lower extremity    Acute pain of right knee    PONV (postoperative nausea and vomiting)    Status post revision of total knee, left    COVID-19    SLE (systemic lupus erythematosus) (HCC)    Arthritis of left knee    Iron deficiency anemia due to chronic blood loss    Insomnia    Pulmonary embolus (HCC)    Financial difficulties    Antiphospholipid antibody syndrome (HCC)    Sleep disturbance    Neuropathic pain, leg, left    Anticoagulated    Fall    Neutropenia, unspecified type (HCC)    Rheumatoid arthritis, involving unspecified site, unspecified whether rheumatoid factor present (HCC)    BMI 40.0-44.9, adult (HCC)    Chronic midline low back pain without sciatica    Motion sickness    Other sleep disorders    Thyroid nodule    GRIS (obstructive sleep apnea)    Maltracking of left patella    Infection of total knee  replacement  (HCC)    Acute blood loss as cause of postoperative anemia    Penicillin allergy    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)    Diarrhea    Staphylococcal arthritis, left knee (HCC)    Nephrolithiasis    Ureteral calculus    Right-sided congestive heart failure (HCC)    Post-traumatic osteoarthritis of right ankle    Peroneal tendonitis, right    Right ankle instability   [2]   Past Medical History:  Diagnosis Date    HELENE positive     Anemia     Sildenafil causes decreased hematocrit    Antiphospholipid syndrome (HCC)     Anxiety 3/2020    CHF (congestive heart failure) ([facility])     right heart failure related to pulm HTN lupus    Chronic kidney disease 2018    borderline lab values    Chronic rhinitis 06/04/2012    Clotting disorder ([facility]) 2012    Coronary artery disease 2018    Encephalitis     Lasted Assessed 10/18/2017    Gout 06/26/2018    Head injury 11/11/2023    Heart failure ([facility]) 2019    History of transfusion 2/13/2019    autologous    Hypertension     Lupus 2018    Lupus anticoagulant disorder ([facility])     Maxillary sinusitis     Lasted Assessed 10/18/2017    Night sweat     Osteopenia     Hip    Osteoporosis     Spine    Pneumonia     Last Assessed 10/18/2017    PONV (postoperative nausea and vomiting)     Pulmonary embolism ([facility])     Pulmonary hypertension ([facility])     Rheumatic disease     Seizures ([facility])     Only during Encephalitis    Seizures ([facility]) 04/24/2025    Occurred once at age 18-something associated with viral encephalitis      Shortness of breath     with exertion    Sinus tachycardia     Urinary incontinence    [3]   Past Surgical History:  Procedure Laterality Date    ANKLE SURGERY  6/2015    ARTHRODESIS      Hand: IP Joint    CHOLECYSTECTOMY      COLONOSCOPY      COLPOSCOPY      FL RETROGRADE PYELOGRAM  4/25/2025    HYSTERECTOMY      Vaginal    JOINT REPLACEMENT Right 10/03/2024    Knee replacement    KNEE SURGERY  2/2019    LAPAROSCOPIC ESOPHAGOGASTRIC FUNDOPLASTY  2008     ORTHOPEDIC SURGERY  10/03/2024    10/06/2022    NE ARTHRP KNE CONDYLE&PLATU MEDIAL&LAT COMPARTMENTS Right 02/12/2019    Procedure: KNEE TOTAL ARTHROPLASTY AND ASSOCIATED PROCEDURES;  Surgeon: Donovan Zendejas DO;  [facility];  Service: Orthopedics    NE ARTHRP KNE CONDYLE&PLATU MEDIAL&LAT COMPARTMENTS Left 10/06/2022    Procedure: ARTHROPLASTY KNEE TOTAL;  Surgeon: Donovan Zendejas DO;  [facility];  Service: Orthopedics    NE ARTHRS KNEE W/MENISCECTOMY MED&LAT W/SHAVING Right 10/02/2018    Procedure: KNEE ARTHROSCOPY WITH PARTIAL MEDIAL MENISCECTOMY;  Surgeon: Donovan Zendejas DO;  [facility];  Service: Orthopedics    NE ARTHRS KNEE W/MENISCECTOMY MED&LAT W/SHAVING Left 12/17/2019    Procedure: KNEE ARTHROSCOPIC MENISCECTOMY /MEDIAL; Chondyl medial and posterior;  Surgeon: Donovan Zendejas DO;  [facility];  Service: Orthopedics    NE CYSTO/URETERO W/LITHOTRIPSY &INDWELL STENT INSRT Left 4/25/2025    Procedure: CYSTOSCOPY URETEROSCOPY WITH LITHOTRIPSY HOLMIUM LASER, RETROGRADE PYELOGRAM AND INSERTION STENT URETERAL;;  Surgeon: Yusuf Briseno DO;  [facility];  Service: Urology    REDUCTION MAMMAPLASTY Bilateral     doesnt remember when    REPAIR ANKLE LIGAMENT Right     TENDON REPAIR      TONSILLECTOMY      TOTAL KNEE ARTHROPLASTY REVISION Left 10/03/2024    Procedure: ARTHROPLASTY KNEE TOTAL REVISION;  Surgeon: Grupo Wilson DO;  [facility];  Service: Orthopedics

## 2025-06-12 NOTE — ASSESSMENT & PLAN NOTE
POD 1 s/p Left total knee arthroplasty revision with Dr. Wilson 6/11/25  WBAT LLE  ROM as tolerated, pillow/blankets under achilles not behind knee while in bed  Ancef x 24   Hgb11.7 this AM  Current cultures showing no growth- will continue to monitor  PT/OT- appreciate recommendations  DVT ppx: resume coumadin  Pain control   Patient is to follow up with Dr. Wilson in 10-14 days as outpatient

## 2025-06-12 NOTE — CASE MANAGEMENT
Case Management Discharge Planning Note    Patient name Na Joseph  Location /-01 MRN [id number]  : 1965 Date 2025       Current Admission Date: 2025  Current Admission Diagnosis:Status post revision of total knee, left   Patient Active Problem List    Diagnosis Date Noted    Post-traumatic osteoarthritis of right ankle 2025    Peroneal tendonitis, right 2025    Right ankle instability 2025    Nephrolithiasis 2025    Ureteral calculus 2025    Right-sided congestive heart failure (HCC) 2025    Staphylococcal arthritis, left knee ([facility]) 2025    Diarrhea 2024    Infection of prosthetic left knee joint ([facility]) 10/08/2024    Penicillin allergy 10/07/2024    Acute blood loss as cause of postoperative anemia 10/06/2024    Infection of total knee replacement  ([facility]) 10/02/2024    Maltracking of left patella 2024    GRIS (obstructive sleep apnea) 2024    Other sleep disorders 2024    Thyroid nodule 2024    Neutropenia, unspecified type ([facility]) 2024    Rheumatoid arthritis, involving unspecified site, unspecified whether rheumatoid factor present ([facility]) 2024    BMI 40.0-44.9, adult ([facility]) 2024    Chronic midline low back pain without sciatica 2024    Motion sickness 2024    Fall 2023    Anticoagulated 2023    Sleep disturbance 2023    Neuropathic pain, leg, left 2023    Antiphospholipid antibody syndrome ([facility]) 2023    Financial difficulties 2023    Insomnia 2023    Pulmonary embolus ([facility]) 2023    Iron deficiency anemia due to chronic blood loss 2022    COVID-19 2022    SLE (systemic lupus erythematosus) ([facility]) 2022    Status post revision of total knee, left 10/20/2022    PONV (postoperative nausea and vomiting) 10/06/2022    Venous stasis of lower extremity 2022    Acute pain of right knee 2022    Leukopenia 2021     Morbid obesity (HCC)     Primary osteoarthritis of left knee 02/12/2020    Left knee pain 11/11/2019    Tear of medial meniscus of left knee, current 11/11/2019    Right knee pain 02/18/2019    Status post total right knee replacement 02/18/2019    Chronic kidney disease, stage 3a (HCC) 02/12/2019    Tear of medial meniscus of right knee, current 09/10/2018    Other tear of medial meniscus, current injury, right knee, initial encounter 07/16/2018    Patellofemoral disorder of right knee 06/04/2018    Pes anserinus bursitis of right knee 06/04/2018    Arthritis of right knee 06/04/2018    Arthritis of left knee 06/04/2018    Chronic pain of right knee 05/22/2018    Elevated serum creatinine 05/22/2018    Hyperuricemia 05/22/2018    Long-term use of hydroxychloroquine 02/16/2018    Pulmonary hypertension ([facility]) 02/16/2018    Undifferentiated connective tissue disease ([facility]) 02/16/2018    Pulmonary artery hypertension ([facility]) 12/15/2017    Diffuse connective tissue disease ([facility]) 11/21/2017    Pes anserine bursitis 11/21/2017    Trochanteric bursitis of both hips 11/21/2017    Vitamin D deficiency 11/21/2017    Other organ or system involvement in systemic lupus erythematosus ([facility]) 11/17/2017    Sinus tachycardia 11/09/2017    B12 deficiency 10/19/2017    Age-related osteoporosis without current pathological fracture 10/19/2017    Lupus anticoagulant disorder ([facility]) 10/18/2017    Positive HELENE (antinuclear antibody) 10/18/2017    Hot flashes due to menopause 09/01/2015    SOB (shortness of breath) on exertion 11/12/2012    Other chronic pulmonary heart diseases 11/05/2012    Edema 06/04/2012    Post-nasal drip 06/04/2012    Chronic cough 02/09/2012    Dyspnea 02/09/2012      LOS (days): 1  Geometric Mean LOS (GMLOS) (days): 3  Days to GMLOS:2.1     OBJECTIVE:  Risk of Unplanned Readmission Score: 18.64         Current admission status: Inpatient   Preferred Pharmacy:   [facility] [place] [address]  [facility] [address]  Phone: [phone] Fax: [phone]    [id number] - [place] - [address]  [address] [address]  Phone: [phone] Fax: [phone]    [facility] - [place], [place] - [address] [place] [address]  Phone: [phone] Fax: [phone]    Primary Care Provider: Sultana Arnold DO    Primary Insurance: MEDICARE  Secondary Insurance: BLUE CROSS    DISCHARGE DETAILS:                                                                                           IMM Given (Date):: 06/12/25  IMM Given to:: Patient

## 2025-06-12 NOTE — PROGRESS NOTES
"Progress Note - Orthopedics   Name: Na Joseph 60 y.o. female I MRN: [id number]  Unit/Bed#: -01 I Date of Admission: 6/11/2025   Date of Service: 6/12/2025 I Hospital Day: 1    Assessment & Plan  Status post revision of total knee, left  POD 1 s/p Left total knee arthroplasty revision with Dr. Wilson 6/11/25  WBAT LLE  ROM as tolerated, pillow/blankets under achilles not behind knee while in bed  Ancef x 24   Hgb11.7 this AM  Current cultures showing no growth- will continue to monitor  PT/OT- appreciate recommendations  DVT ppx: resume coumadin  Pain control   Patient is to follow up with Dr. Wilson in 10-14 days as outpatient       Subjective   60 y.o.female seen and examined at bedside, she notes that she has some dull achy soreness above and diffuse knee.  No acute events, no new complaints.  Denies fevers, chills, CP, SOB, N/V, numbness or tingling.  Objective :  Temp:  [97.1 °F (36.2 °C)-98.4 °F (36.9 °C)] 97.7 °F (36.5 °C)  HR:  [76-92] 79  BP: (109-207)/(66-85) 109/77  Resp:  [14-22] 16  SpO2:  [92 %-99 %] 97 %  O2 Device: None (Room air)    Physical Exam  Musculoskeletal: Left lower extremity  Skin intact . No erythema or ecchymosis.  Dressing : mepilex and ace c/d/I  Thigh and calf soft and compressible   Motor intact to +FHL/EHL, +ankle dorsi/plantar flexion  Sensation intact to saphenous, sural, tibial, superficial peroneal nerve, and deep peroneal  2+ DP pulse  No calf swelling or tenderness to palpation      Lab Results: I have reviewed the following results:  Recent Labs     06/11/25  1826 06/12/25  0352   WBC  --  6.62   HGB  --  11.7   HCT  --  34.7*    202   BUN  --  12   CREATININE  --  0.71   INR 1.02 1.10     Blood Culture:    Lab Results   Component Value Date    BLOODCX No Growth After 5 Days. 04/23/2025    BLOODCX No Growth After 5 Days. 04/23/2025     Wound Culture: No results found for: \"WOUNDCULT\"    "

## 2025-06-12 NOTE — PROGRESS NOTES
Progress Note - Hospitalist   Name: Na Joseph 60 y.o. female I MRN: [id number]  Unit/Bed#: -01 I Date of Admission: 6/11/2025   Date of Service: 6/12/2025 I Hospital Day: 1    Assessment & Plan  Status post revision of total knee, left  POD #1 s/p L TKA revision with Dr. Wilson  Management per primary team  DVT prophylaxis as below  PT/OT recommending outpt Pt/OT,   incentive spirometry, pain control  Fall precautions    F/u with ortho outpt, f/u cultures  Patient has remained hemodynamically stable afebrile in no acute respiratory distress.  Patient has been medically cleared for discharge per SLIM perspective.  Infection of total knee replacement  (HCC)  History of left TKA 10/6/22. Developed 2 weeks of left knee pain, fevers, chills and was seen by orthopedic surgery 9/23.  Arthrocentesis performed and Synovasure was positive with Staph epidermidis isolated.  Now status post left TKA revision with implantation of an articulating antibiotic spacer (1.5 stage revision arthroplasty) 10/3/24.  Operative cultures growing staph epidermidis in broth only.   Completed IV Cefazolin 6 week course of IV antibiotics through 11/13/24 now followed by 4.5 months p.o. antibiotics with Cefadroxil (6 months total) due to 1.5 stage revision arthroplasty through 4/14/25  Recommend continued outpatient follow-up with ID, will be on p.o. doxycycline postoperatively per orthopedics  Antiphospholipid antibody syndrome ([facility])  Follows with outpatient hematology maintained on Coumadin goal INR 2.0-3.0  Post op, initiate Lovenox 100 mg subcu daily along with warfarin until INR is therapeutic range   SLE (systemic lupus erythematosus) ([facility])  Continue outpatient rheumatologic follow-up  On Plaquenil as an outpatient  Right-sided congestive heart failure ([facility])  Resume home diuretics postoperatively and monitor BMP  Intake and output, daily weights    VTE Pharmacologic Prophylaxis:   Moderate Risk (Score 3-4) - Pharmacological DVT  Prophylaxis Ordered: warfarin (Coumadin).    Mobility:   Basic Mobility Inpatient Raw Score: 17  JH-HLM Goal: 5: Stand one or more mins  JH-HLM Achieved: 6: Walk 10 steps or more  JH-HLM Goal achieved. Continue to encourage appropriate mobility.    Patient Centered Rounds: I performed bedside rounds with nursing staff today.   Discussions with Specialists or Other Care Team Provider: ortho, CM, Pt, OT    Education and Discussions with Family / Patient: Patient declined call to .     Current Length of Stay: 1 day(s)  Current Patient Status: Inpatient   Certification Statement: pt is medically cleared from slim perspective   Discharge Plan: SLIM is following this patient on consult. They are medically stable for discharge when deemed appropriate by primary service.    Code Status: Level 1 - Full Code    Subjective   Lili was seen and examined at bedside.  No acute events overnight.  Discussed plan of care.  All questions and concerns were answered and addressed.  Has no acute complaints at this time.  States that current pain regimen is working however is concerned about going home.  Patient ambulating well in the hallways with PT and OT.  Discussed with Ortho patient is medically cleared from internal medicine perspective for discharge    Objective :  Temp:  [97.1 °F (36.2 °C)-98.4 °F (36.9 °C)] 98.2 °F (36.8 °C)  HR:  [76-92] 92  BP: (124-207)/(66-85) 159/85  Resp:  [14-22] 18  SpO2:  [92 %-99 %] 96 %  O2 Device: None (Room air)    Body mass index is 42.94 kg/m².     Input and Output Summary (last 24 hours):     Intake/Output Summary (Last 24 hours) at 6/12/2025 0654  Last data filed at 6/12/2025 0349  Gross per 24 hour   Intake 2630 ml   Output --   Net 2630 ml       Physical Exam  Vitals and nursing note reviewed.   Constitutional:       General: She is not in acute distress.     Appearance: She is obese. She is not ill-appearing.   HENT:      Head: Normocephalic and atraumatic.     Cardiovascular:       Rate and Rhythm: Normal rate and regular rhythm.      Pulses: Normal pulses.      Heart sounds: Normal heart sounds.   Pulmonary:      Effort: Pulmonary effort is normal.      Breath sounds: Normal breath sounds.   Abdominal:      General: Abdomen is flat. Bowel sounds are normal.      Palpations: Abdomen is soft.     Musculoskeletal:      Right lower leg: No edema.      Left lower leg: No edema.      Comments: Left knee edema improving      Skin:     General: Skin is warm.     Neurological:      General: No focal deficit present.      Mental Status: She is alert and oriented to person, place, and time.           Lines/Drains:              Lab Results: I have reviewed the following results:   Results from last 7 days   Lab Units 06/12/25  0352   WBC Thousand/uL 6.62   HEMOGLOBIN g/dL 11.7   HEMATOCRIT % 34.7*   PLATELETS Thousands/uL 202     Results from last 7 days   Lab Units 06/12/25  0352   SODIUM mmol/L 135   POTASSIUM mmol/L 4.2   CHLORIDE mmol/L 103   CO2 mmol/L 23   BUN mg/dL 12   CREATININE mg/dL 0.71   ANION GAP mmol/L 9   CALCIUM mg/dL 9.3   GLUCOSE RANDOM mg/dL 164*     Results from last 7 days   Lab Units 06/12/25  0352   INR  1.10                   Recent Cultures (last 7 days):   Results from last 7 days   Lab Units 06/11/25  1333   GRAM STAIN RESULT  No Polys or Bacteria seen  No Polys or Bacteria seen  No Polys or Bacteria seen       Imaging Results Review: No pertinent imaging studies reviewed.  Other Study Results Review: No additional pertinent studies reviewed.    Last 24 Hours Medication List:     Current Facility-Administered Medications:     acetaminophen (TYLENOL) tablet 650 mg, Q4H PRN    acetaminophen (TYLENOL) tablet 975 mg, Q8H    ascorbic acid (VITAMIN C) tablet 500 mg, BID    calcium carbonate (TUMS) chewable tablet 1,000 mg, Daily PRN    cefadroxil (DURICEF) capsule 500 mg, Q12H JOSE LUIS    chlorhexidine gluconate (HIBICLENS) 4 % topical liquid, Daily PRN    diphenhydrAMINE (BENADRYL)  tablet 25 mg, Q6H PRN    docusate sodium (COLACE) capsule 100 mg, BID    enoxaparin (LOVENOX) subcutaneous injection 100 mg, Q24H    folic acid (FOLVITE) tablet 1 mg, Daily    gabapentin (NEURONTIN) capsule 300 mg, HS    lactated ringers bolus 1,000 mL, Once PRN **AND** lactated ringers bolus 1,000 mL, Once PRN    lactated ringers infusion, Continuous, Last Rate: 100 mL/hr (06/12/25 0349)    morphine injection 2 mg, Q2H PRN    multivitamin-minerals (CENTRUM) tablet 1 tablet, Daily    ondansetron (ZOFRAN) injection 4 mg, Q6H PRN    oxyCODONE (ROXICODONE) immediate release tablet 10 mg, Q4H PRN    oxyCODONE (ROXICODONE) IR tablet 5 mg, Q4H PRN    pantoprazole (PROTONIX) EC tablet 40 mg, Daily    senna (SENOKOT) tablet 8.6 mg, Daily    sildenafil (REVATIO) tablet 40 mg, TID    simethicone (MYLICON) chewable tablet 80 mg, 4x Daily PRN    sodium chloride 0.9 % bolus 1,000 mL, Once PRN **AND** sodium chloride 0.9 % bolus 1,000 mL, Once PRN    spironolactone (ALDACTONE) tablet 25 mg, Daily    torsemide (DEMADEX) tablet 20 mg, Daily    traZODone (DESYREL) tablet 100 mg, HS    warfarin (COUMADIN) tablet 7.5 mg, Once (warfarin)    Administrative Statements   Today, Patient Was Seen By: Tanya Tomlin MD  I have spent a total time of 56 minutes in caring for this patient on the day of the visit/encounter including Diagnostic results, Prognosis, Risks and benefits of tx options, Instructions for management, Patient and family education, Importance of tx compliance, Risk factor reductions, Impressions, Counseling / Coordination of care, Documenting in the medical record, Reviewing/placing orders in the medical record (including tests, medications, and/or procedures), Obtaining or reviewing history  , and Communicating with other healthcare professionals .    **Please Note: This note may have been constructed using a voice recognition system.**

## 2025-06-12 NOTE — ASSESSMENT & PLAN NOTE
POD #1 s/p L TKA revision with Dr. Wilson  Management per primary team  DVT prophylaxis as below  PT/OT recommending outpt Pt/OT,   incentive spirometry, pain control  Fall precautions    F/u with ortho outpt, f/u cultures  Patient has remained hemodynamically stable afebrile in no acute respiratory distress.  Patient has been medically cleared for discharge per SLIM perspective.

## 2025-06-12 NOTE — CASE MANAGEMENT
Case Management Assessment & Discharge Planning Note    Patient name Na Joseph  Location /-01 MRN [id number]  : 1965 Date 2025       Current Admission Date: 2025  Current Admission Diagnosis:Status post revision of total knee, left   Patient Active Problem List    Diagnosis Date Noted    Post-traumatic osteoarthritis of right ankle 2025    Peroneal tendonitis, right 2025    Right ankle instability 2025    Nephrolithiasis 2025    Ureteral calculus 2025    Right-sided congestive heart failure (HCC) 2025    Staphylococcal arthritis, left knee ([facility]) 2025    Diarrhea 2024    Infection of prosthetic left knee joint ([facility]) 10/08/2024    Penicillin allergy 10/07/2024    Acute blood loss as cause of postoperative anemia 10/06/2024    Infection of total knee replacement  ([facility]) 10/02/2024    Maltracking of left patella 2024    GRIS (obstructive sleep apnea) 2024    Other sleep disorders 2024    Thyroid nodule 2024    Neutropenia, unspecified type ([facility]) 2024    Rheumatoid arthritis, involving unspecified site, unspecified whether rheumatoid factor present ([facility]) 2024    BMI 40.0-44.9, adult ([facility]) 2024    Chronic midline low back pain without sciatica 2024    Motion sickness 2024    Fall 2023    Anticoagulated 2023    Sleep disturbance 2023    Neuropathic pain, leg, left 2023    Antiphospholipid antibody syndrome ([facility]) 2023    Financial difficulties 2023    Insomnia 2023    Pulmonary embolus ([facility]) 2023    Iron deficiency anemia due to chronic blood loss 2022    COVID-19 2022    SLE (systemic lupus erythematosus) ([facility]) 2022    Status post revision of total knee, left 10/20/2022    PONV (postoperative nausea and vomiting) 10/06/2022    Venous stasis of lower extremity 2022    Acute pain of right knee 2022    Leukopenia  11/22/2021    Morbid obesity (HCC)     Primary osteoarthritis of left knee 02/12/2020    Left knee pain 11/11/2019    Tear of medial meniscus of left knee, current 11/11/2019    Right knee pain 02/18/2019    Status post total right knee replacement 02/18/2019    Chronic kidney disease, stage 3a (HCC) 02/12/2019    Tear of medial meniscus of right knee, current 09/10/2018    Other tear of medial meniscus, current injury, right knee, initial encounter 07/16/2018    Patellofemoral disorder of right knee 06/04/2018    Pes anserinus bursitis of right knee 06/04/2018    Arthritis of right knee 06/04/2018    Arthritis of left knee 06/04/2018    Chronic pain of right knee 05/22/2018    Elevated serum creatinine 05/22/2018    Hyperuricemia 05/22/2018    Long-term use of hydroxychloroquine 02/16/2018    Pulmonary hypertension (HCC) 02/16/2018    Undifferentiated connective tissue disease (HCC) 02/16/2018    Pulmonary artery hypertension (HCC) 12/15/2017    Diffuse connective tissue disease (HCC) 11/21/2017    Pes anserine bursitis 11/21/2017    Trochanteric bursitis of both hips 11/21/2017    Vitamin D deficiency 11/21/2017    Other organ or system involvement in systemic lupus erythematosus ([facility]) 11/17/2017    Sinus tachycardia 11/09/2017    B12 deficiency 10/19/2017    Age-related osteoporosis without current pathological fracture 10/19/2017    Lupus anticoagulant disorder ([facility]) 10/18/2017    Positive HELENE (antinuclear antibody) 10/18/2017    Hot flashes due to menopause 09/01/2015    SOB (shortness of breath) on exertion 11/12/2012    Other chronic pulmonary heart diseases 11/05/2012    Edema 06/04/2012    Post-nasal drip 06/04/2012    Chronic cough 02/09/2012    Dyspnea 02/09/2012      LOS (days): 1  Geometric Mean LOS (GMLOS) (days): 3  Days to GMLOS:2.1     OBJECTIVE:    Risk of Unplanned Readmission Score: 18.64         Current admission status: Inpatient       Preferred Pharmacy:   [address]  [facility]  [address] [facility] [address]  Phone: [phone] Fax: [phone]    [id number] - [facility], [place] - [address]  [address] [facility] [place] [address]  Phone: [phone] Fax: [phone]    [facility] - [place], [place] - [address]  [address] [facility] [address]  Phone: [phone] Fax: [phone]    Primary Care Provider: Sultana Arnold DO    Primary Insurance: MEDICARE  Secondary Insurance: BLUE CROSS    ASSESSMENT:  Active Health Care Proxies       Hardeep Joseph Health Care Representative - Spouse   Primary Phone: [phone] (Mobile)  Home Phone: [phone]                 Advance Directives  Does patient have a Health Care POA?: Yes  Does patient have Advance Directives?: Yes  Advance Directives: Living will, Power of  for health care  Primary Contact: Hardeep Ball         Readmission Root Cause  30 Day Readmission: No    Patient Information  Admitted from:: Home  Mental Status: Alert  During Assessment patient was accompanied by: Spouse  Assessment information provided by:: Spouse  Primary Caregiver: Self  Support Systems: Spouse/significant other  County of Residence: [place]  [place] [facility] do you live in?: [place]  Home entry access options. Select all that apply.: Stairs  Number of steps to enter home.: 4  Do the steps have railings?: Yes  Type of Current Residence: [address] home  Upon entering residence, is there a bedroom on the main floor (no further steps)?: No  A bedroom is located on the following floor levels of residence (select all that apply):: 2nd Floor  Upon entering residence, is there a bathroom on the main floor (no further steps)?: Yes  Number of steps to 2nd floor from main floor: One Flight  Living Arrangements: Lives w/ Spouse/significant other  Is patient a ?: No    Activities of Daily Living Prior to Admission  Functional Status:  Independent  Completes ADLs independently?: Yes  Ambulates independently?: Yes  Does patient use assisted devices?: Yes  Assisted Devices (DME) used: Rollator, Bedside Commode  Does patient currently own DME?: Yes  What DME does the patient currently own?: Bedside Commode, Rollator  Does patient have a history of Outpatient Therapy (PT/OT)?: Yes  Does the patient have a history of Short-Term Rehab?: No  Does patient have a history of HHC?: No  Does patient currently have HHC?: No         Patient Information Continued  Income Source: SSI/SSD  Does patient have prescription coverage?: Yes  Can the patient afford their medications and any related supplies (such as glucometers or test strips)?: Yes  Does patient receive dialysis treatments?: No  Does patient have a history of substance abuse?: No  Does patient have a history of Mental Health Diagnosis?: No         Means of Transportation  Means of Transport to Appts:: Family transport          DISCHARGE DETAILS:    Discharge planning discussed with:: patient and spouse  Freedom of Choice: Yes     CM contacted family/caregiver?: Yes  Were Treatment Team discharge recommendations reviewed with patient/caregiver?: Yes  Did patient/caregiver verbalize understanding of patient care needs?: Yes       Contacts  Patient Contacts: Hardeep Joseph  Relationship to Patient:: Family  Contact Method: In Person  Reason/Outcome: Discharge Planning    Requested Home Health Care         Is the patient interested in HHC at discharge?: No    DME Referral Provided  Referral made for DME?: No              Treatment Team Recommendation: Home  Expected Discharge Disposition: Other (Comment) (OPPT)         Met with patient and her spouse Hardeep to review the role of CM and discuss needs patient may have for discharge. Patient resides w/ her spouse in a 2 story home with 4 JINA w/ railing. She plans to sleep in a recliner and has a NE and BSC to use on the firast floor. She has a rollater/BSC to  uses. She uses [facility] in [facility] for her meds. She has receives OPPT and denies HHC/SNF/BHU/D&A rehab admissions. She plans to go to OPPT at discharge. Her spouse will transport her home.

## 2025-06-12 NOTE — ANESTHESIA POSTPROCEDURE EVALUATION
Post-Op Assessment Note    CV Status:  Stable  Pain Score: 8    Pain management: adequate       Mental Status:  Awake and alert   Hydration Status:  Stable   PONV Controlled:  None   Airway Patency:  Patent and adequate     Post Op Vitals Reviewed: Yes    No anethesia notable event occurred.    Staff: Anesthesiologist           Last Filed PACU Vitals:  Vitals Value Taken Time   Temp 98.4 °F (36.9 °C) 06/11/25 14:49   Pulse 86 06/11/25 16:32   /66 06/11/25 16:16   Resp 17 06/11/25 16:31   SpO2 94 % 06/11/25 16:32   Vitals shown include unfiled device data.    Modified Naveen:     Vitals Value Taken Time   Activity 2 06/11/25 14:53   Respiration 2 06/11/25 14:53   Circulation 2 06/11/25 14:53   Consciousness 1 06/11/25 14:53   Oxygen Saturation 2 06/11/25 14:53     Modified Naveen Score: 9

## 2025-06-12 NOTE — OCCUPATIONAL THERAPY NOTE
"    Occupational Therapy Evaluation and Treatment ([id number])     Patient Name: Na Joseph  Today's Date: 6/12/2025  Problem List  Principal Problem:    Status post revision of total knee, left  Active Problems:    SLE (systemic lupus erythematosus) (HCC)    Antiphospholipid antibody syndrome (HCC)    Infection of total knee replacement  (HCC)    Right-sided congestive heart failure (HCC)    Past Medical History  Past Medical History[1]  Past Surgical History  Past Surgical History[2]        06/12/25 0823   OT Last Visit   OT Visit Date 06/12/25   Note Type   Note type Evaluation   Pain Assessment   Pain Assessment Tool 0-10   Pain Score 7   Pain Location/Orientation Orientation: Left;Location: Knee   Patient's Stated Pain Goal No pain   Hospital Pain Intervention(s) Repositioned;Ambulation/increased activity   Restrictions/Precautions   Weight Bearing Precautions Per Order Yes   LLE Weight Bearing Per Order WBAT   Other Precautions Chair Alarm;Bed Alarm;Multiple lines;Fall Risk;Pain   Home Living   Type of Home House   Home Layout Two level;Bed/bath upstairs  (4 JINA w railing)   Bathroom Shower/Tub Walk-in shower   Bathroom Toilet Raised   Bathroom Equipment Grab bars in shower;Shower chair   Home Equipment Walker;Other (Comment)  (leg ; rollator walker)   Prior Function   Level of [place] Independent with ADLs;Independent with functional mobility;Needs assistance with IADLS   Lives With Spouse  (Grandson, Fiance and grandchild)   Receives Help From Family   IADLs Independent with driving;Family/Friend/Other provides meals;Independent with medication management   Falls in the last 6 months 0   Vocational On disability   Lifestyle   Reciprocal Relationships spouse   General   Additional Pertinent History POD1 s/p L TKR with Dr. Wilson; WBAT LLE   Family/Caregiver Present Yes   Additional General Comments spouse at end of session   Subjective   Subjective \"I can do it by myself\"   ADL   Eating " Assistance 7  Independent   Grooming Assistance 6  Modified Independent   Grooming Deficit   (Pt completed morning ADLs/grooming, seated on rollator at bathroom sink. Pt able to complete all tasks with VICKI (teeth brushing, washing face, washing hands))   UB Bathing Assistance 7  Independent   LB Bathing Assistance 6  Modified Independent   UB Dressing Assistance 7  Independent   LB Dressing Assistance 6  Modified independent   LB Dressing Deficit   (see treatment note for completion of LB dressin)   Toileting Assistance  6  Modified independent   Bed Mobility   Supine to Sit 6  Modified independent   Additional items HOB elevated;Leg ;Bedrails   Additional Comments Plans to sleep in a recliner chair   Transfers   Sit to Stand 5  Supervision   Additional items Armrests   Stand to Sit 5  Supervision   Additional items Armrests   Functional Mobility   Functional Mobility 6  Modified independent   Additional items   (rollator; ~20ft to bathroom (with S), additional ~130ft within martines and room progressing to VICKI - Pt impulsive with rollator use, would benefit from use of RW for increased safety)   Balance   Static Sitting Normal   Dynamic Sitting Normal   Static Standing Good   Dynamic Standing Good   Activity Tolerance   Activity Tolerance Patient limited by pain  (CARLOZ Lugo made aware of PT's increased pain)   Medical Staff Made Aware PT Shelly   Nurse Made Aware CARLOZ lugo   Psychosocial   Psychosocial (WDL) WDL   Cognition   Overall Cognitive Status WFL   Arousal/Participation Alert;Cooperative   Attention Within functional limits   Orientation Level Oriented X4   Memory Within functional limits   Following Commands Follows multistep commands without difficulty   Comments Pt willing to participate in OT evaluation. Pt very motivated for independence   Assessment   Prognosis Good   Assessment Pt is a 60 y.o. female seen for OT evaluation at [facility], admitted 6/11/2025 w/ Status post revision of total knee, left. Pt  is POD1 s/p eft total knee arthroplasty revision with Dr. Wilson 6/11/25; pt is WBAT LLE. Extensive chart review completed by OT. Comorbidities affecting the pt's functional performance include a significant PMH of: L TKR (2022), lupus, R sided CHF, diffuse connective tissue disease, dyspnea, edema, SOB, pulmonary artery hypertension, age-related OP,  patellofemoral disorder of R knee, undifferentiated connective tissue disease, CKD stage 3, anemia, PE, fall, R ankle instability, chronic midline LB pain w/o sciatica, thyroid nodule, nephrolithiasis. Personal factors affecting pt at time of IE include: steps to enter environment, health management , and environment. PTA pt was living in a 2 Sh w/ 4 JINA, was  IND w/ ADLs and A/IND w/ IADLS, and IND with functional mobility with use of rollator, (+) driving, (-) home alone. Upon OT IE pt demonstrated  VICKI for bed mobility, S progressing to VICKI for functional transfers, S progressing to VICKI for functional mobility, IND for UB ADLs and VICKI for LB ADLS. Pt seen for additional treatment session for LB dressing where she progressed with transfers and ADLs. Pt with increased pain. Based on OT evaluation, at this time pt demonstrates that they are performing close to their functional baseline.  The patient's raw score on the AM-PAC Daily Activity inpatient short form is 24, standardized score is 57.54, greater than 39.4. Patients at this level are likely to benefit from DC to home. Please refer to the recommendation of the Occupational Therapist for safe DC planning. At this time, OT recommendations at time of discharge are DC with No rehabilitation needs resources.  Pt with no further acute OT needs indicated at this time - Recommend pt continue to be OOB for meals, ambulation to/from BR, perform self care tasks, and mobility in hallway with nursing. D/C from OT caseload with above recommendations.   Goals   Patient Goals To have pain better controlled   Plan   OT  Frequency Eval only   Discharge Recommendation   Rehab Resource Intensity Level, OT No post-acute rehabilitation needs   AM-PAC Daily Activity Inpatient   Lower Body Dressing 4   Bathing 4   Toileting 4   Upper Body Dressing 4   Grooming 4   Eating 4   Daily Activity Raw Score 24   Daily Activity Standardized Score (Calc for Raw Score >=11) 57.54   AM-PAC Applied Cognition Inpatient   Following a Speech/Presentation 4   Understanding Ordinary Conversation 4   Taking Medications 4   Remembering Where Things Are Placed or Put Away 4   Remembering List of 4-5 Errands 4   Taking Care of Complicated Tasks 4   Applied Cognition Raw Score 24   Applied Cognition Standardized Score 62.21   Additional Treatment Session   Start Time 0832   End Time 0842   Treatment Assessment Pt seated in recliner chair for seated rest break. Educated patient on LB dressing techniques - pt with good understanding. Pt demonstrated ability to don/doff breif and shorts at VICKI level. Pt completed additional STS transfer from recliner chair at VICKI level. Pt able to manage clothing over hips in standing with good balance. Pt returned seated in recliner at VICKI. Educated patient on car transfer technqiues and shower transfers. Pt reports no further ADL conerns and spouse is able to assist PRN. Pt has supportive setup up at home. At end of session, pt seated in recliner with all needs within reach, chair alarmed, and RN made aware.   Additional Treatment Day 1   End of Consult   Education Provided Yes   Patient Position at End of Consult Bedside chair;Bed/Chair alarm activated;All needs within reach   Nurse Communication Nurse aware of consult          DAVID Alcaraz/L          [1]   Past Medical History:  Diagnosis Date    HELENE positive     Anemia     Sildenafil causes decreased hematocrit    Antiphospholipid syndrome (HCC)     Anxiety 3/2020    CHF (congestive heart failure) (HCC)     right heart failure related to pulm HTN lupus    Chronic  kidney disease 2018    borderline lab values    Chronic rhinitis 06/04/2012    Clotting disorder (HCC) 2012    Coronary artery disease 2018    Encephalitis     Lasted Assessed 10/18/2017    Gout 06/26/2018    Head injury 11/11/2023    Heart failure (HCC) 2019    History of transfusion 2/13/2019    autologous    Hypertension     Lupus 2018    Lupus anticoagulant disorder (HCC)     Maxillary sinusitis     Lasted Assessed 10/18/2017    Night sweat     Osteopenia     Hip    Osteoporosis     Spine    Pneumonia     Last Assessed 10/18/2017    PONV (postoperative nausea and vomiting)     Pulmonary embolism (HCC)     Pulmonary hypertension (HCC)     Rheumatic disease     Seizures (HCC)     Only during Encephalitis    Seizures (HCC) 04/24/2025    Occurred once at age 18-something associated with viral encephalitis      Shortness of breath     with exertion    Sinus tachycardia     Urinary incontinence    [2]   Past Surgical History:  Procedure Laterality Date    ANKLE SURGERY  6/2015    ARTHRODESIS      Hand: IP Joint    CHOLECYSTECTOMY      COLONOSCOPY      COLPOSCOPY      FL RETROGRADE PYELOGRAM  4/25/2025    HYSTERECTOMY      Vaginal    JOINT REPLACEMENT Right 10/03/2024    Knee replacement    KNEE SURGERY  2/2019    LAPAROSCOPIC ESOPHAGOGASTRIC FUNDOPLASTY  2008    ORTHOPEDIC SURGERY  10/03/2024    10/06/2022    VT ARTHRP KNE CONDYLE&PLATU MEDIAL&LAT COMPARTMENTS Right 02/12/2019    Procedure: KNEE TOTAL ARTHROPLASTY AND ASSOCIATED PROCEDURES;  Surgeon: Donovan Zendejas DO;  [facility];  Service: Orthopedics    VT ARTHRP KNE CONDYLE&PLATU MEDIAL&LAT COMPARTMENTS Left 10/06/2022    Procedure: ARTHROPLASTY KNEE TOTAL;  Surgeon: Donovan Zendejas DO;  [facility];  Service: Orthopedics    VT ARTHRS KNEE W/MENISCECTOMY MED&LAT W/SHAVING Right 10/02/2018    Procedure: KNEE ARTHROSCOPY WITH PARTIAL MEDIAL MENISCECTOMY;  Surgeon: Donovan Zendejas DO;  [facility];  Service: Orthopedics    VT ARTHRS KNEE  W/MENISCECTOMY MED&LAT W/SHAVING Left 12/17/2019    Procedure: KNEE ARTHROSCOPIC MENISCECTOMY /MEDIAL; Chondyl medial and posterior;  Surgeon: Donovan Zendejas DO;  [facility];  Service: Orthopedics    OR CYSTO/URETERO W/LITHOTRIPSY &INDWELL STENT INSRT Left 4/25/2025    Procedure: CYSTOSCOPY URETEROSCOPY WITH LITHOTRIPSY HOLMIUM LASER, RETROGRADE PYELOGRAM AND INSERTION STENT URETERAL;;  Surgeon: Yusuf Briseno DO;  [facility]: [facility];  Service: Urology    REDUCTION MAMMAPLASTY Bilateral     doesnt remember when    REPAIR ANKLE LIGAMENT Right     TENDON REPAIR      TONSILLECTOMY      TOTAL KNEE ARTHROPLASTY REVISION Left 10/03/2024    Procedure: ARTHROPLASTY KNEE TOTAL REVISION;  Surgeon: Grupo Wilson DO;  Location: [facility];  Service: Orthopedics

## 2025-06-12 NOTE — PLAN OF CARE
Problem: PAIN - ADULT  Goal: Verbalizes/displays adequate comfort level or baseline comfort level  Description: Interventions:  - Encourage patient to monitor pain and request assistance  - Assess pain using appropriate pain scale  - Administer analgesics as ordered based on type and severity of pain and evaluate response  - Implement non-pharmacological measures as appropriate and evaluate response  - Consider cultural and social influences on pain and pain management  - Notify physician/advanced practitioner if interventions unsuccessful or patient reports new pain  - Educate patient/family on pain management process including their role and importance of  reporting pain   - Provide non-pharmacologic/complimentary pain relief interventions  Outcome: Progressing     Problem: INFECTION - ADULT  Goal: Absence or prevention of progression during hospitalization  Description: INTERVENTIONS:  - Assess and monitor for signs and symptoms of infection  - Monitor lab/diagnostic results  - Monitor all insertion sites, i.e. indwelling lines, tubes, and drains  - Monitor endotracheal if appropriate and nasal secretions for changes in amount and color  - [place] appropriate cooling/warming therapies per order  - Administer medications as ordered  - Instruct and encourage patient and family to use good hand hygiene technique  - Identify and instruct in appropriate isolation precautions for identified infection/condition  Outcome: Progressing  Goal: Absence of fever/infection during neutropenic period  Description: INTERVENTIONS:  - Monitor WBC  - Perform strict hand hygiene  - Limit to healthy visitors only  - No plants, dried, fresh or silk flowers with wells in patient room  Outcome: Progressing     Problem: DISCHARGE PLANNING  Goal: Discharge to home or other facility with appropriate resources  Description: INTERVENTIONS:  - Identify barriers to discharge w/patient and caregiver  - Arrange for needed discharge resources  and transportation as appropriate  - Identify discharge learning needs (meds, wound care, etc.)  - Arrange for interpretive services to assist at discharge as needed  - Refer to Case Management Department for coordinating discharge planning if the patient needs post-hospital services based on physician/advanced practitioner order or complex needs related to functional status, cognitive ability, or social support system  Outcome: Progressing     Problem: Knowledge Deficit  Goal: Patient/family/caregiver demonstrates understanding of disease process, treatment plan, medications, and discharge instructions  Description: Complete learning assessment and assess knowledge base.  Interventions:  - Provide teaching at level of understanding  - Provide teaching via preferred learning methods  Outcome: Progressing

## 2025-06-13 ENCOUNTER — TRANSITIONAL CARE MANAGEMENT (OUTPATIENT)
Age: 60
End: 2025-06-13

## 2025-06-13 ENCOUNTER — TELEPHONE (OUTPATIENT)
Dept: OBGYN CLINIC | Facility: HOSPITAL | Age: 60
End: 2025-06-13

## 2025-06-13 ENCOUNTER — TELEPHONE (OUTPATIENT)
Dept: LAB | Facility: HOSPITAL | Age: 60
End: 2025-06-13

## 2025-06-13 NOTE — TELEPHONE ENCOUNTER
"Patient contacted for a postoperative follow up assessment. Patient reports \"right now I'm ok\" and pain is \"well controlled\" right now.  Patient states current pain level of a  3/10, and reports 6/10 with ambulating, using the RW.  Patient reports \"foot and ankle\" are \"pretty swollen\" and dressing is clean, dry and intact. Patient is icing the site, using an Ice Machine and we discussed postoperative swelling, continuing to ICE areas of pain/swelling, especially after increased activity over the next few weeks.      She has OP PT on Monday. She reports doing \"foot pumps\" as well.    We reviewed patients AVS medication list. Patient is taking Tylenol 1000mg every 8 hours, Duricef BID for 14 days, Oxycodone 5mg PRN, and her daily Coumadin (7.5mg). Pt reports loose stools, and denies taking any Stool Softener, she understands if not having BMs to start an OTC. She has the Zofran if needed for nausea.     She is coing for PT/INR draw next Friday, 6/20    Patient denies nausea, vomiting, abdominal pain, chest pain, shortness of breath, fever, dizziness and calf pain. Patient does not have any other questions or concerns at this time. Pt was encouraged to call with any questions, concerns or issues.    "

## 2025-06-13 NOTE — TELEPHONE ENCOUNTER
Spoke to patient educated on surgeon message, discussed the 10mg being for severe pain, and trying to consistently use the 5mg Oxycodone dosing first.     pt encouraged to call me with questions, concerns or issues.

## 2025-06-14 LAB
BACTERIA SPEC ANAEROBE CULT: NO GROWTH
BACTERIA SPEC ANAEROBE CULT: NO GROWTH
BACTERIA TISS AEROBE CULT: NO GROWTH
BACTERIA TISS AEROBE CULT: NO GROWTH
GRAM STN SPEC: NORMAL
GRAM STN SPEC: NORMAL

## 2025-06-15 LAB
BACTERIA SPEC ANAEROBE CULT: ABNORMAL
BACTERIA SPEC ANAEROBE CULT: ABNORMAL
BACTERIA TISS AEROBE CULT: ABNORMAL
GRAM STN SPEC: ABNORMAL

## 2025-06-16 ENCOUNTER — OFFICE VISIT (OUTPATIENT)
Dept: PHYSICAL THERAPY | Facility: CLINIC | Age: 60
End: 2025-06-16
Payer: MEDICARE

## 2025-06-16 DIAGNOSIS — M25.562 ACUTE PAIN OF LEFT KNEE: Primary | ICD-10-CM

## 2025-06-16 LAB
BACTERIA SPEC ANAEROBE CULT: ABNORMAL
BACTERIA SPEC ANAEROBE CULT: ABNORMAL
FUNGUS SPEC CULT: NORMAL

## 2025-06-16 PROCEDURE — 97110 THERAPEUTIC EXERCISES: CPT | Performed by: PHYSICAL THERAPIST

## 2025-06-16 PROCEDURE — 97161 PT EVAL LOW COMPLEX 20 MIN: CPT | Performed by: PHYSICAL THERAPIST

## 2025-06-16 NOTE — PROGRESS NOTES
PT Evaluation     Today's date: 2025  Patient name: Na Joseph  : 1965  MRN: [id number]  Referring provider: No ref. provider found  Dx:   Encounter Diagnosis     ICD-10-CM    1. Acute pain of left knee  M25.562                      Assessment  Impairments: abnormal gait, abnormal or restricted ROM, activity intolerance, impaired physical strength, lacks appropriate home exercise program, pain with function, poor posture , poor body mechanics, activity limitations and endurance    Assessment details: Patient presents with pain, limited ROM, strength and mobility s/p L Total knee revision.  She is doing quite well at this point but requires skilled PT to address deficits and return to highest level of function.  Thank you for this pleasant referral.    Understanding of Dx/Px/POC: good     Prognosis: good    Goals    STG 4 Weeks:  Decrease pain at worst to 2/10  Improve knee range to 105 flex, TKE achievement  Improve strength to 4- hip, knee 5/5  Independent with HEP  LTG 8 Weeks:  Decrease pain at worst to 1/10  Improve knee range to be 120 flex  Improve strength to Hip 4/5 or greater.    Able to perform all desired activities with minimal to nil symptom exacerbation               Plan  Patient would benefit from: skilled physical therapy and home program  Planned modality interventions: cryotherapy and thermotherapy: hydrocollator packs    Planned therapy interventions: joint mobilization, manual therapy, neuromuscular re-education, strengthening, stretching, therapeutic activities, therapeutic exercise, therapeutic training, transfer training, IADL retraining, home exercise program, graded motor, graded exercise, graded activity, gait training, functional ROM exercises, flexibility, abdominal trunk stabilization, activity modification, balance, behavior modification and body mechanics training    Frequency: 2x week  Duration in weeks: 10  Treatment plan discussed with: patient  Plan details:   is Pavel.          Subjective Evaluation    History of Present Illness  Date of onset: 2025  Date of surgery: 25.  Mechanism of injury: Patient underwent L Total Knee revision on 25.  She returned home the following day.  She has 4-5 steps to enter her 2 floor home.  She is ambulating stairs.  She is independent with self care except donning/doffing shoes/socks.  Her WB tolerance is 15-20 minutes.  She is disabled.  She will be taking a cruise in September and wishes to walk on the beach.  She will be helping care for a  great grandchild.      Quality of life: good    Patient Goals  Patient goals for therapy: increased strength, return to sport/leisure activities, increased motion, decreased pain and improved balance    Pain  Current pain ratin  At best pain ratin  At worst pain ratin  Location: L Knee    Treatments  Previous treatment: medication, injection treatment and physical therapy        Objective     Active Range of Motion   Left Knee   Flexion: 96 degrees   Extension: 0 degrees     Right Knee   Flexion: 130 degrees   Extension: 0 degrees     Additional Active Range of Motion Details  Gait: RW, decreased L stance time  LE Strength  L Knee 3+/5; Hip Flexion 3/5; Remainder Hip 4/5  TU.7 sec RW  5x Sit <-->Stand: 20.25 sec, UE support  Independent with Supine <--> Sit transfers  DL Squat: 80                       Precautions: S/P L TKA Revision secondary to infection with Dr. Wilson on 10/3/24, Falls, Hx of PE, Anemia, CHF, Osteoporosis.        Precautions: L TKA by Dr. Mae on 25.  Fall concern, LBBB, HTN        Manuals            Education of procedure and recovery            PROM L Knee                                    Neuro Re-Ed            NBOS EC            NBOS EO Foam            Hip Abd/Ext            SLR A/AROM                                                Ther Ex            Bike 3'           AP for circulation            QS/GS             HS               Gastroc chasityel ST            LAQ            HR/TR                                    HEP review           Ther Activity            Sit to Stand            Mini Squats            Elevations            LRD progression                        Modalities            CP to L Knee prn

## 2025-06-18 ENCOUNTER — TELEPHONE (OUTPATIENT)
Age: 60
End: 2025-06-18

## 2025-06-18 ENCOUNTER — OFFICE VISIT (OUTPATIENT)
Dept: PHYSICAL THERAPY | Facility: CLINIC | Age: 60
End: 2025-06-18
Payer: MEDICARE

## 2025-06-18 DIAGNOSIS — M25.562 ACUTE PAIN OF LEFT KNEE: Primary | ICD-10-CM

## 2025-06-18 PROCEDURE — 97110 THERAPEUTIC EXERCISES: CPT | Performed by: PHYSICAL THERAPIST

## 2025-06-18 PROCEDURE — 97140 MANUAL THERAPY 1/> REGIONS: CPT | Performed by: PHYSICAL THERAPIST

## 2025-06-18 NOTE — TELEPHONE ENCOUNTER
"Spoke to patient, relayed all information from Dr. Wilson, and she was pleased with call. She will see us Tuesday, she states \"I feel really good.\"  "

## 2025-06-18 NOTE — PROGRESS NOTES
"Daily Note     Today's date: 2025  Patient name: Na Joseph  : 1965  MRN: [id number]  Referring provider: No ref. provider found  Dx:   Encounter Diagnosis     ICD-10-CM    1. Acute pain of left knee  M25.562                      Subjective: Patient reports some stiffness after post op evaluation. Reports feeling \"ok\" upon arrival.       Objective: See treatment diary below      Assessment: Patient is able to achieve FWD revolutions on RB. Knee flexion ROM is measured at 102 deg; full knee extension. Fair tolerance to weight bearing exercises. Limited anterior knee translation during FSU. Significant R weight shift during mini squat.       Plan: Continue per plan of care.      Precautions: S/P L TKA Revision secondary to infection with Dr. Wilson on 10/3/24, Falls, Hx of PE, Anemia, CHF, Osteoporosis.        Precautions: L TKA by Dr. Mae on 25.  Fall concern, LBBB, HTN        Manuals           Education of procedure and recovery            PROM L Knee  TB                                  Neuro Re-Ed            NBOS EC            NBOS EO Foam            Hip Abd/Ext            SLR A/AROM                                                Ther Ex            Bike 3' 8 min          AP for circulation            QS/GS   20x5s; w/ towel roll          HS              Gastroc towel ST            LAQ  20x          HR/TR  20x                                  HEP review           Ther Activity            Sit to Stand            Mini Squats  20x          Elevations            FSU  2x10; 4in          LRD progression                        Modalities            CP to L Knee prn  10 min post                             "

## 2025-06-18 NOTE — TELEPHONE ENCOUNTER
Caller: Patient     Doctor: Dr. Wilson    Reason for call: Patient called requested to speak to Ortho Nurse Navigator, stated she received notice culture results have been completed and are abnormal and is very concerned. Please advise.    Call back#: [phone]

## 2025-06-19 ENCOUNTER — TELEMEDICINE (OUTPATIENT)
Age: 60
End: 2025-06-19
Payer: MEDICARE

## 2025-06-19 ENCOUNTER — HOSPITAL ENCOUNTER (OUTPATIENT)
Dept: ULTRASOUND IMAGING | Facility: HOSPITAL | Age: 60
Discharge: HOME | End: 2025-06-19
Payer: COMMERCIAL

## 2025-06-19 DIAGNOSIS — T84.59XS INFECTION OF TOTAL KNEE REPLACEMENT, SEQUELA: Primary | ICD-10-CM

## 2025-06-19 DIAGNOSIS — Z96.652 STATUS POST REVISION OF TOTAL KNEE, LEFT: ICD-10-CM

## 2025-06-19 DIAGNOSIS — N20.0 NEPHROLITHIASIS: ICD-10-CM

## 2025-06-19 DIAGNOSIS — Z96.659 INFECTION OF TOTAL KNEE REPLACEMENT, SEQUELA: Primary | ICD-10-CM

## 2025-06-19 PROCEDURE — 76775 US EXAM ABDO BACK WALL LIM: CPT

## 2025-06-19 PROCEDURE — G2211 COMPLEX E/M VISIT ADD ON: HCPCS | Performed by: FAMILY MEDICINE

## 2025-06-19 PROCEDURE — 99213 OFFICE O/P EST LOW 20 MIN: CPT | Performed by: FAMILY MEDICINE

## 2025-06-19 NOTE — PROGRESS NOTES
Virtual TCM Visit:Name: Na Joseph      : 1965      MRN: [id number]  Encounter Provider: Sultana Arnold DO  Encounter Date: 2025   Encounter department: [facility]  :  Assessment & Plan  Status post revision of total knee, left  Continue management per orthopedic surgery  Pain is currently well-managed with oxycodone 5 mg immediate release every 4-8 hours as needed for pain  Patient will continue with outpatient physical therapy per Ortho       Infection of total knee replacement, sequela  Intraoperative cultures reviewed with patient today  Staff aureus likely contaminant  Has follow-up with orthopedic surgery 2025                Return for Next scheduled follow up (2025).    The patient indicates understanding of these issues and agrees with the plan.                History of Present Illness     Transitional Care Management Review:   Na Joseph is a 60 y.o. female here for TCM follow up.    During the TCM phone call patient stated:  TCM Call (since 2025)       Patient was hospitialized at  Other (comment)    Comment  [facility]    Date of Admission  25    Date of discharge  25    Diagnosis  Status post revision of total knee, left          TCM Call (since 2025)       None          HPI  Patient presents for TCM visit  Admitted to [facility] 2025 for revision of left total knee replacement due to infection  Patient was discharged 29 hours later on 2025  She underwent TKA revision with implantation of an articulating antibiotic spacer back in 2024 with cultures showing Staph epidermidis; patient completed extensive antibiotic treatment prior to this most recent surgery  Cultures from this revision show Staph aureus, a likely contaminant  Her postoperative course was complicated only by significant pain and recovery  Her pain is now well-controlled with oxycodone 5 mg every 4-8 hours as needed  She  denies any postoperative fever, shortness of breath, bleeding  Surgery recommended that her Coumadin dose be restarted immediately postop and she is due to have her INR checked again tomorrow  Hematology is following her INR due to her diagnosis of antiphospholipid antibody syndrome and SLE  She is tolerating outpatient physical therapy well and will be following up with Ortho next week      Review of Systems   Constitutional:  Negative for activity change, fatigue and fever.   HENT:  Negative for congestion, ear pain, sinus pain and sore throat.    Eyes:  Negative for pain and itching.   Respiratory:  Negative for cough and shortness of breath.    Cardiovascular:  Negative for chest pain and palpitations.   Gastrointestinal:  Negative for abdominal pain, constipation, diarrhea, nausea and vomiting.   Endocrine: Negative for cold intolerance and heat intolerance.   Genitourinary:  Negative for dysuria.   Musculoskeletal:  Negative for myalgias.   Skin:  Negative for color change and rash.   Neurological:  Negative for dizziness, syncope and headaches.   Hematological:  Negative for adenopathy.   Psychiatric/Behavioral:  Negative for behavioral problems, dysphoric mood and sleep disturbance. The patient is not nervous/anxious.      Objective   There were no vitals taken for this visit.    Physical Exam  Constitutional:       General: She is not in acute distress.     Appearance: Normal appearance. She is well-developed. She is not ill-appearing.      Comments: Observation limited by video visit   HENT:      Head: Normocephalic and atraumatic.     Eyes:      General: No scleral icterus.        Right eye: No discharge.         Left eye: No discharge.      Conjunctiva/sclera: Conjunctivae normal.      Pupils: Pupils are equal, round, and reactive to light.     Pulmonary:      Effort: Pulmonary effort is normal. No respiratory distress.     Skin:     Coloration: Skin is not pale.      Findings: No erythema.      Neurological:      Mental Status: She is alert and oriented to person, place, and time.      Cranial Nerves: No cranial nerve deficit.     Psychiatric:         Mood and Affect: Mood normal.         Behavior: Behavior normal.       Medications have been reviewed by provider in current encounter    Administrative Statements   Encounter provider Sultana Arnold DO    The Patient is located at Home and in the following state in which I hold an active license [place].    The patient was identified by name and date of birth. Na Joseph was informed that this is a telemedicine visit and that the visit is being conducted through the Epic Embedded platform. She agrees to proceed..  My office door was closed. No one else was in the room.  She acknowledged consent and understanding of privacy and security of the video platform. The patient has agreed to participate and understands they can discontinue the visit at any time.    I have spent a total time of 25 minutes in caring for this patient on the day of the visit/encounter including Diagnostic results, Prognosis, Importance of tx compliance, Documenting in the medical record, and Reviewing/placing orders in the medical record (including tests, medications, and/or procedures), not including the time spent for establishing the audio/video connection.    Sultana Arnold DO

## 2025-06-19 NOTE — ASSESSMENT & PLAN NOTE
Intraoperative cultures reviewed with patient today  Staff aureus likely contaminant  Has follow-up with orthopedic surgery 6/24/2025

## 2025-06-19 NOTE — ASSESSMENT & PLAN NOTE
Continue management per orthopedic surgery  Pain is currently well-managed with oxycodone 5 mg immediate release every 4-8 hours as needed for pain  Patient will continue with outpatient physical therapy per Ortho

## 2025-06-20 ENCOUNTER — APPOINTMENT (OUTPATIENT)
Dept: LAB | Facility: CLINIC | Age: 60
End: 2025-06-20
Attending: PHYSICIAN ASSISTANT
Payer: MEDICARE

## 2025-06-20 DIAGNOSIS — D68.61 ANTIPHOSPHOLIPID ANTIBODY SYNDROME (HCC): ICD-10-CM

## 2025-06-20 DIAGNOSIS — I26.99 OTHER ACUTE PULMONARY EMBOLISM WITHOUT ACUTE COR PULMONALE (HCC): Primary | ICD-10-CM

## 2025-06-20 DIAGNOSIS — Z79.01 ANTICOAGULATED: ICD-10-CM

## 2025-06-20 DIAGNOSIS — Z79.01 ANTICOAGULATED ON COUMADIN: ICD-10-CM

## 2025-06-20 LAB
INR PPP: 1.81 (ref 0.85–1.19)
PROTHROMBIN TIME: 21.7 SECONDS (ref 12.3–15)

## 2025-06-20 PROCEDURE — 85610 PROTHROMBIN TIME: CPT

## 2025-06-20 PROCEDURE — 36415 COLL VENOUS BLD VENIPUNCTURE: CPT

## 2025-06-23 ENCOUNTER — OFFICE VISIT (OUTPATIENT)
Dept: PHYSICAL THERAPY | Facility: CLINIC | Age: 60
End: 2025-06-23
Payer: MEDICARE

## 2025-06-23 DIAGNOSIS — M25.562 ACUTE PAIN OF LEFT KNEE: Primary | ICD-10-CM

## 2025-06-23 DIAGNOSIS — Z96.652 STATUS POST REVISION OF TOTAL KNEE, LEFT: ICD-10-CM

## 2025-06-23 LAB
FUNGUS SPEC CULT: NORMAL

## 2025-06-23 PROCEDURE — 97110 THERAPEUTIC EXERCISES: CPT | Performed by: PHYSICAL THERAPIST

## 2025-06-23 RX ORDER — FOLIC ACID 1 MG/1
1000 TABLET ORAL DAILY
Qty: 30 TABLET | Refills: 1 | OUTPATIENT
Start: 2025-06-23

## 2025-06-23 NOTE — PROGRESS NOTES
Daily Note     Today's date: 2025  Patient name: Na Joseph  : 1965  MRN: [id number]  Referring provider: Amina Goldberg PA-C  Dx:   Encounter Diagnosis     ICD-10-CM    1. Acute pain of left knee  M25.562                      Subjective: Pt quite sore after last session but feeling good today      Objective: See treatment diary below.  AROM 0-100      Assessment: Tolerated treatment well. Patient would benefit from continued PT      Plan: Continue per plan of care.      Precautions: S/P L TKA Revision secondary to infection with Dr. Wilson on 10/3/24, Falls, Hx of PE, Anemia, CHF, Osteoporosis.        Precautions: L TKA by Dr. Mae on 25.  Fall concern, LBBB, HTN        Manuals          Education of procedure and recovery            PROM L Knee  TB TB                                 Neuro Re-Ed            NBOS EC            NBOS EO Foam            Hip Abd/Ext            SLR A/AROM                                                Ther Ex            Bike 3' 8 min 6'         AP for circulation            QS/GS   20x5s; w/ towel roll :03 20         HS              Gastroc towel ST            LAQ  20x 20         HR/TR  20x 20         Heel Slides   20x                     HEP review           Ther Activity            Sit to Stand            Mini Squats  20x 20         Elevations            FSU  2x10; 4in 20 4''         LRD progression                        Modalities            CP to L Knee prn  10 min post 10' post

## 2025-06-24 ENCOUNTER — OFFICE VISIT (OUTPATIENT)
Age: 60
End: 2025-06-24

## 2025-06-24 ENCOUNTER — APPOINTMENT (OUTPATIENT)
Age: 60
End: 2025-06-24
Attending: STUDENT IN AN ORGANIZED HEALTH CARE EDUCATION/TRAINING PROGRAM
Payer: MEDICARE

## 2025-06-24 VITALS — WEIGHT: 234 LBS | BODY MASS INDEX: 43.06 KG/M2 | HEIGHT: 62 IN

## 2025-06-24 DIAGNOSIS — Z96.652 STATUS POST REVISION OF TOTAL KNEE, LEFT: Primary | ICD-10-CM

## 2025-06-24 DIAGNOSIS — Z96.652 STATUS POST REVISION OF TOTAL KNEE, LEFT: ICD-10-CM

## 2025-06-24 PROCEDURE — 99024 POSTOP FOLLOW-UP VISIT: CPT | Performed by: STUDENT IN AN ORGANIZED HEALTH CARE EDUCATION/TRAINING PROGRAM

## 2025-06-24 PROCEDURE — 73562 X-RAY EXAM OF KNEE 3: CPT

## 2025-06-24 RX ORDER — OXYCODONE HYDROCHLORIDE 5 MG/1
5 TABLET ORAL EVERY 4 HOURS PRN
Qty: 42 TABLET | Refills: 0 | Status: SHIPPED | OUTPATIENT
Start: 2025-06-24 | End: 2025-07-04

## 2025-06-24 NOTE — ASSESSMENT & PLAN NOTE
60 y.o. female 2 weeks s/p Left TKA Revision from 6/11/25, doing excellent.    - continue multi-modal pain control    - oxycodone refilled today  - Weight bearing status: WBAT LLE  - DVT ppx: Resume coumadin, PCP is adjusting meds to improve INR.  - Incision care: May shower, do not scrub or submerge incision  - PT/OT  - F/U 4 weeks    Orders:  •  XR knee 3 vw left non injury; Future  •  oxyCODONE (Roxicodone) 5 immediate release tablet; Take 1 tablet (5 mg total) by mouth every 4 (four) hours as needed for moderate pain or severe pain for up to 10 days Max Daily Amount: 30 mg

## 2025-06-24 NOTE — PROGRESS NOTES
Subjective: Patient seen and examined. Pain controlled, she is taking oxycodone as needed. Progressing well, she is attending PT.  She is ambulating with a rollator walker. Incision without drainage, mepilex intact.  Denies fevers or chills    Physical Exam:  Incision: CDI, no erythema or drainage noted  ROM: 2-115  5/5 IP/Q/HS/TA/GS, 2+ DP/PT, SILT DP/SP/S/S/TN    XR Left knee: s/p attune revision TKA, components in good position. No fracture or dislocation.      Assessment & Plan  Status post revision of total knee, left  60 y.o. female 2 weeks s/p Left TKA Revision from 6/11/25, doing excellent.    - continue multi-modal pain control    - oxycodone refilled today  - Weight bearing status: WBAT LLE  - DVT ppx: Resume coumadin, PCP is adjusting meds to improve INR.  - Incision care: May shower, do not scrub or submerge incision  - PT/OT  - F/U 4 weeks    Orders:  •  XR knee 3 vw left non injury; Future  •  oxyCODONE (Roxicodone) 5 immediate release tablet; Take 1 tablet (5 mg total) by mouth every 4 (four) hours as needed for moderate pain or severe pain for up to 10 days Max Daily Amount: 30 mg      Scribe Attestation    I,:  Amina Goldberg PA-C am acting as a scribe while in the presence of the attending physician.:       I,:  Grupo Wilson,  personally performed the services described in this documentation    as scribed in my presence.:

## 2025-06-25 ENCOUNTER — EVALUATION (OUTPATIENT)
Dept: PHYSICAL THERAPY | Facility: CLINIC | Age: 60
End: 2025-06-25
Payer: MEDICARE

## 2025-06-25 DIAGNOSIS — M25.562 ACUTE PAIN OF LEFT KNEE: Primary | ICD-10-CM

## 2025-06-25 PROCEDURE — 97140 MANUAL THERAPY 1/> REGIONS: CPT | Performed by: PHYSICAL THERAPIST

## 2025-06-25 PROCEDURE — 97110 THERAPEUTIC EXERCISES: CPT | Performed by: PHYSICAL THERAPIST

## 2025-06-25 NOTE — PROGRESS NOTES
"Daily Note     Today's date: 2025  Patient name: Na Joseph  : 1965  MRN: [id number]  Referring provider: Amina Goldebrg PA-C  Dx:   Encounter Diagnosis     ICD-10-CM    1. Acute pain of left knee  M25.562                        Subjective: Pt presents today stating she is feeling well. Had a good visit with surgeon yesterday.        Objective: See treatment diary below.      AROM 0-109      Assessment: Progressing with ROM, able to proceed full revolution on bike and perform SLR AROM.  Continue to progress as able.        Plan: Continue per plan of care.      Precautions: Spacer L TKA Dr. Wilson 25.  S/P L TKA Revision secondary to infection with Dr. Wilson on 10/3/24, Falls, Hx of PE, Anemia, CHF, Osteoporosis.      Insurance Billing Rule ReEval POC expires Auth Status Total   Visits  Start date  Expiration date PT/OT + Visit Limit? Co-Insurance Misc   MC CMS 7/16/25 8/10/25 Approved 30 6/16 8/10/25 BOMN No CBC secondary, 30 PT OP visits                                                            Visit/Unit Tracking  AUTH Status: Approved Date                     Visits  Authed: 30 Used 5                     Remaining  25                                Manuals         Education of procedure and recovery            PROM L Knee  TB TB TAS                                Neuro Re-Ed            NBOS EC            NBOS EO Foam            Hip Abd/Ext            SLR A/AROM    3 x 5                                            Ther Ex            Bike 3' 8 min 6' 6 min        AP for circulation            QS/GS   20x5s; w/ towel roll :03 20 3\" x 20        HS              Gastroc towel ST    Wedge 20\" x 4        LAQ  20x 20 20x        HR/TR  20x 20 20x        Heel Slides   20x 20x                    HEP review           Ther Activity            Sit to Stand            Mini Squats  20x 20 20x        Elevations            FSU  2x10; 4in 20 4'' 20 x 4\"        LRD progression          "               Modalities            CP to L Knee prn  10 min post 10' post 10 min

## 2025-06-27 ENCOUNTER — APPOINTMENT (OUTPATIENT)
Dept: LAB | Facility: CLINIC | Age: 60
End: 2025-06-27
Attending: PHYSICIAN ASSISTANT
Payer: MEDICARE

## 2025-06-27 DIAGNOSIS — Z79.01 ANTICOAGULATED ON COUMADIN: ICD-10-CM

## 2025-06-27 LAB
INR PPP: 2.38 (ref 0.85–1.19)
PROTHROMBIN TIME: 26.7 SECONDS (ref 12.3–15)

## 2025-06-27 PROCEDURE — 36415 COLL VENOUS BLD VENIPUNCTURE: CPT

## 2025-06-27 PROCEDURE — 85610 PROTHROMBIN TIME: CPT

## 2025-06-30 ENCOUNTER — OFFICE VISIT (OUTPATIENT)
Dept: PHYSICAL THERAPY | Facility: CLINIC | Age: 60
End: 2025-06-30
Payer: MEDICARE

## 2025-06-30 DIAGNOSIS — M25.562 ACUTE PAIN OF LEFT KNEE: Primary | ICD-10-CM

## 2025-06-30 LAB
FUNGUS SPEC CULT: NORMAL

## 2025-06-30 PROCEDURE — 97110 THERAPEUTIC EXERCISES: CPT | Performed by: PHYSICAL THERAPIST

## 2025-06-30 PROCEDURE — 97140 MANUAL THERAPY 1/> REGIONS: CPT | Performed by: PHYSICAL THERAPIST

## 2025-06-30 NOTE — TELEPHONE ENCOUNTER
Please contact patient to see how he is doing.  His stool test is positive for Campylobacter.  This is usually a self-limited faction recommend proper hydration electrolyte rehydration solutions.  If his symptoms are resolved or improved then no additional treatment is needed.  If he is having continued diarrhea abdominal pain and fever then be reasonable to consider treatment with 3-day course of azithromycin 500mg daily for 3 days.   Transferred call to CTS

## 2025-06-30 NOTE — PROGRESS NOTES
"Daily Note     Today's date: 2025  Patient name: Na Joseph  : 1965  MRN: [id number]  Referring provider: Amina Goldberg PA-C  Dx:   Encounter Diagnosis     ICD-10-CM    1. Acute pain of left knee  M25.562                          Subjective: Pt reports busy weekend, sore from all the standing and walking over the weekend.  Content with progression thus far.      Objective: See treatment diary below.      A/PROM 0-115/118      Assessment: ROM progressing very well.  AROM SLR still fatigues but is being performed.  Proceeded with balance based intervention.  Continue to progress as able.       Plan: Continue per plan of care.      Precautions: Spacer L TKA Dr. Wilson 25.  S/P L TKA Revision secondary to infection with Dr. Wilson on 10/3/24, Falls, Hx of PE, Anemia, CHF, Osteoporosis.      Insurance Billing Rule ReEval POC expires Auth Status Total   Visits  Start date  Expiration date PT/OT + Visit Limit? Co-Insurance Misc   MC CMS 7/16/25 8/10/25 Approved 30 6/16 8/10/25 BOMN No CBC secondary, 30 PT OP visits                                                            Visit/Unit Tracking  AUTH Status: Approved Date                    Visits  Authed: 30 Used 5  24                               Manuals        Education of procedure and recovery            PROM L Knee  TB TB TAS TAS                               Neuro Re-Ed            NBOS EC     1 min EC Foam.         NBOS EO Foam            Hip Abd/Ext            SLR A/AROM    3 x 5 3 x 5                                           Ther Ex            Bike 3' 8 min 6' 6 min 6 min       AP for circulation            QS/GS   20x5s; w/ towel roll :03 20 3\" x 20 3\" x 20       HS              Gastroc towel ST    Wedge 20\" x 4 Wedge 20\" x 4       LAQ  20x 20 20x 20x       HR/TR  20x 20 20x 20x       Heel Slides   20x 20x 20x                   HEP review           Ther Activity          " "  Sit to Stand            Mini Squats  20x 20 20x 20x       Elevations            FSU  2x10; 4in 20 4'' 20 x 4\" 20\" x 4       LRD progression                        Modalities            CP to L Knee prn  10 min post 10' post 10 min 10 min                            "

## 2025-07-02 ENCOUNTER — OFFICE VISIT (OUTPATIENT)
Dept: PHYSICAL THERAPY | Facility: CLINIC | Age: 60
End: 2025-07-02
Payer: MEDICARE

## 2025-07-02 DIAGNOSIS — M25.562 ACUTE PAIN OF LEFT KNEE: Primary | ICD-10-CM

## 2025-07-02 DIAGNOSIS — Z51.89 AFTERCARE: ICD-10-CM

## 2025-07-02 PROCEDURE — 97110 THERAPEUTIC EXERCISES: CPT | Performed by: PHYSICAL THERAPIST

## 2025-07-02 NOTE — PROGRESS NOTES
"Daily Note     Today's date: 2025  Patient name: Na Joseph  : 1965  MRN: [id number]  Referring provider: Amina Goldberg PA-C  Dx:   Encounter Diagnosis     ICD-10-CM    1. Acute pain of left knee  M25.562       2. Aftercare  Z51.89               Start Time: 1730  Stop Time: 1805  Total time in clinic (min): 35 minutes    Subjective: Pt feeling well, offers no concerns during today session.      Objective: See treatment diary below.      A/PROM 0-117/123      Assessment: Able to proceed with outlined activity without issue and proceeded with SPC, improving endurance and strength.  Continue to progress as able.        Plan: Continue per plan of care.      Precautions: Spacer L TKA Dr. Wilson 25.  S/P L TKA Revision secondary to infection with Dr. Wilson on 10/3/24, Falls, Hx of PE, Anemia, CHF, Osteoporosis.      Insurance Billing Rule ReEval POC expires Auth Status Total   Visits  Start date  Expiration date PT/OT + Visit Limit? Co-Insurance Misc   MC CMS 7/16/25 8/10/25 Approved 30 6/16 8/10/25 BOMN No CBC secondary, 30 PT OP visits                                                            Visit/Unit Tracking  AUTH Status: Approved Date  7                  Visits  Authed: 30 Used 5 6 7                   Remaining  25 24 23                              Manuals       Education of procedure and recovery            PROM L Knee  TB TB TAS TAS TAS                              Neuro Re-Ed            NBOS EC     1 min EC Foam.   `1 min EC Foam                  Hip Abd/Ext      nv      SLR A/AROM    3 x 5 3 x 5 4 x 5      AP ML walk                                    Ther Ex            Bike 3' 8 min 6' 6 min 6 min 6 min      AP for circulation            QS/GS   20x5s; w/ towel roll :03 20 3\" x 20 3\" x 20 3\" x 20      HS              Gastroc towel ST    Wedge 20\" x 4 Wedge 20\" x 4 Wedge 20\" x 4      LAQ  20x 20 20x 20x 30x      HR/TR  20x 20 20x 20x 30x      Heel " "Slides   20x 20x 20x 20x                  HEP review           Ther Activity            Sit to Stand            Mini Squats  20x 20 20x 20x 20x      Elevations            FSU  2x10; 4in 20 4'' 20 x 4\" 20\" x 4 20\" x 4      LRD progression                        Modalities            CP to L Knee prn  10 min post 10' post 10 min 10 min dec                           "

## 2025-07-07 ENCOUNTER — OFFICE VISIT (OUTPATIENT)
Dept: PHYSICAL THERAPY | Facility: CLINIC | Age: 60
End: 2025-07-07
Payer: MEDICARE

## 2025-07-07 DIAGNOSIS — M25.562 ACUTE PAIN OF LEFT KNEE: Primary | ICD-10-CM

## 2025-07-07 LAB
FUNGUS SPEC CULT: NORMAL

## 2025-07-07 PROCEDURE — 97530 THERAPEUTIC ACTIVITIES: CPT | Performed by: PHYSICAL THERAPIST

## 2025-07-07 PROCEDURE — 97110 THERAPEUTIC EXERCISES: CPT | Performed by: PHYSICAL THERAPIST

## 2025-07-07 NOTE — PROGRESS NOTES
"Daily Note     Today's date: 2025  Patient name: Na Joseph  : 1965  MRN: [id number]  Referring provider: Amina Goldberg PA-C  Dx:   Encounter Diagnosis     ICD-10-CM    1. Acute pain of left knee  M25.562           Start Time: 1730  Stop Time: 1809  Total time in clinic (min): 39 minutes    Subjective: Pt states that her L knee feels great and reports that \"This is a walk in the park compared to October.\" She notes that \"Every day it gets easier.\" She reiterates that her primary goal is to be able to walk on the beach in September.      Objective: See treatment diary below    L knee flexion PROM 128 degrees      Assessment: Tolerated treatment well. Patient demonstrated fatigue post treatment, exhibited good technique with therapeutic exercises, and would benefit from continued PT Introduced standing bilateral hip abduction and extension. Pt exhibited increased strength and work capacity this session, evident from improved ability to perform step ups, straight leg raises.      Plan: Continue per plan of care.      Precautions: Spacer L TKA Dr. Wilson 25.  S/P L TKA Revision secondary to infection with Dr. Wilson on 10/3/24, Falls, Hx of PE, Anemia, CHF, Osteoporosis.      Insurance Billing Rule ReEval POC expires Auth Status Total   Visits  Start date  Expiration date PT/OT + Visit Limit? Co-Insurance Misc   MC CMS 7/16/25 8/10/25 Approved 30 6/16 8/10/25 BOMN No CBC secondary, 30 PT OP visits                                                            Visit/Unit Tracking  AUTH Status: Approved Date                  Visits  Authed: 30 Used 5 6 7 8                  Remaining  25 24 23 22                             Manuals      Education of procedure and recovery            PROM L Knee  TB TB TAS TAS TAS CS                             Neuro Re-Ed            NBOS EC     1 min EC Foam.   `1 min EC Foam 1' EC foam                 Hip Abd/Ext      nv " "10/ea x 2     SLR A/AROM    3 x 5 3 x 5 4 x 5 10 x 2     AP ML walk                                    Ther Ex            Bike 3' 8 min 6' 6 min 6 min 6 min 5'     AP for circulation            QS/GS   20x5s; w/ towel roll :03 20 3\" x 20 3\" x 20 3\" x 20      HS              Gastroc towel ST    Wedge 20\" x 4 Wedge 20\" x 4 Wedge 20\" x 4 Wedge 20\" x 4     LAQ  20x 20 20x 20x 30x 30     HR/TR  20x 20 20x 20x 30x 30     Heel Slides   20x 20x 20x 20x                  HEP review           Ther Activity            Sit to Stand            Mini Squats  20x 20 20x 20x 20x 20x     Elevations            FSU  2x10; 4in 20 4'' 20 x 4\" 20\" x 4 20\" x 4 4\" x 25     LRD progression                        Modalities            CP to L Knee prn  10 min post 10' post 10 min 10 min dec                             "

## 2025-07-10 ENCOUNTER — OFFICE VISIT (OUTPATIENT)
Dept: PHYSICAL THERAPY | Facility: CLINIC | Age: 60
End: 2025-07-10
Payer: MEDICARE

## 2025-07-10 DIAGNOSIS — Z51.89 AFTERCARE: ICD-10-CM

## 2025-07-10 DIAGNOSIS — M25.562 ACUTE PAIN OF LEFT KNEE: Primary | ICD-10-CM

## 2025-07-10 PROCEDURE — 97140 MANUAL THERAPY 1/> REGIONS: CPT | Performed by: PHYSICAL THERAPIST

## 2025-07-10 PROCEDURE — 97112 NEUROMUSCULAR REEDUCATION: CPT | Performed by: PHYSICAL THERAPIST

## 2025-07-10 PROCEDURE — 97110 THERAPEUTIC EXERCISES: CPT | Performed by: PHYSICAL THERAPIST

## 2025-07-10 NOTE — PROGRESS NOTES
"Daily Note     Today's date: 7/10/2025  Patient name: Na Joseph  : 1965  MRN: [id number]  Referring provider: Amina Goldberg PA-C  Dx:   Encounter Diagnosis     ICD-10-CM    1. Acute pain of left knee  M25.562       2. Aftercare  Z51.89                        Subjective: Pt reports that she is achy but no major complaints.        Objective: See treatment diary below      Assessment:  Introduced light resistance and enhanced elevation height.  Progressing quite positively at this time.        Plan: Continue per plan of care.      Precautions: Spacer L TKA Dr. Wilson 25.  S/P L TKA Revision secondary to infection with Dr. Wilson on 10/3/24, Falls, Hx of PE, Anemia, CHF, Osteoporosis.      Insurance Billing Rule ReEval POC expires Auth Status Total   Visits  Start date  Expiration date PT/OT + Visit Limit? Co-Insurance Misc   MC CMS 7/16/25 8/10/25 Approved 30 6/16 8/10/25 BOMN No CBC secondary, 30 PT OP visits                                                            Visit/Unit Tracking  AUTH Status: Approved Date 6/25 6/30 7/2 7/7 7/10                Visits  Authed: 30 Used 5 6 7 8 9                 Remaining  25 24 23 22 21                            Manuals 6/16 6/18 6/23 6/25 6/30 7/2 7/7 7/10    Education of procedure and recovery            PROM L Knee  TB TB TAS TAS TAS CS TAS                            Neuro Re-Ed            NBOS EC     1 min EC Foam.   `1 min EC Foam 1' EC foam 1 min EC Foam                Hip Abd/Ext      nv 10/ea x 2 10 x 2     SLR A/AROM    3 x 5 3 x 5 4 x 5 10 x 2 10 x 2    AP ML walk                                    Ther Ex            Bike 3' 8 min 6' 6 min 6 min 6 min 5' 5 min    AP for circulation            QS/GS   20x5s; w/ towel roll :03 20 3\" x 20 3\" x 20 3\" x 20      HS              Gastroc towel ST    Wedge 20\" x 4 Wedge 20\" x 4 Wedge 20\" x 4 Wedge 20\" x 4 Wedge 20\" x 4    LAQ  20x 20 20x 20x 30x 30 30x 2#    HR/TR  20x 20 20x 20x 30x 30 30    Heel Slides   " "20x 20x 20x 20x                  HEP review           Ther Activity            Sit to Stand            Mini Squats  20x 20 20x 20x 20x 20x 20x    Elevations            FSU  2x10; 4in 20 4'' 20 x 4\" 20\" x 4 20\" x 4 4\" x 25 6\" x 25    LRD progression                        Modalities            CP to L Knee prn  10 min post 10' post 10 min 10 min dec                             "

## 2025-07-11 ENCOUNTER — HOSPITAL ENCOUNTER (OUTPATIENT)
Dept: RADIOLOGY | Facility: MEDICAL CENTER | Age: 60
Discharge: HOME | End: 2025-07-11
Attending: PHYSICAL MEDICINE & REHABILITATION
Payer: MEDICARE

## 2025-07-11 ENCOUNTER — APPOINTMENT (OUTPATIENT)
Dept: LAB | Facility: CLINIC | Age: 60
End: 2025-07-11
Attending: INTERNAL MEDICINE
Payer: MEDICARE

## 2025-07-11 VITALS
TEMPERATURE: 98.4 F | HEART RATE: 91 BPM | SYSTOLIC BLOOD PRESSURE: 142 MMHG | DIASTOLIC BLOOD PRESSURE: 82 MMHG | RESPIRATION RATE: 18 BRPM | OXYGEN SATURATION: 97 %

## 2025-07-11 DIAGNOSIS — I26.99 OTHER ACUTE PULMONARY EMBOLISM WITHOUT ACUTE COR PULMONALE (HCC): ICD-10-CM

## 2025-07-11 DIAGNOSIS — Z79.01 ANTICOAGULATED: ICD-10-CM

## 2025-07-11 DIAGNOSIS — M47.816 LUMBAR SPONDYLOSIS: ICD-10-CM

## 2025-07-11 DIAGNOSIS — D68.61 ANTIPHOSPHOLIPID ANTIBODY SYNDROME (HCC): ICD-10-CM

## 2025-07-11 LAB
INR PPP: 2.53 (ref 0.85–1.19)
PROTHROMBIN TIME: 27.9 SECONDS (ref 12.3–15)

## 2025-07-11 PROCEDURE — 36415 COLL VENOUS BLD VENIPUNCTURE: CPT

## 2025-07-11 PROCEDURE — 64493 INJ PARAVERT F JNT L/S 1 LEV: CPT | Performed by: PHYSICAL MEDICINE & REHABILITATION

## 2025-07-11 PROCEDURE — 85610 PROTHROMBIN TIME: CPT

## 2025-07-11 PROCEDURE — 64494 INJ PARAVERT F JNT L/S 2 LEV: CPT | Performed by: PHYSICAL MEDICINE & REHABILITATION

## 2025-07-11 RX ADMIN — LIDOCAINE HYDROCHLORIDE 3 ML: 20 INJECTION, SOLUTION EPIDURAL; INFILTRATION; INTRACAUDAL; PERINEURAL at 08:22

## 2025-07-11 NOTE — DISCHARGE INSTR - LAB

## 2025-07-11 NOTE — H&P
History of Present Illness: The patient is a 60 y.o. female who presents with complaints of bilateral lower back pain    Past Medical History[1]    Past Surgical History[2]    Current Medications[3]    Allergies[4]    Physical Exam:   Vitals:    07/11/25 0803   BP: 122/79   Pulse: 82   Resp: 18   Temp: 98.4 °F (36.9 °C)   SpO2: 98%     General: Awake, Alert, Oriented x 3, Mood and affect appropriate  Respiratory: Respirations even and unlabored  Cardiovascular: Peripheral pulses intact; no edema  Musculoskeletal Exam: bilateral lower back pain    ASA Score: 3    Patient/Chart Verification  Patient ID Verified: Verbal  ID Band Applied: No  Consents Confirmed: To be obtained in the Procedural area  Interval H&P(within 24 hr) Complete (required for Outpatients and Surgery Admit only): To be obtained in the Procedural area  Allergies Reviewed: Yes  Anticoag/NSAID held?: NA  Currently on antibiotics?: No  Pregnancy denied?: NA    Assessment:   1. Lumbar spondylosis        Plan: B/L L3-5 MBB #1 (73340, 71086)         [1]   Past Medical History:  Diagnosis Date    HELENE positive     Anemia     Sildenafil causes decreased hematocrit    Antiphospholipid syndrome (HCC)     Anxiety 3/2020    CHF (congestive heart failure) ([facility])     right heart failure related to pulm HTN lupus    Chronic kidney disease 2018    borderline lab values    Chronic rhinitis 06/04/2012    Clotting disorder (HCC) 2012    Coronary artery disease 2018    Encephalitis     Lasted Assessed 10/18/2017    Gout 06/26/2018    Head injury 11/11/2023    Heart failure (HCC) 2019    History of transfusion 2/13/2019    autologous    Hypertension     Lupus 2018    Lupus anticoagulant disorder ([facility])     Maxillary sinusitis     Lasted Assessed 10/18/2017    Night sweat     Osteopenia     Hip    Osteoporosis     Spine    Pneumonia     Last Assessed 10/18/2017    PONV (postoperative nausea and vomiting)     Pulmonary embolism (HCC)     Pulmonary hypertension ([facility])      Rheumatic disease     Seizures (HCC)     Only during Encephalitis    Seizures (HCC) 04/24/2025    Occurred once at age 18-something associated with viral encephalitis      Shortness of breath     with exertion    Sinus tachycardia     Urinary incontinence    [2]   Past Surgical History:  Procedure Laterality Date    ANKLE SURGERY  6/2015    ARTHRODESIS      Hand: IP Joint    CHOLECYSTECTOMY      COLONOSCOPY      COLPOSCOPY      FL RETROGRADE PYELOGRAM  4/25/2025    HYSTERECTOMY      Vaginal    JOINT REPLACEMENT Right 10/03/2024    Knee replacement    KNEE SURGERY  2/2019    LAPAROSCOPIC ESOPHAGOGASTRIC FUNDOPLASTY  2008    ORTHOPEDIC SURGERY  10/03/2024    10/06/2022    VT ARTHRP KNE CONDYLE&PLATU MEDIAL&LAT COMPARTMENTS Right 02/12/2019    Procedure: KNEE TOTAL ARTHROPLASTY AND ASSOCIATED PROCEDURES;  Surgeon: Donovan Zendejas DO;  [facility]: [facility];  Service: Orthopedics    VT ARTHRP KNE CONDYLE&PLATU MEDIAL&LAT COMPARTMENTS Left 10/06/2022    Procedure: ARTHROPLASTY KNEE TOTAL;  Surgeon: Donovan Zendejas DO;  [facility];  Service: Orthopedics    VT ARTHRS KNEE W/MENISCECTOMY MED&LAT W/SHAVING Right 10/02/2018    Procedure: KNEE ARTHROSCOPY WITH PARTIAL MEDIAL MENISCECTOMY;  Surgeon: Donovan Zendejas DO;  [facility];  Service: Orthopedics    VT ARTHRS KNEE W/MENISCECTOMY MED&LAT W/SHAVING Left 12/17/2019    Procedure: KNEE ARTHROSCOPIC MENISCECTOMY /MEDIAL; Chondyl medial and posterior;  Surgeon: Donovan Zendejas DO;  [facility];  Service: Orthopedics    VT CYSTO/URETERO W/LITHOTRIPSY &INDWELL STENT INSRT Left 4/25/2025    Procedure: CYSTOSCOPY URETEROSCOPY WITH LITHOTRIPSY HOLMIUM LASER, RETROGRADE PYELOGRAM AND INSERTION STENT URETERAL;;  Surgeon: Yusuf Briseno DO;  Location: [facility];  Service: Urology    VT REVJ TOT KNEE ARTHRP FEM&ENTIRE TIBIAL COMPONE Left 6/11/2025    Procedure: ARTHROPLASTY KNEE TOTAL REVISION;  Surgeon: Grupo Wilson DO;  Location: [facility];  Service:  "Orthopedics    REDUCTION MAMMAPLASTY Bilateral     doesnt remember when    REPAIR ANKLE LIGAMENT Right     TENDON REPAIR      TONSILLECTOMY      TOTAL KNEE ARTHROPLASTY REVISION Left 10/03/2024    Procedure: ARTHROPLASTY KNEE TOTAL REVISION;  Surgeon: Grupo Wilson DO;  Location: [facility] OR;  Service: Orthopedics   [3]   Current Outpatient Medications:     acetaminophen (TYLENOL) 500 mg tablet, Take 2 tablets (1,000 mg total) by mouth every 8 (eight) hours, Disp: 60 tablet, Rfl: 0    ascorbic acid (VITAMIN C) 500 MG tablet, Take 1 tablet (500 mg total) by mouth 2 (two) times a day, Disp: 60 tablet, Rfl: 1    B-D 3CC LUER-LILLIE SYR 25GX1/2\" 25G X 1-1/2\" 3 ML MISC, USE 1 SYRINGE EVERY 30 DAYS, Disp: 12 each, Rfl: 1    Benlysta 200 MG/ML SOAJ, Inject under the skin Every friday, Disp: , Rfl:     cholecalciferol (VITAMIN D3) 25 mcg (1,000 units) tablet, Take 2 tablets (2,000 Units total) by mouth daily, Disp: 60 tablet, Rfl: 1    cyanocobalamin 1,000 mcg/mL, 1000 mcg IM Q 2 weeks, Disp: 10 mL, Rfl: 3    fluconazole (DIFLUCAN) 150 mg tablet, if needed (Patient not taking: Reported on 7/11/2025), Disp: , Rfl:     folic acid (FOLVITE) 1 mg tablet, Take 1 tablet (1 mg total) by mouth daily (Patient not taking: Reported on 6/24/2025), Disp: 30 tablet, Rfl: 1    gabapentin (NEURONTIN) 100 mg capsule, TAKE 1 CAPSULE(100 MG) BY MOUTH THREE TIMES DAILY, Disp: 90 capsule, Rfl: 5    hydroxychloroquine (PLAQUENIL) 200 mg tablet, 200mg every other day, 400mg alternating days. Changing as needed d/t theraputic level, Disp: , Rfl:     loperamide (IMODIUM A-D) 2 MG tablet, Take 2 mg by mouth 4 (four) times a day as needed for diarrhea, Disp: , Rfl:     multivitamin (THERAGRAN) TABS, Take 1 tablet by mouth daily, Disp: , Rfl:     NEEDLE, DISP, 23 G (BD Disp Needle) 23G X 1\" MISC, Use to administer B12, Disp: 10 each, Rfl: 3    nystatin (MYCOSTATIN) powder, Apply topically 3 (three) times a day (Patient taking differently: Apply " topically if needed), Disp: 60 g, Rfl: 0    ondansetron (ZOFRAN-ODT) 4 mg disintegrating tablet, Take 1 tablet (4 mg total) by mouth every 6 (six) hours as needed for nausea or vomiting (Patient not taking: Reported on 6/24/2025), Disp: 20 tablet, Rfl: 0    potassium chloride (Klor-Con M20) 20 mEq tablet, Take 1 tablet (20 mEq total) by mouth daily (Patient taking differently: Take 20 mEq by mouth if needed), Disp: 30 tablet, Rfl: 2    senna-docusate sodium (SENOKOT S) 8.6-50 mg per tablet, Take 1 tablet by mouth daily (Patient not taking: Reported on 6/24/2025), Disp: 30 tablet, Rfl: 0    sildenafil (REVATIO) 20 mg tablet, Take 2 tablets (40 mg total) by mouth 3 (three) times a day, Disp: 180 tablet, Rfl: 5    spironolactone (ALDACTONE) 25 mg tablet, TAKE 1 TABLET(25 MG) BY MOUTH DAILY, Disp: 90 tablet, Rfl: 1    torsemide (DEMADEX) 20 mg tablet, TAKE 1 TABLET(20 MG) BY MOUTH DAILY, Disp: 90 tablet, Rfl: 1    traZODone (DESYREL) 50 mg tablet, Take 2 tablets (100 mg total) by mouth daily at bedtime, Disp: 180 tablet, Rfl: 1    warfarin (Coumadin) 5 mg tablet, 7.5mg po daily or as directed., Disp: 150 tablet, Rfl: 3  [4]   Allergies  Allergen Reactions    Amoxicillin-Pot Clavulanate Dermatitis, Rash, Anaphylaxis, Hives, Itching and Swelling    Dilantin [Phenytoin] Anaphylaxis and Rash    Penicillins Anaphylaxis, Hives, Dermatitis and Rash     Includes augmentin

## 2025-07-14 ENCOUNTER — OFFICE VISIT (OUTPATIENT)
Dept: PHYSICAL THERAPY | Facility: CLINIC | Age: 60
End: 2025-07-14
Payer: MEDICARE

## 2025-07-14 ENCOUNTER — TELEPHONE (OUTPATIENT)
Dept: RADIOLOGY | Facility: MEDICAL CENTER | Age: 60
End: 2025-07-14

## 2025-07-14 ENCOUNTER — APPOINTMENT (OUTPATIENT)
Dept: LAB | Facility: CLINIC | Age: 60
End: 2025-07-14
Attending: PHYSICIAN ASSISTANT
Payer: MEDICARE

## 2025-07-14 DIAGNOSIS — I26.99 PULMONARY EMBOLISM ASSOCIATED WITH COVID-19 (HCC): ICD-10-CM

## 2025-07-14 DIAGNOSIS — Z51.89 AFTERCARE: ICD-10-CM

## 2025-07-14 DIAGNOSIS — E55.9 VITAMIN D DEFICIENCY: ICD-10-CM

## 2025-07-14 DIAGNOSIS — D50.0 IRON DEFICIENCY ANEMIA DUE TO CHRONIC BLOOD LOSS: ICD-10-CM

## 2025-07-14 DIAGNOSIS — D84.9 IMMUNOSUPPRESSION-RELATED INFECTIOUS DISEASE (HCC): ICD-10-CM

## 2025-07-14 DIAGNOSIS — U07.1 PULMONARY EMBOLISM ASSOCIATED WITH COVID-19 (HCC): ICD-10-CM

## 2025-07-14 DIAGNOSIS — Z79.899 POLYPHARMACY: ICD-10-CM

## 2025-07-14 DIAGNOSIS — E53.8 B12 DEFICIENCY: ICD-10-CM

## 2025-07-14 DIAGNOSIS — M32.19 DILATED CARDIOMYOPATHY SECONDARY TO SYSTEMIC LUPUS ERYTHEMATOSUS (HCC): ICD-10-CM

## 2025-07-14 DIAGNOSIS — D68.61 ANTIPHOSPHOLIPID ANTIBODY SYNDROME (HCC): ICD-10-CM

## 2025-07-14 DIAGNOSIS — I43 DILATED CARDIOMYOPATHY SECONDARY TO SYSTEMIC LUPUS ERYTHEMATOSUS (HCC): ICD-10-CM

## 2025-07-14 DIAGNOSIS — B99.8 IMMUNOSUPPRESSION-RELATED INFECTIOUS DISEASE (HCC): ICD-10-CM

## 2025-07-14 DIAGNOSIS — M25.562 ACUTE PAIN OF LEFT KNEE: Primary | ICD-10-CM

## 2025-07-14 DIAGNOSIS — Z79.01 ANTICOAGULATED ON COUMADIN: ICD-10-CM

## 2025-07-14 LAB
25(OH)D3 SERPL-MCNC: 48.7 NG/ML (ref 30–100)
ALBUMIN SERPL BCG-MCNC: 4 G/DL (ref 3.5–5)
ALP SERPL-CCNC: 112 U/L (ref 34–104)
ALT SERPL W P-5'-P-CCNC: 9 U/L (ref 7–52)
ANION GAP SERPL CALCULATED.3IONS-SCNC: 3 MMOL/L (ref 4–13)
AST SERPL W P-5'-P-CCNC: 10 U/L (ref 13–39)
BASOPHILS # BLD AUTO: 0.02 THOUSANDS/ÂΜL (ref 0–0.1)
BASOPHILS NFR BLD AUTO: 1 % (ref 0–1)
BILIRUB SERPL-MCNC: 0.44 MG/DL (ref 0.2–1)
BILIRUB UR QL STRIP: NEGATIVE
BUN SERPL-MCNC: 19 MG/DL (ref 5–25)
C3 SERPL-MCNC: 157 MG/DL (ref 87–200)
C4 SERPL-MCNC: 39 MG/DL (ref 19–52)
CALCIUM SERPL-MCNC: 9.5 MG/DL (ref 8.4–10.2)
CHLORIDE SERPL-SCNC: 107 MMOL/L (ref 96–108)
CLARITY UR: CLEAR
CO2 SERPL-SCNC: 29 MMOL/L (ref 21–32)
COLOR UR: COLORLESS
CREAT SERPL-MCNC: 0.84 MG/DL (ref 0.6–1.3)
CREAT UR-MCNC: 88.4 MG/DL
EOSINOPHIL # BLD AUTO: 0.43 THOUSAND/ÂΜL (ref 0–0.61)
EOSINOPHIL NFR BLD AUTO: 14 % (ref 0–6)
ERYTHROCYTE [DISTWIDTH] IN BLOOD BY AUTOMATED COUNT: 12.6 % (ref 11.6–15.1)
ERYTHROCYTE [SEDIMENTATION RATE] IN BLOOD: 14 MM/HOUR (ref 0–29)
FERRITIN SERPL-MCNC: 183 NG/ML (ref 30–307)
FUNGUS SPEC CULT: NORMAL
GFR SERPL CREATININE-BSD FRML MDRD: 74 ML/MIN/1.73SQ M
GLUCOSE P FAST SERPL-MCNC: 91 MG/DL (ref 65–99)
GLUCOSE UR STRIP-MCNC: NEGATIVE MG/DL
HCT VFR BLD AUTO: 37.5 % (ref 34.8–46.1)
HGB BLD-MCNC: 12.4 G/DL (ref 11.5–15.4)
HGB UR QL STRIP.AUTO: NEGATIVE
IMM GRANULOCYTES # BLD AUTO: 0.01 THOUSAND/UL (ref 0–0.2)
IMM GRANULOCYTES NFR BLD AUTO: 0 % (ref 0–2)
INR PPP: 3.51 (ref 0.85–1.19)
IRON SATN MFR SERPL: 17 % (ref 15–50)
IRON SERPL-MCNC: 51 UG/DL (ref 50–212)
KETONES UR STRIP-MCNC: NEGATIVE MG/DL
LEUKOCYTE ESTERASE UR QL STRIP: NEGATIVE
LYMPHOCYTES # BLD AUTO: 0.43 THOUSANDS/ÂΜL (ref 0.6–4.47)
LYMPHOCYTES NFR BLD AUTO: 14 % (ref 14–44)
MCH RBC QN AUTO: 32 PG (ref 26.8–34.3)
MCHC RBC AUTO-ENTMCNC: 33.1 G/DL (ref 31.4–37.4)
MCV RBC AUTO: 97 FL (ref 82–98)
MONOCYTES # BLD AUTO: 0.26 THOUSAND/ÂΜL (ref 0.17–1.22)
MONOCYTES NFR BLD AUTO: 8 % (ref 4–12)
NEUTROPHILS # BLD AUTO: 1.93 THOUSANDS/ÂΜL (ref 1.85–7.62)
NEUTS SEG NFR BLD AUTO: 63 % (ref 43–75)
NITRITE UR QL STRIP: NEGATIVE
NRBC BLD AUTO-RTO: 0 /100 WBCS
PH UR STRIP.AUTO: 5 [PH]
PLATELET # BLD AUTO: 206 THOUSANDS/UL (ref 149–390)
PMV BLD AUTO: 10.3 FL (ref 8.9–12.7)
POTASSIUM SERPL-SCNC: 3.8 MMOL/L (ref 3.5–5.3)
PROT SERPL-MCNC: 6.1 G/DL (ref 6.4–8.4)
PROT UR STRIP-MCNC: NEGATIVE MG/DL
PROT UR-MCNC: 6.8 MG/DL
PROT/CREAT UR: 0.1 MG/G{CREAT}
PROTHROMBIN TIME: 35.7 SECONDS (ref 12.3–15)
RBC # BLD AUTO: 3.87 MILLION/UL (ref 3.81–5.12)
SODIUM SERPL-SCNC: 139 MMOL/L (ref 135–147)
SP GR UR STRIP.AUTO: 1.01 (ref 1–1.03)
TIBC SERPL-MCNC: 298.2 UG/DL (ref 250–450)
TRANSFERRIN SERPL-MCNC: 213 MG/DL (ref 203–362)
UIBC SERPL-MCNC: 247 UG/DL (ref 155–355)
URATE SERPL-MCNC: 4.7 MG/DL (ref 2–7.5)
UROBILINOGEN UR STRIP-ACNC: <2 MG/DL
VIT B12 SERPL-MCNC: 392 PG/ML (ref 180–914)
WBC # BLD AUTO: 3.08 THOUSAND/UL (ref 4.31–10.16)

## 2025-07-14 PROCEDURE — 85025 COMPLETE CBC W/AUTO DIFF WBC: CPT

## 2025-07-14 PROCEDURE — 84550 ASSAY OF BLOOD/URIC ACID: CPT

## 2025-07-14 PROCEDURE — 84156 ASSAY OF PROTEIN URINE: CPT

## 2025-07-14 PROCEDURE — 82306 VITAMIN D 25 HYDROXY: CPT

## 2025-07-14 PROCEDURE — 85652 RBC SED RATE AUTOMATED: CPT

## 2025-07-14 PROCEDURE — 86225 DNA ANTIBODY NATIVE: CPT

## 2025-07-14 PROCEDURE — 82728 ASSAY OF FERRITIN: CPT

## 2025-07-14 PROCEDURE — 80053 COMPREHEN METABOLIC PANEL: CPT

## 2025-07-14 PROCEDURE — 81003 URINALYSIS AUTO W/O SCOPE: CPT

## 2025-07-14 PROCEDURE — 82607 VITAMIN B-12: CPT

## 2025-07-14 PROCEDURE — 85598 HEXAGNAL PHOSPH PLTLT NEUTRL: CPT

## 2025-07-14 PROCEDURE — 86147 CARDIOLIPIN ANTIBODY EA IG: CPT

## 2025-07-14 PROCEDURE — 85732 THROMBOPLASTIN TIME PARTIAL: CPT

## 2025-07-14 PROCEDURE — 85705 THROMBOPLASTIN INHIBITION: CPT

## 2025-07-14 PROCEDURE — 97110 THERAPEUTIC EXERCISES: CPT | Performed by: PHYSICAL THERAPIST

## 2025-07-14 PROCEDURE — 82570 ASSAY OF URINE CREATININE: CPT

## 2025-07-14 PROCEDURE — 83540 ASSAY OF IRON: CPT

## 2025-07-14 PROCEDURE — 85613 RUSSELL VIPER VENOM DILUTED: CPT

## 2025-07-14 PROCEDURE — 80220 DRUG ASY HYDROXYCHLOROQUINE: CPT

## 2025-07-14 PROCEDURE — 85610 PROTHROMBIN TIME: CPT

## 2025-07-14 PROCEDURE — 86160 COMPLEMENT ANTIGEN: CPT

## 2025-07-14 PROCEDURE — 83550 IRON BINDING TEST: CPT

## 2025-07-14 PROCEDURE — 97140 MANUAL THERAPY 1/> REGIONS: CPT | Performed by: PHYSICAL THERAPIST

## 2025-07-14 PROCEDURE — 85670 THROMBIN TIME PLASMA: CPT

## 2025-07-14 PROCEDURE — 36415 COLL VENOUS BLD VENIPUNCTURE: CPT

## 2025-07-14 NOTE — TELEPHONE ENCOUNTER
Procedure scheduled 7/25/25    Reviewed instructions: , NPO 1 hour prior, loose-fitting/comfortable clothes, if ill/fever/infx/abx to call and reschedule.  Also pain level at leat 5/10 and refrain from PRN, as-needed pain meds 6h prior.  Patient stated verbal understanding.

## 2025-07-14 NOTE — PROGRESS NOTES
"Daily Note     Today's date: 2025  Patient name: Na Joseph  : 1965  MRN: [id number]  Referring provider: Amina Goldberg PA-C  Dx:   Encounter Diagnosis     ICD-10-CM    1. Acute pain of left knee  M25.562       2. Aftercare  Z51.89                        Subjective: Pt reports that she is feeling well.  Offers no complaints.      Objective: See treatment diary below      Assessment:  Proceeded with further CKC resistant based intervention.  Continue to progress as able.        Plan: Continue per plan of care.      Precautions: Spacer L TKA Dr. Wilson 25.  S/P L TKA Revision secondary to infection with Dr. Wilson on 10/3/24, Falls, Hx of PE, Anemia, CHF, Osteoporosis.      Insurance Billing Rule ReEval POC expires Auth Status Total   Visits  Start date  Expiration date PT/OT + Visit Limit? Co-Insurance Misc   [facility] CMS 7/16/25 8/10/25 Approved 30 6/16 8/10/25 BOMN No CBC secondary, 30 PT OP visits                                                            Visit/Unit Tracking  AUTH Status: Approved Date 6/25 6/30 7/2 7/7 7/10 7/14               Visits  Authed: 30 Used 5 6 7 8 9 10                Remaining  25 24 23 22 21 20                           Manuals 6/16 6/18 6/23 6/25 6/30 7/2 7/7 7/10 7/14   Education of procedure and recovery            PROM L Knee  TB TB TAS TAS TAS CS TAS TAS                           Neuro Re-Ed            NBOS EC     1 min EC Foam.   `1 min EC Foam 1' EC foam 1 min EC Foam 1 min EC foam               Hip Abd/Ext      nv 10/ea x 2 10 x 2  10 x 2   SLR A/AROM    3 x 5 3 x 5 4 x 5 10 x 2 10 x 2 10 x 2   AP ML walk                                    Ther Ex            Bike 3' 8 min 6' 6 min 6 min 6 min 5' 5 min 5 min   AP for circulation            QS/GS   20x5s; w/ towel roll :03 20 3\" x 20 3\" x 20 3\" x 20      HS              Gastroc towel ST    Wedge 20\" x 4 Wedge 20\" x 4 Wedge 20\" x 4 Wedge 20\" x 4 Wedge 20\" x 4 Wedge 20\" x 4   LAQ  20x 20 20x 20x 30x 30 30x 2# 30x " Subjective   History of Present Illness  41-year-old male with history of COPD presents to the emergency department with concerns about vomiting blood today.  He states that he was coughing so hard that he started vomiting and saw streaks of blood in his emesis.  He denies use of anticoagulants.  He states that he has not had an EGD in the past and does not have any known history of peptic ulcer disease.  He reports intermittent worsening chest pain for the past 4 days, and today he had worsening left-sided chest pain radiating to his left upper extremity.  His pain is worse with deep inspiration.  He also reports lightheadedness with standing and difficulty walking.  He reports subjective fever today, but did not check his temperature.  He was seen in the emergency department at  yesterday, and was discharged home on oral doxycycline.  He states that he took the first dose this morning, and that is when his vomiting began.  He was also prescribed a prednisone taper which he states he has been taking.  He reports ongoing tobacco smoking, but is trying to cut down.  He is currently staying at a facility for sobriety, and admits to prior crack cocaine use, but denies any history of prior IV drug use.  He denies any recent use of recreational drugs.      Review of Systems   Constitutional:  Positive for fever. Negative for diaphoresis.   HENT:  Negative for facial swelling.    Eyes:  Negative for photophobia and discharge.   Respiratory:  Positive for cough and shortness of breath. Negative for stridor.    Cardiovascular:  Positive for chest pain.   Gastrointestinal:  Positive for nausea and vomiting.   Musculoskeletal:         Right great toenail is purple and thickened and elongated, he complains that it is rubbing against the second toe.   Neurological:  Positive for dizziness and light-headedness.       History reviewed. No pertinent past medical history.  Patient reports history of COPD, and prior stroke.    No  "3#   HR/TR  20x 20 20x 20x 30x 30 30 30   Heel Slides   20x 20x 20x 20x      LP + HR            HEP review           Ther Activity            Sit to Stand            Mini Squats  20x 20 20x 20x 20x 20x 20x 20x    Elevations            FSU  2x10; 4in 20 4'' 20 x 4\" 20\" x 4 20\" x 4 4\" x 25 6\" x 25 6\"  x 25   LRD progression                        Modalities            CP to L Knee prn  10 min post 10' post 10 min 10 min dec                             " Known Allergies    History reviewed. No pertinent surgical history.    History reviewed. No pertinent family history.    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   Constitutional:       Appearance: He is not diaphoretic.      Comments: BMI 30, excellent historian.   Eyes:      Comments: No photophobia or nystagmus.   Neck:      Comments: No meningismus.  Cardiovascular:      Heart sounds: No murmur heard.     No friction rub. No gallop.      Comments: S1, S2, mild regular tachycardia.  Pulmonary:      Breath sounds: No stridor.      Comments: Coughs during exam.  Bilateral expiratory wheezing with mild tachypnea and mild prolonged expiratory phase.  Rales on inspiration to left base.  Abdominal:      Palpations: Abdomen is soft.   Musculoskeletal:      Cervical back: Neck supple.      Comments: No significant peripheral edema.  No calf tenderness bilaterally.   Skin:     General: Skin is warm and dry.   Neurological:      Mental Status: He is alert.      Comments: Moves all extremities, no facial droop appreciated.  No truncal ataxia.         Procedures           ED Course  ED Course as of 10/28/23 0157   Sat Oct 28, 2023   0154 COPD with acute exacerbation and pneumonia with possible sepsis, not tolerating outpatient oral therapy, will discuss with hospitalist Dr. Agrawal via [employer] chat. [LD]      ED Course User Index  [LD] Reny Paredes MD                                            Medical Decision Making  Differential diagnosis includes COPD exacerbation, pneumonia, pleural effusion, viral illness, acute coronary syndrome, and others.    Problems Addressed:  Atypical chest pain: complicated acute illness or injury  COPD with acute exacerbation: complicated acute illness or injury  Cough, unspecified type: complicated acute illness or injury  Hematemesis with nausea: complicated acute illness or injury  Hyperglycemia due to diabetes mellitus: complicated acute illness or injury  Leukocytosis, unspecified type: complicated acute illness or injury  Pneumonia of left lower lobe due to infectious organism: complicated acute illness or injury  Sinus tachycardia by electrocardiogram: complicated acute illness or injury  Tobacco use disorder: complicated acute illness or injury    Amount and/or Complexity of Data Reviewed  External Data Reviewed: notes.     Details: I reviewed his emergency department note from  from yesterday, 10/26/2023.  Labs: ordered. Decision-making details documented in ED Course.  Radiology: ordered. Decision-making details documented in ED Course.  ECG/medicine tests: ordered and independent interpretation performed. Decision-making details documented in ED Course.     Details: EKG at 2038 shows sinus tachycardia at a rate of 119 bpm, normal intervals, no acute ischemic changes, Q waves in septal leads, no prior EKG available for comparison.  Repeat EKG at 2257 shows sinus tachycardia at a rate of 111 bpm, septal Q waves, no acute ischemic changes, no significant dynamic EKG changes.  Discussion of management or test interpretation with external provider(s): Dr. Agrawal, hospitalist, contacted via [employer] chat at approximately [phone].    Risk  Prescription drug management.  Decision regarding hospitalization.      Recent Results (from the past 24 hour(s))   ECG 12 Lead ED Triage Standing Order; Chest Pain    Collection Time: 10/27/23  8:38 PM   Result Value Ref Range     QT Interval 298 ms    QTC Interval 419 ms   High Sensitivity Troponin T    Collection Time: 10/27/23  8:46 PM    Specimen: Blood   Result Value Ref Range    HS Troponin T 15 (H) <15 ng/L   Comprehensive Metabolic Panel    Collection Time: 10/27/23  8:46 PM    Specimen: Blood   Result Value Ref Range    Glucose 218 (H) 65 - 99 mg/dL    BUN 14 6 - 20 mg/dL    Creatinine 0.74 (L) 0.76 - 1.27 mg/dL    Sodium 134 (L) 136 - 145 mmol/L    Potassium 4.0 3.5 - 5.2 mmol/L    Chloride 102 98 - 107 mmol/L    CO2 21.0 (L) 22.0 - 29.0 mmol/L    Calcium 9.3 8.6 - 10.5 mg/dL    Total Protein 7.0 6.0 - 8.5 g/dL    Albumin 4.5 3.5 - 5.2 g/dL    ALT (SGPT) 19 1 - 41 U/L    AST (SGOT) 14 1 - 40 U/L    Alkaline Phosphatase 86 39 - 117 U/L    Total Bilirubin 0.3 0.0 - 1.2 mg/dL    Globulin 2.5 gm/dL    A/G Ratio 1.8 g/dL    BUN/Creatinine Ratio 18.9 7.0 - 25.0    Anion Gap 11.0 5.0 - 15.0 mmol/L    eGFR 115.3 >60.0 mL/min/1.73   Lipase    Collection Time: 10/27/23  8:46 PM    Specimen: Blood   Result Value Ref Range    Lipase 59 13 - 60 U/L   BNP    Collection Time: 10/27/23  8:46 PM    Specimen: Blood   Result Value Ref Range    proBNP 658.3 (H) 0.0 - 450.0 pg/mL   Green Top (Gel)    Collection Time: 10/27/23  8:46 PM   Result Value Ref Range    Extra Tube Hold for add-ons.    Lavender Top    Collection Time: 10/27/23  8:46 PM   Result Value Ref Range    Extra Tube hold for add-on    Gold Top - SST    Collection Time: 10/27/23  8:46 PM   Result Value Ref Range    Extra Tube Hold for add-ons.    Gray Top    Collection Time: 10/27/23  8:46 PM   Result Value Ref Range    Extra Tube Hold for add-ons.    Light Blue Top    Collection Time: 10/27/23  8:46 PM   Result Value Ref Range    Extra Tube Hold for add-ons.    CBC Auto Differential    Collection Time: 10/27/23  8:46 PM    Specimen: Blood   Result Value Ref Range    WBC 12.94 (H) 3.40 - 10.80 10*3/mm3    RBC 4.65 4.14 - 5.80 10*6/mm3    Hemoglobin 13.9 13.0 - 17.7 g/dL    Hematocrit  39.8 37.5 - 51.0 %    MCV 85.6 79.0 - 97.0 fL    MCH 29.9 26.6 - 33.0 pg    MCHC 34.9 31.5 - 35.7 g/dL    RDW 13.2 12.3 - 15.4 %    RDW-SD 40.5 37.0 - 54.0 fl    MPV 10.9 6.0 - 12.0 fL    Platelets 272 140 - 450 10*3/mm3    Neutrophil % 86.7 (H) 42.7 - 76.0 %    Lymphocyte % 8.6 (L) 19.6 - 45.3 %    Monocyte % 3.9 (L) 5.0 - 12.0 %    Eosinophil % 0.0 (L) 0.3 - 6.2 %    Basophil % 0.1 0.0 - 1.5 %    Immature Grans % 0.7 (H) 0.0 - 0.5 %    Neutrophils, Absolute 11.23 (H) 1.70 - 7.00 10*3/mm3    Lymphocytes, Absolute 1.11 0.70 - 3.10 10*3/mm3    Monocytes, Absolute 0.50 0.10 - 0.90 10*3/mm3    Eosinophils, Absolute 0.00 0.00 - 0.40 10*3/mm3    Basophils, Absolute 0.01 0.00 - 0.20 10*3/mm3    Immature Grans, Absolute 0.09 (H) 0.00 - 0.05 10*3/mm3    nRBC 0.0 0.0 - 0.2 /100 WBC   D-dimer, Quantitative    Collection Time: 10/27/23  8:46 PM    Specimen: Blood   Result Value Ref Range    D-Dimer, Quantitative <0.27 0.00 - 0.50 MCGFEU/mL   COVID-19 and FLU A/B PCR - Swab, Nasopharynx    Collection Time: 10/27/23 10:41 PM    Specimen: Nasopharynx; Swab   Result Value Ref Range    COVID19 Not Detected Not Detected - Ref. Range    Influenza A PCR Not Detected Not Detected    Influenza B PCR Not Detected Not Detected   High Sensitivity Troponin T 2Hr    Collection Time: 10/27/23 10:47 PM    Specimen: Blood   Result Value Ref Range    HS Troponin T 13 <15 ng/L    Troponin T Delta -2 >=-4 - <+4 ng/L   ECG 12 Lead ED Triage Standing Order; Chest Pain    Collection Time: 10/27/23 10:57 PM   Result Value Ref Range    QT Interval 324 ms    QTC Interval 440 ms   Urinalysis With Microscopic If Indicated (No Culture) - Urine, Clean Catch    Collection Time: 10/28/23 12:39 AM    Specimen: Urine, Clean Catch   Result Value Ref Range    Color, UA Yellow Yellow, Straw    Appearance, UA Clear Clear    pH, UA 6.0 5.0 - 8.0    Specific Gravity, UA 1.019 1.001 - 1.030    Glucose,  mg/dL (Trace) (A) Negative    Ketones, UA Negative Negative     Bilirubin, UA Negative Negative    Blood, UA Negative Negative    Protein, UA Trace (A) Negative    Leuk Esterase, UA Negative Negative    Nitrite, UA Negative Negative    Urobilinogen, UA 1.0 E.U./dL 0.2 - 1.0 E.U./dL   Urine Drug Screen - Urine, Clean Catch    Collection Time: 10/28/23 12:39 AM    Specimen: Urine, Clean Catch   Result Value Ref Range    THC, Screen, Urine Negative Negative    Phencyclidine (PCP), Urine Negative Negative    Cocaine Screen, Urine Negative Negative    Methamphetamine, Ur Negative Negative    Opiate Screen Positive (A) Negative    Amphetamine Screen, Urine Negative Negative    Benzodiazepine Screen, Urine Negative Negative    Tricyclic Antidepressants Screen Negative Negative    Methadone Screen, Urine Negative Negative    Barbiturates Screen, Urine Negative Negative    Oxycodone Screen, Urine Negative Negative    Propoxyphene Screen Negative Negative    Buprenorphine, Screen, Urine Negative Negative     Note: In addition to lab results from this visit, the labs listed above may include labs taken at another facility or during a different encounter within the last 24 hours. Please correlate lab times with ED admission and discharge times for further clarification of the services performed during this visit.    CT Abdomen Pelvis Without Contrast   Final Result   Impression:   1.Small focus of pneumonia in the left lower lobe.   2.No acute abdominal or pelvic abnormality.   3.12 mm exophytic lesion at the inferior aspect of the left kidney. This is indeterminate in part due to motion and otherwise due to small size and noncontrast technique. Consider 3-month follow-up renal protocol CT or MRI to more definitively evaluate..            Electronically Signed: Yonatan Yun MD     10/27/2023 11:59 PM EDT     Workstation ID: [id number]      XR Chest 1 View   Final Result   Impression:      No acute pulmonary disease.         Electronically Signed: Jose Luis Davis MD     10/27/2023 9:59  PM EDT     Workstation ID: [id number]        Vitals:    10/27/23 2248 10/27/23 2315 10/27/23 2345 10/28/23 0015   BP: 127/75 130/71 124/83 116/69   BP Location:       Patient Position:       Pulse: 106 113 114 111   Resp:       Temp:       TempSrc:       SpO2: 97% 92% 91% 93%   Weight:       Height:         Medications   sodium chloride 0.9 % flush 10 mL (has no administration in time range)   ipratropium-albuterol (DUO-NEB) nebulizer solution 3 mL (3 mL Nebulization Given 10/27/23 2244)   azithromycin (ZITHROMAX) 500 mg in sodium chloride 0.9 % 250 mL IVPB-VTB (has no administration in time range)   sodium chloride 0.9 % bolus 1,000 mL (1,000 mL Intravenous New Bag 10/27/23 2232)   pantoprazole (PROTONIX) injection 80 mg (80 mg Intravenous Given 10/27/23 2234)   ondansetron (ZOFRAN) injection 4 mg (4 mg Intravenous Given 10/27/23 2234)   methylPREDNISolone sodium succinate (SOLU-Medrol) injection 125 mg (125 mg Intravenous Given 10/27/23 2234)   cefTRIAXone (ROCEPHIN) 2,000 mg in sodium chloride 0.9 % 100 mL IVPB (2,000 mg Intravenous New Bag 10/28/23 0005)     ECG/EMG Results (last 24 hours)       Procedure Component Value Units Date/Time    ECG 12 Lead ED Triage Standing Order; Chest Pain [[id number]] Collected: 10/27/23 2038     Updated: 10/27/23 2037     QT Interval 298 ms      QTC Interval 419 ms     Narrative:      Test Reason : ED Triage Standing Order~  Blood Pressure :   */*   mmHG  Vent. Rate : 119 BPM     Atrial Rate : 119 BPM     P-R Int : 152 ms          QRS Dur :  84 ms      QT Int : 298 ms       P-R-T Axes :  62 -33  77 degrees     QTc Int : 419 ms    Sinus tachycardia  Left axis deviation  Anteroseptal infarct , age undetermined  Abnormal ECG  No previous ECGs available    Referred By: DMITRY           Confirmed By:     ECG 12 Lead ED Triage Standing Order; Chest Pain [[id number]] Collected: 10/27/23 2257     Updated: 10/27/23 2258     QT Interval 324 ms      QTC Interval 440 ms     Narrative:      Test  Reason : ED Triage Standing Order~  Blood Pressure :   */*   mmHG  Vent. Rate : 111 BPM     Atrial Rate : 111 BPM     P-R Int : 156 ms          QRS Dur : 100 ms      QT Int : 324 ms       P-R-T Axes :  64  -4  75 degrees     QTc Int : 440 ms    Sinus tachycardia  Septal infarct (cited on or before 27-OCT-2023)  Abnormal ECG  When compared with ECG of 27-OCT-2023 20:38, (Unconfirmed)  No significant change was found    Referred By: JOHN PAUL           Confirmed By:           ECG 12 Lead ED Triage Standing Order; Chest Pain   Preliminary Result   Test Reason : ED Triage Standing Order~   Blood Pressure :   */*   mmHG   Vent. Rate : 111 BPM     Atrial Rate : 111 BPM      P-R Int : 156 ms          QRS Dur : 100 ms       QT Int : 324 ms       P-R-T Axes :  64  -4  75 degrees      QTc Int : 440 ms      Sinus tachycardia   Septal infarct (cited on or before 27-OCT-2023)   Abnormal ECG   When compared with ECG of 27-OCT-2023 20:38, (Unconfirmed)   No significant change was found      Referred By: JOHN PAUL           Confirmed By:       ECG 12 Lead ED Triage Standing Order; Chest Pain   Preliminary Result   Test Reason : ED Triage Standing Order~   Blood Pressure :   */*   mmHG   Vent. Rate : 119 BPM     Atrial Rate : 119 BPM      P-R Int : 152 ms          QRS Dur :  84 ms       QT Int : 298 ms       P-R-T Axes :  62 -33  77 degrees      QTc Int : 419 ms      Sinus tachycardia   Left axis deviation   Anteroseptal infarct , age undetermined   Abnormal ECG   No previous ECGs available      Referred By: DMITRY           Confirmed By:               Final diagnoses:   Pneumonia of left lower lobe due to infectious organism   COPD with acute exacerbation   Cough, unspecified type   Tobacco use disorder   Hematemesis with nausea   Hyperglycemia due to diabetes mellitus   Atypical chest pain   Leukocytosis, unspecified type   Sinus tachycardia by electrocardiogram   Renal lesion   Dizziness       ED Disposition  ED Disposition       ED  Disposition   Decision to Admit    Condition   --    Comment   --               No follow-up provider specified.       Medication List      No changes were made to your prescriptions during this visit.            Reny Paredes MD  10/28/23 0158

## 2025-07-14 NOTE — TELEPHONE ENCOUNTER
Patient is currently at an appointment with her daughter.  Will call her back to schedule this afternoon.

## 2025-07-16 ENCOUNTER — EVALUATION (OUTPATIENT)
Dept: PHYSICAL THERAPY | Facility: CLINIC | Age: 60
End: 2025-07-16
Payer: MEDICARE

## 2025-07-16 DIAGNOSIS — M25.562 ACUTE PAIN OF LEFT KNEE: Primary | ICD-10-CM

## 2025-07-16 DIAGNOSIS — Z51.89 AFTERCARE: ICD-10-CM

## 2025-07-16 LAB
APTT HEX PL PPP: 9 SEC (ref 0–11)
APTT SCREEN TO CONFIRM RATIO: 1.08 RATIO (ref 0–1.34)
APTT-LA IMM 4:1 NP PPP: 45.2 SEC (ref 0–40.5)
CONFIRM APTT/NORMAL: 126.4 SEC (ref 0–47.6)
DRVVT IMM 1:2 NP PPP: 45.6 SEC (ref 0–40.4)
DRVVT SCREEN TO CONFIRM RATIO: 1 RATIO (ref 0.8–1.2)
LA PPP-IMP: ABNORMAL
SCREEN APTT: 62.6 SEC (ref 0–43.5)
SCREEN DRVVT: 71.6 SEC (ref 0–47)
THROMBIN TIME: 18.8 SEC (ref 0–23)

## 2025-07-16 PROCEDURE — 97110 THERAPEUTIC EXERCISES: CPT | Performed by: PHYSICAL THERAPIST

## 2025-07-16 PROCEDURE — 97140 MANUAL THERAPY 1/> REGIONS: CPT | Performed by: PHYSICAL THERAPIST

## 2025-07-16 NOTE — PROGRESS NOTES
PT Evaluation     Today's date: 2025  Patient name: Na Joseph  : 1965  MRN: [id number]  Referring provider: Amina Goldberg PA-C  Dx:   Encounter Diagnosis     ICD-10-CM    1. Acute pain of left knee  M25.562       2. Aftercare  Z51.89                        Assessment  Impairments: abnormal gait, abnormal or restricted ROM, activity intolerance, impaired physical strength, lacks appropriate home exercise program, pain with function, poor posture , poor body mechanics, activity limitations and endurance    Assessment details: Pt is progressing well at this time.  They have demonstrated improvement in pain levels, strength, range, flexibility as well as overall tolerance to activity.  They have achieved all STGs sought out for them as well as by them.  They will benefit continued Skilled PT intervention in order to achieve all LTGs sought out for them as well as by them in order to perform all desired activities with minimal to nil symptom exacerbation.  Thank you very much for this kind and motivated referral.      Understanding of Dx/Px/POC: good     Prognosis: good    Goals    STG 4 Weeks:  Decrease pain at worst to 2/10 -met  Improve knee range to 105 flex, TKE achievement -met  Improve strength to 4- hip, knee 5/5 -met  Independent with HEP -met  LTG 8 Weeks:  Decrease pain at worst to 1/10  Improve knee range to be 120 flex  Improve strength to Hip 4/5 or greater.    Able to perform all desired activities with minimal to nil symptom exacerbation               Plan  Patient would benefit from: skilled physical therapy and home program  Planned modality interventions: cryotherapy and thermotherapy: hydrocollator packs    Planned therapy interventions: joint mobilization, manual therapy, neuromuscular re-education, strengthening, stretching, therapeutic activities, therapeutic exercise, therapeutic training, transfer training, IADL retraining, home exercise program, graded motor, graded exercise,  "graded activity, gait training, functional ROM exercises, flexibility, abdominal trunk stabilization, activity modification, balance, behavior modification and body mechanics training    Frequency: 2x week  Duration in weeks: 10  Treatment plan discussed with: patient  Plan details:  is Pavel.          Subjective Evaluation    History of Present Illness  Date of onset: 2025  Date of surgery: 25.  Mechanism of injury: Pt presents today stating that overall she is feeling well.  Reports at worst pain is 5-6/10 this is with prolong standing or walking.  Still using SPC, desires to be able to alternate elevations, further reduce pain, improve strength, endurance, and eventually be without SPC.  Pt follows up with Dr. Wilson 25.      Quality of life: good    Patient Goals  Patient goals for therapy: increased strength, return to sport/leisure activities, increased motion, decreased pain and improved balance    Pain  Current pain ratin  At best pain ratin  At worst pain ratin  Location: L Knee    Treatments  Previous treatment: medication, injection treatment and physical therapy        Objective     Active Range of Motion   Left Knee   Flexion: 125 (PROM 130) degrees   Extension: 0 degrees     Right Knee   Flexion: 130 degrees   Extension: 0 degrees     Additional Active Range of Motion Details  Gait: nil AD antalgia with L > R.    LE Strength  L Knee 5/5; Hip Flexion 4-/5; Remainder Hip 5  TU.7 sec RW // 11.5 nil AD.    5x Sit <-->Stand: 20.25 sec, UE support // nil UE support 12.82\".    Independent with Supine <--> Sit transfers  DL Squat: 95  Elevations: alternate up and down B/L UE support fair eccentrics on decent.                       Precautions: S/P L TKA Revision secondary to infection with Dr. Wilson on 10/3/24, Falls, Hx of PE, Anemia, CHF, Osteoporosis.      Precautions: Spacer L TKA Dr. Wilson 25.  S/P L TKA Revision secondary to infection with Dr. Wilson on " "10/3/24, Falls, Hx of PE, Anemia, CHF, Osteoporosis.      Insurance Billing Rule ReEval POC expires Auth Status Total   Visits  Start date  Expiration date PT/OT + Visit Limit? Co-Insurance Misc   [facility] CMS 7/16/25 8/16/25 Approved 30 6/16 8/10/25 BOMN No CBC secondary, 30 PT OP visits                                                            Visit/Unit Tracking  AUTH Status: Approved Date 6/25 6/30 7/2 7/7 7/10 7/14 7/16              Visits  Authed: 30 Used 5 6 7 8 9 10 11               Remaining  25 24 23 22 21 20 19                          Manuals 7/16 6/18 6/23 6/25 6/30 7/2 7/7 7/10 7/14   Education of procedure and recovery            PROM L Knee TAS TB TB TAS TAS TAS CS TAS TAS                           Neuro Re-Ed            NBOS EC     1 min EC Foam.   `1 min EC Foam 1' EC foam 1 min EC Foam 1 min EC foam               Hip Abd/Ext      nv 10/ea x 2 10 x 2  10 x 2   SLR A/AROM    3 x 5 3 x 5 4 x 5 10 x 2 10 x 2 10 x 2   AP ML walk                                    Ther Ex            Bike 5 min 8 min 6' 6 min 6 min 6 min 5' 5 min 5 min   AP for circulation            QS/GS   20x5s; w/ towel roll :03 20 3\" x 20 3\" x 20 3\" x 20      HS              Gastroc towel ST Wedge 20\" x 4   Wedge 20\" x 4 Wedge 20\" x 4 Wedge 20\" x 4 Wedge 20\" x 4 Wedge 20\" x 4 Wedge 20\" x 4   LAQ 3# 30x 20x 20 20x 20x 30x 30 30x 2# 30x 3#   HR/TR 30x 20x 20 20x 20x 30x 30 30 30   Heel Slides   20x 20x 20x 20x      LP + HR            HEP review           Ther Activity            Sit to Stand            Mini Squats 20x 20x 20 20x 20x 20x 20x 20x 20x    Elevations            FSU FF 2x10; 4in 20 4'' 20 x 4\" 20\" x 4 20\" x 4 4\" x 25 6\" x 25 6\"  x 25   LRD progression                        Modalities            CP to L Knee prn Declined  10 min post 10' post 10 min 10 min dec                    "

## 2025-07-17 DIAGNOSIS — I50.32 CHRONIC HEART FAILURE WITH PRESERVED EJECTION FRACTION (HCC): ICD-10-CM

## 2025-07-17 LAB — DSDNA AB SER QL CLIF: NEGATIVE

## 2025-07-17 RX ORDER — TORSEMIDE 20 MG/1
20 TABLET ORAL DAILY
Qty: 90 TABLET | Refills: 1 | Status: SHIPPED | OUTPATIENT
Start: 2025-07-17

## 2025-07-18 LAB
CARDIOLIPIN IGA SER IA-ACNC: 1
CARDIOLIPIN IGG SER IA-ACNC: 1.1
CARDIOLIPIN IGM SER IA-ACNC: <0.9

## 2025-07-21 ENCOUNTER — APPOINTMENT (OUTPATIENT)
Dept: PHYSICAL THERAPY | Facility: CLINIC | Age: 60
End: 2025-07-21
Payer: MEDICARE

## 2025-07-21 LAB — MISCELLANEOUS LAB TEST RESULT: NORMAL

## 2025-07-22 ENCOUNTER — TELEPHONE (OUTPATIENT)
Age: 60
End: 2025-07-22

## 2025-07-22 DIAGNOSIS — Z96.652 STATUS POST REVISION OF TOTAL KNEE, LEFT: Primary | ICD-10-CM

## 2025-07-22 RX ORDER — CLINDAMYCIN HYDROCHLORIDE 300 MG/1
CAPSULE ORAL
Qty: 2 CAPSULE | Refills: 5 | Status: SHIPPED | OUTPATIENT
Start: 2025-07-22 | End: 2025-08-22

## 2025-07-22 NOTE — TELEPHONE ENCOUNTER
Caller: Na     Doctor: Dr. Wilson     Reason for call: Patient had a knee replacement  June 11,2025. Patient broke a tooth yesterday and has an appointment with her dentist tomorrow at 12 pm. Patient needs to know if she can be seen by the dentist for her broken tooth and if there are any guidelines. Please return call and advise.     Call back#: [phone]

## 2025-07-23 ENCOUNTER — OFFICE VISIT (OUTPATIENT)
Dept: PHYSICAL THERAPY | Facility: CLINIC | Age: 60
End: 2025-07-23
Payer: MEDICARE

## 2025-07-23 DIAGNOSIS — M25.562 ACUTE PAIN OF LEFT KNEE: Primary | ICD-10-CM

## 2025-07-23 DIAGNOSIS — Z51.89 AFTERCARE: ICD-10-CM

## 2025-07-23 PROCEDURE — 97110 THERAPEUTIC EXERCISES: CPT | Performed by: PHYSICAL THERAPIST

## 2025-07-23 PROCEDURE — 97140 MANUAL THERAPY 1/> REGIONS: CPT | Performed by: PHYSICAL THERAPIST

## 2025-07-23 NOTE — TELEPHONE ENCOUNTER
Called patient and relayed this was sent in. She also asked me to call her dentist at the below number to relay it was fine to proceed with dental work.    [phone]     Called dental office and relayed this.

## 2025-07-23 NOTE — PROGRESS NOTES
"Daily Note     Today's date: 2025  Patient name: Na Joseph  : 1965  MRN: [id number]  Referring provider: Amina Goldberg PA-C  Dx:   Encounter Diagnosis     ICD-10-CM    1. Acute pain of left knee  M25.562       2. Aftercare  Z51.89                        Subjective: Pt presents today without SPC.  Sore in her shin with standing and walking.      Objective: See treatment diary below    PROM L knee 0-131    Assessment:  Proceeded with further CKC resistant based intervention.  Fair Eccentrics with elevation descent.  Continue to progress as able.        Plan: Continue per plan of care.      Precautions: Spacer L TKA Dr. Wilson 25.  S/P L TKA Revision secondary to infection with Dr. Wilson on 10/3/24, Falls, Hx of PE, Anemia, CHF, Osteoporosis.      Insurance Billing Rule ReEval POC expires Auth Status Total   Visits  Start date  Expiration date PT/OT + Visit Limit? Co-Insurance Misc   [facility] CMS 25 Approved 30 6/16 8/10/25 BOMN No CBC secondary, 30 PT OP visits                                                            Visit/Unit Tracking  AUTH Status: Approved Date 6/25 6/30 7/2 7/7 7/10 7/14 7/16 7/23             Visits  Authed: 30 Used 5 6 7 8 9 10 11 12              Remaining  25 24 23 22 21 20 19 19                         Manuals 7/16 7/23 6/23 6/25 6/30 7/2 7/7 7/10 7/14   Education of procedure and recovery            PROM L Knee TAS TAS TB TAS TAS TAS CS TAS TAS                           Neuro Re-Ed            NBOS EC     1 min EC Foam.   `1 min EC Foam 1' EC foam 1 min EC Foam 1 min EC foam               Hip Abd/Ext      nv 10/ea x 2 10 x 2  10 x 2   SLR A/AROM    3 x 5 3 x 5 4 x 5 10 x 2 10 x 2 10 x 2   AP ML walk                                    Ther Ex            Bike 5 min 6 min 6' 6 min 6 min 6 min 5' 5 min 5 min   AP for circulation            QS/GS    :03 20 3\" x 20 3\" x 20 3\" x 20      HS              Gastroc towel ST Wedge 20\" x 4 Wedge 20\" x 4  Wedge 20\" x 4 Wedge " "20\" x 4 Wedge 20\" x 4 Wedge 20\" x 4 Wedge 20\" x 4 Wedge 20\" x 4   LAQ 3# 30x 5# 30x 20 20x 20x 30x 30 30x 2# 30x 3#   HR/TR 30x 30x 20 20x 20x 30x 30 30 30   Heel Slides   20x 20x 20x 20x      LP + HR  85# 2 x 10          HEP review           Ther Activity            Sit to Stand            Mini Squats 20x 20x 20 20x 20x 20x 20x 20x 20x    Elevations            FSU FF FF 20 4'' 20 x 4\" 20\" x 4 20\" x 4 4\" x 25 6\" x 25 6\"  x 25   LRD progression                        Modalities            CP to L Knee prn Declined  dec 10' post 10 min 10 min dec                           "

## 2025-07-25 ENCOUNTER — OFFICE VISIT (OUTPATIENT)
Dept: HEMATOLOGY ONCOLOGY | Facility: CLINIC | Age: 60
End: 2025-07-25
Payer: MEDICARE

## 2025-07-25 ENCOUNTER — APPOINTMENT (OUTPATIENT)
Dept: LAB | Facility: CLINIC | Age: 60
End: 2025-07-25
Attending: PHYSICIAN ASSISTANT
Payer: MEDICARE

## 2025-07-25 ENCOUNTER — HOSPITAL ENCOUNTER (OUTPATIENT)
Dept: RADIOLOGY | Facility: MEDICAL CENTER | Age: 60
Discharge: HOME | End: 2025-07-25
Attending: PHYSICAL MEDICINE & REHABILITATION | Admitting: PHYSICAL MEDICINE & REHABILITATION
Payer: MEDICARE

## 2025-07-25 ENCOUNTER — TELEPHONE (OUTPATIENT)
Dept: HEMATOLOGY ONCOLOGY | Facility: CLINIC | Age: 60
End: 2025-07-25

## 2025-07-25 VITALS
RESPIRATION RATE: 18 BRPM | HEIGHT: 62 IN | OXYGEN SATURATION: 96 % | HEART RATE: 97 BPM | TEMPERATURE: 97.9 F | BODY MASS INDEX: 42.8 KG/M2 | SYSTOLIC BLOOD PRESSURE: 128 MMHG | DIASTOLIC BLOOD PRESSURE: 74 MMHG

## 2025-07-25 VITALS
OXYGEN SATURATION: 97 % | DIASTOLIC BLOOD PRESSURE: 72 MMHG | TEMPERATURE: 97.5 F | RESPIRATION RATE: 20 BRPM | HEART RATE: 86 BPM | SYSTOLIC BLOOD PRESSURE: 138 MMHG

## 2025-07-25 DIAGNOSIS — I26.99 PULMONARY EMBOLISM ASSOCIATED WITH COVID-19 (HCC): ICD-10-CM

## 2025-07-25 DIAGNOSIS — U07.1 PULMONARY EMBOLISM ASSOCIATED WITH COVID-19 (HCC): Primary | ICD-10-CM

## 2025-07-25 DIAGNOSIS — M47.816 LUMBAR SPONDYLOSIS: ICD-10-CM

## 2025-07-25 DIAGNOSIS — I26.99 PULMONARY EMBOLISM ASSOCIATED WITH COVID-19 (HCC): Primary | ICD-10-CM

## 2025-07-25 DIAGNOSIS — U07.1 PULMONARY EMBOLISM ASSOCIATED WITH COVID-19 (HCC): ICD-10-CM

## 2025-07-25 DIAGNOSIS — E53.8 B12 DEFICIENCY: ICD-10-CM

## 2025-07-25 DIAGNOSIS — D50.9 IRON DEFICIENCY ANEMIA, UNSPECIFIED IRON DEFICIENCY ANEMIA TYPE: ICD-10-CM

## 2025-07-25 DIAGNOSIS — D68.61 ANTIPHOSPHOLIPID ANTIBODY SYNDROME (HCC): ICD-10-CM

## 2025-07-25 DIAGNOSIS — M81.0 AGE-RELATED OSTEOPOROSIS WITHOUT CURRENT PATHOLOGICAL FRACTURE: ICD-10-CM

## 2025-07-25 LAB
INR PPP: 2.33 (ref 0.85–1.19)
PROTHROMBIN TIME: 26.3 SECONDS (ref 12.3–15)

## 2025-07-25 PROCEDURE — 64493 INJ PARAVERT F JNT L/S 1 LEV: CPT | Performed by: PHYSICAL MEDICINE & REHABILITATION

## 2025-07-25 PROCEDURE — 36415 COLL VENOUS BLD VENIPUNCTURE: CPT

## 2025-07-25 PROCEDURE — 99214 OFFICE O/P EST MOD 30 MIN: CPT | Performed by: PHYSICIAN ASSISTANT

## 2025-07-25 PROCEDURE — 64494 INJ PARAVERT F JNT L/S 2 LEV: CPT | Performed by: PHYSICAL MEDICINE & REHABILITATION

## 2025-07-25 PROCEDURE — 85610 PROTHROMBIN TIME: CPT

## 2025-07-25 RX ORDER — NEEDLES, DISPOSABLE 25GX5/8"
NEEDLE, DISPOSABLE MISCELLANEOUS
Qty: 10 EACH | Refills: 3 | Status: SHIPPED | OUTPATIENT
Start: 2025-07-25

## 2025-07-25 RX ORDER — CYANOCOBALAMIN 1000 UG/ML
INJECTION, SOLUTION INTRAMUSCULAR; SUBCUTANEOUS
Qty: 10 ML | Refills: 3 | Status: SHIPPED | OUTPATIENT
Start: 2025-07-25

## 2025-07-25 RX ORDER — BUPIVACAINE HYDROCHLORIDE 5 MG/ML
3 INJECTION, SOLUTION EPIDURAL; INTRACAUDAL; PERINEURAL ONCE
Status: COMPLETED | OUTPATIENT
Start: 2025-07-25 | End: 2025-07-25

## 2025-07-25 RX ADMIN — BUPIVACAINE HYDROCHLORIDE 3 ML: 5 INJECTION, SOLUTION EPIDURAL; INTRACAUDAL; PERINEURAL at 14:23

## 2025-07-25 NOTE — ASSESSMENT & PLAN NOTE
06/24 DXA bone scan showing osteoporosis  Saw Rheumatologist Dr. Loredo on 7/3/25- due for repeat DXA scan , searching for new endocrinologist for tx  Continue Vit D3 supplement    Orders:    DXA bone density spine hip and pelvis; Future

## 2025-07-25 NOTE — TELEPHONE ENCOUNTER
When DXA scan was mentioned patient said she would wait until she got home to schedule scan. Asked if she would like me to schedule it for her, but patient insisted that she schedule the scan herself.

## 2025-07-25 NOTE — ASSESSMENT & PLAN NOTE
"B12 1000mcg IM monthly.   8/14/23    B12 level 328  Frequency Increase. B12 1000mcg IM q 2 weeks      2/5/24 B12 505  8/23/24 B12 687  1/17/25 305  2 additional doses of B12 IM when she received IV iron  5/23/25 B12 565    7/14/25- 392  Continue IM B12 1000mcg q2wks  Monitor level  Orders:    CBC and differential; Future    Vitamin B12; Future    cyanocobalamin 1,000 mcg/mL; 1000 mcg IM Q 2 weeks    NEEDLE, DISP, 23 G (BD Disp Needle) 23G X 1\" MISC; Use to administer B12 IM    "

## 2025-07-25 NOTE — ASSESSMENT & PLAN NOTE
History of  Positive LA; history of + anticardiolipin antibody.       2/2015 positive lupus anticoagulant  7/2016 positive lupus anticoagulant  9/2022 positive lupus anticoagulant  2/2023 no lupus anticoagulant was detected  07/2025 no lupus anticoagulant detected     2/2015 normal beta-2 glycoprotein antibodies  12/20/2018 normal beta-2 glycoprotein antibodies  4/2021 normal beta-2 glycoprotein antibodies  1/2023 normal beta-2 glycoprotein antibodies        2/2015 IgM anticardiolipin antibody 14.5  7/2016  normal anticardiolipin antibodies  10/2017  IgM anticardiolipin antibody 18 (Indeterminate). Normal IgG anticardiolipin antibody , normal IgA anticardiolipin antibody.  2/2018  normal anticardiolipin antibodies  6/2018 normal anticardiolipin antibodies  12/2018 normal anticardiolipin antibodies  4/2019 normal anticardiolipin antibodies  10/2019 normal anticardiolipin antibodies  10/2020 normal anticardiolipin antibodies  4/2021 normal anticardiolipin antibodies  9/2022 normal anticardiolipin antibodies  11/2024 normal anticardiolipin antibodies  07/2025 normal anticardiolipin anitbodies

## 2025-07-28 ENCOUNTER — OFFICE VISIT (OUTPATIENT)
Dept: PHYSICAL THERAPY | Facility: CLINIC | Age: 60
End: 2025-07-28
Payer: MEDICARE

## 2025-07-28 ENCOUNTER — TELEPHONE (OUTPATIENT)
Dept: RADIOLOGY | Facility: MEDICAL CENTER | Age: 60
End: 2025-07-28

## 2025-07-28 ENCOUNTER — OFFICE VISIT (OUTPATIENT)
Age: 60
End: 2025-07-28

## 2025-07-28 VITALS — HEIGHT: 62 IN | WEIGHT: 234 LBS | BODY MASS INDEX: 43.06 KG/M2

## 2025-07-28 DIAGNOSIS — M25.562 ACUTE PAIN OF LEFT KNEE: Primary | ICD-10-CM

## 2025-07-28 DIAGNOSIS — Z96.652 STATUS POST REVISION OF TOTAL KNEE, LEFT: Primary | ICD-10-CM

## 2025-07-28 DIAGNOSIS — Z51.89 AFTERCARE: ICD-10-CM

## 2025-07-28 PROCEDURE — 97140 MANUAL THERAPY 1/> REGIONS: CPT | Performed by: PHYSICAL THERAPIST

## 2025-07-28 PROCEDURE — 97110 THERAPEUTIC EXERCISES: CPT | Performed by: PHYSICAL THERAPIST

## 2025-07-28 PROCEDURE — 99024 POSTOP FOLLOW-UP VISIT: CPT | Performed by: STUDENT IN AN ORGANIZED HEALTH CARE EDUCATION/TRAINING PROGRAM

## 2025-07-30 ENCOUNTER — TELEPHONE (OUTPATIENT)
Age: 60
End: 2025-07-30

## 2025-07-30 ENCOUNTER — APPOINTMENT (OUTPATIENT)
Dept: PHYSICAL THERAPY | Facility: CLINIC | Age: 60
End: 2025-07-30
Payer: MEDICARE

## 2025-07-31 ENCOUNTER — OFFICE VISIT (OUTPATIENT)
Dept: PAIN MEDICINE | Facility: MEDICAL CENTER | Age: 60
End: 2025-07-31

## 2025-07-31 ENCOUNTER — TELEPHONE (OUTPATIENT)
Dept: NON INVASIVE DIAGNOSTICS | Facility: HOSPITAL | Age: 60
End: 2025-07-31

## 2025-07-31 DIAGNOSIS — M47.816 LUMBAR SPONDYLOSIS: Primary | ICD-10-CM

## 2025-07-31 DIAGNOSIS — G89.29 CHRONIC BILATERAL LOW BACK PAIN WITH LEFT-SIDED SCIATICA: ICD-10-CM

## 2025-07-31 DIAGNOSIS — M54.16 LUMBAR RADICULITIS: ICD-10-CM

## 2025-07-31 DIAGNOSIS — M54.42 CHRONIC BILATERAL LOW BACK PAIN WITH LEFT-SIDED SCIATICA: ICD-10-CM

## 2025-07-31 DIAGNOSIS — I27.20 PULMONARY HYPERTENSION (HCC): ICD-10-CM

## 2025-08-01 ENCOUNTER — PATIENT MESSAGE (OUTPATIENT)
Age: 60
End: 2025-08-01

## 2025-08-01 DIAGNOSIS — Z96.652 STATUS POST REVISION OF TOTAL KNEE, LEFT: ICD-10-CM

## 2025-08-01 RX ORDER — SILDENAFIL CITRATE 20 MG/1
40 TABLET ORAL 3 TIMES DAILY
Qty: 180 TABLET | Refills: 2 | Status: SHIPPED | OUTPATIENT
Start: 2025-08-01

## 2025-08-01 RX ORDER — GABAPENTIN 100 MG/1
100 CAPSULE ORAL 3 TIMES DAILY
Qty: 90 CAPSULE | Refills: 5 | Status: SHIPPED | OUTPATIENT
Start: 2025-08-01

## 2025-08-07 ENCOUNTER — HOSPITAL ENCOUNTER (OUTPATIENT)
Dept: ULTRASOUND IMAGING | Facility: HOSPITAL | Age: 60
End: 2025-08-07
Attending: ORTHOPAEDIC SURGERY
Payer: MEDICARE

## 2025-08-08 ENCOUNTER — APPOINTMENT (OUTPATIENT)
Dept: LAB | Facility: CLINIC | Age: 60
End: 2025-08-08
Attending: PHYSICIAN ASSISTANT
Payer: MEDICARE

## 2025-08-10 ENCOUNTER — HOSPITAL ENCOUNTER (OUTPATIENT)
Dept: MRI IMAGING | Facility: HOSPITAL | Age: 60
Discharge: HOME | End: 2025-08-10
Payer: MEDICARE

## 2025-08-21 ENCOUNTER — TELEPHONE (OUTPATIENT)
Dept: CARDIOLOGY CLINIC | Facility: CLINIC | Age: 60
End: 2025-08-21

## (undated) DEVICE — PREMIUM DRY TRAY LF: Brand: MEDLINE INDUSTRIES, INC.

## (undated) DEVICE — SUT ETHILON 2-0 FSLX 30 IN 1674H

## (undated) DEVICE — REM POLYHESIVE ADULT PATIENT RETURN ELECTRODE: Brand: VALLEYLAB

## (undated) DEVICE — CEMENT MIXING TOWER

## (undated) DEVICE — BETHLEHEM UNIV TOTAL KNEE, KIT: Brand: CARDINAL HEALTH

## (undated) DEVICE — TRAY FOLEY 16FR URIMETER SURESTEP

## (undated) DEVICE — TUBING ARTHROSCOPY REDUCE PATIENT

## (undated) DEVICE — PADDING CAST 4 IN  COTTON STRL

## (undated) DEVICE — SUT STRATAFIX SPIRAL PDS PLUS 1 CTX 18 IN SXPP1A400

## (undated) DEVICE — DRAPE EQUIPMENT RF WAND

## (undated) DEVICE — SYRINGE 10ML LL

## (undated) DEVICE — NEEDLE 22 G X 1 1/2 SAFETY

## (undated) DEVICE — ACE WRAP 6 IN STERILE

## (undated) DEVICE — SUT ETHILON 3-0 PS-1 18 IN 1663G

## (undated) DEVICE — NEEDLE 18 G X 1 1/2 SAFETY

## (undated) DEVICE — ACE WRAP 6 IN UNSTERILE

## (undated) DEVICE — OCCLUSIVE GAUZE STRIP,3% BISMUTH TRIBROMOPHENATE IN PETROLATUM BLEND: Brand: XEROFORM

## (undated) DEVICE — VIAL DECANTER

## (undated) DEVICE — YELLOW BOAT

## (undated) DEVICE — HOOD: Brand: FLYTE, SURGICOOL

## (undated) DEVICE — PEEL-AWAY HOOD: Brand: FLYTE, SURGICOOL

## (undated) DEVICE — SYRINGE 20ML LL

## (undated) DEVICE — GLOVE SRG BIOGEL ECLIPSE 8

## (undated) DEVICE — SMOKE EVAC FLUID SUCTION HEPA FILTER

## (undated) DEVICE — HANDPIECE SET WITH RETRACTABLE COAXIAL FAN SPRAY TIP AND SUCTION TUBE: Brand: INTERPULSE

## (undated) DEVICE — CAPIT KNEE ATTUNE RP CEMENT - DEPUY

## (undated) DEVICE — PADDING,UNDERCAST,COTTON, 4"X4YD STERILE: Brand: MEDLINE

## (undated) DEVICE — SUT STRATAFIX SPIRAL PLUS 3-0 PS-2 30 X 30 CM SXMP2B408

## (undated) DEVICE — INTENDED FOR TISSUE SEPARATION, AND OTHER PROCEDURES THAT REQUIRE A SHARP SURGICAL BLADE TO PUNCTURE OR CUT.: Brand: BARD-PARKER ® CARBON RIB-BACK BLADES

## (undated) DEVICE — GLOVE INDICATOR PI UNDERGLOVE SZ 8.5 BLUE

## (undated) DEVICE — PENCIL ELECTROSURG E-Z CLEAN -0035H

## (undated) DEVICE — 450 ML BOTTLE OF 0.05% CHLORHEXIDINE GLUCONATE IN 99.95% STERILE WATER FOR IRRIGATION, USP AND APPLICATOR.: Brand: IRRISEPT ANTIMICROBIAL WOUND LAVAGE

## (undated) DEVICE — GLOVE SRG BIOGEL ECLIPSE 7

## (undated) DEVICE — SPONGE PVP SCRUB WING STERILE

## (undated) DEVICE — ADHESIVE SKIN CLOSR DERMABOND PRINEO

## (undated) DEVICE — BAG WOUND LAVAGE 1L BIASURGE ADV

## (undated) DEVICE — SYRINGE 30ML LL

## (undated) DEVICE — INTENDED FOR TISSUE SEPARATION, AND OTHER PROCEDURES THAT REQUIRE A SHARP SURGICAL BLADE TO PUNCTURE OR CUT.: Brand: BARD-PARKER SAFETY BLADES SIZE 10, STERILE

## (undated) DEVICE — SPECIMEN CONTAINER STERILE PEEL PACK

## (undated) DEVICE — CHLORAPREP HI-LITE 26ML ORANGE

## (undated) DEVICE — ABDOMINAL PAD: Brand: DERMACEA

## (undated) DEVICE — COBAN 4 IN STERILE

## (undated) DEVICE — TUBING ARTHROSCOPY REDUCE PUMP

## (undated) DEVICE — GLOVE INDICATOR PI UNDERGLOVE SZ 8 BLUE

## (undated) DEVICE — IMPERVIOUS STOCKINETTE: Brand: DEROYAL

## (undated) DEVICE — NEPTUNE E-SEP SMOKE EVACUATION PENCIL, COATED, 70MM BLADE, PUSH BUTTON SWITCH: Brand: NEPTUNE E-SEP

## (undated) DEVICE — BLADE SHAVER DISSECTOR 4MM 13CM COOLCUT

## (undated) DEVICE — EXOFIN PRECISION PEN HIGH VISCOSITY TOPICAL SKIN ADHESIVE: Brand: EXOFIN PRECISION PEN, 1G

## (undated) DEVICE — SUT VICRYL 0 CT-1 27 IN J260H

## (undated) DEVICE — GLOVE SRG BIOGEL 8.5

## (undated) DEVICE — SUT STRATAFIX SPIRAL 1-0 PDO 36 X 36 CM SXPD2B405

## (undated) DEVICE — SUT STRATAFIX SPIRAL MONOCRYL PLUS 3-0 PS-2 45CM SXMP1B107

## (undated) DEVICE — SUT VICRYL 1 CT-1 27 IN J261H

## (undated) DEVICE — ACE WRAP 4 IN STERILE

## (undated) DEVICE — SPONGE SCRUB 4 PCT CHLORHEXIDINE

## (undated) DEVICE — THE SIMPULSE SOLO SYSTEM WITH ULTREX RETRACTABLE SPLASH SHIELD TIP: Brand: SIMPULSE SOLO

## (undated) DEVICE — 3M™ IOBAN™ 2 ANTIMICROBIAL INCISE DRAPE 6650EZ: Brand: IOBAN™ 2

## (undated) DEVICE — ANTI-EMBOLISM STOCKINGS,THIGH LENGTH,LARGE,REGULAR,SIZE H: Brand: T.E.D.

## (undated) DEVICE — TUBING SUCTION 5MM X 12 FT

## (undated) DEVICE — DERMABOND PRINEO

## (undated) DEVICE — PLUMEPEN PRO 10FT

## (undated) DEVICE — COBAN 6 IN STERILE

## (undated) DEVICE — ANTIBACTERIAL UNDYED BRAIDED (POLYGLACTIN 910), SYNTHETIC ABSORBABLE SUTURE: Brand: COATED VICRYL

## (undated) DEVICE — SURGICAL GOWN, XL SMARTSLEEVE: Brand: CONVERTORS

## (undated) DEVICE — ADHESIVE SKIN HIGH VISCOSITY EXOFIN 1ML

## (undated) DEVICE — Device

## (undated) DEVICE — DUAL CUT SAGITTAL BLADE

## (undated) DEVICE — FIBER BALL TIP QUANTA 272 MICRON

## (undated) DEVICE — LIGHT HANDLE COVER SLEEVE DISP BLUE STELLAR

## (undated) DEVICE — NEEDLE 18 G X 1 1/2

## (undated) DEVICE — COOL TEMP PAD

## (undated) DEVICE — CEMENT MIXING SYSTEM WITH FEMORAL BREAKWAY NOZZLE: Brand: REVOLUTION

## (undated) DEVICE — SCD SEQUENTIAL COMPRESSION COMFORT SLEEVE MEDIUM KNEE LENGTH: Brand: KENDALL SCD

## (undated) DEVICE — SYRINGE 3ML LL

## (undated) DEVICE — GAUZE SPONGES,16 PLY: Brand: CURITY

## (undated) DEVICE — HEAVY DUTY TABLE COVER: Brand: CONVERTORS

## (undated) DEVICE — GUIDEWIRE STRGHT TIP 0.035 IN  SOLO PLUS

## (undated) DEVICE — GLOVE SRG BIOGEL ORTHOPEDIC 8.5

## (undated) DEVICE — CARDINAL HEALTH LAP SPONGE  8 X 36 IN. PREWASHED X-RAY DETECTABLE SOFTPACK: Brand: CARDINAL HEALTH

## (undated) DEVICE — GLOVE SRG BIOGEL 8

## (undated) DEVICE — BETHLEHEM UNIVERSAL  ARTHRO PK: Brand: CARDINAL HEALTH

## (undated) DEVICE — TEDS THIGH LG REG

## (undated) DEVICE — 3M™ STERI-DRAPE™ U-DRAPE 1015: Brand: STERI-DRAPE™

## (undated) DEVICE — SUT VICRYL 2-0 CT-1 36 IN J945H

## (undated) DEVICE — GLOVE INDICATOR PI UNDERGLOVE SZ 6.5 BLUE

## (undated) DEVICE — ALL PURPOSE SPONGES,NON-WOVEN, 4 PLY: Brand: CURITY

## (undated) DEVICE — CUFF TOURNIQUET 34 X 4 IN QUICK CONNECT DISP 1BLA

## (undated) DEVICE — GLOVE SRG BIOGEL 6.5

## (undated) DEVICE — I DISPOSABLE MIXING BOWL AND SPATULA: Brand: HOWMEDICA, MIX-KIT

## (undated) DEVICE — RECIPROCATING BLADE, DOUBLE SIDED (70.0 X 0.96 X 12.5MM)

## (undated) DEVICE — RECIPROCATING BLADE, DOUBLE SIDED, OFFSET  (70.0 X 0.64 X 12.6MM)

## (undated) DEVICE — SUT STRATAFIX SPIRAL 3-0 PGA/PCL 30 X 30 CM SXMD2B408

## (undated) DEVICE — SUT STRATAFIX SPIRAL 2-0 CT-1 30 CM SXPD1B401

## (undated) DEVICE — PREP SURGICAL PURPREP 26ML

## (undated) DEVICE — VAPR COOLPULSE 90 ELECTRODE 90 DEGREES SUCTION WITH INTEGRATED HANDPIECE: Brand: VAPR COOLPULSE

## (undated) DEVICE — SAW BLADE OSCILLATING BRAZOL 167

## (undated) DEVICE — PACK TUR

## (undated) DEVICE — BETHLEHEM UNIV MAJ EXT ,KIT: Brand: CARDINAL HEALTH

## (undated) DEVICE — WEBRIL 6 IN UNSTERILE

## (undated) DEVICE — ANTIBACTERIAL VIOLET BRAIDED (POLYGLACTIN 910), SYNTHETIC ABSORBABLE SURGICAL SUTURE: Brand: COATED VICRYL

## (undated) DEVICE — CAPIT KNEE REVISION ATTUNE W/ SLEEVES

## (undated) DEVICE — SUT VICRYL 2-0 CT-1 27 IN J259H

## (undated) DEVICE — CATH URETERAL 5FR X 70 CM FLEX TIP POLYUR BARD

## (undated) DEVICE — SPONGE LAP 18 X 18 IN STRL RFD

## (undated) DEVICE — 4-PORT MANIFOLD: Brand: NEPTUNE 2